# Patient Record
Sex: MALE | Race: BLACK OR AFRICAN AMERICAN | Employment: FULL TIME | URBAN - METROPOLITAN AREA
[De-identification: names, ages, dates, MRNs, and addresses within clinical notes are randomized per-mention and may not be internally consistent; named-entity substitution may affect disease eponyms.]

---

## 2019-07-24 ENCOUNTER — HOSPITAL ENCOUNTER (INPATIENT)
Age: 59
LOS: 1 days | Discharge: HOME | DRG: 261 | End: 2019-07-26
Attending: EMERGENCY MEDICINE | Admitting: INTERNAL MEDICINE

## 2019-07-24 ENCOUNTER — APPOINTMENT (OUTPATIENT)
Dept: CT IMAGING | Age: 59
DRG: 261 | End: 2019-07-24

## 2019-07-24 DIAGNOSIS — N40.0 ENLARGED PROSTATE: ICD-10-CM

## 2019-07-24 DIAGNOSIS — K59.00 CONSTIPATION, UNSPECIFIED CONSTIPATION TYPE: ICD-10-CM

## 2019-07-24 DIAGNOSIS — R33.8 ACUTE RETENTION OF URINE: Primary | ICD-10-CM

## 2019-07-24 DIAGNOSIS — N17.9 ACUTE RENAL FAILURE, UNSPECIFIED ACUTE RENAL FAILURE TYPE (HCC): ICD-10-CM

## 2019-07-24 DIAGNOSIS — R19.7 DIARRHEA OF PRESUMED INFECTIOUS ORIGIN: ICD-10-CM

## 2019-07-24 DIAGNOSIS — K52.1 DIARRHEA DUE TO DRUG: ICD-10-CM

## 2019-07-24 PROBLEM — R33.9 URINARY RETENTION: Status: ACTIVE | Noted: 2019-07-24

## 2019-07-24 LAB
A/G RATIO: 1.2 (ref 1.1–2.2)
ALBUMIN SERPL-MCNC: 4.2 G/DL (ref 3.4–5)
ALP BLD-CCNC: 60 U/L (ref 40–129)
ALT SERPL-CCNC: 55 U/L (ref 10–40)
ANION GAP SERPL CALCULATED.3IONS-SCNC: 14 MMOL/L (ref 3–16)
AST SERPL-CCNC: 54 U/L (ref 15–37)
BASOPHILS ABSOLUTE: 0.1 K/UL (ref 0–0.2)
BASOPHILS RELATIVE PERCENT: 0.4 %
BILIRUB SERPL-MCNC: 1.9 MG/DL (ref 0–1)
BILIRUBIN URINE: NEGATIVE
BLOOD, URINE: ABNORMAL
BUN BLDV-MCNC: 24 MG/DL (ref 7–20)
CALCIUM SERPL-MCNC: 9.3 MG/DL (ref 8.3–10.6)
CHLORIDE BLD-SCNC: 102 MMOL/L (ref 99–110)
CLARITY: ABNORMAL
CO2: 24 MMOL/L (ref 21–32)
COLOR: YELLOW
CREAT SERPL-MCNC: 2.1 MG/DL (ref 0.8–1.3)
EOSINOPHILS ABSOLUTE: 0 K/UL (ref 0–0.6)
EOSINOPHILS RELATIVE PERCENT: 0 %
EPITHELIAL CELLS, UA: 0 /HPF (ref 0–5)
GFR AFRICAN AMERICAN: 39
GFR NON-AFRICAN AMERICAN: 32
GLOBULIN: 3.6 G/DL
GLUCOSE BLD-MCNC: 128 MG/DL (ref 70–99)
GLUCOSE URINE: NEGATIVE MG/DL
HCT VFR BLD CALC: 41.9 % (ref 40.5–52.5)
HEMOGLOBIN: 13.4 G/DL (ref 13.5–17.5)
HYALINE CASTS: 2 /LPF (ref 0–8)
KETONES, URINE: NEGATIVE MG/DL
LEUKOCYTE ESTERASE, URINE: NEGATIVE
LYMPHOCYTES ABSOLUTE: 0.6 K/UL (ref 1–5.1)
LYMPHOCYTES RELATIVE PERCENT: 5.1 %
MCH RBC QN AUTO: 25.9 PG (ref 26–34)
MCHC RBC AUTO-ENTMCNC: 31.9 G/DL (ref 31–36)
MCV RBC AUTO: 81.2 FL (ref 80–100)
MICROSCOPIC EXAMINATION: YES
MONOCYTES ABSOLUTE: 0.9 K/UL (ref 0–1.3)
MONOCYTES RELATIVE PERCENT: 7.2 %
NEUTROPHILS ABSOLUTE: 11 K/UL (ref 1.7–7.7)
NEUTROPHILS RELATIVE PERCENT: 87.3 %
NITRITE, URINE: NEGATIVE
PDW BLD-RTO: 15.7 % (ref 12.4–15.4)
PH UA: 6 (ref 5–8)
PLATELET # BLD: 259 K/UL (ref 135–450)
PMV BLD AUTO: 8.2 FL (ref 5–10.5)
POTASSIUM SERPL-SCNC: 4.3 MMOL/L (ref 3.5–5.1)
PROTEIN UA: 30 MG/DL
RBC # BLD: 5.16 M/UL (ref 4.2–5.9)
RBC UA: 593 /HPF (ref 0–4)
SODIUM BLD-SCNC: 140 MMOL/L (ref 136–145)
SPECIFIC GRAVITY UA: 1.01 (ref 1–1.03)
TOTAL PROTEIN: 7.8 G/DL (ref 6.4–8.2)
URINE REFLEX TO CULTURE: ABNORMAL
URINE TYPE: ABNORMAL
UROBILINOGEN, URINE: 0.2 E.U./DL
WBC # BLD: 12.6 K/UL (ref 4–11)
WBC UA: 3 /HPF (ref 0–5)

## 2019-07-24 PROCEDURE — 51702 INSERT TEMP BLADDER CATH: CPT

## 2019-07-24 PROCEDURE — 96361 HYDRATE IV INFUSION ADD-ON: CPT

## 2019-07-24 PROCEDURE — 80053 COMPREHEN METABOLIC PANEL: CPT

## 2019-07-24 PROCEDURE — 81001 URINALYSIS AUTO W/SCOPE: CPT

## 2019-07-24 PROCEDURE — 36415 COLL VENOUS BLD VENIPUNCTURE: CPT

## 2019-07-24 PROCEDURE — 74176 CT ABD & PELVIS W/O CONTRAST: CPT

## 2019-07-24 PROCEDURE — 2580000003 HC RX 258: Performed by: PHYSICIAN ASSISTANT

## 2019-07-24 PROCEDURE — 2580000003 HC RX 258: Performed by: INTERNAL MEDICINE

## 2019-07-24 PROCEDURE — 85025 COMPLETE CBC W/AUTO DIFF WBC: CPT

## 2019-07-24 PROCEDURE — 96360 HYDRATION IV INFUSION INIT: CPT

## 2019-07-24 PROCEDURE — 99284 EMERGENCY DEPT VISIT MOD MDM: CPT

## 2019-07-24 PROCEDURE — G0378 HOSPITAL OBSERVATION PER HR: HCPCS

## 2019-07-24 PROCEDURE — 6370000000 HC RX 637 (ALT 250 FOR IP): Performed by: INTERNAL MEDICINE

## 2019-07-24 RX ORDER — FINASTERIDE 5 MG/1
5 TABLET, FILM COATED ORAL DAILY
Status: DISCONTINUED | OUTPATIENT
Start: 2019-07-24 | End: 2019-07-26 | Stop reason: HOSPADM

## 2019-07-24 RX ORDER — AMLODIPINE BESYLATE 5 MG/1
5 TABLET ORAL DAILY
Status: DISCONTINUED | OUTPATIENT
Start: 2019-07-24 | End: 2019-07-25

## 2019-07-24 RX ORDER — ONDANSETRON 2 MG/ML
4 INJECTION INTRAMUSCULAR; INTRAVENOUS EVERY 6 HOURS PRN
Status: DISCONTINUED | OUTPATIENT
Start: 2019-07-24 | End: 2019-07-26 | Stop reason: HOSPADM

## 2019-07-24 RX ORDER — 0.9 % SODIUM CHLORIDE 0.9 %
500 INTRAVENOUS SOLUTION INTRAVENOUS ONCE
Status: COMPLETED | OUTPATIENT
Start: 2019-07-24 | End: 2019-07-24

## 2019-07-24 RX ORDER — SODIUM CHLORIDE 0.9 % (FLUSH) 0.9 %
10 SYRINGE (ML) INJECTION PRN
Status: DISCONTINUED | OUTPATIENT
Start: 2019-07-24 | End: 2019-07-26 | Stop reason: HOSPADM

## 2019-07-24 RX ORDER — TAMSULOSIN HYDROCHLORIDE 0.4 MG/1
0.4 CAPSULE ORAL DAILY
Status: DISCONTINUED | OUTPATIENT
Start: 2019-07-24 | End: 2019-07-26 | Stop reason: HOSPADM

## 2019-07-24 RX ORDER — SODIUM CHLORIDE 0.9 % (FLUSH) 0.9 %
10 SYRINGE (ML) INJECTION EVERY 12 HOURS SCHEDULED
Status: DISCONTINUED | OUTPATIENT
Start: 2019-07-24 | End: 2019-07-26 | Stop reason: HOSPADM

## 2019-07-24 RX ADMIN — TAMSULOSIN HYDROCHLORIDE 0.4 MG: 0.4 CAPSULE ORAL at 17:20

## 2019-07-24 RX ADMIN — AMLODIPINE BESYLATE 5 MG: 5 TABLET ORAL at 23:02

## 2019-07-24 RX ADMIN — FINASTERIDE 5 MG: 5 TABLET, FILM COATED ORAL at 17:20

## 2019-07-24 RX ADMIN — Medication 10 ML: at 23:02

## 2019-07-24 RX ADMIN — SODIUM CHLORIDE 500 ML: 9 INJECTION, SOLUTION INTRAVENOUS at 13:05

## 2019-07-24 SDOH — HEALTH STABILITY: MENTAL HEALTH: HOW OFTEN DO YOU HAVE A DRINK CONTAINING ALCOHOL?: NEVER

## 2019-07-24 ASSESSMENT — PAIN SCALES - GENERAL
PAINLEVEL_OUTOF10: 0
PAINLEVEL_OUTOF10: 9

## 2019-07-24 ASSESSMENT — ENCOUNTER SYMPTOMS
BLOOD IN STOOL: 0
COUGH: 0
VOMITING: 0
BACK PAIN: 0
CONSTIPATION: 1
ABDOMINAL PAIN: 1
WHEEZING: 0
DIARRHEA: 0
COLOR CHANGE: 0
SHORTNESS OF BREATH: 0
STRIDOR: 0
RECTAL PAIN: 0
ABDOMINAL DISTENTION: 1
ANAL BLEEDING: 0
NAUSEA: 0

## 2019-07-24 ASSESSMENT — PAIN DESCRIPTION - LOCATION: LOCATION: ABDOMEN

## 2019-07-24 NOTE — ED PROVIDER NOTES
meetings of clubs or organizations: None     Relationship status: None    Intimate partner violence:     Fear of current or ex partner: None     Emotionally abused: None     Physically abused: None     Forced sexual activity: None   Other Topics Concern    None   Social History Narrative    None       SCREENINGS             PHYSICAL EXAM    (up to 7 for level 4, 8 or more for level 5)     ED Triage Vitals [07/24/19 1052]   BP Temp Temp Source Pulse Resp SpO2 Height Weight   (!) 172/129 97.9 °F (36.6 °C) Infrared 79 16 98 % -- 202 lb (91.6 kg)       Physical Exam   Constitutional: He is oriented to person, place, and time. He appears well-developed and well-nourished. No distress. HENT:   Head: Normocephalic and atraumatic. Right Ear: External ear normal.   Left Ear: External ear normal.   Nose: Nose normal.   Mouth/Throat: Oropharynx is clear and moist.   Eyes: Pupils are equal, round, and reactive to light. Conjunctivae and EOM are normal. Right eye exhibits no discharge. Left eye exhibits no discharge. No scleral icterus. Neck: Normal range of motion. No JVD present. Cardiovascular: Normal rate. Pulmonary/Chest: Effort normal and breath sounds normal.   Abdominal: Soft. Bowel sounds are normal. He exhibits distension. He exhibits no abdominal bruit and no pulsatile midline mass. There is tenderness in the suprapubic area. There is no rigidity, no rebound, no guarding, no CVA tenderness, no tenderness at McBurney's point and negative Kebede's sign. Hernia confirmed negative in the right inguinal area and confirmed negative in the left inguinal area. Genitourinary: Testes normal and penis normal. Cremasteric reflex is present. Right testis shows no mass, no swelling and no tenderness. Right testis is descended. Cremasteric reflex is not absent on the right side. Left testis shows no mass, no swelling and no tenderness. Left testis is descended. Cremasteric reflex is not absent on the left side. Uncircumcised. No phimosis, paraphimosis, hypospadias, penile erythema or penile tenderness. No discharge found. Musculoskeletal: Normal range of motion. Lymphadenopathy:     He has no cervical adenopathy. No inguinal adenopathy noted on the right or left side. Neurological: He is alert and oriented to person, place, and time. Skin: Skin is warm and dry. Capillary refill takes less than 2 seconds. No rash noted. He is not diaphoretic. No erythema. No pallor. Psychiatric: He has a normal mood and affect. His behavior is normal.   Nursing note and vitals reviewed.       DIAGNOSTIC RESULTS   LABS:    Labs Reviewed   CBC WITH AUTO DIFFERENTIAL - Abnormal; Notable for the following components:       Result Value    WBC 12.6 (*)     Hemoglobin 13.4 (*)     MCH 25.9 (*)     RDW 15.7 (*)     Neutrophils # 11.0 (*)     Lymphocytes # 0.6 (*)     All other components within normal limits    Narrative:     Performed at:  [facility] [address] [address] [employer]   Phone [phone]   COMPREHENSIVE METABOLIC PANEL - Abnormal; Notable for the following components:    Glucose 128 (*)     BUN 24 (*)     CREATININE 2.1 (*)     GFR Non- 32 (*)     GFR  39 (*)     Total Bilirubin 1.9 (*)     ALT 55 (*)     AST 54 (*)     All other components within normal limits    Narrative:     Performed at:  [facility] [address] [address] [employer]   Phone [phone]   URINE RT REFLEX TO CULTURE - Abnormal; Notable for the following components:    Clarity, UA CLOUDY (*)     Blood, Urine LARGE (*)     Protein, UA 30 (*)     All other components within normal limits    Narrative:     Performed at:  [facility]  [address] [address] [employer]   Phone [phone]   MICROSCOPIC URINALYSIS - Abnormal; Notable for the following components:    RBC,  (*)     All other components

## 2019-07-24 NOTE — CONSULTS
symptoms located lower abdomen described as continuous and rated as severe. Patient also reports long history of LUTS as above. No prior urologic workup. Past Medical History:  He has no past medical history on file. Past Surgical History:  He has no past surgical history on file. Allergies:  No Known Allergies     Social History:  He reports that he has never smoked. He does not have any smokeless tobacco history on file. He reports that he does not drink alcohol or use drugs. Family History:  family history is not on file. Medications:  Scheduled Meds:   sodium chloride  500 mL Intravenous Once     Continuous Infusions:  PRN Meds:     Review of Systems:  Constitutional: Negative for fever   Genitourinary: + lower abdomen pain, diff urinating, see HPI  Eyes: negative for sudden change in vision  EENT: no complaints  Cardiovascular: Negative for chest pain  Respiratory: Negative for shortness of breath  Gastrointestinal: Negative for nausea  Musculoskeletal: Negative for back pain  Neurological: Negative for weakness  Psychiatric: Negative for anxiety  Integumentary: Negative for rashes or adenopathy    PHYSICAL EXAM     Vitals:    07/24/19 1305   BP: (!) 144/110   Pulse: 60   Resp: 16   Temp:    SpO2: 96%     Constitutional: NAD, well-developed, well-nourished. HEENT: MMM. Hearing intact. PERRL  Neck: no thyroid masses appreciated. Trachea is midline. Neck appears unremarkable  Lymph: no palpable adenopathy in supraclavicular, or axillary lymph nodes  Cardiovascular: Regular rate. No peripheral edema  Respiratory: Respirations are even and non-labored. No audible breath sounds. Genitourinary: uncircumcised penis, glans normal, no penile discharge. No rashes/lesions. Testes descended bilaterally, no masses, nontender to palpation. Remainder of scrotal contents normal. No hernia appreciated. MEET >50g no nodules  Abdomen: Soft.  No distension since catheter placement, no tenderness, hydronephrosis bilaterally. Severe bilateral perinephric edema with fluid tracking inferiorly within the perinephric and anterior pararenal spaces. 4 cm right renal cyst.  Remaining solid organs and gallbladder have an unremarkable noncontrast appearance. GI/Bowel: Colonic diverticulosis. No other gastrointestinal abnormality demonstrated Pelvis: Enlarged prostate. Don catheter in the urinary bladder. Moderately distended urinary bladder with mild wall thickening. No pelvic fluid Peritoneum/Retroperitoneum: No ascites or pneumoperitoneum. Aorta normal in caliber Bones/Soft Tissues: Diffuse lumbar degenerative disc disease     Enlarged prostate. Moderately distended urinary bladder with mild wall thickening compatible with chronic bladder outlet obstruction. There is a Don catheter in place. There is severe bilateral perinephric edema. This finding can be seen in the setting of acute renal insufficiency or obstructive uropathy.   There is no significant hydronephrosis currently, however if the Don catheter was recently placed, there may be recently relieved obstructive uropathy            Electronically signed by: Jonah Heath, 7/24/2019 1:13 PM  [facility]  Office contact: [phone]

## 2019-07-24 NOTE — PLAN OF CARE
minimized  Outcome: Ongoing  Goal: Cerebral tissue perfusion will improve  Description  Cerebral tissue perfusion will improve  Outcome: Ongoing     Problem: Coping:  Goal: Ability to identify and develop effective coping behavior will improve  Description  Ability to identify and develop effective coping behavior will improve  Outcome: Ongoing     Problem: Nutritional:  Goal: Ability to identify appropriate dietary choices will improve  Description  Ability to identify appropriate dietary choices will improve  Outcome: Ongoing

## 2019-07-24 NOTE — H&P
Sclera clear, pupils equal  ENT: Moist mucus membranes, no thrush. Trachea midline. Cardiovascular: Regular rhythm, normal S1, S2. No murmur, gallop, rub. No edema in lower extremities  Respiratory: Clear to auscultation bilaterally, no wheeze, good inspiratory effort  Gastrointestinal: Abdomen soft, non-tender, not distended, normal bowel sounds  Musculoskeletal: No cyanosis in digits, neck supple  Neurology: Cranial nerves grossly intact. Alert and oriented in time, place and person. No speech or motor deficits  Psychiatry: Appropriate affect. Not agitated  Skin: Warm, dry, normal turgor, no rash  Brisk capillary refill, peripheral pulses palpable   Labs:  CBC:   Lab Results   Component Value Date    WBC 12.6 07/24/2019    RBC 5.16 07/24/2019    HGB 13.4 07/24/2019    HCT 41.9 07/24/2019    MCV 81.2 07/24/2019    MCH 25.9 07/24/2019    MCHC 31.9 07/24/2019    RDW 15.7 07/24/2019     07/24/2019    MPV 8.2 07/24/2019     BMP:    Lab Results   Component Value Date     07/24/2019    K 4.3 07/24/2019     07/24/2019    CO2 24 07/24/2019    BUN 24 07/24/2019    CREATININE 2.1 07/24/2019    CALCIUM 9.3 07/24/2019    GFRAA 39 07/24/2019    LABGLOM 32 07/24/2019    GLUCOSE 128 07/24/2019     CT ABDOMEN PELVIS WO CONTRAST Additional Contrast? None   Final Result   Enlarged prostate. Moderately distended urinary bladder with mild wall   thickening compatible with chronic bladder outlet obstruction. There is a   Don catheter in place. There is severe bilateral perinephric edema. This   finding can be seen in the setting of acute renal insufficiency or   obstructive uropathy.   There is no significant hydronephrosis currently,   however if the Don catheter was recently placed, there may be recently   relieved obstructive uropathy         CT abdomen shows   Enlarged prostate.  Moderately distended urinary bladder with mild wall   thickening compatible with chronic bladder outlet obstruction

## 2019-07-25 PROBLEM — R33.8 ACUTE RETENTION OF URINE: Status: ACTIVE | Noted: 2019-07-24

## 2019-07-25 LAB
A/G RATIO: 1.2 (ref 1.1–2.2)
ALBUMIN SERPL-MCNC: 3.3 G/DL (ref 3.4–5)
ALP BLD-CCNC: 46 U/L (ref 40–129)
ALT SERPL-CCNC: 40 U/L (ref 10–40)
ANION GAP SERPL CALCULATED.3IONS-SCNC: 9 MMOL/L (ref 3–16)
AST SERPL-CCNC: 37 U/L (ref 15–37)
BILIRUB SERPL-MCNC: 1.8 MG/DL (ref 0–1)
BUN BLDV-MCNC: 23 MG/DL (ref 7–20)
CALCIUM SERPL-MCNC: 8.4 MG/DL (ref 8.3–10.6)
CHLORIDE BLD-SCNC: 107 MMOL/L (ref 99–110)
CO2: 27 MMOL/L (ref 21–32)
CREAT SERPL-MCNC: 1.3 MG/DL (ref 0.8–1.3)
EKG ATRIAL RATE: 144 BPM
EKG DIAGNOSIS: NORMAL
EKG Q-T INTERVAL: 362 MS
EKG QRS DURATION: 96 MS
EKG QTC CALCULATION (BAZETT): 514 MS
EKG R AXIS: 2 DEGREES
EKG T AXIS: 199 DEGREES
EKG VENTRICULAR RATE: 121 BPM
GFR AFRICAN AMERICAN: >60
GFR NON-AFRICAN AMERICAN: 56
GLOBULIN: 2.8 G/DL
GLUCOSE BLD-MCNC: 98 MG/DL (ref 70–99)
HCT VFR BLD CALC: 38.4 % (ref 40.5–52.5)
HEMOGLOBIN: 12.6 G/DL (ref 13.5–17.5)
LV EF: 33 %
LVEF MODALITY: NORMAL
MCH RBC QN AUTO: 26.3 PG (ref 26–34)
MCHC RBC AUTO-ENTMCNC: 32.8 G/DL (ref 31–36)
MCV RBC AUTO: 80.1 FL (ref 80–100)
PDW BLD-RTO: 15.3 % (ref 12.4–15.4)
PLATELET # BLD: 212 K/UL (ref 135–450)
PMV BLD AUTO: 7.6 FL (ref 5–10.5)
POTASSIUM REFLEX MAGNESIUM: 4.4 MMOL/L (ref 3.5–5.1)
RBC # BLD: 4.79 M/UL (ref 4.2–5.9)
SODIUM BLD-SCNC: 143 MMOL/L (ref 136–145)
TOTAL PROTEIN: 6.1 G/DL (ref 6.4–8.2)
TROPONIN: <0.01 NG/ML
TROPONIN: <0.01 NG/ML
TSH REFLEX: 0.49 UIU/ML (ref 0.27–4.2)
WBC # BLD: 7.5 K/UL (ref 4–11)

## 2019-07-25 PROCEDURE — 84484 ASSAY OF TROPONIN QUANT: CPT

## 2019-07-25 PROCEDURE — 85027 COMPLETE CBC AUTOMATED: CPT

## 2019-07-25 PROCEDURE — 99255 IP/OBS CONSLTJ NEW/EST HI 80: CPT | Performed by: INTERNAL MEDICINE

## 2019-07-25 PROCEDURE — 93306 TTE W/DOPPLER COMPLETE: CPT

## 2019-07-25 PROCEDURE — 96372 THER/PROPH/DIAG INJ SC/IM: CPT

## 2019-07-25 PROCEDURE — G0378 HOSPITAL OBSERVATION PER HR: HCPCS

## 2019-07-25 PROCEDURE — 2580000003 HC RX 258: Performed by: INTERNAL MEDICINE

## 2019-07-25 PROCEDURE — 93010 ELECTROCARDIOGRAM REPORT: CPT | Performed by: INTERNAL MEDICINE

## 2019-07-25 PROCEDURE — 96374 THER/PROPH/DIAG INJ IV PUSH: CPT

## 2019-07-25 PROCEDURE — 93005 ELECTROCARDIOGRAM TRACING: CPT | Performed by: INTERNAL MEDICINE

## 2019-07-25 PROCEDURE — 6360000002 HC RX W HCPCS: Performed by: INTERNAL MEDICINE

## 2019-07-25 PROCEDURE — 80053 COMPREHEN METABOLIC PANEL: CPT

## 2019-07-25 PROCEDURE — 6370000000 HC RX 637 (ALT 250 FOR IP): Performed by: INTERNAL MEDICINE

## 2019-07-25 PROCEDURE — 2500000003 HC RX 250 WO HCPCS: Performed by: INTERNAL MEDICINE

## 2019-07-25 PROCEDURE — 36415 COLL VENOUS BLD VENIPUNCTURE: CPT

## 2019-07-25 PROCEDURE — 84443 ASSAY THYROID STIM HORMONE: CPT

## 2019-07-25 RX ORDER — DILTIAZEM HYDROCHLORIDE 5 MG/ML
10 INJECTION INTRAVENOUS ONCE
Status: COMPLETED | OUTPATIENT
Start: 2019-07-25 | End: 2019-07-25

## 2019-07-25 RX ADMIN — TAMSULOSIN HYDROCHLORIDE 0.4 MG: 0.4 CAPSULE ORAL at 08:21

## 2019-07-25 RX ADMIN — AMLODIPINE BESYLATE 5 MG: 5 TABLET ORAL at 08:21

## 2019-07-25 RX ADMIN — FINASTERIDE 5 MG: 5 TABLET, FILM COATED ORAL at 08:21

## 2019-07-25 RX ADMIN — Medication 10 ML: at 08:22

## 2019-07-25 RX ADMIN — DILTIAZEM HYDROCHLORIDE 30 MG: 30 TABLET, FILM COATED ORAL at 13:07

## 2019-07-25 RX ADMIN — ENOXAPARIN SODIUM 40 MG: 40 INJECTION SUBCUTANEOUS at 08:21

## 2019-07-25 RX ADMIN — DILTIAZEM HYDROCHLORIDE 10 MG: 5 INJECTION INTRAVENOUS at 10:45

## 2019-07-25 RX ADMIN — APIXABAN 5 MG: 5 TABLET, FILM COATED ORAL at 21:49

## 2019-07-25 RX ADMIN — APIXABAN 5 MG: 5 TABLET, FILM COATED ORAL at 13:07

## 2019-07-25 RX ADMIN — Medication 10 ML: at 21:49

## 2019-07-25 RX ADMIN — DILTIAZEM HYDROCHLORIDE 30 MG: 30 TABLET, FILM COATED ORAL at 18:00

## 2019-07-25 ASSESSMENT — PAIN SCALES - GENERAL
PAINLEVEL_OUTOF10: 0

## 2019-07-25 NOTE — CONSULTS
[address]   Electrophysiology Consultation   Date: 7/25/2019  Reason for Consultation: Atrial fibrillation   Consult Requesting Physician: Henri Benitez MD     Chief Complaint   Patient presents with    Urinary Retention     Pt states he has not urinated in over 24hrs. Pain to abdomen     HPI: Kyle Worley is a 59 y.o. male with Hx of HTN and BPH. No history of AF or arrhythmias. Pt presented for constipation and urinary retention starting on Tuesday. A ernst was placed in ER and 2,200 mL of urine was returned . he went into atrial fibrillation this morning with non-specific T wave inversion, given 10 mg IV cardizem bolus and started on PO. Patient seen and examined. Clinical notes reviewed. Telemetry reviewed. No new complaints today. No major events overnight. Denies having chest pain, palpitations, shortness of breath, orthopnea/PND, cough, or dizziness at the time of this visit. Past Medical History:   Diagnosis Date    Hypertension         History reviewed. No pertinent surgical history. No Known Allergies    Social History:  Reviewed. reports that he has never smoked. He has never used smokeless tobacco. He reports that he does not drink alcohol or use drugs. Family History:  Reviewed. family history includes Heart Attack in his mother; Heart Disease in his mother; High Blood Pressure in his father and mother; Other in his father; Stroke in his father. Review of System:  All other systems reviewed and are negative except for that noted above.  Pertinent negatives are:     · General: negative for fever, chills   · Ophthalmic ROS: negative for - eye pain or loss of vision  · ENT ROS: negative for - headaches, sore throat   · Respiratory: negative for - cough, sputum  · Cardiovascular: Reviewed in HPI  · Gastrointestinal: negative for - abdominal pain, diarrhea, N/V  · Hematology: negative for - bleeding, blood clots, bruising or jaundice  · Genito-Urinary:  negative for - results found for: BNP  No results found for: PROTIME, INR  No results found for: CHOL, HDL, TRIG    ECG: Atrial fibrillation with rapid ventricular response  with pvcsT wave abnormality, consider lateral ischemia  or LVHProlonged QTBaseline   Echo:   Cath:     Scheduled Meds:   apixaban  5 mg Oral BID    diltiazem  30 mg Oral 4 times per day    sodium chloride flush  10 mL Intravenous 2 times per day    tamsulosin  0.4 mg Oral Daily    finasteride  5 mg Oral Daily     Continuous Infusions:  PRN Meds:.perflutren lipid microspheres, sodium chloride flush, magnesium hydroxide, ondansetron     Patient Active Problem List    Diagnosis Date Noted    Urinary retention 07/24/2019      Active Hospital Problems    Diagnosis Date Noted    Urinary retention [R33.9] 07/24/2019       Assessment:       Plan:    - paroxysmal atrial fibrillation   I am not sure how Atrial fibrillation was detected since no baseline ECG was available but exam from yesterday was sinus   [id number] score 1. anticoagulation for 4 weeks following conversion   I would do cardioversion since it is of new onset if he stays in Atrial fibrillation by tomorrow     Rate is on the higher side, will monitor     Will consider long term monitoring with Implantable Loop recorder   Afib risk factors including age, HTN, obesity, inactivity and JOLENE were discussed with patient. Risk factor modification recommended. All questions were answered. - Treatment options including cardioversion, rate control strategy, anticoagulation were discussed with patient. Risks, benefits and alternative of each treatment options were explained. All questions answered. Mallampati 3  ASA 3    - HTN       BP is well controlled. Continue current meds.     - Urinary retention   Followed by urology      - SACHIN   Obstructive uropathy   Improved    Thank you for allowing me to participate in the care of Lisa Lind     NOTE: This report was transcribed using voice recognition software. Every effort was made to ensure accuracy, however, inadvertent computerized transcription errors may be present.

## 2019-07-25 NOTE — PROGRESS NOTES
Shift assessment complete, heart rhythm irregular, notified MD tele placed on per MD order, pt developed A Fib according to ECG strip, notified MD with ECG reading, Cardizem IV bolus administered per MD order/ MAR, monitor vss, administered morning med per [facility], pt denied pain, call light within reach,pt alert and oriented x4 no needs reported at this time

## 2019-07-25 NOTE — PROGRESS NOTES
Regular rate   Resp: unlabored respirations  Abd: Soft, non-distended, non-tender, no masses  Ext: no peripheral edema noted, moves upper and lower extremities spontaneously  Skin: warmand well perfused, no rashes noted on the face, or arms.      Don cyu    Labs:  Lab Results   Component Value Date    WBC 7.5 07/25/2019    HGB 12.6 (L) 07/25/2019    HCT 38.4 (L) 07/25/2019    MCV 80.1 07/25/2019     07/25/2019     Lab Results   Component Value Date    CREATININE 1.3 07/25/2019    BUN 23 (H) 07/25/2019     07/25/2019    K 4.4 07/25/2019     07/25/2019    CO2 27 07/25/2019       Tali Floyd MD  7/25/2019

## 2019-07-25 NOTE — PROGRESS NOTES
[facility] PROGRESS NOTE    7/25/2019 11:58 AM        Name: Ludy Garcia . Admitted: 7/24/2019  Primary Care Provider: No primary care provider on file. (Tel: None)      Subjective: Developed new onset atrial for ablation this morning received 10 mg Cardizem bolus Don in place no abdominal pain no chest pain        Reviewed interval ancillary notes    Current Medications    perflutren lipid microspheres (DEFINITY) injection 1.65 mg ONCE PRN   apixaban (ELIQUIS) tablet 5 mg BID   diltiazem (CARDIZEM) tablet 30 mg 4 times per day   sodium chloride flush 0.9 % injection 10 mL 2 times per day   sodium chloride flush 0.9 % injection 10 mL PRN   magnesium hydroxide (MILK OF MAGNESIA) 400 MG/5ML suspension 30 mL Daily PRN   ondansetron (ZOFRAN) injection 4 mg Q6H PRN   tamsulosin (FLOMAX) capsule 0.4 mg Daily   finasteride (PROSCAR) tablet 5 mg Daily       Objective:  /65   Pulse 108   Temp 98.3 °F (36.8 °C) (Oral)   Resp 18   Ht 6' 2\" (1.88 m)   Wt 202 lb (91.6 kg)   SpO2 92%   BMI 25.94 kg/m²     Intake/Output Summary (Last 24 hours) at 7/25/2019 1158  Last data filed at 7/25/2019 3666  Gross per 24 hour   Intake --   Output 1900 ml   Net -1900 ml    Wt Readings from Last 3 Encounters:   07/24/19 202 lb (91.6 kg)       General appearance:  Appears comfortable  Eyes: Sclera clear. Pupils equal.  ENT: Moist oral mucosa. Trachea midline, no adenopathy. Cardiovascular: Regular rhythm, normal S1, S2. No murmur. No edema in lower extremities  Respiratory: Not using accessory muscles. Good inspiratory effort. Clear to auscultation bilaterally, no wheeze or crackles. GI: Abdomen soft, no tenderness, not distended, normal bowel sounds  Musculoskeletal: No cyanosis in digits, neck supple  Neurology: CN 2-12 grossly intact. No speech or motor deficits  Psych: Normal affect.  Alert and oriented in time, place and

## 2019-07-25 NOTE — PROGRESS NOTES
4 Eyes Skin Assessment     The patient is being assess for  Admission    I agree that 2 RN's have performed a thorough Head to Toe Skin Assessment on the patient. ALL assessment sites listed below have been assessed. Areas assessed by both nurses:   [x]   Head, Face, and Ears   [x]   Shoulders, Back, and Chest  [x]   Arms, Elbows, and Hands   [x]   Coccyx, Sacrum, and IschIum  [x]   Legs, Feet, and Heels        Does the Patient have Skin Breakdown?   No         J Carlos Prevention initiated:  No   Wound Care Orders initiated:  No      [facility] nurse consulted for Pressure Injury (Stage 3,4, Unstageable, DTI, NWPT, and Complex wounds), New and Established Ostomies:  No      Nurse 1 eSignature: Electronically signed by Gio Wu RN on 7/25/19 at 7:53 PM    **SHARE this note so that the co-signing nurse is able to place an eSignature**    Nurse 2 eSignature: Electronically signed by Marcella Bonds RN on 7/25/19 at 7:54 PM

## 2019-07-26 VITALS
BODY MASS INDEX: 25.93 KG/M2 | WEIGHT: 202 LBS | TEMPERATURE: 98.2 F | RESPIRATION RATE: 18 BRPM | HEART RATE: 82 BPM | HEIGHT: 74 IN | DIASTOLIC BLOOD PRESSURE: 83 MMHG | SYSTOLIC BLOOD PRESSURE: 118 MMHG | OXYGEN SATURATION: 98 %

## 2019-07-26 DIAGNOSIS — Z45.09 ENCOUNTER FOR LOOP RECORDER CHECK: Primary | ICD-10-CM

## 2019-07-26 PROBLEM — R33.8 ACUTE URINARY RETENTION: Status: ACTIVE | Noted: 2019-07-26

## 2019-07-26 LAB
EKG ATRIAL RATE: 64 BPM
EKG DIAGNOSIS: NORMAL
EKG P AXIS: 102 DEGREES
EKG P-R INTERVAL: 132 MS
EKG Q-T INTERVAL: 442 MS
EKG QRS DURATION: 90 MS
EKG QTC CALCULATION (BAZETT): 477 MS
EKG R AXIS: 21 DEGREES
EKG T AXIS: 137 DEGREES
EKG VENTRICULAR RATE: 70 BPM
LV EF: 27 %
LVEF MODALITY: NORMAL
TROPONIN: <0.01 NG/ML

## 2019-07-26 PROCEDURE — 93017 CV STRESS TEST TRACING ONLY: CPT | Performed by: INTERNAL MEDICINE

## 2019-07-26 PROCEDURE — 33285 INSJ SUBQ CAR RHYTHM MNTR: CPT

## 2019-07-26 PROCEDURE — 94760 N-INVAS EAR/PLS OXIMETRY 1: CPT

## 2019-07-26 PROCEDURE — 84484 ASSAY OF TROPONIN QUANT: CPT

## 2019-07-26 PROCEDURE — 6370000000 HC RX 637 (ALT 250 FOR IP): Performed by: INTERNAL MEDICINE

## 2019-07-26 PROCEDURE — 6360000002 HC RX W HCPCS: Performed by: INTERNAL MEDICINE

## 2019-07-26 PROCEDURE — G0378 HOSPITAL OBSERVATION PER HR: HCPCS

## 2019-07-26 PROCEDURE — 78452 HT MUSCLE IMAGE SPECT MULT: CPT | Performed by: INTERNAL MEDICINE

## 2019-07-26 PROCEDURE — 5A2204Z RESTORATION OF CARDIAC RHYTHM, SINGLE: ICD-10-PCS | Performed by: INTERNAL MEDICINE

## 2019-07-26 PROCEDURE — 93005 ELECTROCARDIOGRAM TRACING: CPT | Performed by: INTERNAL MEDICINE

## 2019-07-26 PROCEDURE — 2580000003 HC RX 258: Performed by: INTERNAL MEDICINE

## 2019-07-26 PROCEDURE — 92960 CARDIOVERSION ELECTRIC EXT: CPT

## 2019-07-26 PROCEDURE — 99233 SBSQ HOSP IP/OBS HIGH 50: CPT | Performed by: INTERNAL MEDICINE

## 2019-07-26 PROCEDURE — 36415 COLL VENOUS BLD VENIPUNCTURE: CPT

## 2019-07-26 PROCEDURE — 1200000000 HC SEMI PRIVATE

## 2019-07-26 PROCEDURE — 7100000010 HC PHASE II RECOVERY - FIRST 15 MIN

## 2019-07-26 PROCEDURE — 2500000003 HC RX 250 WO HCPCS

## 2019-07-26 PROCEDURE — 92960 CARDIOVERSION ELECTRIC EXT: CPT | Performed by: INTERNAL MEDICINE

## 2019-07-26 PROCEDURE — A9502 TC99M TETROFOSMIN: HCPCS | Performed by: INTERNAL MEDICINE

## 2019-07-26 PROCEDURE — 33285 INSJ SUBQ CAR RHYTHM MNTR: CPT | Performed by: INTERNAL MEDICINE

## 2019-07-26 PROCEDURE — 93010 ELECTROCARDIOGRAM REPORT: CPT | Performed by: INTERNAL MEDICINE

## 2019-07-26 PROCEDURE — C1764 EVENT RECORDER, CARDIAC: HCPCS

## 2019-07-26 PROCEDURE — 3430000000 HC RX DIAGNOSTIC RADIOPHARMACEUTICAL: Performed by: INTERNAL MEDICINE

## 2019-07-26 PROCEDURE — 0JH632Z INSERTION OF MONITORING DEVICE INTO CHEST SUBCUTANEOUS TISSUE AND FASCIA, PERCUTANEOUS APPROACH: ICD-10-PCS | Performed by: INTERNAL MEDICINE

## 2019-07-26 RX ORDER — ASPIRIN 81 MG/1
81 TABLET, CHEWABLE ORAL DAILY
Qty: 30 TABLET | Refills: 3 | Status: ON HOLD | OUTPATIENT
Start: 2019-07-27 | End: 2022-02-09 | Stop reason: SDUPTHER

## 2019-07-26 RX ORDER — METOPROLOL SUCCINATE 25 MG/1
25 TABLET, EXTENDED RELEASE ORAL DAILY
Status: DISCONTINUED | OUTPATIENT
Start: 2019-07-26 | End: 2019-07-26 | Stop reason: HOSPADM

## 2019-07-26 RX ORDER — ATORVASTATIN CALCIUM 40 MG/1
40 TABLET, FILM COATED ORAL NIGHTLY
Status: DISCONTINUED | OUTPATIENT
Start: 2019-07-26 | End: 2019-07-26 | Stop reason: HOSPADM

## 2019-07-26 RX ORDER — TAMSULOSIN HYDROCHLORIDE 0.4 MG/1
0.4 CAPSULE ORAL DAILY
Qty: 30 CAPSULE | Refills: 3 | Status: ON HOLD | OUTPATIENT
Start: 2019-07-27 | End: 2021-12-01 | Stop reason: SDUPTHER

## 2019-07-26 RX ORDER — FINASTERIDE 5 MG/1
5 TABLET, FILM COATED ORAL DAILY
Qty: 30 TABLET | Refills: 3 | Status: ON HOLD | OUTPATIENT
Start: 2019-07-27 | End: 2021-12-01 | Stop reason: SDUPTHER

## 2019-07-26 RX ORDER — ATORVASTATIN CALCIUM 40 MG/1
40 TABLET, FILM COATED ORAL NIGHTLY
Qty: 30 TABLET | Refills: 3 | Status: ON HOLD | OUTPATIENT
Start: 2019-07-26 | End: 2021-12-01 | Stop reason: SDUPTHER

## 2019-07-26 RX ORDER — ASPIRIN 81 MG/1
81 TABLET, CHEWABLE ORAL DAILY
Status: DISCONTINUED | OUTPATIENT
Start: 2019-07-26 | End: 2019-07-26 | Stop reason: HOSPADM

## 2019-07-26 RX ORDER — LISINOPRIL 5 MG/1
5 TABLET ORAL DAILY
Qty: 90 TABLET | Refills: 0 | Status: ON HOLD | OUTPATIENT
Start: 2019-07-26 | End: 2021-12-01 | Stop reason: SDUPTHER

## 2019-07-26 RX ADMIN — Medication 10 ML: at 12:28

## 2019-07-26 RX ADMIN — ASPIRIN 81 MG 81 MG: 81 TABLET ORAL at 12:23

## 2019-07-26 RX ADMIN — TETROFOSMIN 10 MILLICURIE: 1.38 INJECTION, POWDER, LYOPHILIZED, FOR SOLUTION INTRAVENOUS at 08:59

## 2019-07-26 RX ADMIN — METOPROLOL TARTRATE 25 MG: 25 TABLET, FILM COATED ORAL at 12:21

## 2019-07-26 RX ADMIN — FINASTERIDE 5 MG: 5 TABLET, FILM COATED ORAL at 12:22

## 2019-07-26 RX ADMIN — DILTIAZEM HYDROCHLORIDE 30 MG: 30 TABLET, FILM COATED ORAL at 06:23

## 2019-07-26 RX ADMIN — TETROFOSMIN 30 MILLICURIE: 1.38 INJECTION, POWDER, LYOPHILIZED, FOR SOLUTION INTRAVENOUS at 10:20

## 2019-07-26 RX ADMIN — REGADENOSON 0.4 MG: 0.08 INJECTION, SOLUTION INTRAVENOUS at 10:16

## 2019-07-26 RX ADMIN — DILTIAZEM HYDROCHLORIDE 30 MG: 30 TABLET, FILM COATED ORAL at 00:53

## 2019-07-26 RX ADMIN — APIXABAN 5 MG: 5 TABLET, FILM COATED ORAL at 12:22

## 2019-07-26 RX ADMIN — TAMSULOSIN HYDROCHLORIDE 0.4 MG: 0.4 CAPSULE ORAL at 12:21

## 2019-07-26 ASSESSMENT — PAIN SCALES - GENERAL
PAINLEVEL_OUTOF10: 0
PAINLEVEL_OUTOF10: 0

## 2019-07-26 NOTE — PROGRESS NOTES
joining rehab when able. Patient provided with CHF Zone Management tool and CHF symptoms magnet. Discussed importance of lifestyle changes: agrees to daily weights    PATIENT/CAREGIVER TEACHING:    Level of patient/caregiver understanding able to:   [x ] Verbalize understanding [ ] Demonstrate understanding [ ] Teach back   [ ] Needs reinforcement [ ] Other:       Time spent teachin mins    1. WEIGHT: Admit Weight: 202 lb (91.6 kg)      Today  Weight: 202 lb (91.6 kg)   2. I/O     Intake/Output Summary (Last 24 hours) at 2019 1450  Last data filed at 2019 0625  Gross per 24 hour   Intake --   Output 1850 ml   Net -1850 ml       Recommendations:   1. He needs one week hospital follow up  2. Educate further on fluid restriction 48 oz- 64 oz during inpatient stay so he can understand how to measure intake at home. 3. Continue to educate on S/S.   4. Emphasize daily weights, diet, and knowing when and who to call  5. Provided patient with CHF Resource Line for questions and concerns.        SHANTHI CALL 2019 2:50 PM

## 2019-07-26 NOTE — PROGRESS NOTES
[facility]   Electrophysiology Progress Note     Admit Date: 2019     Reason for follow up: Atrial fibrillation     HPI and Interval History:   Patient seen and examined. Clinical notes reviewed. Telemetry reviewed. No new complaint today. No major events overnight. Denies having chest pain, shortness of breath, dyspnea on exertion, Orthopnea, PND at the time of this visit. Feels fine  Has Florencia  Review of System:  All other systems reviewed and are negative except for that noted above. Pertinent negatives are:     · General: negative for fever, chills   · Ophthalmic ROS: negative for - eye pain or loss of vision  · ENT ROS: negative for - headaches, sore throat   · Respiratory: negative for - cough, sputum  · Cardiovascular: Reviewed in HPI  · Gastrointestinal: negative for - abdominal pain, diarrhea, N/V  · Hematology: negative for - bleeding, blood clots, bruising or jaundice  · Genito-Urinary:  negative for - Dysuria or incontinence  · Musculoskeletal: negative for - Joint swelling, muscle pain  · Neurological: negative for - confusion, dizziness, headaches   · Psychiatric: No anxiety, no depression. · Dermatological: negative for - rash      Physical Examination:  Vitals:    19 0631   BP:    Pulse: 94   Resp:    Temp:    SpO2:       In: -   Out: 1850    Wt Readings from Last 3 Encounters:   19 202 lb (91.6 kg)     Temp  Av.3 °F (36.8 °C)  Min: 97.8 °F (36.6 °C)  Max: 98.8 °F (37.1 °C)  Pulse  Av.4  Min: 65  Max: 111  BP  Min: 100/66  Max: 137/86  SpO2  Av.2 %  Min: 92 %  Max: 100 %    Intake/Output Summary (Last 24 hours) at 2019 0735  Last data filed at 2019 4042  Gross per 24 hour   Intake --   Output 2800 ml   Net -2800 ml       · Telemetry: Atrial fibrillation   · Constitutional: Oriented. No distress. · Head: Normocephalic and atraumatic.    · Mouth/Throat: Oropharynx is clear and moist.   · Eyes: Conjunctivae normal. EOM are normal.   · Neck:

## 2019-07-26 NOTE — PROGRESS NOTES
CLINICAL PHARMACY NOTE: MEDS TO [address] Select Patient?: No  Total # of Prescriptions Filled: 7   The following medications were delivered to the patient:  · Metoprolol tartrate 25mg  · Lisinopril 5mg  · Asa 81mg chew  · Finasteride 5mg  · tamsulosin 0.4mg  · eliquis 5mg  · Atorvastatin 40mg  Total # of Interventions Completed: 0  Time Spent (min): 45    Additional Documentation:  Added free trial eliquis card  Medications delivered- patient signed  Cristina Melton

## 2019-07-31 ENCOUNTER — TELEPHONE (OUTPATIENT)
Dept: OTHER | Age: 59
End: 2019-07-31

## 2019-08-01 ENCOUNTER — HOSPITAL ENCOUNTER (EMERGENCY)
Age: 59
Discharge: HOME | End: 2019-08-01

## 2019-08-01 ENCOUNTER — NURSE ONLY (OUTPATIENT)
Dept: CARDIOLOGY CLINIC | Age: 59
End: 2019-08-01

## 2019-08-01 VITALS
BODY MASS INDEX: 25.54 KG/M2 | SYSTOLIC BLOOD PRESSURE: 123 MMHG | WEIGHT: 199 LBS | DIASTOLIC BLOOD PRESSURE: 77 MMHG | OXYGEN SATURATION: 99 % | HEART RATE: 69 BPM | HEIGHT: 74 IN | RESPIRATION RATE: 18 BRPM | TEMPERATURE: 99 F

## 2019-08-01 DIAGNOSIS — N39.0 URINARY TRACT INFECTION WITHOUT HEMATURIA, SITE UNSPECIFIED: Primary | ICD-10-CM

## 2019-08-01 DIAGNOSIS — I48.0 PAF (PAROXYSMAL ATRIAL FIBRILLATION) (HCC): ICD-10-CM

## 2019-08-01 DIAGNOSIS — Z46.6 ENCOUNTER FOR FOLEY CATHETER REMOVAL: ICD-10-CM

## 2019-08-01 DIAGNOSIS — Z45.09 ENCOUNTER FOR LOOP RECORDER CHECK: ICD-10-CM

## 2019-08-01 LAB
BACTERIA: ABNORMAL /HPF
BILIRUBIN URINE: NEGATIVE
BLOOD, URINE: ABNORMAL
CLARITY: ABNORMAL
COLOR: YELLOW
EPITHELIAL CELLS, UA: 1 /HPF (ref 0–5)
GLUCOSE URINE: NEGATIVE MG/DL
HYALINE CASTS: 3 /LPF (ref 0–8)
KETONES, URINE: NEGATIVE MG/DL
LEUKOCYTE ESTERASE, URINE: ABNORMAL
MICROSCOPIC EXAMINATION: YES
NITRITE, URINE: NEGATIVE
PH UA: 5.5 (ref 5–8)
PROTEIN UA: 100 MG/DL
RBC UA: 448 /HPF (ref 0–4)
SPECIFIC GRAVITY UA: 1.02 (ref 1–1.03)
URINE REFLEX TO CULTURE: YES
URINE TYPE: ABNORMAL
UROBILINOGEN, URINE: 1 E.U./DL
WBC UA: 278 /HPF (ref 0–5)

## 2019-08-01 PROCEDURE — 81001 URINALYSIS AUTO W/SCOPE: CPT

## 2019-08-01 PROCEDURE — 87077 CULTURE AEROBIC IDENTIFY: CPT

## 2019-08-01 PROCEDURE — 87186 SC STD MICRODIL/AGAR DIL: CPT

## 2019-08-01 PROCEDURE — 99283 EMERGENCY DEPT VISIT LOW MDM: CPT

## 2019-08-01 PROCEDURE — 93291 INTERROG DEV EVAL SCRMS IP: CPT | Performed by: INTERNAL MEDICINE

## 2019-08-01 PROCEDURE — 6370000000 HC RX 637 (ALT 250 FOR IP): Performed by: PHYSICIAN ASSISTANT

## 2019-08-01 PROCEDURE — 87086 URINE CULTURE/COLONY COUNT: CPT

## 2019-08-01 RX ORDER — CEPHALEXIN 500 MG/1
500 CAPSULE ORAL 2 TIMES DAILY
Qty: 10 CAPSULE | Refills: 0 | Status: SHIPPED | OUTPATIENT
Start: 2019-08-01 | End: 2019-08-06

## 2019-08-01 RX ORDER — CEPHALEXIN 250 MG/1
500 CAPSULE ORAL ONCE
Status: COMPLETED | OUTPATIENT
Start: 2019-08-01 | End: 2019-08-01

## 2019-08-01 RX ADMIN — CEPHALEXIN 500 MG: 250 CAPSULE ORAL at 18:34

## 2019-08-01 ASSESSMENT — ENCOUNTER SYMPTOMS
DIARRHEA: 0
ABDOMINAL PAIN: 0
SHORTNESS OF BREATH: 0
VOMITING: 0
WHEEZING: 0
COUGH: 0
RHINORRHEA: 0
NAUSEA: 0

## 2019-08-01 NOTE — DISCHARGE INSTR - COC
Continuity of Care Form    Patient Name: Annie Neri   :  1960  MRN:  [id number]    Admit date:  2019  Discharge date:  ***    Code Status Order: Prior   Advance Directives:     Admitting Physician:  No admitting provider for patient encounter. PCP: No primary care provider on file. Discharging Nurse: [facility] Unit/Room#: ED-0012/12  Discharging Unit Phone Number: ***    Emergency Contact:   Extended Emergency Contact Information  Primary Emergency Contact: [address] [place] [address] Phone: [phone]  Relation: Other   needed? No    Past Surgical History:  Past Surgical History:   Procedure Laterality Date    CARDIOVERSION  2019    Dr. Siria Juan  2019       Immunization History: There is no immunization history on file for this patient.     Active Problems:  Patient Active Problem List   Diagnosis Code    Acute retention of urine R33.8    Acute renal failure (HCC) N17.9    PAF (paroxysmal atrial fibrillation) (HCC) I48.0    Benign essential HTN I10    Dilated cardiomyopathy ([facility] [address] I42.0    Encounter for loop recorder check Z45.09    Acute urinary retention R33.8       Isolation/Infection:   Isolation          No Isolation            Nurse Assessment:  Last Vital Signs: BP (!) 152/92   Pulse 70   Temp 99 °F (37.2 °C) (Infrared)   Resp 16   Ht 6' 2\" (1.88 m)   Wt 199 lb (90.3 kg)   SpO2 99%   BMI 25.55 kg/m²     Last documented pain score (0-10 scale):    Last Weight:   Wt Readings from Last 1 Encounters:   19 199 lb (90.3 kg)     Mental Status:  {IP PT MENTAL STATUS:}    IV Access:  { JOSE ROBERTO IV ACCESS:178841685}    Nursing Mobility/ADLs:  Walking   {CHP DME HCDY:402486469}  Transfer  {P DME CDOY:169292270}  Bathing  {P DME GJHT:130605532}  Dressing  {CHP DME ILDL:194709186}  Toileting  {CHP DME MHIT:893162777}  Feeding  {P DME NRMP:216952545}  Med Admin  {[facility] DME SZQN:980715987}  Med Delivery   {

## 2019-08-01 NOTE — ED PROVIDER NOTES
session: None    Stress: None   Relationships    Social connections:     Talks on phone: None     Gets together: None     Attends Pent[employer]ostal service: None     Active member of club or organization: None     Attends meetings of clubs or organizations: None     Relationship status: None    Intimate partner violence:     Fear of current or ex partner: None     Emotionally abused: None     Physically abused: None     Forced sexual activity: None   Other Topics Concern    None   Social History Narrative    None       SCREENINGS             PHYSICAL EXAM    (up to 7 for level 4, 8 or more for level 5)     ED Triage Vitals [08/01/19 1644]   BP Temp Temp Source Pulse Resp SpO2 Height Weight   (!) 152/92 99 °F (37.2 °C) Infrared 70 16 99 % 6' 2\" (1.88 m) 199 lb (90.3 kg)       Physical Exam   Constitutional: He is oriented to person, place, and time. He appears well-developed and well-nourished. HENT:   Head: Normocephalic and atraumatic. Right Ear: External ear normal.   Left Ear: External ear normal.   Nose: Nose normal.   Eyes: Right eye exhibits no discharge. Left eye exhibits no discharge. Neck: Normal range of motion. Neck supple. Cardiovascular: Normal rate, regular rhythm and normal heart sounds. No murmur heard. Pulmonary/Chest: Effort normal and breath sounds normal. No respiratory distress. Abdominal: Soft. He exhibits no distension. There is no tenderness. Musculoskeletal: Normal range of motion. Neurological: He is alert and oriented to person, place, and time. Skin: Skin is warm and dry. He is not diaphoretic. Psychiatric: He has a normal mood and affect. His behavior is normal.   Nursing note and vitals reviewed.       DIAGNOSTIC RESULTS   LABS:    Labs Reviewed   URINE RT REFLEX TO CULTURE - Abnormal; Notable for the following components:       Result Value    Clarity, UA CLOUDY (*)     Blood, Urine LARGE (*)     Protein,  (*)     Leukocyte Esterase, Urine LARGE (*)     All other

## 2019-08-02 ENCOUNTER — HOSPITAL ENCOUNTER (EMERGENCY)
Age: 59
Discharge: HOME | End: 2019-08-02
Attending: EMERGENCY MEDICINE

## 2019-08-02 VITALS
RESPIRATION RATE: 16 BRPM | TEMPERATURE: 98.1 F | DIASTOLIC BLOOD PRESSURE: 72 MMHG | OXYGEN SATURATION: 100 % | WEIGHT: 199 LBS | HEART RATE: 86 BPM | BODY MASS INDEX: 25.55 KG/M2 | SYSTOLIC BLOOD PRESSURE: 118 MMHG

## 2019-08-02 DIAGNOSIS — R33.8 ACUTE URINARY RETENTION: Primary | ICD-10-CM

## 2019-08-02 LAB
BACTERIA: ABNORMAL /HPF
BILIRUBIN URINE: NEGATIVE
BLOOD, URINE: ABNORMAL
CLARITY: ABNORMAL
COLOR: YELLOW
COMMENT UA: ABNORMAL
EPITHELIAL CELLS, UA: 2 /HPF (ref 0–5)
GLUCOSE URINE: NEGATIVE MG/DL
KETONES, URINE: NEGATIVE MG/DL
LEUKOCYTE ESTERASE, URINE: ABNORMAL
MICROSCOPIC EXAMINATION: YES
MUCUS: ABNORMAL /LPF
NITRITE, URINE: NEGATIVE
PH UA: 6 (ref 5–8)
PROTEIN UA: NEGATIVE MG/DL
RBC UA: ABNORMAL /HPF (ref 0–2)
SPECIFIC GRAVITY UA: 1.01 (ref 1–1.03)
URINE REFLEX TO CULTURE: YES
URINE TYPE: ABNORMAL
UROBILINOGEN, URINE: 0.2 E.U./DL
WBC UA: 16 /HPF (ref 0–5)

## 2019-08-02 PROCEDURE — 51798 US URINE CAPACITY MEASURE: CPT

## 2019-08-02 PROCEDURE — 81001 URINALYSIS AUTO W/SCOPE: CPT

## 2019-08-02 PROCEDURE — 99283 EMERGENCY DEPT VISIT LOW MDM: CPT

## 2019-08-02 PROCEDURE — 87086 URINE CULTURE/COLONY COUNT: CPT

## 2019-08-02 ASSESSMENT — PAIN SCALES - GENERAL: PAINLEVEL_OUTOF10: 8

## 2019-08-02 ASSESSMENT — ENCOUNTER SYMPTOMS
NAUSEA: 0
ABDOMINAL PAIN: 0
VOMITING: 0
SHORTNESS OF BREATH: 0

## 2019-08-02 ASSESSMENT — PAIN DESCRIPTION - LOCATION: LOCATION: ABDOMEN

## 2019-08-03 LAB
ORGANISM: ABNORMAL
URINE CULTURE, ROUTINE: ABNORMAL
URINE CULTURE, ROUTINE: ABNORMAL

## 2019-08-04 LAB — URINE CULTURE, ROUTINE: NORMAL

## 2019-08-04 NOTE — ED PROVIDER NOTES
ED Course as of Aug 03 2310   Sat Aug 03, 2019   2308 Microscopic Urinalysis(!):    Mucus, UA 1+(!)   RBC, UA (!)   Bacteria, UA Rare(!)   Urinalysis Comments see below   WBC, UA 16(!)   Epi Cells 2 [WL]   2308 Urinalysis Reflex to Culture(!):    Color, UA YELLOW   Clarity, UA CLOUDY(!)   Glucose, UA Negative   Bilirubin, Urine Negative   Ketones, Urine Negative   Specific Gravity, UA 1.013   Blood, Urine MODERATE(!)   pH, UA 6.0   Protein, UA Negative   Urobilinogen, Urine 0.2   Nitrite, Urine Negative   Leukocyte Esterase, Urine TRACE(!)   Microscopic Examination YES   Urine Reflex to Culture Yes   Urine Type Not Specified [WL]   2308 Attending Supervising Physician's Attestation Statement  I performed a history and physical examination on the patient and discussed the management with the physician assistant. I reviewed and agree with the findings and plan as documented in her note . Electronically signed by Lubna Cole DO on 8/3/19 at 11:08 PM    Patient presents for evaluation of urinary retention. He had Don removed yesterday, has not been able to urinate since. He reports abdominal sensation of fullness. Denies any back pain. Denies any nausea. Denies any fever chills. He is currently being treated for UTI, and had urine culture yesterday and about a week ago. He is unsure what antibiotic he is on. On my evaluation, following Don placement for significant urinary retention, patient is nontoxic in no acute distress. Abdomen is soft nontender with no guarding or rebound. He has no CVA tenderness. Don was placed with significant relief and repeat culture sent. He does not have any symptoms consistent or concerning for more severe obstruction and does not describe a time course concerning for this either. Culture results reviewed, and sensitivities are not returned yet but E. coli seen on culture from yesterday.   Discussed continued antibiotics and patient will need to follow-up
Transportation needs:     Medical: None     Non-medical: None   Tobacco Use    Smoking status: Never Smoker    Smokeless tobacco: Never Used   Substance and Sexual Activity    Alcohol use: Never     Frequency: Never    Drug use: Never    Sexual activity: Not Currently   Lifestyle    Physical activity:     Days per week: None     Minutes per session: None    Stress: None   Relationships    Social connections:     Talks on phone: None     Gets together: None     Attends Taoist service: None     Active member of club or organization: None     Attends meetings of clubs or organizations: None     Relationship status: None    Intimate partner violence:     Fear of current or ex partner: None     Emotionally abused: None     Physically abused: None     Forced sexual activity: None   Other Topics Concern    None   Social History Narrative    None       SCREENINGS             PHYSICAL EXAM    (up to 7 for level 4, 8 or more for level 5)     ED Triage Vitals [08/02/19 1802]   BP Temp Temp Source Pulse Resp SpO2 Height Weight   118/72 98.1 °F (36.7 °C) Infrared 86 16 100 % -- 199 lb (90.3 kg)       Physical Exam   Constitutional: He is oriented to person, place, and time. He appears well-developed and well-nourished. No distress. HENT:   Head: Normocephalic and atraumatic. Nose: Nose normal.   Mouth/Throat: Oropharynx is clear and moist.   Eyes: Conjunctivae and EOM are normal. Right eye exhibits no discharge. Left eye exhibits no discharge. Neck: Normal range of motion. Neck supple. Cardiovascular: Normal rate, regular rhythm and normal heart sounds. Exam reveals no gallop. No murmur heard. Pulmonary/Chest: Effort normal and breath sounds normal. No respiratory distress. He has no wheezes. He has no rales. Abdominal: Soft. There is no tenderness. Musculoskeletal: Normal range of motion. He exhibits no edema, tenderness or deformity.    Neurological: He is alert and oriented to person, place, and

## 2019-09-14 ENCOUNTER — HOSPITAL ENCOUNTER (EMERGENCY)
Age: 59
Discharge: HOME | End: 2019-09-14

## 2019-09-14 VITALS
RESPIRATION RATE: 16 BRPM | TEMPERATURE: 97.1 F | DIASTOLIC BLOOD PRESSURE: 104 MMHG | HEART RATE: 64 BPM | OXYGEN SATURATION: 98 % | SYSTOLIC BLOOD PRESSURE: 179 MMHG

## 2019-09-14 DIAGNOSIS — R33.9 URINARY RETENTION: Primary | ICD-10-CM

## 2019-09-14 LAB
BACTERIA: ABNORMAL /HPF
BILIRUBIN URINE: NEGATIVE
BLOOD, URINE: ABNORMAL
CLARITY: ABNORMAL
COLOR: YELLOW
CRYSTALS, UA: ABNORMAL /HPF
GLUCOSE URINE: NEGATIVE MG/DL
KETONES, URINE: NEGATIVE MG/DL
LEUKOCYTE ESTERASE, URINE: ABNORMAL
MICROSCOPIC EXAMINATION: YES
NITRITE, URINE: POSITIVE
PH UA: >=9 (ref 5–8)
PROTEIN UA: >=300 MG/DL
RBC UA: ABNORMAL /HPF (ref 0–2)
SPECIFIC GRAVITY UA: 1.01 (ref 1–1.03)
URINE REFLEX TO CULTURE: YES
URINE TYPE: ABNORMAL
UROBILINOGEN, URINE: 0.2 E.U./DL
WBC UA: ABNORMAL /HPF (ref 0–5)

## 2019-09-14 PROCEDURE — 87086 URINE CULTURE/COLONY COUNT: CPT

## 2019-09-14 PROCEDURE — 87186 SC STD MICRODIL/AGAR DIL: CPT

## 2019-09-14 PROCEDURE — 81001 URINALYSIS AUTO W/SCOPE: CPT

## 2019-09-14 PROCEDURE — 99283 EMERGENCY DEPT VISIT LOW MDM: CPT

## 2019-09-14 PROCEDURE — 51702 INSERT TEMP BLADDER CATH: CPT

## 2019-09-14 PROCEDURE — 87077 CULTURE AEROBIC IDENTIFY: CPT

## 2019-09-14 RX ORDER — CEPHALEXIN 500 MG/1
500 CAPSULE ORAL 4 TIMES DAILY
Qty: 40 CAPSULE | Refills: 0 | Status: ON HOLD | OUTPATIENT
Start: 2019-09-14 | End: 2021-12-01 | Stop reason: HOSPADM

## 2019-09-14 NOTE — ED PROVIDER NOTES
[facility]        Pt Name: Lor Coppola  MRN: [id number]  [place] 1960  Date of evaluation: 9/14/2019  Provider: Juan Jose Muse PA-C  PCP: No primary care provider on file. This patient was not seen and evaluated by the attending physician No att. providers found. CHIEF COMPLAINT       Chief Complaint   Patient presents with    Dysuria     pt in from home c/o dysuria and urine retention for the past day, pt has hx of the same states he thinks he needs at catheter        HISTORY OF PRESENT ILLNESS   (Location/Symptom, Timing/Onset, Context/Setting, Quality, Duration, Modifying Factors, Severity)  Note limiting factors. Lor Coppola is a 59 y.o. male that presents to the emergency department with a chief complaint of his Don catheter not draining. Patient states last time he had his Don catheter changed was when he was here in the emergency department which was on 8/2. He was seen here in July for acute kidney injury and urinary retention and was seen by urology at that time for neurogenic bladder versus BPH and was placed on Flomax which he states he has been taking. Was seen here for retention again and had Don catheter changed on 8/2 and he has not followed up with anyone since then. He states that his catheter bag was not draining at home since yesterday so presented to the emergency department but before I enter the room his Don catheter began draining again and now he feels much better. He is no longer having his abdominal discomfort. Has no pain now. Denies nausea, vomiting, hematuria or any symptoms. States he has not followed up with urology because he does not have the money at this time. Nursing Notes were all reviewed and agreed with or any disagreements were addressed  in the HPI.     REVIEW OF SYSTEMS    (2-9 systems for level 4, 10 or more for level 5)     Review of Musculoskeletal: Normal range of motion. He exhibits no edema, tenderness or deformity. Neurological: He is alert and oriented to person, place, and time. No cranial nerve deficit. Skin: Skin is warm and dry. No rash noted. He is not diaphoretic. No erythema. Psychiatric: He has a normal mood and affect. His behavior is normal.   Nursing note and vitals reviewed. DIAGNOSTIC RESULTS   LABS:    Labs Reviewed   URINE RT REFLEX TO CULTURE - Abnormal; Notable for the following components:       Result Value    Clarity, UA TURBID (*)     Blood, Urine TRACE (*)     pH, UA >=9.0 (*)     Protein, UA >=300 (*)     Nitrite, Urine POSITIVE (*)     Leukocyte Esterase, Urine LARGE (*)     All other components within normal limits    Narrative:     Performed at:  [facility] [address], [facility], [address] [facility]   Phone [phone]   MICROSCOPIC URINALYSIS - Abnormal; Notable for the following components:    WBC, UA 6-10 (*)     Bacteria, UA 3+ (*)     Crystals 3+ Triple Phos (*)     All other components within normal limits    Narrative:     Performed at:  [facility] [address], [facility], [address]   Phone [phone]   URINE CULTURE       All other labs were within normal range or not returned as of this dictation. EKG: All EKG's are interpreted by the Emergency Department Physician in the absence of a cardiologist.  Please see their note for interpretation of EKG. RADIOLOGY:   Non-plain film images such as CT, Ultrasound and MRI are read by the radiologist. Plain radiographic images are visualized andpreliminarily interpreted by the  ED Provider with the below findings:        Interpretation perthe Radiologist below, if available at the time of this note:    No orders to display     No results found.         PROCEDURES   Unless otherwise noted below, none     Procedures    CRITICAL CARE TIME   N/A    CONSULTS: Dr. Toshia Jean Baptiste CONSULT TO UROLOGY      EMERGENCY DEPARTMENT COURSE and DIFFERENTIAL DIAGNOSIS/MDM:   Vitals:    Vitals:    09/14/19 0600   BP: (!) 179/104   Pulse: 64   Resp: 16   Temp: 97.1 °F (36.2 °C)   SpO2: 98%       Patient was given thefollowing medications:  Medications - No data to display    Patient presented with urinary retention. By the time he got here however his Don catheter began draining he felt much better and was asymptomatic. Did discuss this with urology as the patient has not followed up and was just planning on leaving his Don catheter in place that already had a large amount of sediment and it was dirty. They would like a new Don catheter placed in for the patient to be placed on antibiotics with urine culture. They state that the patient would not follow-up with them [facility] also has a resident clinic that he can follow-up with. I educated the patient on this and the importance of following up. Catheter was changed by nursing staff. Patient was placed on antibiotics. Do not suspect obstructing stone, acute abdomen, pyelonephritis or other emergent etiology. He will return here for any worsening of symptoms or problems at home. FINAL IMPRESSION      1.  Urinary retention          DISPOSITION/PLAN   DISPOSITION Decision To Discharge 09/14/2019 08:35:51 AM      PATIENT REFERREDTO:  Katherine Sutton MD  [facility] [address]  [address] [place] [address] [phone]    Schedule an appointment as soon as possible for a visit   3-5 days    [facility] urology clinic      For re-check    [facility] Emergency Department  [address] [facility]  [phone]    As needed      DISCHARGE MEDICATIONS:  Discharge Medication List as of 9/14/2019  8:35 AM      START taking these medications    Details   cephALEXin (KEFLEX) 500 MG capsule Take 1 capsule by mouth 4 times daily, Disp-40 capsule, R-0Print             DISCONTINUED MEDICATIONS:  Discharge

## 2019-09-17 LAB
ORGANISM: ABNORMAL
ORGANISM: ABNORMAL
URINE CULTURE, ROUTINE: ABNORMAL
URINE CULTURE, ROUTINE: ABNORMAL

## 2019-09-17 NOTE — ED NOTES
+ culture noted, reviewed with Dr Maya Amador, pt needs to stop Keflex & start Cipro 500mg BID x10 days; message left with family member to have pt call ED.      Neptali Hardy RN  09/17/19 [id number]

## 2019-10-31 ENCOUNTER — HOSPITAL ENCOUNTER (EMERGENCY)
Age: 59
Discharge: HOME | End: 2019-10-31
Attending: EMERGENCY MEDICINE

## 2019-10-31 VITALS
RESPIRATION RATE: 16 BRPM | DIASTOLIC BLOOD PRESSURE: 115 MMHG | SYSTOLIC BLOOD PRESSURE: 173 MMHG | OXYGEN SATURATION: 97 % | BODY MASS INDEX: 27.09 KG/M2 | WEIGHT: 200 LBS | HEIGHT: 72 IN | TEMPERATURE: 98.1 F | HEART RATE: 85 BPM

## 2019-10-31 DIAGNOSIS — T83.9XXA URINARY CATHETER COMPLICATION, INITIAL ENCOUNTER (HCC): Primary | ICD-10-CM

## 2019-10-31 PROCEDURE — 51702 INSERT TEMP BLADDER CATH: CPT

## 2019-10-31 PROCEDURE — 99282 EMERGENCY DEPT VISIT SF MDM: CPT

## 2019-10-31 RX ORDER — LIDOCAINE HYDROCHLORIDE 20 MG/ML
JELLY TOPICAL
Status: DISCONTINUED
Start: 2019-10-31 | End: 2019-11-01 | Stop reason: HOSPADM

## 2020-01-19 ENCOUNTER — HOSPITAL ENCOUNTER (EMERGENCY)
Age: 60
Discharge: HOME | End: 2020-01-19
Attending: EMERGENCY MEDICINE

## 2020-01-19 VITALS
HEIGHT: 74 IN | OXYGEN SATURATION: 99 % | TEMPERATURE: 98.6 F | DIASTOLIC BLOOD PRESSURE: 96 MMHG | BODY MASS INDEX: 26.95 KG/M2 | RESPIRATION RATE: 18 BRPM | HEART RATE: 81 BPM | SYSTOLIC BLOOD PRESSURE: 139 MMHG | WEIGHT: 210 LBS

## 2020-01-19 PROCEDURE — 99283 EMERGENCY DEPT VISIT LOW MDM: CPT

## 2020-01-19 NOTE — ED NOTES
Pt has been in and out of the bathroom multiple times. Will not leave monitors on.       Chidi Ames RN  01/19/20 1114

## 2020-01-19 NOTE — ED NOTES
Pt unsure of medications. Reports that he does not have a family doctor. Asked if he could get his medications here before he leaves. Explained that the ER cannot refill home medications and that he needs to follow up with primary care doctor. Verbalized understanding.       Anand Duarte RN  01/19/20 [id number]

## 2020-01-19 NOTE — ED PROVIDER NOTES
[facility]        Pt Name: Jihan Marinelli  MRN: [id number]  [place] 1960  Date of evaluation: 1/19/2020  Provider: Niya Steven PA-C  PCP: No primary care provider on file. This patient was seen and evaluated by the attending physician Gretchen Tavarez MD.      [address] [place] [address]       Chief Complaint   Patient presents with    Abdominal Pain     pt c/o abd pain since this AM. Pt sts \"jj been having a little bit of constipation. \" Denies n/v/d or urinary sx. pt wont explain anything else other than triage. just sts \"its a bad situation. \"       HISTORY OF PRESENT ILLNESS   (Location/Symptom, Timing/Onset, Context/Setting, Quality, Duration, Modifying Factors, Severity)  Note limiting factors. Jihan Marinelli is a 62 y.o. male that presents to the emergency department with a chief complaint of abdominal discomfort and urinary retention. Patient states that his catheter stopped draining sometime yesterday morning about 24 hours ago. Is now having abdominal discomfort and nausea. Denies vomiting, fevers, diarrhea, bloody stool, chest pain, shortness of breath or any other symptoms. Patient was admitted in July 2018 for acute urinary retention and acute renal failure. Since then he has been in and out of the hospital for catheter changes. I saw this patient in September and talk to urology at that time about possibly doing a voiding trial but after discussion given that he had renal failure and noncompliance concern was that patient most likely need to have voiding trial in urology office and he was referred to both the urology group and [facility] urology clinic. Patient was then seen again for similar complaints in the emergency department on October 31 and had catheter change and he states he is still not even called his urologist when I talked to them today.   He states that his catheter was changed 3 weeks ago and where it was changed he states here but he is not even been here for 2-1/2 months. This catheter is been in place for almost 3 months now. His most recent urine culture grew out enterococcus and Pseudomonas. Nursing Notes were all reviewed and agreed with or any disagreements were addressed in the HPI. REVIEW OF SYSTEMS    (2-9 systems for level 4, 10 or more for level 5)     Review of Systems    Positives and Pertinent negatives as per HPI. Except as noted above in the ROS, all other systems were reviewed and negative. PAST MEDICAL HISTORY     Past Medical History:   Diagnosis Date    Atrial fibrillation ([address] 07/25/2019    Hypertension          SURGICAL HISTORY     Past Surgical History:   Procedure Laterality Date    CARDIOVERSION  07/26/2019    Dr. Bipin Bhagat  07/26/2019         [address]       Discharge Medication List as of 1/19/2020  1:09 PM      CONTINUE these medications which have NOT CHANGED    Details   cephALEXin (KEFLEX) 500 MG capsule Take 1 capsule by mouth 4 times daily, Disp-40 capsule, R-0Print      aspirin 81 MG chewable tablet Take 1 tablet by mouth daily, Disp-30 tablet, R-3Normal      apixaban (ELIQUIS) 5 MG TABS tablet Take 1 tablet by mouth 2 times daily, Disp-60 tablet, R-0Normal      atorvastatin (LIPITOR) 40 MG tablet Take 1 tablet by mouth nightly, Disp-30 tablet, R-3Normal      metoprolol tartrate (LOPRESSOR) 25 MG tablet Take 1 tablet by mouth 2 times daily, Disp-60 tablet, R-3Normal      finasteride (PROSCAR) 5 MG tablet Take 1 tablet by mouth daily, Disp-30 tablet, R-3Normal      tamsulosin (FLOMAX) 0.4 MG capsule Take 1 capsule by mouth daily, Disp-30 capsule, R-3Normal      lisinopril (PRINIVIL;ZESTRIL) 5 MG tablet Take 1 tablet by mouth daily, Disp-90 tablet, R-0Normal               ALLERGIES     Patient has no known allergies.     FAMILYHISTORY       Family History   Problem Relation Age of Onset    Heart Disease Mother     High Blood Pressure Mother     Heart Attack Mother     Other Father     Stroke Father     High Blood Pressure Father           SOCIAL HISTORY       Social History     Tobacco Use    Smoking status: Never Smoker    Smokeless tobacco: Never Used   Substance Use Topics    Alcohol use: Never     Frequency: Never    Drug use: Never       SCREENINGS             PHYSICAL EXAM    (up to 7 for level 4, 8 or more for level 5)     ED Triage Vitals [01/19/20 1033]   BP Temp Temp Source Pulse Resp SpO2 Height Weight   (!) 199/109 98.6 °F (37 °C) Oral 81 18 99 % 6' 2\" (1.88 m) 210 lb (95.3 kg)       Physical Exam  Vitals signs and nursing note reviewed. Constitutional:       Appearance: He is well-developed. He is not diaphoretic. HENT:      Head: Atraumatic. Nose: Nose normal.   Eyes:      General:         Right eye: No discharge. Left eye: No discharge. Neck:      Musculoskeletal: Normal range of motion. Cardiovascular:      Rate and Rhythm: Normal rate and regular rhythm. Heart sounds: No murmur. No friction rub. No gallop. Pulmonary:      Effort: Pulmonary effort is normal. No respiratory distress. Breath sounds: No stridor. No wheezing, rhonchi or rales. Abdominal:      General: Bowel sounds are normal. There is no distension. Palpations: Abdomen is soft. There is no mass. Tenderness: There is tenderness. There is no guarding or rebound. Hernia: No hernia is present. Comments: Generalized tenderness to palpate throughout the abdomen. Difficult to obtain exam however as patient will not lay down and is just pacing around the room. Genitourinary:     Comments: Very small amount of dark-colored urine noted in Don catheter with large amount of sediment noted over catheter itself. No blood coming from the penis or penile swelling noted. Musculoskeletal: Normal range of motion. General: No swelling.    Skin:     General: Skin is try to slide and in between his other patients that he already has scheduled. Patient does not have his own cell phone number and apparently the number we have listed for him was 1 of his friends numbers and is disconnected as Dr. Holman Nicely try to call this. Patient understands urgency that he needs to go to this appointment tomorrow. Low suspicion for pyelonephritis, acute abdomen, perforated viscus or other emergent etiology. Patient was stable time of discharge. FINAL IMPRESSION      1. Acute urinary retention    2. Complication of Don catheter, initial encounter [facility])          DISPOSITION/PLAN   DISPOSITION Decision To Discharge 01/19/2020 12:10:06 PM      PATIENT REFERREDTO:  Dr. Holman Nicely  [address]  to his office at noon tomorrow and he will see you as soon as he can when he gets a break between other patients.       DISCHARGE MEDICATIONS:  Discharge Medication List as of 1/19/2020  1:09 PM          DISCONTINUED MEDICATIONS:  Discharge Medication List as of 1/19/2020  1:09 PM                 (Please note that portions of this note were completed with a voice recognition program.  Efforts were made to edit the dictations but occasionally words are mis-transcribed.)    Niya Steven PA-C (electronically signed)           Niya Steven PA-C  01/19/20 [id number]

## 2020-08-11 NOTE — PROGRESS NOTES
Family History:  Reviewed. family history includes Heart Attack in his mother; Heart Disease in his mother; High Blood Pressure in his father and mother; Other in his father; Stroke in his father. Denies family history of sudden cardiac death, arrhythmia, premature CAD    Review of Systems:  · Constitutional: Negative for fever, night sweats, chills, weight changes, or weakness  · Skin: Negative for rash, dry skin, pruritus, bruising, bleeding, blood clots, or changes in skin pigment  · HEENT: Negative for vision changes, ringing in the ears, sore throat, dysphagia, or swollen lymph nodes  · Respiratory: Reviewed in HPI  · Cardiovascular: Reviewed in HPI  · Gastrointestinal: Negative for abdominal pain, N/V/D, constipation, or black/tarry stools  · Genito-Urinary: Negative for dysuria, incontinence, urgency, or hematuria  · Musculoskeletal: Negative for joint swelling, muscle pain, or injuries  · Neurological/Psych: Negative for confusion, seizures, headaches, balance issues or TIA-like symptoms. No anxiety, depression, or insomnia    Physical Examination:  Vitals:    07/25/19 1235   BP: 111/64   Pulse: 65   Resp: 18   Temp: 98.3 °F (36.8 °C)   SpO2: 100%      In: -   Out: 2300    Wt Readings from Last 3 Encounters:   07/24/19 202 lb (91.6 kg)       Intake/Output Summary (Last 24 hours) at 7/25/2019 1328  Last data filed at 7/25/2019 1312  Gross per 24 hour   Intake --   Output 2300 ml   Net -2300 ml       Telemetry: Personally Reviewed  - Atrial Fibrillation  · Constitutional: Cooperative and in no apparent distress, and appears well nourished/developed  · Skin: Warm and pink; no pallor, cyanosis, bruising, or clubbing  · HEENT: Symmetric and normocephalic. PERRL, EOM intact. Conjunctiva pink with clear sclera. Mucus membranes pink and moist. Teeth intact. Thyroid smooth without nodules or goiter. · Cardiovascular: Tachycardic rate and irregular rhythm. S1/S2 present without murmurs, rubs, or gallops. cardiovascular related activities with a goal of 150 min/week of moderate level activity or 10,000 steps per day. Encouraged to perform as much activity as tolerated    All pertinent information and plan of care discussed with the EP physician. All questions and concerns were addressed to the patient/family. Alternatives to my treatment were discussed. I have discussed the above stated plan with patient and the nurse. The patient verbalized understanding and agreed with the plan. Thank you for allowing to us to participate in the care of Lor Coppola.     TRENT Davila-CNP  [facility]   Office: [phone] You can access the FollowMyHealth Patient Portal offered by [facility] by registering at the following website: [email or web address]. By joining [employer]’s FollowMyHealth portal, you will also be able to view your health information using other applications (apps) compatible with our system.

## 2021-11-27 ENCOUNTER — HOSPITAL ENCOUNTER (INPATIENT)
Age: 61
LOS: 4 days | Discharge: HOME | DRG: 720 | End: 2021-12-01
Attending: STUDENT IN AN ORGANIZED HEALTH CARE EDUCATION/TRAINING PROGRAM | Admitting: INTERNAL MEDICINE
Payer: MEDICAID

## 2021-11-27 ENCOUNTER — APPOINTMENT (OUTPATIENT)
Dept: GENERAL RADIOLOGY | Age: 61
DRG: 720 | End: 2021-11-27
Payer: MEDICAID

## 2021-11-27 DIAGNOSIS — I48.91 ATRIAL FIBRILLATION WITH RVR (HCC): ICD-10-CM

## 2021-11-27 DIAGNOSIS — Z79.01 ANTICOAGULATED: ICD-10-CM

## 2021-11-27 DIAGNOSIS — J81.0 ACUTE PULMONARY EDEMA (HCC): Primary | ICD-10-CM

## 2021-11-27 DIAGNOSIS — R06.01 ORTHOPNEA: ICD-10-CM

## 2021-11-27 DIAGNOSIS — Z20.822 PERSON UNDER INVESTIGATION FOR COVID-19: ICD-10-CM

## 2021-11-27 LAB
A/G RATIO: 1.1 (ref 1.1–2.2)
ALBUMIN SERPL-MCNC: 3.3 G/DL (ref 3.4–5)
ALP BLD-CCNC: 46 U/L (ref 40–129)
ALT SERPL-CCNC: 40 U/L (ref 10–40)
ANION GAP SERPL CALCULATED.3IONS-SCNC: 10 MMOL/L (ref 3–16)
AST SERPL-CCNC: 66 U/L (ref 15–37)
BACTERIA: ABNORMAL /HPF
BASOPHILS ABSOLUTE: 0 K/UL (ref 0–0.2)
BASOPHILS RELATIVE PERCENT: 0.3 %
BILIRUB SERPL-MCNC: 1 MG/DL (ref 0–1)
BILIRUBIN URINE: NEGATIVE
BLOOD, URINE: ABNORMAL
BUN BLDV-MCNC: 17 MG/DL (ref 7–20)
CALCIUM SERPL-MCNC: 8.2 MG/DL (ref 8.3–10.6)
CHLORIDE BLD-SCNC: 99 MMOL/L (ref 99–110)
CLARITY: ABNORMAL
CO2: 28 MMOL/L (ref 21–32)
COLOR: YELLOW
CREAT SERPL-MCNC: 1 MG/DL (ref 0.8–1.3)
EOSINOPHILS ABSOLUTE: 0 K/UL (ref 0–0.6)
EOSINOPHILS RELATIVE PERCENT: 0 %
EPITHELIAL CELLS, UA: 1 /HPF (ref 0–5)
GFR AFRICAN AMERICAN: >60
GFR NON-AFRICAN AMERICAN: >60
GLUCOSE BLD-MCNC: 105 MG/DL (ref 70–99)
GLUCOSE URINE: NEGATIVE MG/DL
HCT VFR BLD CALC: 39.1 % (ref 40.5–52.5)
HEMOGLOBIN: 12.9 G/DL (ref 13.5–17.5)
HYALINE CASTS: 5 /LPF (ref 0–8)
KETONES, URINE: NEGATIVE MG/DL
LACTIC ACID: 1.9 MMOL/L (ref 0.4–2)
LEUKOCYTE ESTERASE, URINE: ABNORMAL
LYMPHOCYTES ABSOLUTE: 0.4 K/UL (ref 1–5.1)
LYMPHOCYTES RELATIVE PERCENT: 19.9 %
MCH RBC QN AUTO: 26.4 PG (ref 26–34)
MCHC RBC AUTO-ENTMCNC: 33 G/DL (ref 31–36)
MCV RBC AUTO: 80 FL (ref 80–100)
MICROSCOPIC EXAMINATION: YES
MONOCYTES ABSOLUTE: 0.2 K/UL (ref 0–1.3)
MONOCYTES RELATIVE PERCENT: 8.1 %
NEUTROPHILS ABSOLUTE: 1.5 K/UL (ref 1.7–7.7)
NEUTROPHILS RELATIVE PERCENT: 71.7 %
NITRITE, URINE: POSITIVE
PDW BLD-RTO: 14.7 % (ref 12.4–15.4)
PH UA: 6 (ref 5–8)
PLATELET # BLD: 171 K/UL (ref 135–450)
PMV BLD AUTO: 7.8 FL (ref 5–10.5)
POTASSIUM SERPL-SCNC: 4 MMOL/L (ref 3.5–5.1)
PRO-BNP: ABNORMAL PG/ML (ref 0–124)
PROCALCITONIN: 0.32 NG/ML (ref 0–0.15)
PROTEIN UA: 100 MG/DL
RBC # BLD: 4.89 M/UL (ref 4.2–5.9)
RBC UA: 1 /HPF (ref 0–4)
SODIUM BLD-SCNC: 137 MMOL/L (ref 136–145)
SPECIFIC GRAVITY UA: 1.02 (ref 1–1.03)
TOTAL PROTEIN: 6.4 G/DL (ref 6.4–8.2)
TROPONIN: <0.01 NG/ML
URINE REFLEX TO CULTURE: ABNORMAL
URINE TYPE: ABNORMAL
UROBILINOGEN, URINE: 1 E.U./DL
WBC # BLD: 2.1 K/UL (ref 4–11)
WBC UA: 8 /HPF (ref 0–5)

## 2021-11-27 PROCEDURE — 6370000000 HC RX 637 (ALT 250 FOR IP): Performed by: PHYSICIAN ASSISTANT

## 2021-11-27 PROCEDURE — 36415 COLL VENOUS BLD VENIPUNCTURE: CPT

## 2021-11-27 PROCEDURE — 87150 DNA/RNA AMPLIFIED PROBE: CPT

## 2021-11-27 PROCEDURE — 1200000000 HC SEMI PRIVATE

## 2021-11-27 PROCEDURE — 80053 COMPREHEN METABOLIC PANEL: CPT

## 2021-11-27 PROCEDURE — 84145 PROCALCITONIN (PCT): CPT

## 2021-11-27 PROCEDURE — 81001 URINALYSIS AUTO W/SCOPE: CPT

## 2021-11-27 PROCEDURE — 85025 COMPLETE CBC W/AUTO DIFF WBC: CPT

## 2021-11-27 PROCEDURE — 71046 X-RAY EXAM CHEST 2 VIEWS: CPT

## 2021-11-27 PROCEDURE — 87086 URINE CULTURE/COLONY COUNT: CPT

## 2021-11-27 PROCEDURE — U0003 INFECTIOUS AGENT DETECTION BY NUCLEIC ACID (DNA OR RNA); SEVERE ACUTE RESPIRATORY SYNDROME CORONAVIRUS 2 (SARS-COV-2) (CORONAVIRUS DISEASE [COVID-19]), AMPLIFIED PROBE TECHNIQUE, MAKING USE OF HIGH THROUGHPUT TECHNOLOGIES AS DESCRIBED BY CMS-2020-01-R: HCPCS

## 2021-11-27 PROCEDURE — 96374 THER/PROPH/DIAG INJ IV PUSH: CPT

## 2021-11-27 PROCEDURE — 87040 BLOOD CULTURE FOR BACTERIA: CPT

## 2021-11-27 PROCEDURE — 84484 ASSAY OF TROPONIN QUANT: CPT

## 2021-11-27 PROCEDURE — 87186 SC STD MICRODIL/AGAR DIL: CPT

## 2021-11-27 PROCEDURE — 93005 ELECTROCARDIOGRAM TRACING: CPT | Performed by: STUDENT IN AN ORGANIZED HEALTH CARE EDUCATION/TRAINING PROGRAM

## 2021-11-27 PROCEDURE — 99283 EMERGENCY DEPT VISIT LOW MDM: CPT

## 2021-11-27 PROCEDURE — U0005 INFEC AGEN DETEC AMPLI PROBE: HCPCS

## 2021-11-27 PROCEDURE — 94761 N-INVAS EAR/PLS OXIMETRY MLT: CPT

## 2021-11-27 PROCEDURE — 83605 ASSAY OF LACTIC ACID: CPT

## 2021-11-27 PROCEDURE — 94640 AIRWAY INHALATION TREATMENT: CPT

## 2021-11-27 PROCEDURE — 6360000002 HC RX W HCPCS: Performed by: NURSE PRACTITIONER

## 2021-11-27 PROCEDURE — 83880 ASSAY OF NATRIURETIC PEPTIDE: CPT

## 2021-11-27 PROCEDURE — 87088 URINE BACTERIA CULTURE: CPT

## 2021-11-27 RX ORDER — SODIUM CHLORIDE 0.9 % (FLUSH) 0.9 %
5-40 SYRINGE (ML) INJECTION PRN
Status: DISCONTINUED | OUTPATIENT
Start: 2021-11-27 | End: 2021-12-01 | Stop reason: HOSPADM

## 2021-11-27 RX ORDER — ACETAMINOPHEN 500 MG
500 TABLET ORAL ONCE
Status: COMPLETED | OUTPATIENT
Start: 2021-11-27 | End: 2021-11-27

## 2021-11-27 RX ORDER — LISINOPRIL 5 MG/1
5 TABLET ORAL DAILY
Status: DISCONTINUED | OUTPATIENT
Start: 2021-11-28 | End: 2021-12-01 | Stop reason: HOSPADM

## 2021-11-27 RX ORDER — TAMSULOSIN HYDROCHLORIDE 0.4 MG/1
0.4 CAPSULE ORAL DAILY
Status: DISCONTINUED | OUTPATIENT
Start: 2021-11-28 | End: 2021-12-01 | Stop reason: HOSPADM

## 2021-11-27 RX ORDER — FINASTERIDE 5 MG/1
5 TABLET, FILM COATED ORAL DAILY
Status: DISCONTINUED | OUTPATIENT
Start: 2021-11-28 | End: 2021-12-01 | Stop reason: HOSPADM

## 2021-11-27 RX ORDER — SODIUM CHLORIDE 9 MG/ML
25 INJECTION, SOLUTION INTRAVENOUS PRN
Status: DISCONTINUED | OUTPATIENT
Start: 2021-11-27 | End: 2021-12-01 | Stop reason: HOSPADM

## 2021-11-27 RX ORDER — IPRATROPIUM BROMIDE AND ALBUTEROL SULFATE 2.5; .5 MG/3ML; MG/3ML
1 SOLUTION RESPIRATORY (INHALATION) ONCE
Status: COMPLETED | OUTPATIENT
Start: 2021-11-27 | End: 2021-11-27

## 2021-11-27 RX ORDER — ATORVASTATIN CALCIUM 40 MG/1
40 TABLET, FILM COATED ORAL NIGHTLY
Status: DISCONTINUED | OUTPATIENT
Start: 2021-11-28 | End: 2021-12-01 | Stop reason: HOSPADM

## 2021-11-27 RX ORDER — ACETAMINOPHEN 325 MG/1
650 TABLET ORAL EVERY 6 HOURS PRN
Status: DISCONTINUED | OUTPATIENT
Start: 2021-11-27 | End: 2021-12-01 | Stop reason: HOSPADM

## 2021-11-27 RX ORDER — FUROSEMIDE 10 MG/ML
40 INJECTION INTRAMUSCULAR; INTRAVENOUS ONCE
Status: COMPLETED | OUTPATIENT
Start: 2021-11-27 | End: 2021-11-27

## 2021-11-27 RX ORDER — ONDANSETRON 4 MG/1
4 TABLET, ORALLY DISINTEGRATING ORAL EVERY 8 HOURS PRN
Status: DISCONTINUED | OUTPATIENT
Start: 2021-11-27 | End: 2021-12-01 | Stop reason: HOSPADM

## 2021-11-27 RX ORDER — SODIUM CHLORIDE 0.9 % (FLUSH) 0.9 %
5-40 SYRINGE (ML) INJECTION EVERY 12 HOURS SCHEDULED
Status: DISCONTINUED | OUTPATIENT
Start: 2021-11-28 | End: 2021-12-01 | Stop reason: HOSPADM

## 2021-11-27 RX ORDER — ACETAMINOPHEN 650 MG/1
650 SUPPOSITORY RECTAL EVERY 6 HOURS PRN
Status: DISCONTINUED | OUTPATIENT
Start: 2021-11-27 | End: 2021-12-01 | Stop reason: HOSPADM

## 2021-11-27 RX ORDER — ONDANSETRON 2 MG/ML
4 INJECTION INTRAMUSCULAR; INTRAVENOUS EVERY 6 HOURS PRN
Status: DISCONTINUED | OUTPATIENT
Start: 2021-11-27 | End: 2021-12-01 | Stop reason: HOSPADM

## 2021-11-27 RX ORDER — POLYETHYLENE GLYCOL 3350 17 G/17G
17 POWDER, FOR SOLUTION ORAL DAILY PRN
Status: DISCONTINUED | OUTPATIENT
Start: 2021-11-27 | End: 2021-12-01 | Stop reason: HOSPADM

## 2021-11-27 RX ADMIN — ACETAMINOPHEN 500 MG: 500 TABLET ORAL at 20:01

## 2021-11-27 RX ADMIN — IPRATROPIUM BROMIDE AND ALBUTEROL SULFATE 1 AMPULE: .5; 3 SOLUTION RESPIRATORY (INHALATION) at 20:41

## 2021-11-27 RX ADMIN — FUROSEMIDE 40 MG: 10 INJECTION, SOLUTION INTRAMUSCULAR; INTRAVENOUS at 22:25

## 2021-11-27 ASSESSMENT — ENCOUNTER SYMPTOMS
VOICE CHANGE: 0
EYES NEGATIVE: 1
DIARRHEA: 0
BACK PAIN: 0
SORE THROAT: 0
STRIDOR: 0
GASTROINTESTINAL NEGATIVE: 1
SHORTNESS OF BREATH: 1
TROUBLE SWALLOWING: 0
CONSTIPATION: 0
ABDOMINAL PAIN: 0
NAUSEA: 0
COUGH: 1
WHEEZING: 0
VOMITING: 0
COLOR CHANGE: 0

## 2021-11-27 ASSESSMENT — PAIN SCALES - GENERAL: PAINLEVEL_OUTOF10: 0

## 2021-11-28 ENCOUNTER — APPOINTMENT (OUTPATIENT)
Dept: CT IMAGING | Age: 61
DRG: 720 | End: 2021-11-28
Payer: MEDICAID

## 2021-11-28 PROBLEM — I50.43 ACUTE ON CHRONIC COMBINED SYSTOLIC AND DIASTOLIC CHF (CONGESTIVE HEART FAILURE) (HCC): Status: ACTIVE | Noted: 2021-11-28

## 2021-11-28 LAB
A/G RATIO: 1 (ref 1.1–2.2)
ALBUMIN SERPL-MCNC: 2.9 G/DL (ref 3.4–5)
ALP BLD-CCNC: 43 U/L (ref 40–129)
ALT SERPL-CCNC: 35 U/L (ref 10–40)
ANION GAP SERPL CALCULATED.3IONS-SCNC: 8 MMOL/L (ref 3–16)
APTT: 32.9 SEC (ref 26.2–38.6)
AST SERPL-CCNC: 62 U/L (ref 15–37)
BASOPHILS ABSOLUTE: 0 K/UL (ref 0–0.2)
BASOPHILS RELATIVE PERCENT: 0.4 %
BILIRUB SERPL-MCNC: 0.9 MG/DL (ref 0–1)
BUN BLDV-MCNC: 15 MG/DL (ref 7–20)
C-REACTIVE PROTEIN: 48.9 MG/L (ref 0–5.1)
C-REACTIVE PROTEIN: 49 MG/L (ref 0–5.1)
CALCIUM SERPL-MCNC: 7.9 MG/DL (ref 8.3–10.6)
CHLORIDE BLD-SCNC: 102 MMOL/L (ref 99–110)
CO2: 29 MMOL/L (ref 21–32)
CREAT SERPL-MCNC: 0.9 MG/DL (ref 0.8–1.3)
D DIMER: 421 NG/ML DDU (ref 0–229)
EKG ATRIAL RATE: 68 BPM
EKG DIAGNOSIS: NORMAL
EKG Q-T INTERVAL: 338 MS
EKG QRS DURATION: 98 MS
EKG QTC CALCULATION (BAZETT): 473 MS
EKG R AXIS: -18 DEGREES
EKG T AXIS: 84 DEGREES
EKG VENTRICULAR RATE: 118 BPM
EOSINOPHILS ABSOLUTE: 0 K/UL (ref 0–0.6)
EOSINOPHILS RELATIVE PERCENT: 0.1 %
FIBRINOGEN: 389 MG/DL (ref 200–397)
GFR AFRICAN AMERICAN: >60
GFR NON-AFRICAN AMERICAN: >60
GLUCOSE BLD-MCNC: 98 MG/DL (ref 70–99)
HCT VFR BLD CALC: 39 % (ref 40.5–52.5)
HEMOGLOBIN: 12.8 G/DL (ref 13.5–17.5)
INR BLD: 1.5 (ref 0.88–1.12)
LACTATE DEHYDROGENASE: 647 U/L (ref 100–190)
LYMPHOCYTES ABSOLUTE: 0.5 K/UL (ref 1–5.1)
LYMPHOCYTES RELATIVE PERCENT: 22.8 %
MAGNESIUM: 2.2 MG/DL (ref 1.8–2.4)
MCH RBC QN AUTO: 26.2 PG (ref 26–34)
MCHC RBC AUTO-ENTMCNC: 32.9 G/DL (ref 31–36)
MCV RBC AUTO: 79.6 FL (ref 80–100)
MONOCYTES ABSOLUTE: 0.1 K/UL (ref 0–1.3)
MONOCYTES RELATIVE PERCENT: 5.7 %
NEUTROPHILS ABSOLUTE: 1.5 K/UL (ref 1.7–7.7)
NEUTROPHILS RELATIVE PERCENT: 71 %
PDW BLD-RTO: 14.7 % (ref 12.4–15.4)
PLATELET # BLD: 158 K/UL (ref 135–450)
PMV BLD AUTO: 8.1 FL (ref 5–10.5)
POTASSIUM REFLEX MAGNESIUM: 4.2 MMOL/L (ref 3.5–5.1)
PROCALCITONIN: 0.29 NG/ML (ref 0–0.15)
PROTHROMBIN TIME: 17.2 SEC (ref 9.9–12.7)
RBC # BLD: 4.9 M/UL (ref 4.2–5.9)
SARS-COV-2: DETECTED
SODIUM BLD-SCNC: 139 MMOL/L (ref 136–145)
TOTAL PROTEIN: 5.9 G/DL (ref 6.4–8.2)
TROPONIN: <0.01 NG/ML
TSH REFLEX: 1.2 UIU/ML (ref 0.27–4.2)
VITAMIN D 25-HYDROXY: 11.8 NG/ML
WBC # BLD: 2 K/UL (ref 4–11)

## 2021-11-28 PROCEDURE — 93010 ELECTROCARDIOGRAM REPORT: CPT | Performed by: INTERNAL MEDICINE

## 2021-11-28 PROCEDURE — 2580000003 HC RX 258: Performed by: NURSE PRACTITIONER

## 2021-11-28 PROCEDURE — 71260 CT THORAX DX C+: CPT

## 2021-11-28 PROCEDURE — 85730 THROMBOPLASTIN TIME PARTIAL: CPT

## 2021-11-28 PROCEDURE — 85384 FIBRINOGEN ACTIVITY: CPT

## 2021-11-28 PROCEDURE — 84484 ASSAY OF TROPONIN QUANT: CPT

## 2021-11-28 PROCEDURE — 93005 ELECTROCARDIOGRAM TRACING: CPT | Performed by: INTERNAL MEDICINE

## 2021-11-28 PROCEDURE — 1200000000 HC SEMI PRIVATE

## 2021-11-28 PROCEDURE — 83615 LACTATE (LD) (LDH) ENZYME: CPT

## 2021-11-28 PROCEDURE — 84145 PROCALCITONIN (PCT): CPT

## 2021-11-28 PROCEDURE — 85379 FIBRIN DEGRADATION QUANT: CPT

## 2021-11-28 PROCEDURE — 6360000002 HC RX W HCPCS: Performed by: NURSE PRACTITIONER

## 2021-11-28 PROCEDURE — 85025 COMPLETE CBC W/AUTO DIFF WBC: CPT

## 2021-11-28 PROCEDURE — 83735 ASSAY OF MAGNESIUM: CPT

## 2021-11-28 PROCEDURE — 6360000004 HC RX CONTRAST MEDICATION: Performed by: INTERNAL MEDICINE

## 2021-11-28 PROCEDURE — 84443 ASSAY THYROID STIM HORMONE: CPT

## 2021-11-28 PROCEDURE — 99253 IP/OBS CNSLTJ NEW/EST LOW 45: CPT | Performed by: INTERNAL MEDICINE

## 2021-11-28 PROCEDURE — 6360000002 HC RX W HCPCS: Performed by: INTERNAL MEDICINE

## 2021-11-28 PROCEDURE — 82306 VITAMIN D 25 HYDROXY: CPT

## 2021-11-28 PROCEDURE — 86140 C-REACTIVE PROTEIN: CPT

## 2021-11-28 PROCEDURE — 85610 PROTHROMBIN TIME: CPT

## 2021-11-28 PROCEDURE — 36415 COLL VENOUS BLD VENIPUNCTURE: CPT

## 2021-11-28 PROCEDURE — 6370000000 HC RX 637 (ALT 250 FOR IP): Performed by: NURSE PRACTITIONER

## 2021-11-28 PROCEDURE — 80053 COMPREHEN METABOLIC PANEL: CPT

## 2021-11-28 RX ORDER — METHYLPREDNISOLONE SODIUM SUCCINATE 40 MG/ML
40 INJECTION, POWDER, LYOPHILIZED, FOR SOLUTION INTRAMUSCULAR; INTRAVENOUS EVERY 6 HOURS
Status: DISCONTINUED | OUTPATIENT
Start: 2021-11-28 | End: 2021-11-29

## 2021-11-28 RX ORDER — FUROSEMIDE 10 MG/ML
40 INJECTION INTRAMUSCULAR; INTRAVENOUS 2 TIMES DAILY
Status: DISCONTINUED | OUTPATIENT
Start: 2021-11-28 | End: 2021-12-01

## 2021-11-28 RX ORDER — FUROSEMIDE 10 MG/ML
40 INJECTION INTRAMUSCULAR; INTRAVENOUS 2 TIMES DAILY
Status: DISCONTINUED | OUTPATIENT
Start: 2021-11-28 | End: 2021-11-28

## 2021-11-28 RX ADMIN — METHYLPREDNISOLONE SODIUM SUCCINATE 40 MG: 40 INJECTION, POWDER, FOR SOLUTION INTRAMUSCULAR; INTRAVENOUS at 23:57

## 2021-11-28 RX ADMIN — METOPROLOL TARTRATE 25 MG: 25 TABLET, FILM COATED ORAL at 00:27

## 2021-11-28 RX ADMIN — APIXABAN 5 MG: 5 TABLET, FILM COATED ORAL at 20:17

## 2021-11-28 RX ADMIN — APIXABAN 5 MG: 5 TABLET, FILM COATED ORAL at 00:27

## 2021-11-28 RX ADMIN — SODIUM CHLORIDE, PRESERVATIVE FREE 10 ML: 5 INJECTION INTRAVENOUS at 20:18

## 2021-11-28 RX ADMIN — LISINOPRIL 5 MG: 5 TABLET ORAL at 09:51

## 2021-11-28 RX ADMIN — DESMOPRESSIN ACETATE 40 MG: 0.2 TABLET ORAL at 00:27

## 2021-11-28 RX ADMIN — METHYLPREDNISOLONE SODIUM SUCCINATE 40 MG: 40 INJECTION, POWDER, FOR SOLUTION INTRAMUSCULAR; INTRAVENOUS at 18:41

## 2021-11-28 RX ADMIN — FUROSEMIDE 40 MG: 10 INJECTION, SOLUTION INTRAMUSCULAR; INTRAVENOUS at 18:41

## 2021-11-28 RX ADMIN — FINASTERIDE 5 MG: 5 TABLET, FILM COATED ORAL at 09:51

## 2021-11-28 RX ADMIN — FUROSEMIDE 40 MG: 10 INJECTION, SOLUTION INTRAMUSCULAR; INTRAVENOUS at 09:51

## 2021-11-28 RX ADMIN — METOPROLOL TARTRATE 25 MG: 25 TABLET, FILM COATED ORAL at 20:18

## 2021-11-28 RX ADMIN — APIXABAN 5 MG: 5 TABLET, FILM COATED ORAL at 09:50

## 2021-11-28 RX ADMIN — TAMSULOSIN HYDROCHLORIDE 0.4 MG: 0.4 CAPSULE ORAL at 09:51

## 2021-11-28 RX ADMIN — DESMOPRESSIN ACETATE 40 MG: 0.2 TABLET ORAL at 20:17

## 2021-11-28 RX ADMIN — SODIUM CHLORIDE, PRESERVATIVE FREE 10 ML: 5 INJECTION INTRAVENOUS at 09:51

## 2021-11-28 RX ADMIN — METOPROLOL TARTRATE 25 MG: 25 TABLET, FILM COATED ORAL at 09:50

## 2021-11-28 RX ADMIN — IOPAMIDOL 75 ML: 755 INJECTION, SOLUTION INTRAVENOUS at 10:18

## 2021-11-28 RX ADMIN — METHYLPREDNISOLONE SODIUM SUCCINATE 40 MG: 40 INJECTION, POWDER, FOR SOLUTION INTRAMUSCULAR; INTRAVENOUS at 12:18

## 2021-11-28 ASSESSMENT — PAIN SCALES - GENERAL
PAINLEVEL_OUTOF10: 0

## 2021-11-28 NOTE — PROGRESS NOTES
Hospitalist Progress Note      PCP: No primary care provider on file. Date of Admission: 11/27/2021    Chief Complaint: SOB    Hospital Course: 55 yo M with history of dilated CMP, pAfib (not taking Eliquis as prescribed), BPH, HTN came to ER with SOB. Admitted as inpatient for acute on chronic combined CHF, Afib with RVR and sepsis suspected to be from COVID 19. Started on Solumedrol IV for tracheitis and wheezing on 11/28. Cardiology saw patient on 11/28. CTA Chest requested to r/o PE on 11/28. Subjective:  Patient states BL LE edema improving. Still SOB. Has sore throat. No CP, HA or fevers      Medications:  Reviewed    Infusion Medications    sodium chloride       Scheduled Medications    furosemide  40 mg IntraVENous BID    apixaban  5 mg Oral BID    atorvastatin  40 mg Oral Nightly    finasteride  5 mg Oral Daily    lisinopril  5 mg Oral Daily    metoprolol tartrate  25 mg Oral BID    tamsulosin  0.4 mg Oral Daily    sodium chloride flush  5-40 mL IntraVENous 2 times per day     PRN Meds: iopamidol, sodium chloride flush, sodium chloride, ondansetron **OR** ondansetron, polyethylene glycol, acetaminophen **OR** acetaminophen, perflutren lipid microspheres, acetaminophen **OR** acetaminophen      Intake/Output Summary (Last 24 hours) at 11/28/2021 1031  Last data filed at 11/28/2021 0958  Gross per 24 hour   Intake 240 ml   Output 300 ml   Net -60 ml       Physical Exam Performed:    /83   Pulse 88   Temp 98.9 °F (37.2 °C) (Oral)   Resp 18   Ht 6' 2\" (1.88 m)   Wt 197 lb 5 oz (89.5 kg)   SpO2 90%   BMI 25.33 kg/m²     General appearance: No apparent distress, appears stated age and cooperative. HEENT: Pupils equal, round, and reactive to light. Conjunctivae/corneas clear. Neck: Supple, with full range of motion. No jugular venous distention. Trachea midline. Respiratory:  BL wheezing and basilar rales. Scattered rhonchi.   Cardiovascular: Regular rate and rhythm with normal S1/S2 without murmurs, rubs or gallops. Abdomen: Soft, non-tender, non-distended with normal bowel sounds. Musculoskeletal: No clubbing, cyanosis. 2+ BL LE edema. Full range of motion without deformity. Skin: Skin color, texture, turgor normal.  No rashes or lesions. Neurologic:  Neurovascularly intact without any focal sensory/motor deficits. Cranial nerves: II-XII intact, grossly non-focal.  Psychiatric: Alert and oriented, thought content appropriate, normal insight  Capillary Refill: Brisk,3 seconds, normal   Peripheral Pulses: +2 palpable, equal bilaterally       Labs:   Recent Labs     11/27/21 2020 11/28/21 0618   WBC 2.1* 2.0*   HGB 12.9* 12.8*   HCT 39.1* 39.0*    158     Recent Labs     11/27/21 2020 11/28/21 0618    139   K 4.0 4.2   CL 99 102   CO2 28 29   BUN 17 15   CREATININE 1.0 0.9   CALCIUM 8.2* 7.9*     Recent Labs     11/27/21 2020 11/28/21 0618   AST 66* 62*   ALT 40 35   BILITOT 1.0 0.9   ALKPHOS 46 43     Recent Labs     11/28/21 0618   INR 1.50*     Recent Labs     11/27/21 2020 11/28/21  0009 11/28/21 0618   TROPONINI <0.01 <0.01 <0.01       Urinalysis:      Lab Results   Component Value Date    NITRU POSITIVE 11/27/2021    WBCUA 8 11/27/2021    BACTERIA 4+ 11/27/2021    RBCUA 1 11/27/2021    BLOODU MODERATE 11/27/2021    SPECGRAV 1.019 11/27/2021    GLUCOSEU Negative 11/27/2021       Radiology:  XR CHEST (2 VW)   Final Result   Moderate diffuse interstitial and alveolar opacities throughout the lungs   bilaterally, somewhat asymmetric and more pronounced on the right. The   finding is nonspecific and may be due to asymmetric pulmonary edema. However, multilobar pneumonia could have a similar appearance, including an   active viral pneumonitis. Cardiomegaly. No pneumothorax or significant pleural effusion.          CT CHEST PULMONARY EMBOLISM W CONTRAST    (Results Pending)           Assessment/Plan:    Active Hospital Problems    Diagnosis     Acute on chronic combined systolic and diastolic CHF (congestive heart failure) ([facility]) [I50.43]     Acute pulmonary edema ([facility]) [J81.0]     Dilated cardiomyopathy ([address] [I42.0]     PAF (paroxysmal atrial fibrillation) ([facility]) [I48.0]      1. Cont Lasix 40 mg IV bid  2. Droplet plus until COVID r/o  3. Start Solumedrol 40 mg IV q6h for wheezing and tracheitis  4. Continue Metoprolol, Lisinopril, Lipitor  5. Continue Eliquis  6. Strict I/O, daily wt on scale  7. FR 1.5 L daily  8. Check CTA Chest to r/o PE    DVT Prophylaxis: Eliquis  Diet: ADULT DIET; Regular; Low Sodium (2 gm)  Code Status: Full Code    PT/OT Eval Status: Requested    Dispo - Home    Discussed with patient. COVID seems likely here. Will see how he responds to treatment.     Pillo Núñez MD

## 2021-11-28 NOTE — ED PROVIDER NOTES
MidLevel Attestation   I havepersonally performed and/or participated in the history, exam and medical decision making and agree with all pertinent clinical information. I have also reviewed and agree with the past medical, family and social historyunless otherwise noted. I have personally performed a face to face diagnostic evaluation onthis patient. I have reviewed the mid-levels findings and agree. In brief, Serina Montero is a 56 y.o. male that presented to the emergency department with   Chief Complaint   Patient presents with    Shortness of Breath     Pt reports having difficulty breathing while laying flat, and chest congestion times one week. hoarse voice   . CONSTITUTIONAL: Well appearing, in no acute distress   EYES: PERRL, No injection, discharge or scleral icterus. NECK: Normal ROM, NO LAD and Moist mucous membranes. CARDIOVASCULAR: Regular rate and rhythm. No murmurs or gallop. PULMONARY/CHEST: Airway patent. No retractions. Breath sounds clear with good air entry bilaterally. ABDOMEN: Soft, Non-distended and non-tender, without guarding or rebound. SKIN: Acyanotic, warm, dry   MUSCULOSKELETAL: No swelling, tenderness or deformity   NEUROLOGICAL: Awake. Pulses intact. Grossly nonfocal     78-year-old gentleman who presents with shortness of breath and found to have pulmonary edema secondary to atrial fibrillation. Patient admitted for further evaluation and treatment. I have reviewed and interpreted all of the currently available lab results and diagnostics from this visit:  Results for orders placed or performed during the hospital encounter of 11/27/21   Culture, Blood 1    Specimen: Blood   Result Value Ref Range    Blood Culture, Routine       No Growth to date. Any change in status will be called.    Culture, Blood 2    Specimen: Blood   Result Value Ref Range    Culture, Blood 2 (A)      Gram stain Aerobic bottle:  Gram positive cocci in clusters  resembling Staphylococcus  Information to follow      Organism Staphylococcus epidermidis DNA Detected (A)     Culture, Blood 2 See additional report for complete BCID panel.     Culture, Urine    Specimen: Urine, clean catch   Result Value Ref Range    Organism Escherichia coli (A)     Urine Culture, Routine >100,000 CFU/ml  Sensitivity to follow      Culture, Blood, PCR ID Panel Results Report   Result Value Ref Range    Report SEE IMAGE    CBC Auto Differential   Result Value Ref Range    WBC 2.1 (L) 4.0 - 11.0 K/uL    RBC 4.89 4.20 - 5.90 M/uL    Hemoglobin 12.9 (L) 13.5 - 17.5 g/dL    Hematocrit 39.1 (L) 40.5 - 52.5 %    MCV 80.0 80.0 - 100.0 fL    MCH 26.4 26.0 - 34.0 pg    MCHC 33.0 31.0 - 36.0 g/dL    RDW 14.7 12.4 - 15.4 %    Platelets 282 627 - 194 K/uL    MPV 7.8 5.0 - 10.5 fL    Neutrophils % 71.7 %    Lymphocytes % 19.9 %    Monocytes % 8.1 %    Eosinophils % 0.0 %    Basophils % 0.3 %    Neutrophils Absolute 1.5 (L) 1.7 - 7.7 K/uL    Lymphocytes Absolute 0.4 (L) 1.0 - 5.1 K/uL    Monocytes Absolute 0.2 0.0 - 1.3 K/uL    Eosinophils Absolute 0.0 0.0 - 0.6 K/uL    Basophils Absolute 0.0 0.0 - 0.2 K/uL   Comprehensive Metabolic Panel   Result Value Ref Range    Sodium 137 136 - 145 mmol/L    Potassium 4.0 3.5 - 5.1 mmol/L    Chloride 99 99 - 110 mmol/L    CO2 28 21 - 32 mmol/L    Anion Gap 10 3 - 16    Glucose 105 (H) 70 - 99 mg/dL    BUN 17 7 - 20 mg/dL    CREATININE 1.0 0.8 - 1.3 mg/dL    GFR Non-African American >60 >60    GFR African American >60 >60    Calcium 8.2 (L) 8.3 - 10.6 mg/dL    Total Protein 6.4 6.4 - 8.2 g/dL    Albumin 3.3 (L) 3.4 - 5.0 g/dL    Albumin/Globulin Ratio 1.1 1.1 - 2.2    Total Bilirubin 1.0 0.0 - 1.0 mg/dL    Alkaline Phosphatase 46 40 - 129 U/L    ALT 40 10 - 40 U/L    AST 66 (H) 15 - 37 U/L   Troponin   Result Value Ref Range    Troponin <0.01 <0.01 ng/mL   Brain Natriuretic Peptide   Result Value Ref Range    Pro-BNP 13,643 (H) 0 - 124 pg/mL   Lactic Acid, Plasma   Result Value Ref Range    Lactic Acid 1.9 0.4 - 2.0 mmol/L   COVID-19   Result Value Ref Range    SARS-CoV-2 Detected (A) Not detected   Procalcitonin   Result Value Ref Range    Procalcitonin 0.32 (H) 0.00 - 0.15 ng/mL   Urine, reflex to culture    Specimen: Urine, clean catch   Result Value Ref Range    Color, UA YELLOW Straw/Yellow    Clarity, UA CLOUDY (A) Clear    Glucose, Ur Negative Negative mg/dL    Bilirubin Urine Negative Negative    Ketones, Urine Negative Negative mg/dL    Specific Gravity, UA 1.019 1.005 - 1.030    Blood, Urine MODERATE (A) Negative    pH, UA 6.0 5.0 - 8.0    Protein,  (A) Negative mg/dL    Urobilinogen, Urine 1.0 <2.0 E.U./dL    Nitrite, Urine POSITIVE (A) Negative    Leukocyte Esterase, Urine TRACE (A) Negative    Microscopic Examination YES     Urine Type see below     Urine Reflex to Culture Not Indicated    Microscopic Urinalysis   Result Value Ref Range    Bacteria, UA 4+ (A) None Seen /HPF    Hyaline Casts, UA 5 0 - 8 /LPF    WBC, UA 8 (H) 0 - 5 /HPF    RBC, UA 1 0 - 4 /HPF    Epithelial Cells, UA 1 0 - 5 /HPF   Comprehensive Metabolic Panel w/ Reflex to MG   Result Value Ref Range    Sodium 139 136 - 145 mmol/L    Potassium reflex Magnesium 4.2 3.5 - 5.1 mmol/L    Chloride 102 99 - 110 mmol/L    CO2 29 21 - 32 mmol/L    Anion Gap 8 3 - 16    Glucose 98 70 - 99 mg/dL    BUN 15 7 - 20 mg/dL    CREATININE 0.9 0.8 - 1.3 mg/dL    GFR Non-African American >60 >60    GFR African American >60 >60    Calcium 7.9 (L) 8.3 - 10.6 mg/dL    Total Protein 5.9 (L) 6.4 - 8.2 g/dL    Albumin 2.9 (L) 3.4 - 5.0 g/dL    Albumin/Globulin Ratio 1.0 (L) 1.1 - 2.2    Total Bilirubin 0.9 0.0 - 1.0 mg/dL    Alkaline Phosphatase 43 40 - 129 U/L    ALT 35 10 - 40 U/L    AST 62 (H) 15 - 37 U/L   CBC auto differential   Result Value Ref Range    WBC 2.0 (L) 4.0 - 11.0 K/uL    RBC 4.90 4.20 - 5.90 M/uL    Hemoglobin 12.8 (L) 13.5 - 17.5 g/dL    Hematocrit 39.0 (L) 40.5 - 52.5 %    MCV 79.6 (L) 80.0 - 100.0 fL    MCH 26.2 26.0 - 34.0 pg    MCHC 32.9 31.0 - 36.0 g/dL    RDW 14.7 12.4 - 15.4 %    Platelets 554 945 - 759 K/uL    MPV 8.1 5.0 - 10.5 fL    Neutrophils % 71.0 %    Lymphocytes % 22.8 %    Monocytes % 5.7 %    Eosinophils % 0.1 %    Basophils % 0.4 %    Neutrophils Absolute 1.5 (L) 1.7 - 7.7 K/uL    Lymphocytes Absolute 0.5 (L) 1.0 - 5.1 K/uL    Monocytes Absolute 0.1 0.0 - 1.3 K/uL    Eosinophils Absolute 0.0 0.0 - 0.6 K/uL    Basophils Absolute 0.0 0.0 - 0.2 K/uL   Troponin   Result Value Ref Range    Troponin <0.01 <0.01 ng/mL   Troponin   Result Value Ref Range    Troponin <0.01 <0.01 ng/mL   TSH with Reflex   Result Value Ref Range    TSH 1.20 0.27 - 4.20 uIU/mL   Protime-INR   Result Value Ref Range    Protime 17.2 (H) 9.9 - 12.7 sec    INR 1.50 (H) 0.88 - 1.12   APTT   Result Value Ref Range    aPTT 32.9 26.2 - 38.6 sec   Fibrinogen   Result Value Ref Range    Fibrinogen 389 200 - 397 mg/dL   Procalcitonin   Result Value Ref Range    Procalcitonin 0.29 (H) 0.00 - 0.15 ng/mL   C-Reactive Protein   Result Value Ref Range    CRP 48.9 (H) 0.0 - 5.1 mg/L   C-Reactive Protein   Result Value Ref Range    CRP 49.0 (H) 0.0 - 5.1 mg/L   Lactate Dehydrogenase   Result Value Ref Range     (H) 100 - 190 U/L   D-Dimer, Quantitative   Result Value Ref Range    D-Dimer, Quant 421 (H) 0 - 229 ng/mL DDU   Vitamin D 25 Hydroxy   Result Value Ref Range    Vit D, 25-Hydroxy 11.8 (L) >=30 ng/mL   Troponin   Result Value Ref Range    Troponin <0.01 <0.01 ng/mL   Magnesium   Result Value Ref Range    Magnesium 2.20 1.80 - 2.40 mg/dL   C-Reactive Protein   Result Value Ref Range    CRP 42.7 (H) 0.0 - 5.1 mg/L   Basic metabolic panel   Result Value Ref Range    Sodium 137 136 - 145 mmol/L    Potassium 4.2 3.5 - 5.1 mmol/L    Chloride 98 (L) 99 - 110 mmol/L    CO2 31 21 - 32 mmol/L    Anion Gap 8 3 - 16    Glucose 135 (H) 70 - 99 mg/dL    BUN 17 7 - 20 mg/dL    CREATININE 0.9 0.8 - 1.3 mg/dL    GFR Non-African American >60 >60    GFR admission, onward)    Start Ordered     Status Ordering Provider    11/29/21 1630 11/29/21 0439  vancomycin 1000 mg IVPB in 250 mL D5W addavial  EVERY 12 HOURS        Question:  Antimicrobial Indications  Answer:  Bloodstream Infection    Acknowledged [address]    11/29/21 1200 11/29/21 1115  dexamethasone (PF) (DECADRON) injection 6 mg  DAILY         Last MAR action: Given - by HEATH Collins on 11/29/21 at [address] [facility]    11/29/21 1145 11/29/21 1121  metoprolol succinate (TOPROL XL) extended release tablet 50 mg  DAILY         Last MAR action: Given - by Kasi Mcintosh on 11/29/21 at [facility]    11/29/21 1130 11/29/21 1110  cefTRIAXone (ROCEPHIN) 1000 mg in sterile water 10 mL IV syringe  EVERY 24 HOURS        Question:  Antimicrobial Indications  Answer:  Urinary Tract Infection    Last MAR action: Given - by HEATH Collins on 11/29/21 at [facility]    11/29/21 0230 11/29/21 0214  vancomycin (VANCOCIN) 1,250 mg in dextrose 5 % 250 mL IVPB  ONCE        Question:  Antimicrobial Indications  Answer:  Bloodstream Infection    Last MAR action: Stopped - by Ju Ruelas on 11/29/21 at [address] [facility], [place]    11/28/21 1800 11/28/21 1036  furosemide (LASIX) injection 40 mg  2 TIMES DAILY         Last MAR action: Given - by Kasi Mcintosh on 11/29/21 at [facility] [place]    11/28/21 1018 11/28/21 1018  iopamidol (ISOVUE-370) 76 % injection 75 mL  IMG ONCE PRN         Last MAR action: Given - by Annabel Delcid on 11/28/21 at [facility] [facility], [facility]    11/28/21 0944 11/28/21 0944  iopamidol (ISOVUE-370) 76 % injection 75 mL  IMG ONCE PRN         Acknowledged [place], [place]    11/28/21 0900 11/27/21 2354  finasteride (PROSCAR) tablet 5 mg  DAILY         Last MAR action: Given - by Iman Michael A on 11/29/21 at [address] [place], [place]    11/28/21 0900 11/27/21 2354  lisinopril (PRINIVIL;ZESTRIL) tablet 5 mg  DAILY         Last MAR action: Given - by Kasi Mcintosh on 11/29/21 at [address] [facility]    11/28/21 0900 11/27/21 2354  tamsulosin (FLOMAX) capsule 0.4 mg  DAILY         Last MAR action: Given - by Derek Covarrubias on 11/29/21 at [address] [place] [address]    11/28/21 0900 11/27/21 2354  sodium chloride flush 0.9 % injection 5-40 mL  2 times per day         Last MAR action: Given - by Derek Covarrubias on 11/29/21 at [address] [place] [address]    11/28/21 0015 11/27/21 2354  apixaban (ELIQUIS) tablet 5 mg  2 TIMES DAILY         Last MAR action: Given - by Derek Covarrubias on 11/29/21 at [address] [place] [address]    11/28/21 0015 11/27/21 2354  atorvastatin (LIPITOR) tablet 40 mg  NIGHTLY         Last MAR action: Given - by Mandy Luther on 11/28/21 at [address] [place] [address], [address] [place] [address]    11/27/21 2354 11/27/21 2354  sodium chloride flush 0.9 % injection 5-40 mL  PRN         Acknowledged [facility]    11/27/21 2354 11/27/21 2354  ondansetron (ZOFRAN-ODT) disintegrating tablet 4 mg  EVERY 8 HOURS PRN        \"Or\" Linked Group Details    Acknowledged [facility], [facility]    11/27/21 2354 11/27/21 2354  ondansetron (ZOFRAN) injection 4 mg  EVERY 6 HOURS PRN        \"Or\" Linked Group Details    Acknowledged [facility], [facility]    11/27/21 2354 11/27/21 2354  polyethylene glycol (GLYCOLAX) packet 17 g  DAILY PRN         Acknowledged [facility]    11/27/21 2354 11/27/21 2354  acetaminophen (TYLENOL) tablet 650 mg  EVERY 6 HOURS PRN        \"Or\" Linked Group Details    Acknowledged [facility]    11/27/21 2354 11/27/21 2354  acetaminophen (TYLENOL) suppository 650 mg  EVERY 6 HOURS PRN        \"Or\" Linked Group Details    Acknowledged [facility]    11/27/21 2354 11/27/21 2354  perflutren lipid microspheres (DEFINITY) injection 1.65 mg  IMG ONCE PRN         Acknowledged [facility]    11/27/21 2354 11/27/21 2354  acetaminophen (TYLENOL) tablet 650 mg  EVERY 6 HOURS PRN        \"Or\" Linked Group Details    Acknowledged [facility], [place]    11/27/21 2354 11/27/21 2354  acetaminophen (TYLENOL) suppository 650 mg  EVERY 6 HOURS PRN        \"Or\" Linked Group Details    Acknowledged Juan Wasserman    11/27/21 2354 11/27/21 2354  0.9 % sodium chloride infusion  PRN         Last MAR action: New Bag - by Mo Bevaers [facility] 11/29/21 [facility]    11/27/21 2215 11/27/21 2212  furosemide (LASIX) injection 40 mg  ONCE         Last MAR action: Given - by Liana Feldman on 11/27/21 at [address] [address]    11/27/21 2000 11/27/21 1957  acetaminophen (TYLENOL) tablet 500 mg  ONCE         Last MAR action: Given - by Norma Perez on 11/27/21 at [facility], [facility]    11/27/21 2000 11/27/21 1957  ipratropium-albuterol (DUONEB) nebulizer solution 1 ampule  ONCE        Question:  Initiate RT Bronchodilator Protocol  Answer:  Yes    Last MAR action: Given - by Sandra Salguero on 11/27/21 at [address]          Final Impression      1. Acute pulmonary edema ([address]    2. Person under investigation for COVID-19    3. Atrial fibrillation with RVR ([address]    4. Anticoagulated    5. Orthopnea        DISPOSITION Admitted 11/27/2021 10:28:49 PM       This record is transcribed utilizing voice recognition technology. There are inherent limitations in this technology. In addition, there may be limitations in editing of this report. If there are any discrepancies, please contact me directly.     Enrique Arizmendi MD   11/29/2021         Torsten Weinberg MD  11/29/21 5448

## 2021-11-28 NOTE — PROGRESS NOTES
4 Eyes Skin Assessment     The patient is being assess for  Admission    I agree that 2 RN's have performed a thorough Head to Toe Skin Assessment on the patient. ALL assessment sites listed below have been assessed. Areas assessed by both nurses:   [x]   Head, Face, and Ears   [x]   Shoulders, Back, and Chest  [x]   Arms, Elbows, and Hands   [x]   Coccyx, Sacrum, and IschIum  [x]   Legs, Feet, and Heels        Does the Patient have Skin Breakdown?   No         J Carlos Prevention initiated:  NA   Wound Care Orders initiated:  NA      [facility] nurse consulted for Pressure Injury (Stage 3,4, Unstageable, DTI, NWPT, and Complex wounds), New and Established Ostomies:  NA      Nurse 1 eSignature: Electronically signed by Corinne Coyer, RN on 11/28/21 at 12:21 AM EST    **SHARE this note so that the co-signing nurse is able to place an eSignature**    Nurse 2 eSignature: Electronically signed by Farheen Mclaughlin RN on 11/28/21 at 3:15 AM EST

## 2021-11-28 NOTE — H&P
HOSPITALISTS HISTORY AND PHYSICAL    11/27/2021 10:18 PM    Patient Information:  Gisele Bonilla is a 56 y.o. male [id number]  PCP:  No primary care provider on file. (Tel: None )    Chief complaint:    Chief Complaint   Patient presents with    Shortness of Breath     Pt reports having difficulty breathing while laying flat, and chest congestion times one week. hoarse voice        History of Present Illness:  Cherie Nice is a 56 y.o. male with history of hypertension and atrial fibrillation. Patient presents the emergency room for shortness of breath and cough. He states his symptoms started approximately 1 week ago. He states he is unable to lay flat due to shortness of breath. He has had a productive cough with white phlegm. He denies any fevers at home. However his temperature was 100.5 F in ER. He has been experiencing lower extremity edema which is new. Denies any history of heart failure. Denies any chest pain. He denies any sick contacts. He was concerned about COVID because his friend is positive for COVID as of today. However patient was having symptoms prior to him being in contact with him. In the emergency room patient's EKG showed atrial fibrillation with RVR. Patient does have a history of atrial fibrillation but states he was \"put back in rhythm\". He stopped taking Eliquis about 6 months ago, he was unsure if he was suppose to continue medication. History obtained from patient       REVIEW OF SYSTEMS:   Review of Systems   Constitutional: Positive for fatigue. HENT: Negative for congestion and sore throat. Eyes: Negative. Respiratory: Positive for cough and shortness of breath. Cardiovascular: Positive for leg swelling. Negative for chest pain. Gastrointestinal: Negative. Endocrine: Negative. Genitourinary: Negative.     Musculoskeletal: Negative. Skin: Negative. Neurological: Negative. Hematological: Negative. Psychiatric/Behavioral: Negative. All other systems reviewed and are negative. Past Medical History:   has a past medical history of Atrial fibrillation ([address] and Hypertension. Past Surgical History:   has a past surgical history that includes Insertable Cardiac Monitor (07/26/2019) and Cardioversion (07/26/2019). Medications:  No current facility-administered medications on file prior to encounter. Current Outpatient Medications on File Prior to Encounter   Medication Sig Dispense Refill    aspirin 81 MG chewable tablet Take 1 tablet by mouth daily 30 tablet 3    cephALEXin (KEFLEX) 500 MG capsule Take 1 capsule by mouth 4 times daily 40 capsule 0    apixaban (ELIQUIS) 5 MG TABS tablet Take 1 tablet by mouth 2 times daily 60 tablet 0    atorvastatin (LIPITOR) 40 MG tablet Take 1 tablet by mouth nightly 30 tablet 3    metoprolol tartrate (LOPRESSOR) 25 MG tablet Take 1 tablet by mouth 2 times daily 60 tablet 3    finasteride (PROSCAR) 5 MG tablet Take 1 tablet by mouth daily 30 tablet 3    tamsulosin (FLOMAX) 0.4 MG capsule Take 1 capsule by mouth daily 30 capsule 3    lisinopril (PRINIVIL;ZESTRIL) 5 MG tablet Take 1 tablet by mouth daily 90 tablet 0       Allergies:  No Known Allergies     Social History:  Patient Lives alone   reports that he has never smoked. He has never used smokeless tobacco. He reports that he does not drink alcohol and does not use drugs. Family History:  family history includes Heart Attack in his mother; Heart Disease in his mother; High Blood Pressure in his father and mother; Other in his father; Stroke in his father. ,     Physical Exam:  /83   Pulse 104   Temp 100.5 °F (38.1 °C) (Oral)   Resp (!) 32   Ht 6' 2\" (1.88 m)   Wt 202 lb (91.6 kg)   SpO2 96%   BMI 25.94 kg/m²   Physical Exam  Vitals and nursing note reviewed.    Constitutional:       General: He is not in acute distress. Appearance: Normal appearance. He is ill-appearing. HENT:      Head: Normocephalic. Nose: Nose normal.      Mouth/Throat:      Mouth: Mucous membranes are moist.   Eyes:      Pupils: Pupils are equal, round, and reactive to light. Neck:      Comments: JVD present  Cardiovascular:      Rate and Rhythm: Tachycardia present. Rhythm irregular. Pulses: Normal pulses. Heart sounds: No murmur heard. Pulmonary:      Comments: Diffuse crackles bilaterally with occasional wheezing. Patient begins coughing with deep breath  Abdominal:      General: Abdomen is flat. Bowel sounds are normal. There is no distension. Palpations: Abdomen is soft. Tenderness: There is no abdominal tenderness. Musculoskeletal:         General: Normal range of motion. Right lower leg: Edema present. Left lower leg: Edema present. Comments: 4+ bilateral edema   Skin:     General: Skin is warm and dry. Capillary Refill: Capillary refill takes less than 2 seconds. Coloration: Skin is not jaundiced. Neurological:      General: No focal deficit present. Mental Status: He is alert and oriented to person, place, and time. Cranial Nerves: No cranial nerve deficit. Psychiatric:         Mood and Affect: Mood normal.         Behavior: Behavior normal.         Thought Content:  Thought content normal.         Judgment: Judgment normal.         Labs:  CBC:   Lab Results   Component Value Date    WBC 2.1 11/27/2021    RBC 4.89 11/27/2021    HGB 12.9 11/27/2021    HCT 39.1 11/27/2021    MCV 80.0 11/27/2021    MCH 26.4 11/27/2021    MCHC 33.0 11/27/2021    RDW 14.7 11/27/2021     11/27/2021    MPV 7.8 11/27/2021     BMP:    Lab Results   Component Value Date     11/27/2021    K 4.0 11/27/2021    K 4.4 07/25/2019    CL 99 11/27/2021    CO2 28 11/27/2021    BUN 17 11/27/2021    CREATININE 1.0 11/27/2021    CALCIUM 8.2 11/27/2021    GFRAA >60 11/27/2021 LABGLOM >60 11/27/2021    GLUCOSE 105 11/27/2021     XR CHEST (2 VW)   Final Result   Moderate diffuse interstitial and alveolar opacities throughout the lungs   bilaterally, somewhat asymmetric and more pronounced on the right. The   finding is nonspecific and may be due to asymmetric pulmonary edema. However, multilobar pneumonia could have a similar appearance, including an   active viral pneumonitis. Cardiomegaly. No pneumothorax or significant pleural effusion. Chest Xray:   EKG:    I visualized CXR images and EKG strips    Problem List  Active Problems:    * No active hospital problems. *  Resolved Problems:    * No resolved hospital problems. *        Assessment/Plan:   1. Acute Pulmonary Edema  Will be admitted to the floor with telemetry. His clinical picture is consistent with fluid overload given the new onset lower extremity edema BNP was 13,643 and orthopnea. Will give a dose of Lasix x1 in ER and evaluate response. 2D echo in am.  BB and ACE continue  However patient does also have fever with cough and CXR ? Viral pneumonia. His respiratory symptoms maybe multifactorial   He is unvaccinated for COVID,  Will check COVID pcr, droplet plus isolation. He is not hypoxic in ER but dyspneic. No steroids at this time. 2. Atrial Fibrillation with RVR  Restart eliquis. Rate is controlled now. Restart metoprolol with dose tonight. 3. Fever  COVID pending  UA  Sputum cx  procalcitonin 0.32.   antibiotics not warranted at this time    4. Hypertension  BP stable continue home meds. DVT prophylaxis Eliquis  Code status Full  Diet Cardiac, Na restriction  IV access peripheral  Don Catheter none    Admit as inpatient. I anticipate hospitalization spanning more than two midnights for investigation and treatment of the above medically necessary diagnoses. Please note that some part of this chart was generated using Dragon dictation software.  Although every effort was made to ensure the accuracy of this automated transcription, some errors in transcription may have occurred inadvertently. If you may need any clarification, please do not hesitate to contact me through [facility].        TRENT Villalobos CNP    11/27/2021 10:18 PM

## 2021-11-28 NOTE — ED PROVIDER NOTES
[facility]        Pt Name: Nnamdi Prince  MRN: [id number]  [place] 1960  Date of evaluation: 11/27/2021  Provider: Torin Benton PA-C  PCP: No primary care provider on file. Note Started: 8:48 PM EST        I have seen and evaluated this patient with my supervising physician Kingston Aly MD.    [address] [place] [address]       Chief Complaint   Patient presents with    Shortness of Breath     Pt reports having difficulty breathing while laying flat, and chest congestion times one week. hoarse voice       HISTORY OF PRESENT ILLNESS   (Location, Timing/Onset, Context/Setting, Quality, Duration, Modifying Factors, Severity, Associated Signs and Symptoms)  Note limiting factors. Chief Complaint: Cough and shortness of breath    Nnamdi Prince is a 56 y.o. male who presents to the emergency department with complaints of cough, congestion and shortness of breath. He reports having a cough and congestion for the past week. He does have known COVID-19 exposure at work. He has not Covid vaccinated. He has been feeling increasingly short of breath primarily when he lays flat. He presented to the emergency department with a fever. Denies chills or body aches. Denies chest pain, palpitations, hemoptysis, abdominal pain, nausea, vomiting or diarrhea. He also noticed that for the past month, he has had swelling in his ankles. Nursing Notes were all reviewed and agreed with or any disagreements were addressed in the HPI. REVIEW OF SYSTEMS    (2-9 systems for level 4, 10 or more for level 5)     Review of Systems   Constitutional: Positive for fever. Negative for chills. HENT: Positive for congestion. Negative for sore throat, tinnitus, trouble swallowing and voice change. Eyes: Negative for visual disturbance. Respiratory: Positive for cough and shortness of breath. Negative for wheezing and stridor. Father           SOCIAL HISTORY       Social History     Tobacco Use    Smoking status: Never Smoker    Smokeless tobacco: Never Used   Substance Use Topics    Alcohol use: Never    Drug use: Never       SCREENINGS             PHYSICAL EXAM    (up to 7 for level 4, 8 or more for level 5)     ED Triage Vitals   BP Temp Temp Source Pulse Resp SpO2 Height Weight   11/27/21 1941 11/27/21 1939 11/27/21 1939 11/27/21 1941 11/27/21 1941 11/27/21 1941 11/27/21 1943 11/27/21 1943   116/83 100.5 °F (38.1 °C) Oral 88 22 92 % 6' 2\" (1.88 m) 202 lb (91.6 kg)       Physical Exam  Vitals and nursing note reviewed. Constitutional:       Appearance: He is well-developed. He is not toxic-appearing or diaphoretic. HENT:      Head: Normocephalic and atraumatic. Jaw: There is normal jaw occlusion. No trismus, tenderness, swelling, pain on movement or malocclusion. Right Ear: Hearing, tympanic membrane, ear canal and external ear normal.      Left Ear: Hearing, tympanic membrane, ear canal and external ear normal.      Nose: Congestion present. Right Sinus: No maxillary sinus tenderness or frontal sinus tenderness. Left Sinus: No maxillary sinus tenderness or frontal sinus tenderness. Mouth/Throat:      Lips: Pink. Mouth: Mucous membranes are moist.      Dentition: No dental tenderness. Pharynx: Oropharynx is clear. Uvula midline. No uvula swelling. Tonsils: No tonsillar exudate or tonsillar abscesses. 1+ on the right. 1+ on the left. Eyes:      General: No scleral icterus. Right eye: No discharge. Left eye: No discharge. Extraocular Movements: Extraocular movements intact. Conjunctiva/sclera: Conjunctivae normal.      Pupils: Pupils are equal, round, and reactive to light. Cardiovascular:      Rate and Rhythm: Normal rate. Pulmonary:      Effort: Pulmonary effort is normal.      Breath sounds: Rales present.    Abdominal:      General: Bowel sounds are normal. Palpations: Abdomen is soft. Tenderness: There is no abdominal tenderness. Musculoskeletal:         General: Normal range of motion. Cervical back: Normal range of motion. Comments: Symmetrical trace ankle edema. No posterior calf or thigh tenderness, palpable cord, discoloration. Negative homans. Skin:     General: Skin is warm and dry. Capillary Refill: Capillary refill takes less than 2 seconds. Coloration: Skin is not jaundiced or pale. Findings: No bruising, erythema, lesion or rash. Neurological:      Mental Status: He is alert and oriented to person, place, and time. Mental status is at baseline.    Psychiatric:         Mood and Affect: Mood normal.         Behavior: Behavior normal.         DIAGNOSTIC RESULTS   LABS:    Labs Reviewed   CBC WITH AUTO DIFFERENTIAL - Abnormal; Notable for the following components:       Result Value    WBC 2.1 (*)     Hemoglobin 12.9 (*)     Hematocrit 39.1 (*)     Neutrophils Absolute 1.5 (*)     Lymphocytes Absolute 0.4 (*)     All other components within normal limits    Narrative:     Performed at:  [facility] [address]   Phone [phone]   COMPREHENSIVE METABOLIC PANEL - Abnormal; Notable for the following components:    Glucose 105 (*)     Calcium 8.2 (*)     Albumin 3.3 (*)     AST 66 (*)     All other components within normal limits    Narrative:     Performed at:  [facility] [address] [place] [address] [email or web address]   Phone [phone] - Abnormal; Notable for the following components:    Pro-BNP 13,643 (*)     All other components within normal limits    Narrative:     Performed at:  [facility] [email or web address]   Phone [phone]   CULTURE, BLOOD 1   CULTURE, BLOOD 2   TROPONIN    Narrative:     Performed at:  [facility]  [address], [place]   Phone [phone]   LACTIC ACID, PLASMA    Narrative:     Performed at:  [facility]  [address], [place]   Phone [phone]   LACTIC ACID, PLASMA   COVID-19       When ordered only abnormal lab results are displayed. All other labs were within normal range or not returned as of this dictation. EKG: When ordered, EKG's are interpreted by the Emergency Department Physician in the absence of a cardiologist.  Please see their note for interpretation of EKG. RADIOLOGY:   Non-plain film images such as CT, Ultrasound and MRI are read by the radiologist. Plain radiographic images are visualized and preliminarily interpreted by the ED Provider with the below findings:        Interpretation per the Radiologist below, if available at the time of this note:    XR CHEST (2 VW)   Final Result   Moderate diffuse interstitial and alveolar opacities throughout the lungs   bilaterally, somewhat asymmetric and more pronounced on the right. The   finding is nonspecific and may be due to asymmetric pulmonary edema. However, multilobar pneumonia could have a similar appearance, including an   active viral pneumonitis. Cardiomegaly. No pneumothorax or significant pleural effusion. XR CHEST (2 VW)    Result Date: 11/27/2021  EXAMINATION: TWO XRAY VIEWS OF THE CHEST 11/27/2021 6:59 pm COMPARISON: None. HISTORY: ORDERING SYSTEM PROVIDED HISTORY: cough/fever TECHNOLOGIST PROVIDED HISTORY: Reason for exam:->cough/fever Reason for Exam: cough/fever Acuity: Acute Type of Exam: Initial FINDINGS: Upright frontal and lateral view chest radiographs were obtained. The heart is enlarged. The mediastinal contour and pleural spaces are within normal limits.  Diffuse interstitial and alveolar opacities are present throughout the lungs bilaterally to a moderate degree, somewhat asymmetric and significantly more pronounced on the right. No pneumothorax or significant pleural effusion. No acute thoracic osseous abnormality. Moderate diffuse interstitial and alveolar opacities throughout the lungs bilaterally, somewhat asymmetric and more pronounced on the right. The finding is nonspecific and may be due to asymmetric pulmonary edema. However, multilobar pneumonia could have a similar appearance, including an active viral pneumonitis. Cardiomegaly. No pneumothorax or significant pleural effusion. PROCEDURES   Unless otherwise noted below, none     Procedures    CRITICAL CARE TIME   Critical Care  There was a high probability of life-threatening clinical deterioration in the patient's condition requiring my urgent intervention. Total critical care time with the patient was 32 minutes excluding separately reportable procedures. Critical care required due to patients suspected covid19 pneumonia with superimposed pulmonary edema and atrial fibrillation warranting consideration of treatment, continued monitoring, consultation and admission. CONSULTS:  IP CONSULT TO HOSPITALIST      EMERGENCY DEPARTMENT COURSE and DIFFERENTIAL DIAGNOSIS/MDM:   Vitals:    Vitals:    11/27/21 1941 11/27/21 1943 11/27/21 2043 11/27/21 2122   BP: 116/83      Pulse: 88   104   Resp: 22  25 (!) 32   Temp:       TempSrc:       SpO2: 92%  92% 96%   Weight:  202 lb (91.6 kg)     Height:  6' 2\" (1.88 m)         Patient was given the following medications:  Medications   acetaminophen (TYLENOL) tablet 500 mg (500 mg Oral Given 11/27/21 2001)   ipratropium-albuterol (DUONEB) nebulizer solution 1 ampule (1 ampule Inhalation Given 11/27/21 2041)         This patient presents to the emergency department complaining of cough and shortness of breath. He also reports bilateral ankle edema. In 2019, he did have an echocardiogram with ejection fraction 30 to 35%. Chest x-ray shows findings likely consistent with pulmonary edema. His BNP is over 30,000.

## 2021-11-28 NOTE — ED NOTES
SPO2/pulse taken walking from triage to bed 19    In triage: SPO2 93%, pulse: 91  At room 19: SPO2 88%, pulse: 3330 Nick Matamoros RN  11/27/21 2040

## 2021-11-28 NOTE — CONSULTS
[facility]   Cardiovascular Evaluation    PATIENT: Deshawn Patterson  DATE: 2021  MRN: [id number]  CSN: [id number]  : 1960    Primary Care Doctor/Referring provider: No primary care provider on file., Britney Mann MD     Reason for evaluation/Chief complaint:   Shortness of Breath (Pt reports having difficulty breathing while laying flat, and chest congestion times one week. hoarse voice)      Subjective:    History of present illness on initial date of evaluation:   Deshawn Patterson is a 56 y.o. patient who presented to the ER for SOB. Due to the current efforts to prevent transmission of COVID-19 and also the need to preserve PPE for other caregivers, an observational only full face-to-face encounter with the patient was performed. That being said, all relevant records and diagnostic tests were reviewed, including laboratory results and imaging. Please reference any relevant documentation elsewhere. Care will be coordinated with the primary service. The patient complained of SOB for 1 week with associated febrile symptoms. He was exposed to [employer] at work. Clinically also complained of CHF symptoms. He has been admitted and responded to lasix with diuresis and improvement in his clinical symptoms. This morning he has no SOB and is watching television and eating breakfast.     .        Patient Active Problem List   Diagnosis    Acute retention of urine    Acute renal failure ([address]    PAF (paroxysmal atrial fibrillation) ([address]    Benign essential HTN    Dilated cardiomyopathy ([address]    Encounter for loop recorder check    Acute urinary retention    Acute pulmonary edema ([address]         Cardiac Testing: I have reviewed the findings below. EKG:  ECHO:   STRESS TEST:  CATH:  BYPASS:  VASCULAR:    Past Medical History:   has a past medical history of Atrial fibrillation ([address] and Hypertension.     Surgical History:   has a past surgical history that includes illness. Physical Examination:    [unfilled]  /76   Pulse 88   Temp 98.6 °F (37 °C) (Oral)   Resp 20   Ht 6' 2\" (1.88 m)   Wt 197 lb 5 oz (89.5 kg)   SpO2 93%   BMI 25.33 kg/m²    Weight: 197 lb 5 oz (89.5 kg)     Wt Readings from Last 3 Encounters:   11/27/21 197 lb 5 oz (89.5 kg)   01/19/20 210 lb (95.3 kg)   10/31/19 200 lb (90.7 kg)       Intake/Output Summary (Last 24 hours) at 11/28/2021 0823  Last data filed at 11/28/2021 0322  Gross per 24 hour   Intake --   Output 300 ml   Net -300 ml       . Due to the current efforts to prevent transmission of COVID-19 and also the need to preserve PPE for other caregivers, an observational only full face-to-face encounter with the patient was performed. That being said, all relevant records and diagnostic tests were reviewed, including laboratory results and imaging. Please reference any relevant documentation elsewhere.  Care will be coordinated with the primary service    General Appearance:  Alert, cooperative, no distress, appears stated age   Head:  Normocephalic, without obvious abnormality, atraumatic   Eyes:  PERRL, conjunctiva/corneas clear       Nose: Nares normal, no drainage or sinus tenderness   Throat: Lips, mucosa, and tongue normal   Neck: Supple, symmetrical, trachea midline, no adenopathy, thyroid: not enlarged, symmetric, no JVD       Lungs:   Respirations unlabored and no distress   Chest Wall:  deferred   Heart:  irregular rhythm and normal rate   Abdomen:   Not distended           Extremities: Extremities normal, atraumatic, no cyanosis or edema   Pulses: deferred   Skin: Skin color, texture, turgor normal, no rashes or lesions   Pysch: Normal mood and affect   Neurologic: Observational exam with normal gross motor and sensory exam.         Labs  Recent Labs     11/27/21 2020 11/28/21 0618   WBC 2.1* 2.0*   HGB 12.9* 12.8*   HCT 39.1* 39.0*   MCV 80.0 79.6*    158     Recent Labs     11/27/21 2020 11/28/21 0618 CREATININE 1.0 0.9   BUN 17 15    139   K 4.0 4.2   CL 99 102   CO2 28 29     Recent Labs     11/28/21  0618   INR 1.50*   PROTIME 17.2*     Recent Labs     11/27/21  2020 11/28/21  0009 11/28/21  0618   TROPONINI <0.01 <0.01 <0.01     Invalid input(s): PRO-BNP  No results for input(s): CHOL, HDL in the last 72 hours. Invalid input(s): LDL, TG      Imaging:  I have reviewed the below testing personally and my interpretation is below. EKG:  Atrial fibrillation with rapid ventricular responseMinimal voltage criteria for LVH, may be normal variantNonspecific T wave abnormality , probably digitalis effectAbnormal ECG    CXR:      Assessment:  56 y.o. patient with:  SOB  CMP  AFIB      Plan:  COVID PNA a real concern    ~should be tested for COVID  CHF also possibly   ~known prior history of LV dysfunction   ~CHF education reinforced. ~salt restriction   ~fluid restriction   ~medication compliance   ~daily weights and notify of any significant weight gain/loss   ~establish with CHF nurse   ~outpatient follow-up with our CHF team    AFIB   ~rate control   ~AC    Medical Decision Making: The following items were considered in medical decision making:  Independent review of images  Review / order clinical lab tests  Review / order radiology tests  Decision to obtain old records  Review and summation of old records as accessed through [facility] (a summary of my findings in these old records)        All questions and concerns were addressed to the patient/family. Alternatives to my treatment were discussed. The note was completed using EMR. Every effort was made to ensure accuracy; however, inadvertent computerized transcription errors may be present.     Krystal Berger MD, [facility]  [phone] [facility] office  [phone] [facility]  11/28/2021  8:23 AM

## 2021-11-28 NOTE — ED NOTES
Bed: 19  Expected date:   Expected time:   Means of arrival:   Comments:  Yuli Romo RN  11/27/21 2024

## 2021-11-28 NOTE — ED NOTES
Bedside shift report given to 5T JACLYN hancock. V/U. Patient being transported to unit via wheelchair.       Radha Mustafa RN  11/27/21 9918

## 2021-11-28 NOTE — PROGRESS NOTES
Hospitalist    Per nurse patient feeling much better after Lasix dose. He voided several times however unfortunately was not measured. Will continue Lasix for 2 more doses.   Cardiology consult in am.     Jm Gambino NP

## 2021-11-28 NOTE — ACP (ADVANCE CARE PLANNING)
Advanced Care Planning Note. Purpose of Encounter: Advanced care planning in light of acute on chronic combined CHF  Parties In Attendance: Patient  Decisional Capacity: Yes  Subjective: Patient has sore throat  Objective: Cr 0.9  Goals of Care Determination: Patient wants full support (CPR, vent, surgery, HD, trach, PEG)  Plan:  IV Lasix, IV Solumedrol, Eliquis, CTA Chest  Code Status: Full code   Time spent on Advanced care Plannin minutes  Advanced Care Planning Documents: Completed advanced directives on chart, cousin is the POA.     Sangita Sesay MD  2021 10:37 AM

## 2021-11-29 PROBLEM — I50.23 ACUTE ON CHRONIC SYSTOLIC HEART FAILURE DUE TO VALVULAR DISEASE (HCC): Status: ACTIVE | Noted: 2021-11-28

## 2021-11-29 PROBLEM — I38 ACUTE ON CHRONIC SYSTOLIC HEART FAILURE DUE TO VALVULAR DISEASE (HCC): Status: ACTIVE | Noted: 2021-11-28

## 2021-11-29 LAB
ANION GAP SERPL CALCULATED.3IONS-SCNC: 8 MMOL/L (ref 3–16)
BASOPHILS ABSOLUTE: 0 K/UL (ref 0–0.2)
BASOPHILS RELATIVE PERCENT: 0.1 %
BUN BLDV-MCNC: 17 MG/DL (ref 7–20)
C-REACTIVE PROTEIN: 42.7 MG/L (ref 0–5.1)
CALCIUM SERPL-MCNC: 8.2 MG/DL (ref 8.3–10.6)
CHLORIDE BLD-SCNC: 98 MMOL/L (ref 99–110)
CO2: 31 MMOL/L (ref 21–32)
CREAT SERPL-MCNC: 0.9 MG/DL (ref 0.8–1.3)
EOSINOPHILS ABSOLUTE: 0 K/UL (ref 0–0.6)
EOSINOPHILS RELATIVE PERCENT: 0 %
GFR AFRICAN AMERICAN: >60
GFR NON-AFRICAN AMERICAN: >60
GLUCOSE BLD-MCNC: 135 MG/DL (ref 70–99)
HCT VFR BLD CALC: 42 % (ref 40.5–52.5)
HEMOGLOBIN: 13.7 G/DL (ref 13.5–17.5)
LV EF: 20 %
LVEF MODALITY: NORMAL
LYMPHOCYTES ABSOLUTE: 0.4 K/UL (ref 1–5.1)
LYMPHOCYTES RELATIVE PERCENT: 14.9 %
MCH RBC QN AUTO: 26 PG (ref 26–34)
MCHC RBC AUTO-ENTMCNC: 32.7 G/DL (ref 31–36)
MCV RBC AUTO: 79.8 FL (ref 80–100)
MONOCYTES ABSOLUTE: 0.1 K/UL (ref 0–1.3)
MONOCYTES RELATIVE PERCENT: 4.7 %
NEUTROPHILS ABSOLUTE: 2.1 K/UL (ref 1.7–7.7)
NEUTROPHILS RELATIVE PERCENT: 80.3 %
PDW BLD-RTO: 15.2 % (ref 12.4–15.4)
PLATELET # BLD: 203 K/UL (ref 135–450)
PMV BLD AUTO: 8 FL (ref 5–10.5)
POTASSIUM SERPL-SCNC: 4.2 MMOL/L (ref 3.5–5.1)
PRO-BNP: ABNORMAL PG/ML (ref 0–124)
RBC # BLD: 5.26 M/UL (ref 4.2–5.9)
REPORT: NORMAL
SODIUM BLD-SCNC: 137 MMOL/L (ref 136–145)
WBC # BLD: 2.6 K/UL (ref 4–11)

## 2021-11-29 PROCEDURE — 36415 COLL VENOUS BLD VENIPUNCTURE: CPT

## 2021-11-29 PROCEDURE — 6360000002 HC RX W HCPCS: Performed by: INTERNAL MEDICINE

## 2021-11-29 PROCEDURE — 85025 COMPLETE CBC W/AUTO DIFF WBC: CPT

## 2021-11-29 PROCEDURE — 6370000000 HC RX 637 (ALT 250 FOR IP): Performed by: CLINICAL NURSE SPECIALIST

## 2021-11-29 PROCEDURE — 93306 TTE W/DOPPLER COMPLETE: CPT

## 2021-11-29 PROCEDURE — 6360000002 HC RX W HCPCS: Performed by: NURSE PRACTITIONER

## 2021-11-29 PROCEDURE — 99232 SBSQ HOSP IP/OBS MODERATE 35: CPT | Performed by: CLINICAL NURSE SPECIALIST

## 2021-11-29 PROCEDURE — 2580000003 HC RX 258: Performed by: INTERNAL MEDICINE

## 2021-11-29 PROCEDURE — 86140 C-REACTIVE PROTEIN: CPT

## 2021-11-29 PROCEDURE — 6370000000 HC RX 637 (ALT 250 FOR IP): Performed by: NURSE PRACTITIONER

## 2021-11-29 PROCEDURE — 87040 BLOOD CULTURE FOR BACTERIA: CPT

## 2021-11-29 PROCEDURE — 87205 SMEAR GRAM STAIN: CPT

## 2021-11-29 PROCEDURE — 80048 BASIC METABOLIC PNL TOTAL CA: CPT

## 2021-11-29 PROCEDURE — 87070 CULTURE OTHR SPECIMN AEROBIC: CPT

## 2021-11-29 PROCEDURE — 83880 ASSAY OF NATRIURETIC PEPTIDE: CPT

## 2021-11-29 PROCEDURE — 1200000000 HC SEMI PRIVATE

## 2021-11-29 PROCEDURE — 2580000003 HC RX 258: Performed by: NURSE PRACTITIONER

## 2021-11-29 RX ORDER — DEXAMETHASONE SODIUM PHOSPHATE 10 MG/ML
6 INJECTION, SOLUTION INTRAMUSCULAR; INTRAVENOUS DAILY
Status: DISCONTINUED | OUTPATIENT
Start: 2021-11-29 | End: 2021-11-30

## 2021-11-29 RX ORDER — METOPROLOL SUCCINATE 50 MG/1
50 TABLET, EXTENDED RELEASE ORAL DAILY
Status: DISCONTINUED | OUTPATIENT
Start: 2021-11-29 | End: 2021-12-01 | Stop reason: HOSPADM

## 2021-11-29 RX ADMIN — DEXAMETHASONE SODIUM PHOSPHATE 6 MG: 10 INJECTION, SOLUTION INTRAMUSCULAR; INTRAVENOUS at 13:40

## 2021-11-29 RX ADMIN — METHYLPREDNISOLONE SODIUM SUCCINATE 40 MG: 40 INJECTION, POWDER, FOR SOLUTION INTRAMUSCULAR; INTRAVENOUS at 07:12

## 2021-11-29 RX ADMIN — VANCOMYCIN HYDROCHLORIDE 1250 MG: 10 INJECTION, POWDER, LYOPHILIZED, FOR SOLUTION INTRAVENOUS at 04:21

## 2021-11-29 RX ADMIN — SODIUM CHLORIDE, PRESERVATIVE FREE 10 ML: 5 INJECTION INTRAVENOUS at 08:52

## 2021-11-29 RX ADMIN — LISINOPRIL 5 MG: 5 TABLET ORAL at 08:53

## 2021-11-29 RX ADMIN — SODIUM CHLORIDE 25 ML: 9 INJECTION, SOLUTION INTRAVENOUS at 04:20

## 2021-11-29 RX ADMIN — METOPROLOL SUCCINATE 50 MG: 50 TABLET, EXTENDED RELEASE ORAL at 13:40

## 2021-11-29 RX ADMIN — METOPROLOL TARTRATE 25 MG: 25 TABLET, FILM COATED ORAL at 08:52

## 2021-11-29 RX ADMIN — Medication 1000 MG: at 11:45

## 2021-11-29 RX ADMIN — FUROSEMIDE 40 MG: 10 INJECTION, SOLUTION INTRAMUSCULAR; INTRAVENOUS at 17:43

## 2021-11-29 RX ADMIN — SODIUM CHLORIDE, PRESERVATIVE FREE 10 ML: 5 INJECTION INTRAVENOUS at 21:50

## 2021-11-29 RX ADMIN — FINASTERIDE 5 MG: 5 TABLET, FILM COATED ORAL at 08:52

## 2021-11-29 RX ADMIN — APIXABAN 5 MG: 5 TABLET, FILM COATED ORAL at 21:51

## 2021-11-29 RX ADMIN — TAMSULOSIN HYDROCHLORIDE 0.4 MG: 0.4 CAPSULE ORAL at 08:52

## 2021-11-29 RX ADMIN — APIXABAN 5 MG: 5 TABLET, FILM COATED ORAL at 08:52

## 2021-11-29 RX ADMIN — VANCOMYCIN HYDROCHLORIDE 1000 MG: 1 INJECTION, POWDER, LYOPHILIZED, FOR SOLUTION INTRAVENOUS at 17:45

## 2021-11-29 RX ADMIN — FUROSEMIDE 40 MG: 10 INJECTION, SOLUTION INTRAMUSCULAR; INTRAVENOUS at 08:53

## 2021-11-29 ASSESSMENT — PAIN SCALES - GENERAL
PAINLEVEL_OUTOF10: 0
PAINLEVEL_OUTOF10: 0

## 2021-11-29 NOTE — PROGRESS NOTES
[address]   Daily Progress Note      Admit Date:  11/27/2021    HPI:    Mr. Pascual Navas is a 56year old male with history of AF, hypertension     He is admitted with shortness of breath with febrile symptoms. He was exposed to covid at work. He was treated with lasix and diuresed. He was also started on solumedrol IV for tracheitis and wheezing. covid + probnp 13K  unvaccinated    Subjective:  Patient is being seen for systolic heart failure  There were no acute overnight cardiac events. Weight 202-188   Creat 0.9 potassium 4.2 He is sitting up in the chair. He works as a . He has not been following with cardiology, just gets his medications when he comes into the hospital for admission/ER (therefore non compliant with his medications). He is currently being treated for covid    Objective:   BP (!) 128/94   Pulse 119   Temp 97.9 °F (36.6 °C) (Oral)   Resp 16   Ht 6' 2\" (1.88 m)   Wt 188 lb 7.9 oz (85.5 kg)   SpO2 96%   BMI 24.20 kg/m²     Intake/Output Summary (Last 24 hours) at 11/29/2021 1055  Last data filed at 11/29/2021 0845  Gross per 24 hour   Intake 720 ml   Output --   Net 720 ml          Physical Exam:  General:  Awake, alert, oriented in NAD  Skin:  Warm and dry. No unusual bruising or rash  Neck:  Supple. No JVD or carotid bruit appreciated  Chest:  Normal effort.   Bilateral rales in the bases  Cardiovascular:  tachy, S1/S2, no murmur/gallop/rub  Abdomen:  Soft, nontender, +bowel sounds  Extremities:  No edema  Neurological: No focal deficits  Psychological: Normal mood and affect      Medications:    vancomycin  1,000 mg IntraVENous Q12H    methylPREDNISolone  40 mg IntraVENous Q6H    furosemide  40 mg IntraVENous BID    apixaban  5 mg Oral BID    atorvastatin  40 mg Oral Nightly    finasteride  5 mg Oral Daily    lisinopril  5 mg Oral Daily    metoprolol tartrate  25 mg Oral BID    tamsulosin  0.4 mg Oral Daily    sodium chloride flush  5-40 mL IntraVENous 2 times per day      sodium chloride 25 mL (11/29/21 0420)       Lab Data:  CBC:   Recent Labs     11/27/21 2020 11/28/21  0618 11/29/21  0543   WBC 2.1* 2.0* 2.6*   HGB 12.9* 12.8* 13.7    158 203     BMP:    Recent Labs     11/27/21 2020 11/28/21  0618 11/29/21  0543    139 137   K 4.0 4.2 4.2   CO2 28 29 31   BUN 17 15 17   CREATININE 1.0 0.9 0.9     INR:    Recent Labs     11/28/21 0618   INR 1.50*     BNP:    Recent Labs     11/27/21 2020   PROBNP 13,643*         Diagnostics:  Echo pending    Echo 7/25/2019  Summary   -Overall left ventricular systolic function is moderately depressed .   -Ejection fraction is visually estimated to be 30-35 %. -Global left ventricular function moderately reduced.   -Indeterminate diastolic function due to atrial fibrillation and moderate to   severe mitral regurgitation.   -Moderate-to-severe mitral regurgitation .   -Dilated left atrium with a volume of 97 ml.   -Left atrial volume is increased probably due to atrial fibrillation .   -There is mild right ventricular hypertrophy.   -The right ventricle is mildly enlarged.   -Right ventricular systolic pressure of 53 mm Hg consistent with pulmonary   hypertension.   -Moderate to severe tricuspid regurgitation. TAPSE = 1.43   -IVC size is dilated (>2.1 cm) but collapses > 50% with respiration   consistent with elevated RA pressure (8 mmHg). Assessment:    1.  covid pneumonia  2. Non compliance  3.  AF rate controlled, on eliquis   4. Acute on chronic systolic heart failure    Plan:    1. Continue lisinopril 5 mg daily  2. Will change lopressor to toprol 50 mg aily  3. Continue Lasix 40 mg IV bid   4. Add probnp to labs  5. CHF nurse following for diet education, fluid restriction and daily weights  6. Echo when cleared from covid  7.  covid vaccine recommend in a month    NYHA II    Discussed with patient who is agreeable with plan of care.      Thank you for allowing me to participate in the care of your patient.     Yoko Gil, APRN - CNS, CNS

## 2021-11-29 NOTE — PROGRESS NOTES
kg)   SpO2 90%   BMI 24.20 kg/m²     Intake/Output Summary (Last 24 hours) at 11/29/2021 0729  Last data filed at 11/28/2021 1830  Gross per 24 hour   Intake 720 ml   Output --   Net 720 ml      Wt Readings from Last 3 Encounters:   11/29/21 188 lb 7.9 oz (85.5 kg)   01/19/20 210 lb (95.3 kg)   10/31/19 200 lb (90.7 kg)       General appearance:  Appears comfortable, while up in the chair. He is alert and pleasant   Eyes: Sclera clear. Pupils equal.  ENT: Moist oral mucosa. Trachea midline, no adenopathy. Cardiovascular: Regular rhythm, normal S1, S2. No murmur. + 1 edema in lower extremities  Respiratory: Not using accessory muscles. Good inspiratory effort. No wheezing,  Bilateral crackles noted   GI: Abdomen soft, no tenderness, not distended, normal bowel sounds  Musculoskeletal: No cyanosis in digits, neck supple  Neurology: CN 2-12 grossly intact. No speech or motor deficits  Psych: Normal affect. Alert and oriented in time, place and person  Skin: Warm, dry, normal turgor    Labs and Tests:  CBC:   Recent Labs     11/27/21 2020 11/28/21  0618 11/29/21  0543   WBC 2.1* 2.0* 2.6*   HGB 12.9* 12.8* 13.7    158 203     BMP:    Recent Labs     11/27/21 2020 11/28/21  0618 11/29/21  0543    139 137   K 4.0 4.2 4.2   CL 99 102 98*   CO2 28 29 31   BUN 17 15 17   CREATININE 1.0 0.9 0.9   GLUCOSE 105* 98 135*     Hepatic:   Recent Labs     11/27/21 2020 11/28/21  0618   AST 66* 62*   ALT 40 35   BILITOT 1.0 0.9   ALKPHOS 55 43     CTPA   Negative for pulmonary embolus. 2. Multifocal bilateral atypical pneumonia     BNP 13,643  -  12, 738     D dimer 421     CRP 49 - 42     Urine culture with E coli    1/2 BC with staph epi     ECHO July 2019     Summary   -Overall left ventricular systolic function is moderately depressed .   -Ejection fraction is visually estimated to be 30-35 %.    -Global left ventricular function moderately reduced.   -Indeterminate diastolic function due to atrial fibrillation and moderate to   severe mitral regurgitation.   -Moderate-to-severe mitral regurgitation .   -Dilated left atrium with a volume of 97 ml.   -Left atrial volume is increased probably due to atrial fibrillation .   -There is mild right ventricular hypertrophy.   -The right ventricle is mildly enlarged.   -Right ventricular systolic pressure of 53 mm Hg consistent with pulmonary   hypertension.   -Moderate to severe tricuspid regurgitation. TAPSE = 1.43   -IVC size is dilated (>2.1 cm) but collapses > 50% with respiration    Problem List  Principal Problem:    Acute on chronic combined systolic and diastolic CHF (congestive heart failure) ([facility])  Active Problems:    PAF (paroxysmal atrial fibrillation) ([facility])    Dilated cardiomyopathy ([facility] [address]    Acute pulmonary edema ([facility])  Resolved Problems:    * No resolved hospital problems. *       Assessment & Plan:   1. Acute on chronic combine Heart failure:  Echo is pending , however previous Echo showed EF 35 % . The patient was taking only ASA prior to admission. He has been started on ACE, Toprol 50 mg and BID IV lasix. He need HF education   2. AF:  Rate controlled , now on Eliquis   3. UTI with E coli:  I have added daily rocephin, will follow the cultures  4. 1/2 BC positive for staph epi, likely a contaminant,  Will repeat. He received one dose of Vanc  5. COVID:  Now on daily decadron at 6mg daily. Sats are 985 on RA   6. Will place financial consult . .. will need meds to beds. Diet: ADULT DIET; Regular;  Low Sodium (2 gm); 1500 ml  Code:Full Code  DVT PPX      TRENT Bourgeois CNP   11/29/2021 7:29 AM

## 2021-11-29 NOTE — PROGRESS NOTES
Hospitalist    Positive blood cultures and culture #2. Gram stain aerobic bottle gram-positive cocci in clusters resembling Staphylococcus coccus coagulase negative DNA detected. We will start IV vancomycin with pharmacy dosing. Repeat blood cultures. Further recommendations per rounding hospitalist in the morning.     Ciara Gonzalez NP

## 2021-11-29 NOTE — CARE COORDINATION
CM called and spoke to patient d/t positive covid status. Pt alert and oriented. Currently staying at his work so he can isolate himself. He states he has supportive friends and stays with them normally. Pt has no PCP. CM noted pending medicaid. Cm told pt will provide him with a [facility] pamphlet and they accept medicaid for follow up. He verbalized understanding. CM notified RN pamphlet by charge RN. Pt will need meds to bed on discharge. CM will monitor for d/c needs.     Bluford Libman, RN, BSN  [phone]

## 2021-11-29 NOTE — PROGRESS NOTES
Micro called and said blood culture #1 is + for coagulase neg staph. Message sent to [facility] to notify of result.  .Electronically signed by Heidi Leyva RN on 11/29/2021 at 2:02 AM

## 2021-11-30 ENCOUNTER — APPOINTMENT (OUTPATIENT)
Dept: GENERAL RADIOLOGY | Age: 61
DRG: 720 | End: 2021-11-30
Payer: MEDICAID

## 2021-11-30 LAB
EKG ATRIAL RATE: 90 BPM
EKG DIAGNOSIS: NORMAL
EKG Q-T INTERVAL: 376 MS
EKG QRS DURATION: 104 MS
EKG QTC CALCULATION (BAZETT): 490 MS
EKG R AXIS: 194 DEGREES
EKG T AXIS: 118 DEGREES
EKG VENTRICULAR RATE: 102 BPM
ORGANISM: ABNORMAL
PROCALCITONIN: 0.14 NG/ML (ref 0–0.15)
URINE CULTURE, ROUTINE: ABNORMAL
VANCOMYCIN TROUGH: 13.2 UG/ML (ref 10–20)

## 2021-11-30 PROCEDURE — 6360000002 HC RX W HCPCS: Performed by: NURSE PRACTITIONER

## 2021-11-30 PROCEDURE — 2580000003 HC RX 258: Performed by: NURSE PRACTITIONER

## 2021-11-30 PROCEDURE — 99232 SBSQ HOSP IP/OBS MODERATE 35: CPT | Performed by: CLINICAL NURSE SPECIALIST

## 2021-11-30 PROCEDURE — 84145 PROCALCITONIN (PCT): CPT

## 2021-11-30 PROCEDURE — 1200000000 HC SEMI PRIVATE

## 2021-11-30 PROCEDURE — 6370000000 HC RX 637 (ALT 250 FOR IP): Performed by: CLINICAL NURSE SPECIALIST

## 2021-11-30 PROCEDURE — 93010 ELECTROCARDIOGRAM REPORT: CPT | Performed by: INTERNAL MEDICINE

## 2021-11-30 PROCEDURE — 6370000000 HC RX 637 (ALT 250 FOR IP): Performed by: NURSE PRACTITIONER

## 2021-11-30 PROCEDURE — 71045 X-RAY EXAM CHEST 1 VIEW: CPT

## 2021-11-30 PROCEDURE — 80202 ASSAY OF VANCOMYCIN: CPT

## 2021-11-30 PROCEDURE — 36415 COLL VENOUS BLD VENIPUNCTURE: CPT

## 2021-11-30 PROCEDURE — 6360000002 HC RX W HCPCS: Performed by: INTERNAL MEDICINE

## 2021-11-30 RX ADMIN — SODIUM CHLORIDE, PRESERVATIVE FREE 10 ML: 5 INJECTION INTRAVENOUS at 17:14

## 2021-11-30 RX ADMIN — LISINOPRIL 5 MG: 5 TABLET ORAL at 10:27

## 2021-11-30 RX ADMIN — APIXABAN 5 MG: 5 TABLET, FILM COATED ORAL at 10:27

## 2021-11-30 RX ADMIN — SODIUM CHLORIDE, PRESERVATIVE FREE 10 ML: 5 INJECTION INTRAVENOUS at 10:29

## 2021-11-30 RX ADMIN — SODIUM CHLORIDE 25 ML: 9 INJECTION, SOLUTION INTRAVENOUS at 05:40

## 2021-11-30 RX ADMIN — FINASTERIDE 5 MG: 5 TABLET, FILM COATED ORAL at 10:27

## 2021-11-30 RX ADMIN — METOPROLOL SUCCINATE 50 MG: 50 TABLET, EXTENDED RELEASE ORAL at 10:27

## 2021-11-30 RX ADMIN — DESMOPRESSIN ACETATE 40 MG: 0.2 TABLET ORAL at 21:24

## 2021-11-30 RX ADMIN — APIXABAN 5 MG: 5 TABLET, FILM COATED ORAL at 21:24

## 2021-11-30 RX ADMIN — Medication 1000 MG: at 10:26

## 2021-11-30 RX ADMIN — TAMSULOSIN HYDROCHLORIDE 0.4 MG: 0.4 CAPSULE ORAL at 10:27

## 2021-11-30 RX ADMIN — SODIUM CHLORIDE, PRESERVATIVE FREE 10 ML: 5 INJECTION INTRAVENOUS at 21:24

## 2021-11-30 RX ADMIN — FUROSEMIDE 40 MG: 10 INJECTION, SOLUTION INTRAMUSCULAR; INTRAVENOUS at 10:27

## 2021-11-30 RX ADMIN — DESMOPRESSIN ACETATE 40 MG: 0.2 TABLET ORAL at 01:46

## 2021-11-30 RX ADMIN — DEXAMETHASONE SODIUM PHOSPHATE 6 MG: 10 INJECTION, SOLUTION INTRAMUSCULAR; INTRAVENOUS at 10:27

## 2021-11-30 RX ADMIN — VANCOMYCIN HYDROCHLORIDE 1000 MG: 1 INJECTION, POWDER, LYOPHILIZED, FOR SOLUTION INTRAVENOUS at 05:42

## 2021-11-30 RX ADMIN — FUROSEMIDE 40 MG: 10 INJECTION, SOLUTION INTRAMUSCULAR; INTRAVENOUS at 17:15

## 2021-11-30 ASSESSMENT — PAIN SCALES - GENERAL
PAINLEVEL_OUTOF10: 0

## 2021-11-30 NOTE — PROGRESS NOTES
Pt denies any pain remains RA VSS aware of fluid restriction education provided by nurse. Cardio in this am signed off adjusted metoprolol orders.  Pt aware resting in bed call light in reach

## 2021-11-30 NOTE — PROGRESS NOTES
Net 240 ml      Wt Readings from Last 3 Encounters:   11/30/21 186 lb 15.2 oz (84.8 kg)   01/19/20 210 lb (95.3 kg)   10/31/19 200 lb (90.7 kg)       General appearance:  Appears comfortable, while up in the chair. He is alert and pleasant   Eyes: Sclera clear. Pupils equal.  ENT: Moist oral mucosa. Trachea midline, no adenopathy. Cardiovascular: Regular rhythm, normal S1, S2. No murmur. no edema in lower extremities  Respiratory: Not using accessory muscles. Good inspiratory effort. No wheezing, crackles LLL  GI: Abdomen soft, no tenderness, not distended, normal bowel sounds  Musculoskeletal: No cyanosis in digits, neck supple  Neurology: CN 2-12 grossly intact. No speech or motor deficits  Psych: Normal affect. Alert and oriented in time, place and person  Skin: Warm, dry, normal turgor    Labs and Tests:  CBC:   Recent Labs     11/27/21  2020 11/28/21  0618 11/29/21  0543   WBC 2.1* 2.0* 2.6*   HGB 12.9* 12.8* 13.7    158 203     BMP:    Recent Labs     11/27/21 2020 11/28/21  0618 11/29/21  0543    139 137   K 4.0 4.2 4.2   CL 99 102 98*   CO2 28 29 31   BUN 17 15 17   CREATININE 1.0 0.9 0.9   GLUCOSE 105* 98 135*     Hepatic:   Recent Labs     11/27/21  2020 11/28/21  0618   AST 66* 62*   ALT 40 35   BILITOT 1.0 0.9   ALKPHOS 55 43     CTPA   Negative for pulmonary embolus. 2. Multifocal bilateral atypical pneumonia     BNP 13,643  -  12, 738       Urine culture with E coli    1/2 BC with staph epi      Summary    -Covid+    -Severely reduced global systolic function with an ejection fraction    estimated at 20%.  -Severe global hypokinesis with regional variations noted.    -Right ventricular systolic function appears to be mildly reduced based on    visual inspection.    -Severe biatrial enlargement.    -Thickened mitral valve without evidence of stenosis.  There is    mild-to-moderate mitral regurgitation.    -There is mild-to-moderate tricuspid regurgitation with a RVSP estimation of  37 mmHg.    -Diastolic dysfunction with elevated LV filling pressures. Urine Cultures  Susceptibility     Escherichia coli     BACTERIAL SUSCEPTIBILITY PANEL BY PAT     ampicillin <=2 mcg/mL Sensitive     ceFAZolin <=4 mcg/mL Sensitive 1     cefepime <=0.12 mcg/mL Sensitive     cefTRIAXone <=0.25 mcg/mL Sensitive     ciprofloxacin <=0.25 mcg/mL Sensitive     ertapenem <=0.12 mcg/mL Sensitive     gentamicin <=1 mcg/mL Sensitive     levofloxacin <=0.12 mcg/mL Sensitive     nitrofurantoin <=16 mcg/mL Sensitive     piperacillin-tazobactam <=4 mcg/mL Sensitive     trimethoprim-sulfamethoxazole <=20 mcg/mL Sensitive          Problem List  Principal Problem:    Acute on chronic systolic heart failure due to valvular disease (HCC)  Active Problems:    Atrial fibrillation (HCC)    Dilated cardiomyopathy (HCC)    Acute pulmonary edema (HCC)    COVID    Non-compliance    Homeless  Resolved Problems:    * No resolved hospital problems. *       Assessment & Plan:   1. Acute on chronic combine Heart failure:  Echo revealed EF down to 20% from 35 % two years ago. The patient was taking only ASA prior to admission. He has been started on ACE, Toprol 50 mg and BID IV lasix. He needs HF education. Discussed with cardiology. Plan to switch to po lasix if cxr improved. 2. AF:  Rate controlled , now on Eliquis   3. UTI with E coli: on rocephin day 2.   4. 1/2 BC positive for staph epi, likely a contaminant, repeat negative thus far. Will dc the vanco.  5. COVID:  Now on daily decadron at 6mg daily. On RA. Will dc the steroids. 6. Disposition-possible dc tomorrow. Diet: ADULT DIET; Regular;  Low Sodium (2 gm); 1500 ml  Code:Full Code  DVT PPX      Giovany Lewis PA-C   11/30/2021 3:52 PM

## 2021-11-30 NOTE — CONSULTS
[address]      Manfred Miners 1960    History:  Past Medical History:   Diagnosis Date    Atrial fibrillation ([facility] [address] 07/25/2019    Hypertension        ECHO:   Summary   -Covid+   -Severely reduced global systolic function with an ejection fraction   estimated at 20%. -Severe global hypokinesis with regional variations noted. -Right ventricular systolic function appears to be mildly reduced based on   visual inspection.   -Severe biatrial enlargement.   -Thickened mitral valve without evidence of stenosis. There is   mild-to-moderate mitral regurgitation.   -There is mild-to-moderate tricuspid regurgitation with a RVSP estimation of   37 mmHg. -Diastolic dysfunction with elevated LV filling pressures. ACE/ARB: lisinopril 5 mg daily  BB: toprol xl 50 mg daily  Aldosterone Antagonist: not on- consider if not contraindicated    History of sleep apnea: no  [place] Screen ordered:yes    DM History: No      Last Hospital Admission:  7/24/19 with urinary retention  Code Status: full   Discharge plans:  Patient homeless- lives with friends    Family Present: no    Manfred Leal was admitted to the hospital with increased shortness of breath. Found to be COVID positive and EF now 20% Patient states he was noted to have lower leg edema, dyspnea with exertion and fatigue. Patient states he does not have a scale but has a way to weigh himself at work. Patient was not following a sodium or fluid restriction. Stressed to patient importance of complying with follow up visits and with medications. Asked patient if he had plan to be able to continue to obtain meds. He was not able to answer. He did state he would have friends bring him to appointments. Stressed need for following up. Patient provided with both written and verbal education on CHF signs/ symptoms, causes, discharge medications, daily weights, low sodium diet, activity, and follow-up.   Pt to call if gains 3 pounds in one day or 5 pounds in one week. Mutually agreed upon goals were discussed such as calling the MD as soon as they recognize symptoms and weight gain, maintaining proper diet, taking medications as prescribed, joining cardiac rehab when able. Also reviewed importance of risk factor reduction. Patient provided with CHF Zone Management tool and CHF symptoms magnet. Discussed importance of lifestyle changes: agrees to weights daily    PATIENT/CAREGIVER TEACHING:    Level of patient/caregiver understanding able to:   [ x] Verbalize understanding [ ] Demonstrate understanding [ ] Teach back   [ ] Needs reinforcement [ ] Other:       Time spent teachin mins    1. WEIGHT: Admit Weight: 202 lb (91.6 kg)      Today  Weight: 186 lb 15.2 oz (84.8 kg)   2. I/O     Intake/Output Summary (Last 24 hours) at 2021 1158  Last data filed at 2021 1015  Gross per 24 hour   Intake 240 ml   Output --   Net 240 ml       Recommendations:   1. Patient needs follow up at discharge  2. Educate further on fluid restriction 48 oz- 64 oz during inpatient stay so he can understand how to measure intake at home. 3. Continue to educate on S/S.   4. Emphasize daily weights, diet, and knowing when and who to call  5. Provided patient with CHF Resource Line for questions and concerns.    6. Stressed importance of complying with follow ups and medications    SHANTHI CALL RN 2021 11:58 AM

## 2021-11-30 NOTE — PROGRESS NOTES
[facility], [place], operated by [facility]   Daily Progress Note      Admit Date:  11/27/2021    HPI:    Mr. Alva Bennett is a 56year old male with history of AF, hypertension     He is admitted with shortness of breath with febrile symptoms. He was exposed to covid at work. He was treated with lasix and diuresed. He was also started on solumedrol IV for tracheitis and wheezing. covid + probnp 13K-12K  Unvaccinated. . homeless    Subjective:  Patient is being seen for systolic heart failure  There were no acute overnight cardiac events. Weight [phone]  I&O not recorded  Creat 0.9 potassium 4.2 He is sitting up in the bed on room air and states that his breathing is better today. He does not drive and is very quite about where he lives and who can help him    Objective:   /65   Pulse 75   Temp 97.5 °F (36.4 °C) (Oral)   Resp 16   Ht 6' 2\" (1.88 m)   Wt 186 lb 15.2 oz (84.8 kg)   SpO2 94%   BMI 24.00 kg/m²       Intake/Output Summary (Last 24 hours) at 11/30/2021 1058  Last data filed at 11/30/2021 1015  Gross per 24 hour   Intake 240 ml   Output --   Net 240 ml          Physical Exam:  General:  Awake, alert, oriented in NAD  Skin:  Warm and dry. No unusual bruising or rash  Neck:  Supple. No JVD or carotid bruit appreciated  Chest:  Normal effort.   Bilateral rales in the bases  Cardiovascular:  tachy, S1/S2, no murmur/gallop/rub  Abdomen:  Soft, nontender, +bowel sounds  Extremities:  No edema  Neurological: No focal deficits  Psychological: Normal mood and affect      Medications:    cefTRIAXone (ROCEPHIN) IV  1,000 mg IntraVENous Q24H    dexamethasone  6 mg IntraVENous Daily    metoprolol succinate  50 mg Oral Daily    furosemide  40 mg IntraVENous BID    apixaban  5 mg Oral BID    atorvastatin  40 mg Oral Nightly    finasteride  5 mg Oral Daily    lisinopril  5 mg Oral Daily    tamsulosin  0.4 mg Oral Daily    sodium chloride flush  5-40 mL IntraVENous 2 times per day      sodium covid pneumonia  2. Non compliance  3.  AF rate controlled, on eliquis   4. Acute on chronic systolic heart failure, on ace and bb; no aldosterone antagonist due to non compliance concerns  5.  homeless    Plan:    1. Continue lisinopril 5 mg daily  2. Continue toprol 50 mg aily  3. Continue Lasix 40 mg IV bid   4.  covid vaccine recommend in a month  5. CHF nurse following for diet education, fluid restriction and daily weights    cxr pending if improved will change lasix to po    Will need meds to bed. NYHA II    Discussed with patient who is agreeable with plan of care. Thank you for allowing me to participate in the care of your patient.     Yoko Gil, TRENT - CNS, CNS

## 2021-11-30 NOTE — PROGRESS NOTES
Message to hosp regarding sepsis risk score >= 5 . Electronically signed by Slim Gordon RN on 11/30/2021 at 3:16 AM

## 2021-12-01 VITALS
OXYGEN SATURATION: 94 % | SYSTOLIC BLOOD PRESSURE: 142 MMHG | RESPIRATION RATE: 18 BRPM | HEIGHT: 74 IN | DIASTOLIC BLOOD PRESSURE: 89 MMHG | WEIGHT: 181.2 LBS | TEMPERATURE: 98.3 F | BODY MASS INDEX: 23.25 KG/M2 | HEART RATE: 59 BPM

## 2021-12-01 LAB
ANION GAP SERPL CALCULATED.3IONS-SCNC: 11 MMOL/L (ref 3–16)
BLOOD CULTURE, ROUTINE: NORMAL
BUN BLDV-MCNC: 16 MG/DL (ref 7–20)
CALCIUM SERPL-MCNC: 7.9 MG/DL (ref 8.3–10.6)
CHLORIDE BLD-SCNC: 100 MMOL/L (ref 99–110)
CO2: 29 MMOL/L (ref 21–32)
CREAT SERPL-MCNC: 0.8 MG/DL (ref 0.8–1.3)
CULTURE, BLOOD 2: ABNORMAL
CULTURE, BLOOD 2: ABNORMAL
CULTURE, RESPIRATORY: NORMAL
GFR AFRICAN AMERICAN: >60
GFR NON-AFRICAN AMERICAN: >60
GLUCOSE BLD-MCNC: 117 MG/DL (ref 70–99)
GRAM STAIN RESULT: NORMAL
ORGANISM: ABNORMAL
ORGANISM: ABNORMAL
POTASSIUM REFLEX MAGNESIUM: 3.7 MMOL/L (ref 3.5–5.1)
PRO-BNP: 7283 PG/ML (ref 0–124)
SODIUM BLD-SCNC: 140 MMOL/L (ref 136–145)

## 2021-12-01 PROCEDURE — 83880 ASSAY OF NATRIURETIC PEPTIDE: CPT

## 2021-12-01 PROCEDURE — 80048 BASIC METABOLIC PNL TOTAL CA: CPT

## 2021-12-01 PROCEDURE — 2580000003 HC RX 258: Performed by: NURSE PRACTITIONER

## 2021-12-01 PROCEDURE — 36415 COLL VENOUS BLD VENIPUNCTURE: CPT

## 2021-12-01 PROCEDURE — 6370000000 HC RX 637 (ALT 250 FOR IP): Performed by: NURSE PRACTITIONER

## 2021-12-01 PROCEDURE — 6370000000 HC RX 637 (ALT 250 FOR IP): Performed by: CLINICAL NURSE SPECIALIST

## 2021-12-01 PROCEDURE — 6360000002 HC RX W HCPCS: Performed by: INTERNAL MEDICINE

## 2021-12-01 PROCEDURE — 99232 SBSQ HOSP IP/OBS MODERATE 35: CPT | Performed by: CLINICAL NURSE SPECIALIST

## 2021-12-01 PROCEDURE — 6360000002 HC RX W HCPCS: Performed by: NURSE PRACTITIONER

## 2021-12-01 RX ORDER — ATORVASTATIN CALCIUM 40 MG/1
40 TABLET, FILM COATED ORAL NIGHTLY
Qty: 30 TABLET | Refills: 1 | Status: ON HOLD | OUTPATIENT
Start: 2021-12-01 | End: 2022-02-09 | Stop reason: SDUPTHER

## 2021-12-01 RX ORDER — FUROSEMIDE 40 MG/1
40 TABLET ORAL DAILY
Status: DISCONTINUED | OUTPATIENT
Start: 2021-12-01 | End: 2021-12-01 | Stop reason: HOSPADM

## 2021-12-01 RX ORDER — FUROSEMIDE 40 MG/1
40 TABLET ORAL DAILY
Qty: 30 TABLET | Refills: 0 | Status: ON HOLD | OUTPATIENT
Start: 2021-12-02 | End: 2022-02-09 | Stop reason: SDUPTHER

## 2021-12-01 RX ORDER — METOPROLOL SUCCINATE 50 MG/1
50 TABLET, EXTENDED RELEASE ORAL DAILY
Qty: 30 TABLET | Refills: 0 | Status: ON HOLD | OUTPATIENT
Start: 2021-12-02 | End: 2022-02-09 | Stop reason: SDUPTHER

## 2021-12-01 RX ORDER — FINASTERIDE 5 MG/1
5 TABLET, FILM COATED ORAL DAILY
Qty: 30 TABLET | Refills: 1 | Status: ON HOLD | OUTPATIENT
Start: 2021-12-01 | End: 2022-02-09 | Stop reason: SDUPTHER

## 2021-12-01 RX ORDER — TAMSULOSIN HYDROCHLORIDE 0.4 MG/1
0.4 CAPSULE ORAL DAILY
Qty: 30 CAPSULE | Refills: 1 | Status: ON HOLD | OUTPATIENT
Start: 2021-12-01 | End: 2022-02-09 | Stop reason: SDUPTHER

## 2021-12-01 RX ORDER — LISINOPRIL 5 MG/1
5 TABLET ORAL DAILY
Qty: 30 TABLET | Refills: 0 | Status: ON HOLD | OUTPATIENT
Start: 2021-12-01 | End: 2022-02-09 | Stop reason: SDUPTHER

## 2021-12-01 RX ADMIN — LISINOPRIL 5 MG: 5 TABLET ORAL at 09:01

## 2021-12-01 RX ADMIN — APIXABAN 5 MG: 5 TABLET, FILM COATED ORAL at 09:01

## 2021-12-01 RX ADMIN — METOPROLOL SUCCINATE 50 MG: 50 TABLET, EXTENDED RELEASE ORAL at 09:01

## 2021-12-01 RX ADMIN — Medication 1000 MG: at 11:17

## 2021-12-01 RX ADMIN — SODIUM CHLORIDE, PRESERVATIVE FREE 10 ML: 5 INJECTION INTRAVENOUS at 09:02

## 2021-12-01 RX ADMIN — TAMSULOSIN HYDROCHLORIDE 0.4 MG: 0.4 CAPSULE ORAL at 09:01

## 2021-12-01 RX ADMIN — FINASTERIDE 5 MG: 5 TABLET, FILM COATED ORAL at 09:01

## 2021-12-01 RX ADMIN — FUROSEMIDE 40 MG: 10 INJECTION, SOLUTION INTRAMUSCULAR; INTRAVENOUS at 09:03

## 2021-12-01 NOTE — PROGRESS NOTES
VSS. A&O. Morning medications given. No other needs expressed by pt at this time. Call light within reach. Will continue to monitor.

## 2021-12-01 NOTE — DISCHARGE INSTR - DIET
For nutrition questions after discharge please call the Registered Dietitian at [phone]. Heart Failure Nutrition Therapy  This diet will help you feel better and support your heart by reducing symptoms of fluid retention, shortness of breath and swelling. You should focus on:  Limiting sodium in your diet by reading labels and limiting foods high in sodium. Limit your daily sodium intake to 2,000 mg per day. Select foods with 140 mg of sodium or less per serving. Foods with more than 300 mg of sodium per serving may not fit into a reduced-sodium meal plan. Check serving sizes. If you eat more than 1 serving, you will get more sodium than the amount listed. Limiting fluid in your diet. Ask your doctor how much fluid you can have per day  Remember foods that are liquid at room temperature such as popsicles, soup, ice cream and Jell-O are fluids. Checking your weight to make sure you're not retaining too much fluid. Weigh yourself every morning. If you gain 3 or more pounds in one day or 5 pounds within 1 week, call your doctor. Foods to choose and avoid:  Avoid processed foods. Eat more fresh foods. Fresh and frozen fruits and vegetables are good choices. Choose fresh meats. Avoid processed meats such as troy, sausage and hot dogs. Do not add salt to your food while cooking or at the table. Try dry or fresh herbs, pepper, lemon juice, or a sodium-free seasoning blend such as Mrs. Dash to add flavor to food. Do not use a salt substitute. Use caution at [employer] foods are high in sodium. Ask for your food to be cooked without salt and request sauces and dressing to come on the side. 

## 2021-12-01 NOTE — PROGRESS NOTES
CLINICAL PHARMACY NOTE: MEDS TO BEDS    Total # of Prescriptions Filled: 7   The following medications were delivered to the patient:  · Flomax 0.4 mg  · Finasteride 5 mg  · Lisinopril 5 mg  · Lasix 40 mg  · Eliquis 5 mg  · Metoprolol ER 50 mg  · Atorvastatin 40 mg    Additional Documentation:    Delivered to Nurse  Presume RN=signed  Doretha Hurtado CPhT

## 2021-12-01 NOTE — PROGRESS NOTES
[address]   Daily Progress Note      Admit Date:  11/27/2021    HPI:    Mr. Sai Brown is a 56year old male with history of AF, hypertension     He is admitted with shortness of breath with febrile symptoms. He was exposed to covid at work. He was treated with lasix and diuresed. He was also started on solumedrol IV for tracheitis and wheezing. covid + probnp [phone]  Unvaccinated. . homeless    Subjective:  Patient is being seen for systolic heart failure  There were no acute overnight cardiac events. Weight [phone] by me. He is sitting up in the bed, breathing much improved  Creat 0.8 potassium 3.7 bnp much improved 7283    Objective:   BP (!) 142/89   Pulse 59   Temp 98.3 °F (36.8 °C) (Oral)   Resp 18   Ht 6' 2\" (1.88 m)   Wt 186 lb 15.2 oz (84.8 kg)   SpO2 94%   BMI 24.00 kg/m²     No intake or output data in the 24 hours ending 12/01/21 1034       Physical Exam:  General:  Awake, alert, oriented in NAD  Skin:  Warm and dry. No unusual bruising or rash  Neck:  Supple. No JVD or carotid bruit appreciated  Chest:  Normal effort.   Clear bilaterally, no rales, rhonchi or wheezing  Cardiovascular:  tachy, S1/S2, no murmur/gallop/rub  Abdomen:  Soft, nontender, +bowel sounds  Extremities:  No edema  Neurological: No focal deficits  Psychological: Normal mood and affect      Medications:    cefTRIAXone (ROCEPHIN) IV  1,000 mg IntraVENous Q24H    metoprolol succinate  50 mg Oral Daily    furosemide  40 mg IntraVENous BID    apixaban  5 mg Oral BID    atorvastatin  40 mg Oral Nightly    finasteride  5 mg Oral Daily    lisinopril  5 mg Oral Daily    tamsulosin  0.4 mg Oral Daily    sodium chloride flush  5-40 mL IntraVENous 2 times per day      sodium chloride 25 mL (11/30/21 0540)       Lab Data:  CBC:   Recent Labs     11/29/21  0543   WBC 2.6*   HGB 13.7        BMP:    Recent Labs     11/29/21  0543 12/01/21  0824    140   K 4.2 3.7   CO2 31 29 BUN 17 16   CREATININE 0.9 0.8     INR:    No results for input(s): INR in the last 72 hours. BNP:    Recent Labs     11/29/21  0543 12/01/21  0823   PROBNP 12,738* 7,283*         Diagnostics:  Echo 11/29/2021  Summary   -Covid+   -Severely reduced global systolic function with an ejection fraction   estimated at 20%. -Severe global hypokinesis with regional variations noted. -Right ventricular systolic function appears to be mildly reduced based on   visual inspection.   -Severe biatrial enlargement.   -Thickened mitral valve without evidence of stenosis. There is   mild-to-moderate mitral regurgitation.   -There is mild-to-moderate tricuspid regurgitation with a RVSP estimation of   37 mmHg. -Diastolic dysfunction with elevated LV filling pressures. Echo 7/25/2019  Summary   -Overall left ventricular systolic function is moderately depressed .   -Ejection fraction is visually estimated to be 30-35 %. -Global left ventricular function moderately reduced.   -Indeterminate diastolic function due to atrial fibrillation and moderate to   severe mitral regurgitation.   -Moderate-to-severe mitral regurgitation .   -Dilated left atrium with a volume of 97 ml.   -Left atrial volume is increased probably due to atrial fibrillation .   -There is mild right ventricular hypertrophy.   -The right ventricle is mildly enlarged.   -Right ventricular systolic pressure of 53 mm Hg consistent with pulmonary   hypertension.   -Moderate to severe tricuspid regurgitation. TAPSE = 1.43   -IVC size is dilated (>2.1 cm) but collapses > 50% with respiration   consistent with elevated RA pressure (8 mmHg). Assessment:    1.  covid pneumonia  2. Non compliance  3.  AF rate controlled, on eliquis   4. Acute on chronic systolic heart failure, on ace and bb; no aldosterone antagonist due to non compliance concerns  5.  homeless    Plan:    1. Continue lisinopril 5 mg daily  2. Continue toprol 50 mg aily  3.   Will stop IV lasix and change to lasix 40 mg daily  4.  covid vaccine recommend in a month  5. CHF nurse following for diet education, fluid restriction and daily weights  6. Will need stress test once cleared from covid    Ok from cardiology standpoint for discharge. Will do meds to bed and schedule first follow up visit as a VV. He states he has a friend that can help him with a visit on a smart phone  He does not seem to understand how weak his heart is      NYHA II    Discussed with patient who is agreeable with plan of care. Thank you for allowing me to participate in the care of your patient.     TRENT Luis - CNS, CNS

## 2021-12-01 NOTE — PLAN OF CARE
Problem: Airway Clearance - Ineffective:  Goal: Ability to maintain a clear airway will improve  Description: Ability to maintain a clear airway will improve  12/1/2021 1148 by Usman Perez RN  Outcome: Completed  12/1/2021 1109 by Usman Perez RN  Outcome: Ongoing     Problem: Airway Clearance - Ineffective  Goal: Achieve or maintain patent airway  12/1/2021 1148 by Usman Perez RN  Outcome: Completed  12/1/2021 1109 by Usman Perez RN  Outcome: Ongoing     Problem: Gas Exchange - Impaired  Goal: Absence of hypoxia  12/1/2021 1148 by Usman Perez RN  Outcome: Completed  12/1/2021 1109 by Usman Perez RN  Outcome: Ongoing  Goal: Promote optimal lung function  12/1/2021 1148 by Usman Perez RN  Outcome: Completed  12/1/2021 1109 by Usman Perez RN  Outcome: Ongoing     Problem: Breathing Pattern - Ineffective  Goal: Ability to achieve and maintain a regular respiratory rate  12/1/2021 1148 by Usman Perez RN  Outcome: Completed  12/1/2021 1109 by Usman Perez RN  Outcome: Ongoing     Problem:  Body Temperature -  Risk of, Imbalanced  Goal: Ability to maintain a body temperature within defined limits  12/1/2021 1148 by Usman Perez RN  Outcome: Completed  12/1/2021 1109 by Usman Perez RN  Outcome: Ongoing  Goal: Will regain or maintain usual level of consciousness  12/1/2021 1148 by Usman Perez RN  Outcome: Completed  12/1/2021 1109 by Usman Perez RN  Outcome: Ongoing  Goal: Complications related to the disease process, condition or treatment will be avoided or minimized  12/1/2021 1148 by Usman Perez RN  Outcome: Completed  12/1/2021 1109 by Usman Perez RN  Outcome: Ongoing     Problem: Isolation Precautions - Risk of Spread of Infection  Goal: Prevent transmission of infection  12/1/2021 1148 by Usman Perez RN  Outcome: Completed  12/1/2021 1109 by Usman Perez RN  Outcome: Ongoing     Problem: Nutrition Deficits  Goal: Optimize nutritional status  12/1/2021 1148 by Usman Perez

## 2021-12-02 ENCOUNTER — CARE COORDINATION (OUTPATIENT)
Dept: CASE MANAGEMENT | Age: 61
End: 2021-12-02

## 2021-12-02 ENCOUNTER — TELEPHONE (OUTPATIENT)
Dept: OTHER | Age: 61
End: 2021-12-02

## 2021-12-02 NOTE — CARE COORDINATION
[address] Transitions Initial Follow Up Call    Call within 2 business days of discharge: Yes    Patient: Dominic Perla Patient : 1960   MRN: [id number]  Reason for Admission: COVID+  Discharge Date: 21 RARS: Readmission Risk Score: 9.3 ( )    First attempt at 24 hour discharge call, phone number has been disconnected. There is no HIPAA form on file to reach out to joanna relative or friend. CTN will resolve episode.       Freida Vides RN

## 2021-12-03 ENCOUNTER — TELEPHONE (OUTPATIENT)
Dept: OTHER | Age: 61
End: 2021-12-03

## 2021-12-03 LAB
BLOOD CULTURE, ROUTINE: NORMAL
CULTURE, BLOOD 2: NORMAL

## 2021-12-03 NOTE — TELEPHONE ENCOUNTER
Second attempt to contact patient for follow up call. No answer at this time.      Has appointment 12/16 with [address] [place] [address]

## 2021-12-03 NOTE — TELEPHONE ENCOUNTER
[facility]  TELEPHONE ENCOUNTER FORM    Perez Melissa 1960    Final attempted to call patient for HF follow-up. No answer at this time.       Next MD/ Clinic appointment: 12/16 with Joane Phoenix, RN 12/3/2021 5:26 PM

## 2021-12-08 NOTE — DISCHARGE SUMMARY
[facility]    Patient Demographics    Vince Arora  Date of Birth. 1960  MRN. [id number]     Primary care provider. No primary care provider on file. (Tel: None)    Admit date: 11/27/2021    Discharge date (blank if same as Note Date): 12/1/2021  Note Date: 12/8/2021     Reason for Hospitalization. Chief Complaint   Patient presents with    Shortness of Breath     Pt reports having difficulty breathing while laying flat, and chest congestion times one week. hoarse voice         Significant Findings. Principal Problem:    Acute on chronic systolic heart failure due to valvular disease (HCC)  Active Problems:    Atrial fibrillation (HCC)    Dilated cardiomyopathy (HCC)    Acute pulmonary edema (HCC)    COVID    Non-compliance    Homeless  Resolved Problems:    * No resolved hospital problems. *       Problems and results from this hospitalization that need follow up. 1. CHF  2. A. fib  3. COVID    Significant test results and incidental findings. 1. ECHO  -Covid+    -Severely reduced global systolic function with an ejection fraction    estimated at 20%.  -Severe global hypokinesis with regional variations noted.    -Right ventricular systolic function appears to be mildly reduced based on    visual inspection.    -Severe biatrial enlargement.    -Thickened mitral valve without evidence of stenosis. There is    mild-to-moderate mitral regurgitation.    -There is mild-to-moderate tricuspid regurgitation with a RVSP estimation of    37 mmHg.    -Diastolic dysfunction with elevated LV filling pressures  Invasive procedures and treatments. 1. None     Problem-based Hospital Course. Patient is a 52-year-old male who presented to the hospital with shortness of breath fever, and cough. He has also been having orthopnea and leg swelling.   In the emergency room EKG revealed A. fib with RVR. Chest x-ray revealed pulmonary edema versus multifocal pneumonia. Patient was admitted with decompensated CHF. He was started on IV Lasix. He did test positive for Covid and was initially treated with steroids however these were discontinued as he is not requiring oxygen. 1. Acute on chronic combine Heart failure:  Echo revealed EF down to 20% from 35 % two years ago. The patient was taking only ASA prior to admission. He has been started on ACE, Toprol 50 mg and IV lasix. He was transitioned to oral Lasix and was seen by the CHF nurse for extensive education as patient did not realize he had heart failure previously. 2. AF:  Rate is controlled. He was restarted on Eliquis   3. UTI -cultures were positive for E. coli. He completed 3 days of Rocephin. 4. COVID:   Patient was started on IV Decadron however this was discontinued as he was on room air. .     Consults. IP CONSULT TO HOSPITALIST  IP CONSULT TO CARDIOLOGY  IP CONSULT TO HEART FAILURE NURSE/COORDINATOR  IP CONSULT TO FINANCIAL COUNSELOR    Physical examination on discharge day. BP (!) 142/89   Pulse 59   Temp 98.3 °F (36.8 °C) (Oral)   Resp 18   Ht 6' 2\" (1.88 m)   Wt 181 lb 3.2 oz (82.2 kg)   SpO2 94%   BMI 23.26 kg/m²   General appearance. Alert. Looks comfortable. HEENT. Sclera clear. Moist mucus membranes. Cardiovascular. Regular rate and rhythm, normal S1, S2. No murmur. Respiratory. Not using accessory muscles. Clear to auscultation bilaterally, no wheeze. Gastrointestinal. Abdomen soft, non-tender, not distended, normal bowel sounds  Neurology. Facial symmetry. No speech deficits. Moving all extremities equally. Extremities. No edema in lower extremities. Skin. Warm, dry, normal turgor    Condition at time of discharge stable    Medication instructions provided to patient at discharge.      Medication List      START taking these medications    furosemide 40 MG tablet  Commonly known as: LASIX  Take 1 tablet by mouth daily     metoprolol succinate 50 MG extended release tablet  Commonly known as: TOPROL XL  Take 1 tablet by mouth daily        CONTINUE taking these medications    apixaban 5 MG Tabs tablet  Commonly known as: ELIQUIS  Take 1 tablet by mouth 2 times daily     aspirin 81 MG chewable tablet  Take 1 tablet by mouth daily     atorvastatin 40 MG tablet  Commonly known as: LIPITOR  Take 1 tablet by mouth nightly     finasteride 5 MG tablet  Commonly known as: PROSCAR  Take 1 tablet by mouth daily     lisinopril 5 MG tablet  Commonly known as: PRINIVIL;ZESTRIL  Take 1 tablet by mouth daily     tamsulosin 0.4 MG capsule  Commonly known as: FLOMAX  Take 1 capsule by mouth daily        STOP taking these medications    cephALEXin 500 MG capsule  Commonly known as: Keflex     metoprolol tartrate 25 MG tablet  Commonly known as: LOPRESSOR           Where to Get Your Medications      These medications were sent to [facility], [address] [place] [address], [address] [place] [address] Road    Phone: [phone]   · apixaban 5 MG Tabs tablet  · atorvastatin 40 MG tablet  · finasteride 5 MG tablet  · furosemide 40 MG tablet  · lisinopril 5 MG tablet  · metoprolol succinate 50 MG extended release tablet  · tamsulosin 0.4 MG capsule         Discharge recommendations given to patient. Follow Up. cardiology in 1 week   Disposition. home  Activity. activity as tolerated    Spent 33 minutes in discharge process. Signed:   Kathi Chacon PA-C     12/8/2021 2:38 PM

## 2022-02-05 ENCOUNTER — HOSPITAL ENCOUNTER (INPATIENT)
Age: 62
LOS: 4 days | Discharge: HOME | DRG: 190 | End: 2022-02-10
Attending: EMERGENCY MEDICINE | Admitting: INTERNAL MEDICINE
Payer: COMMERCIAL

## 2022-02-05 ENCOUNTER — APPOINTMENT (OUTPATIENT)
Dept: CT IMAGING | Age: 62
DRG: 190 | End: 2022-02-05
Payer: COMMERCIAL

## 2022-02-05 ENCOUNTER — APPOINTMENT (OUTPATIENT)
Dept: GENERAL RADIOLOGY | Age: 62
DRG: 190 | End: 2022-02-05
Payer: COMMERCIAL

## 2022-02-05 DIAGNOSIS — I50.9 ACUTE ON CHRONIC CONGESTIVE HEART FAILURE, UNSPECIFIED HEART FAILURE TYPE (HCC): ICD-10-CM

## 2022-02-05 DIAGNOSIS — I21.4 NSTEMI (NON-ST ELEVATED MYOCARDIAL INFARCTION) (HCC): Primary | ICD-10-CM

## 2022-02-05 LAB
A/G RATIO: 1.2 (ref 1.1–2.2)
ALBUMIN SERPL-MCNC: 3.3 G/DL (ref 3.4–5)
ALP BLD-CCNC: 79 U/L (ref 40–129)
ALT SERPL-CCNC: 47 U/L (ref 10–40)
ANION GAP SERPL CALCULATED.3IONS-SCNC: 9 MMOL/L (ref 3–16)
APTT: 26.4 SEC (ref 26.2–38.6)
AST SERPL-CCNC: 37 U/L (ref 15–37)
BASOPHILS ABSOLUTE: 0 K/UL (ref 0–0.2)
BASOPHILS RELATIVE PERCENT: 0.8 %
BILIRUB SERPL-MCNC: 0.8 MG/DL (ref 0–1)
BUN BLDV-MCNC: 17 MG/DL (ref 7–20)
CALCIUM SERPL-MCNC: 8.3 MG/DL (ref 8.3–10.6)
CHLORIDE BLD-SCNC: 108 MMOL/L (ref 99–110)
CO2: 25 MMOL/L (ref 21–32)
CREAT SERPL-MCNC: 1 MG/DL (ref 0.8–1.3)
EOSINOPHILS ABSOLUTE: 0.1 K/UL (ref 0–0.6)
EOSINOPHILS RELATIVE PERCENT: 1.6 %
GFR AFRICAN AMERICAN: >60
GFR NON-AFRICAN AMERICAN: >60
GLUCOSE BLD-MCNC: 96 MG/DL (ref 70–99)
HCT VFR BLD CALC: 34.4 % (ref 40.5–52.5)
HEMOGLOBIN: 11.4 G/DL (ref 13.5–17.5)
INR BLD: 1.5 (ref 0.88–1.12)
LYMPHOCYTES ABSOLUTE: 1.4 K/UL (ref 1–5.1)
LYMPHOCYTES RELATIVE PERCENT: 26.2 %
MCH RBC QN AUTO: 27.2 PG (ref 26–34)
MCHC RBC AUTO-ENTMCNC: 33 G/DL (ref 31–36)
MCV RBC AUTO: 82.3 FL (ref 80–100)
MONOCYTES ABSOLUTE: 0.5 K/UL (ref 0–1.3)
MONOCYTES RELATIVE PERCENT: 8.7 %
NEUTROPHILS ABSOLUTE: 3.4 K/UL (ref 1.7–7.7)
NEUTROPHILS RELATIVE PERCENT: 62.7 %
PDW BLD-RTO: 16.7 % (ref 12.4–15.4)
PLATELET # BLD: 338 K/UL (ref 135–450)
PMV BLD AUTO: 7 FL (ref 5–10.5)
POTASSIUM SERPL-SCNC: 3.9 MMOL/L (ref 3.5–5.1)
PRO-BNP: ABNORMAL PG/ML (ref 0–124)
PROTHROMBIN TIME: 17.2 SEC (ref 9.9–12.7)
RBC # BLD: 4.18 M/UL (ref 4.2–5.9)
SODIUM BLD-SCNC: 142 MMOL/L (ref 136–145)
TOTAL PROTEIN: 6 G/DL (ref 6.4–8.2)
TROPONIN: 0.5 NG/ML
TROPONIN: 0.54 NG/ML
WBC # BLD: 5.5 K/UL (ref 4–11)

## 2022-02-05 PROCEDURE — 83880 ASSAY OF NATRIURETIC PEPTIDE: CPT

## 2022-02-05 PROCEDURE — 6360000004 HC RX CONTRAST MEDICATION: Performed by: EMERGENCY MEDICINE

## 2022-02-05 PROCEDURE — 6360000002 HC RX W HCPCS: Performed by: EMERGENCY MEDICINE

## 2022-02-05 PROCEDURE — 36415 COLL VENOUS BLD VENIPUNCTURE: CPT

## 2022-02-05 PROCEDURE — 84484 ASSAY OF TROPONIN QUANT: CPT

## 2022-02-05 PROCEDURE — 85025 COMPLETE CBC W/AUTO DIFF WBC: CPT

## 2022-02-05 PROCEDURE — 85610 PROTHROMBIN TIME: CPT

## 2022-02-05 PROCEDURE — 85730 THROMBOPLASTIN TIME PARTIAL: CPT

## 2022-02-05 PROCEDURE — 99284 EMERGENCY DEPT VISIT MOD MDM: CPT

## 2022-02-05 PROCEDURE — 93005 ELECTROCARDIOGRAM TRACING: CPT | Performed by: PHYSICIAN ASSISTANT

## 2022-02-05 PROCEDURE — 71046 X-RAY EXAM CHEST 2 VIEWS: CPT

## 2022-02-05 PROCEDURE — 6370000000 HC RX 637 (ALT 250 FOR IP): Performed by: EMERGENCY MEDICINE

## 2022-02-05 PROCEDURE — 71260 CT THORAX DX C+: CPT

## 2022-02-05 PROCEDURE — 81001 URINALYSIS AUTO W/SCOPE: CPT

## 2022-02-05 PROCEDURE — 80053 COMPREHEN METABOLIC PANEL: CPT

## 2022-02-05 PROCEDURE — 96374 THER/PROPH/DIAG INJ IV PUSH: CPT

## 2022-02-05 RX ORDER — ASPIRIN 81 MG/1
324 TABLET, CHEWABLE ORAL ONCE
Status: COMPLETED | OUTPATIENT
Start: 2022-02-05 | End: 2022-02-05

## 2022-02-05 RX ORDER — FUROSEMIDE 10 MG/ML
40 INJECTION INTRAMUSCULAR; INTRAVENOUS ONCE
Status: COMPLETED | OUTPATIENT
Start: 2022-02-05 | End: 2022-02-05

## 2022-02-05 RX ADMIN — IOPAMIDOL 68 ML: 755 INJECTION, SOLUTION INTRAVENOUS at 21:32

## 2022-02-05 RX ADMIN — ASPIRIN 81 MG 324 MG: 81 TABLET ORAL at 22:57

## 2022-02-05 RX ADMIN — FUROSEMIDE 40 MG: 10 INJECTION, SOLUTION INTRAMUSCULAR; INTRAVENOUS at 22:57

## 2022-02-05 ASSESSMENT — PAIN SCALES - GENERAL: PAINLEVEL_OUTOF10: 8

## 2022-02-05 ASSESSMENT — ENCOUNTER SYMPTOMS
SHORTNESS OF BREATH: 1
VOMITING: 0
COUGH: 0
RHINORRHEA: 0
NAUSEA: 0
DIARRHEA: 0
ABDOMINAL PAIN: 0

## 2022-02-05 NOTE — LETTER
[facility]  EP Procedure Sheet    2/10/22  Julio Roblero  1960  EP Procedures  [] Pacemaker implant (single/dual) [] EP Study   [] ICD implant (single/dual) [] Atrial flutter ablation (CONSUELO Y/N)   [] Biv implant ICD [] Tilt Table   [] Biv implant PPM [] Atrial fibrillation ablation (CONSUELO Yes)   [] Generator Change (PPM/ICD/BiV) [] SVT ablation   [] Lead revision (RV/LA/RA) (<1 month) [] VT ablation     [] Lead extraction +/- upgrade (BiV/PPM/ICD) [] VT Ischemic/ non-ischemic   [] Loop implant/ removal [] VT RVOT   [x] Cardioversion [] VT Left sided   [x] CONSUELO (rule out thrombus) [] AVN ablation   Equipment  [] Medtronic  [] LADY Mapping System   [] [address] [] [address]   [] [employer] [] CryoAblation   [] Biotronik [] Laser Lead Extraction   EP Procedures Scheduling Request  # hours Requested   Scheduled  Date:   Specific Day  Completed    Anesthesia  F/u Date:   CT surgery backup  COVID     Overnight stay      Location/Doctor MFF [x]RMM []MXA   []MKW [] Other     Pre-Procedure Labs / Imaging  [] PT/INR [] Type & cross   [] CBC [] Units PRBC   [] BMP/Mg [] Units FFP   [] Venogram [] Cardiac CTA for Pulmonary vein mapping     RN INITIALS:     Patient Instructions  Do not eat or drink after midnight the night prior to procedure  Dx:Persistent AF, HENOK thrombus

## 2022-02-06 PROBLEM — I50.43 ACUTE ON CHRONIC COMBINED SYSTOLIC AND DIASTOLIC HEART FAILURE (HCC): Status: ACTIVE | Noted: 2022-02-06

## 2022-02-06 LAB
BILIRUBIN URINE: NEGATIVE
BILIRUBIN URINE: NEGATIVE
BLOOD, URINE: ABNORMAL
BLOOD, URINE: ABNORMAL
CLARITY: CLEAR
CLARITY: CLEAR
COLOR: YELLOW
COLOR: YELLOW
EKG ATRIAL RATE: 110 BPM
EKG DIAGNOSIS: NORMAL
EKG Q-T INTERVAL: 428 MS
EKG QRS DURATION: 100 MS
EKG QTC CALCULATION (BAZETT): 497 MS
EKG R AXIS: -12 DEGREES
EKG T AXIS: 163 DEGREES
EKG VENTRICULAR RATE: 81 BPM
EPITHELIAL CELLS, UA: 0 /HPF (ref 0–5)
EPITHELIAL CELLS, UA: 0 /HPF (ref 0–5)
GLUCOSE URINE: NEGATIVE MG/DL
GLUCOSE URINE: NEGATIVE MG/DL
HYALINE CASTS: 0 /LPF (ref 0–8)
HYALINE CASTS: 0 /LPF (ref 0–8)
KETONES, URINE: NEGATIVE MG/DL
KETONES, URINE: NEGATIVE MG/DL
LEUKOCYTE ESTERASE, URINE: NEGATIVE
LEUKOCYTE ESTERASE, URINE: NEGATIVE
MAGNESIUM: 2.1 MG/DL (ref 1.8–2.4)
MICROSCOPIC EXAMINATION: YES
MICROSCOPIC EXAMINATION: YES
NITRITE, URINE: NEGATIVE
NITRITE, URINE: NEGATIVE
PH UA: 5.5 (ref 5–8)
PH UA: 6.5 (ref 5–8)
PROTEIN UA: NEGATIVE MG/DL
PROTEIN UA: NEGATIVE MG/DL
RBC UA: 11 /HPF (ref 0–4)
RBC UA: 227 /HPF (ref 0–4)
REASON FOR REJECTION: NORMAL
REJECTED TEST: NORMAL
SPECIFIC GRAVITY UA: 1.02 (ref 1–1.03)
SPECIFIC GRAVITY UA: 1.02 (ref 1–1.03)
TROPONIN: 0.52 NG/ML
URINE REFLEX TO CULTURE: ABNORMAL
URINE TYPE: ABNORMAL
URINE TYPE: ABNORMAL
UROBILINOGEN, URINE: 0.2 E.U./DL
UROBILINOGEN, URINE: 0.2 E.U./DL
WBC UA: 0 /HPF (ref 0–5)
WBC UA: 1 /HPF (ref 0–5)

## 2022-02-06 PROCEDURE — 1200000000 HC SEMI PRIVATE

## 2022-02-06 PROCEDURE — 2580000003 HC RX 258: Performed by: INTERNAL MEDICINE

## 2022-02-06 PROCEDURE — 6360000002 HC RX W HCPCS: Performed by: INTERNAL MEDICINE

## 2022-02-06 PROCEDURE — 81001 URINALYSIS AUTO W/SCOPE: CPT

## 2022-02-06 PROCEDURE — 36415 COLL VENOUS BLD VENIPUNCTURE: CPT

## 2022-02-06 PROCEDURE — 83735 ASSAY OF MAGNESIUM: CPT

## 2022-02-06 PROCEDURE — 99255 IP/OBS CONSLTJ NEW/EST HI 80: CPT | Performed by: INTERNAL MEDICINE

## 2022-02-06 PROCEDURE — 93010 ELECTROCARDIOGRAM REPORT: CPT | Performed by: INTERNAL MEDICINE

## 2022-02-06 PROCEDURE — 6370000000 HC RX 637 (ALT 250 FOR IP): Performed by: INTERNAL MEDICINE

## 2022-02-06 PROCEDURE — 84484 ASSAY OF TROPONIN QUANT: CPT

## 2022-02-06 RX ORDER — ACETAMINOPHEN 325 MG/1
650 TABLET ORAL EVERY 6 HOURS PRN
Status: DISCONTINUED | OUTPATIENT
Start: 2022-02-06 | End: 2022-02-08

## 2022-02-06 RX ORDER — METOPROLOL SUCCINATE 50 MG/1
50 TABLET, EXTENDED RELEASE ORAL DAILY
Status: DISCONTINUED | OUTPATIENT
Start: 2022-02-06 | End: 2022-02-10 | Stop reason: HOSPADM

## 2022-02-06 RX ORDER — ASPIRIN 81 MG/1
81 TABLET, CHEWABLE ORAL DAILY
Status: DISCONTINUED | OUTPATIENT
Start: 2022-02-06 | End: 2022-02-10 | Stop reason: HOSPADM

## 2022-02-06 RX ORDER — SODIUM CHLORIDE 0.9 % (FLUSH) 0.9 %
5-40 SYRINGE (ML) INJECTION PRN
Status: DISCONTINUED | OUTPATIENT
Start: 2022-02-06 | End: 2022-02-10 | Stop reason: HOSPADM

## 2022-02-06 RX ORDER — TAMSULOSIN HYDROCHLORIDE 0.4 MG/1
0.4 CAPSULE ORAL DAILY
Status: DISCONTINUED | OUTPATIENT
Start: 2022-02-06 | End: 2022-02-10 | Stop reason: HOSPADM

## 2022-02-06 RX ORDER — MECLIZINE HCL 12.5 MG/1
25 TABLET ORAL 3 TIMES DAILY PRN
Status: DISCONTINUED | OUTPATIENT
Start: 2022-02-06 | End: 2022-02-10 | Stop reason: HOSPADM

## 2022-02-06 RX ORDER — ACETAMINOPHEN 650 MG/1
650 SUPPOSITORY RECTAL EVERY 6 HOURS PRN
Status: DISCONTINUED | OUTPATIENT
Start: 2022-02-06 | End: 2022-02-10 | Stop reason: HOSPADM

## 2022-02-06 RX ORDER — FINASTERIDE 5 MG/1
5 TABLET, FILM COATED ORAL DAILY
Status: DISCONTINUED | OUTPATIENT
Start: 2022-02-06 | End: 2022-02-10 | Stop reason: HOSPADM

## 2022-02-06 RX ORDER — SODIUM CHLORIDE 0.9 % (FLUSH) 0.9 %
5-40 SYRINGE (ML) INJECTION EVERY 12 HOURS SCHEDULED
Status: DISCONTINUED | OUTPATIENT
Start: 2022-02-06 | End: 2022-02-10 | Stop reason: HOSPADM

## 2022-02-06 RX ORDER — NITROGLYCERIN 0.4 MG/1
0.4 TABLET SUBLINGUAL EVERY 5 MIN PRN
Status: DISCONTINUED | OUTPATIENT
Start: 2022-02-06 | End: 2022-02-10 | Stop reason: HOSPADM

## 2022-02-06 RX ORDER — LISINOPRIL 5 MG/1
5 TABLET ORAL DAILY
Status: DISCONTINUED | OUTPATIENT
Start: 2022-02-06 | End: 2022-02-10 | Stop reason: HOSPADM

## 2022-02-06 RX ORDER — POLYETHYLENE GLYCOL 3350 17 G/17G
17 POWDER, FOR SOLUTION ORAL DAILY PRN
Status: DISCONTINUED | OUTPATIENT
Start: 2022-02-06 | End: 2022-02-10 | Stop reason: HOSPADM

## 2022-02-06 RX ORDER — FUROSEMIDE 10 MG/ML
40 INJECTION INTRAMUSCULAR; INTRAVENOUS 3 TIMES DAILY
Status: DISCONTINUED | OUTPATIENT
Start: 2022-02-06 | End: 2022-02-09

## 2022-02-06 RX ORDER — ATORVASTATIN CALCIUM 40 MG/1
40 TABLET, FILM COATED ORAL NIGHTLY
Status: DISCONTINUED | OUTPATIENT
Start: 2022-02-06 | End: 2022-02-10 | Stop reason: HOSPADM

## 2022-02-06 RX ORDER — SODIUM CHLORIDE 9 MG/ML
25 INJECTION, SOLUTION INTRAVENOUS PRN
Status: DISCONTINUED | OUTPATIENT
Start: 2022-02-06 | End: 2022-02-10 | Stop reason: HOSPADM

## 2022-02-06 RX ADMIN — Medication 10 ML: at 10:36

## 2022-02-06 RX ADMIN — ATORVASTATIN CALCIUM 40 MG: 40 TABLET, FILM COATED ORAL at 20:21

## 2022-02-06 RX ADMIN — FINASTERIDE 5 MG: 5 TABLET, FILM COATED ORAL at 10:34

## 2022-02-06 RX ADMIN — FUROSEMIDE 40 MG: 10 INJECTION, SOLUTION INTRAMUSCULAR; INTRAVENOUS at 16:06

## 2022-02-06 RX ADMIN — LISINOPRIL 5 MG: 5 TABLET ORAL at 10:34

## 2022-02-06 RX ADMIN — FUROSEMIDE 40 MG: 10 INJECTION, SOLUTION INTRAMUSCULAR; INTRAVENOUS at 20:21

## 2022-02-06 RX ADMIN — TAMSULOSIN HYDROCHLORIDE 0.4 MG: 0.4 CAPSULE ORAL at 10:34

## 2022-02-06 RX ADMIN — Medication 10 ML: at 20:21

## 2022-02-06 RX ADMIN — ASPIRIN 81 MG 81 MG: 81 TABLET ORAL at 10:35

## 2022-02-06 RX ADMIN — FUROSEMIDE 40 MG: 10 INJECTION, SOLUTION INTRAMUSCULAR; INTRAVENOUS at 10:37

## 2022-02-06 RX ADMIN — METOPROLOL SUCCINATE 50 MG: 50 TABLET, EXTENDED RELEASE ORAL at 10:34

## 2022-02-06 ASSESSMENT — PAIN SCALES - GENERAL
PAINLEVEL_OUTOF10: 0

## 2022-02-06 NOTE — ED PROVIDER NOTES
[facility]        Pt Name: Loree Forde  MRN: [id number]  [place] 1960  Date of evaluation: 2/5/2022  Provider: Archana Crespo PA-C  PCP: No primary care provider on file. Note Started: 8:06 PM EST        I have seen and evaluated this patient with my supervising physician No att. providers found. CHIEF COMPLAINT       Chief Complaint   Patient presents with    Knee Pain     pt states bilateral knee and ankle pain and swelling x1 weeks. HISTORY OF PRESENT ILLNESS   (Location, Timing/Onset, Context/Setting, Quality, Duration, Modifying Factors, Severity, Associated Signs and Symptoms)  Note limiting factors. Chief Complaint: Lower leg edema    Loree Forde is a 59 y.o. male who presents to the emergency department today for evaluation for lower leg edema. The patient states he does have a history of hypertension, as well as atrial fibrillation. The patient states that he is compliant with all his medications, including Eliquis as well as his Lasix. The patient has been admitted for pulmonary edema in the past.  The patient tells me that over the past week, he states that he has noticed increasing swelling to both of his legs, and he states that this is been worsening. The patient states he is having some discomfort to both of his lower legs but is attributing this to the swelling. He is describing this as a \"tightness\". Patient states he has had a 20 pound unintentional weight gain over the past month. The patient states that he does not have any chest pain although he states he is now is noticing exertional shortness of breath. The patient states that he has had a cough and he feels that he occasionally will cough up clear sputum. Patient is otherwise not any fevers. He has no abdominal pain, nausea, vomiting or diarrhea.   He has no urinary symptoms, the patient otherwise has no complaints at this time    Nursing Notes were all reviewed and agreed with or any disagreements were addressed in the HPI. REVIEW OF SYSTEMS    (2-9 systems for level 4, 10 or more for level 5)     Review of Systems   Constitutional: Negative for activity change, appetite change, chills and fever. HENT: Negative for congestion and rhinorrhea. Respiratory: Positive for shortness of breath. Negative for cough. Cardiovascular: Positive for leg swelling. Negative for chest pain. Gastrointestinal: Negative for abdominal pain, diarrhea, nausea and vomiting. Genitourinary: Negative for difficulty urinating, dysuria and hematuria. Positives and Pertinent negatives as per HPI. Except as noted above in the ROS, all other systems were reviewed and negative. PAST MEDICAL HISTORY     Past Medical History:   Diagnosis Date    Atrial fibrillation ([facility] [address] 07/25/2019    Hypertension          SURGICAL HISTORY     Past Surgical History:   Procedure Laterality Date    CARDIOVERSION  07/26/2019    Dr. Damian Gabriel  07/26/2019         CURRENTMEDICATIONS       Previous Medications    APIXABAN (ELIQUIS) 5 MG TABS TABLET    Take 1 tablet by mouth 2 times daily    ASPIRIN 81 MG CHEWABLE TABLET    Take 1 tablet by mouth daily    ATORVASTATIN (LIPITOR) 40 MG TABLET    Take 1 tablet by mouth nightly    FINASTERIDE (PROSCAR) 5 MG TABLET    Take 1 tablet by mouth daily    FUROSEMIDE (LASIX) 40 MG TABLET    Take 1 tablet by mouth daily    LISINOPRIL (PRINIVIL;ZESTRIL) 5 MG TABLET    Take 1 tablet by mouth daily    METOPROLOL SUCCINATE (TOPROL XL) 50 MG EXTENDED RELEASE TABLET    Take 1 tablet by mouth daily    TAMSULOSIN (FLOMAX) 0.4 MG CAPSULE    Take 1 capsule by mouth daily         ALLERGIES     Patient has no known allergies.     FAMILYHISTORY       Family History   Problem Relation Age of Onset    Heart Disease Mother     High Blood Pressure Mother     Heart Attack Mother     Other Father     Stroke Father     High Blood Pressure Father           SOCIAL HISTORY       Social History     Tobacco Use    Smoking status: Never Smoker    Smokeless tobacco: Never Used   Substance Use Topics    Alcohol use: Never    Drug use: Never       SCREENINGS             PHYSICAL EXAM    (up to 7 for level 4, 8 or more for level 5)     ED Triage Vitals   BP Temp Temp Source Pulse Resp SpO2 Height Weight   02/05/22 1925 02/05/22 1922 02/05/22 1922 02/05/22 1922 02/05/22 1922 02/05/22 1922 -- 02/05/22 1922   121/75 98.5 °F (36.9 °C) Oral 97 16 97 %  181 lb (82.1 kg)       Physical Exam  Vitals and nursing note reviewed. Constitutional:       Appearance: He is well-developed. He is not diaphoretic. HENT:      Head: Normocephalic and atraumatic. Right Ear: External ear normal.      Left Ear: External ear normal.      Nose: Nose normal.   Eyes:      General:         Right eye: No discharge. Left eye: No discharge. Neck:      Vascular: JVD present. Trachea: No tracheal deviation. Cardiovascular:      Rate and Rhythm: Normal rate. Rhythm irregular. Pulses: Normal pulses. Comments: 3+ pitting edema to bilateral lower legs  Pulmonary:      Effort: Pulmonary effort is normal. No respiratory distress. Breath sounds: Rales present. No wheezing. Comments: Rales to bilateral bases  Abdominal:      General: Bowel sounds are normal. There is no distension. Palpations: Abdomen is soft. Tenderness: There is no abdominal tenderness. There is no guarding. Musculoskeletal:         General: Normal range of motion. Cervical back: Normal range of motion and neck supple. Skin:     General: Skin is warm and dry. Neurological:      Mental Status: He is alert and oriented to person, place, and time.    Psychiatric:         Behavior: Behavior normal.         DIAGNOSTIC RESULTS   LABS:    Labs Reviewed   CBC WITH AUTO DIFFERENTIAL   COMPREHENSIVE METABOLIC PANEL   URINE RT REFLEX TO CULTURE   TROPONIN   BRAIN NATRIURETIC PEPTIDE       When ordered only abnormal lab results are displayed. All other labs were within normal range or not returned as of this dictation. EKG: When ordered, EKG's are interpreted by the Emergency Department Physician in the absence of a cardiologist.  Please see their note for interpretation of EKG. RADIOLOGY:   Non-plain film images such as CT, Ultrasound and MRI are read by the radiologist. Plain radiographic images are visualized and preliminarily interpreted by the ED Provider with the below findings:        Interpretation per the Radiologist below, if available at the time of this note:    XR CHEST (2 VW)    (Results Pending)     No results found. PROCEDURES   Unless otherwise noted below, none     Procedures    CRITICAL CARE TIME   Critical Care  There was a high probability of life-threatening clinical deterioration in the patient's condition requiring my urgent intervention. Total critical care time with the patient was 35 minutes excluding separately reportable procedures. Critical care required due to patients NSTEMI, requiring extensive work-up, cardiology consultation, and admission      CONSULTS:  None      EMERGENCY DEPARTMENT COURSE and DIFFERENTIAL DIAGNOSIS/MDM:   Vitals:    Vitals:    02/05/22 1922 02/05/22 1925   BP:  121/75   Pulse: 97    Resp: 16    Temp: 98.5 °F (36.9 °C)    TempSrc: Oral    SpO2: 97%    Weight: 181 lb (82.1 kg)        Patient was given the following medications:  Medications - No data to display        Briefly, this is a 75-year-old male who presents to the emergency department today for evaluation for leg swelling. The patient states he has been experiencing leg swelling for the past week, he states he has had a 20 pound unintentional weight gain.   Patient states that he is now starting to notice shortness of breath, particularly with exertion    On examination he does have 3+ pitting edema to bilateral lower legs.  He does have JVD, with rales noted to bilateral bases. EKG does show T wave inversions which appear to be more pronounced when compared to previous EKGs. Please see attending note for details. CBC shows no evidence of leukocytosis, he is does have a mild anemia. His CMP is unremarkable. Troponin is elevated 0.54 and 6 BNP is over 16,000. His chest x-ray does show a small portal effusion. Patient states that he is compliant with his medications, and he states he did take his Eliquis today. CT of chest will be obtained there is no evidence of PE    Discussed the case with cardiology, clinical presentation today concerning for NSTEMI, although aspirin and Lasix were added on. As the patient is already taking Eliquis today no heparin recommended. Patient will need to be managed for further care and evaluation, hospitalist has been paged for admission, the patient is updated and agreeable with plan. He is otherwise stable for admission    FINAL IMPRESSION      1. NSTEMI (non-ST elevated myocardial infarction) ([facility] [address]          DISPOSITION/PLAN   DISPOSITION        PATIENT REFERRED TO:  No follow-up provider specified.     DISCHARGE MEDICATIONS:  New Prescriptions    No medications on file       DISCONTINUED MEDICATIONS:  Discontinued Medications    No medications on file              (Please note that portions of this note were completed with a voice recognition program.  Efforts were made to edit the dictations but occasionally words are mis-transcribed.)    Ilda Whittington PA-C (electronically signed)            Ilda Whittington PA-C  02/06/22 9513

## 2022-02-06 NOTE — ED NOTES
Report called to 3A. RN verbalized understanding. Jonathon being transported to unit via ED tech.      [facility]  02/06/22 0503

## 2022-02-06 NOTE — H&P
Hospital Medicine History and Physical    2/6/2022    Date of Admission: 2/6/2022    Date of Service: Pt seen/examined on 2/6/2022 and admitted to inpatient. Assessment/plan:  1. Acute on chronic combined systolic and diastolic heart failure. Will continue with IV Lasix 40 mg twice daily with hold parameters. Continue beta-blocker. Maintain on fluid restrictions. Monitor ins/out. Patient may require AICD placement at some point in the near future, unless of course if ejection fraction has improved. 2. NSTEMI. Could be secondary to demand ischemia from CHF exacerbation. Patient already on Eliquis, took last dose prior to arrival in the emergency room. Hold further anticoagulation tonight, especially as patient may require cardiac catheterization this hospital stay. Following evaluation by cardiology service, may be started on heparin infusion later this morning. Continue antiplatelet therapy, beta-blocker, statin. Trend troponin. Telemetry monitoring. 3. Other comorbidities:  history of fibrillation on chronic anticoagulation with Eliquis, essential hypertension, chronic combined systolic and diastolic heart failure (most recent echocardiogram with ejection fraction of 20%), history of BPH. Activities: Bedrest  Prophylaxis: Last took Eliquis last night  Code status: Full code    ==========================================================  Chief complaint:  Chief Complaint   Patient presents with    Knee Pain     pt states bilateral knee and ankle pain and swelling x1 weeks. History of Presenting Illness:   This is a pleasant 59 y.o. male with history of fibrillation on chronic anticoagulation with Eliquis, essential hypertension, chronic combined systolic and diastolic heart failure (most recent echocardiogram with ejection fraction of 20%), history of BPH, who presents to the emergency room with complaints of increasing bilateral lower extremity swelling, bilateral knee pain (due to swelling), orthopnea. He does not have shortness of breath at rest.  He denies cough or fever or chills. He reports having right shoulder pain earlier yesterday. proBNP is significantly elevated, has elevated troponin of 0.5; reports taking Eliquis prior to presentation to the emergency room. CT-PE protocol reveals trace right pleural effusion. Patient is being admitted for management of acute on chronic combined systolic heart failure, NSTEMI. Past Medical History:      Diagnosis Date    Atrial fibrillation ([address] 07/25/2019    Hypertension        Past Surgical History:      Procedure Laterality Date    CARDIOVERSION  07/26/2019    Dr. Alexander Fears  07/26/2019       Medications (prior to admission):  Prior to Admission medications    Medication Sig Start Date End Date Taking? Authorizing Provider   lisinopril (PRINIVIL;ZESTRIL) 5 MG tablet Take 1 tablet by mouth daily 12/1/21  Yes TRENT Pascual - CNS   metoprolol succinate (TOPROL XL) 50 MG extended release tablet Take 1 tablet by mouth daily 12/2/21  Yes TRENT Alamo - CNS   furosemide (LASIX) 40 MG tablet Take 1 tablet by mouth daily 12/2/21  Yes Collette Belch, APRN - CNS   atorvastatin (LIPITOR) 40 MG tablet Take 1 tablet by mouth nightly 12/1/21  Yes Ashleigh Gross PA-C   apixaban (ELIQUIS) 5 MG TABS tablet Take 1 tablet by mouth 2 times daily 12/1/21  Yes Ashleigh Gross PA-C   finasteride (PROSCAR) 5 MG tablet Take 1 tablet by mouth daily 12/1/21  Yes Ashleigh Gross PA-C   tamsulosin (FLOMAX) 0.4 MG capsule Take 1 capsule by mouth daily 12/1/21  Yes Ashleigh Gross PA-C   aspirin 81 MG chewable tablet Take 1 tablet by mouth daily 7/27/19  Yes Ayah Erickson MD       Allergy(ies):  Patient has no known allergies. Social History:  TOBACCO:  reports that he has never smoked. He has never used smokeless tobacco.  ETOH:  reports no history of alcohol use.     Family History:      Problem Relation Age of Onset    Heart Disease Mother     High Blood Pressure Mother     Heart Attack Mother     Other Father     Stroke Father     High Blood Pressure Father        Review of Systems:  Pertinent positives are listed in HPI. At least 10-point ROS reviewed and were negative. Vitals and physical examination:  /75   Pulse 97   Temp 98.5 °F (36.9 °C) (Oral)   Resp 16   Wt 181 lb (82.1 kg)   SpO2 97%   BMI 23.24 kg/m²   Gen/overall appearance: Not in acute distress. Alert. Oriented x3. Head: Normocephalic, atraumatic  Eyes: EOMI, good acuity  ENT: Oral mucosa moist  Neck: No JVD, thyromegaly  CVS: Nml S1S2, no MRG, RRR  Pulm: Clear bilaterally. No crackles/wheezes  Gastrointestinal: Soft, NT/ND, +BS  Musculoskeletal: 4+ bilateral lower extremity edema. Warm  Neuro: No focal deficit. Moves extremity spontaneously. Psychiatry: Appropriate affect. Not agitated. Skin: Warm, dry with normal turgor. No rash  Capillary refill: Brisk,< 3 seconds   Peripheral Pulses: +2 palpable, equal bilaterally       Labs/imaging/EKG:  CBC:   Recent Labs     02/05/22 2022   WBC 5.5   HGB 11.4*        BMP:    Recent Labs     02/05/22 2022      K 3.9      CO2 25   BUN 17   CREATININE 1.0   GLUCOSE 96     Hepatic:   Recent Labs     02/05/22 2022   AST 37   ALT 47*   BILITOT 0.8   ALKPHOS 79       XR CHEST (2 VW)    Result Date: 2/5/2022  EXAMINATION: TWO XRAY VIEWS OF THE CHEST 2/5/2022 8:01 pm COMPARISON: 11/30/2021 HISTORY: ORDERING SYSTEM PROVIDED HISTORY: Chest Discomfort TECHNOLOGIST PROVIDED HISTORY: Reason for exam:->Chest Discomfort FINDINGS: The heart is moderately enlarged and unchanged. There is an implanted cardiac event monitor. There is blunting of the right costophrenic angle. Lungs are otherwise clear. There is no pneumothorax. No acute bone finding. Stable cardiomegaly. Mild blunting of the right costophrenic angle, likely trace pleural fluid and airspace disease.      CT CHEST PULMONARY EMBOLISM W CONTRAST    Result Date: 2/5/2022  EXAMINATION: CTA OF THE CHEST 2/5/2022 9:25 pm TECHNIQUE: CTA of the chest was performed after the administration of intravenous contrast.  Multiplanar reformatted images are provided for review. MIP images are provided for review. Dose modulation, iterative reconstruction, and/or weight based adjustment of the mA/kV was utilized to reduce the radiation dose to as low as reasonably achievable. COMPARISON: Chest radiograph today. Chest CT 11/28/2021 HISTORY: ORDERING SYSTEM PROVIDED HISTORY: r/o pe TECHNOLOGIST PROVIDED HISTORY: Reason for exam:->r/o pe Decision Support Exception - unselect if not a suspected or confirmed emergency medical condition->Emergency Medical Condition (MA) Reason for Exam: Knee Pain (pt states bilateral knee and ankle pain and swelling x1 weeks. ) FINDINGS: Pulmonary Arteries: Pulmonary arteries are adequately opacified for evaluation. No evidence of intraluminal filling defect to suggest pulmonary embolism. Main pulmonary artery is normal in caliber. Mediastinum: No evidence of mediastinal lymphadenopathy. The heart and pericardium demonstrate no acute abnormality. Lungs/pleura: Trace right pleural effusion. Minimal scarring in the anterior medial right upper lobe and medial lower lobes, which may represent residual from previously seen multifocal consolidation. No evidence for edema. The central airway is patent. Upper Abdomen: No acute findings. Right renal cyst. Soft Tissues/Bones: Mild body wall edema. No evidence of pulmonary embolism. Trace right effusion. Minimal residual scarring from previously seen multifocal consolidation. EKG: Atrial fibrillation, rate 86 beats per minutes. Inverted T waves in lateral leads appears to be more pronounced than on previous EKGs. No ST changes. PVCs present. QTc 497. I reviewed EKG. Discussed with ER GENIE. Thank you No primary care provider on file.  for the opportunity to be involved in this patient's care.    -----------------------------  Denis Coburn MD  [facility]

## 2022-02-06 NOTE — PROGRESS NOTES
Cardiomyopathy presumed to be nonischemic but no cardiac cath    Paroxysmal a.fib on eliquis  Difficulty with medical compliance  Acute systolic heart failure  Elevated troponin concerning for an ischemic etiology of cardiomyopathy    Rec- continue diuresis  Agree that cardiac cath should be done during this hospital stay

## 2022-02-06 NOTE — ED PROVIDER NOTES
I independently performed a history and physical on Dusty Herzog. All diagnostic, treatment, and disposition decisions were made by myself in conjunction with the advanced practice provider. Briefly, this is a 59 y.o. male here for bilateral leg swelling and shortness of breath. Reports symptoms are worsened by minimal exertion, as well as with lying flat. Known history of CHF and atrial fibrillation, reports adherence to all his medications, however has difficulty describing his medications to me, states \"I just take with a give me. \"  He does report taking his Eliquis earlier today. .    On exam, patient afebrile and nontoxic. No distress. Heart RRR. Lungs diminished at bases, no wheezes. Abdomen soft, nondistended, nontender to palpation in all quadrants. 3+ symmetric pitting edema lower extremities. Calf compartments nontender to compression. EKG  EKG was reviewed by emergency department physician in the absence of a cardiologist    Narrow complex irregular rhythm, rate 81, normal axis, normal QRS intervals, normal Qtc, no ST elevations or depressions, TWI I, II, aVL and lateral leads, impression atrial fibrillation with nonspecific T wave morphology and solitary PVC, no STEMI      Screenings   [place] Coma Scale  Eye Opening: Spontaneous  Best Verbal Response: Oriented  Best Motor Response: Obeys commands  [place] Coma Scale Score: 15        MDM    Patient afebrile and nontoxic. No distress. EKG with nonspecific T wave morphology and chronic atrial fibrillation, no STEMI. Troponin is significantly elevated at 0.54, patient reports shortness of breath, however no chest pain. CTA shows no evidence of pulmonary embolism, pneumothorax or mediastinal abnormality. BNP is also significantly elevated over baseline. No findings of infection, patient is not septic. I suspect patient is likely having type II NSTEMI secondary to CHF exacerbation. Patient received aspirin and furosemide. Electrolytes acceptable. Case was discussed with Dr. Kelly Perez for cardiology, agrees with current management plan. Per cardiology, recommends no heparin at this time as patient has recently taken his Eliquis. I did perform a repeat troponin which is without significant change. Case discussed with internal medicine team who will admit for further reevaluation and care. Patient alert, hemodynamically stable and in no distress at time of admission. Critical Care:    I have discussed the case with the advanced practice provider. I have personally performed a history, physical exam, and my own medical decision making. I have reviewed the note and agree with the findings and plan. Upon my evaluation, this patient had a high probability of imminent or life-threatening deterioration due to non-ST elevation myocardial infarction which required my direct attention, intervention, and personal management. The total critical care time personally spent while evaluating and treating this patient was 32 minutes exclusive of any time spent doing separately billable procedures. This includes time at the bedside, data interpretation, medication management, monitoring for potential decompensation and physician consultation. Specifics of interventions taken and potentially life-threatening diagnostic considerations are listed above in the medical decision making. Patient Referrals:  No follow-up provider specified. Discharge Medications:  Current Discharge Medication List          FINAL IMPRESSION  1. NSTEMI (non-ST elevated myocardial infarction) ([facility] [address]    2. Acute on chronic congestive heart failure, unspecified heart failure type (HCC)        Blood pressure (!) 134/90, pulse 99, temperature 98.1 °F (36.7 °C), temperature source Oral, resp. rate 16, height 6' 2\" (1.88 m), weight 223 lb 11.2 oz (101.5 kg), SpO2 95 %.      For further details of Rachel Carrington emergency department encounter, please see documentation by advanced practice provider, BISI Singleton.     Chela Mistry DO (electronically signed)  Attending Emergency Physician       Chela Mistry DO  02/06/22 428 Landy Foster DO  02/06/22 1036

## 2022-02-07 LAB
A/G RATIO: 1.4 (ref 1.1–2.2)
ALBUMIN SERPL-MCNC: 3.3 G/DL (ref 3.4–5)
ALP BLD-CCNC: 63 U/L (ref 40–129)
ALT SERPL-CCNC: 40 U/L (ref 10–40)
ANION GAP SERPL CALCULATED.3IONS-SCNC: 10 MMOL/L (ref 3–16)
AST SERPL-CCNC: 30 U/L (ref 15–37)
BASOPHILS ABSOLUTE: 0 K/UL (ref 0–0.2)
BASOPHILS RELATIVE PERCENT: 0.8 %
BILIRUB SERPL-MCNC: 1.4 MG/DL (ref 0–1)
BUN BLDV-MCNC: 15 MG/DL (ref 7–20)
CALCIUM SERPL-MCNC: 8.6 MG/DL (ref 8.3–10.6)
CHLORIDE BLD-SCNC: 102 MMOL/L (ref 99–110)
CO2: 28 MMOL/L (ref 21–32)
CREAT SERPL-MCNC: 1.1 MG/DL (ref 0.8–1.3)
EKG ATRIAL RATE: 97 BPM
EKG DIAGNOSIS: NORMAL
EKG Q-T INTERVAL: 472 MS
EKG QRS DURATION: 106 MS
EKG QTC CALCULATION (BAZETT): 541 MS
EKG R AXIS: -9 DEGREES
EKG T AXIS: 249 DEGREES
EKG VENTRICULAR RATE: 79 BPM
EOSINOPHILS ABSOLUTE: 0.1 K/UL (ref 0–0.6)
EOSINOPHILS RELATIVE PERCENT: 2.7 %
GFR AFRICAN AMERICAN: >60
GFR NON-AFRICAN AMERICAN: >60
GLUCOSE BLD-MCNC: 81 MG/DL (ref 70–99)
HCT VFR BLD CALC: 38.4 % (ref 40.5–52.5)
HEMOGLOBIN: 12.5 G/DL (ref 13.5–17.5)
LV EF: 20 %
LVEF MODALITY: NORMAL
LYMPHOCYTES ABSOLUTE: 1.1 K/UL (ref 1–5.1)
LYMPHOCYTES RELATIVE PERCENT: 24.1 %
MAGNESIUM: 2 MG/DL (ref 1.8–2.4)
MCH RBC QN AUTO: 27 PG (ref 26–34)
MCHC RBC AUTO-ENTMCNC: 32.5 G/DL (ref 31–36)
MCV RBC AUTO: 83 FL (ref 80–100)
MONOCYTES ABSOLUTE: 0.4 K/UL (ref 0–1.3)
MONOCYTES RELATIVE PERCENT: 9.5 %
NEUTROPHILS ABSOLUTE: 2.9 K/UL (ref 1.7–7.7)
NEUTROPHILS RELATIVE PERCENT: 62.9 %
PDW BLD-RTO: 16.2 % (ref 12.4–15.4)
PHOSPHORUS: 4.5 MG/DL (ref 2.5–4.9)
PLATELET # BLD: 337 K/UL (ref 135–450)
PMV BLD AUTO: 7.5 FL (ref 5–10.5)
POTASSIUM SERPL-SCNC: 4.3 MMOL/L (ref 3.5–5.1)
RBC # BLD: 4.63 M/UL (ref 4.2–5.9)
SODIUM BLD-SCNC: 140 MMOL/L (ref 136–145)
TOTAL PROTEIN: 5.7 G/DL (ref 6.4–8.2)
WBC # BLD: 4.6 K/UL (ref 4–11)

## 2022-02-07 PROCEDURE — 84100 ASSAY OF PHOSPHORUS: CPT

## 2022-02-07 PROCEDURE — 6360000004 HC RX CONTRAST MEDICATION: Performed by: INTERNAL MEDICINE

## 2022-02-07 PROCEDURE — 1200000000 HC SEMI PRIVATE

## 2022-02-07 PROCEDURE — 94760 N-INVAS EAR/PLS OXIMETRY 1: CPT

## 2022-02-07 PROCEDURE — 85025 COMPLETE CBC W/AUTO DIFF WBC: CPT

## 2022-02-07 PROCEDURE — 93010 ELECTROCARDIOGRAM REPORT: CPT | Performed by: INTERNAL MEDICINE

## 2022-02-07 PROCEDURE — 93005 ELECTROCARDIOGRAM TRACING: CPT | Performed by: INTERNAL MEDICINE

## 2022-02-07 PROCEDURE — 6360000002 HC RX W HCPCS: Performed by: INTERNAL MEDICINE

## 2022-02-07 PROCEDURE — 2580000003 HC RX 258: Performed by: INTERNAL MEDICINE

## 2022-02-07 PROCEDURE — 99232 SBSQ HOSP IP/OBS MODERATE 35: CPT | Performed by: CLINICAL NURSE SPECIALIST

## 2022-02-07 PROCEDURE — 80053 COMPREHEN METABOLIC PANEL: CPT

## 2022-02-07 PROCEDURE — 99233 SBSQ HOSP IP/OBS HIGH 50: CPT | Performed by: INTERNAL MEDICINE

## 2022-02-07 PROCEDURE — 83735 ASSAY OF MAGNESIUM: CPT

## 2022-02-07 PROCEDURE — 36415 COLL VENOUS BLD VENIPUNCTURE: CPT

## 2022-02-07 PROCEDURE — 6370000000 HC RX 637 (ALT 250 FOR IP): Performed by: INTERNAL MEDICINE

## 2022-02-07 PROCEDURE — C8929 TTE W OR WO FOL WCON,DOPPLER: HCPCS

## 2022-02-07 RX ADMIN — ENOXAPARIN SODIUM 80 MG: 80 INJECTION SUBCUTANEOUS at 07:56

## 2022-02-07 RX ADMIN — ASPIRIN 81 MG 81 MG: 81 TABLET ORAL at 07:55

## 2022-02-07 RX ADMIN — Medication 10 ML: at 20:16

## 2022-02-07 RX ADMIN — PERFLUTREN 1.43 MG: 6.52 INJECTION, SUSPENSION INTRAVENOUS at 14:49

## 2022-02-07 RX ADMIN — TAMSULOSIN HYDROCHLORIDE 0.4 MG: 0.4 CAPSULE ORAL at 07:56

## 2022-02-07 RX ADMIN — FUROSEMIDE 40 MG: 10 INJECTION, SOLUTION INTRAMUSCULAR; INTRAVENOUS at 07:55

## 2022-02-07 RX ADMIN — LISINOPRIL 5 MG: 5 TABLET ORAL at 07:55

## 2022-02-07 RX ADMIN — METOPROLOL SUCCINATE 50 MG: 50 TABLET, EXTENDED RELEASE ORAL at 07:55

## 2022-02-07 RX ADMIN — FINASTERIDE 5 MG: 5 TABLET, FILM COATED ORAL at 07:55

## 2022-02-07 RX ADMIN — FUROSEMIDE 40 MG: 10 INJECTION, SOLUTION INTRAMUSCULAR; INTRAVENOUS at 14:08

## 2022-02-07 RX ADMIN — FUROSEMIDE 40 MG: 10 INJECTION, SOLUTION INTRAMUSCULAR; INTRAVENOUS at 20:16

## 2022-02-07 RX ADMIN — Medication 10 ML: at 07:56

## 2022-02-07 RX ADMIN — ATORVASTATIN CALCIUM 40 MG: 40 TABLET, FILM COATED ORAL at 20:16

## 2022-02-07 ASSESSMENT — PAIN SCALES - GENERAL
PAINLEVEL_OUTOF10: 0

## 2022-02-07 NOTE — PROGRESS NOTES
[facility] PROGRESS NOTE    2/7/2022 2:15 PM        Name: Beckie Lang . Admitted: 2/5/2022  Primary Care Provider: No primary care provider on file. (Tel: None)                        Subjective:  . No acute events overnight. Resting well. Pain control. Diet ok. Labs reviewed  Denies any chest pain sob. Reviewed interval ancillary notes    Current Medications  finasteride (PROSCAR) tablet 5 mg, Daily  lisinopril (PRINIVIL;ZESTRIL) tablet 5 mg, Daily  metoprolol succinate (TOPROL XL) extended release tablet 50 mg, Daily  tamsulosin (FLOMAX) capsule 0.4 mg, Daily  furosemide (LASIX) injection 40 mg, TID  sodium chloride flush 0.9 % injection 5-40 mL, 2 times per day  sodium chloride flush 0.9 % injection 5-40 mL, PRN  0.9 % sodium chloride infusion, PRN  acetaminophen (TYLENOL) tablet 650 mg, Q6H PRN   Or  acetaminophen (TYLENOL) suppository 650 mg, Q6H PRN  polyethylene glycol (GLYCOLAX) packet 17 g, Daily PRN  aspirin chewable tablet 81 mg, Daily  atorvastatin (LIPITOR) tablet 40 mg, Nightly  meclizine (ANTIVERT) tablet 25 mg, TID PRN  nitroGLYCERIN (NITROSTAT) SL tablet 0.4 mg, Q5 Min PRN  COVID-19 Ad26 Vaccine (J&J, COLLEEN) injection 0.5 mL, Prior to discharge        Objective:  /73   Pulse 59   Temp 98 °F (36.7 °C) (Oral)   Resp 16   Ht 6' 2\" (1.88 m)   Wt 206 lb 1.6 oz (93.5 kg)   SpO2 98%   BMI 26.46 kg/m²     Intake/Output Summary (Last 24 hours) at 2/7/2022 1415  Last data filed at 2/7/2022 1141  Gross per 24 hour   Intake 240 ml   Output 5175 ml   Net -4935 ml      Wt Readings from Last 3 Encounters:   02/07/22 206 lb 1.6 oz (93.5 kg)   12/01/21 181 lb 3.2 oz (82.2 kg)   01/19/20 210 lb (95.3 kg)       General appearance:  Appears comfortable  Eyes: Sclera clear. Pupils equal.  ENT: Moist oral mucosa.  Trachea midline, no adenopathy. Cardiovascular: Regular rhythm, normal S1, S2. No murmur. No edema in lower extremities  Respiratory: Not using accessory muscles. Good inspiratory effort. Clear to auscultation bilaterally, no wheeze or crackles. GI: Abdomen soft, no tenderness, not distended, normal bowel sounds  Musculoskeletal: No cyanosis in digits, neck supple  Neurology: CN 2-12 grossly intact. No speech or motor deficits  Psych: Normal affect. Alert and oriented in time, place and person  Skin: Warm, dry, normal turgor    Labs and Tests:  CBC:   Recent Labs     02/05/22 2022 02/07/22  0611   WBC 5.5 4.6   HGB 11.4* 12.5*    337     BMP:    Recent Labs     02/05/22 2022 02/07/22  0611    140   K 3.9 4.3    102   CO2 25 28   BUN 17 15   CREATININE 1.0 1.1   GLUCOSE 96 81     Hepatic:   Recent Labs     02/05/22 2022 02/07/22  0611   AST 37 30   ALT 47* 40   BILITOT 0.8 1.4*   ALKPHOS 79 63       Discussed care with patient             Problem List  Active Problems:    PAF (paroxysmal atrial fibrillation) (HCC)    Acute on chronic systolic heart failure (HCC)    Acute on chronic combined systolic and diastolic heart failure (HCC)    NSTEMI (non-ST elevated myocardial infarction) ([facility] [address]  Resolved Problems:    * No resolved hospital problems. *       Assessment & Plan:   1. Acute on chronic CHF exacerbation  -Continue IV diuresis continue monitor Don catheter monitor input    Non-STEMI  -Plan for cath today    Chronic atrial fibrillation on anticoagulation with Eliquis which has been held continue Lovenox for now          Diet: Diet NPO Exceptions are: Sips of Water with Meds  ADULT DIET;  Regular  Code:Full Code  DVT PPX lovenox       Taylor Alonso MD   2/7/2022 2:15 PM

## 2022-02-07 NOTE — PROGRESS NOTES
[facility] Daily Progress Note      Admit Date:  2/5/2022    Chief Complaint   Patient presents with    Knee Pain     pt states bilateral knee and ankle pain and swelling x1 weeks. Subjective:  Mr. Hernandez Moody is being seen for acute CHF. He has difficulty with getting his medications at times. He is homeless and stays with friends and family. He works as a . He is admitted with weight gain and lower extremity edema. LVEF 20% by echo. EKG changes and elevated troponin 0.52 and AF-on Eliquis (DCCV; ILR 2019). He has never had a [facility].     Objective:   /81   Pulse 74   Temp 98 °F (36.7 °C) (Oral)   Resp 16   Ht 6' 2\" (1.88 m)   Wt 199 lb 1.6 oz (90.3 kg)   SpO2 95%   BMI 25.56 kg/m²       Intake/Output Summary (Last 24 hours) at 2/8/2022 0801  Last data filed at 2/8/2022 0518  Gross per 24 hour   Intake --   Output 3350 ml   Net -3350 ml       TELEMETRY: Afib    Physical Exam:  General:  Awake, alert, oriented x 3, NAD  Skin:  Warm and dry  Neck:  JVD flat  Chest:  normal air entry  Cardiovascular:  Irregular RR S1S2, no S3, no mrmr  Abdomen:  Soft, ND, NT, No HSM  Extremities:  2+ edema    Medications:    finasteride  5 mg Oral Daily    lisinopril  5 mg Oral Daily    metoprolol succinate  50 mg Oral Daily    tamsulosin  0.4 mg Oral Daily    furosemide  40 mg IntraVENous TID    sodium chloride flush  5-40 mL IntraVENous 2 times per day    aspirin  81 mg Oral Daily    atorvastatin  40 mg Oral Nightly    covid-19 vaccine  1 Dose IntraMUSCular Prior to discharge      sodium chloride       sodium chloride flush, sodium chloride, acetaminophen **OR** acetaminophen, polyethylene glycol, meclizine, nitroGLYCERIN    Lab Data:  CBC:   Recent Labs     02/05/22 2022 02/07/22 0611 02/08/22  0648   WBC 5.5 4.6 3.7*   HGB 11.4* 12.5* 12.0*   HCT 34.4* 38.4* 37.0*   MCV 82.3 83.0 81.9    337 353     BMP:   Recent Labs     02/05/22 2022 02/07/22  0611 02/08/22  0648   NA office            Eduardo Blanco MD, MD 2/8/2022 8:01 AM

## 2022-02-07 NOTE — PROGRESS NOTES
 sodium chloride         Lab Data:  CBC:   Recent Labs     02/05/22 2022 02/07/22  0611   WBC 5.5 4.6   HGB 11.4* 12.5*    337     BMP:    Recent Labs     02/05/22 2022 02/07/22  0611    140   K 3.9 4.3   CO2 25 28   BUN 17 15   CREATININE 1.0 1.1     INR:    Recent Labs     02/05/22  2301   INR 1.50*     BNP:    Recent Labs     02/05/22 2022   PROBNP 16,265*         Diagnostics:  Echo 11/29/2021  Summary   -Covid+   -Severely reduced global systolic function with an ejection fraction   estimated at 20%.  -Severe global hypokinesis with regional variations noted.   -Right ventricular systolic function appears to be mildly reduced based on   visual inspection.   -Severe biatrial enlargement.   -Thickened mitral valve without evidence of stenosis. There is   mild-to-moderate mitral regurgitation.   -There is mild-to-moderate tricuspid regurgitation with a RVSP estimation of   49 mmHg.   -Diastolic dysfunction with elevated LV filling pressures.        Echo 7/25/2019  Summary   -Overall left ventricular systolic function is moderately depressed .   -Ejection fraction is visually estimated to be 30-35 %.  -Global left ventricular function moderately reduced.   -Indeterminate diastolic function due to atrial fibrillation and moderate to   severe mitral regurgitation.   -Moderate-to-severe mitral regurgitation .   -Dilated left atrium with a volume of 97 ml.   -Left atrial volume is increased probably due to atrial fibrillation .   -There is mild right ventricular hypertrophy.   -The right ventricle is mildly enlarged.   -Right ventricular systolic pressure of 53 mm Hg consistent with pulmonary   hypertension.   -Moderate to severe tricuspid regurgitation. TAPSE = 1.43   -IVC size is dilated (>2.1 cm) but collapses > 50% with respiration   consistent with elevated RA pressure (8 mmHg). Assessment:    1.   Acute on chronic systolic heart failure, on ace and BB; no aldosterone antagonist due to non compliance  2. NSTEMI  3. Non compliance  4. Homeless  5. PAF, on lovenox    Plan:    1. Continue toprol 50 mg once a day  2. Continue lisinopril 5 mg daily  3. Continue lasix 40 mg IV three times a day  4. CHF nurse following for diet education, fluid restriction and daily weights  5. Will need to have LHC once he is diuresed, hopefully tomorrow  6. Will need meds to bed at discharge  7. Stressed importance of follow up    NYHA III    Discussed with patient who is agreeable with plan of care. Thank you for allowing me to participate in the care of your patient.     Alice Rebollar, APRN - CNS, CNS

## 2022-02-07 NOTE — CONSULTS
[address]                     [facility], [address]                                  CONSULTATION    PATIENT NAME: Yenifer Valdez                  :        1960  MED REC NO:   [id number]                          ROOM:       3327  ACCOUNT NO:   [de-identified]                           ADMIT DATE: 2022  PROVIDER:     Manjit Golden MD    CONSULT DATE:  2022    REASON FOR CONSULTATION:  Evaluate the patient known to you with a  congestive heart failure exacerbation. HISTORY OF PRESENT ILLNESS:  The patient is a gentleman who currently  has a very difficult social situation. When he was last seen in  2021, he was actually homeless. As a result, he has sometimes of  difficult time being compliant with his medications. He has noted a  very significant weight gain with lower extremity edema over the last  week or so. Due to this, he came into the hospital.  He has had stress  testing in the past and has felt to have a nonischemic cardiomyopathy,  but has never had a cardiac catheterization. He has known to have  ejection fraction of 20%. Medical compliance makes it difficult to push  medications and also to discuss device therapy for protection. He has  been admitted to the hospital with acute congestive heart failure. He  feels significantly better with resolution of leg edema and improvement  in shortness of breath with diuresis. He denies having any chest  discomfort. PAST MEDICAL HISTORY:  Notable for paroxysmal atrial fibrillation. He  remains in sinus rhythm; history of hypertension; cardiomyopathy, which  has been presumed to be nonischemic. He has had cardioversion in the  past.  He does have a loop recorder. FAMILY HISTORY:  Notable for heart attack in his mother later age,  stroke in his father. SOCIAL HISTORY:  He does not drink alcohol. No history of cigarette  use.     MEDICATIONS:  Lisinopril 5 mg daily, Toprol XL 50 mg daily, Lasix 40 mg  daily, Lipitor 40 mg daily, Eliquis 5 mg b.i.d., Proscar, Flomax, and  aspirin. ALLERGIES:  None. REVIEW OF SYSTEMS:  A 14-system review of system is otherwise, negative. PHYSICAL EXAMINATION:  GENERAL:  Currently on physical examination, he appears comfortable at  rest.  VITAL SIGNS:  Blood pressure is 120/74, pulse 96. He states he feels  great. Weight on admission was 181. The weight today is increased  _____. HEENT:  Normal.  NECK:  He currently does have neck vein distention. LUNGS:  Diminished at both bases. CARDIOVASCULAR:  He has normal S1, S2. He does have S3 gallop. ABDOMEN:  Soft and nontender. No masses are felt. SKIN:  Warm and dry. EXTREMITIES:  Show 2+ edema below the knees, which by _____ has  tremendously improved. NEUROLOGIC:  Intact. Moves all extremities well. LABORATORY DATA:  Lab work show creatinine to be 1. Liver test slightly  elevated, ALT at 47, troponin has been elevated at 0.52. Also of note,  this patient had a recent COVID pneumonia. EKG is difficult to tell, but likely he has atrial fibrillation. He  also has lateral T wave inversion. It is concerning for lateral wall  ischemia. IMPRESSION:  Cardiomyopathy with acute exacerbation of congestive heart  failure, which has been presumed that it was a nonischemic  cardiomyopathy, but now is concerning with EKG changes and elevated  troponin that he could have an ischemic etiology. RECOMMENDATIONS:  May be difficult to use all medications due to his  medical compliance. Entresto needs to be considered, cardiac  catheterization needs to be considered and also implantable  defibrillator needs to be considered. For now, we will proceed with  aggressive diuresis as has been done. I agree with temporarily holding  Eliquis in anticipation of a cardiac catheterization. For DVT prophylaxis, I will  give full dose Lovenox in the interim.         Lito Goff MD    D: 02/06/2022 17:21:14       T: 02/06/2022 22:30:09     [email or web address]  Job#: [id number]     Doc#: [id number]    CC:

## 2022-02-07 NOTE — CARE COORDINATION
Discharge planning note;  Patient  Reviewed for discharge planning needs; admitted from home, A&O, independent  and it appears that patient has minimal needs for discharge at this time with readmission score of 15%. Discussed with patients nurse and requested that case management be notified if discharge needs are identified. Patient has active insurance with [employer]  Patient lives with friends. A friend will pick him up on discharge , it not may require assistance possibly- [facility]. No PCP listed on the chart , patient was provided with list of the area PCPs. , advised to call and schedule appointment. Case management will continue to follow progress and update discharge plan as needed.

## 2022-02-07 NOTE — CONSULTS
[address]      Garima Taylor 1960    History:  Past Medical History:   Diagnosis Date    Atrial fibrillation ([facility] [address] 07/25/2019    Hypertension        ECHO: echo results today pending    From 11/29/21   Summary   -Covid+   -Severely reduced global systolic function with an ejection fraction   estimated at 20%. -Severe global hypokinesis with regional variations noted. -Right ventricular systolic function appears to be mildly reduced based on   visual inspection.   -Severe biatrial enlargement.   -Thickened mitral valve without evidence of stenosis. There is   mild-to-moderate mitral regurgitation.   -There is mild-to-moderate tricuspid regurgitation with a RVSP estimation of   37 mmHg. -Diastolic dysfunction with elevated LV filling pressures. ACE/ARB: lisinopril 5 mg daily  BB: toprol xl 50 mg daily  Aldosterone Antagonist:  Not on-- no aldosterone antagonist due to non compliance    History of sleep apnea: no  [place] Screen ordered: yes    DM History: No    Last Hospital Admission:11/27/21 with CHF  Code Status: full code  Discharge plans: from home    Family Present: shahab Taylor was admitted to the hospital with increased shortness of breath. Patient is known to HF team from previous admission. He did not come to follow up appointments. He has not been weighing himself daily. He was not following a sodium restriction. Patient states he was skipping days of medications when he was feeling well. He was not able to come to his appointment. Patient provided with both written and verbal education on CHF signs/ symptoms, causes, discharge medications, daily weights, low sodium diet, activity, and follow-up. Pt to call if gains 3 pounds in one day or 5 pounds in one week.  Mutually agreed upon goals were discussed such as calling the MD as soon as they recognize symptoms and weight gain, maintaining proper diet, taking medications as prescribed, joining cardiac rehab when able. Also reviewed importance of risk factor reduction. Patient provided with CHF Zone Management tool and CHF symptoms magnet. Discussed importance of lifestyle changes: encourage compliance with medications and follow up appointments    PATIENT/CAREGIVER TEACHING:    Level of patient/caregiver understanding able to:   [x ] Verbalize understanding [ ] Demonstrate understanding [ ] Teach back   [ ] Needs reinforcement [ ] Other:       Time spent teachin mins    1. WEIGHT: Admit Weight: 181 lb (82.1 kg)      Today  Weight: 206 lb 1.6 oz (93.5 kg)   2. I/O     Intake/Output Summary (Last 24 hours) at 2022 1523  Last data filed at 2022 1141  Gross per 24 hour   Intake 240 ml   Output 5175 ml   Net -4935 ml       Recommendations:   1. Patient needs follow up -- stressed importance of going to clinic in follow up. 2. Educate further on fluid restriction 48 oz- 64 oz during inpatient stay so he can understand how to measure intake at home. 3. Continue to educate on S/S.   4. Emphasize daily weights, diet, and knowing when and who to call  5. Provided patient with CHF Resource Line for questions and concerns. 6. Encourage medication compliance.     Romnia Mckeon, JACLYN 2022 3:23 PM

## 2022-02-08 LAB
A/G RATIO: 1.5 (ref 1.1–2.2)
ALBUMIN SERPL-MCNC: 3.2 G/DL (ref 3.4–5)
ALP BLD-CCNC: 57 U/L (ref 40–129)
ALT SERPL-CCNC: 33 U/L (ref 10–40)
ANION GAP SERPL CALCULATED.3IONS-SCNC: 11 MMOL/L (ref 3–16)
AST SERPL-CCNC: 21 U/L (ref 15–37)
BASOPHILS ABSOLUTE: 0 K/UL (ref 0–0.2)
BASOPHILS RELATIVE PERCENT: 1 %
BILIRUB SERPL-MCNC: 1.2 MG/DL (ref 0–1)
BUN BLDV-MCNC: 15 MG/DL (ref 7–20)
CALCIUM SERPL-MCNC: 8.3 MG/DL (ref 8.3–10.6)
CHLORIDE BLD-SCNC: 101 MMOL/L (ref 99–110)
CO2: 28 MMOL/L (ref 21–32)
CREAT SERPL-MCNC: 1.1 MG/DL (ref 0.8–1.3)
EOSINOPHILS ABSOLUTE: 0.1 K/UL (ref 0–0.6)
EOSINOPHILS RELATIVE PERCENT: 3.3 %
GFR AFRICAN AMERICAN: >60
GFR NON-AFRICAN AMERICAN: >60
GLUCOSE BLD-MCNC: 76 MG/DL (ref 70–99)
HCT VFR BLD CALC: 37 % (ref 40.5–52.5)
HEMOGLOBIN: 12 G/DL (ref 13.5–17.5)
LEFT VENTRICULAR EJECTION FRACTION MODE: NORMAL
LV EF: 5 %
LYMPHOCYTES ABSOLUTE: 1.1 K/UL (ref 1–5.1)
LYMPHOCYTES RELATIVE PERCENT: 29 %
MAGNESIUM: 2 MG/DL (ref 1.8–2.4)
MCH RBC QN AUTO: 26.6 PG (ref 26–34)
MCHC RBC AUTO-ENTMCNC: 32.5 G/DL (ref 31–36)
MCV RBC AUTO: 81.9 FL (ref 80–100)
MONOCYTES ABSOLUTE: 0.3 K/UL (ref 0–1.3)
MONOCYTES RELATIVE PERCENT: 8.7 %
NEUTROPHILS ABSOLUTE: 2.1 K/UL (ref 1.7–7.7)
NEUTROPHILS RELATIVE PERCENT: 58 %
PDW BLD-RTO: 16.1 % (ref 12.4–15.4)
PHOSPHORUS: 4.1 MG/DL (ref 2.5–4.9)
PLATELET # BLD: 353 K/UL (ref 135–450)
PMV BLD AUTO: 7.5 FL (ref 5–10.5)
POTASSIUM SERPL-SCNC: 3.9 MMOL/L (ref 3.5–5.1)
RBC # BLD: 4.52 M/UL (ref 4.2–5.9)
SODIUM BLD-SCNC: 140 MMOL/L (ref 136–145)
TOTAL PROTEIN: 5.3 G/DL (ref 6.4–8.2)
WBC # BLD: 3.7 K/UL (ref 4–11)

## 2022-02-08 PROCEDURE — 6360000004 HC RX CONTRAST MEDICATION: Performed by: INTERNAL MEDICINE

## 2022-02-08 PROCEDURE — 85025 COMPLETE CBC W/AUTO DIFF WBC: CPT

## 2022-02-08 PROCEDURE — 2709999900 HC NON-CHARGEABLE SUPPLY

## 2022-02-08 PROCEDURE — 99232 SBSQ HOSP IP/OBS MODERATE 35: CPT | Performed by: CLINICAL NURSE SPECIALIST

## 2022-02-08 PROCEDURE — 80053 COMPREHEN METABOLIC PANEL: CPT

## 2022-02-08 PROCEDURE — 99152 MOD SED SAME PHYS/QHP 5/>YRS: CPT | Performed by: INTERNAL MEDICINE

## 2022-02-08 PROCEDURE — 1200000000 HC SEMI PRIVATE

## 2022-02-08 PROCEDURE — 6360000002 HC RX W HCPCS

## 2022-02-08 PROCEDURE — 93460 R&L HRT ART/VENTRICLE ANGIO: CPT | Performed by: INTERNAL MEDICINE

## 2022-02-08 PROCEDURE — 36415 COLL VENOUS BLD VENIPUNCTURE: CPT

## 2022-02-08 PROCEDURE — 93458 L HRT ARTERY/VENTRICLE ANGIO: CPT

## 2022-02-08 PROCEDURE — 84100 ASSAY OF PHOSPHORUS: CPT

## 2022-02-08 PROCEDURE — B2111ZZ FLUOROSCOPY OF MULTIPLE CORONARY ARTERIES USING LOW OSMOLAR CONTRAST: ICD-10-PCS | Performed by: INTERNAL MEDICINE

## 2022-02-08 PROCEDURE — C1894 INTRO/SHEATH, NON-LASER: HCPCS

## 2022-02-08 PROCEDURE — 2500000003 HC RX 250 WO HCPCS

## 2022-02-08 PROCEDURE — 6360000002 HC RX W HCPCS: Performed by: INTERNAL MEDICINE

## 2022-02-08 PROCEDURE — C1769 GUIDE WIRE: HCPCS

## 2022-02-08 PROCEDURE — 83735 ASSAY OF MAGNESIUM: CPT

## 2022-02-08 PROCEDURE — 2580000003 HC RX 258: Performed by: INTERNAL MEDICINE

## 2022-02-08 PROCEDURE — 99152 MOD SED SAME PHYS/QHP 5/>YRS: CPT

## 2022-02-08 PROCEDURE — 99153 MOD SED SAME PHYS/QHP EA: CPT

## 2022-02-08 PROCEDURE — C1751 CATH, INF, PER/CENT/MIDLINE: HCPCS

## 2022-02-08 PROCEDURE — 6370000000 HC RX 637 (ALT 250 FOR IP): Performed by: INTERNAL MEDICINE

## 2022-02-08 PROCEDURE — 4A023N7 MEASUREMENT OF CARDIAC SAMPLING AND PRESSURE, LEFT HEART, PERCUTANEOUS APPROACH: ICD-10-PCS | Performed by: INTERNAL MEDICINE

## 2022-02-08 RX ORDER — OXYCODONE HYDROCHLORIDE AND ACETAMINOPHEN 5; 325 MG/1; MG/1
2 TABLET ORAL EVERY 4 HOURS PRN
Status: DISCONTINUED | OUTPATIENT
Start: 2022-02-08 | End: 2022-02-10 | Stop reason: HOSPADM

## 2022-02-08 RX ORDER — ONDANSETRON 2 MG/ML
4 INJECTION INTRAMUSCULAR; INTRAVENOUS EVERY 6 HOURS PRN
Status: DISCONTINUED | OUTPATIENT
Start: 2022-02-08 | End: 2022-02-10 | Stop reason: HOSPADM

## 2022-02-08 RX ORDER — ACETAMINOPHEN 325 MG/1
650 TABLET ORAL EVERY 4 HOURS PRN
Status: DISCONTINUED | OUTPATIENT
Start: 2022-02-08 | End: 2022-02-10 | Stop reason: HOSPADM

## 2022-02-08 RX ORDER — SODIUM CHLORIDE 9 MG/ML
25 INJECTION, SOLUTION INTRAVENOUS PRN
Status: DISCONTINUED | OUTPATIENT
Start: 2022-02-08 | End: 2022-02-10 | Stop reason: HOSPADM

## 2022-02-08 RX ORDER — SODIUM CHLORIDE 0.9 % (FLUSH) 0.9 %
5-40 SYRINGE (ML) INJECTION PRN
Status: DISCONTINUED | OUTPATIENT
Start: 2022-02-08 | End: 2022-02-10 | Stop reason: HOSPADM

## 2022-02-08 RX ORDER — SODIUM CHLORIDE 0.9 % (FLUSH) 0.9 %
5-40 SYRINGE (ML) INJECTION EVERY 12 HOURS SCHEDULED
Status: DISCONTINUED | OUTPATIENT
Start: 2022-02-08 | End: 2022-02-10 | Stop reason: HOSPADM

## 2022-02-08 RX ORDER — SODIUM CHLORIDE 9 MG/ML
INJECTION, SOLUTION INTRAVENOUS CONTINUOUS
Status: DISCONTINUED | OUTPATIENT
Start: 2022-02-08 | End: 2022-02-10 | Stop reason: HOSPADM

## 2022-02-08 RX ORDER — OXYCODONE HYDROCHLORIDE AND ACETAMINOPHEN 5; 325 MG/1; MG/1
1 TABLET ORAL EVERY 4 HOURS PRN
Status: DISCONTINUED | OUTPATIENT
Start: 2022-02-08 | End: 2022-02-10 | Stop reason: HOSPADM

## 2022-02-08 RX ADMIN — Medication 10 ML: at 20:30

## 2022-02-08 RX ADMIN — ATORVASTATIN CALCIUM 40 MG: 40 TABLET, FILM COATED ORAL at 20:30

## 2022-02-08 RX ADMIN — FUROSEMIDE 40 MG: 10 INJECTION, SOLUTION INTRAMUSCULAR; INTRAVENOUS at 20:29

## 2022-02-08 RX ADMIN — TAMSULOSIN HYDROCHLORIDE 0.4 MG: 0.4 CAPSULE ORAL at 08:00

## 2022-02-08 RX ADMIN — LISINOPRIL 5 MG: 5 TABLET ORAL at 08:01

## 2022-02-08 RX ADMIN — FINASTERIDE 5 MG: 5 TABLET, FILM COATED ORAL at 08:00

## 2022-02-08 RX ADMIN — IOPAMIDOL 74 ML: 755 INJECTION, SOLUTION INTRAVENOUS at 18:13

## 2022-02-08 RX ADMIN — FUROSEMIDE 40 MG: 10 INJECTION, SOLUTION INTRAMUSCULAR; INTRAVENOUS at 16:13

## 2022-02-08 RX ADMIN — FUROSEMIDE 40 MG: 10 INJECTION, SOLUTION INTRAMUSCULAR; INTRAVENOUS at 08:01

## 2022-02-08 RX ADMIN — SODIUM CHLORIDE: 9 INJECTION, SOLUTION INTRAVENOUS at 16:13

## 2022-02-08 RX ADMIN — ASPIRIN 81 MG 81 MG: 81 TABLET ORAL at 08:01

## 2022-02-08 RX ADMIN — METOPROLOL SUCCINATE 50 MG: 50 TABLET, EXTENDED RELEASE ORAL at 08:01

## 2022-02-08 RX ADMIN — Medication 10 ML: at 08:01

## 2022-02-08 ASSESSMENT — PAIN SCALES - GENERAL
PAINLEVEL_OUTOF10: 0

## 2022-02-08 NOTE — PROGRESS NOTES
The [place] Sleepiness Scale       The [place] Sleepiness Scale is widely used in the field of sleep medicine as a subjective measure of a patient's sleepiness. The test is a list of eight situations in which you rate your tendency to become sleepy on a scale of 0, no chance to 3, high chance of dozing. Your score is based on a scale of 0 to 24. The scale estimates whether you are experiencing excessive sleepiness that possibly requires medical attention. How Sleepy Are You? How sleepy are you to doze off or fall asleep in the following situations? You should rate your chances of dozing off, not just feeling tired. Even if you have not done some of these things recently try to determine how they would have affected you.  For each situation, decide whether or not you would have:     0 = No chance of dozing 1 = Slight chance of dozing   2 = Moderate chance of  dozing 3 = High change of dozing       Situation                                                                                     Chance of Dozing    Sitting and reading  0 =  []  1 =    [] 2 =    [] 3 =    [x]    Watching TV  0 =  []  1 =    [] 2 =    [] 3 =    [x]      Sitting inactive in public place (e.g., a theater or a meeting)  0 =  [x]  1 =    [] 2 =    [] 3 =    []    As a passenger in a car for an hour without a break          0  =  []  1 =    [] 2 =    [] 3 =    [x]    Lying down to rest in the afternoon when circumstances permit    0 =  [x]  1 =    [] 2 =    [] 3 =    []    Sitting and talking to someone  0 =  [x]  1 =    [] 2 =    [] 3 =    []      Sitting quietly after a lunch without alcohol  0 =  []  1 =    [] 2 =    [] 3 =    [x]    In a car, while stopped for a few minutes in traffic                                                                      0 =  []  1 =    [x] 2 =    [] 3 =    []    Total Score = 13  If your total score is 10 or greater, you are experiencing excessive sleepiness and should consider seeking a medical follow-up. Take a copy of this screening test to your primary care physician on your next office visit. Interpretation:      0 -   7: It is unlikely that you are abnormally sleepy. 8 -   9: You have an average amount of daytime sleepiness. 10 - 15: You may be excessively sleepy depending on the situation. You may want to consider seeking medical attention. 16 - 24: You are excessively sleepy and should consider seeking medical attention.       Electronically signed by Terry Griffith RCP on 2/8/2022 at 3:42 PM

## 2022-02-08 NOTE — OP NOTE
Patient:  Beckie Lang   :   1960    Procedural Summary  ~Consent:   Obtained written and verbal consent      Risks/benefits explained in detail  ~Procedure:    Left Heart Catheterization  ~Medications:    Procedural sedation with minimal conscious sedation  ~Complications:   None  ~Blood Loss:    <10cc  ~Specimens:    None obtained  ~Pre-sedation re-evaluation: Performed immediately prior to procedure. Medication and Procedural Reconciliation:  An independent trained observer pushed medications at my direction. We monitored the patient's level of consciousness and vital signs/physiologic status throughout the procedure duration (see start and stop times below). Sedation: 1 mg Versed, 50 mcg Fentanyl  Sedation start: 140  Sedation stop: 145    Cardiac Cath PCI:  Anatomy:   LM-nml   LAD-nml  Cx-nml  OM- nml  RCA-nml  RPDA- nml  LVEF- 10%  LVG- global hypokinesis  LVEDP- 10    Hemodynamics:  RA- mean 3  RV- 37/0  PAWP- 10  PA- 34/12 (20)    C. O.- 5.7 (4.9)  C. I.- 2.6 (2.25)  PA sat 60%  AO sat 91%    Contrast: 70  Flouro Time: 5.3  Access: R radial a, R CFV    Impression  ~Coronary Angiography w/ normal cors  ~LVG with LVEF of 10% and global regional wall motion abnormalities  ~Severe MR  ~Normal CO/CI  ~normal L and R filling pressures    Recommendation  ~Aggressive medical treatment and risk factor modification  ~1. Medications reviewed, no changes at this time. 2. Post cath IVF. Bedrest.  3.Consider CONSUELO for evaluation of MR.   4. Patient has been advised on the importance of regular exercise of at least 20-30 minutes daily alternating with aerobic and isometric activities. 5. Patient counseled about and offered assistance for smoking cessation   6. No indication for cardiac rehab  7. CHF service to evaluate tomorrow.             Kaylee Olea MD, MD 2022 2:54 PM

## 2022-02-08 NOTE — PROGRESS NOTES
Pt returned from cath lab via bed. VSS. R radial site swollen, no bruising, no hematoma, no oozing. Pulses strong +3. R groin site clean, dry and intact. No signs of hematoma. Pedal pulse weak +1. Will monitor.

## 2022-02-08 NOTE — PRE SEDATION
Brief Pre-Op Note/Sedation Assessment      Hammad Falcon  1960  [id number]  2:53 PM    Planned Procedure: Cardiac Catheterization Procedure  Post Procedure Plan: Return to same level of care  Consent: I have discussed with the patient and/or the patient representative the indication, alternatives, and the possible risks and/or complications of the planned procedure and the anesthesia methods. The patient and/or patient representative appear to understand and agree to proceed. Chief Complaint:   NSTEMI      Indications for Cath Procedure:  1. Presentation:  Suspected CAD, Cardiac Arrythmia and LV Dysfunction  2. Anginal Classification within 2 weeks:  CCS III - Symptoms with everyday living activities, i.e., moderate limitation  3. Angina Symptoms Assessment:  Typical Chest Pain  4. Heart Failure Class within last 2 weeks:  Yes:  Heart Failure Type: Systolic Severity:  Class III - Symptoms of HF on less-than-ordinary exertion  5. Cardiovascular Instability:  Yes:  Persistent ischemic symptoms (CP, ST Elevation) and Acute CHF symptoms    Prior Ischemic Workup/Eval:  1. Pre-Procedural Medications: Yes: ACE/ARB/ARNI, Beta Blockers and STATIN  2. Stress Test Completed? Yes:  Stress or Imaging Studies Performed (within ANY time period):   Type:  Stress Nuclear  Results:  Positive:  Myocardial Perfusion Defects (Nuclear) Extent of Ischemia:  High Risk (>3% annual death or MI)    Does Patient need surgery?   Cath Valve Surgery:  No    Pre-Procedure Medical History:  Vital Signs:  /75   Pulse 56   Temp 98.2 °F (36.8 °C) (Oral)   Resp 15   Ht 6' 2\" (1.88 m)   Wt 199 lb 1.6 oz (90.3 kg)   SpO2 98%   BMI 25.56 kg/m²     Allergies:  No Known Allergies  Medications:    Current Facility-Administered Medications   Medication Dose Route Frequency Provider Last Rate Last Admin    finasteride (PROSCAR) tablet 5 mg  5 mg Oral Daily Van Meyers MD   5 mg at 02/08/22 0800    lisinopril (PRINIVIL;ZESTRIL) tablet 5 mg  5 mg Oral Daily Ingrid Horn MD   5 mg at 02/08/22 0801    metoprolol succinate (TOPROL XL) extended release tablet 50 mg  50 mg Oral Daily Ingrid oHrn MD   50 mg at 02/08/22 0801    tamsulosin (FLOMAX) capsule 0.4 mg  0.4 mg Oral Daily Ingrid Horn MD   0.4 mg at 02/08/22 0800    furosemide (LASIX) injection 40 mg  40 mg IntraVENous TID Ingrid Horn MD   40 mg at 02/08/22 0801    sodium chloride flush 0.9 % injection 5-40 mL  5-40 mL IntraVENous 2 times per day Ingrid Horn MD   10 mL at 02/08/22 0801    sodium chloride flush 0.9 % injection 5-40 mL  5-40 mL IntraVENous PRN Ingrid Horn MD        0.9 % sodium chloride infusion  25 mL IntraVENous PRN Ingrid Horn MD        acetaminophen (TYLENOL) tablet 650 mg  650 mg Oral Q6H PRN Ingrid Horn MD        Or    acetaminophen (TYLENOL) suppository 650 mg  650 mg Rectal Q6H PRN Ingrid Horn MD        polyethylene glycol (GLYCOLAX) packet 17 g  17 g Oral Daily PRN Ingrid Horn MD        aspirin chewable tablet 81 mg  81 mg Oral Daily Ingrid Horn MD   81 mg at 02/08/22 0801    atorvastatin (LIPITOR) tablet 40 mg  40 mg Oral Nightly Ingrid Horn MD   40 mg at 02/07/22 2016    meclizine (ANTIVERT) tablet 25 mg  25 mg Oral TID PRN Ingrid Horn MD        nitroGLYCERIN (NITROSTAT) SL tablet 0.4 mg  0.4 mg SubLINGual Q5 Min PRN Ingrid Horn MD        COVID-19 Ad26 Vaccine (J&J, COLLEEN) injection 0.5 mL  1 Dose IntraMUSCular Prior to discharge Kishan Flores, JACLYN           Past Medical History:    Past Medical History:   Diagnosis Date    Atrial fibrillation ([address] 07/25/2019    Hypertension        Surgical History:    Past Surgical History:   Procedure Laterality Date    CARDIOVERSION  07/26/2019    Dr. Ritika Houston  07/26/2019             Pre-Sedation:  Pre-Sedation Documentation and Exam:  I have assessed the patient and reviewed the H&P on the chart. Prior History of Anesthesia Complications:   none    Modified Mallampati:  II (soft palate, uvula, fauces visible)    ASA Classification:  Class 2 - A normal healthy patient with mild systemic disease    Isrrael Scale: Activity:  2 - Able to move 4 extremities voluntarily on command  Respiration:  2 - Able to breathe deeply and cough freely  Circulation:  2 - BP+/- 20mmHg of normal  Consciousness:  2 - Fully awake  Oxygen Saturation (color):  2 - Able to maintain oxygen saturation >92% on room air    Sedation/Anesthesia Plan:  Guard the patient's safety and welfare. Minimize physical discomfort and pain. Minimize negative psychological responses to treatment by providing sedation and analgesia and maximize the potential amnesia. Patient to meet pre-procedure discharge plan.     Medication Planned:  midazolam intravenously and fentanyl intravenously    Patient is an appropriate candidate for plan of sedation:   yes      Electronically signed by Britney Coley MD on 2/8/2022 at 2:53 PM

## 2022-02-08 NOTE — PROGRESS NOTES
[facility]   Daily Progress Note      Admit Date:  2/5/2022    HPI:    Mr. Kenrick Gray is a 59year old male with history of PAF, hypertension. Unvaccinated, , homeless    He is admitted with weight gain and lower extremity edema. He has had medical non compliance. Last admission was covid + and he never followed up. He never had LHC and LVEF 20%. EKG changes and elevated troponin 0.52 and AF    Subjective:  Patient is being seen for acute on chronic systolic heart failure. There were no acute overnight cardiac events. He is feeling  Better today and edema in lower legs much improved. He is making good urine  LHC planned for today    Creat 1.1 potassium 3.9  Weight [phone] (186 best weight) and -10 L out     Objective:   /81   Pulse 74   Temp 98 °F (36.7 °C) (Oral)   Resp 16   Ht 6' 2\" (1.88 m)   Wt 199 lb 1.6 oz (90.3 kg)   SpO2 95%   BMI 25.56 kg/m²       Intake/Output Summary (Last 24 hours) at 2/8/2022 0915  Last data filed at 2/8/2022 0518  Gross per 24 hour   Intake --   Output 3350 ml   Net -3350 ml          Physical Exam:  General:  Awake, alert, oriented in NAD  Skin:  Warm and dry. No unusual bruising or rash  Neck:  Supple. No JVD or carotid bruit appreciated  Chest:  Normal effort.   Rales in the bases  Cardiovascular:  RRR, S1/S2, no murmur/gallop/rub  Abdomen:  Soft, nontender, +bowel sounds  Extremities:  Bilateral lower ankle to mid calf edema L>R  Neurological: No focal deficits  Psychological: Normal mood and affect      Medications:    finasteride  5 mg Oral Daily    lisinopril  5 mg Oral Daily    metoprolol succinate  50 mg Oral Daily    tamsulosin  0.4 mg Oral Daily    furosemide  40 mg IntraVENous TID    sodium chloride flush  5-40 mL IntraVENous 2 times per day    aspirin  81 mg Oral Daily    atorvastatin  40 mg Oral Nightly    covid-19 vaccine  1 Dose IntraMUSCular Prior to discharge      sodium chloride         Lab Data:  CBC:   Recent Labs     02/05/22 2022 02/07/22  0611 02/08/22  0648   WBC 5.5 4.6 3.7*   HGB 11.4* 12.5* 12.0*    337 353     BMP:    Recent Labs     02/05/22 2022 02/07/22  0611 02/08/22  0648    140 140   K 3.9 4.3 3.9   CO2 25 28 28   BUN 17 15 15   CREATININE 1.0 1.1 1.1     INR:    Recent Labs     02/05/22  2301   INR 1.50*     BNP:    Recent Labs     02/05/22 2022   PROBNP 16,265*         Diagnostics:  Echo 11/29/2021  Summary   -Covid+   -Severely reduced global systolic function with an ejection fraction   estimated at 20%.  -Severe global hypokinesis with regional variations noted.   -Right ventricular systolic function appears to be mildly reduced based on   visual inspection.   -Severe biatrial enlargement.   -Thickened mitral valve without evidence of stenosis. There is   mild-to-moderate mitral regurgitation.   -There is mild-to-moderate tricuspid regurgitation with a RVSP estimation of   68 mmHg.   -Diastolic dysfunction with elevated LV filling pressures.        Echo 7/25/2019  Summary   -Overall left ventricular systolic function is moderately depressed .   -Ejection fraction is visually estimated to be 30-35 %.  -Global left ventricular function moderately reduced.   -Indeterminate diastolic function due to atrial fibrillation and moderate to   severe mitral regurgitation.   -Moderate-to-severe mitral regurgitation .   -Dilated left atrium with a volume of 97 ml.   -Left atrial volume is increased probably due to atrial fibrillation .   -There is mild right ventricular hypertrophy.   -The right ventricle is mildly enlarged.   -Right ventricular systolic pressure of 53 mm Hg consistent with pulmonary   hypertension.   -Moderate to severe tricuspid regurgitation. TAPSE = 1.43   -IVC size is dilated (>2.1 cm) but collapses > 50% with respiration   consistent with elevated RA pressure (8 mmHg). Assessment:    1.   Acute on chronic systolic heart failure, on ace and BB; no aldosterone antagonist due to non compliance  2. NSTEMI  3. Non compliance  4. Homeless  5. PAF, on lovenox    Plan:    1. Continue toprol 50 mg once a day  2. Continue lisinopril 5 mg daily. Would not start entresto or aldactone due to non compliance history. Could consider if he follows up closely  3. Continue lasix 40 mg IV three times a day  4. CHF nurse following for diet education, fluid restriction and daily weights  5. C planned for today  6. Will need meds to bed at discharge  7. Stressed importance of follow up    NYHA III    Discussed with patient who is agreeable with plan of care. Thank you for allowing me to participate in the care of your patient.     TRENT [place] - CNS, CNS

## 2022-02-08 NOTE — PROGRESS NOTES
[facility] PROGRESS NOTE    2/8/2022 1:34 PM        Name: Garima Taylor . Admitted: 2/5/2022  Primary Care Provider: No primary care provider on file. (Tel: None)                        Subjective:  . No acute events overnight. Resting well. Pain control. Diet ok. Labs reviewed  Denies any chest pain sob. Reviewed interval ancillary notes    Current Medications  finasteride (PROSCAR) tablet 5 mg, Daily  lisinopril (PRINIVIL;ZESTRIL) tablet 5 mg, Daily  metoprolol succinate (TOPROL XL) extended release tablet 50 mg, Daily  tamsulosin (FLOMAX) capsule 0.4 mg, Daily  furosemide (LASIX) injection 40 mg, TID  sodium chloride flush 0.9 % injection 5-40 mL, 2 times per day  sodium chloride flush 0.9 % injection 5-40 mL, PRN  0.9 % sodium chloride infusion, PRN  acetaminophen (TYLENOL) tablet 650 mg, Q6H PRN   Or  acetaminophen (TYLENOL) suppository 650 mg, Q6H PRN  polyethylene glycol (GLYCOLAX) packet 17 g, Daily PRN  aspirin chewable tablet 81 mg, Daily  atorvastatin (LIPITOR) tablet 40 mg, Nightly  meclizine (ANTIVERT) tablet 25 mg, TID PRN  nitroGLYCERIN (NITROSTAT) SL tablet 0.4 mg, Q5 Min PRN  COVID-19 Ad26 Vaccine (J&J, [employer]LE[employer]) injection 0.5 mL, Prior to discharge        Objective:  /75   Pulse 56   Temp 98.2 °F (36.8 °C) (Oral)   Resp 15   Ht 6' 2\" (1.88 m)   Wt 199 lb 1.6 oz (90.3 kg)   SpO2 98%   BMI 25.56 kg/m²     Intake/Output Summary (Last 24 hours) at 2/8/2022 1334  Last data filed at 2/8/2022 1229  Gross per 24 hour   Intake --   Output 3550 ml   Net -3550 ml      Wt Readings from Last 3 Encounters:   02/08/22 199 lb 1.6 oz (90.3 kg)   12/01/21 181 lb 3.2 oz (82.2 kg)   01/19/20 210 lb (95.3 kg)       General appearance:  Appears comfortable  Eyes: Sclera clear. Pupils equal.  ENT: Moist oral mucosa.  Trachea midline, no adenopathy. Cardiovascular: Regular rhythm, normal S1, S2. No murmur. No edema in lower extremities  Respiratory: Not using accessory muscles. Good inspiratory effort. Clear to auscultation bilaterally, no wheeze or crackles. GI: Abdomen soft, no tenderness, not distended, normal bowel sounds  Musculoskeletal: No cyanosis in digits, neck supple  Neurology: CN 2-12 grossly intact. No speech or motor deficits  Psych: Normal affect. Alert and oriented in time, place and person  Skin: Warm, dry, normal turgor    Labs and Tests:  CBC:   Recent Labs     02/05/22 2022 02/07/22  0611 02/08/22  0648   WBC 5.5 4.6 3.7*   HGB 11.4* 12.5* 12.0*    337 353     BMP:    Recent Labs     02/05/22 2022 02/07/22  0611 02/08/22  0648    140 140   K 3.9 4.3 3.9    102 101   CO2 25 28 28   BUN 17 15 15   CREATININE 1.0 1.1 1.1   GLUCOSE 96 81 76     Hepatic:   Recent Labs     02/05/22 2022 02/07/22  0611 02/08/22  0648   AST 37 30 21   ALT 47* 40 33   BILITOT 0.8 1.4* 1.2*   ALKPHOS 79 63 57       Discussed care with patient             Problem List  Active Problems:    PAF (paroxysmal atrial fibrillation) (HCC)    Acute on chronic systolic heart failure (HCC)    Acute on chronic combined systolic and diastolic heart failure (HCC)    NSTEMI (non-ST elevated myocardial infarction) ([facility] [address]  Resolved Problems:    * No resolved hospital problems. *       Assessment & Plan:   1.  Acute on chronic CHF exacerbation  -Continue IV diuresis continue monitor Don catheter monitor input  -Continue diuresis    Non-STEMI  -0 plan for left heart cath today    Chronic atrial fibrillation on anticoagulation with Eliquis which has been held continue Lovenox for now          Diet: Diet NPO Exceptions are: Sips of Water with Meds  Code:Full Code  DVT PPX lovenox   Disposition hopefully home tomorrow if all improving      Cesar Marcum MD   2/8/2022 1:34 PM

## 2022-02-09 DIAGNOSIS — I34.0 MITRAL VALVE INSUFFICIENCY, UNSPECIFIED ETIOLOGY: Primary | ICD-10-CM

## 2022-02-09 LAB
A/G RATIO: 1.3 (ref 1.1–2.2)
ALBUMIN SERPL-MCNC: 2.9 G/DL (ref 3.4–5)
ALP BLD-CCNC: 56 U/L (ref 40–129)
ALT SERPL-CCNC: 28 U/L (ref 10–40)
ANION GAP SERPL CALCULATED.3IONS-SCNC: 11 MMOL/L (ref 3–16)
AST SERPL-CCNC: 21 U/L (ref 15–37)
BASOPHILS ABSOLUTE: 0 K/UL (ref 0–0.2)
BASOPHILS RELATIVE PERCENT: 0.7 %
BILIRUB SERPL-MCNC: 0.9 MG/DL (ref 0–1)
BUN BLDV-MCNC: 14 MG/DL (ref 7–20)
CALCIUM SERPL-MCNC: 8.1 MG/DL (ref 8.3–10.6)
CHLORIDE BLD-SCNC: 100 MMOL/L (ref 99–110)
CO2: 26 MMOL/L (ref 21–32)
CREAT SERPL-MCNC: 1 MG/DL (ref 0.8–1.3)
EOSINOPHILS ABSOLUTE: 0.1 K/UL (ref 0–0.6)
EOSINOPHILS RELATIVE PERCENT: 3 %
GFR AFRICAN AMERICAN: >60
GFR NON-AFRICAN AMERICAN: >60
GLUCOSE BLD-MCNC: 79 MG/DL (ref 70–99)
HCT VFR BLD CALC: 38.7 % (ref 40.5–52.5)
HEMOGLOBIN: 12.5 G/DL (ref 13.5–17.5)
LYMPHOCYTES ABSOLUTE: 0.9 K/UL (ref 1–5.1)
LYMPHOCYTES RELATIVE PERCENT: 22.3 %
MAGNESIUM: 2 MG/DL (ref 1.8–2.4)
MCH RBC QN AUTO: 26.5 PG (ref 26–34)
MCHC RBC AUTO-ENTMCNC: 32.2 G/DL (ref 31–36)
MCV RBC AUTO: 82.4 FL (ref 80–100)
MONOCYTES ABSOLUTE: 0.3 K/UL (ref 0–1.3)
MONOCYTES RELATIVE PERCENT: 8.5 %
NEUTROPHILS ABSOLUTE: 2.6 K/UL (ref 1.7–7.7)
NEUTROPHILS RELATIVE PERCENT: 65.5 %
PDW BLD-RTO: 16.8 % (ref 12.4–15.4)
PHOSPHORUS: 4.3 MG/DL (ref 2.5–4.9)
PLATELET # BLD: 329 K/UL (ref 135–450)
PMV BLD AUTO: 7.4 FL (ref 5–10.5)
POTASSIUM SERPL-SCNC: 3.7 MMOL/L (ref 3.5–5.1)
RBC # BLD: 4.69 M/UL (ref 4.2–5.9)
SODIUM BLD-SCNC: 137 MMOL/L (ref 136–145)
TOTAL PROTEIN: 5.1 G/DL (ref 6.4–8.2)
WBC # BLD: 4 K/UL (ref 4–11)

## 2022-02-09 PROCEDURE — 83735 ASSAY OF MAGNESIUM: CPT

## 2022-02-09 PROCEDURE — 99232 SBSQ HOSP IP/OBS MODERATE 35: CPT | Performed by: CLINICAL NURSE SPECIALIST

## 2022-02-09 PROCEDURE — 2580000003 HC RX 258: Performed by: INTERNAL MEDICINE

## 2022-02-09 PROCEDURE — 1200000000 HC SEMI PRIVATE

## 2022-02-09 PROCEDURE — 6370000000 HC RX 637 (ALT 250 FOR IP): Performed by: INTERNAL MEDICINE

## 2022-02-09 PROCEDURE — 85025 COMPLETE CBC W/AUTO DIFF WBC: CPT

## 2022-02-09 PROCEDURE — 84100 ASSAY OF PHOSPHORUS: CPT

## 2022-02-09 PROCEDURE — 80053 COMPREHEN METABOLIC PANEL: CPT

## 2022-02-09 PROCEDURE — 36415 COLL VENOUS BLD VENIPUNCTURE: CPT

## 2022-02-09 PROCEDURE — 6370000000 HC RX 637 (ALT 250 FOR IP): Performed by: CLINICAL NURSE SPECIALIST

## 2022-02-09 PROCEDURE — 6360000002 HC RX W HCPCS: Performed by: INTERNAL MEDICINE

## 2022-02-09 PROCEDURE — 6370000000 HC RX 637 (ALT 250 FOR IP): Performed by: HOSPITALIST

## 2022-02-09 RX ORDER — TAMSULOSIN HYDROCHLORIDE 0.4 MG/1
0.4 CAPSULE ORAL DAILY
Qty: 30 CAPSULE | Refills: 2 | Status: ON HOLD | OUTPATIENT
Start: 2022-02-09 | End: 2022-04-26 | Stop reason: SDUPTHER

## 2022-02-09 RX ORDER — METOPROLOL SUCCINATE 50 MG/1
50 TABLET, EXTENDED RELEASE ORAL DAILY
Qty: 30 TABLET | Refills: 1 | Status: SHIPPED | OUTPATIENT
Start: 2022-02-09 | End: 2022-04-12 | Stop reason: SDUPTHER

## 2022-02-09 RX ORDER — ATORVASTATIN CALCIUM 40 MG/1
40 TABLET, FILM COATED ORAL NIGHTLY
Qty: 30 TABLET | Refills: 2 | Status: ON HOLD | OUTPATIENT
Start: 2022-02-09 | End: 2022-04-26 | Stop reason: SDUPTHER

## 2022-02-09 RX ORDER — ASPIRIN 81 MG/1
81 TABLET, CHEWABLE ORAL DAILY
Qty: 30 TABLET | Refills: 1 | Status: ON HOLD | OUTPATIENT
Start: 2022-02-09 | End: 2022-04-26 | Stop reason: SDUPTHER

## 2022-02-09 RX ORDER — LISINOPRIL 5 MG/1
5 TABLET ORAL DAILY
Qty: 30 TABLET | Refills: 1 | Status: SHIPPED | OUTPATIENT
Start: 2022-02-09 | End: 2022-04-12 | Stop reason: SDUPTHER

## 2022-02-09 RX ORDER — FINASTERIDE 5 MG/1
5 TABLET, FILM COATED ORAL DAILY
Qty: 30 TABLET | Refills: 0 | Status: ON HOLD | OUTPATIENT
Start: 2022-02-09 | End: 2022-04-26 | Stop reason: SDUPTHER

## 2022-02-09 RX ORDER — FUROSEMIDE 40 MG/1
40 TABLET ORAL 2 TIMES DAILY
Status: DISCONTINUED | OUTPATIENT
Start: 2022-02-09 | End: 2022-02-10 | Stop reason: HOSPADM

## 2022-02-09 RX ORDER — FUROSEMIDE 40 MG/1
40 TABLET ORAL DAILY
Qty: 90 TABLET | Refills: 1 | Status: SHIPPED | OUTPATIENT
Start: 2022-02-09 | End: 2022-02-16

## 2022-02-09 RX ADMIN — Medication 10 ML: at 20:09

## 2022-02-09 RX ADMIN — FINASTERIDE 5 MG: 5 TABLET, FILM COATED ORAL at 08:34

## 2022-02-09 RX ADMIN — ATORVASTATIN CALCIUM 40 MG: 40 TABLET, FILM COATED ORAL at 20:08

## 2022-02-09 RX ADMIN — SODIUM CHLORIDE, PRESERVATIVE FREE 10 ML: 5 INJECTION INTRAVENOUS at 08:35

## 2022-02-09 RX ADMIN — METOPROLOL SUCCINATE 50 MG: 50 TABLET, EXTENDED RELEASE ORAL at 08:34

## 2022-02-09 RX ADMIN — APIXABAN 5 MG: 5 TABLET, FILM COATED ORAL at 15:23

## 2022-02-09 RX ADMIN — TAMSULOSIN HYDROCHLORIDE 0.4 MG: 0.4 CAPSULE ORAL at 08:35

## 2022-02-09 RX ADMIN — SODIUM CHLORIDE, PRESERVATIVE FREE 10 ML: 5 INJECTION INTRAVENOUS at 20:08

## 2022-02-09 RX ADMIN — FUROSEMIDE 40 MG: 40 TABLET ORAL at 17:33

## 2022-02-09 RX ADMIN — Medication 10 ML: at 10:08

## 2022-02-09 RX ADMIN — ASPIRIN 81 MG 81 MG: 81 TABLET ORAL at 08:34

## 2022-02-09 RX ADMIN — FUROSEMIDE 40 MG: 10 INJECTION, SOLUTION INTRAMUSCULAR; INTRAVENOUS at 08:35

## 2022-02-09 RX ADMIN — LISINOPRIL 5 MG: 5 TABLET ORAL at 08:35

## 2022-02-09 ASSESSMENT — PAIN SCALES - GENERAL
PAINLEVEL_OUTOF10: 0

## 2022-02-09 NOTE — PROGRESS NOTES
[facility] PROGRESS NOTE    2/9/2022 2:14 PM        Name: Billy Dos Santos Admitted: 2/5/2022  Primary Care Provider: No primary care provider on file. (Tel: None)                        Subjective:  . No acute events overnight. Resting well. Pain control. Diet ok. Labs reviewed  Denies any chest pain sob.      Reviewed interval ancillary notes    Current Medications  furosemide (LASIX) tablet 40 mg, BID  0.9 % sodium chloride infusion, Continuous  sodium chloride flush 0.9 % injection 5-40 mL, 2 times per day  sodium chloride flush 0.9 % injection 5-40 mL, PRN  0.9 % sodium chloride infusion, PRN  acetaminophen (TYLENOL) tablet 650 mg, Q4H PRN  oxyCODONE-acetaminophen (PERCOCET) 5-325 MG per tablet 1 tablet, Q4H PRN   Or  oxyCODONE-acetaminophen (PERCOCET) 5-325 MG per tablet 2 tablet, Q4H PRN  ondansetron (ZOFRAN) injection 4 mg, Q6H PRN  finasteride (PROSCAR) tablet 5 mg, Daily  lisinopril (PRINIVIL;ZESTRIL) tablet 5 mg, Daily  metoprolol succinate (TOPROL XL) extended release tablet 50 mg, Daily  tamsulosin (FLOMAX) capsule 0.4 mg, Daily  sodium chloride flush 0.9 % injection 5-40 mL, 2 times per day  sodium chloride flush 0.9 % injection 5-40 mL, PRN  0.9 % sodium chloride infusion, PRN  acetaminophen (TYLENOL) suppository 650 mg, Q6H PRN  polyethylene glycol (GLYCOLAX) packet 17 g, Daily PRN  aspirin chewable tablet 81 mg, Daily  atorvastatin (LIPITOR) tablet 40 mg, Nightly  meclizine (ANTIVERT) tablet 25 mg, TID PRN  nitroGLYCERIN (NITROSTAT) SL tablet 0.4 mg, Q5 Min PRN  COVID-19 Ad26 Vaccine (J&J, [employer]) injection 0.5 mL, Prior to discharge        Objective:  BP 94/67   Pulse 61   Temp 98.1 °F (36.7 °C) (Oral)   Resp 16   Ht 6' 2\" (1.88 m)   Wt 193 lb 12.6 oz (87.9 kg)   SpO2 95%   BMI 24.88 kg/m²     Intake/Output Summary (Last 24 hours) at 2/9/2022 Sadie Moore 42 filed at 2/9/2022 1119  Gross per 24 hour   Intake 240 ml   Output 3450 ml   Net -3210 ml      Wt Readings from Last 3 Encounters:   02/09/22 193 lb 12.6 oz (87.9 kg)   12/01/21 181 lb 3.2 oz (82.2 kg)   01/19/20 210 lb (95.3 kg)       General appearance:  Appears comfortable  Eyes: Sclera clear. Pupils equal.  ENT: Moist oral mucosa. Trachea midline, no adenopathy. Cardiovascular: Regular rhythm, normal S1, S2. No murmur. No edema in lower extremities  Respiratory: Not using accessory muscles. Good inspiratory effort. Clear to auscultation bilaterally, no wheeze or crackles. GI: Abdomen soft, no tenderness, not distended, normal bowel sounds  Musculoskeletal: No cyanosis in digits, neck supple  Neurology: CN 2-12 grossly intact. No speech or motor deficits  Psych: Normal affect. Alert and oriented in time, place and person  Skin: Warm, dry, normal turgor    Labs and Tests:  CBC:   Recent Labs     02/07/22  0611 02/08/22  0648 02/09/22  0546   WBC 4.6 3.7* 4.0   HGB 12.5* 12.0* 12.5*    353 329     BMP:    Recent Labs     02/07/22  0611 02/08/22  0648 02/09/22  0546    140 137   K 4.3 3.9 3.7    101 100   CO2 28 28 26   BUN 15 15 14   CREATININE 1.1 1.1 1.0   GLUCOSE 81 76 79     Hepatic:   Recent Labs     02/07/22  0611 02/08/22  0648 02/09/22  0546   AST 30 21 21   ALT 40 33 28   BILITOT 1.4* 1.2* 0.9   ALKPHOS 63 57 56       Discussed care with patient             Problem List  Active Problems:    PAF (paroxysmal atrial fibrillation) (HCC)    Acute on chronic systolic heart failure (HCC)    Acute on chronic combined systolic and diastolic heart failure (HCC)    NSTEMI (non-ST elevated myocardial infarction) ([facility] [address]  Resolved Problems:    * No resolved hospital problems. *       Assessment & Plan:   1.  Acute on chronic CHF exacerbation  -switched to po lasix   -Continue diuresis    Non-STEMI  =s/p LHC  - CONSUELO and life vest         Diet: Diet NPO Exceptions are: Sips of Water with Meds  Code:Full Code  DVT PPX lovenox   Disposition hopefully home tomorrow if all improving      Trixie Jeffery MD   2/9/2022 2:14 PM

## 2022-02-09 NOTE — PROGRESS NOTES
[address]   Daily Progress Note      Admit Date:  2/5/2022    HPI:    Mr. Miko Iraheta is a 59year old male with history of PAF, hypertension. Unvaccinated, , homeless    He is admitted with weight gain and lower extremity edema. He has had medical non compliance. Last admission was covid + and he never followed up. He never had LHC and LVEF 20%. EKG changes and elevated troponin 0.52 and AF    Subjective:  Patient is being seen for acute on chronic systolic heart failure. There were no acute overnight cardiac events. He is feeling  Better today and edema in lower legs much improved. He is making good urine  LHC planned for today    Creat 1.1 potassium 3.9  Weight [phone] (186 best weight) and -14 L out     Objective:   /62   Pulse 86   Temp 97.4 °F (36.3 °C) (Oral)   Resp 15   Ht 6' 2\" (1.88 m)   Wt 193 lb 12.6 oz (87.9 kg)   SpO2 95%   BMI 24.88 kg/m²       Intake/Output Summary (Last 24 hours) at 2/9/2022 0924  Last data filed at 2/9/2022 0029  Gross per 24 hour   Intake 240 ml   Output 4100 ml   Net -3860 ml          Physical Exam:  General:  Awake, alert, oriented in NAD  Skin:  Warm and dry. No unusual bruising or rash  Neck:  Supple. No JVD or carotid bruit appreciated  Chest:  Normal effort.   Rales in the bases  Cardiovascular:  RRR, S1/S2, no murmur/gallop/rub  Abdomen:  Soft, nontender, +bowel sounds  Extremities:  Bilateral lower ankle to mid calf edema L>R  Neurological: No focal deficits  Psychological: Normal mood and affect      Medications:    sodium chloride flush  5-40 mL IntraVENous 2 times per day    finasteride  5 mg Oral Daily    lisinopril  5 mg Oral Daily    metoprolol succinate  50 mg Oral Daily    tamsulosin  0.4 mg Oral Daily    furosemide  40 mg IntraVENous TID    sodium chloride flush  5-40 mL IntraVENous 2 times per day    aspirin  81 mg Oral Daily    atorvastatin  40 mg Oral Nightly    covid-19 vaccine  1 Dose IntraMUSCular Prior to discharge      sodium chloride 75 mL/hr at 02/08/22 1613    sodium chloride      sodium chloride         Lab Data:  CBC:   Recent Labs     02/07/22  0611 02/08/22  0648 02/09/22  0546   WBC 4.6 3.7* 4.0   HGB 12.5* 12.0* 12.5*    353 329     BMP:    Recent Labs     02/07/22  0611 02/08/22  0648 02/09/22  0546    140 137   K 4.3 3.9 3.7   CO2 28 28 26   BUN 15 15 14   CREATININE 1.1 1.1 1.0     INR:    No results for input(s): INR in the last 72 hours. BNP:    No results for input(s): PROBNP in the last 72 hours. Diagnostics:  Cardiac Cath PCI: Dr Tom Olea 2/8/2022  Anatomy:   LM-nml   LAD-nml  Cx-nml  OM- nml  RCA-nml  RPDA- nml  LVEF- 10%  LVG- global hypokinesis  LVEDP- 10     Hemodynamics:  RA- mean 3  RV- 37/0  PAWP- 10  PA- 34/12 (20)     C.O.- 5.7 (4.9)  C. I.- 2.6 (2.25)  PA sat 60%  AO sat 91%     Contrast: 70  Flouro Time: 5.3  Access: R radial a, R CFV     Impression  ~Coronary Angiography w/ normal cors  ~LVG with LVEF of 10% and global regional wall motion abnormalities  ~Severe MR  ~Normal CO/CI  ~normal L and R filling pressures     Recommendation  ~Aggressive medical treatment and risk factor modification  ~1. Medications reviewed, no changes at this time. 2. Post cath IVF. Bedrest.  3.Consider CONSUELO for evaluation of MR.   4. Patient has been advised on the importance of regular exercise of at least 20-30 minutes daily alternating with aerobic and isometric activities. 5. Patient counseled about and offered assistance for smoking cessation   6. No indication for cardiac rehab  7. CHF service to evaluate tomorrow. Echo 11/29/2021  Summary   -Covid+   -Severely reduced global systolic function with an ejection fraction   estimated at 20%.    -Severe global hypokinesis with regional variations noted.   -Right ventricular systolic function appears to be mildly reduced based on   visual inspection.   -Severe biatrial enlargement.   -Thickened mitral valve without evidence of stenosis. There is   mild-to-moderate mitral regurgitation.   -There is mild-to-moderate tricuspid regurgitation with a RVSP estimation of   05 mmHg.   -Diastolic dysfunction with elevated LV filling pressures.        Echo 7/25/2019  Summary   -Overall left ventricular systolic function is moderately depressed .   -Ejection fraction is visually estimated to be 30-35 %.  -Global left ventricular function moderately reduced.   -Indeterminate diastolic function due to atrial fibrillation and moderate to   severe mitral regurgitation.   -Moderate-to-severe mitral regurgitation .   -Dilated left atrium with a volume of 97 ml.   -Left atrial volume is increased probably due to atrial fibrillation .   -There is mild right ventricular hypertrophy.   -The right ventricle is mildly enlarged.   -Right ventricular systolic pressure of 53 mm Hg consistent with pulmonary   hypertension.   -Moderate to severe tricuspid regurgitation. TAPSE = 1.43   -IVC size is dilated (>2.1 cm) but collapses > 50% with respiration   consistent with elevated RA pressure (8 mmHg). Assessment:    1. Acute on chronic systolic heart failure, on ace and BB; no aldosterone antagonist due to non compliance  2. NSTEMI  3. Non compliance  4. Homeless  5. PAF, on lovenox    Plan:    1. Continue toprol 50 mg once a day  2. Continue lisinopril 5 mg daily. Would not start entresto or aldactone due to non compliance history. Could consider if he follows up closely  3. Will stop IV lasix and change to oral 40 mg po bid  4. CHF nurse following for diet education, fluid restriction and daily weights  5. Will see if CONSUELO can be done today. Make npo for now  6. Will need meds to bed at discharge  7. Stressed importance of follow up    Discussed with Dr Berny Pearce. Will ask EP to see regarding life vest at discharge    NYHA III    Discussed with patient who is agreeable with plan of care.      Thank you for allowing me to participate in the care of your patient.     Isidra An, APRN - CNS, CNS

## 2022-02-09 NOTE — PROGRESS NOTES
CLINICAL PHARMACY NOTE: MEDS TO BEDS    Total # of Prescriptions Filled: 8   The following medications were delivered to the patient:  · Lasix 40 mg  · Finasteride 5 mg  · Lisinopril 5 mg  · Aspirin 81 mg  · Eliquis 5 mg  · Atorvastatin 40 mg  · Metoprolol ER 50 mg  · Flomax 0.4 mg    Additional Documentation:  Delivered to Patient=signed  Chela Valenzuela CPhT

## 2022-02-10 ENCOUNTER — TELEPHONE (OUTPATIENT)
Dept: CARDIOLOGY CLINIC | Age: 62
End: 2022-02-10

## 2022-02-10 VITALS
BODY MASS INDEX: 24.78 KG/M2 | OXYGEN SATURATION: 99 % | DIASTOLIC BLOOD PRESSURE: 78 MMHG | TEMPERATURE: 97.6 F | WEIGHT: 193.12 LBS | HEIGHT: 74 IN | SYSTOLIC BLOOD PRESSURE: 115 MMHG | RESPIRATION RATE: 16 BRPM | HEART RATE: 68 BPM

## 2022-02-10 LAB
ANION GAP SERPL CALCULATED.3IONS-SCNC: 10 MMOL/L (ref 3–16)
BASOPHILS ABSOLUTE: 0.1 K/UL (ref 0–0.2)
BASOPHILS RELATIVE PERCENT: 1.2 %
BUN BLDV-MCNC: 13 MG/DL (ref 7–20)
CALCIUM SERPL-MCNC: 8.8 MG/DL (ref 8.3–10.6)
CHLORIDE BLD-SCNC: 101 MMOL/L (ref 99–110)
CO2: 28 MMOL/L (ref 21–32)
CREAT SERPL-MCNC: 1 MG/DL (ref 0.8–1.3)
EOSINOPHILS ABSOLUTE: 0.1 K/UL (ref 0–0.6)
EOSINOPHILS RELATIVE PERCENT: 2.9 %
GFR AFRICAN AMERICAN: >60
GFR NON-AFRICAN AMERICAN: >60
GLUCOSE BLD-MCNC: 82 MG/DL (ref 70–99)
HCT VFR BLD CALC: 39.5 % (ref 40.5–52.5)
HEMOGLOBIN: 12.7 G/DL (ref 13.5–17.5)
LV EF: 23 %
LVEF MODALITY: NORMAL
LYMPHOCYTES ABSOLUTE: 1.2 K/UL (ref 1–5.1)
LYMPHOCYTES RELATIVE PERCENT: 27.8 %
MCH RBC QN AUTO: 26.4 PG (ref 26–34)
MCHC RBC AUTO-ENTMCNC: 32.1 G/DL (ref 31–36)
MCV RBC AUTO: 82.2 FL (ref 80–100)
MONOCYTES ABSOLUTE: 0.4 K/UL (ref 0–1.3)
MONOCYTES RELATIVE PERCENT: 8.4 %
NEUTROPHILS ABSOLUTE: 2.6 K/UL (ref 1.7–7.7)
NEUTROPHILS RELATIVE PERCENT: 59.7 %
PDW BLD-RTO: 16.5 % (ref 12.4–15.4)
PLATELET # BLD: 358 K/UL (ref 135–450)
PMV BLD AUTO: 7.5 FL (ref 5–10.5)
POTASSIUM SERPL-SCNC: 4.9 MMOL/L (ref 3.5–5.1)
RBC # BLD: 4.81 M/UL (ref 4.2–5.9)
SARS-COV-2, NAAT: NOT DETECTED
SODIUM BLD-SCNC: 139 MMOL/L (ref 136–145)
WBC # BLD: 4.3 K/UL (ref 4–11)

## 2022-02-10 PROCEDURE — 93325 DOPPLER ECHO COLOR FLOW MAPG: CPT

## 2022-02-10 PROCEDURE — 36415 COLL VENOUS BLD VENIPUNCTURE: CPT

## 2022-02-10 PROCEDURE — B24BZZ4 ULTRASONOGRAPHY OF HEART WITH AORTA, TRANSESOPHAGEAL: ICD-10-PCS | Performed by: INTERNAL MEDICINE

## 2022-02-10 PROCEDURE — 6370000000 HC RX 637 (ALT 250 FOR IP): Performed by: CLINICAL NURSE SPECIALIST

## 2022-02-10 PROCEDURE — 99152 MOD SED SAME PHYS/QHP 5/>YRS: CPT

## 2022-02-10 PROCEDURE — 85025 COMPLETE CBC W/AUTO DIFF WBC: CPT

## 2022-02-10 PROCEDURE — 93320 DOPPLER ECHO COMPLETE: CPT

## 2022-02-10 PROCEDURE — 99232 SBSQ HOSP IP/OBS MODERATE 35: CPT | Performed by: CLINICAL NURSE SPECIALIST

## 2022-02-10 PROCEDURE — 99152 MOD SED SAME PHYS/QHP 5/>YRS: CPT | Performed by: INTERNAL MEDICINE

## 2022-02-10 PROCEDURE — 2580000003 HC RX 258: Performed by: INTERNAL MEDICINE

## 2022-02-10 PROCEDURE — 93312 ECHO TRANSESOPHAGEAL: CPT

## 2022-02-10 PROCEDURE — 99153 MOD SED SAME PHYS/QHP EA: CPT

## 2022-02-10 PROCEDURE — 99233 SBSQ HOSP IP/OBS HIGH 50: CPT | Performed by: INTERNAL MEDICINE

## 2022-02-10 PROCEDURE — 6370000000 HC RX 637 (ALT 250 FOR IP): Performed by: HOSPITALIST

## 2022-02-10 PROCEDURE — 7100000010 HC PHASE II RECOVERY - FIRST 15 MIN

## 2022-02-10 PROCEDURE — 80048 BASIC METABOLIC PNL TOTAL CA: CPT

## 2022-02-10 PROCEDURE — 87635 SARS-COV-2 COVID-19 AMP PRB: CPT

## 2022-02-10 PROCEDURE — 6370000000 HC RX 637 (ALT 250 FOR IP): Performed by: INTERNAL MEDICINE

## 2022-02-10 RX ADMIN — ASPIRIN 81 MG 81 MG: 81 TABLET ORAL at 08:13

## 2022-02-10 RX ADMIN — FINASTERIDE 5 MG: 5 TABLET, FILM COATED ORAL at 08:14

## 2022-02-10 RX ADMIN — TAMSULOSIN HYDROCHLORIDE 0.4 MG: 0.4 CAPSULE ORAL at 08:14

## 2022-02-10 RX ADMIN — SODIUM CHLORIDE, PRESERVATIVE FREE 10 ML: 5 INJECTION INTRAVENOUS at 08:14

## 2022-02-10 RX ADMIN — APIXABAN 5 MG: 5 TABLET, FILM COATED ORAL at 08:14

## 2022-02-10 RX ADMIN — METOPROLOL SUCCINATE 50 MG: 50 TABLET, EXTENDED RELEASE ORAL at 08:13

## 2022-02-10 RX ADMIN — LISINOPRIL 5 MG: 5 TABLET ORAL at 08:14

## 2022-02-10 RX ADMIN — FUROSEMIDE 40 MG: 40 TABLET ORAL at 08:13

## 2022-02-10 ASSESSMENT — PAIN SCALES - GENERAL
PAINLEVEL_OUTOF10: 0
PAINLEVEL_OUTOF10: 0

## 2022-02-10 NOTE — CONSULTS
[address]   Electrophysiology Consultation   Date: 2/10/2022  Reason for Consultation: Atrial fibrillation   Consult Requesting Physician: Gregory Mcwilliams MD   Chief Complaint   Patient presents with    Knee Pain     pt states bilateral knee and ankle pain and swelling x1 weeks. CC: LE swelling and shortness of breath    HPI: Calin Huggins is a 59 y.o. male history of NICMP, paroxysmal atrial fibrillation, HFrEF, who has presented to the hospital with increasing LE swelling and weight gain and orthopnea. Patient has history of HFrEF and was seen before but did not follow up. He is homeless. He started having increasing SOB and orthopnea and LE swelling and came to the hospital. He has been treated with GDMT and heart failure is following him. EP has been consulted with regards to Afib and LifeVest evaluation. Patient denies having palpitation. No syncope. Assessment and plan:     - HFrEF:    Severe LV dysfunction. He has been started on GDMT. We discussed future implantation of AICD if his EF remains low after 3-6 months of compliant medical therapy. We also discussed Wearable defibrillator, LifeVest, Risks, benefits and alternative were explained. All questions answered. Patient verbalized understanding but is not interested in having wearable defibrillator. He states that he would like to try medication to see if his heart improves. Continue with BB and ACE-I. Follow up with heart failure team.     - Atrial fibrillation   Duration of atrial fibrillation is unknown. He has an ILR but never followed up. Rate is controlled. High [id number]-Aurc score and high risk for stroke and thromboembolism. Anticoagulation is recommended. On ELiquis. Discussed cardioversion after CONSUELO today. Risks, benefits and alternative of procedure were explained. All questions answered. Patient understood and would like to proceed.      Antiarrhythmic therapy limited to amiodarone due to structural heart disease.     - Mitral regurgitation:    Echo with ? Severe MR. Plan for CONSUELO today to assess the severity of MR. Discussed with nursing staff. Active Hospital Problems    Diagnosis Date Noted    NSTEMI (non-ST elevated myocardial infarction) ([facility] [address] [I21.4]     Acute on chronic combined systolic and diastolic heart failure ([place] [address] [I50.43] 2022    Acute on chronic systolic heart failure (HCC) [I50.23] 2021    PAF (paroxysmal atrial fibrillation) ([facility] [address] [I48.0]        Diagnostic studies:   Echo: 2022:    -Definity administered for LV thrombus. -Left ventricular cavity size is severely dilated with normal left   ventricular wall thickness.   -Overall left ventricular systolic function appears severely reduced. Ejection fraction is visually estimated to be 20%. -There is severe diffuse hypokinesis. -Cannot grade diastology due to atrial fibrillation, although there are   elevated LA pressures.   -Prominent trabeculations in the LV apex. Cannot exclude Non-compaction   cardiomyopathy.   -No evidence of left ventricular mass or thrombus noted. -The right ventricle is severely enlarged. Right ventricular systolic   function is moderately reduced.   -There is severe bi-atrial enlargement. -Mitral valve leaflets appear mildly thickened. Mitral regurgitation   directed centrally and posteriorly that may be severe. -The ascending aorta is mildly dilated. -Moderate to severe tricuspid regurgitation with a PASP of 77 mmHg.   -Trivial pulmonic regurgitation. Echo: 2019: EF: 35%     EC2022: Atrial fibrillation     CXR: 2022  Stable cardiomegaly.       Mild blunting of the right costophrenic angle, likely trace pleural fluid and   airspace disease. I independently reviewed the cardiac diagnostic studies, ECG and relevant imaging studies.      Lab Results   Component Value Date    LVEF 5 2022    LVEFMODE Cardiac Cath 2022     No results found for: TSHFT4, TSH    Physical Examination:  Vitals:    02/10/22 0800   BP: 122/80   Pulse: 73   Resp: 16   Temp: 97.6 °F (36.4 °C)   SpO2: 97%      In: -   Out: 1800    Wt Readings from Last 3 Encounters:   02/10/22 193 lb 2 oz (87.6 kg)   21 181 lb 3.2 oz (82.2 kg)   20 210 lb (95.3 kg)     Temp  Av.9 °F (36.6 °C)  Min: 97.5 °F (36.4 °C)  Max: 98.2 °F (36.8 °C)  Pulse  Av.2  Min: 61  Max: 77  BP  Min: 94/67  Max: 122/80  SpO2  Av %  Min: 95 %  Max: 100 %    Intake/Output Summary (Last 24 hours) at 2/10/2022 0848  Last data filed at 2/10/2022 9789  Gross per 24 hour   Intake --   Output 1800 ml   Net -1800 ml       I independently reviewed all cardiac tracing from cardiac telemetry. · Constitutional: Oriented. No distress. · Head: Normocephalic and atraumatic. · Mouth/Throat: Oropharynx is clear and moist.   · Eyes: Conjunctivae normal. EOM are normal.   · Neck: Neck supple. No JVD present. · Cardiovascular: Normal rate, Irregular rhythm, S1&S2. Systolic murmur    · Pulmonary/Chest: Bilateral respiratory sounds. No rhonchi. · Abdominal: Soft. No tenderness. · Musculoskeletal: No tenderness. No edema    · Lymphadenopathy: Has no cervical adenopathy. · Neurological: Alert and oriented. Follows command, No Gross deficit   · Skin: Skin is warm, No rash noted.    · Psychiatric: Has a normal behavior       Scheduled Meds:   furosemide  40 mg Oral BID    apixaban  5 mg Oral BID    sodium chloride flush  5-40 mL IntraVENous 2 times per day    finasteride  5 mg Oral Daily    lisinopril  5 mg Oral Daily    metoprolol succinate  50 mg Oral Daily    tamsulosin  0.4 mg Oral Daily    sodium chloride flush  5-40 mL IntraVENous 2 times per day    aspirin  81 mg Oral Daily    atorvastatin  40 mg Oral Nightly    covid-19 vaccine  1 Dose IntraMUSCular Prior to discharge     Continuous Infusions:   sodium chloride 75 mL/hr at 22 1613    sodium chloride      sodium chloride       PRN Meds:.sodium chloride flush, sodium chloride, acetaminophen, oxyCODONE-acetaminophen **OR** oxyCODONE-acetaminophen, ondansetron, sodium chloride flush, sodium chloride, [DISCONTINUED] acetaminophen **OR** acetaminophen, polyethylene glycol, meclizine, nitroGLYCERIN     Review of System:  [x] Full ROS obtained and negative except as mentioned in HPI    Prior to Admission medications    Medication Sig Start Date End Date Taking? Authorizing Provider   aspirin 81 MG chewable tablet Take 1 tablet by mouth daily 2/9/22  Yes Tommy Cook MD   atorvastatin (LIPITOR) 40 MG tablet Take 1 tablet by mouth nightly 2/9/22  Yes Tommy Cook MD   finasteride (PROSCAR) 5 MG tablet Take 1 tablet by mouth daily 2/9/22  Yes Tommy Cook MD   furosemide (LASIX) 40 MG tablet Take 1 tablet by mouth daily 2/9/22  Yes Tommy Cook MD   lisinopril (PRINIVIL;ZESTRIL) 5 MG tablet Take 1 tablet by mouth daily 2/9/22  Yes Tommy Cook MD   metoprolol succinate (TOPROL XL) 50 MG extended release tablet Take 1 tablet by mouth daily 2/9/22  Yes Tommy Cook MD   tamsulosin (FLOMAX) 0.4 MG capsule Take 1 capsule by mouth daily 2/9/22  Yes Tommy Cook MD   apixaban (ELIQUIS) 5 MG TABS tablet Take 1 tablet by mouth 2 times daily 2/9/22  Yes Tommy Cook MD       Past Medical History:   Diagnosis Date    Atrial fibrillation ([facility] [address] 07/25/2019    Hypertension         Past Surgical History:   Procedure Laterality Date    CARDIOVERSION  07/26/2019    Dr. Lidia Herrmann  07/26/2019       No Known Allergies    Social History:  Reviewed. reports that he has never smoked. He has never used smokeless tobacco. He reports that he does not drink alcohol and does not use drugs. Family History:  Reviewed. Reviewed. No family history of SCD. Relevant and available labs, and cardiovascular diagnostics reviewed. Reviewed.    Recent Labs     02/08/22  0648 02/09/22  0546    137   K 3.9 3.7  100   CO2 28 26   PHOS 4.1 4.3   BUN 15 14   CREATININE 1.1 1.0     Recent Labs     02/08/22  0648 02/09/22  0546   WBC 3.7* 4.0   HGB 12.0* 12.5*   HCT 37.0* 38.7*   MCV 81.9 82.4    329     Estimated Creatinine Clearance: 88 mL/min (based on SCr of 1 mg/dL). No results found for: BNP    I independently reviewed all cardiac tracing from cardiac telemetry. I independently reviewed relevant and available cardiac diagnostic tests ECG, CXR, Echo, Stress test, Device interrogation, Holter, CT scan. Complex medical condition with multiple medical problems affecting prognosis and outcome of EP interventions    All questions and concerns were addressed to the patient/family. Alternatives to my treatment were discussed. I have discussed the above stated plan and the patient verbalized understanding and agreed with the plan. NOTE: This report was transcribed using voice recognition software. Every effort was made to ensure accuracy, however, inadvertent computerized transcription errors may be present.      Travis Bedolla MD, MPH  [place] [address]   Office: [phone]  Fax: [phone]

## 2022-02-10 NOTE — PLAN OF CARE
Problem: Falls - Risk of:  Goal: Will remain free from falls  Description: Will remain free from falls  Outcome: Ongoing  Note: Remain free from falls.

## 2022-02-10 NOTE — TELEPHONE ENCOUNTER
LVM to call to schedule CONSUELO w/[facility] the week of 2-28. Please transfer call to [address] [place] [address]. Thank you.

## 2022-02-10 NOTE — PROGRESS NOTES
Pt back from cath lab. Bedside report received from cath lab RN. Pt drowsy from sedation, oriented x4.

## 2022-02-10 NOTE — PROCEDURES
[place] [address]     Electrophysiology Procedure Note       Date of Procedure: 2/10/2022  Patient's Name: Robi Sarkar  YOB: 1960   Medical Record Number: [id number]  Procedure Performed by: Kian Hameed MD    Procedures performed:    Trans-esophageal echocardiography    IV sedation. Start time: 12:09  Stop time: 12:34   Fentanyl: 50  Mcg  Versed: 2    Mg    An independent trained observer pushed medications at my direction    Indication of the procedure: MR     Details of procedure: The patient was brought to the cath lab area in a fasting and non-sedated state. The risks, benefits and alternatives of the procedure were discussed with the patient. The patient opted to proceed with the procedure. Written informed consent was signed and placed in the chart. A timeout protocol was completed to identify the patient and the procedure being performed. IV sedation was provided with IV Versed, Fentanyl initially and CONSUELO was performed     Preliminary CONSUELO results are:     - Severe LV dysfunction,  EF: 20-25%  - LA dilated. There was HENOK thrombus. - Moderate MR     Patient tolerated the procedure and no immediate complications noted.      Kian Hameed MD, MPH  [place] [address]   Office: [phone]  Fax: [phone]

## 2022-02-10 NOTE — PROGRESS NOTES
H&P Update    I have reviewed the history and physical and examined the patient and updated with relevant changes. Consent: I have discussed with the patient and/or the patient representative the indication, alternatives, and the possible risks and/or complications of the planned procedure and the anesthesia methods. The patient and/or patient representative appear to understand and agree to proceed. Vitals:    02/10/22 1145   BP: 101/72   Pulse: 65   Resp: 16   Temp:    SpO2: 99%     Prior to Admission medications    Medication Sig Start Date End Date Taking? Authorizing Provider   aspirin 81 MG chewable tablet Take 1 tablet by mouth daily 2/9/22  Yes Gracy Sánchez MD   atorvastatin (LIPITOR) 40 MG tablet Take 1 tablet by mouth nightly 2/9/22  Yes Gracy Sánchez MD   finasteride (PROSCAR) 5 MG tablet Take 1 tablet by mouth daily 2/9/22  Yes Gracy Sánchez MD   furosemide (LASIX) 40 MG tablet Take 1 tablet by mouth daily 2/9/22  Yes Gracy Sánchez MD   lisinopril (PRINIVIL;ZESTRIL) 5 MG tablet Take 1 tablet by mouth daily 2/9/22  Yes Gracy Sánchez MD   metoprolol succinate (TOPROL XL) 50 MG extended release tablet Take 1 tablet by mouth daily 2/9/22  Yes Gracy Sánchez MD   tamsulosin (FLOMAX) 0.4 MG capsule Take 1 capsule by mouth daily 2/9/22  Yes Gracy Sánchez MD   apixaban (ELIQUIS) 5 MG TABS tablet Take 1 tablet by mouth 2 times daily 2/9/22  Yes Gracy Sánchez MD     Past Medical History:   Diagnosis Date    Atrial fibrillation ([address] 07/25/2019    Hypertension      Past Surgical History:   Procedure Laterality Date    CARDIOVERSION  07/26/2019    Dr. Kee Pina  07/26/2019     No Known Allergies    Pre-Sedation Documentation and Exam:   I have personally completed a history, physical exam & review of systems for this patient (see notes).     Mallampati Airway Assessment:  Class II     Prior History of Anesthesia Complications:   None    ASA Classification:  Class 3 - A patient with severe systemic disease that limits activity but is not incapacitating    Sedation/ Anesthesia Plan:   Intravenous sedation    Medications Planned:   Midazolam (Versed) and Fentanyl intravenously    Patient is an appropriate candidate for plan of sedation:   Yes    Electronically signed by Damian Schneider MD on 2/10/2022 at 12:36 PM

## 2022-02-10 NOTE — PROGRESS NOTES
[address]   Electrophysiology Nurse Practitioner  Pre-Consult Rounding    Date: 2/10/2022  Date of admission: 2022  7:16 PM  Reason for Admission: Acute on chronic combined systolic and diastolic heart failure ([address] [I50.43]  NSTEMI (non-ST elevated myocardial infarction) [facility]) [I21.4]  Consult Requesting Physician: Juan Lambert MD     Dusty Herzog is a 59 y.o. male presented to hospital with weight gain and leg swelling. He was found to have severe cardiomyopathy and MR. A cardiac cath showed normal coronaries. Past medical history: PAF, CHF, NICM, and HTN. Hx of DCCV with ILR implant (19)    EC22  Atrial fibrillation    ECHO: 22   -Definity administered for LV thrombus. -Left ventricular cavity size is severely dilated with normal left ventricular wall thickness.   -Overall left ventricular systolic function appears severely reduced. Ejection fraction is visually estimated to be 20%. -There is severe diffuse hypokinesis. -Cannot grade diastology due to atrial fibrillation, although there are elevated LA pressures.   -Prominent trabeculations in the LV apex. Cannot exclude Non-compaction cardiomyopathy.   -No evidence of left ventricular mass or thrombus noted. -The right ventricle is severely enlarged. Right ventricular systolic function is moderately reduced.   -There is severe bi-atrial enlargement. -Mitral valve leaflets appear mildly thickened. Mitral regurgitation directed centrally and posteriorly that may be severe. -The ascending aorta is mildly dilated. -Moderate to severe tricuspid regurgitation with a PASP of 77 mmHg.   -Trivial pulmonic regurgitation. Cath: 22  Anatomy:   LM-nml   LAD-nml  Cx-nml  OM- nml  RCA-nml  RPDA- nml  LVEF- 10%  LVG- global hypokinesis  LVEDP- 10     Hemodynamics:  RA- mean 3  RV- 37/0  PAWP- 10  PA- 34/12 (20)     C.O.- 5.7 (4.9)  C. I.- 2.6 (2.25)  PA sat 60%  AO sat 91%      Impression  ~Coronary Angiography w/ normal cors  ~LVG with LVEF of 10% and global regional wall motion abnormalities  ~Severe MR  ~Normal CO/CI  ~normal L and R filling pressures    Lab Results   Component Value Date    LVEF 5 02/08/2022    LVEFMODE Cardiac Cath 02/08/2022     No results found for: TSHFT4, TSH    Plan:     1. Acute on Chronic systolic heart failure (NYHA Class **)  - Appears compensated   ~ EF 20% per echo  - Continue with  Toprol XL 50 mg QD, lisinopril 5 mg QD, lasix 40 mg BID  - Aggressive medical therapy with risk factor modification  - Discussed with patient importance of monitoring weight, low sodium diet and fluid restriction  - CHF team following    2. NICM   - EF 10% on cath   - On OMT    3. Paroxysmal Atrial Fibrillation  - Currently in AF, rate controlled  - Continue Toprol XL 50 mg QD  - [id number] score:high; [id number] Vasc score and anticoagulation discussed. High risk for stroke and thromboembolism. Anticoagulation is recommended. Risk of bleeding was discussed.  ~ On eliquis 5 mg BID      - Treatment options including cardioversion, rate control strategy, antiarrhythmics, anticoagulation and possible ablation were discussed with patient. Risks, benefits and alternative of each treatment options were explained. All questions answered    ~ Will plan for CONSUELO/DCCV later today if no clot found    4. Mitral Regurgitation   - Severe per echo   - Consider CONSUELO to assess further    Will discuss the plan for EP attending. Full consult note to follow.

## 2022-02-10 NOTE — PROGRESS NOTES
Data- discharge order received, pt verbalized agreement to discharge, disposition to previous residence, no needs for HHC/DME. Action- discharge instructions prepared and given to pt, pt verbalized understanding. Medication information packet given r/t NEW and/or CHANGED prescriptions emphasizing name/purpose/side effects, pt verbalized understanding. Discharge instruction summary: Diet- cardiac, Activity- as tolerated, Primary Care Physician as follows: No primary care provider on file. None f/u appointment in one week, immunizations reviewed and updated, prescription medications filled meds to beds. Inpatient surgical procedure precautions reviewed: left heart cath, CONSUELO cardioversion CHF Education reviewed. Pt/ Family has had a total of 60 minutes CHF education this admission encounter. 1. WEIGHT: Admit Weight: 181 lb (82.1 kg) (02/05/22 1922)        Today  Weight: 193 lb 2 oz (87.6 kg) (02/10/22 0530)       2. O2 SAT.: SpO2: 99 % (02/10/22 1145)    Response- Pt belongings gathered, IV removed. Disposition is home (no HHC/DME needs), transported with friend, taken to lobby via w/c w/ RN, no complications.

## 2022-02-10 NOTE — PROGRESS NOTES
[facility]   Daily Progress Note      Admit Date:  2/5/2022    HPI:    Mr. Ciara Kim is a 59year old male with history of PAF, hypertension. Unvaccinated, , homeless    He is admitted with weight gain and lower extremity edema. He has had medical non compliance. Last admission was covid + and he never followed up. He never had LHC and LVEF 20%. EKG changes and elevated troponin 0.52 and AF    Subjective:  Patient is being seen for acute on chronic systolic heart failure. There were no acute overnight cardiac events. He tells me he went to high school. His vision is poor but he did read something for me. He is feeling good, no edema  Creat 1.0 potassium 3.7  Weight [phone] (186 best weight) and -14 L out     Objective:   /80   Pulse 73   Temp 97.6 °F (36.4 °C) (Oral)   Resp 16   Ht 6' 2\" (1.88 m)   Wt 193 lb 2 oz (87.6 kg)   SpO2 97%   BMI 24.80 kg/m²       Intake/Output Summary (Last 24 hours) at 2/10/2022 2968  Last data filed at 2/10/2022 2906  Gross per 24 hour   Intake --   Output 1800 ml   Net -1800 ml          Physical Exam:  General:  Awake, alert, oriented in NAD  Skin:  Warm and dry. No unusual bruising or rash  Neck:  Supple. No JVD or carotid bruit appreciated  Chest:  Normal effort.   Clear bilaterally, no rales rhonchi or wheezing  Cardiovascular:  RRR, S1/S2, no murmur/gallop/rub  Abdomen:  Soft, nontender, +bowel sounds  Extremities:  No leg edema  Neurological: No focal deficits  Psychological: Normal mood and affect      Medications:    furosemide  40 mg Oral BID    apixaban  5 mg Oral BID    sodium chloride flush  5-40 mL IntraVENous 2 times per day    finasteride  5 mg Oral Daily    lisinopril  5 mg Oral Daily    metoprolol succinate  50 mg Oral Daily    tamsulosin  0.4 mg Oral Daily    sodium chloride flush  5-40 mL IntraVENous 2 times per day    aspirin  81 mg Oral Daily    atorvastatin  40 mg Oral Nightly    covid-19 vaccine  1 Dose IntraMUSCular Prior to discharge      sodium chloride 75 mL/hr at 02/08/22 1613    sodium chloride      sodium chloride         Lab Data:  CBC:   Recent Labs     02/08/22  0648 02/09/22  0546   WBC 3.7* 4.0   HGB 12.0* 12.5*    329     BMP:    Recent Labs     02/08/22  0648 02/09/22  0546    137   K 3.9 3.7   CO2 28 26   BUN 15 14   CREATININE 1.1 1.0     INR:    No results for input(s): INR in the last 72 hours. BNP:    No results for input(s): PROBNP in the last 72 hours. Diagnostics:  Cardiac Cath PCI: Dr Jany Ji 2/8/2022  Anatomy:   LM-nml   LAD-nml  Cx-nml  OM- nml  RCA-nml  RPDA- nml  LVEF- 10%  LVG- global hypokinesis  LVEDP- 10     Hemodynamics:  RA- mean 3  RV- 37/0  PAWP- 10  PA- 34/12 (20)     C.O.- 5.7 (4.9)  C. I.- 2.6 (2.25)  PA sat 60%  AO sat 91%     Contrast: 70  Flouro Time: 5.3  Access: R radial a, R CFV     Impression  ~Coronary Angiography w/ normal cors  ~LVG with LVEF of 10% and global regional wall motion abnormalities  ~Severe MR  ~Normal CO/CI  ~normal L and R filling pressures     Recommendation  ~Aggressive medical treatment and risk factor modification  ~1. Medications reviewed, no changes at this time. 2. Post cath IVF. Bedrest.  3.Consider CONSUELO for evaluation of MR.   4. Patient has been advised on the importance of regular exercise of at least 20-30 minutes daily alternating with aerobic and isometric activities. 5. Patient counseled about and offered assistance for smoking cessation   6. No indication for cardiac rehab  7. CHF service to evaluate tomorrow. Echo 11/29/2021  Summary   -Covid+   -Severely reduced global systolic function with an ejection fraction   estimated at 20%.  -Severe global hypokinesis with regional variations noted.   -Right ventricular systolic function appears to be mildly reduced based on   visual inspection.   -Severe biatrial enlargement.   -Thickened mitral valve without evidence of stenosis.  There is   mild-to-moderate mitral regurgitation.   -There is mild-to-moderate tricuspid regurgitation with a RVSP estimation of   13 mmHg.   -Diastolic dysfunction with elevated LV filling pressures.        Echo 7/25/2019  Summary   -Overall left ventricular systolic function is moderately depressed .   -Ejection fraction is visually estimated to be 30-35 %.  -Global left ventricular function moderately reduced.   -Indeterminate diastolic function due to atrial fibrillation and moderate to   severe mitral regurgitation.   -Moderate-to-severe mitral regurgitation .   -Dilated left atrium with a volume of 97 ml.   -Left atrial volume is increased probably due to atrial fibrillation .   -There is mild right ventricular hypertrophy.   -The right ventricle is mildly enlarged.   -Right ventricular systolic pressure of 53 mm Hg consistent with pulmonary   hypertension.   -Moderate to severe tricuspid regurgitation. TAPSE = 1.43   -IVC size is dilated (>2.1 cm) but collapses > 50% with respiration   consistent with elevated RA pressure (8 mmHg). Assessment:    1. Acute on chronic systolic heart failure, on ace and BB; no aldosterone antagonist due to non compliance  2. NSTEMI  3. Non compliance  4. Homeless  5. PAF, on lovenox    Plan:    1. Continue toprol 50 mg once a day  2. Continue lisinopril 5 mg daily. Would not start entresto or aldactone due to non compliance history. Could consider if he follows up closely  3. Continue lasix oral 40 mg bid  4. CHF nurse following for diet education, fluid restriction and daily weights  5.  meds to bed at discharge done  6. Stressed importance of follow up    Declines life vest  Plans for CONSUELO to look at MV and possible CV    NYHA III    Discussed with patient who is agreeable with plan of care. Thank you for allowing me to participate in the care of your patient.     TRENT De Leon - CNS, CNS

## 2022-02-11 ENCOUNTER — TELEPHONE (OUTPATIENT)
Dept: OTHER | Age: 62
End: 2022-02-11

## 2022-02-11 ENCOUNTER — TELEPHONE (OUTPATIENT)
Dept: CARDIOLOGY CLINIC | Age: 62
End: 2022-02-11

## 2022-02-11 NOTE — TELEPHONE ENCOUNTER
[facility]  TELEPHONE ENCOUNTER FORM    Calista Lehman 1960    Attempted to call patient for HF follow-up. No answer at this time.       Next MD/ Clinic appointment: 2/16/22 with Nena Germain RN 2/11/2022 3:06 PM

## 2022-02-11 NOTE — TELEPHONE ENCOUNTER
[place] [address]  TELEPHONE ENCOUNTER FORM    Rovertodanay Jose J 1960    Attempted to call patient for HF follow-up. No answer at this time.       Next MD/ Clinic appointment: 2/16 with Melinda RODAS RN 2/11/2022 11:27 AM

## 2022-02-12 NOTE — PROGRESS NOTES
Physician Progress Note      PATIENT:               Frankie Lin  CSN #:                  [id number]  :                       1960  ADMIT DATE:       2022 7:16 PM  [address] DATE:        2/10/2022 5:58 PM  RESPONDING  PROVIDER #:        Cherie Myers MD          QUERY TEXT:    Patient admitted with Acute on Chronic Combined CHF. .  If possible, please   document in the progress notes and discharge summary if you are evaluating   and/or treating any of the following: The medical record reflects the following:  Risk Factors: A/C Combined CHF, HTN, NSTEMI, Cardiomyopathy, MVR, PAF  Clinical Indicators: Per H&P 22 \"NSTEMI. Could be secondary to demand   ischemia from CHF exacerbation. \" Per Cardiology LHC OP note 22 \"Coronary   Angiography w/ normal cors   LVG with LVEF of 10% and global regional wall motion abnormalities   Severe MR. \"  Labs on admission 22 Troponin 2022pm 0.54  2301pm  0.50     22  0231am  0.52  Treatment: Cardiology/EP Cardiology consult, LHC, CONSUELO, Echo, Cxr, CT Chest/PE,   iv Lasix, po Eliquis, monitor cardiac status and labs  Options provided:  -- NSTEMI  -- Type 2 MI  -- Demand Ischemia with MI  -- Demand Ischemia only, no MI  -- Other - I will add my own diagnosis  -- Disagree - Not applicable / Not valid  -- Disagree - Clinically unable to determine / Unknown  -- Refer to Clinical Documentation Reviewer    PROVIDER RESPONSE TEXT:    This patient has an NSTEMI.     Query created by: Leena Magana on 2022 4:15 PM      Electronically signed by:  Cherie Meyrs MD 2022 10:21 AM

## 2022-02-12 NOTE — DISCHARGE SUMMARY
[facility] DISCHARGE SUMMARY    Patient Demographics    Vince Del Rosario  Date of Birth. 1960  MRN. [id number]     Primary care provider. No primary care provider on file. (Tel: None)    Admit date: 2/5/2022    Discharge date (blank if same as Note Date): 2/10/2022  Note Date: 2/12/2022     Reason for Hospitalization. Chief Complaint   Patient presents with    Knee Pain     pt states bilateral knee and ankle pain and swelling x1 weeks. [facility] Course. Acute on chronic CHF exacerbation  -New onset AF. Less than 25%  -Poor compliance  -See eval by cardiology underwent CONSUELO due to abnormal troponin left heart cath as well.  -Patient treated with IV diuresis and transition to p.o. diuresis  -Cardiology will follow outpatient. Meds to bed provided. Medically stable on room air at the time of discharge    Consults. IP CONSULT TO CARDIOLOGY  IP CONSULT TO CARDIOLOGY  IP CONSULT TO HEART FAILURE NURSE/COORDINATOR  IP CONSULT TO SOCIAL WORK  IP CONSULT TO FINANCIAL COUNSELOR    Physical examination on discharge day. /78   Pulse 68   Temp 97.6 °F (36.4 °C) (Oral)   Resp 16   Ht 6' 2\" (1.88 m)   Wt 193 lb 2 oz (87.6 kg)   SpO2 99%   BMI 24.80 kg/m²   General appearance. Alert. Looks comfortable. HEENT. Sclera clear. Moist mucus membranes. Cardiovascular. Regular rate and rhythm, normal S1, S2. No murmur. Respiratory. Not using accessory muscles. Clear to auscultation bilaterally, no wheeze. Gastrointestinal. Abdomen soft, non-tender, not distended, normal bowel sounds  Neurology. Facial symmetry. No speech deficits. Moving all extremities equally. Extremities. No edema in lower extremities. Skin. Warm, dry, normal turgor    Condition at time of discharge stable     Medication instructions provided to patient at discharge.      Medication List CONTINUE taking these medications    apixaban 5 MG Tabs tablet  Commonly known as: ELIQUIS  Take 1 tablet by mouth 2 times daily  Notes to patient: Eliquis (apixaban) keeps the platelets in your blood from coagulating (clotting)   Side effect: bruising      aspirin 81 MG chewable tablet  Take 1 tablet by mouth daily  Notes to patient: Used to prevent platelet aggregation   May cause bleeding     atorvastatin 40 MG tablet  Commonly known as: LIPITOR  Take 1 tablet by mouth nightly  Notes to patient: Use: lower bad cholesterol  Side effects: headache, muscle pain, constipation, diarrhea     finasteride 5 MG tablet  Commonly known as: PROSCAR  Take 1 tablet by mouth daily  Notes to patient: Treats symptoms of benign prostatic hyperplasia (BPH) in men with an enlarged prostate  Side effects: chills, dizziness      furosemide 40 MG tablet  Commonly known as: LASIX  Take 1 tablet by mouth daily  Notes to patient: Use: gets rid of extra volume the heart would otherwise have to process through the body. Decreases swelling and decreases workload on the heart  Side effects: low potassium, muscle cramps, increased urination      lisinopril 5 MG tablet  Commonly known as: PRINIVIL;ZESTRIL  Take 1 tablet by mouth daily  Notes to patient: Angiotensin Converting Enzyme Inhibitors  Use: lower blood pressure, preventing heart failure  Side effects: cough, change in taste, and dizziness . Call MD right away if lips or throat begin swell or you have difficulty breathing.      metoprolol succinate 50 MG extended release tablet  Commonly known as: TOPROL XL  Take 1 tablet by mouth daily  Notes to patient: Use: to treat weak heart or high blood pressure  Side effects: feeling dizzy, tired, or weak     tamsulosin 0.4 MG capsule  Commonly known as: FLOMAX  Take 1 capsule by mouth daily  Notes to patient: Tamsulosin /Flomax  Use: treat an enlarged prostate or urinary retention  Side effects: Feeling dizzy, headache or low blood pressure Where to Get Your Medications      These medications were sent to [facility], [address] [place] [address] Mayra Germain, [place] [address]    Phone: [phone]   · apixaban 5 MG Tabs tablet  · aspirin 81 MG chewable tablet  · atorvastatin 40 MG tablet  · finasteride 5 MG tablet  · furosemide 40 MG tablet  · lisinopril 5 MG tablet  · metoprolol succinate 50 MG extended release tablet  · tamsulosin 0.4 MG capsule         Discharge recommendations given to patient. Follow Up. pcp in 1 week   Disposition. home  Activity. activity as tolerated  Diet: No diet orders on file      Spent 45  minutes in discharge process.     Signed:  Calulm Vo MD     2/12/2022 6:15 PM

## 2022-02-15 ENCOUNTER — PRE-PROCEDURE TELEPHONE (OUTPATIENT)
Dept: CARDIAC CATH/INVASIVE PROCEDURES | Age: 62
End: 2022-02-15

## 2022-02-15 NOTE — PROGRESS NOTES
The phone number listed on the PT's account is not his, the number belongs to a friend. Friend states PT does not have a number and he does not know where to find him. He states that he will have him call us if he sees him. PT does have an appointment in the office tomorrow. If possible could we schedule his CV/CONSUELO will here? PT insurance will need 7 business days to pre-cert the CONSUELO. So as of now we could schedule the procedure for 2/28/22 @ 9am (8am arrival) or 3/2 @ 9am (8:00am arrival). Routing this message to EP Nurse.

## 2022-02-15 NOTE — PROGRESS NOTES
Called patient to schedule his CV & CONSUELO. No answer so I left a message asking for a return call.

## 2022-02-16 ENCOUNTER — HOSPITAL ENCOUNTER (OUTPATIENT)
Age: 62
Discharge: HOME | End: 2022-02-16
Payer: COMMERCIAL

## 2022-02-16 ENCOUNTER — OFFICE VISIT (OUTPATIENT)
Dept: CARDIOLOGY CLINIC | Age: 62
End: 2022-02-16
Payer: COMMERCIAL

## 2022-02-16 VITALS
DIASTOLIC BLOOD PRESSURE: 86 MMHG | HEIGHT: 74 IN | HEART RATE: 68 BPM | SYSTOLIC BLOOD PRESSURE: 136 MMHG | BODY MASS INDEX: 26.69 KG/M2 | WEIGHT: 208 LBS | OXYGEN SATURATION: 99 %

## 2022-02-16 DIAGNOSIS — I50.22 CHRONIC CLINICAL SYSTOLIC HEART FAILURE (HCC): ICD-10-CM

## 2022-02-16 DIAGNOSIS — I10 BENIGN ESSENTIAL HTN: ICD-10-CM

## 2022-02-16 DIAGNOSIS — Z91.199 NON-COMPLIANCE: ICD-10-CM

## 2022-02-16 DIAGNOSIS — I50.23 ACUTE ON CHRONIC SYSTOLIC HEART FAILURE (HCC): Primary | ICD-10-CM

## 2022-02-16 DIAGNOSIS — Z59.00 HOMELESS: ICD-10-CM

## 2022-02-16 DIAGNOSIS — I42.0 DILATED CARDIOMYOPATHY (HCC): ICD-10-CM

## 2022-02-16 DIAGNOSIS — I48.0 PAF (PAROXYSMAL ATRIAL FIBRILLATION) (HCC): ICD-10-CM

## 2022-02-16 LAB
ANION GAP SERPL CALCULATED.3IONS-SCNC: 8 MMOL/L (ref 3–16)
BUN BLDV-MCNC: 17 MG/DL (ref 7–20)
CALCIUM SERPL-MCNC: 8.5 MG/DL (ref 8.3–10.6)
CHLORIDE BLD-SCNC: 106 MMOL/L (ref 99–110)
CO2: 27 MMOL/L (ref 21–32)
CREAT SERPL-MCNC: 1 MG/DL (ref 0.8–1.3)
GFR AFRICAN AMERICAN: >60
GFR NON-AFRICAN AMERICAN: >60
GLUCOSE BLD-MCNC: 72 MG/DL (ref 70–99)
POTASSIUM SERPL-SCNC: 4.5 MMOL/L (ref 3.5–5.1)
PRO-BNP: ABNORMAL PG/ML (ref 0–124)
SODIUM BLD-SCNC: 141 MMOL/L (ref 136–145)

## 2022-02-16 PROCEDURE — 36415 COLL VENOUS BLD VENIPUNCTURE: CPT

## 2022-02-16 PROCEDURE — 80048 BASIC METABOLIC PNL TOTAL CA: CPT

## 2022-02-16 PROCEDURE — 99215 OFFICE O/P EST HI 40 MIN: CPT | Performed by: CLINICAL NURSE SPECIALIST

## 2022-02-16 PROCEDURE — 83880 ASSAY OF NATRIURETIC PEPTIDE: CPT

## 2022-02-16 RX ORDER — FUROSEMIDE 40 MG/1
80 TABLET ORAL DAILY
Qty: 60 TABLET | Refills: 0 | Status: SHIPPED | OUTPATIENT
Start: 2022-02-16 | End: 2022-03-02 | Stop reason: DRUGHIGH

## 2022-02-16 RX ORDER — CHOLECALCIFEROL (VITAMIN D3) 50 MCG
2000 TABLET ORAL DAILY
Qty: 90 TABLET | Refills: 1 | Status: SHIPPED | OUTPATIENT
Start: 2022-02-16 | End: 2022-09-21 | Stop reason: SDUPTHER

## 2022-02-16 SDOH — ECONOMIC STABILITY - HOUSING INSECURITY: HOMELESSNESS UNSPECIFIED: Z59.00

## 2022-02-16 NOTE — PROGRESS NOTES
[facility]  Progress Note    Primary Care Doctor:  No primary care provider on file. Chief Complaint   Patient presents with    Congestive Heart Failure        History of Present Illness:  59 y.o. male with history of with history of PAF, hypertension. Unvaccinated, , homeless  Declines life vest    I had the pleasure of seeing Ellyn Scott in follow up for hospitalization 2/5-10/22 for weight gain and lower extremity edema. He recently had covid and did not follow up in office. LVEF 20%, LHC done. Weight 223->193. He was started on HF therapy, declined life vest.  CONSUELO done with HENOK thrombus (on eliquis per EP). Not able to do CV  He is ambulatory and by his self today. His weight is up from [address], [address]. He is not weighing self at home. He denies any chest pain or shortness of breath but his lower ankles to lower calf are edematous. He still tells me that the phone number listed is correct. No answered it when office called  He is taking all his medications. Given a pill box today    Past Medical History:   has a past medical history of Atrial fibrillation ([address] and Hypertension. Surgical History:   has a past surgical history that includes Insertable Cardiac Monitor (07/26/2019) and Cardioversion (07/26/2019). Social History:   reports that he has never smoked. He has never used smokeless tobacco. He reports that he does not drink alcohol and does not use drugs. Family History:   Family History   Problem Relation Age of Onset    Heart Disease Mother     High Blood Pressure Mother     Heart Attack Mother     Other Father     Stroke Father     High Blood Pressure Father        Home Medications:  Prior to Admission medications    Medication Sig Start Date End Date Taking?  Authorizing Provider   furosemide (LASIX) 40 MG tablet Take 2 tablets by mouth daily 2/16/22  Yes TRENT Jalloh - CNS   vitamin D (CHOLECALCIFEROL) 50 MCG (2000 UT) TABS tablet Take 1 tablet by mouth daily 2/16/22  Yes Deirdre Jose Daniel Law, APRN - CNS   aspirin 81 MG chewable tablet Take 1 tablet by mouth daily 2/9/22   Eber Cuba MD   atorvastatin (LIPITOR) 40 MG tablet Take 1 tablet by mouth nightly 2/9/22   Eber Cuba MD   finasteride (PROSCAR) 5 MG tablet Take 1 tablet by mouth daily 2/9/22   Eber Cuba MD   lisinopril (PRINIVIL;ZESTRIL) 5 MG tablet Take 1 tablet by mouth daily 2/9/22   Eber Cuba MD   metoprolol succinate (TOPROL XL) 50 MG extended release tablet Take 1 tablet by mouth daily 2/9/22   Eber Cuba MD   tamsulosin (FLOMAX) 0.4 MG capsule Take 1 capsule by mouth daily 2/9/22   Eber Cuba MD   apixaban (ELIQUIS) 5 MG TABS tablet Take 1 tablet by mouth 2 times daily 2/9/22   Eber Cuba MD        Allergies:  Patient has no known allergies. Review of Systems:   · Constitutional: there has been no unanticipated weight loss. There's been no change in energy level, sleep pattern, or activity level. · Eyes: No visual changes or diplopia. No scleral icterus. · ENT: No Headaches, hearing loss or vertigo. No mouth sores or sore throat. · Cardiovascular: Reviewed in HPI  · Respiratory: No cough or wheezing, no sputum production. No hematemesis. · Gastrointestinal: No abdominal pain, appetite loss, blood in stools. No change in bowel or bladder habits. · Genitourinary: No dysuria, trouble voiding, or hematuria. · Musculoskeletal:  No gait disturbance, weakness or joint complaints. · Integumentary: No rash or pruritis. · Neurological: No headache, diplopia, change in muscle strength, numbness or tingling. No change in gait, balance, coordination, mood, affect, memory, mentation, behavior. · Psychiatric: No anxiety, no depression. · Endocrine: No malaise, fatigue or temperature intolerance. No excessive thirst, fluid intake, or urination. No tremor.   · Hematologic/Lymphatic: No abnormal bruising or bleeding, blood clots or swollen lymph nodes.  · Allergic/Immunologic: No nasal congestion or hives. Physical Examination:    Vitals:    02/16/22 1317   BP: 136/86   Pulse: 68   SpO2: 99%   Weight: 208 lb (94.3 kg)   Height: 6' 2\" (1.88 m)        Constitutional and General Appearance: Warm and dry, no apparent distress, normal coloration  HEENT:  Normocephalic, atraumatic  Respiratory:  · Normal excursion and expansion without use of accessory muscles  · Resp Auscultation: Normal breath sounds without dullness  Cardiovascular:  · The apical impulses not displaced  · Heart tones are crisp and normal  · JVP normal cm H2O  · irregular rate and rhythm  · Peripheral pulses are symmetrical and full  · There is no clubbing, cyanosis of the extremities.   · Bilateral ankle to lower calf edema  · Pedal Pulses: 2+ and equal   Abdomen:  · No masses or tenderness  · Liver/Spleen: No Abnormalities Noted  Neurological/Psychiatric:  · Alert and oriented in all spheres  · Moves all extremities well  · Exhibits normal gait balance and coordination  · No abnormalities of mood, affect, memory, mentation, or behavior are noted    Lab Data:    CBC:   Lab Results   Component Value Date    WBC 4.3 02/10/2022    WBC 4.0 02/09/2022    WBC 3.7 02/08/2022    RBC 4.81 02/10/2022    RBC 4.69 02/09/2022    RBC 4.52 02/08/2022    HGB 12.7 02/10/2022    HGB 12.5 02/09/2022    HGB 12.0 02/08/2022    HCT 39.5 02/10/2022    HCT 38.7 02/09/2022    HCT 37.0 02/08/2022    MCV 82.2 02/10/2022    MCV 82.4 02/09/2022    MCV 81.9 02/08/2022    RDW 16.5 02/10/2022    RDW 16.8 02/09/2022    RDW 16.1 02/08/2022     02/10/2022     02/09/2022     02/08/2022     BMP:  Lab Results   Component Value Date     02/16/2022     02/10/2022     02/09/2022    K 4.5 02/16/2022    K 4.9 02/10/2022    K 3.7 02/09/2022    K 3.7 12/01/2021    K 4.2 11/28/2021    K 4.4 07/25/2019     02/16/2022     02/10/2022     02/09/2022    CO2 27 02/16/2022    CO2 28 02/10/2022    CO2 26 02/09/2022    PHOS 4.3 02/09/2022    PHOS 4.1 02/08/2022    PHOS 4.5 02/07/2022    BUN 17 02/16/2022    BUN 13 02/10/2022    BUN 14 02/09/2022    CREATININE 1.0 02/16/2022    CREATININE 1.0 02/10/2022    CREATININE 1.0 02/09/2022     BNP:   Lab Results   Component Value Date    PROBNP 10,576 02/16/2022    PROBNP 16,265 02/05/2022    PROBNP 7,283 12/01/2021     Cardiac Imaging:  CONSUELO Dr Walker Hatfield 2/10/22  Preliminary CONSUELO results are:      - Severe LV dysfunction,  EF: 20-25%  - LA dilated. There was HENOK thrombus. - Moderate MR    Cardiac Cath PCI: Dr Dyllan Sapp 2/8/2022  Anatomy:   LM-nml   LAD-nml  Cx-nml  OM- nml  RCA-nml  RPDA- nml  LVEF- 10%  LVG- global hypokinesis  LVEDP- 10     Hemodynamics:  RA- mean 3  RV- 37/0  PAWP- 10  PA- 34/12 (20)     C.O.- 5.7 (4.9)  C. I.- 2.6 (2.25)  PA sat 60%  AO sat 91%     Contrast: 70  Flouro Time: 5.3  Access: R radial a, R CFV     Impression  ~Coronary Angiography w/ normal cors  ~LVG with LVEF of 10% and global regional wall motion abnormalities  ~Severe MR  ~Normal CO/CI  ~normal L and R filling pressures     Recommendation  ~Aggressive medical treatment and risk factor modification  ~1. Medications reviewed, no changes at this time. 2. Post cath IVF. Bedrest.  3.Consider CONSUELO for evaluation of MR.   4. Patient has been advised on the importance of regular exercise of at least 20-30 minutes daily alternating with aerobic and isometric activities. 5. Patient counseled about and offered assistance for smoking cessation   6. No indication for cardiac rehab  7. CHF service to evaluate tomorrow.     Echo 11/29/2021  Summary   -Covid+   -Severely reduced global systolic function with an ejection fraction   estimated at 20%.  -Severe global hypokinesis with regional variations noted.   -Right ventricular systolic function appears to be mildly reduced based on   visual inspection.   -Severe biatrial enlargement.   -Thickened mitral valve without evidence of stenosis.

## 2022-02-16 NOTE — PATIENT INSTRUCTIONS
1. Check blood work today  2. Increase lasix to 80 mg daily  3. Start vitamin d 2000 once a day  4. Continue all other medications  5. Cut fluids down to 64 ounces a day and <2000 mg sodium  6.   RTO in 2 weeks

## 2022-02-17 ENCOUNTER — TELEPHONE (OUTPATIENT)
Dept: CARDIOLOGY CLINIC | Age: 62
End: 2022-02-17

## 2022-02-17 NOTE — TELEPHONE ENCOUNTER
----- Message from TRENT Roach - CNS sent at 2/16/2022  6:51 PM EST -----  Call and make sure he increased his lasix as I discussed in [address] today  His fluid level is still elevated on labs  thanks

## 2022-02-17 NOTE — TELEPHONE ENCOUNTER
Tried to reach patient , [facility] about lab results and hie increased lasix dose.

## 2022-03-02 ENCOUNTER — TELEPHONE (OUTPATIENT)
Dept: CARDIOLOGY CLINIC | Age: 62
End: 2022-03-02

## 2022-03-02 ENCOUNTER — OFFICE VISIT (OUTPATIENT)
Dept: CARDIOLOGY CLINIC | Age: 62
End: 2022-03-02
Payer: COMMERCIAL

## 2022-03-02 ENCOUNTER — HOSPITAL ENCOUNTER (EMERGENCY)
Age: 62
Discharge: HOME | End: 2022-03-02
Payer: COMMERCIAL

## 2022-03-02 ENCOUNTER — HOSPITAL ENCOUNTER (OUTPATIENT)
Dept: ONCOLOGY | Age: 62
Setting detail: INFUSION SERIES
Discharge: HOME | End: 2022-03-02
Payer: COMMERCIAL

## 2022-03-02 VITALS
RESPIRATION RATE: 18 BRPM | DIASTOLIC BLOOD PRESSURE: 98 MMHG | HEART RATE: 68 BPM | TEMPERATURE: 98.7 F | OXYGEN SATURATION: 95 % | SYSTOLIC BLOOD PRESSURE: 138 MMHG

## 2022-03-02 VITALS
HEART RATE: 64 BPM | DIASTOLIC BLOOD PRESSURE: 66 MMHG | OXYGEN SATURATION: 97 % | SYSTOLIC BLOOD PRESSURE: 106 MMHG | HEIGHT: 74 IN | WEIGHT: 224.3 LBS | BODY MASS INDEX: 28.79 KG/M2

## 2022-03-02 VITALS
HEART RATE: 89 BPM | DIASTOLIC BLOOD PRESSURE: 65 MMHG | SYSTOLIC BLOOD PRESSURE: 124 MMHG | TEMPERATURE: 97.8 F | RESPIRATION RATE: 18 BRPM

## 2022-03-02 DIAGNOSIS — I50.23 ACUTE ON CHRONIC SYSTOLIC HEART FAILURE (HCC): Primary | ICD-10-CM

## 2022-03-02 DIAGNOSIS — I42.0 DILATED CARDIOMYOPATHY (HCC): ICD-10-CM

## 2022-03-02 DIAGNOSIS — I50.23 ACUTE ON CHRONIC SYSTOLIC HEART FAILURE (HCC): ICD-10-CM

## 2022-03-02 DIAGNOSIS — I10 BENIGN ESSENTIAL HTN: ICD-10-CM

## 2022-03-02 DIAGNOSIS — R33.9 URINARY RETENTION: Primary | ICD-10-CM

## 2022-03-02 DIAGNOSIS — Z91.199 NON-COMPLIANCE: ICD-10-CM

## 2022-03-02 DIAGNOSIS — I48.0 PAF (PAROXYSMAL ATRIAL FIBRILLATION) (HCC): ICD-10-CM

## 2022-03-02 DIAGNOSIS — Z59.00 HOMELESS: ICD-10-CM

## 2022-03-02 LAB
ANION GAP SERPL CALCULATED.3IONS-SCNC: 10 MMOL/L (ref 3–16)
ANION GAP SERPL CALCULATED.3IONS-SCNC: 11 MMOL/L (ref 3–16)
BASOPHILS ABSOLUTE: 0 K/UL (ref 0–0.2)
BASOPHILS RELATIVE PERCENT: 0.7 %
BILIRUBIN URINE: NEGATIVE
BLOOD, URINE: ABNORMAL
BUN BLDV-MCNC: 23 MG/DL (ref 7–20)
BUN BLDV-MCNC: 23 MG/DL (ref 7–20)
CALCIUM SERPL-MCNC: 8.4 MG/DL (ref 8.3–10.6)
CALCIUM SERPL-MCNC: 8.6 MG/DL (ref 8.3–10.6)
CHLORIDE BLD-SCNC: 105 MMOL/L (ref 99–110)
CHLORIDE BLD-SCNC: 105 MMOL/L (ref 99–110)
CLARITY: CLEAR
CO2: 25 MMOL/L (ref 21–32)
CO2: 27 MMOL/L (ref 21–32)
COLOR: YELLOW
CREAT SERPL-MCNC: 1.1 MG/DL (ref 0.8–1.3)
CREAT SERPL-MCNC: 1.1 MG/DL (ref 0.8–1.3)
EOSINOPHILS ABSOLUTE: 0.1 K/UL (ref 0–0.6)
EOSINOPHILS RELATIVE PERCENT: 1.1 %
EPITHELIAL CELLS, UA: 0 /HPF (ref 0–5)
GFR AFRICAN AMERICAN: >60
GFR AFRICAN AMERICAN: >60
GFR NON-AFRICAN AMERICAN: >60
GFR NON-AFRICAN AMERICAN: >60
GLUCOSE BLD-MCNC: 89 MG/DL (ref 70–99)
GLUCOSE BLD-MCNC: 97 MG/DL (ref 70–99)
GLUCOSE URINE: NEGATIVE MG/DL
HCT VFR BLD CALC: 34.8 % (ref 40.5–52.5)
HEMOGLOBIN: 11.2 G/DL (ref 13.5–17.5)
HYALINE CASTS: 0 /LPF (ref 0–8)
KETONES, URINE: NEGATIVE MG/DL
LEUKOCYTE ESTERASE, URINE: NEGATIVE
LYMPHOCYTES ABSOLUTE: 0.9 K/UL (ref 1–5.1)
LYMPHOCYTES RELATIVE PERCENT: 19.8 %
MCH RBC QN AUTO: 26.6 PG (ref 26–34)
MCHC RBC AUTO-ENTMCNC: 32.1 G/DL (ref 31–36)
MCV RBC AUTO: 82.7 FL (ref 80–100)
MICROSCOPIC EXAMINATION: YES
MONOCYTES ABSOLUTE: 0.3 K/UL (ref 0–1.3)
MONOCYTES RELATIVE PERCENT: 7 %
NEUTROPHILS ABSOLUTE: 3.1 K/UL (ref 1.7–7.7)
NEUTROPHILS RELATIVE PERCENT: 71.4 %
NITRITE, URINE: NEGATIVE
PDW BLD-RTO: 15.9 % (ref 12.4–15.4)
PH UA: 6 (ref 5–8)
PLATELET # BLD: 199 K/UL (ref 135–450)
PMV BLD AUTO: 7.8 FL (ref 5–10.5)
POTASSIUM SERPL-SCNC: 3.7 MMOL/L (ref 3.5–5.1)
POTASSIUM SERPL-SCNC: 3.8 MMOL/L (ref 3.5–5.1)
PRO-BNP: ABNORMAL PG/ML (ref 0–124)
PROTEIN UA: NEGATIVE MG/DL
RBC # BLD: 4.2 M/UL (ref 4.2–5.9)
RBC UA: 215 /HPF (ref 0–4)
SODIUM BLD-SCNC: 141 MMOL/L (ref 136–145)
SODIUM BLD-SCNC: 142 MMOL/L (ref 136–145)
SPECIFIC GRAVITY UA: 1.01 (ref 1–1.03)
URINE REFLEX TO CULTURE: ABNORMAL
URINE TYPE: ABNORMAL
UROBILINOGEN, URINE: 0.2 E.U./DL
WBC # BLD: 4.4 K/UL (ref 4–11)
WBC UA: 0 /HPF (ref 0–5)

## 2022-03-02 PROCEDURE — 6360000002 HC RX W HCPCS: Performed by: CLINICAL NURSE SPECIALIST

## 2022-03-02 PROCEDURE — 96374 THER/PROPH/DIAG INJ IV PUSH: CPT

## 2022-03-02 PROCEDURE — 2580000003 HC RX 258: Performed by: CLINICAL NURSE SPECIALIST

## 2022-03-02 PROCEDURE — 85025 COMPLETE CBC W/AUTO DIFF WBC: CPT

## 2022-03-02 PROCEDURE — 99211 OFF/OP EST MAY X REQ PHY/QHP: CPT

## 2022-03-02 PROCEDURE — G8419 CALC BMI OUT NRM PARAM NOF/U: HCPCS | Performed by: CLINICAL NURSE SPECIALIST

## 2022-03-02 PROCEDURE — 83880 ASSAY OF NATRIURETIC PEPTIDE: CPT

## 2022-03-02 PROCEDURE — G8427 DOCREV CUR MEDS BY ELIG CLIN: HCPCS | Performed by: CLINICAL NURSE SPECIALIST

## 2022-03-02 PROCEDURE — 1036F TOBACCO NON-USER: CPT | Performed by: CLINICAL NURSE SPECIALIST

## 2022-03-02 PROCEDURE — 36415 COLL VENOUS BLD VENIPUNCTURE: CPT

## 2022-03-02 PROCEDURE — 99215 OFFICE O/P EST HI 40 MIN: CPT | Performed by: CLINICAL NURSE SPECIALIST

## 2022-03-02 PROCEDURE — G8484 FLU IMMUNIZE NO ADMIN: HCPCS | Performed by: CLINICAL NURSE SPECIALIST

## 2022-03-02 PROCEDURE — 80048 BASIC METABOLIC PNL TOTAL CA: CPT

## 2022-03-02 PROCEDURE — 3017F COLORECTAL CA SCREEN DOC REV: CPT | Performed by: CLINICAL NURSE SPECIALIST

## 2022-03-02 PROCEDURE — 1111F DSCHRG MED/CURRENT MED MERGE: CPT | Performed by: CLINICAL NURSE SPECIALIST

## 2022-03-02 PROCEDURE — 81001 URINALYSIS AUTO W/SCOPE: CPT

## 2022-03-02 RX ORDER — FUROSEMIDE 10 MG/ML
120 INJECTION INTRAMUSCULAR; INTRAVENOUS ONCE
Status: COMPLETED | OUTPATIENT
Start: 2022-03-02 | End: 2022-03-02

## 2022-03-02 RX ORDER — SODIUM CHLORIDE 0.9 % (FLUSH) 0.9 %
5-40 SYRINGE (ML) INJECTION ONCE
Status: COMPLETED | OUTPATIENT
Start: 2022-03-02 | End: 2022-03-02

## 2022-03-02 RX ORDER — FUROSEMIDE 40 MG/1
80 TABLET ORAL 2 TIMES DAILY
Qty: 120 TABLET | Refills: 0 | Status: SHIPPED | OUTPATIENT
Start: 2022-03-02 | End: 2022-04-12 | Stop reason: ALTCHOICE

## 2022-03-02 RX ADMIN — FUROSEMIDE 120 MG: 10 INJECTION, SOLUTION INTRAMUSCULAR; INTRAVENOUS at 14:54

## 2022-03-02 RX ADMIN — Medication 10 ML: at 14:53

## 2022-03-02 SDOH — ECONOMIC STABILITY - HOUSING INSECURITY: HOMELESSNESS UNSPECIFIED: Z59.00

## 2022-03-02 ASSESSMENT — PAIN SCALES - GENERAL: PAINLEVEL_OUTOF10: 10

## 2022-03-02 ASSESSMENT — PAIN - FUNCTIONAL ASSESSMENT: PAIN_FUNCTIONAL_ASSESSMENT: 0-10

## 2022-03-02 ASSESSMENT — ENCOUNTER SYMPTOMS
SHORTNESS OF BREATH: 0
DIARRHEA: 0
ABDOMINAL PAIN: 1
RHINORRHEA: 0
NAUSEA: 0
VOMITING: 0
COUGH: 0

## 2022-03-02 NOTE — PROGRESS NOTES
Patient to department for outpatient labs and lasix. Here from cardiology office. Labs drawn followed by lasix 120 mg at 20 mg per minute. Tolerated well. Patient aware that he will have urinary urgency and frequency. Also aware that this tx may lower his b/p and he may feel dizzy upon standing. All questions answered. To follow up with cardiology office.

## 2022-03-02 NOTE — PROGRESS NOTES
[facility]  Progress Note    Primary Care Doctor:  No primary care provider on file. Chief Complaint   Patient presents with    Congestive Heart Failure    Foot Swelling    Shortness of Breath        History of Present Illness:  59 y.o. male with history of with history of PAF, hypertension. Unvaccinated, , homeless  Declines life vest  hospitalization 2/5-10/22 for weight gain and lower extremity edema. He recently had covid and did not follow up in office. LVEF 20%, C done. Weight 223->193. He was started on HF therapy, declined life vest.  CONSUELO done with HENOK thrombus (on eliquis per EP). Not able to do CV    I had the pleasure of seeing Herman Goyal in follow up for systolic heart failure. He is ambulatory and by his self. His weight has gone from 091 673 13 99. He is complaining of not having any energy. He is eating out at fast food. His lower legs are very edematous. He states he is taking all his medications. He complains of fatigue, shortness of breath and swelling in feet/legs. He denies any chest pain, palpitations. His phone number is a friend's and that person does not answer. He is working on getting his own    Past Medical History:   has a past medical history of Atrial fibrillation ([address] and Hypertension. Surgical History:   has a past surgical history that includes Insertable Cardiac Monitor (07/26/2019) and Cardioversion (07/26/2019). Social History:   reports that he has never smoked. He has never used smokeless tobacco. He reports that he does not drink alcohol and does not use drugs. Family History:   Family History   Problem Relation Age of Onset    Heart Disease Mother     High Blood Pressure Mother     Heart Attack Mother     Other Father     Stroke Father     High Blood Pressure Father        Home Medications:  Prior to Admission medications    Medication Sig Start Date End Date Taking?  Authorizing Provider   furosemide (LASIX) 40 MG tablet Take 2 tablets by mouth 2 times daily 3/2/22  Yes TRENT Perez - CNS   vitamin D (CHOLECALCIFEROL) 50 MCG (2000 UT) TABS tablet Take 1 tablet by mouth daily 2/16/22  Yes TRENT Alamo CNS   aspirin 81 MG chewable tablet Take 1 tablet by mouth daily 2/9/22  Yes Vijaya Roy MD   atorvastatin (LIPITOR) 40 MG tablet Take 1 tablet by mouth nightly 2/9/22  Yes Vijaya Roy MD   finasteride (PROSCAR) 5 MG tablet Take 1 tablet by mouth daily 2/9/22  Yes Vijaya Roy MD   lisinopril (PRINIVIL;ZESTRIL) 5 MG tablet Take 1 tablet by mouth daily 2/9/22  Yes Vijaya Roy MD   metoprolol succinate (TOPROL XL) 50 MG extended release tablet Take 1 tablet by mouth daily 2/9/22  Yes Vijaya Roy MD   tamsulosin (FLOMAX) 0.4 MG capsule Take 1 capsule by mouth daily 2/9/22  Yes Vijaya Roy MD   apixaban (ELIQUIS) 5 MG TABS tablet Take 1 tablet by mouth 2 times daily 2/9/22  Yes Vijaya Roy MD        Allergies:  Patient has no known allergies. Review of Systems:   · Constitutional: there has been no unanticipated weight loss. There's been no change in energy level, sleep pattern, or activity level. · Eyes: No visual changes or diplopia. No scleral icterus. · ENT: No Headaches, hearing loss or vertigo. No mouth sores or sore throat. · Cardiovascular: Reviewed in HPI  · Respiratory: No cough or wheezing, no sputum production. No hematemesis. · Gastrointestinal: No abdominal pain, appetite loss, blood in stools. No change in bowel or bladder habits. · Genitourinary: No dysuria, trouble voiding, or hematuria. · Musculoskeletal:  No gait disturbance, weakness or joint complaints. · Integumentary: No rash or pruritis. · Neurological: No headache, diplopia, change in muscle strength, numbness or tingling. No change in gait, balance, coordination, mood, affect, memory, mentation, behavior. · Psychiatric: No anxiety, no depression.   · Endocrine: No malaise, fatigue or temperature intolerance. No excessive thirst, fluid intake, or urination. No tremor. · Hematologic/Lymphatic: No abnormal bruising or bleeding, blood clots or swollen lymph nodes. · Allergic/Immunologic: No nasal congestion or hives. Physical Examination:    Vitals:    03/02/22 1401   BP: 106/66   Pulse: 64   SpO2: 97%   Weight: 224 lb 4.8 oz (101.7 kg)   Height: 6' 2\" (1.88 m)        Constitutional and General Appearance: Warm and dry, no apparent distress, normal coloration  HEENT:  Normocephalic, atraumatic  Respiratory:  · Normal excursion and expansion without use of accessory muscles  · Resp Auscultation: Normal breath sounds without dullness  Cardiovascular:  · The apical impulses not displaced  · Heart tones are crisp and normal  · JVP + cm H2O  · irregular rate and rhythm  · Peripheral pulses are symmetrical and full  · There is no clubbing, cyanosis of the extremities.   · Bilateral ankle to thigh edema (increased)  · Pedal Pulses: 2+ and equal   Abdomen:  · No masses or tenderness  · Liver/Spleen: No Abnormalities Noted  Neurological/Psychiatric:  · Alert and oriented in all spheres  · Moves all extremities well  · Exhibits normal gait balance and coordination  · No abnormalities of mood, affect, memory, mentation, or behavior are noted    Lab Data:    CBC:   Lab Results   Component Value Date    WBC 4.3 02/10/2022    WBC 4.0 02/09/2022    WBC 3.7 02/08/2022    RBC 4.81 02/10/2022    RBC 4.69 02/09/2022    RBC 4.52 02/08/2022    HGB 12.7 02/10/2022    HGB 12.5 02/09/2022    HGB 12.0 02/08/2022    HCT 39.5 02/10/2022    HCT 38.7 02/09/2022    HCT 37.0 02/08/2022    MCV 82.2 02/10/2022    MCV 82.4 02/09/2022    MCV 81.9 02/08/2022    RDW 16.5 02/10/2022    RDW 16.8 02/09/2022    RDW 16.1 02/08/2022     02/10/2022     02/09/2022     02/08/2022     BMP:  Lab Results   Component Value Date     02/16/2022     02/10/2022     02/09/2022    K 4.5 02/16/2022    K 4.9 02/10/2022    K 3.7 02/09/2022    K 3.7 12/01/2021    K 4.2 11/28/2021    K 4.4 07/25/2019     02/16/2022     02/10/2022     02/09/2022    CO2 27 02/16/2022    CO2 28 02/10/2022    CO2 26 02/09/2022    PHOS 4.3 02/09/2022    PHOS 4.1 02/08/2022    PHOS 4.5 02/07/2022    BUN 17 02/16/2022    BUN 13 02/10/2022    BUN 14 02/09/2022    CREATININE 1.0 02/16/2022    CREATININE 1.0 02/10/2022    CREATININE 1.0 02/09/2022     BNP:   Lab Results   Component Value Date    PROBNP 10,576 02/16/2022    PROBNP 16,265 02/05/2022    PROBNP 7,283 12/01/2021     Cardiac Imaging:  CONSUELO Dr [facility] 2/10/22  Preliminary CONSUELO results are:      - Severe LV dysfunction,  EF: 20-25%  - LA dilated. There was HENOK thrombus. - Moderate MR    Cardiac Cath PCI: Dr Ortega Soares 2/8/2022  Anatomy:   LM-nml   LAD-nml  Cx-nml  OM- nml  RCA-nml  RPDA- nml  LVEF- 10%  LVG- global hypokinesis  LVEDP- 10     Hemodynamics:  RA- mean 3  RV- 37/0  PAWP- 10  PA- 34/12 (20)     C.O.- 5.7 (4.9)  C. I.- 2.6 (2.25)  PA sat 60%  AO sat 91%     Contrast: 70  Flouro Time: 5.3  Access: R radial a, R CFV     Impression  ~Coronary Angiography w/ normal cors  ~LVG with LVEF of 10% and global regional wall motion abnormalities  ~Severe MR  ~Normal CO/CI  ~normal L and R filling pressures     Recommendation  ~Aggressive medical treatment and risk factor modification  ~1. Medications reviewed, no changes at this time. 2. Post cath IVF. Bedrest.  3.Consider CONSUELO for evaluation of MR.   4. Patient has been advised on the importance of regular exercise of at least 20-30 minutes daily alternating with aerobic and isometric activities. 5. Patient counseled about and offered assistance for smoking cessation   6. No indication for cardiac rehab  7. CHF service to evaluate tomorrow.     Echo 11/29/2021  Summary   -Covid+   -Severely reduced global systolic function with an ejection fraction   estimated at 20%.    -Severe global hypokinesis with regional variations noted.   -Right ventricular systolic function appears to be mildly reduced based on   visual inspection.   -Severe biatrial enlargement.   -Thickened mitral valve without evidence of stenosis. There is   mild-to-moderate mitral regurgitation.   -There is mild-to-moderate tricuspid regurgitation with a RVSP estimation of   78 mmHg.   -Diastolic dysfunction with elevated LV filling pressures.     Echo 7/25/2019  Summary   -Overall left ventricular systolic function is moderately depressed .   -Ejection fraction is visually estimated to be 30-35 %.  -Global left ventricular function moderately reduced.   -Indeterminate diastolic function due to atrial fibrillation and moderate to   severe mitral regurgitation.   -Moderate-to-severe mitral regurgitation .   -Dilated left atrium with a volume of 97 ml.   -Left atrial volume is increased probably due to atrial fibrillation .   -There is mild right ventricular hypertrophy.   -The right ventricle is mildly enlarged.   -Right ventricular systolic pressure of 53 mm Hg consistent with pulmonary   hypertension.   -Moderate to severe tricuspid regurgitation. TAPSE = 1.43   -IVC size is dilated (>2.1 cm) but collapses > 50% with respiration   consistent with elevated RA pressure (8 mmHg).    Assessment:    1. Acute on chronic systolic heart failure (HCC) on ace and bb   2. Non-compliance    3. Homeless    4. PAF (paroxysmal atrial fibrillation) ([address]    5. Benign essential HTN    6. Dilated cardiomyopathy ([address]        Plan:   Patient Instructions   1. Cut out the fast food  2. IV lasix and blood work in infusion center today  3. Increase lasix to 80 mg twice a day  4. RTO in 1 week  5. Low sodium food <2000 mg a day and <64 ounces fluids a day    >40 minutes spent in reviewing, examining and coordinating with Infusion center plan of care and treatment.     He is not interested in doing cardiac rehab    NYHA 3    I appreciate the opportunity of cooperating in the care of this individual.    Damir Guillen, APRN - CNS, CNS, 3/2/2022, 3:06 PM  }

## 2022-03-02 NOTE — TELEPHONE ENCOUNTER
LMOM for pt to [facility] for message per [facility] EVALUATION [facility] TREATMENT [place]

## 2022-03-02 NOTE — TELEPHONE ENCOUNTER
----- Message from Dozier Snellen, APRN - CNS sent at 3/2/2022  4:10 PM EST -----  Call this patricia tomorrow  His fluid level was up on labs today and was given IV lasix  Make sure he picked up the script for lasix in pharmacy and is taking 80 mg twice a day of lasix  thanks

## 2022-03-02 NOTE — PATIENT INSTRUCTIONS
1.  Cut out the fast food  2. IV lasix and blood work in infusion center today  3. Increase lasix to 80 mg twice a day  4. RTO in 1 week  5.   Low sodium food <2000 mg a day and <64 ounces fluids a day

## 2022-03-03 ENCOUNTER — TELEPHONE (OUTPATIENT)
Dept: OTHER | Facility: CLINIC | Age: 62
End: 2022-03-03

## 2022-03-03 NOTE — TELEPHONE ENCOUNTER
N[facility] attempted to contact pt regarding ED follow up appt. Attempt unsuccessful. Unable to leave voicemail. Name on voicemail did not match pt's name.

## 2022-03-03 NOTE — ED PROVIDER NOTES
[facility]        Pt Name: Rad Angelo  MRN: [id number]  [place] 1960  Date of evaluation: 3/2/2022  Provider: Pradeep Aly PA-C  PCP: No primary care provider on file. Note Started: 10:57 PM EST       SHONA. I have evaluated this patient. My supervising physician was available for consultation. CHIEF COMPLAINT       Chief Complaint   Patient presents with    Abdominal Pain     pt states that he was here today to be seen for the same urinary problems. pt states that gave him some medication to urinate but he states he feels like he can go but when he tries he cant. HISTORY OF PRESENT ILLNESS   (Location, Timing/Onset, Context/Setting, Quality, Duration, Modifying Factors, Severity, Associated Signs and Symptoms)  Note limiting factors. Chief Complaint: Urinary retention    Rad Angelo is a 59 y.o. male who presents to the emergency department today for evaluation for urinary retention. The patient has a history of hypertension, atrial fibrillation, anticoagulated on Eliquis, history of CHF. The patient states that he has had issues with urinary retention in the past, and he states he has needed a Don catheter in the past.  Patient states that he is followed by Dr. Marilou Taylor for this. The patient states that he was seen as an outpatient of cardiology today, and he did receive 120 mg of Lasix IV, he states that this was done at 2 PM, he states that he went home however he states he is not been able to urinate patient states that he was having lower abdominal discomfort, and a fullness. He states that his pain is sharp, constant is rated as a 10/10. Patient states that this felt similar to when he needed a catheter in the past.  The patient states that he did not notice any hematuria. He has no chest pain. No shortness of breath. No fever or chills. No cough or congestion.   He denies any nausea, vomiting or diarrhea. No other complaints    Nursing Notes were all reviewed and agreed with or any disagreements were addressed in the HPI. REVIEW OF SYSTEMS    (2-9 systems for level 4, 10 or more for level 5)     Review of Systems   Constitutional: Negative for activity change, appetite change, chills and fever. HENT: Negative for congestion and rhinorrhea. Respiratory: Negative for cough and shortness of breath. Cardiovascular: Negative for chest pain. Gastrointestinal: Positive for abdominal pain. Negative for diarrhea, nausea and vomiting. Genitourinary: Positive for difficulty urinating. Negative for dysuria and hematuria. Positives and Pertinent negatives as per HPI. Except as noted above in the ROS, all other systems were reviewed and negative.        PAST MEDICAL HISTORY     Past Medical History:   Diagnosis Date    Atrial fibrillation ([facility] [address] 07/25/2019    Hypertension          SURGICAL HISTORY     Past Surgical History:   Procedure Laterality Date    CARDIOVERSION  07/26/2019    Dr. Devendra Rahman  07/26/2019         [address]       Discharge Medication List as of 3/2/2022  9:58 PM      CONTINUE these medications which have NOT CHANGED    Details   furosemide (LASIX) 40 MG tablet Take 2 tablets by mouth 2 times daily, Disp-120 tablet, R-0Normal      vitamin D (CHOLECALCIFEROL) 50 MCG (2000 UT) TABS tablet Take 1 tablet by mouth daily, Disp-90 tablet, R-1Normal      aspirin 81 MG chewable tablet Take 1 tablet by mouth daily, Disp-30 tablet, R-1Normal      atorvastatin (LIPITOR) 40 MG tablet Take 1 tablet by mouth nightly, Disp-30 tablet, R-2Normal      finasteride (PROSCAR) 5 MG tablet Take 1 tablet by mouth daily, Disp-30 tablet, R-0Normal      lisinopril (PRINIVIL;ZESTRIL) 5 MG tablet Take 1 tablet by mouth daily, Disp-30 tablet, R-1Normal      metoprolol succinate (TOPROL XL) 50 MG extended release tablet Take 1 tablet by mouth daily, Disp-30 tablet, R-1Normal      tamsulosin (FLOMAX) 0.4 MG capsule Take 1 capsule by mouth daily, Disp-30 capsule, R-2Normal      apixaban (ELIQUIS) 5 MG TABS tablet Take 1 tablet by mouth 2 times daily, Disp-60 tablet, R-0Normal               ALLERGIES     Patient has no known allergies. FAMILYHISTORY       Family History   Problem Relation Age of Onset    Heart Disease Mother     High Blood Pressure Mother     Heart Attack Mother     Other Father     Stroke Father     High Blood Pressure Father           SOCIAL HISTORY       Social History     Tobacco Use    Smoking status: Never Smoker    Smokeless tobacco: Never Used   Substance Use Topics    Alcohol use: Never    Drug use: Never       SCREENINGS    Deniz Coma Scale  Eye Opening: Spontaneous  Best Verbal Response: Oriented  Best Motor Response: Obeys commands  Deniz Coma Scale Score: 15        PHYSICAL EXAM    (up to 7 for level 4, 8 or more for level 5)     ED Triage Vitals   BP Temp Temp Source Pulse Resp SpO2 Height Weight   03/02/22 2022 03/02/22 2018 03/02/22 2018 03/02/22 2018 03/02/22 2020 03/02/22 2020 -- --   (!) 137/100 98.7 °F (37.1 °C) Oral 68 18 98 %         Physical Exam  Vitals and nursing note reviewed. Constitutional:       Appearance: He is well-developed. He is not diaphoretic. HENT:      Head: Normocephalic and atraumatic. Right Ear: External ear normal.      Left Ear: External ear normal.      Nose: Nose normal.   Eyes:      General:         Right eye: No discharge. Left eye: No discharge. Neck:      Trachea: No tracheal deviation. Cardiovascular:      Rate and Rhythm: Normal rate and regular rhythm. Pulmonary:      Effort: Pulmonary effort is normal. No respiratory distress. Breath sounds: Normal breath sounds. No wheezing or rales. Abdominal:      General: Bowel sounds are normal. There is distension. Palpations: Abdomen is soft. Tenderness:  There is abdominal tenderness in the suprapubic area. There is no guarding or rebound. Musculoskeletal:         General: Normal range of motion. Cervical back: Normal range of motion and neck supple. Skin:     General: Skin is warm and dry. Neurological:      Mental Status: He is alert and oriented to person, place, and time. Psychiatric:         Behavior: Behavior normal.         DIAGNOSTIC RESULTS   LABS:    Labs Reviewed   CBC WITH AUTO DIFFERENTIAL - Abnormal; Notable for the following components:       Result Value    Hemoglobin 11.2 (*)     Hematocrit 34.8 (*)     RDW 15.9 (*)     Lymphocytes Absolute 0.9 (*)     All other components within normal limits   BASIC METABOLIC PANEL - Abnormal; Notable for the following components:    BUN 23 (*)     All other components within normal limits   URINALYSIS WITH REFLEX TO CULTURE - Abnormal; Notable for the following components:    Blood, Urine LARGE (*)     All other components within normal limits   MICROSCOPIC URINALYSIS - Abnormal; Notable for the following components:    RBC,  (*)     All other components within normal limits       When ordered only abnormal lab results are displayed. All other labs were within normal range or not returned as of this dictation. EKG: When ordered, EKG's are interpreted by the Emergency Department Physician in the absence of a cardiologist.  Please see their note for interpretation of EKG. RADIOLOGY:   Non-plain film images such as CT, Ultrasound and MRI are read by the radiologist. Plain radiographic images are visualized and preliminarily interpreted by the ED Provider with the below findings:        Interpretation per the Radiologist below, if available at the time of this note:    No orders to display     No results found.         PROCEDURES   Unless otherwise noted below, none     Procedures    CRITICAL CARE TIME       CONSULTS:  None      EMERGENCY DEPARTMENT COURSE and DIFFERENTIAL DIAGNOSIS/MDM:   Vitals:    Vitals:    03/02/22 2022 03/02/22 2045 03/02/22 2115 03/02/22 2145   BP: (!) 137/100 (!) 123/94 (!) 130/92 (!) 138/98   Pulse:       Resp:       Temp:       TempSrc:       SpO2:   95%        Patient was given the following medications:  Medications - No data to display        Briefly, this is a 69-year-old male who presents to the emergency department today for evaluation for urinary retention. Patient states that he does have a history of CHF, he states that he went to cardiology today, and he states that he received 120 mg of Lasix IV at 3 PM, since then has been unable to urinate, and has had abdominal discomfort and distention. He has had urinary retention in the past and states that he is needed a catheter    On physical examination, patient does have tenderness noted over suprapubic abdomen some mild distention, bladder scan is greater than 1000, Don catheter will be placed    Patient had complete resolution of symptoms after Don catheter placed and over 1600 mL have been drained from the patient's bladder    CBC shows no evidence of leukocytosis, mild anemia, similar to previous. His BMP is unremarkable urine does show blood but no evidence of infection, \"due to the recent catheterization    Patient has remained asymptomatic in the emergency room since Don catheter will be placed. Will DC home with the catheter in place and will follow up with urology. As he does not have any symptoms and his abdomen is now benign they do not feel that he needs any imaging or further work-up at this time. Recommend close follow-up within the next 2 to 3 days. He is to return to the ED for any new or worsening symptoms, the patient was understanding is agreeable with plan. Stable for discharge. Results for orders placed or performed during the hospital encounter of 03/02/22   CBC with Auto Differential   Result Value Ref Range    WBC 4.4 4.0 - 11.0 K/uL    RBC 4.20 4. 20 - 5.90 M/uL    Hemoglobin 11.2 (L) 13.5 - 17.5 g/dL    Hematocrit 34.8 (L) 40.5 - 52.5 %    MCV 82.7 80.0 - 100.0 fL    MCH 26.6 26.0 - 34.0 pg    MCHC 32.1 31.0 - 36.0 g/dL    RDW 15.9 (H) 12.4 - 15.4 %    Platelets 858 624 - 143 K/uL    MPV 7.8 5.0 - 10.5 fL    Neutrophils % 71.4 %    Lymphocytes % 19.8 %    Monocytes % 7.0 %    Eosinophils % 1.1 %    Basophils % 0.7 %    Neutrophils Absolute 3.1 1.7 - 7.7 K/uL    Lymphocytes Absolute 0.9 (L) 1.0 - 5.1 K/uL    Monocytes Absolute 0.3 0.0 - 1.3 K/uL    Eosinophils Absolute 0.1 0.0 - 0.6 K/uL    Basophils Absolute 0.0 0.0 - 0.2 K/uL   Basic Metabolic Panel   Result Value Ref Range    Sodium 142 136 - 145 mmol/L    Potassium 3.8 3.5 - 5.1 mmol/L    Chloride 105 99 - 110 mmol/L    CO2 27 21 - 32 mmol/L    Anion Gap 10 3 - 16    Glucose 89 70 - 99 mg/dL    BUN 23 (H) 7 - 20 mg/dL    CREATININE 1.1 0.8 - 1.3 mg/dL    GFR Non-African American >60 >60    GFR African American >60 >60    Calcium 8.4 8.3 - 10.6 mg/dL   Urinalysis with Reflex to Culture    Specimen: Urine   Result Value Ref Range    Color, UA YELLOW Straw/Yellow    Clarity, UA Clear Clear    Glucose, Ur Negative Negative mg/dL    Bilirubin Urine Negative Negative    Ketones, Urine Negative Negative mg/dL    Specific Gravity, UA 1.007 1.005 - 1.030    Blood, Urine LARGE (A) Negative    pH, UA 6.0 5.0 - 8.0    Protein, UA Negative Negative mg/dL    Urobilinogen, Urine 0.2 <2.0 E.U./dL    Nitrite, Urine Negative Negative    Leukocyte Esterase, Urine Negative Negative    Microscopic Examination YES     Urine Type NotGiven     Urine Reflex to Culture Not Indicated    Microscopic Urinalysis   Result Value Ref Range    Hyaline Casts, UA 0 0 - 8 /LPF    WBC, UA 0 0 - 5 /HPF    RBC,  (H) 0 - 4 /HPF    Epithelial Cells, UA 0 0 - 5 /HPF         I estimate there is LOW risk acute appendicitis, acute cholecystitis, cholangitis, pancreatitis, diverticulitis, perforated viscus, perforated ulcer, colitis, C. diff colitis, ischemic colitis, bowel obstruction, acute dissection, thus I consider the discharge disposition reasonable. Also, there is no evidence or peritonitis, sepsis, or toxicity. Beckie Lang and I have discussed the diagnosis and risks, and we agree with discharging home to follow-up with their primary doctor. We also discussed returning to the Emergency Department immediately if new or worsening symptoms occur. We have discussed the symptoms which are most concerning (e.g., bloody stool, fever, changing or worsening pain, vomiting) that necessitate immediate return. FINAL IMPRESSION      1.  Urinary retention          DISPOSITION/PLAN   DISPOSITION Decision To Discharge 03/02/2022 09:44:57 PM      PATIENT REFERRED TO:  Jarod Esqueda MD  [facility]  [phone]    Schedule an appointment as soon as possible for a visit in 2 days      [facility] Emergency Department  [address] [place] [address]  [phone]    As needed, If symptoms worsen      DISCHARGE MEDICATIONS:  Discharge Medication List as of 3/2/2022  9:58 PM          DISCONTINUED MEDICATIONS:  Discharge Medication List as of 3/2/2022  9:58 PM                 (Please note that portions of this note were completed with a voice recognition program.  Efforts were made to edit the dictations but occasionally words are mis-transcribed.)    Karson Monroy PA-C (electronically signed)            Karson Monroy PA-C  03/02/22 2072

## 2022-03-03 NOTE — ED NOTES
Bladder scanner done at this time and showed >1000 ml. PA notified and ernst catheter placed at this time.       Pily [facility]ias[facility]  03/02/22 2029

## 2022-03-09 ENCOUNTER — OFFICE VISIT (OUTPATIENT)
Dept: CARDIOLOGY CLINIC | Age: 62
End: 2022-03-09
Payer: COMMERCIAL

## 2022-03-09 ENCOUNTER — TELEPHONE (OUTPATIENT)
Dept: CARDIOLOGY CLINIC | Age: 62
End: 2022-03-09

## 2022-03-09 VITALS
WEIGHT: 218 LBS | HEART RATE: 54 BPM | BODY MASS INDEX: 27.98 KG/M2 | HEIGHT: 74 IN | OXYGEN SATURATION: 98 % | SYSTOLIC BLOOD PRESSURE: 120 MMHG | DIASTOLIC BLOOD PRESSURE: 80 MMHG

## 2022-03-09 DIAGNOSIS — I48.0 PAF (PAROXYSMAL ATRIAL FIBRILLATION) (HCC): ICD-10-CM

## 2022-03-09 DIAGNOSIS — I42.0 DILATED CARDIOMYOPATHY (HCC): ICD-10-CM

## 2022-03-09 DIAGNOSIS — Z59.00 HOMELESS: ICD-10-CM

## 2022-03-09 DIAGNOSIS — I10 BENIGN ESSENTIAL HTN: ICD-10-CM

## 2022-03-09 DIAGNOSIS — Z91.199 NON-COMPLIANCE: ICD-10-CM

## 2022-03-09 DIAGNOSIS — I50.23 ACUTE ON CHRONIC SYSTOLIC HEART FAILURE (HCC): Primary | ICD-10-CM

## 2022-03-09 PROCEDURE — 99214 OFFICE O/P EST MOD 30 MIN: CPT | Performed by: CLINICAL NURSE SPECIALIST

## 2022-03-09 PROCEDURE — G8427 DOCREV CUR MEDS BY ELIG CLIN: HCPCS | Performed by: CLINICAL NURSE SPECIALIST

## 2022-03-09 PROCEDURE — G8419 CALC BMI OUT NRM PARAM NOF/U: HCPCS | Performed by: CLINICAL NURSE SPECIALIST

## 2022-03-09 PROCEDURE — G8484 FLU IMMUNIZE NO ADMIN: HCPCS | Performed by: CLINICAL NURSE SPECIALIST

## 2022-03-09 PROCEDURE — 1111F DSCHRG MED/CURRENT MED MERGE: CPT | Performed by: CLINICAL NURSE SPECIALIST

## 2022-03-09 PROCEDURE — 3017F COLORECTAL CA SCREEN DOC REV: CPT | Performed by: CLINICAL NURSE SPECIALIST

## 2022-03-09 PROCEDURE — 1036F TOBACCO NON-USER: CPT | Performed by: CLINICAL NURSE SPECIALIST

## 2022-03-09 SDOH — ECONOMIC STABILITY - HOUSING INSECURITY: HOMELESSNESS UNSPECIFIED: Z59.00

## 2022-03-09 NOTE — PROGRESS NOTES
[address]  Progress Note    Primary Care Doctor:  No primary care provider on file. Chief Complaint   Patient presents with    Follow-up    Congestive Heart Failure        History of Present Illness:  59 y.o. male with history of with history of PAF, hypertension. Unvaccinated, , homeless  Declines life vest  hospitalization 2/5-10/22 for weight gain and lower extremity edema. He recently had covid and did not follow up in office. LVEF 20%, LHC done. Weight 223->193. He was started on HF therapy, declined life vest.  CONSUELO done with HENOK thrombus (on eliquis per EP). Not able to do CV    I had the pleasure of seeing Enma Corbett in follow up for systolic heart failure. He is ambulatory and by his self. After last visit, he was suppose to increase lasix to 80 mg twice a day but he is only taking 80 mg once a day. He received IV lasix. He did have to go to the ER for urinary retention and now has an indwelling catheter. He is suppose to schedule an appt with Dr Petrona Pompa but has not. He continues with edema ankles to knees. Weight [phone]. No chest pain, palpitation or shortness of breath. Past Medical History:   has a past medical history of Atrial fibrillation ([address] and Hypertension. Surgical History:   has a past surgical history that includes Insertable Cardiac Monitor (07/26/2019) and Cardioversion (07/26/2019). Social History:   reports that he has never smoked. He has never used smokeless tobacco. He reports that he does not drink alcohol and does not use drugs. Family History:   Family History   Problem Relation Age of Onset    Heart Disease Mother     High Blood Pressure Mother     Heart Attack Mother     Other Father     Stroke Father     High Blood Pressure Father        Home Medications:  Prior to Admission medications    Medication Sig Start Date End Date Taking?  Authorizing Provider   furosemide (LASIX) 40 MG tablet Take 2 tablets by mouth 2 times daily 3/2/22  Yes TRENT Diallo   vitamin D (CHOLECALCIFEROL) 50 MCG (2000 UT) TABS tablet Take 1 tablet by mouth daily 2/16/22  Yes TRENT [place]   aspirin 81 MG chewable tablet Take 1 tablet by mouth daily 2/9/22  Yes Kristine Rodriguez MD   atorvastatin (LIPITOR) 40 MG tablet Take 1 tablet by mouth nightly 2/9/22  Yes Kristine Rodriguez MD   finasteride (PROSCAR) 5 MG tablet Take 1 tablet by mouth daily 2/9/22  Yes Kristine Rodriguez MD   lisinopril (PRINIVIL;ZESTRIL) 5 MG tablet Take 1 tablet by mouth daily 2/9/22  Yes Kristine Rodriguez MD   metoprolol succinate (TOPROL XL) 50 MG extended release tablet Take 1 tablet by mouth daily 2/9/22  Yes Kristine Rodriguez MD   tamsulosin (FLOMAX) 0.4 MG capsule Take 1 capsule by mouth daily 2/9/22  Yes Kristine Rodriguez MD   apixaban (ELIQUIS) 5 MG TABS tablet Take 1 tablet by mouth 2 times daily 2/9/22  Yes Kristine Rodriguez MD        Allergies:  Patient has no known allergies. Review of Systems:   · Constitutional: there has been no unanticipated weight loss. There's been no change in energy level, sleep pattern, or activity level. · Eyes: No visual changes or diplopia. No scleral icterus. · ENT: No Headaches, hearing loss or vertigo. No mouth sores or sore throat. · Cardiovascular: Reviewed in HPI  · Respiratory: No cough or wheezing, no sputum production. No hematemesis. · Gastrointestinal: No abdominal pain, appetite loss, blood in stools. No change in bowel or bladder habits. · Genitourinary: No dysuria, trouble voiding, or hematuria. · Musculoskeletal:  No gait disturbance, weakness or joint complaints. · Integumentary: No rash or pruritis. · Neurological: No headache, diplopia, change in muscle strength, numbness or tingling. No change in gait, balance, coordination, mood, affect, memory, mentation, behavior. · Psychiatric: No anxiety, no depression. · Endocrine: No malaise, fatigue or temperature intolerance.  No excessive thirst, fluid intake, or urination. No tremor. · Hematologic/Lymphatic: No abnormal bruising or bleeding, blood clots or swollen lymph nodes. · Allergic/Immunologic: No nasal congestion or hives. Physical Examination:    Vitals:    03/09/22 1303   BP: 120/80   Site: Left Upper Arm   Position: Sitting   Cuff Size: Large Adult   Pulse: 54   SpO2: 98%   Weight: 218 lb (98.9 kg)   Height: 6' 2\" (1.88 m)        Constitutional and General Appearance: Warm and dry, no apparent distress, normal coloration  HEENT:  Normocephalic, atraumatic  Respiratory:  · Normal excursion and expansion without use of accessory muscles  · Resp Auscultation: Normal breath sounds without dullness  Cardiovascular:  · The apical impulses not displaced  · Heart tones are crisp and normal  · JVP + cm H2O  · irregular rate and rhythm  · Peripheral pulses are symmetrical and full  · There is no clubbing, cyanosis of the extremities.   · Bilateral ankle to knees  · Pedal Pulses: 2+ and equal   Abdomen:  · No masses or tenderness  · Liver/Spleen: No Abnormalities Noted  Neurological/Psychiatric:  · Alert and oriented in all spheres  · Moves all extremities well  · Exhibits normal gait balance and coordination  · No abnormalities of mood, affect, memory, mentation, or behavior are noted    Lab Data:    CBC:   Lab Results   Component Value Date    WBC 4.4 03/02/2022    WBC 4.3 02/10/2022    WBC 4.0 02/09/2022    RBC 4.20 03/02/2022    RBC 4.81 02/10/2022    RBC 4.69 02/09/2022    HGB 11.2 03/02/2022    HGB 12.7 02/10/2022    HGB 12.5 02/09/2022    HCT 34.8 03/02/2022    HCT 39.5 02/10/2022    HCT 38.7 02/09/2022    MCV 82.7 03/02/2022    MCV 82.2 02/10/2022    MCV 82.4 02/09/2022    RDW 15.9 03/02/2022    RDW 16.5 02/10/2022    RDW 16.8 02/09/2022     03/02/2022     02/10/2022     02/09/2022     BMP:  Lab Results   Component Value Date     03/02/2022     03/02/2022     02/16/2022    K 3.8 03/02/2022    K 3.7 03/02/2022    K 4.5 02/16/2022    K 3.7 12/01/2021    K 4.2 11/28/2021    K 4.4 07/25/2019     03/02/2022     03/02/2022     02/16/2022    CO2 27 03/02/2022    CO2 25 03/02/2022    CO2 27 02/16/2022    PHOS 4.3 02/09/2022    PHOS 4.1 02/08/2022    PHOS 4.5 02/07/2022    BUN 23 03/02/2022    BUN 23 03/02/2022    BUN 17 02/16/2022    CREATININE 1.1 03/02/2022    CREATININE 1.1 03/02/2022    CREATININE 1.0 02/16/2022     BNP:   Lab Results   Component Value Date    PROBNP 22,846 03/02/2022    PROBNP 10,576 02/16/2022    PROBNP 16,265 02/05/2022     Cardiac Imaging:  CONSUELO Dr Farrah Mack 2/10/22  Preliminary CONSUELO results are:      - Severe LV dysfunction,  EF: 20-25%  - LA dilated. There was HENOK thrombus. - Moderate MR    Cardiac Cath PCI: Dr Dwain Che 2/8/2022  Anatomy:   LM-nml   LAD-nml  Cx-nml  OM- nml  RCA-nml  RPDA- nml  LVEF- 10%  LVG- global hypokinesis  LVEDP- 10     Hemodynamics:  RA- mean 3  RV- 37/0  PAWP- 10  PA- 34/12 (20)     C.O.- 5.7 (4.9)  C. I.- 2.6 (2.25)  PA sat 60%  AO sat 91%     Contrast: 70  Flouro Time: 5.3  Access: R radial a, R CFV     Impression  ~Coronary Angiography w/ normal cors  ~LVG with LVEF of 10% and global regional wall motion abnormalities  ~Severe MR  ~Normal CO/CI  ~normal L and R filling pressures     Recommendation  ~Aggressive medical treatment and risk factor modification  ~1. Medications reviewed, no changes at this time. 2. Post cath IVF. Bedrest.  3.Consider CONSUELO for evaluation of MR.   4. Patient has been advised on the importance of regular exercise of at least 20-30 minutes daily alternating with aerobic and isometric activities. 5. Patient counseled about and offered assistance for smoking cessation   6. No indication for cardiac rehab  7. CHF service to evaluate tomorrow.     Echo 11/29/2021  Summary   -Covid+   -Severely reduced global systolic function with an ejection fraction   estimated at 20%.    -Severe global hypokinesis with regional variations noted.   -Right ventricular systolic function appears to be mildly reduced based on   visual inspection.   -Severe biatrial enlargement.   -Thickened mitral valve without evidence of stenosis. There is   mild-to-moderate mitral regurgitation.   -There is mild-to-moderate tricuspid regurgitation with a RVSP estimation of   85 mmHg.   -Diastolic dysfunction with elevated LV filling pressures.     Echo 7/25/2019  Summary   -Overall left ventricular systolic function is moderately depressed .   -Ejection fraction is visually estimated to be 30-35 %.  -Global left ventricular function moderately reduced.   -Indeterminate diastolic function due to atrial fibrillation and moderate to   severe mitral regurgitation.   -Moderate-to-severe mitral regurgitation .   -Dilated left atrium with a volume of 97 ml.   -Left atrial volume is increased probably due to atrial fibrillation .   -There is mild right ventricular hypertrophy.   -The right ventricle is mildly enlarged.   -Right ventricular systolic pressure of 53 mm Hg consistent with pulmonary   hypertension.   -Moderate to severe tricuspid regurgitation. TAPSE = 1.43   -IVC size is dilated (>2.1 cm) but collapses > 50% with respiration   consistent with elevated RA pressure (8 mmHg).    Assessment:    1. Acute on chronic systolic heart failure (HCC) on ace and bb   2. Non-compliance    3. Homeless    4. PAF (paroxysmal atrial fibrillation) ([address]    5. Benign essential HTN    6. Dilated cardiomyopathy ([address]        Plan:   Patient Instructions   1. Increase lasix to 2 pills twice a day  2. Check blood work in 1 week  3. RTO March 22nd   4. Patient to call Dr Nelda Koyanagi office for appt    Patient apparently has a bill that needs to be paid at Dr Delgado Hill office. Patient given phone number to take care of finances and schedule appt for his indwelling ernst.     He is not interested in doing cardiac rehab    NYHA 3    I appreciate the opportunity of cooperating in the care of this individual.    Elisha White, APRN - CNS, CNS, 3/9/2022, 2:31 PM  }

## 2022-03-09 NOTE — PATIENT INSTRUCTIONS
1.  Increase lasix to 2 pills twice a day  2. Check blood work in 1 week  3. RTO March 22nd   4.   Patient to call Dr Petrona Pompa office for appt

## 2022-03-09 NOTE — TELEPHONE ENCOUNTER
Urology called Sang Perez  back to state pt needs to call billing dept at  [phone] before they can schedule an appt. Please advise pt.

## 2022-03-09 NOTE — TELEPHONE ENCOUNTER
Spoke to patient before he left today he stated he will call the billing department and set up arrangements

## 2022-03-22 ENCOUNTER — HOSPITAL ENCOUNTER (OUTPATIENT)
Age: 62
Discharge: HOME | End: 2022-03-22
Payer: COMMERCIAL

## 2022-03-22 ENCOUNTER — OFFICE VISIT (OUTPATIENT)
Dept: CARDIOLOGY CLINIC | Age: 62
End: 2022-03-22
Payer: COMMERCIAL

## 2022-03-22 VITALS
HEART RATE: 52 BPM | HEIGHT: 74 IN | WEIGHT: 220.1 LBS | DIASTOLIC BLOOD PRESSURE: 80 MMHG | SYSTOLIC BLOOD PRESSURE: 112 MMHG | BODY MASS INDEX: 28.25 KG/M2 | OXYGEN SATURATION: 96 %

## 2022-03-22 DIAGNOSIS — I10 BENIGN ESSENTIAL HTN: ICD-10-CM

## 2022-03-22 DIAGNOSIS — Z91.199 NON-COMPLIANCE: ICD-10-CM

## 2022-03-22 DIAGNOSIS — I42.0 DILATED CARDIOMYOPATHY (HCC): ICD-10-CM

## 2022-03-22 DIAGNOSIS — Z59.00 HOMELESS: ICD-10-CM

## 2022-03-22 DIAGNOSIS — I50.23 ACUTE ON CHRONIC SYSTOLIC HEART FAILURE (HCC): ICD-10-CM

## 2022-03-22 DIAGNOSIS — I50.23 ACUTE ON CHRONIC SYSTOLIC HEART FAILURE (HCC): Primary | ICD-10-CM

## 2022-03-22 DIAGNOSIS — I48.0 PAF (PAROXYSMAL ATRIAL FIBRILLATION) (HCC): ICD-10-CM

## 2022-03-22 LAB
ANION GAP SERPL CALCULATED.3IONS-SCNC: 11 MMOL/L (ref 3–16)
BUN BLDV-MCNC: 21 MG/DL (ref 7–20)
CALCIUM SERPL-MCNC: 8.4 MG/DL (ref 8.3–10.6)
CHLORIDE BLD-SCNC: 99 MMOL/L (ref 99–110)
CO2: 29 MMOL/L (ref 21–32)
CREAT SERPL-MCNC: 1.2 MG/DL (ref 0.8–1.3)
GFR AFRICAN AMERICAN: >60
GFR NON-AFRICAN AMERICAN: >60
GLUCOSE BLD-MCNC: 92 MG/DL (ref 70–99)
POTASSIUM SERPL-SCNC: 3.6 MMOL/L (ref 3.5–5.1)
PRO-BNP: ABNORMAL PG/ML (ref 0–124)
SODIUM BLD-SCNC: 139 MMOL/L (ref 136–145)

## 2022-03-22 PROCEDURE — 83880 ASSAY OF NATRIURETIC PEPTIDE: CPT

## 2022-03-22 PROCEDURE — 36415 COLL VENOUS BLD VENIPUNCTURE: CPT

## 2022-03-22 PROCEDURE — 1036F TOBACCO NON-USER: CPT | Performed by: CLINICAL NURSE SPECIALIST

## 2022-03-22 PROCEDURE — G8427 DOCREV CUR MEDS BY ELIG CLIN: HCPCS | Performed by: CLINICAL NURSE SPECIALIST

## 2022-03-22 PROCEDURE — G8484 FLU IMMUNIZE NO ADMIN: HCPCS | Performed by: CLINICAL NURSE SPECIALIST

## 2022-03-22 PROCEDURE — 99214 OFFICE O/P EST MOD 30 MIN: CPT | Performed by: CLINICAL NURSE SPECIALIST

## 2022-03-22 PROCEDURE — 80048 BASIC METABOLIC PNL TOTAL CA: CPT

## 2022-03-22 PROCEDURE — G8419 CALC BMI OUT NRM PARAM NOF/U: HCPCS | Performed by: CLINICAL NURSE SPECIALIST

## 2022-03-22 PROCEDURE — 3017F COLORECTAL CA SCREEN DOC REV: CPT | Performed by: CLINICAL NURSE SPECIALIST

## 2022-03-22 RX ORDER — METOLAZONE 5 MG/1
5 TABLET ORAL ONCE
Qty: 1 TABLET | Refills: 0 | Status: ON HOLD | OUTPATIENT
Start: 2022-03-22 | End: 2022-04-26 | Stop reason: HOSPADM

## 2022-03-22 SDOH — ECONOMIC STABILITY - HOUSING INSECURITY: HOMELESSNESS UNSPECIFIED: Z59.00

## 2022-03-22 NOTE — PATIENT INSTRUCTIONS
1.  You must make an appt with Dr Pillo Alba [phone]  2. Take metolazone 5 mg once 30 minutes prior to your lasix tomorrow morning  3. Continue all other medications  4.   RTO in 3 weeks

## 2022-03-22 NOTE — PROGRESS NOTES
[address]  Progress Note    Primary Care Doctor:  No primary care provider on file. Chief Complaint   Patient presents with    Hypertension    Shortness of Breath    Edema     legs and ankles        History of Present Illness:  59 y.o. male with history of with history of PAF, hypertension. Unvaccinated, , homeless  Declines life vest  hospitalization 2/5-10/22 for weight gain and lower extremity edema. He recently had covid and did not follow up in office. LVEF 20%, LHC done. Weight 223->193. He was started on HF therapy, declined life vest.  CONSUELO done with HENOK thrombus (on eliquis per EP). Not able to do CV    I had the pleasure of seeing Robi Sarkar in follow up for systolic heart failure. He is ambulatory and by his self. He continues with his renst bag and has not schedule an appt with Dr Madeleine Lema as instructed after last visit. He is asking me to take his ernst out today. His weight is up 2 pounds and tells me he is taking all his medications. When asked about diet and fluids, he is still over doing them both. His legs are edematous ankles to lower thighs. He denies any increased shortness of breath, palpitations or chest pain. He has been compliant with coming to his appointments    Past Medical History:   has a past medical history of Atrial fibrillation ([address] and Hypertension. Surgical History:   has a past surgical history that includes Insertable Cardiac Monitor (07/26/2019) and Cardioversion (07/26/2019). Social History:   reports that he has never smoked. He has never used smokeless tobacco. He reports that he does not drink alcohol and does not use drugs.    Family History:   Family History   Problem Relation Age of Onset    Heart Disease Mother     High Blood Pressure Mother     Heart Attack Mother     Other Father     Stroke Father     High Blood Pressure Father        Home Medications:  Prior to Admission medications    Medication Sig Start Date End Date Taking? Authorizing Provider   metOLazone (ZAROXOLYN) 5 MG tablet Take 1 tablet by mouth once for 1 dose 3/22/22 3/22/22 Yes TRENT Peguero   furosemide (LASIX) 40 MG tablet Take 2 tablets by mouth 2 times daily 3/2/22  Yes TRENT Stein - CNS   vitamin D (CHOLECALCIFEROL) 50 MCG (2000 UT) TABS tablet Take 1 tablet by mouth daily 2/16/22  Yes TRENT Alamo - CNS   aspirin 81 MG chewable tablet Take 1 tablet by mouth daily 2/9/22  Yes Rigoberto Norman MD   atorvastatin (LIPITOR) 40 MG tablet Take 1 tablet by mouth nightly 2/9/22  Yes Rigoberto Norman MD   finasteride (PROSCAR) 5 MG tablet Take 1 tablet by mouth daily 2/9/22  Yes Rigoberto Norman MD   lisinopril (PRINIVIL;ZESTRIL) 5 MG tablet Take 1 tablet by mouth daily 2/9/22  Yes Rigoberto Norman MD   metoprolol succinate (TOPROL XL) 50 MG extended release tablet Take 1 tablet by mouth daily 2/9/22  Yes Rigoberto Norman MD   tamsulosin (FLOMAX) 0.4 MG capsule Take 1 capsule by mouth daily 2/9/22  Yes Rigoberto Norman MD   apixaban (ELIQUIS) 5 MG TABS tablet Take 1 tablet by mouth 2 times daily 2/9/22  Yes Rigoberto Norman MD        Allergies:  Patient has no known allergies. Review of Systems:   · Constitutional: there has been no unanticipated weight loss. There's been no change in energy level, sleep pattern, or activity level. · Eyes: No visual changes or diplopia. No scleral icterus. · ENT: No Headaches, hearing loss or vertigo. No mouth sores or sore throat. · Cardiovascular: Reviewed in HPI  · Respiratory: No cough or wheezing, no sputum production. No hematemesis. · Gastrointestinal: No abdominal pain, appetite loss, blood in stools. No change in bowel or bladder habits. · Genitourinary: No dysuria, trouble voiding, or hematuria. · Musculoskeletal:  No gait disturbance, weakness or joint complaints. · Integumentary: No rash or pruritis.   · Neurological: No headache, diplopia, change in muscle strength, numbness or 329 02/09/2022     BMP:  Lab Results   Component Value Date     03/02/2022     03/02/2022     02/16/2022    K 3.8 03/02/2022    K 3.7 03/02/2022    K 4.5 02/16/2022    K 3.7 12/01/2021    K 4.2 11/28/2021    K 4.4 07/25/2019     03/02/2022     03/02/2022     02/16/2022    CO2 27 03/02/2022    CO2 25 03/02/2022    CO2 27 02/16/2022    PHOS 4.3 02/09/2022    PHOS 4.1 02/08/2022    PHOS 4.5 02/07/2022    BUN 23 03/02/2022    BUN 23 03/02/2022    BUN 17 02/16/2022    CREATININE 1.1 03/02/2022    CREATININE 1.1 03/02/2022    CREATININE 1.0 02/16/2022     BNP:   Lab Results   Component Value Date    PROBNP 22,846 03/02/2022    PROBNP 10,576 02/16/2022    PROBNP 16,265 02/05/2022     Cardiac Imaging:  CONSUELO Dr Albert Felix 2/10/22  Preliminary CONSUELO results are:      - Severe LV dysfunction,  EF: 20-25%  - LA dilated. There was HENOK thrombus. - Moderate MR    Cardiac Cath PCI: Dr Milka Whitley 2/8/2022  Anatomy:   LM-nml   LAD-nml  Cx-nml  OM- nml  RCA-nml  RPDA- nml  LVEF- 10%  LVG- global hypokinesis  LVEDP- 10     Hemodynamics:  RA- mean 3  RV- 37/0  PAWP- 10  PA- 34/12 (20)     C.O.- 5.7 (4.9)  C. I.- 2.6 (2.25)  PA sat 60%  AO sat 91%     Contrast: 70  Flouro Time: 5.3  Access: R radial a, R CFV     Impression  ~Coronary Angiography w/ normal cors  ~LVG with LVEF of 10% and global regional wall motion abnormalities  ~Severe MR  ~Normal CO/CI  ~normal L and R filling pressures     Recommendation  ~Aggressive medical treatment and risk factor modification  ~1. Medications reviewed, no changes at this time. 2. Post cath IVF. Bedrest.  3.Consider CONSUELO for evaluation of MR.   4. Patient has been advised on the importance of regular exercise of at least 20-30 minutes daily alternating with aerobic and isometric activities. 5. Patient counseled about and offered assistance for smoking cessation   6.  No indication for cardiac rehab  7. CHF service to evaluate tomorrow.     Echo 11/29/2021  Summary   -Covid+   -Severely reduced global systolic function with an ejection fraction   estimated at 20%.  -Severe global hypokinesis with regional variations noted.   -Right ventricular systolic function appears to be mildly reduced based on   visual inspection.   -Severe biatrial enlargement.   -Thickened mitral valve without evidence of stenosis. There is   mild-to-moderate mitral regurgitation.   -There is mild-to-moderate tricuspid regurgitation with a RVSP estimation of   80 mmHg.   -Diastolic dysfunction with elevated LV filling pressures.     Echo 7/25/2019  Summary   -Overall left ventricular systolic function is moderately depressed .   -Ejection fraction is visually estimated to be 30-35 %.  -Global left ventricular function moderately reduced.   -Indeterminate diastolic function due to atrial fibrillation and moderate to   severe mitral regurgitation.   -Moderate-to-severe mitral regurgitation .   -Dilated left atrium with a volume of 97 ml.   -Left atrial volume is increased probably due to atrial fibrillation .   -There is mild right ventricular hypertrophy.   -The right ventricle is mildly enlarged.   -Right ventricular systolic pressure of 53 mm Hg consistent with pulmonary   hypertension.   -Moderate to severe tricuspid regurgitation. TAPSE = 1.43   -IVC size is dilated (>2.1 cm) but collapses > 50% with respiration   consistent with elevated RA pressure (8 mmHg).    Assessment:    1. Acute on chronic systolic heart failure (HCC) on ace and bb   2. Non-compliance    3. Homeless    4. PAF (paroxysmal atrial fibrillation) ([facility] [address]    5. Benign essential HTN    6. Dilated cardiomyopathy ([facility] [address]        Plan:   Patient Instructions   1. You must make an appt with Dr Tabitha Mclean [phone]  2. Take metolazone 5 mg once 30 minutes prior to your lasix tomorrow morning  3. Continue all other medications  4.   RTO in 3 weeks    Patient apparently has a bill that needs to be paid at Dr Brayden Washington office.     I stressed the importance of having the above appt as I am concerned for infection    He is not interested in doing cardiac rehab    NYHA 3    I appreciate the opportunity of cooperating in the care of this individual.    TRENT Hunter Mai - CNS, CNS, 3/22/2022, 2:12 PM  }

## 2022-03-23 ENCOUNTER — TELEPHONE (OUTPATIENT)
Dept: CARDIOLOGY CLINIC | Age: 62
End: 2022-03-23

## 2022-03-23 DIAGNOSIS — I50.23 ACUTE ON CHRONIC SYSTOLIC HEART FAILURE (HCC): Primary | ICD-10-CM

## 2022-03-23 RX ORDER — POTASSIUM CHLORIDE 20 MEQ/1
40 TABLET, EXTENDED RELEASE ORAL DAILY
Qty: 1 TABLET | Refills: 0 | Status: ON HOLD | OUTPATIENT
Start: 2022-03-23 | End: 2022-04-26

## 2022-03-23 NOTE — TELEPHONE ENCOUNTER
Friend calling back-could not understand what his name was-stated he was calling back for the pt. MA unavailable to speak to him.

## 2022-03-23 NOTE — TELEPHONE ENCOUNTER
pharmacy called they need update RX for the need clarification dispense one or two tablet?    potassium chloride (KLOR-CON M) 20 MEQ   Dis 1 tablet   Direction is Take 2 tablets by mouth daily for 1 day

## 2022-03-23 NOTE — TELEPHONE ENCOUNTER
----- Message from TRENT Hodgson CNP sent at 3/23/2022 10:13 AM EDT -----  Labs better than 3 weeks ago, his potassium is on the low side so I am going to call in a dose for him to take today with the metolazone. Let's recheck labs next week please.  Marck Bear

## 2022-03-24 ENCOUNTER — TELEPHONE (OUTPATIENT)
Dept: CARDIOLOGY CLINIC | Age: 62
End: 2022-03-24

## 2022-03-24 NOTE — TELEPHONE ENCOUNTER
Called and spoke to patient's friend he will pass the information on. Tried to reach outpatient pharmacy for clarification of potassium, LMOM about potassium dose.

## 2022-03-24 NOTE — TELEPHONE ENCOUNTER
----- Message from TRENT Castillo - CNP sent at 3/23/2022 10:13 AM EDT -----  Labs better than 3 weeks ago, his potassium is on the low side so I am going to call in a dose for him to take today with the metolazone. Let's recheck labs next week please.  Phoebe Jones

## 2022-04-12 ENCOUNTER — OFFICE VISIT (OUTPATIENT)
Dept: CARDIOLOGY CLINIC | Age: 62
End: 2022-04-12
Payer: COMMERCIAL

## 2022-04-12 VITALS
HEIGHT: 74 IN | DIASTOLIC BLOOD PRESSURE: 90 MMHG | OXYGEN SATURATION: 97 % | SYSTOLIC BLOOD PRESSURE: 130 MMHG | BODY MASS INDEX: 29.21 KG/M2 | WEIGHT: 227.6 LBS | HEART RATE: 70 BPM

## 2022-04-12 DIAGNOSIS — I42.0 DILATED CARDIOMYOPATHY (HCC): ICD-10-CM

## 2022-04-12 DIAGNOSIS — I48.0 PAF (PAROXYSMAL ATRIAL FIBRILLATION) (HCC): ICD-10-CM

## 2022-04-12 DIAGNOSIS — I10 BENIGN ESSENTIAL HTN: ICD-10-CM

## 2022-04-12 DIAGNOSIS — I50.23 ACUTE ON CHRONIC SYSTOLIC HEART FAILURE (HCC): Primary | ICD-10-CM

## 2022-04-12 DIAGNOSIS — Z59.00 HOMELESS: ICD-10-CM

## 2022-04-12 DIAGNOSIS — Z91.199 NON-COMPLIANCE: ICD-10-CM

## 2022-04-12 PROCEDURE — 1036F TOBACCO NON-USER: CPT | Performed by: CLINICAL NURSE SPECIALIST

## 2022-04-12 PROCEDURE — G8427 DOCREV CUR MEDS BY ELIG CLIN: HCPCS | Performed by: CLINICAL NURSE SPECIALIST

## 2022-04-12 PROCEDURE — 3017F COLORECTAL CA SCREEN DOC REV: CPT | Performed by: CLINICAL NURSE SPECIALIST

## 2022-04-12 PROCEDURE — 99215 OFFICE O/P EST HI 40 MIN: CPT | Performed by: CLINICAL NURSE SPECIALIST

## 2022-04-12 PROCEDURE — G8419 CALC BMI OUT NRM PARAM NOF/U: HCPCS | Performed by: CLINICAL NURSE SPECIALIST

## 2022-04-12 RX ORDER — TORSEMIDE 20 MG/1
20 TABLET ORAL DAILY
Qty: 120 TABLET | Refills: 0 | Status: ON HOLD | OUTPATIENT
Start: 2022-04-12 | End: 2022-04-26 | Stop reason: HOSPADM

## 2022-04-12 RX ORDER — LISINOPRIL 5 MG/1
5 TABLET ORAL DAILY
Qty: 30 TABLET | Refills: 1 | Status: ON HOLD | OUTPATIENT
Start: 2022-04-12 | End: 2022-04-26 | Stop reason: SDUPTHER

## 2022-04-12 RX ORDER — METOPROLOL SUCCINATE 50 MG/1
50 TABLET, EXTENDED RELEASE ORAL DAILY
Qty: 30 TABLET | Refills: 1 | Status: ON HOLD | OUTPATIENT
Start: 2022-04-12 | End: 2022-04-26 | Stop reason: HOSPADM

## 2022-04-12 SDOH — ECONOMIC STABILITY - HOUSING INSECURITY: HOMELESSNESS UNSPECIFIED: Z59.00

## 2022-04-12 NOTE — PROGRESS NOTES
[facility]  Progress Note    Primary Care Doctor:  No primary care provider on file. Chief Complaint   Patient presents with    Congestive Heart Failure     [place]    Hypertension    Follow-up     3 weeks         History of Present Illness:  59 y.o. male with history of with history of PAF, hypertension. Unvaccinated, , homeless  Declines life vest  hospitalization 2/5-10/22 for weight gain and lower extremity edema. He recently had covid and did not follow up in office. LVEF 20%, LHC done. Weight 223->193. He was started on HF therapy, declined life vest.  CONSUELO done with HENOK thrombus (on eliquis per EP). Not able to do CV    I had the pleasure of seeing Mejia Valladares in follow up for systolic heart failure. He is ambulatory and by his self. He continues to hold a lot of fluid in his legs. \"I just want more energy and no shortness of breath. \"  He had an indwelling ernst catheter which he never followed up with Dr Andi Shaver. He states that the catheter popped out 3 days ago, no abdominal pain and feels he is urinating fine for now. He has a bottle of baby aspirin and has his lasix pills and eliquis in the bottle. He cut the eliquis back once a day due to nose bleeds. He pops baby aspirins when he does not feel good. Not sure he is taking his metoprolol or lisinopril. He has not followed up with EP. Past Medical History:   has a past medical history of Atrial fibrillation ([address] and Hypertension. Surgical History:   has a past surgical history that includes Insertable Cardiac Monitor (07/26/2019) and Cardioversion (07/26/2019). Social History:   reports that he has never smoked. He has never used smokeless tobacco. He reports that he does not drink alcohol and does not use drugs.    Family History:   Family History   Problem Relation Age of Onset    Heart Disease Mother     High Blood Pressure Mother     Heart Attack Mother     Other Father     Stroke Father    Geoff High Blood Pressure Father        Home Medications:  Prior to Admission medications    Medication Sig Start Date End Date Taking? Authorizing Provider   torsemide (DEMADEX) 20 MG tablet Take 1 tablet by mouth daily 4/12/22  Yes TRENT Manzo   lisinopril (PRINIVIL;ZESTRIL) 5 MG tablet Take 1 tablet by mouth daily 4/12/22  Yes TRENT Manzo   metoprolol succinate (TOPROL XL) 50 MG extended release tablet Take 1 tablet by mouth daily 4/12/22  Yes TRENT Jain   potassium chloride (KLOR-CON M) 20 MEQ extended release tablet Take 2 tablets by mouth daily for 1 day 3/23/22 4/12/22 Yes TRENT Salinas CNP   metOLazone (ZAROXOLYN) 5 MG tablet Take 1 tablet by mouth once for 1 dose 3/22/22 4/12/22 Yes TRENT Jain   vitamin D (CHOLECALCIFEROL) 50 MCG (2000 UT) TABS tablet Take 1 tablet by mouth daily 2/16/22  Yes TRENT Alamo   aspirin 81 MG chewable tablet Take 1 tablet by mouth daily 2/9/22  Yes Queen Anita MD   atorvastatin (LIPITOR) 40 MG tablet Take 1 tablet by mouth nightly 2/9/22  Yes Queen Anita MD   finasteride (PROSCAR) 5 MG tablet Take 1 tablet by mouth daily 2/9/22  Yes Queen Anita MD   tamsulosin (FLOMAX) 0.4 MG capsule Take 1 capsule by mouth daily 2/9/22  Yes Queen Anita MD   apixaban (ELIQUIS) 5 MG TABS tablet Take 1 tablet by mouth 2 times daily  Patient taking differently: Take 5 mg by mouth Patient stated that he has started to take 1 time a day instead of 2. Patient stated that taking 2 a day was causing nosebleeds. 2/9/22  Yes Queen Anita MD        Allergies:  Patient has no known allergies. Review of Systems:   · Constitutional: there has been no unanticipated weight loss. There's been no change in energy level, sleep pattern, or activity level. · Eyes: No visual changes or diplopia. No scleral icterus. · ENT: No Headaches, hearing loss or vertigo. No mouth sores or sore throat.   · Cardiovascular: Reviewed in HPI  · Respiratory: No cough or wheezing, no sputum production. No hematemesis. · Gastrointestinal: No abdominal pain, appetite loss, blood in stools. No change in bowel or bladder habits. · Genitourinary: No dysuria, trouble voiding, or hematuria. · Musculoskeletal:  No gait disturbance, weakness or joint complaints. · Integumentary: No rash or pruritis. · Neurological: No headache, diplopia, change in muscle strength, numbness or tingling. No change in gait, balance, coordination, mood, affect, memory, mentation, behavior. · Psychiatric: No anxiety, no depression. · Endocrine: No malaise, fatigue or temperature intolerance. No excessive thirst, fluid intake, or urination. No tremor. · Hematologic/Lymphatic: No abnormal bruising or bleeding, blood clots or swollen lymph nodes. · Allergic/Immunologic: No nasal congestion or hives. Physical Examination:    Vitals:    04/12/22 1449   BP: (!) 130/90   Site: Left Upper Arm   Position: Sitting   Cuff Size: Large Adult   Pulse: 70   SpO2: 97%   Weight: 227 lb 9.6 oz (103.2 kg)   Height: 6' 2\" (1.88 m)        Constitutional and General Appearance: Warm and dry, no apparent distress, normal coloration  HEENT:  Normocephalic, atraumatic  Respiratory:  · Normal excursion and expansion without use of accessory muscles  · Resp Auscultation: Normal breath sounds without dullness  Cardiovascular:  · The apical impulses not displaced  · Heart tones are crisp and normal  · JVP + cm H2O  · irregular rate and rhythm  · Peripheral pulses are symmetrical and full  · There is no clubbing, cyanosis of the extremities.   · Bilateral ankle to thighs  · Pedal Pulses: 2+ and equal   Abdomen:  · No masses or tenderness  · Liver/Spleen: No Abnormalities Noted  Neurological/Psychiatric:  · Alert and oriented in all spheres  · Moves all extremities well  · Exhibits normal gait balance and coordination  · No abnormalities of mood, affect, memory, mentation, or behavior are noted    Lab Data:    CBC:   Lab Results   Component Value Date    WBC 4.4 03/02/2022    WBC 4.3 02/10/2022    WBC 4.0 02/09/2022    RBC 4.20 03/02/2022    RBC 4.81 02/10/2022    RBC 4.69 02/09/2022    HGB 11.2 03/02/2022    HGB 12.7 02/10/2022    HGB 12.5 02/09/2022    HCT 34.8 03/02/2022    HCT 39.5 02/10/2022    HCT 38.7 02/09/2022    MCV 82.7 03/02/2022    MCV 82.2 02/10/2022    MCV 82.4 02/09/2022    RDW 15.9 03/02/2022    RDW 16.5 02/10/2022    RDW 16.8 02/09/2022     03/02/2022     02/10/2022     02/09/2022     BMP:  Lab Results   Component Value Date     03/22/2022     03/02/2022     03/02/2022    K 3.6 03/22/2022    K 3.8 03/02/2022    K 3.7 03/02/2022    K 3.7 12/01/2021    K 4.2 11/28/2021    K 4.4 07/25/2019    CL 99 03/22/2022     03/02/2022     03/02/2022    CO2 29 03/22/2022    CO2 27 03/02/2022    CO2 25 03/02/2022    PHOS 4.3 02/09/2022    PHOS 4.1 02/08/2022    PHOS 4.5 02/07/2022    BUN 21 03/22/2022    BUN 23 03/02/2022    BUN 23 03/02/2022    CREATININE 1.2 03/22/2022    CREATININE 1.1 03/02/2022    CREATININE 1.1 03/02/2022     BNP:   Lab Results   Component Value Date    PROBNP 11,579 03/22/2022    PROBNP 22,846 03/02/2022    PROBNP 10,576 02/16/2022     Cardiac Imaging:  CONSUELO Dr Asya Palafox 2/10/22  Preliminary CONSUELO results are:      - Severe LV dysfunction,  EF: 20-25%  - LA dilated. There was HENOK thrombus. - Moderate MR    Cardiac Cath PCI: Dr Krissy Caballero 2/8/2022  Anatomy:   LM-nml   LAD-nml  Cx-nml  OM- nml  RCA-nml  RPDA- nml  LVEF- 10%  LVG- global hypokinesis  LVEDP- 10     Hemodynamics:  RA- mean 3  RV- 37/0  PAWP- 10  PA- 34/12 (20)     C.O.- 5.7 (4.9)  C. I.- 2.6 (2.25)  PA sat 60%  AO sat 91%     Contrast: 70  Flouro Time: 5.3  Access: R radial a, R CFV     Impression  ~Coronary Angiography w/ normal cors  ~LVG with LVEF of 10% and global regional wall motion abnormalities  ~Severe MR  ~Normal CO/CI  ~normal L and R filling pressures     Recommendation  ~Aggressive medical treatment and risk factor modification  ~1. Medications reviewed, no changes at this time. 2. Post cath IVF. Bedrest.  3.Consider CONSUELO for evaluation of MR.   4. Patient has been advised on the importance of regular exercise of at least 20-30 minutes daily alternating with aerobic and isometric activities. 5. Patient counseled about and offered assistance for smoking cessation   6. No indication for cardiac rehab  7. CHF service to evaluate tomorrow.     Echo 11/29/2021  Summary   -Covid+   -Severely reduced global systolic function with an ejection fraction   estimated at 20%.  -Severe global hypokinesis with regional variations noted.   -Right ventricular systolic function appears to be mildly reduced based on   visual inspection.   -Severe biatrial enlargement.   -Thickened mitral valve without evidence of stenosis. There is   mild-to-moderate mitral regurgitation.   -There is mild-to-moderate tricuspid regurgitation with a RVSP estimation of   17 mmHg.   -Diastolic dysfunction with elevated LV filling pressures.     Echo 7/25/2019  Summary   -Overall left ventricular systolic function is moderately depressed .   -Ejection fraction is visually estimated to be 30-35 %.  -Global left ventricular function moderately reduced.   -Indeterminate diastolic function due to atrial fibrillation and moderate to   severe mitral regurgitation.   -Moderate-to-severe mitral regurgitation .   -Dilated left atrium with a volume of 97 ml.   -Left atrial volume is increased probably due to atrial fibrillation .   -There is mild right ventricular hypertrophy.   -The right ventricle is mildly enlarged.   -Right ventricular systolic pressure of 53 mm Hg consistent with pulmonary   hypertension.   -Moderate to severe tricuspid regurgitation.  TAPSE = 1.43   -IVC size is dilated (>2.1 cm) but collapses > 50% with respiration   consistent with elevated RA pressure (8 mmHg).     Assessment:    1. Acute on chronic systolic heart failure (HCC) on ace and bb   2. Non-compliance    3. Homeless    4. PAF (paroxysmal atrial fibrillation) ([facility] [address]    5. Benign essential HTN    6. Dilated cardiomyopathy ([facility] [address]        Plan:   Patient Instructions   1. Infusion center tomorrow for blood work and IV lasix  2. Stop po lasix and start torsemide 40 mg twice a day  3. Recommend ER if no improvement in symptoms  4. You need to be on eliquis twice a day  5. Needs appt with EP (rhythm MD)  6. RTO in 2 weeks    He is fluid overloaded 20 pounds and recommend admission to the hospital.  I am afraid he is not compliant with taking his lisinopril or metoprolol along with fluid restrictions. If he is not able to urinate, he has been instructed to go to ER (had indwelling ernst catheter which fell out). New scripts for lisinopril and metoprolol sent to pharmacy    >40 minutes spent in reviewing, examining and coordinating with Infusion center plan of care and treatment.     NYHA 3    I appreciate the opportunity of cooperating in the care of this individual.    TRENT Lema - CNS, CNS, 4/12/2022, 4:02 PM  }

## 2022-04-12 NOTE — PATIENT INSTRUCTIONS
1.  Infusion center tomorrow for blood work and IV lasix  2. Stop po lasix and start torsemide 40 mg twice a day  3. Recommend ER if no improvement in symptoms  4. You need to be on eliquis twice a day  5. Needs appt with EP (rhythm MD)  6.   RTO in 2 weeks Statement Selected

## 2022-04-13 ENCOUNTER — HOSPITAL ENCOUNTER (INPATIENT)
Age: 62
LOS: 13 days | Discharge: HOME | DRG: 179 | End: 2022-04-26
Attending: EMERGENCY MEDICINE | Admitting: INTERNAL MEDICINE
Payer: COMMERCIAL

## 2022-04-13 ENCOUNTER — APPOINTMENT (OUTPATIENT)
Dept: GENERAL RADIOLOGY | Age: 62
DRG: 179 | End: 2022-04-13
Payer: COMMERCIAL

## 2022-04-13 ENCOUNTER — CLINICAL DOCUMENTATION (OUTPATIENT)
Dept: ONCOLOGY | Age: 62
End: 2022-04-13

## 2022-04-13 DIAGNOSIS — I50.9 ACUTE ON CHRONIC CONGESTIVE HEART FAILURE, UNSPECIFIED HEART FAILURE TYPE (HCC): Primary | ICD-10-CM

## 2022-04-13 DIAGNOSIS — N39.0 COMPLICATED UTI (URINARY TRACT INFECTION): ICD-10-CM

## 2022-04-13 PROBLEM — I50.21 ACUTE SYSTOLIC HEART FAILURE (HCC): Status: ACTIVE | Noted: 2022-04-13

## 2022-04-13 LAB
A/G RATIO: 1.4 (ref 1.1–2.2)
ALBUMIN SERPL-MCNC: 4 G/DL (ref 3.4–5)
ALP BLD-CCNC: 100 U/L (ref 40–129)
ALT SERPL-CCNC: 57 U/L (ref 10–40)
ANION GAP SERPL CALCULATED.3IONS-SCNC: 13 MMOL/L (ref 3–16)
AST SERPL-CCNC: 50 U/L (ref 15–37)
BACTERIA: ABNORMAL /HPF
BASOPHILS ABSOLUTE: 0.1 K/UL (ref 0–0.2)
BASOPHILS RELATIVE PERCENT: 0.8 %
BILIRUB SERPL-MCNC: 2.1 MG/DL (ref 0–1)
BILIRUBIN URINE: NEGATIVE
BLOOD, URINE: ABNORMAL
BUN BLDV-MCNC: 32 MG/DL (ref 7–20)
CALCIUM SERPL-MCNC: 9.1 MG/DL (ref 8.3–10.6)
CHLORIDE BLD-SCNC: 101 MMOL/L (ref 99–110)
CLARITY: ABNORMAL
CO2: 27 MMOL/L (ref 21–32)
COLOR: YELLOW
CREAT SERPL-MCNC: 1.3 MG/DL (ref 0.8–1.3)
EOSINOPHILS ABSOLUTE: 0 K/UL (ref 0–0.6)
EOSINOPHILS RELATIVE PERCENT: 0.5 %
EPITHELIAL CELLS, UA: 0 /HPF (ref 0–5)
GFR AFRICAN AMERICAN: >60
GFR NON-AFRICAN AMERICAN: 56
GLUCOSE BLD-MCNC: 91 MG/DL (ref 70–99)
GLUCOSE URINE: NEGATIVE MG/DL
HCT VFR BLD CALC: 37.8 % (ref 40.5–52.5)
HEMOGLOBIN: 12.2 G/DL (ref 13.5–17.5)
HYALINE CASTS: 14 /LPF (ref 0–8)
KETONES, URINE: NEGATIVE MG/DL
LEUKOCYTE ESTERASE, URINE: ABNORMAL
LYMPHOCYTES ABSOLUTE: 1.5 K/UL (ref 1–5.1)
LYMPHOCYTES RELATIVE PERCENT: 20.4 %
MCH RBC QN AUTO: 26.3 PG (ref 26–34)
MCHC RBC AUTO-ENTMCNC: 32.3 G/DL (ref 31–36)
MCV RBC AUTO: 81.3 FL (ref 80–100)
MICROSCOPIC EXAMINATION: YES
MONOCYTES ABSOLUTE: 0.7 K/UL (ref 0–1.3)
MONOCYTES RELATIVE PERCENT: 9.5 %
NEUTROPHILS ABSOLUTE: 4.9 K/UL (ref 1.7–7.7)
NEUTROPHILS RELATIVE PERCENT: 68.8 %
NITRITE, URINE: NEGATIVE
PDW BLD-RTO: 15.7 % (ref 12.4–15.4)
PH UA: 5 (ref 5–8)
PLATELET # BLD: 277 K/UL (ref 135–450)
PMV BLD AUTO: 7.6 FL (ref 5–10.5)
POTASSIUM REFLEX MAGNESIUM: 4.1 MMOL/L (ref 3.5–5.1)
PRO-BNP: ABNORMAL PG/ML (ref 0–124)
PROTEIN UA: 100 MG/DL
RBC # BLD: 4.64 M/UL (ref 4.2–5.9)
RBC UA: 8 /HPF (ref 0–4)
SODIUM BLD-SCNC: 141 MMOL/L (ref 136–145)
SPECIFIC GRAVITY UA: >=1.03 (ref 1–1.03)
TOTAL PROTEIN: 6.9 G/DL (ref 6.4–8.2)
TROPONIN: <0.01 NG/ML
URINE REFLEX TO CULTURE: YES
URINE TYPE: ABNORMAL
UROBILINOGEN, URINE: 0.2 E.U./DL
WBC # BLD: 7.1 K/UL (ref 4–11)
WBC UA: 140 /HPF (ref 0–5)

## 2022-04-13 PROCEDURE — 99283 EMERGENCY DEPT VISIT LOW MDM: CPT

## 2022-04-13 PROCEDURE — 6370000000 HC RX 637 (ALT 250 FOR IP): Performed by: INTERNAL MEDICINE

## 2022-04-13 PROCEDURE — 6360000002 HC RX W HCPCS: Performed by: EMERGENCY MEDICINE

## 2022-04-13 PROCEDURE — 85025 COMPLETE CBC W/AUTO DIFF WBC: CPT

## 2022-04-13 PROCEDURE — 84484 ASSAY OF TROPONIN QUANT: CPT

## 2022-04-13 PROCEDURE — 87086 URINE CULTURE/COLONY COUNT: CPT

## 2022-04-13 PROCEDURE — 1200000000 HC SEMI PRIVATE

## 2022-04-13 PROCEDURE — 80053 COMPREHEN METABOLIC PANEL: CPT

## 2022-04-13 PROCEDURE — 87040 BLOOD CULTURE FOR BACTERIA: CPT

## 2022-04-13 PROCEDURE — 71045 X-RAY EXAM CHEST 1 VIEW: CPT

## 2022-04-13 PROCEDURE — 83880 ASSAY OF NATRIURETIC PEPTIDE: CPT

## 2022-04-13 PROCEDURE — 36415 COLL VENOUS BLD VENIPUNCTURE: CPT

## 2022-04-13 PROCEDURE — 81001 URINALYSIS AUTO W/SCOPE: CPT

## 2022-04-13 PROCEDURE — 93005 ELECTROCARDIOGRAM TRACING: CPT | Performed by: EMERGENCY MEDICINE

## 2022-04-13 PROCEDURE — 6360000002 HC RX W HCPCS: Performed by: INTERNAL MEDICINE

## 2022-04-13 PROCEDURE — 87186 SC STD MICRODIL/AGAR DIL: CPT

## 2022-04-13 RX ORDER — ONDANSETRON 4 MG/1
4 TABLET, ORALLY DISINTEGRATING ORAL EVERY 8 HOURS PRN
Status: DISCONTINUED | OUTPATIENT
Start: 2022-04-13 | End: 2022-04-13 | Stop reason: ALTCHOICE

## 2022-04-13 RX ORDER — FUROSEMIDE 10 MG/ML
60 INJECTION INTRAMUSCULAR; INTRAVENOUS ONCE
Status: COMPLETED | OUTPATIENT
Start: 2022-04-13 | End: 2022-04-13

## 2022-04-13 RX ORDER — PROCHLORPERAZINE MALEATE 5 MG/1
10 TABLET ORAL EVERY 6 HOURS PRN
Status: DISCONTINUED | OUTPATIENT
Start: 2022-04-13 | End: 2022-04-26 | Stop reason: HOSPADM

## 2022-04-13 RX ORDER — SODIUM CHLORIDE 9 MG/ML
INJECTION, SOLUTION INTRAVENOUS PRN
Status: DISCONTINUED | OUTPATIENT
Start: 2022-04-13 | End: 2022-04-26 | Stop reason: HOSPADM

## 2022-04-13 RX ORDER — POLYETHYLENE GLYCOL 3350 17 G/17G
17 POWDER, FOR SOLUTION ORAL DAILY PRN
Status: DISCONTINUED | OUTPATIENT
Start: 2022-04-13 | End: 2022-04-26 | Stop reason: HOSPADM

## 2022-04-13 RX ORDER — ASPIRIN 81 MG/1
81 TABLET, CHEWABLE ORAL DAILY
Status: DISCONTINUED | OUTPATIENT
Start: 2022-04-14 | End: 2022-04-14

## 2022-04-13 RX ORDER — FUROSEMIDE 10 MG/ML
INJECTION INTRAMUSCULAR; INTRAVENOUS
Status: DISPENSED
Start: 2022-04-13 | End: 2022-04-14

## 2022-04-13 RX ORDER — LISINOPRIL 5 MG/1
5 TABLET ORAL DAILY
Status: DISCONTINUED | OUTPATIENT
Start: 2022-04-13 | End: 2022-04-15

## 2022-04-13 RX ORDER — METOPROLOL SUCCINATE 50 MG/1
50 TABLET, EXTENDED RELEASE ORAL DAILY
Status: DISCONTINUED | OUTPATIENT
Start: 2022-04-13 | End: 2022-04-17

## 2022-04-13 RX ORDER — ONDANSETRON 2 MG/ML
4 INJECTION INTRAMUSCULAR; INTRAVENOUS EVERY 6 HOURS PRN
Status: DISCONTINUED | OUTPATIENT
Start: 2022-04-13 | End: 2022-04-13 | Stop reason: ALTCHOICE

## 2022-04-13 RX ORDER — SODIUM CHLORIDE 0.9 % (FLUSH) 0.9 %
5-40 SYRINGE (ML) INJECTION PRN
Status: DISCONTINUED | OUTPATIENT
Start: 2022-04-13 | End: 2022-04-26 | Stop reason: HOSPADM

## 2022-04-13 RX ORDER — TAMSULOSIN HYDROCHLORIDE 0.4 MG/1
0.4 CAPSULE ORAL DAILY
Status: DISCONTINUED | OUTPATIENT
Start: 2022-04-14 | End: 2022-04-26 | Stop reason: HOSPADM

## 2022-04-13 RX ORDER — FINASTERIDE 5 MG/1
5 TABLET, FILM COATED ORAL DAILY
Status: DISCONTINUED | OUTPATIENT
Start: 2022-04-14 | End: 2022-04-26 | Stop reason: HOSPADM

## 2022-04-13 RX ORDER — SODIUM CHLORIDE 0.9 % (FLUSH) 0.9 %
5-40 SYRINGE (ML) INJECTION EVERY 12 HOURS SCHEDULED
Status: DISCONTINUED | OUTPATIENT
Start: 2022-04-13 | End: 2022-04-26 | Stop reason: HOSPADM

## 2022-04-13 RX ORDER — ACETAMINOPHEN 325 MG/1
650 TABLET ORAL EVERY 6 HOURS PRN
Status: DISCONTINUED | OUTPATIENT
Start: 2022-04-13 | End: 2022-04-26 | Stop reason: HOSPADM

## 2022-04-13 RX ORDER — ACETAMINOPHEN 650 MG/1
650 SUPPOSITORY RECTAL EVERY 6 HOURS PRN
Status: DISCONTINUED | OUTPATIENT
Start: 2022-04-13 | End: 2022-04-26 | Stop reason: HOSPADM

## 2022-04-13 RX ORDER — ATORVASTATIN CALCIUM 40 MG/1
40 TABLET, FILM COATED ORAL NIGHTLY
Status: DISCONTINUED | OUTPATIENT
Start: 2022-04-13 | End: 2022-04-26 | Stop reason: HOSPADM

## 2022-04-13 RX ADMIN — Medication 1000 MG: at 17:16

## 2022-04-13 RX ADMIN — FUROSEMIDE 60 MG: 10 INJECTION, SOLUTION INTRAMUSCULAR; INTRAVENOUS at 15:14

## 2022-04-13 RX ADMIN — DESMOPRESSIN ACETATE 40 MG: 0.2 TABLET ORAL at 21:57

## 2022-04-13 RX ADMIN — METOPROLOL SUCCINATE 50 MG: 50 TABLET, EXTENDED RELEASE ORAL at 21:57

## 2022-04-13 ASSESSMENT — PAIN SCALES - GENERAL
PAINLEVEL_OUTOF10: 0

## 2022-04-13 NOTE — ACP (ADVANCE CARE PLANNING)
Advanced Care Planning Note.     Purpose of Encounter: Advanced care planning in light of hospitalization  Parties In Attendance: Patient,    Decisional Capacity: Yes  Subjective: Patient  understand that this conversation is to address long term care goal  Objective: Admitted to hospital with acute on chronic systolic heart failure exacerbation and UTI  Goals of Care Determination: Patient pursue CPR intubation initially if required unsure about long-term ventilation or tracheostomy would want his family and doctors to come to a joint decision regarding this  Code Status: full code  Time spent on Advanced care Plannin minutes  Advanced Care Planning Documents: documented patient's wishes, and unsure who he would want to make medical decision for him if he is unable to make decisions would like to think about it and get back to the service regarding this    Richa Ramírez MD  2022 3:29 PM

## 2022-04-13 NOTE — ED PROVIDER NOTES
EMERGENCY DEPARTMENT PROVIDER NOTE    Patient Identification  Pt Name: Hudson Dowell  MRN: [id number]  Armstrongfizabella 1960  Date of evaluation: 4/13/2022  Provider: Chidi Haider DO  PCP: No primary care provider on file. Chief Complaint  Shortness of Breath (pt c/o shortness of breath x couple of days.)      HPI  (History provided by patient)  This is a 59 y.o. male with pertinent past medical history of CHF, hypertension who was brought in by self for shortness of breath ongoing for the past 3 to 5 days. Worsened with exertion and lying flat, nothing seems to make the symptoms better. Severity moderate. Patient states it feels like his past exacerbations of CHF. Associated with worsening edema in his bilateral lower extremities and weight gain. Patient states it is difficult to bend his knees due to the swelling. He reports adherence to his medications. Was seen by cardiology yesterday, was instructed to switch from Lasix to torsemide however he has not yet filled this new prescription. He denies any chest pain or fever. Patient also states he had a Ernst catheter placed until it was inadvertently removed about 3 days ago. He is been urinating easily without the ernst since then, however has burning with urination.     ROS    Const:  No fevers, no chills, no generalized weakness  Skin:  No rash, no lesions  Eyes:  No visual changes, no blurry or double vision, no pain  ENT:  No sore throat, no difficulty swallowing, no ear pain, no sinus pain or congestion  Card:  No chest pain, no palpitations, +edema  Resp:  +shortness of breath, +cough, no wheezing  Abd:  No abdominal pain, no nausea, no vomiting, no diarrhea  Genitourinary:  +dysuria, no hematuria  MSK:  No joint pain, no myalgia  Neuro:  No focal weakness, no headache, no paresthesia    All other systems reviewed and negative unless otherwise noted in HPI        I have reviewed the following nursing documentation:  Allergies: Patient has no known allergies. Past medical history:   Past Medical History:   Diagnosis Date    Atrial fibrillation ([facility] [address] 07/25/2019    CHF (congestive heart failure) (HCC)     Hx of blood clots     Hypertension      Past surgical history:   Past Surgical History:   Procedure Laterality Date    CARDIOVERSION  07/26/2019    Dr. Maria A Fernandez  07/26/2019       Home medications:   Current Discharge Medication List      CONTINUE these medications which have NOT CHANGED    Details   torsemide (DEMADEX) 20 MG tablet Take 1 tablet by mouth daily  Qty: 120 tablet, Refills: 0      lisinopril (PRINIVIL;ZESTRIL) 5 MG tablet Take 1 tablet by mouth daily  Qty: 30 tablet, Refills: 1      metoprolol succinate (TOPROL XL) 50 MG extended release tablet Take 1 tablet by mouth daily  Qty: 30 tablet, Refills: 1      potassium chloride (KLOR-CON M) 20 MEQ extended release tablet Take 2 tablets by mouth daily for 1 day  Qty: 1 tablet, Refills: 0      metOLazone (ZAROXOLYN) 5 MG tablet Take 1 tablet by mouth once for 1 dose  Qty: 1 tablet, Refills: 0      vitamin D (CHOLECALCIFEROL) 50 MCG (2000 UT) TABS tablet Take 1 tablet by mouth daily  Qty: 90 tablet, Refills: 1    Associated Diagnoses: Acute on chronic systolic heart failure (HCC)      aspirin 81 MG chewable tablet Take 1 tablet by mouth daily  Qty: 30 tablet, Refills: 1      atorvastatin (LIPITOR) 40 MG tablet Take 1 tablet by mouth nightly  Qty: 30 tablet, Refills: 2      finasteride (PROSCAR) 5 MG tablet Take 1 tablet by mouth daily  Qty: 30 tablet, Refills: 0      tamsulosin (FLOMAX) 0.4 MG capsule Take 1 capsule by mouth daily  Qty: 30 capsule, Refills: 2      apixaban (ELIQUIS) 5 MG TABS tablet Take 1 tablet by mouth 2 times daily  Qty: 60 tablet, Refills: 0             Social history:  reports that he has never smoked. He has never used smokeless tobacco. He reports that he does not drink alcohol and does not use drugs.     Family history:    Family History   Problem Relation Age of Onset    Heart Disease Mother     High Blood Pressure Mother     Heart Attack Mother     Other Father     Stroke Father     High Blood Pressure Father          Exam  ED Triage Vitals [04/13/22 1210]   BP Temp Temp Source Pulse Resp SpO2 Height Weight   112/88 98.2 °F (36.8 °C) Oral 70 22 99 % 6' 2\" (1.88 m) 227 lb (103 kg)     Nursing note and vitals reviewed. Constitutional: Well developed, well nourished. Non-toxic in appearance. Chronically ill-appearing. HENT:      Head: Normocephalic and atraumatic. Ears: External ears normal.      Nose: Nose normal.     Mouth: Membrane mucosa moist and pink. Eyes: Anicteric sclera. No discharge. Neck: Supple. Trachea midline. Cardiovascular: Irregularly irregular rhythm, normal rate; no murmurs, rubs, or gallops. 4+ pitting edema symmetric lower extremities  Pulmonary/Chest: No sheba respiratory distress. Mild tachypnea, appears conversationally dyspneic. Breath sounds diminished at bases. No stridor. No wheezes. No rales. Abdominal: Soft. No distension. Pitting edema to lower anterior abdominal wall. Nontender to palpation all quadrants. Musculoskeletal: Moves all extremities. No gross deformity. Neurological: Alert and oriented. Face symmetric. Speech is clear. Skin: Warm and dry. No rash. Psychiatric: Normal mood and affect. Behavior is normal.    Procedures      EKG    EKG was reviewed by emergency department physician in the absence of a cardiologist    Narrow complex irregular rhythm, rate 102, normal axis, normal QRS intervals, normal Qtc, no ST elevations or depressions, TWI V5 and V6, impression atrial fibrillation with nonspecific T wave morphology and frequent PVCs, no STEMI      Radiology  XR CHEST PORTABLE   Final Result   Cardiomegaly with no acute pulmonary finding.              Labs  Results for orders placed or performed during the hospital encounter of 04/13/22   CBC with Auto Differential Negative Negative    Leukocyte Esterase, Urine MODERATE (A) Negative    Microscopic Examination YES     Urine Type NotGiven     Urine Reflex to Culture Yes    Microscopic Urinalysis   Result Value Ref Range    Bacteria, UA 4+ (A) None Seen /HPF    Hyaline Casts, UA 14 (H) 0 - 8 /LPF    WBC,  (H) 0 - 5 /HPF    RBC, UA 8 (H) 0 - 4 /HPF    Epithelial Cells, UA 0 0 - 5 /HPF   EKG 12 Lead   Result Value Ref Range    Ventricular Rate 102 BPM    Atrial Rate 102 BPM    QRS Duration 108 ms    Q-T Interval 396 ms    QTc Calculation (Bazett) 516 ms    R Axis 2 degrees    T Axis -42 degrees    Diagnosis       Undetermined rhythmT wave abnormality, consider lateral ischemiaAbnormal ECG       Screenings           MDM and ED Course    Patient afebrile, chronically ill-appearing however nontoxic. He is mildly tachypneic, however in no obvious respiratory distress. No hypoxia. EKG no STEMI, troponin normal, no chest pain, low suspicion for ACS. Patient with chronic atrial fibrillation, currently rate controlled, no indication for emergent intervention in this regard. Pulmonary embolism is considered however is not the most likely diagnosis. BNP is significantly elevated over baseline, patient also with weight gain and pronounced lower extremity edema. Consistent with acute exacerbation of CHF. Urinalysis also notable for UTI, nothing to suggest pyelonephritis. Patient is not septic. Patient received Lasix. Given recent presence of Don catheter, will treat UTI with cefepime. Case discussed with internal medicine team who will admit for further evaluation and care. Patient alert, no distress and hemodynamically stable at time of admission. Final Impression  1. Acute on chronic congestive heart failure, unspecified heart failure type ([facility] [address]    2. Complicated UTI (urinary tract infection)        Blood pressure (!) 117/91, pulse 108, temperature 97.9 °F (36.6 °C), temperature source Oral, resp.  rate 18, height 6' 2\" (1.88 m), weight 224 lb 12.8 oz (102 kg), SpO2 95 %. Disposition:  DISPOSITION Admitted 04/13/2022 03:12:41 PM      Patient Referrals:  No follow-up provider specified. Discharge Medications:  Current Discharge Medication List          Discontinued Medications:  Current Discharge Medication List          This chart was generated using the [address] [place] [address] dictation system. I created this record but it may contain dictation errors given the limitations of this technology.     Yessy Vaughn DO (electronically signed)  Attending Emergency Physician       Yessy Vaughn DO  04/13/22 1942

## 2022-04-13 NOTE — H&P
HOSPITALISTS HISTORY AND PHYSICAL    4/13/2022 3:26 PM    Patient Information:  Gena Martinez is a 59 y.o. male [id number]  PCP:  No primary care provider on file. (Tel: None )    Chief complaint:    Chief Complaint   Patient presents with    Shortness of Breath     pt c/o shortness of breath x couple of days. History of Present Illness:  Marilyn Carter is a 59 y.o. male admitted to hospital 2 months ago with A. fib with RVR no left atrial appendage clot is on Eliquis and oral medication patient has had increased lower extremity swelling with exertional dyspnea and nocturnal dyspnea not able to lay flat anymore. No chest pain no nausea vomiting no fevers or chills but does have a cough productive of frothy sputum. REVIEW OF SYSTEMS:   Constitutional: Negative for fever,chills or night sweats  ENT: Negative for rhinorrhea, epistaxis, hoarseness, sore throat. Respiratory: see above  Cardiovascular: Negative for chest pain, palpitations   Gastrointestinal: Negative for nausea, vomiting, diarrhea  Genitourinary: Negative for polyuria, dysuria   Hematologic/Lymphatic: Negative for bleeding tendency, easy bruising  Musculoskeletal: Negative for myalgias and arthralgias  Neurologic: Negative for confusion,dysarthria. Skin: Negative for itching,rash  Psychiatric: Negative for depression,anxiety, agitation. Endocrine: Negative for polydipsia,polyuria,heat /cold intolerance. Past Medical History:   has a past medical history of Atrial fibrillation ([facility] [address] and Hypertension. Past Surgical History:   has a past surgical history that includes Insertable Cardiac Monitor (07/26/2019) and Cardioversion (07/26/2019).      Medications:  Current Facility-Administered Medications on File Prior to Encounter   Medication Dose Route Frequency Provider Last Rate Last Admin    furosemide (LASIX) injection 120 mg  120 mg IntraVENous Once TRENT [place]        sodium chloride flush 0.9 % injection 5-40 mL  5-40 mL IntraVENous Once TRENT [place]         Current Outpatient Medications on File Prior to Encounter   Medication Sig Dispense Refill    torsemide (DEMADEX) 20 MG tablet Take 1 tablet by mouth daily 120 tablet 0    lisinopril (PRINIVIL;ZESTRIL) 5 MG tablet Take 1 tablet by mouth daily 30 tablet 1    metoprolol succinate (TOPROL XL) 50 MG extended release tablet Take 1 tablet by mouth daily 30 tablet 1    potassium chloride (KLOR-CON M) 20 MEQ extended release tablet Take 2 tablets by mouth daily for 1 day 1 tablet 0    metOLazone (ZAROXOLYN) 5 MG tablet Take 1 tablet by mouth once for 1 dose 1 tablet 0    vitamin D (CHOLECALCIFEROL) 50 MCG (2000 UT) TABS tablet Take 1 tablet by mouth daily 90 tablet 1    aspirin 81 MG chewable tablet Take 1 tablet by mouth daily 30 tablet 1    atorvastatin (LIPITOR) 40 MG tablet Take 1 tablet by mouth nightly 30 tablet 2    finasteride (PROSCAR) 5 MG tablet Take 1 tablet by mouth daily 30 tablet 0    tamsulosin (FLOMAX) 0.4 MG capsule Take 1 capsule by mouth daily 30 capsule 2    apixaban (ELIQUIS) 5 MG TABS tablet Take 1 tablet by mouth 2 times daily (Patient taking differently: Take 5 mg by mouth Patient stated that he has started to take 1 time a day instead of 2. Patient stated that taking 2 a day was causing nosebleeds.) 60 tablet 0       Allergies:  No Known Allergies     Social History:  Patient Lives at home   reports that he has never smoked. He has never used smokeless tobacco. He reports that he does not drink alcohol and does not use drugs. Family History:  family history includes Heart Attack in his mother; Heart Disease in his mother; High Blood Pressure in his father and mother;  Other in his father; Stroke in his father. ,     Physical Exam:  /88   Pulse 70   Temp 98.2 °F (36.8 °C) (Oral)   Resp 22   Ht 6' 2\" (1.88 m)   Wt 227 lb (103 kg)   SpO2 99%   BMI 29.15 kg/m²     General appearance:  Appears comfortable. AAOx3  HEENT: atraumatic, Pupils equal, muscous membranes moist, no masses appreciated  Cardiovascular: Regular rate and rhythm no murmurs appreciated  Respiratory: CTAB no wheezing  Gastrointestinal: Abdomen soft, non-tender, BS+  EXT: 2+ BLE  Neurology: no gross focal deficts  Psychiatry: Appropriate affect. Not agitated  Skin: Warm, dry, no rashes appreciated    Labs:  CBC:   Lab Results   Component Value Date    WBC 7.1 04/13/2022    RBC 4.64 04/13/2022    HGB 12.2 04/13/2022    HCT 37.8 04/13/2022    MCV 81.3 04/13/2022    MCH 26.3 04/13/2022    MCHC 32.3 04/13/2022    RDW 15.7 04/13/2022     04/13/2022    MPV 7.6 04/13/2022     BMP:    Lab Results   Component Value Date     04/13/2022    K 4.1 04/13/2022     04/13/2022    CO2 27 04/13/2022    BUN 32 04/13/2022    CREATININE 1.3 04/13/2022    CALCIUM 9.1 04/13/2022    GFRAA >60 04/13/2022    LABGLOM 56 04/13/2022    GLUCOSE 91 04/13/2022     XR CHEST PORTABLE   Final Result   Cardiomegaly with no acute pulmonary finding. Recent imaging reviewed    Problem List  Principal Problem:    Acute systolic heart failure ([address]  Resolved Problems:    * No resolved hospital problems. *        Assessment/Plan:   Acute On chronic systolic heart failure with EF of 25% and moderate MR  - iv lasix 40 mg bid  - home meds  - cards consult    Elevated lft likely secondary to above  - diurese and trend    PAF with left atrial appendage clost 2/2022  - eliquis  - bblocker    Bph: flomax    UTI: rocephin     DVT prophylaxis eliquis  Code status full code        Admit as inpatient I anticipate hospitalization spanning more than two midnights for investigation and treatment of the above medically necessary diagnoses. Please note that some part of this chart was generated using Dragon dictation software.  Although every effort was made to ensure the accuracy of this automated transcription, some errors in transcription may have occurred inadvertently. If you may need any clarification, please do not hesitate to contact me through [facility].        Joan Byrd MD    4/13/2022 3:26 PM

## 2022-04-13 NOTE — PROGRESS NOTES
Pt admitted to 3 A from ed. Assessment complete. A&o x4. resp even/unlabored on ra. sob with exertion. o2 sats 94% on RA. No cp/pressure, tele reading afib 100s. 2+ edema ble. Pt weak, fall precautions in place. Bed alarm in place.  Bed low, call bell in reach, instructed to call for assist.

## 2022-04-13 NOTE — ED NOTES
Patient took monitor off and didn't want it back on. ..needed wipes for hands.   Upset he couldn't get them sooner     Annalee Lopez RN  04/13/22 [phone]

## 2022-04-14 ENCOUNTER — APPOINTMENT (OUTPATIENT)
Dept: GENERAL RADIOLOGY | Age: 62
DRG: 179 | End: 2022-04-14
Payer: COMMERCIAL

## 2022-04-14 LAB
A/G RATIO: 1.2 (ref 1.1–2.2)
ALBUMIN SERPL-MCNC: 3.6 G/DL (ref 3.4–5)
ALP BLD-CCNC: 83 U/L (ref 40–129)
ALT SERPL-CCNC: 53 U/L (ref 10–40)
ANION GAP SERPL CALCULATED.3IONS-SCNC: 12 MMOL/L (ref 3–16)
AST SERPL-CCNC: 43 U/L (ref 15–37)
BASOPHILS ABSOLUTE: 0 K/UL (ref 0–0.2)
BASOPHILS RELATIVE PERCENT: 0.4 %
BILIRUB SERPL-MCNC: 1.8 MG/DL (ref 0–1)
BUN BLDV-MCNC: 34 MG/DL (ref 7–20)
CALCIUM SERPL-MCNC: 9 MG/DL (ref 8.3–10.6)
CHLORIDE BLD-SCNC: 103 MMOL/L (ref 99–110)
CO2: 28 MMOL/L (ref 21–32)
CREAT SERPL-MCNC: 1.4 MG/DL (ref 0.8–1.3)
EKG ATRIAL RATE: 102 BPM
EKG DIAGNOSIS: NORMAL
EKG Q-T INTERVAL: 396 MS
EKG QRS DURATION: 108 MS
EKG QTC CALCULATION (BAZETT): 516 MS
EKG R AXIS: 2 DEGREES
EKG T AXIS: -42 DEGREES
EKG VENTRICULAR RATE: 102 BPM
EOSINOPHILS ABSOLUTE: 0 K/UL (ref 0–0.6)
EOSINOPHILS RELATIVE PERCENT: 0.6 %
GFR AFRICAN AMERICAN: >60
GFR NON-AFRICAN AMERICAN: 51
GLUCOSE BLD-MCNC: 96 MG/DL (ref 70–99)
HCT VFR BLD CALC: 36.1 % (ref 40.5–52.5)
HEMOGLOBIN: 11.6 G/DL (ref 13.5–17.5)
LYMPHOCYTES ABSOLUTE: 1.4 K/UL (ref 1–5.1)
LYMPHOCYTES RELATIVE PERCENT: 19.9 %
MCH RBC QN AUTO: 26.1 PG (ref 26–34)
MCHC RBC AUTO-ENTMCNC: 32.1 G/DL (ref 31–36)
MCV RBC AUTO: 81.4 FL (ref 80–100)
MONOCYTES ABSOLUTE: 0.5 K/UL (ref 0–1.3)
MONOCYTES RELATIVE PERCENT: 7.4 %
NEUTROPHILS ABSOLUTE: 4.8 K/UL (ref 1.7–7.7)
NEUTROPHILS RELATIVE PERCENT: 71.7 %
PDW BLD-RTO: 14.9 % (ref 12.4–15.4)
PLATELET # BLD: 274 K/UL (ref 135–450)
PMV BLD AUTO: 7.8 FL (ref 5–10.5)
POTASSIUM REFLEX MAGNESIUM: 4 MMOL/L (ref 3.5–5.1)
RBC # BLD: 4.44 M/UL (ref 4.2–5.9)
SODIUM BLD-SCNC: 143 MMOL/L (ref 136–145)
TOTAL PROTEIN: 6.5 G/DL (ref 6.4–8.2)
WBC # BLD: 6.8 K/UL (ref 4–11)

## 2022-04-14 PROCEDURE — 97162 PT EVAL MOD COMPLEX 30 MIN: CPT

## 2022-04-14 PROCEDURE — 93010 ELECTROCARDIOGRAM REPORT: CPT | Performed by: INTERNAL MEDICINE

## 2022-04-14 PROCEDURE — 97166 OT EVAL MOD COMPLEX 45 MIN: CPT

## 2022-04-14 PROCEDURE — 36415 COLL VENOUS BLD VENIPUNCTURE: CPT

## 2022-04-14 PROCEDURE — 1200000000 HC SEMI PRIVATE

## 2022-04-14 PROCEDURE — 51798 US URINE CAPACITY MEASURE: CPT

## 2022-04-14 PROCEDURE — 72170 X-RAY EXAM OF PELVIS: CPT

## 2022-04-14 PROCEDURE — 6370000000 HC RX 637 (ALT 250 FOR IP): Performed by: INTERNAL MEDICINE

## 2022-04-14 PROCEDURE — 2580000003 HC RX 258: Performed by: INTERNAL MEDICINE

## 2022-04-14 PROCEDURE — 80053 COMPREHEN METABOLIC PANEL: CPT

## 2022-04-14 PROCEDURE — 6360000002 HC RX W HCPCS: Performed by: INTERNAL MEDICINE

## 2022-04-14 PROCEDURE — 99255 IP/OBS CONSLTJ NEW/EST HI 80: CPT | Performed by: INTERNAL MEDICINE

## 2022-04-14 PROCEDURE — 85025 COMPLETE CBC W/AUTO DIFF WBC: CPT

## 2022-04-14 PROCEDURE — 97116 GAIT TRAINING THERAPY: CPT

## 2022-04-14 PROCEDURE — 97530 THERAPEUTIC ACTIVITIES: CPT

## 2022-04-14 RX ADMIN — TAMSULOSIN HYDROCHLORIDE 0.4 MG: 0.4 CAPSULE ORAL at 09:51

## 2022-04-14 RX ADMIN — APIXABAN 5 MG: 5 TABLET, FILM COATED ORAL at 09:51

## 2022-04-14 RX ADMIN — ASPIRIN 81 MG 81 MG: 81 TABLET ORAL at 09:50

## 2022-04-14 RX ADMIN — LISINOPRIL 5 MG: 5 TABLET ORAL at 09:50

## 2022-04-14 RX ADMIN — FUROSEMIDE 5 MG/HR: 10 INJECTION, SOLUTION INTRAMUSCULAR; INTRAVENOUS at 10:00

## 2022-04-14 RX ADMIN — METOPROLOL SUCCINATE 50 MG: 50 TABLET, EXTENDED RELEASE ORAL at 09:51

## 2022-04-14 RX ADMIN — Medication 1000 MG: at 16:20

## 2022-04-14 RX ADMIN — DESMOPRESSIN ACETATE 40 MG: 0.2 TABLET ORAL at 20:58

## 2022-04-14 RX ADMIN — Medication 10 ML: at 09:51

## 2022-04-14 RX ADMIN — LISINOPRIL 5 MG: 5 TABLET ORAL at 01:10

## 2022-04-14 RX ADMIN — FINASTERIDE 5 MG: 5 TABLET, FILM COATED ORAL at 09:50

## 2022-04-14 ASSESSMENT — PAIN SCALES - GENERAL
PAINLEVEL_OUTOF10: 0

## 2022-04-14 NOTE — CONSULTS
Urology Consult Note  [facility]     Patient: Shyla Diehl MRN: [id number]  Room/Bed: [id number]   YOB: 1960  Age/Sex: 59 y.o.male  Admission Date: 4/13/2022     Date of Service:  4/14/2022    Consulting Provider: TRENT Newman - KATLYN  Admitting/Requesting Physician: Lizandro Coleman MD  Primary Care Physician: No primary care provider on file. Reason for Consult: Urinary Retention    ASSESSMENT/PLAN     58 yo male with hx of BPH on f/f  Hx of CHF with EF 20%, admitted with exacerbation on lasix drip  Don removed yesterday and PVR this morning is 15CC's  His urine is clear yellow, he is convinced he has a fragment of the catheter retained in his bladder, and this was the purpose for our consultation today. UA is suspicious for infection  No previous psa noted  Rev CT 2019- enlarged prostate, signs of LYON    Recommendations:  X-ray 1v pelvis  Continue empiric abx and f/u culture  Continue f/f  Continue to monitor for urinary retention  Needs a PSA outside the setting of urinary retention- follow up requested  Urology will follow      All patient questions were answered. He understands the plan as listed above. HISTORY     Chief Complaint:   Chief Complaint   Patient presents with    Shortness of Breath     pt c/o shortness of breath x couple of days. History of Present Illness: Shyla Diehl is a 59 y.o. male admitted to hospital 2 months ago with A. fib with RVR no left atrial appendage clot is on Eliquis and oral medication patient has had increased lower extremity swelling with exertional dyspnea and nocturnal dyspnea not able to lay flat anymore. No chest pain no nausea vomiting no fevers or chills but does have a cough productive of frothy sputum. Urology has been consulted as patient believes he has a retained catheter in the bladder.      Past Medical History:  He has a past medical history of Atrial fibrillation ([address] (07/25/2019), CHF (congestive heart failure) ([facility] [address] blood clots, and Hypertension. Hospital Problem List:  Principal Problem:    Acute systolic heart failure ([facility] [address]  Resolved Problems:    * No resolved hospital problems. *      Past Surgical History:  He has a past surgical history that includes Insertable Cardiac Monitor (07/26/2019) and Cardioversion (07/26/2019). Social History:  He reports that he has never smoked. He has never used smokeless tobacco. He reports that he does not drink alcohol and does not use drugs. Family History:  family history includes Heart Attack in his mother; Heart Disease in his mother; High Blood Pressure in his father and mother; Other in his father; Stroke in his father. Allergies:  No Known Allergies    Medications:  Scheduled Meds:   apixaban  5 mg Oral Daily    aspirin  81 mg Oral Daily    atorvastatin  40 mg Oral Nightly    finasteride  5 mg Oral Daily    lisinopril  5 mg Oral Daily    metoprolol succinate  50 mg Oral Daily    tamsulosin  0.4 mg Oral Daily    sodium chloride flush  5-40 mL IntraVENous 2 times per day    cefTRIAXone (ROCEPHIN) IV  1,000 mg IntraVENous Q24H     Continuous Infusions:   furosemide (LASIX) 1mg/ml infusion 5 mg/hr (04/14/22 1000)    sodium chloride       PRN Meds:sodium chloride flush, sodium chloride, polyethylene glycol, acetaminophen **OR** acetaminophen, prochlorperazine **OR** prochlorperazine (COMPAZINE) with normal saline injection    Review of Systems:  Pertinent positives/negatives reviewed in HPI. All other systems reviewed and negative, unless noted below.     Constitutional: Negative  Genitourinary: see HPI  HEENT: Negative   Cardiovascular: Negative   Respiratory: Negative   Gastrointestinal: Negative   Musculoskeletal: Negative   Neurological: Negative   Psychiatric: Negative   Integumentary: Negative     PHYSICAL EXAM     Vitals:    04/14/22 0947   BP: (!) 112/52   Pulse: 103   Resp: 20   Temp: 97.9 °F (36.6 °C)   SpO2: 96% CONSTITUTIONAL: The patient is well nourished/developed, with no distress noted. NEUROLOGICAL/PSYCHIATRIC: Oriented to place and time, normal affected noted. NECK: The neck is symmetrical and supple, with no masses noted. CARDIOVASCULAR: Regular rate and rhythm, no evidence of swelling noted. RESPIRATORY: Normal respiratory effort with no wheezing noted. ABDOMEN: Abdomen soft, non-tender, non-distended. No enlarged liver or spleen. No hernias noted. Stool occult blood not indicated. SKIN: Skin appears normal.  LYMPHATICS: No adenopathy noted. CVA: No CVA tenderness bilaterally. GENITOURINARY: The penis is without rash or lesions and meatus with expected size and location. The scrotum appears normal. Bilateral testicles appears to be of normal size and location. No masses or tenderness noted of testicles or epididymis. Ins/Outs:    Intake/Output Summary (Last 24 hours) at 4/14/2022 1041  Last data filed at 4/14/2022 0947  Gross per 24 hour   Intake 600 ml   Output 789 ml   Net -189 ml       LABS     CBC   Lab Results   Component Value Date    WBC 6.8 04/14/2022    RBC 4.44 04/14/2022    HGB 11.6 04/14/2022    HCT 36.1 04/14/2022    MCV 81.4 04/14/2022    MCH 26.1 04/14/2022    MCHC 32.1 04/14/2022    RDW 14.9 04/14/2022     04/14/2022    MPV 7.8 04/14/2022     BMP   Lab Results   Component Value Date     04/14/2022    K 4.0 04/14/2022     04/14/2022    CO2 28 04/14/2022    BUN 34 04/14/2022    CREATININE 1.4 04/14/2022    GLUCOSE 96 04/14/2022    CALCIUM 9.0 04/14/2022     Urinalysis:   Lab Results   Component Value Date    COLORU Yellow 04/13/2022    GLUCOSEU Negative 04/13/2022    BLOODU MODERATE 04/13/2022    NITRU Negative 04/13/2022    LEUKOCYTESUR MODERATE 04/13/2022     Urine culture: No results for input(s): Cecily Brooms in the last 72 hours.   PSA: No results found for: PSA      IMAGING     XR CHEST PORTABLE    Result Date: 4/13/2022  EXAMINATION: ONE XRAY VIEW OF THE CHEST 4/13/2022 12:40 pm COMPARISON: 02/05/2022 radiograph HISTORY: ORDERING SYSTEM PROVIDED HISTORY: shortness of breath TECHNOLOGIST PROVIDED HISTORY: Reason for exam:->shortness of breath Reason for Exam:  shortness of breath, cough FINDINGS: Severe cardiomegaly. No significant vascular congestion. The lungs appear clear with the left lower lung zone obscured on this portable study by the cardiac silhouette. No skeletal finding. Cardiomegaly with no acute pulmonary finding.             Electronically signed by: TRENT Galindo CNP  4/14/2022   [facility]  Office Contact: [phone]

## 2022-04-14 NOTE — PROGRESS NOTES
Physical Therapy    Facility/Department: [address] NURSING  Initial Assessment    NAME: Ger Ennis  : 1960  MRN: [id number]    Date of Service: 2022  Ger Ennis scored a 20/24 on the AM-PAC short mobility form. Current research shows that an AM-PAC score of 18 or greater is typically associated with a discharge to the patient's home setting. Based on the patient's AM-PAC score and their current functional mobility deficits, it is recommended that the patient have 2-3 sessions per week of Physical Therapy at d/c to increase the patient's independence. At this time, this patient demonstrates the endurance and safety to discharge home with Home PT and a follow up treatment frequency of 2-3x/wk. Please see assessment section for further patient specific details. If patient discharges prior to next session this note will serve as a discharge summary. Please see below for the latest assessment towards goals. Discharge Recommendations:      PT Equipment Recommendations  Equipment Needed: No  Other: Pt. ambulating without an AD at this time    Assessment   Body structures, Functions, Activity limitations: Decreased functional mobility ; Decreased strength;Decreased endurance;Decreased balance  Assessment: Pt. presents with CHF and complicated UTI. Pt. is slightly below baseline functional mobility and is somewhat limited by significant B LE edema. Pt. will benefit from skilled PT to improve functional mobility. Anticipate Pt. improving functional mobility and being safe for d/c to home. Will continue to monitor based on progress.   Treatment Diagnosis: Impaired functional mobility  Prognosis: Good  Decision Making: Medium Complexity  History: as above  Exam: as above  Clinical Presentation: Evolving  PT Education: PT Role;Plan of Care;Transfer Training;General Safety;Gait Training;Functional Mobility Training;Home Exercise Program  Patient Education: Verbalized understanding  Barriers to Learning: None  REQUIRES PT FOLLOW UP: Yes  Activity Tolerance  Activity Tolerance: Patient Tolerated treatment well;Patient limited by endurance  Activity Tolerance: Pt. limited partially by B LE edema       Patient Diagnosis(es): The primary encounter diagnosis was Acute on chronic congestive heart failure, unspecified heart failure type ([address] A diagnosis of Complicated UTI (urinary tract infection) was also pertinent to this visit. has a past medical history of Atrial fibrillation ([address] CHF (congestive heart failure) ([address] Hx of blood clots, and Hypertension. has a past surgical history that includes Insertable Cardiac Monitor (07/26/2019) and Cardioversion (07/26/2019). 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  He is homeless and has noncompliance with meds and f/u. He has recently had an indwelling ernst for urinary retention and had balloon rupture and thinks some of the fragments may still be inside. He has chronic AF and is on eliquis. He was seen in heart failure clinic on 4/12 and admitted. Recent admit 2/5-2/10/22 for the same. EF 20%. Normal cors by cath. Severe MRRaya WHITING thrombus also. Dx with acute on chronic CHF and complicated UTI  Vision/Hearing  Vision: Impaired  Vision Exceptions: Wears glasses for reading  Hearing: Within functional limits     Subjective  General  Chart Reviewed: Yes  Patient assessed for rehabilitation services?: Yes  Response To Previous Treatment: Patient with no complaints from previous session.   Family / Caregiver Present: No  Referring Practitioner: Dr. Earl Johnston  Referral Date : 04/13/22  Diagnosis: Acute Systolic heart failure  Follows Commands: Within Functional Limits  General Comment  Comments: Supine in bed, Deviations: Increased VITO; Decreased step length;Decreased step height;Slow Rona  Distance: ~ 100'  Stairs/Curb  Stairs?: No     Balance  Posture: Good  Sitting - Static: Good  Sitting - Dynamic: Good  Standing - Static: Good;-  Standing - Dynamic: Good;-  Comments: Pt. with Supervision in sitting and CGA with standign        Plan   Plan  Times per week: 3-5x/wk  Current Treatment Recommendations: Strengthening,Balance Training,Functional Mobility Training,Transfer Training,Gait Training,Endurance Training,Home Exercise Program,Patient/Caregiver Education & Training,Equipment Evaluation, Education, & procurement,Safety Education & Training,Stair training  Safety Devices  Type of devices:  All fall risk precautions in place,Gait belt (Left in w/c with transport for a test)  Restraints  Initially in place: No                                                             AM-PAC Score  AM-PAC Inpatient Mobility Raw Score : 20 (04/14/22 1434)  AM-PAC Inpatient T-Scale Score : 47.67 (04/14/22 1434)  Mobility Inpatient CMS 0-100% Score: 35.83 (04/14/22 1434)  Mobility Inpatient CMS G-Code Modifier : CJ (04/14/22 1434)          Goals  Short term goals  Time Frame for Short term goals: By d/c  Short term goal 1: Supine to/from sit with Mod I  Short term goal 2: Amb. x 200' with/without and AD wiht Mod I  Short term goal 3: up and down 12 steps with rail and Mod I  Patient Goals   Patient goals : Improve walking       Therapy Time   Individual Concurrent Group Co-treatment   Time In 1341         Time Out 1404         Minutes 23         Timed Code Treatment Minutes: [facility] [address] [place] [address], [id number]

## 2022-04-14 NOTE — CONSULTS
[address]   Cardiac Evaluation      Patient: Liat Munguia  YOB: 1960         Chief Complaint   Patient presents with    Shortness of Breath     pt c/o shortness of breath x couple of days. Referring provider: No primary care provider on file. History of Present Illness  60 yo BM with hx of nonischemic severe cmp with severe MR who is admitted with edema and chf.  He is homeless and has noncompliance with meds and f/u. He has recently had an indwelling ernst for urinary retention and had balloon rupture and thinks some of the fragments may still be inside. He has chronic AF and is on eliquis. He was seen in heart failure clinic on 4/12 and admitted. Recent admit 2/5-2/10/22 for the same. EF 20%. Normal cors by cath. Severe MR. HENOK thrombus also. Past Medical History:   has a past medical history of Atrial fibrillation ([address] CHF (congestive heart failure) ([facility] Hx of blood clots, and Hypertension. Surgical History:   has a past surgical history that includes Insertable Cardiac Monitor (07/26/2019) and Cardioversion (07/26/2019).      Current Facility-Administered Medications   Medication Dose Route Frequency Provider Last Rate Last Admin    furosemide (LASIX) 100 mg in dextrose 5 % 100 mL infusion  5 mg/hr IntraVENous Continuous Jong Ruiz MD        apixaban (ELIQUIS) tablet 5 mg  5 mg Oral Daily Richa Ramírez MD        aspirin chewable tablet 81 mg  81 mg Oral Daily Richa Ramírez MD        atorvastatin (LIPITOR) tablet 40 mg  40 mg Oral Nightly Richa Ramírez MD   40 mg at 04/13/22 2157    finasteride (PROSCAR) tablet 5 mg  5 mg Oral Daily Richa Ramírez MD        lisinopril (PRINIVIL;ZESTRIL) tablet 5 mg  5 mg Oral Daily Richa Ramírez MD   5 mg at 04/14/22 0110    metoprolol succinate (TOPROL XL) extended release tablet 50 mg  50 mg Oral Daily Richa Ramírez MD   50 mg at 04/13/22 2157    tamsulosin (FLOMAX) capsule 0.4 mg 0.4 mg Oral Daily Jeannie Tran MD        sodium chloride flush 0.9 % injection 5-40 mL  5-40 mL IntraVENous 2 times per day Jeannie Tran MD        sodium chloride flush 0.9 % injection 5-40 mL  5-40 mL IntraVENous PRN Jeannie Tran MD        0.9 % sodium chloride infusion   IntraVENous PRN Jeannie Tran MD        polyethylene glycol (GLYCOLAX) packet 17 g  17 g Oral Daily PRN Jeannie Tran MD        acetaminophen (TYLENOL) tablet 650 mg  650 mg Oral Q6H PRN Jeannie Tran MD        Or   Tellez acetaminophen (TYLENOL) suppository 650 mg  650 mg Rectal Q6H PRN Jeannie Tran MD        cefTRIAXone (ROCEPHIN) 1000 mg in sterile water 10 mL IV syringe  1,000 mg IntraVENous Q24H Jeannie Tran MD   1,000 mg at 04/13/22 1716    prochlorperazine (COMPAZINE) tablet 10 mg  10 mg Oral Q6H PRN Jeannie Tran MD        Or   Tellez prochlorperazine (COMPAZINE) 10 mg in sodium chloride (PF) 10 mL injection  10 mg IntraVENous Q6H PRN Jeannie Tran MD         Facility-Administered Medications Ordered in Other Encounters   Medication Dose Route Frequency Provider Last Rate Last Admin    furosemide (LASIX) injection 120 mg  120 mg IntraVENous Once TRENT [place]        sodium chloride flush 0.9 % injection 5-40 mL  5-40 mL IntraVENous Once TRENT Davis           Allergies:  Patient has no known allergies.      Social History:  Social History     Socioeconomic History    Marital status: Single     Spouse name: Not on file    Number of children: Not on file    Years of education: Not on file    Highest education level: Not on file   Occupational History    Not on file   Tobacco Use    Smoking status: Never Smoker    Smokeless tobacco: Never Used   Vaping Use    Vaping Use: Never used   Substance and Sexual Activity    Alcohol use: Never    Drug use: Never    Sexual activity: Not Currently   Other Topics Concern    Not on file   Social History Narrative    Not on file     Social Determinants of Health     Financial Resource Strain:     Difficulty of Paying Living Expenses: Not on file   Food Insecurity:     Worried About Running Out of Food in the Last Year: Not on file    Pam of Food in the Last Year: Not on file   Transportation Needs:     Lack of Transportation (Medical): Not on file    Lack of Transportation (Non-Medical): Not on file   Physical Activity:     Days of Exercise per Week: Not on file    Minutes of Exercise per Session: Not on file   Stress:     Feeling of Stress : Not on file   Social Connections:     Frequency of Communication with Friends and Family: Not on file    Frequency of Social Gatherings with Friends and Family: Not on file    Attends [employer] Services: Not on file    Active Member of [address] [place] [address] [employer] or Organizations: Not on file    Attends Club or Organization Meetings: Not on file    Marital Status: Not on file   Intimate Partner Violence:     Fear of Current or Ex-Partner: Not on file    Emotionally Abused: Not on file    Physically Abused: Not on file    Sexually Abused: Not on file   Housing Stability:     Unable to Pay for Housing in the Last Year: Not on file    Number of [place] in the Last Year: Not on file    Unstable Housing in the Last Year: Not on file       Family History:   Family History   Problem Relation Age of Onset    Heart Disease Mother     High Blood Pressure Mother     Heart Attack Mother     Other Father     Stroke Father     High Blood Pressure Father      Family history has been reviewed and not pertinent except as noted above. Review of Systems:   · Constitutional: there has been no unanticipated weight loss. No change in energy or activity level   · Eyes: No visual changes   · ENT: No Headaches, hearing loss or vertigo. No mouth sores or sore throat. · Cardiovascular: Reviewed in HPI  · Respiratory: No cough or wheezing, no sputum production.    · Gastrointestinal: No abdominal pain, appetite loss, blood in stools. No change in bowel or bladder habits. · Genitourinary: No nocturia, dysuria, trouble voiding  · Musculoskeletal:  No gait disturbance, weakness or joint complaints. · Integumentary: No rash or pruritis. · Neurological: No headache, change in muscle strength, numbness or tingling. No change in gait, balance, coordination, mood, affect, memory, mentation, behavior. · Psychiatric: No anxiety or depression  · Endocrine: No malaise or fever  · Hematologic/Lymphatic: No abnormal bruising or bleeding, blood clots or swollen lymph nodes. · Allergic/Immunologic: No nasal congestion or hives. Physical Examination:    Vitals:    04/13/22 2115 04/13/22 2347 04/14/22 0306 04/14/22 0449   BP: 90/78 (!) 129/94 (!) 133/99    Pulse:  102 108    Resp:  20 20    Temp:  97.6 °F (36.4 °C) 97.8 °F (36.6 °C)    TempSrc:  Oral Oral    SpO2:  95% 97%    Weight:    219 lb 1.6 oz (99.4 kg)   Height:         Body mass index is 28.13 kg/m². Wt Readings from Last 3 Encounters:   04/14/22 219 lb 1.6 oz (99.4 kg)   04/12/22 227 lb 9.6 oz (103.2 kg)   03/22/22 220 lb 1.6 oz (99.8 kg)      BP Readings from Last 3 Encounters:   04/14/22 (!) 133/99   04/12/22 (!) 130/90   03/22/22 112/80        Physical Examination:    · CONSTITUTIONAL: Well developed, well nourished, pleasant   · EYES: PERRLA. No xanthelasma, sclera non icteric  · EARS,NOSE,MOUTH,THROAT:  Mucous membranes moist, normal hearing  · NECK: Supple, JVP normal, thyroid not enlarged. Carotids 2+ without bruits  · RESPIRATORY: Normal effort, no rales or rhonchi  · CARDIOVASCULAR: Normal PMI, irregular rate and rhythm, 3/6 holosystolic  murmurs, rub or gallop. 3-4+ edema into the thighs. . Radial pulses present and equal  · CHEST: No scar or masses  · ABDOMEN: Normal bowel sounds. No masses or tenderness. No bruit  · MUSCULOSKELETAL: No clubbing or cyanosis. Moves all extremities well. Normal gait  · SKIN:  Warm and dry.  No rashes  · NEUROLOGIC: Cranial nerves intact. Alert and oriented  · PSYCHIATRIC: Calm affect. Appears to have normal judgement and insight        Assessment/Plan  1. Acute on chronic congestive heart failure, unspecified heart failure type ([address] - related to noncompliance with meds probably because of his homelessness and lack of resources. No further cardiac testing is needed. I will start a lasix drip to help with his diuresis and he just needs to be placed on his usual meds. 2. Complicated UTI (urinary tract infection) - urology consult. He thinks he may have some frqgments of balloon inside. 3. Severe cmp and severe MR>   4. Chronic AF and HENOK thrombus - continue eliquis. Thank you for allowing to me to participate in the care of Mariam Webster.

## 2022-04-14 NOTE — PROGRESS NOTES
[facility] PROGRESS NOTE    4/14/2022 12:26 PM        Name: Hudson Dowell . Admitted: 4/13/2022  Primary Care Provider: No primary care provider on file. (Tel: None)        Subjective: In bed shortness of breath is improving swelling lower extremities.   No nausea vomiting chest pain    Reviewed interval ancillary notes    Current Medications  furosemide (LASIX) 100 mg in dextrose 5 % 100 mL infusion, Continuous  apixaban (ELIQUIS) tablet 5 mg, Daily  aspirin chewable tablet 81 mg, Daily  atorvastatin (LIPITOR) tablet 40 mg, Nightly  finasteride (PROSCAR) tablet 5 mg, Daily  lisinopril (PRINIVIL;ZESTRIL) tablet 5 mg, Daily  metoprolol succinate (TOPROL XL) extended release tablet 50 mg, Daily  tamsulosin (FLOMAX) capsule 0.4 mg, Daily  sodium chloride flush 0.9 % injection 5-40 mL, 2 times per day  sodium chloride flush 0.9 % injection 5-40 mL, PRN  0.9 % sodium chloride infusion, PRN  polyethylene glycol (GLYCOLAX) packet 17 g, Daily PRN  acetaminophen (TYLENOL) tablet 650 mg, Q6H PRN   Or  acetaminophen (TYLENOL) suppository 650 mg, Q6H PRN  cefTRIAXone (ROCEPHIN) 1000 mg in sterile water 10 mL IV syringe, Q24H  prochlorperazine (COMPAZINE) tablet 10 mg, Q6H PRN   Or  prochlorperazine (COMPAZINE) 10 mg in sodium chloride (PF) 10 mL injection, Q6H PRN    sodium chloride flush 0.9 % injection 5-40 mL, Once        Objective:  BP (!) 112/52   Pulse 103   Temp 97.9 °F (36.6 °C) (Oral)   Resp 20   Ht 6' 2\" (1.88 m)   Wt 219 lb 1.6 oz (99.4 kg)   SpO2 96%   BMI 28.13 kg/m²     Intake/Output Summary (Last 24 hours) at 4/14/2022 1226  Last data filed at 4/14/2022 1150  Gross per 24 hour   Intake 600 ml   Output 1039 ml   Net -439 ml      Wt Readings from Last 3 Encounters:   04/14/22 219 lb 1.6 oz (99.4 kg)   04/12/22 227 lb 9.6 oz (103.2 kg)   03/22/22 220 lb 1.6 oz (99.8 kg)       General appearance:  Appears

## 2022-04-14 NOTE — PROGRESS NOTES
Occupational Therapy   Occupational Therapy Initial Assessment  Date: 2022   Patient Name: Dominique Shirley  MRN: [id number]     : 1960    Date of Service: 2022    Discharge Recommendations: Dominique Shirley scored a 21/24 on the AM-PAC ADL Inpatient form. Current research shows that an AM-PAC score of 18 or greater is typically associated with a discharge to the patient's home setting. Based on the patient's AM-PAC score, and their current ADL deficits, it is recommended that the patient have 2-3 sessions per week of Occupational Therapy at d/c to increase the patient's independence. At this time, this patient demonstrates the endurance and safety to discharge home with home health care (home vs OP services) and a follow up treatment frequency of 2-3x/wk. Please see assessment section for further patient specific details. HOME HEALTH CARE: LEVEL 1 STANDARD    - Initial home health evaluation to occur within 24-48 hours, in patient home   - Therapy to evaluate with goal of regaining prior level of functioning   - Therapy to evaluate if patient has [id number] [facility] needs for personal care      If patient discharges prior to next session this note will serve as a discharge summary. Please see below for the latest assessment towards goals. OT Equipment Recommendations  Equipment Needed: No  Other: Defer to next setting    Assessment   Performance deficits / Impairments: Decreased functional mobility ; Decreased safe awareness;Decreased coordination;Decreased ADL status; Decreased endurance;Decreased sensation;Decreased high-level IADLs  Assessment: Patient presents with the above deficits impacting occupational performance and functioning below baseline, skilled OT services needed to address deficits to facilitate safe return to PLOF. Treatment Diagnosis: Above associated with decreased ADL/IADL status due to acute systolic heart failure and general weakness.   Prognosis: Fair  Decision on eliquis. He was seen in heart failure clinic on 4/12 and admitted. Recent admit 2/5-2/10/22 for the same. EF 20%. Normal cors by cath. Severe MR. HENOK thrombus also. Dx with acute on chronic CHF and complicated UTI    Subjective   General  Chart Reviewed: Yes  Patient assessed for rehabilitation services?: Yes  Family / Caregiver Present: No  Subjective  Subjective: Patient supine in bed upon arrival, agreeable to therapy evaluation. Patient Currently in Pain: No  Pain Assessment  Pain Assessment: 0-10  Pain Level: 0  Patient's Stated Pain Goal: No pain  Vital Signs  Temp: 97.3 °F (36.3 °C)  Temp Source: Axillary  Pulse: 100  Heart Rate Source: Monitor  Resp: 20  BP: 101/63  BP Location: Left upper arm  Patient Position: Semi fowlers  Level of Consciousness: Alert (0)  MEWS Score: 1  Patient Currently in Pain: No  Oxygen Therapy  SpO2: 96 %  Pulse Oximeter Device Mode: Intermittent  Pulse Oximeter Device Location: Finger  O2 Device: None (Room air)    Social/Functional History  Social/Functional History  Lives With:  (Has been staying with friends but chart reports homeless)  Type of Home: Apartment  Home Layout: One level  Home Access: Stairs to enter with rails  Entrance Stairs - Number of Steps: ~2-3 BRET  Bathroom Shower/Tub: Tub/Shower unit  Bathroom Toilet: Standard  Bathroom Accessibility: Accessible  ADL Assistance: Independent  Homemaking Assistance: Independent  Homemaking Responsibilities: Yes  Ambulation Assistance: Independent  Transfer Assistance: Independent  Active : No  Patient's  Info: Friends drive as needed  Occupation: Part time employment  Type of occupation:   Leisure & Hobbies: Watching  Additional Comments: Patient reports no falls in past 6 months.      Objective   Vision: Impaired  Vision Exceptions: Wears glasses for reading  Hearing: Within functional limits    Orientation  Overall Orientation Status: Within Functional Limits  Observation/Palpation  Posture: Fair  Edema: B LE's significantly edematous mid thigh and distal  Balance  Sitting Balance: Stand by assistance  Standing Balance: Contact guard assistance  Standing Balance  Time: ~4-5 minutes  Activity: Functional transfers, functional mobility  Comment: Patient with slight unsteadiness and reported increased swelling and decreased sensation in BLE upon standing. Functional Mobility  Functional - Mobility Device: No device  Activity: Other  Assist Level: Contact guard assistance  Functional Mobility Comments: Patient with CGA with no assistive device, patient with slight unsteadiness and minimal verbal cueing needed for spatial awareness and initiation for increased safety awareness. ADL  UE Bathing: Stand by assistance (SBA for washing face and arms seated EOB with warm wipes)  Additional Comments: Patient declined any further ADLs. Tone RUE  RUE Tone: Normotonic  Tone LUE  LUE Tone: Normotonic  Coordination  Movements Are Fluid And Coordinated: Yes     Bed mobility  Supine to Sit: Stand by assistance  Scooting: Stand by assistance  Comment: Patient with HOB elevated and with use of side rail, patient with increased time due to slowed movements. Transfers  Sit to stand: Contact guard assistance  Stand to sit: Contact guard assistance  Transfer Comments: Patient with minimal verbal cueing needed for hand placement needed for increased safety awareness with functional mobility tasks. Vision - Basic Assessment  Prior Vision: Wears glasses only for reading  Visual History: No significant visual history  Patient Visual Report: No visual complaint reported. Cognition  Overall Cognitive Status: Exceptions  Arousal/Alertness: Appropriate responses to stimuli  Following Commands:  Follows multistep commands consistently  Attention Span: Appears intact  Memory: Appears intact  Safety Judgement: Decreased awareness of need for assistance;Decreased awareness of need for safety  Problem Solving: Assistance required to generate solutions;Assistance required to implement solutions  Insights: Decreased awareness of deficits  Initiation: Requires cues for some  Sequencing: Requires cues for some  Perception  Overall Perceptual Status: WFL     Sensation  Overall Sensation Status: WFL      LUE AROM (degrees)  LUE AROM : WFL  Left Hand AROM (degrees)  Left Hand AROM: WFL  RUE AROM (degrees)  RUE AROM : WFL  Right Hand AROM (degrees)  Right Hand AROM: WFL         Plan   Plan  Times per week: 3-5x/wk  Times per day: Daily  Current Treatment Recommendations: Balance Training,Functional Mobility Training,Endurance Training,Patient/Caregiver Education & Training,Self-Care / ADL,Safety Education & Training      AM-PAC Score   AM-PAC Inpatient Daily Activity Raw Score: 21 (04/14/22 1427)  AM-PAC Inpatient ADL T-Scale Score : 44.27 (04/14/22 1427)  ADL Inpatient CMS 0-100% Score: 32.79 (04/14/22 1427)  ADL Inpatient CMS G-Code Modifier : Lauren Coreas (04/14/22 1427)    Goals  Short term goals  Time Frame for Short term goals: Discharge  Short term goal 1: Patient will complete functional transfers with mod I  Short term goal 2: Patient will complete functional mobility tasks with mod I  Short term goal 3: Patient will complete UB/LB ADLs with mod I  Long term goals  Time Frame for Long term goals : STG=LTG  Patient Goals   Patient goals : Patient wants to get back to feeling better.        Therapy Time   Individual Concurrent Group Co-treatment   Time In 1341         Time Out 1404         Minutes 23              Timed Code Treatment Minutes:   8 minutes     Total Treatment Minutes:  23 minutes       Arvin Smith OTR/L [id number]

## 2022-04-14 NOTE — CONSULTS
[address]   Electrophysiology Consultation   Date: 4/14/2022  Reason for Consultation: NSVT   Consult Requesting Physician: Dr. Leonard Blackwell MD   Chief Complaint   Patient presents with    Shortness of Breath     pt c/o shortness of breath x couple of days. CC: Shortness of breath    HPI: Marilyn Carter is a 59 y.o. male who has presented to the hospital with increasing SOB for the past few days. History of NICMP, persistent atrial fibrillation, HFrEF. Last seen by EP in 2/10/2022 and CONSUELO was performed which showed moderate MR and severe LV dysfunction. LifeVest discussed but he was not interested. He was started on GDMT by heart failure team. He was seen in heart failure clinic and was not sure whether he takes his medications. He was found to be fluid overloaded and was sent to ER. He complains of increasing shortness of breath and lower extremity swelling. No chest pain. He also states that he had a urinary catheter because he was not able to urinate and that has come out. He states that he was taking his Eliquis once a day instead of twice a day because he had some nosebleeding. Patient has had PVCs and few beats of nonsustained VT on telemetry and EP has been consulted. Assessment:   - NSVT   - Acute on chronic HFrEF  -Nonischemic cardiomyopathy  -Moderate to severe mitral regurgitation  -Persistent atrial fibrillation  -Left atrial appendage thrombus  -UTI    Plan:   -Patient has had PVC and a few beats of nonsustained VT, asymptomatic on telemetry.  -He does have HFrEF and LV dysfunction and eventually is a candidate for AICD implantation.  -I discussed AICD implantation with him in details. Risk-benefit alternative discussed.  -Also provided shared decision making tools for him to read.   He states that his friends have ICD and would like to learn more about it before making a decision.  -He declined LifeVest in the past and is not interested.  -Continue aggressive medical therapy. Needs to confirm his compliance with his medical therapy and follow-up with heart failure prior to final decision for implantation of ICD. -He has persistent atrial fibrillation and HENOK thrombus. He was not taking his Eliquis correctly. -Continue with Eliquis 5 mg twice daily. Discontinue aspirin due to nosebleeding.  -Heart failure team to follow. - IV diuretics  - Strict I/Os  - Weight dialy.   - Salt and fluid restriction  - Antibiotic therapy. -  evaluation for living situation, etc.     Discussed with nursing staff. Active Hospital Problems    Diagnosis Date Noted    Acute systolic heart failure ([address] [I50.21] 2022       Diagnostic studies:   ECG: Atrial fibrillation, PVC  Last ECG in sinus rhythm, 2019     Echo: 2/10/2022:    Left ventricular size is moderately increased. Overall left ventricular   systolic function appears severely reduced with ejection fraction of 20-25%   and severe diffuse hypokinesis. Moderate mitral regurgitation. Calculated ERO of 0.33 cm2. Left atrial size appears dilated. There is an echodensity inside the left   atrial appendage consistent with left atrial appendage thrombus. The right ventricle is enlarged. Right ventricular systolic function is   mildly reduced . Mild to moderate tricuspid regurgitation. I independently reviewed the cardiac diagnostic studies, ECG and relevant imaging studies.      Lab Results   Component Value Date    LVEF 23 02/10/2022    LVEFMODE Cardiac Cath 2022     No results found for: TSHFT4, TSH    Physical Examination:  Vitals:    22 0947   BP: (!) 112/52   Pulse: 103   Resp: 20   Temp: 97.9 °F (36.6 °C)   SpO2: 96%      In: 600 [P.O.:600]  Out: 789    Wt Readings from Last 3 Encounters:   22 219 lb 1.6 oz (99.4 kg)   22 227 lb 9.6 oz (103.2 kg)   22 220 lb 1.6 oz (99.8 kg)     Temp  Av.9 °F (36.6 °C)  Min: 97.6 °F (36.4 °C)  Max: 98.2 °F (36.8 °C)  Pulse  Av.1  Min: 70  Max: 112  BP  Min: 77/59  Max: 133/99  SpO2  Av.7 %  Min: 95 %  Max: 99 %    Intake/Output Summary (Last 24 hours) at 2022 1026  Last data filed at 2022 0947  Gross per 24 hour   Intake 600 ml   Output 789 ml   Net -189 ml         I independently reviewed all cardiac tracing from cardiac telemetry. · Constitutional: Oriented. No distress. · Head: Normocephalic and atraumatic. · Mouth/Throat: Oropharynx is clear and moist.   · Eyes: Conjunctivae normal. EOM are normal.   · Neck: Neck supple. No JVD present. · Cardiovascular: Normal rate, regular rhythm, R9&I0.  ++ systolic murmur   · Pulmonary/Chest: Bilateral respiratory sounds. No rhonchi. · Abdominal: Soft. No tenderness. · Musculoskeletal: No tenderness. ++ edema    · Lymphadenopathy: Has no cervical adenopathy. · Neurological: Alert and oriented. Follows command, No Gross deficit   · Skin: Skin is warm, No rash noted. · Psychiatric: Has a normal behavior       Scheduled Meds:   apixaban  5 mg Oral Daily    aspirin  81 mg Oral Daily    atorvastatin  40 mg Oral Nightly    finasteride  5 mg Oral Daily    lisinopril  5 mg Oral Daily    metoprolol succinate  50 mg Oral Daily    tamsulosin  0.4 mg Oral Daily    sodium chloride flush  5-40 mL IntraVENous 2 times per day    cefTRIAXone (ROCEPHIN) IV  1,000 mg IntraVENous Q24H     Continuous Infusions:   furosemide (LASIX) 1mg/ml infusion 5 mg/hr (22 1000)    sodium chloride       PRN Meds:.sodium chloride flush, sodium chloride, polyethylene glycol, acetaminophen **OR** acetaminophen, prochlorperazine **OR** prochlorperazine (COMPAZINE) with normal saline injection     Review of System:  [x] Full ROS obtained and negative except as mentioned in HPI    Prior to Admission medications    Medication Sig Start Date End Date Taking?  Authorizing Provider   torsemide (DEMADEX) 20 MG tablet Take 1 tablet by mouth daily 22   Deirdre Cormier APRN - CNS   lisinopril (PRINIVIL;ZESTRIL) 5 MG tablet Take 1 tablet by mouth daily 4/12/22   [facility]   metoprolol succinate (TOPROL XL) 50 MG extended release tablet Take 1 tablet by mouth daily 4/12/22   [facility]   potassium chloride (KLOR-CON M) 20 MEQ extended release tablet Take 2 tablets by mouth daily for 1 day 3/23/22 4/12/22  Nia Black, APRN - CNP   metOLazone (ZAROXOLYN) 5 MG tablet Take 1 tablet by mouth once for 1 dose 3/22/22 4/12/22  Deirdre Alex Emmie, APRN - CNS   vitamin D (CHOLECALCIFEROL) 50 MCG (2000 UT) TABS tablet Take 1 tablet by mouth daily 2/16/22   [facility]   aspirin 81 MG chewable tablet Take 1 tablet by mouth daily 2/9/22   Kayli Cadena MD   atorvastatin (LIPITOR) 40 MG tablet Take 1 tablet by mouth nightly 2/9/22   Kayli Cadena MD   finasteride (PROSCAR) 5 MG tablet Take 1 tablet by mouth daily 2/9/22   Kayli Cadena MD   tamsulosin (FLOMAX) 0.4 MG capsule Take 1 capsule by mouth daily 2/9/22   Kayli Cadena MD   apixaban (ELIQUIS) 5 MG TABS tablet Take 1 tablet by mouth 2 times daily  Patient taking differently: Take 5 mg by mouth Patient stated that he has started to take 1 time a day instead of 2. Patient stated that taking 2 a day was causing nosebleeds. 2/9/22   Kayli Cadena MD       Past Medical History:   Diagnosis Date    Atrial fibrillation [facility]) 07/25/2019    CHF (congestive heart failure) (HCC)     Hx of blood clots     Hypertension         Past Surgical History:   Procedure Laterality Date    CARDIOVERSION  07/26/2019    Dr. Giselle Costa  07/26/2019       No Known Allergies    Social History:  Reviewed. reports that he has never smoked. He has never used smokeless tobacco. He reports that he does not drink alcohol and does not use drugs. Family History:  Reviewed. Reviewed. No family history of SCD. Relevant and available labs, and cardiovascular diagnostics reviewed. Reviewed. Recent Labs     04/13/22  1305 04/14/22  0513    143   K 4.1 4.0    103   CO2 27 28   BUN 32* 34*   CREATININE 1.3 1.4*     Recent Labs     04/13/22  1305 04/14/22  0514   WBC 7.1 6.8   HGB 12.2* 11.6*   HCT 37.8* 36.1*   MCV 81.3 81.4    274     Estimated Creatinine Clearance: 68 mL/min (A) (based on SCr of 1.4 mg/dL (H)). No results found for: BNP    I independently reviewed all cardiac tracing from cardiac telemetry. I independently reviewed relevant and available cardiac diagnostic tests ECG, CXR, Echo, Stress test, Device interrogation, Holter, CT scan. Complex medical condition with multiple medical problems affecting prognosis and outcome of EP interventions    All questions and concerns were addressed to the patient/family. Alternatives to my treatment were discussed. I have discussed the above stated plan and the patient verbalized understanding and agreed with the plan. NOTE: This report was transcribed using voice recognition software. Every effort was made to ensure accuracy, however, inadvertent computerized transcription errors may be present.      Oumou Turpin MD, MPH  [facility]   Office: [phone]  Fax: [phone]

## 2022-04-14 NOTE — PROGRESS NOTES
Nutrition Education    · Verbally reviewed information with Patient  · Educated on CHF  · Written educational materials provided. · Refer to Patient Education activity for more details. Provided heart failure diet education. Education included low sodium diet guidelines (3-4 gm Na+/day) and fluid restriction (64 oz/day). Reviewed foods to choose and foods to avoid, along with label reading and ways to add flavor to food. Pt reports currently tries to follow a low sodium diet at home and does not add salt to food. Pt states understanding of education. Time spent with patient: 5 minutes.        Electronically signed by Adeline Howe RD, LD on 4/14/22 at 10:55 AM EDT    Contact: [phone]

## 2022-04-14 NOTE — PROGRESS NOTES
[facility]      Toby Cushing 1960    History:  Past Medical History:   Diagnosis Date    Atrial fibrillation ([address] 07/25/2019    [facility]congest[facility] heart failure) ([facility])     Hx of blood clots     Hypertension        ECHO:  Echocardiogram     Summary   Left ventricular size is moderately increased. Overall left ventricular   systolic function appears severely reduced with ejection fraction of 20-25%   and severe diffuse hypokinesis. Moderate mitral regurgitation. Calculated ERO of 0.33 cm2. Left atrial size appears dilated. There is an echodensity inside the left   atrial appendage consistent with left atrial appendage thrombus. The right ventricle is enlarged. Right ventricular systolic function is   mildly reduced . Mild to moderate tricuspid regurgitation. Signature      ------------------------------------------------------------------   Electronically signed by Daya Jeong MD (Interpreting   physician) on 02/10/2022 at 02:25 PM   ------------------------------------------------------------------    ACE/ARB/ARNi: Lisinopril 5 mg daily  BB: Metoprolol Succinate 50 mg daily  Aldosterone Antagonist: No aldosterone antagonist  SGLT2: No SGLT2 inhibitor    History of sleep apnea: No  [place] Screen ordered: Yes    DM History:  No    Last [place] for CHF/Afib  Code Status: Full   Discharge plans: Patient to be discharged home    Family Present: No    Mr. Brandon Li was seen for heart failure with severe MRRaya Has a history of PAF, ( Eliquis),  hypertension, and systolic heart failure. Works as a  and is homeless. Noncompliance with medications and follow-up. He was admitted for edema and 20 lb weight gain. Had an indwelling catheter that fell out. He has a scale, but does not weigh daily. He lives with friends from house to house. He claims he no longer uses salt and tries to eat more vegetables.  Does go over 64 ounces of fluid a day. Stressed importance of follow-up and medication compliance. Will need medications in hand at discharge. Patient provided with both written and verbal education on CHF signs/ symptoms, causes, discharge medications, daily weights, low sodium diet, activity, and follow-up. Pt to call if gains 3 pounds in one day or 5 pounds in one week. Mutually agreed upon goals were discussed such as calling the MD as soon as they recognize symptoms and weight gain, maintaining proper diet, taking medications as prescribed, joining cardiac rehab when able. Also reviewed importance of risk factor reduction. Patient provided with CHF Zone Management tool and CHF symptoms magnet. Discussed importance of lifestyle changes: call early with symptoms    PATIENT/CAREGIVER TEACHING:    Level of patient/caregiver understanding able to:   [x ] Verbalize understanding [ ] Demonstrate understanding [ ] Teach back   [ ] Needs reinforcement [ ] Other:       Time spent teachin minutes    1. WEIGHT: Admit Weight: 227 lb (103 kg)      Today  Weight: 219 lb 1.6 oz (99.4 kg)   2. I/O     Intake/Output Summary (Last 24 hours) at 2022 1641  Last data filed at 2022 1150  Gross per 24 hour   Intake 600 ml   Output 1039 ml   Net -439 ml       Recommendations:   1. He will need a follow up appointment  2. Educate further on fluid restriction 48 oz- 64 oz during inpatient stay so he can understand how to measure intake at home. 3. Continue to educate on S/S.   4. Emphasize daily weights, diet, and knowing when and who to call  5. Provided patient with CHF Resource Line for questions and concerns.       Twila Alba RN 2022 4:41 PM

## 2022-04-14 NOTE — PROGRESS NOTES
pt having intermittent runs of vtach. the longest was 11 beats. pt is asymptomatic. Cardiology notified.

## 2022-04-15 ENCOUNTER — TELEPHONE (OUTPATIENT)
Dept: CARDIOLOGY CLINIC | Age: 62
End: 2022-04-15

## 2022-04-15 LAB
A/G RATIO: 1.3 (ref 1.1–2.2)
ALBUMIN SERPL-MCNC: 3.4 G/DL (ref 3.4–5)
ALP BLD-CCNC: 77 U/L (ref 40–129)
ALT SERPL-CCNC: 48 U/L (ref 10–40)
ANION GAP SERPL CALCULATED.3IONS-SCNC: 8 MMOL/L (ref 3–16)
AST SERPL-CCNC: 36 U/L (ref 15–37)
BASOPHILS ABSOLUTE: 0 K/UL (ref 0–0.2)
BASOPHILS RELATIVE PERCENT: 0.5 %
BILIRUB SERPL-MCNC: 1.3 MG/DL (ref 0–1)
BUN BLDV-MCNC: 32 MG/DL (ref 7–20)
CALCIUM SERPL-MCNC: 8.4 MG/DL (ref 8.3–10.6)
CHLORIDE BLD-SCNC: 102 MMOL/L (ref 99–110)
CO2: 30 MMOL/L (ref 21–32)
CREAT SERPL-MCNC: 1.3 MG/DL (ref 0.8–1.3)
EOSINOPHILS ABSOLUTE: 0.1 K/UL (ref 0–0.6)
EOSINOPHILS RELATIVE PERCENT: 1.3 %
GFR AFRICAN AMERICAN: >60
GFR NON-AFRICAN AMERICAN: 56
GLUCOSE BLD-MCNC: 89 MG/DL (ref 70–99)
HCT VFR BLD CALC: 36.3 % (ref 40.5–52.5)
HEMOGLOBIN: 11.6 G/DL (ref 13.5–17.5)
IRON SATURATION: 10 % (ref 20–50)
IRON: 38 UG/DL (ref 59–158)
LYMPHOCYTES ABSOLUTE: 1.4 K/UL (ref 1–5.1)
LYMPHOCYTES RELATIVE PERCENT: 20.8 %
MCH RBC QN AUTO: 26.1 PG (ref 26–34)
MCHC RBC AUTO-ENTMCNC: 31.9 G/DL (ref 31–36)
MCV RBC AUTO: 81.9 FL (ref 80–100)
MONOCYTES ABSOLUTE: 0.5 K/UL (ref 0–1.3)
MONOCYTES RELATIVE PERCENT: 7.1 %
NEUTROPHILS ABSOLUTE: 4.6 K/UL (ref 1.7–7.7)
NEUTROPHILS RELATIVE PERCENT: 70.3 %
ORGANISM: ABNORMAL
PDW BLD-RTO: 15.3 % (ref 12.4–15.4)
PLATELET # BLD: 278 K/UL (ref 135–450)
PMV BLD AUTO: 7.7 FL (ref 5–10.5)
POTASSIUM REFLEX MAGNESIUM: 3.8 MMOL/L (ref 3.5–5.1)
RBC # BLD: 4.44 M/UL (ref 4.2–5.9)
SODIUM BLD-SCNC: 140 MMOL/L (ref 136–145)
TOTAL IRON BINDING CAPACITY: 372 UG/DL (ref 260–445)
TOTAL PROTEIN: 6 G/DL (ref 6.4–8.2)
URINE CULTURE, ROUTINE: ABNORMAL
WBC # BLD: 6.5 K/UL (ref 4–11)

## 2022-04-15 PROCEDURE — 2580000003 HC RX 258: Performed by: INTERNAL MEDICINE

## 2022-04-15 PROCEDURE — 80053 COMPREHEN METABOLIC PANEL: CPT

## 2022-04-15 PROCEDURE — 6360000002 HC RX W HCPCS: Performed by: INTERNAL MEDICINE

## 2022-04-15 PROCEDURE — 36415 COLL VENOUS BLD VENIPUNCTURE: CPT

## 2022-04-15 PROCEDURE — 99233 SBSQ HOSP IP/OBS HIGH 50: CPT | Performed by: INTERNAL MEDICINE

## 2022-04-15 PROCEDURE — 99232 SBSQ HOSP IP/OBS MODERATE 35: CPT | Performed by: CLINICAL NURSE SPECIALIST

## 2022-04-15 PROCEDURE — 97530 THERAPEUTIC ACTIVITIES: CPT

## 2022-04-15 PROCEDURE — 6370000000 HC RX 637 (ALT 250 FOR IP): Performed by: INTERNAL MEDICINE

## 2022-04-15 PROCEDURE — 85025 COMPLETE CBC W/AUTO DIFF WBC: CPT

## 2022-04-15 PROCEDURE — 1200000000 HC SEMI PRIVATE

## 2022-04-15 PROCEDURE — 97535 SELF CARE MNGMENT TRAINING: CPT

## 2022-04-15 PROCEDURE — 83540 ASSAY OF IRON: CPT

## 2022-04-15 PROCEDURE — 83550 IRON BINDING TEST: CPT

## 2022-04-15 RX ORDER — LISINOPRIL 5 MG/1
2.5 TABLET ORAL DAILY
Status: DISCONTINUED | OUTPATIENT
Start: 2022-04-16 | End: 2022-04-21

## 2022-04-15 RX ADMIN — FUROSEMIDE 5 MG/HR: 10 INJECTION, SOLUTION INTRAMUSCULAR; INTRAVENOUS at 06:28

## 2022-04-15 RX ADMIN — FINASTERIDE 5 MG: 5 TABLET, FILM COATED ORAL at 08:56

## 2022-04-15 RX ADMIN — TAMSULOSIN HYDROCHLORIDE 0.4 MG: 0.4 CAPSULE ORAL at 08:56

## 2022-04-15 RX ADMIN — FUROSEMIDE 5 MG/HR: 10 INJECTION, SOLUTION INTRAMUSCULAR; INTRAVENOUS at 21:52

## 2022-04-15 RX ADMIN — APIXABAN 5 MG: 5 TABLET, FILM COATED ORAL at 08:56

## 2022-04-15 RX ADMIN — Medication 10 ML: at 20:17

## 2022-04-15 RX ADMIN — Medication 1000 MG: at 16:33

## 2022-04-15 RX ADMIN — DESMOPRESSIN ACETATE 40 MG: 0.2 TABLET ORAL at 20:17

## 2022-04-15 ASSESSMENT — PAIN SCALES - GENERAL
PAINLEVEL_OUTOF10: 0

## 2022-04-15 NOTE — PROGRESS NOTES
Urology Progress Note  [facility]    Provider: TRENT Chavarria CNP  Patient ID:  Admission Date: 2022 Name: Angel Silvestre  OR Date: 2022 MRN: [id number]   Patient Location: [id number] : 1960  Attending: Mariaa Prado MD Date of Service: 4/15/2022  PCP: No primary care provider on file. Diagnoses:  1. Acute on chronic congestive heart failure, unspecified heart failure type ([address]    2. Complicated UTI (urinary tract infection)      BPH    Assessment/Plan:  60 yo male with hx of BPH on f/f  Hx of CHF with EF 20%, admitted with exacerbation on lasix drip  Don removed yesterday and PVR this morning is 15CC's  His urine is clear yellow, he is convinced he has a fragment of the catheter retained in his bladder, and this was the purpose for our consultation today. UA is suspicious for infection  No previous psa noted  Rev CT - enlarged prostate, signs of LYON       Recommendations:  X-ray 1v pelvis- negative for foreign object in bladder  -Feeling better today, denies LUTS, pvr low yesterday  Continue short course abx for klebsiella uti  Continue f/f  Can discharge per  when medically clear. We discussed Follow up for PSA testing and BPH with LUTS. - requested    The patient had a chance to ask questions which were answered. he understands the above plan. Subjective:   Angel Silvestre is a 59 y.o. male. He was seen and examined this morning. Today we discussed urinary symptoms have improved. We talked about his x-ray showing no foreign object in the bladder. We discussed f/u for bph and psa testing.      Objective:   Vitals:  Vitals:    04/15/22 0855   BP: (!) 97/56   Pulse: 54   Resp:    Temp:    SpO2:        Intake/Output Summary (Last 24 hours) at 4/15/2022 1016  Last data filed at 4/15/2022 0907  Gross per 24 hour   Intake 840 ml   Output 1400 ml   Net -560 ml     Physical Exam:  Gen: Alert and oriented x3, no acute distress  CV: Regular rate   Resp: unlabored respirations  Abd: Soft, non-distended, non-tender, no masses  Ext: no peripheral edema noted, moves upper and lower extremities spontaneously  Skin: warmand well perfused, no rashes noted on the face, or arms.      Labs:  Lab Results   Component Value Date    WBC 6.5 04/15/2022    HGB 11.6 (L) 04/15/2022    HCT 36.3 (L) 04/15/2022    MCV 81.9 04/15/2022     04/15/2022     Lab Results   Component Value Date    CREATININE 1.3 04/15/2022    BUN 32 (H) 04/15/2022     04/15/2022    K 3.8 04/15/2022     04/15/2022    CO2 30 04/15/2022       Latoya Bennett, TRENT - CNP   4/15/2022

## 2022-04-15 NOTE — PROGRESS NOTES
Occupational Therapy  Facility/Department: [facility] 3A NURSING  Daily Treatment Note  NAME: Willian Moss  : 1960  MRN: [id number]    Date of Service: 4/15/2022    Discharge Recommendations: Willian Moss scored a 22/24 on the AM-PAC ADL Inpatient form. Current research shows that an AM-PAC score of 18 or greater is typically associated with a discharge to the patient's home setting. Based on the patient's AM-PAC score, and their current ADL deficits, it is recommended that the patient have 2-3 sessions per week of Occupational Therapy at d/c to increase the patient's independence. At this time, this patient demonstrates the endurance and safety to discharge home with home OT and a follow up treatment frequency of 2-3x/wk. Please see assessment section for further patient specific details. Patient stated goes between several friends' houses in the [facility]. Works part time as a . HOME HEALTH CARE: LEVEL 1 STANDARD    - Initial home health evaluation to occur within 24-48 hours, in patient home   - Therapy to evaluate with goal of regaining prior level of functioning   - Therapy to evaluate if patient has [address] needs for personal care    If patient discharges prior to next session this note will serve as a discharge summary. Please see below for the latest assessment towards goals. OT Equipment Recommendations  Equipment Needed: No    Assessment   Performance deficits / Impairments: Decreased functional mobility ; Decreased safe awareness;Decreased coordination;Decreased ADL status; Decreased endurance;Decreased sensation;Decreased high-level IADLs  Assessment: Patient presents with the above deficits impacting occupational performance and functioning below baseline, skilled OT services needed to address deficits to facilitate safe return to [facility].   Treatment Diagnosis: Above associated with decreased ADL/IADL status due to acute systolic heart failure and general weakness. Prognosis: [address]  OT Education: OT Role;Energy Conservation;Plan of Care;Transfer Training  Patient Education: Patient verbalized understanding of transfers and ADLs this date  REQUIRES OT FOLLOW UP: Yes  Activity Tolerance  Activity Tolerance: Patient Tolerated treatment well  Safety Devices  Safety Devices in place: Yes  Type of devices: All fall risk precautions in place;Nurse notified; Chair alarm in place; Left in chair;Call light within reach; Patient at risk for falls  Restraints  Initially in place: No         Patient Diagnosis(es): The primary encounter diagnosis was Acute on chronic congestive heart failure, unspecified heart failure type ([address] A diagnosis of Complicated UTI (urinary tract infection) was also pertinent to this visit. has a past medical history of Atrial fibrillation ([facility] CHF (congestive heart failure) ([facility] Hx of blood clots, and Hypertension. has a past surgical history that includes Insertable Cardiac Monitor (07/26/2019) and Cardioversion (07/26/2019). Restrictions  Restrictions/Precautions  Restrictions/Precautions: Fall Risk (high fall risk)  Required Braces or Orthoses?: No  Position Activity Restriction  Other position/activity restrictions: 58 yo BM with hx of nonischemic severe cmp with severe MR who is admitted with edema and chf.  He is homeless and has noncompliance with meds and f/u. He has recently had an indwelling ernst for urinary retention and had balloon rupture and thinks some of the fragments may still be inside. He has chronic AF and is on eliquis. He was seen in heart failure clinic on 4/12 and admitted. Recent admit 2/5-2/10/22 for the same. EF 20%. Normal cors by cath. Severe MR. HENOK thrombus also.   Dx with acute on chronic CHF and complicated UTI  Subjective   General  Chart Reviewed: Yes  Patient assessed for rehabilitation services?: Yes  Response to previous treatment: Patient with no complaints from previous session  Family / Caregiver Present: No  Subjective  Subjective: Patient sitting in a chair at sink with nurse aide in room. Patient performed shaving and sponge bath with nurse aide. General Comment  Comments: Patient c/o left lateral thigh feels sore. Patient described it as spasm like, or like a eric horse. Stated felt better after walk. Warm blanket applied across thigh at end of session  Vital Signs  Patient Currently in Pain: Other (comment)   Orientation  Orientation  Overall Orientation Status: Within Functional Limits  Objective    ADL  Feeding: Independent  Grooming: Independent  UE Dressing: Setup  LE Dressing: Setup (don hospital pants, don/doff socks sitting in chair)  Additional Comments: Patient just completed full sponge bathing an shaving sitting in chair at sink with nurse aide. Balance  Sitting Balance: Independent  Standing Balance: Contact guard assistance  Standing Balance  Time: ~4-5 minutes  Activity: Functional transfers, functional mobility  Comment: Ambulation @ 100 feet without use of deivce. Slight unsteadiness, patient c/o left thigh having spasms-- CGA     Transfers  Sit to stand: Contact guard assistance  Stand to sit: Contact guard assistance                       Cognition  Arousal/Alertness: Appropriate responses to stimuli  Following Commands: Follows multistep commands consistently  Attention Span: Appears intact  Safety Judgement: Decreased awareness of need for assistance;Decreased awareness of need for safety  Problem Solving: Assistance required to generate solutions;Assistance required to implement solutions  Insights: Decreased awareness of deficits  Cognition Comment: Chart reports patient noncompliant with medications-- may be due to homelessness and limited resources.      Perception  Overall Perceptual Status: [facility]                                   Plan   Plan  Times per week: 3-5x/wk  Times per day: Daily  Current Treatment Recommendations: Balance Training,Functional Mobility Training,Endurance Training,Patient/Caregiver Education & Training,Self-Care / ADL,Safety Education & Training  G-Code     OutComes Score                                                  AM-PAC Score        AM-PAC Inpatient Daily Activity Raw Score: 22 (04/15/22 1220)  AM-PAC Inpatient ADL T-Scale Score : 47.1 (04/15/22 1220)  ADL Inpatient CMS 0-100% Score: 25.8 (04/15/22 1220)  ADL Inpatient CMS G-Code Modifier : Neoma Ruffing (04/15/22 1220)    Goals  Short term goals  Time Frame for Short term goals: Discharge  Short term goal 1: Patient will complete functional transfers with mod I-- CGA 4/15/22  Short term goal 2: Patient will complete functional mobility tasks with mod I--- SBA/CGA without device 4/15/22  Short term goal 3: Patient will complete UB/LB ADLs with mod I--setup LB dressing 4/15/22  Long term goals  Time Frame for Long term goals : STG=LTG  Patient Goals   Patient goals : Patient wants to get back to feeling better.        Therapy Time   Individual Concurrent Group Co-treatment   Time In 4099         Time Out 1216         Minutes 25              Timed Code Treatment Minutes:  25 Minutes    Total Treatment Minutes:  [address], OTR/L [address]

## 2022-04-15 NOTE — PROGRESS NOTES
CLINICAL PHARMACY NOTE: MEDS TO BEDS    Total # of Prescriptions Filled: 7   The following medications were delivered to the patient:  · Eliquis 5mg  · Atorvastatin 40mg  · Tamsulosin 0.4mg  · Finasteride 5mg  · Lisinopril 2.5mg   · Torsemide 20mg  Metoprolol ER 50mg     Additional Documentation:    Medications delivered to [facility] for weekend discharge

## 2022-04-15 NOTE — PROGRESS NOTES
[facility], operated by [facility]   Electrophysiology Progress Note     Admit Date: 4/13/2022     Reason for follow up: PVCs, NSVT     HPI and Interval History: 59 y.o. male presented with history of NICMP, persistent atrial fibrillation, HFrEF, HENOK Thrombus, Moderate to severe MR, severe LV dysfunction  Declined lifeVest.    Homeless and appears to be non-compliant with medical therapy. Presented with SOB and LE swelling and urinary complaint. Patient seen and examined. Clinical notes reviewed. Telemetry reviewed. No new complaint today. No major events overnight. Denies having chest pain, shortness of breath, dyspnea on exertion, Orthopnea, PND at the time of this visit. Assessment:     - NSVT   - Acute on chronic HFrEF  -Nonischemic cardiomyopathy  -Moderate to severe mitral regurgitation  -Persistent atrial fibrillation  -Left atrial appendage thrombus  -UTI      Plan:   - I have discussed importance of compliance with patient. - HFrEF and LV dysfunction and eventually is a candidate for AICD implantation if remains compliant with GDMT and follow up with heart failure. - Risk-benefit alternative of AICD has been discussed. Also provided shared decision making tools for him to read. -He declined LifeVest in the past and is not interested. - He states that he will think about AICD implantation and will let me know. He does welding occasionally and understand that is not recommended in patient with AICD and he is concerned about it.      -Continue aggressive medical therapy.    -He has persistent atrial fibrillation and HENOK thrombus. He was not taking his Eliquis correctly. -Resume Eliquis 5 mg twice daily. Discontinue aspirin due to nosebleeding.  -Heart failure team to follow. - IV diuretics  - Strict I/Os  - Weight dialy.   - Salt and fluid restriction  - Antibiotic therapy.      -  evaluation for living situation, etc.     - No further EP recommendation.  Follow up in EP office as outpatient. Will sign off. Discussed with nursing staff. Active Hospital Problems    Diagnosis Date Noted    Complicated UTI (urinary tract infection) [N39.0]     Chronic atrial fibrillation (HCC) [I48.20]     Severe mitral regurgitation [I34.0]     Acute systolic heart failure ([address] [I50.21] 2022       Diagnostic studies:     ECG: Atrial fibrillation, PVC  Last ECG in sinus rhythm, 2019      Echo: 2/10/2022:    Left ventricular size is moderately increased. Overall left ventricular   systolic function appears severely reduced with ejection fraction of 20-25%   and severe diffuse hypokinesis.      Moderate mitral regurgitation. Calculated ERO of 0.33 cm2.      Left atrial size appears dilated. There is an echodensity inside the left   atrial appendage consistent with left atrial appendage thrombus.      The right ventricle is enlarged. Right ventricular systolic function is   mildly reduced .      Mild to moderate tricuspid regurgitation. I independently reviewed the cardiac diagnostic studies, ECG and relevant imaging studies. Physical Examination:  Vitals:    04/15/22 0855   BP: (!) 97/56   Pulse: 54   Resp:    Temp:    SpO2:       In: 1080 [P.O.:1080]  Out: 1400    Wt Readings from Last 3 Encounters:   04/15/22 216 lb 4.8 oz (98.1 kg)   22 227 lb 9.6 oz (103.2 kg)   22 220 lb 1.6 oz (99.8 kg)     Temp  Av °F (36.1 °C)  Min: 94.6 °F (34.8 °C)  Max: 97.8 °F (36.6 °C)  Pulse  Av.3  Min: 52  Max: 114  BP  Min: 97/56  Max: 124/87  SpO2  Av.5 %  Min: 94 %  Max: 97 %    Intake/Output Summary (Last 24 hours) at 4/15/2022 1002  Last data filed at 4/15/2022 5354  Gross per 24 hour   Intake 840 ml   Output 1400 ml   Net -560 ml       I independently reviewed all cardiac tracing from cardiac telemetry. · · Constitutional: Oriented. No distress. · · Head: Normocephalic and atraumatic.    · · Mouth/Throat: Oropharynx is clear and moist.   · · Eyes: Conjunctivae normal. EOM are normal.   · · Neck: Neck supple. No JVD present. · · Cardiovascular: Normal rate, regular rhythm, R8&U9.  ++ systolic murmur   · · Pulmonary/Chest: Bilateral respiratory sounds. No rhonchi. · · Abdominal: Soft. No tenderness. · · Musculoskeletal: No tenderness. ++ edema    · · Lymphadenopathy: Has no cervical adenopathy. · · Neurological: Alert and oriented. Follows command, No Gross deficit   · · Skin: Skin is warm, No rash noted. · · Psychiatric: Has a normal behavior     Scheduled Meds:   apixaban  5 mg Oral Daily    atorvastatin  40 mg Oral Nightly    finasteride  5 mg Oral Daily    lisinopril  5 mg Oral Daily    metoprolol succinate  50 mg Oral Daily    tamsulosin  0.4 mg Oral Daily    sodium chloride flush  5-40 mL IntraVENous 2 times per day    cefTRIAXone (ROCEPHIN) IV  1,000 mg IntraVENous Q24H     Continuous Infusions:   furosemide (LASIX) 1mg/ml infusion 5 mg/hr (04/15/22 0628)    sodium chloride       PRN Meds:sodium chloride flush, sodium chloride, polyethylene glycol, acetaminophen **OR** acetaminophen, prochlorperazine **OR** prochlorperazine (COMPAZINE) with normal saline injection     Prior to Admission medications    Medication Sig Start Date End Date Taking?  Authorizing Provider   torsemide (DEMADEX) 20 MG tablet Take 1 tablet by mouth daily 4/12/22   [facility], TRENT - CNS   lisinopril (PRINIVIL;ZESTRIL) 5 MG tablet Take 1 tablet by mouth daily 4/12/22   [facility], APRN - CNS   metoprolol succinate (TOPROL XL) 50 MG extended release tablet Take 1 tablet by mouth daily 4/12/22   [facility], APRN - CNS   potassium chloride (KLOR-CON M) 20 MEQ extended release tablet Take 2 tablets by mouth daily for 1 day 3/23/22 4/12/22  TRENT Hunt - CNP   metOLazone (ZAROXOLYN) 5 MG tablet Take 1 tablet by mouth once for 1 dose 3/22/22 4/12/22  TRENT Betancur - CNS   vitamin D (CHOLECALCIFEROL) 50 MCG (2000 UT) TABS tablet Take 1 tablet by mouth daily 2/16/22   Viviana Bloodgood, APRN - CNS   aspirin 81 MG chewable tablet Take 1 tablet by mouth daily 2/9/22   Criss Baker MD   atorvastatin (LIPITOR) 40 MG tablet Take 1 tablet by mouth nightly 2/9/22   Criss Baker MD   finasteride (PROSCAR) 5 MG tablet Take 1 tablet by mouth daily 2/9/22   Criss Baker MD   tamsulosin (FLOMAX) 0.4 MG capsule Take 1 capsule by mouth daily 2/9/22   Criss Baker MD   apixaban (ELIQUIS) 5 MG TABS tablet Take 1 tablet by mouth 2 times daily  Patient taking differently: Take 5 mg by mouth Patient stated that he has started to take 1 time a day instead of 2. Patient stated that taking 2 a day was causing nosebleeds. 2/9/22   Criss Baker MD       Review of System:  [x] Full ROS obtained and negative except as mentioned in HPI    Relevant and available labs, and cardiovascular diagnostics reviewed. Reviewed. Recent Labs     04/13/22  1305 04/14/22  0513 04/15/22  0611    143 140   K 4.1 4.0 3.8    103 102   CO2 27 28 30   BUN 32* 34* 32*   CREATININE 1.3 1.4* 1.3     Recent Labs     04/13/22  1305 04/14/22  0514 04/15/22  0611   WBC 7.1 6.8 6.5   HGB 12.2* 11.6* 11.6*   HCT 37.8* 36.1* 36.3*   MCV 81.3 81.4 81.9    274 278     Estimated Creatinine Clearance: 68 mL/min (based on SCr of 1.3 mg/dL). No results found for: BNP    I independently reviewed all cardiac tracing from cardiac telemetry. I independently reviewed relevant and available cardiac diagnostic tests ECG, CXR, Echo, Stress test, Device interrogation, Holter, CT scan. Complex medical condition with multiple medical problems affecting prognosis and outcome of EP interventions  Severe exacerbation of underlying medical condition requiring hospitalization and at risk of decompensation.          I have spent 35 minutes of face to face time with the patient with more than 50% spent counseling and coordinating care for this patient    Thank you for allowing me to participate in the care of Tatyana Boyle     All questions and concerns were addressed to the patient/family. Alternatives to my treatment were discussed. I have discussed the above stated plan and the patient verbalized understanding and agreed with the plan. NOTE: This report was transcribed using voice recognition software. Every effort was made to ensure accuracy, however, inadvertent computerized transcription errors may be present.      Indy Neri MD, MPH  [facility]   Office: [phone]  Fax: [phone]

## 2022-04-15 NOTE — PROGRESS NOTES
[facility] PROGRESS NOTE    4/15/2022 10:38 AM        Name: Samuel Tanner . Admitted: 4/13/2022  Primary Care Provider: No primary care provider on file. (Tel: None)        Subjective: In bed shortness of breath has improved swelling is improving no nausea vomiting chest  Reviewed interval ancillary notes    Current Medications  furosemide (LASIX) 100 mg in dextrose 5 % 100 mL infusion, Continuous  apixaban (ELIQUIS) tablet 5 mg, Daily  atorvastatin (LIPITOR) tablet 40 mg, Nightly  finasteride (PROSCAR) tablet 5 mg, Daily  lisinopril (PRINIVIL;ZESTRIL) tablet 5 mg, Daily  metoprolol succinate (TOPROL XL) extended release tablet 50 mg, Daily  tamsulosin (FLOMAX) capsule 0.4 mg, Daily  sodium chloride flush 0.9 % injection 5-40 mL, 2 times per day  sodium chloride flush 0.9 % injection 5-40 mL, PRN  0.9 % sodium chloride infusion, PRN  polyethylene glycol (GLYCOLAX) packet 17 g, Daily PRN  acetaminophen (TYLENOL) tablet 650 mg, Q6H PRN   Or  acetaminophen (TYLENOL) suppository 650 mg, Q6H PRN  cefTRIAXone (ROCEPHIN) 1000 mg in sterile water 10 mL IV syringe, Q24H  prochlorperazine (COMPAZINE) tablet 10 mg, Q6H PRN   Or  prochlorperazine (COMPAZINE) 10 mg in sodium chloride (PF) 10 mL injection, Q6H PRN    sodium chloride flush 0.9 % injection 5-40 mL, Once        Objective:  BP (!) 97/56   Pulse 99   Temp 97.5 °F (36.4 °C) (Oral)   Resp 16   Ht 6' 2\" (1.88 m)   Wt 216 lb 4.8 oz (98.1 kg)   SpO2 96%   BMI 27.77 kg/m²     Intake/Output Summary (Last 24 hours) at 4/15/2022 1038  Last data filed at 4/15/2022 0907  Gross per 24 hour   Intake 840 ml   Output 1400 ml   Net -560 ml      Wt Readings from Last 3 Encounters:   04/15/22 216 lb 4.8 oz (98.1 kg)   04/12/22 227 lb 9.6 oz (103.2 kg)   03/22/22 220 lb 1.6 oz (99.8 kg)       General appearance:  Appears comfortable.  AAOx3  HEENT: atraumatic, Pupils equal, muscous membranes moist, no masses appreciated  Cardiovascular: Regular rate and rhythm no murmurs appreciated  Respiratory: CTAB no wheezing  Gastrointestinal: Abdomen soft, non-tender, BS+  EXT: 1+ BLE  Neurology: no gross focal deficts  Psychiatry: Appropriate affect. Not agitated  Skin: Warm, dry, no rashes appreciated    Labs and Tests:  CBC:   Recent Labs     04/13/22  1305 04/14/22  0514 04/15/22  0611   WBC 7.1 6.8 6.5   HGB 12.2* 11.6* 11.6*    274 278     BMP:    Recent Labs     04/13/22  1305 04/14/22  0513 04/15/22  0611    143 140   K 4.1 4.0 3.8    103 102   CO2 27 28 30   BUN 32* 34* 32*   CREATININE 1.3 1.4* 1.3   GLUCOSE 91 96 89     Hepatic:   Recent Labs     04/13/22  1305 04/14/22  0513 04/15/22  0611   AST 50* 43* 36   ALT 57* 53* 48*   BILITOT 2.1* 1.8* 1.3*   ALKPHOS 100 83 77     XR PELVIS (1-2 VIEWS)   Final Result   No acute abnormality and no foreign body evident. XR CHEST PORTABLE   Final Result   Cardiomegaly with no acute pulmonary finding. Recent imaging reviewed    Problem List  Principal Problem:    Acute systolic heart failure (HCC)  Active Problems:    Complicated UTI (urinary tract infection)    Chronic atrial fibrillation (HCC)    Severe mitral regurgitation  Resolved Problems:    * No resolved hospital problems.  *     Assessment/Plan:   Acute On chronic systolic heart failure with EF of 25% and moderate MR  - lasix gtt, cr stable repeat labs in am  - bp lower will decreas elisinorpril dose  - cards onboard     Elevated lft likely secondary to above  - trend improving with diuresis     PAF with left atrial appendage clost 2/2022  - eliquis  - bblocker     Bph: flomax     UTI: rocephin      DVT prophylaxis eliquis  Code status full code       Audrey Terrell MD   4/15/2022 10:38 AM

## 2022-04-15 NOTE — PROGRESS NOTES
[facility]   Daily Progress Note      Admit Date:  4/13/2022    HPI:    Mr. Pema Abad is a 59 y.o. male with history of with history of PAF, hypertension.  Unvaccinated, , homeless, HENOK thrombus and AF    He is admitted with fluid overload, non compliance with medications (not taking toprol or lisinopril at home and taking eliquis once a day along with several baby aspirins). He had recent indwelling ernst for urinary retention and had balloon rupture causing ernst to fall out. Recent admission 2/5-10 for same issues. LVEF 20% normal cors, severe MR, HENOK thrombus    Subjective:  Patient is being seen for acute on chronic systolic heart failure. There were no acute overnight cardiac events. He is sitting up in the bed on room air. Weight 224-216 creat 1.3 potassium 3.8   He is happy that he is able to urinate without difficulty and that the edema is going down. Objective:   BP (!) 97/56   Pulse 54   Temp 97.5 °F (36.4 °C) (Oral)   Resp 16   Ht 6' 2\" (1.88 m)   Wt 216 lb 4.8 oz (98.1 kg)   SpO2 96%   BMI 27.77 kg/m²     Intake/Output Summary (Last 24 hours) at 4/15/2022 0901  Last data filed at 4/15/2022 6635  Gross per 24 hour   Intake 720 ml   Output 1400 ml   Net -680 ml          Physical Exam:  General:  Awake, alert, oriented in NAD  Skin:  Warm and dry. No unusual bruising or rash  Neck:  Supple. No JVD or carotid bruit appreciated  Chest:  Normal effort.   Decreased bilaterally in bases  Cardiovascular:  RRR, S1/S2, no murmur/gallop/rub  Abdomen:  Soft, nontender, +bowel sounds  Extremities:  Bilateral lower ankles to thighs edema  Neurological: No focal deficits  Psychological: Normal mood and affect      Medications:    apixaban  5 mg Oral Daily    atorvastatin  40 mg Oral Nightly    finasteride  5 mg Oral Daily    lisinopril  5 mg Oral Daily    metoprolol succinate  50 mg Oral Daily    tamsulosin  0.4 mg Oral Daily    sodium chloride flush  5-40 mL IntraVENous 2 times per day    cefTRIAXone (ROCEPHIN) IV  1,000 mg IntraVENous Q24H      furosemide (LASIX) 1mg/ml infusion 5 mg/hr (04/15/22 0628)    sodium chloride         Lab Data:  CBC:   Recent Labs     04/13/22  1305 04/14/22  0514 04/15/22  0611   WBC 7.1 6.8 6.5   HGB 12.2* 11.6* 11.6*    274 278     BMP:    Recent Labs     04/13/22  1305 04/14/22  0513 04/15/22  0611    143 140   K 4.1 4.0 3.8   CO2 27 28 30   BUN 32* 34* 32*   CREATININE 1.3 1.4* 1.3     INR:  No results for input(s): INR in the last 72 hours. BNP:    Recent Labs     04/13/22  1305   PROBNP 27,525*         Diagnostics:  CONSUELO Dr Vanessa Ledezma 2/10/22  Preliminary CONSUELO results are:      - Severe LV dysfunction,  EF: 20-25%  - LA dilated. There was HENOK thrombus. - Moderate MR     Cardiac Cath PCI: Dr Justice Postin 2/8/2022  Anatomy:   LM-nml   LAD-nml  Cx-nml  OM- nml  RCA-nml  RPDA- nml  LVEF- 10%  LVG- global hypokinesis  LVEDP- 10     Hemodynamics:  RA- mean 3  RV- 37/0  PAWP- 10  PA- 34/12 (20)     C.O.- 5.7 (4.9)  C. I.- 2.6 (2.25)  PA sat 60%  AO sat 91%     Contrast: 70  Flouro Time: 5.3  Access: R radial a, R CFV     Impression  ~Coronary Angiography w/ normal cors  ~LVG with LVEF of 10% and global regional wall motion abnormalities  ~Severe MR  ~Normal CO/CI  ~normal L and R filling pressures     Recommendation  ~Aggressive medical treatment and risk factor modification  ~1. Medications reviewed, no changes at this time. 2. Post cath IVF. Bedrest.  3.Consider CONSUELO for evaluation of MR.   4. Patient has been advised on the importance of regular exercise of at least 20-30 minutes daily alternating with aerobic and isometric activities. 5. Patient counseled about and offered assistance for smoking cessation   6. No indication for cardiac rehab  7. CHF service to evaluate tomorrow.     Echo 11/29/2021  Summary   -Covid+   -Severely reduced global systolic function with an ejection fraction   estimated at 20%.    -Severe global hypokinesis with regional variations noted.   -Right ventricular systolic function appears to be mildly reduced based on   visual inspection.   -Severe biatrial enlargement.   -Thickened mitral valve without evidence of stenosis. There is   mild-to-moderate mitral regurgitation.   -There is mild-to-moderate tricuspid regurgitation with a RVSP estimation of   67 mmHg.   -Diastolic dysfunction with elevated LV filling pressures.     Echo 7/25/2019  Summary   -Overall left ventricular systolic function is moderately depressed .   -Ejection fraction is visually estimated to be 30-35 %.  -Global left ventricular function moderately reduced.   -Indeterminate diastolic function due to atrial fibrillation and moderate to   severe mitral regurgitation.   -Moderate-to-severe mitral regurgitation .   -Dilated left atrium with a volume of 97 ml.   -Left atrial volume is increased probably due to atrial fibrillation .   -There is mild right ventricular hypertrophy.   -The right ventricle is mildly enlarged.   -Right ventricular systolic pressure of 53 mm Hg consistent with pulmonary   hypertension.   -Moderate to severe tricuspid regurgitation. TAPSE = 1.43   -IVC size is dilated (>2.1 cm) but collapses > 50% with respiration   consistent with elevated RA pressure (8 mmHg). Assessment:    1. Acute on chronic systolic heart failure, on ace, bb; no aldosterone antagonist due to non compliance  2. Complicated UTI  3. Chronic atrial fib and HENOK thrombus, on eliquis  4. Non compliance  5. Severe MR  6. Homeless       Plan:    1. Add iron studies to labs  2. Continue lisinopril 2.5 mg daily (will decrease from 5 to 2.5)  3. Continue toprol 50 mg daily  4. Compliance stressed with diet and medications  5.  EP following  6. CHF nurse following for diet education, fluid restriction and daily weights  7. Consider adding verquvo and/or jardiance at discharge  8.   Continue lasix drip at 5 mg/hr    Will be here through the weekend and will do meds to bed. I have called the pharmacy    NYHA IV    Discussed with patient who is agreeable with plan of care. Thank you for allowing me to participate in the care of your patient.     TRENT Mireles - CNS, CNS

## 2022-04-15 NOTE — TELEPHONE ENCOUNTER
Please call and schedule hospital f/u in 4 momths  cardiomyopathy, possible ICD , Persistent atrial fibrillation with HENOK thrombus

## 2022-04-15 NOTE — PLAN OF CARE
Problem: Falls - Risk of:  Goal: Will remain free from falls  Description: Will remain free from falls  4/15/2022 1026 by Светлана Stanley RN  Outcome: Ongoing  Note: High fall risk per Jones scale. Fall precautions in place, bed alarm engaged. Non-skid footwear on patient, belongings within reach, bed in lowest position.

## 2022-04-15 NOTE — TELEPHONE ENCOUNTER
----- Message from Milady Navarro MD sent at 4/15/2022 10:55 AM EDT -----  Schedule office visit with me after 4 months.        Thanks

## 2022-04-15 NOTE — CARE COORDINATION
Discharge Planning Assessment  Readmission score   RN/SW discharge planner met with patient and family member to discuss reason for admission, current living situation, and potential needs at the time of discharge    Demographics/Insurance verified Yes- [employer]    Current type of dwelling: patient states he lives with friends and co-woekers , has no place of his own    Patient from ECF/SW confirmed with: N/A    Living arrangements: lives with friends in different places. 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 Friends assists as needed . PCP: has no PCP, he uses his cardiolgist . He has been provided with PCP list and advised to call . DME:  None    Active with any community resources/agencies/skilled home care: No    Medication compliance issues: denies    Financial issues that could impact healthcare: No      Tentative discharge plan: home with one of his friends    Discussed and provided facilities of choice if transition to a skilled nursing facility is required at the time of discharge      Discussed with patient and/or family that on the day of discharge home tentative time of discharge will be between 10 AM and noon.     Transportation at the time of discharge: one of the friends will assist .

## 2022-04-15 NOTE — PROGRESS NOTES
Meds delivered to inpatient pharmacy to store until pt's discharge. Made a note in pt's handoff report and will relay to night shift RN tonight.

## 2022-04-16 LAB
A/G RATIO: 1.3 (ref 1.1–2.2)
ALBUMIN SERPL-MCNC: 3.3 G/DL (ref 3.4–5)
ALP BLD-CCNC: 72 U/L (ref 40–129)
ALT SERPL-CCNC: 43 U/L (ref 10–40)
ANION GAP SERPL CALCULATED.3IONS-SCNC: 10 MMOL/L (ref 3–16)
AST SERPL-CCNC: 33 U/L (ref 15–37)
BASOPHILS ABSOLUTE: 0 K/UL (ref 0–0.2)
BASOPHILS RELATIVE PERCENT: 0.7 %
BILIRUB SERPL-MCNC: 0.9 MG/DL (ref 0–1)
BUN BLDV-MCNC: 25 MG/DL (ref 7–20)
CALCIUM SERPL-MCNC: 8.4 MG/DL (ref 8.3–10.6)
CHLORIDE BLD-SCNC: 101 MMOL/L (ref 99–110)
CO2: 29 MMOL/L (ref 21–32)
CREAT SERPL-MCNC: 1.1 MG/DL (ref 0.8–1.3)
EOSINOPHILS ABSOLUTE: 0.1 K/UL (ref 0–0.6)
EOSINOPHILS RELATIVE PERCENT: 2.2 %
GFR AFRICAN AMERICAN: >60
GFR NON-AFRICAN AMERICAN: >60
GLUCOSE BLD-MCNC: 86 MG/DL (ref 70–99)
HCT VFR BLD CALC: 35.2 % (ref 40.5–52.5)
HEMOGLOBIN: 11.5 G/DL (ref 13.5–17.5)
LYMPHOCYTES ABSOLUTE: 1.2 K/UL (ref 1–5.1)
LYMPHOCYTES RELATIVE PERCENT: 24.4 %
MCH RBC QN AUTO: 26.7 PG (ref 26–34)
MCHC RBC AUTO-ENTMCNC: 32.7 G/DL (ref 31–36)
MCV RBC AUTO: 81.9 FL (ref 80–100)
MONOCYTES ABSOLUTE: 0.3 K/UL (ref 0–1.3)
MONOCYTES RELATIVE PERCENT: 7 %
NEUTROPHILS ABSOLUTE: 3.1 K/UL (ref 1.7–7.7)
NEUTROPHILS RELATIVE PERCENT: 65.7 %
PDW BLD-RTO: 15.3 % (ref 12.4–15.4)
PLATELET # BLD: 255 K/UL (ref 135–450)
PMV BLD AUTO: 7.8 FL (ref 5–10.5)
POTASSIUM REFLEX MAGNESIUM: 3.8 MMOL/L (ref 3.5–5.1)
PRO-BNP: ABNORMAL PG/ML (ref 0–124)
RBC # BLD: 4.3 M/UL (ref 4.2–5.9)
SODIUM BLD-SCNC: 140 MMOL/L (ref 136–145)
TOTAL PROTEIN: 5.9 G/DL (ref 6.4–8.2)
WBC # BLD: 4.7 K/UL (ref 4–11)

## 2022-04-16 PROCEDURE — 6360000002 HC RX W HCPCS: Performed by: INTERNAL MEDICINE

## 2022-04-16 PROCEDURE — 1200000000 HC SEMI PRIVATE

## 2022-04-16 PROCEDURE — 99233 SBSQ HOSP IP/OBS HIGH 50: CPT | Performed by: NURSE PRACTITIONER

## 2022-04-16 PROCEDURE — 83880 ASSAY OF NATRIURETIC PEPTIDE: CPT

## 2022-04-16 PROCEDURE — 36415 COLL VENOUS BLD VENIPUNCTURE: CPT

## 2022-04-16 PROCEDURE — 80053 COMPREHEN METABOLIC PANEL: CPT

## 2022-04-16 PROCEDURE — 2580000003 HC RX 258: Performed by: INTERNAL MEDICINE

## 2022-04-16 PROCEDURE — 85025 COMPLETE CBC W/AUTO DIFF WBC: CPT

## 2022-04-16 PROCEDURE — 6370000000 HC RX 637 (ALT 250 FOR IP): Performed by: INTERNAL MEDICINE

## 2022-04-16 RX ADMIN — TAMSULOSIN HYDROCHLORIDE 0.4 MG: 0.4 CAPSULE ORAL at 09:00

## 2022-04-16 RX ADMIN — Medication 1000 MG: at 14:48

## 2022-04-16 RX ADMIN — Medication 10 ML: at 09:00

## 2022-04-16 RX ADMIN — FUROSEMIDE 2.5 MG/HR: 10 INJECTION, SOLUTION INTRAMUSCULAR; INTRAVENOUS at 18:52

## 2022-04-16 RX ADMIN — FINASTERIDE 5 MG: 5 TABLET, FILM COATED ORAL at 09:00

## 2022-04-16 RX ADMIN — DESMOPRESSIN ACETATE 40 MG: 0.2 TABLET ORAL at 20:14

## 2022-04-16 RX ADMIN — APIXABAN 5 MG: 5 TABLET, FILM COATED ORAL at 09:00

## 2022-04-16 ASSESSMENT — PAIN SCALES - GENERAL
PAINLEVEL_OUTOF10: 0

## 2022-04-16 NOTE — PROGRESS NOTES
[facility] PROGRESS NOTE    4/16/2022 11:49 AM        Name: Denise Dos Santos Admitted: 4/13/2022  Primary Care Provider: No primary care provider on file. (Tel: None)        Subjective: In bed shortness of breath is resolved swelling is improved able to bend knees now no nausea vomiting or chest  Reviewed interval ancillary notes    Current Medications  lisinopril (PRINIVIL;ZESTRIL) tablet 2.5 mg, Daily  furosemide (LASIX) 100 mg in dextrose 5 % 100 mL infusion, Continuous  apixaban (ELIQUIS) tablet 5 mg, Daily  atorvastatin (LIPITOR) tablet 40 mg, Nightly  finasteride (PROSCAR) tablet 5 mg, Daily  metoprolol succinate (TOPROL XL) extended release tablet 50 mg, Daily  tamsulosin (FLOMAX) capsule 0.4 mg, Daily  sodium chloride flush 0.9 % injection 5-40 mL, 2 times per day  sodium chloride flush 0.9 % injection 5-40 mL, PRN  0.9 % sodium chloride infusion, PRN  polyethylene glycol (GLYCOLAX) packet 17 g, Daily PRN  acetaminophen (TYLENOL) tablet 650 mg, Q6H PRN   Or  acetaminophen (TYLENOL) suppository 650 mg, Q6H PRN  cefTRIAXone (ROCEPHIN) 1000 mg in sterile water 10 mL IV syringe, Q24H  prochlorperazine (COMPAZINE) tablet 10 mg, Q6H PRN   Or  prochlorperazine (COMPAZINE) 10 mg in sodium chloride (PF) 10 mL injection, Q6H PRN        Objective:  BP (!) 104/56   Pulse 91   Temp 97.7 °F (36.5 °C) (Oral)   Resp 18   Ht 6' 2\" (1.88 m)   Wt 221 lb (100.2 kg)   SpO2 99%   BMI 28.37 kg/m²     Intake/Output Summary (Last 24 hours) at 4/16/2022 1149  Last data filed at 4/16/2022 1114  Gross per 24 hour   Intake 1480 ml   Output 1890 ml   Net -410 ml      Wt Readings from Last 3 Encounters:   04/16/22 221 lb (100.2 kg)   04/12/22 227 lb 9.6 oz (103.2 kg)   03/22/22 220 lb 1.6 oz (99.8 kg)       General appearance:  Appears comfortable.  AAOx3  HEENT: atraumatic, Pupils equal, muscous membranes moist, no masses appreciated  Cardiovascular: Regular rate and rhythm no murmurs appreciated  Respiratory: CTAB no wheezing  Gastrointestinal: Abdomen soft, non-tender, BS+  EXT: 1+ BLE improving  Neurology: no gross focal deficts  Psychiatry: Appropriate affect. Not agitated  Skin: Warm, dry, no rashes appreciated    Labs and Tests:  CBC:   Recent Labs     04/14/22  0514 04/15/22  0611 04/16/22  0552   WBC 6.8 6.5 4.7   HGB 11.6* 11.6* 11.5*    278 255     BMP:    Recent Labs     04/14/22  0513 04/15/22  0611 04/16/22  0552    140 140   K 4.0 3.8 3.8    102 101   CO2 28 30 29   BUN 34* 32* 25*   CREATININE 1.4* 1.3 1.1   GLUCOSE 96 89 86     Hepatic:   Recent Labs     04/14/22  0513 04/15/22  0611 04/16/22  0552   AST 43* 36 33   ALT 53* 48* 43*   BILITOT 1.8* 1.3* 0.9   ALKPHOS 83 77 72     XR PELVIS (1-2 VIEWS)   Final Result   No acute abnormality and no foreign body evident. XR CHEST PORTABLE   Final Result   Cardiomegaly with no acute pulmonary finding. Recent imaging reviewed    Problem List  Principal Problem:    Acute systolic heart failure (HCC)  Active Problems:    Complicated UTI (urinary tract infection)    Chronic atrial fibrillation (HCC)    Severe mitral regurgitation    Acute on chronic congestive heart failure ([address]  Resolved Problems:    * No resolved hospital problems.  *     Assessment/Plan:   Acute On chronic systolic heart failure with EF of 25% and moderate MR  - lasix gtt, cr stable repeat bmp in am       Elevated lft likely secondary to above  -repeat labs in am improving with diuresisi     PAF with left atrial appendage clost 2/2022  - eliquis  - bblocker     Bph: flomax     UTI: rocephin      DVT prophylaxis eliquis  Code status full code       Erica Rivas MD   4/16/2022 11:49 AM

## 2022-04-16 NOTE — PROGRESS NOTES
Pt's B/P now trending down, SBP in low 90s. Lasix gtt at 5. I held morning metoprolol & lisinopril d/t SBP < 110. Provider, Dr. Koleen Boxer, notified. Lasix gtt turned to 2.5mg/hr per his response. Pt informed & educated. Denies any symptomatic response to hypotension.

## 2022-04-16 NOTE — PROGRESS NOTES
[address]   Cardiology Progress Note     Date: 4/16/2022  Admit Date: 4/13/2022     Reason for consultation: shortness of breath     Chief Complaint:   Chief Complaint   Patient presents with    Shortness of Breath     pt c/o shortness of breath x couple of days. History of Present Illness: History obtained from patient and medical record. Brad Gunn is a 59 y.o. male with a past medical history of PAF, hypertension.  Unvaccinated, , homeless, HENOK thrombus and AF     He is admitted with fluid overload, non compliance with medications (not taking toprol or lisinopril at home and taking eliquis once a day along with several baby aspirins). He had recent indwelling ernst for urinary retention and had balloon rupture causing ernst to fall out. Recent admission 2/5-10 for same issues. LVEF 20% normal cors, severe MR, HENOK thrombus      Interval Hx: Today, he says the shortness of breath is much better. Swelling is slowly improving. Patient seen and examined. Clinical notes reviewed. Telemetry reviewed. No new complaints today. No major events overnight. Denies having chest pain, palpitations, shortness of breath, orthopnea/PND, cough, or dizziness at the time of this visit. Allergies:  No Known Allergies    Home Meds:  Prior to Visit Medications    Medication Sig Taking?  Authorizing Provider   torsemide (DEMADEX) 20 MG tablet Take 1 tablet by mouth daily  TRENT [place] - CNS   lisinopril (PRINIVIL;ZESTRIL) 5 MG tablet Take 1 tablet by mouth daily  TRENT [place] - CNS   metoprolol succinate (TOPROL XL) 50 MG extended release tablet Take 1 tablet by mouth daily  TRENT [place] - CNS   potassium chloride (KLOR-CON M) 20 MEQ extended release tablet Take 2 tablets by mouth daily for 1 day  TRENT Benedict CNP   metOLazone (ZAROXOLYN) 5 MG tablet Take 1 tablet by mouth once for 1 dose  TRENT [facility]ana   vitamin D (CHOLECALCIFEROL) 50 seconds. No elevation of JVP. +2 RLE, +3 LLE  · Respiratory: Respirations symmetric and unlabored. Lungs clear to auscultation bilaterally, no wheezing, crackles, or rhonchi  · Gastrointestinal: Abdomen soft and round. Bowel sounds normoactive in all quadrants without tenderness or masses. · Musculoskeletal: Bilateral upper and lower extremity strength 5/5 with full ROM  · Neurologic/Psych: Awake and orientated to person, place and time. Calm affect, appropriate mood    Pertinent labs, diagnostic, device, and imaging results reviewed as a part of this visit    Labs:    BMP:   Recent Labs     22  0513 04/15/22  0611 22  0552    140 140   K 4.0 3.8 3.8    102 101   CO2 28 30 29   BUN 34* 32* 25*   CREATININE 1.4* 1.3 1.1     Estimated Creatinine Clearance: 87 mL/min (based on SCr of 1.1 mg/dL). CBC:   Recent Labs     22  0514 04/15/22  0622  0552   WBC 6.8 6.5 4.7   HGB 11.6* 11.6* 11.5*   HCT 36.1* 36.3* 35.2*   MCV 81.4 81.9 81.9    278 255     Thyroid: No results found for: TSH, V8RQXJW, N9QPGNK, THYROIDAB  Lipids: No results found for: CHOL, HDL, TRIG  LFTS:   Lab Results   Component Value Date    ALT 43 2022    AST 33 2022    ALKPHOS 72 2022    PROT 5.9 2022    AGRATIO 1.3 2022    BILITOT 0.9 2022     Cardiac Enzymes:   Lab Results   Component Value Date    TROPONINI <0.01 2022    TROPONINI 0.52 2022    TROPONINI 0.50 2022     Coags:   Lab Results   Component Value Date    PROTIME 17.2 2022    INR 1.50 2022       EC/3/22  Atrial fibrillation  T wave abnormality, consider lateral ischemia  Abnormal ECG    ECHO:  22  Summary   -Definity administered for LV thrombus. -Left ventricular cavity size is severely dilated with normal left   ventricular wall thickness.   -Overall left ventricular systolic function appears severely reduced. Ejection fraction is visually estimated to be 20%.    -There is severe diffuse hypokinesis. -Cannot grade diastology due to atrial fibrillation, although there are   elevated LA pressures.   -Prominent trabeculations in the LV apex. Cannot exclude Non-compaction   cardiomyopathy.   -No evidence of left ventricular mass or thrombus noted. -The right ventricle is severely enlarged. Right ventricular systolic   function is moderately reduced.   -There is severe bi-atrial enlargement. -Mitral valve leaflets appear mildly thickened. Mitral regurgitation   directed centrally and posteriorly that may be severe. -The ascending aorta is mildly dilated. -Moderate to severe tricuspid regurgitation with a PASP of 77 mmHg.   -Trivial pulmonic regurgitation. CONSUELO 2/10/22  - Severe LV dysfunction,  EF: 20-25%  - LA dilated. There was HENOK thrombus. - Moderate MR     Cath: 2/8/22  Anatomy:   LM-nml   LAD-nml  Cx-nml  OM- nml  RCA-nml  RPDA- nml  LVEF- 10%  LVG- global hypokinesis  LVEDP- 10     Hemodynamics:  RA- mean 3  RV- 37/0  PAWP- 10  PA- 34/12 (20)     C.O.- 5.7 (4.9)  C. I.- 2.6 (2.25)  PA sat 60%  AO sat 91%     Contrast: 70  Flouro Time: 5.3  Access: R radial a, R CFV     Impression  ~Coronary Angiography w/ normal cors  ~LVG with LVEF of 10% and global regional wall motion abnormalities  ~Severe MR  ~Normal CO/CI  ~normal L and R filling pressures     Recommendation  ~Aggressive medical treatment and risk factor modification  ~1. Medications reviewed, no changes at this time. 2. Post cath IVF. Bedrest.  3.Consider CONSUELO for evaluation of MR.   4. Patient has been advised on the importance of regular exercise of at least 20-30 minutes daily alternating with aerobic and isometric activities. 5. Patient counseled about and offered assistance for smoking cessation   6.  No indication for cardiac rehab  7. CHF service to evaluate tomorrow.       Problem List:   Patient Active Problem List    Diagnosis Date Noted    Complicated UTI (urinary tract infection)     Chronic atrial fibrillation (HCC)     Severe mitral regurgitation     Acute systolic heart failure ([address] 04/13/2022    NSTEMI (non-ST elevated myocardial infarction) ([address]     Acute on chronic combined systolic and diastolic heart failure ([address] 02/06/2022    Homeless     COVID     Non-compliance     Acute on chronic systolic heart failure ([address] 11/28/2021    Acute pulmonary edema ([address] 11/27/2021    Encounter for loop recorder check 07/26/2019    Acute urinary retention 07/26/2019    Dilated cardiomyopathy (HCC)     Acute renal failure (HCC)     PAF (paroxysmal atrial fibrillation) (HCC)     Benign essential HTN     Acute retention of urine 07/24/2019        Assessment and Plan:     MR  ~ severe MR per echo 2/8/22   ~ moderate MR per CONSUELO 2/10/22     Cardiomyopathy   ~ SOB improving, significant swelling persistent BLE   ~ Echo 2/7/22 EF 20%  ~ Normal cors per [address] 2/8/22   ~ not intersted in [address],   ~ conversation started regarding AICD with EP. Needs to confirm compliance with medical therapy and follow up with heart failure prior to final deciion for implantation of ICD  ~ lisinopril / toprol / lasix gtt     PVCs/NSVT  ~ EP following  ~ known HFrEF  ~ refuses LifeVest  ~ discussing AICD with EP     Afib / HENOK  ~ eliquis   ~ EP following     UTI  ~ abx per urology     Trend BNP. Continue with lasix gtt for another day. Will reevaluate symptoms/labs in AM    Multiple medical conditions with risk of decompensation. All pertinent information and plan of care discussed with the physician. All questions and concerns were addressed to the patient. Alternatives to my treatment were discussed. I have discussed the above stated plan with patient and the nurse. The patient verbalized understanding and agreed with the plan. Thank you for allowing to us to participate in the care of Denise NEWMAN-CNP  [facility]   Office: [phone]

## 2022-04-17 LAB
A/G RATIO: 1.4 (ref 1.1–2.2)
ALBUMIN SERPL-MCNC: 3.5 G/DL (ref 3.4–5)
ALP BLD-CCNC: 74 U/L (ref 40–129)
ALT SERPL-CCNC: 41 U/L (ref 10–40)
ANION GAP SERPL CALCULATED.3IONS-SCNC: 8 MMOL/L (ref 3–16)
AST SERPL-CCNC: 35 U/L (ref 15–37)
BASOPHILS ABSOLUTE: 0 K/UL (ref 0–0.2)
BASOPHILS RELATIVE PERCENT: 1 %
BILIRUB SERPL-MCNC: 0.9 MG/DL (ref 0–1)
BLOOD CULTURE, ROUTINE: NORMAL
BUN BLDV-MCNC: 23 MG/DL (ref 7–20)
CALCIUM SERPL-MCNC: 8.6 MG/DL (ref 8.3–10.6)
CHLORIDE BLD-SCNC: 100 MMOL/L (ref 99–110)
CO2: 29 MMOL/L (ref 21–32)
CREAT SERPL-MCNC: 1 MG/DL (ref 0.8–1.3)
CULTURE, BLOOD 2: NORMAL
EOSINOPHILS ABSOLUTE: 0.1 K/UL (ref 0–0.6)
EOSINOPHILS RELATIVE PERCENT: 2.5 %
GFR AFRICAN AMERICAN: >60
GFR NON-AFRICAN AMERICAN: >60
GLUCOSE BLD-MCNC: 89 MG/DL (ref 70–99)
HCT VFR BLD CALC: 34.4 % (ref 40.5–52.5)
HEMOGLOBIN: 11.2 G/DL (ref 13.5–17.5)
LYMPHOCYTES ABSOLUTE: 1.1 K/UL (ref 1–5.1)
LYMPHOCYTES RELATIVE PERCENT: 23.7 %
MAGNESIUM: 2.2 MG/DL (ref 1.8–2.4)
MCH RBC QN AUTO: 26.8 PG (ref 26–34)
MCHC RBC AUTO-ENTMCNC: 32.7 G/DL (ref 31–36)
MCV RBC AUTO: 81.9 FL (ref 80–100)
MONOCYTES ABSOLUTE: 0.3 K/UL (ref 0–1.3)
MONOCYTES RELATIVE PERCENT: 7.6 %
NEUTROPHILS ABSOLUTE: 2.9 K/UL (ref 1.7–7.7)
NEUTROPHILS RELATIVE PERCENT: 65.2 %
PDW BLD-RTO: 15.6 % (ref 12.4–15.4)
PLATELET # BLD: 262 K/UL (ref 135–450)
PMV BLD AUTO: 8.1 FL (ref 5–10.5)
POTASSIUM REFLEX MAGNESIUM: 3.9 MMOL/L (ref 3.5–5.1)
RBC # BLD: 4.2 M/UL (ref 4.2–5.9)
SODIUM BLD-SCNC: 137 MMOL/L (ref 136–145)
TOTAL PROTEIN: 6 G/DL (ref 6.4–8.2)
WBC # BLD: 4.5 K/UL (ref 4–11)

## 2022-04-17 PROCEDURE — 6360000002 HC RX W HCPCS: Performed by: INTERNAL MEDICINE

## 2022-04-17 PROCEDURE — 6370000000 HC RX 637 (ALT 250 FOR IP): Performed by: INTERNAL MEDICINE

## 2022-04-17 PROCEDURE — 85025 COMPLETE CBC W/AUTO DIFF WBC: CPT

## 2022-04-17 PROCEDURE — 1200000000 HC SEMI PRIVATE

## 2022-04-17 PROCEDURE — 6370000000 HC RX 637 (ALT 250 FOR IP): Performed by: NURSE PRACTITIONER

## 2022-04-17 PROCEDURE — 99232 SBSQ HOSP IP/OBS MODERATE 35: CPT | Performed by: NURSE PRACTITIONER

## 2022-04-17 PROCEDURE — 83735 ASSAY OF MAGNESIUM: CPT

## 2022-04-17 PROCEDURE — 36415 COLL VENOUS BLD VENIPUNCTURE: CPT

## 2022-04-17 PROCEDURE — 2580000003 HC RX 258: Performed by: INTERNAL MEDICINE

## 2022-04-17 PROCEDURE — 80053 COMPREHEN METABOLIC PANEL: CPT

## 2022-04-17 RX ORDER — METOPROLOL SUCCINATE 25 MG/1
25 TABLET, EXTENDED RELEASE ORAL DAILY
Status: DISCONTINUED | OUTPATIENT
Start: 2022-04-17 | End: 2022-04-26 | Stop reason: HOSPADM

## 2022-04-17 RX ADMIN — DESMOPRESSIN ACETATE 40 MG: 0.2 TABLET ORAL at 20:23

## 2022-04-17 RX ADMIN — LISINOPRIL 2.5 MG: 5 TABLET ORAL at 08:26

## 2022-04-17 RX ADMIN — APIXABAN 5 MG: 5 TABLET, FILM COATED ORAL at 08:26

## 2022-04-17 RX ADMIN — Medication 10 ML: at 20:23

## 2022-04-17 RX ADMIN — TAMSULOSIN HYDROCHLORIDE 0.4 MG: 0.4 CAPSULE ORAL at 08:26

## 2022-04-17 RX ADMIN — METOPROLOL SUCCINATE 25 MG: 25 TABLET, EXTENDED RELEASE ORAL at 08:26

## 2022-04-17 RX ADMIN — FINASTERIDE 5 MG: 5 TABLET, FILM COATED ORAL at 08:26

## 2022-04-17 RX ADMIN — Medication 10 ML: at 08:26

## 2022-04-17 RX ADMIN — Medication 1000 MG: at 16:36

## 2022-04-17 RX ADMIN — APIXABAN 5 MG: 5 TABLET, FILM COATED ORAL at 20:23

## 2022-04-17 ASSESSMENT — PAIN SCALES - GENERAL
PAINLEVEL_OUTOF10: 0

## 2022-04-17 NOTE — PROGRESS NOTES
[facility] PROGRESS NOTE    4/17/2022 11:37 AM        Name: Werner Leal . Admitted: 4/13/2022  Primary Care Provider: No primary care provider on file. (Tel: None)        Subjective: In bed shortness of breath is improving swelling of the knees is greatly improved no nausea vomiting or chest pain  Reviewed interval ancillary notes    Current Medications  apixaban (ELIQUIS) tablet 5 mg, BID  metoprolol succinate (TOPROL XL) extended release tablet 25 mg, Daily  lisinopril (PRINIVIL;ZESTRIL) tablet 2.5 mg, Daily  furosemide (LASIX) 100 mg in dextrose 5 % 100 mL infusion, Continuous  atorvastatin (LIPITOR) tablet 40 mg, Nightly  finasteride (PROSCAR) tablet 5 mg, Daily  tamsulosin (FLOMAX) capsule 0.4 mg, Daily  sodium chloride flush 0.9 % injection 5-40 mL, 2 times per day  sodium chloride flush 0.9 % injection 5-40 mL, PRN  0.9 % sodium chloride infusion, PRN  polyethylene glycol (GLYCOLAX) packet 17 g, Daily PRN  acetaminophen (TYLENOL) tablet 650 mg, Q6H PRN   Or  acetaminophen (TYLENOL) suppository 650 mg, Q6H PRN  cefTRIAXone (ROCEPHIN) 1000 mg in sterile water 10 mL IV syringe, Q24H  prochlorperazine (COMPAZINE) tablet 10 mg, Q6H PRN   Or  prochlorperazine (COMPAZINE) 10 mg in sodium chloride (PF) 10 mL injection, Q6H PRN        Objective:  BP (!) 121/92   Pulse 75   Temp 98 °F (36.7 °C) (Oral)   Resp 18   Ht 6' 2\" (1.88 m)   Wt 221 lb 1.6 oz (100.3 kg)   SpO2 98%   BMI 28.39 kg/m²     Intake/Output Summary (Last 24 hours) at 4/17/2022 1137  Last data filed at 4/17/2022 0735  Gross per 24 hour   Intake 1262.17 ml   Output 575 ml   Net 687.17 ml      Wt Readings from Last 3 Encounters:   04/17/22 221 lb 1.6 oz (100.3 kg)   04/12/22 227 lb 9.6 oz (103.2 kg)   03/22/22 220 lb 1.6 oz (99.8 kg)       General appearance:  Appears comfortable.  AAOx3  HEENT: atraumatic, Pupils equal, muscous membranes moist, no masses appreciated  Cardiovascular: Regular rate and rhythm no murmurs appreciated  Respiratory: CTAB no wheezing  Gastrointestinal: Abdomen soft, non-tender, BS+  EXT: 1+ BLE improving  Neurology: no gross focal deficts  Psychiatry: Appropriate affect. Not agitated  Skin: Warm, dry, no rashes appreciated    Labs and Tests:  CBC:   Recent Labs     04/15/22  0611 04/16/22  0552 04/17/22  0531   WBC 6.5 4.7 4.5   HGB 11.6* 11.5* 11.2*    255 262     BMP:    Recent Labs     04/15/22  0611 04/16/22  0552 04/17/22  0531    140 137   K 3.8 3.8 3.9    101 100   CO2 30 29 29   BUN 32* 25* 23*   CREATININE 1.3 1.1 1.0   GLUCOSE 89 86 89     Hepatic:   Recent Labs     04/15/22  0611 04/16/22  0552 04/17/22  0531   AST 36 33 35   ALT 48* 43* 41*   BILITOT 1.3* 0.9 0.9   ALKPHOS 77 72 74     XR PELVIS (1-2 VIEWS)   Final Result   No acute abnormality and no foreign body evident. XR CHEST PORTABLE   Final Result   Cardiomegaly with no acute pulmonary finding. Recent imaging reviewed    Problem List  Principal Problem:    Acute systolic heart failure (HCC)  Active Problems:    Complicated UTI (urinary tract infection)    Chronic atrial fibrillation (HCC)    Severe mitral regurgitation    Acute on chronic congestive heart failure ([address]  Resolved Problems:    * No resolved hospital problems.  *     Assessment/Plan:   Acute On chronic systolic heart failure with EF of 25% and moderate MR  - lasix gtt, cr is stil stable repeat labs sandy       Elevated lft likely secondary to above  Keeps improving with diureesis     PAF with left atrial appendage clost 2/2022  - eliquis  - bblocker     Bph: flomax     UTI: rocephin      DVT prophylaxis eliquis  Code status full code       Yudith Contreras MD   4/17/2022 11:37 AM
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
## 2022-04-17 NOTE — PROGRESS NOTES
I was told in report that pt had 11 beats of V-tach overnight. Provider notified by night shift RN, magnesium lab ordered. Lab just called and verified that they can add mag onto the labs drawn this morning, awaiting result.

## 2022-04-17 NOTE — PLAN OF CARE
Problem: Falls - Risk of:  Goal: Will remain free from falls  Description: Will remain free from falls  4/16/2022 2344 by John You RN  Outcome: Ongoing     Problem: Falls - Risk of:  Goal: Absence of physical injury  Description: Absence of physical injury  4/16/2022 2344 by John You RN  Outcome: Ongoing     Problem: OXYGENATION/RESPIRATORY FUNCTION  Goal: Patient will maintain patent airway  4/16/2022 1228 by Cherry Milner RN  Outcome: Ongoing

## 2022-04-17 NOTE — PROGRESS NOTES
[facility]   Cardiology Progress Note     Date: 4/17/2022  Admit Date: 4/13/2022     Reason for consultation: shortness of breath     Chief Complaint:   Chief Complaint   Patient presents with    Shortness of Breath     pt c/o shortness of breath x couple of days. History of Present Illness: History obtained from patient and medical record. Jesus Chavez is a 59 y.o. male with a past medical history of PAF, hypertension.  Unvaccinated, , homeless, HENOK thrombus and AF     He is admitted with fluid overload, non compliance with medications (not taking toprol or lisinopril at home and taking eliquis once a day along with several baby aspirins). He had recent indwelling ernst for urinary retention and had balloon rupture causing ernst to fall out. Recent admission 2/5-10 for same issues. LVEF 20% normal cors, severe MR, HENOK thrombus      Interval Hx: Today, he feels great. Swelling in legs has still not completely resolved. Per RN, Lasix gtt decreased last evening to 2.5 due to SBP in the 90s. 11 beat run of VT overnight, asymptomatic. Patient seen and examined. Clinical notes reviewed. Telemetry reviewed. No new complaints today. No major events overnight. Denies having chest pain, palpitations, shortness of breath, orthopnea/PND, cough, or dizziness at the time of this visit. Allergies:  No Known Allergies    Home Meds:  Prior to Visit Medications    Medication Sig Taking?  Authorizing Provider   torsemide (DEMADEX) 20 MG tablet Take 1 tablet by mouth daily  TRENT [place] - CNS   lisinopril (PRINIVIL;ZESTRIL) 5 MG tablet Take 1 tablet by mouth daily  TRENT [place] - CNS   metoprolol succinate (TOPROL XL) 50 MG extended release tablet Take 1 tablet by mouth daily  TRENT [place] - CNS   potassium chloride (KLOR-CON M) 20 MEQ extended release tablet Take 2 tablets by mouth daily for 1 day  TRENT Gilbert CNP   metOLazone (ZAROXOLYN) 5 MG tablet Take 1 tablet by mouth once for 1 dose  TRENT [place]   vitamin D (CHOLECALCIFEROL) 50 MCG (2000 UT) TABS tablet Take 1 tablet by mouth daily  TRENT [place]   aspirin 81 MG chewable tablet Take 1 tablet by mouth daily  Kee Ruano MD   atorvastatin (LIPITOR) 40 MG tablet Take 1 tablet by mouth nightly  Kee Ruano MD   finasteride (PROSCAR) 5 MG tablet Take 1 tablet by mouth daily  Kee Ruano MD   tamsulosin (FLOMAX) 0.4 MG capsule Take 1 capsule by mouth daily  Raysa Nunez MD   apixaban (ELIQUIS) 5 MG TABS tablet Take 1 tablet by mouth 2 times daily  Patient taking differently: Take 5 mg by mouth Patient stated that he has started to take 1 time a day instead of 2. Patient stated that taking 2 a day was causing nosebleeds. Kee Ruano MD      Scheduled Meds:   lisinopril  2.5 mg Oral Daily    apixaban  5 mg Oral Daily    atorvastatin  40 mg Oral Nightly    finasteride  5 mg Oral Daily    metoprolol succinate  50 mg Oral Daily    tamsulosin  0.4 mg Oral Daily    sodium chloride flush  5-40 mL IntraVENous 2 times per day    cefTRIAXone (ROCEPHIN) IV  1,000 mg IntraVENous Q24H     Continuous Infusions:   furosemide (LASIX) 1mg/ml infusion 2.5 mg/hr (04/17/22 0735)    sodium chloride       PRN Meds:sodium chloride flush, sodium chloride, polyethylene glycol, acetaminophen **OR** acetaminophen, prochlorperazine **OR** prochlorperazine (COMPAZINE) with normal saline injection     Past Medical History:  Past Medical History:   Diagnosis Date    Atrial fibrillation ([facility] [address] 07/25/2019    CHF (congestive heart failure) (HCC)     Hx of blood clots     Hypertension         Past Surgical History:    has a past surgical history that includes Insertable Cardiac Monitor (07/26/2019) and Cardioversion (07/26/2019). Social History:  Reviewed. reports that he has never smoked.  He has never used smokeless tobacco. He reports that he does not drink alcohol and does not use drugs. Family History:  Reviewed. family history includes Heart Attack in his mother; Heart Disease in his mother; High Blood Pressure in his father and mother; Other in his father; Stroke in his father. Denies family history of sudden cardiac death, arrhythmia, premature CAD    Review of Systems:  · Constitutional: Negative for fever, night sweats, chills, weight changes, or weakness  · Skin: Negative for rash, dry skin, pruritus, bruising, bleeding, blood clots, or changes in skin pigment  · HEENT: Negative for vision changes, ringing in the ears, sore throat, dysphagia, or swollen lymph nodes  · Respiratory: Reviewed in HPI  · Cardiovascular: Reviewed in HPI  · Gastrointestinal: Negative for abdominal pain, N/V/D, constipation, or black/tarry stools  · Genito-Urinary: Negative for dysuria, incontinence, urgency, or hematuria  · Musculoskeletal: Negative for joint swelling, muscle pain, or injuries  · Neurological/Psych: Negative for confusion, seizures, headaches, balance issues or TIA-like symptoms. No anxiety, depression, or insomnia    Physical Examination:  Vitals:    04/17/22 0407   BP: 102/78   Pulse: 93   Resp: 22   Temp: 97 °F (36.1 °C)   SpO2: 100%      In: 1622.2 [P.O.:1320; I.V.:302.2]  Out: 1100    Wt Readings from Last 3 Encounters:   04/17/22 221 lb 1.6 oz (100.3 kg)   04/12/22 227 lb 9.6 oz (103.2 kg)   03/22/22 220 lb 1.6 oz (99.8 kg)       Intake/Output Summary (Last 24 hours) at 4/17/2022 8172  Last data filed at 4/17/2022 0735  Gross per 24 hour   Intake 1622.17 ml   Output 1100 ml   Net 522.17 ml       Telemetry: Personally Reviewed  - atrial fibrillation   · Constitutional: Cooperative and in no apparent distress, and appears well nourished  · Skin: Warm and pink; no pallor, cyanosis, bruising, or clubbing  · HEENT: Symmetric and normocephalic. PERRL, EOM intact. Conjunctiva pink with clear sclera. Mucus membranes pink and moist. Teeth intact.  Thyroid smooth without nodules or goiter. · Cardiovascular: Irregular rate and rhythm. S1/S2 present without murmurs, rubs, or gallops. Peripheral pulses 2+, capillary refill < 3 seconds. No elevation of JVP. +1 RLE +2 LLE  · Respiratory: Respirations symmetric and unlabored. Lungs clear to auscultation bilaterally, no wheezing, crackles, or rhonchi  · Gastrointestinal: Abdomen soft and round. Bowel sounds normoactive in all quadrants without tenderness or masses. · Musculoskeletal: Bilateral upper and lower extremity strength 5/5 with full ROM  · Neurologic/Psych: Awake and orientated to person, place and time. Calm affect, appropriate mood    Pertinent labs, diagnostic, device, and imaging results reviewed as a part of this visit    Labs:    BMP:   Recent Labs     04/15/22  0622  0552 22  0531    140 137   K 3.8 3.8 3.9    101 100   CO2 30 29 29   BUN 32* 25* 23*   CREATININE 1.3 1.1 1.0   MG  --   --  2.20     Estimated Creatinine Clearance: 96 mL/min (based on SCr of 1 mg/dL). CBC:   Recent Labs     04/15/22  062252 22  0531   WBC 6.5 4.7 4.5   HGB 11.6* 11.5* 11.2*   HCT 36.3* 35.2* 34.4*   MCV 81.9 81.9 81.9    255 262     Thyroid: No results found for: TSH, F6PWHSK, D7AQCKO, THYROIDAB  Lipids: No results found for: CHOL, HDL, TRIG  LFTS:   Lab Results   Component Value Date    ALT 41 2022    AST 35 2022    ALKPHOS 74 2022    PROT 6.0 2022    AGRATIO 1.4 2022    BILITOT 0.9 2022     Cardiac Enzymes:   Lab Results   Component Value Date    TROPONINI <0.01 2022    TROPONINI 0.52 2022    TROPONINI 0.50 2022     Coags:   Lab Results   Component Value Date    PROTIME 17.2 2022    INR 1.50 2022       EC/3/22  Atrial fibrillation  T wave abnormality, consider lateral ischemia  Abnormal ECG    ECHO:  22  Summary   -Definity administered for LV thrombus.    -Left ventricular cavity size is severely dilated with normal left ventricular wall thickness.   -Overall left ventricular systolic function appears severely reduced. Ejection fraction is visually estimated to be 20%. -There is severe diffuse hypokinesis. -Cannot grade diastology due to atrial fibrillation, although there are   elevated LA pressures.   -Prominent trabeculations in the LV apex. Cannot exclude Non-compaction   cardiomyopathy.   -No evidence of left ventricular mass or thrombus noted. -The right ventricle is severely enlarged. Right ventricular systolic   function is moderately reduced.   -There is severe bi-atrial enlargement. -Mitral valve leaflets appear mildly thickened. Mitral regurgitation   directed centrally and posteriorly that may be severe. -The ascending aorta is mildly dilated. -Moderate to severe tricuspid regurgitation with a PASP of 77 mmHg.   -Trivial pulmonic regurgitation. CONSUELO 2/10/22  - Severe LV dysfunction,  EF: 20-25%  - LA dilated. There was HENOK thrombus. - Moderate MR     Cath: 2/8/22  Anatomy:   LM-nml   LAD-nml  Cx-nml  OM- nml  RCA-nml  RPDA- nml  LVEF- 10%  LVG- global hypokinesis  LVEDP- 10     Hemodynamics:  RA- mean 3  RV- 37/0  PAWP- 10  PA- 34/12 (20)     C.O.- 5.7 (4.9)  C. I.- 2.6 (2.25)  PA sat 60%  AO sat 91%     Contrast: 70  Flouro Time: 5.3  Access: R radial a, R CFV     Impression  ~Coronary Angiography w/ normal cors  ~LVG with LVEF of 10% and global regional wall motion abnormalities  ~Severe MR  ~Normal CO/CI  ~normal L and R filling pressures     Recommendation  ~Aggressive medical treatment and risk factor modification  ~1. Medications reviewed, no changes at this time. 2. Post cath IVF. Bedrest.  3.Consider CONSUELO for evaluation of MR.   4. Patient has been advised on the importance of regular exercise of at least 20-30 minutes daily alternating with aerobic and isometric activities. 5. Patient counseled about and offered assistance for smoking cessation   6.  No indication for cardiac rehab  7. CHF service to evaluate tomorrow.       Problem List:   Patient Active Problem List    Diagnosis Date Noted    Acute on chronic congestive heart failure (HCC)     Complicated UTI (urinary tract infection)     Chronic atrial fibrillation (HCC)     Severe mitral regurgitation     Acute systolic heart failure ([address] 04/13/2022    NSTEMI (non-ST elevated myocardial infarction) ([address]     Acute on chronic combined systolic and diastolic heart failure ([address] 02/06/2022    Homeless     COVID     Non-compliance     Acute on chronic systolic heart failure ([address] 11/28/2021    Acute pulmonary edema ([address] 11/27/2021    Encounter for loop recorder check 07/26/2019    Acute urinary retention 07/26/2019    Dilated cardiomyopathy ([facility] [address]     Acute renal failure ([facility] [address]     PAF (paroxysmal atrial fibrillation) ([facility] [address]     Benign essential HTN     Acute retention of urine 07/24/2019        Assessment and Plan:     MR  ~ moderate MR per CONSUELO 2/10/22   ~ severe MR per echo 2/8/22     Cardiomyopathy   ~ SOB improving, significant swelling persistent BLE   ~ Echo 2/7/22 EF 20%  ~ Normal cors per [facility] 2/8/22   ~ not intersted in [address],   ~ conversation started regarding AICD with EP. Needs to confirm compliance with medical therapy and follow up with heart failure prior to final deciion for implantation of ICD  ~ lisinopril / toprol / lasix gtt     PVCs/NSVT  ~ 11 beat run of VT overnight, pt asymptomatic. BB has not been given x2 days d/t low BP  ~ EP following  ~ known HFrEF  ~ refuses LifeVest  ~ discussing AICD with EP     Afib / HENOK  ~ eliquis   ~ EP following     UTI  ~ abx per urology     Increase eliquis to BID dosing as ASA has been discontinued. Decrease toprol to 25 mg daily as he his low BP has prevented him from receiving the medication the last 2 days. Continue with lasix gtt , will reevaluate symptoms/labs in AM.     Multiple medical conditions with risk of decompensation.      All pertinent information and plan of care discussed with the physician. All questions and concerns were addressed to the patient. Alternatives to my treatment were discussed. I have discussed the above stated plan with patient and the nurse. The patient verbalized understanding and agreed with the plan. Thank you for allowing to us to participate in the care of Tatyana Boyle.     Bonifacio Arboleda APRN-[facility]   Office: [phone]

## 2022-04-18 ENCOUNTER — APPOINTMENT (OUTPATIENT)
Dept: GENERAL RADIOLOGY | Age: 62
DRG: 179 | End: 2022-04-18
Payer: COMMERCIAL

## 2022-04-18 LAB
A/G RATIO: 1.3 (ref 1.1–2.2)
ALBUMIN SERPL-MCNC: 3.7 G/DL (ref 3.4–5)
ALP BLD-CCNC: 81 U/L (ref 40–129)
ALT SERPL-CCNC: 41 U/L (ref 10–40)
ANION GAP SERPL CALCULATED.3IONS-SCNC: 12 MMOL/L (ref 3–16)
AST SERPL-CCNC: 33 U/L (ref 15–37)
BASOPHILS ABSOLUTE: 0 K/UL (ref 0–0.2)
BASOPHILS RELATIVE PERCENT: 0.7 %
BILIRUB SERPL-MCNC: 0.8 MG/DL (ref 0–1)
BUN BLDV-MCNC: 22 MG/DL (ref 7–20)
CALCIUM SERPL-MCNC: 8.9 MG/DL (ref 8.3–10.6)
CHLORIDE BLD-SCNC: 100 MMOL/L (ref 99–110)
CO2: 27 MMOL/L (ref 21–32)
CREAT SERPL-MCNC: 1.1 MG/DL (ref 0.8–1.3)
EOSINOPHILS ABSOLUTE: 0.1 K/UL (ref 0–0.6)
EOSINOPHILS RELATIVE PERCENT: 2.2 %
GFR AFRICAN AMERICAN: >60
GFR NON-AFRICAN AMERICAN: >60
GLUCOSE BLD-MCNC: 99 MG/DL (ref 70–99)
HCT VFR BLD CALC: 37.6 % (ref 40.5–52.5)
HEMOGLOBIN: 11.9 G/DL (ref 13.5–17.5)
LYMPHOCYTES ABSOLUTE: 1.2 K/UL (ref 1–5.1)
LYMPHOCYTES RELATIVE PERCENT: 25.2 %
MAGNESIUM: 2.4 MG/DL (ref 1.8–2.4)
MCH RBC QN AUTO: 25.8 PG (ref 26–34)
MCHC RBC AUTO-ENTMCNC: 31.6 G/DL (ref 31–36)
MCV RBC AUTO: 81.6 FL (ref 80–100)
MONOCYTES ABSOLUTE: 0.3 K/UL (ref 0–1.3)
MONOCYTES RELATIVE PERCENT: 7 %
NEUTROPHILS ABSOLUTE: 3.1 K/UL (ref 1.7–7.7)
NEUTROPHILS RELATIVE PERCENT: 64.9 %
PDW BLD-RTO: 15.5 % (ref 12.4–15.4)
PHOSPHORUS: 3.7 MG/DL (ref 2.5–4.9)
PLATELET # BLD: 279 K/UL (ref 135–450)
PMV BLD AUTO: 7.6 FL (ref 5–10.5)
POTASSIUM SERPL-SCNC: 4.1 MMOL/L (ref 3.5–5.1)
RBC # BLD: 4.61 M/UL (ref 4.2–5.9)
SODIUM BLD-SCNC: 139 MMOL/L (ref 136–145)
TOTAL PROTEIN: 6.5 G/DL (ref 6.4–8.2)
TROPONIN: <0.01 NG/ML
WBC # BLD: 4.8 K/UL (ref 4–11)

## 2022-04-18 PROCEDURE — 2580000003 HC RX 258: Performed by: INTERNAL MEDICINE

## 2022-04-18 PROCEDURE — 36569 INSJ PICC 5 YR+ W/O IMAGING: CPT

## 2022-04-18 PROCEDURE — 71045 X-RAY EXAM CHEST 1 VIEW: CPT

## 2022-04-18 PROCEDURE — 6370000000 HC RX 637 (ALT 250 FOR IP): Performed by: INTERNAL MEDICINE

## 2022-04-18 PROCEDURE — 84100 ASSAY OF PHOSPHORUS: CPT

## 2022-04-18 PROCEDURE — 83735 ASSAY OF MAGNESIUM: CPT

## 2022-04-18 PROCEDURE — 97116 GAIT TRAINING THERAPY: CPT

## 2022-04-18 PROCEDURE — 97110 THERAPEUTIC EXERCISES: CPT

## 2022-04-18 PROCEDURE — 85025 COMPLETE CBC W/AUTO DIFF WBC: CPT

## 2022-04-18 PROCEDURE — C1751 CATH, INF, PER/CENT/MIDLINE: HCPCS

## 2022-04-18 PROCEDURE — 80053 COMPREHEN METABOLIC PANEL: CPT

## 2022-04-18 PROCEDURE — 6360000002 HC RX W HCPCS: Performed by: INTERNAL MEDICINE

## 2022-04-18 PROCEDURE — 02HV33Z INSERTION OF INFUSION DEVICE INTO SUPERIOR VENA CAVA, PERCUTANEOUS APPROACH: ICD-10-PCS | Performed by: INTERNAL MEDICINE

## 2022-04-18 PROCEDURE — 6370000000 HC RX 637 (ALT 250 FOR IP): Performed by: NURSE PRACTITIONER

## 2022-04-18 PROCEDURE — 1200000000 HC SEMI PRIVATE

## 2022-04-18 PROCEDURE — 6360000002 HC RX W HCPCS: Performed by: CLINICAL NURSE SPECIALIST

## 2022-04-18 PROCEDURE — 36415 COLL VENOUS BLD VENIPUNCTURE: CPT

## 2022-04-18 PROCEDURE — 84484 ASSAY OF TROPONIN QUANT: CPT

## 2022-04-18 PROCEDURE — 99233 SBSQ HOSP IP/OBS HIGH 50: CPT | Performed by: CLINICAL NURSE SPECIALIST

## 2022-04-18 PROCEDURE — 2580000003 HC RX 258: Performed by: CLINICAL NURSE SPECIALIST

## 2022-04-18 PROCEDURE — 2500000003 HC RX 250 WO HCPCS: Performed by: INTERNAL MEDICINE

## 2022-04-18 RX ORDER — LIDOCAINE HYDROCHLORIDE 10 MG/ML
5 INJECTION, SOLUTION EPIDURAL; INFILTRATION; INTRACAUDAL; PERINEURAL ONCE
Status: DISCONTINUED | OUTPATIENT
Start: 2022-04-18 | End: 2022-04-18 | Stop reason: SDUPTHER

## 2022-04-18 RX ORDER — SODIUM CHLORIDE 0.9 % (FLUSH) 0.9 %
5-40 SYRINGE (ML) INJECTION PRN
Status: DISCONTINUED | OUTPATIENT
Start: 2022-04-18 | End: 2022-04-18

## 2022-04-18 RX ORDER — LIDOCAINE HYDROCHLORIDE 10 MG/ML
5 INJECTION, SOLUTION EPIDURAL; INFILTRATION; INTRACAUDAL; PERINEURAL ONCE
Status: COMPLETED | OUTPATIENT
Start: 2022-04-18 | End: 2022-04-18

## 2022-04-18 RX ORDER — MILRINONE LACTATE 0.2 MG/ML
0.2 INJECTION, SOLUTION INTRAVENOUS CONTINUOUS
Status: DISCONTINUED | OUTPATIENT
Start: 2022-04-18 | End: 2022-04-22

## 2022-04-18 RX ORDER — SODIUM CHLORIDE 9 MG/ML
25 INJECTION, SOLUTION INTRAVENOUS PRN
Status: DISCONTINUED | OUTPATIENT
Start: 2022-04-18 | End: 2022-04-26 | Stop reason: HOSPADM

## 2022-04-18 RX ORDER — SODIUM CHLORIDE 0.9 % (FLUSH) 0.9 %
5-40 SYRINGE (ML) INJECTION PRN
Status: DISCONTINUED | OUTPATIENT
Start: 2022-04-18 | End: 2022-04-26 | Stop reason: HOSPADM

## 2022-04-18 RX ORDER — SODIUM CHLORIDE 9 MG/ML
25 INJECTION, SOLUTION INTRAVENOUS PRN
Status: DISCONTINUED | OUTPATIENT
Start: 2022-04-18 | End: 2022-04-18

## 2022-04-18 RX ORDER — SODIUM CHLORIDE 0.9 % (FLUSH) 0.9 %
5-40 SYRINGE (ML) INJECTION EVERY 12 HOURS SCHEDULED
Status: DISCONTINUED | OUTPATIENT
Start: 2022-04-18 | End: 2022-04-26 | Stop reason: HOSPADM

## 2022-04-18 RX ORDER — SODIUM CHLORIDE 0.9 % (FLUSH) 0.9 %
5-40 SYRINGE (ML) INJECTION EVERY 12 HOURS SCHEDULED
Status: DISCONTINUED | OUTPATIENT
Start: 2022-04-18 | End: 2022-04-18 | Stop reason: SDUPTHER

## 2022-04-18 RX ADMIN — APIXABAN 5 MG: 5 TABLET, FILM COATED ORAL at 20:08

## 2022-04-18 RX ADMIN — IRON SUCROSE 200 MG: 20 INJECTION, SOLUTION INTRAVENOUS at 16:43

## 2022-04-18 RX ADMIN — FINASTERIDE 5 MG: 5 TABLET, FILM COATED ORAL at 08:40

## 2022-04-18 RX ADMIN — DESMOPRESSIN ACETATE 40 MG: 0.2 TABLET ORAL at 20:08

## 2022-04-18 RX ADMIN — SODIUM CHLORIDE, PRESERVATIVE FREE 10 ML: 5 INJECTION INTRAVENOUS at 20:09

## 2022-04-18 RX ADMIN — LIDOCAINE HYDROCHLORIDE 5 ML: 10 INJECTION, SOLUTION EPIDURAL; INFILTRATION; INTRACAUDAL; PERINEURAL at 16:29

## 2022-04-18 RX ADMIN — TAMSULOSIN HYDROCHLORIDE 0.4 MG: 0.4 CAPSULE ORAL at 08:41

## 2022-04-18 RX ADMIN — METOPROLOL SUCCINATE 25 MG: 25 TABLET, EXTENDED RELEASE ORAL at 08:40

## 2022-04-18 RX ADMIN — MILRINONE LACTATE 0.2 MCG/KG/MIN: 0.2 INJECTION, SOLUTION INTRAVENOUS at 18:41

## 2022-04-18 RX ADMIN — LISINOPRIL 2.5 MG: 5 TABLET ORAL at 08:41

## 2022-04-18 RX ADMIN — APIXABAN 5 MG: 5 TABLET, FILM COATED ORAL at 08:41

## 2022-04-18 RX ADMIN — FUROSEMIDE 2.5 MG/HR: 10 INJECTION, SOLUTION INTRAMUSCULAR; INTRAVENOUS at 14:39

## 2022-04-18 ASSESSMENT — PAIN SCALES - GENERAL
PAINLEVEL_OUTOF10: 0

## 2022-04-18 NOTE — PROGRESS NOTES
Pt awake in bed. VSS, assessment complete and medications given. Pt denies any needs. Fresh water provided and call light in reach.

## 2022-04-18 NOTE — PROGRESS NOTES
Physical Therapy  Facility/Department: [address] NURSING  Daily Treatment Note  NAME: Christy Agarwal  : 1960  MRN: [id number]    Date of Service: 2022  Christy Agarwal scored a 20/24 on the AM-PAC short mobility form. Current research shows that an AM-PAC score of 18 or greater is typically associated with a discharge to the patient's home setting. Based on the patient's AM-PAC score and their current functional mobility, do not anticipate Pt. Needing further therapy. PT gave HEP and Pt. Reports \"I'll continue to do what you showed Me\"  Please see assessment section for further patient specific details. If patient discharges prior to next session this note will serve as a discharge summary. Please see below for the latest assessment towards goals. Discharge Recommendations:      PT Equipment Recommendations  Equipment Needed: No  Other: Pt. ambulating without an AD at this time    Assessment   Assessment: Pt. presents with CHF and complicated UTI. Pt. is slightly below baseline functional mobility and is somewhat limited by significant B LE edema. Pt. improving in the distance of ambulation tolerating. Pt. will benefit from skilled PT to improve functional mobility. Anticipate Pt. improving functional mobility and being safe for d/c to home. Will continue to monitor based on progress. Treatment Diagnosis: Impaired functional mobility  Prognosis: Good  PT Education: PT Role;Plan of Care;Transfer Training;General Safety;Gait Training;Functional Mobility Training;Home Exercise Program  Patient Education: Verbalized understanding  REQUIRES PT FOLLOW UP: Yes  Activity Tolerance  Activity Tolerance: Patient Tolerated treatment well  Activity Tolerance: Pt. limited partially by B LE edema     Patient Diagnosis(es): The primary encounter diagnosis was Acute on chronic congestive heart failure, unspecified heart failure type ([facility] [address]  A diagnosis of Complicated UTI (urinary tract infection) was also pertinent to this visit. has a past medical history of Atrial fibrillation ([facility] [address] CHF (congestive heart failure) ([facility] [address] Hx of blood clots, and Hypertension. has a past surgical history that includes Insertable Cardiac Monitor (07/26/2019) and Cardioversion (07/26/2019). Restrictions  Restrictions/Precautions  Restrictions/Precautions: Fall Risk (moderat)  Required Braces or Orthoses?: No  Position Activity Restriction  Other position/activity restrictions: 58 yo BM with hx of nonischemic severe cmp with severe MR who is admitted with edema and chf.  He is homeless and has noncompliance with meds and f/u. He has recently had an indwelling ernst for urinary retention and had balloon rupture and thinks some of the fragments may still be inside. He has chronic AF and is on eliquis. He was seen in heart failure clinic on 4/12 and admitted. Recent admit 2/5-2/10/22 for the same. EF 20%. Normal cors by cath. Severe MR. HENOK thrombus also. Dx with acute on chronic CHF and complicated UTI  Subjective   General  Chart Reviewed: Yes  Response To Previous Treatment: Patient with no complaints from previous session. Family / Caregiver Present: No  Subjective  Subjective: Denies pain c/o stiffness in the L LE  General Comment  Comments: Sitting up in chair, agreeable to therapy          Orientation  Orientation  Overall Orientation Status: Within Functional Limits  Cognition      Objective   Bed mobility  Supine to Sit: Unable to assess  Sit to Supine: Unable to assess  Comment: Up in chair pre/post session  Transfers  Sit to Stand: Supervision  Stand to sit: Supervision  Ambulation  Ambulation?: Yes  Ambulation 1  Surface: level tile  Device: No Device  Assistance: Contact guard assistance;Supervision  Quality of Gait: Pt. with decreased knee and ankle motion d/t edema, No LOB, increased med/lateral sway to accomodate swing through with limited knee flexion  Gait Deviations: Increased VITO; Decreased step length;Decreased step height;Slow Rona  Distance: ~ 350' x 2  Stairs/Curb  Stairs?: No     Balance  Posture: Good  Sitting - Static: Good  Sitting - Dynamic: Good  Standing - Static: Good;-  Standing - Dynamic: Good;-  Comments: Pt. with Supervision in sitting and CGA/S with standing  Exercises  Hip Flexion: x 12  BLE  Knee Active Range of Motion: x 12 B LE to 90 degrees with pillow case under feet  Ankle Pumps: x 12 B LE         Comment: Set up for comfort in BS chair              G-Code     OutComes Score                                                     AM-PAC Score  AM-PAC Inpatient Mobility Raw Score : 20 (04/18/22 1336)  AM-PAC Inpatient T-Scale Score : 47.67 (04/18/22 1336)  Mobility Inpatient CMS 0-100% Score: 35.83 (04/18/22 1336)  Mobility Inpatient CMS G-Code Modifier : CJ (04/18/22 1336)          Goals  Short term goals  Time Frame for Short term goals: By d/c  Short term goal 1: Supine to/from sit with Mod I  Short term goal 2: Amb. x 200' with/without and AD wiht Mod I  Short term goal 3: up and down 12 steps with rail and Mod I  Patient Goals   Patient goals : Improve walking    Plan    Plan  Times per week: 3-5x/wk  Current Treatment Recommendations: Strengthening,Balance Training,Functional Mobility Training,Transfer Training,Gait Training,Endurance Training,Home Exercise Program,Patient/Caregiver Education & Training,Equipment Evaluation, Education, & procurement,Safety Education & Training,Stair training  Safety Devices  Type of devices:  All fall risk precautions in place,Gait belt,Call light within reach  Restraints  Initially in place: No     Therapy Time   Individual Concurrent Group Co-treatment   Time In 1507         Time Out 1535         Minutes 28         Timed Code Treatment Minutes: [address], [place], [id number]

## 2022-04-18 NOTE — PROGRESS NOTES
[facility]   Daily Progress Note      Admit Date:  4/13/2022    HPI:    Mr. Ivana Johnson is a 59 y.o. male with history of with history of PAF, hypertension.  Unvaccinated, , homeless, HENOK thrombus and AF    He is admitted with fluid overload, non compliance with medications (not taking toprol or lisinopril at home and taking eliquis once a day along with several baby aspirins). He had recent indwelling ernst for urinary retention and had balloon rupture causing ernst to fall out. Recent admission 2/5-10 for same issues. LVEF 20% normal cors, severe MR, HENOK thrombus    Subjective:  Patient is being seen for acute on chronic systolic heart failure. There were no acute overnight cardiac events. Creat 1.1 potassium 4.1 not making much urine and weight not coming down. Iron sat 10  probnp 27K-11K    Objective:   /78   Pulse 91   Temp 97.4 °F (36.3 °C) (Oral)   Resp 16   Ht 6' 2\" (1.88 m)   Wt 222 lb (100.7 kg)   SpO2 97%   BMI 28.50 kg/m²       Intake/Output Summary (Last 24 hours) at 4/18/2022 0842  Last data filed at 4/18/2022 0833  Gross per 24 hour   Intake 480 ml   Output 210 ml   Net 270 ml          Physical Exam:  General:  Awake, alert, oriented in NAD  Skin:  Warm and dry. No unusual bruising or rash  Neck:  Supple. No JVD or carotid bruit appreciated  Chest:  Normal effort.   Decreased bilaterally in bases  Cardiovascular:  RRR, S1/S2, no murmur/gallop/rub  Abdomen:  Soft, nontender, +bowel sounds  Extremities:  Bilateral lower ankles to thighs edema  Neurological: No focal deficits  Psychological: Normal mood and affect      Medications:    apixaban  5 mg Oral BID    metoprolol succinate  25 mg Oral Daily    lisinopril  2.5 mg Oral Daily    atorvastatin  40 mg Oral Nightly    finasteride  5 mg Oral Daily    tamsulosin  0.4 mg Oral Daily    sodium chloride flush  5-40 mL IntraVENous 2 times per day    cefTRIAXone (ROCEPHIN) IV  1,000 mg IntraVENous Q24H      furosemide (LASIX) 1mg/ml infusion 2.5 mg/hr (04/17/22 0735)    sodium chloride         Lab Data:  CBC:   Recent Labs     04/16/22  0552 04/17/22  0531 04/18/22  0215   WBC 4.7 4.5 4.8   HGB 11.5* 11.2* 11.9*    262 279     BMP:    Recent Labs     04/16/22  0552 04/17/22  0531 04/18/22  0216    137 139   K 3.8 3.9 4.1   CO2 29 29 27   BUN 25* 23* 22*   CREATININE 1.1 1.0 1.1     INR:  No results for input(s): INR in the last 72 hours. BNP:    Recent Labs     04/16/22  0552   PROBNP 11,887*         Diagnostics:  CONSUELO Dr Julieth Thomason 2/10/22  Preliminary CONSUELO results are:      - Severe LV dysfunction,  EF: 20-25%  - LA dilated. There was HENOK thrombus. - Moderate MR     Cardiac Cath PCI: Dr Ofe Reilly 2/8/2022  Anatomy:   LM-nml   LAD-nml  Cx-nml  OM- nml  RCA-nml  RPDA- nml  LVEF- 10%  LVG- global hypokinesis  LVEDP- 10     Hemodynamics:  RA- mean 3  RV- 37/0  PAWP- 10  PA- 34/12 (20)     C.O.- 5.7 (4.9)  C. I.- 2.6 (2.25)  PA sat 60%  AO sat 91%     Contrast: 70  Flouro Time: 5.3  Access: R radial a, R CFV     Impression  ~Coronary Angiography w/ normal cors  ~LVG with LVEF of 10% and global regional wall motion abnormalities  ~Severe MR  ~Normal CO/CI  ~normal L and R filling pressures     Recommendation  ~Aggressive medical treatment and risk factor modification  ~1. Medications reviewed, no changes at this time. 2. Post cath IVF. Bedrest.  3.Consider CONSUELO for evaluation of MR.   4. Patient has been advised on the importance of regular exercise of at least 20-30 minutes daily alternating with aerobic and isometric activities. 5. Patient counseled about and offered assistance for smoking cessation   6. No indication for cardiac rehab  7. CHF service to evaluate tomorrow.     Echo 11/29/2021  Summary   -Covid+   -Severely reduced global systolic function with an ejection fraction   estimated at 20%.    -Severe global hypokinesis with regional variations noted.   -Right ventricular systolic function appears to be mildly reduced based on   visual inspection.   -Severe biatrial enlargement.   -Thickened mitral valve without evidence of stenosis. There is   mild-to-moderate mitral regurgitation.   -There is mild-to-moderate tricuspid regurgitation with a RVSP estimation of   41 mmHg.   -Diastolic dysfunction with elevated LV filling pressures.     Echo 7/25/2019  Summary   -Overall left ventricular systolic function is moderately depressed .   -Ejection fraction is visually estimated to be 30-35 %.  -Global left ventricular function moderately reduced.   -Indeterminate diastolic function due to atrial fibrillation and moderate to   severe mitral regurgitation.   -Moderate-to-severe mitral regurgitation .   -Dilated left atrium with a volume of 97 ml.   -Left atrial volume is increased probably due to atrial fibrillation .   -There is mild right ventricular hypertrophy.   -The right ventricle is mildly enlarged.   -Right ventricular systolic pressure of 53 mm Hg consistent with pulmonary   hypertension.   -Moderate to severe tricuspid regurgitation. TAPSE = 1.43   -IVC size is dilated (>2.1 cm) but collapses > 50% with respiration   consistent with elevated RA pressure (8 mmHg). Assessment:    1. Acute on chronic systolic heart failure, on ace, bb; no aldosterone antagonist due to non compliance  2. Complicated UTI  3. Chronic atrial fib and HENOK thrombus, on eliquis  4. Non compliance  5. Severe MR  6. Homeless   7. NSVT  8. Iron deficiency anemia    Plan:    1. Will start milrinone 0.2 mcg/kg/min  2. Continue lisinopril 2.5 mg daily  3. Continue toprol 25 mg daily  4. Compliance stressed with diet and medications  5.  EP following  6. CHF nurse following for diet education, fluid restriction and daily weights  7. Consider adding verquvo and/or jardiance at discharge  8. Continue lasix drip at 2.5 mg/hr. May be able to increase to 5 mg/hr if BP improves  9.   Give IV venofer 200 mg x 4 doses    Per notes BP in 90s but not documented on flow sheet  He is still very fluid overloaded as I have seen him in hospital before and weight was down below 200  Will require several more days to diuresis  Discussed with Dr Sunshine Chowdary    NYHA IV    Discussed with patient who is agreeable with plan of care. Thank you for allowing me to participate in the care of your patient.     Erik Bullock, APRN - CNS, CNS

## 2022-04-18 NOTE — PROGRESS NOTES
[facility] PROGRESS NOTE    4/18/2022 10:33 AM        Name: Mariam Webster . Admitted: 4/13/2022  Primary Care Provider: No primary care provider on file.  (Tel: None)        Subjective:      Sitting in chair shortness of breath about the same swelling of the legs is slightly improved from yesterday but greatly from admission no nausea  Reviewed interval ancillary notes    Current Medications  iron sucrose (VENOFER) 200 mg in sodium chloride 0.9 % 100 mL IVPB, Q24H  apixaban (ELIQUIS) tablet 5 mg, BID  metoprolol succinate (TOPROL XL) extended release tablet 25 mg, Daily  lisinopril (PRINIVIL;ZESTRIL) tablet 2.5 mg, Daily  furosemide (LASIX) 100 mg in dextrose 5 % 100 mL infusion, Continuous  atorvastatin (LIPITOR) tablet 40 mg, Nightly  finasteride (PROSCAR) tablet 5 mg, Daily  tamsulosin (FLOMAX) capsule 0.4 mg, Daily  sodium chloride flush 0.9 % injection 5-40 mL, 2 times per day  sodium chloride flush 0.9 % injection 5-40 mL, PRN  0.9 % sodium chloride infusion, PRN  polyethylene glycol (GLYCOLAX) packet 17 g, Daily PRN  acetaminophen (TYLENOL) tablet 650 mg, Q6H PRN   Or  acetaminophen (TYLENOL) suppository 650 mg, Q6H PRN  cefTRIAXone (ROCEPHIN) 1000 mg in sterile water 10 mL IV syringe, Q24H  prochlorperazine (COMPAZINE) tablet 10 mg, Q6H PRN   Or  prochlorperazine (COMPAZINE) 10 mg in sodium chloride (PF) 10 mL injection, Q6H PRN        Objective:  /78   Pulse 91   Temp 97.4 °F (36.3 °C) (Oral)   Resp 16   Ht 6' 2\" (1.88 m)   Wt 222 lb (100.7 kg)   SpO2 97%   BMI 28.50 kg/m²     Intake/Output Summary (Last 24 hours) at 4/18/2022 1033  Last data filed at 4/18/2022 0847  Gross per 24 hour   Intake 480 ml   Output 285 ml   Net 195 ml      Wt Readings from Last 3 Encounters:   04/18/22 222 lb (100.7 kg)   04/12/22 227 lb 9.6 oz (103.2 kg)   03/22/22 220 lb 1.6 oz (99.8 kg)       General appearance: Appears comfortable. AAOx3  HEENT: atraumatic, Pupils equal, muscous membranes moist, no masses appreciated  Cardiovascular: Regular rate and rhythm no murmurs appreciated  Respiratory: CTAB no wheezing  Gastrointestinal: Abdomen soft, non-tender, BS+  EXT: 1+ BLE improving  Neurology: no gross focal deficts  Psychiatry: Appropriate affect. Not agitated  Skin: Warm, dry, no rashes appreciated    Labs and Tests:  CBC:   Recent Labs     04/16/22  0552 04/17/22  0531 04/18/22 0215   WBC 4.7 4.5 4.8   HGB 11.5* 11.2* 11.9*    262 279     BMP:    Recent Labs     04/16/22  0552 04/17/22  0531 04/18/22  0216    137 139   K 3.8 3.9 4.1    100 100   CO2 29 29 27   BUN 25* 23* 22*   CREATININE 1.1 1.0 1.1   GLUCOSE 86 89 99     Hepatic:   Recent Labs     04/16/22 0552 04/17/22 0531 04/18/22 0216   AST 33 35 33   ALT 43* 41* 41*   BILITOT 0.9 0.9 0.8   ALKPHOS 72 74 81     XR PELVIS (1-2 VIEWS)   Final Result   No acute abnormality and no foreign body evident. XR CHEST PORTABLE   Final Result   Cardiomegaly with no acute pulmonary finding. Recent imaging reviewed    Problem List  Principal Problem:    Acute systolic heart failure (HCC)  Active Problems:    Complicated UTI (urinary tract infection)    Chronic atrial fibrillation (HCC)    Severe mitral regurgitation    Acute on chronic congestive heart failure ([address]  Resolved Problems:    * No resolved hospital problems.  *     Assessment/Plan:   Acute On chronic systolic heart failure with EF of 25% and moderate MR  -Lasix drip creatinine stable discussed with cardiology will start milrinone drip       Elevated lft likely secondary to above  improved     PAF with left atrial appendage clost 2/2022  - eliquis  - bblocker     Bph: flomax     UTI: rocephin      DVT prophylaxis eliquis  Code status full code       Audrey Terrell MD   4/18/2022 10:33 AM

## 2022-04-18 NOTE — PLAN OF CARE
Problem: Falls - Risk of:  Goal: Will remain free from falls  Description: Will remain free from falls  4/18/2022 0250 by Dariel Manriquez RN  Outcome: Ongoing     Problem: Falls - Risk of:  Goal: Absence of physical injury  Description: Absence of physical injury  4/18/2022 0250 by Dariel Manriquez RN  Outcome: Ongoing     Problem: HEMODYNAMIC STATUS  Goal: Patient has stable vital signs and fluid balance  4/18/2022 0250 by Dariel Manriquez RN  Outcome: Ongoing

## 2022-04-18 NOTE — PROCEDURES
PCXR obtained cleared to use. Per radiologist reading noted as follows: There is a PICC line in place on the left with the tip in the distal superior vena cava. Pt JACLYN Guaman aware.

## 2022-04-18 NOTE — PROCEDURES
PICC PROCEDURE NOTE WITH PCXR NEEDED  Chart reviewed for allergies, diagnosis, labs, known contraindications, reason for line placement and planned length of treatment. Informed consent noted to be signed and on chart. Insertion procedure discussed with patient/family member. Three patient identifiers - Patient name,   and MRN -  completed &  confirmed verbally. Time out performed Hat, mask and eye shield donned. PICC site scrubbed with Chloraprep  One-Step applicator for 30 seconds x 1. Hand Hygiene  performed with 3% Chlorhexidine surgical scrub x1 min prior to  Sterile gloves, sterile gown being donned. Patient draped using maximal sterile barrier technique ( head to toe ). PICC site scrubbed a 2nd time with Chloraprep One-Step applicator x 30 sec. Modified Seldinger  technique/ultrasound assisted and tip locating system utilized for insertion. 1% Lidocaine 5 ml injected interdermally pre-insertion. PCXR obtained d/t unable to confirm PICC tip with 3CG, will place note and order with clarification of radiologist reading for clearance to use line when available and notify RN. Positive brisk blood return obtained from all lumens  and each lumen flushed with 10 mls  0.9% Sterile Sodium Chloride. All lumens flush easily with no resistance. Valve placed on all lumens followed by Alcohol Swab Caps on end of each. Bio-patch in place. Catheter secured with non-sutured locking device per hospital protocol. Sterile Tegaderm applied over PICC site. Sterile field maintained during procedure. Guide wire and positioning wire accounted for post procedure and disposed of in sharps. Appearance of site is Clean dry and intact without bleeding or edema. All edges of Tegaderm occlusive. Site marked with date and initials of RN placing line. Teaching performed to pt/family and noted in education section. Bed placed in low position post-procedure. Top 2 side rails in up position call button in reach. Pt. Response to procedure tolerated well. RN notified of all of the above. Attempted SHELBY PICC, basilic with much difficulty, unable to get confirmation, PICC appeared to be coiling back on itself or straight across chest without dropping into SVC, after multiple attempts with placement, PCXR now ordered, USG PIV placed in RFA if PICC needs to be removed. A Double  Power Injectable PICC was trimmed at  50 CM and placed   SHELBY   vein.

## 2022-04-18 NOTE — PLAN OF CARE
Problem: Falls - Risk of:  Goal: Will remain free from falls  Description: Will remain free from falls  Outcome: Ongoing   Pt will remain free from falls. Fall precautions in place and alarm engaged. Bedside table and call light within reach. Pt aware to use call light for assistance ambulating.     Problem: HEMODYNAMIC STATUS  Goal: Patient has stable vital signs and fluid balance  Outcome: Ongoing  VSS and monitored Q4 hrs

## 2022-04-19 LAB
A/G RATIO: 1.3 (ref 1.1–2.2)
ALBUMIN SERPL-MCNC: 3.4 G/DL (ref 3.4–5)
ALP BLD-CCNC: 81 U/L (ref 40–129)
ALT SERPL-CCNC: 36 U/L (ref 10–40)
ANION GAP SERPL CALCULATED.3IONS-SCNC: 9 MMOL/L (ref 3–16)
AST SERPL-CCNC: 28 U/L (ref 15–37)
BASOPHILS ABSOLUTE: 0 K/UL (ref 0–0.2)
BASOPHILS RELATIVE PERCENT: 1.1 %
BILIRUB SERPL-MCNC: 0.9 MG/DL (ref 0–1)
BUN BLDV-MCNC: 23 MG/DL (ref 7–20)
CALCIUM SERPL-MCNC: 8.5 MG/DL (ref 8.3–10.6)
CHLORIDE BLD-SCNC: 102 MMOL/L (ref 99–110)
CO2: 30 MMOL/L (ref 21–32)
CREAT SERPL-MCNC: 1 MG/DL (ref 0.8–1.3)
EOSINOPHILS ABSOLUTE: 0.1 K/UL (ref 0–0.6)
EOSINOPHILS RELATIVE PERCENT: 2.3 %
GFR AFRICAN AMERICAN: >60
GFR NON-AFRICAN AMERICAN: >60
GLUCOSE BLD-MCNC: 105 MG/DL (ref 70–99)
HCT VFR BLD CALC: 32.6 % (ref 40.5–52.5)
HEMOGLOBIN: 10.8 G/DL (ref 13.5–17.5)
LYMPHOCYTES ABSOLUTE: 0.9 K/UL (ref 1–5.1)
LYMPHOCYTES RELATIVE PERCENT: 23.3 %
MCH RBC QN AUTO: 26.9 PG (ref 26–34)
MCHC RBC AUTO-ENTMCNC: 33.1 G/DL (ref 31–36)
MCV RBC AUTO: 81.2 FL (ref 80–100)
MONOCYTES ABSOLUTE: 0.3 K/UL (ref 0–1.3)
MONOCYTES RELATIVE PERCENT: 8.1 %
NEUTROPHILS ABSOLUTE: 2.4 K/UL (ref 1.7–7.7)
NEUTROPHILS RELATIVE PERCENT: 65.2 %
PDW BLD-RTO: 15.1 % (ref 12.4–15.4)
PLATELET # BLD: 246 K/UL (ref 135–450)
PMV BLD AUTO: 7.6 FL (ref 5–10.5)
POTASSIUM REFLEX MAGNESIUM: 3.7 MMOL/L (ref 3.5–5.1)
RBC # BLD: 4.01 M/UL (ref 4.2–5.9)
SODIUM BLD-SCNC: 141 MMOL/L (ref 136–145)
TOTAL PROTEIN: 6 G/DL (ref 6.4–8.2)
WBC # BLD: 3.7 K/UL (ref 4–11)

## 2022-04-19 PROCEDURE — 2580000003 HC RX 258: Performed by: CLINICAL NURSE SPECIALIST

## 2022-04-19 PROCEDURE — 97116 GAIT TRAINING THERAPY: CPT

## 2022-04-19 PROCEDURE — 6360000002 HC RX W HCPCS: Performed by: INTERNAL MEDICINE

## 2022-04-19 PROCEDURE — 85025 COMPLETE CBC W/AUTO DIFF WBC: CPT

## 2022-04-19 PROCEDURE — 6360000002 HC RX W HCPCS: Performed by: CLINICAL NURSE SPECIALIST

## 2022-04-19 PROCEDURE — 2580000003 HC RX 258: Performed by: INTERNAL MEDICINE

## 2022-04-19 PROCEDURE — 1200000000 HC SEMI PRIVATE

## 2022-04-19 PROCEDURE — 99233 SBSQ HOSP IP/OBS HIGH 50: CPT | Performed by: CLINICAL NURSE SPECIALIST

## 2022-04-19 PROCEDURE — 6370000000 HC RX 637 (ALT 250 FOR IP): Performed by: NURSE PRACTITIONER

## 2022-04-19 PROCEDURE — 80053 COMPREHEN METABOLIC PANEL: CPT

## 2022-04-19 PROCEDURE — 6370000000 HC RX 637 (ALT 250 FOR IP): Performed by: INTERNAL MEDICINE

## 2022-04-19 RX ADMIN — IRON SUCROSE 200 MG: 20 INJECTION, SOLUTION INTRAVENOUS at 08:53

## 2022-04-19 RX ADMIN — MILRINONE LACTATE 0.2 MCG/KG/MIN: 0.2 INJECTION, SOLUTION INTRAVENOUS at 09:49

## 2022-04-19 RX ADMIN — METOPROLOL SUCCINATE 25 MG: 25 TABLET, EXTENDED RELEASE ORAL at 08:02

## 2022-04-19 RX ADMIN — LISINOPRIL 2.5 MG: 5 TABLET ORAL at 08:02

## 2022-04-19 RX ADMIN — APIXABAN 5 MG: 5 TABLET, FILM COATED ORAL at 21:05

## 2022-04-19 RX ADMIN — SODIUM CHLORIDE, PRESERVATIVE FREE 10 ML: 5 INJECTION INTRAVENOUS at 21:06

## 2022-04-19 RX ADMIN — FUROSEMIDE 10 MG/HR: 10 INJECTION, SOLUTION INTRAMUSCULAR; INTRAVENOUS at 14:15

## 2022-04-19 RX ADMIN — APIXABAN 5 MG: 5 TABLET, FILM COATED ORAL at 08:02

## 2022-04-19 RX ADMIN — TAMSULOSIN HYDROCHLORIDE 0.4 MG: 0.4 CAPSULE ORAL at 08:02

## 2022-04-19 RX ADMIN — Medication 10 ML: at 10:01

## 2022-04-19 RX ADMIN — DESMOPRESSIN ACETATE 40 MG: 0.2 TABLET ORAL at 21:05

## 2022-04-19 RX ADMIN — FINASTERIDE 5 MG: 5 TABLET, FILM COATED ORAL at 08:02

## 2022-04-19 ASSESSMENT — PAIN SCALES - GENERAL
PAINLEVEL_OUTOF10: 0

## 2022-04-19 NOTE — PROGRESS NOTES
[facility] PROGRESS NOTE    4/19/2022 11:33 AM        Name: Marquez Bernardo . Admitted: 4/13/2022  Primary Care Provider: No primary care provider on file.  (Tel: None)        Subjective:      Lying in bed started on milrinone drip Lasix drip and IV Venofer shortness of breath is improving leg swelling improved but not much urine output yesterday  Reviewed interval ancillary notes    Current Medications  iron sucrose (VENOFER) 200 mg in sodium chloride 0.9 % 100 mL IVPB, Q24H  milrinone (PRIMACOR) 20 mg in dextrose 5 % 100 mL infusion, Continuous  sodium chloride flush 0.9 % injection 5-40 mL, 2 times per day  sodium chloride flush 0.9 % injection 5-40 mL, PRN  0.9 % sodium chloride infusion, PRN  apixaban (ELIQUIS) tablet 5 mg, BID  metoprolol succinate (TOPROL XL) extended release tablet 25 mg, Daily  lisinopril (PRINIVIL;ZESTRIL) tablet 2.5 mg, Daily  furosemide (LASIX) 100 mg in dextrose 5 % 100 mL infusion, Continuous  atorvastatin (LIPITOR) tablet 40 mg, Nightly  finasteride (PROSCAR) tablet 5 mg, Daily  tamsulosin (FLOMAX) capsule 0.4 mg, Daily  sodium chloride flush 0.9 % injection 5-40 mL, 2 times per day  sodium chloride flush 0.9 % injection 5-40 mL, PRN  0.9 % sodium chloride infusion, PRN  polyethylene glycol (GLYCOLAX) packet 17 g, Daily PRN  acetaminophen (TYLENOL) tablet 650 mg, Q6H PRN   Or  acetaminophen (TYLENOL) suppository 650 mg, Q6H PRN  prochlorperazine (COMPAZINE) tablet 10 mg, Q6H PRN   Or  prochlorperazine (COMPAZINE) 10 mg in sodium chloride (PF) 10 mL injection, Q6H PRN        Objective:  /83   Pulse 69   Temp 97.3 °F (36.3 °C) (Oral)   Resp 16   Ht 6' 2\" (1.88 m)   Wt 222 lb 10.6 oz (101 kg)   SpO2 99%   BMI 28.59 kg/m²     Intake/Output Summary (Last 24 hours) at 4/19/2022 1133  Last data filed at 4/19/2022 1016  Gross per 24 hour   Intake 600 ml   Output 1075 ml   Net -475 ml      Wt Readings from Last 3 Encounters:   04/19/22 222 lb 10.6 oz (101 kg)   04/12/22 227 lb 9.6 oz (103.2 kg)   03/22/22 220 lb 1.6 oz (99.8 kg)       General appearance:  Appears comfortable. AAOx3  HEENT: atraumatic, Pupils equal, muscous membranes moist, no masses appreciated  Cardiovascular: Regular rate and rhythm no murmurs appreciated  Respiratory: CTAB no wheezing  Gastrointestinal: Abdomen soft, non-tender, BS+  EXT: 1+ BLE not much improved  Neurology: no gross focal deficts  Psychiatry: Appropriate affect. Not agitated  Skin: Warm, dry, no rashes appreciated    Labs and Tests:  CBC:   Recent Labs     04/17/22  0531 04/18/22 0215 04/19/22  0515   WBC 4.5 4.8 3.7*   HGB 11.2* 11.9* 10.8*    279 246     BMP:    Recent Labs     04/17/22  0531 04/18/22 0216 04/19/22  0515    139 141   K 3.9 4.1 3.7    100 102   CO2 29 27 30   BUN 23* 22* 23*   CREATININE 1.0 1.1 1.0   GLUCOSE 89 99 105*     Hepatic:   Recent Labs     04/17/22  0531 04/18/22 0216 04/19/22  0515   AST 35 33 28   ALT 41* 41* 36   BILITOT 0.9 0.8 0.9   ALKPHOS 74 81 81     XR CHEST PORTABLE   Final Result   Status post left PICC line placement in good position. Stable cardiomegaly with minimal central pulmonary congestion or pulmonary   artery hypertension which is unchanged. Minimal discoid atelectasis or scarring along the lung bases which is more   prominent         XR PELVIS (1-2 VIEWS)   Final Result   No acute abnormality and no foreign body evident. XR CHEST PORTABLE   Final Result   Cardiomegaly with no acute pulmonary finding. Recent imaging reviewed    Problem List  Principal Problem:    Acute systolic heart failure (HCC)  Active Problems:    Complicated UTI (urinary tract infection)    Chronic atrial fibrillation (HCC)    Severe mitral regurgitation    Acute on chronic congestive heart failure ([facility]  Resolved Problems:    * No resolved hospital problems.  *     Assessment/Plan: Acute On chronic systolic heart failure with EF of 25% and moderate MR  -milirinone gtt  - if bp stable increase lasix to 10mg /hr       Elevated lft likely secondary to above  improved     PAF with left atrial appendage clost 2/2022  - eliquis  - nika     Bph: flomax     UTI: rocephin      DVT prophylaxis eliquis  Code status full code       Dar Graham MD   4/19/2022 11:33 AM

## 2022-04-19 NOTE — PROGRESS NOTES
Physical Therapy  Facility/Department: [address] NURSING  Daily Treatment Note  NAME: Mariam Webster  : 1960  MRN: [id number]    Date of Service: 2022    Discharge Recommendations:Manoj Dsouza scored a 22/24 on the AM-PAC short mobility form. At this time, no further PT is recommended upon discharge due to functioning near his baseline status, safe with mobility. Recommend patient returns to prior setting with prior services. PT Equipment Recommendations  Equipment Needed: No  Other: Pt. ambulating without an AD at this time    Assessment   Body structures, Functions, Activity limitations: Decreased functional mobility ; Decreased endurance  Assessment: Pt supervision for transfers and gait with no AD needed. SOB after activity. Pt. presents with CHF and complicated UTI. Pt. is slightly below baseline functional mobility and is somewhat limited by significant B LE edema. Pt. improving in the distance of ambulation tolerating. Pt. will benefit from skilled PT to improve functional mobility. Anticipate Pt. improving functional mobility and being safe for d/c to home. Will continue to monitor based on progress. Treatment Diagnosis: Impaired functional mobility  Prognosis: Good  PT Education: PT Role;Plan of Care;Transfer Training;General Safety;Gait Training;Functional Mobility Training;Home Exercise Program;Goals  Patient Education: Verbalized understanding  REQUIRES PT FOLLOW UP: Yes  Activity Tolerance  Activity Tolerance: Patient Tolerated treatment well  Activity Tolerance: Pt. limited partially by B LE edema, but swelling decreasing. Patient Diagnosis(es): The primary encounter diagnosis was Acute on chronic congestive heart failure, unspecified heart failure type ([facility] [address] A diagnosis of Complicated UTI (urinary tract infection) was also pertinent to this visit.      has a past medical history of Atrial fibrillation ([address] CHF (congestive heart failure) ([address] Hx of blood clots, and Hypertension. has a past surgical history that includes Insertable Cardiac Monitor (07/26/2019) and Cardioversion (07/26/2019). Restrictions  Restrictions/Precautions  Restrictions/Precautions: Fall Risk (moderat)  Required Braces or Orthoses?: No  Position Activity Restriction  Other position/activity restrictions: 60 yo BM with hx of nonischemic severe cmp with severe MR who is admitted with edema and chf.  He is homeless and has noncompliance with meds and f/u. He has recently had an indwelling ernst for urinary retention and had balloon rupture and thinks some of the fragments may still be inside. He has chronic AF and is on eliquis. He was seen in heart failure clinic on 4/12 and admitted. Recent admit 2/5-2/10/22 for the same. EF 20%. Normal cors by cath. Severe MR. HENOK thrombus also. Dx with acute on chronic CHF and complicated UTI  Subjective   General  Chart Reviewed: Yes  Response To Previous Treatment: Patient with no complaints from previous session. Family / Caregiver Present: No  Subjective  Subjective: Pt denies pain at rest. Feeling better overall. General Comment  Comments: Pt seated EOB upon arrival. Agreeable to working with PT. Pain Screening  Patient Currently in Pain: Denies  Vital Signs  Patient Currently in Pain: Denies       Orientation  Orientation  Overall Orientation Status: Within Functional Limits  Cognition      Objective   Bed mobility  Comment: Pt seated EOB upon arrival and remained seated EOB at end of treatment. Pt with good sitting balance and able to ayad pants independently. Transfers  Sit to Stand: Supervision  Stand to sit: Supervision  Ambulation  Ambulation?: Yes  Ambulation 1  Surface: level tile  Device: No Device  Other Apparatus: (IV)  Assistance: Supervision  Quality of Gait: steady gait  Distance: Pt amb with no AD and supervision for 450 ft. Comments: Pt amb in hallway with good balance. SOB noted during amb but tolerated well.  O2 sats 97% on RA after activity. Pt recovered breathing quickly once seated. Balance  Posture: Good  Sitting - Static: Good  Sitting - Dynamic: Good  Standing - Static: Good;-  Standing - Dynamic: Good;-  Comments: Pt amb with no AD. Comment: dressing, monitoring vitals with activity. G-Code     OutComes Score                                                     AM-PAC Score  AM-PAC Inpatient Mobility Raw Score : 22 (04/19/22 1427)  AM-PAC Inpatient T-Scale Score : 53.28 (04/19/22 1427)  Mobility Inpatient CMS 0-100% Score: 20.91 (04/19/22 1427)  Mobility Inpatient CMS G-Code Modifier : CJ (04/19/22 1427)          Goals  Short term goals  Time Frame for Short term goals: By d/c (no goals met in full this date)  Short term goal 1: Supine to/from sit with Mod I  Short term goal 2: Amb. x 200' with/without and AD wiht Mod I  Short term goal 3: up and down 12 steps with rail and Mod I  Patient Goals   Patient goals : Improve walking    Plan    Plan  Times per week: 3-5x/wk  Times per day: Daily  Current Treatment Recommendations: Strengthening,Balance Training,Functional Mobility Training,Transfer Training,Gait Training,Endurance Training,Home Exercise Program,Patient/Caregiver Education & Training,Equipment Evaluation, Education, & procurement,Safety Education & Training,Stair training  Safety Devices  Type of devices:  All fall risk precautions in place,Gait belt,Call light within reach,Left in bed,Nurse notified  Restraints  Initially in place: No     Therapy Time   Individual Concurrent Group Co-treatment   Time In 1359         Time Out 1417         Minutes 18         Timed Code Treatment Minutes: [address] Luc [facility], [id number]

## 2022-04-19 NOTE — PROGRESS NOTES
Clinical Pharmacy Note    HF Core Measures Assessment    Latest EF: 20-25% on ECHo    Entresto/ACE/ARB (EF<40%): lisinopril 2.5 mg daily   Evidence based BB (bisoprolol, metoprolol XL, carvedilol) (EF<40%): metoprolol succinate 25 mg daily  Aldosterone Antagonist (EF<35%): no     (May consider the use of hydralazine + nitrate in patients intolerant to ACEI/ARB)    Odalis Tobias, PharmD, [facility]  Clinical Pharmacist  [id number]

## 2022-04-19 NOTE — PLAN OF CARE
Problem: Falls - Risk of:  Goal: Will remain free from falls  Description: Will remain free from falls  4/19/2022 0225 by Gin Casarez RN  Outcome: Ongoing     Problem: Falls - Risk of:  Goal: Absence of physical injury  Description: Absence of physical injury  Outcome: Ongoing     Problem: OXYGENATION/RESPIRATORY FUNCTION  Goal: Patient will maintain patent airway  Outcome: Ongoing     Problem: OXYGENATION/RESPIRATORY FUNCTION  Goal: Patient will achieve/maintain normal respiratory rate/effort  Description: Respiratory rate and effort will be within normal limits for the patient  Outcome: Ongoing     Problem: HEMODYNAMIC STATUS  Goal: Patient has stable vital signs and fluid balance  4/19/2022 0225 by Gin Casarez RN  Outcome: Ongoing

## 2022-04-19 NOTE — PROGRESS NOTES
Nutrition Note    RECOMMENDATIONS  1. PO Diet: Cardiac, 2 gm Na+  2. ONS: NA     NUTRITION ASSESSMENT   Pt reports is eating well, with % meals consumed. Pt continues to diurese d/t CHF. Education has been provided, however pt has been noncompliant in past d/t social/environmental circumstances (staff reports pt is homeless). Pt is at low risk for nutrition compromise. Will con't to provide on-going education & monitor for wt fluctuations.  Nutrition Related Findings: +1 pitting edema BLE.  -1L per I/O's; LBM 4/17   Wounds: None   Nutrition Education:  Education completed    Nutrition Goals: po intake at least 75% of meals     MALNUTRITION ASSESSMENT       Malnutrition Status: No malnutrition    NUTRITION DIAGNOSIS   · Limited adherence to nutrition-related recommendations related to other (comment) (social & environmental circumstances) as evidenced by other (comment) (limited food choices/sources of meals d/t homelessness)        CURRENT NUTRITION THERAPIES  ADULT DIET; Regular; Low Fat/Low Chol/High Fiber/2 gm Na     PO Intake: %   PO Supplement Intake:None Ordered    ANTHROPOMETRICS   Current Height: 6' 2\" (188 cm)   Current Weight: 222 lb 10.6 oz (101 kg)     Ideal Body Weight (IBW): 190 lbs  (86 kg)      BMI: 28.5    The patient will be monitored per nutrition standards of care. Consult dietitian if additional nutrition interventions are needed prior to RD reassessment.      Emiliana Hinojosa, JUSTICE, LD    Contact: [phone]

## 2022-04-19 NOTE — PROGRESS NOTES
[address]   Daily Progress Note      Admit Date:  4/13/2022    HPI:    Mr. Mary Kay Sy is a 59 y.o. male with history of with history of PAF, hypertension.  Unvaccinated, , homeless, HENOK thrombus and AF    He is admitted with fluid overload, non compliance with medications (not taking toprol or lisinopril at home and taking eliquis once a day along with several baby aspirins). He had recent indwelling ernst for urinary retention and had balloon rupture causing ernst to fall out. Recent admission 2/5-10 for same issues. LVEF 20% normal cors, severe MR, HENOK thrombus  Anemic and started on IV venofer  Milrinone started 4/18/22    Subjective:  Patient is being seen for acute on chronic systolic heart failure. There were no acute overnight cardiac events. Weight steady at 222 milrinone started late yesterday due to needing a PICC line and BP improved. He is now on IV lasix 5 mg/hr, milrinone 0.2 mcg/kg/min and IV venofer. Legs still very edematous. He denies any chest pain or palpitations. Some runs of 3 PVC on monitor and a run of 5  probnp 27K-11K    Objective:   /83   Pulse 69   Temp 97.3 °F (36.3 °C) (Oral)   Resp 16   Ht 6' 2\" (1.88 m)   Wt 222 lb 10.6 oz (101 kg)   SpO2 99%   BMI 28.59 kg/m²       Intake/Output Summary (Last 24 hours) at 4/19/2022 0854  Last data filed at 4/19/2022 0427  Gross per 24 hour   Intake 360 ml   Output 775 ml   Net -415 ml          Physical Exam:  General:  Awake, alert, oriented in NAD  Skin:  Warm and dry. No unusual bruising or rash  Neck:  Supple. No JVD or carotid bruit appreciated  Chest:  Normal effort.   Decreased bilaterally in bases  Cardiovascular:  RRR, S1/S2, no murmur/gallop/rub  Abdomen:  Soft, nontender, +bowel sounds  Extremities:  Bilateral lower ankles to thighs edema  Neurological: No focal deficits  Psychological: Normal mood and affect      Medications:    iron sucrose (VENOFER) iv piggyback 100 mL (Admin over 60 minutes)  200 mg IntraVENous Q24H    sodium chloride flush  5-40 mL IntraVENous 2 times per day    apixaban  5 mg Oral BID    metoprolol succinate  25 mg Oral Daily    lisinopril  2.5 mg Oral Daily    atorvastatin  40 mg Oral Nightly    finasteride  5 mg Oral Daily    tamsulosin  0.4 mg Oral Daily    sodium chloride flush  5-40 mL IntraVENous 2 times per day      milrinone 0.1986 mcg/kg/min (04/18/22 1841)    sodium chloride      furosemide (LASIX) 1mg/ml infusion 5 mg/hr (04/19/22 0039)    sodium chloride         Lab Data:  CBC:   Recent Labs     04/17/22  0531 04/18/22  0215 04/19/22  0515   WBC 4.5 4.8 3.7*   HGB 11.2* 11.9* 10.8*    279 246     BMP:    Recent Labs     04/17/22  0531 04/18/22  0216 04/19/22  0515    139 141   K 3.9 4.1 3.7   CO2 29 27 30   BUN 23* 22* 23*   CREATININE 1.0 1.1 1.0     INR:  No results for input(s): INR in the last 72 hours. BNP:    No results for input(s): PROBNP in the last 72 hours. Diagnostics:  [facility] Dr Denise Plata 2/10/22  Preliminary CONSUELO results are:      - Severe LV dysfunction,  EF: 20-25%  - LA dilated. There was HENOK thrombus. - Moderate MR     Cardiac Cath PCI: Dr Vicky Crawford 2/8/2022  Anatomy:   LM-nml   LAD-nml  Cx-nml  OM- nml  RCA-nml  RPDA- nml  LVEF- 10%  LVG- global hypokinesis  LVEDP- 10     Hemodynamics:  RA- mean 3  RV- 37/0  PAWP- 10  PA- 34/12 (20)     C.O.- 5.7 (4.9)  C. I.- 2.6 (2.25)  PA sat 60%  AO sat 91%     Contrast: 70  Flouro Time: 5.3  Access: R radial a, R CFV     Impression  ~Coronary Angiography w/ normal cors  ~LVG with LVEF of 10% and global regional wall motion abnormalities  ~Severe MR  ~Normal CO/CI  ~normal L and R filling pressures     Recommendation  ~Aggressive medical treatment and risk factor modification  ~1. Medications reviewed, no changes at this time. 2. Post cath IVF.  Bedrest.  3.Consider CONSUELO for evaluation of MR.   4. Patient has been advised on the importance of regular exercise of at least 20-30 minutes daily alternating with aerobic and isometric activities. 5. Patient counseled about and offered assistance for smoking cessation   6. No indication for cardiac rehab  7. CHF service to evaluate tomorrow.     Echo 11/29/2021  Summary   -Covid+   -Severely reduced global systolic function with an ejection fraction   estimated at 20%.  -Severe global hypokinesis with regional variations noted.   -Right ventricular systolic function appears to be mildly reduced based on   visual inspection.   -Severe biatrial enlargement.   -Thickened mitral valve without evidence of stenosis. There is   mild-to-moderate mitral regurgitation.   -There is mild-to-moderate tricuspid regurgitation with a RVSP estimation of   36 mmHg.   -Diastolic dysfunction with elevated LV filling pressures.     Echo 7/25/2019  Summary   -Overall left ventricular systolic function is moderately depressed .   -Ejection fraction is visually estimated to be 30-35 %.  -Global left ventricular function moderately reduced.   -Indeterminate diastolic function due to atrial fibrillation and moderate to   severe mitral regurgitation.   -Moderate-to-severe mitral regurgitation .   -Dilated left atrium with a volume of 97 ml.   -Left atrial volume is increased probably due to atrial fibrillation .   -There is mild right ventricular hypertrophy.   -The right ventricle is mildly enlarged.   -Right ventricular systolic pressure of 53 mm Hg consistent with pulmonary   hypertension.   -Moderate to severe tricuspid regurgitation. TAPSE = 1.43   -IVC size is dilated (>2.1 cm) but collapses > 50% with respiration   consistent with elevated RA pressure (8 mmHg). Assessment:    1. Acute on chronic systolic heart failure, on ace, bb; no aldosterone antagonist due to non compliance  2. Complicated UTI  3. Chronic atrial fib and HENOK thrombus, on eliquis  4. Non compliance  5. Severe MR  6. Homeless   7. NSVT  8. Iron deficiency anemia    Plan:    1.   Continue milrinone 0.2 mcg/kg/min  2. Continue lisinopril 2.5 mg daily  3. Continue toprol 25 mg daily  4. Compliance stressed with diet and medications  5.  EP following  6. CHF nurse following for diet education, fluid restriction and daily weights  7. Consider adding verquvo and/or jardiance at discharge  8. Continue lasix drip at 5 mg/hr. Will increase to 10 mg/hr at noon if BP remains >100  9. Continue IV venofer 200 mg x 4 doses    He has several more days to diuresis. Goal weight is 200    NYHA IV    Discussed with patient who is agreeable with plan of care. Thank you for allowing me to participate in the care of your patient.     Rebeca Terrazas, APRN - CNS, CNS

## 2022-04-19 NOTE — PLAN OF CARE
Problem: Falls - Risk of:  Goal: Will remain free from falls  Description: Will remain free from falls  4/19/2022 0926 by Evelyne Kaufman RN  Outcome: Ongoing  Note: Pt A&O, remains free from falls during this stay. Call light within reach. Will continue to monitor. Problem: FLUID AND ELECTROLYTE IMBALANCE  Goal: Fluid and electrolyte balance are achieved/maintained  Outcome: Ongoing  Note: Cardiology following. Pt on lasix and milrinone gtt. Strict I&Os being done. Pt on RA, no SOB.

## 2022-04-20 LAB
A/G RATIO: 1.3 (ref 1.1–2.2)
ALBUMIN SERPL-MCNC: 3.3 G/DL (ref 3.4–5)
ALP BLD-CCNC: 79 U/L (ref 40–129)
ALT SERPL-CCNC: 32 U/L (ref 10–40)
ANION GAP SERPL CALCULATED.3IONS-SCNC: 8 MMOL/L (ref 3–16)
AST SERPL-CCNC: 26 U/L (ref 15–37)
BASOPHILS ABSOLUTE: 0 K/UL (ref 0–0.2)
BASOPHILS RELATIVE PERCENT: 1 %
BILIRUB SERPL-MCNC: 0.9 MG/DL (ref 0–1)
BUN BLDV-MCNC: 18 MG/DL (ref 7–20)
CALCIUM SERPL-MCNC: 8.6 MG/DL (ref 8.3–10.6)
CHLORIDE BLD-SCNC: 99 MMOL/L (ref 99–110)
CO2: 31 MMOL/L (ref 21–32)
CREAT SERPL-MCNC: 1 MG/DL (ref 0.8–1.3)
EOSINOPHILS ABSOLUTE: 0.1 K/UL (ref 0–0.6)
EOSINOPHILS RELATIVE PERCENT: 3.3 %
GFR AFRICAN AMERICAN: >60
GFR NON-AFRICAN AMERICAN: >60
GLUCOSE BLD-MCNC: 84 MG/DL (ref 70–99)
HCT VFR BLD CALC: 32.6 % (ref 40.5–52.5)
HEMOGLOBIN: 10.7 G/DL (ref 13.5–17.5)
LYMPHOCYTES ABSOLUTE: 0.9 K/UL (ref 1–5.1)
LYMPHOCYTES RELATIVE PERCENT: 25.5 %
MCH RBC QN AUTO: 26.5 PG (ref 26–34)
MCHC RBC AUTO-ENTMCNC: 32.8 G/DL (ref 31–36)
MCV RBC AUTO: 80.7 FL (ref 80–100)
MONOCYTES ABSOLUTE: 0.4 K/UL (ref 0–1.3)
MONOCYTES RELATIVE PERCENT: 10.9 %
NEUTROPHILS ABSOLUTE: 2.2 K/UL (ref 1.7–7.7)
NEUTROPHILS RELATIVE PERCENT: 59.3 %
PDW BLD-RTO: 15.4 % (ref 12.4–15.4)
PLATELET # BLD: 241 K/UL (ref 135–450)
PMV BLD AUTO: 7.4 FL (ref 5–10.5)
POTASSIUM REFLEX MAGNESIUM: 3.6 MMOL/L (ref 3.5–5.1)
RBC # BLD: 4.04 M/UL (ref 4.2–5.9)
SODIUM BLD-SCNC: 138 MMOL/L (ref 136–145)
TOTAL PROTEIN: 5.8 G/DL (ref 6.4–8.2)
WBC # BLD: 3.6 K/UL (ref 4–11)

## 2022-04-20 PROCEDURE — 85025 COMPLETE CBC W/AUTO DIFF WBC: CPT

## 2022-04-20 PROCEDURE — 2580000003 HC RX 258: Performed by: INTERNAL MEDICINE

## 2022-04-20 PROCEDURE — 6360000002 HC RX W HCPCS: Performed by: CLINICAL NURSE SPECIALIST

## 2022-04-20 PROCEDURE — 1200000000 HC SEMI PRIVATE

## 2022-04-20 PROCEDURE — 99233 SBSQ HOSP IP/OBS HIGH 50: CPT | Performed by: CLINICAL NURSE SPECIALIST

## 2022-04-20 PROCEDURE — 6370000000 HC RX 637 (ALT 250 FOR IP): Performed by: NURSE PRACTITIONER

## 2022-04-20 PROCEDURE — 6370000000 HC RX 637 (ALT 250 FOR IP): Performed by: INTERNAL MEDICINE

## 2022-04-20 PROCEDURE — 2580000003 HC RX 258: Performed by: CLINICAL NURSE SPECIALIST

## 2022-04-20 PROCEDURE — 80053 COMPREHEN METABOLIC PANEL: CPT

## 2022-04-20 RX ADMIN — FUROSEMIDE 10 MG/HR: 10 INJECTION, SOLUTION INTRAMUSCULAR; INTRAVENOUS at 23:31

## 2022-04-20 RX ADMIN — MILRINONE LACTATE 0.2 MCG/KG/MIN: 0.2 INJECTION, SOLUTION INTRAVENOUS at 06:42

## 2022-04-20 RX ADMIN — SODIUM CHLORIDE, PRESERVATIVE FREE 10 ML: 5 INJECTION INTRAVENOUS at 20:29

## 2022-04-20 RX ADMIN — FINASTERIDE 5 MG: 5 TABLET, FILM COATED ORAL at 09:44

## 2022-04-20 RX ADMIN — FUROSEMIDE 10 MG/HR: 10 INJECTION, SOLUTION INTRAMUSCULAR; INTRAVENOUS at 13:32

## 2022-04-20 RX ADMIN — APIXABAN 5 MG: 5 TABLET, FILM COATED ORAL at 20:26

## 2022-04-20 RX ADMIN — LISINOPRIL 2.5 MG: 5 TABLET ORAL at 09:44

## 2022-04-20 RX ADMIN — METOPROLOL SUCCINATE 25 MG: 25 TABLET, EXTENDED RELEASE ORAL at 09:44

## 2022-04-20 RX ADMIN — FUROSEMIDE 10 MG/HR: 10 INJECTION, SOLUTION INTRAMUSCULAR; INTRAVENOUS at 02:07

## 2022-04-20 RX ADMIN — DESMOPRESSIN ACETATE 40 MG: 0.2 TABLET ORAL at 20:26

## 2022-04-20 RX ADMIN — TAMSULOSIN HYDROCHLORIDE 0.4 MG: 0.4 CAPSULE ORAL at 09:44

## 2022-04-20 RX ADMIN — IRON SUCROSE 200 MG: 20 INJECTION, SOLUTION INTRAVENOUS at 10:20

## 2022-04-20 RX ADMIN — APIXABAN 5 MG: 5 TABLET, FILM COATED ORAL at 09:44

## 2022-04-20 ASSESSMENT — PAIN SCALES - GENERAL
PAINLEVEL_OUTOF10: 0

## 2022-04-20 NOTE — CARE COORDINATION
SW reviewed pt's chart. PT/OT now has no recommendations for follow up therapy. Patient has been provided a PCP list.  Has [employer]. Pt reported he is homeless but staying with friends. May need meds to beds at discharge d/t difficulty getting to a pharmacy. No other needs identified.     Electronically signed by DAVINA Diaz, CHRISTINEW on 4/20/2022 at 3:21 PM

## 2022-04-20 NOTE — PLAN OF CARE
Problem: ACTIVITY INTOLERANCE/IMPAIRED MOBILITY  Goal: Mobility/activity is maintained at optimum level for patient  4/20/2022 1911 by Maria R Garcia RN  Outcome: Progressing  4/20/2022 0538 by Elisa Gudino  Outcome: Ongoing  Intervention: ENCOURAGE INDEPENDENT ACTIVITY PER ABILITY  Note: Pt able to walk in [place] with assist, pt walked in [place] 600 feet with pca. No sob. Lasix gtt and primacor gtt infusing. Pt needing reminders to use urinal because he has voided into toilet several times. Vss. BLE wrapped with ace wraps.

## 2022-04-20 NOTE — PROGRESS NOTES
[facility]   Daily Progress Note      Admit Date:  4/13/2022    HPI:    Mr. Abril Smith is a 59 y.o. male with history of with history of PAF, hypertension.  Unvaccinated, , homeless, HENOK thrombus and AF    He is admitted with fluid overload, non compliance with medications (not taking toprol or lisinopril at home and taking eliquis once a day along with several baby aspirins). He had recent indwelling ernst for urinary retention and had balloon rupture causing ernst to fall out. Recent admission 2/5-10 for same issues. LVEF 20% normal cors, severe MR, HENKO thrombus  Anemic and started on IV venofer  Milrinone started 4/18/22    Subjective:  Patient is being seen for acute on chronic systolic heart failure. There were no acute overnight cardiac events. Weight 222-219 probnp 27K-11K and -3.3 L out  Creat 1.0 potassium 3.6   He is up moving around in his room with bilateral lower legs wrapped and feel much softer. Breathing is improved. Tolerating lasix and milrinone infusions    Objective:   /85   Pulse 91   Temp 97.6 °F (36.4 °C) (Oral)   Resp 18   Ht 6' 2\" (1.88 m)   Wt 219 lb 12.8 oz (99.7 kg)   SpO2 98%   BMI 28.22 kg/m²       Intake/Output Summary (Last 24 hours) at 4/20/2022 9833  Last data filed at 4/20/2022 0900  Gross per 24 hour   Intake 480 ml   Output 2950 ml   Net -2470 ml          Physical Exam:  General:  Awake, alert, oriented in NAD  Skin:  Warm and dry. No unusual bruising or rash  Neck:  Supple. No JVD or carotid bruit appreciated  Chest:  Normal effort.   Decreased bilaterally in bases  Cardiovascular:  RRR, S1/S2, no murmur/gallop/rub  Abdomen:  Soft, nontender, +bowel sounds  Extremities:  Bilateral lower ankles to thighs edema, wrapped and softer  Neurological: No focal deficits  Psychological: Normal mood and affect      Medications:    iron sucrose (VENOFER) iv piggyback 100 mL (Admin over 60 minutes)  200 mg IntraVENous Q24H    sodium chloride flush 5-40 mL IntraVENous 2 times per day    apixaban  5 mg Oral BID    metoprolol succinate  25 mg Oral Daily    lisinopril  2.5 mg Oral Daily    atorvastatin  40 mg Oral Nightly    finasteride  5 mg Oral Daily    tamsulosin  0.4 mg Oral Daily    sodium chloride flush  5-40 mL IntraVENous 2 times per day      milrinone 0.2 mcg/kg/min (04/20/22 0251)    sodium chloride      furosemide (LASIX) 1mg/ml infusion 10 mg/hr (04/20/22 0207)    sodium chloride         Lab Data:  CBC:   Recent Labs     04/18/22  0215 04/19/22  0515 04/20/22  0421   WBC 4.8 3.7* 3.6*   HGB 11.9* 10.8* 10.7*    246 241     BMP:    Recent Labs     04/18/22  0216 04/19/22  0515 04/20/22 0421    141 138   K 4.1 3.7 3.6   CO2 27 30 31   BUN 22* 23* 18   CREATININE 1.1 1.0 1.0     INR:  No results for input(s): INR in the last 72 hours. BNP:    No results for input(s): PROBNP in the last 72 hours. Diagnostics:  [facility] Dr Joan Bassett 2/10/22  Preliminary CONSUELO results are:      - Severe LV dysfunction,  EF: 20-25%  - LA dilated. There was HENOK thrombus. - Moderate MR     Cardiac Cath PCI: Dr Bouchra Higgins 2/8/2022  Anatomy:   LM-nml   LAD-nml  Cx-nml  OM- nml  RCA-nml  RPDA- nml  LVEF- 10%  LVG- global hypokinesis  LVEDP- 10     Hemodynamics:  RA- mean 3  RV- 37/0  PAWP- 10  PA- 34/12 (20)     C.O.- 5.7 (4.9)  C. I.- 2.6 (2.25)  PA sat 60%  AO sat 91%     Contrast: 70  Flouro Time: 5.3  Access: R radial a, R CFV     Impression  ~Coronary Angiography w/ normal cors  ~LVG with LVEF of 10% and global regional wall motion abnormalities  ~Severe MR  ~Normal CO/CI  ~normal L and R filling pressures     Recommendation  ~Aggressive medical treatment and risk factor modification  ~1. Medications reviewed, no changes at this time. 2. Post cath IVF. Bedrest.  3.Consider CONSUELO for evaluation of MR.   4. Patient has been advised on the importance of regular exercise of at least 20-30 minutes daily alternating with aerobic and isometric activities.    5. Patient counseled about and offered assistance for smoking cessation   6. No indication for cardiac rehab  7. CHF service to evaluate tomorrow.     Echo 11/29/2021  Summary   -Covid+   -Severely reduced global systolic function with an ejection fraction   estimated at 20%.  -Severe global hypokinesis with regional variations noted.   -Right ventricular systolic function appears to be mildly reduced based on   visual inspection.   -Severe biatrial enlargement.   -Thickened mitral valve without evidence of stenosis. There is   mild-to-moderate mitral regurgitation.   -There is mild-to-moderate tricuspid regurgitation with a RVSP estimation of   41 mmHg.   -Diastolic dysfunction with elevated LV filling pressures.     Echo 7/25/2019  Summary   -Overall left ventricular systolic function is moderately depressed .   -Ejection fraction is visually estimated to be 30-35 %.  -Global left ventricular function moderately reduced.   -Indeterminate diastolic function due to atrial fibrillation and moderate to   severe mitral regurgitation.   -Moderate-to-severe mitral regurgitation .   -Dilated left atrium with a volume of 97 ml.   -Left atrial volume is increased probably due to atrial fibrillation .   -There is mild right ventricular hypertrophy.   -The right ventricle is mildly enlarged.   -Right ventricular systolic pressure of 53 mm Hg consistent with pulmonary   hypertension.   -Moderate to severe tricuspid regurgitation. TAPSE = 1.43   -IVC size is dilated (>2.1 cm) but collapses > 50% with respiration   consistent with elevated RA pressure (8 mmHg). Assessment:    1. Acute on chronic systolic heart failure, on ace, bb; no aldosterone antagonist due to non compliance  2. Complicated UTI  3. Chronic atrial fib and HENOK thrombus, on eliquis  4. Non compliance  5. Severe MR  6. Homeless   7. NSVT  8. Iron deficiency anemia    Plan:    1. Continue milrinone 0.2 mcg/kg/min  2. Continue lisinopril 2.5 mg daily. May consider changing to entresto at discharge  3. Continue toprol 25 mg daily  4. Compliance stressed with diet and medications  5.  EP following, plan for ICD after medical therapy, declines life vest  6. CHF nurse following for diet education, fluid restriction and daily weights  7. Consider adding verquvo and/or jardiance at discharge  8. Continue lasix drip at 10 mg/hr  9. Continue IV venofer 200 mg x 4 doses    He has several more days to diuresis. Goal weight is 200    NYHA IV    Discussed with patient who is agreeable with plan of care. Thank you for allowing me to participate in the care of your patient.     Kenny Carmen, APRN - CNS, CNS

## 2022-04-21 LAB
A/G RATIO: 1.3 (ref 1.1–2.2)
ALBUMIN SERPL-MCNC: 3.3 G/DL (ref 3.4–5)
ALP BLD-CCNC: 76 U/L (ref 40–129)
ALT SERPL-CCNC: 31 U/L (ref 10–40)
ANION GAP SERPL CALCULATED.3IONS-SCNC: 9 MMOL/L (ref 3–16)
AST SERPL-CCNC: 26 U/L (ref 15–37)
BASOPHILS ABSOLUTE: 0.1 K/UL (ref 0–0.2)
BASOPHILS RELATIVE PERCENT: 1.2 %
BILIRUB SERPL-MCNC: 1 MG/DL (ref 0–1)
BUN BLDV-MCNC: 16 MG/DL (ref 7–20)
CALCIUM SERPL-MCNC: 8.7 MG/DL (ref 8.3–10.6)
CHLORIDE BLD-SCNC: 97 MMOL/L (ref 99–110)
CO2: 32 MMOL/L (ref 21–32)
CREAT SERPL-MCNC: 1 MG/DL (ref 0.8–1.3)
EOSINOPHILS ABSOLUTE: 0.1 K/UL (ref 0–0.6)
EOSINOPHILS RELATIVE PERCENT: 3.3 %
GFR AFRICAN AMERICAN: >60
GFR NON-AFRICAN AMERICAN: >60
GLUCOSE BLD-MCNC: 82 MG/DL (ref 70–99)
HCT VFR BLD CALC: 32.7 % (ref 40.5–52.5)
HEMOGLOBIN: 10.8 G/DL (ref 13.5–17.5)
LYMPHOCYTES ABSOLUTE: 1 K/UL (ref 1–5.1)
LYMPHOCYTES RELATIVE PERCENT: 23.3 %
MCH RBC QN AUTO: 26.7 PG (ref 26–34)
MCHC RBC AUTO-ENTMCNC: 33.2 G/DL (ref 31–36)
MCV RBC AUTO: 80.5 FL (ref 80–100)
MONOCYTES ABSOLUTE: 0.4 K/UL (ref 0–1.3)
MONOCYTES RELATIVE PERCENT: 10 %
NEUTROPHILS ABSOLUTE: 2.6 K/UL (ref 1.7–7.7)
NEUTROPHILS RELATIVE PERCENT: 62.2 %
PDW BLD-RTO: 15 % (ref 12.4–15.4)
PLATELET # BLD: 238 K/UL (ref 135–450)
PMV BLD AUTO: 7.3 FL (ref 5–10.5)
POTASSIUM REFLEX MAGNESIUM: 3.6 MMOL/L (ref 3.5–5.1)
PRO-BNP: 5842 PG/ML (ref 0–124)
RBC # BLD: 4.05 M/UL (ref 4.2–5.9)
SODIUM BLD-SCNC: 138 MMOL/L (ref 136–145)
TOTAL PROTEIN: 5.8 G/DL (ref 6.4–8.2)
WBC # BLD: 4.2 K/UL (ref 4–11)

## 2022-04-21 PROCEDURE — 6370000000 HC RX 637 (ALT 250 FOR IP): Performed by: NURSE PRACTITIONER

## 2022-04-21 PROCEDURE — 36415 COLL VENOUS BLD VENIPUNCTURE: CPT

## 2022-04-21 PROCEDURE — 2580000003 HC RX 258: Performed by: CLINICAL NURSE SPECIALIST

## 2022-04-21 PROCEDURE — 6370000000 HC RX 637 (ALT 250 FOR IP): Performed by: INTERNAL MEDICINE

## 2022-04-21 PROCEDURE — 97116 GAIT TRAINING THERAPY: CPT

## 2022-04-21 PROCEDURE — 99233 SBSQ HOSP IP/OBS HIGH 50: CPT | Performed by: CLINICAL NURSE SPECIALIST

## 2022-04-21 PROCEDURE — 1200000000 HC SEMI PRIVATE

## 2022-04-21 PROCEDURE — 2580000003 HC RX 258: Performed by: INTERNAL MEDICINE

## 2022-04-21 PROCEDURE — 6360000002 HC RX W HCPCS: Performed by: CLINICAL NURSE SPECIALIST

## 2022-04-21 PROCEDURE — 94760 N-INVAS EAR/PLS OXIMETRY 1: CPT

## 2022-04-21 PROCEDURE — 85025 COMPLETE CBC W/AUTO DIFF WBC: CPT

## 2022-04-21 PROCEDURE — 83880 ASSAY OF NATRIURETIC PEPTIDE: CPT

## 2022-04-21 PROCEDURE — 80053 COMPREHEN METABOLIC PANEL: CPT

## 2022-04-21 PROCEDURE — 97110 THERAPEUTIC EXERCISES: CPT

## 2022-04-21 RX ADMIN — DESMOPRESSIN ACETATE 40 MG: 0.2 TABLET ORAL at 20:32

## 2022-04-21 RX ADMIN — Medication 10 ML: at 20:33

## 2022-04-21 RX ADMIN — MILRINONE LACTATE 0.2 MCG/KG/MIN: 0.2 INJECTION, SOLUTION INTRAVENOUS at 02:15

## 2022-04-21 RX ADMIN — TAMSULOSIN HYDROCHLORIDE 0.4 MG: 0.4 CAPSULE ORAL at 08:01

## 2022-04-21 RX ADMIN — MILRINONE LACTATE 0.2 MCG/KG/MIN: 0.2 INJECTION, SOLUTION INTRAVENOUS at 20:34

## 2022-04-21 RX ADMIN — FUROSEMIDE 10 MG/HR: 10 INJECTION, SOLUTION INTRAMUSCULAR; INTRAVENOUS at 10:23

## 2022-04-21 RX ADMIN — APIXABAN 5 MG: 5 TABLET, FILM COATED ORAL at 08:01

## 2022-04-21 RX ADMIN — IRON SUCROSE 200 MG: 20 INJECTION, SOLUTION INTRAVENOUS at 08:54

## 2022-04-21 RX ADMIN — FINASTERIDE 5 MG: 5 TABLET, FILM COATED ORAL at 08:01

## 2022-04-21 RX ADMIN — APIXABAN 5 MG: 5 TABLET, FILM COATED ORAL at 20:32

## 2022-04-21 RX ADMIN — FUROSEMIDE 10 MG/HR: 10 INJECTION, SOLUTION INTRAMUSCULAR; INTRAVENOUS at 20:35

## 2022-04-21 ASSESSMENT — PAIN DESCRIPTION - PROGRESSION: CLINICAL_PROGRESSION: GRADUALLY IMPROVING

## 2022-04-21 ASSESSMENT — PAIN SCALES - GENERAL
PAINLEVEL_OUTOF10: 0
PAINLEVEL_OUTOF10: 0

## 2022-04-21 NOTE — PLAN OF CARE
Problem: Falls - Risk of:  Goal: Will remain free from falls  Description: Will remain free from falls  4/21/2022 0901 by Severo Roller, RN  Outcome: Progressing     Problem: OXYGENATION/RESPIRATORY FUNCTION  Goal: Patient will maintain patent airway  4/21/2022 0901 by Severo Roller, RN  Outcome: Progressing     Problem: FLUID AND ELECTROLYTE IMBALANCE  Goal: Fluid and electrolyte balance are achieved/maintained  Outcome: Progressing     Problem: ACTIVITY INTOLERANCE/IMPAIRED MOBILITY  Goal: Mobility/activity is maintained at optimum level for patient  4/21/2022 0901 by Severo Roller, RN  Outcome: Progressing     Problem: Pain  Goal: Verbalizes/displays adequate comfort level or baseline comfort level  4/21/2022 0901 by Severo Roller, RN  Outcome: Progressing

## 2022-04-21 NOTE — PROGRESS NOTES
[facility]   Daily Progress Note      Admit Date:  4/13/2022    HPI:    Mr. Steve Venegas is a 59 y.o. male with history of with history of PAF, hypertension.  Unvaccinated, , homeless, HENOK thrombus and AF    He is admitted with fluid overload, non compliance with medications (not taking toprol or lisinopril at home and taking eliquis once a day along with several baby aspirins). He had recent indwelling ernst for urinary retention and had balloon rupture causing ernst to fall out. Recent admission 2/5-10 for same issues. LVEF 20% normal cors, severe MR, HENOK thrombus  Anemic and started on IV venofer  Milrinone started 4/18/22    Subjective:  Patient is being seen for acute on chronic systolic heart failure. There were no acute overnight cardiac events. Weight [phone] probnp 27K-11K and -4.1 L out  Creat 1.0 potassium 3.6   He is sitting up in the chair on room air. His edema is improving in his legs, out walking in the halls and breathing much improved. Objective:   /66   Pulse 82   Temp 97.4 °F (36.3 °C) (Oral)   Resp 16   Ht 6' 2\" (1.88 m)   Wt 212 lb 11.2 oz (96.5 kg)   SpO2 96%   BMI 27.31 kg/m²       Intake/Output Summary (Last 24 hours) at 4/21/2022 0909  Last data filed at 4/21/2022 0855  Gross per 24 hour   Intake 240 ml   Output 1750 ml   Net -1510 ml          Physical Exam:  General:  Awake, alert, oriented in NAD  Skin:  Warm and dry. No unusual bruising or rash  Neck:  Supple. No JVD or carotid bruit appreciated  Chest:  Normal effort.   Rales in bilateral bases  Cardiovascular:  RRR, S1/S2, no murmur/gallop/rub  Abdomen:  Soft, nontender, +bowel sounds  Extremities:  Bilateral lower ankles to thighs edema, wrapped and softer  Neurological: No focal deficits  Psychological: Normal mood and affect      Medications:    iron sucrose (VENOFER) iv piggyback 100 mL (Admin over 60 minutes)  200 mg IntraVENous Q24H    sodium chloride flush  5-40 mL IntraVENous 2 times per day    apixaban  5 mg Oral BID    metoprolol succinate  25 mg Oral Daily    lisinopril  2.5 mg Oral Daily    atorvastatin  40 mg Oral Nightly    finasteride  5 mg Oral Daily    tamsulosin  0.4 mg Oral Daily    sodium chloride flush  5-40 mL IntraVENous 2 times per day      milrinone 0.2 mcg/kg/min (04/21/22 0215)    sodium chloride      furosemide (LASIX) 1mg/ml infusion 10 mg/hr (04/20/22 2331)    sodium chloride         Lab Data:  CBC:   Recent Labs     04/19/22  0515 04/20/22  0421 04/21/22  0405   WBC 3.7* 3.6* 4.2   HGB 10.8* 10.7* 10.8*    241 238     BMP:    Recent Labs     04/19/22  0515 04/20/22 0421 04/21/22  0405    138 138   K 3.7 3.6 3.6   CO2 30 31 32   BUN 23* 18 16   CREATININE 1.0 1.0 1.0     INR:  No results for input(s): INR in the last 72 hours. BNP:    No results for input(s): PROBNP in the last 72 hours. Diagnostics:  CONSUELO Dr Denise Plata 2/10/22  Preliminary CONSUELO results are:      - Severe LV dysfunction,  EF: 20-25%  - LA dilated. There was HENOK thrombus. - Moderate MR     Cardiac Cath PCI: Dr Vicky Crawford 2/8/2022  Anatomy:   LM-nml   LAD-nml  Cx-nml  OM- nml  RCA-nml  RPDA- nml  LVEF- 10%  LVG- global hypokinesis  LVEDP- 10     Hemodynamics:  RA- mean 3  RV- 37/0  PAWP- 10  PA- 34/12 (20)     C.O.- 5.7 (4.9)  C. I.- 2.6 (2.25)  PA sat 60%  AO sat 91%     Contrast: 70  Flouro Time: 5.3  Access: R radial a, R CFV     Impression  ~Coronary Angiography w/ normal cors  ~LVG with LVEF of 10% and global regional wall motion abnormalities  ~Severe MR  ~Normal CO/CI  ~normal L and R filling pressures     Recommendation  ~Aggressive medical treatment and risk factor modification  ~1. Medications reviewed, no changes at this time. 2. Post cath IVF. Bedrest.  3.Consider CONSUELO for evaluation of MR.   4. Patient has been advised on the importance of regular exercise of at least 20-30 minutes daily alternating with aerobic and isometric activities.    5. Patient counseled about and offered assistance for smoking cessation   6. No indication for cardiac rehab  7. CHF service to evaluate tomorrow.     Echo 11/29/2021  Summary   -Covid+   -Severely reduced global systolic function with an ejection fraction   estimated at 20%.  -Severe global hypokinesis with regional variations noted.   -Right ventricular systolic function appears to be mildly reduced based on   visual inspection.   -Severe biatrial enlargement.   -Thickened mitral valve without evidence of stenosis. There is   mild-to-moderate mitral regurgitation.   -There is mild-to-moderate tricuspid regurgitation with a RVSP estimation of   13 mmHg.   -Diastolic dysfunction with elevated LV filling pressures.     Echo 7/25/2019  Summary   -Overall left ventricular systolic function is moderately depressed .   -Ejection fraction is visually estimated to be 30-35 %.  -Global left ventricular function moderately reduced.   -Indeterminate diastolic function due to atrial fibrillation and moderate to   severe mitral regurgitation.   -Moderate-to-severe mitral regurgitation .   -Dilated left atrium with a volume of 97 ml.   -Left atrial volume is increased probably due to atrial fibrillation .   -There is mild right ventricular hypertrophy.   -The right ventricle is mildly enlarged.   -Right ventricular systolic pressure of 53 mm Hg consistent with pulmonary   hypertension.   -Moderate to severe tricuspid regurgitation. TAPSE = 1.43   -IVC size is dilated (>2.1 cm) but collapses > 50% with respiration   consistent with elevated RA pressure (8 mmHg). Assessment:    1. Acute on chronic systolic heart failure, on ace, bb; no aldosterone antagonist due to non compliance  2. Complicated UTI  3. Chronic atrial fib and HENOK thrombus, on eliquis  4. Non compliance  5. Severe MR  6. Homeless   7. NSVT  8. Iron deficiency anemia    Plan:    1. Continue milrinone 0.2 mcg/kg/min  2. Will hold lisinopril to allow BP to not be so soft  3. Continue toprol 25 mg daily  4. Compliance stressed with diet and medications  5.  EP following, plan for ICD after medical therapy, declines life vest  6. CHF nurse following for diet education, fluid restriction and daily weights  7. Consider adding verquvo and/or jardiance at discharge  8. Continue lasix drip at 10 mg/hr  9. Continue IV venofer 200 mg x 4 doses  10. Will check probnp    He has several more days to diuresis. Goal weight is 200    NYHA IV    Discussed with patient who is agreeable with plan of care. Thank you for allowing me to participate in the care of your patient.     Charleen Domingo, APRN - CNS, CNS

## 2022-04-21 NOTE — PLAN OF CARE
Problem: Falls - Risk of:  Goal: Will remain free from falls  Description: Will remain free from falls  Outcome: Progressing     Problem: Falls - Risk of:  Goal: Absence of physical injury  Description: Absence of physical injury  Outcome: Progressing     Problem: OXYGENATION/RESPIRATORY FUNCTION  Goal: Patient will maintain patent airway  Outcome: Progressing     Problem: OXYGENATION/RESPIRATORY FUNCTION  Goal: Patient will achieve/maintain normal respiratory rate/effort  Description: Respiratory rate and effort will be within normal limits for the patient  Outcome: Progressing     Problem: Pain  Goal: Verbalizes/displays adequate comfort level or baseline comfort level  Outcome: Progressing

## 2022-04-21 NOTE — PROGRESS NOTES
[facility] PROGRESS NOTE    4/21/2022 10:23 AM        Name: Hudson Dowell . Admitted: 4/13/2022  Primary Care Provider: No primary care provider on file.  (Tel: None)        Subjective:      Sitting in chair feeling much better swelling greatly improved no nausea vomiting chest  Reviewed interval ancillary notes    Current Medications  milrinone (PRIMACOR) 20 mg in dextrose 5 % 100 mL infusion, Continuous  sodium chloride flush 0.9 % injection 5-40 mL, 2 times per day  sodium chloride flush 0.9 % injection 5-40 mL, PRN  0.9 % sodium chloride infusion, PRN  apixaban (ELIQUIS) tablet 5 mg, BID  metoprolol succinate (TOPROL XL) extended release tablet 25 mg, Daily  furosemide (LASIX) 100 mg in dextrose 5 % 100 mL infusion, Continuous  atorvastatin (LIPITOR) tablet 40 mg, Nightly  finasteride (PROSCAR) tablet 5 mg, Daily  tamsulosin (FLOMAX) capsule 0.4 mg, Daily  sodium chloride flush 0.9 % injection 5-40 mL, 2 times per day  sodium chloride flush 0.9 % injection 5-40 mL, PRN  0.9 % sodium chloride infusion, PRN  polyethylene glycol (GLYCOLAX) packet 17 g, Daily PRN  acetaminophen (TYLENOL) tablet 650 mg, Q6H PRN   Or  acetaminophen (TYLENOL) suppository 650 mg, Q6H PRN  prochlorperazine (COMPAZINE) tablet 10 mg, Q6H PRN   Or  prochlorperazine (COMPAZINE) 10 mg in sodium chloride (PF) 10 mL injection, Q6H PRN        Objective:  /66   Pulse 82   Temp 97.4 °F (36.3 °C) (Oral)   Resp 16   Ht 6' 2\" (1.88 m)   Wt 212 lb 11.2 oz (96.5 kg)   SpO2 96%   BMI 27.31 kg/m²     Intake/Output Summary (Last 24 hours) at 4/21/2022 1023  Last data filed at 4/21/2022 0855  Gross per 24 hour   Intake 240 ml   Output 1750 ml   Net -1510 ml      Wt Readings from Last 3 Encounters:   04/21/22 212 lb 11.2 oz (96.5 kg)   04/12/22 227 lb 9.6 oz (103.2 kg)   03/22/22 220 lb 1.6 oz (99.8 kg)       General appearance:  Appears comfortable. AAOx3  HEENT: atraumatic, Pupils equal, muscous membranes moist, no masses appreciated  Cardiovascular: Regular rate and rhythm no murmurs appreciated  Respiratory: CTAB no wheezing  Gastrointestinal: Abdomen soft, non-tender, BS+  EXT: 1+ BLE not much improved  Neurology: no gross focal deficts  Psychiatry: Appropriate affect. Not agitated  Skin: Warm, dry, no rashes appreciated    Labs and Tests:  CBC:   Recent Labs     04/19/22  0515 04/20/22  0421 04/21/22  0405   WBC 3.7* 3.6* 4.2   HGB 10.8* 10.7* 10.8*    241 238     BMP:    Recent Labs     04/19/22  0515 04/20/22  0421 04/21/22  0405    138 138   K 3.7 3.6 3.6    99 97*   CO2 30 31 32   BUN 23* 18 16   CREATININE 1.0 1.0 1.0   GLUCOSE 105* 84 82     Hepatic:   Recent Labs     04/19/22  0515 04/20/22  0421 04/21/22  0405   AST 28 26 26   ALT 36 32 31   BILITOT 0.9 0.9 1.0   ALKPHOS 81 79 76     XR CHEST PORTABLE   Final Result   Status post left PICC line placement in good position. Stable cardiomegaly with minimal central pulmonary congestion or pulmonary   artery hypertension which is unchanged. Minimal discoid atelectasis or scarring along the lung bases which is more   prominent         XR PELVIS (1-2 VIEWS)   Final Result   No acute abnormality and no foreign body evident. XR CHEST PORTABLE   Final Result   Cardiomegaly with no acute pulmonary finding. Recent imaging reviewed    Problem List  Principal Problem:    Acute systolic heart failure (HCC)  Active Problems:    Complicated UTI (urinary tract infection)    Chronic atrial fibrillation (HCC)    Severe mitral regurgitation    Acute on chronic congestive heart failure ([address]  Resolved Problems:    * No resolved hospital problems.  *     Assessment/Plan:   Acute On chronic systolic heart failure with EF of 25% and moderate MR  -milirinone gtt  -lasix   -Continue lisinopril and Lopressor blood pressure on lower side will monitor today and blood pressure improved we will give recheck BMP in a.m. monitor mag and potassium       Elevated lft likely secondary to above  improved     PAF with left atrial appendage clost 2/2022  - eliquis  - nika     Bph: flomax     UTI: s/p atbx  DVT prophylaxis eliquis  Code status full code       Gasper Daniels MD   4/21/2022 10:23 AM

## 2022-04-21 NOTE — PROGRESS NOTES
Physical Therapy  Facility/Department: [address] NURSING  Physical Therapy treatment session    Name: Tarun Cervantes  : 1960  MRN: [id number]  Date of Service: 2022    Discharge Recommendations:    Tarun Cervantes scored a 20/24 on the AM-PAC short mobility form. Current research shows that an AM-PAC score of 18 or greater is typically associated with a discharge to the patient's home setting. Based on the patient's AM-PAC score and their current functional mobility, do not anticipate further need for PT at d/c. Please see assessment section for further patient specific details. If patient discharges prior to next session this note will serve as a discharge summary. Please see below for the latest assessment towards goals. PT Equipment Recommendations  Equipment Needed: No  Other: Pt. ambulating without an AD at this time      Patient Diagnosis(es): The primary encounter diagnosis was Acute on chronic congestive heart failure, unspecified heart failure type ([address] A diagnosis of Complicated UTI (urinary tract infection) was also pertinent to this visit. Past Medical History:  has a past medical history of Atrial fibrillation ([address] CHF (congestive heart failure) ([address] Hx of blood clots, and Hypertension. Past Surgical History:  has a past surgical history that includes Insertable Cardiac Monitor (2019) and Cardioversion (2019). Assessment   Assessment: Pt presented with CHF and UTI. Pt. is progressing with functional mobility able to increase ambulation to 600' this date with S/CGA. Occasionally has a balance faulter to the R partially corrected by PT. Pt is very agreeable to therapy. Anticipated being able to d/c to home safely.   Therapy Prognosis: Good  Requires PT Follow-Up: Yes  Activity Tolerance  Activity Tolerance: Patient tolerated treatment well     Plan   Plan  Current Treatment Recommendations: Rosa Tadeo Mobility Training,Transfer Training,Gait  Exercise Program,Patient/Caregiver Education & Training,Equipment Evaluation, Education, & procurement,Safety Education & Training,Stair training  Safety Devices  Type of Devices: Call light within reach (Pt. did not have bed alarm on upon entry)  Restraints  Restraints Initially in Place: No     Restrictions  Restrictions/Precautions  Restrictions/Precautions: Fall Risk (moderate fall risk)  Required Braces or Orthoses?: No  Position Activity Restriction  Other position/activity restrictions: 60 yo BM with hx of nonischemic severe cmp with severe MR who is admitted with edema and chf.  He is homeless and has noncompliance with meds and f/u. He has recently had an indwelling ernst for urinary retention and had balloon rupture and thinks some of the fragments may still be inside. He has chronic AF and is on eliquis. He was seen in heart failure clinic on 4/12 and admitted. Recent admit 2/5-2/10/22 for the same. EF 20%. Normal cors by cath. Severe MR. HENOK thrombus also. Dx with acute on chronic CHF and complicated UTI     Subjective   General  Chart Reviewed: Yes  Response To Previous Treatment: Patient with no complaints from previous session. Family / Caregiver Present: No  Diagnosis: Acute Systolic heart failure  Follows Commands: Within Functional Limits  Subjective  Subjective: Pt denies pain at rest. Feeling better overall.  Denies lightheadedness or dizziness         Social/Functional History  Social/Functional History  Lives With:  (Has been staying with friends but chart reports homeless)  Type of Home: Apartment  Home Layout: One level  Home Access: Stairs to enter with rails  Entrance Stairs - Number of Steps: ~2-3 BRET  Bathroom Shower/Tub: Tub/Shower unit  Bathroom Toilet: Standard  Bathroom Accessibility: Accessible  ADL Assistance: [address] [place] [address] Avenue: Independent  Homemaking Responsibilities: Yes  Ambulation Assistance: Independent  Transfer Assistance: Independent  Active : No  Patient's  Info: Friends drive as needed  Occupation: Part time employment  Type of Occupation:   Leisure & Hobbies: Watching  Additional Comments: Patient reports no falls in past 6 months. Objective      Observation/Palpation  Posture: Good                    Bed Mobility Training  Bed Mobility Training: Yes  Supine to Sit: Supervision;Modified independent  Sit to Supine: Supervision;Modified independent  Scooting: Modified independent;Supervision  Balance  Sitting: Intact  Standing: Without support (Needed intermittent CGA)  Transfer Training  Transfer Training: Yes  Overall Level of Assistance: Supervision;Modified independent  Interventions: Verbal cues (to bend knees to 90 degrees)  Sit to Stand: Supervision;Modified independent  Stand to Sit: Modified independent;Supervision  Gait Training  Gait Training: Yes  Gait  Overall Level of Assistance: Contact-guard assistance  Base of Support: Widened  Speed/Rona: Slow  Step Length: Right shortened;Left shortened  Stance: Right increased; Left increased  Gait Abnormalities:  (occasional list to the R that PT corrects with CGA; Pt. slow to respond to listing)  Distance (ft): 600 Feet  Assistive Device: Gait belt (No device)  Wheelchair Management  Wheelchair Management: No                 Exercise Treatment: Standing exercises:  1 UE support on the countertop:  marching x 10, hip abd/add x 10 and heel rises x 10  Dynamic Standing Balance Exercises: Standing with eyes closed x 30 sec. with Supervisoin; Pertubations with eyes open in all directions, Pt. a bit slow initially to react and needed cuing but gradually improved, turn to look over each shoulder x 5 reps, able to shift weight appropriately with cuing, Cervical ROM while standng with Supervision        OutComes Score                                                  AM-PAC Score  AM-PAC Inpatient Mobility Raw Score : 20 (04/21/22 1401)  AM-PAC Inpatient T-Scale Score : 47.67 (04/21/22 1401)  Mobility Inpatient CMS 0-100% Score: 35.83 (04/21/22 1401)  Mobility Inpatient CMS G-Code Modifier : CJ (04/21/22 1401)          Goals  Short Term Goals  Time Frame for Short term goals: By d/c  Short term goal 1: Supine to/from sit with Mod I  Short term goal 2: Amb. x 200' with/without and AD wiht Mod I  Short term goal 3: up and down 12 steps with rail and Mod I  Patient Goals   Patient goals : Improve walking       Therapy Time   Individual Concurrent Group Co-treatment   Time In 1325         Time Out 1355         Minutes 30         Timed Code Treatment Minutes: [address] [facility] [address] [place], [place], [id number]

## 2022-04-21 NOTE — PROGRESS NOTES
Assessment complete. VSS, respirations are even and unlabored. Afebrile. Pt is up to chair. No needs expressed. Call light within reach. Non-skid socks on, bed in lowest position and locked. No other needs expressed. will continue to monitor.

## 2022-04-21 NOTE — PROGRESS NOTES
[facility] PROGRESS NOTE    4/21/2022 10:22 AM        Name: Hudson Dowell . Admitted: 4/13/2022  Primary Care Provider: No primary care provider on file. (Tel: None)        Subjective: In bed on milrinone and Lasix drip and swelling is improving   Reviewed interval ancillary notes    Current Medications  milrinone (PRIMACOR) 20 mg in dextrose 5 % 100 mL infusion, Continuous  sodium chloride flush 0.9 % injection 5-40 mL, 2 times per day  sodium chloride flush 0.9 % injection 5-40 mL, PRN  0.9 % sodium chloride infusion, PRN  apixaban (ELIQUIS) tablet 5 mg, BID  metoprolol succinate (TOPROL XL) extended release tablet 25 mg, Daily  furosemide (LASIX) 100 mg in dextrose 5 % 100 mL infusion, Continuous  atorvastatin (LIPITOR) tablet 40 mg, Nightly  finasteride (PROSCAR) tablet 5 mg, Daily  tamsulosin (FLOMAX) capsule 0.4 mg, Daily  sodium chloride flush 0.9 % injection 5-40 mL, 2 times per day  sodium chloride flush 0.9 % injection 5-40 mL, PRN  0.9 % sodium chloride infusion, PRN  polyethylene glycol (GLYCOLAX) packet 17 g, Daily PRN  acetaminophen (TYLENOL) tablet 650 mg, Q6H PRN   Or  acetaminophen (TYLENOL) suppository 650 mg, Q6H PRN  prochlorperazine (COMPAZINE) tablet 10 mg, Q6H PRN   Or  prochlorperazine (COMPAZINE) 10 mg in sodium chloride (PF) 10 mL injection, Q6H PRN        Objective:  /66   Pulse 82   Temp 97.4 °F (36.3 °C) (Oral)   Resp 16   Ht 6' 2\" (1.88 m)   Wt 212 lb 11.2 oz (96.5 kg)   SpO2 96%   BMI 27.31 kg/m²     Intake/Output Summary (Last 24 hours) at 4/21/2022 1022  Last data filed at 4/21/2022 0855  Gross per 24 hour   Intake 240 ml   Output 1750 ml   Net -1510 ml      Wt Readings from Last 3 Encounters:   04/21/22 212 lb 11.2 oz (96.5 kg)   04/12/22 227 lb 9.6 oz (103.2 kg)   03/22/22 220 lb 1.6 oz (99.8 kg)       General appearance:  Appears comfortable.  AAOx3  HEENT: atraumatic, Pupils equal, muscous membranes moist, no masses appreciated  Cardiovascular: Regular rate and rhythm no murmurs appreciated  Respiratory: CTAB no wheezing  Gastrointestinal: Abdomen soft, non-tender, BS+  EXT: 1+ BLE not much improved  Neurology: no gross focal deficts  Psychiatry: Appropriate affect. Not agitated  Skin: Warm, dry, no rashes appreciated    Labs and Tests:  CBC:   Recent Labs     04/19/22  0515 04/20/22  0421 04/21/22  0405   WBC 3.7* 3.6* 4.2   HGB 10.8* 10.7* 10.8*    241 238     BMP:    Recent Labs     04/19/22  0515 04/20/22  0421 04/21/22  0405    138 138   K 3.7 3.6 3.6    99 97*   CO2 30 31 32   BUN 23* 18 16   CREATININE 1.0 1.0 1.0   GLUCOSE 105* 84 82     Hepatic:   Recent Labs     04/19/22  0515 04/20/22  0421 04/21/22  0405   AST 28 26 26   ALT 36 32 31   BILITOT 0.9 0.9 1.0   ALKPHOS 81 79 76     XR CHEST PORTABLE   Final Result   Status post left PICC line placement in good position. Stable cardiomegaly with minimal central pulmonary congestion or pulmonary   artery hypertension which is unchanged. Minimal discoid atelectasis or scarring along the lung bases which is more   prominent         XR PELVIS (1-2 VIEWS)   Final Result   No acute abnormality and no foreign body evident. XR CHEST PORTABLE   Final Result   Cardiomegaly with no acute pulmonary finding. Recent imaging reviewed    Problem List  Principal Problem:    Acute systolic heart failure (HCC)  Active Problems:    Complicated UTI (urinary tract infection)    Chronic atrial fibrillation (HCC)    Severe mitral regurgitation    Acute on chronic congestive heart failure ([facility]  Resolved Problems:    * No resolved hospital problems.  *     Assessment/Plan:   Acute On chronic systolic heart failure with EF of 25% and moderate MR  -milirinone gtt  -lasix repea tbmp in am       Elevated lft likely secondary to above  improved     PAF with left atrial appendage clost 2/2022  - eliquis  - bblocker     Bph: flomax     UTI: rocephin      DVT prophylaxis eliquis  Code status full code       Tamiko Newton MD   4/21/2022 10:22 AM

## 2022-04-22 LAB
A/G RATIO: 1.3 (ref 1.1–2.2)
ALBUMIN SERPL-MCNC: 3.8 G/DL (ref 3.4–5)
ALP BLD-CCNC: 92 U/L (ref 40–129)
ALT SERPL-CCNC: 37 U/L (ref 10–40)
ANION GAP SERPL CALCULATED.3IONS-SCNC: 8 MMOL/L (ref 3–16)
AST SERPL-CCNC: 32 U/L (ref 15–37)
BASOPHILS ABSOLUTE: 0.1 K/UL (ref 0–0.2)
BASOPHILS RELATIVE PERCENT: 1.2 %
BILIRUB SERPL-MCNC: 1.3 MG/DL (ref 0–1)
BUN BLDV-MCNC: 16 MG/DL (ref 7–20)
CALCIUM SERPL-MCNC: 9.1 MG/DL (ref 8.3–10.6)
CHLORIDE BLD-SCNC: 95 MMOL/L (ref 99–110)
CO2: 32 MMOL/L (ref 21–32)
CREAT SERPL-MCNC: 1 MG/DL (ref 0.8–1.3)
EOSINOPHILS ABSOLUTE: 0.1 K/UL (ref 0–0.6)
EOSINOPHILS RELATIVE PERCENT: 2.6 %
GFR AFRICAN AMERICAN: >60
GFR NON-AFRICAN AMERICAN: >60
GLUCOSE BLD-MCNC: 85 MG/DL (ref 70–99)
HCT VFR BLD CALC: 36.1 % (ref 40.5–52.5)
HEMOGLOBIN: 11.5 G/DL (ref 13.5–17.5)
LYMPHOCYTES ABSOLUTE: 1.2 K/UL (ref 1–5.1)
LYMPHOCYTES RELATIVE PERCENT: 23 %
MCH RBC QN AUTO: 25.7 PG (ref 26–34)
MCHC RBC AUTO-ENTMCNC: 31.9 G/DL (ref 31–36)
MCV RBC AUTO: 80.6 FL (ref 80–100)
MONOCYTES ABSOLUTE: 0.4 K/UL (ref 0–1.3)
MONOCYTES RELATIVE PERCENT: 8.3 %
NEUTROPHILS ABSOLUTE: 3.5 K/UL (ref 1.7–7.7)
NEUTROPHILS RELATIVE PERCENT: 64.9 %
PDW BLD-RTO: 15.3 % (ref 12.4–15.4)
PLATELET # BLD: 306 K/UL (ref 135–450)
PMV BLD AUTO: 7.5 FL (ref 5–10.5)
POTASSIUM REFLEX MAGNESIUM: 3.8 MMOL/L (ref 3.5–5.1)
RBC # BLD: 4.48 M/UL (ref 4.2–5.9)
SODIUM BLD-SCNC: 135 MMOL/L (ref 136–145)
TOTAL PROTEIN: 6.8 G/DL (ref 6.4–8.2)
WBC # BLD: 5.3 K/UL (ref 4–11)

## 2022-04-22 PROCEDURE — 2580000003 HC RX 258: Performed by: CLINICAL NURSE SPECIALIST

## 2022-04-22 PROCEDURE — 6360000002 HC RX W HCPCS: Performed by: CLINICAL NURSE SPECIALIST

## 2022-04-22 PROCEDURE — 6370000000 HC RX 637 (ALT 250 FOR IP): Performed by: NURSE PRACTITIONER

## 2022-04-22 PROCEDURE — 85025 COMPLETE CBC W/AUTO DIFF WBC: CPT

## 2022-04-22 PROCEDURE — 97535 SELF CARE MNGMENT TRAINING: CPT

## 2022-04-22 PROCEDURE — 1200000000 HC SEMI PRIVATE

## 2022-04-22 PROCEDURE — 6370000000 HC RX 637 (ALT 250 FOR IP): Performed by: INTERNAL MEDICINE

## 2022-04-22 PROCEDURE — 97110 THERAPEUTIC EXERCISES: CPT

## 2022-04-22 PROCEDURE — 97116 GAIT TRAINING THERAPY: CPT

## 2022-04-22 PROCEDURE — 2580000003 HC RX 258: Performed by: INTERNAL MEDICINE

## 2022-04-22 PROCEDURE — 99233 SBSQ HOSP IP/OBS HIGH 50: CPT | Performed by: CLINICAL NURSE SPECIALIST

## 2022-04-22 PROCEDURE — 80053 COMPREHEN METABOLIC PANEL: CPT

## 2022-04-22 PROCEDURE — 97530 THERAPEUTIC ACTIVITIES: CPT

## 2022-04-22 PROCEDURE — 36592 COLLECT BLOOD FROM PICC: CPT

## 2022-04-22 RX ORDER — MILRINONE LACTATE 0.2 MG/ML
0.1 INJECTION, SOLUTION INTRAVENOUS CONTINUOUS
Status: DISCONTINUED | OUTPATIENT
Start: 2022-04-22 | End: 2022-04-23

## 2022-04-22 RX ORDER — MILRINONE LACTATE 0.2 MG/ML
0.1 INJECTION, SOLUTION INTRAVENOUS CONTINUOUS
Status: CANCELLED | OUTPATIENT
Start: 2022-04-22

## 2022-04-22 RX ADMIN — Medication 10 ML: at 09:39

## 2022-04-22 RX ADMIN — FUROSEMIDE 10 MG/HR: 10 INJECTION, SOLUTION INTRAMUSCULAR; INTRAVENOUS at 10:03

## 2022-04-22 RX ADMIN — FINASTERIDE 5 MG: 5 TABLET, FILM COATED ORAL at 09:36

## 2022-04-22 RX ADMIN — FUROSEMIDE 10 MG/HR: 10 INJECTION, SOLUTION INTRAMUSCULAR; INTRAVENOUS at 19:28

## 2022-04-22 RX ADMIN — APIXABAN 5 MG: 5 TABLET, FILM COATED ORAL at 21:16

## 2022-04-22 RX ADMIN — APIXABAN 5 MG: 5 TABLET, FILM COATED ORAL at 09:36

## 2022-04-22 RX ADMIN — DESMOPRESSIN ACETATE 40 MG: 0.2 TABLET ORAL at 21:16

## 2022-04-22 RX ADMIN — MILRINONE LACTATE 0.1 MCG/KG/MIN: 0.2 INJECTION, SOLUTION INTRAVENOUS at 19:30

## 2022-04-22 RX ADMIN — TAMSULOSIN HYDROCHLORIDE 0.4 MG: 0.4 CAPSULE ORAL at 09:36

## 2022-04-22 RX ADMIN — METOPROLOL SUCCINATE 25 MG: 25 TABLET, EXTENDED RELEASE ORAL at 09:36

## 2022-04-22 ASSESSMENT — PAIN SCALES - GENERAL: PAINLEVEL_OUTOF10: 0

## 2022-04-22 ASSESSMENT — PAIN DESCRIPTION - PROGRESSION
CLINICAL_PROGRESSION: GRADUALLY IMPROVING

## 2022-04-22 NOTE — PROGRESS NOTES
Restrictions  Restrictions/Precautions  Restrictions/Precautions: Fall Risk (medium fall risk)  Required Braces or Orthoses?: No  Position Activity Restriction  Sternal Precautions: . Other position/activity restrictions: 60 yo BM with hx of nonischemic severe cmp with severe MR who is admitted with edema and chf.  He is homeless and has noncompliance with meds and f/u. He has recently had an indwelling ernst for urinary retention and had balloon rupture and thinks some of the fragments may still be inside. He has chronic AF and is on eliquis. He was seen in heart failure clinic on 4/12 and admitted. Recent admit 2/5-2/10/22 for the same. EF 20%. Normal cors by cath. Severe MR. HENOK thrombus also. Dx with acute on chronic CHF and complicated UTI     Subjective   General  Patient assessed for rehabilitation services?: Yes  Family / Caregiver Present: No  Diagnosis: Acute Systolic heart failure  Follows Commands: Within Functional Limits  General Comment  Comments: supine in bed upon arrival.  Subjective  Subjective: Denied pain. Agreeable to therapy. States he feels much better    Objective         Gross Assessment  Strength: Within functional limits  Coordination: Within functional limits                    Bed mobility  Supine to Sit: Independent  Scooting: Independent  Transfers  Sit to Stand: Independent (from bed x 3)  Stand to sit:  Independent  Ambulation  Device: No Device  Assistance: Independent  Quality of Gait: steady gait, decreased savanah, increased VITO  Distance: 15'+ 15' + 500'  Comments: able to perform 5' of retrograde ambulation independently  Stairs/Curb  Stairs?: Yes  Stairs  # Steps : 12 (4 steps x 3 due to IV pole)  Rails: Left ascending  Assistance: Modified independent   Comment: steady reciprocal pattern     Balance  Sitting - Static: Good  Sitting - Dynamic: Good  Standing - Static: Good  Standing - Dynamic: Good           AM-PAC Score  AM-PAC Inpatient Mobility Raw Score : 24 (04/22/22 1342)  AM-PAC Inpatient T-Scale Score : 61.14 (04/22/22 1342)  Mobility Inpatient CMS 0-100% Score: 0 (04/22/22 1342)  Mobility Inpatient CMS G-Code Modifier : [facility] (04/22/22 1342)          Goals  Short Term Goals  Time Frame for Short term goals: By d/c  Short term goal 1: Supine to/from sit with Mod I - MET 4/22  Short term goal 2: Amb. x 200' with/without and AD wiht Mod I - MET 4/22  Short term goal 3: up and down 12 steps with rail and Mod I - MET 4/22  Patient Goals   Patient goals : Improve walking       Therapy Time   Individual Concurrent Group Co-treatment   Time In 1257         Time Out 1325         Minutes 28         Timed Code Treatment Minutes: 28 Minutes       Olivier Rowe, PT    Thanks, Olivier Rowe, PT, DPT [id number]

## 2022-04-22 NOTE — PROGRESS NOTES
Occupational Therapy  Facility/Department: [facility] 3A NURSING  Daily Treatment Note    Name: Desire Naylor  : 1960  MRN: [id number]  Date of Service: 2022    Discharge Recommendations:  Desire Naylor scored a 23/24 on the AM-PAC ADL inpatient form. Current research shows that an AM-PAC score of 18 or greater is typically associated with a discharge to the patient's home setting. Based on the patient's AM-PAC score and their current functional progression, do not anticipate further need for OT at d/c. Please see assessment section for further patient specific details. OT Equipment Recommendations  Equipment Needed: No       Patient Diagnosis(es): The primary encounter diagnosis was Acute on chronic congestive heart failure, unspecified heart failure type ([address] A diagnosis of Complicated UTI (urinary tract infection) was also pertinent to this visit. Past Medical History:  has a past medical history of Atrial fibrillation ([facility] [address] CHF (congestive heart failure) ([facility] [address] Hx of blood clots, and Hypertension. Past Surgical History:  has a past surgical history that includes Insertable Cardiac Monitor (2019) and Cardioversion (2019). Treatment Diagnosis: Above associated with decreased ADL/IADL status due to acute systolic heart failure and general weakness. Assessment   Performance deficits / Impairments: Decreased functional mobility ; Decreased safe awareness;Decreased coordination;Decreased ADL status; Decreased endurance;Decreased sensation;Decreased high-level IADLs  Assessment: Pt presents close to baseline with the above deficits d/t CHF/UTI. Pt continues to progress with independence with functional mobilty, functional transfers, and ADL completion. Pt is very agreeable and motivated to therapy. Anticipating safe d/c home.   Prognosis: Good  REQUIRES OT FOLLOW-UP: Yes  Activity Tolerance  Activity Tolerance: Patient Tolerated treatment well  Activity Tolerance: Pt motivated to participate in therapeutic exercises this date, no c/o fatigue or pain during grading of TE        Plan   Plan  Times per Week: 3-5x/wk  Times per Day: Daily  Current Treatment Recommendations: Balance training,Strengthening,Functional mobility training,Endurance training,Safety education & training,Return to work related activity,Self-Care / ADL,Cognitive/Perceptual training,Other (comment) (Medication management)    Safety Precautions: Call light within reach; Chair alarm in place; Left in chair; Patient at risk for falls; Gait belt; All fall risk precautions in place; Nurse notified    Restrictions  Restrictions/Precautions  Restrictions/Precautions: Fall Risk (High Fall risk, Pacemaker precations)  Required Braces or Orthoses?: No  Position Activity Restriction  Other position/activity restrictions: 58 yo BM with hx of nonischemic severe cmp with severe MR who is admitted with edema and chf.  He is homeless and has noncompliance with meds and f/u. He has recently had an indwelling ernst for urinary retention and had balloon rupture and thinks some of the fragments may still be inside. He has chronic AF and is on eliquis. He was seen in heart failure clinic on 4/12 and admitted. Recent admit 2/5-2/10/22 for the same. EF 20%. Normal cors by cath. Severe MR. HENOK thrombus also. Dx with acute on chronic CHF and complicated UTI    Subjective   General  Chart Reviewed: Yes  Patient assessed for rehabilitation services?: Yes  Response to previous treatment: Patient with no complaints from previous session  Family / Caregiver Present: No  Subjective  Subjective: Pt supine in bed with RN in room upon arrival. Pleasant and agreeable to OT session. Pt denying pain at rest but c/o difficulty with urination this AM.  General Comment  Comments: Patient c/o left lateral thigh feels sore. Patient described it as spasm like, or like a eric horse. Stated felt better after walk.  Warm blanket applied across thigh at end of session    Objective   Balance  Sitting Balance: Independent (EOB ~5min)  Standing Balance: Contact guard assistance (Supervision for static standing, CGA during dynamic activities)  Standing Balance  Time: ~20 min total  Activity: Functional transfers, functional mobility, TE in stance to work on balance/dynamc standing  Comment: No instances of LOB  Functional Mobility  Functional - Mobility Device: No device  Activity:  (around room, to/from sink ~30ft total)  Assist Level: Stand by assistance  Functional Mobility Comments: Pt SBA for functional mobility with no DME. Min VCs for safety awareness and spatial awareness in small spaces  ADL  Feeding: Independent (Pt independent to eat breakfast at EOS)  Grooming: Supervision (Supervision for hand hygeine in stance at sink)  LE Dressing: Supervision;Setup (Supervision to don hospital pants seated EOB)  Additional Comments: Pt politely declined additional ADLs and need to toilet this date        Bed mobility  Supine to Sit: Modified independent  Sit to Supine: Unable to assess (Pt seated in recliner at EOS)  Scooting: Modified independent  Comment: HOB raised and use of bedrails with all bed mobility  Transfers  Sit to stand: Supervision (x1 from EOB)  Stand to sit: Supervision (x1 to recliner)  Transfer Comments: Patient with minimal verbal cueing needed for hand placement needed for increased safety awareness with functional transfers. Cognition  Overall Cognitive Status: Exceptions  Arousal/Alertness: Appropriate responses to stimuli  Following Commands: Follows multistep commands with increased time; Follows multistep commands with repitition; Follows one step commands consistently  Safety Judgement: Decreased awareness of need for assistance;Decreased awareness of need for safety  Problem Solving: Assistance required to generate solutions;Assistance required to implement solutions  Insights: Decreased awareness of deficits  Initiation: Requires cues for some  Sequencing: Requires cues for some  Cognition Comment: Pt slow to process commands, difficulty adhering to multi-step instructions without repetition/VCs  Perception  Overall Perceptual Status: WFL            Exercise Treatment: Pt completed dynamic standing AROM TE to address balance necessary during dynamic ADL completion and functional mobility as well as for fall prevention. Pt completed bilateral single leg balancing while raising opposite arms/legs x10 each side (shoulder flexion, hip flexion/knee flex). Pt completed x10 each side single leg balances with arm crossing midline (Hip extension/knee flexion, shoulder horizontal adduction/abduction). Pt completed single leg raises x10 each LE, and standing bicep curls with a small VITO (feet together) x10 B UE. Pt completed x10 B LE lunges with one leg forward and bent forward with opposite arms completing shoulder row. AM-PAC Score  AM-PAC Inpatient Daily Activity Raw Score: 23 (04/22/22 1038)  AM-PAC Inpatient ADL T-Scale Score : 51.12 (04/22/22 1038)  ADL Inpatient CMS 0-100% Score: 15.86 (04/22/22 1038)  ADL Inpatient CMS G-Code Modifier : CI (04/22/22 1038)    Goals  Short Term Goals  Time Frame for Short term goals: Discharge  Short Term Goal 1: Patient will complete functional transfers with mod I-- Supervision 4/22  Short Term Goal 2: Patient will complete functional mobility tasks with mod I--- SBA/CGA with no DME 4/22  Short Term Goal 3: Patient will complete UB/LB ADLs with mod I-Setup/supervision 4/22  Long Term Goals  Time Frame for Long term goals : STG=LTG  Patient Goals   Patient goals : Patient wants to get back to feeling better.        Therapy Time   Individual Concurrent Group Co-treatment   Time In 0815         Time Out 0855         Minutes 40         Timed Code Treatment Minutes:40 minutes  Total Treatment Minutes:  40 minutes    Marty Chough, S/RUCHI  [place], SIFUENTES/L-[id number]    RUCHI provided direct supervision to student, facilitated in making skilled judgements throughout duration of session.

## 2022-04-22 NOTE — PLAN OF CARE
Problem: HEMODYNAMIC STATUS  Goal: Patient has stable vital signs and fluid balance  Intervention: Administer medications as ordered  Note: Pt remains on lasix and milrinone gtts. Cards following. Pt reminded of strict I&O, daily weights, low salt diet.

## 2022-04-22 NOTE — PLAN OF CARE
Problem: HEMODYNAMIC STATUS  Goal: Patient has stable vital signs and fluid balance  Outcome: Progressing     Problem: FLUID AND ELECTROLYTE IMBALANCE  Goal: Fluid and electrolyte balance are achieved/maintained  Outcome: Progressing     Problem: ACTIVITY INTOLERANCE/IMPAIRED MOBILITY  Goal: Mobility/activity is maintained at optimum level for patient  Outcome: Adequate for Discharge     Problem: Falls - Risk of:  Goal: Will remain free from falls  Description: Will remain free from falls  Outcome: Completed  Goal: Absence of physical injury  Description: Absence of physical injury  Outcome: Completed     Problem: OXYGENATION/RESPIRATORY FUNCTION  Goal: Patient will maintain patent airway  Outcome: Completed  Goal: Patient will achieve/maintain normal respiratory rate/effort  Description: Respiratory rate and effort will be within normal limits for the patient  Outcome: Completed

## 2022-04-22 NOTE — FLOWSHEET NOTE
04/22/22 0921 04/22/22 0928 04/22/22 0933   Output (mL)   Urine  --  375 mL  --    Urine Assessment   Bladder Scan Volume (mL) 621 mL  --  305 mL   bladder scanned pt before and after void.

## 2022-04-22 NOTE — PROGRESS NOTES
[address]   Daily Progress Note      Admit Date:  4/13/2022    HPI:    Mr. Nat Mera is a 59 y.o. male with history of with history of PAF, hypertension.  Unvaccinated, , homeless, HENOK thrombus and AF    He is admitted with fluid overload, non compliance with medications (not taking toprol or lisinopril at home and taking eliquis once a day along with several baby aspirins). He had recent indwelling ernst for urinary retention and had balloon rupture causing ernst to fall out. Recent admission 2/5-10 for same issues. LVEF 20% normal cors, severe MR, HENOK thrombus  Anemic and started on IV venofer  Milrinone started 4/18/22    Subjective:  Patient is being seen for acute on chronic systolic heart failure. There were no acute overnight cardiac events. Weight [phone] probnp 59R- and -4.7 L out  Creat 1.0 potassium 3.8  He is sitting up in the chair on room air, breathing better, leg edema improving and weight down. He is complaining of not emptying his bladder completely      Objective:   BP 98/68   Pulse 78   Temp 97.5 °F (36.4 °C) (Oral)   Resp 18   Ht 6' 2\" (1.88 m)   Wt 204 lb 8 oz (92.8 kg)   SpO2 98%   BMI 26.26 kg/m²       Intake/Output Summary (Last 24 hours) at 4/22/2022 0843  Last data filed at 4/22/2022 5717  Gross per 24 hour   Intake 2525.6 ml   Output 3125 ml   Net -599.4 ml          Physical Exam:  General:  Awake, alert, oriented in NAD  Skin:  Warm and dry. No unusual bruising or rash  Neck:  Supple. No JVD or carotid bruit appreciated  Chest:  Normal effort.   Rales in bilateral bases improved  Cardiovascular:  RRR, S1/S2, no murmur/gallop/rub  Abdomen:  Soft, nontender, +bowel sounds  Extremities:  Bilateral lower ankles to thighs edema, wrapped and softer  Neurological: No focal deficits  Psychological: Normal mood and affect      Medications:    sodium chloride flush  5-40 mL IntraVENous 2 times per day    apixaban  5 mg Oral BID    metoprolol evaluate tomorrow.     Echo 11/29/2021  Summary   -Covid+   -Severely reduced global systolic function with an ejection fraction   estimated at 20%.  -Severe global hypokinesis with regional variations noted.   -Right ventricular systolic function appears to be mildly reduced based on   visual inspection.   -Severe biatrial enlargement.   -Thickened mitral valve without evidence of stenosis. There is   mild-to-moderate mitral regurgitation.   -There is mild-to-moderate tricuspid regurgitation with a RVSP estimation of   98 mmHg.   -Diastolic dysfunction with elevated LV filling pressures.     Echo 7/25/2019  Summary   -Overall left ventricular systolic function is moderately depressed .   -Ejection fraction is visually estimated to be 30-35 %.  -Global left ventricular function moderately reduced.   -Indeterminate diastolic function due to atrial fibrillation and moderate to   severe mitral regurgitation.   -Moderate-to-severe mitral regurgitation .   -Dilated left atrium with a volume of 97 ml.   -Left atrial volume is increased probably due to atrial fibrillation .   -There is mild right ventricular hypertrophy.   -The right ventricle is mildly enlarged.   -Right ventricular systolic pressure of 53 mm Hg consistent with pulmonary   hypertension.   -Moderate to severe tricuspid regurgitation. TAPSE = 1.43   -IVC size is dilated (>2.1 cm) but collapses > 50% with respiration   consistent with elevated RA pressure (8 mmHg). Assessment:    1. Acute on chronic systolic heart failure, on ace, bb; no aldosterone antagonist due to non compliance  2. Complicated UTI  3. Chronic atrial fib and HENOK thrombus, on eliquis  4. Non compliance  5. Severe MR  6. Homeless   7. NSVT  8. Iron deficiency anemia    Plan:    1. Will decrease milrinone 0.1 mcg/kg/min. Hopefully stop tomorrow  2. Will hold lisinopril to allow BP to not be so soft  3. Continue toprol 25 mg daily  4.   Compliance stressed with diet and medications  5.  EP following, plan for ICD after medical therapy, declines life vest  6. CHF nurse following for diet education, fluid restriction and daily weights  7. Consider adding verquvo and/or jardiance at discharge  8. Continue lasix drip at 10 mg/hr. Hopefully stop tomorrow and change to oral torsemide  9. Continue IV venofer 200 mg x 4 doses    meds to bed completed for possible discharge over the weekend. Hopefully home Sunday with follow up next week    Discussed with bedside nurse. She will do bladder scan    Goal weight is 200    NYHA IV    Discussed with patient who is agreeable with plan of care. Thank you for allowing me to participate in the care of your patient.     TRENT Waite - CNS, CNS

## 2022-04-23 LAB
A/G RATIO: 1.3 (ref 1.1–2.2)
ALBUMIN SERPL-MCNC: 3.6 G/DL (ref 3.4–5)
ALP BLD-CCNC: 79 U/L (ref 40–129)
ALT SERPL-CCNC: 31 U/L (ref 10–40)
ANION GAP SERPL CALCULATED.3IONS-SCNC: 9 MMOL/L (ref 3–16)
AST SERPL-CCNC: 28 U/L (ref 15–37)
BASOPHILS ABSOLUTE: 0 K/UL (ref 0–0.2)
BASOPHILS RELATIVE PERCENT: 1 %
BILIRUB SERPL-MCNC: 1.3 MG/DL (ref 0–1)
BUN BLDV-MCNC: 12 MG/DL (ref 7–20)
CALCIUM SERPL-MCNC: 8.9 MG/DL (ref 8.3–10.6)
CHLORIDE BLD-SCNC: 98 MMOL/L (ref 99–110)
CO2: 32 MMOL/L (ref 21–32)
CREAT SERPL-MCNC: 0.8 MG/DL (ref 0.8–1.3)
EOSINOPHILS ABSOLUTE: 0.1 K/UL (ref 0–0.6)
EOSINOPHILS RELATIVE PERCENT: 3.1 %
GFR AFRICAN AMERICAN: >60
GFR NON-AFRICAN AMERICAN: >60
GLUCOSE BLD-MCNC: 99 MG/DL (ref 70–99)
HCT VFR BLD CALC: 33.6 % (ref 40.5–52.5)
HEMOGLOBIN: 11.1 G/DL (ref 13.5–17.5)
LYMPHOCYTES ABSOLUTE: 1 K/UL (ref 1–5.1)
LYMPHOCYTES RELATIVE PERCENT: 24.4 %
MAGNESIUM: 2.2 MG/DL (ref 1.8–2.4)
MCH RBC QN AUTO: 26.6 PG (ref 26–34)
MCHC RBC AUTO-ENTMCNC: 33.1 G/DL (ref 31–36)
MCV RBC AUTO: 80.3 FL (ref 80–100)
MONOCYTES ABSOLUTE: 0.4 K/UL (ref 0–1.3)
MONOCYTES RELATIVE PERCENT: 11.1 %
NEUTROPHILS ABSOLUTE: 2.4 K/UL (ref 1.7–7.7)
NEUTROPHILS RELATIVE PERCENT: 60.4 %
PDW BLD-RTO: 15.1 % (ref 12.4–15.4)
PLATELET # BLD: 248 K/UL (ref 135–450)
PMV BLD AUTO: 7.6 FL (ref 5–10.5)
POTASSIUM REFLEX MAGNESIUM: 3.8 MMOL/L (ref 3.5–5.1)
RBC # BLD: 4.18 M/UL (ref 4.2–5.9)
SODIUM BLD-SCNC: 139 MMOL/L (ref 136–145)
TOTAL PROTEIN: 6.3 G/DL (ref 6.4–8.2)
WBC # BLD: 3.9 K/UL (ref 4–11)

## 2022-04-23 PROCEDURE — 85025 COMPLETE CBC W/AUTO DIFF WBC: CPT

## 2022-04-23 PROCEDURE — 2580000003 HC RX 258: Performed by: INTERNAL MEDICINE

## 2022-04-23 PROCEDURE — 2580000003 HC RX 258: Performed by: CLINICAL NURSE SPECIALIST

## 2022-04-23 PROCEDURE — 6360000002 HC RX W HCPCS: Performed by: CLINICAL NURSE SPECIALIST

## 2022-04-23 PROCEDURE — 99233 SBSQ HOSP IP/OBS HIGH 50: CPT | Performed by: CLINICAL NURSE SPECIALIST

## 2022-04-23 PROCEDURE — 1200000000 HC SEMI PRIVATE

## 2022-04-23 PROCEDURE — 6360000002 HC RX W HCPCS: Performed by: INTERNAL MEDICINE

## 2022-04-23 PROCEDURE — 6370000000 HC RX 637 (ALT 250 FOR IP): Performed by: INTERNAL MEDICINE

## 2022-04-23 PROCEDURE — 6370000000 HC RX 637 (ALT 250 FOR IP): Performed by: NURSE PRACTITIONER

## 2022-04-23 PROCEDURE — 6370000000 HC RX 637 (ALT 250 FOR IP): Performed by: CLINICAL NURSE SPECIALIST

## 2022-04-23 PROCEDURE — 80053 COMPREHEN METABOLIC PANEL: CPT

## 2022-04-23 PROCEDURE — 83735 ASSAY OF MAGNESIUM: CPT

## 2022-04-23 RX ORDER — TORSEMIDE 20 MG/1
40 TABLET ORAL 2 TIMES DAILY
Status: DISCONTINUED | OUTPATIENT
Start: 2022-04-23 | End: 2022-04-26 | Stop reason: HOSPADM

## 2022-04-23 RX ORDER — POTASSIUM CHLORIDE 20 MEQ/1
20 TABLET, EXTENDED RELEASE ORAL ONCE
Status: COMPLETED | OUTPATIENT
Start: 2022-04-23 | End: 2022-04-23

## 2022-04-23 RX ADMIN — AMIODARONE HYDROCHLORIDE 1 MG/MIN: 50 INJECTION, SOLUTION INTRAVENOUS at 20:12

## 2022-04-23 RX ADMIN — SODIUM CHLORIDE, PRESERVATIVE FREE 10 ML: 5 INJECTION INTRAVENOUS at 20:02

## 2022-04-23 RX ADMIN — DEXTROSE MONOHYDRATE 150 MG: 50 INJECTION, SOLUTION INTRAVENOUS at 20:00

## 2022-04-23 RX ADMIN — FUROSEMIDE 10 MG/HR: 10 INJECTION, SOLUTION INTRAMUSCULAR; INTRAVENOUS at 05:53

## 2022-04-23 RX ADMIN — POTASSIUM CHLORIDE 20 MEQ: 20 TABLET, EXTENDED RELEASE ORAL at 19:54

## 2022-04-23 RX ADMIN — METOPROLOL SUCCINATE 25 MG: 25 TABLET, EXTENDED RELEASE ORAL at 08:29

## 2022-04-23 RX ADMIN — SODIUM CHLORIDE, PRESERVATIVE FREE 10 ML: 5 INJECTION INTRAVENOUS at 08:30

## 2022-04-23 RX ADMIN — APIXABAN 5 MG: 5 TABLET, FILM COATED ORAL at 08:29

## 2022-04-23 RX ADMIN — APIXABAN 5 MG: 5 TABLET, FILM COATED ORAL at 19:54

## 2022-04-23 RX ADMIN — FINASTERIDE 5 MG: 5 TABLET, FILM COATED ORAL at 08:29

## 2022-04-23 RX ADMIN — DESMOPRESSIN ACETATE 40 MG: 0.2 TABLET ORAL at 19:54

## 2022-04-23 RX ADMIN — Medication 10 ML: at 08:29

## 2022-04-23 RX ADMIN — SODIUM CHLORIDE, PRESERVATIVE FREE 10 ML: 5 INJECTION INTRAVENOUS at 20:01

## 2022-04-23 RX ADMIN — TAMSULOSIN HYDROCHLORIDE 0.4 MG: 0.4 CAPSULE ORAL at 08:29

## 2022-04-23 RX ADMIN — TORSEMIDE 40 MG: 20 TABLET ORAL at 11:06

## 2022-04-23 RX ADMIN — TORSEMIDE 40 MG: 20 TABLET ORAL at 19:54

## 2022-04-23 ASSESSMENT — PAIN SCALES - GENERAL
PAINLEVEL_OUTOF10: 0

## 2022-04-23 NOTE — FLOWSHEET NOTE
Pt urinated 250 ml. bladderscan 242ml. Pt instructed to urinate again while sitting on toilet and pressing bladder. Pt urinated more.  bladderscan 35 ml.       04/23/22 1817   Urine Assessment   Post Void Cath Residual (mL) 35

## 2022-04-23 NOTE — PLAN OF CARE
Problem: HEMODYNAMIC STATUS  Goal: Patient has stable vital signs and fluid balance  Outcome: Progressing     Problem: ACTIVITY INTOLERANCE/IMPAIRED MOBILITY  Goal: Mobility/activity is maintained at optimum level for patient  Outcome: Progressing     Problem: FLUID AND ELECTROLYTE IMBALANCE  Goal: Fluid and electrolyte balance are achieved/maintained  Outcome: Adequate for Discharge     Problem: Pain  Goal: Verbalizes/displays adequate comfort level or baseline comfort level  Outcome: Adequate for Discharge     Problem: Anxiety  Goal: Will report anxiety at manageable levels  Description: INTERVENTIONS:  1. Administer medication as ordered  2. Teach and rehearse alternative coping skills  3.  Provide emotional support with 1:1 interaction with staff  Outcome: Adequate for Discharge

## 2022-04-23 NOTE — FLOWSHEET NOTE
Pt stated he was having trouble urinating this morning. He urinated 250 ml. He requested that he be bladderscanned.  He had significant post void residual       04/23/22 0802   Urine Assessment   Post Void Cath Residual (mL) 748

## 2022-04-23 NOTE — PROGRESS NOTES
Pt had 52 beat run of Vtach at 1830. VSS, asymptomatic. MT captured on paper; placed in hard chart. Cardiologist on call was perfectserved.  Dr. Mimi Alvarez returned call and asked this nurse to order amiodarone standard protocol (bolus and gtt), 20 meq potassium PO, and check Magnesium (replace if less than 2, with 2g (2000mg) IV magnesium sulfate)

## 2022-04-23 NOTE — PROGRESS NOTES
[facility] PROGRESS NOTE    4/23/2022 12:02 PM        Name: Liat Munguia . Admitted: 4/13/2022  Primary Care Provider: No primary care provider on file. (Tel: None)        Subjective:    I bed doing better swelling is greatly improved no chest pain fevers or chills  Reviewed interval ancillary notes    Current Medications  torsemide (DEMADEX) tablet 40 mg, BID  sodium chloride flush 0.9 % injection 5-40 mL, 2 times per day  sodium chloride flush 0.9 % injection 5-40 mL, PRN  0.9 % sodium chloride infusion, PRN  apixaban (ELIQUIS) tablet 5 mg, BID  metoprolol succinate (TOPROL XL) extended release tablet 25 mg, Daily  atorvastatin (LIPITOR) tablet 40 mg, Nightly  finasteride (PROSCAR) tablet 5 mg, Daily  tamsulosin (FLOMAX) capsule 0.4 mg, Daily  sodium chloride flush 0.9 % injection 5-40 mL, 2 times per day  sodium chloride flush 0.9 % injection 5-40 mL, PRN  0.9 % sodium chloride infusion, PRN  polyethylene glycol (GLYCOLAX) packet 17 g, Daily PRN  acetaminophen (TYLENOL) tablet 650 mg, Q6H PRN   Or  acetaminophen (TYLENOL) suppository 650 mg, Q6H PRN  prochlorperazine (COMPAZINE) tablet 10 mg, Q6H PRN   Or  prochlorperazine (COMPAZINE) 10 mg in sodium chloride (PF) 10 mL injection, Q6H PRN        Objective:  BP (!) 113/58   Pulse 70   Temp 97.6 °F (36.4 °C) (Oral)   Resp 18   Ht 6' 2\" (1.88 m)   Wt 198 lb 8 oz (90 kg)   SpO2 97%   BMI 25.49 kg/m²     Intake/Output Summary (Last 24 hours) at 4/23/2022 1202  Last data filed at 4/23/2022 1039  Gross per 24 hour   Intake 923 ml   Output 4223 ml   Net -3300 ml      Wt Readings from Last 3 Encounters:   04/23/22 198 lb 8 oz (90 kg)   04/12/22 227 lb 9.6 oz (103.2 kg)   03/22/22 220 lb 1.6 oz (99.8 kg)       General appearance:  Appears comfortable.  AAOx3  HEENT: atraumatic, Pupils equal, muscous membranes moist, no masses appreciated  Cardiovascular: Regular rate and rhythm no murmurs appreciated  Respiratory: CTAB no wheezing  Gastrointestinal: Abdomen soft, non-tender, BS+  EXT: trace edema  Neurology: no gross focal deficts  Psychiatry: Appropriate affect. Not agitated  Skin: Warm, dry, no rashes appreciated    Labs and Tests:  CBC:   Recent Labs     04/21/22  0405 04/22/22  0400 04/23/22  0550   WBC 4.2 5.3 3.9*   HGB 10.8* 11.5* 11.1*    306 248     BMP:    Recent Labs     04/21/22  0405 04/22/22  0400 04/23/22  0550    135* 139   K 3.6 3.8 3.8   CL 97* 95* 98*   CO2 32 32 32   BUN 16 16 12   CREATININE 1.0 1.0 0.8   GLUCOSE 82 85 99     Hepatic:   Recent Labs     04/21/22  0405 04/22/22  0400 04/23/22  0550   AST 26 32 28   ALT 31 37 31   BILITOT 1.0 1.3* 1.3*   ALKPHOS 76 92 79     XR CHEST PORTABLE   Final Result   Status post left PICC line placement in good position. Stable cardiomegaly with minimal central pulmonary congestion or pulmonary   artery hypertension which is unchanged. Minimal discoid atelectasis or scarring along the lung bases which is more   prominent         XR PELVIS (1-2 VIEWS)   Final Result   No acute abnormality and no foreign body evident. XR CHEST PORTABLE   Final Result   Cardiomegaly with no acute pulmonary finding. Recent imaging reviewed    Problem List  Principal Problem:    Acute systolic heart failure (HCC)  Active Problems:    Complicated UTI (urinary tract infection)    Chronic atrial fibrillation (HCC)    Severe mitral regurgitation    Acute on chronic congestive heart failure ([address]  Resolved Problems:    * No resolved hospital problems.  *     Assessment/Plan:   Acute On chronic systolic heart failure with EF of 25% and moderate MR  -stop milrinone and lasix gtt, start torsemide 40mg bid  -continuue toprol lsinipril on hold for bp       Elevated lft likely secondary to above  improved     PAF with left atrial appendage clost 2/2022  - eliquis  - nika     Bph: flomax, having retention will straight cath today and monitor     UTI: s/p atbx  DVT prophylaxis eliquis  Code status full code       Mattie Collins MD   4/23/2022 12:02 PM

## 2022-04-23 NOTE — PROGRESS NOTES
[address]   Daily Progress Note      Admit Date:  4/13/2022    HPI:    Mr. Shayla Hurtado is a 59 y.o. male with history of with history of PAF, hypertension.  Unvaccinated, , homeless, HENOK thrombus and AF    He is admitted with fluid overload, non compliance with medications (not taking toprol or lisinopril at home and taking eliquis once a day along with several baby aspirins). He had recent indwelling ernst for urinary retention and had balloon rupture causing ernst to fall out. Recent admission 2/5-10 for same issues. LVEF 20% normal cors, severe MR, HENOK thrombus  Anemic and started on IV venofer  Milrinone started 4/18/22    Subjective:  Patient is being seen for acute on chronic systolic heart failure. There were no acute overnight cardiac events. Weight [phone] probnp 29P- and -8.7 L out  Creat 0.8 potassium 3.8  He is sleeping soundly. Diuresed nicely, breathing much improved along with  leg edema        Objective:   BP (!) 113/58   Pulse 70   Temp 97.6 °F (36.4 °C) (Oral)   Resp 18   Ht 6' 2\" (1.88 m)   Wt 198 lb 8 oz (90 kg)   SpO2 97%   BMI 25.49 kg/m²       Intake/Output Summary (Last 24 hours) at 4/23/2022 1039  Last data filed at 4/23/2022 0802  Gross per 24 hour   Intake 683 ml   Output 4498 ml   Net -3815 ml          Physical Exam:  General:  Awake, alert, oriented in NAD  Skin:  Warm and dry. No unusual bruising or rash  Neck:  Supple. No JVD or carotid bruit appreciated  Chest:  Normal effort.   Clear bilaterally, no rhonchi, rales or wheezing  Cardiovascular:  RRR, S1/S2, no murmur/gallop/rub  Abdomen:  Soft, nontender, +bowel sounds  Extremities:  Bilateral lower ankles edema  Neurological: No focal deficits  Psychological: Normal mood and affect      Medications:    sodium chloride flush  5-40 mL IntraVENous 2 times per day    apixaban  5 mg Oral BID    metoprolol succinate  25 mg Oral Daily    atorvastatin  40 mg Oral Nightly    finasteride  5 mg Oral Daily    tamsulosin  0.4 mg Oral Daily    sodium chloride flush  5-40 mL IntraVENous 2 times per day      milrinone 0.1006 mcg/kg/min (04/22/22 1930)    sodium chloride      furosemide (LASIX) 1mg/ml infusion 10 mg/hr (04/23/22 0553)    sodium chloride         Lab Data:  CBC:   Recent Labs     04/21/22  0405 04/22/22  0400 04/23/22  0550   WBC 4.2 5.3 3.9*   HGB 10.8* 11.5* 11.1*    306 248     BMP:    Recent Labs     04/21/22  0405 04/22/22  0400 04/23/22  0550    135* 139   K 3.6 3.8 3.8   CO2 32 32 32   BUN 16 16 12   CREATININE 1.0 1.0 0.8     INR:  No results for input(s): INR in the last 72 hours. BNP:    Recent Labs     04/21/22  0405   PROBNP 5,842*         Diagnostics:  CONSUELO Dr Nallely Nieto 2/10/22  Preliminary CONSUELO results are:      - Severe LV dysfunction,  EF: 20-25%  - LA dilated. There was HENOK thrombus. - Moderate MR     Cardiac Cath PCI: Dr Jesus Manuel Edmond 2/8/2022  Anatomy:   LM-nml   LAD-nml  Cx-nml  OM- nml  RCA-nml  RPDA- nml  LVEF- 10%  LVG- global hypokinesis  LVEDP- 10     Hemodynamics:  RA- mean 3  RV- 37/0  PAWP- 10  PA- 34/12 (20)     C.O.- 5.7 (4.9)  C. I.- 2.6 (2.25)  PA sat 60%  AO sat 91%     Contrast: 70  Flouro Time: 5.3  Access: R radial a, R CFV     Impression  ~Coronary Angiography w/ normal cors  ~LVG with LVEF of 10% and global regional wall motion abnormalities  ~Severe MR  ~Normal CO/CI  ~normal L and R filling pressures     Recommendation  ~Aggressive medical treatment and risk factor modification  ~1. Medications reviewed, no changes at this time. 2. Post cath IVF. Bedrest.  3.Consider CONSUELO for evaluation of MR.   4. Patient has been advised on the importance of regular exercise of at least 20-30 minutes daily alternating with aerobic and isometric activities. 5. Patient counseled about and offered assistance for smoking cessation   6.  No indication for cardiac rehab  7. CHF service to evaluate tomorrow.     Echo 11/29/2021  Summary   -Covid+   -Severely reduced global systolic function with an ejection fraction   estimated at 20%.  -Severe global hypokinesis with regional variations noted.   -Right ventricular systolic function appears to be mildly reduced based on   visual inspection.   -Severe biatrial enlargement.   -Thickened mitral valve without evidence of stenosis. There is   mild-to-moderate mitral regurgitation.   -There is mild-to-moderate tricuspid regurgitation with a RVSP estimation of   99 mmHg.   -Diastolic dysfunction with elevated LV filling pressures.     Echo 7/25/2019  Summary   -Overall left ventricular systolic function is moderately depressed .   -Ejection fraction is visually estimated to be 30-35 %.  -Global left ventricular function moderately reduced.   -Indeterminate diastolic function due to atrial fibrillation and moderate to   severe mitral regurgitation.   -Moderate-to-severe mitral regurgitation .   -Dilated left atrium with a volume of 97 ml.   -Left atrial volume is increased probably due to atrial fibrillation .   -There is mild right ventricular hypertrophy.   -The right ventricle is mildly enlarged.   -Right ventricular systolic pressure of 53 mm Hg consistent with pulmonary   hypertension.   -Moderate to severe tricuspid regurgitation. TAPSE = 1.43   -IVC size is dilated (>2.1 cm) but collapses > 50% with respiration   consistent with elevated RA pressure (8 mmHg). Assessment:    1. Acute on chronic systolic heart failure, on ace, bb; no aldosterone antagonist due to non compliance  2. Complicated UTI  3. Chronic atrial fib and HENOK thrombus, on eliquis  4. Non compliance  5. Severe MR  6. Homeless   7. NSVT  8. Iron deficiency anemia    Plan:    1. Will stop IV milrinone and lasix drip  2. Will hold lisinopril as BP soft. May consider starting 2.5 mg tomorrow  3. Continue toprol 25 mg daily  4.   Compliance stressed with diet and medications  5.  EP following, plan for ICD after medical therapy, declines life vest  6. CHF nurse following for diet education, fluid restriction and daily weights  7. Consider adding verquvo and/or jardiance at discharge  8. Start torsemide 40 mg twice a day   9. Continue IV venofer 200 mg x 4 doses    meds to bed completed for possible discharge over the weekend. Hopefully home Sunday with follow up next week. Beside nurse to pack his medisets prior to discharge    Discussed with bedside nurse. She will do bladder scan    Goal weight is 200    NYHA IV    Discussed with patient who is agreeable with plan of care. Thank you for allowing me to participate in the care of your patient.     Katelin Alston, APRN - CNS, CNS

## 2022-04-24 LAB
A/G RATIO: 1.4 (ref 1.1–2.2)
ALBUMIN SERPL-MCNC: 3.5 G/DL (ref 3.4–5)
ALP BLD-CCNC: 84 U/L (ref 40–129)
ALT SERPL-CCNC: 35 U/L (ref 10–40)
ANION GAP SERPL CALCULATED.3IONS-SCNC: 8 MMOL/L (ref 3–16)
AST SERPL-CCNC: 33 U/L (ref 15–37)
BASOPHILS ABSOLUTE: 0 K/UL (ref 0–0.2)
BASOPHILS RELATIVE PERCENT: 0.9 %
BILIRUB SERPL-MCNC: 1 MG/DL (ref 0–1)
BUN BLDV-MCNC: 18 MG/DL (ref 7–20)
CALCIUM SERPL-MCNC: 8.5 MG/DL (ref 8.3–10.6)
CHLORIDE BLD-SCNC: 96 MMOL/L (ref 99–110)
CO2: 31 MMOL/L (ref 21–32)
CREAT SERPL-MCNC: 1.1 MG/DL (ref 0.8–1.3)
EOSINOPHILS ABSOLUTE: 0 K/UL (ref 0–0.6)
EOSINOPHILS RELATIVE PERCENT: 0.8 %
GFR AFRICAN AMERICAN: >60
GFR NON-AFRICAN AMERICAN: >60
GLUCOSE BLD-MCNC: 94 MG/DL (ref 70–99)
HCT VFR BLD CALC: 33 % (ref 40.5–52.5)
HEMOGLOBIN: 11 G/DL (ref 13.5–17.5)
LYMPHOCYTES ABSOLUTE: 0.9 K/UL (ref 1–5.1)
LYMPHOCYTES RELATIVE PERCENT: 18.3 %
MCH RBC QN AUTO: 26.7 PG (ref 26–34)
MCHC RBC AUTO-ENTMCNC: 33.2 G/DL (ref 31–36)
MCV RBC AUTO: 80.4 FL (ref 80–100)
MONOCYTES ABSOLUTE: 0.4 K/UL (ref 0–1.3)
MONOCYTES RELATIVE PERCENT: 7.7 %
NEUTROPHILS ABSOLUTE: 3.4 K/UL (ref 1.7–7.7)
NEUTROPHILS RELATIVE PERCENT: 72.3 %
PDW BLD-RTO: 14.9 % (ref 12.4–15.4)
PLATELET # BLD: 251 K/UL (ref 135–450)
PMV BLD AUTO: 7.5 FL (ref 5–10.5)
POTASSIUM REFLEX MAGNESIUM: 4.2 MMOL/L (ref 3.5–5.1)
RBC # BLD: 4.11 M/UL (ref 4.2–5.9)
SODIUM BLD-SCNC: 135 MMOL/L (ref 136–145)
TOTAL PROTEIN: 6 G/DL (ref 6.4–8.2)
WBC # BLD: 4.7 K/UL (ref 4–11)

## 2022-04-24 PROCEDURE — 6370000000 HC RX 637 (ALT 250 FOR IP): Performed by: NURSE PRACTITIONER

## 2022-04-24 PROCEDURE — 85025 COMPLETE CBC W/AUTO DIFF WBC: CPT

## 2022-04-24 PROCEDURE — 6370000000 HC RX 637 (ALT 250 FOR IP): Performed by: CLINICAL NURSE SPECIALIST

## 2022-04-24 PROCEDURE — 80053 COMPREHEN METABOLIC PANEL: CPT

## 2022-04-24 PROCEDURE — 6370000000 HC RX 637 (ALT 250 FOR IP): Performed by: INTERNAL MEDICINE

## 2022-04-24 PROCEDURE — 2580000003 HC RX 258: Performed by: INTERNAL MEDICINE

## 2022-04-24 PROCEDURE — 99233 SBSQ HOSP IP/OBS HIGH 50: CPT | Performed by: INTERNAL MEDICINE

## 2022-04-24 PROCEDURE — 1200000000 HC SEMI PRIVATE

## 2022-04-24 RX ADMIN — APIXABAN 5 MG: 5 TABLET, FILM COATED ORAL at 10:29

## 2022-04-24 RX ADMIN — APIXABAN 5 MG: 5 TABLET, FILM COATED ORAL at 20:31

## 2022-04-24 RX ADMIN — SODIUM CHLORIDE, PRESERVATIVE FREE 10 ML: 5 INJECTION INTRAVENOUS at 08:15

## 2022-04-24 RX ADMIN — FINASTERIDE 5 MG: 5 TABLET, FILM COATED ORAL at 08:15

## 2022-04-24 RX ADMIN — METOPROLOL SUCCINATE 25 MG: 25 TABLET, EXTENDED RELEASE ORAL at 08:15

## 2022-04-24 RX ADMIN — TAMSULOSIN HYDROCHLORIDE 0.4 MG: 0.4 CAPSULE ORAL at 08:14

## 2022-04-24 RX ADMIN — TORSEMIDE 40 MG: 20 TABLET ORAL at 08:15

## 2022-04-24 RX ADMIN — DESMOPRESSIN ACETATE 40 MG: 0.2 TABLET ORAL at 20:31

## 2022-04-24 RX ADMIN — Medication 10 ML: at 08:15

## 2022-04-24 RX ADMIN — TORSEMIDE 40 MG: 20 TABLET ORAL at 20:31

## 2022-04-24 ASSESSMENT — PAIN SCALES - GENERAL
PAINLEVEL_OUTOF10: 0

## 2022-04-24 NOTE — PROGRESS NOTES
[facility]   Electrophysiology Progress Note     Admit Date: 4/13/2022     Reason for follow up: PVCs, NSVT     HPI and Interval History: 59 y.o. male presented with history of NICMP, persistent atrial fibrillation, HFrEF, HENOK Thrombus, Moderate to severe MR, severe LV dysfunction  Declined lifeVest.    Homeless and appears to be non-compliant with medical therapy. Presented with SOB and LE swelling and urinary complaint. Patient seen and examined. Clinical notes reviewed. Telemetry reviewed. No new complaint today. No major events overnight. Denies having chest pain, shortness of breath, dyspnea on exertion, Orthopnea, PND at the time of this visit. Patient had a long episode of VT last night. This was almost 30 seconds. Active Hospital Problems    Diagnosis Date Noted    Acute on chronic congestive heart failure (HCC) [I97.3]     Complicated UTI (urinary tract infection) [N39.0]     Chronic atrial fibrillation (HCC) [I48.20]     Severe mitral regurgitation [I34.0]     Acute systolic heart failure ([address] [I50.21] 04/13/2022       Diagnostic studies:     ECG: Atrial fibrillation, PVC  Last ECG in sinus rhythm, 7/26/2019      Echo: 2/10/2022:    Left ventricular size is moderately increased. Overall left ventricular   systolic function appears severely reduced with ejection fraction of 20-25%   and severe diffuse hypokinesis.      Moderate mitral regurgitation. Calculated ERO of 0.33 cm2.      Left atrial size appears dilated. There is an echodensity inside the left   atrial appendage consistent with left atrial appendage thrombus.      The right ventricle is enlarged. Right ventricular systolic function is   mildly reduced .      Mild to moderate tricuspid regurgitation. I independently reviewed the cardiac diagnostic studies, ECG and relevant imaging studies.      Physical Examination:  Vitals:    04/24/22 0745   BP: 108/69   Pulse: 70   Resp: 18   Temp: 97.7 °F (36.5 °C) SpO2: 99%      In: 1320 [P.O.:1320]  Out: 3078    Wt Readings from Last 3 Encounters:   22 197 lb 4.8 oz (89.5 kg)   22 227 lb 9.6 oz (103.2 kg)   22 220 lb 1.6 oz (99.8 kg)     Temp  Av.8 °F (36.6 °C)  Min: 97.4 °F (36.3 °C)  Max: 98.2 °F (36.8 °C)  Pulse  Av.1  Min: 48  Max: 76  BP  Min: 84/62  Max: 128/100  SpO2  Av.6 %  Min: 92 %  Max: 100 %    Intake/Output Summary (Last 24 hours) at 2022 1048  Last data filed at 2022 0841  Gross per 24 hour   Intake 1080 ml   Output 2080 ml   Net -1000 ml       I independently reviewed all cardiac tracing from cardiac telemetry. Ventricular tachycardia, 26 seconds  · · Constitutional: Oriented. No distress. · · Head: Normocephalic and atraumatic. · · Mouth/Throat: Oropharynx is clear and moist.   · · Eyes: Conjunctivae normal. EOM are normal.   · · Neck: Neck supple. No JVD present. · · Cardiovascular: Normal rate, regular rhythm, B8&K4.  ++ systolic murmur   · · Pulmonary/Chest: Bilateral respiratory sounds. No rhonchi. · · Abdominal: Soft. No tenderness. · · Musculoskeletal: No tenderness. ++ edema    · · Lymphadenopathy: Has no cervical adenopathy. · · Neurological: Alert and oriented. Follows command, No Gross deficit   · · Skin: Skin is warm, No rash noted.    · · Psychiatric: Has a normal behavior     Scheduled Meds:   torsemide  40 mg Oral BID    sodium chloride flush  5-40 mL IntraVENous 2 times per day    apixaban  5 mg Oral BID    metoprolol succinate  25 mg Oral Daily    atorvastatin  40 mg Oral Nightly    finasteride  5 mg Oral Daily    tamsulosin  0.4 mg Oral Daily    sodium chloride flush  5-40 mL IntraVENous 2 times per day     Continuous Infusions:   amiodarone      sodium chloride      sodium chloride       PRN Meds:sodium chloride flush, sodium chloride, sodium chloride flush, sodium chloride, polyethylene glycol, acetaminophen **OR** acetaminophen, prochlorperazine **OR** prochlorperazine (COMPAZINE) with normal saline injection     Prior to Admission medications    Medication Sig Start Date End Date Taking? Authorizing Provider   torsemide (DEMADEX) 20 MG tablet Take 1 tablet by mouth daily 4/12/22   TRENT Leach - CNS   lisinopril (PRINIVIL;ZESTRIL) 5 MG tablet Take 1 tablet by mouth daily 4/12/22   Darren Warner APRN - CNS   metoprolol succinate (TOPROL XL) 50 MG extended release tablet Take 1 tablet by mouth daily 4/12/22   TRENT Leach - CNS   potassium chloride (KLOR-CON M) 20 MEQ extended release tablet Take 2 tablets by mouth daily for 1 day 3/23/22 4/12/22  TRENT Ocampo - CNP   metOLazone (ZAROXOLYN) 5 MG tablet Take 1 tablet by mouth once for 1 dose 3/22/22 4/12/22  TRENT Alaniz - CNS   vitamin D (CHOLECALCIFEROL) 50 MCG (2000 UT) TABS tablet Take 1 tablet by mouth daily 2/16/22   TRENT Leach - CNS   aspirin 81 MG chewable tablet Take 1 tablet by mouth daily 2/9/22   Sheridan Ramey MD   atorvastatin (LIPITOR) 40 MG tablet Take 1 tablet by mouth nightly 2/9/22   Sheridan Ramey MD   finasteride (PROSCAR) 5 MG tablet Take 1 tablet by mouth daily 2/9/22   Sheridan Ramey MD   tamsulosin (FLOMAX) 0.4 MG capsule Take 1 capsule by mouth daily 2/9/22   Sheridan Ramey MD   apixaban (ELIQUIS) 5 MG TABS tablet Take 1 tablet by mouth 2 times daily  Patient taking differently: Take 5 mg by mouth Patient stated that he has started to take 1 time a day instead of 2. Patient stated that taking 2 a day was causing nosebleeds. 2/9/22   Sheridan Ramey MD       Review of System:  [x] Full ROS obtained and negative except as mentioned in HPI    Relevant and available labs, and cardiovascular diagnostics reviewed. Reviewed.    Recent Labs     04/22/22  0400 04/23/22  0550 04/24/22  0545   * 139 135*   K 3.8 3.8 4.2   CL 95* 98* 96*   CO2 32 32 31   BUN 16 12 18   CREATININE 1.0 0.8 1.1     Recent Labs     04/22/22  0400 04/23/22  0550 04/24/22  0545   WBC 5.3 3.9* 4.7   HGB 11.5* 11.1* 11.0*   HCT 36.1* 33.6* 33.0*   MCV 80.6 80.3 80.4    248 251     Estimated Creatinine Clearance: 80 mL/min (based on SCr of 1.1 mg/dL). No results found for: BNP   Assessment:     Plan:   -Ventricular tachycardia, NSVT    Patient has been having episodes of nonsustained VT and had a very long episode that was almost 30 seconds. I think given that the patient has had longstanding cardiomyopathy for several years on medical therapy, he requires ICD as primary and I would even consider as secondary prevention given the long VT episode. Given that the patient has very low heart rate, he will need biventricular device to allow for pacing   more physiologically since it is expected that he will have significant amount of pacing.      - Acute on chronic HFrEF    Patient is more compensated. Continue medical therapy.        -Nonischemic cardiomyopathy    Continue medical therapy. He has been informed about ICD in the past.  Given the long episode of VT that he had, I think we should proceed with the ICD on this admission. We will consider biventricular device due to expected high percentage of pacing.      -Moderate to severe mitral regurgitation  Continue medical therapy      -Persistent atrial fibrillation    Continue anticoagulation.   Rate is controlled.      -Left atrial appendage thrombus  Continue anticoagulation

## 2022-04-24 NOTE — PLAN OF CARE
Problem: HEMODYNAMIC STATUS  Goal: Patient has stable vital signs and fluid balance  Outcome: Progressing     Problem: FLUID AND ELECTROLYTE IMBALANCE  Goal: Fluid and electrolyte balance are achieved/maintained  Outcome: Progressing     Problem: Pain  Goal: Verbalizes/displays adequate comfort level or baseline comfort level  Outcome: Adequate for Discharge     Problem: Anxiety  Goal: Will report anxiety at manageable levels  Description: INTERVENTIONS:  1. Administer medication as ordered  2. Teach and rehearse alternative coping skills  3.  Provide emotional support with 1:1 interaction with staff  Outcome: Adequate for Discharge

## 2022-04-24 NOTE — PROGRESS NOTES
[facility] PROGRESS NOTE    4/24/2022 11:38 AM        Name: Willian Moss . Admitted: 4/13/2022  Primary Care Provider: No primary care provider on file. (Tel: None)        Subjective:     With episode nonsustained V. tach overnight up to 52 beats felt lightheaded and dizzy with these episodes no chest pain or shortness of breath  Reviewed interval ancillary notes    Current Medications  torsemide (DEMADEX) tablet 40 mg, BID  amiodarone (CORDARONE) 450 mg in dextrose 5 % 250 mL infusion, Continuous  sodium chloride flush 0.9 % injection 5-40 mL, 2 times per day  sodium chloride flush 0.9 % injection 5-40 mL, PRN  0.9 % sodium chloride infusion, PRN  apixaban (ELIQUIS) tablet 5 mg, BID  metoprolol succinate (TOPROL XL) extended release tablet 25 mg, Daily  atorvastatin (LIPITOR) tablet 40 mg, Nightly  finasteride (PROSCAR) tablet 5 mg, Daily  tamsulosin (FLOMAX) capsule 0.4 mg, Daily  sodium chloride flush 0.9 % injection 5-40 mL, 2 times per day  sodium chloride flush 0.9 % injection 5-40 mL, PRN  0.9 % sodium chloride infusion, PRN  polyethylene glycol (GLYCOLAX) packet 17 g, Daily PRN  acetaminophen (TYLENOL) tablet 650 mg, Q6H PRN   Or  acetaminophen (TYLENOL) suppository 650 mg, Q6H PRN  prochlorperazine (COMPAZINE) tablet 10 mg, Q6H PRN   Or  prochlorperazine (COMPAZINE) 10 mg in sodium chloride (PF) 10 mL injection, Q6H PRN        Objective:  /69   Pulse 70   Temp 97.7 °F (36.5 °C) (Oral)   Resp 18   Ht 6' 2\" (1.88 m)   Wt 197 lb 4.8 oz (89.5 kg)   SpO2 99%   BMI 25.33 kg/m²     Intake/Output Summary (Last 24 hours) at 4/24/2022 1138  Last data filed at 4/24/2022 0841  Gross per 24 hour   Intake 1080 ml   Output 1780 ml   Net -700 ml      Wt Readings from Last 3 Encounters:   04/24/22 197 lb 4.8 oz (89.5 kg)   04/12/22 227 lb 9.6 oz (103.2 kg)   03/22/22 220 lb 1.6 oz (99.8 kg)       General appearance:  Appears comfortable. AAOx3  HEENT: atraumatic, Pupils equal, muscous membranes moist, no masses appreciated  Cardiovascular: Regular rate and rhythm no murmurs appreciated  Respiratory: CTAB no wheezing  Gastrointestinal: Abdomen soft, non-tender, BS+  EXT: trace edema  Neurology: no gross focal deficts  Psychiatry: Appropriate affect. Not agitated  Skin: Warm, dry, no rashes appreciated    Labs and Tests:  CBC:   Recent Labs     04/22/22  0400 04/23/22  0550 04/24/22  0545   WBC 5.3 3.9* 4.7   HGB 11.5* 11.1* 11.0*    248 251     BMP:    Recent Labs     04/22/22  0400 04/23/22  0550 04/24/22  0545   * 139 135*   K 3.8 3.8 4.2   CL 95* 98* 96*   CO2 32 32 31   BUN 16 12 18   CREATININE 1.0 0.8 1.1   GLUCOSE 85 99 94     Hepatic:   Recent Labs     04/22/22  0400 04/23/22  0550 04/24/22  0545   AST 32 28 33   ALT 37 31 35   BILITOT 1.3* 1.3* 1.0   ALKPHOS 92 79 84     XR CHEST PORTABLE   Final Result   Status post left PICC line placement in good position. Stable cardiomegaly with minimal central pulmonary congestion or pulmonary   artery hypertension which is unchanged. Minimal discoid atelectasis or scarring along the lung bases which is more   prominent         XR PELVIS (1-2 VIEWS)   Final Result   No acute abnormality and no foreign body evident. XR CHEST PORTABLE   Final Result   Cardiomegaly with no acute pulmonary finding. Recent imaging reviewed    Problem List  Principal Problem:    Acute systolic heart failure (HCC)  Active Problems:    VT (ventricular tachycardia) (HCC)    Complicated UTI (urinary tract infection)    Chronic atrial fibrillation (HCC)    Severe mitral regurgitation    Acute on chronic congestive heart failure ([address]  Resolved Problems:    * No resolved hospital problems.  *     Assessment/Plan:   Acute On chronic systolic heart failure with EF of 25% and moderate MR   torsemide 40mg bid  -continuue toprol lsinipril on hold for bp    Nonsustained vt: discussed with cards icd in am     Elevated lft likely secondary to above  improved     PAF with left atrial appendage clost 2/2022  - eliquis  - nika     Bph: flomax, having retention will straight cath today and monitor     UTI: s/p atbx  DVT prophylaxis eliquis  Code status full code       Tamiko Newton MD   4/24/2022 11:38 AM

## 2022-04-24 NOTE — PROGRESS NOTES
Message was sent to hospitalist group in regards to pt being dizzy and soft b/p and Giacomo Fleischer responded with just to monitor the pt for now

## 2022-04-24 NOTE — PROGRESS NOTES
Pt called out stating he was feeling dizzy.  This RN came to the room and stopped amiodarone gtt that was infusing

## 2022-04-24 NOTE — FLOWSHEET NOTE
Pt voided 350. Bladder scan 295. Pt instructed to sit on toilet and press on bladder to empty it more .  Bladder scan post 2nd void 145.       04/24/22 1624   Urine Assessment   Post Void Cath Residual (mL) 145

## 2022-04-25 ENCOUNTER — APPOINTMENT (OUTPATIENT)
Dept: GENERAL RADIOLOGY | Age: 62
DRG: 179 | End: 2022-04-25
Payer: COMMERCIAL

## 2022-04-25 PROCEDURE — C1894 INTRO/SHEATH, NON-LASER: HCPCS

## 2022-04-25 PROCEDURE — 6370000000 HC RX 637 (ALT 250 FOR IP): Performed by: INTERNAL MEDICINE

## 2022-04-25 PROCEDURE — 33249 INSJ/RPLCMT DEFIB W/LEAD(S): CPT | Performed by: INTERNAL MEDICINE

## 2022-04-25 PROCEDURE — 99233 SBSQ HOSP IP/OBS HIGH 50: CPT | Performed by: NURSE PRACTITIONER

## 2022-04-25 PROCEDURE — C1882 AICD, OTHER THAN SING/DUAL: HCPCS

## 2022-04-25 PROCEDURE — 71045 X-RAY EXAM CHEST 1 VIEW: CPT

## 2022-04-25 PROCEDURE — C1898 LEAD, PMKR, OTHER THAN TRANS: HCPCS

## 2022-04-25 PROCEDURE — 2580000003 HC RX 258

## 2022-04-25 PROCEDURE — C1900 LEAD, CORONARY VENOUS: HCPCS

## 2022-04-25 PROCEDURE — 99152 MOD SED SAME PHYS/QHP 5/>YRS: CPT

## 2022-04-25 PROCEDURE — 6370000000 HC RX 637 (ALT 250 FOR IP): Performed by: NURSE PRACTITIONER

## 2022-04-25 PROCEDURE — 02H63KZ INSERTION OF DEFIBRILLATOR LEAD INTO RIGHT ATRIUM, PERCUTANEOUS APPROACH: ICD-10-PCS | Performed by: INTERNAL MEDICINE

## 2022-04-25 PROCEDURE — 33286 RMVL SUBQ CAR RHYTHM MNTR: CPT

## 2022-04-25 PROCEDURE — 6370000000 HC RX 637 (ALT 250 FOR IP): Performed by: CLINICAL NURSE SPECIALIST

## 2022-04-25 PROCEDURE — 6360000002 HC RX W HCPCS

## 2022-04-25 PROCEDURE — 33225 L VENTRIC PACING LEAD ADD-ON: CPT

## 2022-04-25 PROCEDURE — C1777 LEAD, AICD, ENDO SINGLE COIL: HCPCS

## 2022-04-25 PROCEDURE — C1887 CATHETER, GUIDING: HCPCS

## 2022-04-25 PROCEDURE — 1200000000 HC SEMI PRIVATE

## 2022-04-25 PROCEDURE — C1892 INTRO/SHEATH,FIXED,PEEL-AWAY: HCPCS

## 2022-04-25 PROCEDURE — 02HK3KZ INSERTION OF DEFIBRILLATOR LEAD INTO RIGHT VENTRICLE, PERCUTANEOUS APPROACH: ICD-10-PCS | Performed by: INTERNAL MEDICINE

## 2022-04-25 PROCEDURE — C1889 IMPLANT/INSERT DEVICE, NOC: HCPCS

## 2022-04-25 PROCEDURE — 33249 INSJ/RPLCMT DEFIB W/LEAD(S): CPT

## 2022-04-25 PROCEDURE — 0JH609Z INSERTION OF CARDIAC RESYNCHRONIZATION DEFIBRILLATOR PULSE GENERATOR INTO CHEST SUBCUTANEOUS TISSUE AND FASCIA, OPEN APPROACH: ICD-10-PCS | Performed by: INTERNAL MEDICINE

## 2022-04-25 PROCEDURE — 99152 MOD SED SAME PHYS/QHP 5/>YRS: CPT | Performed by: INTERNAL MEDICINE

## 2022-04-25 PROCEDURE — C1769 GUIDE WIRE: HCPCS

## 2022-04-25 PROCEDURE — 2580000003 HC RX 258: Performed by: INTERNAL MEDICINE

## 2022-04-25 PROCEDURE — 99153 MOD SED SAME PHYS/QHP EA: CPT

## 2022-04-25 PROCEDURE — 02HL3KZ INSERTION OF DEFIBRILLATOR LEAD INTO LEFT VENTRICLE, PERCUTANEOUS APPROACH: ICD-10-PCS | Performed by: INTERNAL MEDICINE

## 2022-04-25 PROCEDURE — 33225 L VENTRIC PACING LEAD ADD-ON: CPT | Performed by: INTERNAL MEDICINE

## 2022-04-25 RX ORDER — SODIUM CHLORIDE 0.9 % (FLUSH) 0.9 %
5-40 SYRINGE (ML) INJECTION PRN
Status: DISCONTINUED | OUTPATIENT
Start: 2022-04-25 | End: 2022-04-26 | Stop reason: HOSPADM

## 2022-04-25 RX ORDER — SODIUM CHLORIDE 9 MG/ML
INJECTION, SOLUTION INTRAVENOUS PRN
Status: DISCONTINUED | OUTPATIENT
Start: 2022-04-25 | End: 2022-04-26 | Stop reason: HOSPADM

## 2022-04-25 RX ORDER — SODIUM CHLORIDE 0.9 % (FLUSH) 0.9 %
5-40 SYRINGE (ML) INJECTION EVERY 12 HOURS SCHEDULED
Status: DISCONTINUED | OUTPATIENT
Start: 2022-04-25 | End: 2022-04-26 | Stop reason: HOSPADM

## 2022-04-25 RX ORDER — OXYCODONE HYDROCHLORIDE 5 MG/1
5 TABLET ORAL EVERY 4 HOURS PRN
Status: DISCONTINUED | OUTPATIENT
Start: 2022-04-25 | End: 2022-04-26 | Stop reason: HOSPADM

## 2022-04-25 RX ORDER — OXYCODONE HYDROCHLORIDE 5 MG/1
10 TABLET ORAL EVERY 4 HOURS PRN
Status: DISCONTINUED | OUTPATIENT
Start: 2022-04-25 | End: 2022-04-26 | Stop reason: HOSPADM

## 2022-04-25 RX ADMIN — TORSEMIDE 40 MG: 20 TABLET ORAL at 21:11

## 2022-04-25 RX ADMIN — TAMSULOSIN HYDROCHLORIDE 0.4 MG: 0.4 CAPSULE ORAL at 08:17

## 2022-04-25 RX ADMIN — SODIUM CHLORIDE, PRESERVATIVE FREE 10 ML: 5 INJECTION INTRAVENOUS at 08:19

## 2022-04-25 RX ADMIN — Medication 10 ML: at 08:19

## 2022-04-25 RX ADMIN — FINASTERIDE 5 MG: 5 TABLET, FILM COATED ORAL at 08:17

## 2022-04-25 RX ADMIN — APIXABAN 5 MG: 5 TABLET, FILM COATED ORAL at 21:11

## 2022-04-25 RX ADMIN — DESMOPRESSIN ACETATE 40 MG: 0.2 TABLET ORAL at 21:11

## 2022-04-25 ASSESSMENT — PAIN SCALES - GENERAL
PAINLEVEL_OUTOF10: 0

## 2022-04-25 NOTE — PROGRESS NOTES
Pt return from cath to room 3320 at 1800. TWO incisions CDI where BVICD implanted and loop recorder extracted. Bedrest complete 1730 per order  \"Bedrest x 1 hours then ambulate only if there is no evidence of failure to sense, capture or pace\"  patient aware, denies questions. Report from nurse Kait Narayan. Telemetry verified. Will continue to monitor.

## 2022-04-25 NOTE — PROGRESS NOTES
[facility]   Electrophysiology Progress Note   Date: 4/25/2022  Admit Date: 4/13/2022     Reason for follow up: NSVT, CMP    Chief Complaint:   Chief Complaint   Patient presents with    Shortness of Breath     pt c/o shortness of breath x couple of days. History of Present Illness: History obtained from patient and medical record. Angie Ga is a 59 y.o. male with a past medical history of hypertension, sCHF, NICM with EF 20-25%, persistent atrial fibrillation. Seen by EP 2/2022 and CONSUELO was performed which showed moderate M, HENOK thrombus, and LV dysfunction 20-25%. Neymar Bautista was discussed and he declined at that time. Started on GDMT at that time. He has ILR after DCCV 7/26/2019 Dr. Ayad Casarez. Interval Hx: Today, he is being seen for follow up. AF and NSVT on telemery. No new complaints today. No major events overnight. Denies having chest pain, palpitations, shortness of breath, orthopnea or dizziness. Patient seen and examined. Clinical notes reviewed. Telemetry reviewed. Assessment and Plan:  Ventricular tachycardia/NSVT   - NSVT nearly 30 seconds in duration this admisssion   - Continue Toprol, may need to consider AAD therapy if frequent or recurrent   - Asymptomatic with it   - Likely secondary to NICM with EF 20-25%   - Plans for AICD later today     Acute on Chronic HFrEF   - Appears compensated   - Continue GDMT   . Heart failure education:   - Salt restriction < 2 grams/day  - Fluid restriction < 2 lit/day  - Exercise 30 min, 5 times/week  - Alcohol abstinence   - Avoiding over the counter NSAIDs ( e.g. Advil)  - Medication compliance   - Weight loss and heart health diet  - Daily weight   - Follow up with heart failure clinic. NICM   - EF 20-25%   - I have discussed the procedure, risks, benefits and alternatives in detail with patient and family.  The risks including, but not limited to, the risks of bleeding, infection, pain, device malfunction, lead dislodgement, radiation exposure, injury to cardiac and surrounding structures (including pneumothorax), stroke, cardiac perforation, tamponade, need for emergent heart surgery, myocardial infarction and death were discussed in detail. The patient opted to proceed with the device implantation. Keep NPO. Will plan for Biv-AICD placement and ILR removal today with Dr. Mildred Barbosa    Persistent AF   - Remaisn in AF/CVR today    - At this point will continue with [facility] and rate control, can consider AAD or invasive therapy as OP   - Continue Eliquis 5 mg bid  HENOK thrombus   - Per CONSUELO 2/10/2022   - Continue with AC  MR   - Moderate per echo   - Stable    - Plans for Biv AICD and ILR removal later today  - Last Eliquis 4/24/2022, held this am    All pertinent information and plan of care discussed with the EP physician. Multiple medical conditions with risk of decompensation. Problem List:   Patient Active Problem List    Diagnosis Date Noted    VT (ventricular tachycardia) ([address]     Acute on chronic congestive heart failure (HCC)     Complicated UTI (urinary tract infection)     Chronic atrial fibrillation (HCC)     Severe mitral regurgitation     Acute systolic heart failure ([address] 04/13/2022    NSTEMI (non-ST elevated myocardial infarction) ([address]     Acute on chronic combined systolic and diastolic heart failure ([address] 02/06/2022    Homeless     COVID     Non-compliance     Acute on chronic systolic heart failure ([address] 11/28/2021    Acute pulmonary edema ([address] 11/27/2021    Encounter for loop recorder check 07/26/2019    Acute urinary retention 07/26/2019    Dilated cardiomyopathy ([address]     Acute renal failure ([address]     PAF (paroxysmal atrial fibrillation) ([address]     Benign essential HTN     Acute retention of urine 07/24/2019      Allergies:  No Known Allergies  Home Meds:  Prior to Visit Medications    Medication Sig Taking?  Authorizing Provider   torsemide (DEMADEX) 20 MG tablet Take 1 tablet by mouth daily  Deirdre Das, APRN - CNS   lisinopril (PRINIVIL;ZESTRIL) 5 MG tablet Take 1 tablet by mouth daily  Deirdre Das, APRN - CNS   metoprolol succinate (TOPROL XL) 50 MG extended release tablet Take 1 tablet by mouth daily  Deirdre Das, APRN - CNS   potassium chloride (KLOR-CON M) 20 MEQ extended release tablet Take 2 tablets by mouth daily for 1 day  TRENT Conteh - CNP   metOLazone (ZAROXOLYN) 5 MG tablet Take 1 tablet by mouth once for 1 dose  Deirdre Das, APRN - CNS   vitamin D (CHOLECALCIFEROL) 50 MCG (2000 UT) TABS tablet Take 1 tablet by mouth daily  TRENT [place] - CNS   aspirin 81 MG chewable tablet Take 1 tablet by mouth daily  Venice Kelsey MD   atorvastatin (LIPITOR) 40 MG tablet Take 1 tablet by mouth nightly  Venice Kelsey MD   finasteride (PROSCAR) 5 MG tablet Take 1 tablet by mouth daily  Venice Kelsey MD   tamsulosin (FLOMAX) 0.4 MG capsule Take 1 capsule by mouth daily  Raysa Nunez MD   apixaban (ELIQUIS) 5 MG TABS tablet Take 1 tablet by mouth 2 times daily  Patient taking differently: Take 5 mg by mouth Patient stated that he has started to take 1 time a day instead of 2. Patient stated that taking 2 a day was causing nosebleeds.   Venice Kelsey MD      Scheduled Meds:   torsemide  40 mg Oral BID    sodium chloride flush  5-40 mL IntraVENous 2 times per day    apixaban  5 mg Oral BID    metoprolol succinate  25 mg Oral Daily    atorvastatin  40 mg Oral Nightly    finasteride  5 mg Oral Daily    tamsulosin  0.4 mg Oral Daily    sodium chloride flush  5-40 mL IntraVENous 2 times per day     Continuous Infusions:   amiodarone      sodium chloride      sodium chloride       PRN Meds:sodium chloride flush, sodium chloride, sodium chloride flush, sodium chloride, polyethylene glycol, acetaminophen **OR** acetaminophen, prochlorperazine **OR** prochlorperazine (COMPAZINE) with normal saline injection   Past Medical History:  Past Medical History: Diagnosis Date    Atrial fibrillation ([facility] 07/25/2019    CHF (congestive heart failure) (HCC)     Hx of blood clots     Hypertension       Past Surgical History:    has a past surgical history that includes Insertable Cardiac Monitor (07/26/2019) and Cardioversion (07/26/2019). Social History:  Reviewed. reports that he has never smoked. He has never used smokeless tobacco. He reports that he does not drink alcohol and does not use drugs. Family History:  Reviewed. family history includes Heart Attack in his mother; Heart Disease in his mother; High Blood Pressure in his father and mother; Other in his father; Stroke in his father. Denies family history of sudden cardiac death, arrhythmia, premature CAD    Review of Systems:  · Constitutional: Negative for fever, weight changes, or weakness  · Skin: Negative for bruising, bleeding, or changes in skin pigment  · HEENT: Negative for vision changes or dysphagia  · Respiratory: Reviewed in HPI  · Cardiovascular: Reviewed in HPI  · Gastrointestinal: Negative for abdominal pain or black/tarry stools  · Genito-Urinary: Negative for hematuria  · Musculoskeletal: No focal weakness  · Neurological/Psych: Negative for confusion or TIA-like symptoms.  No anxiety, depression, or insomnia    Physical Examination:  Vitals:    04/25/22 0800   BP: 92/84   Pulse: 55   Resp:    Temp:    SpO2:       In: 1440 [P.O.:1440]  Out: 1545    Wt Readings from Last 3 Encounters:   04/25/22 194 lb 8 oz (88.2 kg)   04/12/22 227 lb 9.6 oz (103.2 kg)   03/22/22 220 lb 1.6 oz (99.8 kg)       Intake/Output Summary (Last 24 hours) at 4/25/2022 0830  Last data filed at 4/25/2022 0416  Gross per 24 hour   Intake 1440 ml   Output 1545 ml   Net -105 ml     Telemetry: Personally Reviewed Atrial fibrillation , NSVT  · Constitutional: Cooperative and in no apparent distress, and appears well nourished  · Skin: Warm and pink; no cyanosis, bruising, or clubbing  · HEENT: Symmetric and normocephalic. Conjunctiva pink with clear sclera. Mucus membranes pink and moist.   · Cardiovascular: Irregular rate and rhythm. S1 & S2, negative for murmurs. Peripheral pulses 2+, capillary refill < 3 seconds. negative elevation of JVP. · Respiratory: Respirations symmetric and unlabored. Lungs clear to auscultation bilaterally, no wheezing, crackles, or rhonchi  · Gastrointestinal: Abdomen soft and round. Bowel sounds normoactive in all quadrants. · Musculoskeletal: No focal weakness. · Neurologic/Psych: Awake and orientated to person, place and time. Calm affect, appropriate mood    Pertinent labs, diagnostic, device, and imaging results reviewed as a part of this visit    Labs:    BMP:   Recent Labs     22  0550 22  0545     --  135*   K 3.8  --  4.2   CL 98*  --  96*   CO2 32  --  31   BUN 12  --  18   CREATININE 0.8  --  1.1   MG  --  2.20  --      Estimated Creatinine Clearance: 80 mL/min (based on SCr of 1.1 mg/dL). CBC:   Recent Labs     22  0550 22  0545   WBC 3.9* 4.7   HGB 11.1* 11.0*   HCT 33.6* 33.0*   MCV 80.3 80.4    251     Thyroid: No results found for: TSH, O6USZEA, Y4WVNPZ, THYROIDAB  Lipids: No results found for: CHOL, HDL, TRIG  LFTS:   Lab Results   Component Value Date    ALT 35 2022    AST 33 2022    ALKPHOS 84 2022    PROT 6.0 2022    AGRATIO 1.4 2022    BILITOT 1.0 2022     Cardiac Enzymes:   Lab Results   Component Value Date    TROPONINI <0.01 2022    TROPONINI <0.01 2022    TROPONINI 0.52 2022     Coags:   Lab Results   Component Value Date    PROTIME 17.2 2022    INR 1.50 2022     EC2022: Atrial Fibrillation    ECHO:  2/10/2022      Summary   Left ventricular size is moderately increased. Overall left ventricular   systolic function appears severely reduced with ejection fraction of 20-25%   and severe diffuse hypokinesis.       Moderate mitral regurgitation. Calculated ERO of 0.33 cm2. Left atrial size appears dilated. There is an echodensity inside the left   atrial appendage consistent with left atrial appendage thrombus. The right ventricle is enlarged. Right ventricular systolic function is   mildly reduced . Mild to moderate tricuspid regurgitation. All questions and concerns were addressed to the patient. Alternatives to my treatment were discussed. I have discussed the above stated plan with patient and the nurse. The patient verbalized understanding and agreed with the plan. Thank you for allowing to us to participate in the care of Estefania Gautma.     Lula Salazar, TRENT-CNP  [facility]   Office: [phone]

## 2022-04-25 NOTE — PROCEDURES
[address]     Electrophysiology Procedure Note       Date of Procedure: 4/25/2022  Patient's Name: Angel Silvestre  YOB: 1960   Medical Record Number: [id number]  Procedure Performed by: Sergio Hernandez MD    Procedures performed:    ·    ·    · Insertion of MRI compatible right ventricular defibrillator lead under fluoroscopy. · Insertion of MRI compatible right atrial lead under fluoroscopy  · Insertion of MRI compatible left ventricular lead under fluoroscopy  · Implantation of a MRI compatible Biv-AICD   ·    · Electronic analysis of lead and device. · IV sedation       Blood loss: 10 cc  Complications: none     Indication of the procedure:    Angel Silvestre is a 59 y.o. male with  non-ischemic cardiomyopathy, systolic heart failure, NYHA class III, EF of 20% % and ventricular tachycardia. On GDMT. Patient is bradycardic and requires high percentage of pacing and therefore biventricular device is indicated. Details of procedure: The patient was brought to the electrophysiology laboratory in stable condition. The patient was in a fasting and non-sedated state. The risks, benefits and alternatives of the procedure were discussed with the patient. The risks including, but not limited to, the risks of vascular injury, bleeding, infection, device malfunction, lead dislodgement, radiation exposure, injury to cardiac and surrounding structures (including pneumothorax), stroke, myocardial infarction and death were discussed in detail. The patient opted to proceed with the device implantation. Written informed consent was signed and placed in the chart. Prophylactic antibiotic was given. Access was done using ultrasound. An independent trained observer pushed medications at my direction. We monitored the patient's level of consciousness and vital signs/physiologic status throughout the procedure duration (see start and stop times below).   Sedation:  4 mg Versed, 200 mcg Fentanyl  Sedation start: 1627  Sedation stop: 1745      The patient was prepped and draped in a sterile fashion. A timeout protocol was completed to identify the patient and the procedure being performed. Pre-sedation evaluation and airway assessment (Mallampatti classification, class lI) was completed. IV sedation was provided with IV Versed, Fentanyl. An incision was made in the left pectoral area after administration of lidocaine. Using electrocautery and blunt dissection, a pocket was created. Central venous access into the left axillary vein was obtained using the modified Seldinger technique. After central venous access was obtained, a sheath was placed in the axillary vein. A right ventricular lead was advanced into position in the apical septum/   under fluoroscopic guidance and using a series of curved stylets. The lead was actively fixated. After confirming appropriate function, the sheath was split and removed. The lead was secured to the underlying tissue using suture material.     Then using a long sheath and guidewire, we gained access to the coronary sinus. We performed a CS venography and found the  lateral branch of the coronary sinus. Then using a wire, we accessed to this branch and a sheath was advanced into this branch. Then under fluoroscopy a LV lead was advanced into this branch. After confirming appropriate function, the sheath was split and removed. The lead was secured to the underlying tissue using suture material.   A new sheath was advanced over a  previously placed wire into the vein. The atrial lead was advanced to the right atrial appendage and actively fixated under fluoroscopic guidance. After confirming appropriate function, the sheath was split and removed. The lead was secured to the underlying tissue using suture. The pocket was irrigated with an antibiotic solution.  The leads were then connected to the new pulse generator (Cardiac resynchronization therapy(CRT)  ) which was then placed into the cleaned pocket. The pulse generator was sutured inside the pocket. The pocket was then closed in three separate subcutaneous layers using 3-0  Vicryl and subcuticular layer using 4-0 Vicryl. . Steristrips was used on the skin. The skin was covered with pressure dressing. Antibiotic envelope was used. All sponge and needle counts were reported as correct at the end of the procedure. The patient tolerated the procedure well and there were no complications. Post-sedation evaluation was completed. Patient was transported to the holding area in stable condition. DEVICE and LEADS information:          Plan:   The patient will be admitted and have usual post-implant care, including chest x-ray, and antibiotic therapy and interrogation of the device.

## 2022-04-25 NOTE — PROGRESS NOTES
[facility] PROGRESS NOTE    4/25/2022 9:18 AM        Name: Denise Dos Santos Admitted: 4/13/2022  Primary Care Provider: No primary care provider on file. (Tel: None)        Subjective:    Lying bed no nausea vomiting no chest pain  Reviewed interval ancillary notes    Current Medications  torsemide (DEMADEX) tablet 40 mg, BID  amiodarone (CORDARONE) 450 mg in dextrose 5 % 250 mL infusion, Continuous  sodium chloride flush 0.9 % injection 5-40 mL, 2 times per day  sodium chloride flush 0.9 % injection 5-40 mL, PRN  0.9 % sodium chloride infusion, PRN  apixaban (ELIQUIS) tablet 5 mg, BID  metoprolol succinate (TOPROL XL) extended release tablet 25 mg, Daily  atorvastatin (LIPITOR) tablet 40 mg, Nightly  finasteride (PROSCAR) tablet 5 mg, Daily  tamsulosin (FLOMAX) capsule 0.4 mg, Daily  sodium chloride flush 0.9 % injection 5-40 mL, 2 times per day  sodium chloride flush 0.9 % injection 5-40 mL, PRN  0.9 % sodium chloride infusion, PRN  polyethylene glycol (GLYCOLAX) packet 17 g, Daily PRN  acetaminophen (TYLENOL) tablet 650 mg, Q6H PRN   Or  acetaminophen (TYLENOL) suppository 650 mg, Q6H PRN  prochlorperazine (COMPAZINE) tablet 10 mg, Q6H PRN   Or  prochlorperazine (COMPAZINE) 10 mg in sodium chloride (PF) 10 mL injection, Q6H PRN        Objective:  BP 92/84   Pulse 55   Temp 97.6 °F (36.4 °C) (Oral)   Resp 18   Ht 6' 2\" (1.88 m)   Wt 194 lb 8 oz (88.2 kg)   SpO2 100%   BMI 24.97 kg/m²     Intake/Output Summary (Last 24 hours) at 4/25/2022 0918  Last data filed at 4/25/2022 0416  Gross per 24 hour   Intake 1200 ml   Output 1545 ml   Net -345 ml      Wt Readings from Last 3 Encounters:   04/25/22 194 lb 8 oz (88.2 kg)   04/12/22 227 lb 9.6 oz (103.2 kg)   03/22/22 220 lb 1.6 oz (99.8 kg)       General appearance:  Appears comfortable.  AAOx3  HEENT: atraumatic, Pupils equal, muscous membranes moist, no masses appreciated  Cardiovascular: Regular rate and rhythm no murmurs appreciated  Respiratory: CTAB no wheezing  Gastrointestinal: Abdomen soft, non-tender, BS+  EXT: trace edema  Neurology: no gross focal deficts  Psychiatry: Appropriate affect. Not agitated  Skin: Warm, dry, no rashes appreciated    Labs and Tests:  CBC:   Recent Labs     04/23/22  0550 04/24/22  0545   WBC 3.9* 4.7   HGB 11.1* 11.0*    251     BMP:    Recent Labs     04/23/22  0550 04/24/22  0545    135*   K 3.8 4.2   CL 98* 96*   CO2 32 31   BUN 12 18   CREATININE 0.8 1.1   GLUCOSE 99 94     Hepatic:   Recent Labs     04/23/22  0550 04/24/22  0545   AST 28 33   ALT 31 35   BILITOT 1.3* 1.0   ALKPHOS 79 84     XR CHEST PORTABLE   Final Result   Status post left PICC line placement in good position. Stable cardiomegaly with minimal central pulmonary congestion or pulmonary   artery hypertension which is unchanged. Minimal discoid atelectasis or scarring along the lung bases which is more   prominent         XR PELVIS (1-2 VIEWS)   Final Result   No acute abnormality and no foreign body evident. XR CHEST PORTABLE   Final Result   Cardiomegaly with no acute pulmonary finding. Recent imaging reviewed    Problem List  Principal Problem:    Acute systolic heart failure (HCC)  Active Problems:    VT (ventricular tachycardia) (HCC)    Complicated UTI (urinary tract infection)    Chronic atrial fibrillation (HCC)    Severe mitral regurgitation    Acute on chronic congestive heart failure ([address]  Resolved Problems:    * No resolved hospital problems.  *     Assessment/Plan:   Acute On chronic systolic heart failure with EF of 25% and moderate MR   torsemide 40mg bid  -continuue toprol lsinipril on hold for bp    Nonsustained vt: for aicd today    Elevated lft likely secondary to above  improved     PAF with left atrial appendage clost 2/2022  - eliquis  - bblocker     Bph: flomax,     UTI: s/p atbx  DVT prophylaxis eliquis  Code status full code       Monserrat Stratton MD   4/25/2022 9:18 AM

## 2022-04-25 NOTE — PLAN OF CARE
Problem: HEMODYNAMIC STATUS  Goal: Patient has stable vital signs and fluid balance  Outcome: Progressing     Problem: FLUID AND ELECTROLYTE IMBALANCE  Goal: Fluid and electrolyte balance are achieved/maintained  Outcome: Progressing     Problem: ACTIVITY INTOLERANCE/IMPAIRED MOBILITY  Goal: Mobility/activity is maintained at optimum level for patient  Outcome: Progressing

## 2022-04-26 ENCOUNTER — NURSE ONLY (OUTPATIENT)
Dept: CARDIOLOGY CLINIC | Age: 62
End: 2022-04-26
Payer: COMMERCIAL

## 2022-04-26 ENCOUNTER — APPOINTMENT (OUTPATIENT)
Dept: GENERAL RADIOLOGY | Age: 62
DRG: 179 | End: 2022-04-26
Payer: COMMERCIAL

## 2022-04-26 ENCOUNTER — NURSE ONLY (OUTPATIENT)
Dept: CARDIOLOGY CLINIC | Age: 62
End: 2022-04-26

## 2022-04-26 VITALS
OXYGEN SATURATION: 97 % | HEIGHT: 74 IN | TEMPERATURE: 98.4 F | RESPIRATION RATE: 18 BRPM | BODY MASS INDEX: 24.85 KG/M2 | DIASTOLIC BLOOD PRESSURE: 88 MMHG | HEART RATE: 81 BPM | SYSTOLIC BLOOD PRESSURE: 123 MMHG | WEIGHT: 193.6 LBS

## 2022-04-26 DIAGNOSIS — I42.0 DILATED CARDIOMYOPATHY (HCC): ICD-10-CM

## 2022-04-26 DIAGNOSIS — I50.22 CHRONIC SYSTOLIC CONGESTIVE HEART FAILURE (HCC): ICD-10-CM

## 2022-04-26 DIAGNOSIS — I47.20 VT (VENTRICULAR TACHYCARDIA): ICD-10-CM

## 2022-04-26 DIAGNOSIS — I48.0 PAF (PAROXYSMAL ATRIAL FIBRILLATION) (HCC): ICD-10-CM

## 2022-04-26 DIAGNOSIS — Z95.810 ICD (IMPLANTABLE CARDIOVERTER-DEFIBRILLATOR), BIVENTRICULAR, IN SITU: Primary | ICD-10-CM

## 2022-04-26 DIAGNOSIS — Z95.810 ICD (IMPLANTABLE CARDIOVERTER-DEFIBRILLATOR), BIVENTRICULAR, IN SITU: ICD-10-CM

## 2022-04-26 DIAGNOSIS — I48.20 CHRONIC ATRIAL FIBRILLATION (HCC): ICD-10-CM

## 2022-04-26 PROCEDURE — 2580000003 HC RX 258: Performed by: INTERNAL MEDICINE

## 2022-04-26 PROCEDURE — 93005 ELECTROCARDIOGRAM TRACING: CPT | Performed by: INTERNAL MEDICINE

## 2022-04-26 PROCEDURE — 6370000000 HC RX 637 (ALT 250 FOR IP): Performed by: INTERNAL MEDICINE

## 2022-04-26 PROCEDURE — 94760 N-INVAS EAR/PLS OXIMETRY 1: CPT

## 2022-04-26 PROCEDURE — 6370000000 HC RX 637 (ALT 250 FOR IP): Performed by: CLINICAL NURSE SPECIALIST

## 2022-04-26 PROCEDURE — 71046 X-RAY EXAM CHEST 2 VIEWS: CPT

## 2022-04-26 PROCEDURE — 99232 SBSQ HOSP IP/OBS MODERATE 35: CPT | Performed by: NURSE PRACTITIONER

## 2022-04-26 PROCEDURE — 99233 SBSQ HOSP IP/OBS HIGH 50: CPT | Performed by: NURSE PRACTITIONER

## 2022-04-26 PROCEDURE — 6370000000 HC RX 637 (ALT 250 FOR IP): Performed by: NURSE PRACTITIONER

## 2022-04-26 RX ORDER — ATORVASTATIN CALCIUM 40 MG/1
40 TABLET, FILM COATED ORAL NIGHTLY
Qty: 30 TABLET | Refills: 2 | Status: ON HOLD | OUTPATIENT
Start: 2022-04-26 | End: 2022-06-17 | Stop reason: SDUPTHER

## 2022-04-26 RX ORDER — FINASTERIDE 5 MG/1
5 TABLET, FILM COATED ORAL DAILY
Qty: 30 TABLET | Refills: 0 | Status: SHIPPED | OUTPATIENT
Start: 2022-04-26 | End: 2022-05-31 | Stop reason: SDUPTHER

## 2022-04-26 RX ORDER — METOPROLOL SUCCINATE 25 MG/1
25 TABLET, EXTENDED RELEASE ORAL DAILY
Qty: 30 TABLET | Refills: 3 | Status: SHIPPED | OUTPATIENT
Start: 2022-04-27 | End: 2022-05-31 | Stop reason: SDUPTHER

## 2022-04-26 RX ORDER — ASPIRIN 81 MG/1
81 TABLET, CHEWABLE ORAL DAILY
Qty: 30 TABLET | Refills: 1 | Status: ON HOLD | OUTPATIENT
Start: 2022-04-26 | End: 2022-05-01 | Stop reason: HOSPADM

## 2022-04-26 RX ORDER — POTASSIUM CHLORIDE 20 MEQ/1
40 TABLET, EXTENDED RELEASE ORAL DAILY
Qty: 1 TABLET | Refills: 0 | Status: SHIPPED | OUTPATIENT
Start: 2022-04-26 | End: 2022-05-13 | Stop reason: ALTCHOICE

## 2022-04-26 RX ORDER — LISINOPRIL 5 MG/1
5 TABLET ORAL DAILY
Qty: 30 TABLET | Refills: 1 | Status: SHIPPED | OUTPATIENT
Start: 2022-04-26 | End: 2022-04-29 | Stop reason: DRUGHIGH

## 2022-04-26 RX ORDER — TAMSULOSIN HYDROCHLORIDE 0.4 MG/1
0.4 CAPSULE ORAL DAILY
Qty: 30 CAPSULE | Refills: 2 | Status: SHIPPED | OUTPATIENT
Start: 2022-04-26 | End: 2022-05-31 | Stop reason: SDUPTHER

## 2022-04-26 RX ADMIN — METOPROLOL SUCCINATE 25 MG: 25 TABLET, EXTENDED RELEASE ORAL at 08:16

## 2022-04-26 RX ADMIN — ACETAMINOPHEN 650 MG: 325 TABLET ORAL at 02:24

## 2022-04-26 RX ADMIN — APIXABAN 5 MG: 5 TABLET, FILM COATED ORAL at 08:16

## 2022-04-26 RX ADMIN — TORSEMIDE 40 MG: 20 TABLET ORAL at 08:15

## 2022-04-26 RX ADMIN — ACETAMINOPHEN 650 MG: 325 TABLET ORAL at 08:19

## 2022-04-26 RX ADMIN — TAMSULOSIN HYDROCHLORIDE 0.4 MG: 0.4 CAPSULE ORAL at 08:15

## 2022-04-26 RX ADMIN — FINASTERIDE 5 MG: 5 TABLET, FILM COATED ORAL at 08:16

## 2022-04-26 RX ADMIN — Medication 5 ML: at 08:31

## 2022-04-26 ASSESSMENT — PAIN SCALES - GENERAL
PAINLEVEL_OUTOF10: 0
PAINLEVEL_OUTOF10: 1
PAINLEVEL_OUTOF10: 4

## 2022-04-26 ASSESSMENT — PAIN DESCRIPTION - LOCATION: LOCATION: CHEST

## 2022-04-26 ASSESSMENT — PAIN DESCRIPTION - ORIENTATION: ORIENTATION: LEFT

## 2022-04-26 ASSESSMENT — PAIN DESCRIPTION - DESCRIPTORS: DESCRIPTORS: ACHING

## 2022-04-26 ASSESSMENT — PAIN - FUNCTIONAL ASSESSMENT: PAIN_FUNCTIONAL_ASSESSMENT: ACTIVITIES ARE NOT PREVENTED

## 2022-04-26 NOTE — PROGRESS NOTES
Patient was seen and evaluated today. Needs 2-view CXR today to evaluate leads.       Lalitha Mccartney, TRENT-CNP

## 2022-04-26 NOTE — PROGRESS NOTES
Electrophysiology Note    Reviewed CXR with Dr. Leidy Krueger and the lead has migrated back. Device has been adjusted LV1t o RV coil with adequate thresholds. OK for discharge from EP perspective. Post-Device Implant Instructions were reviewed with patient, please place in discharge instructions on AVS    1. Wound Care  -Bathe daily but keep incision dry and clean until your 7-10 day follow up  -Do not shower until you are told to do so by your physician.  -Do not remove the outer dressing unless it is soiled  -Allow the thin pieces of tape (Steri-Strips) to fall off naturally. Do not pick or pull at these unless instructed to do so by your physician. 2. Contact the cardiologists office for these concerns:   -Increased swelling and/or tenderness in incision and/or extending down the arm on the same side as implant site   -Feeling increased palpitations or irregular heart beat   -Redness of incision or drainage from incision.   -Bleeding that does not stop or increases.  -Skin pimples along incision.  -If a suture works its way through the incision.  -Fever greater than 100.5    3. Do not rub or twist the device site    4. Avoid lifting objects heavier than 10 pounds for one month. Do not raise the arm on the side where the device was implanted over your head for one month. These rules help to heal the implant site and stabilize the heart lead wires. 5. Avoid tight clothing over the incision. 6. No driving for one week or until initial visit after your procedure. 7. Carry your temporary Device  I.D. Card with you until your permanent card arrives in four to six weeks. An I. D. Card should be with you always. 8. If you have a defibrillator (AICD) and receive a shock or hear a tone from the device call the doctor's office    9. An implanted device does not replace any medications, diet or activity restrictions that you had before the implant.  Check with your cardiologist regarding questions    10. Reasons to seek medical attention immediately: Call 911   -Sudden onset of chest pain, shortness of breath, dizziness, or loss of balance or coordination   -Sudden Change in vision   -Sudden confusion or trouble speaking and/or understanding    -Sudden onset of numbness or weakness of face/arm/leg, especially on one side of the body   -Sudden or severe headache without known cause   -Nausea with uncontrolled vomiting   -Severe bleeding        Carry Grim, APRN-CNP

## 2022-04-26 NOTE — PROGRESS NOTES
RN referral \"per protocol\" to see patient. Visited w/pt who spoke of new diagnosis and lifestyle changes. Expressed gratitude for the \"wonderful care and support\" he has received from all staff throughout hospital.  Dixie and prayer important to pt. Prayer and blessing with patient.

## 2022-04-26 NOTE — PLAN OF CARE
Problem: HEMODYNAMIC STATUS  Goal: Patient has stable vital signs and fluid balance  Outcome: Completed     Problem: FLUID AND ELECTROLYTE IMBALANCE  Goal: Fluid and electrolyte balance are achieved/maintained  Outcome: Completed     Problem: ACTIVITY INTOLERANCE/IMPAIRED MOBILITY  Goal: Mobility/activity is maintained at optimum level for patient  Outcome: Completed     Problem: Discharge Planning  Goal: Discharge to home or other facility with appropriate resources  Outcome: Completed     Problem: Pain  Goal: Verbalizes/displays adequate comfort level or baseline comfort level  Outcome: Completed     Problem: Chronic Conditions and Co-morbidities  Goal: Patient's chronic conditions and co-morbidity symptoms are monitored and maintained or improved  Outcome: Completed     Problem: Death & Dying  Goal: Pt/Family communicate acceptance of impending death and feel psychological comfort and peace  Description: INTERVENTIONS:  1. Assess patient/family anxiety and grief process related to end of life issues  2. Provide emotional and spiritual support  3. Provide information about the patient's health status with consideration of family and cultural values  4. Communicate willingness to discuss death and facilitate grief process  with patient/family as appropriate  5. Emphasize sustaining relationships within family system and community, or amy/spiritual traditions  6. Initiate Spiritual Care, Psychosocial Clinical Specialist, consult as needed  Outcome: Completed     Problem: Anxiety  Goal: Will report anxiety at manageable levels  Description: INTERVENTIONS:  1. Administer medication as ordered  2. Teach and rehearse alternative coping skills  3.  Provide emotional support with 1:1 interaction with staff  Outcome: Completed

## 2022-04-26 NOTE — CARE COORDINATION
Patient reported for his PCP follow up he will be seeing KATLYN Seo  with [address] in [facility] [phone] on 4/27/22.

## 2022-04-26 NOTE — PROGRESS NOTES
CLINICAL PHARMACY NOTE: MEDS TO BEDS    Total # of Prescriptions Filled: 2   The following medications were delivered to the patient:  · Aspirin 81 mg  · Metoprolol ER 25 mg    Additional Documentation:    Shahram Connors RN picked up from Outpatient Pharmacy=signed  Elan Barber CPhT

## 2022-04-26 NOTE — CARE COORDINATION
Discharge Plan:   Patient discharged on 4/26/22 to home with a friend. PCP follow up he will be seeing KATLYN Ramos  with [address] in [facility]- [phone] on 4/27/22.     One of his friends will pick him up or he may need assistance possibly  LYFT    All discharge needs met per case management

## 2022-04-26 NOTE — DISCHARGE SUMMARY
Hospital Medicine Discharge Summary    Patient: Scott Mederos     Gender: male  : 1960   Age: 59 y.o. MRN: [id number]    Admitting Physician: Jacob Gabriel MD  Discharge Physician: Jacob Gabriel MD     Code Status: Full Code     Admit Date: 2022   Discharge Date:   2022    Disposition:  Home  Time spent arranging discharge: 35 minutes    Discharge Diagnoses: Active Hospital Problems    Diagnosis Date Noted    [facility] [I47.2]      Priority: Medium    Acute on chronic congestive heart failure (HCC) [Y57.7]     Complicated UTI (urinary tract infection) [N39.0]     Chronic atrial fibrillation (HCC) [I48.20]     Severe mitral regurgitation [I34.0]     Acute systolic heart failure ([facility] [I50.21] 2022       Condition at Discharge:  Stable    Hospital Course:   Patient was admitted to hospital with acute on chronic systolic heart failure with EF of 25% and moderate mitral regurgitation. Patient was started on milrinone drip and IV Lasix drip patient was diuresed for multiple days shortness of breath and swelling resolved patient discharged on torsemide 40 mg twice daily. Patient did have episode of nonsustained VT underwent AICD placement. Patient have UTI treated with antibiotic    Discharge Exam:    /88   Pulse 81   Temp 98.4 °F (36.9 °C) (Oral)   Resp 16   Ht 6' 2\" (1.88 m)   Wt 193 lb 9.6 oz (87.8 kg)   SpO2 97%   BMI 24.86 kg/m²   General appearance:  Appears comfortable. AAOx3  HEENT: atraumatic, Pupils equal, muscous membranes moist, no masses appreciated  Cardiovascular: Regular rate and rhythm no murmurs appreciated  Respiratory: CTAB no wheezing  Gastrointestinal: Abdomen soft, non-tender, BS+  EXT: no edema  Neurology: no gross focal deficts  Psychiatry: Appropriate affect.  Not agitated  Skin: Warm, dry, no rashes appreciated    Discharge Medications:   Current Discharge Medication List        Current Discharge Medication List      CONTINUE these medications which have CHANGED    Details   metoprolol succinate (TOPROL XL) 25 MG extended release tablet Take 1 tablet by mouth daily  Qty: 30 tablet, Refills: 3      torsemide 40 MG TABS Take 40 mg by mouth in the morning and at bedtime  Qty: 30 tablet, Refills: 3      aspirin 81 MG chewable tablet Take 1 tablet by mouth daily  Qty: 30 tablet, Refills: 1      apixaban (ELIQUIS) 5 MG TABS tablet Take 1 tablet by mouth 2 times daily  Qty: 60 tablet, Refills: 0      atorvastatin (LIPITOR) 40 MG tablet Take 1 tablet by mouth nightly  Qty: 30 tablet, Refills: 2      lisinopril (PRINIVIL;ZESTRIL) 5 MG tablet Take 1 tablet by mouth daily  Qty: 30 tablet, Refills: 1      potassium chloride (KLOR-CON M) 20 MEQ extended release tablet Take 2 tablets by mouth daily for 1 day  Qty: 1 tablet, Refills: 0      finasteride (PROSCAR) 5 MG tablet Take 1 tablet by mouth daily  Qty: 30 tablet, Refills: 0      tamsulosin (FLOMAX) 0.4 MG capsule Take 1 capsule by mouth daily  Qty: 30 capsule, Refills: 2           Current Discharge Medication List      CONTINUE these medications which have NOT CHANGED    Details   vitamin D (CHOLECALCIFEROL) 50 MCG (2000 UT) TABS tablet Take 1 tablet by mouth daily  Qty: 90 tablet, Refills: 1    Associated Diagnoses: Acute on chronic systolic heart failure (HCC)           Current Discharge Medication List      STOP taking these medications       metOLazone (ZAROXOLYN) 5 MG tablet Comments:   Reason for Stopping:               Labs:  For convenience and continuity at follow-up the following most recent labs are provided:    Lab Results   Component Value Date    WBC 4.7 04/24/2022    HGB 11.0 04/24/2022    HCT 33.0 04/24/2022    MCV 80.4 04/24/2022     04/24/2022     04/24/2022    K 4.2 04/24/2022    CL 96 04/24/2022    CO2 31 04/24/2022    BUN 18 04/24/2022    CREATININE 1.1 04/24/2022    CALCIUM 8.5 04/24/2022    PHOS 3.7 04/18/2022    ALKPHOS 84 04/24/2022    ALT 35 04/24/2022    AST 33 04/24/2022    BILITOT 1.0 04/24/2022    LABALBU 3.5 04/24/2022     Lab Results   Component Value Date    INR 1.50 (H) 02/05/2022    INR 1.50 (H) 11/28/2021       Radiology:  XR PELVIS (1-2 VIEWS)    Result Date: 4/14/2022  EXAMINATION: ONE XRAY VIEW OF THE PELVIS 4/14/2022 2:03 pm COMPARISON: None. HISTORY: ORDERING SYSTEM PROVIDED HISTORY: Patient believes he has a fragment from the ernst catheter in his bladder. This can be a 1v at bedside if desired TECHNOLOGIST PROVIDED HISTORY: Reason for exam:->Patient believes he has a fragment from the ernst catheter in his bladder. This can be a 1v at bedside if desired Reason for Exam: Patient believes he has a fragment from the ernst catheter in his bladder. FINDINGS: Soft tissues within the lower pelvis are unremarkable. There is some stool within the rectal vault. No foreign bodies are demonstrated. There is no acute fracture or dislocation. Hip joints are unremarkable. Surrounding soft tissues are unremarkable. The remainder of the included pelvis is unremarkable for age. No acute abnormality and no foreign body evident. XR CHEST PORTABLE    Result Date: 4/25/2022  EXAMINATION: ONE XRAY VIEW OF THE CHEST 4/25/2022 6:18 pm COMPARISON: Chest radiograph 04/18/2022. HISTORY: ORDERING SYSTEM PROVIDED HISTORY: Pacemaker placement and rule out pneumothorax TECHNOLOGIST PROVIDED HISTORY: Reason for exam:->Pacemaker placement and rule out pneumothorax Reason for Exam: pacemaker placement and rule out pneumothorax FINDINGS: A multiple lead left chest AICD is in place. The cardiac silhouette is enlarged but stable. No pneumothorax, vascular congestion, consolidation, or pleural effusion is identified. No acute osseous abnormality. No acute process.      XR CHEST PORTABLE    Result Date: 4/18/2022  EXAMINATION: ONE XRAY VIEW OF THE CHEST 4/18/2022 4:36 pm COMPARISON: 04/13/2022 HISTORY: ORDERING SYSTEM PROVIDED HISTORY: SHELBY PICC, difficult placement, pt afib unable to confirm TECHNOLOGIST PROVIDED HISTORY: Reason for exam:->SHELBY PICC, difficult placement, pt afib unable to confirm Reason for Exam: SHELBY PICC FINDINGS: The heart is mildly enlarged but unchanged. The pulmonary vessels are top-normal.  The lungs are hyperinflated with mild linear densities along the lung bases which is more prominent. No effusion or consolidation is seen. There is a PICC line in place on the left with the tip in the distal superior vena cava. No pneumothorax is seen. Status post left PICC line placement in good position. Stable cardiomegaly with minimal central pulmonary congestion or pulmonary artery hypertension which is unchanged. Minimal discoid atelectasis or scarring along the lung bases which is more prominent     XR CHEST PORTABLE    Result Date: 4/13/2022  EXAMINATION: ONE XRAY VIEW OF THE CHEST 4/13/2022 12:40 pm COMPARISON: 02/05/2022 radiograph HISTORY: ORDERING SYSTEM PROVIDED HISTORY: shortness of breath TECHNOLOGIST PROVIDED HISTORY: Reason for exam:->shortness of breath Reason for Exam:  shortness of breath, cough FINDINGS: Severe cardiomegaly. No significant vascular congestion. The lungs appear clear with the left lower lung zone obscured on this portable study by the cardiac silhouette. No skeletal finding. Cardiomegaly with no acute pulmonary finding.          Signed:    Jeannie Tran MD   4/26/2022

## 2022-04-26 NOTE — PROGRESS NOTES
[address]  HEART FAILURE  Progress Note      Admit Date 4/13/2022     Reason for Consult:      Reason for Consultation/Chief Complaint: SOB    HPI:    Rob Newton is a 59 y.o. male with PMH NICM, HFrEF, homelessness, AF s/p DCCV, and HTN admitted with edema and SOB. COVID +, has had some VT, anemia - got IV venofer and was on Milrinone and Lasix gtt. He has diuresed about 10L, long episode VT Sat night and had ICD placed yesterday. Subjective:  Patient is being seen for CHF, CMP. There were no acute overnight cardiac events. Today Mr. Siobhan Miguel denies chest pain, shortness of breath, palpitations and feels well, BP improved today. Baseline Weight:    Wt Readings from Last 3 Encounters:   04/26/22 193 lb 9.6 oz (87.8 kg)   04/12/22 227 lb 9.6 oz (103.2 kg)   03/22/22 220 lb 1.6 oz (99.8 kg)         Cardiac Testing:   CONSUELO Dr Gabriel Smith 2/10/22  Preliminary CONSUELO results are:      - Severe LV dysfunction,  EF: 20-25%  - LA dilated. There was HENOK thrombus. - Moderate MR     Cardiac Cath PCI: Dr Paulo Buckley 2/8/2022  Anatomy:   LM-nml   LAD-nml  Cx-nml  OM- nml  RCA-nml  RPDA- nml  LVEF- 10%  LVG- global hypokinesis  LVEDP- 10     Hemodynamics:  RA- mean 3  RV- 37/0  PAWP- 10  PA- 34/12 (20)     C.O.- 5.7 (4.9)  C. I.- 2.6 (2.25)  PA sat 60%  AO sat 91%     Contrast: 70  Flouro Time: 5.3  Access: R radial a, R CFV     Impression  ~Coronary Angiography w/ normal cors  ~LVG with LVEF of 10% and global regional wall motion abnormalities  ~Severe MR  ~Normal CO/CI  ~normal L and R filling pressures     Echo 11/29/2021  Summary   -Covid+   -Severely reduced global systolic function with an ejection fraction   estimated at 20%.  -Severe global hypokinesis with regional variations noted.   -Right ventricular systolic function appears to be mildly reduced based on   visual inspection.   -Severe biatrial enlargement.   -Thickened mitral valve without evidence of stenosis.  There is   mild-to-moderate mitral regurgitation.   -There is mild-to-moderate tricuspid regurgitation with a RVSP estimation of   06 mmHg.   -Diastolic dysfunction with elevated LV filling pressures.       Core measures for HF:  EF: 20-25%   ACEi/ARB/ARNI:lisinopril  BB: Metoprolol succinate  Damian: none for noncompliance  Hydralazine/nitrates:  SGLT2i: unaffordable for pt    NYHA Class III    Objective:   /88   Pulse 81   Temp 98.4 °F (36.9 °C) (Oral)   Resp 16   Ht 6' 2\" (1.88 m)   Wt 193 lb 9.6 oz (87.8 kg)   SpO2 97%   BMI 24.86 kg/m²       Intake/Output Summary (Last 24 hours) at 4/26/2022 0849  Last data filed at 4/26/2022 0162  Gross per 24 hour   Intake 960 ml   Output 1725 ml   Net -765 ml      In: 960 [P.O.:960]  Out: 1725       Physical Exam:  General Appearance:  Non-obese/Well Nourished  Respiratory:  · Resp Auscultation: Normal breath sounds without dullness  Cardiovascular:  · Auscultation: Regular rate and rhythm, normal S1S2, no m/g/r/c  · Palpation: Normal    · JVD: none  · Pedal Pulses: 2+ and equal   Abdomen:  · Soft, NT, ND, + bs  Extremities:  · No Cyanosis or Clubbing  · Extremities:trace edema  Neurological/Psychiatric:  · Oriented to time, place, and person  · Non-anxious    MEDICATIONS:   Scheduled Meds:   Scheduled Meds:   sodium chloride flush  5-40 mL IntraVENous 2 times per day    torsemide  40 mg Oral BID    sodium chloride flush  5-40 mL IntraVENous 2 times per day    apixaban  5 mg Oral BID    metoprolol succinate  25 mg Oral Daily    atorvastatin  40 mg Oral Nightly    finasteride  5 mg Oral Daily    tamsulosin  0.4 mg Oral Daily    sodium chloride flush  5-40 mL IntraVENous 2 times per day     Continuous Infusions:   sodium chloride      amiodarone      sodium chloride      sodium chloride       PRN Meds:.sodium chloride flush, sodium chloride, oxyCODONE **OR** oxyCODONE, sodium chloride flush, sodium chloride, sodium chloride flush, sodium chloride, polyethylene glycol, acetaminophen **OR** acetaminophen, prochlorperazine **OR** prochlorperazine (COMPAZINE) with normal saline injection  Continuous Infusions:   sodium chloride      amiodarone      sodium chloride      sodium chloride         Intake/Output Summary (Last 24 hours) at 4/26/2022 0849  Last data filed at 4/26/2022 9407  Gross per 24 hour   Intake 960 ml   Output 1725 ml   Net -765 ml       Lab Data:  CBC:   Lab Results   Component Value Date    WBC 4.7 04/24/2022    HGB 11.0 04/24/2022     04/24/2022     BMP:  Lab Results   Component Value Date     04/24/2022    K 4.2 04/24/2022    CL 96 04/24/2022    CO2 31 04/24/2022    BUN 18 04/24/2022    CREATININE 1.1 04/24/2022    GLUCOSE 94 04/24/2022     INR:   Lab Results   Component Value Date    INR 1.50 02/05/2022    INR 1.50 11/28/2021        CARDIAC LABS  ENZYMES:No results for input(s): CKMB, CKMBINDEX, TROPONINI in the last 72 hours. Invalid input(s): CKTOTAL;3  FASTING LIPID PANEL:No results found for: HDL, LDLDIRECT, LDLCALC, TRIG, TSH  LIVER PROFILE:  Lab Results   Component Value Date    AST 33 04/24/2022    AST 28 04/23/2022    ALT 35 04/24/2022    ALT 31 04/23/2022     BNP:   Lab Results   Component Value Date    PROBNP 5,842 04/21/2022    PROBNP 11,887 04/16/2022    PROBNP 27,525 04/13/2022     Iron Studies:  No results found for: FERRITIN  Lab Results   Component Value Date    IRON 38 (L) 04/15/2022    TIBC 372 04/15/2022      Iron Deficiency Anemia:  Yes IV Iron Therapy:  Yes  2017 ACC/AHA HF Guidelines:   intravenous iron replacement in patients with New York Heart Association (NYHA) class II and III HF and iron deficiency(ferritin <100 ng/ml or 100-300 ng/ml if transferrin saturation <20%), to improve functional status and QoL. 1. WEIGHT: Admit Weight: 227 lb (103 kg)      Today  Weight: 193 lb 9.6 oz (87.8 kg)   2.  I/O     Intake/Output Summary (Last 24 hours) at 4/26/2022 0849  Last data filed at 4/26/2022 7628  Gross per 24 hour   Intake 960 ml Output 1725 ml   Net -765 ml           Assessment/Plan:     1. AHF- compensated, continue torsemide  2. NICM - on low dose BB, restart lisinopril 2.5mg/day, no cathy for noncompliance, SGLT2i unaffordable  3. AF- rate controlled on BB, continue AC  4.  VT- ICD placed    Pt ok for d/c today, already has meds at bedside, will f/u in CHF clinic in one week        I appreciate the opportunity of cooperating in the care of this individual.    Margo Henderson, APRN - CNP, ACNP, [address] 4/26/2022, 8:49 AM  Heart Failure  The [address], [address]  Ph: [phone]      Core Measures:   · Discharge instructions:   · LVEF documented:   · ACEI for LV dysfunction:   · Smoking Cessation:

## 2022-04-26 NOTE — PROGRESS NOTES
[facility] Post Op Implant/PDE Device Check  Rep Checked Device   Pre Rep-Changed LV vector from LV 4-coil to LV3-coil due to non capture

## 2022-04-26 NOTE — PROGRESS NOTES
[address]   Electrophysiology Progress Note   Date: 4/26/2022  Admit Date: 4/13/2022     Reason for follow up: NSVT, CMP    Chief Complaint:   Chief Complaint   Patient presents with    Shortness of Breath     pt c/o shortness of breath x couple of days. History of Present Illness: History obtained from patient and medical record. Gabe Valiente is a 59 y.o. male with a past medical history of hypertension, sCHF, NICM with EF 20-25%, persistent atrial fibrillation. Seen by EP 2/2022 and CONSUELO was performed which showed moderate M, HENOK thrombus, and LV dysfunction 20-25%. Marquis Keita was discussed and he declined at that time. Started on GDMT at that time. He has ILR after DCCV 7/26/2019 Dr. Nic Espitia. Interval Hx: Today, he is being seen for follow up. AF and NSVT on telemery. No new complaints today. No major events overnight. Denies having chest pain, palpitations, shortness of breath, orthopnea or dizziness. Patient seen and examined. Clinical notes reviewed. Telemetry reviewed. Assessment and Plan:  Ventricular tachycardia/NSVT   - NSVT nearly 30 seconds in duration this admisssion   - No recurrence on telemetry   - S/p AICD   - Likely secondary to NICM with EF 20-25%  Acute on Chronic HFrEF   - Appears compensated   - Continue GDMT   . Heart failure education:   - Salt restriction < 2 grams/day  - Fluid restriction < 2 lit/day  - Exercise 30 min, 5 times/week  - Alcohol abstinence   - Avoiding over the counter NSAIDs ( e.g. Advil)  - Medication compliance   - Weight loss and heart health diet  - Daily weight   - Follow up with heart failure clinic. NICM   - EF 20-25%. -.S/p Biv-AICD    ~ Left upper chest CDI not hematoma or oozing    ~ Device interrogation showed elevated thresholds and LV vector was changed from LV3 to coil to LV4 to coil. Reivewed with Dr. Nic Espitia and we will repeat CXR today to assess lead position. Otherwise normal device fx.   Persistent AF   - AF/ today on telemetry    - At this point will continue with [facility] and rate control, can consider AAD or invasive therapy as OP   - Continue Eliquis 5 mg bid  HENOK thrombus   - Per CONSUELO 2/10/2022   - Continue with AC  MR   - Moderate per echo   - Stable    - 2-view CXR now  - May need device adjustments pending CXR results    All pertinent information and plan of care discussed with the EP physician. Multiple medical conditions with risk of decompensation. Problem List:   Patient Active Problem List    Diagnosis Date Noted    VT (ventricular tachycardia) ([address]     Acute on chronic congestive heart failure (HCC)     Complicated UTI (urinary tract infection)     Chronic atrial fibrillation (HCC)     Severe mitral regurgitation     Acute systolic heart failure ([address] 04/13/2022    NSTEMI (non-ST elevated myocardial infarction) ([address]     Acute on chronic combined systolic and diastolic heart failure ([address] 02/06/2022    Homeless     COVID     Non-compliance     Acute on chronic systolic heart failure ([address] 11/28/2021    Acute pulmonary edema ([address] 11/27/2021    Encounter for loop recorder check 07/26/2019    Acute urinary retention 07/26/2019    Dilated cardiomyopathy ([address]     Acute renal failure ([address]     PAF (paroxysmal atrial fibrillation) ([address]     Benign essential HTN     Acute retention of urine 07/24/2019      Allergies:  No Known Allergies  Home Meds:  Prior to Visit Medications    Medication Sig Taking?  Authorizing Provider   torsemide (DEMADEX) 20 MG tablet Take 1 tablet by mouth daily  TRENT Alamo - CNS   lisinopril (PRINIVIL;ZESTRIL) 5 MG tablet Take 1 tablet by mouth daily  TRENT [place]amo - CNS   metoprolol succinate (TOPROL XL) 50 MG extended release tablet Take 1 tablet by mouth daily  TRENT [place] - CNS   potassium chloride (KLOR-CON M) 20 MEQ extended release tablet Take 2 tablets by mouth daily for 1 day  TRENT Lorenz CNP   metOLazone (Mike Sarwat) 5 MG tablet Take 1 tablet by mouth once for 1 dose  TRENT [place]amo   vitamin D (CHOLECALCIFEROL) 50 MCG (2000 UT) TABS tablet Take 1 tablet by mouth daily  TRENT [place]amo   aspirin 81 MG chewable tablet Take 1 tablet by mouth daily  Sheridan Ramey MD   atorvastatin (LIPITOR) 40 MG tablet Take 1 tablet by mouth nightly  Sheridan Ramey MD   finasteride (PROSCAR) 5 MG tablet Take 1 tablet by mouth daily  Sheridan Ramey MD   tamsulosin (FLOMAX) 0.4 MG capsule Take 1 capsule by mouth daily  Raysa Nunez MD   apixaban (ELIQUIS) 5 MG TABS tablet Take 1 tablet by mouth 2 times daily  Patient taking differently: Take 5 mg by mouth Patient stated that he has started to take 1 time a day instead of 2. Patient stated that taking 2 a day was causing nosebleeds.   Sheridan Ramey MD      Scheduled Meds:   sodium chloride flush  5-40 mL IntraVENous 2 times per day    torsemide  40 mg Oral BID    sodium chloride flush  5-40 mL IntraVENous 2 times per day    apixaban  5 mg Oral BID    metoprolol succinate  25 mg Oral Daily    atorvastatin  40 mg Oral Nightly    finasteride  5 mg Oral Daily    tamsulosin  0.4 mg Oral Daily    sodium chloride flush  5-40 mL IntraVENous 2 times per day     Continuous Infusions:   sodium chloride      amiodarone      sodium chloride      sodium chloride       PRN Meds:sodium chloride flush, sodium chloride, oxyCODONE **OR** oxyCODONE, sodium chloride flush, sodium chloride, sodium chloride flush, sodium chloride, polyethylene glycol, acetaminophen **OR** acetaminophen, prochlorperazine **OR** prochlorperazine (COMPAZINE) with normal saline injection   Past Medical History:  Past Medical History:   Diagnosis Date    [facility] 07/25/2019    CHF (congestive heart failure) (HCC)     Hx of blood clots     Hypertension       Past Surgical History:    has a past surgical history that includes Insertable Cardiac Monitor (07/26/2019) and Cardioversion (07/26/2019). Social History:  Reviewed. reports that he has never smoked. He has never used smokeless tobacco. He reports that he does not drink alcohol and does not use drugs. Family History:  Reviewed. family history includes Heart Attack in his mother; Heart Disease in his mother; High Blood Pressure in his father and mother; Other in his father; Stroke in his father. Denies family history of sudden cardiac death, arrhythmia, premature CAD    Review of Systems:  · Constitutional: Negative for fever, weight changes, or weakness  · Skin: Negative for bruising, bleeding, or changes in skin pigment  · HEENT: Negative for vision changes or dysphagia  · Respiratory: Reviewed in HPI  · Cardiovascular: Reviewed in HPI  · Gastrointestinal: Negative for abdominal pain or black/tarry stools  · Genito-Urinary: Negative for hematuria  · Musculoskeletal: No focal weakness  · Neurological/Psych: Negative for confusion or TIA-like symptoms. No anxiety, depression, or insomnia    Physical Examination:  Vitals:    04/26/22 0815   BP: 123/88   Pulse: 81   Resp: 16   Temp: 98.4 °F (36.9 °C)   SpO2: 97%      In: 960 [P.O.:960]  Out: 1725    Wt Readings from Last 3 Encounters:   04/26/22 193 lb 9.6 oz (87.8 kg)   04/12/22 227 lb 9.6 oz (103.2 kg)   03/22/22 220 lb 1.6 oz (99.8 kg)       Intake/Output Summary (Last 24 hours) at 4/26/2022 6525  Last data filed at 4/26/2022 9782  Gross per 24 hour   Intake 960 ml   Output 1725 ml   Net -765 ml     Telemetry: Personally Reviewed Atrial fibrillation , NSVT  · Constitutional: Cooperative and in no apparent distress, and appears well nourished  · Skin: Warm and pink; no cyanosis, bruising, or clubbing  · HEENT: Symmetric and normocephalic. Conjunctiva pink with clear sclera. Mucus membranes pink and moist.   · Cardiovascular: Irregular rate and rhythm. S1 & S2, negative for murmurs. Peripheral pulses 2+, capillary refill < 3 seconds. negative elevation of JVP.    · Respiratory: Respirations symmetric and unlabored. Lungs clear to auscultation bilaterally, no wheezing, crackles, or rhonchi  · Gastrointestinal: Abdomen soft and round. Bowel sounds normoactive in all quadrants. · Musculoskeletal: No focal weakness. · Neurologic/Psych: Awake and orientated to person, place and time. Calm affect, appropriate mood    Pertinent labs, diagnostic, device, and imaging results reviewed as a part of this visit    Labs:    BMP:   Recent Labs     22  0545   NA  --  135*   K  --  4.2   CL  --  96*   CO2  --  31   BUN  --  18   CREATININE  --  1.1   MG 2.20  --      Estimated Creatinine Clearance: 80 mL/min (based on SCr of 1.1 mg/dL). CBC:   Recent Labs     22  0545   WBC 4.7   HGB 11.0*   HCT 33.0*   MCV 80.4        Thyroid: No results found for: TSH, V6LBBIP, M4NOTZG, THYROIDAB  Lipids: No results found for: CHOL, HDL, TRIG  LFTS:   Lab Results   Component Value Date    ALT 35 2022    AST 33 2022    ALKPHOS 84 2022    PROT 6.0 2022    AGRATIO 1.4 2022    BILITOT 1.0 2022     Cardiac Enzymes:   Lab Results   Component Value Date    TROPONINI <0.01 2022    TROPONINI <0.01 2022    TROPONINI 0.52 2022     Coags:   Lab Results   Component Value Date    PROTIME 17.2 2022    INR 1.50 2022     EC2022: Atrial Fibrillation    ECHO:  2/10/2022      Summary   Left ventricular size is moderately increased. Overall left ventricular   systolic function appears severely reduced with ejection fraction of 20-25%   and severe diffuse hypokinesis. Moderate mitral regurgitation. Calculated ERO of 0.33 cm2. Left atrial size appears dilated. There is an echodensity inside the left   atrial appendage consistent with left atrial appendage thrombus. The right ventricle is enlarged. Right ventricular systolic function is   mildly reduced . Mild to moderate tricuspid regurgitation.     All questions and concerns were addressed to the patient. Alternatives to my treatment were discussed. I have discussed the above stated plan with patient and the nurse. The patient verbalized understanding and agreed with the plan. Thank you for allowing to us to participate in the care of Olegario Lopez.     TRENT Stephenson-KATLYN  [address]   Office: [phone]

## 2022-04-26 NOTE — PROGRESS NOTES
Data- discharge order received, pt verbalized agreement to discharge, disposition to previous residence, no needs for HHC/DME. Action- discharge instructions prepared and given to pt, pt verbalized understanding. Medication information packet given r/t NEW and/or CHANGED prescriptions emphasizing name/purpose/side effects, pt verbalized understanding. Discharge instruction summary: Diet- cardiac, Activity- as tolerated, Primary Care Physician as follows: No primary care provider on file. None f/u appointment scheduled with provided PCP list, immunizations reviewed and N/A, prescription medications filled MEDS TO BEDS. Inpatient surgical procedure precautions reviewed: all post-ICD insertion education provided. CHF Education reviewed. Pt/ Family has had a total of 60 minutes CHF education this admission encounter. 1. WEIGHT: Admit Weight: 227 lb (103 kg) (04/13/22 1210)        Today  Weight: 193 lb 9.6 oz (87.8 kg) (04/26/22 0224)       2. O2 SAT.: SpO2: 97 % (04/26/22 1457)    Response- Pt belongings gathered, IV removed. Disposition is home (no HHC/DME needs), transported with belongings, taken to lobby via w/c w/ staff, no complications.

## 2022-04-26 NOTE — DISCHARGE INSTR - COC
Continuity of Care Form    Patient Name: Seth Cooper   :  1960  MRN:  [id number]    Admit date:  2022  Discharge date:  ***    Code Status Order: Full Code   Advance Directives:      Admitting Physician:  Shona Osgood, MD  PCP: No primary care provider on file. Discharging Nurse: [facility] Unit/Room#: [id number]-[id number]/3320-01  Discharging Unit Phone Number: ***    Emergency Contact:   Extended Emergency Contact Information  Primary Emergency Contact: Edward [address] Phone: [phone]  Mobile Phone: [phone]  Relation: Other   needed? No    Past Surgical History:  Past Surgical History:   Procedure Laterality Date    CARDIOVERSION  2019    Dr. Annamaria Nielsen  2019       Immunization History: There is no immunization history for the selected administration types on file for this patient.     Active Problems:  Patient Active Problem List   Diagnosis Code    Acute retention of urine R33.8    Acute renal failure ([facility]) N17.9    PAF (paroxysmal atrial fibrillation) ([facility]) I48.0    Benign essential HTN I10    Dilated cardiomyopathy ([facility] I42.0    Encounter for loop recorder check Z45.09    Acute urinary retention R33.8    Acute pulmonary edema ([facility]) J81.0    Acute on chronic systolic heart failure ([facility]) I50.23    COVID U07.1    Non-compliance Z91.19    Homeless Z59.00    Acute on chronic combined systolic and diastolic heart failure ([facility]) I50.43    NSTEMI (non-ST elevated myocardial infarction) ([facility]) A22.9    Acute systolic heart failure ([facility]) T91.67    Complicated UTI (urinary tract infection) N39.0    Chronic atrial fibrillation ([facility]) I48.20    Severe mitral regurgitation I34.0    Acute on chronic congestive heart failure ([facility]) I50.9    VT (ventricular tachycardia) ([facility]) I47.2    ICD (implantable cardioverter-defibrillator), biventricular, in situ Z95.810       Isolation/Infection:   Isolation            No Isolation          Patient Infection Status       Infection Onset Added Last Indicated Last Indicated By Review Planned Expiration Resolved Resolved By    None active    Resolved    COVID-19 21 COVID-19   21     COVID-19 (Rule Out) 21 COVID-19 (Ordered)   21 Rule-Out Test Resulted            Nurse Assessment:  Last Vital Signs: /88   Pulse 81   Temp 98.4 °F (36.9 °C) (Oral)   Resp 16   Ht 6' 2\" (1.88 m)   Wt 193 lb 9.6 oz (87.8 kg)   SpO2 97%   BMI 24.86 kg/m²     Last documented pain score (0-10 scale): Pain Level: 1  Last Weight:   Wt Readings from Last 1 Encounters:   22 193 lb 9.6 oz (87.8 kg)     Mental Status:  {IP PT MENTAL STATUS:68877}    IV Access:  { JOSE ROBERTO IV ACCESS:853076687}    Nursing Mobility/ADLs:  Walking   {CHP DME YWLH:125840399}  Transfer  {CHP DME KPWP:602308464}  Bathing  {CHP DME NCNA:765201176}  Dressing  {CHP DME FZXI:491689878}  Toileting  {CHP DME QPKQ:345259498}  Feeding  {CHP DME FLIT:012470969}  Med Admin  {CHP DME QDOQ:050845749}  Med Delivery   { JOSE ROBERTO MED Delivery:897304638}    Wound Care Documentation and Therapy:  Incision 22 Sternum Left;Upper (Active)   Dressing Status Clean;Dry; Intact 22 0440   Incision Assessment Other (Comment) 22 0440   Number of days: 0       Incision 22 Sternum Upper (Active)   Dressing Status Clean;Dry; Intact 22 0440   Incision Assessment Other (Comment) 22 0440   Number of days: 0        Elimination:  Continence: Bowel: {YES / WI:30521}  Bladder: {YES / PE:99011}  Urinary Catheter: {Urinary Catheter:101000118}   Colostomy/Ileostomy/Ileal Conduit: {YES / AU:82663}       Date of Last BM: ***    Intake/Output Summary (Last 24 hours) at 2022 1251  Last data filed at 2022 1237  Gross per 24 hour   Intake 1686.37 ml   Output 1225 ml   Net 461.37 ml     I/O last 3 completed shifts:   In: 720 [P.O.:720]  Out: 2200 [Urine:2200]    Safety Concerns:     [address] [place] [address] Street Safety Concerns:353328464}    Impairments/Disabilities:      { JOSE ROBERTO Impairments/Disabilities:138307881}    Nutrition Therapy:  Current Nutrition Therapy:   [address] JOSE ROBERTO Diet List:553888777}    Routes of Feeding: {CHP DME Other Feedings:551543342}  Liquids: {Slp liquid thickness:93177}  Daily Fluid Restriction: {CHP DME Yes amt example:096104013}  Last Modified Barium Swallow with Video (Video Swallowing Test): {Done Not Done KWCL:840054959}    Treatments at the Time of Hospital Discharge:   Respiratory Treatments: ***  Oxygen Therapy:  {Therapy; copd oxygen:51979}  Ventilator:    {[facility] Vent VYPK:036095107}    Rehab Therapies: {THERAPEUTIC INTERVENTION:5273761612}  Weight Bearing Status/Restrictions: [address] CC Weight Bearin}  Other Medical Equipment (for information only, NOT a DME order):  {EQUIPMENT:340181179}  Other Treatments: ***    Patient's personal belongings (please select all that are sent with patient):  {[facility] DME Belongings:127315091}    RN SIGNATURE:  {Esignature:168946407}    CASE MANAGEMENT/SOCIAL WORK SECTION    Inpatient Status Date: ***    Readmission Risk Assessment Score:  Readmission Risk              Risk of Unplanned Readmission:  23           PCP follow up he will be seeing   KATLYN Rodriguez   with [address] in [facility]- [phone] on 22. / signature: Electronically signed by Corby Newsome RN on 22 at 12:51 PM EDT    PHYSICIAN SECTION    Prognosis: {Prognosis:0145563107}    Condition at Discharge: [address] Patient Condition:506010029}    Rehab Potential (if transferring to Rehab): {Prognosis:5740060292}    Recommended Labs or Other Treatments After Discharge: ***    Physician Certification: I certify the above information and transfer of Samuel Tanner  is necessary for the continuing treatment of the diagnosis listed and that he requires {Admit to Appropriate Level of Care:97290} for {GREATER/LESS:692392580} 30 days. Update Admission H&P: {CHP DME Changes in SRQUB:332611117}    PHYSICIAN SIGNATURE:  {Esignature:144708308}

## 2022-04-26 NOTE — PROGRESS NOTES
Pt discharge paperwork and education complete, 1 week's worth of medications sorted into day and night pill sorters with pt so he can guarantee compliance and get new routine experience. PIV and portable telemetry removed. PCP list provided, pt has already selected PCP and scheduled appt. Pt is currently without a permanent residence, but will be staying with a friend. Friend can not p/u until 1700, I offered a Lyft ride, pt declined d/t the residence being locked until his friend can access with the keys. Will await pickup at 1700.

## 2022-04-27 ENCOUNTER — TELEPHONE (OUTPATIENT)
Dept: OTHER | Age: 62
End: 2022-04-27

## 2022-04-27 LAB
EKG ATRIAL RATE: 76 BPM
EKG DIAGNOSIS: NORMAL
EKG Q-T INTERVAL: 494 MS
EKG QRS DURATION: 164 MS
EKG QTC CALCULATION (BAZETT): 551 MS
EKG R AXIS: -82 DEGREES
EKG T AXIS: 100 DEGREES
EKG VENTRICULAR RATE: 75 BPM

## 2022-04-27 PROCEDURE — 93284 PRGRMG EVAL IMPLANTABLE DFB: CPT | Performed by: INTERNAL MEDICINE

## 2022-04-27 PROCEDURE — 93010 ELECTROCARDIOGRAM REPORT: CPT | Performed by: INTERNAL MEDICINE

## 2022-04-27 NOTE — TELEPHONE ENCOUNTER
[facility]  TELEPHONE ENCOUNTER FORM    Angel Silvestre 1960    Attempted to call patient for HF follow-up. Patient not available to talk per friend.       Next MD/ Clinic appointment: 4/29 with Melinda RODAS RN 4/27/2022 1:42 PM

## 2022-04-28 ENCOUNTER — TELEPHONE (OUTPATIENT)
Dept: OTHER | Age: 62
End: 2022-04-28

## 2022-04-29 ENCOUNTER — TELEPHONE (OUTPATIENT)
Dept: OTHER | Age: 62
End: 2022-04-29

## 2022-04-29 ENCOUNTER — HOSPITAL ENCOUNTER (INPATIENT)
Age: 62
LOS: 1 days | Discharge: HOME | DRG: 422 | End: 2022-05-01
Attending: INTERNAL MEDICINE | Admitting: INTERNAL MEDICINE
Payer: COMMERCIAL

## 2022-04-29 ENCOUNTER — OFFICE VISIT (OUTPATIENT)
Dept: CARDIOLOGY CLINIC | Age: 62
End: 2022-04-29
Payer: COMMERCIAL

## 2022-04-29 ENCOUNTER — APPOINTMENT (OUTPATIENT)
Dept: CT IMAGING | Age: 62
DRG: 422 | End: 2022-04-29
Payer: COMMERCIAL

## 2022-04-29 ENCOUNTER — TELEPHONE (OUTPATIENT)
Dept: OTHER | Facility: CLINIC | Age: 62
End: 2022-04-29

## 2022-04-29 ENCOUNTER — APPOINTMENT (OUTPATIENT)
Dept: GENERAL RADIOLOGY | Age: 62
DRG: 422 | End: 2022-04-29
Payer: COMMERCIAL

## 2022-04-29 VITALS
WEIGHT: 199.5 LBS | SYSTOLIC BLOOD PRESSURE: 114 MMHG | HEART RATE: 77 BPM | OXYGEN SATURATION: 98 % | HEIGHT: 74 IN | DIASTOLIC BLOOD PRESSURE: 78 MMHG | BODY MASS INDEX: 25.6 KG/M2

## 2022-04-29 DIAGNOSIS — S01.81XA FACIAL LACERATION, INITIAL ENCOUNTER: ICD-10-CM

## 2022-04-29 DIAGNOSIS — Z59.00 HOMELESS: ICD-10-CM

## 2022-04-29 DIAGNOSIS — I10 BENIGN ESSENTIAL HTN: ICD-10-CM

## 2022-04-29 DIAGNOSIS — I48.0 PAF (PAROXYSMAL ATRIAL FIBRILLATION) (HCC): ICD-10-CM

## 2022-04-29 DIAGNOSIS — R55 SYNCOPE AND COLLAPSE: Primary | ICD-10-CM

## 2022-04-29 DIAGNOSIS — S09.90XA INJURY OF HEAD, INITIAL ENCOUNTER: ICD-10-CM

## 2022-04-29 DIAGNOSIS — I50.22 CHRONIC SYSTOLIC CONGESTIVE HEART FAILURE (HCC): Primary | ICD-10-CM

## 2022-04-29 DIAGNOSIS — Z91.199 NON-COMPLIANCE: ICD-10-CM

## 2022-04-29 DIAGNOSIS — I42.0 DILATED CARDIOMYOPATHY (HCC): ICD-10-CM

## 2022-04-29 LAB
A/G RATIO: 1.2 (ref 1.1–2.2)
ALBUMIN SERPL-MCNC: 3.7 G/DL (ref 3.4–5)
ALP BLD-CCNC: 89 U/L (ref 40–129)
ALT SERPL-CCNC: 30 U/L (ref 10–40)
ANION GAP SERPL CALCULATED.3IONS-SCNC: 9 MMOL/L (ref 3–16)
AST SERPL-CCNC: 33 U/L (ref 15–37)
BASOPHILS ABSOLUTE: 0 K/UL (ref 0–0.2)
BASOPHILS RELATIVE PERCENT: 1 %
BILIRUB SERPL-MCNC: 1.2 MG/DL (ref 0–1)
BUN BLDV-MCNC: 16 MG/DL (ref 7–20)
CALCIUM SERPL-MCNC: 8.7 MG/DL (ref 8.3–10.6)
CHLORIDE BLD-SCNC: 101 MMOL/L (ref 99–110)
CO2: 33 MMOL/L (ref 21–32)
CREAT SERPL-MCNC: 1.1 MG/DL (ref 0.8–1.3)
EOSINOPHILS ABSOLUTE: 0.1 K/UL (ref 0–0.6)
EOSINOPHILS RELATIVE PERCENT: 1.3 %
GFR AFRICAN AMERICAN: >60
GFR NON-AFRICAN AMERICAN: >60
GLUCOSE BLD-MCNC: 82 MG/DL (ref 70–99)
HCT VFR BLD CALC: 31.9 % (ref 40.5–52.5)
HEMOGLOBIN: 10.5 G/DL (ref 13.5–17.5)
LYMPHOCYTES ABSOLUTE: 1.1 K/UL (ref 1–5.1)
LYMPHOCYTES RELATIVE PERCENT: 20.5 %
MAGNESIUM: 2.1 MG/DL (ref 1.8–2.4)
MCH RBC QN AUTO: 26.4 PG (ref 26–34)
MCHC RBC AUTO-ENTMCNC: 33 G/DL (ref 31–36)
MCV RBC AUTO: 80.1 FL (ref 80–100)
MONOCYTES ABSOLUTE: 0.4 K/UL (ref 0–1.3)
MONOCYTES RELATIVE PERCENT: 8.4 %
NEUTROPHILS ABSOLUTE: 3.5 K/UL (ref 1.7–7.7)
NEUTROPHILS RELATIVE PERCENT: 68.8 %
PDW BLD-RTO: 15.8 % (ref 12.4–15.4)
PLATELET # BLD: 198 K/UL (ref 135–450)
PMV BLD AUTO: 7.9 FL (ref 5–10.5)
POTASSIUM REFLEX MAGNESIUM: 3.3 MMOL/L (ref 3.5–5.1)
RBC # BLD: 3.98 M/UL (ref 4.2–5.9)
SODIUM BLD-SCNC: 143 MMOL/L (ref 136–145)
TOTAL PROTEIN: 6.7 G/DL (ref 6.4–8.2)
TROPONIN: <0.01 NG/ML
WBC # BLD: 5.1 K/UL (ref 4–11)

## 2022-04-29 PROCEDURE — G0378 HOSPITAL OBSERVATION PER HR: HCPCS

## 2022-04-29 PROCEDURE — 36415 COLL VENOUS BLD VENIPUNCTURE: CPT

## 2022-04-29 PROCEDURE — 70450 CT HEAD/BRAIN W/O DYE: CPT

## 2022-04-29 PROCEDURE — 0HQ1XZZ REPAIR FACE SKIN, EXTERNAL APPROACH: ICD-10-PCS | Performed by: INTERNAL MEDICINE

## 2022-04-29 PROCEDURE — 93005 ELECTROCARDIOGRAM TRACING: CPT | Performed by: PHYSICIAN ASSISTANT

## 2022-04-29 PROCEDURE — 6370000000 HC RX 637 (ALT 250 FOR IP): Performed by: NURSE PRACTITIONER

## 2022-04-29 PROCEDURE — 90715 TDAP VACCINE 7 YRS/> IM: CPT | Performed by: PHYSICIAN ASSISTANT

## 2022-04-29 PROCEDURE — 83735 ASSAY OF MAGNESIUM: CPT

## 2022-04-29 PROCEDURE — 6360000002 HC RX W HCPCS: Performed by: PHYSICIAN ASSISTANT

## 2022-04-29 PROCEDURE — 84484 ASSAY OF TROPONIN QUANT: CPT

## 2022-04-29 PROCEDURE — 12013 RPR F/E/E/N/L/M 2.6-5.0 CM: CPT

## 2022-04-29 PROCEDURE — 99285 EMERGENCY DEPT VISIT HI MDM: CPT

## 2022-04-29 PROCEDURE — 99215 OFFICE O/P EST HI 40 MIN: CPT | Performed by: CLINICAL NURSE SPECIALIST

## 2022-04-29 PROCEDURE — 85025 COMPLETE CBC W/AUTO DIFF WBC: CPT

## 2022-04-29 PROCEDURE — 80053 COMPREHEN METABOLIC PANEL: CPT

## 2022-04-29 PROCEDURE — 90471 IMMUNIZATION ADMIN: CPT | Performed by: PHYSICIAN ASSISTANT

## 2022-04-29 PROCEDURE — 71045 X-RAY EXAM CHEST 1 VIEW: CPT

## 2022-04-29 PROCEDURE — 72125 CT NECK SPINE W/O DYE: CPT

## 2022-04-29 RX ORDER — TORSEMIDE 20 MG/1
40 TABLET ORAL 2 TIMES DAILY
Status: DISCONTINUED | OUTPATIENT
Start: 2022-04-30 | End: 2022-04-30

## 2022-04-29 RX ORDER — ONDANSETRON 2 MG/ML
4 INJECTION INTRAMUSCULAR; INTRAVENOUS EVERY 6 HOURS PRN
Status: CANCELLED | OUTPATIENT
Start: 2022-04-29

## 2022-04-29 RX ORDER — ONDANSETRON 4 MG/1
4 TABLET, ORALLY DISINTEGRATING ORAL EVERY 8 HOURS PRN
Status: CANCELLED | OUTPATIENT
Start: 2022-04-29

## 2022-04-29 RX ORDER — LISINOPRIL 2.5 MG/1
2.5 TABLET ORAL DAILY
Qty: 30 TABLET | Refills: 0 | Status: SHIPPED
Start: 2022-04-29 | End: 2022-05-31 | Stop reason: SDUPTHER

## 2022-04-29 RX ORDER — POTASSIUM CHLORIDE 750 MG/1
20 TABLET, FILM COATED, EXTENDED RELEASE ORAL ONCE
Status: COMPLETED | OUTPATIENT
Start: 2022-04-29 | End: 2022-04-29

## 2022-04-29 RX ADMIN — POTASSIUM CHLORIDE 20 MEQ: 750 TABLET, FILM COATED, EXTENDED RELEASE ORAL at 23:26

## 2022-04-29 RX ADMIN — TETANUS TOXOID, REDUCED DIPHTHERIA TOXOID AND ACELLULAR PERTUSSIS VACCINE, ADSORBED 0.5 ML: 5; 2.5; 8; 8; 2.5 SUSPENSION INTRAMUSCULAR at 17:31

## 2022-04-29 SDOH — ECONOMIC STABILITY - HOUSING INSECURITY: HOMELESSNESS UNSPECIFIED: Z59.00

## 2022-04-29 ASSESSMENT — ENCOUNTER SYMPTOMS
RESPIRATORY NEGATIVE: 1
GASTROINTESTINAL NEGATIVE: 1
EYES NEGATIVE: 1

## 2022-04-29 ASSESSMENT — PAIN SCALES - GENERAL: PAINLEVEL_OUTOF10: 4

## 2022-04-29 ASSESSMENT — PAIN DESCRIPTION - DESCRIPTORS: DESCRIPTORS: ACHING

## 2022-04-29 ASSESSMENT — PAIN DESCRIPTION - ORIENTATION: ORIENTATION: LEFT

## 2022-04-29 ASSESSMENT — PAIN DESCRIPTION - LOCATION: LOCATION: HEAD

## 2022-04-29 ASSESSMENT — PAIN - FUNCTIONAL ASSESSMENT: PAIN_FUNCTIONAL_ASSESSMENT: 0-10

## 2022-04-29 ASSESSMENT — PAIN DESCRIPTION - FREQUENCY: FREQUENCY: CONTINUOUS

## 2022-04-29 NOTE — PROGRESS NOTES
Rapid Response Quick Summary    Room: front lobby by registration     Assessment of concern / patient: patient reports felt dizzy. Witnesses state he fell and hit head on a table, laceration noted     Physician involved:  Dr. Emre Nye     Interventions: assisted to wheelchair dressing and pressure applied to forehead      Disposition:   To ER per WC

## 2022-04-29 NOTE — PATIENT INSTRUCTIONS
1.  Continue all current medications  2. Continue to fill pill box every week and make sure if pill bottle is empty get filled at the pharmacy  3. Check blood work next week  4. Device check next Tuesday at 1030 am (here in cardiology)  5.   RTO in 2 weeks

## 2022-04-29 NOTE — TELEPHONE ENCOUNTER
Admission orders were placed before writer contacted ED provider to inform of 30 day readmission risk

## 2022-04-29 NOTE — PROGRESS NOTES
[address]  Progress Note    Primary Care Doctor:  No primary care provider on file. Chief Complaint   Patient presents with    Congestive Heart Failure     No complaints     Hypertension    Follow-up     2 Weeks         History of Present Illness:  59 y.o. male with history of with history of PAF, hypertension. Unvaccinated, , homeless  hospitalization 2/5-10/22 for weight gain and lower extremity edema. He recently had covid and did not follow up in office. LVEF 20%, LHC done. Weight 223->193. He was started on HF therapy, declined life vest.  CONSUELO done with HENOK thrombus (on eliquis per EP). Not able to do CV    I had the pleasure of seeing Meghan Kaur in follow up for hospitalization 4/13-26/22 for fluid overload requiring diureses with milrinone and lasix infusion, UTI and VT requiring ICD placement. His weight at discharge was 193 and today 199. He has all his medications in pill boxes (brought them with him) today and is taking all as prescribed. He complains of some soreness at his ICD site (edema) no bleeding or redness. He is pleased with his weight and that he is not shortness of breath and edema still down in his legs. No issues with urinating. Several attempted phone calls 4/27, 28 and 29    Past Medical History:   has a past medical history of Atrial fibrillation ([address] CHF (congestive heart failure) ([address] Hx of blood clots, and Hypertension. Surgical History:   has a past surgical history that includes Insertable Cardiac Monitor (07/26/2019) and Cardioversion (07/26/2019). Social History:   reports that he has never smoked. He has never used smokeless tobacco. He reports that he does not drink alcohol and does not use drugs.    Family History:   Family History   Problem Relation Age of Onset    Heart Disease Mother     High Blood Pressure Mother     Heart Attack Mother     Other Father     Stroke Father     High Blood Pressure Father        Home Medications:  Prior to Admission medications    Medication Sig Start Date End Date Taking? Authorizing Provider   lisinopril (PRINIVIL;ZESTRIL) 2.5 MG tablet Take 1 tablet by mouth daily 4/29/22  Yes TRENT Bonilla   metoprolol succinate (TOPROL XL) 25 MG extended release tablet Take 1 tablet by mouth daily 4/27/22  Yes Gasper Daniels MD   torsemide 40 MG TABS Take 40 mg by mouth in the morning and at bedtime 4/26/22  Yes Gasper Daniels MD   aspirin 81 MG chewable tablet Take 1 tablet by mouth daily 4/26/22  Yes Gasper Daniels MD   apixaban (ELIQUIS) 5 MG TABS tablet Take 1 tablet by mouth 2 times daily 4/26/22  Yes Gasper Daniels MD   atorvastatin (LIPITOR) 40 MG tablet Take 1 tablet by mouth nightly 4/26/22  Yes Gasper Daniels MD   potassium chloride (KLOR-CON M) 20 MEQ extended release tablet Take 2 tablets by mouth daily for 1 day 4/26/22 4/29/22 Yes Gasper Daniels MD   finasteride (PROSCAR) 5 MG tablet Take 1 tablet by mouth daily 4/26/22  Yes Gasper Daniels MD   tamsulosin (FLOMAX) 0.4 MG capsule Take 1 capsule by mouth daily 4/26/22  Yes Gasper Daniels MD   vitamin D (CHOLECALCIFEROL) 50 MCG (2000 UT) TABS tablet Take 1 tablet by mouth daily 2/16/22  Yes TRENT Bonilla        Allergies:  Patient has no known allergies. Review of Systems:   · Constitutional: there has been no unanticipated weight loss. There's been no change in energy level, sleep pattern, or activity level. · Eyes: No visual changes or diplopia. No scleral icterus. · ENT: No Headaches, hearing loss or vertigo. No mouth sores or sore throat. · Cardiovascular: Reviewed in HPI  · Respiratory: No cough or wheezing, no sputum production. No hematemesis. · Gastrointestinal: No abdominal pain, appetite loss, blood in stools. No change in bowel or bladder habits. · Genitourinary: No dysuria, trouble voiding, or hematuria.   · Musculoskeletal:  No gait disturbance, weakness or joint complaints. · Integumentary: No rash or pruritis. · Neurological: No headache, diplopia, change in muscle strength, numbness or tingling. No change in gait, balance, coordination, mood, affect, memory, mentation, behavior. · Psychiatric: No anxiety, no depression. · Endocrine: No malaise, fatigue or temperature intolerance. No excessive thirst, fluid intake, or urination. No tremor. · Hematologic/Lymphatic: No abnormal bruising or bleeding, blood clots or swollen lymph nodes. · Allergic/Immunologic: No nasal congestion or hives. Physical Examination:    Vitals:    04/29/22 1450   BP: 114/78   Site: Left Upper Arm   Position: Sitting   Cuff Size: Medium Adult   Pulse: 77   SpO2: 98%   Weight: 199 lb 8 oz (90.5 kg)   Height: 6' 2\" (1.88 m)        Constitutional and General Appearance: Warm and dry, no apparent distress, normal coloration  HEENT:  Normocephalic, atraumatic  Respiratory:  · Normal excursion and expansion without use of accessory muscles  · Resp Auscultation: Normal breath sounds without dullness  Cardiovascular:  · The apical impulses not displaced  · Heart tones are crisp and normal  · JVP normal cm H2O  · irregular rate and rhythm  · Peripheral pulses are symmetrical and full  · There is no clubbing, cyanosis of the extremities.   · Bilateral lower ankle edema  · Pedal Pulses: 2+ and equal   Abdomen:  · No masses or tenderness  · Liver/Spleen: No Abnormalities Noted  Neurological/Psychiatric:  · Alert and oriented in all spheres  · Moves all extremities well  · Exhibits normal gait balance and coordination  · No abnormalities of mood, affect, memory, mentation, or behavior are noted    Lab Data:    CBC:   Lab Results   Component Value Date    WBC 4.7 04/24/2022    WBC 3.9 04/23/2022    WBC 5.3 04/22/2022    RBC 4.11 04/24/2022    RBC 4.18 04/23/2022    RBC 4.48 04/22/2022    HGB 11.0 04/24/2022    HGB 11.1 04/23/2022    HGB 11.5 04/22/2022    HCT 33.0 04/24/2022    HCT 33.6 04/23/2022    HCT 36.1 04/22/2022    MCV 80.4 04/24/2022    MCV 80.3 04/23/2022    MCV 80.6 04/22/2022    RDW 14.9 04/24/2022    RDW 15.1 04/23/2022    RDW 15.3 04/22/2022     04/24/2022     04/23/2022     04/22/2022     BMP:  Lab Results   Component Value Date     04/24/2022     04/23/2022     04/22/2022    K 4.2 04/24/2022    K 3.8 04/23/2022    K 3.8 04/22/2022    CL 96 04/24/2022    CL 98 04/23/2022    CL 95 04/22/2022    CO2 31 04/24/2022    CO2 32 04/23/2022    CO2 32 04/22/2022    PHOS 3.7 04/18/2022    PHOS 4.3 02/09/2022    PHOS 4.1 02/08/2022    BUN 18 04/24/2022    BUN 12 04/23/2022    BUN 16 04/22/2022    CREATININE 1.1 04/24/2022    CREATININE 0.8 04/23/2022    CREATININE 1.0 04/22/2022     BNP:   Lab Results   Component Value Date    PROBNP 5,842 04/21/2022    PROBNP 11,887 04/16/2022    PROBNP 27,525 04/13/2022     Cardiac Imaging:  CONSUELO Dr Nallely Nieto 2/10/22  Preliminary CONSUELO results are:      - Severe LV dysfunction,  EF: 20-25%  - LA dilated. There was HENOK thrombus. - Moderate MR    Cardiac Cath PCI: Dr Jesus Manuel Edmond 2/8/2022  Anatomy:   LM-nml   LAD-nml  Cx-nml  OM- nml  RCA-nml  RPDA- nml  LVEF- 10%  LVG- global hypokinesis  LVEDP- 10     Hemodynamics:  RA- mean 3  RV- 37/0  PAWP- 10  PA- 34/12 (20)     C.O.- 5.7 (4.9)  C. I.- 2.6 (2.25)  PA sat 60%  AO sat 91%     Contrast: 70  Flouro Time: 5.3  Access: R radial a, R CFV     Impression  ~Coronary Angiography w/ normal cors  ~LVG with LVEF of 10% and global regional wall motion abnormalities  ~Severe MR  ~Normal CO/CI  ~normal L and R filling pressures     Recommendation  ~Aggressive medical treatment and risk factor modification  ~1. Medications reviewed, no changes at this time. 2. Post cath IVF. Bedrest.  3.Consider CONSUELO for evaluation of MR.   4. Patient has been advised on the importance of regular exercise of at least 20-30 minutes daily alternating with aerobic and isometric activities.    5. Patient counseled about and offered assistance for smoking cessation   6. No indication for cardiac rehab  7. CHF service to evaluate tomorrow.     Echo 11/29/2021  Summary   -Covid+   -Severely reduced global systolic function with an ejection fraction   estimated at 20%.  -Severe global hypokinesis with regional variations noted.   -Right ventricular systolic function appears to be mildly reduced based on   visual inspection.   -Severe biatrial enlargement.   -Thickened mitral valve without evidence of stenosis. There is   mild-to-moderate mitral regurgitation.   -There is mild-to-moderate tricuspid regurgitation with a RVSP estimation of   00 mmHg.   -Diastolic dysfunction with elevated LV filling pressures.     Echo 7/25/2019  Summary   -Overall left ventricular systolic function is moderately depressed .   -Ejection fraction is visually estimated to be 30-35 %.  -Global left ventricular function moderately reduced.   -Indeterminate diastolic function due to atrial fibrillation and moderate to   severe mitral regurgitation.   -Moderate-to-severe mitral regurgitation .   -Dilated left atrium with a volume of 97 ml.   -Left atrial volume is increased probably due to atrial fibrillation .   -There is mild right ventricular hypertrophy.   -The right ventricle is mildly enlarged.   -Right ventricular systolic pressure of 53 mm Hg consistent with pulmonary   hypertension.   -Moderate to severe tricuspid regurgitation. TAPSE = 1.43   -IVC size is dilated (>2.1 cm) but collapses > 50% with respiration   consistent with elevated RA pressure (8 mmHg).    Assessment:    1. Chronic systolic heart failure (HCC) on ace and bb; no aldosterone antagonist due to compliance   2. Non-compliance    3. Homeless    4. PAF (paroxysmal atrial fibrillation) (HCC) Dr Chayo rothman   5. Benign essential HTN    6. Dilated cardiomyopathy ([facility]        Plan:   Patient Instructions   1. Continue all current medications  2.   Continue to fill pill box every week and make sure if pill bottle is empty get filled at the pharmacy  3. Check blood work next week  4. Device check next Tuesday at 1030 am (here in cardiology)  5.   RTO in 2 weeks      NYHA 3    I appreciate the opportunity of cooperating in the care of this individual.    TRENT Vanegas - CNS, CNS, 4/29/2022, 3:27 PM  }

## 2022-04-30 LAB
A/G RATIO: 1.2 (ref 1.1–2.2)
ALBUMIN SERPL-MCNC: 3.3 G/DL (ref 3.4–5)
ALP BLD-CCNC: 71 U/L (ref 40–129)
ALT SERPL-CCNC: 25 U/L (ref 10–40)
ANION GAP SERPL CALCULATED.3IONS-SCNC: 10 MMOL/L (ref 3–16)
AST SERPL-CCNC: 28 U/L (ref 15–37)
BASOPHILS ABSOLUTE: 0 K/UL (ref 0–0.2)
BASOPHILS RELATIVE PERCENT: 0.9 %
BILIRUB SERPL-MCNC: 1.3 MG/DL (ref 0–1)
BUN BLDV-MCNC: 18 MG/DL (ref 7–20)
CALCIUM SERPL-MCNC: 8.8 MG/DL (ref 8.3–10.6)
CHLORIDE BLD-SCNC: 103 MMOL/L (ref 99–110)
CO2: 31 MMOL/L (ref 21–32)
CREAT SERPL-MCNC: 1 MG/DL (ref 0.8–1.3)
EKG ATRIAL RATE: 300 BPM
EKG DIAGNOSIS: NORMAL
EKG Q-T INTERVAL: 404 MS
EKG QRS DURATION: 86 MS
EKG QTC CALCULATION (BAZETT): 463 MS
EKG R AXIS: -16 DEGREES
EKG T AXIS: -42 DEGREES
EKG VENTRICULAR RATE: 79 BPM
EOSINOPHILS ABSOLUTE: 0.1 K/UL (ref 0–0.6)
EOSINOPHILS RELATIVE PERCENT: 2 %
GFR AFRICAN AMERICAN: >60
GFR NON-AFRICAN AMERICAN: >60
GLUCOSE BLD-MCNC: 84 MG/DL (ref 70–99)
HCT VFR BLD CALC: 29.9 % (ref 40.5–52.5)
HEMOGLOBIN: 9.8 G/DL (ref 13.5–17.5)
LYMPHOCYTES ABSOLUTE: 1.2 K/UL (ref 1–5.1)
LYMPHOCYTES RELATIVE PERCENT: 25.2 %
MCH RBC QN AUTO: 26.6 PG (ref 26–34)
MCHC RBC AUTO-ENTMCNC: 32.8 G/DL (ref 31–36)
MCV RBC AUTO: 81 FL (ref 80–100)
MONOCYTES ABSOLUTE: 0.4 K/UL (ref 0–1.3)
MONOCYTES RELATIVE PERCENT: 9.1 %
NEUTROPHILS ABSOLUTE: 3 K/UL (ref 1.7–7.7)
NEUTROPHILS RELATIVE PERCENT: 62.8 %
PDW BLD-RTO: 15.6 % (ref 12.4–15.4)
PLATELET # BLD: 187 K/UL (ref 135–450)
PMV BLD AUTO: 7.8 FL (ref 5–10.5)
POTASSIUM REFLEX MAGNESIUM: 3.8 MMOL/L (ref 3.5–5.1)
RBC # BLD: 3.69 M/UL (ref 4.2–5.9)
SODIUM BLD-SCNC: 144 MMOL/L (ref 136–145)
TOTAL PROTEIN: 6 G/DL (ref 6.4–8.2)
TROPONIN: <0.01 NG/ML
WBC # BLD: 4.7 K/UL (ref 4–11)

## 2022-04-30 PROCEDURE — 84484 ASSAY OF TROPONIN QUANT: CPT

## 2022-04-30 PROCEDURE — 1200000000 HC SEMI PRIVATE

## 2022-04-30 PROCEDURE — 36415 COLL VENOUS BLD VENIPUNCTURE: CPT

## 2022-04-30 PROCEDURE — 2580000003 HC RX 258: Performed by: NURSE PRACTITIONER

## 2022-04-30 PROCEDURE — 99223 1ST HOSP IP/OBS HIGH 75: CPT | Performed by: INTERNAL MEDICINE

## 2022-04-30 PROCEDURE — 6370000000 HC RX 637 (ALT 250 FOR IP): Performed by: NURSE PRACTITIONER

## 2022-04-30 PROCEDURE — G0378 HOSPITAL OBSERVATION PER HR: HCPCS

## 2022-04-30 PROCEDURE — 85025 COMPLETE CBC W/AUTO DIFF WBC: CPT

## 2022-04-30 PROCEDURE — 93010 ELECTROCARDIOGRAM REPORT: CPT | Performed by: INTERNAL MEDICINE

## 2022-04-30 PROCEDURE — 80053 COMPREHEN METABOLIC PANEL: CPT

## 2022-04-30 RX ORDER — ASPIRIN 81 MG/1
81 TABLET, CHEWABLE ORAL DAILY
Status: DISCONTINUED | OUTPATIENT
Start: 2022-04-30 | End: 2022-05-01 | Stop reason: HOSPADM

## 2022-04-30 RX ORDER — METOPROLOL SUCCINATE 25 MG/1
25 TABLET, EXTENDED RELEASE ORAL DAILY
Status: DISCONTINUED | OUTPATIENT
Start: 2022-04-30 | End: 2022-05-01 | Stop reason: HOSPADM

## 2022-04-30 RX ORDER — ACETAMINOPHEN 325 MG/1
650 TABLET ORAL EVERY 6 HOURS PRN
Status: DISCONTINUED | OUTPATIENT
Start: 2022-04-30 | End: 2022-05-01 | Stop reason: HOSPADM

## 2022-04-30 RX ORDER — ACETAMINOPHEN 650 MG/1
650 SUPPOSITORY RECTAL EVERY 6 HOURS PRN
Status: DISCONTINUED | OUTPATIENT
Start: 2022-04-30 | End: 2022-05-01 | Stop reason: HOSPADM

## 2022-04-30 RX ORDER — SODIUM CHLORIDE 0.9 % (FLUSH) 0.9 %
5-40 SYRINGE (ML) INJECTION PRN
Status: DISCONTINUED | OUTPATIENT
Start: 2022-04-30 | End: 2022-05-01 | Stop reason: HOSPADM

## 2022-04-30 RX ORDER — TAMSULOSIN HYDROCHLORIDE 0.4 MG/1
0.4 CAPSULE ORAL DAILY
Status: DISCONTINUED | OUTPATIENT
Start: 2022-04-30 | End: 2022-05-01 | Stop reason: HOSPADM

## 2022-04-30 RX ORDER — SODIUM CHLORIDE 9 MG/ML
INJECTION, SOLUTION INTRAVENOUS PRN
Status: DISCONTINUED | OUTPATIENT
Start: 2022-04-30 | End: 2022-05-01 | Stop reason: HOSPADM

## 2022-04-30 RX ORDER — ATORVASTATIN CALCIUM 40 MG/1
40 TABLET, FILM COATED ORAL NIGHTLY
Status: DISCONTINUED | OUTPATIENT
Start: 2022-04-30 | End: 2022-05-01 | Stop reason: HOSPADM

## 2022-04-30 RX ORDER — TORSEMIDE 20 MG/1
20 TABLET ORAL DAILY
Status: DISCONTINUED | OUTPATIENT
Start: 2022-05-01 | End: 2022-05-01 | Stop reason: HOSPADM

## 2022-04-30 RX ORDER — FINASTERIDE 5 MG/1
5 TABLET, FILM COATED ORAL DAILY
Status: DISCONTINUED | OUTPATIENT
Start: 2022-04-30 | End: 2022-05-01 | Stop reason: HOSPADM

## 2022-04-30 RX ORDER — POTASSIUM CHLORIDE 20 MEQ/1
40 TABLET, EXTENDED RELEASE ORAL DAILY
Status: DISCONTINUED | OUTPATIENT
Start: 2022-04-30 | End: 2022-05-01 | Stop reason: HOSPADM

## 2022-04-30 RX ORDER — SODIUM CHLORIDE 0.9 % (FLUSH) 0.9 %
5-40 SYRINGE (ML) INJECTION EVERY 12 HOURS SCHEDULED
Status: DISCONTINUED | OUTPATIENT
Start: 2022-04-30 | End: 2022-05-01 | Stop reason: HOSPADM

## 2022-04-30 RX ORDER — LISINOPRIL 5 MG/1
2.5 TABLET ORAL DAILY
Status: DISCONTINUED | OUTPATIENT
Start: 2022-04-30 | End: 2022-05-01 | Stop reason: HOSPADM

## 2022-04-30 RX ORDER — POLYETHYLENE GLYCOL 3350 17 G/17G
17 POWDER, FOR SOLUTION ORAL DAILY PRN
Status: DISCONTINUED | OUTPATIENT
Start: 2022-04-30 | End: 2022-05-01 | Stop reason: HOSPADM

## 2022-04-30 RX ADMIN — LISINOPRIL 2.5 MG: 5 TABLET ORAL at 10:30

## 2022-04-30 RX ADMIN — APIXABAN 5 MG: 5 TABLET, FILM COATED ORAL at 22:09

## 2022-04-30 RX ADMIN — TORSEMIDE 40 MG: 20 TABLET ORAL at 10:30

## 2022-04-30 RX ADMIN — FINASTERIDE 5 MG: 5 TABLET, FILM COATED ORAL at 10:30

## 2022-04-30 RX ADMIN — APIXABAN 5 MG: 5 TABLET, FILM COATED ORAL at 10:30

## 2022-04-30 RX ADMIN — ATORVASTATIN CALCIUM 40 MG: 40 TABLET, FILM COATED ORAL at 01:08

## 2022-04-30 RX ADMIN — TAMSULOSIN HYDROCHLORIDE 0.4 MG: 0.4 CAPSULE ORAL at 10:30

## 2022-04-30 RX ADMIN — ACETAMINOPHEN 650 MG: 325 TABLET ORAL at 01:08

## 2022-04-30 RX ADMIN — METOPROLOL SUCCINATE 25 MG: 25 TABLET, EXTENDED RELEASE ORAL at 10:30

## 2022-04-30 RX ADMIN — SODIUM CHLORIDE, PRESERVATIVE FREE 10 ML: 5 INJECTION INTRAVENOUS at 22:09

## 2022-04-30 RX ADMIN — APIXABAN 5 MG: 5 TABLET, FILM COATED ORAL at 01:08

## 2022-04-30 RX ADMIN — SODIUM CHLORIDE, PRESERVATIVE FREE 10 ML: 5 INJECTION INTRAVENOUS at 10:31

## 2022-04-30 RX ADMIN — POTASSIUM CHLORIDE 40 MEQ: 20 TABLET, EXTENDED RELEASE ORAL at 10:30

## 2022-04-30 RX ADMIN — ASPIRIN 81 MG 81 MG: 81 TABLET ORAL at 10:31

## 2022-04-30 RX ADMIN — ATORVASTATIN CALCIUM 40 MG: 40 TABLET, FILM COATED ORAL at 22:09

## 2022-04-30 ASSESSMENT — PAIN DESCRIPTION - PAIN TYPE
TYPE: ACUTE PAIN
TYPE: ACUTE PAIN

## 2022-04-30 ASSESSMENT — PAIN DESCRIPTION - ONSET
ONSET: ON-GOING
ONSET: ON-GOING

## 2022-04-30 ASSESSMENT — PAIN DESCRIPTION - PROGRESSION
CLINICAL_PROGRESSION: GRADUALLY IMPROVING

## 2022-04-30 ASSESSMENT — PAIN - FUNCTIONAL ASSESSMENT
PAIN_FUNCTIONAL_ASSESSMENT: ACTIVITIES ARE NOT PREVENTED
PAIN_FUNCTIONAL_ASSESSMENT: ACTIVITIES ARE NOT PREVENTED

## 2022-04-30 ASSESSMENT — PAIN DESCRIPTION - FREQUENCY
FREQUENCY: CONTINUOUS
FREQUENCY: CONTINUOUS

## 2022-04-30 ASSESSMENT — PAIN DESCRIPTION - DESCRIPTORS
DESCRIPTORS: ACHING
DESCRIPTORS: ACHING

## 2022-04-30 ASSESSMENT — PAIN DESCRIPTION - LOCATION
LOCATION: EYE
LOCATION: EYE

## 2022-04-30 ASSESSMENT — PAIN SCALES - GENERAL
PAINLEVEL_OUTOF10: 0
PAINLEVEL_OUTOF10: 0
PAINLEVEL_OUTOF10: 2
PAINLEVEL_OUTOF10: 0
PAINLEVEL_OUTOF10: 2
PAINLEVEL_OUTOF10: 0

## 2022-04-30 ASSESSMENT — PAIN DESCRIPTION - ORIENTATION
ORIENTATION: LEFT
ORIENTATION: LEFT

## 2022-04-30 NOTE — PROGRESS NOTES
Hospitalist Progress Note      PCP: No primary care provider on file. Date of Admission: 4/29/2022    Chief Complaint: Syncope    Hospital Course:   Patient's admitting history reviewed and confirmed  No acute events overnight  No chest pain shortness of breath  No lightheadedness  No abdominal pain nausea vomiting no fever      Medications:  Reviewed      Exam:    /63   Pulse 68   Temp 97.7 °F (36.5 °C) (Oral)   Resp 16   Ht 6' 2\" (1.88 m)   Wt 190 lb 0.6 oz (86.2 kg)   SpO2 93%   BMI 24.40 kg/m²     General appearance: No apparent distress, appears stated age and cooperative. HEENT: Pupils equal, round, and reactive to light. Conjunctivae/corneas clear. Laceration left upper eyebrow has been sutured some edema around the  Neck: Supple, with full range of motion. No jugular venous distention. Trachea midline. Respiratory:  Normal respiratory effort. Clear to auscultation, bilaterally without RALES/WHEEZES/Rhonchi. Cardiovascular: Regular rate and rhythm with normal S1/S2 without MURMURS, rubs or gallops. Abdomen: Soft, non-tender, non-distended with normal bowel sounds. Musculoskeletal: No clubbing, cyanosis or EDEMA bilaterally. Full range of motion without deformity. Skin: Skin color, texture, turgor normal.  No rashes or lesions. Neurologic:  Neurovascularly intact without any focal sensory/motor deficits. Cranial nerves: II-XII intact, grossly non-focal.        Labs:   Recent Labs     04/29/22 1735 04/30/22 0521   WBC 5.1 4.7   HGB 10.5* 9.8*   HCT 31.9* 29.9*    187     Recent Labs     04/29/22 1735 04/30/22  0521    144   K 3.3* 3.8    103   CO2 33* 31   BUN 16 18   CREATININE 1.1 1.0   CALCIUM 8.7 8.8     Recent Labs     04/29/22 1735 04/30/22  0521   AST 33 28   ALT 30 25   BILITOT 1.2* 1.3*   ALKPHOS 89 71     No results for input(s): INR in the last 72 hours.   Recent Labs     04/29/22 1735 04/30/22  0101 04/30/22  0521   TROPONINI <0.01 <0.01 <0.01 Urinalysis:      Lab Results   Component Value Date    NITRU Negative 04/13/2022    WBCUA 140 04/13/2022    BACTERIA 4+ 04/13/2022    RBCUA 8 04/13/2022    BLOODU MODERATE 04/13/2022    SPECGRAV >=1.030 04/13/2022    GLUCOSEU Negative 04/13/2022       Radiology:  CT Cervical Spine WO Contrast   Final Result   No acute intracranial abnormality. No acute cervical spine abnormality. Moderate to severe multilevel   degenerative disc disease. Chest x-ray demonstrates cardiomegaly and pacemaker. No evidence of   pulmonary edema or active infiltrates or active pleural disease. CT Head WO Contrast   Final Result   No acute intracranial abnormality. No acute cervical spine abnormality. Moderate to severe multilevel   degenerative disc disease. Chest x-ray demonstrates cardiomegaly and pacemaker. No evidence of   pulmonary edema or active infiltrates or active pleural disease. XR CHEST PORTABLE   Final Result   No acute intracranial abnormality. No acute cervical spine abnormality. Moderate to severe multilevel   degenerative disc disease. Chest x-ray demonstrates cardiomegaly and pacemaker. No evidence of   pulmonary edema or active infiltrates or active pleural disease. Assessment/Plan:    Active Hospital Problems    Diagnosis Date Noted    Syncope and collapse [R55] 04/29/2022     Priority: Medium       Acute Medical Issues Being Addressed:    64-year-old admitted to the hospital with syncope    Syncope unclear etiology  Has AICD in place  AICD being interrogated  Orthostatics negative  We will get PT OT  Keep on a cardiac monitor  Cardiology has seen the patient    Left forehead laceration  S/p suturing  Traumatic    Atrial fibrillation  Continue Eliquis    Chronic severe systolic heart failure  EF 20 to 25%  On home medication  S/p AICD        DVT Prophylaxis:on eliquis   Diet: ADULT DIET;  Regular; Low Fat/Low Chol/High Fiber/MILIND  Code Status: Full Code      Dispo - once acute medical processes have resolved    Vick Rojas MD

## 2022-04-30 NOTE — PROGRESS NOTES
Pt had recent ICD placement. Had syncope episode when walking out of cardiology office for follow up. Sustained laceration to above left eye, sutures inserted in ED. CT negative. Neuro checks initiated. Plan to interrogate ICD in am, pt NPO. Cardiology consulted.      Alert and oriented X 4  Meds WWW  Independent- [facility]

## 2022-04-30 NOTE — PLAN OF CARE
Problem: Discharge Planning  Goal: Discharge to home or other facility with appropriate resources  4/30/2022 1103 by Alva Jesus RN  Outcome: Progressing  4/30/2022 0128 by Amara Brasher RN  Outcome: Progressing     Problem: Pain  Goal: Verbalizes/displays adequate comfort level or baseline comfort level  4/30/2022 1103 by Alva Jesus RN  Outcome: Progressing  Flowsheets (Taken 4/30/2022 1103)  Verbalizes/displays adequate comfort level or baseline comfort level: Assess pain using appropriate pain scale  Note: No pain reported, just the burning/ pins and needles feeling @ pacemaker site, MD aware  4/30/2022 0128 by Amara Brasher RN  Outcome: Progressing     Problem: Safety - Adult  Goal: Free from fall injury  Outcome: Progressing     Problem: ABCDS Injury Assessment  Goal: Absence of physical injury  Outcome: Progressing

## 2022-04-30 NOTE — H&P
HOSPITALISTS HISTORY AND PHYSICAL    4/29/2022 8:52 PM    Patient Information:  Brayan Huggins is a 59 y.o. male [id number]  PCP:  No primary care provider on file. (Tel: None )    Chief complaint:    Chief Complaint   Patient presents with    Fall     Pt stated fall d/t dizziness. Laceration about 10 cm on L side of forehead        History of Present Illness:  Mariam Webster is a 59 y.o. male with hx of HTN, HFrEF 20 to 25%, recent AICD placement,, Atrial Fibrillation on Eliquis, and homeless. Presents the emergency room after syncopal episode earlier today. He had a follow-up visit with cardiology and completed his visit. He states he was in the waiting room waiting about 40 minutes for a ride home. He got up and walked out to the lobby and woke up on the ground with a doctor standing over him. He states he had no warning that he was going to lose consciousness. He did obtain a laceration to left eyebrow. Patient does have a AICD that was recently placed this week. He does not recall receiving a shock during this episode. He does state since the insertion of the AICD he has been having pins and needle sensation in the are of the device. He denies any CP or SOB. His device has not yet been interrogated. He is currently in a paced rhythm. History obtained from patient     REVIEW OF SYSTEMS:   Review of Systems   Constitutional: Negative. HENT: Negative. Eyes: Negative. Respiratory: Negative. Cardiovascular: Negative. Pain around pacemaker site   Gastrointestinal: Negative. Endocrine: Negative. Genitourinary: Negative. Musculoskeletal: Negative. Skin: Negative. Neurological: Positive for syncope and headaches. Hematological: Negative. Psychiatric/Behavioral: Negative. All other systems reviewed and are negative.       Past Medical History:   has a past medical history of Atrial fibrillation ([facility] CHF (congestive heart failure) ([facility] Hx of blood clots, and Hypertension. Past Surgical History:   has a past surgical history that includes Insertable Cardiac Monitor (07/26/2019) and Cardioversion (07/26/2019). Medications:  No current facility-administered medications on file prior to encounter. Current Outpatient Medications on File Prior to Encounter   Medication Sig Dispense Refill    lisinopril (PRINIVIL;ZESTRIL) 2.5 MG tablet Take 1 tablet by mouth daily 30 tablet 0    metoprolol succinate (TOPROL XL) 25 MG extended release tablet Take 1 tablet by mouth daily 30 tablet 3    torsemide 40 MG TABS Take 40 mg by mouth in the morning and at bedtime 30 tablet 3    aspirin 81 MG chewable tablet Take 1 tablet by mouth daily 30 tablet 1    apixaban (ELIQUIS) 5 MG TABS tablet Take 1 tablet by mouth 2 times daily 60 tablet 0    atorvastatin (LIPITOR) 40 MG tablet Take 1 tablet by mouth nightly 30 tablet 2    potassium chloride (KLOR-CON M) 20 MEQ extended release tablet Take 2 tablets by mouth daily for 1 day 1 tablet 0    finasteride (PROSCAR) 5 MG tablet Take 1 tablet by mouth daily 30 tablet 0    tamsulosin (FLOMAX) 0.4 MG capsule Take 1 capsule by mouth daily 30 capsule 2    vitamin D (CHOLECALCIFEROL) 50 MCG (2000 UT) TABS tablet Take 1 tablet by mouth daily 90 tablet 1       Allergies:  No Known Allergies     Social History:  Patient Lives with friends    reports that he has never smoked. He has never used smokeless tobacco. He reports that he does not drink alcohol and does not use drugs. Family History:  family history includes Heart Attack in his mother; Heart Disease in his mother; High Blood Pressure in his father and mother;  Other in his father; Stroke in his father. ,     Physical Exam:  /70   Pulse 80   Temp 98.2 °F (36.8 °C) (Oral)   Resp 16   Ht 6' 2\" (1.88 m)   Wt 195 lb (88.5 kg)   SpO2 99%   BMI 25.04 kg/m² Physical Exam  Vitals and nursing note reviewed. Constitutional:       Appearance: Normal appearance. He is not ill-appearing. HENT:      Head: Normocephalic. Comments: Left eyebrow laceration with sutures intact     Mouth/Throat:      Mouth: Mucous membranes are moist.   Eyes:      Extraocular Movements: Extraocular movements intact. Pupils: Pupils are equal, round, and reactive to light. Cardiovascular:      Rate and Rhythm: Normal rate. Rhythm irregular. Pulses: Normal pulses. Heart sounds: Murmur heard. Comments: Left chest wall, dressing intact of pacemaker site  Pulmonary:      Effort: Pulmonary effort is normal. No respiratory distress. Breath sounds: Normal breath sounds. Abdominal:      General: Abdomen is flat. Bowel sounds are normal. There is no distension. Palpations: Abdomen is soft. Tenderness: There is no abdominal tenderness. Musculoskeletal:         General: Normal range of motion. Cervical back: Normal range of motion. Right lower leg: Edema present. Left lower leg: Edema present. Comments: 2+ bilateral Edema    Skin:     General: Skin is warm and dry. Capillary Refill: Capillary refill takes less than 2 seconds. Neurological:      General: No focal deficit present. Mental Status: He is alert and oriented to person, place, and time. Cranial Nerves: No cranial nerve deficit.    Psychiatric:         Mood and Affect: Mood normal.         Behavior: Behavior normal.         Labs:  CBC:   Lab Results   Component Value Date    WBC 5.1 04/29/2022    RBC 3.98 04/29/2022    HGB 10.5 04/29/2022    HCT 31.9 04/29/2022    MCV 80.1 04/29/2022    MCH 26.4 04/29/2022    MCHC 33.0 04/29/2022    RDW 15.8 04/29/2022     04/29/2022    MPV 7.9 04/29/2022     BMP:    Lab Results   Component Value Date     04/29/2022    K 3.3 04/29/2022     04/29/2022    CO2 33 04/29/2022    BUN 16 04/29/2022    CREATININE 1.1 04/29/2022    CALCIUM 8.7 04/29/2022    GFRAA >60 04/29/2022    LABGLOM >60 04/29/2022    GLUCOSE 82 04/29/2022     CT Cervical Spine WO Contrast   Final Result   No acute intracranial abnormality. No acute cervical spine abnormality. Moderate to severe multilevel   degenerative disc disease. Chest x-ray demonstrates cardiomegaly and pacemaker. No evidence of   pulmonary edema or active infiltrates or active pleural disease. CT Head WO Contrast   Final Result   No acute intracranial abnormality. No acute cervical spine abnormality. Moderate to severe multilevel   degenerative disc disease. Chest x-ray demonstrates cardiomegaly and pacemaker. No evidence of   pulmonary edema or active infiltrates or active pleural disease. XR CHEST PORTABLE   Final Result   No acute intracranial abnormality. No acute cervical spine abnormality. Moderate to severe multilevel   degenerative disc disease. Chest x-ray demonstrates cardiomegaly and pacemaker. No evidence of   pulmonary edema or active infiltrates or active pleural disease. Chest Xray:   EKG:    I visualized CXR images and EKG strips  Problem List  Active Problems:    * No active hospital problems. *  Resolved Problems:    * No resolved hospital problems. *        Assessment/Plan:   1. Syncope   Admit observation with telemetry   Cardiology consult  Device will need interrogated. Concerning for cardiac arrhythmia given his heart hx and no symptoms prior to episode. Serial troponin  Continue ASA   Orthostatic BP, HR    2. Head Laceration  Sutures placed in ER. Mild headache but neuro exam normal  It seems he lost consciousness prior to hitting his head  Neuro checks overnight  Prn pain meds    3. Atrial Fibrillation  Continue Eliquis    4. Chronic HFrEF  EF 20 to 25% on last echo  Continue home meds including diuretics.   He appears euvolemic other then some bilateral edema but he states this is greatly improved from earlier this week. 5. Hypertension  BP stable continue home meds    6. Prolonged QT  Telemetry   Avoid QT prolonging medications. DVT prophylaxis Elquis   Code status Full   Diet low Na, NPO after midnight   IV access peripheral   Don Catheter none    Admit as Observation I anticipate hospitalization spanning less than two midnights for investigation and treatment of the above medically necessary diagnoses. Please note that some part of this chart was generated using Dragon dictation software. Although every effort was made to ensure the accuracy of this automated transcription, some errors in transcription may have occurred inadvertently. If you may need any clarification, please do not hesitate to contact me through [facility].        Debborah Krabbe, APRN - CNP    4/29/2022 8:52 PM

## 2022-05-01 VITALS
OXYGEN SATURATION: 98 % | SYSTOLIC BLOOD PRESSURE: 112 MMHG | HEART RATE: 78 BPM | BODY MASS INDEX: 24.39 KG/M2 | DIASTOLIC BLOOD PRESSURE: 81 MMHG | HEIGHT: 74 IN | TEMPERATURE: 97.7 F | WEIGHT: 190.04 LBS | RESPIRATION RATE: 18 BRPM

## 2022-05-01 PROCEDURE — 6370000000 HC RX 637 (ALT 250 FOR IP): Performed by: INTERNAL MEDICINE

## 2022-05-01 PROCEDURE — G0378 HOSPITAL OBSERVATION PER HR: HCPCS

## 2022-05-01 PROCEDURE — 99233 SBSQ HOSP IP/OBS HIGH 50: CPT | Performed by: NURSE PRACTITIONER

## 2022-05-01 PROCEDURE — 2580000003 HC RX 258: Performed by: NURSE PRACTITIONER

## 2022-05-01 PROCEDURE — 6370000000 HC RX 637 (ALT 250 FOR IP): Performed by: NURSE PRACTITIONER

## 2022-05-01 RX ORDER — TORSEMIDE 20 MG/1
20 TABLET ORAL DAILY
Qty: 30 TABLET | Refills: 3 | Status: SHIPPED | OUTPATIENT
Start: 2022-05-02 | End: 2022-05-13 | Stop reason: DRUGHIGH

## 2022-05-01 RX ADMIN — SODIUM CHLORIDE, PRESERVATIVE FREE 10 ML: 5 INJECTION INTRAVENOUS at 10:24

## 2022-05-01 RX ADMIN — METOPROLOL SUCCINATE 25 MG: 25 TABLET, EXTENDED RELEASE ORAL at 10:24

## 2022-05-01 RX ADMIN — TAMSULOSIN HYDROCHLORIDE 0.4 MG: 0.4 CAPSULE ORAL at 10:23

## 2022-05-01 RX ADMIN — APIXABAN 5 MG: 5 TABLET, FILM COATED ORAL at 10:24

## 2022-05-01 RX ADMIN — ASPIRIN 81 MG 81 MG: 81 TABLET ORAL at 10:24

## 2022-05-01 RX ADMIN — POTASSIUM CHLORIDE 40 MEQ: 20 TABLET, EXTENDED RELEASE ORAL at 10:24

## 2022-05-01 RX ADMIN — TORSEMIDE 20 MG: 20 TABLET ORAL at 10:24

## 2022-05-01 RX ADMIN — FINASTERIDE 5 MG: 5 TABLET, FILM COATED ORAL at 10:23

## 2022-05-01 ASSESSMENT — PAIN SCALES - GENERAL: PAINLEVEL_OUTOF10: 1

## 2022-05-01 NOTE — PLAN OF CARE
Problem: Discharge Planning  Goal: Discharge to home or other facility with appropriate resources  Outcome: Progressing  Note: Pt set to leave today per MD     Problem: Pain  Goal: Verbalizes/displays adequate comfort level or baseline comfort level  5/1/2022 1315 by Natalia Carlson RN  Outcome: Progressing  Note: No pain reported  5/1/2022 0135 by Dede Marshall RN  Outcome: Progressing  Flowsheets (Taken 5/1/2022 0135)  Verbalizes/displays adequate comfort level or baseline comfort level:   Assess pain using appropriate pain scale   Implement non-pharmacological measures as appropriate and evaluate response  Note: Pt given Ice pack for laceration over left eye. Problem: Safety - Adult  Goal: Free from fall injury  5/1/2022 1315 by Natalia Carlson RN  Outcome: Progressing  5/1/2022 0135 by Dede Marshall RN  Outcome: Progressing  Note: Fall risk assessment completed. Pt gait steady. Clear pathway provided to and from bathroom as pt is ambulatory. Bed kept in lowest position with wheels locked. Call light within reach. Pt encouraged to call for any needs. Pt free from accidental injury this shift. Problem: ABCDS Injury Assessment  Goal: Absence of physical injury  5/1/2022 1315 by Natalia Carlson RN  Outcome: Progressing  5/1/2022 0135 by Dede Marshall RN  Outcome: Progressing  Note: Pt instructed to call if he feels lightheaded or in need of assistance.

## 2022-05-01 NOTE — PROGRESS NOTES
[address]   Cardiology Progress Note     Date: 5/1/2022  Admit Date: 4/29/2022     Reason for consultation: Syncope, CHF    Chief Complaint:   Chief Complaint   Patient presents with    Fall     Pt stated fall d/t dizziness. Laceration about 10 cm on L side of forehead       History of Present Illness: History obtained from patient and medical record. Shyla Diehl is a 59 y.o. male with a past medical history of persistent AF, CHF, NICM, MR, and HTN. Hx of DCCV with ILR implant (7/26/19). He was found to have EF of 20-25%. S/p Med[employer] Biv AICD (4/25/22, Jasper Sheets). Pt admitted after becoming dizzy and having a syncopal episode in the office while waiting for his  to take him home. Interval Hx: Today, he is being seen for follow up. He is feeling much better. He was able to walk in the hallways yesterday without any issue or syncope. He is hemodynamically stable in V-paced rhythm. Pt reports he has been having nose bleeds at home, but is taking his eliquis BID now. Pt has small hematoma/swelling at his AICD site. Patient seen and examined. Clinical notes reviewed. Telemetry reviewed. No new complaints today. No major events overnight. Denies having chest pain, palpitations, shortness of breath, orthopnea/PND, cough, or dizziness at the time of this visit. Allergies:  No Known Allergies    Home Meds:  Prior to Visit Medications    Medication Sig Taking?  Authorizing Provider   lisinopril (PRINIVIL;ZESTRIL) 2.5 MG tablet Take 1 tablet by mouth daily  TRENT Stout - CNS   metoprolol succinate (TOPROL XL) 25 MG extended release tablet Take 1 tablet by mouth daily  Lizandro Coleman MD   torsemide 40 MG TABS Take 40 mg by mouth in the morning and at bedtime  Lizandro Coleman MD   aspirin 81 MG chewable tablet Take 1 tablet by mouth daily  Lizandro Coleman MD   apixaban (ELIQUIS) 5 MG TABS tablet Take 1 tablet by mouth 2 times daily  Lizandro Coleman MD atorvastatin (LIPITOR) 40 MG tablet Take 1 tablet by mouth nightly  Jacqui Sosa MD   potassium chloride (KLOR-CON M) 20 MEQ extended release tablet Take 2 tablets by mouth daily for 1 day  Jacqui Sosa MD   finasteride (PROSCAR) 5 MG tablet Take 1 tablet by mouth daily  Jacqui Sosa MD   tamsulosin (FLOMAX) 0.4 MG capsule Take 1 capsule by mouth daily  Jacqui Sosa MD   vitamin D (CHOLECALCIFEROL) 50 MCG (2000 UT) TABS tablet Take 1 tablet by mouth daily  Deirdre Das, APRN - CNS      Scheduled Meds:   apixaban  5 mg Oral BID    aspirin  81 mg Oral Daily    atorvastatin  40 mg Oral Nightly    finasteride  5 mg Oral Daily    lisinopril  2.5 mg Oral Daily    metoprolol succinate  25 mg Oral Daily    tamsulosin  0.4 mg Oral Daily    potassium chloride  40 mEq Oral Daily    sodium chloride flush  5-40 mL IntraVENous 2 times per day    torsemide  20 mg Oral Daily     Continuous Infusions:   sodium chloride       PRN Meds:sodium chloride flush, sodium chloride, polyethylene glycol, acetaminophen **OR** acetaminophen     Past Medical History:  Past Medical History:   Diagnosis Date    Atrial fibrillation ([facility] 07/25/2019    CHF (congestive heart failure) ([facility]     Hx of blood clots     Hypertension         Past Surgical History:    has a past surgical history that includes Insertable Cardiac Monitor (07/26/2019) and Cardioversion (07/26/2019). Social History:  Reviewed. reports that he has never smoked. He has never used smokeless tobacco. He reports that he does not drink alcohol and does not use drugs. Family History:  Reviewed. family history includes Heart Attack in his mother; Heart Disease in his mother; High Blood Pressure in his father and mother; Other in his father; Stroke in his father.      Review of Systems:  · Constitutional: Negative for fever, night sweats, chills, weight changes, or weakness  · Skin: Negative for rash, dry skin, pruritus, bruising, bleeding, blood clots, or changes in skin pigment  · HEENT: Negative for vision changes, ringing in the ears, sore throat, dysphagia, or swollen lymph nodes  · Respiratory: Reviewed in HPI  · Cardiovascular: Reviewed in HPI  · Gastrointestinal: Negative for abdominal pain, N/V/D, constipation, or black/tarry stools  · Genito-Urinary: Negative for dysuria, incontinence, urgency, or hematuria  · Musculoskeletal: Negative for joint swelling, muscle pain, or injuries  · Neurological/Psych: Negative for confusion, seizures, headaches, balance issues or TIA-like symptoms. No anxiety, depression, or insomnia    Physical Examination:  Vitals:    05/01/22 0800   BP: 115/81   Pulse: 78   Resp: 18   Temp: 98.4 °F (36.9 °C)   SpO2: 99%      No intake/output data recorded. Wt Readings from Last 3 Encounters:   04/30/22 190 lb 0.6 oz (86.2 kg)   04/29/22 199 lb 8 oz (90.5 kg)   04/26/22 193 lb 9.6 oz (87.8 kg)     No intake or output data in the 24 hours ending 05/01/22 1009    Telemetry: Personally Reviewed  - V-paced with underlying atrial fibrillation   · Constitutional: Cooperative and in no apparent distress, and appears well nourished  · Skin: Warm and pink; no pallor, cyanosis, bruising, or clubbing. Left chest AICD site with mild/mod swelling and small hematoma. No drainage, oozing. Dressing in place  · HEENT: Symmetric and normocephalic. PERRL, EOM intact. Conjunctiva pink with clear sclera. Mucus membranes pink and moist. Teeth intact. Thyroid smooth without nodules or goiter. · Cardiovascular: Regular rate and irregular rhythm. S1/S2 present without murmurs, rubs, or gallops. Peripheral pulses 2+, capillary refill < 3 seconds. No elevation of JVP. No peripheral edema  · Respiratory: Respirations symmetric and unlabored. Lungs clear to auscultation bilaterally, no wheezing, crackles, or rhonchi  · Gastrointestinal: Abdomen soft and round.  Bowel sounds normoactive in all quadrants without tenderness or masses. · Musculoskeletal: Bilateral upper and lower extremity strength 5/5 with full ROM  · Neurologic/Psych: Awake and orientated to person, place and time. Calm affect, appropriate mood    Pertinent labs, diagnostic, device, and imaging results reviewed as a part of this visit    Labs:    BMP:   Recent Labs     22    144   K 3.3* 3.8    103   CO2 33* 31   BUN 16 18   CREATININE 1.1 1.0   MG 2.10  --      Estimated Creatinine Clearance: 88 mL/min (based on SCr of 1 mg/dL). CBC:   Recent Labs     22   WBC 5.1 4.7   HGB 10.5* 9.8*   HCT 31.9* 29.9*   MCV 80.1 81.0    187     Thyroid: No results found for: TSH, Q6KFMPT, D9NJEKD, THYROIDAB  Lipids: No results found for: CHOL, HDL, TRIG  LFTS:   Lab Results   Component Value Date    ALT 25 2022    AST 28 2022    ALKPHOS 71 2022    PROT 6.0 2022    AGRATIO 1.2 2022    BILITOT 1.3 2022     Cardiac Enzymes:   Lab Results   Component Value Date    TROPONINI <0.01 2022    TROPONINI <0.01 2022    TROPONINI <0.01 2022     Coags:   Lab Results   Component Value Date    PROTIME 17.2 2022    INR 1.50 2022       EC22 (Personally Interpreted)   - V-paced with underlying atrial fibrillation    ECHO: 22   -Definity administered for LV thrombus.   -Left ventricular cavity size is severely dilated with normal left ventricular wall thickness.   -Overall left ventricular systolic function appears severely reduced. Ejection fraction is visually estimated to be 20%.  -There is severe diffuse hypokinesis.   -Cannot grade diastology due to atrial fibrillation, although there are elevated LA pressures.   -Prominent trabeculations in the LV apex. Cannot exclude Non-compaction cardiomyopathy.   -No evidence of left ventricular mass or thrombus noted.   -The right ventricle is severely enlarged.  Right ventricular systolic function is moderately reduced.   -There is severe bi-atrial enlargement.   -Mitral valve leaflets appear mildly thickened. Mitral regurgitation directed centrally and posteriorly that may be severe.   -The ascending aorta is mildly dilated.   -Moderate to severe tricuspid regurgitation with a PASP of 77 mmHg.   -Trivial pulmonic regurgitation.     Cath: 2/8/22  Anatomy:   LM-nml   LAD-nml  Cx-nml  OM- nml  RCA-nml  RPDA- nml  LVEF- 10%  LVG- global hypokinesis  LVEDP- 10     Hemodynamics:  RA- mean 3  RV- 37/0  PAWP- 10  PA- 34/12 (20)     C.O.- 5.7 (4.9)  C. I.- 2.6 (2.25)  PA sat 60%  AO sat 91%      Impression  ~Coronary Angiography w/ normal cors  ~LVG with LVEF of 10% and global regional wall motion abnormalities  ~Severe MR  ~Normal CO/CI  ~normal L and R filling pressures    Problem List:   Patient Active Problem List    Diagnosis Date Noted    Syncope and collapse 04/29/2022    Thrombus of left atrial appendage     [facility]ricular tachycardia[facility]     ICD (implantable cardioverter-defibrillator), biventricular, in situ 04/26/2022    Acute on chronic congestive heart failure (HCC)     Complicated UTI (urinary tract infection)     Chronic atrial fibrillation ([facility])     Moderate mitral regurgitation     Acute systolic heart failure ([address] 04/13/2022    NSTEMI (non-ST elevated myocardial infarction) ([address]     Acute on chronic combined systolic and diastolic heart failure ([address] 02/06/2022    Homeless     COVID     Non-compliance     Acute on chronic systolic heart failure ([address] 11/28/2021    Acute pulmonary edema ([address] 11/27/2021    Encounter for loop recorder check 07/26/2019    Acute urinary retention 07/26/2019    Dilated cardiomyopathy (HCC)     Acute renal failure (HCC)     PAF (paroxysmal atrial fibrillation) ([facility])     Benign essential HTN     Acute retention of urine 07/24/2019        Assessment and Plan:     1.  Syncope   - Likely secondary to dehydration/meds   - No arrhythmias on device   - No recurrence; able to ambulate unit without issue    - Recommend lying down and elevating legs for future prodromal symptoms  - 20-30mmHg compression stockings recommended    2. Chronic systolic heart failure (NYHA Class III)  - Appears compensated   ~ EF 20-25% per echo  - Continue with Toprol XL 25 mg QD, lisinopril 2.5 mg QD, and torsemide 20 mg QD (previously on 40 mg BID)  - Aggressive medical therapy with risk factor modification  - Discussed with patient importance of monitoring weight, low sodium diet and fluid restriction    3. NICM  - S/p Medtr[employer] Biv AICD (4/25/22, )  - I reviewed device interrogation. Device functioning normally. - Follow up with device clinic as scheduled     - Pt has small hematoma with mild to mod swelling at his AICD site. He states he has not been placing ice to site. Instructed him to avoid overuse of left arm and to apply ice pack to site 3-4x daily for 20 minutes increments to help swelling. Will have him follow up in clinic as scheduled on Tuesday. If no improvement, may need to hold eliquis for a couple days. (Pictures in chart under media)    4. Persistent Atrial Fibrillation  - Currently V-paced with underlying AF  - Continue Toprol XL 25 mg QD  - [id number] score:high ; [id number] Vasc score and anticoagulation discussed. High risk for stroke and thromboembolism. Anticoagulation is recommended. Risk of bleeding was discussed.  ~ On Eliquis 5 mg BID     - Consider DCCV in future if he is compliant with his eliquis    5. HTN  - Controlled: Goal <130/80  - Continue current medications    6. HENOK Thrombus   - Noted on CONSUELO (2/22)   - On Eliquis (Ok to stop ASA given nose bleeds)    No further cardiology recommendation. Naren Hernández for discharge. Follow up in office as scheduled (Device check on 5/3 and CHF NP OV on 5/13)    All pertinent information and plan of care discussed with the rounding physician. All questions and concerns were addressed to the patient.  Alternatives to my treatment were discussed. I have discussed the above stated plan with patient and the nurse. The patient verbalized understanding and agreed with the plan. Thank you for allowing to us to participate in the care of Corinne Blackmon    The patient was seen for >35 minutes. I reviewed interval history, physical exam, review of data including labs, imaging, development and implementation of treatment plan and coordination of complex care.     [facility]  [address]   Office: [phone]

## 2022-05-01 NOTE — PLAN OF CARE
Problem: Discharge Planning  Goal: Discharge to home or other facility with appropriate resources  5/1/2022 1519 by Murtaza Toth RN  Outcome: Completed  5/1/2022 1315 by Murtaza Toth RN  Outcome: Progressing  Note: Pt set to leave today per MD     Problem: Pain  Goal: Verbalizes/displays adequate comfort level or baseline comfort level  5/1/2022 1519 by Murtaza Toth RN  Outcome: Completed  5/1/2022 1315 by Murtaza Toth RN  Outcome: Progressing  Note: No pain reported  5/1/2022 0135 by Octavio Ellington RN  Outcome: Progressing  Flowsheets (Taken 5/1/2022 0135)  Verbalizes/displays adequate comfort level or baseline comfort level:   Assess pain using appropriate pain scale   Implement non-pharmacological measures as appropriate and evaluate response  Note: Pt given Ice pack for laceration over left eye. Problem: Safety - Adult  Goal: Free from fall injury  5/1/2022 1519 by Murtaza Toth RN  Outcome: Completed  5/1/2022 1315 by Murtaza Toth RN  Outcome: Progressing  5/1/2022 0135 by Octavio Ellington RN  Outcome: Progressing  Note: Fall risk assessment completed. Pt gait steady. Clear pathway provided to and from bathroom as pt is ambulatory. Bed kept in lowest position with wheels locked. Call light within reach. Pt encouraged to call for any needs. Pt free from accidental injury this shift. Problem: ABCDS Injury Assessment  Goal: Absence of physical injury  5/1/2022 1519 by Murtaza Toth RN  Outcome: Completed  5/1/2022 1315 by Murtaza Toth RN  Outcome: Progressing  5/1/2022 0135 by Octavio Ellington RN  Outcome: Progressing  Note: Pt instructed to call if he feels lightheaded or in need of assistance.

## 2022-05-01 NOTE — PLAN OF CARE
Problem: Pain  Goal: Verbalizes/displays adequate comfort level or baseline comfort level  Outcome: Progressing  Flowsheets (Taken 5/1/2022 0135)  Verbalizes/displays adequate comfort level or baseline comfort level:   Assess pain using appropriate pain scale   Implement non-pharmacological measures as appropriate and evaluate response  Note: Pt given Ice pack for laceration over left eye. Problem: Safety - Adult  Goal: Free from fall injury  Outcome: Progressing  Note: Fall risk assessment completed. Pt gait steady. Clear pathway provided to and from bathroom as pt is ambulatory. Bed kept in lowest position with wheels locked. Call light within reach. Pt encouraged to call for any needs. Pt free from accidental injury this shift. Problem: ABCDS Injury Assessment  Goal: Absence of physical injury  Outcome: Progressing  Note: Pt instructed to call if he feels lightheaded or in need of assistance.

## 2022-05-01 NOTE — DISCHARGE SUMMARY
Patient: Vinod Blanco     Gender: male  : 1960   Age: 59 y.o.   MRN: [id number]    Admitting Physician: Mckenzie Henderson MD  Discharge Physician: Terrence Guardado MD     Code Status: Full Code     Admit Date: 2022   Discharge Date:   2022    Disposition:  Home    Discharge Diagnoses:  Syncope is likely secondary to dehydration and meds  Chronic severe systolic heart failure s/p AICD  Left forehead laceration s/p suture    Active Hospital Problems    Diagnosis Date Noted    Syncope and collapse [R55] 2022     Priority: Medium       Follow-up appointments:  one week    Outpatient to do list: Follow-up on Tuesday with EP clinic    Condition at Discharge:  [address]:   78-year-old admitted with syncope  His AICD was checked did not show any evidence of arrhythmias this was felt secondary to meds and dehydration at this point his torsemide dose has been decreased however beyond that there are not many options his orthostatics were negative  Had a laceration on his left upper forehead/eyebrow which was sutured had some swelling around it no visual complaints CT head was negative he will need removal of the sutures in 7 days time  Is continued on his Eliquis      Discharge Medications:   Current Discharge Medication List        Current Discharge Medication List      CONTINUE these medications which have CHANGED    Details   torsemide (DEMADEX) 20 MG tablet Take 1 tablet by mouth daily  Qty: 30 tablet, Refills: 3           Current Discharge Medication List      CONTINUE these medications which have NOT CHANGED    Details   lisinopril (PRINIVIL;ZESTRIL) 2.5 MG tablet Take 1 tablet by mouth daily  Qty: 30 tablet, Refills: 0      metoprolol succinate (TOPROL XL) 25 MG extended release tablet Take 1 tablet by mouth daily  Qty: 30 tablet, Refills: 3      apixaban (ELIQUIS) 5 MG TABS tablet Take 1 tablet by mouth 2 times daily  Qty: 60 tablet, Refills: 0      atorvastatin (LIPITOR) 40 MG tablet Take 1 tablet by mouth nightly  Qty: 30 tablet, Refills: 2      potassium chloride (KLOR-CON M) 20 MEQ extended release tablet Take 2 tablets by mouth daily for 1 day  Qty: 1 tablet, Refills: 0      finasteride (PROSCAR) 5 MG tablet Take 1 tablet by mouth daily  Qty: 30 tablet, Refills: 0      tamsulosin (FLOMAX) 0.4 MG capsule Take 1 capsule by mouth daily  Qty: 30 capsule, Refills: 2      vitamin D (CHOLECALCIFEROL) 50 MCG (2000 UT) TABS tablet Take 1 tablet by mouth daily  Qty: 90 tablet, Refills: 1    Associated Diagnoses: Acute on chronic systolic heart failure (HCC)           Current Discharge Medication List      STOP taking these medications       aspirin 81 MG chewable tablet Comments:   Reason for Stopping:               Discharge ROS:  A complete review of systems was asked and negative     Discharge Exam:    /81   Pulse 78   Temp 98.4 °F (36.9 °C) (Oral)   Resp 18   Ht 6' 2\" (1.88 m)   Wt 190 lb 0.6 oz (86.2 kg)   SpO2 99%   BMI 24.40 kg/m²   General appearance:  NAD  HEENT:   Normal cephalic, atraumatic, moist mucous membranes, no oropharyngeal erythema or exudate left upper eyebrow forehead laceration with some edema and tenderness suture  Heart[de-identified] Normal s1/s2, RRR, no murmurs, gallops, or rubs. no leg edema  Lungs:  Normal respiratory effort. Clear to auscultation, bilaterally without Rales/Wheezes/Rhonchi. Abdomen: Soft, non-tender, non-distended, bowel sounds present, no masses  Musculoskeletal:  No clubbing, no cyanosis, *  Neurologic:  Neurovascularly intact without any focal sensory/motor deficits. Cranial nerves: II-XII intact, grossly non-focal.    Labs:  For convenience and continuity at follow-up the following most recent labs are provided:    Lab Results   Component Value Date    WBC 4.7 04/30/2022    HGB 9.8 04/30/2022    HCT 29.9 04/30/2022    MCV 81.0 04/30/2022     04/30/2022     04/30/2022    K 3.8 04/30/2022     04/30/2022    CO2 31 04/30/2022 BUN 18 04/30/2022    CREATININE 1.0 04/30/2022    CALCIUM 8.8 04/30/2022    PHOS 3.7 04/18/2022    ALKPHOS 71 04/30/2022    ALT 25 04/30/2022    AST 28 04/30/2022    BILITOT 1.3 04/30/2022    LABALBU 3.3 04/30/2022     Lab Results   Component Value Date    INR 1.50 (H) 02/05/2022    INR 1.50 (H) 11/28/2021           The patient was seen and examined on day of discharge and this discharge summary is in conjunction with any daily progress note from day of discharge. Time Spent on discharge is 45 minutes  in the examination, evaluation, counseling and review of medications and discharge plan. Note that greater than 30 minutes was spent in preparing discharge papers, discussing discharge with patient, medication review, etc.       Signed:    Cornelio Jain MD   5/1/2022      Thank you No primary care provider on file. for the opportunity to be involved in this patient's care.  If you have any questions or concerns please feel free to contact me

## 2022-05-01 NOTE — CONSULTS
[facility]                     [address] [facility], [address]                                  CONSULTATION    PATIENT NAME: Gretchen Navarrete                  :        1960  MED REC NO:   [id number]                          ROOM:       9965  ACCOUNT NO:   [de-identified]                           ADMIT DATE: 2022  PROVIDER:     Bruna Murphy MD    CONSULT DATE:  2022    REASON FOR CONSULTATION:  Evaluate patient who had a fall while awaiting  his ride after office visit yesterday. HISTORY OF PRESENT ILLNESS:  The patient is a 69-year-old gentleman who  has a chronic nonischemic cardiomyopathy with ejection fraction of 10%. He is now status post implantable defibrillator. He is compliant with  medications, but has a very difficult social situation with chronic  homelessness, although I believe some people do take him in at times. He was seen in the office yesterday and seemingly quite stable. He then  was waiting his ride when he very suddenly became lightheaded. He then  had a fall and actually had a facial laceration. He was seen in the  emergency room with a facial bruise. His ICD device has subsequently  been interrogated with no evidence of ventricular tachycardia. There  are no shots from his defibrillator. He was evaluated in the emergency  room, had a CT scan without any significant abnormalities and he was  admitted for further treatment. He also has a facial laceration sutured  in the emergency room. Today, he is quite concerned about ongoing  follow up and ability to be on his own. PAST MEDICAL HISTORY:  Notable for chronic atrial fibrillation, chronic  nonischemic cardiomyopathy with a recent cardiac catheterization showing  normal coronary arteries. He has recent ICD implant. FAMILY HISTORY:  Notable for coronary artery disease in his mother,  hypertension in mother. Father has stroke and high blood pressure.     SOCIAL HISTORY:  He has never smoked. He does not drink alcohol or use  drugs. MEDICATIONS:  Prior to admission are lisinopril 2.5 mg daily, Toprol XL  25 mg daily, torsemide 40 mg b.i.d., aspirin 81 mg daily, Eliquis 5 mg  b.i.d., Eliquis is used for intracavitary thrombus with chronic atrial  fibrillation. He is on Lipitor 40 mg daily, potassium, Proscar, Flomax,  vitamin B, and in the hospital he is still receiving torsemide at 40 mg  daily. ALLERGIES:  None. REVIEW OF SYSTEMS:  14-system review of systems is only notable for  anxiety. PHYSICAL EXAMINATION:  VITAL SIGNS:  His blood pressure is 198/60 with no significant drop upon  standing. HEENT:  Normal.  NECK:  Neck veins are flat. LUNGS:  Clear. HEART:  Cardiac sounds are notable for S4 gallop. ABDOMEN:  Nontender. No masses are felt. EXTREMITIES:  Currently without edema. NEUROLOGIC:  Moves all extremities well. Cranial nerves II through XII  are intact. EKG shows demand pacemaker, which appears to be a single-chamber  pacemaker. The underlying rhythm is atrial fibrillation. LABORATORY DATA:  Lab work shows troponin less than 0.01, a slightly  prerenal with BUN 18, creatinine 1, albumin only 3.3, hemoglobin 9.8,  white blood cell count 4700, platelet count 647,259. IMPRESSION:  Nonischemic cardiomyopathy with class 3 congestive heart  failure, currently may be over diuresed and perhaps that played a role  in vasovagal episode. We will observe him in the hospital and reduce  the dose of torsemide at only 20 mg daily. Recent ICD implant with no  evidence that the patient have ventricular tachycardia as the cause of  using the same and near loss of consciousness. PLAN:  The patient has very reluctant to be discharged from the  hospital.  I still wonder what his disposition will be. We will reduce  the torsemide to 20 mg daily and I think that will help ______ dizziness  and hypotension. Overall prognosis is poor.   The patient is not a  candidate for advanced nebulized therapy such as left ventricular assist  device nor evaluation with cardiac transplantation due to social  circumstances. Additionally, there is no possible way the patient could  be treated with Entresto. Please note 70 minutes of time is spent with majority of the time with  direct patient contact and performing this consultation.         Otilia Orozco MD    D: 04/30/2022 20:20:55       T: 04/30/2022 23:03:04     [email or web address]  Job#: [id number]     Doc#: [id number]    CC:

## 2022-05-01 NOTE — PROGRESS NOTES
Data- discharge order received, pt verbalized agreement to discharge, disposition to previous residence, no needs for HHC/DME. Action- discharge instructions prepared and given to pt, pt verbalized understanding. Medication information packet given r/t NEW and/or CHANGED prescriptions emphasizing name/purpose/side effects, pt verbalized understanding. Discharge instruction summary: Diet- regular, cardiac diet, Activity- as toelrated, Primary Care Physician as follows: No primary care provider on file. None f/u appointment to be scheduled by pt, list of PCPs provided, immunizations reviewed and up to date, prescription medications filled and pt sent with paper script. Inpatient surgical procedure precautions reviewed. CHF Education reviewed. Pt/ Family has had a total of 60 minutes CHF education this admission encounter. 1. WEIGHT: Admit Weight: 195 lb (88.5 kg) (04/29/22 1704)        Today  Weight: 190 lb 0.6 oz (86.2 kg) (04/30/22 0518)       2. O2 SAT.: SpO2: 98 % (05/01/22 1700)    Response- Pt belongings gathered, IV removed. Disposition is home (no HHC/DME needs), transported with belongings, taken to lobby via w/c w/ belongings, no complications.

## 2022-05-03 ENCOUNTER — NURSE ONLY (OUTPATIENT)
Dept: CARDIOLOGY CLINIC | Age: 62
End: 2022-05-03
Payer: COMMERCIAL

## 2022-05-03 DIAGNOSIS — I48.20 CHRONIC ATRIAL FIBRILLATION (HCC): ICD-10-CM

## 2022-05-03 DIAGNOSIS — I47.20 VT (VENTRICULAR TACHYCARDIA): ICD-10-CM

## 2022-05-03 DIAGNOSIS — Z95.810 ICD (IMPLANTABLE CARDIOVERTER-DEFIBRILLATOR), BIVENTRICULAR, IN SITU: ICD-10-CM

## 2022-05-03 DIAGNOSIS — I42.0 DILATED CARDIOMYOPATHY (HCC): ICD-10-CM

## 2022-05-03 DIAGNOSIS — I48.0 PAF (PAROXYSMAL ATRIAL FIBRILLATION) (HCC): ICD-10-CM

## 2022-05-03 DIAGNOSIS — I50.22 CHRONIC SYSTOLIC CONGESTIVE HEART FAILURE (HCC): ICD-10-CM

## 2022-05-03 PROCEDURE — 93284 PRGRMG EVAL IMPLANTABLE DFB: CPT | Performed by: INTERNAL MEDICINE

## 2022-05-03 PROCEDURE — 93290 INTERROG DEV EVAL ICPMS IP: CPT | Performed by: INTERNAL MEDICINE

## 2022-05-04 NOTE — PROGRESS NOTES
Patient comes in for programming evaluation for his defibrillator. All sensing and pacing parameters are within normal range. S/p mdtbivicd on 4/25/2022 by Dr. Bessy Dowell life 10.0 years  Mode VVIR    63.0%. Effective 57.5%  Patient has known AF  2 NSVT episodes noted. Last on 5/4/2022 lasting 3 seconds. appear to be AF with short RVR. Patient remains on Eliquis and metoprolol. Patients incision is healing nicely. Patient to call the office immediately with any signs on infection. Please see interrogation for more detail. Optivol is initializing. Patient will follow up in 3 months in office. Patient is homeless and does not have a cell phone. Will check in the office every 3 months.

## 2022-05-10 VITALS — DIASTOLIC BLOOD PRESSURE: 62 MMHG | SYSTOLIC BLOOD PRESSURE: 92 MMHG

## 2022-05-13 ENCOUNTER — OFFICE VISIT (OUTPATIENT)
Dept: CARDIOLOGY CLINIC | Age: 62
End: 2022-05-13
Payer: COMMERCIAL

## 2022-05-13 ENCOUNTER — HOSPITAL ENCOUNTER (OUTPATIENT)
Age: 62
Discharge: HOME | End: 2022-05-13
Payer: COMMERCIAL

## 2022-05-13 VITALS
SYSTOLIC BLOOD PRESSURE: 90 MMHG | HEART RATE: 72 BPM | HEIGHT: 74 IN | OXYGEN SATURATION: 98 % | BODY MASS INDEX: 25.8 KG/M2 | DIASTOLIC BLOOD PRESSURE: 56 MMHG | WEIGHT: 201 LBS

## 2022-05-13 DIAGNOSIS — I42.0 DILATED CARDIOMYOPATHY (HCC): ICD-10-CM

## 2022-05-13 DIAGNOSIS — I48.0 PAF (PAROXYSMAL ATRIAL FIBRILLATION) (HCC): ICD-10-CM

## 2022-05-13 DIAGNOSIS — I50.22 CHRONIC SYSTOLIC CONGESTIVE HEART FAILURE (HCC): ICD-10-CM

## 2022-05-13 DIAGNOSIS — I50.22 CHRONIC SYSTOLIC CONGESTIVE HEART FAILURE (HCC): Primary | ICD-10-CM

## 2022-05-13 DIAGNOSIS — Z91.199 NON-COMPLIANCE: ICD-10-CM

## 2022-05-13 DIAGNOSIS — Z59.00 HOMELESS: ICD-10-CM

## 2022-05-13 DIAGNOSIS — I10 BENIGN ESSENTIAL HTN: ICD-10-CM

## 2022-05-13 DIAGNOSIS — Z95.810 ICD (IMPLANTABLE CARDIOVERTER-DEFIBRILLATOR), BIVENTRICULAR, IN SITU: ICD-10-CM

## 2022-05-13 LAB
HCT VFR BLD CALC: 30.2 % (ref 40.5–52.5)
HEMOGLOBIN: 10 G/DL (ref 13.5–17.5)
MCH RBC QN AUTO: 26.8 PG (ref 26–34)
MCHC RBC AUTO-ENTMCNC: 33.2 G/DL (ref 31–36)
MCV RBC AUTO: 81 FL (ref 80–100)
PDW BLD-RTO: 17.4 % (ref 12.4–15.4)
PLATELET # BLD: 262 K/UL (ref 135–450)
PMV BLD AUTO: 9.1 FL (ref 5–10.5)
RBC # BLD: 3.72 M/UL (ref 4.2–5.9)
WBC # BLD: 4.2 K/UL (ref 4–11)

## 2022-05-13 PROCEDURE — 1036F TOBACCO NON-USER: CPT | Performed by: CLINICAL NURSE SPECIALIST

## 2022-05-13 PROCEDURE — 3017F COLORECTAL CA SCREEN DOC REV: CPT | Performed by: CLINICAL NURSE SPECIALIST

## 2022-05-13 PROCEDURE — 99214 OFFICE O/P EST MOD 30 MIN: CPT | Performed by: CLINICAL NURSE SPECIALIST

## 2022-05-13 PROCEDURE — 83880 ASSAY OF NATRIURETIC PEPTIDE: CPT

## 2022-05-13 PROCEDURE — 36415 COLL VENOUS BLD VENIPUNCTURE: CPT

## 2022-05-13 PROCEDURE — 85027 COMPLETE CBC AUTOMATED: CPT

## 2022-05-13 PROCEDURE — 1111F DSCHRG MED/CURRENT MED MERGE: CPT | Performed by: CLINICAL NURSE SPECIALIST

## 2022-05-13 PROCEDURE — 80048 BASIC METABOLIC PNL TOTAL CA: CPT

## 2022-05-13 PROCEDURE — G8427 DOCREV CUR MEDS BY ELIG CLIN: HCPCS | Performed by: CLINICAL NURSE SPECIALIST

## 2022-05-13 PROCEDURE — G8419 CALC BMI OUT NRM PARAM NOF/U: HCPCS | Performed by: CLINICAL NURSE SPECIALIST

## 2022-05-13 RX ORDER — TORSEMIDE 20 MG/1
20 TABLET ORAL 2 TIMES DAILY
Qty: 30 TABLET | Refills: 3 | Status: SHIPPED
Start: 2022-05-13 | End: 2022-05-31 | Stop reason: SDUPTHER

## 2022-05-13 SDOH — ECONOMIC STABILITY - HOUSING INSECURITY: HOMELESSNESS UNSPECIFIED: Z59.00

## 2022-05-13 NOTE — PATIENT INSTRUCTIONS
1. Check blood work today  2. Cut torsemide to 20 mg twice a day. Will not take any torsemide tonight  3. Sutures removed in incision above left eye  4.   RTO in 2 weeks

## 2022-05-13 NOTE — PROGRESS NOTES
[address]  Progress Note    Primary Care Doctor:  No primary care provider on file. Chief Complaint   Patient presents with    Congestive Heart Failure    Hypertension    Follow-up        History of Present Illness:  59 y.o. male with history of with history of PAF, hypertension. Unvaccinated, , homeless  hospitalization 2/5-10/22 for weight gain and lower extremity edema. He recently had covid and did not follow up in office. LVEF 20%, LHC done. Weight 223->193. He was started on HF therapy, declined life vest.  CONSUELO done with HENOK thrombus (on eliquis per EP). Not able to do CV  4/13-26/22 for fluid overload requiring diureses with milrinone and lasix infusion, UTI and VT requiring ICD placement    I had the pleasure of seeing Josh Resendiz in follow up for systolic heart failure. He is ambulatory and by his self. After last visit, he became lightheaded and was admitted due to laceration on top of left eyebrow. His optival did not show VT. He was suppose to cut torsemide to 20 mg daily but he is still taking 40 mg twice a day. He felt lightheaded getting out of car today, sat and then was fine. He has 2 long heavy shirts on today (temp outside is 84. His weight is staying stable. He denies any chest pain, palpitations or increased edema. He feels much better. Past Medical History:   has a past medical history of Atrial fibrillation ([address] CHF (congestive heart failure) ([address] Hx of blood clots, and Hypertension. Surgical History:   has a past surgical history that includes Insertable Cardiac Monitor (07/26/2019) and Cardioversion (07/26/2019). Social History:   reports that he has never smoked. He has never used smokeless tobacco. He reports that he does not drink alcohol and does not use drugs.    Family History:   Family History   Problem Relation Age of Onset    Heart Disease Mother     High Blood Pressure Mother     Heart Attack Mother     Other Father    Tellez Stroke Father     High Blood Pressure Father        Home Medications:  Prior to Admission medications    Medication Sig Start Date End Date Taking? Authorizing Provider   torsemide (DEMADEX) 20 MG tablet Take 1 tablet by mouth in the morning and at bedtime 5/13/22  Yes TRENT Perrin   apixaban (ELIQUIS) 5 MG TABS tablet Take 1 tablet by mouth 2 times daily 4/26/22  Yes Yudith Contreras MD   atorvastatin (LIPITOR) 40 MG tablet Take 1 tablet by mouth nightly 4/26/22  Yes Yudith Contreras MD   lisinopril (PRINIVIL;ZESTRIL) 2.5 MG tablet Take 1 tablet by mouth daily 4/29/22   TRENT Perrin   metoprolol succinate (TOPROL XL) 25 MG extended release tablet Take 1 tablet by mouth daily 4/27/22   Yudith Contreras MD   finasteride (PROSCAR) 5 MG tablet Take 1 tablet by mouth daily 4/26/22   Yudith Contreras MD   tamsulosin [facility]) 0.4 MG capsule Take 1 capsule by mouth daily 4/26/22   Yudith Contreras MD   vitamin D (CHOLECALCIFEROL) 50 MCG (2000 UT) TABS tablet Take 1 tablet by mouth daily 2/16/22   TRENT Perrin        Allergies:  Patient has no known allergies. Review of Systems:   · Constitutional: there has been no unanticipated weight loss. There's been no change in energy level, sleep pattern, or activity level. · Eyes: No visual changes or diplopia. No scleral icterus. · ENT: No Headaches, hearing loss or vertigo. No mouth sores or sore throat. · Cardiovascular: Reviewed in HPI  · Respiratory: No cough or wheezing, no sputum production. No hematemesis. · Gastrointestinal: No abdominal pain, appetite loss, blood in stools. No change in bowel or bladder habits. · Genitourinary: No dysuria, trouble voiding, or hematuria. · Musculoskeletal:  No gait disturbance, weakness or joint complaints. · Integumentary: No rash or pruritis. · Neurological: No headache, diplopia, change in muscle strength, numbness or tingling.  No change in gait, balance, coordination, mood, affect, memory, mentation, behavior. · Psychiatric: No anxiety, no depression. · Endocrine: No malaise, fatigue or temperature intolerance. No excessive thirst, fluid intake, or urination. No tremor. · Hematologic/Lymphatic: No abnormal bruising or bleeding, blood clots or swollen lymph nodes. · Allergic/Immunologic: No nasal congestion or hives. Physical Examination:    Vitals:    05/13/22 1512   BP: (!) 90/56   Pulse: 72   SpO2: 98%   Weight: 201 lb (91.2 kg)   Height: 6' 2\" (1.88 m)        Constitutional and General Appearance: Warm and dry, no apparent distress, normal coloration sutures above left eye brow  HEENT:  Normocephalic, atraumatic  Respiratory:  · Normal excursion and expansion without use of accessory muscles  · Resp Auscultation: Normal breath sounds without dullness  Cardiovascular:  · The apical impulses not displaced  · Heart tones are crisp and normal  · JVP normal cm H2O  · irregular rate and rhythm  · Peripheral pulses are symmetrical and full  · There is no clubbing, cyanosis of the extremities.   · Bilateral lower ankle edema  · Pedal Pulses: 2+ and equal   Abdomen:  · No masses or tenderness  · Liver/Spleen: No Abnormalities Noted  Neurological/Psychiatric:  · Alert and oriented in all spheres  · Moves all extremities well  · Exhibits normal gait balance and coordination  · No abnormalities of mood, affect, memory, mentation, or behavior are noted    Lab Data:    CBC:   Lab Results   Component Value Date    WBC 4.7 04/30/2022    WBC 5.1 04/29/2022    WBC 4.7 04/24/2022    RBC 3.69 04/30/2022    RBC 3.98 04/29/2022    RBC 4.11 04/24/2022    HGB 9.8 04/30/2022    HGB 10.5 04/29/2022    HGB 11.0 04/24/2022    HCT 29.9 04/30/2022    HCT 31.9 04/29/2022    HCT 33.0 04/24/2022    MCV 81.0 04/30/2022    MCV 80.1 04/29/2022    MCV 80.4 04/24/2022    RDW 15.6 04/30/2022    RDW 15.8 04/29/2022    RDW 14.9 04/24/2022     04/30/2022     04/29/2022     04/24/2022     BMP:  Lab Results   Component Value Date     04/30/2022     04/29/2022     04/24/2022    K 3.8 04/30/2022    K 3.3 04/29/2022    K 4.2 04/24/2022     04/30/2022     04/29/2022    CL 96 04/24/2022    CO2 31 04/30/2022    CO2 33 04/29/2022    CO2 31 04/24/2022    PHOS 3.7 04/18/2022    PHOS 4.3 02/09/2022    PHOS 4.1 02/08/2022    BUN 18 04/30/2022    BUN 16 04/29/2022    BUN 18 04/24/2022    CREATININE 1.0 04/30/2022    CREATININE 1.1 04/29/2022    CREATININE 1.1 04/24/2022     BNP:   Lab Results   Component Value Date    PROBNP 5,842 04/21/2022    PROBNP 11,887 04/16/2022    PROBNP 27,525 04/13/2022     Cardiac Imaging:  CONSUELO Dr Asya Palafox 2/10/22  Preliminary CONSUELO results are:      - Severe LV dysfunction,  EF: 20-25%  - LA dilated. There was HENOK thrombus. - Moderate MR    Cardiac Cath PCI: Dr Krissy Caballero 2/8/2022  Anatomy:   LM-nml   LAD-nml  Cx-nml  OM- nml  RCA-nml  RPDA- nml  LVEF- 10%  LVG- global hypokinesis  LVEDP- 10     Hemodynamics:  RA- mean 3  RV- 37/0  PAWP- 10  PA- 34/12 (20)     C.O.- 5.7 (4.9)  C. I.- 2.6 (2.25)  PA sat 60%  AO sat 91%     Contrast: 70  Flouro Time: 5.3  Access: R radial a, R CFV     Impression  ~Coronary Angiography w/ normal cors  ~LVG with LVEF of 10% and global regional wall motion abnormalities  ~Severe MR  ~Normal CO/CI  ~normal L and R filling pressures     Recommendation  ~Aggressive medical treatment and risk factor modification  ~1. Medications reviewed, no changes at this time. 2. Post cath IVF. Bedrest.  3.Consider CONSUELO for evaluation of MR.   4. Patient has been advised on the importance of regular exercise of at least 20-30 minutes daily alternating with aerobic and isometric activities. 5. Patient counseled about and offered assistance for smoking cessation   6.  No indication for cardiac rehab  7. CHF service to evaluate tomorrow.     Echo 11/29/2021  Summary   -Covid+   -Severely reduced global systolic function with an ejection fraction   estimated at 20%.  -Severe global hypokinesis with regional variations noted.   -Right ventricular systolic function appears to be mildly reduced based on   visual inspection.   -Severe biatrial enlargement.   -Thickened mitral valve without evidence of stenosis. There is   mild-to-moderate mitral regurgitation.   -There is mild-to-moderate tricuspid regurgitation with a RVSP estimation of   40 mmHg.   -Diastolic dysfunction with elevated LV filling pressures.     Echo 7/25/2019  Summary   -Overall left ventricular systolic function is moderately depressed .   -Ejection fraction is visually estimated to be 30-35 %.  -Global left ventricular function moderately reduced.   -Indeterminate diastolic function due to atrial fibrillation and moderate to   severe mitral regurgitation.   -Moderate-to-severe mitral regurgitation .   -Dilated left atrium with a volume of 97 ml.   -Left atrial volume is increased probably due to atrial fibrillation .   -There is mild right ventricular hypertrophy.   -The right ventricle is mildly enlarged.   -Right ventricular systolic pressure of 53 mm Hg consistent with pulmonary   hypertension.   -Moderate to severe tricuspid regurgitation. TAPSE = 1.43   -IVC size is dilated (>2.1 cm) but collapses > 50% with respiration   consistent with elevated RA pressure (8 mmHg).    Assessment:    1. Chronic systolic heart failure (HCC) on ace and bb; no aldosterone antagonist due to compliance   2. Non-compliance    3. Homeless    4. PAF (paroxysmal atrial fibrillation) ([facility]) Dr Julieth rothman   5. Benign essential HTN    6. Dilated cardiomyopathy ([address]    7. ICD in place    Plan:   Patient Instructions   1. Check blood work today  2. Cut torsemide to 20 mg twice a day. Will not take any torsemide tonight  3. Sutures removed in incision above left eye  4.   RTO in 2 weeks    Sutures above left eye brow removed by HF nurse  [facility]    I appreciate the opportunity of cooperating in the care of this individual.    Nj Chen, APRN - CNS, CNS, 5/13/2022, 3:44 PM

## 2022-05-14 LAB
ANION GAP SERPL CALCULATED.3IONS-SCNC: 14 MMOL/L (ref 3–16)
BUN BLDV-MCNC: 16 MG/DL (ref 7–20)
CALCIUM SERPL-MCNC: 8.9 MG/DL (ref 8.3–10.6)
CHLORIDE BLD-SCNC: 101 MMOL/L (ref 99–110)
CO2: 28 MMOL/L (ref 21–32)
CREAT SERPL-MCNC: 1.2 MG/DL (ref 0.8–1.3)
GFR AFRICAN AMERICAN: >60
GFR NON-AFRICAN AMERICAN: >60
GLUCOSE BLD-MCNC: 93 MG/DL (ref 70–99)
POTASSIUM SERPL-SCNC: 3.3 MMOL/L (ref 3.5–5.1)
PRO-BNP: ABNORMAL PG/ML (ref 0–124)
SODIUM BLD-SCNC: 143 MMOL/L (ref 136–145)

## 2022-05-17 ENCOUNTER — TELEPHONE (OUTPATIENT)
Dept: CARDIOLOGY CLINIC | Age: 62
End: 2022-05-17

## 2022-05-17 NOTE — TELEPHONE ENCOUNTER
----- Message from TRENT Joseph - CNS sent at 5/16/2022  4:00 PM EDT -----  Labs are stable  Continue current medications  thanks

## 2022-05-31 ENCOUNTER — HOSPITAL ENCOUNTER (OUTPATIENT)
Age: 62
Discharge: HOME | End: 2022-05-31
Payer: COMMERCIAL

## 2022-05-31 ENCOUNTER — OFFICE VISIT (OUTPATIENT)
Dept: CARDIOLOGY CLINIC | Age: 62
End: 2022-05-31
Payer: COMMERCIAL

## 2022-05-31 VITALS
WEIGHT: 212 LBS | HEART RATE: 84 BPM | BODY MASS INDEX: 27.21 KG/M2 | HEIGHT: 74 IN | OXYGEN SATURATION: 98 % | DIASTOLIC BLOOD PRESSURE: 60 MMHG | SYSTOLIC BLOOD PRESSURE: 100 MMHG

## 2022-05-31 DIAGNOSIS — I50.22 CHRONIC SYSTOLIC CONGESTIVE HEART FAILURE (HCC): Primary | ICD-10-CM

## 2022-05-31 DIAGNOSIS — I50.22 CHRONIC SYSTOLIC CONGESTIVE HEART FAILURE (HCC): ICD-10-CM

## 2022-05-31 DIAGNOSIS — I42.0 DILATED CARDIOMYOPATHY (HCC): ICD-10-CM

## 2022-05-31 DIAGNOSIS — Z91.199 NON-COMPLIANCE: ICD-10-CM

## 2022-05-31 DIAGNOSIS — Z59.00 HOMELESS: ICD-10-CM

## 2022-05-31 DIAGNOSIS — Z95.810 ICD (IMPLANTABLE CARDIOVERTER-DEFIBRILLATOR), BIVENTRICULAR, IN SITU: ICD-10-CM

## 2022-05-31 DIAGNOSIS — I48.0 PAF (PAROXYSMAL ATRIAL FIBRILLATION) (HCC): ICD-10-CM

## 2022-05-31 DIAGNOSIS — I10 BENIGN ESSENTIAL HTN: ICD-10-CM

## 2022-05-31 LAB
ANION GAP SERPL CALCULATED.3IONS-SCNC: 13 MMOL/L (ref 3–16)
BUN BLDV-MCNC: 23 MG/DL (ref 7–20)
CALCIUM SERPL-MCNC: 8.8 MG/DL (ref 8.3–10.6)
CHLORIDE BLD-SCNC: 102 MMOL/L (ref 99–110)
CO2: 28 MMOL/L (ref 21–32)
CREAT SERPL-MCNC: 1.2 MG/DL (ref 0.8–1.3)
GFR AFRICAN AMERICAN: >60
GFR NON-AFRICAN AMERICAN: >60
GLUCOSE BLD-MCNC: 80 MG/DL (ref 70–99)
POTASSIUM SERPL-SCNC: 4.1 MMOL/L (ref 3.5–5.1)
PRO-BNP: ABNORMAL PG/ML (ref 0–124)
SODIUM BLD-SCNC: 143 MMOL/L (ref 136–145)

## 2022-05-31 PROCEDURE — 1111F DSCHRG MED/CURRENT MED MERGE: CPT | Performed by: CLINICAL NURSE SPECIALIST

## 2022-05-31 PROCEDURE — 1036F TOBACCO NON-USER: CPT | Performed by: CLINICAL NURSE SPECIALIST

## 2022-05-31 PROCEDURE — 36415 COLL VENOUS BLD VENIPUNCTURE: CPT

## 2022-05-31 PROCEDURE — 99214 OFFICE O/P EST MOD 30 MIN: CPT | Performed by: CLINICAL NURSE SPECIALIST

## 2022-05-31 PROCEDURE — 83880 ASSAY OF NATRIURETIC PEPTIDE: CPT

## 2022-05-31 PROCEDURE — G8419 CALC BMI OUT NRM PARAM NOF/U: HCPCS | Performed by: CLINICAL NURSE SPECIALIST

## 2022-05-31 PROCEDURE — 80048 BASIC METABOLIC PNL TOTAL CA: CPT

## 2022-05-31 PROCEDURE — 3017F COLORECTAL CA SCREEN DOC REV: CPT | Performed by: CLINICAL NURSE SPECIALIST

## 2022-05-31 PROCEDURE — G8427 DOCREV CUR MEDS BY ELIG CLIN: HCPCS | Performed by: CLINICAL NURSE SPECIALIST

## 2022-05-31 RX ORDER — TORSEMIDE 20 MG/1
20 TABLET ORAL 2 TIMES DAILY
Qty: 60 TABLET | Refills: 1 | Status: ON HOLD | OUTPATIENT
Start: 2022-05-31 | End: 2022-06-17 | Stop reason: SDUPTHER

## 2022-05-31 RX ORDER — TAMSULOSIN HYDROCHLORIDE 0.4 MG/1
0.4 CAPSULE ORAL DAILY
Qty: 30 CAPSULE | Refills: 0 | Status: ON HOLD | OUTPATIENT
Start: 2022-05-31 | End: 2022-06-17 | Stop reason: HOSPADM

## 2022-05-31 RX ORDER — METOPROLOL SUCCINATE 25 MG/1
25 TABLET, EXTENDED RELEASE ORAL DAILY
Qty: 90 TABLET | Refills: 3 | Status: ON HOLD | OUTPATIENT
Start: 2022-05-31 | End: 2022-06-17 | Stop reason: HOSPADM

## 2022-05-31 RX ORDER — LISINOPRIL 2.5 MG/1
2.5 TABLET ORAL DAILY
Qty: 90 TABLET | Refills: 0 | Status: ON HOLD | OUTPATIENT
Start: 2022-05-31 | End: 2022-06-17 | Stop reason: SDUPTHER

## 2022-05-31 RX ORDER — FINASTERIDE 5 MG/1
5 TABLET, FILM COATED ORAL DAILY
Qty: 30 TABLET | Refills: 0 | Status: ON HOLD | OUTPATIENT
Start: 2022-05-31 | End: 2022-10-31 | Stop reason: HOSPADM

## 2022-05-31 SDOH — ECONOMIC STABILITY - HOUSING INSECURITY: HOMELESSNESS UNSPECIFIED: Z59.00

## 2022-05-31 NOTE — PROGRESS NOTES
[address]  Progress Note    Primary Care Doctor:  No primary care provider on file. Chief Complaint   Patient presents with    Congestive Heart Failure    Fatigue    Extremity Weakness        History of Present Illness:  59 y.o. male with history of with history of PAF, hypertension. Unvaccinated, , homeless  hospitalization 2/5-10/22 for weight gain and lower extremity edema. He recently had covid and did not follow up in office. LVEF 20%, LHC done. Weight 223->193. He was started on HF therapy, declined life vest.  CONSUELO done with HENOK thrombus (on eliquis per EP). Not able to do CV  4/13-26/22 for fluid overload requiring diureses with milrinone and lasix infusion, UTI and VT requiring ICD placement    I had the pleasure of seeing Hudson Dowell in follow up for systolic heart failure. He is ambulatory and by his self. He is taking all his cardiac medications but has been out of his flomax and proscar for at least 2 weeks. He still has not made an appt with a PCP. He denies any dizziness but is having no energy. His weight is up to 212 (best weight is 200). His optival is starting to  information. He denies any increased shortness of breath or chest pain. Past Medical History:   has a past medical history of Atrial fibrillation ([address] CHF (congestive heart failure) ([address] Hx of blood clots, and Hypertension. Surgical History:   has a past surgical history that includes Insertable Cardiac Monitor (07/26/2019) and Cardioversion (07/26/2019). Social History:   reports that he has never smoked. He has never used smokeless tobacco. He reports that he does not drink alcohol and does not use drugs.    Family History:   Family History   Problem Relation Age of Onset    Heart Disease Mother     High Blood Pressure Mother     Heart Attack Mother     Other Father     Stroke Father     High Blood Pressure Father        Home Medications:  Prior to Admission medications Medication Sig Start Date End Date Taking? Authorizing Provider   empagliflozin (JARDIANCE) 10 MG tablet Take 1 tablet by mouth daily 5/31/22  Yes TRENT Alamo   torsemide (DEMADEX) 20 MG tablet Take 1 tablet by mouth in the morning and at bedtime 5/31/22  Yes TRENT Oliver   lisinopril (PRINIVIL;ZESTRIL) 2.5 MG tablet Take 1 tablet by mouth daily 5/31/22  Yes TRENT Berry   metoprolol succinate (TOPROL XL) 25 MG extended release tablet Take 1 tablet by mouth daily 5/31/22  Yes TRENT Alamo   finasteride (PROSCAR) 5 MG tablet Take 1 tablet by mouth daily 5/31/22  Yes TRENT Oliver   tamsulosin (FLOMAX) 0.4 mg capsule Take 1 capsule by mouth daily 5/31/22  Yes TRENT Berry   apixaban (ELIQUIS) 5 MG TABS tablet Take 1 tablet by mouth 2 times daily 4/26/22  Yes Gwynda Baumgarten, MD   atorvastatin (LIPITOR) 40 MG tablet Take 1 tablet by mouth nightly 4/26/22  Yes Gwynda Baumgarten, MD   vitamin D (CHOLECALCIFEROL) 50 MCG (2000 UT) TABS tablet Take 1 tablet by mouth daily 2/16/22  Yes TRENT Berry        Allergies:  Patient has no known allergies. Review of Systems:   · Constitutional: there has been no unanticipated weight loss. There's been no change in energy level, sleep pattern, or activity level. · Eyes: No visual changes or diplopia. No scleral icterus. · ENT: No Headaches, hearing loss or vertigo. No mouth sores or sore throat. · Cardiovascular: Reviewed in HPI  · Respiratory: No cough or wheezing, no sputum production. No hematemesis. · Gastrointestinal: No abdominal pain, appetite loss, blood in stools. No change in bowel or bladder habits. · Genitourinary: No dysuria, trouble voiding, or hematuria. · Musculoskeletal:  No gait disturbance, weakness or joint complaints. · Integumentary: No rash or pruritis.   · Neurological: No headache, diplopia, change in muscle strength, numbness or tingling. No change in gait, balance, coordination, mood, affect, memory, mentation, behavior. · Psychiatric: No anxiety, no depression. · Endocrine: No malaise, fatigue or temperature intolerance. No excessive thirst, fluid intake, or urination. No tremor. · Hematologic/Lymphatic: No abnormal bruising or bleeding, blood clots or swollen lymph nodes. · Allergic/Immunologic: No nasal congestion or hives. Physical Examination:    Vitals:    05/31/22 1256   BP: 100/60   Pulse: 84   SpO2: 98%   Weight: 212 lb (96.2 kg)   Height: 6' 2\" (1.88 m)        Constitutional and General Appearance: Warm and dry, no apparent distress, normal coloration   HEENT:  Normocephalic, atraumatic  Respiratory:  · Normal excursion and expansion without use of accessory muscles  · Resp Auscultation: Normal breath sounds without dullness  Cardiovascular:  · The apical impulses not displaced  · Heart tones are crisp and normal  · JVP normal cm H2O  · irregular rate and rhythm  · Peripheral pulses are symmetrical and full  · There is no clubbing, cyanosis of the extremities.   · Bilateral lower ankle edema to upper calf edema  · Pedal Pulses: 2+ and equal   Abdomen:  · No masses or tenderness  · Liver/Spleen: No Abnormalities Noted  Neurological/Psychiatric:  · Alert and oriented in all spheres  · Moves all extremities well  · Exhibits normal gait balance and coordination  · No abnormalities of mood, affect, memory, mentation, or behavior are noted    Lab Data:    CBC:   Lab Results   Component Value Date    WBC 4.2 05/13/2022    WBC 4.7 04/30/2022    WBC 5.1 04/29/2022    RBC 3.72 05/13/2022    RBC 3.69 04/30/2022    RBC 3.98 04/29/2022    HGB 10.0 05/13/2022    HGB 9.8 04/30/2022    HGB 10.5 04/29/2022    HCT 30.2 05/13/2022    HCT 29.9 04/30/2022    HCT 31.9 04/29/2022    MCV 81.0 05/13/2022    MCV 81.0 04/30/2022    MCV 80.1 04/29/2022    RDW 17.4 05/13/2022    RDW 15.6 04/30/2022    RDW 15.8 04/29/2022     05/13/2022     04/30/2022     04/29/2022     BMP:  Lab Results   Component Value Date     05/13/2022     04/30/2022     04/29/2022    K 3.3 05/13/2022    K 3.8 04/30/2022    K 3.3 04/29/2022    K 4.2 04/24/2022     05/13/2022     04/30/2022     04/29/2022    CO2 28 05/13/2022    CO2 31 04/30/2022    CO2 33 04/29/2022    PHOS 3.7 04/18/2022    PHOS 4.3 02/09/2022    PHOS 4.1 02/08/2022    BUN 16 05/13/2022    BUN 18 04/30/2022    BUN 16 04/29/2022    CREATININE 1.2 05/13/2022    CREATININE 1.0 04/30/2022    CREATININE 1.1 04/29/2022     BNP:   Lab Results   Component Value Date    PROBNP 10,133 05/13/2022    PROBNP 5,842 04/21/2022    PROBNP 11,887 04/16/2022     Cardiac Imaging:  CONSUELO Dr Julieth Thomason 2/10/22  Preliminary CONSUELO results are:      - Severe LV dysfunction,  EF: 20-25%  - LA dilated. There was HENOK thrombus. - Moderate MR    Cardiac Cath PCI: Dr Adam Hancock 2/8/2022  Anatomy:   LM-nml   LAD-nml  Cx-nml  OM- nml  RCA-nml  RPDA- nml  LVEF- 10%  LVG- global hypokinesis  LVEDP- 10     Hemodynamics:  RA- mean 3  RV- 37/0  PAWP- 10  PA- 34/12 (20)     C.O.- 5.7 (4.9)  C. I.- 2.6 (2.25)  PA sat 60%  AO sat 91%     Contrast: 70  Flouro Time: 5.3  Access: R radial a, R CFV     Impression  ~Coronary Angiography w/ normal cors  ~LVG with LVEF of 10% and global regional wall motion abnormalities  ~Severe MR  ~Normal CO/CI  ~normal L and R filling pressures     Recommendation  ~Aggressive medical treatment and risk factor modification  ~1. Medications reviewed, no changes at this time. 2. Post cath IVF. Bedrest.  3.Consider CONSUELO for evaluation of MR.   4. Patient has been advised on the importance of regular exercise of at least 20-30 minutes daily alternating with aerobic and isometric activities. 5. Patient counseled about and offered assistance for smoking cessation   6.  No indication for cardiac rehab  7. CHF service to evaluate tomorrow.     Echo 11/29/2021  Summary   -Covid+   -Severely reduced global systolic function with an ejection fraction   estimated at 20%.  -Severe global hypokinesis with regional variations noted.   -Right ventricular systolic function appears to be mildly reduced based on   visual inspection.   -Severe biatrial enlargement.   -Thickened mitral valve without evidence of stenosis. There is   mild-to-moderate mitral regurgitation.   -There is mild-to-moderate tricuspid regurgitation with a RVSP estimation of   47 mmHg.   -Diastolic dysfunction with elevated LV filling pressures.     Echo 7/25/2019  Summary   -Overall left ventricular systolic function is moderately depressed .   -Ejection fraction is visually estimated to be 30-35 %.  -Global left ventricular function moderately reduced.   -Indeterminate diastolic function due to atrial fibrillation and moderate to   severe mitral regurgitation.   -Moderate-to-severe mitral regurgitation .   -Dilated left atrium with a volume of 97 ml.   -Left atrial volume is increased probably due to atrial fibrillation .   -There is mild right ventricular hypertrophy.   -The right ventricle is mildly enlarged.   -Right ventricular systolic pressure of 53 mm Hg consistent with pulmonary   hypertension.   -Moderate to severe tricuspid regurgitation. TAPSE = 1.43   -IVC size is dilated (>2.1 cm) but collapses > 50% with respiration   consistent with elevated RA pressure (8 mmHg).    Assessment:    1. Chronic systolic heart failure (HCC) on ace and bb; no aldosterone antagonist due to compliance   2. Non-compliance    3. Homeless    4. PAF (paroxysmal atrial fibrillation) (HCC) Dr Julieth rothman   5. Benign essential HTN    6. Dilated cardiomyopathy ([address]    7. ICD in place    Plan:   Patient Instructions   1. Continue all your medications  2.  scripts at the pharmacy today  3. Start jardiance 10 mg once a day (keep privates clean)  4. Check blood work  5. RTO in 3 week  6.   MAKE primary care appt    He continues to be high risk for readmission due to non compliance with his medications.     NYHA 4    I appreciate the opportunity of cooperating in the care of this individual.    Rebeca Sun, TRENT - CNS, CNS, 5/31/2022, 2:58 PM

## 2022-05-31 NOTE — PATIENT INSTRUCTIONS
1.  Continue all your medications  2.  scripts at the pharmacy today  3. Start jardiance 10 mg once a day (keep privates clean)  4. Check blood work  5. RTO in 3 week  6.   MAKE primary care appt

## 2022-06-01 ENCOUNTER — TELEPHONE (OUTPATIENT)
Dept: CARDIOLOGY CLINIC | Age: 62
End: 2022-06-01

## 2022-06-01 NOTE — TELEPHONE ENCOUNTER
I spoke with Lauri Zuleta- on HIPAA. I relayed message per NPRG. He states that he will talk to pt and get back with us.

## 2022-06-01 NOTE — TELEPHONE ENCOUNTER
----- Message from TRENT Cordero - CNS sent at 6/1/2022  2:16 PM EDT -----  Fluid level is up on his labs   Make sure he started the jardiance 10 mg once a day

## 2022-06-15 ENCOUNTER — HOSPITAL ENCOUNTER (INPATIENT)
Age: 62
LOS: 5 days | Discharge: HOME | DRG: 194 | End: 2022-06-20
Attending: EMERGENCY MEDICINE | Admitting: INTERNAL MEDICINE
Payer: COMMERCIAL

## 2022-06-15 ENCOUNTER — APPOINTMENT (OUTPATIENT)
Dept: CT IMAGING | Age: 62
DRG: 194 | End: 2022-06-15
Payer: COMMERCIAL

## 2022-06-15 ENCOUNTER — APPOINTMENT (OUTPATIENT)
Dept: GENERAL RADIOLOGY | Age: 62
DRG: 194 | End: 2022-06-15
Payer: COMMERCIAL

## 2022-06-15 DIAGNOSIS — I50.9 ACUTE ON CHRONIC CONGESTIVE HEART FAILURE, UNSPECIFIED HEART FAILURE TYPE (HCC): Primary | ICD-10-CM

## 2022-06-15 LAB
A/G RATIO: 1.2 (ref 1.1–2.2)
ALBUMIN SERPL-MCNC: 3.9 G/DL (ref 3.4–5)
ALP BLD-CCNC: 102 U/L (ref 40–129)
ALT SERPL-CCNC: 55 U/L (ref 10–40)
ANION GAP SERPL CALCULATED.3IONS-SCNC: 13 MMOL/L (ref 3–16)
APTT: 29.8 SEC (ref 23–34.3)
AST SERPL-CCNC: 57 U/L (ref 15–37)
BACTERIA: ABNORMAL /HPF
BASE EXCESS VENOUS: 0.2 MMOL/L (ref -3–3)
BASOPHILS ABSOLUTE: 0 K/UL (ref 0–0.2)
BASOPHILS RELATIVE PERCENT: 1.2 %
BILIRUB SERPL-MCNC: 3.5 MG/DL (ref 0–1)
BILIRUBIN URINE: NEGATIVE
BLOOD, URINE: NEGATIVE
BUN BLDV-MCNC: 24 MG/DL (ref 7–20)
CALCIUM SERPL-MCNC: 9.1 MG/DL (ref 8.3–10.6)
CARBOXYHEMOGLOBIN: 4.1 % (ref 0–1.5)
CHLORIDE BLD-SCNC: 101 MMOL/L (ref 99–110)
CLARITY: ABNORMAL
CO2: 24 MMOL/L (ref 21–32)
COLOR: YELLOW
CREAT SERPL-MCNC: 1.2 MG/DL (ref 0.8–1.3)
EOSINOPHILS ABSOLUTE: 0 K/UL (ref 0–0.6)
EOSINOPHILS RELATIVE PERCENT: 0.5 %
EPITHELIAL CELLS, UA: ABNORMAL /HPF (ref 0–5)
GFR AFRICAN AMERICAN: >60
GFR NON-AFRICAN AMERICAN: >60
GLUCOSE BLD-MCNC: 103 MG/DL (ref 70–99)
GLUCOSE URINE: >=1000 MG/DL
HCO3 VENOUS: 25 MMOL/L (ref 23–29)
HCT VFR BLD CALC: 35.1 % (ref 40.5–52.5)
HEMOGLOBIN: 11.3 G/DL (ref 13.5–17.5)
INFLUENZA A: NOT DETECTED
INFLUENZA B: NOT DETECTED
INR BLD: 1.5 (ref 0.87–1.14)
KETONES, URINE: NEGATIVE MG/DL
LACTIC ACID, SEPSIS: 3 MMOL/L (ref 0.4–1.9)
LACTIC ACID, SEPSIS: 3.5 MMOL/L (ref 0.4–1.9)
LEUKOCYTE ESTERASE, URINE: NEGATIVE
LYMPHOCYTES ABSOLUTE: 0.9 K/UL (ref 1–5.1)
LYMPHOCYTES RELATIVE PERCENT: 23 %
MCH RBC QN AUTO: 27.7 PG (ref 26–34)
MCHC RBC AUTO-ENTMCNC: 32.3 G/DL (ref 31–36)
MCV RBC AUTO: 85.7 FL (ref 80–100)
METHEMOGLOBIN VENOUS: 0.2 %
MICROSCOPIC EXAMINATION: YES
MONOCYTES ABSOLUTE: 0.3 K/UL (ref 0–1.3)
MONOCYTES RELATIVE PERCENT: 8.4 %
NEUTROPHILS ABSOLUTE: 2.7 K/UL (ref 1.7–7.7)
NEUTROPHILS RELATIVE PERCENT: 66.9 %
NITRITE, URINE: NEGATIVE
O2 CONTENT, VEN: 16 VOL %
O2 SAT, VEN: 98 %
O2 THERAPY: ABNORMAL
PCO2, VEN: 40.4 MMHG (ref 40–50)
PDW BLD-RTO: 20.7 % (ref 12.4–15.4)
PH UA: 5 (ref 5–8)
PH VENOUS: 7.4 (ref 7.35–7.45)
PLATELET # BLD: 241 K/UL (ref 135–450)
PMV BLD AUTO: 8.3 FL (ref 5–10.5)
PO2, VEN: 105 MMHG (ref 25–40)
POTASSIUM SERPL-SCNC: 4.4 MMOL/L (ref 3.5–5.1)
PRO-BNP: ABNORMAL PG/ML (ref 0–124)
PROCALCITONIN: 0.26 NG/ML (ref 0–0.15)
PROTEIN UA: 100 MG/DL
PROTHROMBIN TIME: 18.1 SEC (ref 11.7–14.5)
RBC # BLD: 4.09 M/UL (ref 4.2–5.9)
RBC UA: ABNORMAL /HPF (ref 0–4)
SARS-COV-2 RNA, RT PCR: NOT DETECTED
SODIUM BLD-SCNC: 138 MMOL/L (ref 136–145)
SPECIFIC GRAVITY UA: 1.02 (ref 1–1.03)
TCO2 CALC VENOUS: 59 MMOL/L
TOTAL PROTEIN: 7.2 G/DL (ref 6.4–8.2)
TROPONIN: <0.01 NG/ML
TROPONIN: <0.01 NG/ML
URINE REFLEX TO CULTURE: ABNORMAL
URINE TYPE: ABNORMAL
UROBILINOGEN, URINE: 1 E.U./DL
WBC # BLD: 4 K/UL (ref 4–11)
WBC UA: ABNORMAL /HPF (ref 0–5)

## 2022-06-15 PROCEDURE — 99285 EMERGENCY DEPT VISIT HI MDM: CPT

## 2022-06-15 PROCEDURE — 71250 CT THORAX DX C-: CPT

## 2022-06-15 PROCEDURE — 80053 COMPREHEN METABOLIC PANEL: CPT

## 2022-06-15 PROCEDURE — 6360000002 HC RX W HCPCS: Performed by: PHYSICIAN ASSISTANT

## 2022-06-15 PROCEDURE — 85730 THROMBOPLASTIN TIME PARTIAL: CPT

## 2022-06-15 PROCEDURE — 81001 URINALYSIS AUTO W/SCOPE: CPT

## 2022-06-15 PROCEDURE — 36415 COLL VENOUS BLD VENIPUNCTURE: CPT

## 2022-06-15 PROCEDURE — 96374 THER/PROPH/DIAG INJ IV PUSH: CPT

## 2022-06-15 PROCEDURE — 85025 COMPLETE CBC W/AUTO DIFF WBC: CPT

## 2022-06-15 PROCEDURE — 84484 ASSAY OF TROPONIN QUANT: CPT

## 2022-06-15 PROCEDURE — 85610 PROTHROMBIN TIME: CPT

## 2022-06-15 PROCEDURE — 84443 ASSAY THYROID STIM HORMONE: CPT

## 2022-06-15 PROCEDURE — 83605 ASSAY OF LACTIC ACID: CPT

## 2022-06-15 PROCEDURE — 93005 ELECTROCARDIOGRAM TRACING: CPT | Performed by: PHYSICIAN ASSISTANT

## 2022-06-15 PROCEDURE — 82803 BLOOD GASES ANY COMBINATION: CPT

## 2022-06-15 PROCEDURE — 1200000000 HC SEMI PRIVATE

## 2022-06-15 PROCEDURE — 84145 PROCALCITONIN (PCT): CPT

## 2022-06-15 PROCEDURE — 87636 SARSCOV2 & INF A&B AMP PRB: CPT

## 2022-06-15 PROCEDURE — 83880 ASSAY OF NATRIURETIC PEPTIDE: CPT

## 2022-06-15 PROCEDURE — 71045 X-RAY EXAM CHEST 1 VIEW: CPT

## 2022-06-15 RX ORDER — METOLAZONE 2.5 MG/1
5 TABLET ORAL ONCE
Status: COMPLETED | OUTPATIENT
Start: 2022-06-15 | End: 2022-06-16

## 2022-06-15 RX ORDER — TAMSULOSIN HYDROCHLORIDE 0.4 MG/1
0.4 CAPSULE ORAL DAILY
Status: DISCONTINUED | OUTPATIENT
Start: 2022-06-16 | End: 2022-06-20 | Stop reason: HOSPADM

## 2022-06-15 RX ORDER — ACETAMINOPHEN 650 MG/1
650 SUPPOSITORY RECTAL EVERY 6 HOURS PRN
Status: DISCONTINUED | OUTPATIENT
Start: 2022-06-15 | End: 2022-06-20 | Stop reason: HOSPADM

## 2022-06-15 RX ORDER — FINASTERIDE 5 MG/1
5 TABLET, FILM COATED ORAL DAILY
Status: DISCONTINUED | OUTPATIENT
Start: 2022-06-16 | End: 2022-06-20 | Stop reason: HOSPADM

## 2022-06-15 RX ORDER — LISINOPRIL 5 MG/1
2.5 TABLET ORAL DAILY
Status: DISCONTINUED | OUTPATIENT
Start: 2022-06-16 | End: 2022-06-20 | Stop reason: HOSPADM

## 2022-06-15 RX ORDER — ONDANSETRON 2 MG/ML
4 INJECTION INTRAMUSCULAR; INTRAVENOUS EVERY 6 HOURS PRN
Status: DISCONTINUED | OUTPATIENT
Start: 2022-06-15 | End: 2022-06-20 | Stop reason: HOSPADM

## 2022-06-15 RX ORDER — METOPROLOL SUCCINATE 25 MG/1
25 TABLET, EXTENDED RELEASE ORAL DAILY
Status: DISCONTINUED | OUTPATIENT
Start: 2022-06-16 | End: 2022-06-20 | Stop reason: HOSPADM

## 2022-06-15 RX ORDER — SODIUM CHLORIDE 0.9 % (FLUSH) 0.9 %
5-40 SYRINGE (ML) INJECTION EVERY 12 HOURS SCHEDULED
Status: DISCONTINUED | OUTPATIENT
Start: 2022-06-15 | End: 2022-06-20 | Stop reason: HOSPADM

## 2022-06-15 RX ORDER — FUROSEMIDE 10 MG/ML
40 INJECTION INTRAMUSCULAR; INTRAVENOUS ONCE
Status: COMPLETED | OUTPATIENT
Start: 2022-06-15 | End: 2022-06-15

## 2022-06-15 RX ORDER — FUROSEMIDE 10 MG/ML
40 INJECTION INTRAMUSCULAR; INTRAVENOUS 2 TIMES DAILY
Status: DISCONTINUED | OUTPATIENT
Start: 2022-06-16 | End: 2022-06-16

## 2022-06-15 RX ORDER — ENOXAPARIN SODIUM 100 MG/ML
40 INJECTION SUBCUTANEOUS DAILY
Status: DISCONTINUED | OUTPATIENT
Start: 2022-06-16 | End: 2022-06-15 | Stop reason: ALTCHOICE

## 2022-06-15 RX ORDER — ONDANSETRON 4 MG/1
4 TABLET, ORALLY DISINTEGRATING ORAL EVERY 8 HOURS PRN
Status: DISCONTINUED | OUTPATIENT
Start: 2022-06-15 | End: 2022-06-20 | Stop reason: HOSPADM

## 2022-06-15 RX ORDER — ACETAMINOPHEN 325 MG/1
650 TABLET ORAL EVERY 6 HOURS PRN
Status: DISCONTINUED | OUTPATIENT
Start: 2022-06-15 | End: 2022-06-20 | Stop reason: HOSPADM

## 2022-06-15 RX ORDER — SODIUM CHLORIDE 9 MG/ML
INJECTION, SOLUTION INTRAVENOUS PRN
Status: DISCONTINUED | OUTPATIENT
Start: 2022-06-15 | End: 2022-06-20 | Stop reason: HOSPADM

## 2022-06-15 RX ORDER — POLYETHYLENE GLYCOL 3350 17 G/17G
17 POWDER, FOR SOLUTION ORAL DAILY PRN
Status: DISCONTINUED | OUTPATIENT
Start: 2022-06-15 | End: 2022-06-20 | Stop reason: HOSPADM

## 2022-06-15 RX ORDER — ATORVASTATIN CALCIUM 40 MG/1
40 TABLET, FILM COATED ORAL NIGHTLY
Status: DISCONTINUED | OUTPATIENT
Start: 2022-06-15 | End: 2022-06-20 | Stop reason: HOSPADM

## 2022-06-15 RX ORDER — SODIUM CHLORIDE 0.9 % (FLUSH) 0.9 %
5-40 SYRINGE (ML) INJECTION PRN
Status: DISCONTINUED | OUTPATIENT
Start: 2022-06-15 | End: 2022-06-20 | Stop reason: HOSPADM

## 2022-06-15 RX ADMIN — FUROSEMIDE 40 MG: 10 INJECTION, SOLUTION INTRAMUSCULAR; INTRAVENOUS at 20:56

## 2022-06-15 ASSESSMENT — ENCOUNTER SYMPTOMS
DIARRHEA: 0
VOMITING: 0
COUGH: 0
RHINORRHEA: 0
SHORTNESS OF BREATH: 1
ABDOMINAL PAIN: 0
NAUSEA: 0

## 2022-06-15 NOTE — ED PROVIDER NOTES
[address] [facility]        Pt Name: Kelin Mckeon  MRN: [id number]  [place] 1960  Date of evaluation: 6/15/2022  Provider: Gabino Mahoney PA-C  PCP: No primary care provider on file. Note Started: 6:54 PM EDT        I have seen and evaluated this patient with my supervising physician Morena Gomez MD.    CHIEF COMPLAINT       Chief Complaint   Patient presents with    Fatigue     i feel sickly since yesterday. c/o upset stomach and feeling overly tired. HISTORY OF PRESENT ILLNESS   (Location, Timing/Onset, Context/Setting, Quality, Duration, Modifying Factors, Severity, Associated Signs and Symptoms)  Note limiting factors. Chief Complaint: Shortness of breath, fatigue    Kelin Mckeon is a 59 y.o. male who presents emergency department today for evaluation for shortness of breath, as well as fatigue. Patient states that he has a history of CHF, atrial fibrillation, history of blood clots and hypertension. He is anticoagulated on Eliquis. Patient states that starting yesterday he noticed a general weakness, and he states that he did develop a cough, and felt increasingly short of breath. Patient states that he is coughing up clear fluid, although denies any hemoptysis, or sputum patient feels that he has gained weight, and does feel that he is fluid overloaded. He states that shortness of breath is worse today, is worse with lying flat, and exertion. Improves when he is sitting straight up. Patient has no chest pain, or chest tightness. Has not had any fever or chills. He denies any abdominal pain, nausea, vomiting or diarrhea. Patient denies any urinary symptoms, no other complaints. Nursing Notes were all reviewed and agreed with or any disagreements were addressed in the HPI.     REVIEW OF SYSTEMS    (2-9 systems for level 4, 10 or more for level 5)     Review of Systems   Constitutional: Positive for fatigue. Negative for activity change, appetite change, chills and fever. HENT: Negative for congestion and rhinorrhea. Respiratory: Positive for shortness of breath. Negative for cough. Cardiovascular: Positive for leg swelling. Negative for chest pain. Gastrointestinal: Negative for abdominal pain, diarrhea, nausea and vomiting. Genitourinary: Negative for difficulty urinating, dysuria and hematuria. Positives and Pertinent negatives as per HPI. Except as noted above in the ROS, all other systems were reviewed and negative. PAST MEDICAL HISTORY     Past Medical History:   Diagnosis Date    Atrial fibrillation ([facility] 07/25/2019    CHF (congestive heart failure) (HCC)     Hx of blood clots     Hypertension          SURGICAL HISTORY     Past Surgical History:   Procedure Laterality Date    CARDIOVERSION  07/26/2019    Dr. Jae Whitney  07/26/2019         CURRENTMEDICATIONS       Previous Medications    APIXABAN (ELIQUIS) 5 MG TABS TABLET    Take 1 tablet by mouth 2 times daily    ATORVASTATIN (LIPITOR) 40 MG TABLET    Take 1 tablet by mouth nightly    EMPAGLIFLOZIN (JARDIANCE) 10 MG TABLET    Take 1 tablet by mouth daily    FINASTERIDE (PROSCAR) 5 MG TABLET    Take 1 tablet by mouth daily    LISINOPRIL (PRINIVIL;ZESTRIL) 2.5 MG TABLET    Take 1 tablet by mouth daily    METOPROLOL SUCCINATE (TOPROL XL) 25 MG EXTENDED RELEASE TABLET    Take 1 tablet by mouth daily    TAMSULOSIN (FLOMAX) 0.4 MG CAPSULE    Take 1 capsule by mouth daily    TORSEMIDE (DEMADEX) 20 MG TABLET    Take 1 tablet by mouth in the morning and at bedtime    VITAMIN D (CHOLECALCIFEROL) 50 MCG (2000 UT) TABS TABLET    Take 1 tablet by mouth daily         ALLERGIES     Patient has no known allergies.     FAMILYHISTORY       Family History   Problem Relation Age of Onset    Heart Disease Mother     High Blood Pressure Mother     Heart Attack Mother     Other Father     Stroke Father     High Blood Pressure Father           SOCIAL HISTORY       Social History     Tobacco Use    Smoking status: Never Smoker    Smokeless tobacco: Never Used   Vaping Use    Vaping Use: Never used   Substance Use Topics    Alcohol use: Never    Drug use: Never       SCREENINGS             PHYSICAL EXAM    (up to 7 for level 4, 8 or more for level 5)     ED Triage Vitals [06/15/22 1751]   BP Temp Temp Source Heart Rate Resp SpO2 Height Weight   (!) 101/57 97.8 °F (36.6 °C) Oral 89 16 97 % 6' 2\" (1.88 m) 200 lb (90.7 kg)       Physical Exam  Vitals and nursing note reviewed. Constitutional:       Appearance: He is well-developed. He is not diaphoretic. HENT:      Head: Normocephalic and atraumatic. Right Ear: External ear normal.      Left Ear: External ear normal.      Nose: Nose normal.   Eyes:      General:         Right eye: No discharge. Left eye: No discharge. Neck:      Vascular: JVD present. Trachea: No tracheal deviation. Cardiovascular:      Rate and Rhythm: Normal rate and regular rhythm. Comments: 2+ pitting edema to bilateral lower leg  Pulmonary:      Effort: Pulmonary effort is normal. No respiratory distress. Breath sounds: Rales present. Comments: Diminished aeration throughout lung fields, rales to bilateral bases  Abdominal:      General: Bowel sounds are normal. There is no distension. Palpations: Abdomen is soft. Tenderness: There is no abdominal tenderness. There is no guarding. Musculoskeletal:         General: Normal range of motion. Cervical back: Normal range of motion and neck supple. Skin:     General: Skin is warm and dry. Neurological:      Mental Status: He is alert and oriented to person, place, and time.    Psychiatric:         Behavior: Behavior normal.         DIAGNOSTIC RESULTS   LABS:    Labs Reviewed   CBC WITH AUTO DIFFERENTIAL - Abnormal; Notable for the following components:       Result Value    RBC 4.09 (*) Hemoglobin 11.3 (*)     Hematocrit 35.1 (*)     RDW 20.7 (*)     Lymphocytes Absolute 0.9 (*)     All other components within normal limits   COMPREHENSIVE METABOLIC PANEL - Abnormal; Notable for the following components:    Glucose 103 (*)     BUN 24 (*)     Total Bilirubin 3.5 (*)     ALT 55 (*)     AST 57 (*)     All other components within normal limits   BRAIN NATRIURETIC PEPTIDE - Abnormal; Notable for the following components:    Pro-BNP 32,647 (*)     All other components within normal limits   PROTIME-INR - Abnormal; Notable for the following components:    Protime 18.1 (*)     INR 1.50 (*)     All other components within normal limits   URINALYSIS WITH REFLEX TO CULTURE - Abnormal; Notable for the following components:    Clarity, UA CLOUDY (*)     Glucose, Ur >=1000 (*)     Protein,  (*)     All other components within normal limits   PROCALCITONIN - Abnormal; Notable for the following components:    Procalcitonin 0.26 (*)     All other components within normal limits   LACTATE, SEPSIS - Abnormal; Notable for the following components:    Lactic Acid, Sepsis 3.0 (*)     All other components within normal limits   LACTATE, SEPSIS - Abnormal; Notable for the following components:    Lactic Acid, Sepsis 3.5 (*)     All other components within normal limits   BLOOD GAS, VENOUS - Abnormal; Notable for the following components:    pO2, [place] 105.0 (*)     Carboxyhemoglobin 4.1 (*)     All other components within normal limits   MICROSCOPIC URINALYSIS - Abnormal; Notable for the following components:    Bacteria, UA Rare (*)     All other components within normal limits   COVID-19 & INFLUENZA COMBO   TROPONIN   APTT       When ordered only abnormal lab results are displayed. All other labs were within normal range or not returned as of this dictation. EKG:  When ordered, EKG's are interpreted by the Emergency Department Physician in the absence of a cardiologist.  Please see their note for interpretation of EKG.    RADIOLOGY:   Non-plain film images such as CT, Ultrasound and MRI are read by the radiologist. Plain radiographic images are visualized and preliminarily interpreted by the ED Provider with the below findings:        Interpretation per the Radiologist below, if available at the time of this note:    XR CHEST PORTABLE   Final Result   1. No acute cardiopulmonary process identified. 2. Stable severe cardiomegaly. CT CHEST WO CONTRAST    (Results Pending)     No results found. PROCEDURES   Unless otherwise noted below, none     Procedures    CRITICAL CARE TIME       CONSULTS:  IP CONSULT TO HEART FAILURE NURSE/COORDINATOR  IP CONSULT TO DIETITIAN  IP CONSULT TO CARDIOLOGY      EMERGENCY DEPARTMENT COURSE and DIFFERENTIAL DIAGNOSIS/MDM:   Vitals:    Vitals:    06/15/22 1751 06/15/22 2012 06/15/22 2015 06/15/22 2045   BP: (!) 101/57  (!) 113/99 (!) 127/107   Pulse: 89 96  95   Resp: 16 27  29   Temp: 97.8 °F (36.6 °C)      TempSrc: Oral      SpO2: 97%      Weight: 200 lb (90.7 kg)      Height: 6' 2\" (1.88 m)          Patient was given the following medications:  Medications   furosemide (LASIX) injection 40 mg (40 mg IntraVENous Given 6/15/22 2056)         Is this patient to be included in the SEP-1 Core Measure due to severe sepsis or septic shock? No   Exclusion criteria - the patient is NOT to be included for SEP-1 Core Measure due to: Infection is not suspected    , This is a 26-year-old male who presents to the emergency department today for shortness of breath, general fatigue. He has a history of CHF, hypertension, history of atrial fibrillation anticoagulated on Eliquis. Started with shortness of breath, fatigue yesterday, worse today. On examination he is diminished aeration throughout all lung fields with rales to bilateral bases. Positive JVD. 2+ pitting edema to bilateral lower legs    EKG is documented by my attending, please see his note for further details.   Urine shows no evidence of infection. COVID and flu are negative. CBC shows no evidence of leukocytosis, mild anemia. Procalcitonin is normal.  Lactic acid is elevated, but this could certainly be respiratory in nature. Patient is tachypneic. Troponin is negative. BNP is elevated. X-ray does showed no acute process with severe cardiomegaly, however in concern for fluid overload, will obtain dry chest CT. Will give Lasix. Patient will need to be admitted for further care and evaluation as I do suspect a CHF exacerbation. Hospitalist has been paged for admission, patient is updated, agreeable with plan, stable for admission. FINAL IMPRESSION      1. Acute on chronic congestive heart failure, unspecified heart failure type [facility])          DISPOSITION/PLAN   DISPOSITION Admitted 06/15/2022 09:46:35 PM      PATIENT REFERRED TO:  No follow-up provider specified.     DISCHARGE MEDICATIONS:  New Prescriptions    No medications on file       DISCONTINUED MEDICATIONS:  Discontinued Medications    No medications on file              (Please note that portions of this note were completed with a voice recognition program.  Efforts were made to edit the dictations but occasionally words are mis-transcribed.)    Abdiel Ramirez PA-C (electronically signed)            Abdiel Ramirez PA-C  06/15/22 [id number]

## 2022-06-15 NOTE — ED PROVIDER NOTES
In addition to the advanced practice provider, I personally saw Spencer Barker and performed a substantive portion of the visit including all aspects of the medical decision making. Medical Decision Making  Patient presented to be evaluated for shortness of breath and fatigue. Patient has extensive history of blood clots and CHF. His weakness and fatigue are generalized and nonfocal.  He is also had a cough and shortness of breath. Presentation and symptoms were consistent with CHF and significantly elevated BNP seemed to confirm this. Chest x-ray did not show edema, but CT showed evidence of venous congestion and early infiltrates in the lung bases. Patient also had an elevated lactic acid, although this is nonspecific and not indicative of sepsis. He does not have a leukocytosis nor was he febrile. Patient's clinical presentation, I believe this is secondary to congestive heart failure and we are treating as such, initiating treatment with Lasix IV. I personally saw the patient and independently provided 20 minutes of non-concurrent critical care out of the total shared critical care time provided. EKG  The Ekg interpreted by me in the absence of a cardiologist shows. Demand pacer rhythm with a rate of 91  Axis is   Left axis deviation   No specific ST-T wave changes appreciated. No evidence of acute ischemia. No significant change from prior EKG dated 4/29/2022      Screenings  Is this patient to be included in the SEP-1 Core Measure due to severe sepsis or septic shock? No   Exclusion criteria - the patient is NOT to be included for SEP-1 Core Measure due to: Alternative explanation for abnormal labs/vitals that do not relate to sepsis, see MDM for further explanation    FINAL IMPRESSION  1. Acute on chronic congestive heart failure, unspecified heart failure type (HCC)        Blood pressure (!) 101/57, pulse 89, temperature 97.8 °F (36.6 °C), temperature source Oral, resp.  rate 16, height 6' 2\" (1.88 m), weight 200 lb (90.7 kg), SpO2 97 %.      For further details of [facility] emergency department encounter, please see documentation by advanced practice provider, BISI Trivedi.       Caitlyn Ortiz MD  06/25/22 [id number]

## 2022-06-16 ENCOUNTER — NURSE ONLY (OUTPATIENT)
Dept: CARDIOLOGY CLINIC | Age: 62
End: 2022-06-16
Payer: COMMERCIAL

## 2022-06-16 DIAGNOSIS — I47.20 VT (VENTRICULAR TACHYCARDIA): ICD-10-CM

## 2022-06-16 DIAGNOSIS — I50.23 ACUTE ON CHRONIC SYSTOLIC HEART FAILURE (HCC): ICD-10-CM

## 2022-06-16 DIAGNOSIS — I42.0 DILATED CARDIOMYOPATHY (HCC): ICD-10-CM

## 2022-06-16 DIAGNOSIS — I48.20 CHRONIC ATRIAL FIBRILLATION (HCC): ICD-10-CM

## 2022-06-16 DIAGNOSIS — I50.22 CHRONIC SYSTOLIC CONGESTIVE HEART FAILURE (HCC): ICD-10-CM

## 2022-06-16 DIAGNOSIS — I48.0 PAF (PAROXYSMAL ATRIAL FIBRILLATION) (HCC): ICD-10-CM

## 2022-06-16 DIAGNOSIS — Z95.810 ICD (IMPLANTABLE CARDIOVERTER-DEFIBRILLATOR), BIVENTRICULAR, IN SITU: ICD-10-CM

## 2022-06-16 LAB
ANION GAP SERPL CALCULATED.3IONS-SCNC: 13 MMOL/L (ref 3–16)
BUN BLDV-MCNC: 26 MG/DL (ref 7–20)
CALCIUM SERPL-MCNC: 9 MG/DL (ref 8.3–10.6)
CHLORIDE BLD-SCNC: 102 MMOL/L (ref 99–110)
CHOLESTEROL, TOTAL: 102 MG/DL (ref 0–199)
CO2: 23 MMOL/L (ref 21–32)
CREAT SERPL-MCNC: 1.3 MG/DL (ref 0.8–1.3)
EKG ATRIAL RATE: 91 BPM
EKG DIAGNOSIS: NORMAL
EKG Q-T INTERVAL: 412 MS
EKG QRS DURATION: 104 MS
EKG QTC CALCULATION (BAZETT): 506 MS
EKG R AXIS: -8 DEGREES
EKG T AXIS: 107 DEGREES
EKG VENTRICULAR RATE: 91 BPM
GFR AFRICAN AMERICAN: >60
GFR NON-AFRICAN AMERICAN: 56
GLUCOSE BLD-MCNC: 97 MG/DL (ref 70–99)
HDLC SERPL-MCNC: 48 MG/DL (ref 40–60)
LDL CHOLESTEROL CALCULATED: 41 MG/DL
MAGNESIUM: 2.5 MG/DL (ref 1.8–2.4)
POTASSIUM SERPL-SCNC: 4.3 MMOL/L (ref 3.5–5.1)
SODIUM BLD-SCNC: 138 MMOL/L (ref 136–145)
TRIGL SERPL-MCNC: 65 MG/DL (ref 0–150)
TROPONIN: <0.01 NG/ML
TSH SERPL DL<=0.05 MIU/L-ACNC: 2.66 UIU/ML (ref 0.27–4.2)
VLDLC SERPL CALC-MCNC: 13 MG/DL

## 2022-06-16 PROCEDURE — 2580000003 HC RX 258: Performed by: INTERNAL MEDICINE

## 2022-06-16 PROCEDURE — 93010 ELECTROCARDIOGRAM REPORT: CPT | Performed by: INTERNAL MEDICINE

## 2022-06-16 PROCEDURE — 80061 LIPID PANEL: CPT

## 2022-06-16 PROCEDURE — 6370000000 HC RX 637 (ALT 250 FOR IP): Performed by: INTERNAL MEDICINE

## 2022-06-16 PROCEDURE — 36415 COLL VENOUS BLD VENIPUNCTURE: CPT

## 2022-06-16 PROCEDURE — 6360000002 HC RX W HCPCS: Performed by: INTERNAL MEDICINE

## 2022-06-16 PROCEDURE — 93290 INTERROG DEV EVAL ICPMS IP: CPT | Performed by: INTERNAL MEDICINE

## 2022-06-16 PROCEDURE — 99223 1ST HOSP IP/OBS HIGH 75: CPT | Performed by: INTERNAL MEDICINE

## 2022-06-16 PROCEDURE — 84484 ASSAY OF TROPONIN QUANT: CPT

## 2022-06-16 PROCEDURE — 80048 BASIC METABOLIC PNL TOTAL CA: CPT

## 2022-06-16 PROCEDURE — 1200000000 HC SEMI PRIVATE

## 2022-06-16 PROCEDURE — 93289 INTERROG DEVICE EVAL HEART: CPT | Performed by: INTERNAL MEDICINE

## 2022-06-16 PROCEDURE — 83735 ASSAY OF MAGNESIUM: CPT

## 2022-06-16 RX ORDER — FUROSEMIDE 10 MG/ML
80 INJECTION INTRAMUSCULAR; INTRAVENOUS 3 TIMES DAILY
Status: DISCONTINUED | OUTPATIENT
Start: 2022-06-16 | End: 2022-06-18

## 2022-06-16 RX ADMIN — FUROSEMIDE 40 MG: 10 INJECTION, SOLUTION INTRAMUSCULAR; INTRAVENOUS at 11:09

## 2022-06-16 RX ADMIN — EMPAGLIFLOZIN 10 MG: 10 TABLET, FILM COATED ORAL at 13:11

## 2022-06-16 RX ADMIN — METOLAZONE 5 MG: 2.5 TABLET ORAL at 01:00

## 2022-06-16 RX ADMIN — FINASTERIDE 5 MG: 5 TABLET, FILM COATED ORAL at 11:09

## 2022-06-16 RX ADMIN — ATORVASTATIN CALCIUM 40 MG: 40 TABLET, FILM COATED ORAL at 01:01

## 2022-06-16 RX ADMIN — TAMSULOSIN HYDROCHLORIDE 0.4 MG: 0.4 CAPSULE ORAL at 11:09

## 2022-06-16 RX ADMIN — LISINOPRIL 2.5 MG: 5 TABLET ORAL at 11:09

## 2022-06-16 RX ADMIN — APIXABAN 5 MG: 5 TABLET, FILM COATED ORAL at 22:43

## 2022-06-16 RX ADMIN — APIXABAN 5 MG: 5 TABLET, FILM COATED ORAL at 01:01

## 2022-06-16 RX ADMIN — SODIUM CHLORIDE, PRESERVATIVE FREE 10 ML: 5 INJECTION INTRAVENOUS at 01:04

## 2022-06-16 RX ADMIN — APIXABAN 5 MG: 5 TABLET, FILM COATED ORAL at 11:09

## 2022-06-16 RX ADMIN — FUROSEMIDE 80 MG: 10 INJECTION, SOLUTION INTRAMUSCULAR; INTRAVENOUS at 22:43

## 2022-06-16 RX ADMIN — ATORVASTATIN CALCIUM 40 MG: 40 TABLET, FILM COATED ORAL at 22:43

## 2022-06-16 RX ADMIN — METOPROLOL SUCCINATE 25 MG: 25 TABLET, EXTENDED RELEASE ORAL at 11:09

## 2022-06-16 RX ADMIN — SODIUM CHLORIDE, PRESERVATIVE FREE 10 ML: 5 INJECTION INTRAVENOUS at 11:10

## 2022-06-16 RX ADMIN — SODIUM CHLORIDE, PRESERVATIVE FREE 10 ML: 5 INJECTION INTRAVENOUS at 22:43

## 2022-06-16 NOTE — PROGRESS NOTES
We received remote transmission from [facility] at [facility]. Transmission shows normal sensing and pacing function. Noted NSVT. Pt has known AF and remains on Eliquis. EP physician will review. See interrogation under cardiology tab in the [address] [place] [address] [address] field for more details. Optivol is within normal range.

## 2022-06-16 NOTE — H&P
Hospital Medicine History & Physical      PCP: No primary care provider on file. Date of Admission: 6/15/2022    Date of Service: Pt seen/examined on 6/15/2022  and Admitted to Inpatient with expected LOS greater than two midnights due to medical therapy. Chief Complaint:    Chief Complaint   Patient presents with    Fatigue     i feel sickly since yesterday. c/o upset stomach and feeling overly tired. History Of Present Illness: The patient is a 59 y.o. male with history of ventricular tachycardia status post AICD, cardiomyopathy with EF of 20%, atrial fibrillation, hypertension, chronic systolic heart failure, CAD who presented with shortness of breath over the last several days with associated orthopnea and ankle swelling, malaise and fatigue. Presents orthopnea with PND. Scanty cough with no production. No fevers or chills. Of note he was seen in cardiology clinic 5/31/2022 and noted to have gained 12 pounds weight. He expresses fluid none discretion. There is concern for medication noncompliance as well. No chest pains. BNP elevated at 32,647  Chest x-ray with clear lung fields and cardiomegaly  EKG paced    Past Medical History:        Diagnosis Date    Atrial fibrillation ([address] 07/25/2019    [facility]est[facility]     Hx of blood clots     Hypertension        Past Surgical History:        Procedure Laterality Date    CARDIOVERSION  07/26/2019    Dr. Aurelia Mathew  07/26/2019       Medications Prior to Admission:    Prior to Admission medications    Medication Sig Start Date End Date Taking?  Authorizing Provider   empagliflozin (JARDIANCE) 10 MG tablet Take 1 tablet by mouth daily 5/31/22   TRENT Wakefield   torsemide (DEMADEX) 20 MG tablet Take 1 tablet by mouth in the morning and at bedtime 5/31/22   TRENT [place]   lisinopril (PRINIVIL;ZESTRIL) 2.5 MG tablet Take 1 tablet by mouth daily 5/31/22   Deirdre TRENT Lewis   metoprolol succinate (TOPROL XL) 25 MG extended release tablet Take 1 tablet by mouth daily 5/31/22   TRENT Matthew   finasteride (PROSCAR) 5 MG tablet Take 1 tablet by mouth daily 5/31/22   TRENT Matthew   tamsulosin [facility]) 0.4 mg capsule Take 1 capsule by mouth daily 5/31/22   TRENT Resendiz   apixaban (ELIQUIS) 5 MG TABS tablet Take 1 tablet by mouth 2 times daily 4/26/22   Jocy Javed MD   atorvastatin (LIPITOR) 40 MG tablet Take 1 tablet by mouth nightly 4/26/22   Jocy Javed MD   vitamin D (CHOLECALCIFEROL) 50 MCG (2000 UT) TABS tablet Take 1 tablet by mouth daily 2/16/22   TRENT Resendiz       Allergies:  Patient has no known allergies. Social History:  The patient currently lives with family    TOBACCO:   reports that he has never smoked. He has never used smokeless tobacco.  ETOH:   reports no history of alcohol use. Family History:  Reviewed in detail and negative for DM, Early CAD, Cancer, CVA. Positive as follows:        Problem Relation Age of Onset    Heart Disease Mother     High Blood Pressure Mother     Heart Attack Mother     Other Father     Stroke Father     High Blood Pressure Father        REVIEW OF SYSTEMS:   Positive for shortness of breath, orthopnea, dyspnea on minimal exertion, leg swelling and as noted in the HPI. All other systems reviewed and negative. PHYSICAL EXAM:    BP (!) 127/107   Pulse 95   Temp 97.8 °F (36.6 °C) (Oral)   Resp 29   Ht 6' 2\" (1.88 m)   Wt 200 lb (90.7 kg)   SpO2 97%   BMI 25.68 kg/m²     General appearance: No apparent distress appears stated age and cooperative. HEENT Normal cephalic, atraumatic without obvious deformity. Pupils equal, round, and reactive to light. Extra ocular muscles intact. Conjunctivae/corneas clear. Neck: Supple, No jugular venous distention/bruits.    Lungs: Clear to auscultation, bilaterally without Rales/Wheezes/Rhonchi with good respiratory effort. Heart: Regular rate and rhythm with Normal S1/S2 without murmurs, rubs or gallops  Abdomen: Soft, non-tender or non-distended without rigidity or guarding and positive bowel sounds all four quadrants. Extremities: No clubbing, cyanosis. Gross pitting bilateral lower extremity  Skin: Skin color, texture, turgor normal.  No rashes or lesions. Neurologic: Alert and oriented X 3, neurovascularly intact with sensory/motor intact upper extremities/lower extremities, bilaterally. Cranial nerves: II-XII intact, grossly non-focal.  Mental status: Alert, oriented, thought content appropriate.         CBC   Recent Labs     06/15/22  1848   WBC 4.0   HGB 11.3*   HCT 35.1*         RENAL  Recent Labs     06/15/22  1848      K 4.4      CO2 24   BUN 24*   CREATININE 1.2     LFT'S  Recent Labs     06/15/22  1848   AST 57*   ALT 55*   BILITOT 3.5*   ALKPHOS 102     COAG  Recent Labs     06/15/22  1848   INR 1.50*     CARDIAC ENZYMES  Recent Labs     06/15/22  1848   TROPONINI <0.01       U/A:    Lab Results   Component Value Date    COLORU Yellow 06/15/2022    WBCUA 0-2 06/15/2022    RBCUA 0-2 06/15/2022    MUCUS 1+ 08/02/2019    BACTERIA Rare 06/15/2022    CLARITYU CLOUDY 06/15/2022    SPECGRAV 1.020 06/15/2022    LEUKOCYTESUR Negative 06/15/2022    BLOODU Negative 06/15/2022    GLUCOSEU >=1000 06/15/2022         Active Hospital Problems    Diagnosis Date Noted    VT (ventricular tachycardia) ([facility] [I47.2]      Priority: Medium    ICD (implantable cardioverter-defibrillator), biventricular, in situ [Z95.810] 04/26/2022    Chronic atrial fibrillation ([address] [I48.20]     Acute on chronic systolic heart failure ([address] [I50.23] 11/28/2021    Dilated cardiomyopathy ([address] [I42.0]     Benign essential HTN [I10]          ASSESSMENT/PLAN:  59 y.o. male with history of ventricular tachycardia status post AICD, cardiomyopathy with EF of 20%, atrial fibrillation, hypertension, chronic systolic heart failure, CAD who presented with shortness of breath over the last several days with associated orthopnea and ankle swelling, malaise and fatigue found to have acute decompensated systolic heart failure    Plan:  -IV diuretic with Lasix 40 mg twice a day  -Monitor intake and output  -Monitor daily weight  -Low-salt diet  -Cardiology consult  -CHF coordinator consult  -Monitor renal function and electrolytes and replace as appropriate  -Trend troponins  -Continue other chronic home medications      DVT Prophylaxis: On Eliquis  Diet: Cardiac diet  Code Status: Full code         Melodie Salazar MD    Thank you No primary care provider on file. for the opportunity to be involved in this patient's care. If you have any questions or concerns please feel free to contact me at [phone].

## 2022-06-16 NOTE — ED NOTES
Report given to [facility]. No further questions at this time. Pt transported to floor by [facility] ER tech. No sign of distress at time of transfer.       Sophie Muse RN  06/15/22 7571

## 2022-06-16 NOTE — CONSULTS
[facility]  Advanced CHF/Pulmonary Hypertension   Cardiac Evaluation      Michael Moran  YOB: 1960    Requesting PHysician:  Dr. Yasmin Romero      Chief Complaint   Patient presents with    Fatigue     i feel sickly since yesterday. c/o upset stomach and feeling overly tired. History of Present Illness:  Michael Moran is a 58 yo male who presented to the ED for evaluation of SOB, as well as fatigue. He has a history of chronic systolic HF, atrial fibrillation, history of blood clots and hypertension. He is anticoagulated on Eliquis. He started having generalized weakness, cough, and felt increasingly short of breath. He is coughing up clear fluid. No hemoptysis. He feels that he has gained weight. He doesn't have scales. He is homeless and lives with various friends. His shortness of breath is better sitting up straight. No chest pain or chest tightness. Denies fever or chills. No abdominal pain, nausea, vomiting, or diarrhea. No urinary symptoms. He was admitted 2/5-10 for weight gain and lower extremity edema. He was diuresed around 30 pounds. He was readmited 4/13-26. That admission, he required milrinone and lasix infusion. He had ICD placed for VT.     I am consulted for HF management. It is doubtful that he has been compliant with meds. He is complaining of the heat, stating that none of his friends that he stays with have air conditioning. He is diuresing today. Labs:  Sodium 138  K 4.3  BUN/Cre 26/1.3  Troponin < 0.01 x 2  BNP 32K (was 25K on 5/31)  H/H 11.3/35.1        Echo:  2/10/22:   Summary   Left ventricular size is moderately increased. Overall left ventricular   systolic function appears severely reduced with ejection fraction of 20-25%   and severe diffuse hypokinesis. Moderate mitral regurgitation. Calculated ERO of 0.33 cm2. Left atrial size appears dilated.  There is an echodensity inside the left   atrial appendage Or    acetaminophen (TYLENOL) suppository 650 mg  650 mg Rectal Q6H PRN Cristiana Peace MD        furosemide (LASIX) injection 40 mg  40 mg IntraVENous BID Cristiana Peace MD   40 mg at 06/16/22 1109       Past Medical History:   Diagnosis Date    Atrial fibrillation ([address] 07/25/2019    CHF (congestive heart failure) (HCC)     Hx of blood clots     Hypertension      Past Surgical History:   Procedure Laterality Date    CARDIOVERSION  07/26/2019    Dr. Misael Ramirez  07/26/2019     Family History   Problem Relation Age of Onset    Heart Disease Mother     High Blood Pressure Mother     Heart Attack Mother     Other Father     Stroke Father     High Blood Pressure Father      Social History     Socioeconomic History    Marital status: Single     Spouse name: Not on file    Number of children: Not on file    Years of education: Not on file    Highest education level: Not on file   Occupational History    Not on file   Tobacco Use    Smoking status: Never Smoker    Smokeless tobacco: Never Used   Vaping Use    Vaping Use: Never used   Substance and Sexual Activity    Alcohol use: Never    Drug use: Never    Sexual activity: Not Currently   Other Topics Concern    Not on file   Social History Narrative    Not on file     Social Determinants of Health     Financial Resource Strain:     Difficulty of Paying Living Expenses: Not on file   Food Insecurity:     Worried About [address] in the Last Year: Not on file    [address] in the Last Year: Not on file   Transportation Needs:     Lack of Transportation (Medical): Not on file    Lack of Transportation (Non-Medical):  Not on file   Physical Activity:     Days of Exercise per Week: Not on file    Minutes of Exercise per Session: Not on file   Stress:     Feeling of Stress : Not on file   Social Connections:     Frequency of Communication with Friends and Family: Not on file    Frequency of Social Gatherings with Friends and Family: Not on file    Attends Restorationist Services: Not on file    Active Member of Clubs or Organizations: Not on file    Attends Club or Organization Meetings: Not on file    Marital Status: Not on file   Intimate Partner Violence:     Fear of Current or Ex-Partner: Not on file    Emotionally Abused: Not on file    Physically Abused: Not on file    Sexually Abused: Not on file   Housing Stability:     Unable to Pay for Housing in the Last Year: Not on file    Number of [place] in the Last Year: Not on file    Unstable Housing in the Last Year: Not on file       Review of Systems:   · Constitutional: there has been no unanticipated weight loss. There's been no change in energy level, sleep pattern, or activity level. · Eyes: No visual changes or diplopia. No scleral icterus. · ENT: No Headaches, hearing loss or vertigo. No mouth sores or sore throat. · Cardiovascular: Reviewed in HPI  · Respiratory: No cough or wheezing, no sputum production. No hematemesis. · Gastrointestinal: No abdominal pain, appetite loss, blood in stools. No change in bowel or bladder habits. · Genitourinary: No dysuria, trouble voiding, or hematuria. · Musculoskeletal:  No gait disturbance, weakness or joint complaints. · Integumentary: No rash or pruritis. · Neurological: No headache, diplopia, change in muscle strength, numbness or tingling. No change in gait, balance, coordination, mood, affect, memory, mentation, behavior. · Psychiatric: No anxiety, no depression. · Endocrine: No malaise, fatigue or temperature intolerance. No excessive thirst, fluid intake, or urination. No tremor. · Hematologic/Lymphatic: No abnormal bruising or bleeding, blood clots or swollen lymph nodes. · Allergic/Immunologic: No nasal congestion or hives.     Physical Examination:    Vitals:    06/16/22 0410 06/16/22 0540 06/16/22 0915 06/16/22 1252   BP: (!) 132/91  116/80 107/74   Pulse: 74  69 90 Resp: 16 18 18   Temp: 98.1 °F (36.7 °C)  98 °F (36.7 °C) 97.7 °F (36.5 °C)   TempSrc: Axillary  Oral Axillary   SpO2: 100%  93% 99%   Weight:  215 lb 9.6 oz (97.8 kg)     Height:         Body mass index is 27.68 kg/m². Wt Readings from Last 3 Encounters:   06/16/22 215 lb 9.6 oz (97.8 kg)   05/31/22 212 lb (96.2 kg)   05/13/22 201 lb (91.2 kg)     BP Readings from Last 3 Encounters:   06/16/22 107/74   05/31/22 100/60   05/13/22 (!) 90/56     Constitutional and General Appearance:   WD/WN in NAD  HEENT:  NC/AT  NIKKI  No problems with hearing  Skin:  Warm, dry  Respiratory:  · Normal excursion and expansion without use of accessory muscles  · Resp Auscultation: Normal breath sounds without dullness  Cardiovascular:  · The apical impulses not displaced  · Heart tones are crisp and normal  · Cervical veins are not engorged  · The carotid upstroke is normal in amplitude and contour without delay or bruit  · JVP less than 8 cm H2O  RRR with nl S1 and S2 without m,r,g  · Peripheral pulses are symmetrical and full  · There is no clubbing, cyanosis of the extremities. · No edema  · Femoral Arteries: 2+ and equal  · Pedal Pulses: 2+ and equal   Neck:  · No thyromegaly  Abdomen:  · No masses or tenderness  · Liver/Spleen: No Abnormalities Noted  Neurological/Psychiatric:  · Alert and oriented in all spheres  · Moves all extremities well  · Exhibits normal gait balance and coordination  · No abnormalities of mood, affect, memory, mentation, or behavior are noted    Labs were reviewed including labs from other hospital systems through [facility]. Cardiac testing was reviewed including echos, nuclear scans, cardiac catheterization, including from other hospital systems through [facility]. Assessment:    1. Acute on chronic systolic congestive heart failure, unspecified heart failure type ([address]     2. Noncompliance with meds  3. ICD with Optivol  4. Homelessness  5. Paroxysmal atrial fib      Plan:  1. Increase lasix 80 mg IV tid. He needs to diurese about 8 pounds. 2.  Continue low dose lisinopril  3  Continue toprol XL  4. He is not reliable with follow up and labs. Thus, I will not prescribe spironolactone  5;  Continue Jardiance  6. Continue Eliquis for [facility] for afib  7. CHF education reinforced. ~salt restriction  ~fluid restriction  ~medication compliance  ~daily weights and notify of any significant weight gain/loss  ~establish with CHF nurse  ~outpatient follow-up with our CHF team        I appreciate the opportunity of cooperating in the care of this patient.     Martha Lees M.D., [facility]

## 2022-06-16 NOTE — PLAN OF CARE
Problem: Discharge Planning  Goal: Discharge to home or other facility with appropriate resources  Outcome: Progressing  Flowsheets (Taken 6/15/2022 2251 by Darrion Thorpe RN)  Discharge to home or other facility with appropriate resources: Identify barriers to discharge with patient and caregiver     Problem: Safety - Adult  Goal: Free from fall injury  Outcome: Progressing

## 2022-06-16 NOTE — CONSULTS
[facility] 1960    History:  Past Medical History:   Diagnosis Date    Atrial fibrillation ([address] 07/25/2019    [facility]cong[facility] ([facility])     Hx of blood clots     Hypertension        ECHO:     Summary   Left ventricular size is moderately increased. Overall left ventricular   systolic function appears severely reduced with ejection fraction of 20-25%   and severe diffuse hypokinesis. Moderate mitral regurgitation. Calculated ERO of 0.33 cm2. Left atrial size appears dilated. There is an echodensity inside the left   atrial appendage consistent with left atrial appendage thrombus. The right ventricle is enlarged. Right ventricular systolic function is   mildly reduced . Mild to moderate tricuspid regurgitation. Signature      ------------------------------------------------------------------   Electronically signed by Robert Garcia MD (Interpreting   physician) on 02/10/2022 at 02:25 PM      ACE/ARB/ARNi: Lisinopril 2.5 mg daily  BB: Metoprolol Succinate 25 mg daily  Aldosterone Antagonist: No aldosterone antagonist due to noncompliance history  SGLT2: Jardiance 10 mg daily    History of sleep apnea: No    [place] Screen ordered: Yes. Previous sleep apnea screen was 13 on 2/8/22    DM History: No      Last Hospital Admission: 5/1/22 for syncope and collapse   Code Status: Full   Discharge plans: Patient to be discharged home    Family Present: No    Patient was admitted for shortness of breath, difficulty walking, decreased energy and increased fatigue. Continued complaints of decreased strength. Question if he is taking his medications correctly. Only taking medications once a day, torsemide and Eliquis  is prescribed  Twice a day. History of PAF  (Eliquis), hypertension, and systolic heart failure. He is staying with different friends.  Previous hospital weight was 190 lbs and admission weight is 215 lbs this admission. He has a scale, but does not weigh everyday. He does not know what to do if his weight goes up. He claims he does not eat a lot of salt, but drinks a lot of fluid due to the hot weather. Did have a piece of pizza the other night. Patient provided with both written and verbal education on CHF signs/ symptoms, causes, discharge medications, daily weights, low sodium diet, activity, and follow-up. Pt to call if gains 3 pounds in one day or 5 pounds in one week. Mutually agreed upon goals were discussed such as calling the MD as soon as they recognize symptoms and weight gain, maintaining proper diet, taking medications as prescribed, joining cardiac rehab when able. Also reviewed importance of risk factor reduction. Patient provided with CHF Zone Management tool and CHF symptoms magnet. Discussed importance of lifestyle changes: Call early for symptoms     PATIENT/CAREGIVER TEACHING:    Level of patient/caregiver understanding able to:   [ x] Verbalize understanding [ ] Demonstrate understanding [ ] Teach back   [ ] Needs reinforcement [ ] Other:       Time spent teachin minutes    1. WEIGHT: Admit Weight: 200 lb (90.7 kg)      Today  Weight: 215 lb 9.6 oz (97.8 kg)   2. I/O     Intake/Output Summary (Last 24 hours) at 2022 1452  Last data filed at 2022 1212  Gross per 24 hour   Intake 490 ml   Output 800 ml   Net -310 ml       Recommendations:   1. Patient will need a follow up appointment  2. Educate further on fluid restriction 48 oz- 64 oz during inpatient stay so he can understand how to measure intake at home. 3. Continue to educate on S/S.   4. Emphasize daily weights, diet, and knowing when and who to call  5. Provided patient with CHF Resource Line for questions and concerns. 6. Patient would benefit from cardiac rehab as an outpatient. Flyer provided. 7. He would benefit from medications in hand.      Caden Christensen RN 2022 2:52 PM

## 2022-06-16 NOTE — PROGRESS NOTES
[facility] PROGRESS NOTE    6/16/2022 8:43 AM        Name: Rashad Melissa . Admitted: 6/15/2022  Primary Care Provider: No primary care provider on file. (Tel: None)      Chief Complaint   Patient presents with    Fatigue     i feel sickly since yesterday. c/o upset stomach and feeling overly tired. Brief History: Patient is a 58 yo male with hx atrial fibrillation, chronic sCHF (EF 20%), HTN, VT s/p AICD, HENOK thrombus. He presented to ER with progressive shortness of breath over the past several days associated with orthopnea, increased edema, fatigue, weight gain. He admits to noncompliance with fluid restriction and medications. ProBNP [id number]. Admitted with CHF exacerbation     Subjective:  Presently sitting up in bed. Reports breathing improved compared to admission but not at baseline. Has swelling BLE.  Denies chest pain, palpitations, abdominal pain, nausea    Reviewed interval ancillary notes    Current Medications  apixaban (ELIQUIS) tablet 5 mg, BID  atorvastatin (LIPITOR) tablet 40 mg, Nightly  empagliflozin (JARDIANCE) tablet 10 mg, Daily  finasteride (PROSCAR) tablet 5 mg, Daily  lisinopril (PRINIVIL;ZESTRIL) tablet 2.5 mg, Daily  metoprolol succinate (TOPROL XL) extended release tablet 25 mg, Daily  tamsulosin (FLOMAX) capsule 0.4 mg, Daily  sodium chloride flush 0.9 % injection 5-40 mL, 2 times per day  sodium chloride flush 0.9 % injection 5-40 mL, PRN  0.9 % sodium chloride infusion, PRN  ondansetron (ZOFRAN-ODT) disintegrating tablet 4 mg, Q8H PRN   Or  ondansetron (ZOFRAN) injection 4 mg, Q6H PRN  polyethylene glycol (GLYCOLAX) packet 17 g, Daily PRN  acetaminophen (TYLENOL) tablet 650 mg, Q6H PRN   Or  acetaminophen (TYLENOL) suppository 650 mg, Q6H PRN  furosemide (LASIX) injection 40 mg, BID      Objective:  BP (!) 132/91   Pulse 74   Temp 98.1 °F (36.7 °C) (Axillary)   Resp 16   Ht 6' 2\" (1.88 m)   Wt 215 lb 9.6 oz (97.8 kg)   SpO2 100%   BMI 27.68 kg/m²     Intake/Output Summary (Last 24 hours) at 6/16/2022 0843  Last data filed at 6/16/2022 0419  Gross per 24 hour   Intake 10 ml   Output 500 ml   Net -490 ml      Wt Readings from Last 3 Encounters:   06/16/22 215 lb 9.6 oz (97.8 kg)   05/31/22 212 lb (96.2 kg)   05/13/22 201 lb (91.2 kg)       General appearance:  Appears comfortable  Eyes: Sclera clear. Pupils equal.  ENT: Moist oral mucosa. Trachea midline, no adenopathy. Cardiovascular: Regular rhythm, normal S1, S2. No murmur. No edema in lower extremities  Respiratory: Not using accessory muscles. Good inspiratory effort. Clear to auscultation bilaterally, no wheeze or crackles. GI: Abdomen soft, no tenderness, not distended, normal bowel sounds  Musculoskeletal: No cyanosis in digits, neck supple  Neurology: CN 2-12 grossly intact. No speech or motor deficits  Psych: Normal affect. Alert and oriented in time, place and person  Skin: Warm, dry, normal turgor    Labs and Tests:  CBC:   Recent Labs     06/15/22  1848   WBC 4.0   HGB 11.3*        BMP:    Recent Labs     06/15/22  1848 06/16/22  0439    138   K 4.4 4.3    102   CO2 24 23   BUN 24* 26*   CREATININE 1.2 1.3   GLUCOSE 103* 97     Hepatic:   Recent Labs     06/15/22  1848   AST 57*   ALT 55*   BILITOT 3.5*   ALKPHOS 102       CXR 6/15/2022:  1. No acute cardiopulmonary process identified. 2. Stable severe cardiomegaly.      CT Chest 6/15/2022:  Limited exam due to the lack of IV contrast.       Mild subsegmental linear atelectasis vs scarring or early infiltrates along   the lung bases.  Recommend follow-up with serial chest x-rays.       Mild cardiomegaly with mild prominence of the central pulmonary vessels   suggestive of pulmonary artery hypertension or venous congestion.       Mildly dilated and atherosclerotic thoracic aorta with no aneurysm.       Left pacemaker in place in good position.     Mild ascites along the upper abdomen.       4.7 cm right renal cyst       Mild chronic liver changes       Questionable 3rd spacing of fluid or cellulitis in the subcutaneous soft   tissues along the anterior chest wall extending inferiorly and laterally. Echo 2/7/2022:  Summary   -Definity administered for LV thrombus. -Left ventricular cavity size is severely dilated with normal left   ventricular wall thickness.   -Overall left ventricular systolic function appears severely reduced. Ejection fraction is visually estimated to be 20%. -There is severe diffuse hypokinesis. -Cannot grade diastology due to atrial fibrillation, although there are   elevated LA pressures.   -Prominent trabeculations in the LV apex. Cannot exclude Non-compaction   cardiomyopathy.   -No evidence of left ventricular mass or thrombus noted. -The right ventricle is severely enlarged. Right ventricular systolic   function is moderately reduced.   -There is severe bi-atrial enlargement. -Mitral valve leaflets appear mildly thickened. Mitral regurgitation   directed centrally and posteriorly that may be severe. -The ascending aorta is mildly dilated. -Moderate to severe tricuspid regurgitation with a PASP of 77 mmHg.   -Trivial pulmonic regurgitation. Problem List  Principal Problem:    Acute on chronic systolic heart failure (HCC)  Active Problems:    VT (ventricular tachycardia) (HCC)    Benign essential HTN    Dilated cardiomyopathy (HCC)    Chronic atrial fibrillation (HCC)    ICD (implantable cardioverter-defibrillator), biventricular, in situ  Resolved Problems:    * No resolved hospital problems. *       Assessment & Plan:   1. Acute on chronic systolic CHF. Likely secondary to noncompliance with fluid restrictions and medications. CT scan suggestive of venous congestion, proBNP 32,647. Started on IV Lasix BID. Cardiology consult pending. 2. NICM. Normal cors on [facility] 2/2022. EF 20%. S/p AICD.  Continue

## 2022-06-16 NOTE — PROGRESS NOTES
Jonny Zhou 56 duties from [employer]. Meds given per eMAR. The care plan and education has been reviewed and mutually agreed upon with the patient. In bed, fall precautions in place, hourly rounding, call light and belongings in reach, bed in lowest position, wheels locked in place, side rails up x 2, walkways free of clutter. No further needs expressed at this time.

## 2022-06-17 LAB
ANION GAP SERPL CALCULATED.3IONS-SCNC: 10 MMOL/L (ref 3–16)
BACTERIA: ABNORMAL /HPF
BILIRUBIN URINE: NEGATIVE
BLOOD, URINE: ABNORMAL
BUN BLDV-MCNC: 26 MG/DL (ref 7–20)
CALCIUM SERPL-MCNC: 8.8 MG/DL (ref 8.3–10.6)
CHLORIDE BLD-SCNC: 101 MMOL/L (ref 99–110)
CLARITY: CLEAR
CO2: 29 MMOL/L (ref 21–32)
COLOR: YELLOW
CREAT SERPL-MCNC: 1.3 MG/DL (ref 0.8–1.3)
EPITHELIAL CELLS, UA: 0 /HPF (ref 0–5)
FERRITIN: 163.1 NG/ML (ref 30–400)
GFR AFRICAN AMERICAN: >60
GFR NON-AFRICAN AMERICAN: 56
GLUCOSE BLD-MCNC: 86 MG/DL (ref 70–99)
GLUCOSE URINE: 250 MG/DL
HYALINE CASTS: 0 /LPF (ref 0–8)
IRON SATURATION: 15 % (ref 20–50)
IRON: 48 UG/DL (ref 59–158)
KETONES, URINE: NEGATIVE MG/DL
LEUKOCYTE ESTERASE, URINE: NEGATIVE
MAGNESIUM: 2.2 MG/DL (ref 1.8–2.4)
MICROSCOPIC EXAMINATION: YES
NITRITE, URINE: NEGATIVE
PH UA: 6 (ref 5–8)
POTASSIUM SERPL-SCNC: 3.5 MMOL/L (ref 3.5–5.1)
PROTEIN UA: NEGATIVE MG/DL
RBC UA: 7 /HPF (ref 0–4)
SODIUM BLD-SCNC: 140 MMOL/L (ref 136–145)
SPECIFIC GRAVITY UA: 1.01 (ref 1–1.03)
TOTAL IRON BINDING CAPACITY: 310 UG/DL (ref 260–445)
URINE TYPE: ABNORMAL
UROBILINOGEN, URINE: 0.2 E.U./DL
WBC UA: 1 /HPF (ref 0–5)

## 2022-06-17 PROCEDURE — 36415 COLL VENOUS BLD VENIPUNCTURE: CPT

## 2022-06-17 PROCEDURE — 80048 BASIC METABOLIC PNL TOTAL CA: CPT

## 2022-06-17 PROCEDURE — 83540 ASSAY OF IRON: CPT

## 2022-06-17 PROCEDURE — 83550 IRON BINDING TEST: CPT

## 2022-06-17 PROCEDURE — 81001 URINALYSIS AUTO W/SCOPE: CPT

## 2022-06-17 PROCEDURE — 6370000000 HC RX 637 (ALT 250 FOR IP): Performed by: INTERNAL MEDICINE

## 2022-06-17 PROCEDURE — 1200000000 HC SEMI PRIVATE

## 2022-06-17 PROCEDURE — 82728 ASSAY OF FERRITIN: CPT

## 2022-06-17 PROCEDURE — 6360000002 HC RX W HCPCS: Performed by: INTERNAL MEDICINE

## 2022-06-17 PROCEDURE — 99233 SBSQ HOSP IP/OBS HIGH 50: CPT | Performed by: NURSE PRACTITIONER

## 2022-06-17 PROCEDURE — 2580000003 HC RX 258: Performed by: INTERNAL MEDICINE

## 2022-06-17 PROCEDURE — 51798 US URINE CAPACITY MEASURE: CPT

## 2022-06-17 PROCEDURE — 83735 ASSAY OF MAGNESIUM: CPT

## 2022-06-17 RX ORDER — METOPROLOL SUCCINATE 25 MG/1
25 TABLET, EXTENDED RELEASE ORAL DAILY
Qty: 30 TABLET | Refills: 3 | Status: ON HOLD | OUTPATIENT
Start: 2022-06-18 | End: 2022-08-01 | Stop reason: HOSPADM

## 2022-06-17 RX ORDER — TAMSULOSIN HYDROCHLORIDE 0.4 MG/1
0.4 CAPSULE ORAL DAILY
Qty: 30 CAPSULE | Refills: 3 | Status: SHIPPED | OUTPATIENT
Start: 2022-06-18

## 2022-06-17 RX ORDER — ATORVASTATIN CALCIUM 40 MG/1
40 TABLET, FILM COATED ORAL NIGHTLY
Qty: 30 TABLET | Refills: 2 | Status: SHIPPED | OUTPATIENT
Start: 2022-06-17 | End: 2022-10-05 | Stop reason: SDUPTHER

## 2022-06-17 RX ORDER — LISINOPRIL 2.5 MG/1
2.5 TABLET ORAL DAILY
Qty: 90 TABLET | Refills: 2 | Status: SHIPPED | OUTPATIENT
Start: 2022-06-17 | End: 2022-08-01

## 2022-06-17 RX ORDER — TORSEMIDE 20 MG/1
60 TABLET ORAL DAILY
Qty: 90 TABLET | Refills: 1 | Status: ON HOLD | OUTPATIENT
Start: 2022-06-17 | End: 2022-08-01 | Stop reason: HOSPADM

## 2022-06-17 RX ADMIN — APIXABAN 5 MG: 5 TABLET, FILM COATED ORAL at 08:38

## 2022-06-17 RX ADMIN — ATORVASTATIN CALCIUM 40 MG: 40 TABLET, FILM COATED ORAL at 21:26

## 2022-06-17 RX ADMIN — TAMSULOSIN HYDROCHLORIDE 0.4 MG: 0.4 CAPSULE ORAL at 08:39

## 2022-06-17 RX ADMIN — SODIUM CHLORIDE, PRESERVATIVE FREE 10 ML: 5 INJECTION INTRAVENOUS at 21:26

## 2022-06-17 RX ADMIN — FUROSEMIDE 80 MG: 10 INJECTION, SOLUTION INTRAMUSCULAR; INTRAVENOUS at 21:26

## 2022-06-17 RX ADMIN — FINASTERIDE 5 MG: 5 TABLET, FILM COATED ORAL at 08:38

## 2022-06-17 RX ADMIN — FUROSEMIDE 80 MG: 10 INJECTION, SOLUTION INTRAMUSCULAR; INTRAVENOUS at 08:39

## 2022-06-17 RX ADMIN — SODIUM CHLORIDE, PRESERVATIVE FREE 10 ML: 5 INJECTION INTRAVENOUS at 08:39

## 2022-06-17 RX ADMIN — LISINOPRIL 2.5 MG: 5 TABLET ORAL at 08:39

## 2022-06-17 RX ADMIN — FUROSEMIDE 80 MG: 10 INJECTION, SOLUTION INTRAMUSCULAR; INTRAVENOUS at 16:59

## 2022-06-17 RX ADMIN — EMPAGLIFLOZIN 10 MG: 10 TABLET, FILM COATED ORAL at 08:38

## 2022-06-17 RX ADMIN — METOPROLOL SUCCINATE 25 MG: 25 TABLET, EXTENDED RELEASE ORAL at 08:38

## 2022-06-17 RX ADMIN — APIXABAN 5 MG: 5 TABLET, FILM COATED ORAL at 21:26

## 2022-06-17 ASSESSMENT — PAIN DESCRIPTION - LOCATION: LOCATION: ABDOMEN

## 2022-06-17 ASSESSMENT — PAIN SCALES - GENERAL
PAINLEVEL_OUTOF10: 4
PAINLEVEL_OUTOF10: 0

## 2022-06-17 NOTE — PROGRESS NOTES
[address]  HEART FAILURE  Progress Note      Admit Date 6/15/2022     Reason for Consult:      Reason for Consultation/Chief Complaint: fatigue    HPI:    Marylin Steven is a 59 y.o. male with PMH AF, blood clots, HTN, HFrEF, VT, ICD admitted with fatigue and SOB. He is homeless, stays with friends and was admitted in both Feb and April for CHF. He has diuresed 3L on IV Lasix      Subjective:  Patient is being seen for CHF. There were no acute overnight cardiac events. Today Mr. Corinne Grace denies chest pain or palpitations, tells me SOB is improving and c/o urinary pain and hematuria. Review of Systems - General ROS: negative  Hematological and Lymphatic ROS: positive for - hematuria  Respiratory ROS: no cough, shortness of breath, or wheezing  Cardiovascular ROS: no chest pain or dyspnea on exertion  Gastrointestinal ROS: no abdominal pain, change in bowel habits, or black or bloody stools  Musculoskeletal ROS: positive for - swelling in legs  Neurological ROS: negative     Baseline Weight: uk, no home wts   Wt Readings from Last 3 Encounters:   06/17/22 203 lb 12.8 oz (92.4 kg)   05/31/22 212 lb (96.2 kg)   05/13/22 201 lb (91.2 kg)         Cardiac Testing:   Echo:  2/10/22:   Summary   Left ventricular size is moderately increased. Overall left ventricular   systolic function appears severely reduced with ejection fraction of 20-25%   and severe diffuse hypokinesis.      Moderate mitral regurgitation. Calculated ERO of 0.33 cm2.      Left atrial size appears dilated. There is an echodensity inside the left   atrial appendage consistent with left atrial appendage thrombus.      The right ventricle is enlarged. Right ventricular systolic function is   mildly reduced .      Mild to moderate tricuspid regurgitation.      Device: Medtronic ICD with optivol    Core measures for HF:  EF: 20-25%%   ACEi/ARB/ARNI: lisinopril  BB: Metoprolol succinate  Damian: none for homelessness and noncompliance with labs  Hydralazine/nitrates:  SGLT2i: Jardiance    NYHA Class III      Objective:   /85   Pulse 81   Temp 97.7 °F (36.5 °C) (Oral)   Resp 18   Ht 6' 2\" (1.88 m)   Wt 203 lb 12.8 oz (92.4 kg)   SpO2 95%   BMI 26.17 kg/m²       Intake/Output Summary (Last 24 hours) at 6/17/2022 0858  Last data filed at 6/17/2022 0452  Gross per 24 hour   Intake 720 ml   Output 3845 ml   Net -3125 ml      In: 720 [P.O.:720]  Out: 3845       Physical Exam:  General Appearance:  Non-obese/Well Nourished  Respiratory:  · Resp Auscultation: Normal breath sounds without dullness  Cardiovascular:  · Auscultation: Regular rate and rhythm, normal S1S2, no m/g/r/c  · Palpation: Normal    · JVD: postive  · Pedal Pulses: 2+ and equal   Abdomen:  · Soft, NT, ND, + bs  Extremities:  · No Cyanosis or Clubbing  · Extremities: 2+ edema past knees  Neurological/Psychiatric:  · Oriented to time, place, and person  · Non-anxious    MEDICATIONS:   Scheduled Meds:   Scheduled Meds:   furosemide  80 mg IntraVENous TID    apixaban  5 mg Oral BID    atorvastatin  40 mg Oral Nightly    empagliflozin  10 mg Oral Daily    finasteride  5 mg Oral Daily    lisinopril  2.5 mg Oral Daily    metoprolol succinate  25 mg Oral Daily    tamsulosin  0.4 mg Oral Daily    sodium chloride flush  5-40 mL IntraVENous 2 times per day     Continuous Infusions:   sodium chloride       PRN Meds:.sodium chloride flush, sodium chloride, ondansetron **OR** ondansetron, polyethylene glycol, acetaminophen **OR** acetaminophen  Continuous Infusions:   sodium chloride         Intake/Output Summary (Last 24 hours) at 6/17/2022 0858  Last data filed at 6/17/2022 0452  Gross per 24 hour   Intake 720 ml   Output 3845 ml   Net -3125 ml       Lab Data:  CBC:   Lab Results   Component Value Date    WBC 4.0 06/15/2022    HGB 11.3 06/15/2022     06/15/2022     BMP:  Lab Results   Component Value Date     06/17/2022    K 3.5 06/17/2022    K 3.8 04/30/2022    CL 101 06/17/2022    CO2 29 06/17/2022    BUN 26 06/17/2022    CREATININE 1.3 06/17/2022    GLUCOSE 86 06/17/2022     INR:   Lab Results   Component Value Date    INR 1.50 06/15/2022    INR 1.50 02/05/2022    INR 1.50 11/28/2021        CARDIAC LABS  ENZYMES:  Recent Labs     06/15/22  1848 06/15/22  2233 06/16/22  0145   TROPONINI <0.01 <0.01 <0.01     FASTING LIPID PANEL:  Lab Results   Component Value Date    HDL 48 06/16/2022    LDLCALC 41 06/16/2022    TRIG 65 06/16/2022    TSH 2.66 06/15/2022     LIVER PROFILE:  Lab Results   Component Value Date    AST 57 06/15/2022    AST 28 04/30/2022    ALT 55 06/15/2022    ALT 25 04/30/2022     BNP:   Lab Results   Component Value Date    PROBNP 32,647 06/15/2022    PROBNP 25,980 05/31/2022    PROBNP 10,133 05/13/2022     Iron Studies:  No results found for: FERRITIN  Lab Results   Component Value Date    IRON 38 (L) 04/15/2022    TIBC 372 04/15/2022      Iron Deficiency Anemia:  Studies pending IV Iron Therapy:    2017 ACC/AHA HF Guidelines:   intravenous iron replacement in patients with New York Heart Association (NYHA) class II and III HF and iron deficiency(ferritin <100 ng/ml or 100-300 ng/ml if transferrin saturation <20%), to improve functional status and QoL. 1. WEIGHT: Admit Weight: 200 lb (90.7 kg)      Today  Weight: 203 lb 12.8 oz (92.4 kg)   2. I/O     Intake/Output Summary (Last 24 hours) at 6/17/2022 0858  Last data filed at 6/17/2022 0452  Gross per 24 hour   Intake 720 ml   Output 3845 ml   Net -3125 ml           Assessment/Plan:     1. AHF- still overloaded, continue diuresis with IV lasix today, check iron studies and give venofer while here if indicated, will send meds to beds today in case pt d/c over the weekend  2. HTN- controlled on ACE and toprol  3. PAF- rate controlled on BB, continue AC  4. CMP- continue ACE, BB, jardiance, no cathy for noncompliance with labs      The patient was seen for >25 minutes.  I reviewed interval history, physical exam, review of data including labs, imaging, development and implementation of treatment plan and coordination of complex care.  More than 50% of the time was devoted to counseling the patient on their diagnoses/treatments, as well as coordination of care with the other care teams    I appreciate the opportunity of cooperating in the care of this individual.    Virginia Andres, APRN - CNP, ACNP, [address] 6/17/2022, 8:58 AM  Heart Failure  The [address], [address] [place] [address]  Ph: [phone]      Core Measures:   · Discharge instructions:   · LVEF documented:   · ACEI for LV dysfunction:   · Smoking Cessation:

## 2022-06-17 NOTE — PROGRESS NOTES
[facility] PROGRESS NOTE    6/17/2022 1:16 PM        Name: Han Porter . Admitted: 6/15/2022  Primary Care Provider: No primary care provider on file. (Tel: None)      Chief Complaint   Patient presents with    Fatigue     i feel sickly since yesterday. c/o upset stomach and feeling overly tired. Brief History: Patient is a 58 yo male with hx atrial fibrillation, chronic sCHF (EF 20%), HTN, VT s/p AICD, HENOK thrombus. He presented to ER with progressive shortness of breath over the past several days associated with orthopnea, increased edema, fatigue, weight gain. He admits to noncompliance with fluid restriction and medications. ProBNP [id number]. Admitted with CHF exacerbation     Subjective: Up and about in room. States breathing is much improved. Weight is down 12 lbs compared to admission. Denies chest pain, palpitations. Has been having issues emptying bladder. Was straight cathed for ~ 900 ml residual earlier today. He is taking Flomax and Proscar.      Reviewed interval ancillary notes    Current Medications  furosemide (LASIX) injection 80 mg, TID  apixaban (ELIQUIS) tablet 5 mg, BID  atorvastatin (LIPITOR) tablet 40 mg, Nightly  empagliflozin (JARDIANCE) tablet 10 mg, Daily  finasteride (PROSCAR) tablet 5 mg, Daily  lisinopril (PRINIVIL;ZESTRIL) tablet 2.5 mg, Daily  metoprolol succinate (TOPROL XL) extended release tablet 25 mg, Daily  tamsulosin (FLOMAX) capsule 0.4 mg, Daily  sodium chloride flush 0.9 % injection 5-40 mL, 2 times per day  sodium chloride flush 0.9 % injection 5-40 mL, PRN  0.9 % sodium chloride infusion, PRN  ondansetron (ZOFRAN-ODT) disintegrating tablet 4 mg, Q8H PRN   Or  ondansetron (ZOFRAN) injection 4 mg, Q6H PRN  polyethylene glycol (GLYCOLAX) packet 17 g, Daily PRN  acetaminophen (TYLENOL) tablet 650 mg, Q6H PRN   Or  acetaminophen (TYLENOL) suppository 650 mg, Q6H spironolactone give noncompliance. 3. Chronic atrial fibrillation. Presents in V-paced rhythm. Continue beta blocker and apixaban. 4. HTN. Controlled. Continue to follow. 5. Anemia. Hx HEATHER and received IV Venofer 4/2022. Hgb 11.3 on presentation which appears baseline. 6. Urinary retention. Reports difficulty emptying bladder. Straight cathed after bladder scan showed post void residual ~ 900 ml, 830 ml obtained. Continue Flomax and Proscar. Consult urology. Diet: ADULT DIET; Regular; 5 carb choices (75 gm/meal);  Low Sodium (2 gm)  Code:Full Code  DVT PPX: apixaban      TRENT Acosta CNP   6/17/2022 1:16 PM

## 2022-06-17 NOTE — PROGRESS NOTES
CLINICAL PHARMACY NOTE: MEDS TO BEDS    Total # of Prescriptions Filled: 3   The following medications were delivered to the patient:  · Torsemide 20 mg  · Eliquis 5 mg  · Atorvastatin 40 mg    Additional Documentation:    Delivered to [facility] Kade Foster RN=signed  Liliana Salinas CP

## 2022-06-17 NOTE — PROGRESS NOTES
Pt complained of bladder fullness and discomfort. Bladder scan showed ~900mL. Straight cathed 830mL. Provider notified.

## 2022-06-18 LAB
ANION GAP SERPL CALCULATED.3IONS-SCNC: 13 MMOL/L (ref 3–16)
BUN BLDV-MCNC: 24 MG/DL (ref 7–20)
CALCIUM SERPL-MCNC: 8.6 MG/DL (ref 8.3–10.6)
CHLORIDE BLD-SCNC: 96 MMOL/L (ref 99–110)
CO2: 32 MMOL/L (ref 21–32)
CREAT SERPL-MCNC: 1.3 MG/DL (ref 0.8–1.3)
GFR AFRICAN AMERICAN: >60
GFR NON-AFRICAN AMERICAN: 56
GLUCOSE BLD-MCNC: 107 MG/DL (ref 70–99)
MAGNESIUM: 1.9 MG/DL (ref 1.8–2.4)
POTASSIUM SERPL-SCNC: 3.2 MMOL/L (ref 3.5–5.1)
PRO-BNP: ABNORMAL PG/ML (ref 0–124)
SODIUM BLD-SCNC: 141 MMOL/L (ref 136–145)

## 2022-06-18 PROCEDURE — 80048 BASIC METABOLIC PNL TOTAL CA: CPT

## 2022-06-18 PROCEDURE — 6370000000 HC RX 637 (ALT 250 FOR IP): Performed by: INTERNAL MEDICINE

## 2022-06-18 PROCEDURE — 6360000002 HC RX W HCPCS: Performed by: NURSE PRACTITIONER

## 2022-06-18 PROCEDURE — 2580000003 HC RX 258: Performed by: INTERNAL MEDICINE

## 2022-06-18 PROCEDURE — 51798 US URINE CAPACITY MEASURE: CPT

## 2022-06-18 PROCEDURE — 6370000000 HC RX 637 (ALT 250 FOR IP): Performed by: NURSE PRACTITIONER

## 2022-06-18 PROCEDURE — 83735 ASSAY OF MAGNESIUM: CPT

## 2022-06-18 PROCEDURE — 99232 SBSQ HOSP IP/OBS MODERATE 35: CPT | Performed by: NURSE PRACTITIONER

## 2022-06-18 PROCEDURE — 83880 ASSAY OF NATRIURETIC PEPTIDE: CPT

## 2022-06-18 PROCEDURE — 1200000000 HC SEMI PRIVATE

## 2022-06-18 PROCEDURE — 2580000003 HC RX 258: Performed by: NURSE PRACTITIONER

## 2022-06-18 RX ORDER — FUROSEMIDE 10 MG/ML
80 INJECTION INTRAMUSCULAR; INTRAVENOUS 2 TIMES DAILY
Status: DISCONTINUED | OUTPATIENT
Start: 2022-06-18 | End: 2022-06-19

## 2022-06-18 RX ORDER — POTASSIUM CHLORIDE 20 MEQ/1
20 TABLET, EXTENDED RELEASE ORAL
Status: DISCONTINUED | OUTPATIENT
Start: 2022-06-18 | End: 2022-06-19

## 2022-06-18 RX ADMIN — APIXABAN 5 MG: 5 TABLET, FILM COATED ORAL at 20:51

## 2022-06-18 RX ADMIN — SODIUM CHLORIDE, PRESERVATIVE FREE 10 ML: 5 INJECTION INTRAVENOUS at 08:56

## 2022-06-18 RX ADMIN — FINASTERIDE 5 MG: 5 TABLET, FILM COATED ORAL at 08:56

## 2022-06-18 RX ADMIN — SODIUM CHLORIDE, PRESERVATIVE FREE 10 ML: 5 INJECTION INTRAVENOUS at 20:51

## 2022-06-18 RX ADMIN — TAMSULOSIN HYDROCHLORIDE 0.4 MG: 0.4 CAPSULE ORAL at 08:55

## 2022-06-18 RX ADMIN — APIXABAN 5 MG: 5 TABLET, FILM COATED ORAL at 08:55

## 2022-06-18 RX ADMIN — ATORVASTATIN CALCIUM 40 MG: 40 TABLET, FILM COATED ORAL at 20:51

## 2022-06-18 RX ADMIN — IRON SUCROSE 100 MG: 20 INJECTION, SOLUTION INTRAVENOUS at 08:59

## 2022-06-18 RX ADMIN — METOPROLOL SUCCINATE 25 MG: 25 TABLET, EXTENDED RELEASE ORAL at 08:56

## 2022-06-18 RX ADMIN — EMPAGLIFLOZIN 10 MG: 10 TABLET, FILM COATED ORAL at 09:32

## 2022-06-18 RX ADMIN — POTASSIUM CHLORIDE 20 MEQ: 20 TABLET, EXTENDED RELEASE ORAL at 08:55

## 2022-06-18 NOTE — CONSULTS
[address]                     [facility], [address]                                  CONSULTATION    PATIENT NAME: Osiel Herron                  :        1960  MED REC NO:   [id number]                          ROOM:       6790  ACCOUNT NO:   [de-identified]                           ADMIT DATE: 06/15/2022  PROVIDER:     Ewelina Johnson MD    CONSULT DATE:  2022    REASON FOR CONSULTATION:  Urinary retention. CONSULTING PHYSICIAN:  Veronica Watson NP    HISTORY OF PRESENT ILLNESS:  The patient is a 43-year-old man who  presented to the hospital on 06/15/2022 with fatigue and shortness of  breath. He has a history of prostatic hypertrophy and on his home  medication list has been on Flomax and Proscar for some time, but is  unsure exactly how long he has been on these medicines. He was admitted  to the hospital and seen by Cardiology with the diagnosis of acute on  chronic congestive heart failure. His creatinine on admission was 1.2  and his urinalysis was clear with no blood or signs of infection. He  had no imaging of his kidneys. He was admitted to the hospital and  started on diuretics and had significant output and later developed  urinary retention. On , a Don catheter was inserted with over  800 mL of output and because of this, Urology consultation was made. The patient denies seeing a urologist previously. Again on his medicine  list, he does take Flomax and Proscar, but there is some note that he  might be not very compliant with his medicines and is not exactly sure  if he has been on these medicines prior to coming to the hospital.  He  denies any previous visits to see urologist and denies any previous  prostate surgery. PAST MEDICAL HISTORY:  Remarkable for atrial fibrillation, congestive  heart failure, hypertension, history of blood clots.     PAST SURGICAL HISTORY:  He has had cardioversion in 2019.    MEDICATIONS:  Please see history of present illness for medication list.  He was taking Flomax and Proscar prior to admission. He does take  Eliquis. ALLERGIES:  He has no known drug allergies. SOCIAL HISTORY:  The patient states he lives with different family  members and friends. FAMILY HISTORY:  Negative for prostate cancer. REVIEW OF SYSTEMS:  Positive for shortness of breath, dyspnea, leg  swelling, stomach upset. No gross hematuria. No dysuria. Denies  incontinence. Had no significant troubles voiding prior to coming to  the hospital.    PHYSICAL EXAMINATION:  The patient was seen and examined in the morning  of 06/18/2022. GENERAL:  He was alert and oriented, totally cooperative. No obvious  distress. VITAL SIGNS:  Temperature 98.3, blood pressure was 87/58, pulse 86,  respirations 18. HEENT:  Head was normocephalic, atraumatic. NECK:  Soft. ABDOMEN:  Soft, nontender, nondistended. GENITOURINARY:  He has a normal phallus. Indwelling Don catheter was  present. Testes was normal with no mass or tenderness. Prostate was  enlarged, but smooth and benign. No significant induration or  nodularity. EXTREMITIES:  He has bilateral lower extremity pitting edema still. IMPRESSION:  Urinary retention probably from prostatic hypertrophy. The  patient is on Flomax and Proscar. We will go ahead and remove the  catheter and then straight cath the patient as needed. Because of his  hypotension, we will hold off on doubling the Flomax at this point. The  patient seems comfortable with intermittent catheterization if necessary  at discharge and then we will arrange for followup in the office next  week.         Elias Saenz MD    D: 06/18/2022 10:27:06       T: 06/18/2022 13:58:36     [email or web address]  Job#: [id number]     Doc#: [id number]    CC:

## 2022-06-18 NOTE — PROGRESS NOTES
Note dictated  Imp   Urinary retention  Normal renal function     ua neg  No uppper tract imaging  Mod bph on exam    Plan  Pt was already on flomax and proscar on adm     Blood pressure low this am and will hold on increasing flomax  Will remove ernst and teach cic if possible     If ready for discharge Sunday , ok for discharge with cic q 8 hours and will get pt in office next week

## 2022-06-18 NOTE — PLAN OF CARE
Problem: Discharge Planning  Goal: Discharge to home or other facility with appropriate resources  Outcome: Progressing     Problem: Safety - Adult  Goal: Free from fall injury  Outcome: Progressing     Problem: Chronic Conditions and Co-morbidities  Goal: Patient's chronic conditions and co-morbidity symptoms are monitored and maintained or improved  Outcome: Progressing     Problem: Pain  Goal: Verbalizes/displays adequate comfort level or baseline comfort level  Outcome: Progressing     Problem: Cardiovascular - Adult  Goal: Maintains optimal cardiac output and hemodynamic stability  Outcome: Progressing  Goal: Absence of cardiac dysrhythmias or at baseline  Outcome: Progressing     Problem: Genitourinary - Adult  Goal: Absence of urinary retention  Outcome: Progressing  Goal: Urinary catheter remains patent  Outcome: Progressing     Problem: Metabolic/Fluid and Electrolytes - Adult  Goal: Electrolytes maintained within normal limits  Outcome: Progressing  Goal: Hemodynamic stability and optimal renal function maintained  Outcome: Progressing

## 2022-06-18 NOTE — PROGRESS NOTES
[facility]              Progress Note      Admit Date 6/15/2022  HPI: Rashad Melissa is a 60 yo male who presented to the ED for evaluation of SOB, as well as fatigue. He has a history of chronic systolic HF, atrial fibrillation, history of blood clots and hypertension. [place] He started having generalized weakness, cough, and felt increasingly short of breath. He is coughing up clear fluid. No hemoptysis. He feels that he has gained weight. His shortness of breath is better sitting up straight.     He was admitted 2/5-10 for weight gain and lower extremity edema. He was diuresed around 30 pounds. He was readmited . That admission, he required milrinone and lasix infusion. He had ICD placed for VT.  ~consulted for HF management. It is doubtful that he has been compliant with meds. Interval history:  K+ 3.2     BNP 32,647 > 13,421     Wt down 14# / 24 hrs ; net loss 5.9 L       Iron sat 15 ; ferritin 163.1    Mr. Roya Barrera denies chest pain, shortness of breath, palpitations  Subjective: slept good last night; able to lay flat. C/O finger cramping and Lt lateral thigh cramp this am       Scheduled Meds:   furosemide  80 mg IntraVENous TID    apixaban  5 mg Oral BID    atorvastatin  40 mg Oral Nightly    empagliflozin  10 mg Oral Daily    finasteride  5 mg Oral Daily    lisinopril  2.5 mg Oral Daily    metoprolol succinate  25 mg Oral Daily    tamsulosin  0.4 mg Oral Daily    sodium chloride flush  5-40 mL IntraVENous 2 times per day     Continuous Infusions:   sodium chloride       PRN Meds:sodium chloride flush, sodium chloride, ondansetron **OR** ondansetron, polyethylene glycol, acetaminophen **OR** acetaminophen       Objective:      Wt Readings from Last 3 Encounters:   22 189 lb 3.2 oz (85.8 kg)   22 212 lb (96.2 kg)   22 201 lb (91.2 kg)   Admit weight: Weight: 200 lb (90.7 kg)      Temperature range over 24hrs:   Temp  Av.1 °F (36.7 °C) Min: 97.7 °F (36.5 °C)  Max: 98.4 °F (36.9 °C)  Current Respiratory Rate:  Resp: 18  Current Pulse:  Heart Rate: 71  Current Blood Pressure:  BP: 93/67  24hr Blood Pressure Range:  Systolic (40KEK), YNC:313 , Min:83 , EFK:438   ; Diastolic (96JGF), KAU:88, Min:37, Max:75    Current Pulse Oximetry:  SpO2: 99 % RA      Intake/Output Summary (Last 24 hours) at 6/18/2022 0714  Last data filed at 6/18/2022 0437  Gross per 24 hour   Intake 1276 ml   Output 7230 ml   Net -5954 ml       Telemetry monitor:  occ V-paced, underlying AF    Physical Exam:  General:  Awake, alert, NAD  Psych : calm   Skin:  Warm and dry  Chest:  Fine crackles LLL to auscultation, respiration easy  Cardiovascular:  IRRR 76 S1S2 no murmur to auscultation; no JVD  Abdomen: Bowel sounds normal, abd soft, non-tender  Extremities:  patrick LE edema  : unremarkable      Imaging    Echo: Feb '22:  Summary   Left ventricular size is moderately increased. Overall left ventricular   systolic function appears severely reduced with ejection fraction of 20-25%   and severe diffuse hypokinesis. Moderate mitral regurgitation. Calculated ERO of 0.33 cm2. Left atrial size appears dilated. There is an echodensity inside the left   atrial appendage consistent with left atrial appendage thrombus. The right ventricle is enlarged. Right ventricular systolic function is   mildly reduced . Mild to moderate tricuspid regurgitation.     CT chest: 6/15/22:     Impression   Limited exam due to the lack of IV contrast.       Mild subsegmental linear atelectasis vs scarring or early infiltrates along   the lung bases.  Recommend follow-up with serial chest x-rays.       Mild cardiomegaly with mild prominence of the central pulmonary vessels   suggestive of pulmonary artery hypertension or venous congestion.       Mildly dilated and atherosclerotic thoracic aorta with no aneurysm.       Left pacemaker in place in good position.       Mild ascites along the upper abdomen.       4.7 cm right renal cyst       Mild chronic liver changes       Questionable 3rd spacing of fluid or cellulitis in the subcutaneous soft   tissues along the anterior chest wall extending inferiorly and laterally.         CXR: 6/15/22:  FINDINGS:   There is a left pacemaker present.  Leads project in the region of the right   atrium, right ventricle and left ventricle.  Severe cardiomegaly is   unchanged.  There is no focal consolidation, pleural effusion or evidence of   edema.           Impression   1. No acute cardiopulmonary process identified. 2. Stable severe cardiomegaly.             Lab Review     Renal Profile:   Lab Results   Component Value Date    CREATININE 1.3 06/18/2022    BUN 24 06/18/2022     06/18/2022    K 3.2 06/18/2022    K 3.8 04/30/2022    CL 96 06/18/2022    CO2 32 06/18/2022     CBC:    Lab Results   Component Value Date    WBC 4.0 06/15/2022    RBC 4.09 06/15/2022    HGB 11.3 06/15/2022    HCT 35.1 06/15/2022    MCV 85.7 06/15/2022    RDW 20.7 06/15/2022     06/15/2022     BNP:  No results found for: BNP  Fasting Lipid Panel:    Lab Results   Component Value Date    CHOL 102 06/16/2022    HDL 48 06/16/2022    TRIG 65 06/16/2022     Cardiac Enzymes:    Lab Results   Component Value Date    TROPONINI <0.01 06/16/2022     PT/ INR   Lab Results   Component Value Date    INR 1.50 06/15/2022    INR 1.50 02/05/2022    INR 1.50 11/28/2021    PROTIME 18.1 06/15/2022    PROTIME 17.2 02/05/2022    PROTIME 17.2 11/28/2021     PTT No results found for: PTT   Lab Results   Component Value Date    MG 1.90 06/18/2022      Lab Results   Component Value Date    TSH 2.66 06/15/2022      Ref. Range 6/15/2022 18:48 6/18/2022 04:26   Pro-BNP Latest Ref Range: 0 - 124 pg/mL 32,647 (H) 13,421 (H)      Ref.  Range 6/17/2022 05:35   Ferritin Latest Ref Range: 30.0 - 400.0 ng/mL 163.1   Iron Latest Ref Range: 59 - 158 ug/dL 48 (L)   Iron Saturation Latest Ref Range: 20 - 50 % 15 (L) TIBC Latest Ref Range: 260 - 445 ug/dL 310       Assessment/Plan:     Patient Active Problem List   Diagnosis    Acute retention of urine    Acute renal failure (HCC)    [facility]    Benign essential HTN    Dilated cardiomyopathy ([facility]    Encounter for loop recorder check    Acute urinary retention    Acute pulmonary edema (HCC)    Acute on chronic systolic heart failure (HCC)    COVID    Non-compliance    Homeless    Acute on chronic combined systolic and diastolic heart failure (HCC)    NSTEMI (non-ST elevated myocardial infarction) ([facility])    Acute systolic heart failure (HCC)    Complicated UTI (urinary tract infection)    Chronic atrial fibrillation ([facility])    Moderate mitral regurgitation    Acute on chronic congestive heart failure (HCC)    VT (ventricular tachycardia) ([facility]    ICD (implantable cardioverter-defibrillator), biventricular, in situ    Thrombus of left atrial appendage    Syncope and collapse        1. AHF-    ~improving : denies orthopnea/PND   ~net loss 5.9 L ; wt down 13# / 24 hrs   ~proBNP trending down   ~Jardiance / lasix 80 mg IV tid / lisinopril    ~ferritin 163.1 with iron sat 15  2. Primary HTN  ~controlled  3. PAF  ~controlled AP (s/p ICD pacemaker)  ~DOAC / toprol  4. Non-ischemic cardiomyopathy  1. LVEF 20-25%, severe diffuse HK; sp ICD  2. echodensity consistent with  Lt atrial appendage thrombus : on Eliquis      Plan:  venofer 100 mg IV x3 doses   Hypokalemia : replacement of 20 mEq po daily ; recheck labs in am   Will decrease lasix to 80 mg bid IV with plans to transition to oral torsemide in am    The patient was seen for >35 minutes.  I reviewed interval history, physical exam, review of data including labs, imaging, development and implementation of treatment plan and coordination of complex care

## 2022-06-18 NOTE — PROGRESS NOTES
[facility] PROGRESS NOTE    6/18/2022 8:49 AM        Name: Fam Leal . Admitted: 6/15/2022  Primary Care Provider: No primary care provider on file. (Tel: None)      Chief Complaint   Patient presents with    Fatigue     i feel sickly since yesterday. c/o upset stomach and feeling overly tired. Brief History: Patient is a 58 yo male with hx atrial fibrillation, chronic sCHF (EF 20%), HTN, VT s/p AICD, HENOK thrombus. He presented to ER with progressive shortness of breath over the past several days associated with orthopnea, increased edema, fatigue, weight gain. He admits to noncompliance with fluid restriction and medications. ProBNP [id number]. Admitted with CHF exacerbation     Subjective: Resting in bed. States he feels much better. Denies shortness of breath, orthopnea. Weight is down 14 lbs overnight, diuresed 7 liters yesterday, he has noted some cramps in hands and legs. BP is low this am 87 systolic but asymptomatic. Cardiology has continued Lasix but dose decreased. Don placed yesterday for high residuals, 900 ml and 800 ml.       Reviewed interval ancillary notes    Current Medications  iron sucrose (VENOFER) 100 mg in sodium chloride 0.9 % 100 mL IVPB, Q24H  potassium chloride (KLOR-CON M) extended release tablet 20 mEq, Daily with breakfast  furosemide (LASIX) injection 80 mg, BID  apixaban (ELIQUIS) tablet 5 mg, BID  atorvastatin (LIPITOR) tablet 40 mg, Nightly  empagliflozin (JARDIANCE) tablet 10 mg, Daily  finasteride (PROSCAR) tablet 5 mg, Daily  lisinopril (PRINIVIL;ZESTRIL) tablet 2.5 mg, Daily  metoprolol succinate (TOPROL XL) extended release tablet 25 mg, Daily  tamsulosin (FLOMAX) capsule 0.4 mg, Daily  sodium chloride flush 0.9 % injection 5-40 mL, 2 times per day  sodium chloride flush 0.9 % injection 5-40 mL, PRN  0.9 % sodium chloride infusion, PRN  ondansetron (ZOFRAN-ODT) disintegrating tablet 4 mg, Q8H PRN   Or  ondansetron (ZOFRAN) injection 4 mg, Q6H PRN  polyethylene glycol (GLYCOLAX) packet 17 g, Daily PRN  acetaminophen (TYLENOL) tablet 650 mg, Q6H PRN   Or  acetaminophen (TYLENOL) suppository 650 mg, Q6H PRN      Objective:  BP 93/67   Pulse 71   Temp 98.3 °F (36.8 °C) (Oral)   Resp 18   Ht 6' 2\" (1.88 m)   Wt 189 lb 3.2 oz (85.8 kg)   SpO2 99%   BMI 24.29 kg/m²     Intake/Output Summary (Last 24 hours) at 6/18/2022 0849  Last data filed at 6/18/2022 0437  Gross per 24 hour   Intake 1276 ml   Output 7230 ml   Net -5954 ml      Wt Readings from Last 3 Encounters:   06/18/22 189 lb 3.2 oz (85.8 kg)   05/31/22 212 lb (96.2 kg)   05/13/22 201 lb (91.2 kg)     General:  Awake, alert, oriented in NAD  Skin:  Warm and dry. No unusual bruising or rash  Neck:  Supple. No JVD appreciated  Chest:  Normal effort. Clear to auscultation, no wheezes/rhonchi/rales  Cardiovascular:  RRR, normal S1/S2, no murmur/gallop/rub  Abdomen:  Soft, nontender, +bowel sounds  Extremities:  No edema  Neurological: No focal deficits  Psychological: Normal mood and affect    Labs and Tests:  CBC:   Recent Labs     06/15/22  1848   WBC 4.0   HGB 11.3*        BMP:    Recent Labs     06/16/22  0439 06/17/22  0535 06/18/22  0426    140 141   K 4.3 3.5 3.2*    101 96*   CO2 23 29 32   BUN 26* 26* 24*   CREATININE 1.3 1.3 1.3   GLUCOSE 97 86 107*     Hepatic:   Recent Labs     06/15/22  1848   AST 57*   ALT 55*   BILITOT 3.5*   ALKPHOS 102       CXR 6/15/2022:  1. No acute cardiopulmonary process identified. 2. Stable severe cardiomegaly.      CT Chest 6/15/2022:  Limited exam due to the lack of IV contrast.       Mild subsegmental linear atelectasis vs scarring or early infiltrates along   the lung bases.  Recommend follow-up with serial chest x-rays.       Mild cardiomegaly with mild prominence of the central pulmonary vessels   suggestive of pulmonary artery hypertension or venous congestion.       Mildly dilated and atherosclerotic thoracic aorta with no aneurysm.       Left pacemaker in place in good position.       Mild ascites along the upper abdomen.       4.7 cm right renal cyst       Mild chronic liver changes       Questionable 3rd spacing of fluid or cellulitis in the subcutaneous soft   tissues along the anterior chest wall extending inferiorly and laterally. Echo 2/7/2022:  Summary   -Definity administered for LV thrombus. -Left ventricular cavity size is severely dilated with normal left   ventricular wall thickness.   -Overall left ventricular systolic function appears severely reduced. Ejection fraction is visually estimated to be 20%. -There is severe diffuse hypokinesis. -Cannot grade diastology due to atrial fibrillation, although there are   elevated LA pressures.   -Prominent trabeculations in the LV apex. Cannot exclude Non-compaction   cardiomyopathy.   -No evidence of left ventricular mass or thrombus noted. -The right ventricle is severely enlarged. Right ventricular systolic   function is moderately reduced.   -There is severe bi-atrial enlargement. -Mitral valve leaflets appear mildly thickened. Mitral regurgitation   directed centrally and posteriorly that may be severe. -The ascending aorta is mildly dilated. -Moderate to severe tricuspid regurgitation with a PASP of 77 mmHg.   -Trivial pulmonic regurgitation. Problem List  Principal Problem:    Acute on chronic systolic heart failure (HCC)  Active Problems:    VT (ventricular tachycardia) (HCC)    Benign essential HTN    Dilated cardiomyopathy (HCC)    Chronic atrial fibrillation (HCC)    ICD (implantable cardioverter-defibrillator), biventricular, in situ  Resolved Problems:    * No resolved hospital problems. *       Assessment & Plan:   1. Acute on chronic systolic CHF. Likely secondary to noncompliance with fluid restrictions and medications.  CT scan suggestive of venous congestion, proBNP [id number]. Started on IV Lasix with excellent response, weight is down 26 lbs compared to admission. Cardiology recommends continuing IV Lasix for 1 more day, will hold dose this am secondary hypotension and muscle cramps. .    2. NICM. Normal cors on [facility] 2/2022. EF 20%. S/p AICD. Continue Jardiance, ACEi, eidence based beta blocker. Not on spironolactone given hx of noncompliance. 3. Chronic atrial fibrillation. Presents in V-paced rhythm. Continue beta blocker and apixaban. 4. HTN. BP low this am, 87/58. Asymptomatic. Will hold IV Lasix and lisinopril this am. Continue to follow. 5. Anemia. Hx HEATHER and received IV Venofer 4/2022. Hgb 11.3 on presentation which appears baseline. Repeat iron levels low(48) to receive Venofer. 6. Urinary retention. Reports difficulty emptying bladder. Don placed for high residuals, 900 ml and 800 ml. Continue Flomax and Proscar. Urology consult pending. Disposition: Anticipate home, likely tomorrow, no needs. .       Diet: ADULT DIET; Regular; 5 carb choices (75 gm/meal);  Low Sodium (2 gm)  Code:Full Code  DVT PPX: apixaban      TRENT Gomez CNP   6/18/2022 8:49 AM

## 2022-06-19 LAB
ANION GAP SERPL CALCULATED.3IONS-SCNC: 8 MMOL/L (ref 3–16)
BUN BLDV-MCNC: 21 MG/DL (ref 7–20)
CALCIUM SERPL-MCNC: 8.5 MG/DL (ref 8.3–10.6)
CHLORIDE BLD-SCNC: 97 MMOL/L (ref 99–110)
CO2: 32 MMOL/L (ref 21–32)
CREAT SERPL-MCNC: 1.3 MG/DL (ref 0.8–1.3)
GFR AFRICAN AMERICAN: >60
GFR NON-AFRICAN AMERICAN: 56
GLUCOSE BLD-MCNC: 85 MG/DL (ref 70–99)
HCT VFR BLD CALC: 37.4 % (ref 40.5–52.5)
HEMOGLOBIN: 11.9 G/DL (ref 13.5–17.5)
MAGNESIUM: 2.1 MG/DL (ref 1.8–2.4)
MCH RBC QN AUTO: 27.2 PG (ref 26–34)
MCHC RBC AUTO-ENTMCNC: 31.9 G/DL (ref 31–36)
MCV RBC AUTO: 85 FL (ref 80–100)
PDW BLD-RTO: 20.7 % (ref 12.4–15.4)
PLATELET # BLD: 258 K/UL (ref 135–450)
PMV BLD AUTO: 7.6 FL (ref 5–10.5)
POTASSIUM SERPL-SCNC: 3.6 MMOL/L (ref 3.5–5.1)
RBC # BLD: 4.39 M/UL (ref 4.2–5.9)
SODIUM BLD-SCNC: 137 MMOL/L (ref 136–145)
WBC # BLD: 4.5 K/UL (ref 4–11)

## 2022-06-19 PROCEDURE — 2580000003 HC RX 258: Performed by: INTERNAL MEDICINE

## 2022-06-19 PROCEDURE — 83735 ASSAY OF MAGNESIUM: CPT

## 2022-06-19 PROCEDURE — 2580000003 HC RX 258: Performed by: NURSE PRACTITIONER

## 2022-06-19 PROCEDURE — 85027 COMPLETE CBC AUTOMATED: CPT

## 2022-06-19 PROCEDURE — 1200000000 HC SEMI PRIVATE

## 2022-06-19 PROCEDURE — 6360000002 HC RX W HCPCS: Performed by: NURSE PRACTITIONER

## 2022-06-19 PROCEDURE — 6370000000 HC RX 637 (ALT 250 FOR IP): Performed by: INTERNAL MEDICINE

## 2022-06-19 PROCEDURE — 99232 SBSQ HOSP IP/OBS MODERATE 35: CPT | Performed by: NURSE PRACTITIONER

## 2022-06-19 PROCEDURE — 36415 COLL VENOUS BLD VENIPUNCTURE: CPT

## 2022-06-19 PROCEDURE — 80048 BASIC METABOLIC PNL TOTAL CA: CPT

## 2022-06-19 RX ORDER — FUROSEMIDE 10 MG/ML
40 INJECTION INTRAMUSCULAR; INTRAVENOUS
Status: DISCONTINUED | OUTPATIENT
Start: 2022-06-19 | End: 2022-06-20 | Stop reason: HOSPADM

## 2022-06-19 RX ORDER — POTASSIUM CHLORIDE 20 MEQ/1
30 TABLET, EXTENDED RELEASE ORAL
Status: DISCONTINUED | OUTPATIENT
Start: 2022-06-19 | End: 2022-06-20 | Stop reason: HOSPADM

## 2022-06-19 RX ADMIN — LISINOPRIL 2.5 MG: 5 TABLET ORAL at 07:49

## 2022-06-19 RX ADMIN — FUROSEMIDE 40 MG: 10 INJECTION, SOLUTION INTRAMUSCULAR; INTRAVENOUS at 10:54

## 2022-06-19 RX ADMIN — APIXABAN 5 MG: 5 TABLET, FILM COATED ORAL at 07:49

## 2022-06-19 RX ADMIN — FINASTERIDE 5 MG: 5 TABLET, FILM COATED ORAL at 07:49

## 2022-06-19 RX ADMIN — ATORVASTATIN CALCIUM 40 MG: 40 TABLET, FILM COATED ORAL at 20:48

## 2022-06-19 RX ADMIN — SODIUM CHLORIDE, PRESERVATIVE FREE 10 ML: 5 INJECTION INTRAVENOUS at 07:49

## 2022-06-19 RX ADMIN — IRON SUCROSE 100 MG: 20 INJECTION, SOLUTION INTRAVENOUS at 08:43

## 2022-06-19 RX ADMIN — TAMSULOSIN HYDROCHLORIDE 0.4 MG: 0.4 CAPSULE ORAL at 07:49

## 2022-06-19 RX ADMIN — METOPROLOL SUCCINATE 25 MG: 25 TABLET, EXTENDED RELEASE ORAL at 07:48

## 2022-06-19 RX ADMIN — EMPAGLIFLOZIN 10 MG: 10 TABLET, FILM COATED ORAL at 07:49

## 2022-06-19 RX ADMIN — APIXABAN 5 MG: 5 TABLET, FILM COATED ORAL at 20:48

## 2022-06-19 RX ADMIN — SODIUM CHLORIDE, PRESERVATIVE FREE 10 ML: 5 INJECTION INTRAVENOUS at 20:49

## 2022-06-19 NOTE — PLAN OF CARE
Problem: Discharge Planning  Goal: Discharge to home or other facility with appropriate resources  Outcome: Progressing     Problem: Safety - Adult  Goal: Free from fall injury  6/18/2022 2317 by Eleni Chacko RN  Outcome: Progressing  6/18/2022 1804 by Yuliet Vidal RN  Outcome: Progressing     Problem: Chronic Conditions and Co-morbidities  Goal: Patient's chronic conditions and co-morbidity symptoms are monitored and maintained or improved  Outcome: Progressing     Problem: Pain  Goal: Verbalizes/displays adequate comfort level or baseline comfort level  Outcome: Progressing     Problem: Cardiovascular - Adult  Goal: Maintains optimal cardiac output and hemodynamic stability  Outcome: Progressing  Goal: Absence of cardiac dysrhythmias or at baseline  Outcome: Progressing     Problem: Genitourinary - Adult  Goal: Absence of urinary retention  Outcome: Progressing  Goal: Urinary catheter remains patent  Outcome: Progressing     Problem: Metabolic/Fluid and Electrolytes - Adult  Goal: Electrolytes maintained within normal limits  Outcome: Progressing  Goal: Hemodynamic stability and optimal renal function maintained  Outcome: Progressing

## 2022-06-19 NOTE — PROGRESS NOTES
[facility] PROGRESS NOTE    6/19/2022 1:14 PM        Name: Marylin Steven . Admitted: 6/15/2022  Primary Care Provider: No primary care provider on file. (Tel: None)      Chief Complaint   Patient presents with    Fatigue     i feel sickly since yesterday. c/o upset stomach and feeling overly tired. Brief History: Patient is a 60 yo male with hx atrial fibrillation, chronic sCHF (EF 20%), HTN, VT s/p AICD, HENOK thrombus. He presented to ER with progressive shortness of breath over the past several days associated with orthopnea, increased edema, fatigue, weight gain. He admits to noncompliance with fluid restriction and medications. ProBNP [id number]. Admitted with CHF exacerbation     Subjective: Resting in bed. States he feels so much better. Has been up in room, denies shortness of breath, denies orthopnea. Says fatigue much improved. Lasix held yesterday for hypotension with BP systolic in 90K. BP improved today. .      Reviewed interval ancillary notes    Current Medications  furosemide (LASIX) injection 40 mg, Daily before lunch  potassium chloride (KLOR-CON M) extended release tablet 30 mEq, Daily with breakfast  iron sucrose (VENOFER) 100 mg in sodium chloride 0.9 % 100 mL IVPB, Q24H  apixaban (ELIQUIS) tablet 5 mg, BID  atorvastatin (LIPITOR) tablet 40 mg, Nightly  empagliflozin (JARDIANCE) tablet 10 mg, Daily  finasteride (PROSCAR) tablet 5 mg, Daily  lisinopril (PRINIVIL;ZESTRIL) tablet 2.5 mg, Daily  metoprolol succinate (TOPROL XL) extended release tablet 25 mg, Daily  tamsulosin (FLOMAX) capsule 0.4 mg, Daily  sodium chloride flush 0.9 % injection 5-40 mL, 2 times per day  sodium chloride flush 0.9 % injection 5-40 mL, PRN  0.9 % sodium chloride infusion, PRN  ondansetron (ZOFRAN-ODT) disintegrating tablet 4 mg, Q8H PRN   Or  ondansetron (ZOFRAN) injection 4 mg, Q6H PRN  polyethylene glycol (GLYCOLAX) packet 17 g, Daily PRN  acetaminophen (TYLENOL) tablet 650 mg, Q6H PRN   Or  acetaminophen (TYLENOL) suppository 650 mg, Q6H PRN      Objective:  /65   Pulse 70   Temp 98 °F (36.7 °C) (Oral)   Resp 18   Ht 6' 2\" (1.88 m)   Wt 190 lb 3.2 oz (86.3 kg)   SpO2 98%   BMI 24.42 kg/m²     Intake/Output Summary (Last 24 hours) at 6/19/2022 1314  Last data filed at 6/19/2022 1052  Gross per 24 hour   Intake 558 ml   Output 2345 ml   Net -1787 ml      Wt Readings from Last 3 Encounters:   06/19/22 190 lb 3.2 oz (86.3 kg)   05/31/22 212 lb (96.2 kg)   05/13/22 201 lb (91.2 kg)     General:  Awake, alert, oriented in NAD  Skin:  Warm and dry. No unusual bruising or rash  Neck:  Supple. No JVD appreciated  Chest:  Normal effort. Clear to auscultation, no wheezes/rhonchi/rales  Cardiovascular:  RRR, normal S1/S2, no murmur/gallop/rub  Abdomen:  Soft, nontender, +bowel sounds  Extremities:  Trace BLE edema  Neurological: No focal deficits  Psychological: Normal mood and affect    Labs and Tests:  CBC:   Recent Labs     06/19/22  0436   WBC 4.5   HGB 11.9*        BMP:    Recent Labs     06/17/22  0535 06/18/22  0426 06/19/22  0436    141 137   K 3.5 3.2* 3.6    96* 97*   CO2 29 32 32   BUN 26* 24* 21*   CREATININE 1.3 1.3 1.3   GLUCOSE 86 107* 85     Hepatic:   No results for input(s): AST, ALT, ALB, BILITOT, ALKPHOS in the last 72 hours. CXR 6/15/2022:  1. No acute cardiopulmonary process identified. 2. Stable severe cardiomegaly.      CT Chest 6/15/2022:  Limited exam due to the lack of IV contrast.       Mild subsegmental linear atelectasis vs scarring or early infiltrates along   the lung bases.  Recommend follow-up with serial chest x-rays.       Mild cardiomegaly with mild prominence of the central pulmonary vessels   suggestive of pulmonary artery hypertension or venous congestion.       Mildly dilated and atherosclerotic thoracic aorta with no aneurysm.       Left pacemaker in place in good position.       Mild ascites along the upper abdomen.       4.7 cm right renal cyst       Mild chronic liver changes       Questionable 3rd spacing of fluid or cellulitis in the subcutaneous soft   tissues along the anterior chest wall extending inferiorly and laterally. Echo 2/7/2022:  Summary   -Definity administered for LV thrombus. -Left ventricular cavity size is severely dilated with normal left   ventricular wall thickness.   -Overall left ventricular systolic function appears severely reduced. Ejection fraction is visually estimated to be 20%. -There is severe diffuse hypokinesis. -Cannot grade diastology due to atrial fibrillation, although there are   elevated LA pressures.   -Prominent trabeculations in the LV apex. Cannot exclude Non-compaction   cardiomyopathy.   -No evidence of left ventricular mass or thrombus noted. -The right ventricle is severely enlarged. Right ventricular systolic   function is moderately reduced.   -There is severe bi-atrial enlargement. -Mitral valve leaflets appear mildly thickened. Mitral regurgitation   directed centrally and posteriorly that may be severe. -The ascending aorta is mildly dilated. -Moderate to severe tricuspid regurgitation with a PASP of 77 mmHg.   -Trivial pulmonic regurgitation. Problem List  Principal Problem:    Acute on chronic systolic heart failure (HCC)  Active Problems:    VT (ventricular tachycardia) (HCC)    Benign essential HTN    Dilated cardiomyopathy (HCC)    Chronic atrial fibrillation (HCC)    ICD (implantable cardioverter-defibrillator), biventricular, in situ  Resolved Problems:    * No resolved hospital problems. *       Assessment & Plan:   1. Acute on chronic systolic CHF. Likely secondary to noncompliance with fluid restrictions and medications. CT scan suggestive of venous congestion, proBNP 32,647. Started on IV Lasix with excellent response, weight is down 25 lbs compared to admission.  Cardiology continuing IV Lasix today. .    2. NICM. Normal cors on [facility] 2/2022. EF 20%. S/p AICD. Continue Jardiance, ACEi, eidence based beta blocker. Not on spironolactone given hx of noncompliance. 3. Chronic atrial fibrillation. Presents in V-paced rhythm. Continue beta blocker and apixaban. 4. HTN. BP low yesterday, 54Q systolic. Improved today, [facility] Continue to follow. 5. Anemia. Hx HEATHER and received IV Venofer 4/2022. Hgb 11.3 on presentation which appears baseline. Repeat iron levels low(48) to receive Venofer. 6. Urinary retention. Reports difficulty emptying bladder. Don placed for high residuals, 900 ml and 800 ml. Continue Flomax and Proscar. Urology consult pending. Disposition: Discussed with cardiology, wanted to continue IV Lasix for another day. Anticipate home tomorrow after 3rd dose Venofer. Diet: ADULT DIET; Regular; 5 carb choices (75 gm/meal);  Low Sodium (2 gm)  Code:Full Code  DVT PPX: apixaban      TRENT Jenkins CNP   6/19/2022 1:14 PM

## 2022-06-20 VITALS
DIASTOLIC BLOOD PRESSURE: 77 MMHG | TEMPERATURE: 98.6 F | HEIGHT: 74 IN | WEIGHT: 192.4 LBS | SYSTOLIC BLOOD PRESSURE: 100 MMHG | HEART RATE: 89 BPM | BODY MASS INDEX: 24.69 KG/M2 | RESPIRATION RATE: 16 BRPM | OXYGEN SATURATION: 98 %

## 2022-06-20 LAB
ANION GAP SERPL CALCULATED.3IONS-SCNC: 8 MMOL/L (ref 3–16)
BUN BLDV-MCNC: 23 MG/DL (ref 7–20)
CALCIUM SERPL-MCNC: 8.4 MG/DL (ref 8.3–10.6)
CHLORIDE BLD-SCNC: 99 MMOL/L (ref 99–110)
CO2: 31 MMOL/L (ref 21–32)
CREAT SERPL-MCNC: 1.2 MG/DL (ref 0.8–1.3)
GFR AFRICAN AMERICAN: >60
GFR NON-AFRICAN AMERICAN: >60
GLUCOSE BLD-MCNC: 84 MG/DL (ref 70–99)
MAGNESIUM: 2.4 MG/DL (ref 1.8–2.4)
POTASSIUM SERPL-SCNC: 4 MMOL/L (ref 3.5–5.1)
SODIUM BLD-SCNC: 138 MMOL/L (ref 136–145)

## 2022-06-20 PROCEDURE — 6360000002 HC RX W HCPCS: Performed by: NURSE PRACTITIONER

## 2022-06-20 PROCEDURE — 80048 BASIC METABOLIC PNL TOTAL CA: CPT

## 2022-06-20 PROCEDURE — 6370000000 HC RX 637 (ALT 250 FOR IP): Performed by: INTERNAL MEDICINE

## 2022-06-20 PROCEDURE — 36415 COLL VENOUS BLD VENIPUNCTURE: CPT

## 2022-06-20 PROCEDURE — 99232 SBSQ HOSP IP/OBS MODERATE 35: CPT | Performed by: NURSE PRACTITIONER

## 2022-06-20 PROCEDURE — 2580000003 HC RX 258: Performed by: NURSE PRACTITIONER

## 2022-06-20 PROCEDURE — 83735 ASSAY OF MAGNESIUM: CPT

## 2022-06-20 PROCEDURE — 2580000003 HC RX 258: Performed by: INTERNAL MEDICINE

## 2022-06-20 PROCEDURE — 6370000000 HC RX 637 (ALT 250 FOR IP): Performed by: NURSE PRACTITIONER

## 2022-06-20 RX ADMIN — APIXABAN 5 MG: 5 TABLET, FILM COATED ORAL at 09:08

## 2022-06-20 RX ADMIN — SODIUM CHLORIDE, PRESERVATIVE FREE 10 ML: 5 INJECTION INTRAVENOUS at 09:09

## 2022-06-20 RX ADMIN — POTASSIUM CHLORIDE 30 MEQ: 20 TABLET, EXTENDED RELEASE ORAL at 09:07

## 2022-06-20 RX ADMIN — EMPAGLIFLOZIN 10 MG: 10 TABLET, FILM COATED ORAL at 09:08

## 2022-06-20 RX ADMIN — FUROSEMIDE 40 MG: 10 INJECTION, SOLUTION INTRAMUSCULAR; INTRAVENOUS at 12:51

## 2022-06-20 RX ADMIN — FINASTERIDE 5 MG: 5 TABLET, FILM COATED ORAL at 09:08

## 2022-06-20 RX ADMIN — IRON SUCROSE 100 MG: 20 INJECTION, SOLUTION INTRAVENOUS at 09:17

## 2022-06-20 RX ADMIN — TAMSULOSIN HYDROCHLORIDE 0.4 MG: 0.4 CAPSULE ORAL at 09:07

## 2022-06-20 ASSESSMENT — PAIN SCALES - GENERAL
PAINLEVEL_OUTOF10: 0
PAINLEVEL_OUTOF10: 0

## 2022-06-20 NOTE — CARE COORDINATION
Discharge Planning Assessment  Readmission score 23%  RN/SW discharge planner met with patient/ (and family member) to discuss reason for admission, current living situation, and potential needs at the time of discharge    Demographics/Insurance verified Yes- [employer]    Current type of dwelling: patient states he still  lives with his different friends and co-worker , he has no place of his own. Patient from ECF/SW confirmed with: N/A    Living arrangements: lives with friends in different places . Level of function/Support: he I independent with ADLs, goes to work when feeling well. - auto -. Friends assist as needed. PCP: KATLYN Muller   with [address] in [place]- [phone] [facility]      DME: None    Active with any community resources/agencies/skilled home care: None    Medication compliance issues: denies issues     Financial issues that could impact healthcare: No      Tentative discharge plan: home with one of his friends. Transportation at the time of discharge: [employer] will pick him up.

## 2022-06-20 NOTE — PROGRESS NOTES
Urology Progress Note  [facility]    Provider: TRENT Marx CNP  Patient ID:  Admission Date: 6/15/2022 Name: Manfred Leal  OR Date: 6/15/2022 MRN: [id number]   Patient Location: [id number] : 1960  Attending: Garth Bowie MD Date of Service: 2022  PCP: No primary care provider on file. Diagnoses:  1. Acute on chronic congestive heart failure, unspecified heart failure type [facility])      Urinary Retention    Assessment/Plan:  58 yo AA male with CHF exac    Don removed for VT yesterday  -Voiding with good UOP  -Obtain PVR  Continue flomax and finasteride  Follow up requested to trend bladder residuals and workup for PCA with MEET/PSA outside setting of retention  OK to discharge per  if PVR is low and medically clear      The patient had a chance to ask questions which were answered. he understands the above plan. Subjective:   Manfred Leal is a 59 y.o. male. He was seen and examined this morning. Today we discussed follow uip in office for routine check. Checkk PVRs. Will need PSA/MEET. Objective:   Vitals:  Vitals:    22 0900   BP: (!) 101/57   Pulse: 87   Resp:    Temp: 98.4 °F (36.9 °C)   SpO2: 100%       Intake/Output Summary (Last 24 hours) at 2022 0910  Last data filed at 2022 1429  Gross per 24 hour   Intake 480 ml   Output 2375 ml   Net -1895 ml     Physical Exam:  Gen: Alert and oriented x3, no acute distress  CV: Regular rate   Resp: unlabored respirations  Abd: Soft, non-distended, non-tender, no masses  Ext: no peripheral edema noted, moves upper and lower extremities spontaneously  Skin: warmand well perfused, no rashes noted on the face, or arms.      Labs:  Lab Results   Component Value Date    WBC 4.5 2022    HGB 11.9 (L) 2022    HCT 37.4 (L) 2022    MCV 85.0 2022     2022     Lab Results   Component Value Date    CREATININE 1.2 2022    BUN 23 (H) 2022

## 2022-06-20 NOTE — PROGRESS NOTES
[address]  HEART FAILURE  Progress Note      Admit Date 6/15/2022     Reason for Consult:      Reason for Consultation/Chief Complaint: fatigue    HPI:    Sebastian Bills is a 59 y.o. male with PMH AF, blood clots, HTN, HFrEF, VT, ICD admitted with fatigue and SOB. He is homeless, stays with friends and was admitted in both Feb and April for CHF. He has diuresed 13L on IV Lasix. Subjective:  Patient is being seen for CHF. There were no acute overnight cardiac events. Today Mr. Santy Benjamin denies chest pain or palpitations, or SOB today. Review of Systems - General ROS: negative  Hematological and Lymphatic ROS: negative  Respiratory ROS: no cough, shortness of breath, or wheezing  Cardiovascular ROS: no chest pain or dyspnea on exertion  Gastrointestinal ROS: no abdominal pain, change in bowel habits, or black or bloody stools  Musculoskeletal ROS: positive for - swelling in legs  Neurological ROS: negative     Baseline Weight: uk, no home wts   Wt Readings from Last 3 Encounters:   06/20/22 192 lb 6.4 oz (87.3 kg)   05/31/22 212 lb (96.2 kg)   05/13/22 201 lb (91.2 kg)         Cardiac Testing:   Echo:  2/10/22:   Summary   Left ventricular size is moderately increased. Overall left ventricular   systolic function appears severely reduced with ejection fraction of 20-25%   and severe diffuse hypokinesis.      Moderate mitral regurgitation. Calculated ERO of 0.33 cm2.      Left atrial size appears dilated. There is an echodensity inside the left   atrial appendage consistent with left atrial appendage thrombus.      The right ventricle is enlarged. Right ventricular systolic function is   mildly reduced .      Mild to moderate tricuspid regurgitation.      Device: Medtronic ICD with optivol    Core measures for HF:  EF: 20-25%%   ACEi/ARB/ARNI: lisinopril  BB: Metoprolol succinate  Damian: none for homelessness and noncompliance with labs  Hydralazine/nitrates:  SGLT2i: Jardiance    NYHA Class II      Objective:   /70   Pulse 69   Temp 98 °F (36.7 °C) (Oral)   Resp 16   Ht 6' 2\" (1.88 m)   Wt 192 lb 6.4 oz (87.3 kg)   SpO2 96%   BMI 24.70 kg/m²       Intake/Output Summary (Last 24 hours) at 6/20/2022 0900  Last data filed at 6/20/2022 4553  Gross per 24 hour   Intake 480 ml   Output 2375 ml   Net -1895 ml      In: 480 [P.O.:480]  Out: 2625       Physical Exam:  General Appearance:  Non-obese/Well Nourished  Respiratory:  · Resp Auscultation: Normal breath sounds without dullness  Cardiovascular:  · Auscultation: Regular rate and rhythm, normal S1S2, no m/g/r/c  · Palpation: Normal    · JVD: negative  · Pedal Pulses: 2+ and equal   Abdomen:  · Soft, NT, ND, + bs  Extremities:  · No Cyanosis or Clubbing  · Extremities: trace pitting edema   Neurological/Psychiatric:  · Oriented to time, place, and person  · Non-anxious    MEDICATIONS:   Scheduled Meds:   Scheduled Meds:   furosemide  40 mg IntraVENous Daily before lunch    potassium chloride  30 mEq Oral Daily with breakfast    iron sucrose (VENOFER) iv piggyback 100 mL (Admin over 60 minutes)  100 mg IntraVENous Q24H    apixaban  5 mg Oral BID    atorvastatin  40 mg Oral Nightly    empagliflozin  10 mg Oral Daily    finasteride  5 mg Oral Daily    lisinopril  2.5 mg Oral Daily    metoprolol succinate  25 mg Oral Daily    tamsulosin  0.4 mg Oral Daily    sodium chloride flush  5-40 mL IntraVENous 2 times per day     Continuous Infusions:   sodium chloride       PRN Meds:.sodium chloride flush, sodium chloride, ondansetron **OR** ondansetron, polyethylene glycol, acetaminophen **OR** acetaminophen  Continuous Infusions:   sodium chloride         Intake/Output Summary (Last 24 hours) at 6/20/2022 0900  Last data filed at 6/20/2022 0615  Gross per 24 hour   Intake 480 ml   Output 2375 ml   Net -1895 ml       Lab Data:  CBC:   Lab Results   Component Value Date    WBC 4.5 06/19/2022    HGB 11.9 06/19/2022     06/19/2022 BMP:  Lab Results   Component Value Date     06/20/2022    K 4.0 06/20/2022    K 3.8 04/30/2022    CL 99 06/20/2022    CO2 31 06/20/2022    BUN 23 06/20/2022    CREATININE 1.2 06/20/2022    GLUCOSE 84 06/20/2022     INR:   Lab Results   Component Value Date    INR 1.50 06/15/2022    INR 1.50 02/05/2022    INR 1.50 11/28/2021        CARDIAC LABS  ENZYMES:  No results for input(s): CKMB, CKMBINDEX, TROPONINI in the last 72 hours. Invalid input(s): CKTOTAL;3  FASTING LIPID PANEL:  Lab Results   Component Value Date    HDL 48 06/16/2022    LDLCALC 41 06/16/2022    TRIG 65 06/16/2022    TSH 2.66 06/15/2022     LIVER PROFILE:  Lab Results   Component Value Date    AST 57 06/15/2022    AST 28 04/30/2022    ALT 55 06/15/2022    ALT 25 04/30/2022     BNP:   Lab Results   Component Value Date    PROBNP 13,421 06/18/2022    PROBNP 32,647 06/15/2022    PROBNP 25,980 05/31/2022     Iron Studies:    Lab Results   Component Value Date    FERRITIN 163.1 06/17/2022     Lab Results   Component Value Date    IRON 48 (L) 06/17/2022    TIBC 310 06/17/2022    FERRITIN 163.1 06/17/2022      Iron Deficiency Anemia:  yes IV Iron Therapy:  Yes, inpt June 2022 2017 ACC/AHA HF Guidelines:   intravenous iron replacement in patients with New York Heart Association (NYHA) class II and III HF and iron deficiency(ferritin <100 ng/ml or 100-300 ng/ml if transferrin saturation <20%), to improve functional status and QoL. 1. WEIGHT: Admit Weight: 200 lb (90.7 kg)      Today  Weight: 192 lb 6.4 oz (87.3 kg)   2. I/O     Intake/Output Summary (Last 24 hours) at 6/20/2022 0900  Last data filed at 6/20/2022 0615  Gross per 24 hour   Intake 480 ml   Output 2375 ml   Net -1895 ml           Assessment/Plan:     1. AHF- compensated, pt ok for discharge today, will get meds to beds- scripts sent on friday  2. HTN- controlled on ACE and toprol  3. PAF- rate controlled on BB, continue AC  4.  CMP- continue ACE, BB, jardiance, no cathy for noncompliance with labs  5.  HEATHER - venofer given        I appreciate the opportunity of cooperating in the care of this individual.    Alex Chao, TRENT - CNP, ACNP, [address] 6/20/2022, 9:00 AM  Heart Failure  The [address], [address]  Ph: [phone]      Core Measures:   · Discharge instructions:   · LVEF documented:   · ACEI for LV dysfunction:   · Smoking Cessation:

## 2022-06-20 NOTE — PLAN OF CARE
Problem: Discharge Planning  Goal: Discharge to home or other facility with appropriate resources  Outcome: Progressing     Problem: Safety - Adult  Goal: Free from fall injury  Outcome: Progressing  Flowsheets (Taken 6/20/2022 1600)  Free From Fall Injury: Instruct family/caregiver on patient safety  Note: Pt is independent in the room, pt educated to call if he feels unsafe without the nurse. Problem: Chronic Conditions and Co-morbidities  Goal: Patient's chronic conditions and co-morbidity symptoms are monitored and maintained or improved  Outcome: Progressing     Problem: Pain  Goal: Verbalizes/displays adequate comfort level or baseline comfort level  Outcome: Progressing  Flowsheets (Taken 6/20/2022 1600)  Verbalizes/displays adequate comfort level or baseline comfort level:   Encourage patient to monitor pain and request assistance   Assess pain using appropriate pain scale   Administer analgesics based on type and severity of pain and evaluate response  Note: Pt stated on a scale from 0-10 his pain was 0/10. No medication or adjustment needed.       Problem: Cardiovascular - Adult  Goal: Maintains optimal cardiac output and hemodynamic stability  Outcome: Progressing  Goal: Absence of cardiac dysrhythmias or at baseline  Outcome: Progressing     Problem: Genitourinary - Adult  Goal: Absence of urinary retention  Outcome: Progressing  Goal: Urinary catheter remains patent  Outcome: Progressing     Problem: Metabolic/Fluid and Electrolytes - Adult  Goal: Electrolytes maintained within normal limits  Outcome: Progressing  Goal: Hemodynamic stability and optimal renal function maintained  Outcome: Progressing     Problem: ABCDS Injury Assessment  Goal: Absence of physical injury  Outcome: Progressing

## 2022-06-20 NOTE — DISCHARGE SUMMARY
[address] DISCHARGE SUMMARY    Patient Demographics    Patient. Jena Rubalcava  Date of Birth. 1960  MRN. [id number]     Primary care provider. No primary care provider on file. (Tel: None)    Admit date: 6/15/2022    Discharge date (blank if same as Note Date): Note Date: 6/20/2022     Reason for Hospitalization. Chief Complaint   Patient presents with    Fatigue     i feel sickly since yesterday. c/o upset stomach and feeling overly tired. Significant Findings. Principal Problem:    Acute on chronic systolic heart failure (HCC)  Active Problems:    VT (ventricular tachycardia) (HCC)    Benign essential HTN    Dilated cardiomyopathy (HCC)    Chronic atrial fibrillation (HCC)    ICD (implantable cardioverter-defibrillator), biventricular, in situ  Resolved Problems:    * No resolved hospital problems. *       Problems and results from this hospitalization that need follow up. 1. Chronic sCHF. Follow up in CHF clinic. 2. Urinary retention. Urology office to arrange follow up. Significant test results and incidental findings. CXR 6/15/2022:  1. No acute cardiopulmonary process identified.    2. Stable severe cardiomegaly.      CT Chest 6/15/2022:  Limited exam due to the lack of IV contrast.       Mild subsegmental linear atelectasis vs scarring or early infiltrates along   the lung bases.  Recommend follow-up with serial chest x-rays.       Mild cardiomegaly with mild prominence of the central pulmonary vessels   suggestive of pulmonary artery hypertension or venous congestion.       Mildly dilated and atherosclerotic thoracic aorta with no aneurysm.       Left pacemaker in place in good position.       Mild ascites along the upper abdomen.       4.7 cm right renal cyst       Mild chronic liver changes       Questionable 3rd spacing of fluid or cellulitis in the subcutaneous soft beta blocker. ACEi transitioned to ARNI after washout period. Not on spironolactone given hx of noncompliance. 3. Chronic atrial fibrillation. Presents in V-paced rhythm. Continued beta blocker and apixaban. 4. HTN. BP stable on Entresto. 5. Anemia. Hx HEATHER. Hgb 11.3 on presentation which appears baseline. Repeat iron levels low(48), received IV Venofer x 3.   6. Urinary retention. Reports difficulty emptying bladder. Don placed for high residuals, 900 ml and 800 ml. Seen by urology, continued Flomax and Proscar. Don removed for voiding trial and voiding without issue. To follow up in urology office in 1 week. Patient feels well, eager for DC home. Currently staying with friends. Reviewed DC plans, follow up and medications with patient. Expresses understanding. Questions answered. Consults. IP CONSULT TO HEART FAILURE NURSE/COORDINATOR  IP CONSULT TO DIETITIAN  IP CONSULT TO CARDIOLOGY  IP CONSULT TO UROLOGY    Physical examination on discharge day. BP (!) 101/57   Pulse 87   Temp 98.4 °F (36.9 °C)   Resp 16   Ht 6' 2\" (1.88 m)   Wt 192 lb 6.4 oz (87.3 kg)   SpO2 100%   BMI 24.70 kg/m²   General appearance. Alert. Looks comfortable. HEENT. Sclera clear. Moist mucus membranes. Cardiovascular. Regular rate and rhythm, normal S1, S2. No murmur. Respiratory. Not using accessory muscles. Clear to auscultation bilaterally, no wheeze. Gastrointestinal. Abdomen soft, non-tender, not distended, normal bowel sounds  Neurology. Facial symmetry. No speech deficits. Moving all extremities equally. Extremities. Trace edema in lower extremities. Skin. Warm, dry, normal turgor    Condition at time of discharge stable    Medication instructions provided to patient at discharge.      Medication List      CHANGE how you take these medications    torsemide 20 MG tablet  Commonly known as: DEMADEX  Take 3 tablets by mouth daily 2 with breakfast and one with lunch  What changed:   · how much to take  · when to take this  · additional instructions  Notes to patient: Use: treat heart failure, fluid retention, lower blood pressure. Side effects: frequent urination, weakness, muscle cramps, increased sensitivity to light, nausea and dizziness. CONTINUE taking these medications    apixaban 5 MG Tabs tablet  Commonly known as: ELIQUIS  Take 1 tablet by mouth 2 times daily  Notes to patient: Use: to prevent blood clots and stroke   Side Effects: bleeding, bruising     atorvastatin 40 MG tablet  Commonly known as: LIPITOR  Take 1 tablet by mouth nightly  Notes to patient:    Atorvastatin (Lipitor®)  Use: lower bad cholesterol   Side effects: headache, muscle pains, constipation, diarrhea.     empagliflozin 10 MG tablet  Commonly known as: Jardiance  Take 1 tablet by mouth daily  Notes to patient: Use: manage diabetes (lower BS)  Side effects: urinary tract infections, low BS, dizziness, nausea     finasteride 5 MG tablet  Commonly known as: PROSCAR  Take 1 tablet by mouth daily  Notes to patient: Use: to treat symptoms of prostate enlargement  Side effects: chills, confusion, dizziness upon standing     lisinopril 2.5 MG tablet  Commonly known as: PRINIVIL;ZESTRIL  Take 1 tablet by mouth daily  Notes to patient: Lisinopril is used to treat high blood pressure, help heart function after a heart attack, help a weak heart. Side effects: dizziness, headache, cough. metoprolol succinate 25 MG extended release tablet  Commonly known as: TOPROL XL  Take 1 tablet by mouth daily  Notes to patient: Lopressor/Metoprolol is used to treat high blood pressure, chest pain or pressure.    Side effects: dizziness, feeling tired, loose stools, throwing up     tamsulosin 0.4 MG capsule  Commonly known as: FLOMAX  Take 1 capsule by mouth daily  Notes to patient: Tamsulosin /Flomax  Use: treat an enlarged prostate or urinary retention  Side effects: Feeling dizzy, headache or low blood pressure     vitamin D 50 MCG (2000 UT) Tabs tablet  Commonly known as: CHOLECALCIFEROL  Take 1 tablet by mouth daily  Notes to patient: Supplement  Use: Prophylaxis and treatment of vitamin D depletion  Side Effects: Muscle weakness/twitching, fatigue, constipation, upset stomach           Where to Get Your Medications      These medications were sent to [facility], [address] [place] [address] 37, [address]    Phone: [phone]   · apixaban 5 MG Tabs tablet  · atorvastatin 40 MG tablet  · empagliflozin 10 MG tablet  · lisinopril 2.5 MG tablet  · metoprolol succinate 25 MG extended release tablet  · tamsulosin 0.4 MG capsule  · torsemide 20 MG tablet     Received meds to beds. Discharge recommendations given to patient. Follow Up. CHF clinic in 1 week, urology office to arrange follow up   Disposition. home  Activity. activity as tolerated  Diet: ADULT DIET; Regular; 5 carb choices (75 gm/meal); Low Sodium (2 gm)      Spent > 30 minutes in discharge process.     Signed:  TRENT Jenkins CNP     6/20/2022 10:04 AM

## 2022-06-20 NOTE — PROGRESS NOTES
Data- discharge order received, pt verbalized agreement to discharge, disposition to previous residence, no needs for HHC/DME. Action- discharge instructions prepared and given to pt, pt verbalized understanding. Medication information packet given r/t NEW and/or CHANGED prescriptions emphasizing name/purpose/side effects, pt verbalized understanding. Discharge instruction summary: Diet- regular, Activity- up as tolerated, Primary Care Physician as follows: No primary care provider on file. None f/u appointment in 1 week, immunizations reviewed and updated, prescription medications filled at outpatient pharmacy. 1. WEIGHT: Admit Weight: 200 lb (90.7 kg) (06/15/22 1751)        Today  Weight: 192 lb 6.4 oz (87.3 kg) (06/20/22 0346)       2. O2 SAT.: SpO2: 98 % (06/20/22 1245)    Response- Pt belongings gathered, IV removed. Disposition is home (no HHC/DME needs), transported with RN, taken to lobby via w/c w/ friend, no complications.

## 2022-06-20 NOTE — PROGRESS NOTES
CLINICAL PHARMACY NOTE: MEDS TO BEDS    Total # of Prescriptions Filled: 3   The following medications were delivered to the patient:  · Lisinopril 2.5 mg  · Flomax 0.4 mg  · Metoprolol ER 25 mg    Additional Documentation:  Delivered to Kasey Beck RN=signed  Francis Salinas CP

## 2022-06-21 ENCOUNTER — TELEPHONE (OUTPATIENT)
Dept: OTHER | Age: 62
End: 2022-06-21

## 2022-06-21 ENCOUNTER — TELEPHONE (OUTPATIENT)
Dept: ADMINISTRATIVE | Age: 62
End: 2022-06-21

## 2022-06-21 NOTE — TELEPHONE ENCOUNTER
[facility]  TELEPHONE ENCOUNTER FORM    Werner Leal 1960    Attempted to call patient for HF follow-up. No answer at this time.       Next MD/ Clinic appointment: 6/23 with Joanne Nguyen RN 6/21/2022 11:51 AM

## 2022-06-23 ENCOUNTER — OFFICE VISIT (OUTPATIENT)
Dept: CARDIOLOGY CLINIC | Age: 62
End: 2022-06-23
Payer: COMMERCIAL

## 2022-06-23 ENCOUNTER — HOSPITAL ENCOUNTER (OUTPATIENT)
Age: 62
Discharge: HOME | End: 2022-06-23
Payer: COMMERCIAL

## 2022-06-23 VITALS
HEIGHT: 74 IN | OXYGEN SATURATION: 98 % | BODY MASS INDEX: 25.44 KG/M2 | HEART RATE: 64 BPM | SYSTOLIC BLOOD PRESSURE: 102 MMHG | DIASTOLIC BLOOD PRESSURE: 60 MMHG | WEIGHT: 198.2 LBS

## 2022-06-23 DIAGNOSIS — I50.22 CHRONIC SYSTOLIC CONGESTIVE HEART FAILURE (HCC): ICD-10-CM

## 2022-06-23 DIAGNOSIS — I48.0 PAF (PAROXYSMAL ATRIAL FIBRILLATION) (HCC): ICD-10-CM

## 2022-06-23 DIAGNOSIS — Z95.810 ICD (IMPLANTABLE CARDIOVERTER-DEFIBRILLATOR), BIVENTRICULAR, IN SITU: ICD-10-CM

## 2022-06-23 DIAGNOSIS — I42.0 DILATED CARDIOMYOPATHY (HCC): ICD-10-CM

## 2022-06-23 DIAGNOSIS — I50.22 CHRONIC SYSTOLIC CONGESTIVE HEART FAILURE (HCC): Primary | ICD-10-CM

## 2022-06-23 DIAGNOSIS — Z91.199 NON-COMPLIANCE: ICD-10-CM

## 2022-06-23 DIAGNOSIS — Z59.00 HOMELESS: ICD-10-CM

## 2022-06-23 DIAGNOSIS — I10 BENIGN ESSENTIAL HTN: ICD-10-CM

## 2022-06-23 LAB
ANION GAP SERPL CALCULATED.3IONS-SCNC: 10 MMOL/L (ref 3–16)
BUN BLDV-MCNC: 21 MG/DL (ref 7–20)
CALCIUM SERPL-MCNC: 8.8 MG/DL (ref 8.3–10.6)
CHLORIDE BLD-SCNC: 99 MMOL/L (ref 99–110)
CO2: 31 MMOL/L (ref 21–32)
CREAT SERPL-MCNC: 1.2 MG/DL (ref 0.8–1.3)
GFR AFRICAN AMERICAN: >60
GFR NON-AFRICAN AMERICAN: >60
GLUCOSE BLD-MCNC: 79 MG/DL (ref 70–99)
POTASSIUM SERPL-SCNC: 3.5 MMOL/L (ref 3.5–5.1)
PRO-BNP: ABNORMAL PG/ML (ref 0–124)
SODIUM BLD-SCNC: 140 MMOL/L (ref 136–145)

## 2022-06-23 PROCEDURE — 80048 BASIC METABOLIC PNL TOTAL CA: CPT

## 2022-06-23 PROCEDURE — 99214 OFFICE O/P EST MOD 30 MIN: CPT | Performed by: CLINICAL NURSE SPECIALIST

## 2022-06-23 PROCEDURE — 36415 COLL VENOUS BLD VENIPUNCTURE: CPT

## 2022-06-23 PROCEDURE — 83880 ASSAY OF NATRIURETIC PEPTIDE: CPT

## 2022-06-23 SDOH — ECONOMIC STABILITY - HOUSING INSECURITY: HOMELESSNESS UNSPECIFIED: Z59.00

## 2022-06-23 NOTE — PATIENT INSTRUCTIONS
1.  Continue current medications  2. Start vitamin d once 2000 day  3. Check blood work   4.   RTO in 1 month

## 2022-06-23 NOTE — PROGRESS NOTES
Mother     High Blood Pressure Mother     Heart Attack Mother     Other Father     Stroke Father     High Blood Pressure Father        Home Medications:  Prior to Admission medications    Medication Sig Start Date End Date Taking? Authorizing Provider   apixaban (ELIQUIS) 5 MG TABS tablet Take 1 tablet by mouth 2 times daily 6/17/22  Yes TRENT Sarkar CNP   empagliflozin (JARDIANCE) 10 MG tablet Take 1 tablet by mouth daily 6/18/22  Yes TRENT Sarkar CNP   metoprolol succinate (TOPROL XL) 25 MG extended release tablet Take 1 tablet by mouth daily 6/18/22  Yes TRENT Sarkar CNP   torsemide (DEMADEX) 20 MG tablet Take 3 tablets by mouth daily 2 with breakfast and one with lunch 6/17/22  Yes TRENT Sarkar CNP   tamsulosin [facility]) 0.4 MG capsule Take 1 capsule by mouth daily 6/18/22  Yes TRENT Sarkar CNP   atorvastatin (LIPITOR) 40 MG tablet Take 1 tablet by mouth nightly 6/17/22  Yes TRENT Sarkar CNP   lisinopril (PRINIVIL;ZESTRIL) 2.5 MG tablet Take 1 tablet by mouth daily 6/17/22  Yes TRENT Sarkar CNP   finasteride (PROSCAR) 5 MG tablet Take 1 tablet by mouth daily 5/31/22  Yes TRENT Ramirez   vitamin D (CHOLECALCIFEROL) 50 MCG (2000 UT) TABS tablet Take 1 tablet by mouth daily 2/16/22  Yes TRENT Ramirez        Allergies:  Patient has no known allergies. Review of Systems:   · Constitutional: there has been no unanticipated weight loss. There's been no change in energy level, sleep pattern, or activity level. · Eyes: No visual changes or diplopia. No scleral icterus. · ENT: No Headaches, hearing loss or vertigo. No mouth sores or sore throat. · Cardiovascular: Reviewed in HPI  · Respiratory: No cough or wheezing, no sputum production. No hematemesis. · Gastrointestinal: No abdominal pain, appetite loss, blood in stools. No change in bowel or bladder habits.   · Genitourinary: No dysuria, trouble voiding, or hematuria. · Musculoskeletal:  No gait disturbance, weakness or joint complaints. · Integumentary: No rash or pruritis. · Neurological: No headache, diplopia, change in muscle strength, numbness or tingling. No change in gait, balance, coordination, mood, affect, memory, mentation, behavior. · Psychiatric: No anxiety, no depression. · Endocrine: No malaise, fatigue or temperature intolerance. No excessive thirst, fluid intake, or urination. No tremor. · Hematologic/Lymphatic: No abnormal bruising or bleeding, blood clots or swollen lymph nodes. · Allergic/Immunologic: No nasal congestion or hives. Physical Examination:    Vitals:    06/23/22 1505 06/23/22 1528   BP: 90/70 102/60   Site: Left Upper Arm    Position: Sitting    Cuff Size: Large Adult    Pulse: 64    SpO2: 98%    Weight: 198 lb 3.2 oz (89.9 kg)    Height: 6' 2\" (1.88 m)         Constitutional and General Appearance: Warm and dry, no apparent distress, normal coloration   HEENT:  Normocephalic, atraumatic  Respiratory:  · Normal excursion and expansion without use of accessory muscles  · Resp Auscultation: Normal breath sounds without dullness  Cardiovascular:  · The apical impulses not displaced  · Heart tones are crisp and normal  · JVP normal cm H2O  · irregular rate and rhythm  · Peripheral pulses are symmetrical and full  · There is no clubbing, cyanosis of the extremities.   · Bilateral lower ankle edema to upper calf edema  · Pedal Pulses: 2+ and equal   Abdomen:  · No masses or tenderness  · Liver/Spleen: No Abnormalities Noted  Neurological/Psychiatric:  · Alert and oriented in all spheres  · Moves all extremities well  · Exhibits normal gait balance and coordination  · No abnormalities of mood, affect, memory, mentation, or behavior are noted    Lab Data:    CBC:   Lab Results   Component Value Date    WBC 4.5 06/19/2022    WBC 4.0 06/15/2022    WBC 4.2 05/13/2022    RBC 4.39 06/19/2022    RBC 4.09 06/15/2022    RBC 3.72 05/13/2022 HGB 11.9 06/19/2022    HGB 11.3 06/15/2022    HGB 10.0 05/13/2022    HCT 37.4 06/19/2022    HCT 35.1 06/15/2022    HCT 30.2 05/13/2022    MCV 85.0 06/19/2022    MCV 85.7 06/15/2022    MCV 81.0 05/13/2022    RDW 20.7 06/19/2022    RDW 20.7 06/15/2022    RDW 17.4 05/13/2022     06/19/2022     06/15/2022     05/13/2022     BMP:  Lab Results   Component Value Date     06/20/2022     06/19/2022     06/18/2022    K 4.0 06/20/2022    K 3.6 06/19/2022    K 3.2 06/18/2022    K 3.8 04/30/2022    K 3.3 04/29/2022    K 4.2 04/24/2022    CL 99 06/20/2022    CL 97 06/19/2022    CL 96 06/18/2022    CO2 31 06/20/2022    CO2 32 06/19/2022    CO2 32 06/18/2022    PHOS 3.7 04/18/2022    PHOS 4.3 02/09/2022    PHOS 4.1 02/08/2022    BUN 23 06/20/2022    BUN 21 06/19/2022    BUN 24 06/18/2022    CREATININE 1.2 06/20/2022    CREATININE 1.3 06/19/2022    CREATININE 1.3 06/18/2022     BNP:   Lab Results   Component Value Date    PROBNP 13,421 06/18/2022    PROBNP 32,647 06/15/2022    PROBNP 25,980 05/31/2022     Cardiac Imaging:  CONSUELO Dr Ladonna Yanez 2/10/22  Preliminary CONSUELO results are:      - Severe LV dysfunction,  EF: 20-25%  - LA dilated. There was HENOK thrombus. - Moderate MR    Cardiac Cath PCI: Dr Kris Anglin 2/8/2022  Anatomy:   LM-nml   LAD-nml  Cx-nml  OM- nml  RCA-nml  RPDA- nml  LVEF- 10%  LVG- global hypokinesis  LVEDP- 10     Hemodynamics:  RA- mean 3  RV- 37/0  PAWP- 10  PA- 34/12 (20)     C.O.- 5.7 (4.9)  C. I.- 2.6 (2.25)  PA sat 60%  AO sat 91%     Contrast: 70  Flouro Time: 5.3  Access: R radial a, R CFV     Impression  ~Coronary Angiography w/ normal cors  ~LVG with LVEF of 10% and global regional wall motion abnormalities  ~Severe MR  ~Normal CO/CI  ~normal L and R filling pressures     Recommendation  ~Aggressive medical treatment and risk factor modification  ~1. Medications reviewed, no changes at this time. 2. Post cath IVF.  Bedrest.  3.Consider CONSUELO for evaluation of MR.   4. Patient has been advised on the importance of regular exercise of at least 20-30 minutes daily alternating with aerobic and isometric activities. 5. Patient counseled about and offered assistance for smoking cessation   6. No indication for cardiac rehab  7. CHF service to evaluate tomorrow.     Echo 11/29/2021  Summary   -Covid+   -Severely reduced global systolic function with an ejection fraction   estimated at 20%.  -Severe global hypokinesis with regional variations noted.   -Right ventricular systolic function appears to be mildly reduced based on   visual inspection.   -Severe biatrial enlargement.   -Thickened mitral valve without evidence of stenosis. There is   mild-to-moderate mitral regurgitation.   -There is mild-to-moderate tricuspid regurgitation with a RVSP estimation of   00 mmHg.   -Diastolic dysfunction with elevated LV filling pressures.     Echo 7/25/2019  Summary   -Overall left ventricular systolic function is moderately depressed .   -Ejection fraction is visually estimated to be 30-35 %.  -Global left ventricular function moderately reduced.   -Indeterminate diastolic function due to atrial fibrillation and moderate to   severe mitral regurgitation.   -Moderate-to-severe mitral regurgitation .   -Dilated left atrium with a volume of 97 ml.   -Left atrial volume is increased probably due to atrial fibrillation .   -There is mild right ventricular hypertrophy.   -The right ventricle is mildly enlarged.   -Right ventricular systolic pressure of 53 mm Hg consistent with pulmonary   hypertension.   -Moderate to severe tricuspid regurgitation. TAPSE = 1.43   -IVC size is dilated (>2.1 cm) but collapses > 50% with respiration   consistent with elevated RA pressure (8 mmHg).    Assessment:    1. Chronic systolic heart failure (HCC) on ace and bb; no aldosterone antagonist due to compliance   2. Non-compliance    3. Homeless    4. PAF (paroxysmal atrial fibrillation) ([facility]) Dr Cayden rothman   5.  Benign essential HTN    6. Dilated cardiomyopathy ([facility] [address]    7. ICD in place    Plan:   Patient Instructions   1. Continue current medications  2. Start vitamin d once 2000 day  3. Check blood work   4.   RTO in 1 month    NYHA 4    I appreciate the opportunity of cooperating in the care of this individual.    Remona Opitz, APRN - CNS, CNS, 6/23/2022, 6:28 PM

## 2022-06-24 ENCOUNTER — TELEPHONE (OUTPATIENT)
Dept: CARDIOLOGY CLINIC | Age: 62
End: 2022-06-24

## 2022-06-24 NOTE — Clinical Note
[address] [place] [address]  [address] [facility] [address]  [address]  Phone: [phone]  Fax: [phone]    TRENT Milan        June 28, 2022     [id number] [facility] [address]      Dear Gladys Barboza:    Below are the results from your recent visit:    Resulted Orders   Basic Metabolic Panel   Result Value Ref Range    Sodium 140 136 - 145 mmol/L    Potassium 3.5 3.5 - 5.1 mmol/L    Chloride 99 99 - 110 mmol/L    CO2 31 21 - 32 mmol/L    Anion Gap 10 3 - 16    Glucose 79 70 - 99 mg/dL    BUN 21 (H) 7 - 20 mg/dL    CREATININE 1.2 0.8 - 1.3 mg/dL    GFR Non-African American >60 >60      Comment:      >60 mL/min/1.73m2 EGFR, calc. for ages 25 and older using the  MDRD formula (not corrected for weight), is valid for stable  renal function. GFR  >60 >60      Comment:      Chronic Kidney Disease: less than 60 ml/min/1.73 sq.m. Kidney Failure: less than 15 ml/min/1.73 sq.m. Results valid for patients 18 years and older. Calcium 8.8 8.3 - 10.6 mg/dL   Brain Natriuretic Peptide   Result Value Ref Range    Pro-BNP 13,453 (H) 0 - 124 pg/mL      Comment:      Methodology by NT-proBNP    An age-independent cutoff point of 300 pg/ml has a 98%  negative predictive value excluding acute heart failure. Values exceeding the age-related cutoff values (450 pg/mL if  age<50, 900 if 50-75 and 1800 if >75) has 90% sensitivity and  84% specificity for diagnosing acute HF. In patients with  renal compromise (eGFR<60) values greater than 1200pg/ml have  a diagnostic sensitivity and specificity of 89% and 72% for  acute HF. The test results show that your current treatment is working. Please {:74226}. We recommend that you repeat the above test(s) in {:11005} {:11}. If you have any questions or concerns, please don't hesitate to call.     Sincerely,        TRENT Milan - CNS

## 2022-06-24 NOTE — LETTER
[address]  [address] [address]  Phone: [phone]  Fax: [address] [facility] [address], APRN - CNS        June 28, 2022     Patient: Angel Silvestre   YOB: 1960   Date of Visit: 6/24/2022       Angel Silvestre    Multiple unsuccessful attempts have been made to contact you. Your labs are stable. Continue all current medications. If you have any questions or concerns, please don't hesitate to call.     Sincerely,        Toby Carrel, APRN - CNS

## 2022-06-24 NOTE — TELEPHONE ENCOUNTER
----- Message from TRENT Mosquera - CNS sent at 6/24/2022  8:27 AM EDT -----  Labs are stable  Continue all current medications  thanks

## 2022-06-24 NOTE — Clinical Note
[address] Cardiology - Cynda Ache  [address] [address]  Phone: [phone]  Fax: [phone]    TRENT Boudreaux        June 28, 2022    [id number] [facility] [address]      Dear Mariella Marie:    ***    If you have any questions or concerns, please don't hesitate to call.     Sincerely,        TRNET Boudreaux

## 2022-07-11 ENCOUNTER — TELEPHONE (OUTPATIENT)
Dept: CARDIOLOGY CLINIC | Age: 62
End: 2022-07-11

## 2022-07-11 NOTE — TELEPHONE ENCOUNTER
Last OV with Deirdre was 6/23/22; next with her is 7/25/22. I spoke to Mr. Js Mitchell. He was mainly concerned about his medications. He implied he ran out but I could not get a confirmation of this. He asked for \"enough pills for today\"; I told him we do not keep medications here, comes from the pharmacy. All of his Rx's were sent into [facility] outpatient pharmacy 6/17/22 & 6/18/22, all for 30 or 90 days with refills. I told him this, and that he should not be out of them yet; he should have enough to get through to his appt with Deirdre (with the refills). I asked him if he had the meds at home, and he could not give me a definitive answer. I advised him to go right up to the pharmacy and discuss this with them to ensure he has enough meds. He did show me his torsemide bottle, and I reiterated to follow the directions which is to take 2 in the am & 1 in the pm (90 tablets); he has 1 refill on it. He said he would go up to the pharmacy to straighten things out. I also advised him that if he felt worse again, to go to the ER. He said he actually feels better today.

## 2022-07-11 NOTE — TELEPHONE ENCOUNTER
Patient stopped by the office today and is having symptoms of nausea and if he tries to walk feels dizzy and weak and SOB. Patient is improved from yesterday. Patient also has questions about his medication that Deirdre started him on and did not know which medication. Please call patient and advise. Told patient that Deirdre is out of the office all week.   shoshana

## 2022-07-25 ENCOUNTER — OFFICE VISIT (OUTPATIENT)
Dept: CARDIOLOGY CLINIC | Age: 62
End: 2022-07-25
Payer: COMMERCIAL

## 2022-07-25 ENCOUNTER — HOSPITAL ENCOUNTER (OUTPATIENT)
Age: 62
Discharge: HOME | End: 2022-07-25
Payer: COMMERCIAL

## 2022-07-25 VITALS
OXYGEN SATURATION: 98 % | SYSTOLIC BLOOD PRESSURE: 111 MMHG | BODY MASS INDEX: 26.69 KG/M2 | DIASTOLIC BLOOD PRESSURE: 76 MMHG | HEIGHT: 74 IN | WEIGHT: 208 LBS | HEART RATE: 93 BPM

## 2022-07-25 DIAGNOSIS — I10 BENIGN ESSENTIAL HTN: ICD-10-CM

## 2022-07-25 DIAGNOSIS — Z59.00 HOMELESS: ICD-10-CM

## 2022-07-25 DIAGNOSIS — Z95.810 ICD (IMPLANTABLE CARDIOVERTER-DEFIBRILLATOR), BIVENTRICULAR, IN SITU: ICD-10-CM

## 2022-07-25 DIAGNOSIS — I50.22 CHRONIC SYSTOLIC CONGESTIVE HEART FAILURE (HCC): ICD-10-CM

## 2022-07-25 DIAGNOSIS — I48.0 PAF (PAROXYSMAL ATRIAL FIBRILLATION) (HCC): ICD-10-CM

## 2022-07-25 DIAGNOSIS — I42.0 DILATED CARDIOMYOPATHY (HCC): ICD-10-CM

## 2022-07-25 DIAGNOSIS — Z91.199 NON-COMPLIANCE: ICD-10-CM

## 2022-07-25 DIAGNOSIS — I50.22 CHRONIC SYSTOLIC CONGESTIVE HEART FAILURE (HCC): Primary | ICD-10-CM

## 2022-07-25 LAB
ANION GAP SERPL CALCULATED.3IONS-SCNC: 11 MMOL/L (ref 3–16)
BUN BLDV-MCNC: 29 MG/DL (ref 7–20)
CALCIUM SERPL-MCNC: 8.5 MG/DL (ref 8.3–10.6)
CHLORIDE BLD-SCNC: 99 MMOL/L (ref 99–110)
CO2: 31 MMOL/L (ref 21–32)
CREAT SERPL-MCNC: 1.3 MG/DL (ref 0.8–1.3)
GFR AFRICAN AMERICAN: >60
GFR NON-AFRICAN AMERICAN: 56
GLUCOSE BLD-MCNC: 108 MG/DL (ref 70–99)
POTASSIUM SERPL-SCNC: 3.7 MMOL/L (ref 3.5–5.1)
PRO-BNP: ABNORMAL PG/ML (ref 0–124)
SODIUM BLD-SCNC: 141 MMOL/L (ref 136–145)

## 2022-07-25 PROCEDURE — 80048 BASIC METABOLIC PNL TOTAL CA: CPT

## 2022-07-25 PROCEDURE — 3017F COLORECTAL CA SCREEN DOC REV: CPT | Performed by: CLINICAL NURSE SPECIALIST

## 2022-07-25 PROCEDURE — G8419 CALC BMI OUT NRM PARAM NOF/U: HCPCS | Performed by: CLINICAL NURSE SPECIALIST

## 2022-07-25 PROCEDURE — 83880 ASSAY OF NATRIURETIC PEPTIDE: CPT

## 2022-07-25 PROCEDURE — G8428 CUR MEDS NOT DOCUMENT: HCPCS | Performed by: CLINICAL NURSE SPECIALIST

## 2022-07-25 PROCEDURE — 99214 OFFICE O/P EST MOD 30 MIN: CPT | Performed by: CLINICAL NURSE SPECIALIST

## 2022-07-25 PROCEDURE — 36415 COLL VENOUS BLD VENIPUNCTURE: CPT

## 2022-07-25 PROCEDURE — 1036F TOBACCO NON-USER: CPT | Performed by: CLINICAL NURSE SPECIALIST

## 2022-07-25 SDOH — ECONOMIC STABILITY - HOUSING INSECURITY: HOMELESSNESS UNSPECIFIED: Z59.00

## 2022-07-25 NOTE — PROGRESS NOTES
[address] [facility]  Progress Note    Primary Care Doctor:  No primary care provider on file. Chief Complaint   Patient presents with    Coronary Artery Disease    Shortness of Breath        History of Present Illness:  59 y.o. male with history of with history of PAF, hypertension. Unvaccinated, , homeless  hospitalization 2/5-10/22 for weight gain and lower extremity edema. He recently had covid and did not follow up in office. LVEF 20%, LHC done. Weight 223->193. He was started on HF therapy, declined life vest.  CONSUELO done with HENOK thrombus (on eliquis per EP). Not able to do CV  4/13-26/22 for fluid overload requiring diureses with milrinone and lasix infusion, UTI and VT requiring ICD placement  6/15-20/22 for acute on chronic systolic heart failure. He was over drinking fluids, diuresed with IV lasix and good weight loss of 23 pounds. I had the pleasure of seeing Deshawn Patterson in follow up for systolic heart failure. He is ambulatory and by his self. His optival shows normal thoracic impedence. He continues with some shortness of breath on occasion, complains of fatigue and had issues with nausea which he does not have today. Weight is up but at home he is below 200. He is taking all his medications. Past Medical History:   has a past medical history of Atrial fibrillation ([address] CHF (congestive heart failure) ([address] Hx of blood clots, and Hypertension. Surgical History:   has a past surgical history that includes Insertable Cardiac Monitor (07/26/2019) and Cardioversion (07/26/2019). Social History:   reports that he has never smoked. He has never used smokeless tobacco. He reports that he does not drink alcohol and does not use drugs.    Family History:   Family History   Problem Relation Age of Onset    Heart Disease Mother     High Blood Pressure Mother     Heart Attack Mother     Other Father     Stroke Father     High Blood Pressure Father        Home Medications:  Prior to Admission medications    Medication Sig Start Date End Date Taking? Authorizing Provider   apixaban (ELIQUIS) 5 MG TABS tablet Take 1 tablet by mouth 2 times daily 6/17/22  Yes TRENT Castellano CNP   metoprolol succinate (TOPROL XL) 25 MG extended release tablet Take 1 tablet by mouth daily 6/18/22  Yes TRENT Castellano CNP   torsemide (DEMADEX) 20 MG tablet Take 3 tablets by mouth daily 2 with breakfast and one with lunch 6/17/22  Yes TRENT Castellano CNP   tamsulosin [facility]) 0.4 MG capsule Take 1 capsule by mouth daily 6/18/22  Yes TRENT Castellano CNP   atorvastatin (LIPITOR) 40 MG tablet Take 1 tablet by mouth nightly 6/17/22  Yes TRENT Castellano CNP   lisinopril (PRINIVIL;ZESTRIL) 2.5 MG tablet Take 1 tablet by mouth daily 6/17/22  Yes TRENT Castellano CNP   finasteride (PROSCAR) 5 MG tablet Take 1 tablet by mouth daily 5/31/22  Yes TRENT Rea   vitamin D (CHOLECALCIFEROL) 50 MCG (2000 UT) TABS tablet Take 1 tablet by mouth daily 2/16/22  Yes TRENT Rea   empagliflozin (JARDIANCE) 10 MG tablet Take 1 tablet by mouth daily 6/18/22   TRENT Castellano CNP        Allergies:  Patient has no known allergies. Review of Systems:   Constitutional: there has been no unanticipated weight loss. There's been no change in energy level, sleep pattern, or activity level. Eyes: No visual changes or diplopia. No scleral icterus. ENT: No Headaches, hearing loss or vertigo. No mouth sores or sore throat. Cardiovascular: Reviewed in HPI  Respiratory: No cough or wheezing, no sputum production. No hematemesis. Gastrointestinal: No abdominal pain, appetite loss, blood in stools. No change in bowel or bladder habits. Genitourinary: No dysuria, trouble voiding, or hematuria. Musculoskeletal:  No gait disturbance, weakness or joint complaints. Integumentary: No rash or pruritis.   Neurological: No headache, diplopia, change in muscle strength, numbness or tingling. No change in gait, balance, coordination, mood, affect, memory, mentation, behavior. Psychiatric: No anxiety, no depression. Endocrine: No malaise, fatigue or temperature intolerance. No excessive thirst, fluid intake, or urination. No tremor. Hematologic/Lymphatic: No abnormal bruising or bleeding, blood clots or swollen lymph nodes. Allergic/Immunologic: No nasal congestion or hives. Physical Examination:    Vitals:    07/25/22 1303   BP: 111/76   Pulse: 93   SpO2: 98%   Weight: 208 lb (94.3 kg)   Height: 6' 2\" (1.88 m)        Constitutional and General Appearance: Warm and dry, no apparent distress, normal coloration   HEENT:  Normocephalic, atraumatic  Respiratory:  Normal excursion and expansion without use of accessory muscles  Resp Auscultation: Normal breath sounds without dullness  Cardiovascular: The apical impulses not displaced  Heart tones are crisp and normal  JVP normal cm H2O  irregular rate and rhythm  Peripheral pulses are symmetrical and full  There is no clubbing, cyanosis of the extremities.   Bilateral lower ankle edema to upper calf edema  Pedal Pulses: 2+ and equal   Abdomen:  No masses or tenderness  Liver/Spleen: No Abnormalities Noted  Neurological/Psychiatric:  Alert and oriented in all spheres  Moves all extremities well  Exhibits normal gait balance and coordination  No abnormalities of mood, affect, memory, mentation, or behavior are noted    Lab Data:    CBC:   Lab Results   Component Value Date/Time    WBC 4.5 06/19/2022 04:36 AM    WBC 4.0 06/15/2022 06:48 PM    WBC 4.2 05/13/2022 04:19 PM    RBC 4.39 06/19/2022 04:36 AM    RBC 4.09 06/15/2022 06:48 PM    RBC 3.72 05/13/2022 04:19 PM    HGB 11.9 06/19/2022 04:36 AM    HGB 11.3 06/15/2022 06:48 PM    HGB 10.0 05/13/2022 04:19 PM    HCT 37.4 06/19/2022 04:36 AM    HCT 35.1 06/15/2022 06:48 PM    HCT 30.2 05/13/2022 04:19 PM    MCV 85.0 06/19/2022 04:36 AM    MCV 85.7 06/15/2022 06:48 PM    MCV 81.0 05/13/2022 04:19 PM    RDW 20.7 06/19/2022 04:36 AM    RDW 20.7 06/15/2022 06:48 PM    RDW 17.4 05/13/2022 04:19 PM     06/19/2022 04:36 AM     06/15/2022 06:48 PM     05/13/2022 04:19 PM     BMP:  Lab Results   Component Value Date/Time     06/23/2022 04:00 PM     06/20/2022 04:37 AM     06/19/2022 04:36 AM    K 3.5 06/23/2022 04:00 PM    K 4.0 06/20/2022 04:37 AM    K 3.6 06/19/2022 04:36 AM    K 3.8 04/30/2022 05:21 AM    K 3.3 04/29/2022 05:35 PM    K 4.2 04/24/2022 05:45 AM    CL 99 06/23/2022 04:00 PM    CL 99 06/20/2022 04:37 AM    CL 97 06/19/2022 04:36 AM    CO2 31 06/23/2022 04:00 PM    CO2 31 06/20/2022 04:37 AM    CO2 32 06/19/2022 04:36 AM    PHOS 3.7 04/18/2022 02:16 AM    PHOS 4.3 02/09/2022 05:46 AM    PHOS 4.1 02/08/2022 06:48 AM    BUN 21 06/23/2022 04:00 PM    BUN 23 06/20/2022 04:37 AM    BUN 21 06/19/2022 04:36 AM    CREATININE 1.2 06/23/2022 04:00 PM    CREATININE 1.2 06/20/2022 04:37 AM    CREATININE 1.3 06/19/2022 04:36 AM     BNP:   Lab Results   Component Value Date/Time    PROBNP 13,453 06/23/2022 04:00 PM    PROBNP 13,421 06/18/2022 04:26 AM    PROBNP 32,647 06/15/2022 06:48 PM     Cardiac Imaging:  CONSUELO Dr Sally Loaiza 2/10/22  Preliminary CONSUELO results are:      - Severe LV dysfunction,  EF: 20-25%  - LA dilated. There was HENOK thrombus. - Moderate MR    Cardiac Cath PCI: Dr Praful Naqvi 2/8/2022  Anatomy:   LM-nml   LAD-nml  Cx-nml  OM- nml  RCA-nml  RPDA- nml  LVEF- 10%  LVG- global hypokinesis  LVEDP- 10     Hemodynamics:  RA- mean 3  RV- 37/0  PAWP- 10  PA- 34/12 (20)     C. O.- 5.7 (4.9)  C. I.- 2.6 (2.25)  PA sat 60%  AO sat 91%     Contrast: 70  Flouro Time: 5.3  Access: R radial a, R CFV     Impression  ~Coronary Angiography w/ normal cors  ~LVG with LVEF of 10% and global regional wall motion abnormalities  ~Severe MR  ~Normal CO/CI  ~normal L and R filling pressures     Recommendation  ~Aggressive medical treatment and risk factor modification  ~1. Medications reviewed, no changes at this time. 2. Post cath IVF. Bedrest.  3.Consider CONSUELO for evaluation of MR.   4. Patient has been advised on the importance of regular exercise of at least 20-30 minutes daily alternating with aerobic and isometric activities. 5. Patient counseled about and offered assistance for smoking cessation   6. No indication for cardiac rehab  7. CHF service to evaluate tomorrow. Echo 11/29/2021  Summary   -Covid+   -Severely reduced global systolic function with an ejection fraction   estimated at 20%. -Severe global hypokinesis with regional variations noted. -Right ventricular systolic function appears to be mildly reduced based on   visual inspection.   -Severe biatrial enlargement.   -Thickened mitral valve without evidence of stenosis. There is   mild-to-moderate mitral regurgitation.   -There is mild-to-moderate tricuspid regurgitation with a RVSP estimation of   37 mmHg. -Diastolic dysfunction with elevated LV filling pressures. Echo 7/25/2019  Summary   -Overall left ventricular systolic function is moderately depressed .   -Ejection fraction is visually estimated to be 30-35 %. -Global left ventricular function moderately reduced.   -Indeterminate diastolic function due to atrial fibrillation and moderate to   severe mitral regurgitation.   -Moderate-to-severe mitral regurgitation .   -Dilated left atrium with a volume of 97 ml.   -Left atrial volume is increased probably due to atrial fibrillation .   -There is mild right ventricular hypertrophy.   -The right ventricle is mildly enlarged.   -Right ventricular systolic pressure of 53 mm Hg consistent with pulmonary   hypertension.   -Moderate to severe tricuspid regurgitation. TAPSE = 1.43   -IVC size is dilated (>2.1 cm) but collapses > 50% with respiration   consistent with elevated RA pressure (8 mmHg). Assessment:    1.  Chronic systolic heart failure (HCC) on ace and bb; no aldosterone antagonist due to compliance   2. Non-compliance    3. Homeless    4. PAF (paroxysmal atrial fibrillation) ([facility]) Dr Rossana rothman   5. Benign essential HTN    6. Dilated cardiomyopathy ([address]    7.       ICD in place    Plan:   Patient Instructions   Cardiac rehab referral  Continue all current medications  Blood work today  Keep up the great work  RTO in 6 weeks    NYHA 4    I appreciate the opportunity of cooperating in the care of this individual.    Young Chiang, TRENT - CNS, CNS, 7/25/2022, 2:09 PM

## 2022-07-25 NOTE — PATIENT INSTRUCTIONS
Cardiac rehab referral  Continue all current medications  Blood work today  Keep up the great work  RTO in 6 weeks

## 2022-07-26 ENCOUNTER — TELEPHONE (OUTPATIENT)
Dept: CARDIOLOGY CLINIC | Age: 62
End: 2022-07-26

## 2022-07-28 ENCOUNTER — APPOINTMENT (OUTPATIENT)
Dept: GENERAL RADIOLOGY | Age: 62
DRG: 137 | End: 2022-07-28
Payer: COMMERCIAL

## 2022-07-28 ENCOUNTER — HOSPITAL ENCOUNTER (INPATIENT)
Age: 62
LOS: 3 days | Discharge: HOME | DRG: 137 | End: 2022-08-01
Attending: HOSPITALIST | Admitting: HOSPITALIST
Payer: COMMERCIAL

## 2022-07-28 ENCOUNTER — APPOINTMENT (OUTPATIENT)
Dept: CT IMAGING | Age: 62
DRG: 137 | End: 2022-07-28
Payer: COMMERCIAL

## 2022-07-28 DIAGNOSIS — I48.20 CHRONIC ATRIAL FIBRILLATION (HCC): ICD-10-CM

## 2022-07-28 DIAGNOSIS — J18.9 PNEUMONIA OF RIGHT LOWER LOBE DUE TO INFECTIOUS ORGANISM: Primary | ICD-10-CM
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
## 2022-07-28 DIAGNOSIS — J18.9 HAP (HOSPITAL-ACQUIRED PNEUMONIA): ICD-10-CM

## 2022-07-28 LAB
A/G RATIO: 1.3 (ref 1.1–2.2)
ALBUMIN SERPL-MCNC: 3.9 G/DL (ref 3.4–5)
ALP BLD-CCNC: 105 U/L (ref 40–129)
ALT SERPL-CCNC: 42 U/L (ref 10–40)
ANION GAP SERPL CALCULATED.3IONS-SCNC: 12 MMOL/L (ref 3–16)
APTT: 28.8 SEC (ref 23–34.3)
AST SERPL-CCNC: 48 U/L (ref 15–37)
BASOPHILS ABSOLUTE: 0.1 K/UL (ref 0–0.2)
BASOPHILS RELATIVE PERCENT: 0.9 %
BILIRUB SERPL-MCNC: 3 MG/DL (ref 0–1)
BUN BLDV-MCNC: 33 MG/DL (ref 7–20)
CALCIUM SERPL-MCNC: 8.7 MG/DL (ref 8.3–10.6)
CHLORIDE BLD-SCNC: 100 MMOL/L (ref 99–110)
CO2: 27 MMOL/L (ref 21–32)
CREAT SERPL-MCNC: 1.4 MG/DL (ref 0.8–1.3)
EOSINOPHILS ABSOLUTE: 0 K/UL (ref 0–0.6)
EOSINOPHILS RELATIVE PERCENT: 0.4 %
GFR AFRICAN AMERICAN: >60
GFR NON-AFRICAN AMERICAN: 51
GLUCOSE BLD-MCNC: 140 MG/DL (ref 70–99)
HCT VFR BLD CALC: 35.1 % (ref 40.5–52.5)
HEMOGLOBIN: 11.5 G/DL (ref 13.5–17.5)
INR BLD: 1.59 (ref 0.87–1.14)
LYMPHOCYTES ABSOLUTE: 0.9 K/UL (ref 1–5.1)
LYMPHOCYTES RELATIVE PERCENT: 15.7 %
MCH RBC QN AUTO: 28.2 PG (ref 26–34)
MCHC RBC AUTO-ENTMCNC: 32.8 G/DL (ref 31–36)
MCV RBC AUTO: 85.9 FL (ref 80–100)
MONOCYTES ABSOLUTE: 0.7 K/UL (ref 0–1.3)
MONOCYTES RELATIVE PERCENT: 11.2 %
NEUTROPHILS ABSOLUTE: 4.3 K/UL (ref 1.7–7.7)
NEUTROPHILS RELATIVE PERCENT: 71.8 %
PDW BLD-RTO: 17 % (ref 12.4–15.4)
PLATELET # BLD: 206 K/UL (ref 135–450)
PMV BLD AUTO: 8.3 FL (ref 5–10.5)
POTASSIUM SERPL-SCNC: 3.5 MMOL/L (ref 3.5–5.1)
PRO-BNP: ABNORMAL PG/ML (ref 0–124)
PROCALCITONIN: 0.16 NG/ML (ref 0–0.15)
PROTHROMBIN TIME: 18.9 SEC (ref 11.7–14.5)
RBC # BLD: 4.08 M/UL (ref 4.2–5.9)
SARS-COV-2, NAAT: NOT DETECTED
SODIUM BLD-SCNC: 139 MMOL/L (ref 136–145)
TOTAL PROTEIN: 6.9 G/DL (ref 6.4–8.2)
TROPONIN: <0.01 NG/ML
WBC # BLD: 5.9 K/UL (ref 4–11)

## 2022-07-28 PROCEDURE — 87635 SARS-COV-2 COVID-19 AMP PRB: CPT

## 2022-07-28 PROCEDURE — 84145 PROCALCITONIN (PCT): CPT

## 2022-07-28 PROCEDURE — 85610 PROTHROMBIN TIME: CPT

## 2022-07-28 PROCEDURE — 99285 EMERGENCY DEPT VISIT HI MDM: CPT

## 2022-07-28 PROCEDURE — 85730 THROMBOPLASTIN TIME PARTIAL: CPT

## 2022-07-28 PROCEDURE — 71046 X-RAY EXAM CHEST 2 VIEWS: CPT

## 2022-07-28 PROCEDURE — 36415 COLL VENOUS BLD VENIPUNCTURE: CPT

## 2022-07-28 PROCEDURE — 85025 COMPLETE CBC W/AUTO DIFF WBC: CPT

## 2022-07-28 PROCEDURE — 84484 ASSAY OF TROPONIN QUANT: CPT

## 2022-07-28 PROCEDURE — 80053 COMPREHEN METABOLIC PANEL: CPT

## 2022-07-28 PROCEDURE — 6360000004 HC RX CONTRAST MEDICATION: Performed by: PHYSICIAN ASSISTANT

## 2022-07-28 PROCEDURE — 83880 ASSAY OF NATRIURETIC PEPTIDE: CPT

## 2022-07-28 PROCEDURE — 71260 CT THORAX DX C+: CPT | Performed by: PHYSICIAN ASSISTANT

## 2022-07-28 RX ADMIN — IOPAMIDOL 75 ML: 755 INJECTION, SOLUTION INTRAVENOUS at 23:53

## 2022-07-28 ASSESSMENT — ENCOUNTER SYMPTOMS
SHORTNESS OF BREATH: 0
VOMITING: 0
RHINORRHEA: 0
ABDOMINAL PAIN: 0
DIARRHEA: 0
NAUSEA: 0
COUGH: 1

## 2022-07-29 ENCOUNTER — APPOINTMENT (OUTPATIENT)
Dept: ULTRASOUND IMAGING | Age: 62
DRG: 137 | End: 2022-07-29
Payer: COMMERCIAL

## 2022-07-29 PROBLEM — J18.9 PNA (PNEUMONIA): Status: ACTIVE | Noted: 2022-07-29

## 2022-07-29 LAB
ANION GAP SERPL CALCULATED.3IONS-SCNC: 16 MMOL/L (ref 3–16)
BUN BLDV-MCNC: 33 MG/DL (ref 7–20)
CALCIUM SERPL-MCNC: 8.5 MG/DL (ref 8.3–10.6)
CHLORIDE BLD-SCNC: 98 MMOL/L (ref 99–110)
CO2: 24 MMOL/L (ref 21–32)
CREAT SERPL-MCNC: 1.4 MG/DL (ref 0.8–1.3)
GFR AFRICAN AMERICAN: >60
GFR NON-AFRICAN AMERICAN: 51
GLUCOSE BLD-MCNC: 146 MG/DL (ref 70–99)
MAGNESIUM: 2.3 MG/DL (ref 1.8–2.4)
PHOSPHORUS: 4.1 MG/DL (ref 2.5–4.9)
POTASSIUM SERPL-SCNC: 3.4 MMOL/L (ref 3.5–5.1)
SODIUM BLD-SCNC: 138 MMOL/L (ref 136–145)
TROPONIN: <0.01 NG/ML
TROPONIN: <0.01 NG/ML
VANCOMYCIN RANDOM: 7 UG/ML

## 2022-07-29 PROCEDURE — 84484 ASSAY OF TROPONIN QUANT: CPT

## 2022-07-29 PROCEDURE — 99254 IP/OBS CNSLTJ NEW/EST MOD 60: CPT | Performed by: INTERNAL MEDICINE

## 2022-07-29 PROCEDURE — 84100 ASSAY OF PHOSPHORUS: CPT

## 2022-07-29 PROCEDURE — 80048 BASIC METABOLIC PNL TOTAL CA: CPT

## 2022-07-29 PROCEDURE — 2580000003 HC RX 258: Performed by: PHYSICIAN ASSISTANT

## 2022-07-29 PROCEDURE — 6370000000 HC RX 637 (ALT 250 FOR IP): Performed by: INTERNAL MEDICINE

## 2022-07-29 PROCEDURE — 36415 COLL VENOUS BLD VENIPUNCTURE: CPT

## 2022-07-29 PROCEDURE — 99223 1ST HOSP IP/OBS HIGH 75: CPT | Performed by: INTERNAL MEDICINE

## 2022-07-29 PROCEDURE — 2580000003 HC RX 258: Performed by: HOSPITALIST

## 2022-07-29 PROCEDURE — 83735 ASSAY OF MAGNESIUM: CPT

## 2022-07-29 PROCEDURE — 94669 MECHANICAL CHEST WALL OSCILL: CPT

## 2022-07-29 PROCEDURE — 6370000000 HC RX 637 (ALT 250 FOR IP): Performed by: HOSPITALIST

## 2022-07-29 PROCEDURE — 6360000002 HC RX W HCPCS: Performed by: HOSPITALIST

## 2022-07-29 PROCEDURE — 6360000002 HC RX W HCPCS: Performed by: PHYSICIAN ASSISTANT

## 2022-07-29 PROCEDURE — 6370000000 HC RX 637 (ALT 250 FOR IP): Performed by: PHYSICIAN ASSISTANT

## 2022-07-29 PROCEDURE — 94761 N-INVAS EAR/PLS OXIMETRY MLT: CPT

## 2022-07-29 PROCEDURE — 76705 ECHO EXAM OF ABDOMEN: CPT

## 2022-07-29 PROCEDURE — 80202 ASSAY OF VANCOMYCIN: CPT

## 2022-07-29 PROCEDURE — 1200000000 HC SEMI PRIVATE

## 2022-07-29 PROCEDURE — 94640 AIRWAY INHALATION TREATMENT: CPT

## 2022-07-29 RX ORDER — ACETAMINOPHEN 325 MG/1
650 TABLET ORAL EVERY 6 HOURS PRN
Status: DISCONTINUED | OUTPATIENT
Start: 2022-07-29 | End: 2022-08-01 | Stop reason: HOSPADM

## 2022-07-29 RX ORDER — ATORVASTATIN CALCIUM 40 MG/1
40 TABLET, FILM COATED ORAL NIGHTLY
Status: DISCONTINUED | OUTPATIENT
Start: 2022-07-29 | End: 2022-08-01 | Stop reason: HOSPADM

## 2022-07-29 RX ORDER — TORSEMIDE 20 MG/1
60 TABLET ORAL DAILY
Status: DISCONTINUED | OUTPATIENT
Start: 2022-07-29 | End: 2022-08-01 | Stop reason: HOSPADM

## 2022-07-29 RX ORDER — TAMSULOSIN HYDROCHLORIDE 0.4 MG/1
0.4 CAPSULE ORAL DAILY
Status: DISCONTINUED | OUTPATIENT
Start: 2022-07-29 | End: 2022-08-01 | Stop reason: HOSPADM

## 2022-07-29 RX ORDER — SODIUM CHLORIDE 0.9 % (FLUSH) 0.9 %
5-40 SYRINGE (ML) INJECTION EVERY 12 HOURS SCHEDULED
Status: DISCONTINUED | OUTPATIENT
Start: 2022-07-29 | End: 2022-08-01 | Stop reason: HOSPADM

## 2022-07-29 RX ORDER — ALBUTEROL SULFATE 2.5 MG/3ML
2.5 SOLUTION RESPIRATORY (INHALATION)
Status: DISCONTINUED | OUTPATIENT
Start: 2022-07-29 | End: 2022-07-29

## 2022-07-29 RX ORDER — FINASTERIDE 5 MG/1
5 TABLET, FILM COATED ORAL DAILY
Status: DISCONTINUED | OUTPATIENT
Start: 2022-07-29 | End: 2022-08-01 | Stop reason: HOSPADM

## 2022-07-29 RX ORDER — METOLAZONE 2.5 MG/1
5 TABLET ORAL DAILY
Status: COMPLETED | OUTPATIENT
Start: 2022-07-29 | End: 2022-07-30

## 2022-07-29 RX ORDER — POLYETHYLENE GLYCOL 3350 17 G/17G
17 POWDER, FOR SOLUTION ORAL DAILY PRN
Status: DISCONTINUED | OUTPATIENT
Start: 2022-07-29 | End: 2022-08-01 | Stop reason: HOSPADM

## 2022-07-29 RX ORDER — CODEINE PHOSPHATE AND GUAIFENESIN 10; 100 MG/5ML; MG/5ML
5 SOLUTION ORAL EVERY 4 HOURS PRN
Status: DISCONTINUED | OUTPATIENT
Start: 2022-07-29 | End: 2022-07-29 | Stop reason: HOSPADM

## 2022-07-29 RX ORDER — ONDANSETRON 4 MG/1
4 TABLET, ORALLY DISINTEGRATING ORAL EVERY 8 HOURS PRN
Status: DISCONTINUED | OUTPATIENT
Start: 2022-07-29 | End: 2022-08-01 | Stop reason: HOSPADM

## 2022-07-29 RX ORDER — SODIUM CHLORIDE 9 MG/ML
INJECTION, SOLUTION INTRAVENOUS PRN
Status: DISCONTINUED | OUTPATIENT
Start: 2022-07-29 | End: 2022-08-01 | Stop reason: HOSPADM

## 2022-07-29 RX ORDER — VITAMIN B COMPLEX
2000 TABLET ORAL DAILY
Status: DISCONTINUED | OUTPATIENT
Start: 2022-07-29 | End: 2022-08-01 | Stop reason: HOSPADM

## 2022-07-29 RX ORDER — ALBUTEROL SULFATE 2.5 MG/3ML
2.5 SOLUTION RESPIRATORY (INHALATION) 2 TIMES DAILY
Status: DISCONTINUED | OUTPATIENT
Start: 2022-07-29 | End: 2022-07-29

## 2022-07-29 RX ORDER — POTASSIUM CHLORIDE 7.45 MG/ML
10 INJECTION INTRAVENOUS PRN
Status: DISCONTINUED | OUTPATIENT
Start: 2022-07-29 | End: 2022-08-01 | Stop reason: HOSPADM

## 2022-07-29 RX ORDER — ALBUTEROL SULFATE 2.5 MG/3ML
2.5 SOLUTION RESPIRATORY (INHALATION)
Status: DISCONTINUED | OUTPATIENT
Start: 2022-07-29 | End: 2022-08-01 | Stop reason: HOSPADM

## 2022-07-29 RX ORDER — MAGNESIUM SULFATE 1 G/100ML
1000 INJECTION INTRAVENOUS PRN
Status: DISCONTINUED | OUTPATIENT
Start: 2022-07-29 | End: 2022-08-01 | Stop reason: HOSPADM

## 2022-07-29 RX ORDER — POTASSIUM CHLORIDE 20 MEQ/1
40 TABLET, EXTENDED RELEASE ORAL PRN
Status: DISCONTINUED | OUTPATIENT
Start: 2022-07-29 | End: 2022-08-01 | Stop reason: HOSPADM

## 2022-07-29 RX ORDER — ONDANSETRON 2 MG/ML
4 INJECTION INTRAMUSCULAR; INTRAVENOUS EVERY 6 HOURS PRN
Status: DISCONTINUED | OUTPATIENT
Start: 2022-07-29 | End: 2022-08-01 | Stop reason: HOSPADM

## 2022-07-29 RX ORDER — METOPROLOL SUCCINATE 25 MG/1
25 TABLET, EXTENDED RELEASE ORAL DAILY
Status: DISCONTINUED | OUTPATIENT
Start: 2022-07-29 | End: 2022-08-01

## 2022-07-29 RX ORDER — LISINOPRIL 5 MG/1
2.5 TABLET ORAL DAILY
Status: DISCONTINUED | OUTPATIENT
Start: 2022-07-29 | End: 2022-08-01 | Stop reason: HOSPADM

## 2022-07-29 RX ORDER — IPRATROPIUM BROMIDE AND ALBUTEROL SULFATE 2.5; .5 MG/3ML; MG/3ML
1 SOLUTION RESPIRATORY (INHALATION) 4 TIMES DAILY
Status: DISCONTINUED | OUTPATIENT
Start: 2022-07-29 | End: 2022-08-01 | Stop reason: HOSPADM

## 2022-07-29 RX ORDER — SODIUM CHLORIDE 0.9 % (FLUSH) 0.9 %
5-40 SYRINGE (ML) INJECTION PRN
Status: DISCONTINUED | OUTPATIENT
Start: 2022-07-29 | End: 2022-08-01 | Stop reason: HOSPADM

## 2022-07-29 RX ORDER — ACETAMINOPHEN 650 MG/1
650 SUPPOSITORY RECTAL EVERY 6 HOURS PRN
Status: DISCONTINUED | OUTPATIENT
Start: 2022-07-29 | End: 2022-08-01 | Stop reason: HOSPADM

## 2022-07-29 RX ADMIN — LISINOPRIL 2.5 MG: 5 TABLET ORAL at 10:29

## 2022-07-29 RX ADMIN — ALBUTEROL SULFATE 2.5 MG: 2.5 SOLUTION RESPIRATORY (INHALATION) at 09:15

## 2022-07-29 RX ADMIN — ATORVASTATIN CALCIUM 40 MG: 40 TABLET, FILM COATED ORAL at 21:36

## 2022-07-29 RX ADMIN — METOPROLOL SUCCINATE 25 MG: 25 TABLET, EXTENDED RELEASE ORAL at 10:30

## 2022-07-29 RX ADMIN — VANCOMYCIN HYDROCHLORIDE 750 MG: 750 INJECTION, POWDER, LYOPHILIZED, FOR SOLUTION INTRAVENOUS at 15:15

## 2022-07-29 RX ADMIN — TORSEMIDE 60 MG: 20 TABLET ORAL at 10:30

## 2022-07-29 RX ADMIN — VANCOMYCIN HYDROCHLORIDE 1000 MG: 1 INJECTION, POWDER, LYOPHILIZED, FOR SOLUTION INTRAVENOUS at 01:22

## 2022-07-29 RX ADMIN — IPRATROPIUM BROMIDE AND ALBUTEROL SULFATE 1 AMPULE: 2.5; .5 SOLUTION RESPIRATORY (INHALATION) at 12:30

## 2022-07-29 RX ADMIN — IPRATROPIUM BROMIDE AND ALBUTEROL SULFATE 1 AMPULE: 2.5; .5 SOLUTION RESPIRATORY (INHALATION) at 15:44

## 2022-07-29 RX ADMIN — IPRATROPIUM BROMIDE AND ALBUTEROL SULFATE 1 AMPULE: 2.5; .5 SOLUTION RESPIRATORY (INHALATION) at 19:33

## 2022-07-29 RX ADMIN — CEFEPIME 2000 MG: 2 INJECTION, POWDER, FOR SOLUTION INTRAVENOUS at 10:43

## 2022-07-29 RX ADMIN — Medication 2000 UNITS: at 10:30

## 2022-07-29 RX ADMIN — Medication 10 ML: at 10:31

## 2022-07-29 RX ADMIN — SODIUM CHLORIDE: 9 INJECTION, SOLUTION INTRAVENOUS at 15:09

## 2022-07-29 RX ADMIN — FINASTERIDE 5 MG: 5 TABLET, FILM COATED ORAL at 10:29

## 2022-07-29 RX ADMIN — CEFEPIME 2000 MG: 2 INJECTION, POWDER, FOR SOLUTION INTRAVENOUS at 17:55

## 2022-07-29 RX ADMIN — METOLAZONE 5 MG: 2.5 TABLET ORAL at 11:19

## 2022-07-29 RX ADMIN — SODIUM CHLORIDE: 9 INJECTION, SOLUTION INTRAVENOUS at 10:39

## 2022-07-29 RX ADMIN — SODIUM CHLORIDE: 9 INJECTION, SOLUTION INTRAVENOUS at 17:52

## 2022-07-29 RX ADMIN — TAMSULOSIN HYDROCHLORIDE 0.4 MG: 0.4 CAPSULE ORAL at 10:29

## 2022-07-29 RX ADMIN — GUAIFENESIN AND CODEINE PHOSPHATE 5 ML: 100; 10 SOLUTION ORAL at 00:44

## 2022-07-29 RX ADMIN — APIXABAN 5 MG: 5 TABLET, FILM COATED ORAL at 10:29

## 2022-07-29 RX ADMIN — CEFEPIME 1000 MG: 1 INJECTION, POWDER, FOR SOLUTION INTRAMUSCULAR; INTRAVENOUS at 00:49

## 2022-07-29 RX ADMIN — APIXABAN 5 MG: 5 TABLET, FILM COATED ORAL at 21:36

## 2022-07-29 ASSESSMENT — PAIN - FUNCTIONAL ASSESSMENT: PAIN_FUNCTIONAL_ASSESSMENT: 0-10

## 2022-07-29 ASSESSMENT — PAIN SCALES - GENERAL
PAINLEVEL_OUTOF10: 0
PAINLEVEL_OUTOF10: 7
PAINLEVEL_OUTOF10: 3

## 2022-07-29 ASSESSMENT — PAIN DESCRIPTION - ORIENTATION
ORIENTATION: RIGHT;LEFT
ORIENTATION: RIGHT;LEFT

## 2022-07-29 ASSESSMENT — PAIN DESCRIPTION - LOCATION
LOCATION: ABDOMEN
LOCATION: KNEE

## 2022-07-29 NOTE — PROGRESS NOTES
4 Eyes Skin Assessment     The patient is being assess for  Admission    I agree that 2 RN's have performed a thorough Head to Toe Skin Assessment on the patient. ALL assessment sites listed below have been assessed. Areas assessed by both nurses:   [x]   Head, Face, and Ears   [x]   Shoulders, Back, and Chest  [x]   Arms, Elbows, and Hands   [x]   Coccyx, Sacrum, and IschIum  [x]   Legs, Feet, and Heels        Does the Patient have Skin Breakdown?   No         J Carlos Prevention initiated:  No   Wound Care Orders initiated:  No      [facility] nurse consulted for Pressure Injury (Stage 3,4, Unstageable, DTI, NWPT, and Complex wounds), New and Established Ostomies:  No      Nurse 1 eSignature: Electronically signed by Olesya Loredo RN on 7/29/22 at 4:38 AM EDT    **SHARE this note so that the co-signing nurse is able to place an eSignature**    Nurse 2 eSignature: Electronically signed by Markell Aquino RN on 7/29/22 at 4:39 AM EDT

## 2022-07-29 NOTE — CONSULTS
[facility] Pulmonary and Critical Care   Consult Note      Reason for Consult: Shortness of breath, pneumonia  Requesting Physician: Dr Cayden Martinez    Subjective:   Laura Mantle / HPI:                The patient is a 59 y.o. male with significant past medical history of:      Diagnosis Date    Atrial fibrillation ([facility] [address] 07/25/2019    CHF (congestive heart failure) ([facility] [address]     Hx of blood clots     Hypertension          Patient presents to the hospital with severe shortness of breath worsening over the preceding 2 or 3 days. He had associated cough. He started to notice some blood in the sputum and came to the ER after that. He described sputum as blood-tinged. He does have a history of atrial fibrillation and left atrial thrombus and is on Eliquis. He also has a history of HFrEF. The patient is homeless.     Past Surgical History:        Procedure Laterality Date    CARDIOVERSION  07/26/2019    Dr. Lynn Can  07/26/2019     Current Medications:    Current Facility-Administered Medications: apixaban (ELIQUIS) tablet 5 mg, 5 mg, Oral, BID  atorvastatin (LIPITOR) tablet 40 mg, 40 mg, Oral, Nightly  finasteride (PROSCAR) tablet 5 mg, 5 mg, Oral, Daily  lisinopril (PRINIVIL;ZESTRIL) tablet 2.5 mg, 2.5 mg, Oral, Daily  metoprolol succinate (TOPROL XL) extended release tablet 25 mg, 25 mg, Oral, Daily  tamsulosin (FLOMAX) capsule 0.4 mg, 0.4 mg, Oral, Daily  torsemide (DEMADEX) tablet 60 mg, 60 mg, Oral, Daily  vitamin D (CHOLECALCIFEROL) tablet 2,000 Units, 2,000 Units, Oral, Daily  sodium chloride flush 0.9 % injection 5-40 mL, 5-40 mL, IntraVENous, 2 times per day  sodium chloride flush 0.9 % injection 5-40 mL, 5-40 mL, IntraVENous, PRN  0.9 % sodium chloride infusion, , IntraVENous, PRN  ondansetron (ZOFRAN-ODT) disintegrating tablet 4 mg, 4 mg, Oral, Q8H PRN **OR** ondansetron (ZOFRAN) injection 4 mg, 4 mg, IntraVENous, Q6H PRN  polyethylene glycol (GLYCOLAX) packet 17 g, 17 g, Oral, Daily palpitations, exertional chest pressure/discomfort, edema, syncope  GASTROINTESTINAL:  negative for nausea, vomiting, diarrhea, constipation, blood in stool and abdominal pain  GENITOURINARY:  negative for frequency, dysuria, urinary incontinence, decreased urine volume, and hematuria  HEMATOLOGIC/LYMPHATIC:  negative for easy bruising, bleeding and lymphadenopathy  ALLERGIC/IMMUNOLOGIC:  negative for recurrent infections, angioedema, anaphylaxis and drug reactions  ENDOCRINE:  negative for weight changes and diabetic symptoms including polyuria, polydipsia and polyphagia  MUSCULOSKELETAL:  negative for  pain, joint swelling, decreased range of motion and muscle weakness  NEUROLOGICAL:  negative for headaches, slurred speech, unilateral weakness  PSYCHIATRIC/BEHAVIORAL: negative for hallucinations, behavioral problems, confusion and agitation. Objective:   PHYSICAL EXAM:      VITALS:  BP (!) 119/92   Pulse 93   Temp 97.3 °F (36.3 °C) (Oral)   Resp 18   Ht 6' 2\" (1.88 m)   Wt 210 lb 12.2 oz (95.6 kg)   SpO2 98%   BMI 27.06 kg/m²      24HR INTAKE/OUTPUT:  No intake or output data in the 24 hours ending 07/29/22 1023  CONSTITUTIONAL:  awake, alert, cooperative, no apparent distress, and appears stated age  NECK:  Supple, symmetrical, trachea midline, no adenopathy, thyroid symmetric, not enlarged and no tenderness, skin normal  LUNGS:  no increased work of breathing and crackles mostly in the right midlung field to auscultation. No accessory muscle use  CARDIOVASCULAR: S1 and S2, no edema and no JVD  ABDOMEN:  normal bowel sounds, non-distended and no masses palpated, and no tenderness to palpation. No hepatospleenomegaly  LYMPHADENOPATHY:  no axillary or supraclavicular adenopathy. No cervical adnenopathy  PSYCHIATRIC: Oriented to person place and time. No obvious depression or anxiety. MUSCULOSKELETAL: No obvious misalignment or effusion of the joints. No clubbing, cyanosis of the digits.   SKIN:  normal

## 2022-07-29 NOTE — CONSULTS
[facility]      Priyanka Flowers 1960    History:  Past Medical History:   Diagnosis Date    Atrial fibrillation ([facility] [address] 07/25/2019    CHF (congestive heart failure) ([facility] [address]     Hx of blood clots     Hypertension        ECHO:  2/10/22   Summary   Left ventricular size is moderately increased. Overall left ventricular   systolic function appears severely reduced with ejection fraction of 20-25%   and severe diffuse hypokinesis. Moderate mitral regurgitation. Calculated ERO of 0.33 cm2. Left atrial size appears dilated. There is an echodensity inside the left   atrial appendage consistent with left atrial appendage thrombus. The right ventricle is enlarged. Right ventricular systolic function is   mildly reduced . Mild to moderate tricuspid regurgitation. ACE/ARB/ARNi: lisinopril 2.5 mg daily  BB: toprol xl 25 mg daily  Aldosterone Antagonist: not on d/t noncompliance  SGLT2: not on d/t noncompliance    History of sleep apnea: No  [place] Screen ordered: Yes    DM History: No      Last Hospital Admission: 6/15/22 with CHF  Code Status: full code  Discharge plans: homeless    Family Present: shahab Flowers was admitted to the hospital with increased shortness of breath. Patient is well known to HF clinic and follows with HF NP as an outpatient. Patient does have a scale. Baseline weight is around 200 lb and he did increase to above 210 lb on home scale. Patient states he is out of medications. He does have refills available through outpatient pharmacy here but he was not aware. Medication compliance has been a concern in the past. Patient states he tries to restrict sodium and fluids. He has been seen in clinic recently. Patient provided with both written and verbal education on CHF signs/ symptoms, causes, discharge medications, daily weights, low sodium diet, activity, and follow-up.   Pt to call if gains 3 pounds in one day or 5 pounds in one week. Mutually agreed upon goals were discussed such as calling the MD as soon as they recognize symptoms and weight gain, maintaining proper diet, taking medications as prescribed, joining cardiac rehab when able. Also reviewed importance of risk factor reduction. Patient provided with CHF Zone Management tool and CHF symptoms magnet. Discussed importance of lifestyle changes: encourage patient to call early with symptoms--also strongly encourage medications compliance    PATIENT/CAREGIVER TEACHING:    Level of patient/caregiver understanding able to:   [ x] Verbalize understanding [ ] Demonstrate understanding [ ] Teach back   [ ] Needs reinforcement [ ] Other:       Time spent teachin mins    1. WEIGHT: Admit Weight: 210 lb 12.2 oz (95.6 kg)      Today  Weight: 210 lb 12.2 oz (95.6 kg)   2. I/O   Intake/Output Summary (Last 24 hours) at 2022 1506  Last data filed at 2022 1503  Gross per 24 hour   Intake 379.47 ml   Output --   Net 379.47 ml       Recommendations:   1. Patient needs one week hospital follow up after discharge. 2. Educate further on fluid restriction 48 oz- 64 oz during inpatient stay so he can understand how to measure intake at home. 3. Continue to educate on S/S.   4. Emphasize daily weights, diet, and knowing when and who to call  5. Provided patient with CHF Resource Line for questions and concerns.   6. Phoned outpatient pharmacy-- per Dr. Aleks Pascual note, will keep HF meds same--patient needs rx refilled- they will send up to patient so he can leave with his medications in hand-- spoke to patient's nurse         Cherie Pena RN 2022 3:06 PM

## 2022-07-29 NOTE — PROGRESS NOTES
The [place] Sleepiness Scale       The [place] Sleepiness Scale is widely used in the field of sleep medicine as a subjective measure of a patient's sleepiness. The test is a list of eight situations in which you rate your tendency to become sleepy on a scale of 0, no chance to 3, high chance of dozing. Your score is based on a scale of 0 to 24. The scale estimates whether you are experiencing excessive sleepiness that possibly requires medical attention. How Sleepy Are You? How sleepy are you to doze off or fall asleep in the following situations? You should rate your chances of dozing off, not just feeling tired. Even if you have not done some of these things recently try to determine how they would have affected you.  For each situation, decide whether or not you would have:     0 = No chance of dozing 1 = Slight chance of dozing   2 = Moderate chance of  dozing 3 = High change of dozing       Situation                                                                                     Chance of Dozing    Sitting and reading  0 =  []  1 =    [] 2 =    [x] 3 =    []    Watching TV  0 =  []  1 =    [] 2 =    [x] 3 =    []      Sitting inactive in public place (e.g., a theater or a meeting)  0 =  [x]  1 =    [] 2 =    [] 3 =    []    As a passenger in a car for an hour without a break          0  =  []  1 =    [] 2 =    [] 3 =    [x]    Lying down to rest in the afternoon when circumstances permit    0 =  []  1 =    [] 2 =    [x] 3 =    []    Sitting and talking to someone  0 =  []  1 =    [] 2 =    [x] 3 =    []      Sitting quietly after a lunch without alcohol  0 =  []  1 =    [] 2 =    [x] 3 =    []    In a car, while stopped for a few minutes in traffic                                                                      0 =  []  1 =    [x] 2 =    [] 3 =    []    Total Score = 14    If your total score is 10 or greater, you are experiencing excessive sleepiness and should consider seeking a medical follow-up. Take a copy of this screening test to your primary care physician on your next office visit. Interpretation:      0 -   7: It is unlikely that you are abnormally sleepy. 8 -   9: You have an average amount of daytime sleepiness. 10 - 15: You may be excessively sleepy depending on the situation. You may want to consider seeking medical attention. 16 - 24: You are excessively sleepy and should consider seeking medical attention.       Electronically signed by Gerson Dalton RCP on 7/29/2022 at 4:08 PM

## 2022-07-29 NOTE — ED TRIAGE NOTES
Pt states that he is not homeless but then ha states he lives in [facility] then [place] then in [facility]" then [facility]" who brought him to the hospital.

## 2022-07-29 NOTE — CONSULTS
[address]   Cardiac Evaluation      Patient: Mer Perales  YOB: 1960         Chief Complaint   Patient presents with    Cough     Cough for about 1 week; States he is \"coughing up blood\" since yesterday; denies living in any group settings; (+) chills; denies any N/V/D        Referring provider: No primary care provider on file. History of Present Illness:   60 yo AAM admitted for hemoptysis and found to have a RLL pneumonia. He has severe NICM and is followed closely in the heart failure clinic, last visit 7/25 and was stable with a normal optivol reading. He states he has some increased LE edema. There is no increased pulmonary edema on the chest ct. He is homeless but has been compliant with his meds. Markie Webb He is on Eliquis for  AF and HENOK thrombus. He has normal cors but severe MR. He has an AICD. Past Medical History:   has a past medical history of Atrial fibrillation ([address] CHF (congestive heart failure) ([facility] [address] Hx of blood clots, and Hypertension. Surgical History:   has a past surgical history that includes Insertable Cardiac Monitor (07/26/2019) and Cardioversion (07/26/2019).      Current Facility-Administered Medications   Medication Dose Route Frequency Provider Last Rate Last Admin    apixaban (ELIQUIS) tablet 5 mg  5 mg Oral BID Dani Wong MD        atorvastatin (LIPITOR) tablet 40 mg  40 mg Oral Nightly Dani Wong MD        finasteride (PROSCAR) tablet 5 mg  5 mg Oral Daily Dani Wong MD        lisinopril (PRINIVIL;ZESTRIL) tablet 2.5 mg  2.5 mg Oral Daily Dani Wong MD        metoprolol succinate (TOPROL XL) extended release tablet 25 mg  25 mg Oral Daily Dani Wong MD        tamsulosin (FLOMAX) capsule 0.4 mg  0.4 mg Oral Daily Dani Wong MD        torsemide (DEMADEX) tablet 60 mg  60 mg Oral Daily Dani Wong MD        vitamin D (CHOLECALCIFEROL) tablet 2,000 Units  2,000 Units Oral Daily Dani MD Marvin        sodium chloride flush 0.9 % injection 5-40 mL  5-40 mL IntraVENous 2 times per day Sven Hernandez MD        sodium chloride flush 0.9 % injection 5-40 mL  5-40 mL IntraVENous PRN Dani Wong MD        0.9 % sodium chloride infusion   IntraVENous PRN Dani Wong MD        ondansetron (ZOFRAN-ODT) disintegrating tablet 4 mg  4 mg Oral Q8H PRN Dani Wong MD        Or    ondansetron (ZOFRAN) injection 4 mg  4 mg IntraVENous Q6H PRN Dani Wong MD        polyethylene glycol (GLYCOLAX) packet 17 g  17 g Oral Daily PRN Dani Wong MD        acetaminophen (TYLENOL) tablet 650 mg  650 mg Oral Q6H PRN Dani Wong MD        Or    acetaminophen (TYLENOL) suppository 650 mg  650 mg Rectal Q6H PRN Dani Wong MD        albuterol (PROVENTIL) nebulizer solution 2.5 mg  2.5 mg Nebulization Q2H PRN Dani Wong MD        cefepime (MAXIPIME) 2000 mg IVPB minibag  2,000 mg IntraVENous Vepatti Nesbitt MD        vancomycin (VANCOCIN) 750 mg in dextrose 5 % 250 mL IVPB  750 mg IntraVENous Q12H Dani Wong MD        albuterol (PROVENTIL) nebulizer solution 2.5 mg  2.5 mg Nebulization BID Dani Wong MD   2.5 mg at 07/29/22 0915    magnesium sulfate 1000 mg in dextrose 5% 100 mL IVPB  1,000 mg IntraVENous PRN Dani Wong MD        sodium phosphate 15.3 mmol in sodium chloride 0.9 % 250 mL IVPB  0.16 mmol/kg IntraVENous PRN Sven Hernandez MD        Or    sodium phosphate 30.6 mmol in sodium chloride 0.9 % 250 mL IVPB  0.32 mmol/kg IntraVENous PRN Dani Wong MD        potassium chloride (KLOR-CON M) extended release tablet 40 mEq  40 mEq Oral PRN Dani Wong MD        Or    potassium bicarb-citric acid (EFFER-K) effervescent tablet 40 mEq  40 mEq Oral PRN Dani Wong MD        Or    potassium chloride 10 mEq/100 mL IVPB (Peripheral Line)  10 mEq IntraVENous PRN Sven Hernandez MD           Allergies:  Patient has no known allergies. Social History:  Social History     Socioeconomic History    Marital status: Single     Spouse name: Not on file    Number of children: Not on file    Years of education: Not on file    Highest education level: Not on file   Occupational History    Not on file   Tobacco Use    Smoking status: Never    Smokeless tobacco: Never   Vaping Use    Vaping Use: Never used   Substance and Sexual Activity    Alcohol use: Never    Drug use: Never    Sexual activity: Not Currently   Other Topics Concern    Not on file   Social History Narrative    Not on file     Social Determinants of Health     Financial Resource Strain: Not on file   Food Insecurity: Not on file   Transportation Needs: Not on file   Physical Activity: Not on file   Stress: Not on file   Social Connections: Not on file   Intimate Partner Violence: Not on file   Housing Stability: Not on file       Family History:   Family History   Problem Relation Age of Onset    Heart Disease Mother     High Blood Pressure Mother     Heart Attack Mother     Other Father     Stroke Father     High Blood Pressure Father      Family history has been reviewed and not pertinent except as noted above. Review of Systems:   Constitutional: there has been no unanticipated weight loss. No change in energy or activity level   Eyes: No visual changes   ENT: No Headaches, hearing loss or vertigo. No mouth sores or sore throat. Cardiovascular: Reviewed in HPI  Respiratory: No cough or wheezing, no sputum production. Gastrointestinal: No abdominal pain, appetite loss, blood in stools. No change in bowel or bladder habits. Genitourinary: No nocturia, dysuria, trouble voiding  Musculoskeletal:  No gait disturbance, weakness or joint complaints. Integumentary: No rash or pruritis. Neurological: No headache, change in muscle strength, numbness or tingling. No change in gait, balance, coordination, mood, affect, memory, mentation, behavior.   Psychiatric: No anxiety or depression  Endocrine: No malaise or fever  Hematologic/Lymphatic: No abnormal bruising or bleeding, blood clots or swollen lymph nodes. Allergic/Immunologic: No nasal congestion or hives. Physical Examination:    Vitals:    07/29/22 0300 07/29/22 0330 07/29/22 0540 07/29/22 0915   BP: 103/60  (!) 119/92    Pulse: 90  55 93   Resp: 20  18 18   Temp: 97.8 °F (36.6 °C)  97.8 °F (36.6 °C)    TempSrc: Oral  Oral    SpO2: 97%  98% 98%   Weight:  210 lb 12.2 oz (95.6 kg)     Height:  6' 2\" (1.88 m)       Body mass index is 27.06 kg/m². Wt Readings from Last 3 Encounters:   07/29/22 210 lb 12.2 oz (95.6 kg)   07/25/22 208 lb (94.3 kg)   06/23/22 198 lb 3.2 oz (89.9 kg)      BP Readings from Last 3 Encounters:   07/29/22 (!) 119/92   07/25/22 111/76   06/23/22 102/60        Physical Examination:    CONSTITUTIONAL: Well developed, well nourished  EYES: PERRLA. No xanthelasma, sclera non icteric  EARS,NOSE,MOUTH,THROAT:  Mucous membranes moist, normal hearing  NECK: Supple, JVP normal, thyroid not enlarged. Carotids 2+ without bruits  RESPIRATORY: Normal effort, no rales or rhonchi  CARDIOVASCULAR: Normal PMI, irregular  rhythm, no murmurs, rub or gallop. 3+  edema. Radial pulses present and equal  CHEST: No scar or masses  ABDOMEN: Normal bowel sounds. No masses or tenderness. No bruit  MUSCULOSKELETAL: No clubbing or cyanosis. Moves all extremities well. Normal gait  SKIN:  Warm and dry. No rashes  NEUROLOGIC: Cranial nerves intact. Alert and oriented  PSYCHIATRIC: Calm affect. Appears to have normal judgement and insight        Assessment/Plan  1. Pneumonia of right lower lobe due to infectious organism - with some hemoptysis. He is feeling better with some abx and breathing treatments and is being seen by DR Rey. 2. HAP (hospital-acquired pneumonia)    3. Chronic atrial fibrillation (HCC) - on rate control with toprol and eliquis       4. Known severe NICM with mildly increased LE edema. Optivol was normal on 6/16/22 . I will give him one dose of metolazone for his edema for 2 days. His stable weight is about 200 and he was weighed at 210 today. All of his other meds are ordered and do not need adjustment. Thank you for allowing to me to participate in the care of [place].

## 2022-07-29 NOTE — H&P
_    [facility]    7/29/2022 1:21 AM    Patient Information:  Siobhan Arredondo is a 59 y.o. male [id number]    PCP:  No primary care provider on file. (Tel: None )    Date of Service:   Pt seen/examined on 7/29/2022   Admitted to Inpatient with expected LOS greater than two midnights due to medical therapy. Chief complaint:    Chief Complaint   Patient presents with    Cough     Cough for about 1 week; States he is \"coughing up blood\" since yesterday; denies living in any group settings; (+) chills; denies any N/V/D       History of Present Illness:  Kiko Torres is a 59 y.o. male who presented with   He comes from Home . He has h/o CHFrEF . He was recently admitted for CHF as well . Since last few days , he has been having productive cough and also having mild hemoptysis . He is on Blood thinners at home . He reports chills @ Home . Hence he came to ED for evaluation      History and pertinent information obtained from   ED provider   Prior Chart    Patient         Vitals:    07/28/22 2149 07/29/22 0034   BP: (!) 108/94 (!) 117/92   Pulse: 82 (!) 112   Resp: 20 20   Temp: 97.6 °F (36.4 °C)    TempSrc: Oral    SpO2: 97% 97%          Imaging/Labs/Work up/Medications given/Consults called by ED => Reviewed and Noted   IV Abx        Current Facility-Administered Medications: vancomycin 1000 mg IVPB in 250 mL D5W addavial, 1,000 mg, IntraVENous, Once  cefepime (MAXIPIME) 1000 mg IVPB minibag, 1,000 mg, IntraVENous, Q12H  guaiFENesin-codeine (GUAIFENESIN AC) 100-10 MG/5ML liquid 5 mL, 5 mL, Oral, Q4H PRN      On my evaluation, patient's condition as below :   Since arrival to ED, post above Rx, patient is AO X 3   Is on RA   He is non toxic appearance   He is not in distress   No chest pain   No SOB @ rest     No obvious signs of s/o acute Life Threatening Respiratory distress or acute Cardiac or hemodynamic instability, noted @ time of my evaluation of patient. REVIEW OF SYSTEMS:     Pertinent positives and negatives are noted in the HPI . All other systems were reviewed and are negative. Past Medical History:         has a past medical history of Atrial fibrillation ([facility] [address] CHF (congestive heart failure) ([facility] [address] Hx of blood clots, and Hypertension. Past Surgical History:         has a past surgical history that includes Insertable Cardiac Monitor (07/26/2019) and Cardioversion (07/26/2019). Medications:    Current Outpatient Medications on File Prior to Encounter   Medication Sig Dispense Refill    apixaban (ELIQUIS) 5 MG TABS tablet Take 1 tablet by mouth 2 times daily 60 tablet 3    empagliflozin (JARDIANCE) 10 MG tablet Take 1 tablet by mouth daily 30 tablet 3    metoprolol succinate (TOPROL XL) 25 MG extended release tablet Take 1 tablet by mouth daily 30 tablet 3    torsemide (DEMADEX) 20 MG tablet Take 3 tablets by mouth daily 2 with breakfast and one with lunch 90 tablet 1    tamsulosin (FLOMAX) 0.4 MG capsule Take 1 capsule by mouth daily 30 capsule 3    atorvastatin (LIPITOR) 40 MG tablet Take 1 tablet by mouth nightly 30 tablet 2    lisinopril (PRINIVIL;ZESTRIL) 2.5 MG tablet Take 1 tablet by mouth daily 90 tablet 2    finasteride (PROSCAR) 5 MG tablet Take 1 tablet by mouth daily 30 tablet 0    vitamin D (CHOLECALCIFEROL) 50 MCG (2000 UT) TABS tablet Take 1 tablet by mouth daily 90 tablet 1         Allergies:  No Known Allergies       Social History:   reports that he has never smoked. He has never used smokeless tobacco. He reports that he does not drink alcohol and does not use drugs. Family History:            Problem Relation Age of Onset    Heart Disease Mother     High Blood Pressure Mother     Heart Attack Mother     Other Father     Stroke Father     High Blood Pressure Father            Physical Exam:  BP (!) 117/92   Pulse (!) 112   Temp 97.6 °F (36.4 °C) (Oral)   Resp 20   SpO2 97%     General appearance:   Ill appearing . Eyes:  Sclera clear, pupils equal  ENT:  Dry   mucus membranes, Trachea midline. Cardiovascular: Regular rhythm, normal S1, S2. No edema in lower extremities   Respiratory:  Noted Dec AE B/ L . Gastrointestinal:  Abdomen soft,  non-tender,  not distended,  normal bowel sounds   Musculoskeletal:  No cyanosis in digits, neck supple  Neurology:  Cranial nerves grossly intact. Alert and oriented in time, place and person. No speech or motor deficits   Psychiatry:  Appropriate affect. Not agitated  Skin:  Warm, dry, normal turgor, no rash       Labs:     Recent Labs     07/28/22  2250   WBC 5.9   HGB 11.5*   HCT 35.1*        Recent Labs     07/28/22  2250      K 3.5      CO2 27   BUN 33*   CREATININE 1.4*   CALCIUM 8.7     Recent Labs     07/28/22  2250   AST 48*   ALT 42*   BILITOT 3.0*   ALKPHOS 105     Recent Labs     07/28/22  2250   INR 1.59*     Recent Labs     07/28/22  2250   TROPONINI <0.01         Urinalysis:      Lab Results   Component Value Date/Time    NITRU Negative 06/17/2022 05:00 PM    WBCUA 1 06/17/2022 05:00 PM    BACTERIA None Seen 06/17/2022 05:00 PM    RBCUA 7 06/17/2022 05:00 PM    BLOODU SMALL 06/17/2022 05:00 PM    SPECGRAV 1.010 06/17/2022 05:00 PM    GLUCOSEU 250 06/17/2022 05:00 PM         Radiology:     CXR:   I have reviewed the CXR  Mild congestion       IMAGING :     CT CHEST PULMONARY EMBOLISM W CONTRAST   Final Result   1. No pulmonary emboli. 2. Right lower lobe infiltrate with mild consolidation seen centrally and   mild peripheral ground-glass change felt to represent an acute early   consolidative pneumonic infiltrate. No cavitation. 3. No spiculated lung mass. 4. Significant cardiomegaly is identified with some contrast reflux into the   hepatic veins suggesting right-sided failure.    5. Trace upper abdominal ascites, similar to the prior exam.         XR CHEST (2 VW)   Final Result   Enlarged cardiac silhouette with mild pulmonary Prophylaxis . C/w HOme Blood thinners    ; + SCDs   Nutrition/Diet. No diet orders on file  Code Status/Advanced Care Plan . Prior  PT/OT and ambulatory Eval Status. Ambulate with Assist   Probable LOS & future Disposition planned post discharge . Home in 2-3 days       CONSULTS ORDERED @ [facility]  IP CONSULT TO PULMONOLOGY  IP CONSULT TO PHARMACY      Medical Decision Making : HIGH     Total patient Care [ Direct and Indirect ] time spent in evaluating the patient an discussing plan with appropriate staff/patient/family members is approximately ~ 46 min       Dionicio Richter MD    [facility]    7/29/2022 1:21 AM

## 2022-07-29 NOTE — PLAN OF CARE
Problem: Pain  Goal: Verbalizes/displays adequate comfort level or baseline comfort level  7/29/2022 1843 by Ranjana Truong RN  Outcome: Progressing  7/29/2022 0654 by Linda Alexis RN  Outcome: Progressing  7/29/2022 0452 by Linda Alexis RN  Outcome: Progressing     Problem: Safety - Adult  Goal: Free from fall injury  7/29/2022 1843 by Ranjana Truong RN  Outcome: Progressing  7/29/2022 0654 by Linda Alexis RN  Outcome: Progressing  7/29/2022 0452 by Linda Alexis RN  Outcome: Progressing     Problem: Chronic Conditions and Co-morbidities  Goal: Patient's chronic conditions and co-morbidity symptoms are monitored and maintained or improved  Outcome: Progressing

## 2022-07-29 NOTE — ED PROVIDER NOTES
[facility]        Pt Name: Dominic Perla  MRN: [id number]  [place] 1960  Date of evaluation: 7/28/2022  Provider: Kieran Alonzo PA-C  PCP: No primary care provider on file. Note Started: 10:18 PM EDT       SHONA. I have evaluated this patient. My supervising physician was available for consultation. CHIEF COMPLAINT       Chief Complaint   Patient presents with    Cough     Cough for about 1 week; States he is \"coughing up blood\" since yesterday; denies living in any group settings; (+) chills; denies any N/V/D       HISTORY OF PRESENT ILLNESS   (Location, Timing/Onset, Context/Setting, Quality, Duration, Modifying Factors, Severity, Associated Signs and Symptoms)  Note limiting factors. Chief Complaint: Wilmer Perla is a 59 y.o. male who presents to the emergency department today for evaluation for a cough. The patient states that he has been experiencing a cough, and he states that this has been ongoing for the past week, has been worse over the past 2 to 3 days. Patient states that he has been coughing up bright red blood for the past 2 days. Patient states that this seems to be improving. Patient does have a history of atrial fibrillation, CHF, history of blood clots and is anticoagulated on Eliquis. Patient states that he is compliant with his medications. Patient has not had any fever or chills. He denies any chest pain. He has no shortness of breath. Patient denies any lower leg swelling. He denies any abdominal pain, nausea, vomiting or diarrhea. He has no urinary symptoms. Patient otherwise has no other complaints at this time    Nursing Notes were all reviewed and agreed with or any disagreements were addressed in the HPI.     REVIEW OF SYSTEMS    (2-9 systems for level 4, 10 or more for level 5)     Review of Systems   Constitutional:  Negative for activity change, appetite change, chills and fever. HENT:  Negative for congestion and rhinorrhea. Respiratory:  Positive for cough. Negative for shortness of breath. Cardiovascular:  Negative for chest pain. Gastrointestinal:  Negative for abdominal pain, diarrhea, nausea and vomiting. Genitourinary:  Negative for difficulty urinating, dysuria and hematuria. Positives and Pertinent negatives as per HPI. Except as noted above in the ROS, all other systems were reviewed and negative. PAST MEDICAL HISTORY     Past Medical History:   Diagnosis Date    Atrial fibrillation ([facility] [address] 07/25/2019    CHF (congestive heart failure) ([facility])     Hx of blood clots     Hypertension          SURGICAL HISTORY     Past Surgical History:   Procedure Laterality Date    CARDIOVERSION  07/26/2019    Dr. Anuja Graves  07/26/2019         CURRENTMEDICATIONS       Previous Medications    APIXABAN (ELIQUIS) 5 MG TABS TABLET    Take 1 tablet by mouth 2 times daily    ATORVASTATIN (LIPITOR) 40 MG TABLET    Take 1 tablet by mouth nightly    EMPAGLIFLOZIN (JARDIANCE) 10 MG TABLET    Take 1 tablet by mouth daily    FINASTERIDE (PROSCAR) 5 MG TABLET    Take 1 tablet by mouth daily    LISINOPRIL (PRINIVIL;ZESTRIL) 2.5 MG TABLET    Take 1 tablet by mouth daily    METOPROLOL SUCCINATE (TOPROL XL) 25 MG EXTENDED RELEASE TABLET    Take 1 tablet by mouth daily    TAMSULOSIN (FLOMAX) 0.4 MG CAPSULE    Take 1 capsule by mouth daily    TORSEMIDE (DEMADEX) 20 MG TABLET    Take 3 tablets by mouth daily 2 with breakfast and one with lunch    VITAMIN D (CHOLECALCIFEROL) 50 MCG (2000 UT) TABS TABLET    Take 1 tablet by mouth daily         ALLERGIES     Patient has no known allergies.     FAMILYHISTORY       Family History   Problem Relation Age of Onset    Heart Disease Mother     High Blood Pressure Mother     Heart Attack Mother     Other Father     Stroke Father     High Blood Pressure Father           SOCIAL HISTORY       Social History     Tobacco Use Smoking status: Never    Smokeless tobacco: Never   Vaping Use    Vaping Use: Never used   Substance Use Topics    Alcohol use: Never    Drug use: Never       SCREENINGS             PHYSICAL EXAM    (up to 7 for level 4, 8 or more for level 5)     ED Triage Vitals [07/28/22 2149]   BP Temp Temp Source Heart Rate Resp SpO2 Height Weight   (!) 108/94 97.6 °F (36.4 °C) Oral 82 20 97 % -- --       Physical Exam  Vitals and nursing note reviewed. Constitutional:       Appearance: He is well-developed. He is not diaphoretic. HENT:      Head: Normocephalic and atraumatic. Right Ear: External ear normal.      Left Ear: External ear normal.      Nose: Nose normal.   Eyes:      General:         Right eye: No discharge. Left eye: No discharge. Neck:      Trachea: No tracheal deviation. Cardiovascular:      Rate and Rhythm: Normal rate and regular rhythm. Heart sounds: No murmur heard. Comments: No lower leg edema  Pulmonary:      Effort: Pulmonary effort is normal. No respiratory distress. Breath sounds: Normal breath sounds. No wheezing or rales. Comments: Diminished aeration throughout, with rales noted to bilateral bases, right greater than left  Abdominal:      General: Bowel sounds are normal. There is no distension. Palpations: Abdomen is soft. Tenderness: There is no abdominal tenderness. Musculoskeletal:         General: Normal range of motion. Cervical back: Normal range of motion and neck supple. Skin:     General: Skin is warm and dry. Neurological:      Mental Status: He is alert and oriented to person, place, and time.    Psychiatric:         Behavior: Behavior normal.       DIAGNOSTIC RESULTS   LABS:    Labs Reviewed   CBC WITH AUTO DIFFERENTIAL - Abnormal; Notable for the following components:       Result Value    RBC 4.08 (*)     Hemoglobin 11.5 (*)     Hematocrit 35.1 (*)     RDW 17.0 (*)     Lymphocytes Absolute 0.9 (*)     All other components within normal limits   COMPREHENSIVE METABOLIC PANEL - Abnormal; Notable for the following components:    Glucose 140 (*)     BUN 33 (*)     Creatinine 1.4 (*)     GFR Non- 51 (*)     Total Bilirubin 3.0 (*)     ALT 42 (*)     AST 48 (*)     All other components within normal limits   BRAIN NATRIURETIC PEPTIDE - Abnormal; Notable for the following components:    Pro-BNP 32,131 (*)     All other components within normal limits   PROTIME-INR - Abnormal; Notable for the following components:    Protime 18.9 (*)     INR 1.59 (*)     All other components within normal limits   PROCALCITONIN - Abnormal; Notable for the following components:    Procalcitonin 0.16 (*)     All other components within normal limits   COVID-19, RAPID   TROPONIN   APTT       When ordered only abnormal lab results are displayed. All other labs were within normal range or not returned as of this dictation. EKG: When ordered, EKG's are interpreted by the Emergency Department Physician in the absence of a cardiologist.  Please see their note for interpretation of EKG. RADIOLOGY:   Non-plain film images such as CT, Ultrasound and MRI are read by the radiologist. Plain radiographic images are visualized and preliminarily interpreted by the ED Provider with the below findings:        Interpretation per the Radiologist below, if available at the time of this note:    CT CHEST PULMONARY EMBOLISM W CONTRAST   Final Result   1. No pulmonary emboli. 2. Right lower lobe infiltrate with mild consolidation seen centrally and   mild peripheral ground-glass change felt to represent an acute early   consolidative pneumonic infiltrate. No cavitation. 3. No spiculated lung mass. 4. Significant cardiomegaly is identified with some contrast reflux into the   hepatic veins suggesting right-sided failure.    5. Trace upper abdominal ascites, similar to the prior exam.         XR CHEST (2 VW)   Final Result   Enlarged cardiac silhouette with mild pulmonary vascular congestion. No results found. PROCEDURES   Unless otherwise noted below, none     Procedures    CRITICAL CARE TIME       CONSULTS:  IP CONSULT TO CARDIOLOGY  IP CONSULT TO PULMONOLOGY  IP CONSULT TO PHARMACY      EMERGENCY DEPARTMENT COURSE and DIFFERENTIAL DIAGNOSIS/MDM:   Vitals:    Vitals:    07/28/22 2149 07/29/22 0034   BP: (!) 108/94 (!) 117/92   Pulse: 82 (!) 112   Resp: 20 20   Temp: 97.6 °F (36.4 °C)    TempSrc: Oral    SpO2: 97% 97%       Patient was given the following medications:  Medications   vancomycin 1000 mg IVPB in 250 mL D5W addavial (has no administration in time range)   cefepime (MAXIPIME) 1000 mg IVPB minibag (1,000 mg IntraVENous New Bag 7/29/22 0049)   guaiFENesin-codeine (GUAIFENESIN AC) 100-10 MG/5ML liquid 5 mL (5 mLs Oral Given 7/29/22 0044)   iopamidol (ISOVUE-370) 76 % injection 75 mL (75 mLs IntraVENous Given 7/28/22 2103)         Is this patient to be included in the SEP-1 Core Measure due to severe sepsis or septic shock? No   Exclusion criteria - the patient is NOT to be included for SEP-1 Core Measure due to:  May have criteria for sepsis, but does not meet criteria for severe sepsis or septic shock    Briefly, this is a 58-year-old male who presents to the emergency department today for evaluation for a cough x1 week. Patient does have a history of blood clots, and is otherwise compliant with medications. Patient states he started coughing up blood 2 to 3 days ago. No pain or shortness of breath    Physical exam has diminished aeration throughout all lung fields, with rales to bilateral bases, right greater than left. EKG seconded by my attending, please see his note for further details. CBC shows no evidence of leukocytosis, stable anemia. CMP is relatively unremarkable, does show elevated liver enzymes, elevated bilirubin, however BNP is elevated and this is likely secondary to his right-sided heart failure.   Troponin is negative  Procalcitonin  Is normal, COVID is negative. CT of chest shows no evidence of PE. He does have a right lower lobe infiltrate with some mild consolidation seen centrally and groundglass change felt to represent an early acute pneumonic infiltrate. No lung mass. He has cardiomegaly with some contrast reflux into the hepatic veins suggested right-sided failure with trace upper abdominal ascites which is similar. Patient was admitted over 1 month ago therefore we will treat with vancomycin and cefepime to cover for hospital-acquired pneumonia. Patient is updated, agreeable with plan, hospitalist has been paged for admission, stable for admission    FINAL IMPRESSION      1. Pneumonia of right lower lobe due to infectious organism    2. HAP (hospital-acquired pneumonia)    3. Chronic atrial fibrillation ([facility] [address]          DISPOSITION/PLAN   DISPOSITION Admitted 07/29/2022 12:55:37 AM      PATIENT REFERRED TO:  No follow-up provider specified.     DISCHARGE MEDICATIONS:  New Prescriptions    No medications on file       DISCONTINUED MEDICATIONS:  Discontinued Medications    No medications on file              (Please note that portions of this note were completed with a voice recognition program.  Efforts were made to edit the dictations but occasionally words are mis-transcribed.)    Jazmín Needs, PA-C (electronically signed)            Jazmín Needs, PA-C  07/29/22 0106

## 2022-07-29 NOTE — PROGRESS NOTES
07/29/22 0356   RT Protocol   History Pulmonary Disease 0   Respiratory pattern 2   Breath sounds 2   Cough 1   Indications for Bronchodilator Therapy Decreased or absent breath sounds   Bronchodilator Assessment Score 5

## 2022-07-29 NOTE — PLAN OF CARE
Admitted earlier for hemoptysis and shortness of breath. On antibiotics for pneumonia. Cardiology and pulmonary recommendations reviewed. Patient feeling better already. On room air.

## 2022-07-29 NOTE — PROGRESS NOTES
CLINICAL PHARMACY NOTE: MEDS TO BEDS    Total # of Prescriptions Filled: 9   The following medications were delivered to the patient:  Vitamin d3  Atorvastatin    Eliquis    Metoprolol  Finasteride    Tamsulosin   Jardiance   Torsemide     Additional Documentation:  Lucien ANAYA signed

## 2022-07-29 NOTE — PROGRESS NOTES
Shift assessment completed. Routine vitals completed. Scheduled medications given. Patient is awake, alert and oriented. Respirations are easy and unlabored. Patient does not appear to be in distress, resting comfortably at this time. Call light within reach.

## 2022-07-29 NOTE — CONSULTS
Clinical Pharmacy Note: Pharmacy to Dose Vancomycin    Vancomycin Day: 2  Current Dosing Regimen: 750 mg q 12 hrs  Dosing Method: Bayesian Modeling    Random: 7.0    Recent Labs     07/28/22  2250 07/29/22  0618   BUN 33* 33*       Recent Labs     07/28/22  2250 07/29/22  0618   CREATININE 1.4* 1.4*       Recent Labs     07/28/22  2250   WBC 5.9       No intake or output data in the 24 hours ending 07/29/22 1140      Ht Readings from Last 1 Encounters:   07/29/22 6' 2\" (1.88 m)        Wt Readings from Last 1 Encounters:   07/29/22 210 lb 12.2 oz (95.6 kg)         Body mass index is 27.06 kg/m². Estimated Creatinine Clearance: 63 mL/min (A) (based on SCr of 1.4 mg/dL (H)). Assessment/Plan:  Vancomycin level is therapeutic. Level was drawn appropriately in respect to last dose given. Continue vancomycin regimen  750 mg every 12 hours. Bayesian Modeling predicts an AUC of 481 mg/L*hr and trough of 16.8 mg/L. Changes in regimen will be determined based on culture results, renal function, and clinical response. Pharmacy will continue to monitor and adjust regimen as necessary.     Thank you for the consult,  Yasmin CastroD, BCPS

## 2022-07-29 NOTE — RT PROTOCOL NOTE
RT Inhaler-Nebulizer Bronchodilator Protocol Note    There is a bronchodilator order in the chart from a provider indicating to follow the RT Bronchodilator Protocol and there is an Initiate RT Inhaler-Nebulizer Bronchodilator Protocol order as well (see protocol at bottom of note). CXR Findings:  No results found. The findings from the last RT Protocol Assessment were as follows:   History Pulmonary Disease: None or smoker <15 pack years  Respiratory Pattern: Dyspnea on exertion or RR 21-25 bpm  Breath Sounds: Slightly diminished and/or crackles  Cough: Strong, productive  Indication for Bronchodilator Therapy: Decreased or absent breath sounds  Bronchodilator Assessment Score: 5    Aerosolized bronchodilator medication orders have been revised according to the RT Inhaler-Nebulizer Bronchodilator Protocol below. Respiratory Therapist to perform RT Therapy Protocol Assessment initially then follow the protocol. Repeat RT Therapy Protocol Assessment PRN for score 0-3 or on second treatment, BID, and PRN for scores above 3. No Indications - adjust the frequency to every 6 hours PRN wheezing or bronchospasm, if no treatments needed after 48 hours then discontinue using Per Protocol order mode. If indication present, adjust the RT bronchodilator orders based on the Bronchodilator Assessment Score as indicated below. Use Inhaler orders unless patient has one or more of the following: on home nebulizer, not able to hold breath for 10 seconds, is not alert and oriented, cannot activate and use MDI correctly, or respiratory rate 25 breaths per minute or more, then use the equivalent nebulizer order(s) with same Frequency and PRN reasons based on the score. If a patient is on this medication at home then do not decrease Frequency below that used at home.     0-3 - enter or revise RT bronchodilator order(s) to equivalent RT Bronchodilator order with Frequency of every 4 hours PRN for wheezing or increased work of breathing using Per Protocol order mode. 4-6 - enter or revise RT Bronchodilator order(s) to two equivalent RT bronchodilator orders with one order with BID Frequency and one order with Frequency of every 4 hours PRN wheezing or increased work of breathing using Per Protocol order mode. 7-10 - enter or revise RT Bronchodilator order(s) to two equivalent RT bronchodilator orders with one order with TID Frequency and one order with Frequency of every 4 hours PRN wheezing or increased work of breathing using Per Protocol order mode. 11-13 - enter or revise RT Bronchodilator order(s) to one equivalent RT bronchodilator order with QID Frequency and an Albuterol order with Frequency of every 4 hours PRN wheezing or increased work of breathing using Per Protocol order mode. Greater than 13 - enter or revise RT Bronchodilator order(s) to one equivalent RT bronchodilator order with every 4 hours Frequency and an Albuterol order with Frequency of every 2 hours PRN wheezing or increased work of breathing using Per Protocol order mode. RT to enter RT Home Evaluation for COPD & MDI Assessment order using Per Protocol order mode.     Electronically signed by Tracie Alejandro RCP on 7/29/2022 at 3:57 AM

## 2022-07-30 PROBLEM — R04.2 HEMOPTYSIS: Status: ACTIVE | Noted: 2022-07-30

## 2022-07-30 LAB
A/G RATIO: 1.1 (ref 1.1–2.2)
ALBUMIN SERPL-MCNC: 3.3 G/DL (ref 3.4–5)
ALP BLD-CCNC: 79 U/L (ref 40–129)
ALT SERPL-CCNC: 32 U/L (ref 10–40)
ANION GAP SERPL CALCULATED.3IONS-SCNC: 9 MMOL/L (ref 3–16)
AST SERPL-CCNC: 34 U/L (ref 15–37)
BASOPHILS ABSOLUTE: 0.1 K/UL (ref 0–0.2)
BASOPHILS RELATIVE PERCENT: 0.6 %
BILIRUB SERPL-MCNC: 2.5 MG/DL (ref 0–1)
BUN BLDV-MCNC: 26 MG/DL (ref 7–20)
CALCIUM SERPL-MCNC: 8.4 MG/DL (ref 8.3–10.6)
CHLORIDE BLD-SCNC: 94 MMOL/L (ref 99–110)
CO2: 34 MMOL/L (ref 21–32)
CREAT SERPL-MCNC: 1.2 MG/DL (ref 0.8–1.3)
EOSINOPHILS ABSOLUTE: 0 K/UL (ref 0–0.6)
EOSINOPHILS RELATIVE PERCENT: 0.3 %
GFR AFRICAN AMERICAN: >60
GFR NON-AFRICAN AMERICAN: >60
GLUCOSE BLD-MCNC: 104 MG/DL (ref 70–99)
HCT VFR BLD CALC: 34.7 % (ref 40.5–52.5)
HEMOGLOBIN: 11.3 G/DL (ref 13.5–17.5)
LYMPHOCYTES ABSOLUTE: 0.7 K/UL (ref 1–5.1)
LYMPHOCYTES RELATIVE PERCENT: 8.7 %
MAGNESIUM: 2.1 MG/DL (ref 1.8–2.4)
MCH RBC QN AUTO: 28.1 PG (ref 26–34)
MCHC RBC AUTO-ENTMCNC: 32.7 G/DL (ref 31–36)
MCV RBC AUTO: 85.9 FL (ref 80–100)
MONOCYTES ABSOLUTE: 0.7 K/UL (ref 0–1.3)
MONOCYTES RELATIVE PERCENT: 9.5 %
MRSA SCREEN RT-PCR: NORMAL
NEUTROPHILS ABSOLUTE: 6.4 K/UL (ref 1.7–7.7)
NEUTROPHILS RELATIVE PERCENT: 80.9 %
PDW BLD-RTO: 16.8 % (ref 12.4–15.4)
PLATELET # BLD: 188 K/UL (ref 135–450)
PMV BLD AUTO: 8.5 FL (ref 5–10.5)
POTASSIUM REFLEX MAGNESIUM: 2.6 MMOL/L (ref 3.5–5.1)
POTASSIUM SERPL-SCNC: 3.2 MMOL/L (ref 3.5–5.1)
PROCALCITONIN: 0.37 NG/ML (ref 0–0.15)
RBC # BLD: 4.04 M/UL (ref 4.2–5.9)
REASON FOR REJECTION: NORMAL
REJECTED TEST: NORMAL
REPORT: NORMAL
RESPIRATORY PANEL PCR: NORMAL
SODIUM BLD-SCNC: 137 MMOL/L (ref 136–145)
TOTAL PROTEIN: 6.2 G/DL (ref 6.4–8.2)
WBC # BLD: 7.9 K/UL (ref 4–11)

## 2022-07-30 PROCEDURE — 1200000000 HC SEMI PRIVATE

## 2022-07-30 PROCEDURE — 6360000002 HC RX W HCPCS: Performed by: HOSPITALIST

## 2022-07-30 PROCEDURE — 85025 COMPLETE CBC W/AUTO DIFF WBC: CPT

## 2022-07-30 PROCEDURE — 83735 ASSAY OF MAGNESIUM: CPT

## 2022-07-30 PROCEDURE — 6370000000 HC RX 637 (ALT 250 FOR IP): Performed by: HOSPITALIST

## 2022-07-30 PROCEDURE — 99233 SBSQ HOSP IP/OBS HIGH 50: CPT | Performed by: NURSE PRACTITIONER

## 2022-07-30 PROCEDURE — 0202U NFCT DS 22 TRGT SARS-COV-2: CPT

## 2022-07-30 PROCEDURE — 6370000000 HC RX 637 (ALT 250 FOR IP): Performed by: INTERNAL MEDICINE

## 2022-07-30 PROCEDURE — 94761 N-INVAS EAR/PLS OXIMETRY MLT: CPT

## 2022-07-30 PROCEDURE — 2580000003 HC RX 258: Performed by: HOSPITALIST

## 2022-07-30 PROCEDURE — 80053 COMPREHEN METABOLIC PANEL: CPT

## 2022-07-30 PROCEDURE — 94640 AIRWAY INHALATION TREATMENT: CPT

## 2022-07-30 PROCEDURE — 87641 MR-STAPH DNA AMP PROBE: CPT

## 2022-07-30 PROCEDURE — 6370000000 HC RX 637 (ALT 250 FOR IP): Performed by: PHYSICIAN ASSISTANT

## 2022-07-30 PROCEDURE — 87449 NOS EACH ORGANISM AG IA: CPT

## 2022-07-30 PROCEDURE — 84132 ASSAY OF SERUM POTASSIUM: CPT

## 2022-07-30 PROCEDURE — 94669 MECHANICAL CHEST WALL OSCILL: CPT

## 2022-07-30 PROCEDURE — 51798 US URINE CAPACITY MEASURE: CPT

## 2022-07-30 PROCEDURE — 36415 COLL VENOUS BLD VENIPUNCTURE: CPT

## 2022-07-30 PROCEDURE — 84145 PROCALCITONIN (PCT): CPT

## 2022-07-30 PROCEDURE — 99232 SBSQ HOSP IP/OBS MODERATE 35: CPT | Performed by: INTERNAL MEDICINE

## 2022-07-30 RX ORDER — FUROSEMIDE 10 MG/ML
40 INJECTION INTRAMUSCULAR; INTRAVENOUS ONCE
Status: DISCONTINUED | OUTPATIENT
Start: 2022-07-30 | End: 2022-07-30

## 2022-07-30 RX ORDER — MIDODRINE HYDROCHLORIDE 5 MG/1
5 TABLET ORAL ONCE
Status: COMPLETED | OUTPATIENT
Start: 2022-07-30 | End: 2022-07-30

## 2022-07-30 RX ADMIN — IPRATROPIUM BROMIDE AND ALBUTEROL SULFATE 1 AMPULE: 2.5; .5 SOLUTION RESPIRATORY (INHALATION) at 07:59

## 2022-07-30 RX ADMIN — Medication 10 ML: at 08:37

## 2022-07-30 RX ADMIN — TAMSULOSIN HYDROCHLORIDE 0.4 MG: 0.4 CAPSULE ORAL at 08:27

## 2022-07-30 RX ADMIN — FINASTERIDE 5 MG: 5 TABLET, FILM COATED ORAL at 08:30

## 2022-07-30 RX ADMIN — IPRATROPIUM BROMIDE AND ALBUTEROL SULFATE 1 AMPULE: 2.5; .5 SOLUTION RESPIRATORY (INHALATION) at 12:25

## 2022-07-30 RX ADMIN — Medication 2000 UNITS: at 08:29

## 2022-07-30 RX ADMIN — METOLAZONE 5 MG: 2.5 TABLET ORAL at 09:53

## 2022-07-30 RX ADMIN — CEFEPIME 2000 MG: 2 INJECTION, POWDER, FOR SOLUTION INTRAVENOUS at 03:32

## 2022-07-30 RX ADMIN — APIXABAN 5 MG: 5 TABLET, FILM COATED ORAL at 08:30

## 2022-07-30 RX ADMIN — POTASSIUM CHLORIDE 10 MEQ: 7.46 INJECTION, SOLUTION INTRAVENOUS at 08:35

## 2022-07-30 RX ADMIN — IPRATROPIUM BROMIDE AND ALBUTEROL SULFATE 1 AMPULE: 2.5; .5 SOLUTION RESPIRATORY (INHALATION) at 18:14

## 2022-07-30 RX ADMIN — CEFEPIME 2000 MG: 2 INJECTION, POWDER, FOR SOLUTION INTRAVENOUS at 18:27

## 2022-07-30 RX ADMIN — VANCOMYCIN HYDROCHLORIDE 750 MG: 750 INJECTION, POWDER, LYOPHILIZED, FOR SOLUTION INTRAVENOUS at 15:07

## 2022-07-30 RX ADMIN — POTASSIUM CHLORIDE 10 MEQ: 7.46 INJECTION, SOLUTION INTRAVENOUS at 12:47

## 2022-07-30 RX ADMIN — LISINOPRIL 2.5 MG: 5 TABLET ORAL at 08:30

## 2022-07-30 RX ADMIN — SODIUM CHLORIDE: 9 INJECTION, SOLUTION INTRAVENOUS at 02:06

## 2022-07-30 RX ADMIN — CEFEPIME 2000 MG: 2 INJECTION, POWDER, FOR SOLUTION INTRAVENOUS at 10:25

## 2022-07-30 RX ADMIN — Medication 10 ML: at 22:13

## 2022-07-30 RX ADMIN — APIXABAN 5 MG: 5 TABLET, FILM COATED ORAL at 22:12

## 2022-07-30 RX ADMIN — METOPROLOL SUCCINATE 25 MG: 25 TABLET, EXTENDED RELEASE ORAL at 08:27

## 2022-07-30 RX ADMIN — POTASSIUM CHLORIDE 10 MEQ: 7.46 INJECTION, SOLUTION INTRAVENOUS at 11:16

## 2022-07-30 RX ADMIN — TORSEMIDE 60 MG: 20 TABLET ORAL at 08:27

## 2022-07-30 RX ADMIN — VANCOMYCIN HYDROCHLORIDE 750 MG: 750 INJECTION, POWDER, LYOPHILIZED, FOR SOLUTION INTRAVENOUS at 02:08

## 2022-07-30 RX ADMIN — POTASSIUM CHLORIDE 10 MEQ: 7.46 INJECTION, SOLUTION INTRAVENOUS at 09:58

## 2022-07-30 RX ADMIN — POTASSIUM CHLORIDE 40 MEQ: 1500 TABLET, EXTENDED RELEASE ORAL at 08:29

## 2022-07-30 RX ADMIN — MIDODRINE HYDROCHLORIDE 5 MG: 5 TABLET ORAL at 23:13

## 2022-07-30 RX ADMIN — ATORVASTATIN CALCIUM 40 MG: 40 TABLET, FILM COATED ORAL at 22:12

## 2022-07-30 NOTE — PROGRESS NOTES
[address]   Cardiology Progress Note   Date: 7/30/2022  Admit Date: 7/28/2022     Reason for follow up: HF, AF    Chief Complaint:   Chief Complaint   Patient presents with    Cough     Cough for about 1 week; States he is \"coughing up blood\" since yesterday; denies living in any group settings; (+) chills; denies any N/V/D     History of Present Illness: History obtained from patient and medical record. Rakel Moreau is a 59 y.o. male with a past medical history of hypertension, sCHF, NICM with EF 20-25%, persistent atrial fibrillation. Seen by EP 2/2022 and CONSUELO was performed which showed moderate MR, HENOK thrombus, and LV dysfunction 20-25%. Jocelyn Angry was discussed and he declined at that time. Started on GDMT at that time. He has ILR after DCCV 7/26/2019 Dr. Quintin Miranda. S/p Biv AICD 4/25/2022    Presented with LE edema and cough. He is homeless, however has been compliant with medications. Interval Hx: Today, he is being seen for follow up. No new complaints today. No major events overnight. Denies having chest pain, palpitations, shortness of breath, orthopnea or dizziness. Patient seen and examined. Clinical notes reviewed. Telemetry reviewed.     Assessment and Plan:  Pneumonia, community acquired   - On RA and abx   - Pulmonology following   Chronic Atrial Fibrillation   -  in atrial fibrillation CVR   - On Eliquis and toprol    - Hx of DCCV 7/2019  Hemoptysis   - Denies recurrence today    - Continue Eliquis and monitor  Severe Ischemic CMP/Chronic Systolic HF   - S/p Biv-AICD    ~ Device interrogation reviewed and Optivol was normal   - Fine crackles and  trace BLE edema, monitor    - Weight is slightly elevated   - Continue torsemide 60 mg daily, lisinopril and Toprol   NSVT   - Likely due to CMP and hypokalemia   - Mag OK   - AICD in place  Hypokalemia   - Severe K+ 2.9   - IV replacement, Keep K>4     - Continue current medications  - Replace electrolytes  - Repeat K this afternoon    All pertinent information and plan of care discussed with the rounding/attending cardiologist.    Multiple medical conditions with risk of decompensation. All questions and concerns were addressed to the patient. Alternatives to my treatment were discussed. I have discussed the above stated plan with patient and the nurse. The patient verbalized understanding and agreed with the plan. Thank you for allowing to us to participate in the care of Spencer Barker. Problem List:   Patient Active Problem List    Diagnosis Date Noted    PNA (pneumonia) 07/29/2022    Syncope and collapse 04/29/2022    Thrombus of left atrial appendage     VT (ventricular tachycardia) (HCC)     ICD (implantable cardioverter-defibrillator), biventricular, in situ 04/26/2022    Acute on chronic congestive heart failure ([address]     Complicated UTI (urinary tract infection)     Chronic atrial fibrillation ([facility])     Moderate mitral regurgitation     Acute systolic heart failure ([address] 04/13/2022    NSTEMI (non-ST elevated myocardial infarction) ([address]     Acute on chronic combined systolic and diastolic heart failure ([address] 02/06/2022    Homeless     COVID     Non-compliance     Acute on chronic systolic heart failure ([address] 11/28/2021    Acute pulmonary edema ([address] 11/27/2021    Encounter for loop recorder check 07/26/2019    Acute urinary retention 07/26/2019    Dilated cardiomyopathy (HCC)     Acute renal failure (HCC)     PAF (paroxysmal atrial fibrillation) (HCC)     Benign essential HTN     Acute retention of urine 07/24/2019      Allergies:  No Known Allergies    Home Meds:  Prior to Visit Medications    Medication Sig Taking?  Authorizing Provider   apixaban (ELIQUIS) 5 MG TABS tablet Take 1 tablet by mouth 2 times daily  TRENT Shanks CNP   empagliflozin (JARDIANCE) 10 MG tablet Take 1 tablet by mouth daily  TRENT Shanks CNP   metoprolol succinate (TOPROL XL) 25 MG extended release tablet Take 1 tablet used smokeless tobacco. He reports that he does not drink alcohol and does not use drugs. Family History:  Reviewed. family history includes Heart Attack in his mother; Heart Disease in his mother; High Blood Pressure in his father and mother; Other in his father; Stroke in his father. Denies family history of sudden cardiac death, arrhythmia, premature CAD    Review of Systems:  Constitutional: Negative for fever, weight changes, or weakness  Skin: Negative for bruising, bleeding, blood clots, or changes in skin pigment  HEENT: Negative for vision changes or dysphagia  Respiratory: Reviewed in HPI  Cardiovascular: Reviewed in HPI  Gastrointestinal: Negative for abdominal pain or black/tarry stools  Genito-Urinary: Negative for hematuria  Musculoskeletal: No focal weakness  Neurological/Psych: Negative for confusion or TIA-like symptoms. Physical Examination:  Vitals:    07/30/22 0827   BP: 111/72   Pulse: 87   Resp: 16   Temp: 98.6 °F (37 °C)   SpO2: 95%      In: 2112 [P.O.:1080; I.V.:92.5]  Out: 1900    Wt Readings from Last 3 Encounters:   07/30/22 195 lb 4.8 oz (88.6 kg)   07/25/22 208 lb (94.3 kg)   06/23/22 198 lb 3.2 oz (89.9 kg)       Intake/Output Summary (Last 24 hours) at 7/30/2022 1009  Last data filed at 7/30/2022 0609  Gross per 24 hour   Intake 2111.99 ml   Output 1900 ml   Net 211.99 ml     Telemetry: Personally Reviewed    Constitutional: Cooperative and in no apparent distress, and appears well nourished  Skin: Warm and pink; no cyanosis, bruising, or clubbing  HEENT: Symmetric and normocephalic. Conjunctiva pink with clear sclera. Mucus membranes pink and moist.   Cardiovascular: regular rate and irregular underlying rhythm. S1 & S2, negative for murmurs. Peripheral pulses 2+, capillary refill < 3 seconds. negative elevation of JVP. 1+ LE edema  Respiratory: Respirations symmetric and unlabored.  Lungs to auscultation bilaterally, no wheezing  or rhonchi + fine crackles bilateral LL  Gastrointestinal: Abdomen soft and round. Bowel sounds normoactive in all quadrants. Musculoskeletal: No focal weakness. Neurologic/Psych: Awake and orientated to person, place and time. Calm affect, appropriate mood    Pertinent labs, diagnostic, device, and imaging results reviewed as a part of this visit    Labs:    BMP:   Recent Labs     2218 22  0643    138 137   K 3.5 3.4* 2.6*    98* 94*   CO2 27 24 34*   PHOS  --  4.1  --    BUN 33* 33* 26*   CREATININE 1.4* 1.4* 1.2   MG  --  2.30 2.10     Estimated Creatinine Clearance: 73 mL/min (based on SCr of 1.2 mg/dL). CBC:   Recent Labs     22  0643   WBC 5.9 7.9   HGB 11.5* 11.3*   HCT 35.1* 34.7*   MCV 85.9 85.9    188     Thyroid:   Lab Results   Component Value Date/Time    TSH 2.66 06/15/2022 10:33 PM     Lipids:   Lab Results   Component Value Date/Time    CHOL 102 2022 04:39 AM    HDL 48 2022 04:39 AM    TRIG 65 2022 04:39 AM     LFTS:   Lab Results   Component Value Date/Time    ALT 32 2022 06:43 AM    AST 34 2022 06:43 AM    ALKPHOS 79 2022 06:43 AM    PROT 6.2 2022 06:43 AM    AGRATIO 1.1 2022 06:43 AM    BILITOT 2.5 2022 06:43 AM     Cardiac Enzymes:   Lab Results   Component Value Date/Time    TROPONINI <0.01 2022 08:46 AM    TROPONINI <0.01 2022 05:14 AM    TROPONINI <0.01 2022 10:50 PM     Coags:   Lab Results   Component Value Date/Time    PROTIME 18.9 2022 10:50 PM    INR 1.59 2022 10:50 PM     EC2022: Atrial Fibrillation    ECHO:  2/10/2022      Summary   Left ventricular size is moderately increased. Overall left ventricular   systolic function appears severely reduced with ejection fraction of 20-25%   and severe diffuse hypokinesis. Moderate mitral regurgitation. Calculated ERO of 0.33 cm2. Left atrial size appears dilated.  There is an echodensity inside the left atrial appendage consistent with left atrial appendage thrombus. The right ventricle is enlarged. Right ventricular systolic function is   mildly reduced . Mild to moderate tricuspid regurgitation. Stress Test: 7/26/2019   Resting ECG    Normal sinus rhythm. Nonspecific ST-T wave changes.     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     Olga Felty, APRN-CNP  [facility]   Office: [phone]

## 2022-07-30 NOTE — PROGRESS NOTES
[facility] Pulmonary/CCM Progress note      Admit Date: 7/28/2022    Chief Complaint: Shortness of breath, cough and hemoptysis    Subjective: Interval History: Patient states that he feels well, with minimal shortness of breath and denies any hemoptysis today.   No chest pain    Scheduled Meds:   apixaban  5 mg Oral BID    atorvastatin  40 mg Oral Nightly    finasteride  5 mg Oral Daily    lisinopril  2.5 mg Oral Daily    metoprolol succinate  25 mg Oral Daily    tamsulosin  0.4 mg Oral Daily    torsemide  60 mg Oral Daily    Vitamin D  2,000 Units Oral Daily    sodium chloride flush  5-40 mL IntraVENous 2 times per day    cefepime  2,000 mg IntraVENous Q8H    vancomycin  750 mg IntraVENous Q12H    ipratropium-albuterol  1 ampule Inhalation 4x daily     Continuous Infusions:   sodium chloride 25 mL/hr at 07/30/22 0206     PRN Meds:sodium chloride flush, sodium chloride, ondansetron **OR** ondansetron, polyethylene glycol, acetaminophen **OR** acetaminophen, albuterol, magnesium sulfate, sodium phosphate IVPB **OR** sodium phosphate IVPB, potassium chloride **OR** potassium alternative oral replacement **OR** potassium chloride    Review of Systems  Constitutional: negative for fatigue, fevers, malaise and weight loss  Ears, nose, mouth, throat: negative for ear drainage, epistaxis, hoarseness, nasal congestion, sore throat and voice change  Respiratory: negative except for shortness of breath  Cardiovascular: negative for chest pain, chest pressure/discomfort, irregular heart beat, lower extremity edema and palpitations  Gastrointestinal: negative for abdominal pain, constipation, diarrhea, jaundice, melena, odynophagia, reflux symptoms and vomiting  Hematologic/lymphatic: negative for bleeding, easy bruising, lymphadenopathy and petechiae  Musculoskeletal:negative for arthralgias, bone pain, muscle weakness, neck pain and stiff joints  Neurological: negative for dizziness, gait problems, headaches, seizures, speech problems, tremors and weakness  Behavioral/Psych: negative for anxiety, behavior problems, depression, fatigue and sleep disturbance  Endocrine: negative for diabetic symptoms including none, neuropathy, polyphagia, polyuria, polydipsia, vomiting and diarrhea and temperature intolerance  Allergic/Immunologic: negative for anaphylaxis, angioedema, hay fever and urticaria    Objective:     Patient Vitals for the past 8 hrs:   BP Temp Temp src Pulse Resp SpO2   07/30/22 1136 102/71 98.5 °F (36.9 °C) Oral 98 18 97 %   07/30/22 0827 111/72 98.6 °F (37 °C) Oral 87 16 95 %   07/30/22 0800 -- -- -- 88 18 95 %     I/O last 3 completed shifts:   In: 2112 [P.O.:1080; I.V.:92.5; IV Piggyback:939.5]  Out: 1900 [Urine:1900]  I/O this shift:  In: -   Out: 1375 [Urine:1375]    General Appearance: alert and oriented to person, place and time, well developed and well- nourished, in no acute distress  Skin: warm and dry, no rash or erythema  Head: normocephalic and atraumatic  Eyes: pupils equal, round, and reactive to light, extraocular eye movements intact, conjunctivae normal  ENT: external ear and ear canal normal bilaterally, nose without deformity, nasal mucosa and turbinates normal  Neck: supple and non-tender without mass, no cervical lymphadenopathy  Pulmonary/Chest: clear to auscultation bilaterally- no wheezes, rales or rhonchi, normal air movement, no respiratory distress  Cardiovascular: normal rate, regular rhythm,  no murmurs, rubs, distal pulses intact, no carotid bruits  Abdomen: soft, non-tender, non-distended, normal bowel sounds, no masses or organomegaly  Lymph Nodes: Cervical, supraclavicular normal  Extremities: no cyanosis, clubbing or edema  Musculoskeletal: normal range of motion, no joint swelling, deformity or tenderness  Neurologic: alert, no focal neurologic deficits    Data Review:  CBC:   Lab Results   Component Value Date/Time    WBC 7.9 07/30/2022 06:43 AM    RBC 4.04 07/30/2022 06:43 AM     BMP:   Lab Results   Component Value Date/Time    GLUCOSE 104 07/30/2022 06:43 AM    CO2 34 07/30/2022 06:43 AM    BUN 26 07/30/2022 06:43 AM    CREATININE 1.2 07/30/2022 06:43 AM    CALCIUM 8.4 07/30/2022 06:43 AM     ABG: No results found for: JRI5PJU, BEART, X0WYDQPJ, PHART, THGBART, FAQ6XRF, PO2ART, PBD9TZD    Radiology: All pertinent images / reports were reviewed as a part of this visit. Narrative   EXAMINATION:   CTA OF THE CHEST 7/28/2022 11:50 pm       TECHNIQUE:   CTA of the chest was performed after the administration of intravenous   contrast.  Multiplanar reformatted images are provided for review. MIP   images are provided for review. Automated exposure control, iterative   reconstruction, and/or weight based adjustment of the mA/kV was utilized to   reduce the radiation dose to as low as reasonably achievable. COMPARISON:   06/15/2022       HISTORY:   ORDERING SYSTEM PROVIDED HISTORY: r.o pe   TECHNOLOGIST PROVIDED HISTORY:   Reason for exam:->r.o pe   Decision Support Exception - unselect if not a suspected or confirmed   emergency medical condition->Emergency Medical Condition (MA)   Reason for Exam: R/o PE. Cough (Cough for about 1 week; States he is   \"coughing up blood\" since yesterday; denies living in any group settings; (+)   chills; denies any N/V/D). FINDINGS:   Pulmonary Arteries: Pulmonary arteries are well opacified with contrast, with   no intraluminal emboli identified. Similar size compared to the previous   examination with prominence of the main pulmonary arteries bilaterally. This   may be related to mild underlying pulmonary hypertension. Mediastinum: No thoracic aortic aneurysm is identified. Cardiomegaly is   noted, with a 3 lead implanted cardiac device noted. No mediastinal   lymphadenopathy is seen. No focal esophageal thickening. Lungs/pleura: No pleural effusion.   Interval development of a right lower   lobe infiltrate with mild central consolidative change and mild peripheral   ground-glass change. No corresponding pulmonary arterial emboli seen to   suggest an infarct. No pneumothorax is identified. Upper Abdomen: Trace ascites in the upper abdomen. Contrast reflux seen into   the patent venous system suggesting right-sided failure. Soft Tissues/Bones: No acute subcutaneous soft tissue abnormality is   identified. No acute osseous abnormality seen in the chest.  Multilevel   degenerative changes are identified. No osseous destructive process is   identified. Impression   1. No pulmonary emboli. 2. Right lower lobe infiltrate with mild consolidation seen centrally and   mild peripheral ground-glass change felt to represent an acute early   consolidative pneumonic infiltrate. No cavitation. 3. No spiculated lung mass. 4. Significant cardiomegaly is identified with some contrast reflux into the   hepatic veins suggesting right-sided failure. 5. Trace upper abdominal ascites, similar to the prior exam       Problem List:     Right lower lobe pneumonia  Hemoptysis  Atrial fibrillation/HENOK thrombus    Assessment/Plan:     Faint right lower lobe infiltrate-peripheral configuration on CT scan. Rapid COVID-19 test negative. Send molecular panel. Patient's symptoms have clinically improved-less shortness of breath and no hemoptysis. On empiric cefepime and vancomycin. Send strep pneumo and Legionella antigen. WBC 7.9, procalcitonin 0.37. Volume overload issues, continues on torsemide. Patient has severe ischemic cardiomyopathy status post BiV AICD. Continue Eliquis for history of HENOK thrombus.     Urvashi Garcia MD

## 2022-07-30 NOTE — PLAN OF CARE
PT independent with set up. Able to ambulate to restroom. Reports no pain at this time. Will continue to monitor for changes.

## 2022-07-30 NOTE — PROGRESS NOTES
Patient complaining of difficulty urinating. Bladder scan shows 549. Hosp notified. Order to straight cath and recheck after 4 hours. Output 550.  Will continue to monitor

## 2022-07-30 NOTE — PROGRESS NOTES
Hospitalist Progress Note      PCP: No primary care provider on file. Date of Admission: 7/28/2022    Chief Complaint: Shortness of breath    Hospital Course: Presented with shortness of breath. Initially required oxygen but currently back on room air. Diagnosed with pneumonia. Has underlying atrial fibrillation and ischemic cardiomyopathy. Subjective: No chest pain, no abdominal pain, no nausea or vomiting. Has mild shortness of breath with exertion but much better than before. Medications:  Reviewed    Infusion Medications    sodium chloride 25 mL/hr at 07/30/22 0206     Scheduled Medications    apixaban  5 mg Oral BID    atorvastatin  40 mg Oral Nightly    finasteride  5 mg Oral Daily    lisinopril  2.5 mg Oral Daily    metoprolol succinate  25 mg Oral Daily    tamsulosin  0.4 mg Oral Daily    torsemide  60 mg Oral Daily    Vitamin D  2,000 Units Oral Daily    sodium chloride flush  5-40 mL IntraVENous 2 times per day    cefepime  2,000 mg IntraVENous Q8H    vancomycin  750 mg IntraVENous Q12H    ipratropium-albuterol  1 ampule Inhalation 4x daily     PRN Meds: sodium chloride flush, sodium chloride, ondansetron **OR** ondansetron, polyethylene glycol, acetaminophen **OR** acetaminophen, albuterol, magnesium sulfate, sodium phosphate IVPB **OR** sodium phosphate IVPB, potassium chloride **OR** potassium alternative oral replacement **OR** potassium chloride      Intake/Output Summary (Last 24 hours) at 7/30/2022 1203  Last data filed at 7/30/2022 0609  Gross per 24 hour   Intake 2111.99 ml   Output 1900 ml   Net 211.99 ml       Physical Exam Performed:    /71   Pulse 98   Temp 98.5 °F (36.9 °C) (Oral)   Resp 18   Ht 6' 2\" (1.88 m)   Wt 195 lb 4.8 oz (88.6 kg)   SpO2 97%   BMI 25.08 kg/m²     General appearance: No apparent distress, appears stated age and cooperative. HEENT: Pupils equal, round, and reactive to light. Conjunctivae/corneas clear.   Neck: Supple, with full range of motion. No jugular venous distention. Trachea midline. Respiratory:  Normal respiratory effort. Right middle and lower posterior inspiratory crackles. No wheezes. Cardiovascular: Irregularly irregular rhythm with normal S1/S2 without murmurs, rubs or gallops. Abdomen: Soft, non-tender, non-distended with normal bowel sounds. Musculoskeletal: No clubbing, cyanosis or edema bilaterally. Full range of motion without deformity. Skin: Skin color, texture, turgor normal.  No rashes or lesions. Neurologic:  Neurovascularly intact without any focal sensory/motor deficits. Cranial nerves: II-XII intact, grossly non-focal.  Psychiatric: Alert and oriented, thought content appropriate, normal insight  Capillary Refill: Brisk,3 seconds, normal   Peripheral Pulses: +2 palpable, equal bilaterally       Labs:   Recent Labs     07/28/22 2250 07/30/22  0643   WBC 5.9 7.9   HGB 11.5* 11.3*   HCT 35.1* 34.7*    188     Recent Labs     07/28/22 2250 07/29/22  0618 07/30/22  0643    138 137   K 3.5 3.4* 2.6*    98* 94*   CO2 27 24 34*   BUN 33* 33* 26*   CREATININE 1.4* 1.4* 1.2   CALCIUM 8.7 8.5 8.4   PHOS  --  4.1  --      Recent Labs     07/28/22 2250 07/30/22  0643   AST 48* 34   ALT 42* 32   BILITOT 3.0* 2.5*   ALKPHOS 105 79     Recent Labs     07/28/22 2250   INR 1.59*     Recent Labs     07/28/22 2250 07/29/22  0514 07/29/22  0846   TROPONINI <0.01 <0.01 <0.01       Urinalysis:      Lab Results   Component Value Date/Time    NITRU Negative 06/17/2022 05:00 PM    WBCUA 1 06/17/2022 05:00 PM    BACTERIA None Seen 06/17/2022 05:00 PM    RBCUA 7 06/17/2022 05:00 PM    BLOODU SMALL 06/17/2022 05:00 PM    SPECGRAV 1.010 06/17/2022 05:00 PM    GLUCOSEU 250 06/17/2022 05:00 PM       Radiology:  [id number] organ? LIVER, GALLBLADDER   Final Result   Fatty infiltration of the liver with mild right upper quadrant ascites. CT CHEST PULMONARY EMBOLISM W CONTRAST   Final Result   1.  No pulmonary emboli. 2. Right lower lobe infiltrate with mild consolidation seen centrally and   mild peripheral ground-glass change felt to represent an acute early   consolidative pneumonic infiltrate. No cavitation. 3. No spiculated lung mass. 4. Significant cardiomegaly is identified with some contrast reflux into the   hepatic veins suggesting right-sided failure. 5. Trace upper abdominal ascites, similar to the prior exam.         XR CHEST (2 VW)   Final Result   Enlarged cardiac silhouette with mild pulmonary vascular congestion. Assessment/Plan:    Active Hospital Problems    Diagnosis     PNA (pneumonia) [J18.9]      Priority: Medium    Thrombus of left atrial appendage [I51.3]      Priority: Medium    VT (ventricular tachycardia) (HCC) [I47.2]      Priority: Medium    ICD (implantable cardioverter-defibrillator), biventricular, in situ [Z95.810]     Chronic atrial fibrillation (HCC) [I48.20]     Moderate mitral regurgitation [V13.8]     Acute systolic heart failure (HCC) [I50.21]     Acute on chronic combined systolic and diastolic heart failure (HCC) [I50.43]     Non-compliance [Z91.19]     Acute on chronic systolic heart failure ([address] [I50.23]     Dilated cardiomyopathy ([address] [I42.0]     PAF (paroxysmal atrial fibrillation) ([address] [I48.0]     Benign essential HTN [I10]      PLAN:    Pneumonia  Started on combination IV antibiotics. Awaiting MRSA nasal swab to de-escalate  Clinically better. Being followed by pulmonology    Hypokalemia  Severe. Replacing with a combination of IV and p.o. potassium    Urinary retention  Required straight cath x1. Able to urinate well now. Chronic atrial fibrillation  Stable on Eliquis for CVA prophylaxis  Controlled heart rate    Ischemic cardiomyopathy  Stable with no changes. Being followed by cardiology. Hypoxia  No evidence of acute hypoxic respiratory failure. Hypoxemia was transient and currently patient is back on room air.   Most likely secondary to pneumonia    Discussed with the patient. Questions answered    Discussed with nursing    DVT Prophylaxis: Eliquis  Diet: ADULT DIET; Regular; Low Sodium (2 gm)  Code Status: Full Code    PT/OT Eval Status: No need    Dispo -inpatient stay probably 1 or 2 more days.     Rajiv Crawford MD

## 2022-07-30 NOTE — PLAN OF CARE
Problem: Pain  Goal: Verbalizes/displays adequate comfort level or baseline comfort level  7/29/2022 2139 by Keiko Peoples RN  Outcome: Progressing     Problem: Safety - Adult  Goal: Free from fall injury  7/29/2022 2139 by Keiko Peoples RN  Outcome: Progressing     Problem: Chronic Conditions and Co-morbidities  Goal: Patient's chronic conditions and co-morbidity symptoms are monitored and maintained or improved  7/29/2022 2139 by Keiko Peoples RN  Outcome: Progressing

## 2022-07-31 LAB
A/G RATIO: 1 (ref 1.1–2.2)
ALBUMIN SERPL-MCNC: 3.3 G/DL (ref 3.4–5)
ALP BLD-CCNC: 94 U/L (ref 40–129)
ALT SERPL-CCNC: 34 U/L (ref 10–40)
ANION GAP SERPL CALCULATED.3IONS-SCNC: 7 MMOL/L (ref 3–16)
AST SERPL-CCNC: 35 U/L (ref 15–37)
BASOPHILS ABSOLUTE: 0 K/UL (ref 0–0.2)
BASOPHILS RELATIVE PERCENT: 0.6 %
BILIRUB SERPL-MCNC: 2 MG/DL (ref 0–1)
BUN BLDV-MCNC: 28 MG/DL (ref 7–20)
CALCIUM SERPL-MCNC: 8.9 MG/DL (ref 8.3–10.6)
CHLORIDE BLD-SCNC: 88 MMOL/L (ref 99–110)
CO2: 40 MMOL/L (ref 21–32)
CREAT SERPL-MCNC: 1.3 MG/DL (ref 0.8–1.3)
EOSINOPHILS ABSOLUTE: 0.1 K/UL (ref 0–0.6)
EOSINOPHILS RELATIVE PERCENT: 1.8 %
GFR AFRICAN AMERICAN: >60
GFR NON-AFRICAN AMERICAN: 56
GLUCOSE BLD-MCNC: 66 MG/DL (ref 70–99)
HCT VFR BLD CALC: 35.1 % (ref 40.5–52.5)
HEMOGLOBIN: 11.6 G/DL (ref 13.5–17.5)
L. PNEUMOPHILA SEROGP 1 UR AG: NORMAL
LYMPHOCYTES ABSOLUTE: 0.9 K/UL (ref 1–5.1)
LYMPHOCYTES RELATIVE PERCENT: 14.1 %
MAGNESIUM: 2.1 MG/DL (ref 1.8–2.4)
MCH RBC QN AUTO: 28.3 PG (ref 26–34)
MCHC RBC AUTO-ENTMCNC: 33.1 G/DL (ref 31–36)
MCV RBC AUTO: 85.6 FL (ref 80–100)
MONOCYTES ABSOLUTE: 0.8 K/UL (ref 0–1.3)
MONOCYTES RELATIVE PERCENT: 12.1 %
NEUTROPHILS ABSOLUTE: 4.7 K/UL (ref 1.7–7.7)
NEUTROPHILS RELATIVE PERCENT: 71.4 %
PDW BLD-RTO: 17.2 % (ref 12.4–15.4)
PLATELET # BLD: 209 K/UL (ref 135–450)
PMV BLD AUTO: 8.5 FL (ref 5–10.5)
POTASSIUM SERPL-SCNC: 3.2 MMOL/L (ref 3.5–5.1)
POTASSIUM SERPL-SCNC: 3.3 MMOL/L (ref 3.5–5.1)
RBC # BLD: 4.1 M/UL (ref 4.2–5.9)
SODIUM BLD-SCNC: 135 MMOL/L (ref 136–145)
STREP PNEUMONIAE ANTIGEN, URINE: NORMAL
TOTAL PROTEIN: 6.7 G/DL (ref 6.4–8.2)
WBC # BLD: 6.6 K/UL (ref 4–11)

## 2022-07-31 PROCEDURE — 6360000002 HC RX W HCPCS: Performed by: HOSPITALIST

## 2022-07-31 PROCEDURE — 85025 COMPLETE CBC W/AUTO DIFF WBC: CPT

## 2022-07-31 PROCEDURE — 2580000003 HC RX 258: Performed by: HOSPITALIST

## 2022-07-31 PROCEDURE — 1200000000 HC SEMI PRIVATE

## 2022-07-31 PROCEDURE — 99232 SBSQ HOSP IP/OBS MODERATE 35: CPT | Performed by: INTERNAL MEDICINE

## 2022-07-31 PROCEDURE — 94761 N-INVAS EAR/PLS OXIMETRY MLT: CPT

## 2022-07-31 PROCEDURE — 6370000000 HC RX 637 (ALT 250 FOR IP): Performed by: HOSPITALIST

## 2022-07-31 PROCEDURE — 94640 AIRWAY INHALATION TREATMENT: CPT

## 2022-07-31 PROCEDURE — 83735 ASSAY OF MAGNESIUM: CPT

## 2022-07-31 PROCEDURE — 84132 ASSAY OF SERUM POTASSIUM: CPT

## 2022-07-31 PROCEDURE — 99233 SBSQ HOSP IP/OBS HIGH 50: CPT | Performed by: NURSE PRACTITIONER

## 2022-07-31 PROCEDURE — 80053 COMPREHEN METABOLIC PANEL: CPT

## 2022-07-31 PROCEDURE — 6370000000 HC RX 637 (ALT 250 FOR IP): Performed by: INTERNAL MEDICINE

## 2022-07-31 PROCEDURE — 36415 COLL VENOUS BLD VENIPUNCTURE: CPT

## 2022-07-31 PROCEDURE — 6370000000 HC RX 637 (ALT 250 FOR IP): Performed by: NURSE PRACTITIONER

## 2022-07-31 RX ORDER — POTASSIUM CHLORIDE 20 MEQ/1
20 TABLET, EXTENDED RELEASE ORAL 2 TIMES DAILY WITH MEALS
Status: DISCONTINUED | OUTPATIENT
Start: 2022-07-31 | End: 2022-08-01 | Stop reason: HOSPADM

## 2022-07-31 RX ORDER — AMOXICILLIN AND CLAVULANATE POTASSIUM 875; 125 MG/1; MG/1
1 TABLET, FILM COATED ORAL EVERY 12 HOURS SCHEDULED
Status: DISCONTINUED | OUTPATIENT
Start: 2022-07-31 | End: 2022-08-01 | Stop reason: HOSPADM

## 2022-07-31 RX ADMIN — APIXABAN 5 MG: 5 TABLET, FILM COATED ORAL at 08:02

## 2022-07-31 RX ADMIN — IPRATROPIUM BROMIDE AND ALBUTEROL SULFATE 1 AMPULE: 2.5; .5 SOLUTION RESPIRATORY (INHALATION) at 21:26

## 2022-07-31 RX ADMIN — Medication 2000 UNITS: at 08:02

## 2022-07-31 RX ADMIN — IPRATROPIUM BROMIDE AND ALBUTEROL SULFATE 1 AMPULE: 2.5; .5 SOLUTION RESPIRATORY (INHALATION) at 15:45

## 2022-07-31 RX ADMIN — CEFEPIME 2000 MG: 2 INJECTION, POWDER, FOR SOLUTION INTRAVENOUS at 10:26

## 2022-07-31 RX ADMIN — IPRATROPIUM BROMIDE AND ALBUTEROL SULFATE 1 AMPULE: 2.5; .5 SOLUTION RESPIRATORY (INHALATION) at 08:23

## 2022-07-31 RX ADMIN — Medication 10 ML: at 21:00

## 2022-07-31 RX ADMIN — TORSEMIDE 60 MG: 20 TABLET ORAL at 08:03

## 2022-07-31 RX ADMIN — IPRATROPIUM BROMIDE AND ALBUTEROL SULFATE 1 AMPULE: 2.5; .5 SOLUTION RESPIRATORY (INHALATION) at 12:40

## 2022-07-31 RX ADMIN — ATORVASTATIN CALCIUM 40 MG: 40 TABLET, FILM COATED ORAL at 20:38

## 2022-07-31 RX ADMIN — TAMSULOSIN HYDROCHLORIDE 0.4 MG: 0.4 CAPSULE ORAL at 08:03

## 2022-07-31 RX ADMIN — APIXABAN 5 MG: 5 TABLET, FILM COATED ORAL at 20:38

## 2022-07-31 RX ADMIN — POTASSIUM CHLORIDE 20 MEQ: 1500 TABLET, EXTENDED RELEASE ORAL at 17:49

## 2022-07-31 RX ADMIN — POTASSIUM CHLORIDE 40 MEQ: 1500 TABLET, EXTENDED RELEASE ORAL at 08:02

## 2022-07-31 RX ADMIN — CEFEPIME 2000 MG: 2 INJECTION, POWDER, FOR SOLUTION INTRAVENOUS at 04:36

## 2022-07-31 RX ADMIN — FINASTERIDE 5 MG: 5 TABLET, FILM COATED ORAL at 08:05

## 2022-07-31 RX ADMIN — VANCOMYCIN HYDROCHLORIDE 750 MG: 750 INJECTION, POWDER, LYOPHILIZED, FOR SOLUTION INTRAVENOUS at 03:16

## 2022-07-31 RX ADMIN — Medication 10 ML: at 08:06

## 2022-07-31 RX ADMIN — AMOXICILLIN AND CLAVULANATE POTASSIUM 1 TABLET: 875; 125 TABLET, FILM COATED ORAL at 20:38

## 2022-07-31 NOTE — PROGRESS NOTES
07/31/22 0824   Treatment   Breath Sounds Post-Tx VIKAS Clear   Breath Sounds Post-Tx LLL Diminished   Breath Sounds Post-Tx RUL Clear   Breath Sounds Post-Tx RML Clear   Breath Sounds Post-Tx RLL Diminished   Post-Tx Pulse 105   Post-Tx Resps 16   Oxygen Therapy/Pulse Ox   O2 Therapy Room air   O2 Device None (Room air)   Resp 16   SpO2 97 %   Cough/Sputum   Sputum How Obtained Cough on request   Cough Dry;Non-productive;Strong   Sputum Amount None

## 2022-07-31 NOTE — PROGRESS NOTES
Hospitalist Progress Note      PCP: No primary care provider on file. Date of Admission: 7/28/2022    Chief Complaint: Shortness of breath    Hospital Course: Presented with shortness of breath. Initially required oxygen but currently back on room air. Diagnosed with pneumonia. Has underlying atrial fibrillation and ischemic cardiomyopathy. Oxygen requirement has resolved. MRSA negative. Urine pneumonia antigens are negative. Vancomycin stopped. Noted persistent hypotension  Potassium is still low but much better. Subjective: No chest pain, no abdominal pain, no nausea or vomiting. Shortness of breath has improved. Medications:  Reviewed    Infusion Medications    sodium chloride 25 mL/hr at 07/30/22 0206     Scheduled Medications    apixaban  5 mg Oral BID    atorvastatin  40 mg Oral Nightly    finasteride  5 mg Oral Daily    lisinopril  2.5 mg Oral Daily    metoprolol succinate  25 mg Oral Daily    tamsulosin  0.4 mg Oral Daily    torsemide  60 mg Oral Daily    Vitamin D  2,000 Units Oral Daily    sodium chloride flush  5-40 mL IntraVENous 2 times per day    cefepime  2,000 mg IntraVENous Q8H    ipratropium-albuterol  1 ampule Inhalation 4x daily     PRN Meds: sodium chloride flush, sodium chloride, ondansetron **OR** ondansetron, polyethylene glycol, acetaminophen **OR** acetaminophen, albuterol, magnesium sulfate, sodium phosphate IVPB **OR** sodium phosphate IVPB, potassium chloride **OR** potassium alternative oral replacement **OR** potassium chloride      Intake/Output Summary (Last 24 hours) at 7/31/2022 0950  Last data filed at 7/31/2022 0444  Gross per 24 hour   Intake 777 ml   Output 1955 ml   Net -1178 ml         Physical Exam Performed:    BP 92/60   Pulse 79   Temp 98.3 °F (36.8 °C)   Resp 16   Ht 6' 2\" (1.88 m)   Wt 195 lb 8 oz (88.7 kg)   SpO2 97%   BMI 25.10 kg/m²     General appearance: No apparent distress, appears stated age and cooperative.   HEENT: Pupils equal, round, and reactive to light. Conjunctivae/corneas clear. Neck: Supple, with full range of motion. No jugular venous distention. Trachea midline. Respiratory:  Normal respiratory effort. Right middle and lower posterior inspiratory crackles. No wheezes. Cardiovascular: Irregularly irregular rhythm with normal S1/S2 without murmurs, rubs or gallops. Abdomen: Soft, non-tender, non-distended with normal bowel sounds. Musculoskeletal: No clubbing, cyanosis or edema bilaterally. Full range of motion without deformity. Skin: Skin color, texture, turgor normal.  No rashes or lesions. Neurologic:  Neurovascularly intact without any focal sensory/motor deficits. Cranial nerves: II-XII intact, grossly non-focal.  Psychiatric: Alert and oriented, thought content appropriate, normal insight  Capillary Refill: Brisk,3 seconds, normal   Peripheral Pulses: +2 palpable, equal bilaterally     I examined the patient today (07/31/22).   Physical exam is similar to yesterday (7/30)    Labs:   Recent Labs     07/28/22 2250 07/30/22  0643 07/31/22  0507   WBC 5.9 7.9 6.6   HGB 11.5* 11.3* 11.6*   HCT 35.1* 34.7* 35.1*    188 209       Recent Labs     07/29/22  0618 07/30/22  0643 07/30/22  1206 07/31/22  0507    137  --  135*   K 3.4* 2.6* 3.2* 3.3*   CL 98* 94*  --  88*   CO2 24 34*  --  40*   BUN 33* 26*  --  28*   CREATININE 1.4* 1.2  --  1.3   CALCIUM 8.5 8.4  --  8.9   PHOS 4.1  --   --   --        Recent Labs     07/28/22 2250 07/30/22  0643 07/31/22  0507   AST 48* 34 35   ALT 42* 32 34   BILITOT 3.0* 2.5* 2.0*   ALKPHOS 105 79 94       Recent Labs     07/28/22  2250   INR 1.59*       Recent Labs     07/28/22  2250 07/29/22  0514 07/29/22  0846   TROPONINI <0.01 <0.01 <0.01         Urinalysis:      Lab Results   Component Value Date/Time    NITRU Negative 06/17/2022 05:00 PM    WBCUA 1 06/17/2022 05:00 PM    BACTERIA None Seen 06/17/2022 05:00 PM    RBCUA 7 06/17/2022 05:00 PM    BLOODU SMALL 06/17/2022 05:00 PM    SPECGRAV 1.010 06/17/2022 05:00 PM    GLUCOSEU 250 06/17/2022 05:00 PM       Radiology:  [id number] [place],[address] organ? LIVER, GALLBLADDER   Final Result   Fatty infiltration of the liver with mild right upper quadrant ascites. CT CHEST PULMONARY EMBOLISM W CONTRAST   Final Result   1. No pulmonary emboli. 2. Right lower lobe infiltrate with mild consolidation seen centrally and   mild peripheral ground-glass change felt to represent an acute early   consolidative pneumonic infiltrate. No cavitation. 3. No spiculated lung mass. 4. Significant cardiomegaly is identified with some contrast reflux into the   hepatic veins suggesting right-sided failure. 5. Trace upper abdominal ascites, similar to the prior exam.         XR CHEST (2 VW)   Final Result   Enlarged cardiac silhouette with mild pulmonary vascular congestion. Assessment/Plan:    Active Hospital Problems    Diagnosis     Hemoptysis [R04.2]      Priority: Medium    PNA (pneumonia) [J18.9]      Priority: Medium    Thrombus of left atrial appendage [I51.3]      Priority: Medium    VT (ventricular tachycardia) (HCC) [I47.2]      Priority: Medium    ICD (implantable cardioverter-defibrillator), biventricular, in situ [Z95.810]     Chronic atrial fibrillation (HCC) [I48.20]     Moderate mitral regurgitation [D23.4]     Acute systolic heart failure (HCC) [I50.21]     Acute on chronic combined systolic and diastolic heart failure (HCC) [I50.43]     Non-compliance [Z91.19]     Acute on chronic systolic heart failure (HCC) [I50.23]     Dilated cardiomyopathy ([address] [I42.0]     PAF (paroxysmal atrial fibrillation) ([facility] [address] [I48.0]     Benign essential HTN [I10]      PLAN:    Pneumonia  Started on combination IV antibiotics  Vancomycin discontinued with negative MRSA nasal swab. Pulmonology input appreciated. Hypokalemia  Severe yesterday, better today.   Replacing per protocol    Urinary retention  Required straight cath x1.  Able to urinate well now. No changes. Chronic atrial fibrillation  Stable on Eliquis for CVA prophylaxis  Controlled heart rate    Ischemic cardiomyopathy  Stable with no changes. Being followed by cardiology. Hypotension  I am concerned for hypotension at this time. Received 1 dose of midodrine overnight. Still hypotensive. Adding hold parameters to beta-blocker and ACE inhibitor which are also needed for cardiomyopathy, not only for hypertension. Hypoxia  No evidence of acute hypoxic respiratory failure. Hypoxemia was transient and currently patient is back on room air. Most likely secondary to pneumonia. Not a cause of concern at this time        DVT Prophylaxis: Eliquis  Diet: ADULT DIET; Regular; Low Sodium (2 gm)  Code Status: Full Code    PT/OT Eval Status: Ordered today    Dispo -probably can go home on oral antibiotics if blood pressure stabilized.     Umer Yoon MD

## 2022-07-31 NOTE — PLAN OF CARE
Problem: Pain  Goal: Verbalizes/displays adequate comfort level or baseline comfort level  7/31/2022 0512 by Gricelda Marcelino RN  Outcome: Progressing     Problem: Safety - Adult  Goal: Free from fall injury  7/31/2022 0512 by Gricelda Marcelino RN  Outcome: Progressing     Problem: Chronic Conditions and Co-morbidities  Goal: Patient's chronic conditions and co-morbidity symptoms are monitored and maintained or improved  7/31/2022 0512 by Gricelda Marcelino RN  Outcome: Progressing

## 2022-07-31 NOTE — PROGRESS NOTES
[address]   Cardiology Progress Note   Date: 7/31/2022  Admit Date: 7/28/2022     Reason for follow up: HF, AF    Chief Complaint:   Chief Complaint   Patient presents with    Cough     Cough for about 1 week; States he is \"coughing up blood\" since yesterday; denies living in any group settings; (+) chills; denies any N/V/D     History of Present Illness: History obtained from patient and medical record. Michael Moran is a 59 y.o. male with a past medical history of hypertension, sCHF, NICM with EF 20-25%, persistent atrial fibrillation. Seen by EP 2/2022 and CONSUELO was performed which showed moderate MR, HENOK thrombus, and LV dysfunction 20-25%. [place] was discussed and he declined at that time. Started on GDMT at that time. He has ILR after DCCV 7/26/2019 Dr. Girish Umana. S/p Biv AICD 4/25/2022    Presented with LE edema and cough. He is homeless, however has been compliant with medications. Interval Hx: Today, he is being seen for follow up. Potassium remains low. Continues with NSVT episodes. No new complaints today. No major events overnight. Denies having chest pain, palpitations, shortness of breath, orthopnea or dizziness. Patient seen and examined. Clinical notes reviewed. Telemetry reviewed.      Assessment and Plan:  Pneumonia, community acquired   - On RA and abx   - Pulmonology following   Chronic Atrial Fibrillation   - Atrial fibrillation rates 's   - On Eliquis and toprol    - Hx of DCCV 7/2019  Hemoptysis   - Denies recurrence today    - Continue Eliquis and monitor  Severe Ischemic CMP  - S/p Biv-AICD   - Subtherapeutic  due to atrial fibrillation and frequent VS episodes; this is not new   - Cannot tolerate more BB due to hypotension  Chronic Systolic HF   ~ Device interrogation repeated and Optivol is normal    - Appears compensated   - Continue torsemide 60 mg daily, lisinopril and Toprol   NSVT   - Likely due to CMP and hypokalemia   - Mag OK   - AICD in place   - Cannot tolerate more BB therapy due to hypotension  Hypokalemia   - Severe K+ 2.9, improved, however remainslow   - IV replacement, Keep K>4     - Replace electrolytes  - Interrogate device to evaluate Optivol and also NSVT burden  - Consider additional medical therapy with digoxin vs amiodarone therapy if NSVT does not improve with electrolyte replacement  - Keep at least one more day to monitor electrolytes    All pertinent information and plan of care discussed with the rounding/attending cardiologist.    Multiple medical conditions with risk of decompensation. All questions and concerns were addressed to the patient. Alternatives to my treatment were discussed. I have discussed the above stated plan with patient and the nurse. The patient verbalized understanding and agreed with the plan. Thank you for allowing to us to participate in the care of Kyle Menchaca.     Problem List:   Patient Active Problem List    Diagnosis Date Noted    Hemoptysis 07/30/2022    PNA (pneumonia) 07/29/2022    Syncope and collapse 04/29/2022    Thrombus of left atrial appendage     VT (ventricular tachycardia) (HCC)     ICD (implantable cardioverter-defibrillator), biventricular, in situ 04/26/2022    Acute on chronic congestive heart failure ([address]     Complicated UTI (urinary tract infection)     Chronic atrial fibrillation (HCC)     Moderate mitral regurgitation     Acute systolic heart failure ([address] 04/13/2022    NSTEMI (non-ST elevated myocardial infarction) ([address]     Acute on chronic combined systolic and diastolic heart failure ([address] 02/06/2022    Homeless     COVID     Non-compliance     Acute on chronic systolic heart failure ([address] 11/28/2021    Acute pulmonary edema ([address] 11/27/2021    Encounter for loop recorder check 07/26/2019    Acute urinary retention 07/26/2019    Dilated cardiomyopathy (HCC)     Acute renal failure (HCC)     PAF (paroxysmal atrial fibrillation) (HCC)     Benign essential HTN Acute retention of urine 07/24/2019      Allergies:  No Known Allergies    Home Meds:  Prior to Visit Medications    Medication Sig Taking?  Authorizing Provider   apixaban (ELIQUIS) 5 MG TABS tablet Take 1 tablet by mouth 2 times daily  TRENT Vargas CNP   empagliflozin (JARDIANCE) 10 MG tablet Take 1 tablet by mouth daily  TRENT Vargas CNP   metoprolol succinate (TOPROL XL) 25 MG extended release tablet Take 1 tablet by mouth daily  TRENT Vargas CNP   torsemide (DEMADEX) 20 MG tablet Take 3 tablets by mouth daily 2 with breakfast and one with lunch  TRENT Vargas CNP   tamsulosin (FLOMAX) 0.4 MG capsule Take 1 capsule by mouth daily  TRENT Vargas CNP   atorvastatin (LIPITOR) 40 MG tablet Take 1 tablet by mouth nightly  TRENT Vargas CNP   lisinopril (PRINIVIL;ZESTRIL) 2.5 MG tablet Take 1 tablet by mouth daily  TRENT Vargas CNP   finasteride (PROSCAR) 5 MG tablet Take 1 tablet by mouth daily  TRENT [place]   vitamin D (CHOLECALCIFEROL) 50 MCG (2000 UT) TABS tablet Take 1 tablet by mouth daily  TRENT [place]      Scheduled Meds:   apixaban  5 mg Oral BID    atorvastatin  40 mg Oral Nightly    finasteride  5 mg Oral Daily    lisinopril  2.5 mg Oral Daily    metoprolol succinate  25 mg Oral Daily    tamsulosin  0.4 mg Oral Daily    torsemide  60 mg Oral Daily    Vitamin D  2,000 Units Oral Daily    sodium chloride flush  5-40 mL IntraVENous 2 times per day    cefepime  2,000 mg IntraVENous Q8H    ipratropium-albuterol  1 ampule Inhalation 4x daily     Continuous Infusions:   sodium chloride 25 mL/hr at 07/30/22 0206     PRN Meds:sodium chloride flush, sodium chloride, ondansetron **OR** ondansetron, polyethylene glycol, acetaminophen **OR** acetaminophen, albuterol, magnesium sulfate, sodium phosphate IVPB **OR** sodium phosphate IVPB, potassium chloride **OR** potassium alternative oral replacement **OR** potassium chloride     Past Medical History:  Past Medical History:   Diagnosis Date    Atrial fibrillation ([facility] [address] 07/25/2019    CHF (congestive heart failure) (HCC)     Hx of blood clots     Hypertension         Past Surgical History:    has a past surgical history that includes Insertable Cardiac Monitor (07/26/2019) and Cardioversion (07/26/2019). Social History:  Reviewed. reports that he has never smoked. He has never used smokeless tobacco. He reports that he does not drink alcohol and does not use drugs. Family History:  Reviewed. family history includes Heart Attack in his mother; Heart Disease in his mother; High Blood Pressure in his father and mother; Other in his father; Stroke in his father. Denies family history of sudden cardiac death, arrhythmia, premature CAD    Review of Systems:  Constitutional: Negative for fever, weight changes, or weakness  Skin: Negative for bruising, bleeding, blood clots, or changes in skin pigment  HEENT: Negative for vision changes or dysphagia  Respiratory: Reviewed in HPI  Cardiovascular: Reviewed in HPI  Gastrointestinal: Negative for abdominal pain or black/tarry stools  Genito-Urinary: Negative for hematuria  Musculoskeletal: No focal weakness  Neurological/Psych: Negative for confusion or TIA-like symptoms. Physical Examination:  Vitals:    07/31/22 0824   BP:    Pulse:    Resp: 16   Temp:    SpO2: 97%      In: 777 [P.O.:777]  Out: 1955    Wt Readings from Last 3 Encounters:   07/31/22 195 lb 8 oz (88.7 kg)   07/25/22 208 lb (94.3 kg)   06/23/22 198 lb 3.2 oz (89.9 kg)       Intake/Output Summary (Last 24 hours) at 7/31/2022 1039  Last data filed at 7/31/2022 0444  Gross per 24 hour   Intake 777 ml   Output 1955 ml   Net -1178 ml     Telemetry: Personally Reviewed    Constitutional: Cooperative and in no apparent distress, and appears well nourished  Skin: Warm and pink; no cyanosis, bruising, or clubbing  HEENT: Symmetric and normocephalic. Conjunctiva pink with clear sclera.  Mucus membranes pink and moist.   Cardiovascular: regular rate and irregular underlying rhythm. S1 & S2, negative for murmurs. Peripheral pulses 2+, capillary refill < 3 seconds. negative elevation of JVP. 1+ LE edema  Respiratory: Respirations symmetric and unlabored. Lungs to auscultation bilaterally, no wheezing  or rhonchi + fine crackles bilateral LL  Gastrointestinal: Abdomen soft and round. Bowel sounds normoactive in all quadrants. Musculoskeletal: No focal weakness. Neurologic/Psych: Awake and orientated to person, place and time. Calm affect, appropriate mood    Pertinent labs, diagnostic, device, and imaging results reviewed as a part of this visit    Labs:    BMP:   Recent Labs     07/29/22  0618 07/30/22  0643 07/30/22  1206 07/31/22  0507    137  --  135*   K 3.4* 2.6* 3.2* 3.3*   CL 98* 94*  --  88*   CO2 24 34*  --  40*   PHOS 4.1  --   --   --    BUN 33* 26*  --  28*   CREATININE 1.4* 1.2  --  1.3   MG 2.30 2.10  --  2.10     Estimated Creatinine Clearance: 68 mL/min (based on SCr of 1.3 mg/dL).    CBC:   Recent Labs     07/28/22  2250 07/30/22  0643 07/31/22  0507   WBC 5.9 7.9 6.6   HGB 11.5* 11.3* 11.6*   HCT 35.1* 34.7* 35.1*   MCV 85.9 85.9 85.6    188 209     Thyroid:   Lab Results   Component Value Date/Time    TSH 2.66 06/15/2022 10:33 PM     Lipids:   Lab Results   Component Value Date/Time    CHOL 102 06/16/2022 04:39 AM    HDL 48 06/16/2022 04:39 AM    TRIG 65 06/16/2022 04:39 AM     LFTS:   Lab Results   Component Value Date/Time    ALT 34 07/31/2022 05:07 AM    AST 35 07/31/2022 05:07 AM    ALKPHOS 94 07/31/2022 05:07 AM    PROT 6.7 07/31/2022 05:07 AM    AGRATIO 1.0 07/31/2022 05:07 AM    BILITOT 2.0 07/31/2022 05:07 AM     Cardiac Enzymes:   Lab Results   Component Value Date/Time    TROPONINI <0.01 07/29/2022 08:46 AM    TROPONINI <0.01 07/29/2022 05:14 AM    TROPONINI <0.01 07/28/2022 10:50 PM     Coags:   Lab Results   Component Value Date/Time    PROTIME 18.9 07/28/2022 10:50 PM    INR 1.59 2022 10:50 PM     EC2022: Atrial Fibrillation    ECHO:  2/10/2022      Summary   Left ventricular size is moderately increased. Overall left ventricular   systolic function appears severely reduced with ejection fraction of 20-25%   and severe diffuse hypokinesis. Moderate mitral regurgitation. Calculated ERO of 0.33 cm2. Left atrial size appears dilated. There is an echodensity inside the left   atrial appendage consistent with left atrial appendage thrombus. The right ventricle is enlarged. Right ventricular systolic function is   mildly reduced . Mild to moderate tricuspid regurgitation. Stress Test: 2019   Resting ECG    Normal sinus rhythm. Nonspecific ST-T wave changes.     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     Minor Habermann, APRN-CNP  [address]   Office: [phone]

## 2022-07-31 NOTE — PLAN OF CARE
PT free from falls. Able to ambulate with standby assist in room. Will continue to monitor for changes.

## 2022-07-31 NOTE — PROGRESS NOTES
[facility] Pulmonary/CCM Progress note      Admit Date: 7/28/2022    Chief Complaint: Shortness of breath, cough and hemoptysis    Subjective: Interval History: Minimal hemoptysis reported by patient today, denies any significant shortness of breath or chest pain. On room air.     Scheduled Meds:   potassium chloride  20 mEq Oral BID WC    apixaban  5 mg Oral BID    atorvastatin  40 mg Oral Nightly    finasteride  5 mg Oral Daily    lisinopril  2.5 mg Oral Daily    metoprolol succinate  25 mg Oral Daily    tamsulosin  0.4 mg Oral Daily    torsemide  60 mg Oral Daily    Vitamin D  2,000 Units Oral Daily    sodium chloride flush  5-40 mL IntraVENous 2 times per day    cefepime  2,000 mg IntraVENous Q8H    ipratropium-albuterol  1 ampule Inhalation 4x daily     Continuous Infusions:   sodium chloride 25 mL/hr at 07/30/22 0206     PRN Meds:sodium chloride flush, sodium chloride, ondansetron **OR** ondansetron, polyethylene glycol, acetaminophen **OR** acetaminophen, albuterol, magnesium sulfate, sodium phosphate IVPB **OR** sodium phosphate IVPB, potassium chloride **OR** potassium alternative oral replacement **OR** potassium chloride    Review of Systems  Constitutional: negative for fatigue, fevers, malaise and weight loss  Ears, nose, mouth, throat: negative for ear drainage, epistaxis, hoarseness, nasal congestion, sore throat and voice change  Respiratory: negative except for shortness of breath  Cardiovascular: negative for chest pain, chest pressure/discomfort, irregular heart beat, lower extremity edema and palpitations  Gastrointestinal: negative for abdominal pain, constipation, diarrhea, jaundice, melena, odynophagia, reflux symptoms and vomiting  Hematologic/lymphatic: negative for bleeding, easy bruising, lymphadenopathy and petechiae  Musculoskeletal:negative for arthralgias, bone pain, muscle weakness, neck pain and stiff joints  Neurological: negative for dizziness, gait problems, headaches, seizures, speech problems, tremors and weakness  Behavioral/Psych: negative for anxiety, behavior problems, depression, fatigue and sleep disturbance  Endocrine: negative for diabetic symptoms including none, neuropathy, polyphagia, polyuria, polydipsia, vomiting and diarrhea and temperature intolerance  Allergic/Immunologic: negative for anaphylaxis, angioedema, hay fever and urticaria    Objective:     Patient Vitals for the past 8 hrs:   BP Temp Temp src Pulse Resp SpO2   07/31/22 1726 (!) 104/52 98 °F (36.7 °C) Oral 53 17 96 %   07/31/22 1245 109/61 -- -- 88 -- --   07/31/22 1239 (!) 82/57 97.5 °F (36.4 °C) Oral 89 16 95 %       I/O last 3 completed shifts: In: 1720.6 [P.O.:1377;  I.V.:6.9; IV Piggyback:336.7]  Out: 3855 [Urine:3855]  I/O this shift:  In: -   Out: 800 [Urine:800]    General Appearance: alert and oriented to person, place and time, well developed and well- nourished, in no acute distress  Skin: warm and dry, no rash or erythema  Head: normocephalic and atraumatic  Eyes: pupils equal, round, and reactive to light, extraocular eye movements intact, conjunctivae normal  ENT: external ear and ear canal normal bilaterally, nose without deformity, nasal mucosa and turbinates normal  Neck: supple and non-tender without mass, no cervical lymphadenopathy  Pulmonary/Chest: clear to auscultation bilaterally- no wheezes, rales or rhonchi, normal air movement, no respiratory distress  Cardiovascular: normal rate, regular rhythm,  no murmurs, rubs, distal pulses intact, no carotid bruits  Abdomen: soft, non-tender, non-distended, normal bowel sounds, no masses or organomegaly  Lymph Nodes: Cervical, supraclavicular normal  Extremities: no cyanosis, clubbing or edema  Musculoskeletal: normal range of motion, no joint swelling, deformity or tenderness  Neurologic: alert, no focal neurologic deficits    Data Review:  CBC:   Lab Results   Component Value Date/Time    WBC 6.6 07/31/2022 05:07 AM    RBC 4.10 07/31/2022 05:07 AM     BMP:   Lab Results   Component Value Date/Time    GLUCOSE 66 07/31/2022 05:07 AM    CO2 40 07/31/2022 05:07 AM    BUN 28 07/31/2022 05:07 AM    CREATININE 1.3 07/31/2022 05:07 AM    CALCIUM 8.9 07/31/2022 05:07 AM     ABG: No results found for: TRX7UNW, BEART, K4YLBGPZ, PHART, THGBART, FBT9QUF, PO2ART, UWH5IXN    Radiology: All pertinent images / reports were reviewed as a part of this visit. Narrative   EXAMINATION:   CTA OF THE CHEST 7/28/2022 11:50 pm       TECHNIQUE:   CTA of the chest was performed after the administration of intravenous   contrast.  Multiplanar reformatted images are provided for review. MIP   images are provided for review. Automated exposure control, iterative   reconstruction, and/or weight based adjustment of the mA/kV was utilized to   reduce the radiation dose to as low as reasonably achievable. COMPARISON:   06/15/2022       HISTORY:   ORDERING SYSTEM PROVIDED HISTORY: r.o pe   TECHNOLOGIST PROVIDED HISTORY:   Reason for exam:->r.o pe   Decision Support Exception - unselect if not a suspected or confirmed   emergency medical condition->Emergency Medical Condition (MA)   Reason for Exam: R/o PE. Cough (Cough for about 1 week; States he is   \"coughing up blood\" since yesterday; denies living in any group settings; (+)   chills; denies any N/V/D). FINDINGS:   Pulmonary Arteries: Pulmonary arteries are well opacified with contrast, with   no intraluminal emboli identified. Similar size compared to the previous   examination with prominence of the main pulmonary arteries bilaterally. This   may be related to mild underlying pulmonary hypertension. Mediastinum: No thoracic aortic aneurysm is identified. Cardiomegaly is   noted, with a 3 lead implanted cardiac device noted. No mediastinal   lymphadenopathy is seen. No focal esophageal thickening. Lungs/pleura: No pleural effusion.   Interval development of a right lower   lobe infiltrate with mild central

## 2022-08-01 VITALS
HEART RATE: 81 BPM | HEIGHT: 74 IN | WEIGHT: 184.3 LBS | TEMPERATURE: 98.2 F | BODY MASS INDEX: 23.65 KG/M2 | RESPIRATION RATE: 18 BRPM | DIASTOLIC BLOOD PRESSURE: 74 MMHG | OXYGEN SATURATION: 97 % | SYSTOLIC BLOOD PRESSURE: 113 MMHG

## 2022-08-01 LAB
ANION GAP SERPL CALCULATED.3IONS-SCNC: 10 MMOL/L (ref 3–16)
BUN BLDV-MCNC: 29 MG/DL (ref 7–20)
CALCIUM SERPL-MCNC: 9.4 MG/DL (ref 8.3–10.6)
CHLORIDE BLD-SCNC: 85 MMOL/L (ref 99–110)
CO2: 39 MMOL/L (ref 21–32)
CREAT SERPL-MCNC: 1.2 MG/DL (ref 0.8–1.3)
GFR AFRICAN AMERICAN: >60
GFR NON-AFRICAN AMERICAN: >60
GLUCOSE BLD-MCNC: 108 MG/DL (ref 70–99)
GLUCOSE BLD-MCNC: 90 MG/DL (ref 70–99)
PERFORMED ON: ABNORMAL
POTASSIUM SERPL-SCNC: 3.1 MMOL/L (ref 3.5–5.1)
PRO-BNP: ABNORMAL PG/ML (ref 0–124)
SODIUM BLD-SCNC: 134 MMOL/L (ref 136–145)

## 2022-08-01 PROCEDURE — 2580000003 HC RX 258: Performed by: HOSPITALIST

## 2022-08-01 PROCEDURE — 6370000000 HC RX 637 (ALT 250 FOR IP): Performed by: NURSE PRACTITIONER

## 2022-08-01 PROCEDURE — 94640 AIRWAY INHALATION TREATMENT: CPT

## 2022-08-01 PROCEDURE — 97161 PT EVAL LOW COMPLEX 20 MIN: CPT

## 2022-08-01 PROCEDURE — 94761 N-INVAS EAR/PLS OXIMETRY MLT: CPT

## 2022-08-01 PROCEDURE — 6370000000 HC RX 637 (ALT 250 FOR IP): Performed by: HOSPITALIST

## 2022-08-01 PROCEDURE — 6370000000 HC RX 637 (ALT 250 FOR IP): Performed by: INTERNAL MEDICINE

## 2022-08-01 PROCEDURE — 99233 SBSQ HOSP IP/OBS HIGH 50: CPT | Performed by: NURSE PRACTITIONER

## 2022-08-01 PROCEDURE — 97530 THERAPEUTIC ACTIVITIES: CPT

## 2022-08-01 PROCEDURE — 36415 COLL VENOUS BLD VENIPUNCTURE: CPT

## 2022-08-01 PROCEDURE — 80048 BASIC METABOLIC PNL TOTAL CA: CPT

## 2022-08-01 PROCEDURE — 97165 OT EVAL LOW COMPLEX 30 MIN: CPT

## 2022-08-01 PROCEDURE — 97116 GAIT TRAINING THERAPY: CPT

## 2022-08-01 PROCEDURE — 83880 ASSAY OF NATRIURETIC PEPTIDE: CPT

## 2022-08-01 PROCEDURE — 99232 SBSQ HOSP IP/OBS MODERATE 35: CPT | Performed by: INTERNAL MEDICINE

## 2022-08-01 RX ORDER — METOPROLOL SUCCINATE 25 MG/1
12.5 TABLET, EXTENDED RELEASE ORAL 2 TIMES DAILY
Qty: 30 TABLET | Refills: 3 | Status: SHIPPED | OUTPATIENT
Start: 2022-08-01

## 2022-08-01 RX ORDER — POTASSIUM CHLORIDE 20 MEQ/1
20 TABLET, EXTENDED RELEASE ORAL 2 TIMES DAILY WITH MEALS
Qty: 60 TABLET | Refills: 3 | Status: SHIPPED | OUTPATIENT
Start: 2022-08-01 | End: 2022-09-08

## 2022-08-01 RX ORDER — METOPROLOL SUCCINATE 25 MG/1
12.5 TABLET, EXTENDED RELEASE ORAL 2 TIMES DAILY
Status: DISCONTINUED | OUTPATIENT
Start: 2022-08-01 | End: 2022-08-01 | Stop reason: HOSPADM

## 2022-08-01 RX ORDER — AMOXICILLIN AND CLAVULANATE POTASSIUM 875; 125 MG/1; MG/1
1 TABLET, FILM COATED ORAL EVERY 12 HOURS SCHEDULED
Qty: 12 TABLET | Refills: 0 | Status: SHIPPED | OUTPATIENT
Start: 2022-08-01 | End: 2022-08-07

## 2022-08-01 RX ADMIN — TAMSULOSIN HYDROCHLORIDE 0.4 MG: 0.4 CAPSULE ORAL at 09:24

## 2022-08-01 RX ADMIN — POTASSIUM CHLORIDE 20 MEQ: 1500 TABLET, EXTENDED RELEASE ORAL at 09:24

## 2022-08-01 RX ADMIN — Medication 2000 UNITS: at 09:23

## 2022-08-01 RX ADMIN — TORSEMIDE 60 MG: 20 TABLET ORAL at 09:24

## 2022-08-01 RX ADMIN — IPRATROPIUM BROMIDE AND ALBUTEROL SULFATE 1 AMPULE: 2.5; .5 SOLUTION RESPIRATORY (INHALATION) at 08:17

## 2022-08-01 RX ADMIN — Medication 10 ML: at 09:24

## 2022-08-01 RX ADMIN — IPRATROPIUM BROMIDE AND ALBUTEROL SULFATE 1 AMPULE: 2.5; .5 SOLUTION RESPIRATORY (INHALATION) at 16:07

## 2022-08-01 RX ADMIN — APIXABAN 5 MG: 5 TABLET, FILM COATED ORAL at 09:24

## 2022-08-01 RX ADMIN — AMOXICILLIN AND CLAVULANATE POTASSIUM 1 TABLET: 875; 125 TABLET, FILM COATED ORAL at 09:23

## 2022-08-01 RX ADMIN — FINASTERIDE 5 MG: 5 TABLET, FILM COATED ORAL at 09:23

## 2022-08-01 RX ADMIN — POTASSIUM CHLORIDE 40 MEQ: 1500 TABLET, EXTENDED RELEASE ORAL at 16:17

## 2022-08-01 NOTE — PROGRESS NOTES
[facility] [address]   Phone: [phone]    Occupational Therapy    [x] Initial Evaluation            [] Daily Treatment Note         [x] Discharge Summary      Patient: Kyle Menchaca   : 1960   MRN: [id number]   Date of Service:  2022    Admitting Diagnosis:  PNA (pneumonia)  Current Admission Summary: Johnie Madrid, APRN-CNP on  \"Manoj Prado is a 59 y.o. male with a past medical history of hypertension, sCHF, NICM with EF 20-25%, persistent atrial fibrillation. Seen by EP 2022 and CONSUELO was performed which showed moderate MR, HENOK thrombus, and LV dysfunction 20-25%. Aydin Crump was discussed and he declined at that time. Started on GDMT at that time. He has ILR after DCCV 2019 Dr. Valery Garcia. S/p Biv AICD 2022. Presented with LE edema and cough. He is homeless, however has been compliant with medications. \"  Past Medical History:  has a past medical history of Atrial fibrillation ([address] CHF (congestive heart failure) ([facility] [address] Hx of blood clots, and Hypertension. Past Surgical History:  has a past surgical history that includes Insertable Cardiac Monitor (2019) and Cardioversion (2019). Discharge Recommendations: Kyle Menchaca scored a 24/24 on the AM-PAC ADL Inpatient form. At this time, no further OT is recommended upon discharge due to patient at independent level. Recommend patient returns to prior setting with prior services. DME Required For Discharge: No DME required    Precautions/Restrictions: low fall risk, up as tolerated, .   Comment: Low sodium diet    Pre-Admission Information  Lives With: friends                   Type of Home: house  Transfer Assistance: Independent without use of device  Ambulation Assistance:Independent without use of device  ADL Assistance: independent with all ADL's  IADL Assistance: independent with homemaking tasks  Active :        [x] Yes                 [] No tries to drive as little as possible   Hand Dominance: [] Left                 [x] Right  Recent Falls: denies  Comment: Per Minor Habermann, APRN-CNP on 7/31, pt is homeless. Pt with unclear living situation. Examination   Vision:   Vision Gross Assessment: Impaired doesn't wear glasses but pt states he needs  Hearing:   WFL states his ears feel \"stuffy\"  Observation:   General Observation:  Pt supine in bed. Peripheral IV in left forearm. Posture:   WFL  Sensation:   reports numbness and tingling in (B) LE  ROM:   (B) UE ROM WFL  Strength:   (B) UE gross strength WFL    Decision Making: low complexity  Clinical Presentation: stable      Subjective  General: Pt is pleasant and agreeable to OT/PT co-treat and evaluation. Pain: 0/10  Pain Interventions: not applicable        Activities of Daily Living  Basic Activities of Daily Living  Dressing Comments: Pt denies need to change clothes, pt requests socks so he can change them later, pt able to reach feet while seated upright in bed  General Comments: Pt is pleasant and agreeable to therapeutic activity but denies ADLs  Instrumental Activities of Daily Living  No IADL completed on this date. Functional Mobility  Bed Mobility  Supine to Sit: modified independent  Comments: HOB elevated, pt verbalized being concerned regarding BP, BP seated on /67  Transfers  Sit to stand transfer:stand by assistance  Stand to sit transfer: stand by assistance  Comments: Good safety awareness. Functional Mobility:  Sitting Balance: modified independent. Standing Balance: supervision.     Functional Mobility: .  stand by assistance  Functional Mobility Activity: ~300 ft on hospital unit  Functional Mobility Comment: no AE  Comments: /86 after functional ambulation    Functional Outcomes  AM-PAC Inpatient Daily Activity Raw Score: 24    Cognition  WFL  Orientation:    A&O x 4  Command Following:   [facility]     Education  Barriers To Learning: none  Patient Education: Patient educated on plan of care, discharge recommendations  Learning Assessment:  Patient verbalized and demonstrates understanding    Assessment  Assessment: Pt BP WFL. Pt tolerated OT/PT well with no rest breaks. Impairments Requiring Therapeutic Intervention: none - eval with same day discharge  Prognosis: good without need for therapy intervention  Clinical Assessment: Patient presenting at independent level for completion of required self care tasks for return to home. Eval with d/c at this time. No therapy services indicated. Safety Interventions: patient left in bed and call light within reach    Plan  Frequency: Eval with same day discharge. No follow up required. Current Treatment Recommendations: Not applicable, evaluation completed with same day discharge. Goals  Patient eval with same day discharge. No goals set as patient is at baseline self care status.       Therapy Session Time     Individual Group Co-treatment   Time In    6495   Time Out    8272   Minutes    23        Timed Code Treatment Minutes:  Timed Code Treatment Minutes: 8 Minutes    Total Treatment Minutes:  23     Electronically Signed By: Pattricia Cabot, [address] [place] [address],[address] OLLIE Quinn/L, [id number]

## 2022-08-01 NOTE — PROGRESS NOTES
[facility] [address]   Phone: [phone]    Physical Therapy    [x] Initial Evaluation            [] Daily Treatment Note         [x] Discharge Summary      Patient: Cece Grimaldo   : 1960   MRN: [id number]   Date of Service:  2022  Admitting Diagnosis: PNA (pneumonia)  Current Admission Summary: Cece Grimaldo is a 59 y.o. male who presents to the emergency department today for evaluation for a cough. The patient states that he has been experiencing a cough, and he states that this has been ongoing for the past week, has been worse over the past 2 to 3 days. Patient states that he has been coughing up bright red blood for the past 2 days. Patient states that this seems to be improving. Patient does have a history of atrial fibrillation, CHF, history of blood clots and is anticoagulated on Eliquis. Patient states that he is compliant with his medications  Past Medical History:  has a past medical history of Atrial fibrillation ([address] CHF (congestive heart failure) ([address] Hx of blood clots, and Hypertension. Past Surgical History:  has a past surgical history that includes Insertable Cardiac Monitor (2019) and Cardioversion (2019). Discharge Recommendations: Cece Grimaldo scored a 24/24 on the AM-PAC short mobility form. At this time, no further PT is recommended upon discharge due to functional mobility presenting at baseline. Recommend patient returns to prior setting with prior services.      DME Required For Discharge: no DME required at discharge  Precautions/Restrictions: no restrictions  Weight Bearing Restrictions: no restrictions  [] Right Upper Extremity  [] Left Upper Extremity [] Right Lower Extremity  [] Left Lower Extremity     Required Braces/Orthotics: no braces required   [] Right  [] Left  Positional Restrictions:no positional restrictions    Pre-Admission Information   Lives With: friends    Type of Home: house    Home Equipment: no prior equipment  Transfer Assistance: Independent without use of device  Ambulation Assistance:Independent without use of device  ADL Assistance: independent with all ADL's    Active :        [x] Yes  [] No    tries to drive as little as possible   Hand Dominance: [] Left  [x] Right    Comments: pt appears to be questionable historian about living environment. per chart review pt may be homeless. Pt states that he is living with friends at this time but unable to give details of home    Examination   Vision:   Vision Gross Assessment: Impaired doesn't wear glasses but pt states he needs some  Hearing:   WFL states his ears feel \"stuffy\"  Observation:   General Observation:  pt  sitting up in bed   Posture:   Good- pt has upright posture during sitting and standing. Sensation:   reports numbness and tingling in (B) LE in the toes, pt states it feels like \"frostbite\"    Tone:   Normotonic    ROM:   (B) LE AROM WFL observed with functional mobility   Strength:   (B) LE strength grossly WFL observed with functional mobility   Decision Making: low complexity  Clinical Presentation: stable      Subjective  General: pt states his cough is better and he can breathe better. Denies any SOB. Pt reports \"things are improving\"  Pain: 0/10  Pain Interventions: not applicable       Functional Mobility  Bed Mobility  Supine to Sit: Independent  Comments:  Transfers  Sit to stand transfer: Independent  Stand to sit transfer: Independent  Comments:  Ambulation  Surface:level surface  Assistance: supervision  Distance: 300ft  Gait Mechanics: increased stance time on R, decreased stance time on L, pronation of both feet, medial/lateral sway, decreased savanah  Comments:    Stair Mobility  Stair mobility not completed on this date. Comments:  Wheelchair Mobility:  No w/c mobility completed on this date.   Comments:  Balance  Static Sitting Balance: good(+): independent with high level dynamic balance in unsupported position  Dynamic Sitting Balance: good(+): independent with high level dynamic balance in unsupported position  Static Standing Balance: good(+): independent with high level dynamic balance in unsupported position  Dynamic Standing Balance: good(+): independent with high level dynamic balance in unsupported position  Comments:    Other Therapeutic Interventions    Functional Outcomes  AM-PAC Inpatient Mobility Raw Score : 24              Cognition  WFL  Orientation:    alert and oriented x 4  Command Following:   [facility]    Education  Barriers To Learning: none  Patient Education: patient educated on PT role and benefits, discharge recommendations  Learning Assessment:  patient verbalizes and demonstrates understanding    Assessment  Activity Tolerance: pt tolerated evaluation well. Pt stated he was ready and excited to get up. When asked if he needed a break during ambulation he denied and stated he felt good and that he could keep going. Prior to ambulation pt had /67 and HR 56; after ambulation /86 and HR 94. Impairments Requiring Therapeutic Intervention: none - eval with same day discharge  Prognosis: good  Clinical Assessment: pt presents with minor gait deviations including medial/lateral sway, decrease stance time on L, and decreased savanah. Prior to admission pt was experiencing SOB and was independent with mobility and ADLs. Currently pt is not experiencing SOB and is performing at baseline. Pt is to be discharged from PT this date with no recommendations for AD or OP/home therapy services. Safety Interventions: patient left in bed; physician in room, call light in reach, RN notified    Plan  Frequency: Eval with same day discharge. No follow up required. Current Treatment Recommendations: not applicable, evaluation completed with same day discharge.     Goals  No acute PT goals    Therapy Session Time      Individual Group Co-treatment   Time In     1109   Time Out     1132 Minutes     23     Timed Code Treatment Minutes:   8  Total Treatment Minutes:  23     Randa Negron, SPT  Electronically Signed By:   PT observed and directed the above evaluation/treatment.   [address], DPT [id number]

## 2022-08-01 NOTE — DISCHARGE SUMMARY
Hospital Medicine Discharge Summary    Patient ID: Dominic Perla      Patient's PCP: No primary care provider on file. Admit Date: 7/28/2022     Discharge Date:   08/01/22     Admitting Provider: Delma Maldonado MD     Discharge Provider: China Medina MD     Discharge Diagnoses: Active Hospital Problems    Diagnosis     Hemoptysis [R04.2]      Priority: Medium    PNA (pneumonia) [J18.9]      Priority: Medium    Thrombus of left atrial appendage [I51.3]      Priority: Medium    VT (ventricular tachycardia) (HCC) [I47.2]      Priority: Medium    ICD (implantable cardioverter-defibrillator), biventricular, in situ [Z95.810]     Chronic atrial fibrillation (HCC) [I48.20]     Moderate mitral regurgitation [B39.2]     Acute systolic heart failure (HCC) [I50.21]     Acute on chronic combined systolic and diastolic heart failure (HCC) [I50.43]     Non-compliance [Z91.19]     Acute on chronic systolic heart failure ([address] [I50.23]     Dilated cardiomyopathy ([facility] [address] [I42.0]     PAF (paroxysmal atrial fibrillation) ([facility] [address] [I48.0]     Benign essential HTN [I10]        The patient was seen and examined on day of discharge and this discharge summary is in conjunction with any daily progress note from day of discharge. Hospital Course:     Patient was admitted with acute shortness of breath and hypoxia. His hypoxia quickly resolved and currently is stable on room air. Diagnosed with pneumonia. Started on IV antibiotics, switched to oral Augmentin by pulmonology. Also evaluated by cardiology. Severe hypokalemia was noted. Diuretics were adjusted and potassium supplementation was added. Jardiance and lisinopril are being kept on hold. Can be resumed if stable over the next week. At this point, patient will be discharged home. Follow-up with PCP and cardiology.     Also evaluated by therapy and was found stable with no further needs    Of note, spironolactone would ordinarily be indicated, but was not prescribed by cardiology due to patient's past history of noncompliance with medications and follow-up. Physical Exam Performed:     /74   Pulse 81   Temp 98.2 °F (36.8 °C) (Oral)   Resp 18   Ht 6' 2\" (1.88 m)   Wt 184 lb 4.8 oz (83.6 kg)   SpO2 93%   BMI 23.66 kg/m²       General appearance:  No apparent distress, appears stated age and cooperative. HEENT:  Normal cephalic, atraumatic without obvious deformity. Pupils equal, round, and reactive to light. Extra ocular muscles intact. Conjunctivae/corneas clear. Neck: Supple, with full range of motion. No jugular venous distention. Trachea midline. Respiratory:  Normal respiratory effort. Clear to auscultation, bilaterally without Rales/Wheezes/Rhonchi. Cardiovascular:  Regular rate and rhythm with normal S1/S2 without murmurs, rubs or gallops. Abdomen: Soft, non-tender, non-distended with normal bowel sounds. Musculoskeletal:  No clubbing, cyanosis or edema bilaterally. Full range of motion without deformity. Skin: Skin color, texture, turgor normal.  No rashes or lesions. Neurologic:  Neurovascularly intact without any focal sensory/motor deficits. Cranial nerves: II-XII intact, grossly non-focal.  Psychiatric:  Alert and oriented, thought content appropriate, normal insight  Capillary Refill: Brisk,< 3 seconds   Peripheral Pulses: +2 palpable, equal bilaterally       Labs:  For convenience and continuity at follow-up the following most recent labs are provided:      CBC:    Lab Results   Component Value Date/Time    WBC 6.6 07/31/2022 05:07 AM    HGB 11.6 07/31/2022 05:07 AM    HCT 35.1 07/31/2022 05:07 AM     07/31/2022 05:07 AM       Renal:    Lab Results   Component Value Date/Time     07/31/2022 05:07 AM    K 3.2 07/31/2022 11:50 AM    K 2.6 07/30/2022 06:43 AM    CL 88 07/31/2022 05:07 AM    CO2 40 07/31/2022 05:07 AM    BUN 28 07/31/2022 05:07 AM    CREATININE 1.3 07/31/2022 05:07 AM    CALCIUM 8.9 07/31/2022 05:07 AM    PHOS 4.1 07/29/2022 06:18 AM         Significant Diagnostic Studies    Radiology:   US ABDOMEN LIMITED Specify organ? LIVER, GALLBLADDER   Final Result   Fatty infiltration of the liver with mild right upper quadrant ascites. CT CHEST PULMONARY EMBOLISM W CONTRAST   Final Result   1. No pulmonary emboli. 2. Right lower lobe infiltrate with mild consolidation seen centrally and   mild peripheral ground-glass change felt to represent an acute early   consolidative pneumonic infiltrate. No cavitation. 3. No spiculated lung mass. 4. Significant cardiomegaly is identified with some contrast reflux into the   hepatic veins suggesting right-sided failure. 5. Trace upper abdominal ascites, similar to the prior exam.         XR CHEST (2 VW)   Final Result   Enlarged cardiac silhouette with mild pulmonary vascular congestion. Consults:     IP CONSULT TO CARDIOLOGY  IP CONSULT TO PULMONOLOGY  IP CONSULT TO HEART FAILURE NURSE/COORDINATOR    Disposition: Home    Condition at Discharge: Stable    Discharge Instructions/Follow-up: PCP, cardiology    Code Status:  Full Code     Activity: activity as tolerated    Diet: diabetic diet      Discharge Medications:     Current Discharge Medication List             Details   amoxicillin-clavulanate (AUGMENTIN) 875-125 MG per tablet Take 1 tablet by mouth in the morning and 1 tablet before bedtime. Do all this for 6 days. Qty: 12 tablet, Refills: 0      potassium chloride (KLOR-CON M) 20 MEQ extended release tablet Take 1 tablet by mouth in the morning and 1 tablet in the evening. Take with meals.   Qty: 60 tablet, Refills: 3                Details   metoprolol succinate (TOPROL XL) 25 MG extended release tablet Take 0.5 tablets by mouth in the morning and at bedtime  Qty: 30 tablet, Refills: 3      torsemide 60 MG TABS Take 60 mg by mouth daily  Qty: 30 tablet, Refills: 3                Details   apixaban (ELIQUIS) 5 MG TABS tablet Take 1 tablet by mouth 2 times daily  Qty: 60 tablet, Refills: 3      tamsulosin (FLOMAX) 0.4 MG capsule Take 1 capsule by mouth daily  Qty: 30 capsule, Refills: 3      atorvastatin (LIPITOR) 40 MG tablet Take 1 tablet by mouth nightly  Qty: 30 tablet, Refills: 2      finasteride (PROSCAR) 5 MG tablet Take 1 tablet by mouth daily  Qty: 30 tablet, Refills: 0      vitamin D (CHOLECALCIFEROL) 50 MCG (2000 UT) TABS tablet Take 1 tablet by mouth daily  Qty: 90 tablet, Refills: 1    Associated Diagnoses: Acute on chronic systolic heart failure (HCC)             Time Spent on discharge is more than 45 minutes in the examination, evaluation, counseling and review of medications and discharge plan. Signed:    Shaq Valdes MD   8/1/2022      Thank you for the opportunity to be involved in this patient's care. If you have any questions or concerns, please feel free to contact me at [phone].

## 2022-08-01 NOTE — PROGRESS NOTES
[address]  HEART FAILURE  Progress Note      Admit Date 7/28/2022     Reason for Consult:      Reason for Consultation/Chief Complaint: cough    HPI:    Deshawn Patterson is a 59 y.o. male with PMH HTn, NICM, HFrEF, AF, MR, HENOK thrombus, ICD April 2022, and homelessness admitted with cough and edema. He has diuresed about 1500, remains hypotensive today, cardiac meds held this am.       Subjective:  Patient is being seen for CHF/CMP. There were no acute overnight cardiac events. Today Mr. Ramakrishna Nolasco denies chest pain, shortness of breath, palpitations    Review of Systems - General ROS: negative  Hematological and Lymphatic ROS: negative  Respiratory ROS: no cough, shortness of breath, or wheezing  Cardiovascular ROS: no chest pain or dyspnea on exertion  Gastrointestinal ROS: no abdominal pain, change in bowel habits, or black or bloody stools  Musculoskeletal ROS: negative  Neurological ROS: no TIA or stroke symptoms     Baseline Weight: 184 hosp scale   Wt Readings from Last 3 Encounters:   08/01/22 184 lb 4.8 oz (83.6 kg)   07/25/22 208 lb (94.3 kg)   06/23/22 198 lb 3.2 oz (89.9 kg)         Cardiac Testing:   ECHO:  2/10/2022      Summary   Left ventricular size is moderately increased. Overall left ventricular   systolic function appears severely reduced with ejection fraction of 20-25%   and severe diffuse hypokinesis. Moderate mitral regurgitation. Calculated ERO of 0.33 cm2. Left atrial size appears dilated. There is an echodensity inside the left   atrial appendage consistent with left atrial appendage thrombus. The right ventricle is enlarged. Right ventricular systolic function is   mildly reduced . Mild to moderate tricuspid regurgitation. Stress Test: 7/26/2019   Resting ECG    Normal sinus rhythm. Nonspecific ST-T wave changes.     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     Device: Medtronic ICD with optivol     Core measures for HF:  EF: 20-25% ACEi/ARB/ARNI: lisinopril  BB: Metoprolol succinate  Damian:  none for noncompliance  with labs  Hydralazine/nitrates:  SGLT2i: Jardiance    NYHA Class III      Objective:   BP 95/63   Pulse 88   Temp 98.2 °F (36.8 °C) (Oral)   Resp 18   Ht 6' 2\" (1.88 m)   Wt 184 lb 4.8 oz (83.6 kg)   SpO2 98%   BMI 23.66 kg/m²     Intake/Output Summary (Last 24 hours) at 8/1/2022 0947  Last data filed at 8/1/2022 0930  Gross per 24 hour   Intake 240 ml   Output 800 ml   Net -560 ml      In: 240 [P.O.:240]  Out: 800       Physical Exam:  General Appearance:  Non-obese/Well Nourished  Respiratory:  Resp Auscultation: Normal breath sounds without dullness  Cardiovascular:   Auscultation: Regular rate and rhythm, normal S1S2, no m/g/r/c  Palpation: Normal    JVD: none  Pedal Pulses: 2+ and equal   Abdomen:  Soft, NT, ND, + bs  Extremities:  No Cyanosis or Clubbing  Extremities: Trace and pitting edema  Neurological/Psychiatric:  Oriented to time, place, and person  Non-anxious    MEDICATIONS:   Scheduled Meds:   Scheduled Meds:   potassium chloride  20 mEq Oral BID WC    amoxicillin-clavulanate  1 tablet Oral 2 times per day    apixaban  5 mg Oral BID    atorvastatin  40 mg Oral Nightly    finasteride  5 mg Oral Daily    lisinopril  2.5 mg Oral Daily    metoprolol succinate  25 mg Oral Daily    tamsulosin  0.4 mg Oral Daily    torsemide  60 mg Oral Daily    Vitamin D  2,000 Units Oral Daily    sodium chloride flush  5-40 mL IntraVENous 2 times per day    ipratropium-albuterol  1 ampule Inhalation 4x daily     Continuous Infusions:   sodium chloride 25 mL/hr at 07/30/22 0206     PRN Meds:.sodium chloride flush, sodium chloride, ondansetron **OR** ondansetron, polyethylene glycol, acetaminophen **OR** acetaminophen, albuterol, magnesium sulfate, sodium phosphate IVPB **OR** sodium phosphate IVPB, potassium chloride **OR** potassium alternative oral replacement **OR** potassium chloride  Continuous Infusions:   sodium chloride 25 mL/hr at 07/30/22 0206       Intake/Output Summary (Last 24 hours) at 8/1/2022 0947  Last data filed at 8/1/2022 0930  Gross per 24 hour   Intake 240 ml   Output 800 ml   Net -560 ml       Lab Data:  CBC:   Lab Results   Component Value Date/Time    WBC 6.6 07/31/2022 05:07 AM    HGB 11.6 07/31/2022 05:07 AM     07/31/2022 05:07 AM     BMP:  Lab Results   Component Value Date/Time     07/31/2022 05:07 AM    K 3.2 07/31/2022 11:50 AM    K 2.6 07/30/2022 06:43 AM    CL 88 07/31/2022 05:07 AM    CO2 40 07/31/2022 05:07 AM    BUN 28 07/31/2022 05:07 AM    CREATININE 1.3 07/31/2022 05:07 AM    GLUCOSE 66 07/31/2022 05:07 AM     INR:   Lab Results   Component Value Date/Time    INR 1.59 07/28/2022 10:50 PM    INR 1.50 06/15/2022 06:48 PM    INR 1.50 02/05/2022 11:01 PM        CARDIAC LABS  ENZYMES:No results for input(s): CKMB, CKMBINDEX, TROPONINI in the last 72 hours.     Invalid input(s): CKTOTAL;3  FASTING LIPID PANEL:  Lab Results   Component Value Date/Time    HDL 48 06/16/2022 04:39 AM    LDLCALC 41 06/16/2022 04:39 AM    TRIG 65 06/16/2022 04:39 AM    TSH 2.66 06/15/2022 10:33 PM     LIVER PROFILE:  Lab Results   Component Value Date/Time    AST 35 07/31/2022 05:07 AM    AST 34 07/30/2022 06:43 AM    ALT 34 07/31/2022 05:07 AM    ALT 32 07/30/2022 06:43 AM     BNP:   Lab Results   Component Value Date/Time    PROBNP 32,131 07/28/2022 10:50 PM    PROBNP 19,307 07/25/2022 02:46 PM    PROBNP 13,453 06/23/2022 04:00 PM     Iron Studies:    Lab Results   Component Value Date/Time    FERRITIN 163.1 06/17/2022 05:35 AM     Lab Results   Component Value Date    IRON 48 (L) 06/17/2022    TIBC 310 06/17/2022    FERRITIN 163.1 06/17/2022      Iron Deficiency Anemia:  Yes IV Iron Therapy:  Yes, June 2022 inpt  2017 ACC/AHA HF Guidelines:   intravenous iron replacement in patients with New York Heart Association (NYHA) class II and III HF and iron deficiency(ferritin <100 ng/ml or 100-300 ng/ml if transferrin saturation <20%), to improve functional status and QoL. 1. WEIGHT: Admit Weight: 210 lb 12.2 oz (95.6 kg)      Today  Weight: 184 lb 4.8 oz (83.6 kg)   2. I/O   Intake/Output Summary (Last 24 hours) at 8/1/2022 0947  Last data filed at 8/1/2022 0930  Gross per 24 hour   Intake 240 ml   Output 800 ml   Net -560 ml         Assessment/Plan:     CHF- optivol under baseline, continue torsemide 60mg/day, pt ok for d/c today if BP and stable  ICM- change toprol to 12.5mg bid and continue holding ACE and jardiance,  can restart at OV this week if BP ok, no cathy for noncompliance with meds  AF- restart BB, continue AC  NSVT- continue BB  Hypokalemia- recheck today, goal >4      The patient was seen for >25 minutes. I reviewed interval history, physical exam, review of data including labs, imaging, development and implementation of treatment plan and coordination of complex care.  More than 50% of the time was devoted to counseling the patient on their diagnoses/treatments, as well as coordination of care with the other care teams    I appreciate the opportunity of cooperating in the care of this individual.    Royer Michael, TRENT - CNP, ACNP, AGPCNP 8/1/2022, 9:47 AM  Heart Failure  The [address], [address] [facility] [place]  Ph: [phone]      Core Measures:   Discharge instructions:   LVEF documented:   ACEI for LV dysfunction:   Smoking Cessation:

## 2022-08-01 NOTE — PROGRESS NOTES
Physician Progress Note      PATIENT:               Chanelle Macdonald  CSN #:                  [id number]  :                       1960  ADMIT DATE:       2022 9:47 PM  [address] [place] DATE:        2022 4:44 PM  RESPONDING  PROVIDER #:        Uziel Sibley MD          QUERY TEXT:    Pt admitted with Pneumonia. Pt noted to have been on iv antibiotics. If   possible, please document in the progress notes and discharge summary if you   are evaluating and/or treating any of the following:      Note: CAP and HCAP indicate where the pneumonia was acquired, not a specific   type. The medical record reflects the following:  Risk Factors: Age, PNA, A/C sCHF, HTN, CMP  Clinical Indicators: Per DC Summary \"Diagnosed with pneumonia. Started on IV   antibiotics, switched to oral Augmentin by pulmonology. \" Treated w/ iv   antibiotics. Treatment: Cardiology/Pulmonology consults, iv Maxipime, iv Vancomycin, po   Augmentin, iv Lasix, CxR, CT Chest/PE, monitor resp. status & labs  Options provided:  -- Possible Gram negative pneumonia  -- Other - I will add my own diagnosis  -- Disagree - Not applicable / Not valid  -- Disagree - Clinically unable to determine / Unknown  -- Refer to Clinical Documentation Reviewer    PROVIDER RESPONSE TEXT:    This patient has possible gram negative pneumonia.     Query created by: Aftab Vasquez on 2022 3:54 PM      Electronically signed by:  Uziel Sibley MD 2022 5:11 PM

## 2022-08-01 NOTE — PROGRESS NOTES
CLINICAL PHARMACY NOTE: MEDS TO BEDS    Total # of Prescriptions Filled:  11   The following medications were delivered to the patient:    Vitamin d  Lisinopril  Amoxicillin-pot clav  Potassium  Metoprolol  Eliquis  Finasteride  Tamsulosin  Torsemide   jardiance  Additional Documentation:  Pt signed

## 2022-08-01 NOTE — PROGRESS NOTES
[facility] Pulmonary/CCM Progress note      Admit Date: 7/28/2022    Chief Complaint: Shortness of breath, cough and hemoptysis    Subjective: Interval History: Small amounts of hemoptysis persist, but overall patient reports improved shortness of breath and cough. Denies any chest pain. On room air.     Scheduled Meds:   metoprolol succinate  12.5 mg Oral BID    potassium chloride  20 mEq Oral BID WC    amoxicillin-clavulanate  1 tablet Oral 2 times per day    apixaban  5 mg Oral BID    atorvastatin  40 mg Oral Nightly    finasteride  5 mg Oral Daily    lisinopril  2.5 mg Oral Daily    tamsulosin  0.4 mg Oral Daily    torsemide  60 mg Oral Daily    Vitamin D  2,000 Units Oral Daily    sodium chloride flush  5-40 mL IntraVENous 2 times per day    ipratropium-albuterol  1 ampule Inhalation 4x daily     Continuous Infusions:   sodium chloride 25 mL/hr at 07/30/22 0206     PRN Meds:sodium chloride flush, sodium chloride, ondansetron **OR** ondansetron, polyethylene glycol, acetaminophen **OR** acetaminophen, albuterol, magnesium sulfate, sodium phosphate IVPB **OR** sodium phosphate IVPB, potassium chloride **OR** potassium alternative oral replacement **OR** potassium chloride    Review of Systems  Constitutional: negative for fatigue, fevers, malaise and weight loss  Ears, nose, mouth, throat: negative for ear drainage, epistaxis, hoarseness, nasal congestion, sore throat and voice change  Respiratory: negative except for shortness of breath  Cardiovascular: negative for chest pain, chest pressure/discomfort, irregular heart beat, lower extremity edema and palpitations  Gastrointestinal: negative for abdominal pain, constipation, diarrhea, jaundice, melena, odynophagia, reflux symptoms and vomiting  Hematologic/lymphatic: negative for bleeding, easy bruising, lymphadenopathy and petechiae  Musculoskeletal:negative for arthralgias, bone pain, muscle weakness, neck pain and stiff joints  Neurological: negative for dizziness, gait problems, headaches, seizures, speech problems, tremors and weakness  Behavioral/Psych: negative for anxiety, behavior problems, depression, fatigue and sleep disturbance  Endocrine: negative for diabetic symptoms including none, neuropathy, polyphagia, polyuria, polydipsia, vomiting and diarrhea and temperature intolerance  Allergic/Immunologic: negative for anaphylaxis, angioedema, hay fever and urticaria    Objective:     Patient Vitals for the past 8 hrs:   BP Temp Temp src Pulse Resp SpO2   08/01/22 0900 95/63 98.2 °F (36.8 °C) Oral 88 18 98 %   08/01/22 0818 -- -- -- 78 18 100 %       I/O last 3 completed shifts:   In: 800 [P.O.:777]  Out: 1380 [Urine:1380]  I/O this shift:  In: 240 [P.O.:240]  Out: -     General Appearance: alert and oriented to person, place and time, well developed and well- nourished, in no acute distress  Skin: warm and dry, no rash or erythema  Head: normocephalic and atraumatic  Eyes: pupils equal, round, and reactive to light, extraocular eye movements intact, conjunctivae normal  ENT: external ear and ear canal normal bilaterally, nose without deformity, nasal mucosa and turbinates normal  Neck: supple and non-tender without mass, no cervical lymphadenopathy  Pulmonary/Chest: clear to auscultation bilaterally- no wheezes, rales or rhonchi, normal air movement, no respiratory distress  Cardiovascular: normal rate, regular rhythm,  no murmurs, rubs, distal pulses intact, no carotid bruits  Abdomen: soft, non-tender, non-distended, normal bowel sounds, no masses or organomegaly  Lymph Nodes: Cervical, supraclavicular normal  Extremities: no cyanosis, clubbing or edema  Musculoskeletal: normal range of motion, no joint swelling, deformity or tenderness  Neurologic: alert, no focal neurologic deficits    Data Review:  CBC:   Lab Results   Component Value Date/Time    WBC 6.6 07/31/2022 05:07 AM    RBC 4.10 07/31/2022 05:07 AM     BMP:   Lab Results   Component Value Date/Time    GLUCOSE 66 07/31/2022 05:07 AM    CO2 40 07/31/2022 05:07 AM    BUN 28 07/31/2022 05:07 AM    CREATININE 1.3 07/31/2022 05:07 AM    CALCIUM 8.9 07/31/2022 05:07 AM     ABG: No results found for: ECL9OES, BEART, L5QNZRCD, PHART, THGBART, GRK8XCY, PO2ART, GCZ3WJR    Radiology: All pertinent images / reports were reviewed as a part of this visit. Narrative   EXAMINATION:   CTA OF THE CHEST 7/28/2022 11:50 pm       TECHNIQUE:   CTA of the chest was performed after the administration of intravenous   contrast.  Multiplanar reformatted images are provided for review. MIP   images are provided for review. Automated exposure control, iterative   reconstruction, and/or weight based adjustment of the mA/kV was utilized to   reduce the radiation dose to as low as reasonably achievable. COMPARISON:   06/15/2022       HISTORY:   ORDERING SYSTEM PROVIDED HISTORY: r.o pe   TECHNOLOGIST PROVIDED HISTORY:   Reason for exam:->r.o pe   Decision Support Exception - unselect if not a suspected or confirmed   emergency medical condition->Emergency Medical Condition (MA)   Reason for Exam: R/o PE. Cough (Cough for about 1 week; States he is   \"coughing up blood\" since yesterday; denies living in any group settings; (+)   chills; denies any N/V/D). FINDINGS:   Pulmonary Arteries: Pulmonary arteries are well opacified with contrast, with   no intraluminal emboli identified. Similar size compared to the previous   examination with prominence of the main pulmonary arteries bilaterally. This   may be related to mild underlying pulmonary hypertension. Mediastinum: No thoracic aortic aneurysm is identified. Cardiomegaly is   noted, with a 3 lead implanted cardiac device noted. No mediastinal   lymphadenopathy is seen. No focal esophageal thickening. Lungs/pleura: No pleural effusion.   Interval development of a right lower   lobe infiltrate with mild central consolidative change and mild peripheral ground-glass change. No corresponding pulmonary arterial emboli seen to   suggest an infarct. No pneumothorax is identified. Upper Abdomen: Trace ascites in the upper abdomen. Contrast reflux seen into   the patent venous system suggesting right-sided failure. Soft Tissues/Bones: No acute subcutaneous soft tissue abnormality is   identified. No acute osseous abnormality seen in the chest.  Multilevel   degenerative changes are identified. No osseous destructive process is   identified. Impression   1. No pulmonary emboli. 2. Right lower lobe infiltrate with mild consolidation seen centrally and   mild peripheral ground-glass change felt to represent an acute early   consolidative pneumonic infiltrate. No cavitation. 3. No spiculated lung mass. 4. Significant cardiomegaly is identified with some contrast reflux into the   hepatic veins suggesting right-sided failure. 5. Trace upper abdominal ascites, similar to the prior exam       Problem List:     Right lower lobe pneumonia  Hemoptysis  Atrial fibrillation/HENOK thrombus    Assessment/Plan:     Faint right lower lobe infiltrate-peripheral configuration on CT scan. Viral molecular panel was noted to be negative. Empiric treatment with oral Augmentin-complete 10-day course. Volume overload issues, continues on torsemide. Patient has severe ischemic cardiomyopathy status post BiV AICD. Continue Eliquis for history of HENOK thrombus. Pulmonary will sign off. Patient should be followed up in our pulmonary office [Dr. Delfina Casas in 1 month. Explained to patient about importance of follow-up.     Perla Piña MD

## 2022-08-02 ENCOUNTER — TELEPHONE (OUTPATIENT)
Dept: OTHER | Age: 62
End: 2022-08-02

## 2022-08-02 NOTE — TELEPHONE ENCOUNTER
[place] [id number]  TELEPHONE ENCOUNTER FORM    Jannie Altamiranon 1960    Attempted to call patient for HF follow-up. No answer at this time.       Next MD/ Clinic appointment: 8/4 with Bibi Ramos RN 8/2/2022 1:52 PM

## 2022-08-03 ENCOUNTER — TELEPHONE (OUTPATIENT)
Dept: OTHER | Age: 62
End: 2022-08-03

## 2022-08-03 NOTE — TELEPHONE ENCOUNTER
Attempted to reach patient. Friend answered phone. States patient not available. Asked he return call.

## 2022-08-04 ENCOUNTER — NURSE ONLY (OUTPATIENT)
Dept: CARDIOLOGY CLINIC | Age: 62
End: 2022-08-04

## 2022-08-04 ENCOUNTER — OFFICE VISIT (OUTPATIENT)
Dept: CARDIOLOGY CLINIC | Age: 62
End: 2022-08-04
Payer: COMMERCIAL

## 2022-08-04 ENCOUNTER — HOSPITAL ENCOUNTER (OUTPATIENT)
Age: 62
Discharge: HOME | End: 2022-08-04
Payer: COMMERCIAL

## 2022-08-04 VITALS
WEIGHT: 192 LBS | OXYGEN SATURATION: 98 % | DIASTOLIC BLOOD PRESSURE: 64 MMHG | HEIGHT: 74 IN | HEART RATE: 64 BPM | SYSTOLIC BLOOD PRESSURE: 98 MMHG | BODY MASS INDEX: 24.64 KG/M2

## 2022-08-04 DIAGNOSIS — I50.22 CHRONIC SYSTOLIC CONGESTIVE HEART FAILURE (HCC): ICD-10-CM

## 2022-08-04 DIAGNOSIS — I50.22 CHRONIC SYSTOLIC CONGESTIVE HEART FAILURE (HCC): Primary | ICD-10-CM

## 2022-08-04 DIAGNOSIS — I42.0 DILATED CARDIOMYOPATHY (HCC): ICD-10-CM

## 2022-08-04 DIAGNOSIS — Z95.810 ICD (IMPLANTABLE CARDIOVERTER-DEFIBRILLATOR), BIVENTRICULAR, IN SITU: ICD-10-CM

## 2022-08-04 DIAGNOSIS — Z59.00 HOMELESS: ICD-10-CM

## 2022-08-04 DIAGNOSIS — I48.0 PAF (PAROXYSMAL ATRIAL FIBRILLATION) (HCC): ICD-10-CM

## 2022-08-04 DIAGNOSIS — I10 BENIGN ESSENTIAL HTN: ICD-10-CM

## 2022-08-04 DIAGNOSIS — Z91.199 NON-COMPLIANCE: ICD-10-CM

## 2022-08-04 LAB
ANION GAP SERPL CALCULATED.3IONS-SCNC: 9 MMOL/L (ref 3–16)
BUN BLDV-MCNC: 25 MG/DL (ref 7–20)
CALCIUM SERPL-MCNC: 8.9 MG/DL (ref 8.3–10.6)
CHLORIDE BLD-SCNC: 93 MMOL/L (ref 99–110)
CO2: 36 MMOL/L (ref 21–32)
CREAT SERPL-MCNC: 1.2 MG/DL (ref 0.8–1.3)
GFR AFRICAN AMERICAN: >60
GFR NON-AFRICAN AMERICAN: >60
GLUCOSE BLD-MCNC: 104 MG/DL (ref 70–99)
POTASSIUM SERPL-SCNC: 3.2 MMOL/L (ref 3.5–5.1)
PRO-BNP: ABNORMAL PG/ML (ref 0–124)
SODIUM BLD-SCNC: 138 MMOL/L (ref 136–145)

## 2022-08-04 PROCEDURE — 36415 COLL VENOUS BLD VENIPUNCTURE: CPT

## 2022-08-04 PROCEDURE — 80048 BASIC METABOLIC PNL TOTAL CA: CPT

## 2022-08-04 PROCEDURE — 99214 OFFICE O/P EST MOD 30 MIN: CPT | Performed by: CLINICAL NURSE SPECIALIST

## 2022-08-04 PROCEDURE — 83880 ASSAY OF NATRIURETIC PEPTIDE: CPT

## 2022-08-04 SDOH — ECONOMIC STABILITY - HOUSING INSECURITY: HOMELESSNESS UNSPECIFIED: Z59.00

## 2022-08-04 NOTE — PATIENT INSTRUCTIONS
Make sure you are not taking the lisinopril and jardiance.   Stay off of these for now  Check blood work today  Dont take the torsemide today and tomorrow start on 40 mg once a day  Continue all other medications  RTO next week

## 2022-08-04 NOTE — PROGRESS NOTES
Brenda Saldivar 89 transmission received 8/4/22 from [facility] for patient's CRT-D.    TECHNICAL FINDINGS  Potential device or lead performance issue(s) observed    DEVICE ASSESSMENT: Available daily battery measurements within expected range. Right ventricular capture threshold not available. Possible loss of ventricular capture noted on Current EGM. ARRHYTHMIA SUMMARY: 2 VT NS episodes detected since last interrogation on 31-Jul-2022, both were on 02-Aug-2022. OBSERVATIONS: Loss of ventriuclar capture on Current EGM VVIR  bpm. Night heart rate over 85 bpm for 7 days. Cardiac Compass trends show fluid impedance measurements above reference line suggesting possible dry status.

## 2022-08-04 NOTE — PROGRESS NOTES
[address]  Progress Note    Primary Care Doctor:  No primary care provider on file. Chief Complaint   Patient presents with    Follow-Up from Hospital    Congestive Heart Failure    Dizziness    Fatigue        History of Present Illness:  59 y.o. male with history of with history of PAF, hypertension. Unvaccinated, , homeless  hospitalization 2/5-10/22 for weight gain and lower extremity edema. He recently had covid and did not follow up in office. LVEF 20%, LHC done. Weight 223->193. He was started on HF therapy, declined life vest.  CONSUELO done with HENOK thrombus (on eliquis per EP). Not able to do CV  4/13-26/22 for fluid overload requiring diureses with milrinone and lasix infusion, UTI and VT requiring ICD placement  6/15-20/22 for acute on chronic systolic heart failure. He was over drinking fluids, diuresed with IV lasix and good weight loss of 23 pounds. I had the pleasure of seeing Michael Moran in follow up for systolic heart failure and hospitalization 7/28-8/1/2022 for shortness of breath, hypoxia, pneumonia, lisinopril and jardiance held due to hypotension and given oral metolazone times 2. He is ambulatory and by his self. He has all of his medications mixed in a baby aspirin bottle (he has pill box at home as well). His optival shows that he is on the drier side. He is complaining of being tired and dizzy. His weight today is 192 (best weight is 199). I gave him 2 bottles of water to drink in the office. He has not taken his medications yet this morning. Phone calls 8/2-3/2022    Past Medical History:   has a past medical history of Atrial fibrillation ([address] CHF (congestive heart failure) ([address] Hx of blood clots, and Hypertension. Surgical History:   has a past surgical history that includes Insertable Cardiac Monitor (07/26/2019) and Cardioversion (07/26/2019). Social History:   reports that he has never smoked.  He has never used smokeless tobacco. He reports that he does not drink alcohol and does not use drugs. Family History:   Family History   Problem Relation Age of Onset    Heart Disease Mother     High Blood Pressure Mother     Heart Attack Mother     Other Father     Stroke Father     High Blood Pressure Father        Home Medications:  Prior to Admission medications    Medication Sig Start Date End Date Taking? Authorizing Provider   Torsemide 60 MG TABS Take 40 mg by mouth daily 8/4/22  Yes TRENT Abdi   metoprolol succinate (TOPROL XL) 25 MG extended release tablet Take 0.5 tablets by mouth in the morning and at bedtime 8/1/22  Yes Raegan Burns MD   amoxicillin-clavulanate (AUGMENTIN) 875-125 MG per tablet Take 1 tablet by mouth in the morning and 1 tablet before bedtime. Do all this for 6 days. 8/1/22 8/7/22 Yes Nadeem Marks MD   potassium chloride (KLOR-CON M) 20 MEQ extended release tablet Take 1 tablet by mouth in the morning and 1 tablet in the evening. Take with meals. 8/1/22  Yes Raegan Burns MD   apixaban (ELIQUIS) 5 MG TABS tablet Take 1 tablet by mouth 2 times daily 6/17/22  Yes TRENT Huang CNP   tamsulosin [facility]) 0.4 MG capsule Take 1 capsule by mouth daily 6/18/22  Yes TRENT Huang CNP   atorvastatin (LIPITOR) 40 MG tablet Take 1 tablet by mouth nightly 6/17/22  Yes TRENT Huang CNP   finasteride (PROSCAR) 5 MG tablet Take 1 tablet by mouth daily 5/31/22  Yes TRENT Esposito   vitamin D (CHOLECALCIFEROL) 50 MCG (2000 UT) TABS tablet Take 1 tablet by mouth daily 2/16/22  Yes TRENT Abdi   empagliflozin (JARDIANCE) 10 MG tablet Take 1 tablet by mouth daily 6/18/22 8/1/22  TRENT Huang CNP   lisinopril (PRINIVIL;ZESTRIL) 2.5 MG tablet Take 1 tablet by mouth daily 6/17/22 8/1/22  TRENT Huang CNP        Allergies:  Patient has no known allergies. Review of Systems:   Constitutional: there has been no unanticipated weight loss.  There's been no change in energy level, sleep pattern, or activity level. Eyes: No visual changes or diplopia. No scleral icterus. ENT: No Headaches, hearing loss or vertigo. No mouth sores or sore throat. Cardiovascular: Reviewed in HPI  Respiratory: No cough or wheezing, no sputum production. No hematemesis. Gastrointestinal: No abdominal pain, appetite loss, blood in stools. No change in bowel or bladder habits. Genitourinary: No dysuria, trouble voiding, or hematuria. Musculoskeletal:  No gait disturbance, weakness or joint complaints. Integumentary: No rash or pruritis. Neurological: No headache, diplopia, change in muscle strength, numbness or tingling. No change in gait, balance, coordination, mood, affect, memory, mentation, behavior. Psychiatric: No anxiety, no depression. Endocrine: No malaise, fatigue or temperature intolerance. No excessive thirst, fluid intake, or urination. No tremor. Hematologic/Lymphatic: No abnormal bruising or bleeding, blood clots or swollen lymph nodes. Allergic/Immunologic: No nasal congestion or hives. Physical Examination:    Vitals:    08/04/22 0922   BP: 98/64   Site: Left Upper Arm   Position: Sitting   Cuff Size: Medium Adult   Pulse: 64   SpO2: 98%   Weight: 192 lb (87.1 kg)   Height: 6' 2\" (1.88 m)        Constitutional and General Appearance: Warm and dry, no apparent distress, normal coloration   HEENT:  Normocephalic, atraumatic  Respiratory:  Normal excursion and expansion without use of accessory muscles  Resp Auscultation: Normal breath sounds without dullness  Cardiovascular: The apical impulses not displaced  Heart tones are crisp and normal  JVP normal cm H2O  irregular rate and rhythm  Peripheral pulses are symmetrical and full  There is no clubbing, cyanosis of the extremities.   Bilateral lower ankle edema to mid calf edema, stable  Pedal Pulses: 2+ and equal   Abdomen:  No masses or tenderness  Liver/Spleen: No Abnormalities Noted  Neurological/Psychiatric:  Alert and oriented in all spheres  Moves all extremities well  Exhibits normal gait balance and coordination  No abnormalities of mood, affect, memory, mentation, or behavior are noted    Lab Data:    CBC:   Lab Results   Component Value Date/Time    WBC 6.6 07/31/2022 05:07 AM    WBC 7.9 07/30/2022 06:43 AM    WBC 5.9 07/28/2022 10:50 PM    RBC 4.10 07/31/2022 05:07 AM    RBC 4.04 07/30/2022 06:43 AM    RBC 4.08 07/28/2022 10:50 PM    HGB 11.6 07/31/2022 05:07 AM    HGB 11.3 07/30/2022 06:43 AM    HGB 11.5 07/28/2022 10:50 PM    HCT 35.1 07/31/2022 05:07 AM    HCT 34.7 07/30/2022 06:43 AM    HCT 35.1 07/28/2022 10:50 PM    MCV 85.6 07/31/2022 05:07 AM    MCV 85.9 07/30/2022 06:43 AM    MCV 85.9 07/28/2022 10:50 PM    RDW 17.2 07/31/2022 05:07 AM    RDW 16.8 07/30/2022 06:43 AM    RDW 17.0 07/28/2022 10:50 PM     07/31/2022 05:07 AM     07/30/2022 06:43 AM     07/28/2022 10:50 PM     BMP:  Lab Results   Component Value Date/Time     08/04/2022 10:50 AM     08/01/2022 11:55 AM     07/31/2022 05:07 AM    K 3.2 08/04/2022 10:50 AM    K 3.1 08/01/2022 11:55 AM    K 3.2 07/31/2022 11:50 AM    K 2.6 07/30/2022 06:43 AM    K 3.8 04/30/2022 05:21 AM    K 3.3 04/29/2022 05:35 PM    CL 93 08/04/2022 10:50 AM    CL 85 08/01/2022 11:55 AM    CL 88 07/31/2022 05:07 AM    CO2 36 08/04/2022 10:50 AM    CO2 39 08/01/2022 11:55 AM    CO2 40 07/31/2022 05:07 AM    PHOS 4.1 07/29/2022 06:18 AM    PHOS 3.7 04/18/2022 02:16 AM    PHOS 4.3 02/09/2022 05:46 AM    BUN 25 08/04/2022 10:50 AM    BUN 29 08/01/2022 11:55 AM    BUN 28 07/31/2022 05:07 AM    CREATININE 1.2 08/04/2022 10:50 AM    CREATININE 1.2 08/01/2022 11:55 AM    CREATININE 1.3 07/31/2022 05:07 AM     BNP:   Lab Results   Component Value Date/Time    PROBNP 16,480 08/04/2022 10:50 AM    PROBNP 15,018 08/01/2022 11:55 AM    PROBNP 32,131 07/28/2022 10:50 PM     Cardiac Imaging:  CONSUELO Dr Shanta Serra reduced.   -Indeterminate diastolic function due to atrial fibrillation and moderate to   severe mitral regurgitation.   -Moderate-to-severe mitral regurgitation .   -Dilated left atrium with a volume of 97 ml.   -Left atrial volume is increased probably due to atrial fibrillation .   -There is mild right ventricular hypertrophy.   -The right ventricle is mildly enlarged.   -Right ventricular systolic pressure of 53 mm Hg consistent with pulmonary   hypertension.   -Moderate to severe tricuspid regurgitation. TAPSE = 1.43   -IVC size is dilated (>2.1 cm) but collapses > 50% with respiration   consistent with elevated RA pressure (8 mmHg). Assessment:    1. Chronic systolic heart failure (HCC)  bb; no aldosterone antagonist due to compliance   2. Non-compliance    3. Homeless    4. PAF (paroxysmal atrial fibrillation) ([facility] Dr Jaswinder rothman   5. Benign essential HTN    6. Dilated cardiomyopathy ([facility] [address]    7. ICD in place    Plan:   Patient Instructions   Make sure you are not taking the lisinopril and jardiance.   Stay off of these for now  Check blood work today  Dont take the torsemide today and tomorrow start on 40 mg once a day  Continue all other medications  RTO next week    Compliance with medications continuing to be a big issue  Will recommend pill pack at follow up visit  Lisinopril and jardiance still on hold  No PCP    NYHA 4    I appreciate the opportunity of cooperating in the care of this individual.    TRENT Craig - MARILEE, CNS, 8/5/2022, 9:05 AM

## 2022-08-05 ENCOUNTER — TELEPHONE (OUTPATIENT)
Dept: CARDIOLOGY CLINIC | Age: 62
End: 2022-08-05

## 2022-08-08 NOTE — PROGRESS NOTES
Physician Progress Note      PATIENT:               Jaquelin Aguilar  CSN #:                  [id number]  :                       1960  ADMIT DATE:       2022 9:47 PM  [address] DATE:        2022 4:44 PM  RESPONDING  PROVIDER #:        Ludy Aj MD          QUERY TEXT:    Patient admitted with Pneumonia. Noted documentation of Acute on Chronic   Systolic CHF in DC Summary and also Acute on Chronic Combined CHF in DC   Summary. If possible, please document in progress notes and discharge summary   if you are evaluating and /or treating any of the following: The medical record reflects the following:  Risk Factors: PNA, CHF, HTN, Dilated Cardiomyopathy, AFib, Non-compliance   w/Medications  Clinical Indicators: Labs on admit 22 Pro-BNP 32,131  22 Pro-BNP   15,018  22 Pro-BNP 01359. Per DC Summary 22 \"Discharge Diagnosis:   Acute on chronic systolic heart failure & Acute on chronic combined systolic   and diastolic heart  failure. Of note, spironolactone would ordinarily be   indicated, but was not prescribed by cardiology due to patient's past history   of noncompliance with medications and follow-up. \"  Per CT Chest 22   \"Impression: Significant cardiomegaly is identified with some contrast reflux   into the hepatic veins suggesting right-sided failure  Treatment: Cardiology consult, po Demadex, po Zaroxolyn, CT Chest/PE, CxR,   monitor labs  Options provided:  -- Acute on Chronic Systolic CHF confirmed and Acute on Chronic Combined CHF   ruled out  -- Acute on Chronic Combined CHF confirmed and Acute on Chronic Systolic CHF   ruled out  -- Acute on Chronic Systolic CHF and Acute on Chronic Combined CHF ruled out   pt. has Chronic Systolic CHF confirmed  -- Acute on Chronic Systolic CHF and Acute on Chronic Combined CHF ruled out   pt. has Chronic Combined CHF confirmed  -- Other - I will add my own diagnosis  -- Disagree - Not applicable / Not valid  -- Disagree - Clinically unable to determine / Unknown  -- Refer to Clinical Documentation Reviewer    PROVIDER RESPONSE TEXT:    After study, Acute on Chronic Systolic CHF confirmed and Acute on Chronic   Combined CHF ruled out.     Query created by: Negar Shine on 8/4/2022 7:15 PM      Electronically signed by:  Ludy Aj MD 8/8/2022 8:50 AM

## 2022-08-10 ENCOUNTER — TELEPHONE (OUTPATIENT)
Dept: PULMONOLOGY | Age: 62
End: 2022-08-10

## 2022-08-11 ENCOUNTER — OFFICE VISIT (OUTPATIENT)
Dept: CARDIOLOGY CLINIC | Age: 62
End: 2022-08-11
Payer: COMMERCIAL

## 2022-08-11 ENCOUNTER — HOSPITAL ENCOUNTER (OUTPATIENT)
Age: 62
Discharge: HOME | End: 2022-08-11
Payer: COMMERCIAL

## 2022-08-11 VITALS
BODY MASS INDEX: 26.17 KG/M2 | DIASTOLIC BLOOD PRESSURE: 64 MMHG | OXYGEN SATURATION: 93 % | SYSTOLIC BLOOD PRESSURE: 94 MMHG | HEIGHT: 74 IN | WEIGHT: 203.9 LBS

## 2022-08-11 DIAGNOSIS — I50.22 CHRONIC SYSTOLIC CONGESTIVE HEART FAILURE (HCC): ICD-10-CM

## 2022-08-11 DIAGNOSIS — I50.22 CHRONIC SYSTOLIC CONGESTIVE HEART FAILURE (HCC): Primary | ICD-10-CM

## 2022-08-11 LAB
ANION GAP SERPL CALCULATED.3IONS-SCNC: 12 MMOL/L (ref 3–16)
BUN BLDV-MCNC: 37 MG/DL (ref 7–20)
CALCIUM SERPL-MCNC: 8.9 MG/DL (ref 8.3–10.6)
CHLORIDE BLD-SCNC: 101 MMOL/L (ref 99–110)
CO2: 30 MMOL/L (ref 21–32)
CREAT SERPL-MCNC: 1.6 MG/DL (ref 0.8–1.3)
GFR AFRICAN AMERICAN: 53
GFR NON-AFRICAN AMERICAN: 44
GLUCOSE BLD-MCNC: 61 MG/DL (ref 70–99)
POTASSIUM SERPL-SCNC: 4.6 MMOL/L (ref 3.5–5.1)
PRO-BNP: ABNORMAL PG/ML (ref 0–124)
SODIUM BLD-SCNC: 143 MMOL/L (ref 136–145)

## 2022-08-11 PROCEDURE — 1036F TOBACCO NON-USER: CPT | Performed by: CLINICAL NURSE SPECIALIST

## 2022-08-11 PROCEDURE — 1111F DSCHRG MED/CURRENT MED MERGE: CPT | Performed by: CLINICAL NURSE SPECIALIST

## 2022-08-11 PROCEDURE — G8427 DOCREV CUR MEDS BY ELIG CLIN: HCPCS | Performed by: CLINICAL NURSE SPECIALIST

## 2022-08-11 PROCEDURE — 3017F COLORECTAL CA SCREEN DOC REV: CPT | Performed by: CLINICAL NURSE SPECIALIST

## 2022-08-11 PROCEDURE — 83880 ASSAY OF NATRIURETIC PEPTIDE: CPT

## 2022-08-11 PROCEDURE — G8419 CALC BMI OUT NRM PARAM NOF/U: HCPCS | Performed by: CLINICAL NURSE SPECIALIST

## 2022-08-11 PROCEDURE — 36415 COLL VENOUS BLD VENIPUNCTURE: CPT

## 2022-08-11 PROCEDURE — 99214 OFFICE O/P EST MOD 30 MIN: CPT | Performed by: CLINICAL NURSE SPECIALIST

## 2022-08-11 PROCEDURE — 80048 BASIC METABOLIC PNL TOTAL CA: CPT

## 2022-08-11 RX ORDER — TORSEMIDE 20 MG/1
40 TABLET ORAL DAILY
Qty: 30 TABLET | Refills: 0 | Status: ON HOLD
Start: 2022-08-11 | End: 2022-09-08 | Stop reason: HOSPADM

## 2022-08-11 NOTE — PROGRESS NOTES
smokeless tobacco. He reports that he does not drink alcohol and does not use drugs. Family History:   Family History   Problem Relation Age of Onset    Heart Disease Mother     High Blood Pressure Mother     Heart Attack Mother     Other Father     Stroke Father     High Blood Pressure Father        Home Medications:  Prior to Admission medications    Medication Sig Start Date End Date Taking? Authorizing Provider   torsemide (DEMADEX) 20 MG tablet Take 2 tablets by mouth in the morning. 8/11/22  Yes TRENT Alamo   metoprolol succinate (TOPROL XL) 25 MG extended release tablet Take 0.5 tablets by mouth in the morning and at bedtime 8/1/22   Milka Mckeon MD   potassium chloride (KLOR-CON M) 20 MEQ extended release tablet Take 1 tablet by mouth in the morning and 1 tablet in the evening. Take with meals. 8/1/22   Milka Mckeon MD   apixaban (ELIQUIS) 5 MG TABS tablet Take 1 tablet by mouth 2 times daily 6/17/22   TRENT Yanes CNP   tamsulosin [facility]) 0.4 MG capsule Take 1 capsule by mouth daily 6/18/22   TRENT Yanes CNP   atorvastatin (LIPITOR) 40 MG tablet Take 1 tablet by mouth nightly 6/17/22   TRENT Yanes CNP   empagliflozin (JARDIANCE) 10 MG tablet Take 1 tablet by mouth daily 6/18/22 8/1/22  TRENT Yanes CNP   lisinopril (PRINIVIL;ZESTRIL) 2.5 MG tablet Take 1 tablet by mouth daily 6/17/22 8/1/22  TRENT Yanes CNP   finasteride (PROSCAR) 5 MG tablet Take 1 tablet by mouth daily 5/31/22   TRENT Wei   vitamin D (CHOLECALCIFEROL) 50 MCG (2000 UT) TABS tablet Take 1 tablet by mouth daily 2/16/22   TRENT Alamo        Allergies:  Patient has no known allergies. Review of Systems:   Constitutional: there has been no unanticipated weight loss. There's been no change in energy level, sleep pattern, or activity level. Eyes: No visual changes or diplopia. No scleral icterus.   ENT: No Headaches, hearing loss or vertigo. No mouth sores or sore throat. Cardiovascular: Reviewed in HPI  Respiratory: No cough or wheezing, no sputum production. No hematemesis. Gastrointestinal: No abdominal pain, appetite loss, blood in stools. No change in bowel or bladder habits. Genitourinary: No dysuria, trouble voiding, or hematuria. Musculoskeletal:  No gait disturbance, weakness or joint complaints. Integumentary: No rash or pruritis. Neurological: No headache, diplopia, change in muscle strength, numbness or tingling. No change in gait, balance, coordination, mood, affect, memory, mentation, behavior. Psychiatric: No anxiety, no depression. Endocrine: No malaise, fatigue or temperature intolerance. No excessive thirst, fluid intake, or urination. No tremor. Hematologic/Lymphatic: No abnormal bruising or bleeding, blood clots or swollen lymph nodes. Allergic/Immunologic: No nasal congestion or hives. Physical Examination:    Vitals:    08/11/22 1232   BP: 94/64   SpO2: 93%   Weight: 203 lb 14.4 oz (92.5 kg)   Height: 6' 2\" (1.88 m)        Constitutional and General Appearance: Warm and dry, no apparent distress, normal coloration   HEENT:  Normocephalic, atraumatic  Respiratory:  Normal excursion and expansion without use of accessory muscles  Resp Auscultation: Normal breath sounds without dullness  Cardiovascular: The apical impulses not displaced  Heart tones are crisp and normal  JVP normal cm H2O  irregular rate and rhythm  Peripheral pulses are symmetrical and full  There is no clubbing, cyanosis of the extremities.   Bilateral lower ankle edema to mid calf edema, unchanged  Pedal Pulses: 2+ and equal   Abdomen:  No masses or tenderness  Liver/Spleen: No Abnormalities Noted  Neurological/Psychiatric:  Alert and oriented in all spheres  Moves all extremities well  Exhibits normal gait balance and coordination  No abnormalities of mood, affect, memory, mentation, or behavior are noted    Lab Data:    CBC:   Lab Results hypokinesis  LVEDP- 10     Hemodynamics:  RA- mean 3  RV- 37/0  PAWP- 10  PA- 34/12 (20)     C. O.- 5.7 (4.9)  C. I.- 2.6 (2.25)  PA sat 60%  AO sat 91%     Contrast: 70  Flouro Time: 5.3  Access: R radial a, R CFV     Impression  ~Coronary Angiography w/ normal cors  ~LVG with LVEF of 10% and global regional wall motion abnormalities  ~Severe MR  ~Normal CO/CI  ~normal L and R filling pressures     Recommendation  ~Aggressive medical treatment and risk factor modification  ~1. Medications reviewed, no changes at this time. 2. Post cath IVF. Bedrest.  3.Consider CONSUELO for evaluation of MR.   4. Patient has been advised on the importance of regular exercise of at least 20-30 minutes daily alternating with aerobic and isometric activities. 5. Patient counseled about and offered assistance for smoking cessation   6. No indication for cardiac rehab  7. CHF service to evaluate tomorrow. Echo 11/29/2021  Summary   -Covid+   -Severely reduced global systolic function with an ejection fraction   estimated at 20%. -Severe global hypokinesis with regional variations noted. -Right ventricular systolic function appears to be mildly reduced based on   visual inspection.   -Severe biatrial enlargement.   -Thickened mitral valve without evidence of stenosis. There is   mild-to-moderate mitral regurgitation.   -There is mild-to-moderate tricuspid regurgitation with a RVSP estimation of   37 mmHg. -Diastolic dysfunction with elevated LV filling pressures. Echo 7/25/2019  Summary   -Overall left ventricular systolic function is moderately depressed .   -Ejection fraction is visually estimated to be 30-35 %.    -Global left ventricular function moderately reduced.   -Indeterminate diastolic function due to atrial fibrillation and moderate to   severe mitral regurgitation.   -Moderate-to-severe mitral regurgitation .   -Dilated left atrium with a volume of 97 ml.   -Left atrial volume is increased probably due to atrial fibrillation .   -There is mild right ventricular hypertrophy.   -The right ventricle is mildly enlarged.   -Right ventricular systolic pressure of 53 mm Hg consistent with pulmonary   hypertension.   -Moderate to severe tricuspid regurgitation. TAPSE = 1.43   -IVC size is dilated (>2.1 cm) but collapses > 50% with respiration   consistent with elevated RA pressure (8 mmHg). Assessment:    1. Chronic systolic heart failure (HCC)  bb; no aldosterone antagonist due to compliance   2. Non-compliance    3. Homeless    4. PAF (paroxysmal atrial fibrillation) ([facility]) Dr Marisol rothman   5. Benign essential HTN    6. Dilated cardiomyopathy ([facility] [address]    7.    ICD in place    Plan:   Patient Instructions   Blood work today  Sleep evaluation   Continue current medications  Bring all your pills in the bottle to your next appt  RTO in August     Compliance with medications continuing to be a big issue  Lisinopril and jardiance still on hold due to soft BP  No PCP    NYHA 4    I appreciate the opportunity of cooperating in the care of this individual.    TRENT Millan - CNS, CNS, 8/11/2022, 3:02 PM

## 2022-08-11 NOTE — PATIENT INSTRUCTIONS
Blood work today  Sleep evaluation   Continue current medications  Bring all your pills in the bottle to your next appt  RTO in August

## 2022-08-12 ENCOUNTER — TELEPHONE (OUTPATIENT)
Dept: CARDIOLOGY CLINIC | Age: 62
End: 2022-08-12

## 2022-08-12 NOTE — TELEPHONE ENCOUNTER
----- Message from TRENT Esposito - CNS sent at 8/12/2022  2:36 PM EDT -----  Please call and see how he is feeling today  Ask him to look and see how much torsemide he is taking and that he is not on lisinopril and jardiance  thanks

## 2022-08-18 ENCOUNTER — HOSPITAL ENCOUNTER (EMERGENCY)
Age: 62
Discharge: HOME | End: 2022-08-19
Attending: EMERGENCY MEDICINE
Payer: COMMERCIAL

## 2022-08-18 ENCOUNTER — APPOINTMENT (OUTPATIENT)
Dept: GENERAL RADIOLOGY | Age: 62
End: 2022-08-18
Payer: COMMERCIAL

## 2022-08-18 VITALS
OXYGEN SATURATION: 100 % | HEIGHT: 74 IN | TEMPERATURE: 97.7 F | SYSTOLIC BLOOD PRESSURE: 111 MMHG | RESPIRATION RATE: 20 BRPM | DIASTOLIC BLOOD PRESSURE: 78 MMHG | BODY MASS INDEX: 26.18 KG/M2 | HEART RATE: 75 BPM

## 2022-08-18 DIAGNOSIS — J20.9 ACUTE BRONCHITIS, UNSPECIFIED ORGANISM: ICD-10-CM

## 2022-08-18 DIAGNOSIS — I50.22 CHRONIC SYSTOLIC CONGESTIVE HEART FAILURE (HCC): Primary | ICD-10-CM

## 2022-08-18 LAB
A/G RATIO: 1.1 (ref 1.1–2.2)
ALBUMIN SERPL-MCNC: 3.5 G/DL (ref 3.4–5)
ALP BLD-CCNC: 131 U/L (ref 40–129)
ALT SERPL-CCNC: 22 U/L (ref 10–40)
ANION GAP SERPL CALCULATED.3IONS-SCNC: 15 MMOL/L (ref 3–16)
AST SERPL-CCNC: 42 U/L (ref 15–37)
BASE EXCESS VENOUS: -0.6 MMOL/L (ref -3–3)
BASOPHILS ABSOLUTE: 0.1 K/UL (ref 0–0.2)
BASOPHILS RELATIVE PERCENT: 1.6 %
BILIRUB SERPL-MCNC: 1.8 MG/DL (ref 0–1)
BUN BLDV-MCNC: 40 MG/DL (ref 7–20)
CALCIUM SERPL-MCNC: 8.6 MG/DL (ref 8.3–10.6)
CARBOXYHEMOGLOBIN: 4.3 % (ref 0–1.5)
CHLORIDE BLD-SCNC: 102 MMOL/L (ref 99–110)
CO2: 22 MMOL/L (ref 21–32)
CREAT SERPL-MCNC: 1.7 MG/DL (ref 0.8–1.3)
EOSINOPHILS ABSOLUTE: 0 K/UL (ref 0–0.6)
EOSINOPHILS RELATIVE PERCENT: 0.8 %
GFR AFRICAN AMERICAN: 49
GFR NON-AFRICAN AMERICAN: 41
GLUCOSE BLD-MCNC: 101 MG/DL (ref 70–99)
HCO3 VENOUS: 22.8 MMOL/L (ref 23–29)
HCT VFR BLD CALC: 35.8 % (ref 40.5–52.5)
HEMOGLOBIN: 11.7 G/DL (ref 13.5–17.5)
LYMPHOCYTES ABSOLUTE: 1.4 K/UL (ref 1–5.1)
LYMPHOCYTES RELATIVE PERCENT: 28.2 %
MCH RBC QN AUTO: 28.1 PG (ref 26–34)
MCHC RBC AUTO-ENTMCNC: 32.7 G/DL (ref 31–36)
MCV RBC AUTO: 86.1 FL (ref 80–100)
METHEMOGLOBIN VENOUS: <0 %
MONOCYTES ABSOLUTE: 0.4 K/UL (ref 0–1.3)
MONOCYTES RELATIVE PERCENT: 7.7 %
NEUTROPHILS ABSOLUTE: 3 K/UL (ref 1.7–7.7)
NEUTROPHILS RELATIVE PERCENT: 61.7 %
O2 CONTENT, VEN: 16 VOL %
O2 SAT, VEN: >100 %
O2 THERAPY: ABNORMAL
PCO2, VEN: 32.7 MMHG (ref 40–50)
PDW BLD-RTO: 16.4 % (ref 12.4–15.4)
PH VENOUS: 7.45 (ref 7.35–7.45)
PLATELET # BLD: 264 K/UL (ref 135–450)
PMV BLD AUTO: 8.4 FL (ref 5–10.5)
PO2, VEN: 186 MMHG (ref 25–40)
POTASSIUM SERPL-SCNC: 4.2 MMOL/L (ref 3.5–5.1)
PRO-BNP: ABNORMAL PG/ML (ref 0–124)
RBC # BLD: 4.16 M/UL (ref 4.2–5.9)
SARS-COV-2, NAAT: NOT DETECTED
SODIUM BLD-SCNC: 139 MMOL/L (ref 136–145)
TCO2 CALC VENOUS: 53 MMOL/L
TOTAL PROTEIN: 6.7 G/DL (ref 6.4–8.2)
WBC # BLD: 4.8 K/UL (ref 4–11)

## 2022-08-18 PROCEDURE — 71045 X-RAY EXAM CHEST 1 VIEW: CPT

## 2022-08-18 PROCEDURE — 87635 SARS-COV-2 COVID-19 AMP PRB: CPT

## 2022-08-18 PROCEDURE — 94640 AIRWAY INHALATION TREATMENT: CPT

## 2022-08-18 PROCEDURE — 80053 COMPREHEN METABOLIC PANEL: CPT

## 2022-08-18 PROCEDURE — 99285 EMERGENCY DEPT VISIT HI MDM: CPT

## 2022-08-18 PROCEDURE — 36415 COLL VENOUS BLD VENIPUNCTURE: CPT

## 2022-08-18 PROCEDURE — 83880 ASSAY OF NATRIURETIC PEPTIDE: CPT

## 2022-08-18 PROCEDURE — 93005 ELECTROCARDIOGRAM TRACING: CPT | Performed by: EMERGENCY MEDICINE

## 2022-08-18 PROCEDURE — 85025 COMPLETE CBC W/AUTO DIFF WBC: CPT

## 2022-08-18 PROCEDURE — 6360000002 HC RX W HCPCS: Performed by: EMERGENCY MEDICINE

## 2022-08-18 PROCEDURE — 82803 BLOOD GASES ANY COMBINATION: CPT

## 2022-08-18 RX ORDER — ALBUTEROL SULFATE 2.5 MG/3ML
5 SOLUTION RESPIRATORY (INHALATION) ONCE
Status: COMPLETED | OUTPATIENT
Start: 2022-08-18 | End: 2022-08-18

## 2022-08-18 RX ADMIN — ALBUTEROL SULFATE 5 MG: 2.5 SOLUTION RESPIRATORY (INHALATION) at 23:32

## 2022-08-18 ASSESSMENT — PAIN DESCRIPTION - LOCATION: LOCATION: THROAT

## 2022-08-18 ASSESSMENT — PAIN - FUNCTIONAL ASSESSMENT
PAIN_FUNCTIONAL_ASSESSMENT: 0-10
PAIN_FUNCTIONAL_ASSESSMENT: ACTIVITIES ARE NOT PREVENTED

## 2022-08-18 ASSESSMENT — PAIN DESCRIPTION - PAIN TYPE: TYPE: ACUTE PAIN

## 2022-08-18 ASSESSMENT — PAIN DESCRIPTION - ORIENTATION: ORIENTATION: ANTERIOR

## 2022-08-18 ASSESSMENT — PAIN DESCRIPTION - DESCRIPTORS: DESCRIPTORS: BURNING

## 2022-08-18 ASSESSMENT — PAIN DESCRIPTION - FREQUENCY: FREQUENCY: INTERMITTENT

## 2022-08-18 ASSESSMENT — PAIN SCALES - GENERAL: PAINLEVEL_OUTOF10: 4

## 2022-08-19 ENCOUNTER — OFFICE VISIT (OUTPATIENT)
Dept: CARDIOLOGY CLINIC | Age: 62
End: 2022-08-19
Payer: COMMERCIAL

## 2022-08-19 ENCOUNTER — TELEPHONE (OUTPATIENT)
Dept: CARDIOLOGY CLINIC | Age: 62
End: 2022-08-19

## 2022-08-19 ENCOUNTER — NURSE ONLY (OUTPATIENT)
Dept: CARDIOLOGY CLINIC | Age: 62
End: 2022-08-19

## 2022-08-19 VITALS
HEART RATE: 79 BPM | BODY MASS INDEX: 27.72 KG/M2 | DIASTOLIC BLOOD PRESSURE: 60 MMHG | SYSTOLIC BLOOD PRESSURE: 102 MMHG | OXYGEN SATURATION: 99 % | HEIGHT: 74 IN | WEIGHT: 216 LBS

## 2022-08-19 DIAGNOSIS — I48.20 CHRONIC ATRIAL FIBRILLATION (HCC): ICD-10-CM

## 2022-08-19 DIAGNOSIS — Z59.00 HOMELESS: ICD-10-CM

## 2022-08-19 DIAGNOSIS — I10 BENIGN ESSENTIAL HTN: ICD-10-CM

## 2022-08-19 DIAGNOSIS — I42.0 DILATED CARDIOMYOPATHY (HCC): ICD-10-CM

## 2022-08-19 DIAGNOSIS — Z95.810 ICD (IMPLANTABLE CARDIOVERTER-DEFIBRILLATOR), BIVENTRICULAR, IN SITU: ICD-10-CM

## 2022-08-19 DIAGNOSIS — I50.43 ACUTE ON CHRONIC COMBINED SYSTOLIC AND DIASTOLIC HEART FAILURE (HCC): Primary | ICD-10-CM

## 2022-08-19 DIAGNOSIS — Z91.199 NON-COMPLIANCE: ICD-10-CM

## 2022-08-19 DIAGNOSIS — I48.0 PAF (PAROXYSMAL ATRIAL FIBRILLATION) (HCC): ICD-10-CM

## 2022-08-19 DIAGNOSIS — I50.22 CHRONIC SYSTOLIC CONGESTIVE HEART FAILURE (HCC): Primary | ICD-10-CM

## 2022-08-19 LAB
EKG ATRIAL RATE: 112 BPM
EKG DIAGNOSIS: NORMAL
EKG Q-T INTERVAL: 380 MS
EKG QRS DURATION: 106 MS
EKG QTC CALCULATION (BAZETT): 518 MS
EKG R AXIS: 15 DEGREES
EKG T AXIS: -11 DEGREES
EKG VENTRICULAR RATE: 112 BPM

## 2022-08-19 PROCEDURE — 99214 OFFICE O/P EST MOD 30 MIN: CPT | Performed by: CLINICAL NURSE SPECIALIST

## 2022-08-19 PROCEDURE — 93290 INTERROG DEV EVAL ICPMS IP: CPT | Performed by: CLINICAL NURSE SPECIALIST

## 2022-08-19 PROCEDURE — 6370000000 HC RX 637 (ALT 250 FOR IP): Performed by: EMERGENCY MEDICINE

## 2022-08-19 PROCEDURE — 93010 ELECTROCARDIOGRAM REPORT: CPT | Performed by: INTERNAL MEDICINE

## 2022-08-19 RX ORDER — DOXYCYCLINE HYCLATE 100 MG
100 TABLET ORAL EVERY 12 HOURS SCHEDULED
Status: DISCONTINUED | OUTPATIENT
Start: 2022-08-19 | End: 2022-08-19

## 2022-08-19 RX ORDER — DOXYCYCLINE HYCLATE 100 MG
100 TABLET ORAL EVERY 12 HOURS SCHEDULED
Status: DISCONTINUED | OUTPATIENT
Start: 2022-08-19 | End: 2022-08-19 | Stop reason: HOSPADM

## 2022-08-19 RX ORDER — DOXYCYCLINE HYCLATE 100 MG
100 TABLET ORAL 2 TIMES DAILY
Qty: 14 TABLET | Refills: 0 | Status: SHIPPED | OUTPATIENT
Start: 2022-08-19 | End: 2022-08-26

## 2022-08-19 RX ADMIN — DOXYCYCLINE HYCLATE 100 MG: 100 TABLET, COATED ORAL at 00:34

## 2022-08-19 SDOH — ECONOMIC STABILITY - HOUSING INSECURITY: HOMELESSNESS UNSPECIFIED: Z59.00

## 2022-08-19 NOTE — PROGRESS NOTES
[address] transmission received 08.19.22 @ 12:38pm from [facility] for patient's CRT-D. Programmed VVIR 50bpm LV only (Eliquis for known AF). Possible intermittent loss of LV capture noted on Current EGM. Last LV threshold measured 2.0V/0.4ms on 07.06.22; Adaptive output on w max at 3.0V. Noted NSVT (Toprol XL). Pt seeing [id number] [facility] today. LVp 58.4%, Effective 52.1%    Optivol is within normal range. TriageF Heart Failure Risk Status on 19-Aug-2022 is Medium*. Pt has f/u appt w device/RMM 08.22.22. NP will review. See interrogation under cardiology tab in the [address] [facility] [address] field for more details.

## 2022-08-19 NOTE — DISCHARGE INSTRUCTIONS
You can increase and take an extra dose of Lasix tonight 1130 tomorrow you have an appointment with the CHF clinic at the [facility] here in [place] show up ahead of time

## 2022-08-19 NOTE — PATIENT INSTRUCTIONS
Restart jardiance 10 mg once a day  Continue all other medications  Go home and take medications  Bring all your bottles with you to your next appointment  RTO 8/31

## 2022-08-19 NOTE — TELEPHONE ENCOUNTER
Tried to reach patient, [facility] about his ER follow up appointment. Patient is scheduled on 08/31/2022 at 1:30 pm. Asked patient to bring in his medication bottles. Also asked if he was coming in today at 11:30 am for a follow up since he was in the ER.

## 2022-08-19 NOTE — ED PROVIDER NOTES
[address] [facility]  eMERGENCY dEPARTMENT eNCOUnter        Pt Name: Mer Perales  MRN: [id number]  Oscargfizabella 1960  Date of evaluation: 8/18/2022  Provider: Andrew Echevarria MD  PCP: No primary care provider on file. CHIEF COMPLAINT       Chief Complaint   Patient presents with    Shortness of Breath     States that for the last week he has weakness and fatigue with a cough. Denies any expose to COVID that he knows of, does endorse N/V, with a productive cough. Fatigue       HISTORY OFPRESENT ILLNESS   (Location/Symptom, Timing/Onset, Context/Setting, Quality, Duration, Modifying Factors,Severity)  Note limiting factors. Mer Perales is a 59 y.o. male patient had a productive cough for a week no chest pain analysis he is having some mild dyspnea on exertion patient has an AICD is on Eliquis for atrial fibrillation    Nursing Notes were all reviewed and agreed with or any disagreements were addressed  in the HPI. REVIEW OF SYSTEMS    (2-9 systems for level 4, 10 or more for level 5)       REVIEW OF SYSTEMS    Constitutional:  Denies fever, chills, or weakness   Eyes:  Denies vision changes  HENT:  Denies sore throat or neck pain   Respiratory:  Denies cough some  shortness of breath   Cardiovascular:  Denies chest pain  GI:  Denies abdominal pain, nausea, vomiting, or diarrhea   Musculoskeletal:  Denies back pain   Skin: no rash or vesicles   Neurologic:  no headache weakness focal    Lymphatic:  no swollen  nodes   Psychiatric: no si or hs thoughts     All systems negative except as marked. Positives and Pertinent negatives as per HPI. Except as noted above in the ROS, all other systems were reviewed andnegative.        PASTMEDICAL HISTORY     Past Medical History:   Diagnosis Date    Atrial fibrillation ([address] 07/25/2019    CHF (congestive heart failure) (HCC)     Hx of blood clots     Hypertension          SURGICAL HISTORY       Past Surgical 08/18/22 2244 08/18/22 2231 08/18/22 2231 08/18/22 2231 08/18/22 2244 --   111/78 97.7 °F (36.5 °C) Oral 85 20 99 % 6' 2\" (1.88 m)            General Appearance:  Alert, cooperative, no distress, appears stated age. Head:  Normocephalic, without obvious abnormality, atraumatic. Eyes:  conjunctiva/corneas clear, EOM's intact. Sclera anicteric. ENT: Mucous membranes moist.   Neck: Supple, symmetrical, trachea midline, no adenopathy. No jugular venous distention. Lungs:   No Respiratory Distress. no rales  rhonchi rub   Chest Wall:  Nontender  no deformity   Heart:  Rsr no murmer gallop    Abdomen:   Soft nontender no organomegally    Extremities:  Full range of motion. no deformity   Pulses: Equal  upper and lower    Skin:  No rashes or lesions to exposed skin. Neurologic: Alert and oriented X 3. Motor grossly normal.  Speech clear.  Cr n 2-12 intact   EKG Interpretation    Interpreted by emergency department physician    Rhythm: paced  Rate: normal  Axis: normal  Ectopy: none  Conduction: normal  ST Segments: no acute change  T Waves: no acute change  Q Waves: none    Clinical Impression: Demand pacemaker rhythm    Josefina Jorgensen MD      DIAGNOSTIC RESULTS   LABS:    Labs Reviewed   CBC WITH AUTO DIFFERENTIAL - Abnormal; Notable for the following components:       Result Value    RBC 4.16 (*)     Hemoglobin 11.7 (*)     Hematocrit 35.8 (*)     RDW 16.4 (*)     All other components within normal limits   COMPREHENSIVE METABOLIC PANEL - Abnormal; Notable for the following components:    Glucose 101 (*)     BUN 40 (*)     Creatinine 1.7 (*)     GFR Non- 41 (*)     GFR  49 (*)     Total Bilirubin 1.8 (*)     Alkaline Phosphatase 131 (*)     AST 42 (*)     All other components within normal limits   BLOOD GAS, VENOUS - Abnormal; Notable for the following components:    pH, Norman 7.452 (*)     pCO2, Norman 32.7 (*)     pO2, Norman 186.0 (*)     HCO3, Venous 22.8 (*) Carboxyhemoglobin 4.3 (*)     All other components within normal limits   BRAIN NATRIURETIC PEPTIDE - Abnormal; Notable for the following components:    Pro-BNP 30,265 (*)     All other components within normal limits   COVID-19, RAPID       All other labs were within normal range or not returned as of thisdictation. EKG: All EKG's are interpreted by the Emergency Department Physician who either signs or Co-signs this chart in the absence of a cardiologist.        RADIOLOGY:   Non-plain film images such as CT, Ultrasound and MRI are read by the radiologist. Plainradiographic images are visualized and preliminarily interpreted by the  ED Provider with the belowfindings:        Interpretation per the Radiologist below, if available at the time of this note:    XR CHEST PORTABLE   Final Result   1. Similar-appearing marked prominence of the cardiac silhouette. No overt   pulmonary edema. 2. Limited evaluation of the retrocardiac space. Otherwise, no focal   consolidation in the remaining visualized lung. PROCEDURES   Unless otherwise noted below, none     Procedures    CRITICAL CARE TIME   N/A      CONSULTS:  None    EMERGENCY DEPARTMENT COURSE and DIFFERENTIAL DIAGNOSIS/MDM:   Vitals:    Vitals:    08/18/22 2231 08/18/22 2244 08/18/22 2332 08/18/22 2333   BP:  111/78     Pulse: 85 75     Resp: 20 20     Temp:  97.7 °F (36.5 °C)     TempSrc:  Oral     SpO2: 99% 100% 98% 100%   Height:  6' 2\" (1.88 m)         Patient was given the following medications:  Medications   albuterol (PROVENTIL) nebulizer solution 5 mg (5 mg Nebulization Given 8/18/22 2332)           Is this patient to be included in the SEP-1 Core Measure due to severe sepsis or septic shock? No   Exclusion criteria - the patient is NOT to be included for SEP-1 Core Measure due to:   Infection is not suspected  Chest x-ray shows no increasing heart failure or pneumonia clinically he does have a bronchitis he was given doxycycline resting heart rate 75 saturation 100% patient is having some fatigue and shortness of breath he was advised to increase his Lasix he was given doxycycline and he has an appointment for 1130 at the CHF clinic in the morning I called and left the information for the clinic and the patient understands he is to go there in the morning    The patient tolerated their visit well. Thepatient and / or the family were informed of the results of any tests, a time was given to answer questions. FINAL IMPRESSION      1. Chronic systolic congestive heart failure ([address]    2. Acute bronchitis, unspecified organism        DISPOSITION/PLAN   DISPOSITION Decision To Admit 08/19/2022 12:08:48 AM      PATIENT REFERRED TO:  No follow-up provider specified.     DISCHARGE MEDICATIONS:  New Prescriptions    No medications on file       DISCONTINUED MEDICATIONS:  Discontinued Medications    No medications on file              (Please note that portions of this note were completed with a voice recognition program.  Efforts were made to edit the dictations but occasionally words aremis-transcribed.)    Barrett Ruiz MD (electronically signed)          Barrett Ruiz MD  08/19/22 3227

## 2022-08-19 NOTE — PROGRESS NOTES
[address]  Progress Note    Primary Care Doctor:  No primary care provider on file. Chief Complaint   Patient presents with    Follow-Up from Hospital    Shortness of Breath    Congestive Heart Failure    Fatigue        History of Present Illness:  59 y.o. male with history of with history of PAF, hypertension. Unvaccinated, , homeless  hospitalization 2/5-10/22 for weight gain and lower extremity edema. He recently had covid and did not follow up in office. LVEF 20%, LHC done. Weight 223->193. He was started on HF therapy, declined life vest.  CONSUELO done with HENOK thrombus (on eliquis per EP). Not able to do CV  4/13-26/22 for fluid overload requiring diureses with milrinone and lasix infusion, UTI and VT requiring ICD placement  6/15-20/22 for acute on chronic systolic heart failure. He was over drinking fluids, diuresed with IV lasix and good weight loss of 23 pounds. 7/28-8/1/2022 for shortness of breath, hypoxia, pneumonia, lisinopril and jardiance held due to hypotension and given oral metolazone times 2    I had the pleasure of seeing Kimberly Yeh in follow up for systolic heart failure and ER visit last night. He is in a wheel chair and by his self. He was started on doxycycline which he just picked up the script today. His optival shows normal thoracic impedence and cxr done in ER does not show increased fluid. His labs show increased bnp to 30K and creat up to 1.7 with bun of 40. His weight has gone from 203 to 216. He remains off his lisinopril and jardiance due to hypotension. BP checked by me is low like he normally is 102/60. No chest pain or palpitations, just fatigue      Past Medical History:   has a past medical history of Atrial fibrillation ([address] CHF (congestive heart failure) ([address] Hx of blood clots, and Hypertension. Surgical History:   has a past surgical history that includes Insertable Cardiac Monitor (07/26/2019) and Cardioversion (07/26/2019). Social History:   reports that he has never smoked. He has never used smokeless tobacco. He reports that he does not drink alcohol and does not use drugs. Family History:   Family History   Problem Relation Age of Onset    Heart Disease Mother     High Blood Pressure Mother     Heart Attack Mother     Other Father     Stroke Father     High Blood Pressure Father        Home Medications:  Prior to Admission medications    Medication Sig Start Date End Date Taking? Authorizing Provider   doxycycline hyclate (VIBRA-TABS) 100 MG tablet Take 1 tablet by mouth 2 times daily for 7 days 8/19/22 8/26/22 Yes Jessika Matthews MD   empagliflozin (JARDIANCE) 10 MG tablet Take 1 tablet by mouth daily 8/19/22  Yes TRENT Manuel   torsemide (DEMADEX) 20 MG tablet Take 2 tablets by mouth in the morning. 8/11/22  Yes TRENT Manuel   metoprolol succinate (TOPROL XL) 25 MG extended release tablet Take 0.5 tablets by mouth in the morning and at bedtime 8/1/22  Yes Tsering Bedoya MD   potassium chloride (KLOR-CON M) 20 MEQ extended release tablet Take 1 tablet by mouth in the morning and 1 tablet in the evening. Take with meals. 8/1/22  Yes Tsering Bedoya MD   apixaban (ELIQUIS) 5 MG TABS tablet Take 1 tablet by mouth 2 times daily 6/17/22  Yes TRENT Swain CNP   tamsulosin [facility]) 0.4 MG capsule Take 1 capsule by mouth daily 6/18/22  Yes TRENT Swain CNP   atorvastatin (LIPITOR) 40 MG tablet Take 1 tablet by mouth nightly 6/17/22  Yes TRENT Swain CNP   finasteride (PROSCAR) 5 MG tablet Take 1 tablet by mouth daily 5/31/22  Yes TRENT Manuel   vitamin D (CHOLECALCIFEROL) 50 MCG (2000 UT) TABS tablet Take 1 tablet by mouth daily 2/16/22  Yes TRENT Manuel   lisinopril (PRINIVIL;ZESTRIL) 2.5 MG tablet Take 1 tablet by mouth daily 6/17/22 8/1/22  TRENT Swain CNP        Allergies:  Patient has no known allergies.      Review of Systems: Constitutional: there has been no unanticipated weight loss. There's been no change in energy level, sleep pattern, or activity level. Eyes: No visual changes or diplopia. No scleral icterus. ENT: No Headaches, hearing loss or vertigo. No mouth sores or sore throat. Cardiovascular: Reviewed in HPI  Respiratory: No cough or wheezing, no sputum production. No hematemesis. Gastrointestinal: No abdominal pain, appetite loss, blood in stools. No change in bowel or bladder habits. Genitourinary: No dysuria, trouble voiding, or hematuria. Musculoskeletal:  No gait disturbance, weakness or joint complaints. Integumentary: No rash or pruritis. Neurological: No headache, diplopia, change in muscle strength, numbness or tingling. No change in gait, balance, coordination, mood, affect, memory, mentation, behavior. Psychiatric: No anxiety, no depression. Endocrine: No malaise, fatigue or temperature intolerance. No excessive thirst, fluid intake, or urination. No tremor. Hematologic/Lymphatic: No abnormal bruising or bleeding, blood clots or swollen lymph nodes. Allergic/Immunologic: No nasal congestion or hives. Physical Examination:    Vitals:    08/19/22 1235 08/19/22 1242 08/19/22 1255   BP: (!) 142/70 (!) 146/80 102/60   Site: Left Upper Arm     Position: Sitting     Cuff Size: Medium Adult     Pulse: 79     SpO2: 99%     Weight: 216 lb (98 kg)     Height: 6' 2\" (1.88 m)            Constitutional and General Appearance: Warm and dry, no apparent distress, normal coloration   HEENT:  Normocephalic, atraumatic  Respiratory:  Normal excursion and expansion without use of accessory muscles  Resp Auscultation: Normal breath sounds without dullness  Cardiovascular: The apical impulses not displaced  Heart tones are crisp and normal  JVP normal cm H2O  irregular rate and rhythm  Peripheral pulses are symmetrical and full  There is no clubbing, cyanosis of the extremities.   Bilateral lower ankle edema to mid calf edema, unchanged  Pedal Pulses: 2+ and equal   Abdomen:  No masses or tenderness  Liver/Spleen: No Abnormalities Noted  Neurological/Psychiatric:  Alert and oriented in all spheres  Moves all extremities well  Exhibits normal gait balance and coordination  No abnormalities of mood, affect, memory, mentation, or behavior are noted    Lab Data:    CBC:   Lab Results   Component Value Date/Time    WBC 4.8 08/18/2022 11:06 PM    WBC 6.6 07/31/2022 05:07 AM    WBC 7.9 07/30/2022 06:43 AM    RBC 4.16 08/18/2022 11:06 PM    RBC 4.10 07/31/2022 05:07 AM    RBC 4.04 07/30/2022 06:43 AM    HGB 11.7 08/18/2022 11:06 PM    HGB 11.6 07/31/2022 05:07 AM    HGB 11.3 07/30/2022 06:43 AM    HCT 35.8 08/18/2022 11:06 PM    HCT 35.1 07/31/2022 05:07 AM    HCT 34.7 07/30/2022 06:43 AM    MCV 86.1 08/18/2022 11:06 PM    MCV 85.6 07/31/2022 05:07 AM    MCV 85.9 07/30/2022 06:43 AM    RDW 16.4 08/18/2022 11:06 PM    RDW 17.2 07/31/2022 05:07 AM    RDW 16.8 07/30/2022 06:43 AM     08/18/2022 11:06 PM     07/31/2022 05:07 AM     07/30/2022 06:43 AM     BMP:  Lab Results   Component Value Date/Time     08/18/2022 11:06 PM     08/11/2022 01:23 PM     08/04/2022 10:50 AM    K 4.2 08/18/2022 11:06 PM    K 4.6 08/11/2022 01:23 PM    K 3.2 08/04/2022 10:50 AM    K 2.6 07/30/2022 06:43 AM    K 3.8 04/30/2022 05:21 AM    K 3.3 04/29/2022 05:35 PM     08/18/2022 11:06 PM     08/11/2022 01:23 PM    CL 93 08/04/2022 10:50 AM    CO2 22 08/18/2022 11:06 PM    CO2 30 08/11/2022 01:23 PM    CO2 36 08/04/2022 10:50 AM    PHOS 4.1 07/29/2022 06:18 AM    PHOS 3.7 04/18/2022 02:16 AM    PHOS 4.3 02/09/2022 05:46 AM    BUN 40 08/18/2022 11:06 PM    BUN 37 08/11/2022 01:23 PM    BUN 25 08/04/2022 10:50 AM    CREATININE 1.7 08/18/2022 11:06 PM    CREATININE 1.6 08/11/2022 01:23 PM    CREATININE 1.2 08/04/2022 10:50 AM     BNP:   Lab Results   Component Value Date/Time    PROBNP 30,265 08/18/2022 11:06 PM -Ejection fraction is visually estimated to be 30-35 %. -Global left ventricular function moderately reduced.   -Indeterminate diastolic function due to atrial fibrillation and moderate to   severe mitral regurgitation.   -Moderate-to-severe mitral regurgitation .   -Dilated left atrium with a volume of 97 ml.   -Left atrial volume is increased probably due to atrial fibrillation .   -There is mild right ventricular hypertrophy.   -The right ventricle is mildly enlarged.   -Right ventricular systolic pressure of 53 mm Hg consistent with pulmonary   hypertension.   -Moderate to severe tricuspid regurgitation. TAPSE = 1.43   -IVC size is dilated (>2.1 cm) but collapses > 50% with respiration   consistent with elevated RA pressure (8 mmHg). Assessment:    1. Chronic systolic heart failure (HCC)  bb; no aldosterone antagonist due to compliance   2. Non-compliance    3. Homeless    4. PAF (paroxysmal atrial fibrillation) ([facility]) Dr Yuly rothman   5. Benign essential HTN    6. Dilated cardiomyopathy ([facility]    7.    ICD in place    Plan:   Patient Instructions   Restart jardiance 10 mg once a day  Continue all other medications  Go home and take medications  Bring all your bottles with you to your next appointment  RTO 8/31    Compliance with medications continuing to be a big issue  Consider restarting lisinopril at next visit  No PCP    NYHA 4    I appreciate the opportunity of cooperating in the care of this individual.    TRENT Booker - CNS, CNS, 8/19/2022, 4:48 PM

## 2022-08-24 ENCOUNTER — OFFICE VISIT (OUTPATIENT)
Dept: CARDIOLOGY CLINIC | Age: 62
End: 2022-08-24
Payer: COMMERCIAL

## 2022-08-24 ENCOUNTER — HOSPITAL ENCOUNTER (OUTPATIENT)
Dept: ONCOLOGY | Age: 62
Setting detail: INFUSION SERIES
Discharge: HOME | End: 2022-08-24
Payer: COMMERCIAL

## 2022-08-24 VITALS
DIASTOLIC BLOOD PRESSURE: 70 MMHG | HEIGHT: 74 IN | WEIGHT: 216 LBS | SYSTOLIC BLOOD PRESSURE: 100 MMHG | HEART RATE: 98 BPM | BODY MASS INDEX: 27.72 KG/M2

## 2022-08-24 VITALS
RESPIRATION RATE: 18 BRPM | HEART RATE: 113 BPM | TEMPERATURE: 97 F | SYSTOLIC BLOOD PRESSURE: 109 MMHG | DIASTOLIC BLOOD PRESSURE: 70 MMHG

## 2022-08-24 DIAGNOSIS — I48.0 PAF (PAROXYSMAL ATRIAL FIBRILLATION) (HCC): ICD-10-CM

## 2022-08-24 DIAGNOSIS — I50.43 ACUTE ON CHRONIC COMBINED SYSTOLIC AND DIASTOLIC HEART FAILURE (HCC): Primary | ICD-10-CM

## 2022-08-24 DIAGNOSIS — Z95.810 ICD (IMPLANTABLE CARDIOVERTER-DEFIBRILLATOR), BIVENTRICULAR, IN SITU: ICD-10-CM

## 2022-08-24 DIAGNOSIS — Z59.00 HOMELESS: ICD-10-CM

## 2022-08-24 DIAGNOSIS — Z91.199 NON-COMPLIANCE: ICD-10-CM

## 2022-08-24 DIAGNOSIS — I10 BENIGN ESSENTIAL HTN: ICD-10-CM

## 2022-08-24 DIAGNOSIS — E87.79 OTHER HYPERVOLEMIA: ICD-10-CM

## 2022-08-24 DIAGNOSIS — I42.0 DILATED CARDIOMYOPATHY (HCC): ICD-10-CM

## 2022-08-24 PROBLEM — E87.70 FLUID OVERLOAD: Status: ACTIVE | Noted: 2022-08-24

## 2022-08-24 LAB
A/G RATIO: 1.1 (ref 1.1–2.2)
ALBUMIN SERPL-MCNC: 3.8 G/DL (ref 3.4–5)
ALP BLD-CCNC: 136 U/L (ref 40–129)
ALT SERPL-CCNC: 75 U/L (ref 10–40)
ANION GAP SERPL CALCULATED.3IONS-SCNC: 11 MMOL/L (ref 3–16)
AST SERPL-CCNC: 82 U/L (ref 15–37)
BILIRUB SERPL-MCNC: 2 MG/DL (ref 0–1)
BUN BLDV-MCNC: 36 MG/DL (ref 7–20)
CALCIUM SERPL-MCNC: 8.9 MG/DL (ref 8.3–10.6)
CHLORIDE BLD-SCNC: 103 MMOL/L (ref 99–110)
CO2: 25 MMOL/L (ref 21–32)
CREAT SERPL-MCNC: 1.4 MG/DL (ref 0.8–1.3)
GFR AFRICAN AMERICAN: >60
GFR NON-AFRICAN AMERICAN: 51
GLUCOSE BLD-MCNC: 89 MG/DL (ref 70–99)
HCT VFR BLD CALC: 38.4 % (ref 40.5–52.5)
HEMOGLOBIN: 12.3 G/DL (ref 13.5–17.5)
IRON SATURATION: 17 % (ref 20–50)
IRON: 82 UG/DL (ref 59–158)
MCH RBC QN AUTO: 28.1 PG (ref 26–34)
MCHC RBC AUTO-ENTMCNC: 32.1 G/DL (ref 31–36)
MCV RBC AUTO: 87.4 FL (ref 80–100)
PDW BLD-RTO: 17.3 % (ref 12.4–15.4)
PLATELET # BLD: 176 K/UL (ref 135–450)
PLATELET SLIDE REVIEW: ABNORMAL
PMV BLD AUTO: 9.5 FL (ref 5–10.5)
POTASSIUM SERPL-SCNC: 5.1 MMOL/L (ref 3.5–5.1)
PRO-BNP: ABNORMAL PG/ML (ref 0–124)
RBC # BLD: 4.39 M/UL (ref 4.2–5.9)
SLIDE REVIEW: ABNORMAL
SODIUM BLD-SCNC: 139 MMOL/L (ref 136–145)
TOTAL IRON BINDING CAPACITY: 473 UG/DL (ref 260–445)
TOTAL PROTEIN: 7.2 G/DL (ref 6.4–8.2)
WBC # BLD: 5.6 K/UL (ref 4–11)

## 2022-08-24 PROCEDURE — 96374 THER/PROPH/DIAG INJ IV PUSH: CPT

## 2022-08-24 PROCEDURE — G8419 CALC BMI OUT NRM PARAM NOF/U: HCPCS | Performed by: CLINICAL NURSE SPECIALIST

## 2022-08-24 PROCEDURE — 85027 COMPLETE CBC AUTOMATED: CPT

## 2022-08-24 PROCEDURE — 6360000002 HC RX W HCPCS: Performed by: CLINICAL NURSE SPECIALIST

## 2022-08-24 PROCEDURE — 2580000003 HC RX 258: Performed by: CLINICAL NURSE SPECIALIST

## 2022-08-24 PROCEDURE — 1111F DSCHRG MED/CURRENT MED MERGE: CPT | Performed by: CLINICAL NURSE SPECIALIST

## 2022-08-24 PROCEDURE — 99215 OFFICE O/P EST HI 40 MIN: CPT | Performed by: CLINICAL NURSE SPECIALIST

## 2022-08-24 PROCEDURE — 1036F TOBACCO NON-USER: CPT | Performed by: CLINICAL NURSE SPECIALIST

## 2022-08-24 PROCEDURE — 3017F COLORECTAL CA SCREEN DOC REV: CPT | Performed by: CLINICAL NURSE SPECIALIST

## 2022-08-24 PROCEDURE — 83540 ASSAY OF IRON: CPT

## 2022-08-24 PROCEDURE — 83550 IRON BINDING TEST: CPT

## 2022-08-24 PROCEDURE — G8427 DOCREV CUR MEDS BY ELIG CLIN: HCPCS | Performed by: CLINICAL NURSE SPECIALIST

## 2022-08-24 PROCEDURE — 80053 COMPREHEN METABOLIC PANEL: CPT

## 2022-08-24 PROCEDURE — 99211 OFF/OP EST MAY X REQ PHY/QHP: CPT

## 2022-08-24 PROCEDURE — 83880 ASSAY OF NATRIURETIC PEPTIDE: CPT

## 2022-08-24 RX ORDER — FUROSEMIDE 10 MG/ML
120 INJECTION INTRAMUSCULAR; INTRAVENOUS ONCE
Status: CANCELLED
Start: 2022-08-24 | End: 2022-08-24

## 2022-08-24 RX ORDER — SODIUM CHLORIDE 0.9 % (FLUSH) 0.9 %
5-40 SYRINGE (ML) INJECTION ONCE
Status: CANCELLED
Start: 2022-08-24 | End: 2022-08-24

## 2022-08-24 RX ORDER — SODIUM CHLORIDE 0.9 % (FLUSH) 0.9 %
5-40 SYRINGE (ML) INJECTION ONCE
Status: COMPLETED | OUTPATIENT
Start: 2022-08-24 | End: 2022-08-24

## 2022-08-24 RX ORDER — LISINOPRIL 2.5 MG/1
2.5 TABLET ORAL DAILY
COMMUNITY
End: 2022-10-26 | Stop reason: SDUPTHER

## 2022-08-24 RX ORDER — FUROSEMIDE 10 MG/ML
120 INJECTION INTRAMUSCULAR; INTRAVENOUS ONCE
Status: COMPLETED | OUTPATIENT
Start: 2022-08-24 | End: 2022-08-24

## 2022-08-24 RX ADMIN — Medication 10 ML: at 12:53

## 2022-08-24 RX ADMIN — FUROSEMIDE 120 MG: 10 INJECTION, SOLUTION INTRAMUSCULAR; INTRAVENOUS at 12:54

## 2022-08-24 SDOH — ECONOMIC STABILITY - HOUSING INSECURITY: HOMELESSNESS UNSPECIFIED: Z59.00

## 2022-08-24 NOTE — PROGRESS NOTES
[address]  Progress Note    Primary Care Doctor:  No primary care provider on file. Chief Complaint   Patient presents with    Follow-up    Congestive Heart Failure    Edema    Shortness of Breath        History of Present Illness:  59 y.o. male with history of with history of PAF, hypertension. Unvaccinated, , homeless  hospitalization 2/5-10/22 for weight gain and lower extremity edema. He recently had covid and did not follow up in office. LVEF 20%, LHC done. Weight 223->193. He was started on HF therapy, declined life vest.  CONSUELO done with HENOK thrombus (on eliquis per EP). Not able to do CV  4/13-26/22 for fluid overload requiring diureses with milrinone and lasix infusion, UTI and VT requiring ICD placement  6/15-20/22 for acute on chronic systolic heart failure. He was over drinking fluids, diuresed with IV lasix and good weight loss of 23 pounds. 7/28-8/1/2022 for shortness of breath, hypoxia, pneumonia, lisinopril and jardiance held due to hypotension and given oral metolazone times 2    I had the pleasure of seeing [place] Neighbors in follow up for systolic heart failure and urgent visit (just walked into office). He is ambulatory and complaining of hurting all over, especially his legs with swelling. He brought all his medications. He stopped taking his antibiotic which has about 3 days worth left. His weight has gone from 192->216. He complains of not being able to eat due to abdominal discomfort and edema into his thighs. He is taking torsemide 20 mg three times a day. No chest pain or dizziness. Mainly overwhelming fatigue. Optival shows normal thoracic impedence      Past Medical History:   has a past medical history of Atrial fibrillation ([address] CHF (congestive heart failure) ([address] Hx of blood clots, and Hypertension. Surgical History:   has a past surgical history that includes Insertable Cardiac Monitor (07/26/2019) and Cardioversion (07/26/2019).    Social History:   reports that he has never smoked. He has never used smokeless tobacco. He reports that he does not drink alcohol and does not use drugs. Family History:   Family History   Problem Relation Age of Onset    Heart Disease Mother     High Blood Pressure Mother     Heart Attack Mother     Other Father     Stroke Father     High Blood Pressure Father        Home Medications:  Prior to Admission medications    Medication Sig Start Date End Date Taking? Authorizing Provider   lisinopril (PRINIVIL;ZESTRIL) 2.5 MG tablet Take 2.5 mg by mouth daily   Yes Historical Provider, MD   doxycycline hyclate (VIBRA-TABS) 100 MG tablet Take 1 tablet by mouth 2 times daily for 7 days 8/19/22 8/26/22 Yes Waleska Esteves MD   empagliflozin (JARDIANCE) 10 MG tablet Take 1 tablet by mouth daily 8/19/22  Yes TRENT Ge   torsemide (DEMADEX) 20 MG tablet Take 2 tablets by mouth in the morning. Patient taking differently: Take 60 mg by mouth daily 8/11/22  Yes TRENT Ge   metoprolol succinate (TOPROL XL) 25 MG extended release tablet Take 0.5 tablets by mouth in the morning and at bedtime 8/1/22  Yes Neda Marquis MD   potassium chloride (KLOR-CON M) 20 MEQ extended release tablet Take 1 tablet by mouth in the morning and 1 tablet in the evening. Take with meals.  8/1/22  Yes Neda Marquis MD   apixaban (ELIQUIS) 5 MG TABS tablet Take 1 tablet by mouth 2 times daily 6/17/22  Yes TRENT Villalobos CNP   tamsulosin [facility]) 0.4 MG capsule Take 1 capsule by mouth daily 6/18/22  Yes TRENT Villalobos CNP   atorvastatin (LIPITOR) 40 MG tablet Take 1 tablet by mouth nightly 6/17/22  Yes TRENT Villalobos CNP   finasteride (PROSCAR) 5 MG tablet Take 1 tablet by mouth daily 5/31/22  Yes TRENT Ge   vitamin D (CHOLECALCIFEROL) 50 MCG (2000 UT) TABS tablet Take 1 tablet by mouth daily 2/16/22  Yes TRENT Ge        Allergies:  Patient has no known 30-35 %. -Global left ventricular function moderately reduced.   -Indeterminate diastolic function due to atrial fibrillation and moderate to   severe mitral regurgitation.   -Moderate-to-severe mitral regurgitation .   -Dilated left atrium with a volume of 97 ml.   -Left atrial volume is increased probably due to atrial fibrillation .   -There is mild right ventricular hypertrophy.   -The right ventricle is mildly enlarged.   -Right ventricular systolic pressure of 53 mm Hg consistent with pulmonary   hypertension.   -Moderate to severe tricuspid regurgitation. TAPSE = 1.43   -IVC size is dilated (>2.1 cm) but collapses > 50% with respiration   consistent with elevated RA pressure (8 mmHg). Assessment:    1. Acute on chronic systolic heart failure (HCC)  bb; no aldosterone antagonist due to compliance   2. Non-compliance    3. Homeless    4. PAF (paroxysmal atrial fibrillation) ([facility]) Dr Blanca rothman   5. Benign essential HTN    6. Dilated cardiomyopathy ([facility] [address]    7. ICD in place    Plan:   Patient Instructions   Discussed with Dr Ortiz Service  Will do labs in infusion center today along with IV lasix 120 mg   Then, weekly he will come to infusion center for IV lasix 120 mg x 1 with infusion of lasix 20 mg/hr for 4 hours with blood work  Will consider adding aldactone in follow up visit next week  Patient taken to infusion center    >40 minutes spent in reviewing, examining and coordinating with Infusion center plan of care and treatment.       NYHA 4    I appreciate the opportunity of cooperating in the care of this individual.    TRENT Hugo - CNS, CNS, 8/24/2022, 2:54 PM

## 2022-08-24 NOTE — PROGRESS NOTES
Patient to department for outpatient labs and lasix. Here from cardiology office. Labs drawn followed by lasix  120mg at 20 mg per minute. Tolerated well. AVS printed and reviewed.  Pt scheduled to return next week for labs/lasix IVP/Lasix gtt

## 2022-08-25 ENCOUNTER — TELEPHONE (OUTPATIENT)
Dept: CARDIOLOGY CLINIC | Age: 62
End: 2022-08-25

## 2022-08-25 NOTE — TELEPHONE ENCOUNTER
----- Message from TRENT Jaquez - CNS sent at 8/25/2022  8:51 AM EDT -----  Call and see how he feels today as I gave him IV lasix in infusion center yesterday  thanks

## 2022-08-27 VITALS
DIASTOLIC BLOOD PRESSURE: 78 MMHG | RESPIRATION RATE: 16 BRPM | HEART RATE: 63 BPM | WEIGHT: 217 LBS | TEMPERATURE: 97.6 F | OXYGEN SATURATION: 99 % | SYSTOLIC BLOOD PRESSURE: 111 MMHG | HEIGHT: 74 IN | BODY MASS INDEX: 27.85 KG/M2

## 2022-08-27 PROCEDURE — 96374 THER/PROPH/DIAG INJ IV PUSH: CPT

## 2022-08-27 PROCEDURE — 99285 EMERGENCY DEPT VISIT HI MDM: CPT

## 2022-08-27 ASSESSMENT — PAIN DESCRIPTION - PAIN TYPE: TYPE: ACUTE PAIN

## 2022-08-27 ASSESSMENT — PAIN DESCRIPTION - FREQUENCY: FREQUENCY: CONTINUOUS

## 2022-08-27 ASSESSMENT — PAIN DESCRIPTION - ONSET: ONSET: ON-GOING

## 2022-08-27 ASSESSMENT — PAIN - FUNCTIONAL ASSESSMENT: PAIN_FUNCTIONAL_ASSESSMENT: 0-10

## 2022-08-27 ASSESSMENT — LIFESTYLE VARIABLES
HOW OFTEN DO YOU HAVE A DRINK CONTAINING ALCOHOL: NEVER
HOW MANY STANDARD DRINKS CONTAINING ALCOHOL DO YOU HAVE ON A TYPICAL DAY: PATIENT DOES NOT DRINK

## 2022-08-27 ASSESSMENT — PAIN DESCRIPTION - ORIENTATION: ORIENTATION: MID;UPPER

## 2022-08-27 ASSESSMENT — PAIN DESCRIPTION - LOCATION: LOCATION: ABDOMEN

## 2022-08-27 ASSESSMENT — PAIN SCALES - GENERAL: PAINLEVEL_OUTOF10: 6

## 2022-08-28 ENCOUNTER — APPOINTMENT (OUTPATIENT)
Dept: GENERAL RADIOLOGY | Age: 62
End: 2022-08-28
Payer: COMMERCIAL

## 2022-08-28 ENCOUNTER — HOSPITAL ENCOUNTER (EMERGENCY)
Age: 62
Discharge: HOME | End: 2022-08-28
Attending: EMERGENCY MEDICINE
Payer: COMMERCIAL

## 2022-08-28 DIAGNOSIS — R10.13 ABDOMINAL PAIN, EPIGASTRIC: Primary | ICD-10-CM

## 2022-08-28 DIAGNOSIS — I50.22 CHRONIC SYSTOLIC CONGESTIVE HEART FAILURE (HCC): ICD-10-CM

## 2022-08-28 LAB
A/G RATIO: 1.1 (ref 1.1–2.2)
ALBUMIN SERPL-MCNC: 3.8 G/DL (ref 3.4–5)
ALP BLD-CCNC: 128 U/L (ref 40–129)
ALT SERPL-CCNC: 62 U/L (ref 10–40)
ANION GAP SERPL CALCULATED.3IONS-SCNC: 13 MMOL/L (ref 3–16)
AST SERPL-CCNC: 51 U/L (ref 15–37)
BASOPHILS ABSOLUTE: 0 K/UL (ref 0–0.2)
BASOPHILS RELATIVE PERCENT: 0.7 %
BILIRUB SERPL-MCNC: 2.6 MG/DL (ref 0–1)
BUN BLDV-MCNC: 39 MG/DL (ref 7–20)
CALCIUM SERPL-MCNC: 9.1 MG/DL (ref 8.3–10.6)
CHLORIDE BLD-SCNC: 99 MMOL/L (ref 99–110)
CO2: 24 MMOL/L (ref 21–32)
CREAT SERPL-MCNC: 1.5 MG/DL (ref 0.8–1.3)
EKG ATRIAL RATE: 76 BPM
EKG DIAGNOSIS: NORMAL
EKG Q-T INTERVAL: 410 MS
EKG QRS DURATION: 108 MS
EKG QTC CALCULATION (BAZETT): 501 MS
EKG R AXIS: 7 DEGREES
EKG T AXIS: -10 DEGREES
EKG VENTRICULAR RATE: 90 BPM
EOSINOPHILS ABSOLUTE: 0 K/UL (ref 0–0.6)
EOSINOPHILS RELATIVE PERCENT: 1 %
GFR AFRICAN AMERICAN: 57
GFR NON-AFRICAN AMERICAN: 47
GLUCOSE BLD-MCNC: 98 MG/DL (ref 70–99)
HCT VFR BLD CALC: 39.9 % (ref 40.5–52.5)
HEMOGLOBIN: 12.7 G/DL (ref 13.5–17.5)
LIPASE: 36 U/L (ref 13–60)
LYMPHOCYTES ABSOLUTE: 1.3 K/UL (ref 1–5.1)
LYMPHOCYTES RELATIVE PERCENT: 28.3 %
MCH RBC QN AUTO: 27.9 PG (ref 26–34)
MCHC RBC AUTO-ENTMCNC: 31.9 G/DL (ref 31–36)
MCV RBC AUTO: 87.4 FL (ref 80–100)
MONOCYTES ABSOLUTE: 0.4 K/UL (ref 0–1.3)
MONOCYTES RELATIVE PERCENT: 9.2 %
NEUTROPHILS ABSOLUTE: 2.8 K/UL (ref 1.7–7.7)
NEUTROPHILS RELATIVE PERCENT: 60.8 %
PDW BLD-RTO: 17.7 % (ref 12.4–15.4)
PLATELET # BLD: 188 K/UL (ref 135–450)
PMV BLD AUTO: 8.3 FL (ref 5–10.5)
POTASSIUM REFLEX MAGNESIUM: 4 MMOL/L (ref 3.5–5.1)
PRO-BNP: ABNORMAL PG/ML (ref 0–124)
RBC # BLD: 4.56 M/UL (ref 4.2–5.9)
SODIUM BLD-SCNC: 136 MMOL/L (ref 136–145)
TOTAL PROTEIN: 7.2 G/DL (ref 6.4–8.2)
TROPONIN: <0.01 NG/ML
WBC # BLD: 4.6 K/UL (ref 4–11)

## 2022-08-28 PROCEDURE — 6370000000 HC RX 637 (ALT 250 FOR IP): Performed by: EMERGENCY MEDICINE

## 2022-08-28 PROCEDURE — 84484 ASSAY OF TROPONIN QUANT: CPT

## 2022-08-28 PROCEDURE — 71045 X-RAY EXAM CHEST 1 VIEW: CPT

## 2022-08-28 PROCEDURE — 93005 ELECTROCARDIOGRAM TRACING: CPT | Performed by: EMERGENCY MEDICINE

## 2022-08-28 PROCEDURE — 6360000002 HC RX W HCPCS: Performed by: EMERGENCY MEDICINE

## 2022-08-28 PROCEDURE — 85025 COMPLETE CBC W/AUTO DIFF WBC: CPT

## 2022-08-28 PROCEDURE — 36415 COLL VENOUS BLD VENIPUNCTURE: CPT

## 2022-08-28 PROCEDURE — 83690 ASSAY OF LIPASE: CPT

## 2022-08-28 PROCEDURE — 80053 COMPREHEN METABOLIC PANEL: CPT

## 2022-08-28 PROCEDURE — 93010 ELECTROCARDIOGRAM REPORT: CPT | Performed by: INTERNAL MEDICINE

## 2022-08-28 PROCEDURE — 83880 ASSAY OF NATRIURETIC PEPTIDE: CPT

## 2022-08-28 RX ORDER — ONDANSETRON 4 MG/1
4 TABLET, ORALLY DISINTEGRATING ORAL ONCE
Status: COMPLETED | OUTPATIENT
Start: 2022-08-28 | End: 2022-08-28

## 2022-08-28 RX ORDER — FUROSEMIDE 10 MG/ML
20 INJECTION INTRAMUSCULAR; INTRAVENOUS ONCE
Status: COMPLETED | OUTPATIENT
Start: 2022-08-28 | End: 2022-08-28

## 2022-08-28 RX ORDER — ONDANSETRON 4 MG/1
4-8 TABLET, ORALLY DISINTEGRATING ORAL EVERY 12 HOURS PRN
Qty: 12 TABLET | Refills: 0 | Status: ON HOLD | OUTPATIENT
Start: 2022-08-28 | End: 2022-10-31 | Stop reason: HOSPADM

## 2022-08-28 RX ORDER — OMEPRAZOLE 20 MG/1
20 CAPSULE, DELAYED RELEASE ORAL
Qty: 30 CAPSULE | Refills: 0 | Status: SHIPPED | OUTPATIENT
Start: 2022-08-28 | End: 2022-10-26 | Stop reason: SDUPTHER

## 2022-08-28 RX ADMIN — ONDANSETRON 4 MG: 4 TABLET, ORALLY DISINTEGRATING ORAL at 04:30

## 2022-08-28 RX ADMIN — FUROSEMIDE 20 MG: 10 INJECTION, SOLUTION INTRAMUSCULAR; INTRAVENOUS at 05:52

## 2022-08-28 NOTE — DISCHARGE INSTRUCTIONS
Take 2 water pills in the morning and 1 in the evening for the next 5 days to reduce some of the swelling and then you can go back to 2 in the morning

## 2022-08-28 NOTE — ED PROVIDER NOTES
[address] [facility]  eMERGENCY dEPARTMENT eNCOUnter        Pt Name: Kyle Menchaca  MRN: [id number]  [place] 1960  Date of evaluation: 8/27/2022  Provider: Julee Garcia MD  PCP: No primary care provider on file. CHIEF COMPLAINT       Chief Complaint   Patient presents with    Abdominal Pain     From home. Upper mid abdominal pain with N/V since yesterday. Bilateral leg pain and swelling, progressive over the last several days. Leg Pain       HISTORY OFPRESENT ILLNESS   (Location/Symptom, Timing/Onset, Context/Setting, Quality, Duration, Modifying Factors,Severity)  Note limiting factors. Kyle Menchaca is a 59 y.o. male complains of epigastric discomfort off and on for about 4 days does not radiate nausea with occasional vomiting patient states he has chronic leg swelling and it is a little worse    Nursing Notes were all reviewed and agreed with or any disagreements were addressed  in the HPI. REVIEW OF SYSTEMS    (2-9 systems for level 4, 10 or more for level 5)       REVIEW OF SYSTEMS    Constitutional:  Denies fever, chills, or weakness   Eyes:  Denies vision changes  HENT:  Denies sore throat or neck pain   Respiratory:  Denies cough or shortness of breath   Cardiovascular:  Denies chest pain  GI:  abdominal pain, nausea, vomiting, no  diarrhea   Musculoskeletal:  Denies back pain   Skin: no rash or vesicles   Neurologic:  no headache weakness focal    Lymphatic:  no swollen  nodes   Psychiatric: no si or hs thoughts     All systems negative except as marked. Positives and Pertinent negatives as per HPI. Except as noted above in the ROS, all other systems were reviewed andnegative.        PASTMEDICAL HISTORY     Past Medical History:   Diagnosis Date    Atrial fibrillation ([address] 07/25/2019    CHF (congestive heart failure) (HCC)     Hx of blood clots     Hypertension          SURGICAL HISTORY       Past Surgical History:   Procedure Laterality Date    CARDIOVERSION  07/26/2019    Dr. Erika Trujillo  07/26/2019         CURRENT MEDICATIONS       Previous Medications    APIXABAN (ELIQUIS) 5 MG TABS TABLET    Take 1 tablet by mouth 2 times daily    ATORVASTATIN (LIPITOR) 40 MG TABLET    Take 1 tablet by mouth nightly    EMPAGLIFLOZIN (JARDIANCE) 10 MG TABLET    Take 1 tablet by mouth daily    FINASTERIDE (PROSCAR) 5 MG TABLET    Take 1 tablet by mouth daily    LISINOPRIL (PRINIVIL;ZESTRIL) 2.5 MG TABLET    Take 2.5 mg by mouth daily    METOPROLOL SUCCINATE (TOPROL XL) 25 MG EXTENDED RELEASE TABLET    Take 0.5 tablets by mouth in the morning and at bedtime    POTASSIUM CHLORIDE (KLOR-CON M) 20 MEQ EXTENDED RELEASE TABLET    Take 1 tablet by mouth in the morning and 1 tablet in the evening. Take with meals. TAMSULOSIN (FLOMAX) 0.4 MG CAPSULE    Take 1 capsule by mouth daily    TORSEMIDE (DEMADEX) 20 MG TABLET    Take 2 tablets by mouth in the morning. VITAMIN D (CHOLECALCIFEROL) 50 MCG (2000 UT) TABS TABLET    Take 1 tablet by mouth daily       ALLERGIES     Patient has no known allergies.     FAMILY HISTORY       Family History   Problem Relation Age of Onset    Heart Disease Mother     High Blood Pressure Mother     Heart Attack Mother     Other Father     Stroke Father     High Blood Pressure Father           SOCIAL HISTORY       Social History     Socioeconomic History    Marital status: Single     Spouse name: None    Number of children: None    Years of education: None    Highest education level: None   Tobacco Use    Smoking status: Never     Passive exposure: Never    Smokeless tobacco: Never   Vaping Use    Vaping Use: Never used   Substance and Sexual Activity    Alcohol use: Never    Drug use: Never    Sexual activity: Not Currently       SCREENINGS    Deniz Coma Scale  Eye Opening: Spontaneous  Best Verbal Response: Oriented  Best Motor Response: Obeys commands  Deniz Coma Scale Score: 15        PHYSICAL emergency department physician    Rhythm:  Pacemaker rhythm  Rate: normal  Axis: normal  Ectopy: none  Conduction: normal  ST Segments: no acute change  T Waves: non specific changes  Q Waves: none    Clinical Impression: Demand pacemaker rhythm    Navneet Bravo MD      EKG: All EKG's are interpreted by the Emergency Department Physician who either signs or Co-signs this chart in the absence of a cardiologist.        RADIOLOGY:   Non-plain film images such as CT, Ultrasound and MRI are read by the radiologist. Han Juan Joséin images are visualized and preliminarily interpreted by the  ED Provider with the belowfindings:        Interpretation per the Radiologist below, if available at the time of this note:    XR CHEST PORTABLE   Final Result   Moderate cardiomegaly with only some mild central congestive changes. There   is no overt edema or focal consolidation. PROCEDURES   Unless otherwise noted below, none     Procedures    CRITICAL CARE TIME   N/A      CONSULTS:  None    EMERGENCY DEPARTMENT COURSE and DIFFERENTIAL DIAGNOSIS/MDM:   Vitals:    Vitals:    08/27/22 2341   BP: 111/78   Pulse: 63   Resp: 16   Temp: 97.6 °F (36.4 °C)   TempSrc: Oral   SpO2: 99%   Weight: 217 lb (98.4 kg)   Height: 6' 2\" (1.88 m)       Patient was given the following medications:  Medications   furosemide (LASIX) injection 20 mg (has no administration in time range)   ondansetron (ZOFRAN-ODT) disintegrating tablet 4 mg (4 mg Oral Given 8/28/22 0430)           Is this patient to be included in the SEP-1 Core Measure due to severe sepsis or septic shock? No   Exclusion criteria - the patient is NOT to be included for SEP-1 Core Measure due to: Infection is not suspected  Labs are unremarkable EKG chest x-ray shows mild failure patient was given IV Lasix will increase his Demadex was also given Prilosec and Zofran for his GI distress we will follow-up with his doctor    The patient tolerated their visit well. Thepatient and / or the family were informed of the results of any tests, a time was given to answer questions. FINAL IMPRESSION      1. Abdominal pain, epigastric    2.  Chronic systolic congestive heart failure [facility])        DISPOSITION/PLAN   DISPOSITION Decision To Discharge 08/28/2022 04:57:34 AM      PATIENT REFERRED TO:  Your doctor          DISCHARGE MEDICATIONS:  New Prescriptions    OMEPRAZOLE (PRILOSEC) 20 MG DELAYED RELEASE CAPSULE    Take 1 capsule by mouth every morning (before breakfast)    ONDANSETRON (ZOFRAN ODT) 4 MG DISINTEGRATING TABLET    Take 1-2 tablets by mouth every 12 hours as needed for Nausea       DISCONTINUED MEDICATIONS:  Discontinued Medications    No medications on file              (Please note that portions of this note were completed with a voice recognition program.  Efforts were made to edit the dictations but occasionally words aremis-transcribed.)    Latia Rowe MD (electronically signed)          Latia Rowe MD  08/28/22 5633

## 2022-08-29 NOTE — TELEPHONE ENCOUNTER
Spoke to His friend Trenton Roche on his HIPPA patient was in the ER yesterday and will be here for his appointment on Wednesday.

## 2022-08-31 ENCOUNTER — HOSPITAL ENCOUNTER (OUTPATIENT)
Dept: ONCOLOGY | Age: 62
Setting detail: INFUSION SERIES
Discharge: HOME | End: 2022-08-31
Payer: COMMERCIAL

## 2022-08-31 ENCOUNTER — APPOINTMENT (OUTPATIENT)
Dept: GENERAL RADIOLOGY | Age: 62
DRG: 194 | End: 2022-08-31
Payer: COMMERCIAL

## 2022-08-31 ENCOUNTER — TELEPHONE (OUTPATIENT)
Dept: OTHER | Facility: CLINIC | Age: 62
End: 2022-08-31

## 2022-08-31 ENCOUNTER — OFFICE VISIT (OUTPATIENT)
Dept: CARDIOLOGY CLINIC | Age: 62
End: 2022-08-31
Payer: COMMERCIAL

## 2022-08-31 ENCOUNTER — HOSPITAL ENCOUNTER (INPATIENT)
Age: 62
LOS: 8 days | Discharge: HOME | DRG: 194 | End: 2022-09-08
Attending: EMERGENCY MEDICINE | Admitting: HOSPITALIST
Payer: COMMERCIAL

## 2022-08-31 VITALS — TEMPERATURE: 97.4 F | DIASTOLIC BLOOD PRESSURE: 50 MMHG | SYSTOLIC BLOOD PRESSURE: 88 MMHG | RESPIRATION RATE: 18 BRPM

## 2022-08-31 VITALS
SYSTOLIC BLOOD PRESSURE: 84 MMHG | WEIGHT: 217 LBS | BODY MASS INDEX: 27.86 KG/M2 | HEART RATE: 96 BPM | DIASTOLIC BLOOD PRESSURE: 50 MMHG

## 2022-08-31 DIAGNOSIS — I42.0 DILATED CARDIOMYOPATHY (HCC): ICD-10-CM

## 2022-08-31 DIAGNOSIS — I50.41 ACUTE COMBINED SYSTOLIC AND DIASTOLIC CONGESTIVE HEART FAILURE (HCC): Primary | ICD-10-CM

## 2022-08-31 DIAGNOSIS — I48.0 PAF (PAROXYSMAL ATRIAL FIBRILLATION) (HCC): ICD-10-CM

## 2022-08-31 DIAGNOSIS — Z91.199 NON-COMPLIANCE: ICD-10-CM

## 2022-08-31 DIAGNOSIS — I10 BENIGN ESSENTIAL HTN: ICD-10-CM

## 2022-08-31 DIAGNOSIS — Z95.810 ICD (IMPLANTABLE CARDIOVERTER-DEFIBRILLATOR), BIVENTRICULAR, IN SITU: ICD-10-CM

## 2022-08-31 DIAGNOSIS — I50.43 ACUTE ON CHRONIC COMBINED SYSTOLIC AND DIASTOLIC HEART FAILURE (HCC): Primary | ICD-10-CM

## 2022-08-31 DIAGNOSIS — E87.79 OTHER HYPERVOLEMIA: Primary | ICD-10-CM

## 2022-08-31 DIAGNOSIS — I50.21 ACUTE SYSTOLIC HEART FAILURE (HCC): ICD-10-CM

## 2022-08-31 DIAGNOSIS — Z59.00 HOMELESS: ICD-10-CM

## 2022-08-31 LAB
A/G RATIO: 1 (ref 1.1–2.2)
ALBUMIN SERPL-MCNC: 3.5 G/DL (ref 3.4–5)
ALP BLD-CCNC: 135 U/L (ref 40–129)
ALT SERPL-CCNC: 51 U/L (ref 10–40)
ANION GAP SERPL CALCULATED.3IONS-SCNC: 11 MMOL/L (ref 3–16)
ANION GAP SERPL CALCULATED.3IONS-SCNC: 13 MMOL/L (ref 3–16)
AST SERPL-CCNC: 43 U/L (ref 15–37)
BASOPHILS ABSOLUTE: 0 K/UL (ref 0–0.2)
BASOPHILS RELATIVE PERCENT: 0.7 %
BILIRUB SERPL-MCNC: 2 MG/DL (ref 0–1)
BUN BLDV-MCNC: 31 MG/DL (ref 7–20)
BUN BLDV-MCNC: 33 MG/DL (ref 7–20)
CALCIUM SERPL-MCNC: 8.5 MG/DL (ref 8.3–10.6)
CALCIUM SERPL-MCNC: 8.8 MG/DL (ref 8.3–10.6)
CHLORIDE BLD-SCNC: 105 MMOL/L (ref 99–110)
CHLORIDE BLD-SCNC: 99 MMOL/L (ref 99–110)
CO2: 26 MMOL/L (ref 21–32)
CO2: 26 MMOL/L (ref 21–32)
CREAT SERPL-MCNC: 1.5 MG/DL (ref 0.8–1.3)
CREAT SERPL-MCNC: 1.6 MG/DL (ref 0.8–1.3)
EOSINOPHILS ABSOLUTE: 0.1 K/UL (ref 0–0.6)
EOSINOPHILS RELATIVE PERCENT: 0.9 %
GFR AFRICAN AMERICAN: 53
GFR AFRICAN AMERICAN: 57
GFR NON-AFRICAN AMERICAN: 44
GFR NON-AFRICAN AMERICAN: 47
GLUCOSE BLD-MCNC: 101 MG/DL (ref 70–99)
GLUCOSE BLD-MCNC: 135 MG/DL (ref 70–99)
HCT VFR BLD CALC: 38.8 % (ref 40.5–52.5)
HEMOGLOBIN: 12.5 G/DL (ref 13.5–17.5)
LYMPHOCYTES ABSOLUTE: 1.2 K/UL (ref 1–5.1)
LYMPHOCYTES RELATIVE PERCENT: 21.7 %
MAGNESIUM: 2.2 MG/DL (ref 1.8–2.4)
MCH RBC QN AUTO: 27.8 PG (ref 26–34)
MCHC RBC AUTO-ENTMCNC: 32.1 G/DL (ref 31–36)
MCV RBC AUTO: 86.4 FL (ref 80–100)
MONOCYTES ABSOLUTE: 0.5 K/UL (ref 0–1.3)
MONOCYTES RELATIVE PERCENT: 9.7 %
NEUTROPHILS ABSOLUTE: 3.6 K/UL (ref 1.7–7.7)
NEUTROPHILS RELATIVE PERCENT: 67 %
PDW BLD-RTO: 17 % (ref 12.4–15.4)
PHOSPHORUS: 3.9 MG/DL (ref 2.5–4.9)
PLATELET # BLD: 150 K/UL (ref 135–450)
PMV BLD AUTO: 8.1 FL (ref 5–10.5)
POTASSIUM SERPL-SCNC: 3.2 MMOL/L (ref 3.5–5.1)
POTASSIUM SERPL-SCNC: 3.6 MMOL/L (ref 3.5–5.1)
PRO-BNP: ABNORMAL PG/ML (ref 0–124)
RBC # BLD: 4.5 M/UL (ref 4.2–5.9)
SODIUM BLD-SCNC: 136 MMOL/L (ref 136–145)
SODIUM BLD-SCNC: 144 MMOL/L (ref 136–145)
TOTAL PROTEIN: 6.9 G/DL (ref 6.4–8.2)
TROPONIN: <0.01 NG/ML
WBC # BLD: 5.4 K/UL (ref 4–11)

## 2022-08-31 PROCEDURE — 99211 OFF/OP EST MAY X REQ PHY/QHP: CPT

## 2022-08-31 PROCEDURE — 6360000002 HC RX W HCPCS: Performed by: EMERGENCY MEDICINE

## 2022-08-31 PROCEDURE — 6370000000 HC RX 637 (ALT 250 FOR IP): Performed by: NURSE PRACTITIONER

## 2022-08-31 PROCEDURE — 2060000000 HC ICU INTERMEDIATE R&B

## 2022-08-31 PROCEDURE — 93005 ELECTROCARDIOGRAM TRACING: CPT | Performed by: EMERGENCY MEDICINE

## 2022-08-31 PROCEDURE — 83735 ASSAY OF MAGNESIUM: CPT

## 2022-08-31 PROCEDURE — 99215 OFFICE O/P EST HI 40 MIN: CPT | Performed by: CLINICAL NURSE SPECIALIST

## 2022-08-31 PROCEDURE — 6360000002 HC RX W HCPCS: Performed by: NURSE PRACTITIONER

## 2022-08-31 PROCEDURE — 6370000000 HC RX 637 (ALT 250 FOR IP): Performed by: HOSPITALIST

## 2022-08-31 PROCEDURE — 36415 COLL VENOUS BLD VENIPUNCTURE: CPT

## 2022-08-31 PROCEDURE — 84484 ASSAY OF TROPONIN QUANT: CPT

## 2022-08-31 PROCEDURE — 99285 EMERGENCY DEPT VISIT HI MDM: CPT

## 2022-08-31 PROCEDURE — G8419 CALC BMI OUT NRM PARAM NOF/U: HCPCS | Performed by: CLINICAL NURSE SPECIALIST

## 2022-08-31 PROCEDURE — G8427 DOCREV CUR MEDS BY ELIG CLIN: HCPCS | Performed by: CLINICAL NURSE SPECIALIST

## 2022-08-31 PROCEDURE — 3017F COLORECTAL CA SCREEN DOC REV: CPT | Performed by: CLINICAL NURSE SPECIALIST

## 2022-08-31 PROCEDURE — 96374 THER/PROPH/DIAG INJ IV PUSH: CPT

## 2022-08-31 PROCEDURE — 84100 ASSAY OF PHOSPHORUS: CPT

## 2022-08-31 PROCEDURE — 1111F DSCHRG MED/CURRENT MED MERGE: CPT | Performed by: CLINICAL NURSE SPECIALIST

## 2022-08-31 PROCEDURE — 85025 COMPLETE CBC W/AUTO DIFF WBC: CPT

## 2022-08-31 PROCEDURE — 83880 ASSAY OF NATRIURETIC PEPTIDE: CPT

## 2022-08-31 PROCEDURE — 93005 ELECTROCARDIOGRAM TRACING: CPT | Performed by: HOSPITALIST

## 2022-08-31 PROCEDURE — 1036F TOBACCO NON-USER: CPT | Performed by: CLINICAL NURSE SPECIALIST

## 2022-08-31 PROCEDURE — 80053 COMPREHEN METABOLIC PANEL: CPT

## 2022-08-31 PROCEDURE — 71045 X-RAY EXAM CHEST 1 VIEW: CPT

## 2022-08-31 RX ORDER — SODIUM CHLORIDE 0.9 % (FLUSH) 0.9 %
5-40 SYRINGE (ML) INJECTION ONCE
Status: DISCONTINUED | OUTPATIENT
Start: 2022-08-31 | End: 2022-09-01 | Stop reason: HOSPADM

## 2022-08-31 RX ORDER — ONDANSETRON 4 MG/1
4 TABLET, ORALLY DISINTEGRATING ORAL EVERY 8 HOURS PRN
Status: DISCONTINUED | OUTPATIENT
Start: 2022-08-31 | End: 2022-08-31

## 2022-08-31 RX ORDER — FINASTERIDE 5 MG/1
5 TABLET, FILM COATED ORAL DAILY
Status: DISCONTINUED | OUTPATIENT
Start: 2022-08-31 | End: 2022-09-08 | Stop reason: HOSPADM

## 2022-08-31 RX ORDER — LISINOPRIL 5 MG/1
2.5 TABLET ORAL DAILY
Status: DISCONTINUED | OUTPATIENT
Start: 2022-08-31 | End: 2022-09-08 | Stop reason: HOSPADM

## 2022-08-31 RX ORDER — SODIUM CHLORIDE 0.9 % (FLUSH) 0.9 %
5-40 SYRINGE (ML) INJECTION EVERY 12 HOURS SCHEDULED
Status: DISCONTINUED | OUTPATIENT
Start: 2022-08-31 | End: 2022-09-08 | Stop reason: HOSPADM

## 2022-08-31 RX ORDER — ONDANSETRON 2 MG/ML
8 INJECTION INTRAMUSCULAR; INTRAVENOUS ONCE
Status: COMPLETED | OUTPATIENT
Start: 2022-08-31 | End: 2022-08-31

## 2022-08-31 RX ORDER — METOPROLOL SUCCINATE 25 MG/1
12.5 TABLET, EXTENDED RELEASE ORAL 2 TIMES DAILY
Status: DISCONTINUED | OUTPATIENT
Start: 2022-08-31 | End: 2022-09-08 | Stop reason: HOSPADM

## 2022-08-31 RX ORDER — PANTOPRAZOLE SODIUM 40 MG/1
40 TABLET, DELAYED RELEASE ORAL
Status: DISCONTINUED | OUTPATIENT
Start: 2022-09-01 | End: 2022-09-08 | Stop reason: HOSPADM

## 2022-08-31 RX ORDER — ONDANSETRON 2 MG/ML
4 INJECTION INTRAMUSCULAR; INTRAVENOUS EVERY 6 HOURS PRN
Status: DISCONTINUED | OUTPATIENT
Start: 2022-08-31 | End: 2022-08-31

## 2022-08-31 RX ORDER — POLYETHYLENE GLYCOL 3350 17 G/17G
17 POWDER, FOR SOLUTION ORAL DAILY PRN
Status: DISCONTINUED | OUTPATIENT
Start: 2022-08-31 | End: 2022-09-08 | Stop reason: HOSPADM

## 2022-08-31 RX ORDER — POTASSIUM CHLORIDE 7.45 MG/ML
10 INJECTION INTRAVENOUS
Status: COMPLETED | OUTPATIENT
Start: 2022-08-31 | End: 2022-08-31

## 2022-08-31 RX ORDER — POTASSIUM CHLORIDE 20 MEQ/1
40 TABLET, EXTENDED RELEASE ORAL ONCE
Status: COMPLETED | OUTPATIENT
Start: 2022-08-31 | End: 2022-08-31

## 2022-08-31 RX ORDER — FUROSEMIDE 10 MG/ML
120 INJECTION INTRAMUSCULAR; INTRAVENOUS ONCE
Status: COMPLETED | OUTPATIENT
Start: 2022-08-31 | End: 2022-08-31

## 2022-08-31 RX ORDER — FUROSEMIDE 10 MG/ML
120 INJECTION INTRAMUSCULAR; INTRAVENOUS ONCE
Status: DISCONTINUED | OUTPATIENT
Start: 2022-08-31 | End: 2022-09-01 | Stop reason: HOSPADM

## 2022-08-31 RX ORDER — TAMSULOSIN HYDROCHLORIDE 0.4 MG/1
0.4 CAPSULE ORAL DAILY
Status: DISCONTINUED | OUTPATIENT
Start: 2022-08-31 | End: 2022-09-08 | Stop reason: HOSPADM

## 2022-08-31 RX ORDER — ENOXAPARIN SODIUM 100 MG/ML
40 INJECTION SUBCUTANEOUS DAILY
Status: DISCONTINUED | OUTPATIENT
Start: 2022-08-31 | End: 2022-08-31

## 2022-08-31 RX ORDER — ACETAMINOPHEN 650 MG/1
650 SUPPOSITORY RECTAL EVERY 6 HOURS PRN
Status: DISCONTINUED | OUTPATIENT
Start: 2022-08-31 | End: 2022-09-08 | Stop reason: HOSPADM

## 2022-08-31 RX ORDER — ACETAMINOPHEN 325 MG/1
650 TABLET ORAL EVERY 6 HOURS PRN
Status: DISCONTINUED | OUTPATIENT
Start: 2022-08-31 | End: 2022-09-08 | Stop reason: HOSPADM

## 2022-08-31 RX ORDER — ATORVASTATIN CALCIUM 40 MG/1
40 TABLET, FILM COATED ORAL NIGHTLY
Status: DISCONTINUED | OUTPATIENT
Start: 2022-08-31 | End: 2022-09-08 | Stop reason: HOSPADM

## 2022-08-31 RX ORDER — SODIUM CHLORIDE 0.9 % (FLUSH) 0.9 %
5-40 SYRINGE (ML) INJECTION ONCE
Start: 2022-09-07 | End: 2022-09-07

## 2022-08-31 RX ORDER — SODIUM CHLORIDE 9 MG/ML
INJECTION, SOLUTION INTRAVENOUS PRN
Status: DISCONTINUED | OUTPATIENT
Start: 2022-08-31 | End: 2022-09-08 | Stop reason: HOSPADM

## 2022-08-31 RX ORDER — FUROSEMIDE 10 MG/ML
120 INJECTION INTRAMUSCULAR; INTRAVENOUS ONCE
Start: 2022-09-07 | End: 2022-09-07

## 2022-08-31 RX ORDER — SODIUM CHLORIDE 0.9 % (FLUSH) 0.9 %
5-40 SYRINGE (ML) INJECTION PRN
Status: DISCONTINUED | OUTPATIENT
Start: 2022-08-31 | End: 2022-09-08 | Stop reason: HOSPADM

## 2022-08-31 RX ADMIN — POTASSIUM CHLORIDE 40 MEQ: 1500 TABLET, EXTENDED RELEASE ORAL at 21:55

## 2022-08-31 RX ADMIN — APIXABAN 5 MG: 5 TABLET, FILM COATED ORAL at 21:54

## 2022-08-31 RX ADMIN — FUROSEMIDE 120 MG: 10 INJECTION, SOLUTION INTRAMUSCULAR; INTRAVENOUS at 13:58

## 2022-08-31 RX ADMIN — FINASTERIDE 5 MG: 5 TABLET, FILM COATED ORAL at 18:23

## 2022-08-31 RX ADMIN — LISINOPRIL 2.5 MG: 5 TABLET ORAL at 18:23

## 2022-08-31 RX ADMIN — ONDANSETRON 8 MG: 2 INJECTION INTRAMUSCULAR; INTRAVENOUS at 12:12

## 2022-08-31 RX ADMIN — POTASSIUM CHLORIDE 10 MEQ: 7.45 INJECTION INTRAVENOUS at 22:01

## 2022-08-31 RX ADMIN — POTASSIUM CHLORIDE 10 MEQ: 7.45 INJECTION INTRAVENOUS at 22:50

## 2022-08-31 RX ADMIN — ATORVASTATIN CALCIUM 40 MG: 40 TABLET, FILM COATED ORAL at 21:55

## 2022-08-31 RX ADMIN — TAMSULOSIN HYDROCHLORIDE 0.4 MG: 0.4 CAPSULE ORAL at 18:23

## 2022-08-31 SDOH — ECONOMIC STABILITY - HOUSING INSECURITY: HOMELESSNESS UNSPECIFIED: Z59.00

## 2022-08-31 ASSESSMENT — PAIN - FUNCTIONAL ASSESSMENT: PAIN_FUNCTIONAL_ASSESSMENT: NONE - DENIES PAIN

## 2022-08-31 NOTE — PROGRESS NOTES
[facility], [place], operated by [facility]  Progress Note    Primary Care Doctor:  No primary care provider on file. Chief Complaint   Patient presents with    Shortness of Breath    Dizziness          History of Present Illness:  59 y.o. male with history of with history of PAF, hypertension. Unvaccinated, , homeless  hospitalization 2/5-10/22 for weight gain and lower extremity edema. He recently had covid and did not follow up in office. LVEF 20%, C done. Weight 223->193. He was started on HF therapy, declined life vest.  CONSUELO done with HENOK thrombus (on eliquis per EP). Not able to do CV  4/13-26/22 for fluid overload requiring diureses with milrinone and lasix infusion, UTI and VT requiring ICD placement  6/15-20/22 for acute on chronic systolic heart failure. He was over drinking fluids, diuresed with IV lasix and good weight loss of 23 pounds. 7/28-8/1/2022 for shortness of breath, hypoxia, pneumonia, lisinopril and jardiance held due to hypotension and given oral metolazone times 2    I had the pleasure of seeing Nnamdi Prince in follow up for systolic heart failure and seen in the infusion center today. He came 2 hours late for his IV lasix plus drip for 4 hours today. His BP is down 80s, complaining of dizziness and edema up to his thighs today. He is with his friend, El Weeks. His weight is 217 which has not come down after I gave 120 mg of IV lasix last week. He is coughing but no fevers. No chest pain. He has all his medications with him. HR is 96 AF    Past Medical History:   has a past medical history of Atrial fibrillation ([address] CHF (congestive heart failure) ([address] Hx of blood clots, and Hypertension. Surgical History:   has a past surgical history that includes Insertable Cardiac Monitor (07/26/2019) and Cardioversion (07/26/2019). Social History:   reports that he has never smoked. He has never been exposed to tobacco smoke.  He has never used smokeless tobacco. He reports that he does has no known allergies. Review of Systems:   Constitutional: there has been no unanticipated weight loss. There's been no change in energy level, sleep pattern, or activity level. Eyes: No visual changes or diplopia. No scleral icterus. ENT: No Headaches, hearing loss or vertigo. No mouth sores or sore throat. Cardiovascular: Reviewed in HPI  Respiratory: No cough or wheezing, no sputum production. No hematemesis. Gastrointestinal: No abdominal pain, appetite loss, blood in stools. No change in bowel or bladder habits. Genitourinary: No dysuria, trouble voiding, or hematuria. Musculoskeletal:  No gait disturbance, weakness or joint complaints. Integumentary: No rash or pruritis. Neurological: No headache, diplopia, change in muscle strength, numbness or tingling. No change in gait, balance, coordination, mood, affect, memory, mentation, behavior. Psychiatric: No anxiety, no depression. Endocrine: No malaise, fatigue or temperature intolerance. No excessive thirst, fluid intake, or urination. No tremor. Hematologic/Lymphatic: No abnormal bruising or bleeding, blood clots or swollen lymph nodes. Allergic/Immunologic: No nasal congestion or hives. Physical Examination:    Vitals:    08/31/22 1246   BP: (!) 84/50   Pulse: 96   Weight: 217 lb (98.4 kg)           Constitutional and General Appearance: Warm and dry, no apparent distress, normal coloration   HEENT:  Normocephalic, atraumatic  Respiratory:  Normal excursion and expansion without use of accessory muscles  Resp Auscultation: Normal breath sounds without dullness  Cardiovascular: The apical impulses not displaced  Heart tones are crisp and normal  JVP + cm H2O  irregular rate and rhythm  Peripheral pulses are symmetrical and full  There is no clubbing, cyanosis of the extremities.   Bilateral lower ankle edema to upper thigh edema  Pedal Pulses: 2+ and equal   Abdomen:  No masses or tenderness  Liver/Spleen: No Abnormalities Noted  Neurological/Psychiatric:  Alert and oriented in all spheres  Moves all extremities well  Exhibits normal gait balance and coordination  No abnormalities of mood, affect, memory, mentation, or behavior are noted    Lab Data:    CBC:   Lab Results   Component Value Date/Time    WBC 5.4 08/31/2022 11:17 AM    WBC 4.6 08/28/2022 04:06 AM    WBC 5.6 08/24/2022 12:46 PM    RBC 4.50 08/31/2022 11:17 AM    RBC 4.56 08/28/2022 04:06 AM    RBC 4.39 08/24/2022 12:46 PM    HGB 12.5 08/31/2022 11:17 AM    HGB 12.7 08/28/2022 04:06 AM    HGB 12.3 08/24/2022 12:46 PM    HCT 38.8 08/31/2022 11:17 AM    HCT 39.9 08/28/2022 04:06 AM    HCT 38.4 08/24/2022 12:46 PM    MCV 86.4 08/31/2022 11:17 AM    MCV 87.4 08/28/2022 04:06 AM    MCV 87.4 08/24/2022 12:46 PM    RDW 17.0 08/31/2022 11:17 AM    RDW 17.7 08/28/2022 04:06 AM    RDW 17.3 08/24/2022 12:46 PM     08/31/2022 11:17 AM     08/28/2022 04:06 AM     08/24/2022 12:46 PM     BMP:  Lab Results   Component Value Date/Time     08/31/2022 11:17 AM     08/28/2022 04:06 AM     08/24/2022 12:46 PM    K 3.6 08/31/2022 11:17 AM    K 4.0 08/28/2022 04:06 AM    K 5.1 08/24/2022 12:46 PM    K 4.2 08/18/2022 11:06 PM    K 2.6 07/30/2022 06:43 AM    K 3.8 04/30/2022 05:21 AM     08/31/2022 11:17 AM    CL 99 08/28/2022 04:06 AM     08/24/2022 12:46 PM    CO2 26 08/31/2022 11:17 AM    CO2 24 08/28/2022 04:06 AM    CO2 25 08/24/2022 12:46 PM    PHOS 4.1 07/29/2022 06:18 AM    PHOS 3.7 04/18/2022 02:16 AM    PHOS 4.3 02/09/2022 05:46 AM    BUN 33 08/31/2022 11:17 AM    BUN 39 08/28/2022 04:06 AM    BUN 36 08/24/2022 12:46 PM    CREATININE 1.6 08/31/2022 11:17 AM    CREATININE 1.5 08/28/2022 04:06 AM    CREATININE 1.4 08/24/2022 12:46 PM     BNP:   Lab Results   Component Value Date/Time    PROBNP 26,359 08/28/2022 04:06 AM    PROBNP 27,430 08/24/2022 12:46 PM    PROBNP 30,265 08/18/2022 11:06 PM     Cardiac Imaging:  CONSUELO Dr Pili Elena reduced.   -Indeterminate diastolic function due to atrial fibrillation and moderate to   severe mitral regurgitation.   -Moderate-to-severe mitral regurgitation .   -Dilated left atrium with a volume of 97 ml.   -Left atrial volume is increased probably due to atrial fibrillation .   -There is mild right ventricular hypertrophy.   -The right ventricle is mildly enlarged.   -Right ventricular systolic pressure of 53 mm Hg consistent with pulmonary   hypertension.   -Moderate to severe tricuspid regurgitation. TAPSE = 1.43   -IVC size is dilated (>2.1 cm) but collapses > 50% with respiration   consistent with elevated RA pressure (8 mmHg). Assessment:    1. Acute on chronic systolic heart failure (HCC)  bb; no aldosterone antagonist due to compliance   2. Non-compliance    3. Homeless    4. PAF (paroxysmal atrial fibrillation) ([facility]) Dr Randell rothman Flatten   5. Benign essential HTN    6. Dilated cardiomyopathy ([facility] [address]    7. ICD in place    Plan:   Patient Instructions   I spoke to Jamilah in the ER   Report given  Patient continues with dizziness and shortness of breath, weight unchanged after IV lasix last week  He is taking all his medications and has all the bottles with hime  Recommend ER for evaluation and admission  Can consider adding aldactone and also resume IV lasix with infusion  He may need IV milrinone to facilitate fluid removal    >40 minutes spent in reviewing, examining and coordinating with Infusion center/ER plan of care and treatment.      NYHA 4    I appreciate the opportunity of cooperating in the care of this individual.    Star Marcos, TRENT - CNS, CNS, 8/31/2022, 12:49 PM

## 2022-08-31 NOTE — H&P
HOSPITALISTS HISTORY AND PHYSICAL    8/31/2022 4:34 PM    Patient Information:  Aleisha Garza is a 59 y.o. male [id number]  PCP:  No primary care provider on file. (Tel: None )    Chief complaint:    Chief Complaint   Patient presents with    Shortness of Breath     Arrived per friend d/t cardiology referral for direct admit SOB d/t fluid overload        History of Present Illness:  Jena Rubalcava is a 59 y.o. male who presented with complaints of increasing shortness of breath. Patient has been having increased weight gain. For past month or so has gained about 23 pounds. Despite being on Lasix. Found to have some low BPs at times. But still increased edema. Patient was given 1 and 20 mg of IV Lasix last week with no significant improvement. Patient endorses exertional dyspnea leg swelling shortness of breath and orthopnea as well. No chest pain currently nothing that makes it better or worse. Complex cardiac history including A. fib homelessness noncompliance hypertension dilated cardiomyopathy with ICD s    REVIEW OF SYSTEMS:   Constitutional: Negative for fever,chills or night sweats  ENT: Negative for rhinorrhea, epistaxis, hoarseness, sore throat. Respiratory: Negative for shortness of breath,wheezing  Cardiovascular: Negative for chest pain, palpitations   Gastrointestinal: Negative for nausea, vomiting, diarrhea  Genitourinary: Negative for polyuria, dysuria   Hematologic/Lymphatic: Negative for bleeding tendency, easy bruising  Musculoskeletal: Negative for myalgias and arthralgias  Neurologic: Negative for confusion,dysarthria. Skin: Negative for itching,rash, good capillary refill. Psychiatric: Negative for depression,anxiety, agitation. Endocrine: Negative for polydipsia,polyuria,heat /cold intolerance.     Past Medical History:   has a past medical history of Atrial fibrillation ([address] CHF (congestive heart failure) ([address] Hx of blood clots, and Hypertension. Past Surgical History:   has a past surgical history that includes Insertable Cardiac Monitor (07/26/2019) and Cardioversion (07/26/2019). Medications:  Current Facility-Administered Medications on File Prior to Encounter   Medication Dose Route Frequency Provider Last Rate Last Admin    furosemide (LASIX) injection 120 mg  120 mg IntraVENous Once Deirdre Das, APRN - CNS        sodium chloride flush 0.9 % injection 5-40 mL  5-40 mL IntraVENous Once Deirdre Das, APRN - CNS         Current Outpatient Medications on File Prior to Encounter   Medication Sig Dispense Refill    ondansetron (ZOFRAN ODT) 4 MG disintegrating tablet Take 1-2 tablets by mouth every 12 hours as needed for Nausea 12 tablet 0    omeprazole (PRILOSEC) 20 MG delayed release capsule Take 1 capsule by mouth every morning (before breakfast) 30 capsule 0    lisinopril (PRINIVIL;ZESTRIL) 2.5 MG tablet Take 2.5 mg by mouth daily      empagliflozin (JARDIANCE) 10 MG tablet Take 1 tablet by mouth daily 30 tablet 1    torsemide (DEMADEX) 20 MG tablet Take 2 tablets by mouth in the morning. (Patient taking differently: Take 60 mg by mouth daily) 30 tablet 0    metoprolol succinate (TOPROL XL) 25 MG extended release tablet Take 0.5 tablets by mouth in the morning and at bedtime 30 tablet 3    potassium chloride (KLOR-CON M) 20 MEQ extended release tablet Take 1 tablet by mouth in the morning and 1 tablet in the evening. Take with meals.  60 tablet 3    apixaban (ELIQUIS) 5 MG TABS tablet Take 1 tablet by mouth 2 times daily 60 tablet 3    tamsulosin (FLOMAX) 0.4 MG capsule Take 1 capsule by mouth daily 30 capsule 3    atorvastatin (LIPITOR) 40 MG tablet Take 1 tablet by mouth nightly 30 tablet 2    finasteride (PROSCAR) 5 MG tablet Take 1 tablet by mouth daily 30 tablet 0    vitamin D (CHOLECALCIFEROL) 50 MCG (2000 UT) TABS tablet Take 1 tablet by mouth daily 90 tablet 1       Allergies:  No Known Allergies Social History:   reports that he has never smoked. He has never been exposed to tobacco smoke. He has never used smokeless tobacco. He reports that he does not drink alcohol and does not use drugs. Family History:  family history includes Heart Attack in his mother; Heart Disease in his mother; High Blood Pressure in his father and mother; Other in his father; Stroke in his father. ,     Physical Exam:  /74   Pulse 82   Temp 97 °F (36.1 °C) (Oral)   Resp 19   Ht 6' 2\" (1.88 m)   Wt 217 lb (98.4 kg)   SpO2 96%   BMI 27.86 kg/m²     General appearance:  Appears comfortable. Well nourished  Eyes: Sclera clear, pupils equal  ENT: Moist mucus membranes, no thrush. Trachea midline. Cardiovascular: Regular rhythm, normal S1, S2. No murmur, gallop, rub.  2+ edema in lower extremities  Respiratory: Clear to auscultation bilaterally, no wheeze, good inspiratory effort  Gastrointestinal: Abdomen soft, non-tender, not distended, normal bowel sounds  Musculoskeletal: No cyanosis in digits, neck supple  Neurology: Cranial nerves grossly intact. Alert and oriented in time, place and person. No speech or motor deficits  Psychiatry: Appropriate affect.  Not agitated  Skin: Warm, dry, normal turgor, no rash    Labs:  CBC:   Lab Results   Component Value Date/Time    WBC 5.4 08/31/2022 11:17 AM    RBC 4.50 08/31/2022 11:17 AM    HGB 12.5 08/31/2022 11:17 AM    HCT 38.8 08/31/2022 11:17 AM    MCV 86.4 08/31/2022 11:17 AM    MCH 27.8 08/31/2022 11:17 AM    MCHC 32.1 08/31/2022 11:17 AM    RDW 17.0 08/31/2022 11:17 AM     08/31/2022 11:17 AM    MPV 8.1 08/31/2022 11:17 AM     BMP:    Lab Results   Component Value Date/Time     08/31/2022 11:17 AM    K 3.6 08/31/2022 11:17 AM    K 4.0 08/28/2022 04:06 AM     08/31/2022 11:17 AM    CO2 26 08/31/2022 11:17 AM    BUN 33 08/31/2022 11:17 AM    CREATININE 1.6 08/31/2022 11:17 AM    CALCIUM 8.8 08/31/2022 11:17 AM    GFRAA 53 08/31/2022 11:17 AM LABGLOM 44 08/31/2022 11:17 AM    GLUCOSE 101 08/31/2022 11:17 AM       Chest Xray:   EKG:    I visualized CXR images and EKG strips     Discussed  with     Problem List  Principal Problem:    Acute combined systolic and diastolic CHF, NYHA class 1 ([facility])  Active Problems:    PAF (paroxysmal atrial fibrillation) ([facility])    Benign essential HTN    Homeless    ICD (implantable cardioverter-defibrillator), biventricular, in situ  Resolved Problems:    * No resolved hospital problems. *        Assessment/Plan:     Acute on chronic combined CHF exacerbation  -Likely probably multifactorial in setting of noncompliance with multiple other risk factors  -Responding outpatient treatment.  -Patient given 120 mg of IV Lasix once  -I will hold off on further Lasix today.  -Cardiology has been consulted we will continue to monitor  -Need milrinone drip  -We will follow further recommendation    Paroxysmal A.  Fib  CAD  ICD in place      Jose Lugo MD    8/31/2022 4:34 PM

## 2022-08-31 NOTE — ED PROVIDER NOTES
[address] PROVIDER NOTE    Patient Identification  Pt Name: Perez Melissa  MRN: [id number]  [facility] 1960  Date of evaluation: 8/31/2022  Provider: iTa Tubbs MD  PCP: No primary care provider on file. Chief Complaint  Shortness of Breath (Arrived per friend d/t cardiology referral for direct admit SOB d/t fluid overload)      HPI  (History provided by patient)  This is a 59 y.o. male who was brought in by self for shortness of breath for the last few days. It has been progressively worsening over time. He is also experiencing lower extremity edema. He was evaluated by his cardiologist in the office today and sent here for treatment. Dyspnea is worse with exertion and laying flat. He reports associated cough productive of \"thick fluid. \" He denies hemoptysis and fever. He is having associated abdominal pain (epigastric) and nausea, but denies vomiting. Abdominal pain is moderate in severity and described as aching in character. He denies urinary troubles, constipation, and diarrhea. He is on Eliquis and reports strict compliance. ROS  10 systems reviewed, pertinent positives/negatives per HPI otherwise noted to be negative. I have reviewed the following nursing documentation:  Allergies: Patient has no known allergies.     Past medical history:   Past Medical History:   Diagnosis Date    Atrial fibrillation ([address] 07/25/2019    CHF (congestive heart failure) (HCC)     Hx of blood clots     Hypertension      Past surgical history:   Past Surgical History:   Procedure Laterality Date    CARDIOVERSION  07/26/2019    Dr. Misael Ramirez  07/26/2019       Home medications:   Previous Medications    APIXABAN (ELIQUIS) 5 MG TABS TABLET    Take 1 tablet by mouth 2 times daily    ATORVASTATIN (LIPITOR) 40 MG TABLET    Take 1 tablet by mouth nightly    EMPAGLIFLOZIN (JARDIANCE) 10 MG TABLET    Take 1 tablet by mouth daily    FINASTERIDE (PROSCAR) 5 MG TABLET    Take 1 tablet by mouth daily    LISINOPRIL (PRINIVIL;ZESTRIL) 2.5 MG TABLET    Take 2.5 mg by mouth daily    METOPROLOL SUCCINATE (TOPROL XL) 25 MG EXTENDED RELEASE TABLET    Take 0.5 tablets by mouth in the morning and at bedtime    OMEPRAZOLE (PRILOSEC) 20 MG DELAYED RELEASE CAPSULE    Take 1 capsule by mouth every morning (before breakfast)    ONDANSETRON (ZOFRAN ODT) 4 MG DISINTEGRATING TABLET    Take 1-2 tablets by mouth every 12 hours as needed for Nausea    POTASSIUM CHLORIDE (KLOR-CON M) 20 MEQ EXTENDED RELEASE TABLET    Take 1 tablet by mouth in the morning and 1 tablet in the evening. Take with meals. TAMSULOSIN (FLOMAX) 0.4 MG CAPSULE    Take 1 capsule by mouth daily    TORSEMIDE (DEMADEX) 20 MG TABLET    Take 2 tablets by mouth in the morning. VITAMIN D (CHOLECALCIFEROL) 50 MCG (2000 UT) TABS TABLET    Take 1 tablet by mouth daily       Social history:  reports that he has never smoked. He has never been exposed to tobacco smoke. He has never used smokeless tobacco. He reports that he does not drink alcohol and does not use drugs. Family history:    Family History   Problem Relation Age of Onset    Heart Disease Mother     High Blood Pressure Mother     Heart Attack Mother     Other Father     Stroke Father     High Blood Pressure Father      Exam  ED Triage Vitals [08/31/22 1104]   BP Temp Temp Source Heart Rate Resp SpO2 Height Weight   124/81 97 °F (36.1 °C) Oral 94 18 96 % 6' 2\" (1.88 m) 217 lb (98.4 kg)     Nursing note and vitals reviewed. Constitutional: Well developed, well nourished. Non-toxic in appearance. HENT:      Head: Normocephalic and atraumatic. Ears: External ears normal.      Nose: Nose normal.     Mouth: Membrane mucosa moist and pink. Eyes: Anicteric sclera. No discharge. Neck: Supple. Trachea midline. Cardiovascular: RRR; no murmurs, rubs, or gallops. Pulmonary/Chest: Effort normal. No respiratory distress. CTAB. No stridor. No wheezes. No rales. Abdominal: Soft. No distension. Mild epigastric tenderness to palpation without guarding or rebound. Benign exam.  Musculoskeletal: Moves all extremities. No gross deformity. +2 pitting edema in the bilateral lower extremities, extending up to the thighs bilaterally. Neurological: Alert and oriented. Face symmetric. Speech is clear. Skin: Warm and dry. No rash. Psychiatric: Normal mood and affect. Behavior is normal.    Procedures      EKG  The Ekg interpreted by me in the absence of a cardiologist shows. Demand pacer and atrial fibrillation with a rate of 92  QTc is   prolonged     No specific ST-T wave changes appreciated. No evidence of acute ischemia. No significant change from prior EKG dated 8/28/2022      Radiology  XR CHEST PORTABLE   Final Result   Stable exam with moderate cardiomegaly and central pulmonary vascular   congestion. No evidence of overt edema. Labs  Results for orders placed or performed during the hospital encounter of 08/31/22   CBC with Auto Differential   Result Value Ref Range    WBC 5.4 4.0 - 11.0 K/uL    RBC 4.50 4. 20 - 5.90 M/uL    Hemoglobin 12.5 (L) 13.5 - 17.5 g/dL    Hematocrit 38.8 (L) 40.5 - 52.5 %    MCV 86.4 80.0 - 100.0 fL    MCH 27.8 26.0 - 34.0 pg    MCHC 32.1 31.0 - 36.0 g/dL    RDW 17.0 (H) 12.4 - 15.4 %    Platelets 681 211 - 379 K/uL    MPV 8.1 5.0 - 10.5 fL    Neutrophils % 67.0 %    Lymphocytes % 21.7 %    Monocytes % 9.7 %    Eosinophils % 0.9 %    Basophils % 0.7 %    Neutrophils Absolute 3.6 1.7 - 7.7 K/uL    Lymphocytes Absolute 1.2 1.0 - 5.1 K/uL    Monocytes Absolute 0.5 0.0 - 1.3 K/uL    Eosinophils Absolute 0.1 0.0 - 0.6 K/uL    Basophils Absolute 0.0 0.0 - 0.2 K/uL   Comprehensive Metabolic Panel   Result Value Ref Range    Sodium 144 136 - 145 mmol/L    Potassium 3.6 3.5 - 5.1 mmol/L    Chloride 105 99 - 110 mmol/L    CO2 26 21 - 32 mmol/L    Anion Gap 13 3 - 16    Glucose 101 (H) 70 - 99 mg/dL    BUN 33 (H) 7 - 20 mg/dL    Creatinine 1.6 (H) 0.8 - 1.3 mg/dL    GFR Non- 44 (A) >60    GFR  53 (A) >60    Calcium 8.8 8.3 - 10.6 mg/dL    Total Protein 6.9 6.4 - 8.2 g/dL    Albumin 3.5 3.4 - 5.0 g/dL    Albumin/Globulin Ratio 1.0 (L) 1.1 - 2.2    Total Bilirubin 2.0 (H) 0.0 - 1.0 mg/dL    Alkaline Phosphatase 135 (H) 40 - 129 U/L    ALT 51 (H) 10 - 40 U/L    AST 43 (H) 15 - 37 U/L   Brain Natriuretic Peptide   Result Value Ref Range    Pro-BNP 23,699 (H) 0 - 124 pg/mL   Troponin   Result Value Ref Range    Troponin <0.01 <0.01 ng/mL     MDM and ED Course  Patient was sent in from cardiology clinic for failed outpatient treatment of his heart failure. Patient has had increasing shortness of breath and weight gain for couple of weeks. He was seen approximately 1 week ago and started on an increased dose of Lasix. Despite this, his weight has been persistent symptoms worsened. He was sent to the ED for treatment, further evaluation, and admission. His exam and presentation are consistent with CHF exacerbation. BNP was 23,699. I did not scan for PE as the patient is on Eliquis and expresses compliance. I initiated Lasix at 120mg IV, per documentation by cardiology in the office. I consulted Dr. Gorge Dominguez through [facility] for admission. He reviewed the patient's history, physical exam, labs, imaging studies, and emergency department course and has decided to admit Sebastian Bills for further evaluation and treatment. As I have deemed necessary from their history, physical, and studies, I have considered and evaluated Sebastian Bills for the following diagnoses:  ACUTE CORONARY SYNDROME, SEVERE CHRONIC OBSTRUCTIVE PULMONARY DISEASE, RESPIRATORY FAILURE/ARREST, ACUTE CONGESTIVE HEART FAILURE, CARDIAC TAMPONADE, PNEUMONIA, PNEUMOTHORAX, PERICARDITIS, PULMONARY EMBOLISM, AORTIC DISSECTION, ARDS      The total Critical Care time is 30 minutes which excludes separately billable procedures. Final Impression  1.  Acute combined systolic and diastolic congestive heart failure (HCC)        Blood pressure 102/76, pulse 88, temperature 97 °F (36.1 °C), temperature source Oral, resp. rate 22, height 6' 2\" (1.88 m), weight 217 lb (98.4 kg), SpO2 96 %. Disposition:  DISPOSITION Decision To Admit 08/31/2022 01:30:40 PM    This chart was generated using the [address] [place] [address] dictation system. I created this record but it may contain dictation errors given the limitations of this technology.         Byron Richter MD  09/04/22 7350

## 2022-08-31 NOTE — PROGRESS NOTES
Pt to infusion center for Stat labs, Lasix IVP and Lasix Gtt. Darin Tomlin NP here to evaluate. Pt with Irregular heart rate and B/P 88/50. Pt to be taken to ER for further evaluation.

## 2022-08-31 NOTE — ED NOTES
Inpatient nurse here to transport pt upstairs. No questions comment or concerns voiced.      Emmie Willis RN  08/31/22 4792

## 2022-08-31 NOTE — PATIENT INSTRUCTIONS
I spoke to [facility] in the ER   Report given  Patient continues with dizziness and shortness of breath, weight unchanged after IV lasix last week  He is taking all his medications and has all the bottles with hime  Recommend ER for evaluation and admission  Can consider adding aldactone and also resume IV lasix with infusion  He may need IV milrinone to facilitate fluid removal

## 2022-09-01 PROBLEM — R79.89 ELEVATED LFTS: Status: ACTIVE | Noted: 2022-09-01

## 2022-09-01 PROBLEM — I95.0 IDIOPATHIC HYPOTENSION: Status: ACTIVE | Noted: 2022-09-01

## 2022-09-01 PROBLEM — D64.9 ANEMIA: Status: ACTIVE | Noted: 2022-09-01

## 2022-09-01 PROBLEM — R60.9 PERIPHERAL EDEMA: Status: ACTIVE | Noted: 2022-09-01

## 2022-09-01 PROBLEM — R60.0 PERIPHERAL EDEMA: Status: ACTIVE | Noted: 2022-09-01

## 2022-09-01 LAB
ANION GAP SERPL CALCULATED.3IONS-SCNC: 15 MMOL/L (ref 3–16)
BUN BLDV-MCNC: 32 MG/DL (ref 7–20)
CALCIUM SERPL-MCNC: 8.6 MG/DL (ref 8.3–10.6)
CHLORIDE BLD-SCNC: 105 MMOL/L (ref 99–110)
CHOLESTEROL, TOTAL: 113 MG/DL (ref 0–199)
CO2: 21 MMOL/L (ref 21–32)
CREAT SERPL-MCNC: 1.4 MG/DL (ref 0.8–1.3)
EKG ATRIAL RATE: 170 BPM
EKG ATRIAL RATE: 54 BPM
EKG DIAGNOSIS: NORMAL
EKG DIAGNOSIS: NORMAL
EKG Q-T INTERVAL: 368 MS
EKG Q-T INTERVAL: 448 MS
EKG QRS DURATION: 112 MS
EKG QRS DURATION: 90 MS
EKG QTC CALCULATION (BAZETT): 460 MS
EKG QTC CALCULATION (BAZETT): 554 MS
EKG R AXIS: -11 DEGREES
EKG R AXIS: -7 DEGREES
EKG T AXIS: -31 DEGREES
EKG T AXIS: -7 DEGREES
EKG VENTRICULAR RATE: 92 BPM
EKG VENTRICULAR RATE: 94 BPM
GFR AFRICAN AMERICAN: >60
GFR NON-AFRICAN AMERICAN: 51
GLUCOSE BLD-MCNC: 91 MG/DL (ref 70–99)
HDLC SERPL-MCNC: 38 MG/DL (ref 40–60)
LDL CHOLESTEROL CALCULATED: 60 MG/DL
MAGNESIUM: 2.4 MG/DL (ref 1.8–2.4)
POTASSIUM SERPL-SCNC: 4.1 MMOL/L (ref 3.5–5.1)
SODIUM BLD-SCNC: 141 MMOL/L (ref 136–145)
TRIGL SERPL-MCNC: 75 MG/DL (ref 0–150)
VLDLC SERPL CALC-MCNC: 15 MG/DL

## 2022-09-01 PROCEDURE — 93005 ELECTROCARDIOGRAM TRACING: CPT | Performed by: HOSPITALIST

## 2022-09-01 PROCEDURE — 83735 ASSAY OF MAGNESIUM: CPT

## 2022-09-01 PROCEDURE — 6370000000 HC RX 637 (ALT 250 FOR IP): Performed by: HOSPITALIST

## 2022-09-01 PROCEDURE — 80061 LIPID PANEL: CPT

## 2022-09-01 PROCEDURE — 2060000000 HC ICU INTERMEDIATE R&B

## 2022-09-01 PROCEDURE — 6370000000 HC RX 637 (ALT 250 FOR IP): Performed by: INTERNAL MEDICINE

## 2022-09-01 PROCEDURE — 80048 BASIC METABOLIC PNL TOTAL CA: CPT

## 2022-09-01 PROCEDURE — 99223 1ST HOSP IP/OBS HIGH 75: CPT | Performed by: INTERNAL MEDICINE

## 2022-09-01 PROCEDURE — 36415 COLL VENOUS BLD VENIPUNCTURE: CPT

## 2022-09-01 PROCEDURE — 2580000003 HC RX 258: Performed by: HOSPITALIST

## 2022-09-01 PROCEDURE — 93010 ELECTROCARDIOGRAM REPORT: CPT | Performed by: INTERNAL MEDICINE

## 2022-09-01 PROCEDURE — 2580000003 HC RX 258: Performed by: NURSE PRACTITIONER

## 2022-09-01 PROCEDURE — 94760 N-INVAS EAR/PLS OXIMETRY 1: CPT

## 2022-09-01 PROCEDURE — 6360000002 HC RX W HCPCS: Performed by: NURSE PRACTITIONER

## 2022-09-01 PROCEDURE — 6360000002 HC RX W HCPCS: Performed by: INTERNAL MEDICINE

## 2022-09-01 RX ORDER — MILRINONE LACTATE 0.2 MG/ML
0.12 INJECTION, SOLUTION INTRAVENOUS CONTINUOUS
Status: DISCONTINUED | OUTPATIENT
Start: 2022-09-01 | End: 2022-09-07

## 2022-09-01 RX ORDER — DIGOXIN 125 MCG
125 TABLET ORAL DAILY
Status: DISCONTINUED | OUTPATIENT
Start: 2022-09-01 | End: 2022-09-08 | Stop reason: HOSPADM

## 2022-09-01 RX ADMIN — Medication 10 ML: at 09:11

## 2022-09-01 RX ADMIN — Medication 10 ML: at 21:30

## 2022-09-01 RX ADMIN — TAMSULOSIN HYDROCHLORIDE 0.4 MG: 0.4 CAPSULE ORAL at 09:20

## 2022-09-01 RX ADMIN — FUROSEMIDE 10 MG/HR: 10 INJECTION, SOLUTION INTRAMUSCULAR; INTRAVENOUS at 14:49

## 2022-09-01 RX ADMIN — LISINOPRIL 2.5 MG: 5 TABLET ORAL at 09:20

## 2022-09-01 RX ADMIN — FUROSEMIDE 10 MG/HR: 10 INJECTION, SOLUTION INTRAMUSCULAR; INTRAVENOUS at 21:57

## 2022-09-01 RX ADMIN — DIGOXIN 125 MCG: 125 TABLET ORAL at 18:19

## 2022-09-01 RX ADMIN — MILRINONE LACTATE 0.12 MCG/KG/MIN: 0.2 INJECTION, SOLUTION INTRAVENOUS at 21:29

## 2022-09-01 RX ADMIN — METOPROLOL SUCCINATE 12.5 MG: 25 TABLET, EXTENDED RELEASE ORAL at 09:19

## 2022-09-01 RX ADMIN — ATORVASTATIN CALCIUM 40 MG: 40 TABLET, FILM COATED ORAL at 21:30

## 2022-09-01 RX ADMIN — METOPROLOL SUCCINATE 12.5 MG: 25 TABLET, EXTENDED RELEASE ORAL at 21:30

## 2022-09-01 RX ADMIN — APIXABAN 5 MG: 5 TABLET, FILM COATED ORAL at 21:30

## 2022-09-01 RX ADMIN — APIXABAN 5 MG: 5 TABLET, FILM COATED ORAL at 09:20

## 2022-09-01 RX ADMIN — PANTOPRAZOLE SODIUM 40 MG: 40 TABLET, DELAYED RELEASE ORAL at 09:20

## 2022-09-01 RX ADMIN — FINASTERIDE 5 MG: 5 TABLET, FILM COATED ORAL at 09:19

## 2022-09-01 ASSESSMENT — PAIN SCALES - GENERAL: PAINLEVEL_OUTOF10: 0

## 2022-09-01 NOTE — PROGRESS NOTES
[facility] PROGRESS NOTE    9/1/2022 10:44 AM        Name: Jennyfer Cade . Admitted: 8/31/2022  Primary Care Provider: TRENT Barahona NP (Tel: [phone])                        Subjective:  . No acute events overnight. Resting well. Pain control. Diet ok. Labs reviewed  Denies any chest pain sob. Reviewed interval ancillary notes    Current Medications  apixaban (ELIQUIS) tablet 5 mg, BID  atorvastatin (LIPITOR) tablet 40 mg, Nightly  finasteride (PROSCAR) tablet 5 mg, Daily  lisinopril (PRINIVIL;ZESTRIL) tablet 2.5 mg, Daily  metoprolol succinate (TOPROL XL) extended release tablet 12.5 mg, BID  pantoprazole (PROTONIX) tablet 40 mg, QAM AC  tamsulosin (FLOMAX) capsule 0.4 mg, Daily  sodium chloride flush 0.9 % injection 5-40 mL, 2 times per day  sodium chloride flush 0.9 % injection 5-40 mL, PRN  0.9 % sodium chloride infusion, PRN  polyethylene glycol (GLYCOLAX) packet 17 g, Daily PRN  acetaminophen (TYLENOL) tablet 650 mg, Q6H PRN   Or  acetaminophen (TYLENOL) suppository 650 mg, Q6H PRN        Objective:  /76   Pulse 84   Temp 98 °F (36.7 °C) (Oral)   Resp 18   Ht 6' 2\" (1.88 m)   Wt 215 lb 9.8 oz (97.8 kg)   SpO2 96%   BMI 27.68 kg/m²   No intake or output data in the 24 hours ending 09/01/22 1044   Wt Readings from Last 3 Encounters:   09/01/22 215 lb 9.8 oz (97.8 kg)   08/31/22 217 lb (98.4 kg)   08/27/22 217 lb (98.4 kg)       General appearance:  Appears comfortable  Eyes: Sclera clear. Pupils equal.  ENT: Moist oral mucosa. Trachea midline, no adenopathy. Cardiovascular: Regular rhythm, normal S1, S2. No murmur. No edema in lower extremities  Respiratory: Not using accessory muscles. Good inspiratory effort. Clear to auscultation bilaterally, no wheeze or crackles.    GI: Abdomen soft, no tenderness, not distended, normal bowel sounds  Musculoskeletal: No cyanosis in digits, neck supple  Neurology: CN 2-12 grossly intact. No speech or motor deficits  Psych: Normal affect. Alert and oriented in time, place and person  Skin: Warm, dry, normal turgor    Labs and Tests:  CBC:   Recent Labs     08/31/22  1117   WBC 5.4   HGB 12.5*        BMP:    Recent Labs     08/31/22  1117 08/31/22  1947 09/01/22  0431    136 141   K 3.6 3.2* 4.1    99 105   CO2 26 26 21   BUN 33* 31* 32*   CREATININE 1.6* 1.5* 1.4*   GLUCOSE 101* 135* 91     Hepatic:   Recent Labs     08/31/22  1117   AST 43*   ALT 51*   BILITOT 2.0*   ALKPHOS 135*       Discussed care with patient             Problem List  Principal Problem:    Acute combined systolic and diastolic CHF, NYHA class 1 ([facility])  Active Problems:    PAF (paroxysmal atrial fibrillation) ([facility])    Benign essential HTN    Homeless    ICD (implantable cardioverter-defibrillator), biventricular, in situ  Resolved Problems:    * No resolved hospital problems. *       Assessment & Plan:   Acute CHF exacerbation  -e urine output was good yes yesterday  -[place] secondary to noncompliant  -Electrolytes remained stable renal function is stable  -Continue IV diuresis dosing per cardiology    Atrial fibrillation  -Rate is controlled    Elevated LFTs likely probably secondary to congestion continue to trend daily    Chronic kidney disease creatinine is 1.4 improved from 1.6      Diet: ADULT DIET; Regular;  Low Sodium (2 gm)  Code:Full Code  DVT PPX lovenox       Roland Canela MD   9/1/2022 10:44 AM

## 2022-09-01 NOTE — CONSULTS
is   mildly reduced . Mild to moderate tricuspid regurgitation. CXR:  Impression   Stable exam with moderate cardiomegaly and central pulmonary vascular   congestion. No evidence of overt edema.        Meds prior to admission:  Lisinopril 2.5 qd  Jardiance 10 qd  Torsemide 60 qd  Toprol xl 12.5 bid  Kcl 20 bid  Eliquis 5 bid  Lipitor 40 qd    No Known Allergies  Current Facility-Administered Medications   Medication Dose Route Frequency Provider Last Rate Last Admin    furosemide (LASIX) 100 mg in dextrose 5 % 100 mL infusion  10 mg/hr IntraVENous Continuous Jessica TRENT Allen - CNP 10 mL/hr at 09/01/22 1449 10 mg/hr at 09/01/22 1449    apixaban (ELIQUIS) tablet 5 mg  5 mg Oral BID Raysa Nunez MD   5 mg at 09/01/22 0920    atorvastatin (LIPITOR) tablet 40 mg  40 mg Oral Nightly Saintclair Fujisawa, MD   40 mg at 08/31/22 2155    finasteride (PROSCAR) tablet 5 mg  5 mg Oral Daily Raysa Nunez MD   5 mg at 09/01/22 0919    lisinopril (PRINIVIL;ZESTRIL) tablet 2.5 mg  2.5 mg Oral Daily Saintclair Fujisawa, MD   2.5 mg at 09/01/22 0920    metoprolol succinate (TOPROL XL) extended release tablet 12.5 mg  12.5 mg Oral BID Saintclair Fujisawa, MD   12.5 mg at 09/01/22 0919    pantoprazole (PROTONIX) tablet 40 mg  40 mg Oral QAM AC Raysa Nunez MD   40 mg at 09/01/22 0920    tamsulosin (FLOMAX) capsule 0.4 mg  0.4 mg Oral Daily Raysa Nunez MD   0.4 mg at 09/01/22 0920    sodium chloride flush 0.9 % injection 5-40 mL  5-40 mL IntraVENous 2 times per day Saintclair Fujisawa, MD   10 mL at 09/01/22 0911    sodium chloride flush 0.9 % injection 5-40 mL  5-40 mL IntraVENous PRN Raysa Nunez MD        0.9 % sodium chloride infusion   IntraVENous PRN Saintclair Fujisawa, MD        polyethylene glycol (GLYCOLAX) packet 17 g  17 g Oral Daily PRN Saintclair Fujisawa, MD        acetaminophen (TYLENOL) tablet 650 mg  650 mg Oral Q6H PRN Raysa Nunez MD        Or    acetaminophen (TYLENOL) suppository 650 mg  650 mg Rectal Q6H PRN Saintclair Fujisawa, MD           Past Medical History:   Diagnosis Date    Atrial fibrillation ([facility] [address] 07/25/2019    CHF (congestive heart failure) (HCC)     Hx of blood clots     Hypertension      Past Surgical History:   Procedure Laterality Date    CARDIOVERSION  07/26/2019    Dr. Lynn Can  07/26/2019     Family History   Problem Relation Age of Onset    Heart Disease Mother     High Blood Pressure Mother     Heart Attack Mother     Other Father     Stroke Father     High Blood Pressure Father      Social History     Socioeconomic History    Marital status: Single     Spouse name: Not on file    Number of children: Not on file    Years of education: Not on file    Highest education level: Not on file   Occupational History    Not on file   Tobacco Use    Smoking status: Never     Passive exposure: Never    Smokeless tobacco: Never   Vaping Use    Vaping Use: Never used   Substance and Sexual Activity    Alcohol use: Never    Drug use: Never    Sexual activity: Not Currently   Other Topics Concern    Not on file   Social History Narrative    Not on file     Social Determinants of Health     Financial Resource Strain: Not on file   Food Insecurity: Not on file   Transportation Needs: Not on file   Physical Activity: Not on file   Stress: Not on file   Social Connections: Not on file   Intimate Partner Violence: Not on file   Housing Stability: Not on file       Review of Systems:   Constitutional: there has been no unanticipated weight loss. There's been no change in energy level, sleep pattern, or activity level. Eyes: No visual changes or diplopia. No scleral icterus. ENT: No Headaches, hearing loss or vertigo. No mouth sores or sore throat. Cardiovascular: Reviewed in HPI  Respiratory: No cough or wheezing, no sputum production. No hematemesis. Gastrointestinal: No abdominal pain, appetite loss, blood in stools. No change in bowel or bladder habits.   Genitourinary: No dysuria, trouble voiding, or hematuria. Musculoskeletal:  No gait disturbance, weakness or joint complaints. Integumentary: No rash or pruritis. Neurological: No headache, diplopia, change in muscle strength, numbness or tingling. No change in gait, balance, coordination, mood, affect, memory, mentation, behavior. Psychiatric: No anxiety, no depression. Endocrine: No malaise, fatigue or temperature intolerance. No excessive thirst, fluid intake, or urination. No tremor. Hematologic/Lymphatic: No abnormal bruising or bleeding, blood clots or swollen lymph nodes. Allergic/Immunologic: No nasal congestion or hives. Physical Examination:    Vitals:    08/31/22 2330 09/01/22 0600 09/01/22 0748 09/01/22 1238   BP: 114/83 (!) 114/98 109/76 115/88   Pulse: 85 74 84 85   Resp:  16 18 18   Temp: 97.7 °F (36.5 °C) 97.9 °F (36.6 °C) 98 °F (36.7 °C) 98.5 °F (36.9 °C)   TempSrc: Axillary Oral Oral Oral   SpO2: 94% 94% 96% 95%   Weight:   215 lb 9.8 oz (97.8 kg)    Height:         Body mass index is 27.68 kg/m². Wt Readings from Last 3 Encounters:   09/01/22 215 lb 9.8 oz (97.8 kg)   08/31/22 217 lb (98.4 kg)   08/27/22 217 lb (98.4 kg)     BP Readings from Last 3 Encounters:   09/01/22 115/88   08/31/22 (!) 84/50   08/31/22 (!) 88/50     Constitutional and General Appearance:   Chronically ill appearing  HEENT:  NC/AT  NIKKI  No problems with hearing  Skin:  Warm, dry  Respiratory:  Normal excursion and expansion without use of accessory muscles  Resp Auscultation: Normal breath sounds without dullness  Cardiovascular: The apical impulses not displaced  Heart tones are crisp and normal  Cervical veins are not engorged  The carotid upstroke is normal in amplitude and contour without delay or bruit  JVP elevated to angle of his jaw  RRR with nl S1 and S2 without m,r,g  Peripheral pulses are symmetrical and full  There is no clubbing, cyanosis of the extremities.   2-3+ edema up to thighs  Femoral Arteries: 2+ and equal  Pedal Pulses: 2+ and equal Neck:  No thyromegaly  Abdomen:  No masses or tenderness  Liver/Spleen: No Abnormalities Noted  Neurological/Psychiatric:  Alert and oriented in all spheres  Moves all extremities well  Exhibits normal gait balance and coordination  No abnormalities of mood, affect, memory, mentation, or behavior are noted    Labs were reviewed including labs from other hospital systems through [facility]. Cardiac testing was reviewed including echos, nuclear scans, cardiac catheterization, including from other hospital systems through [facility]. Assessment:    1. Acute combined systolic and diastolic congestive heart failure ([address]     2. Severe peripheral edema  3. Relative hypotension  4. Mild anemia  5. Elevated LFTs, likely due to right heart failure  6.  homelessness    Plan:   Start lasix drip 10 mg/hr  Start milrinone 0.125 mcg/kg/min to help him diurese  Might need metolazone depending upon how he diuresis  Continue low dose lisinopril 2.5 mg po qd, ideally could try entresto if blood pressure allows  Continue low dose metoprolol  Restart jardiance before discharge  I am hesitant to start him on spironolactone due to CRF  Start digoxin 0.125 mg po qd  Needs to diurese about 20 pounds  CHF education reinforced. ~salt restriction  ~fluid restriction  ~medication compliance  ~daily weights and notify of any significant weight gain/loss  ~establish with CHF nurse  ~outpatient follow-up with our CHF team      The patient was seen for > 70 minutes. I reviewed interval history, physical exam, review of data including labs, imaging, development and implementation of treatment plan and coordination of complex care. More than 50% of the time was devoted to counseling the patient on their diagnoses/treatments, as well as coordination of care with the other care teams      I appreciate the opportunity of cooperating in the care of this patient.     Satnam Mcrae M.D., [facility]

## 2022-09-01 NOTE — CONSULTS
[facility] 38      Romina Darnell 1960    History:  Past Medical History:   Diagnosis Date    Atrial fibrillation ([facility] [address] 07/25/2019    CHF (congestive heart failure) ([facility] [address]     Hx of blood clots     Hypertension        ECHO:  2/10/22  Summary   Left ventricular size is moderately increased. Overall left ventricular   systolic function appears severely reduced with ejection fraction of 20-25%   and severe diffuse hypokinesis. Moderate mitral regurgitation. Calculated ERO of 0.33 cm2. Left atrial size appears dilated. There is an echodensity inside the left   atrial appendage consistent with left atrial appendage thrombus. The right ventricle is enlarged. Right ventricular systolic function is   mildly reduced . Mild to moderate tricuspid regurgitation. ACE/ARB/ARNi: lisinopril 2.5 mg daily  BB: toprol xl 12.5 mg daily  Aldosterone Antagonist: not on d/t noncompliance  SGLT2: jardiance 10 mg daily-- currently on hold    History of sleep apnea: No   Spencer Screen ordered: previously screened on last admission    DM History: No      Last Hospital Admission: 7/28-8/1 with pna  Code Status: full code  Discharge plans: from home    Family Present: shahab Darnell was admitted to the hospital after coming to HF clinic yesterday with hypotension. Has been receiving IV lasix as outpatient and follows closely with HF NP. Patient has been seen multiple times for education. Patient does weigh daily. Baseline weight ~200 lb. He was experiencing hypotension during his last admission and HF medications were held at discharge. He has been seen 5 times in clinic and followed closely. Weight had increased and IV lasix given as outpatient without improvement and he experienced hypotension. He states he has not changed his diet, he has been trying to adhere to 64 oz fluid restriction. States he feels weak.  He states compliance with medications but when he comes in for clinic visits there is always some confusion with his medicines. Patient provided with both written and verbal education on CHF signs/ symptoms, causes, discharge medications, daily weights, low sodium diet, activity, and follow-up. Pt to call if gains 3 pounds in one day or 5 pounds in one week. Mutually agreed upon goals were discussed such as calling the MD as soon as they recognize symptoms and weight gain, maintaining proper diet, taking medications as prescribed, joining cardiac rehab when able. Also reviewed importance of risk factor reduction. Patient provided with CHF Zone Management tool and CHF symptoms magnet. Discussed importance of lifestyle changes: may benefit from pill packs? Needs closer medication monitoring/adherence     PATIENT/CAREGIVER TEACHING:    Level of patient/caregiver understanding able to:   [ x] Verbalize understanding [ ] Demonstrate understanding [ ] Teach back   [ ] Needs reinforcement [ ] Other:       Time spent teaching: 10 mins    1. WEIGHT: Admit Weight: 217 lb (98.4 kg)      Today  Weight: 215 lb 9.8 oz (97.8 kg)   2. I/O   Intake/Output Summary (Last 24 hours) at 9/1/2022 1253  Last data filed at 9/1/2022 1208  Gross per 24 hour   Intake 10 ml   Output 750 ml   Net -740 ml       Recommendations:   1. He needs close follow up at discharge-- very high risk for readmissions  2. Educate further on fluid restriction 48 oz- 64 oz during inpatient stay so he can understand how to measure intake at home. 3. Continue to educate on S/S.   4. Emphasize daily weights, diet, and knowing when and who to call  5. Provided patient with CHF Resource Line for questions and concerns. 6. May benefit from pill packs?  Needs closer medication monitoring/adherence          SHANTHI CALL RN 9/1/2022 12:53 PM

## 2022-09-02 LAB
ANION GAP SERPL CALCULATED.3IONS-SCNC: 8 MMOL/L (ref 3–16)
BUN BLDV-MCNC: 30 MG/DL (ref 7–20)
CALCIUM SERPL-MCNC: 8.2 MG/DL (ref 8.3–10.6)
CHLORIDE BLD-SCNC: 103 MMOL/L (ref 99–110)
CO2: 29 MMOL/L (ref 21–32)
CREAT SERPL-MCNC: 1.4 MG/DL (ref 0.8–1.3)
EKG ATRIAL RATE: 85 BPM
EKG DIAGNOSIS: NORMAL
EKG P-R INTERVAL: 84 MS
EKG Q-T INTERVAL: 392 MS
EKG QRS DURATION: 92 MS
EKG QTC CALCULATION (BAZETT): 460 MS
EKG R AXIS: 2 DEGREES
EKG T AXIS: -7 DEGREES
EKG VENTRICULAR RATE: 83 BPM
GFR AFRICAN AMERICAN: >60
GFR NON-AFRICAN AMERICAN: 51
GLUCOSE BLD-MCNC: 87 MG/DL (ref 70–99)
MAGNESIUM: 2.3 MG/DL (ref 1.8–2.4)
POTASSIUM SERPL-SCNC: 3.8 MMOL/L (ref 3.5–5.1)
SODIUM BLD-SCNC: 140 MMOL/L (ref 136–145)

## 2022-09-02 PROCEDURE — 2060000000 HC ICU INTERMEDIATE R&B

## 2022-09-02 PROCEDURE — 36415 COLL VENOUS BLD VENIPUNCTURE: CPT

## 2022-09-02 PROCEDURE — 2580000003 HC RX 258: Performed by: NURSE PRACTITIONER

## 2022-09-02 PROCEDURE — 93010 ELECTROCARDIOGRAM REPORT: CPT | Performed by: INTERNAL MEDICINE

## 2022-09-02 PROCEDURE — 6370000000 HC RX 637 (ALT 250 FOR IP): Performed by: INTERNAL MEDICINE

## 2022-09-02 PROCEDURE — 6360000002 HC RX W HCPCS: Performed by: NURSE PRACTITIONER

## 2022-09-02 PROCEDURE — 6370000000 HC RX 637 (ALT 250 FOR IP): Performed by: HOSPITALIST

## 2022-09-02 PROCEDURE — 99233 SBSQ HOSP IP/OBS HIGH 50: CPT | Performed by: NURSE PRACTITIONER

## 2022-09-02 PROCEDURE — 6360000002 HC RX W HCPCS: Performed by: INTERNAL MEDICINE

## 2022-09-02 PROCEDURE — 83735 ASSAY OF MAGNESIUM: CPT

## 2022-09-02 PROCEDURE — 80048 BASIC METABOLIC PNL TOTAL CA: CPT

## 2022-09-02 PROCEDURE — 94760 N-INVAS EAR/PLS OXIMETRY 1: CPT

## 2022-09-02 RX ADMIN — FUROSEMIDE 10 MG/HR: 10 INJECTION, SOLUTION INTRAMUSCULAR; INTRAVENOUS at 21:05

## 2022-09-02 RX ADMIN — FUROSEMIDE 10 MG/HR: 10 INJECTION, SOLUTION INTRAMUSCULAR; INTRAVENOUS at 10:30

## 2022-09-02 RX ADMIN — LISINOPRIL 2.5 MG: 5 TABLET ORAL at 08:38

## 2022-09-02 RX ADMIN — ATORVASTATIN CALCIUM 40 MG: 40 TABLET, FILM COATED ORAL at 20:29

## 2022-09-02 RX ADMIN — DIGOXIN 125 MCG: 125 TABLET ORAL at 08:38

## 2022-09-02 RX ADMIN — TAMSULOSIN HYDROCHLORIDE 0.4 MG: 0.4 CAPSULE ORAL at 08:38

## 2022-09-02 RX ADMIN — FINASTERIDE 5 MG: 5 TABLET, FILM COATED ORAL at 08:38

## 2022-09-02 RX ADMIN — METOPROLOL SUCCINATE 12.5 MG: 25 TABLET, EXTENDED RELEASE ORAL at 08:38

## 2022-09-02 RX ADMIN — APIXABAN 5 MG: 5 TABLET, FILM COATED ORAL at 08:38

## 2022-09-02 RX ADMIN — MILRINONE LACTATE 0.12 MCG/KG/MIN: 0.2 INJECTION, SOLUTION INTRAVENOUS at 20:29

## 2022-09-02 RX ADMIN — METOPROLOL SUCCINATE 12.5 MG: 25 TABLET, EXTENDED RELEASE ORAL at 20:29

## 2022-09-02 RX ADMIN — PANTOPRAZOLE SODIUM 40 MG: 40 TABLET, DELAYED RELEASE ORAL at 05:22

## 2022-09-02 RX ADMIN — APIXABAN 5 MG: 5 TABLET, FILM COATED ORAL at 20:29

## 2022-09-02 ASSESSMENT — PAIN SCALES - GENERAL
PAINLEVEL_OUTOF10: 0
PAINLEVEL_OUTOF10: 0

## 2022-09-02 NOTE — PROGRESS NOTES
[facility] PROGRESS NOTE    9/2/2022 8:22 AM        Name: Deshawn Patterson . Admitted: 8/31/2022  Primary Care Provider: TRENT Appiah NP (Tel: [phone])      Subjective:  .     Feels thirsty this am  No current reports of chest pain or dyspnea       Admitted with fluid overload  Pro BNP 23K   Diuresed 2 liters   On lasix drip at 10 mg per hour  Also on milrinone drip  Cardiology following     Hx of VT , had ICD placed in April 22  Right leg > Left  on eliquis   No hx of clots    Admits to drinking lots of fluids prior to admission   Not active prior to admission due to chronic dyspnea       Reviewed interval ancillary notes    Current Medications  furosemide (LASIX) 100 mg in dextrose 5 % 100 mL infusion, Continuous  milrinone (PRIMACOR) 20 mg in dextrose 5 % 100 mL infusion, Continuous  digoxin (LANOXIN) tablet 125 mcg, Daily  apixaban (ELIQUIS) tablet 5 mg, BID  atorvastatin (LIPITOR) tablet 40 mg, Nightly  finasteride (PROSCAR) tablet 5 mg, Daily  lisinopril (PRINIVIL;ZESTRIL) tablet 2.5 mg, Daily  metoprolol succinate (TOPROL XL) extended release tablet 12.5 mg, BID  pantoprazole (PROTONIX) tablet 40 mg, QAM AC  tamsulosin (FLOMAX) capsule 0.4 mg, Daily  sodium chloride flush 0.9 % injection 5-40 mL, 2 times per day  sodium chloride flush 0.9 % injection 5-40 mL, PRN  0.9 % sodium chloride infusion, PRN  polyethylene glycol (GLYCOLAX) packet 17 g, Daily PRN  acetaminophen (TYLENOL) tablet 650 mg, Q6H PRN   Or  acetaminophen (TYLENOL) suppository 650 mg, Q6H PRN        Objective:  /69   Pulse 86   Temp 97.5 °F (36.4 °C) (Oral)   Resp 17   Ht 6' 2\" (1.88 m)   Wt 220 lb 6.4 oz (100 kg)   SpO2 97%   BMI 28.30 kg/m²     Intake/Output Summary (Last 24 hours) at 9/2/2022 0509  Last data filed at 9/2/2022 0410  Gross per 24 hour   Intake 15 ml   Output 2150 ml   Net -2135 ml      Wt Readings from Last 3 Encounters:   09/02/22 220 lb 6.4 oz (100 kg)   08/31/22 217 lb (98.4 kg)   08/27/22 217 lb (98.4 kg)       General appearance:  Appears chronically ill,  alert and pleasant   Eyes: Sclera clear. Pupils equal.  ENT: Moist oral mucosa. Trachea midline, no adenopathy. Cardiovascular: Regular rhythm, normal S1, S2. No murmur. + + edema in lower extremities,  R > L   Respiratory: fine bi basilar crackles noted, no wheezing   GI: Abdomen soft, no tenderness, not distended, normal bowel sounds  Musculoskeletal: No cyanosis in digits, neck supple  Neurology: CN 2-12 grossly intact. No speech or motor deficits  Psych: Normal affect. Alert and oriented in time, place and person  Skin: Warm, dry, normal turgor    Labs and Tests:  CBC:   Recent Labs     08/31/22  1117   WBC 5.4   HGB 12.5*        BMP:    Recent Labs     08/31/22  1947 09/01/22  0431 09/02/22  0430    141 140   K 3.2* 4.1 3.8   CL 99 105 103   CO2 26 21 29   BUN 31* 32* 30*   CREATININE 1.5* 1.4* 1.4*   GLUCOSE 135* 91 87     Hepatic:   Recent Labs     08/31/22  1117   AST 43*   ALT 51*   BILITOT 2.0*   ALKPHOS 135*     Cardiac Testing:   ECHO:  2/10/2022      Summary   Left ventricular size is moderately increased. Overall left ventricular   systolic function appears severely reduced with ejection fraction of 20-25%   and severe diffuse hypokinesis. Moderate mitral regurgitation. Calculated ERO of 0.33 cm2. Left atrial size appears dilated. There is an echodensity inside the left   atrial appendage consistent with left atrial appendage thrombus. The right ventricle is enlarged. Right ventricular systolic function is   mildly reduced . Mild to moderate tricuspid regurgitation.     Problem List  Principal Problem:    Acute combined systolic and diastolic congestive heart failure (HCC)  Active Problems:    Elevated LFTs    Idiopathic hypotension    Peripheral edema    Anemia    PAF (paroxysmal atrial fibrillation) ([facility] [address]    Benign essential HTN    Homeless    ICD (implantable cardioverter-defibrillator), biventricular, in situ  Resolved Problems:    * No resolved hospital problems. *       Assessment & Plan:   Acute combined systolic and diastolic HF:  on milrinone at 0.125 mcg/kg/ min and lasix drip at 10 mg per hour. He is also on low dose ACE/BB and digoxin. Goal diuresis is 20 lbs. Appreciate input from cardiology   CKD: stable at 1.4, will monitor closely with IV diuresis   Elevated LFT's :  likely  due to HF, will monitor   ICM:  on BB, ACE and dig   Chronic AF,  rate controlled on BB/ digoxin. He is AC with eliquis       Diet: ADULT DIET; Regular;  Low Sodium (2 gm)  Code:Full Code  DVT PPX      TRENT Marmolejo CNP   9/2/2022 8:22 AM

## 2022-09-02 NOTE — PROGRESS NOTES
[address]  HEART FAILURE  Progress Note      Admit Date 8/31/2022     Reason for Consult:      Reason for Consultation/Chief Complaint: SOB    HPI:    Lorrie Palacio is a 59 y.o. male with PMH HTN, NICM, HFrEF, AF, MR, HENOK thrombus, ICD April 2022, and homelessness admitted with fluid overload. He was sent to the infusion center for a lasix drip and was rescheduled for another treatment, but when he arrived to the infusion center, his blood pressure was in the 80s and he was complaining of dizziness and edema up to his thighs. His weight recently was 217. Lasix and Milrinone gtts started yesterday and he has diuresed about 2700 overnight. Subjective:  Patient is being seen for CHF/CMP. There were no acute overnight cardiac events. Today Mr. Sara Hubbard denies chest pain or palpitations, tells me orthopnea improved, edema and AD some better, no SOB at rest    Review of Systems - General ROS: negative  Hematological and Lymphatic ROS: negative  Respiratory ROS:LANDEROS  Cardiovascular ROS: no chest pain or dyspnea on exertion  Gastrointestinal ROS: no abdominal pain, change in bowel habits, or black or bloody stools  Musculoskeletal ROS: swelling  Neurological ROS: no TIA or stroke symptoms     Baseline Weight: 184 hosp scale   Wt Readings from Last 3 Encounters:   09/02/22 220 lb 6.4 oz (100 kg)   08/31/22 217 lb (98.4 kg)   08/27/22 217 lb (98.4 kg)         Cardiac Testing:   ECHO:  2/10/2022      Summary   Left ventricular size is moderately increased. Overall left ventricular   systolic function appears severely reduced with ejection fraction of 20-25%   and severe diffuse hypokinesis. Moderate mitral regurgitation. Calculated ERO of 0.33 cm2. Left atrial size appears dilated. There is an echodensity inside the left   atrial appendage consistent with left atrial appendage thrombus. The right ventricle is enlarged. Right ventricular systolic function is   mildly reduced .       Mild to moderate tricuspid regurgitation. Stress Test: 7/26/2019   Resting ECG    Normal sinus rhythm. Nonspecific ST-T wave changes. 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     Device: Medtronic ICD with optivol     Core measures for HF:  EF: 20-25%   ACEi/ARB/ARNI: lisinopril  BB: Metoprolol succinate  Damian:  none for noncompliance  with labs  Hydralazine/nitrates:  SGLT2i: Jardiance    NYHA Class III      Objective:   /69   Pulse 95   Temp 97.5 °F (36.4 °C) (Oral)   Resp 17   Ht 6' 2\" (1.88 m)   Wt 220 lb 6.4 oz (100 kg)   SpO2 97%   BMI 28.30 kg/m²     Intake/Output Summary (Last 24 hours) at 9/2/2022 0908  Last data filed at 9/2/2022 6831  Gross per 24 hour   Intake 15 ml   Output 2800 ml   Net -2785 ml      In: 15 [I.V.:15]  Out: 2800       Physical Exam:  General Appearance:  Non-obese/Well Nourished  Respiratory:  Resp Auscultation: Normal breath sounds without dullness  Cardiovascular:   Auscultation: Regular rate and rhythm, normal S1S2, no m/g/r/c  Palpation: Normal    Pedal Pulses: 2+ and equal   Abdomen:  Soft, NT, ND, + bs  Extremities:  No Cyanosis or Clubbing  Extremities: 2+ up to thighs, legs tight  Neurological/Psychiatric:  Oriented to time, place, and person  Non-anxious    MEDICATIONS:   Scheduled Meds:   Scheduled Meds:   digoxin  125 mcg Oral Daily    apixaban  5 mg Oral BID    atorvastatin  40 mg Oral Nightly    finasteride  5 mg Oral Daily    lisinopril  2.5 mg Oral Daily    metoprolol succinate  12.5 mg Oral BID    pantoprazole  40 mg Oral QAM AC    tamsulosin  0.4 mg Oral Daily    sodium chloride flush  5-40 mL IntraVENous 2 times per day     Continuous Infusions:   furosemide (LASIX) 1mg/ml infusion 10 mg/hr (09/01/22 2157)    milrinone 0.125 mcg/kg/min (09/01/22 2129)    sodium chloride       PRN Meds:.sodium chloride flush, sodium chloride, polyethylene glycol, acetaminophen **OR** acetaminophen  Continuous Infusions:   furosemide (LASIX) 1mg/ml infusion 10 mg/hr (09/01/22 2157)    milrinone 0.125 mcg/kg/min (09/01/22 2129)    sodium chloride         Intake/Output Summary (Last 24 hours) at 9/2/2022 0908  Last data filed at 9/2/2022 4404  Gross per 24 hour   Intake 15 ml   Output 2800 ml   Net -2785 ml       Lab Data:  CBC:   Lab Results   Component Value Date/Time    WBC 5.4 08/31/2022 11:17 AM    HGB 12.5 08/31/2022 11:17 AM     08/31/2022 11:17 AM     BMP:  Lab Results   Component Value Date/Time     09/02/2022 04:30 AM    K 3.8 09/02/2022 04:30 AM    K 4.0 08/28/2022 04:06 AM     09/02/2022 04:30 AM    CO2 29 09/02/2022 04:30 AM    BUN 30 09/02/2022 04:30 AM    CREATININE 1.4 09/02/2022 04:30 AM    GLUCOSE 87 09/02/2022 04:30 AM     INR:   Lab Results   Component Value Date/Time    INR 1.59 07/28/2022 10:50 PM    INR 1.50 06/15/2022 06:48 PM    INR 1.50 02/05/2022 11:01 PM        CARDIAC LABS  ENZYMES:  Recent Labs     08/31/22  1117   TROPONINI <0.01     FASTING LIPID PANEL:  Lab Results   Component Value Date/Time    HDL 38 09/01/2022 04:31 AM    LDLCALC 60 09/01/2022 04:31 AM    TRIG 75 09/01/2022 04:31 AM    TSH 2.66 06/15/2022 10:33 PM     LIVER PROFILE:  Lab Results   Component Value Date/Time    AST 43 08/31/2022 11:17 AM    AST 51 08/28/2022 04:06 AM    ALT 51 08/31/2022 11:17 AM    ALT 62 08/28/2022 04:06 AM     BNP:   Lab Results   Component Value Date/Time    PROBNP 23,699 08/31/2022 11:17 AM    PROBNP 26,359 08/28/2022 04:06 AM    PROBNP 27,430 08/24/2022 12:46 PM     Iron Studies:    Lab Results   Component Value Date/Time    FERRITIN 163.1 06/17/2022 05:35 AM     Lab Results   Component Value Date    IRON 82 08/24/2022    TIBC 473 (H) 08/24/2022    FERRITIN 163.1 06/17/2022      Iron Deficiency Anemia:  Yes IV Iron Therapy:  Yes, June 2022 inpt  2017 ACC/AHA HF Guidelines:   intravenous iron replacement in patients with New York Heart Association (NYHA) class II and III HF and iron deficiency(ferritin <100 ng/ml or 100-300 ng/ml if transferrin saturation <20%), to improve functional status and QoL. 1. WEIGHT: Admit Weight: 217 lb (98.4 kg)      Today  Weight: 220 lb 6.4 oz (100 kg)   2. I/O   Intake/Output Summary (Last 24 hours) at 9/2/2022 0908  Last data filed at 9/2/2022 5748  Gross per 24 hour   Intake 15 ml   Output 2800 ml   Net -2785 ml         Assessment/Plan:     AHF- 2700 out, continue lasix and milrinone gtt today and probably through the weekend  ICM- continue low dose ACE, BB, dig, could try to add cathy this admit since he has been more compliant with f/u  AF- rated controlled on BB, Dig, continue [facility]        The patient was seen for >25 minutes. I reviewed interval history, physical exam, review of data including labs, imaging, development and implementation of treatment plan and coordination of complex care.  More than 50% of the time was devoted to counseling the patient on their diagnoses/treatments, as well as coordination of care with the other care teams    I appreciate the opportunity of cooperating in the care of this individual.    Sara Glynn APRN - CNP, ACNP, AGPCNP 9/2/2022, 9:08 AM  Heart Failure  The [address], [address]  Ph: [phone]      Core Measures:   Discharge instructions:   LVEF documented:   ACEI for LV dysfunction:   Smoking Cessation:

## 2022-09-03 LAB
ANION GAP SERPL CALCULATED.3IONS-SCNC: 9 MMOL/L (ref 3–16)
BUN BLDV-MCNC: 22 MG/DL (ref 7–20)
CALCIUM SERPL-MCNC: 8 MG/DL (ref 8.3–10.6)
CHLORIDE BLD-SCNC: 101 MMOL/L (ref 99–110)
CO2: 29 MMOL/L (ref 21–32)
CREAT SERPL-MCNC: 1.1 MG/DL (ref 0.8–1.3)
GFR AFRICAN AMERICAN: >60
GFR NON-AFRICAN AMERICAN: >60
GLUCOSE BLD-MCNC: 85 MG/DL (ref 70–99)
MAGNESIUM: 2.1 MG/DL (ref 1.8–2.4)
MAGNESIUM: 2.2 MG/DL (ref 1.8–2.4)
POTASSIUM SERPL-SCNC: 3.6 MMOL/L (ref 3.5–5.1)
PRO-BNP: ABNORMAL PG/ML (ref 0–124)
SODIUM BLD-SCNC: 139 MMOL/L (ref 136–145)

## 2022-09-03 PROCEDURE — 6370000000 HC RX 637 (ALT 250 FOR IP): Performed by: HOSPITALIST

## 2022-09-03 PROCEDURE — 99233 SBSQ HOSP IP/OBS HIGH 50: CPT | Performed by: NURSE PRACTITIONER

## 2022-09-03 PROCEDURE — 83880 ASSAY OF NATRIURETIC PEPTIDE: CPT

## 2022-09-03 PROCEDURE — 6360000002 HC RX W HCPCS: Performed by: NURSE PRACTITIONER

## 2022-09-03 PROCEDURE — 2580000003 HC RX 258: Performed by: HOSPITALIST

## 2022-09-03 PROCEDURE — 6360000002 HC RX W HCPCS: Performed by: INTERNAL MEDICINE

## 2022-09-03 PROCEDURE — 2580000003 HC RX 258: Performed by: NURSE PRACTITIONER

## 2022-09-03 PROCEDURE — 2060000000 HC ICU INTERMEDIATE R&B

## 2022-09-03 PROCEDURE — 6370000000 HC RX 637 (ALT 250 FOR IP): Performed by: INTERNAL MEDICINE

## 2022-09-03 PROCEDURE — 36415 COLL VENOUS BLD VENIPUNCTURE: CPT

## 2022-09-03 PROCEDURE — 83735 ASSAY OF MAGNESIUM: CPT

## 2022-09-03 PROCEDURE — 80048 BASIC METABOLIC PNL TOTAL CA: CPT

## 2022-09-03 RX ADMIN — FUROSEMIDE 10 MG/HR: 10 INJECTION, SOLUTION INTRAMUSCULAR; INTRAVENOUS at 14:07

## 2022-09-03 RX ADMIN — APIXABAN 5 MG: 5 TABLET, FILM COATED ORAL at 09:34

## 2022-09-03 RX ADMIN — METOPROLOL SUCCINATE 12.5 MG: 25 TABLET, EXTENDED RELEASE ORAL at 09:34

## 2022-09-03 RX ADMIN — APIXABAN 5 MG: 5 TABLET, FILM COATED ORAL at 20:00

## 2022-09-03 RX ADMIN — Medication 10 ML: at 09:36

## 2022-09-03 RX ADMIN — METOPROLOL SUCCINATE 12.5 MG: 25 TABLET, EXTENDED RELEASE ORAL at 20:00

## 2022-09-03 RX ADMIN — Medication 10 ML: at 20:01

## 2022-09-03 RX ADMIN — TAMSULOSIN HYDROCHLORIDE 0.4 MG: 0.4 CAPSULE ORAL at 09:34

## 2022-09-03 RX ADMIN — FUROSEMIDE 10 MG/HR: 10 INJECTION, SOLUTION INTRAMUSCULAR; INTRAVENOUS at 03:48

## 2022-09-03 RX ADMIN — LISINOPRIL 2.5 MG: 5 TABLET ORAL at 09:33

## 2022-09-03 RX ADMIN — FINASTERIDE 5 MG: 5 TABLET, FILM COATED ORAL at 09:34

## 2022-09-03 RX ADMIN — DIGOXIN 125 MCG: 125 TABLET ORAL at 09:34

## 2022-09-03 RX ADMIN — PANTOPRAZOLE SODIUM 40 MG: 40 TABLET, DELAYED RELEASE ORAL at 06:08

## 2022-09-03 RX ADMIN — ATORVASTATIN CALCIUM 40 MG: 40 TABLET, FILM COATED ORAL at 20:01

## 2022-09-03 RX ADMIN — MILRINONE LACTATE 0.12 MCG/KG/MIN: 0.2 INJECTION, SOLUTION INTRAVENOUS at 19:33

## 2022-09-03 NOTE — PLAN OF CARE
Problem: Discharge Planning  Goal: Discharge to home or other facility with appropriate resources  Outcome: Progressing  Flowsheets (Taken 9/3/2022 0736)  Discharge to home or other facility with appropriate resources:   Identify barriers to discharge with patient and caregiver   Arrange for needed discharge resources and transportation as appropriate   Identify discharge learning needs (meds, wound care, etc)   Refer to discharge planning if patient needs post-hospital services based on physician order or complex needs related to functional status, cognitive ability or social support system     Problem: Chronic Conditions and Co-morbidities  Goal: Patient's chronic conditions and co-morbidity symptoms are monitored and maintained or improved  Outcome: Progressing  Flowsheets (Taken 9/3/2022 0736)  Care Plan - Patient's Chronic Conditions and Co-Morbidity Symptoms are Monitored and Maintained or Improved:   Monitor and assess patient's chronic conditions and comorbid symptoms for stability, deterioration, or improvement   Collaborate with multidisciplinary team to address chronic and comorbid conditions and prevent exacerbation or deterioration   Update acute care plan with appropriate goals if chronic or comorbid symptoms are exacerbated and prevent overall improvement and discharge

## 2022-09-03 NOTE — PLAN OF CARE
Problem: Discharge Planning  Goal: Discharge to home or other facility with appropriate resources  Recent Flowsheet Documentation  Taken 9/2/2022 0832 by Berny Olvera RN  Discharge to home or other facility with appropriate resources:   Identify barriers to discharge with patient and caregiver   Arrange for needed discharge resources and transportation as appropriate   Identify discharge learning needs (meds, wound care, etc)   Refer to discharge planning if patient needs post-hospital services based on physician order or complex needs related to functional status, cognitive ability or social support system     Problem: Chronic Conditions and Co-morbidities  Goal: Patient's chronic conditions and co-morbidity symptoms are monitored and maintained or improved  Outcome: Progressing  Flowsheets (Taken 9/2/2022 0832 by Berny Olvera RN)  Care Plan - Patient's Chronic Conditions and Co-Morbidity Symptoms are Monitored and Maintained or Improved:   Monitor and assess patient's chronic conditions and comorbid symptoms for stability, deterioration, or improvement   Collaborate with multidisciplinary team to address chronic and comorbid conditions and prevent exacerbation or deterioration   Update acute care plan with appropriate goals if chronic or comorbid symptoms are exacerbated and prevent overall improvement and discharge

## 2022-09-03 NOTE — PROGRESS NOTES
[facility]   Cardiology Progress Note   Date: 9/3/2022  Admit Date: 8/31/2022     Reason for follow up: CHF, CMP    Chief Complaint:   Chief Complaint   Patient presents with    Shortness of Breath     Arrived per friend d/t cardiology referral for direct admit SOB d/t fluid overload     History of Present Illness: History obtained from patient and medical record. Kiko oTrres is a 59 y.o. male with a past medical history of hypertension, sCHF, NICM with EF 20-25%, persistent atrial fibrillation. Seen by EP 2/2022 and CONSUELO was performed which showed moderate MR, HENOK thrombus, and LV dysfunction 20-25%. Chris Nix was discussed and he declined at that time. Started on GDMT at that time. He has ILR after DCCV 7/26/2019 Dr. Edwina Benjamin. S/p Biv AICD 4/25/2022    Recently hospitalized 7/2022     Admitted with fluid overload. He was sent to the infusion center for a lasix drip and was rescheduled for another treatment, but when he arrived to the infusion center, his blood pressure was in the 80s and he was complaining of dizziness and edema up to his thighs. His weight recently was 217. Lasix and Milrinone gtts started. He is homeless. Interval Hx: Today, he is being seen for follow up. No new complaints today. No major events overnight. Denies having chest pain, palpitations, shortness of breath, orthopnea or dizziness. Patient seen and examined. Clinical notes reviewed. Telemetry reviewed. Assessment and Plan:  Acute on chronic systolic HF   - Remains overloaded    - Down 5 L and 3 lbs    - Continue lasix gtt @ 10, BP tolerating   - Continue milrinone at 0.125   - Heart failure education:   - Salt restriction < 2 grams/day  - Fluid restriction < 2 lit/day  - Exercise 30 min, 5 times/week  - Alcohol abstinence   - Avoiding over the counter NSAIDs ( e.g. Advil)  - Medication compliance   - Weight loss and heart health diet  - Daily weight   - Follow up with heart failure clinic. Ischemic Cardiomyopathy   - S/p AICD  Chronic Atrial Fibrillation    - Rate controlled   - Continue BB, digoxin and Eliquis  PVC/NSVT   - Brief and asymptomatic   - Add mag is normal    Continue lasix gtt and milrinone gtt  Add mag, normal    All pertinent information and plan of care discussed with the rounding/attending cardiologist.    Multiple medical conditions with risk of decompensation. All questions and concerns were addressed to the patient. Alternatives to my treatment were discussed. I have discussed the above stated plan with patient and the nurse. The patient verbalized understanding and agreed with the plan. Thank you for allowing to us to participate in the care of Fam Leal.     Problem List:   Patient Active Problem List    Diagnosis Date Noted    Elevated LFTs 09/01/2022    Idiopathic hypotension 09/01/2022    Peripheral edema 09/01/2022    Anemia 09/01/2022    Acute combined systolic and diastolic congestive heart failure ([address] 08/31/2022    Fluid overload 08/24/2022    Hemoptysis 07/30/2022    PNA (pneumonia) 07/29/2022    Syncope and collapse 04/29/2022    Thrombus of left atrial appendage     VT (ventricular tachycardia) ([address]     ICD (implantable cardioverter-defibrillator), biventricular, in situ 04/26/2022    Acute on chronic congestive heart failure ([address]     Complicated UTI (urinary tract infection)     Chronic atrial fibrillation (HCC)     Moderate mitral regurgitation     Acute systolic heart failure ([address] 04/13/2022    NSTEMI (non-ST elevated myocardial infarction) ([address]     Acute on chronic combined systolic and diastolic heart failure ([address] 02/06/2022    Homeless     COVID     Non-compliance     Acute on chronic systolic heart failure ([address] 11/28/2021    Acute pulmonary edema ([address] 11/27/2021    Encounter for loop recorder check 07/26/2019    Acute urinary retention 07/26/2019    Dilated cardiomyopathy ([address]     Acute renal failure (HCC)     PA[facility]ox[facility]) [facility]     Benign essential HTN     Acute retention of urine 07/24/2019      Allergies:  No Known Allergies    Home Meds:  Prior to Visit Medications    Medication Sig Taking? Authorizing Provider   ondansetron (ZOFRAN ODT) 4 MG disintegrating tablet Take 1-2 tablets by mouth every 12 hours as needed for Nausea  Ameena Shaw MD   omeprazole (PRILOSEC) 20 MG delayed release capsule Take 1 capsule by mouth every morning (before breakfast)  Ameena Shaw MD   lisinopril (PRINIVIL;ZESTRIL) 2.5 MG tablet Take 2.5 mg by mouth daily  Historical Provider, MD   empagliflozin (JARDIANCE) 10 MG tablet Take 1 tablet by mouth daily  TRENT Alamo   torsemide (DEMADEX) 20 MG tablet Take 2 tablets by mouth in the morning. Patient taking differently: Take 60 mg by mouth daily  TRENT Alamo   metoprolol succinate (TOPROL XL) 25 MG extended release tablet Take 0.5 tablets by mouth in the morning and at bedtime  Bridgette Zapata MD   potassium chloride (KLOR-CON M) 20 MEQ extended release tablet Take 1 tablet by mouth in the morning and 1 tablet in the evening. Take with meals.   Bridgette Zapata MD   apixaban (ELIQUIS) 5 MG TABS tablet Take 1 tablet by mouth 2 times daily  TRENT Moran CNP   tamsulosin (FLOMAX) 0.4 MG capsule Take 1 capsule by mouth daily  TRENT Moran CNP   atorvastatin (LIPITOR) 40 MG tablet Take 1 tablet by mouth nightly  TRENT Moran CNP   finasteride (PROSCAR) 5 MG tablet Take 1 tablet by mouth daily  TRENT Alamo   vitamin D (CHOLECALCIFEROL) 50 MCG (2000 UT) TABS tablet Take 1 tablet by mouth daily  TRENT Alamo      Scheduled Meds:   digoxin  125 mcg Oral Daily    apixaban  5 mg Oral BID    atorvastatin  40 mg Oral Nightly    finasteride  5 mg Oral Daily    lisinopril  2.5 mg Oral Daily    metoprolol succinate  12.5 mg Oral BID    pantoprazole  40 mg Oral QAM AC    tamsulosin  0.4 mg Oral Daily sodium chloride flush  5-40 mL IntraVENous 2 times per day     Continuous Infusions:   furosemide (LASIX) 1mg/ml infusion 10 mg/hr (09/03/22 0348)    milrinone 0.125 mcg/kg/min (09/02/22 2029)    sodium chloride       PRN Meds:sodium chloride flush, sodium chloride, polyethylene glycol, acetaminophen **OR** acetaminophen     Past Medical History:  Past Medical History:   Diagnosis Date    Atrial fibrillation ([address] 07/25/2019    CHF (congestive heart failure) ([facility])     Hx of blood clots     Hypertension         Past Surgical History:    has a past surgical history that includes Insertable Cardiac Monitor (07/26/2019) and Cardioversion (07/26/2019). Social History:  Reviewed. reports that he has never smoked. He has never been exposed to tobacco smoke. He has never used smokeless tobacco. He reports that he does not drink alcohol and does not use drugs. Family History:  Reviewed. family history includes Heart Attack in his mother; Heart Disease in his mother; High Blood Pressure in his father and mother; Other in his father; Stroke in his father. Denies family history of sudden cardiac death, arrhythmia, premature CAD    Review of Systems:  Constitutional: Negative for fever, weight changes, or weakness  Skin: Negative for bruising, bleeding, blood clots, or changes in skin pigment  HEENT: Negative for vision changes or dysphagia  Respiratory: Reviewed in HPI  Cardiovascular: Reviewed in HPI  Gastrointestinal: Negative for abdominal pain or black/tarry stools  Genito-Urinary: Negative for hematuria  Musculoskeletal: No focal weakness  Neurological/Psych: Negative for confusion or TIA-like symptoms. Physical Examination:  Vitals:    09/03/22 0933   BP:    Pulse: 81   Resp:    Temp:    SpO2:       In: 330 [P.O.:320;  I.V.:10]  Out: 3050    Wt Readings from Last 3 Encounters:   09/03/22 217 lb 4.8 oz (98.6 kg)   08/31/22 217 lb (98.4 kg)   08/27/22 217 lb (98.4 kg)       Intake/Output Summary (Last 24 hours) at 9/3/2022 0941  Last data filed at 9/3/2022 7485  Gross per 24 hour   Intake 330 ml   Output 2400 ml   Net -2070 ml     Telemetry: Personally Reviewed Atrial fibrillation   Constitutional: Cooperative and in no apparent distress, and appears well nourished  Skin: Warm and pink; no cyanosis, bruising, or clubbing  HEENT: Symmetric and normocephalic. Conjunctiva pink with clear sclera. Mucus membranes pink and moist.   Cardiovascular: irregular and rhythm. S1 & S2, negative for murmurs. Peripheral pulses 2+, capillary refill < 3 seconds. positive elevation of JVP. 2+ LE edema  Respiratory: Respirations symmetric and unlabored. Lungs clear to auscultation bilaterally, no wheezing, crackles, or rhonchi  Gastrointestinal: Abdomen soft and round. Bowel sounds normoactive in all quadrants. Musculoskeletal: No focal weakness. Neurologic/Psych: Awake and orientated to person, place and time. Calm affect, appropriate mood    Pertinent labs, diagnostic, device, and imaging results reviewed as a part of this visit    Labs:    BMP:   Recent Labs     08/31/22  1947 09/01/22  0431 09/02/22  0430 09/03/22  0539    141 140 139   K 3.2* 4.1 3.8 3.6   CL 99 105 103 101   CO2 26 21 29 29   PHOS 3.9  --   --   --    BUN 31* 32* 30* 22*   CREATININE 1.5* 1.4* 1.4* 1.1   MG 2.20 2.40 2.30 2.10     Estimated Creatinine Clearance: 80 mL/min (based on SCr of 1.1 mg/dL).    CBC:   Recent Labs     08/31/22  1117   WBC 5.4   HGB 12.5*   HCT 38.8*   MCV 86.4        Thyroid:   Lab Results   Component Value Date/Time    TSH 2.66 06/15/2022 10:33 PM     Lipids:   Lab Results   Component Value Date/Time    CHOL 113 09/01/2022 04:31 AM    HDL 38 09/01/2022 04:31 AM    TRIG 75 09/01/2022 04:31 AM     LFTS:   Lab Results   Component Value Date/Time    ALT 51 08/31/2022 11:17 AM    AST 43 08/31/2022 11:17 AM    ALKPHOS 135 08/31/2022 11:17 AM    PROT 6.9 08/31/2022 11:17 AM    AGRATIO 1.0 08/31/2022 11:17 AM    BILITOT 2.0 2022 11:17 AM     Cardiac Enzymes:   Lab Results   Component Value Date/Time    TROPONINI <0.01 2022 11:17 AM    TROPONINI <0.01 2022 04:06 AM    TROPONINI <0.01 2022 08:46 AM     Coags:   Lab Results   Component Value Date/Time    PROTIME 18.9 2022 10:50 PM    INR 1.59 2022 10:50 PM     EC2022: Atrial-paced rhythm with frequent ventricular-paced complexes and with occasional Premature ventricular complexes     ECHO:  2/10/2022    Summary   Left ventricular size is moderately increased. Overall left ventricular   systolic function appears severely reduced with ejection fraction of 20-25%   and severe diffuse hypokinesis. Moderate mitral regurgitation. Calculated ERO of 0.33 cm2. Left atrial size appears dilated. There is an echodensity inside the left   atrial appendage consistent with left atrial appendage thrombus. The right ventricle is enlarged. Right ventricular systolic function is   mildly reduced . Mild to moderate tricuspid regurgitation  Stress Test: 2019   Summary    Small sized inferior fixed defect most consistent with diaphragmatic    artifact. Myocardial fibrosis is less likely. There is severe global LV systolic dysfunction with ejection fraction of 27    %. Possible WPW    Overall findings represent a high risk scan.      Rafita Rodriguez, TRENT-KATLYN  [address]   Office: [phone]

## 2022-09-03 NOTE — PROGRESS NOTES
[facility] PROGRESS NOTE    9/3/2022 7:27 AM        Name: Nnamdi Dos Santos Admitted: 8/31/2022  Primary Care Provider: TRENT Arevalo NP (Tel: [phone])      Subjective:  .     BNP down to 11K  Diuresed 5 liters         Admitted with fluid overload  Pro BNP 23K   Diuresed 2 liters   On lasix drip at 10 mg per hour  Also on milrinone drip  Cardiology following     Hx of VT , had ICD placed in April 22  Right leg > Left  on eliquis   No hx of clots    Admits to drinking lots of fluids prior to admission   Not active prior to admission due to chronic dyspnea       Reviewed interval ancillary notes    Current Medications  furosemide (LASIX) 100 mg in dextrose 5 % 100 mL infusion, Continuous  milrinone (PRIMACOR) 20 mg in dextrose 5 % 100 mL infusion, Continuous  digoxin (LANOXIN) tablet 125 mcg, Daily  apixaban (ELIQUIS) tablet 5 mg, BID  atorvastatin (LIPITOR) tablet 40 mg, Nightly  finasteride (PROSCAR) tablet 5 mg, Daily  lisinopril (PRINIVIL;ZESTRIL) tablet 2.5 mg, Daily  metoprolol succinate (TOPROL XL) extended release tablet 12.5 mg, BID  pantoprazole (PROTONIX) tablet 40 mg, QAM AC  tamsulosin (FLOMAX) capsule 0.4 mg, Daily  sodium chloride flush 0.9 % injection 5-40 mL, 2 times per day  sodium chloride flush 0.9 % injection 5-40 mL, PRN  0.9 % sodium chloride infusion, PRN  polyethylene glycol (GLYCOLAX) packet 17 g, Daily PRN  acetaminophen (TYLENOL) tablet 650 mg, Q6H PRN   Or  acetaminophen (TYLENOL) suppository 650 mg, Q6H PRN      Objective:  /72   Pulse 79   Temp 97.5 °F (36.4 °C) (Oral)   Resp 18   Ht 6' 2\" (1.88 m)   Wt 217 lb 4.8 oz (98.6 kg)   SpO2 99%   BMI 27.90 kg/m²     Intake/Output Summary (Last 24 hours) at 9/3/2022 0727  Last data filed at 9/3/2022 0404  Gross per 24 hour   Intake 120 ml   Output 3050 ml   Net -2930 ml        Wt Readings from Last 3 Encounters:   09/03/22 217 lb 4.8 oz (98.6 kg)   08/31/22 217 lb (98.4 kg)   08/27/22 217 lb (98.4 kg)       General appearance:  Appears chronically ill,  alert and pleasant   Eyes: Sclera clear. Pupils equal.  ENT: Moist oral mucosa. Trachea midline, no adenopathy. Cardiovascular: Regular rhythm, normal S1, S2. No murmur. + + edema in lower extremities,  R > L   Respiratory: fine bi basilar crackles noted, no wheezing   GI: Abdomen soft, no tenderness, not distended, normal bowel sounds  Musculoskeletal: No cyanosis in digits, neck supple  Neurology: CN 2-12 grossly intact. No speech or motor deficits  Psych: Normal affect. Alert and oriented in time, place and person  Skin: Warm, dry, normal turgor    Labs and Tests:  CBC:   Recent Labs     08/31/22  1117   WBC 5.4   HGB 12.5*          BMP:    Recent Labs     09/01/22  0431 09/02/22  0430 09/03/22  0539    140 139   K 4.1 3.8 3.6    103 101   CO2 21 29 29   BUN 32* 30* 22*   CREATININE 1.4* 1.4* 1.1   GLUCOSE 91 87 85       Hepatic:   Recent Labs     08/31/22  1117   AST 43*   ALT 51*   BILITOT 2.0*   ALKPHOS 135*       Cardiac Testing:   ECHO:  2/10/2022      Summary   Left ventricular size is moderately increased. Overall left ventricular   systolic function appears severely reduced with ejection fraction of 20-25%   and severe diffuse hypokinesis. Moderate mitral regurgitation. Calculated ERO of 0.33 cm2. Left atrial size appears dilated. There is an echodensity inside the left   atrial appendage consistent with left atrial appendage thrombus. The right ventricle is enlarged. Right ventricular systolic function is   mildly reduced . Mild to moderate tricuspid regurgitation.     Problem List  Principal Problem:    Acute combined systolic and diastolic congestive heart failure (HCC)  Active Problems:    Elevated LFTs    Idiopathic hypotension    Peripheral edema    Anemia    PAF (paroxysmal atrial fibrillation) ([address] Benign essential HTN    Homeless    ICD (implantable cardioverter-defibrillator), biventricular, in situ  Resolved Problems:    * No resolved hospital problems. *       Assessment & Plan:   Acute combined systolic and diastolic HF:  on milrinone at 0.125 mcg/kg/ min and lasix drip at 10 mg per hour. He is also on low dose ACE/BB and digoxin. Goal diuresis is 20 lbs. Appreciate input from cardiology   CKD: stable at 1.1 will monitor closely with IV diuresis   Elevated LFT's :  likely  due to HF, will monitor   ICM:  on BB, ACE and dig   Chronic AF,  rate controlled on BB/ digoxin. He is AC with eliquis       Diet: ADULT DIET; Regular;  Low Sodium (2 gm)  Code:Full Code  DVT PPX      Lennox German, APRN - CNP   9/3/2022 7:27 AM

## 2022-09-04 LAB
ANION GAP SERPL CALCULATED.3IONS-SCNC: 6 MMOL/L (ref 3–16)
BUN BLDV-MCNC: 21 MG/DL (ref 7–20)
CALCIUM SERPL-MCNC: 8.1 MG/DL (ref 8.3–10.6)
CHLORIDE BLD-SCNC: 101 MMOL/L (ref 99–110)
CO2: 30 MMOL/L (ref 21–32)
CREAT SERPL-MCNC: 1.1 MG/DL (ref 0.8–1.3)
GFR AFRICAN AMERICAN: >60
GFR NON-AFRICAN AMERICAN: >60
GLUCOSE BLD-MCNC: 98 MG/DL (ref 70–99)
MAGNESIUM: 2.1 MG/DL (ref 1.8–2.4)
POTASSIUM SERPL-SCNC: 3.5 MMOL/L (ref 3.5–5.1)
SODIUM BLD-SCNC: 137 MMOL/L (ref 136–145)

## 2022-09-04 PROCEDURE — 2580000003 HC RX 258: Performed by: NURSE PRACTITIONER

## 2022-09-04 PROCEDURE — 6370000000 HC RX 637 (ALT 250 FOR IP): Performed by: HOSPITALIST

## 2022-09-04 PROCEDURE — 6370000000 HC RX 637 (ALT 250 FOR IP): Performed by: INTERNAL MEDICINE

## 2022-09-04 PROCEDURE — 2580000003 HC RX 258: Performed by: HOSPITALIST

## 2022-09-04 PROCEDURE — 6360000002 HC RX W HCPCS: Performed by: NURSE PRACTITIONER

## 2022-09-04 PROCEDURE — 36415 COLL VENOUS BLD VENIPUNCTURE: CPT

## 2022-09-04 PROCEDURE — 99233 SBSQ HOSP IP/OBS HIGH 50: CPT | Performed by: NURSE PRACTITIONER

## 2022-09-04 PROCEDURE — 6360000002 HC RX W HCPCS: Performed by: INTERNAL MEDICINE

## 2022-09-04 PROCEDURE — 80048 BASIC METABOLIC PNL TOTAL CA: CPT

## 2022-09-04 PROCEDURE — 2060000000 HC ICU INTERMEDIATE R&B

## 2022-09-04 PROCEDURE — 83735 ASSAY OF MAGNESIUM: CPT

## 2022-09-04 RX ADMIN — DIGOXIN 125 MCG: 125 TABLET ORAL at 09:44

## 2022-09-04 RX ADMIN — LISINOPRIL 2.5 MG: 5 TABLET ORAL at 09:44

## 2022-09-04 RX ADMIN — Medication 10 ML: at 09:44

## 2022-09-04 RX ADMIN — METOPROLOL SUCCINATE 12.5 MG: 25 TABLET, EXTENDED RELEASE ORAL at 20:17

## 2022-09-04 RX ADMIN — APIXABAN 5 MG: 5 TABLET, FILM COATED ORAL at 09:44

## 2022-09-04 RX ADMIN — TAMSULOSIN HYDROCHLORIDE 0.4 MG: 0.4 CAPSULE ORAL at 09:44

## 2022-09-04 RX ADMIN — FUROSEMIDE 10 MG/HR: 10 INJECTION, SOLUTION INTRAMUSCULAR; INTRAVENOUS at 02:06

## 2022-09-04 RX ADMIN — METOPROLOL SUCCINATE 12.5 MG: 25 TABLET, EXTENDED RELEASE ORAL at 09:44

## 2022-09-04 RX ADMIN — MILRINONE LACTATE 0.12 MCG/KG/MIN: 0.2 INJECTION, SOLUTION INTRAVENOUS at 23:50

## 2022-09-04 RX ADMIN — PANTOPRAZOLE SODIUM 40 MG: 40 TABLET, DELAYED RELEASE ORAL at 05:20

## 2022-09-04 RX ADMIN — APIXABAN 5 MG: 5 TABLET, FILM COATED ORAL at 20:17

## 2022-09-04 RX ADMIN — FINASTERIDE 5 MG: 5 TABLET, FILM COATED ORAL at 09:44

## 2022-09-04 RX ADMIN — ATORVASTATIN CALCIUM 40 MG: 40 TABLET, FILM COATED ORAL at 20:17

## 2022-09-04 ASSESSMENT — PAIN SCALES - GENERAL
PAINLEVEL_OUTOF10: 0

## 2022-09-04 NOTE — PROGRESS NOTES
[facility] PROGRESS NOTE    9/4/2022 7:18 AM        Name: Michael Moran . Admitted: 8/31/2022  Primary Care Provider: TRENT Sandy NP (Tel: [phone])      Subjective:  . BNP down to 11K  Diuresed 7 liters   Feels better,   Lower leg swelling improved  Homeless,  stays with friends.          Admitted with fluid overload  Pro BNP 23K upon admission   On lasix drip at 10 mg per hour  Also on milrinone drip  Cardiology following     Hx of VT , had ICD placed in April 22  Right leg > Left  on eliquis   No hx of clots    Admits to drinking lots of fluids prior to admission   Not active prior to admission due to chronic dyspnea       Reviewed interval ancillary notes    Current Medications  furosemide (LASIX) 100 mg in dextrose 5 % 100 mL infusion, Continuous  milrinone (PRIMACOR) 20 mg in dextrose 5 % 100 mL infusion, Continuous  digoxin (LANOXIN) tablet 125 mcg, Daily  apixaban (ELIQUIS) tablet 5 mg, BID  atorvastatin (LIPITOR) tablet 40 mg, Nightly  finasteride (PROSCAR) tablet 5 mg, Daily  lisinopril (PRINIVIL;ZESTRIL) tablet 2.5 mg, Daily  metoprolol succinate (TOPROL XL) extended release tablet 12.5 mg, BID  pantoprazole (PROTONIX) tablet 40 mg, QAM AC  tamsulosin (FLOMAX) capsule 0.4 mg, Daily  sodium chloride flush 0.9 % injection 5-40 mL, 2 times per day  sodium chloride flush 0.9 % injection 5-40 mL, PRN  0.9 % sodium chloride infusion, PRN  polyethylene glycol (GLYCOLAX) packet 17 g, Daily PRN  acetaminophen (TYLENOL) tablet 650 mg, Q6H PRN   Or  acetaminophen (TYLENOL) suppository 650 mg, Q6H PRN      Objective:  /73   Pulse 75   Temp 97.6 °F (36.4 °C) (Oral)   Resp 18   Ht 6' 2\" (1.88 m)   Wt 213 lb 6.5 oz (96.8 kg)   SpO2 96%   BMI 27.40 kg/m²     Intake/Output Summary (Last 24 hours) at 9/4/2022 0718  Last data filed at 9/4/2022 0519  Gross per 24 hour   Intake 1441.94 ml   Output 3550 ml   Net -2108.06 ml        Wt Readings from Last 3 Encounters:   09/04/22 213 lb 6.5 oz (96.8 kg)   08/31/22 217 lb (98.4 kg)   08/27/22 217 lb (98.4 kg)       General appearance:  Appears chronically ill,  alert and pleasant   Eyes: Sclera clear. Pupils equal.  ENT: Moist oral mucosa. Trachea midline, no adenopathy. Cardiovascular: Regular rhythm, normal S1, S2. No murmur. + + edema in lower extremities,  R > L , edema is improving   Respiratory: fine bi basilar crackles noted, no wheezing   GI: Abdomen soft, no tenderness, not distended, normal bowel sounds  Musculoskeletal: No cyanosis in digits, neck supple  Neurology: CN 2-12 grossly intact. No speech or motor deficits  Psych: Normal affect. Alert and oriented in time, place and person  Skin: Warm, dry, normal turgor    Labs and Tests:  CBC:   No results for input(s): WBC, HGB, PLT in the last 72 hours. BMP:    Recent Labs     09/02/22  0430 09/03/22  0539 09/04/22  0512    139 137   K 3.8 3.6 3.5    101 101   CO2 29 29 30   BUN 30* 22* 21*   CREATININE 1.4* 1.1 1.1   GLUCOSE 87 85 98       Hepatic:   No results for input(s): AST, ALT, ALB, BILITOT, ALKPHOS in the last 72 hours. Cardiac Testing:   ECHO:  2/10/2022      Summary   Left ventricular size is moderately increased. Overall left ventricular   systolic function appears severely reduced with ejection fraction of 20-25%   and severe diffuse hypokinesis. Moderate mitral regurgitation. Calculated ERO of 0.33 cm2. Left atrial size appears dilated. There is an echodensity inside the left   atrial appendage consistent with left atrial appendage thrombus. The right ventricle is enlarged. Right ventricular systolic function is   mildly reduced . Mild to moderate tricuspid regurgitation.     Problem List  Principal Problem:    Acute combined systolic and diastolic congestive heart failure (HCC)  Active Problems:    Elevated LFTs    Idiopathic hypotension    Peripheral edema    Anemia    PAF (paroxysmal atrial fibrillation) (HCC)    Benign essential HTN    Homeless    ICD (implantable cardioverter-defibrillator), biventricular, in situ  Resolved Problems:    * No resolved hospital problems. *       Assessment & Plan:   Acute combined systolic and diastolic HF:  on milrinone at 0.125 mcg/kg/ min and lasix drip at 10 mg per hour. He is also on low dose ACE/BB and digoxin. Goal diuresis is 20 lbs. Appreciate input from cardiology   CKD: stable at 1.1 will monitor closely with IV diuresis   Elevated LFT's :  likely  due to HF, will monitor   ICM:  on BB, ACE and dig , has AICD   Chronic AF,  rate controlled on BB/ digoxin. He is AC with eliquis   Homeless, stays with friends       Diet: ADULT DIET; Regular;  Low Sodium (2 gm)  Code:Full Code  DVT PPX      TRENT Simpson CNP   9/4/2022 7:18 AM

## 2022-09-04 NOTE — PROGRESS NOTES
[address]   Cardiology Progress Note   Date: 9/4/2022  Admit Date: 8/31/2022     Reason for follow up: CHF, CMP    Chief Complaint:   Chief Complaint   Patient presents with    Shortness of Breath     Arrived per friend d/t cardiology referral for direct admit SOB d/t fluid overload     History of Present Illness: History obtained from patient and medical record. Rakel Moreau is a 59 y.o. male with a past medical history of hypertension, sCHF, NICM with EF 20-25%, persistent atrial fibrillation. Seen by EP 2/2022 and CONSUELO was performed which showed moderate MR, HENOK thrombus, and LV dysfunction 20-25%. Jocelyn Angry was discussed and he declined at that time. Started on GDMT at that time. He has ILR after DCCV 7/26/2019 Dr. Quintin Miranda. S/p Biv AICD 4/25/2022    Recently hospitalized 7/2022     Admitted with fluid overload. He was sent to the infusion center for a lasix drip and was rescheduled for another treatment, but when he arrived to the infusion center, his blood pressure was in the 80s and he was complaining of dizziness and edema up to his thighs. His weight recently was 217. Lasix and Milrinone gtts started. He is homeless. Interval Hx: Today, he is being seen for follow up. SOB and swelling is gradualy improving. No new complaints today. No major events overnight. Denies having chest pain, palpitations, or dizziness. Patient seen and examined. Clinical notes reviewed. Telemetry reviewed. Assessment and Plan:  Acute on chronic systolic HF   - Remains overloaded    - Down 7 L and 7 lbs    - Continue lasix gtt @ 10   - Continue milrinone at 0.125   - Heart failure education:   - Salt restriction < 2 grams/day  - Fluid restriction < 2 lit/day  - Exercise 30 min, 5 times/week  - Alcohol abstinence   - Avoiding over the counter NSAIDs ( e.g. Advil)  - Medication compliance   - Weight loss and heart health diet  - Daily weight   - Follow up with heart failure clinic. Ischemic Cardiomyopathy   - S/p AICD  Chronic Atrial Fibrillation    - Rate controlled   - Continue BB, digoxin and Eliquis  PVC/NSVT   - Brief and asymptomatic   - Add mag is normal    Continue lasix gtt and milrinone gtt    All pertinent information and plan of care discussed with the rounding/attending cardiologist.    Multiple medical conditions with risk of decompensation. All questions and concerns were addressed to the patient. Alternatives to my treatment were discussed. I have discussed the above stated plan with patient and the nurse. The patient verbalized understanding and agreed with the plan. Thank you for allowing to us to participate in the care of Rashad Melissa.     Problem List:   Patient Active Problem List    Diagnosis Date Noted    Elevated LFTs 09/01/2022    Idiopathic hypotension 09/01/2022    Peripheral edema 09/01/2022    Anemia 09/01/2022    Acute combined systolic and diastolic congestive heart failure ([address] 08/31/2022    Fluid overload 08/24/2022    Hemoptysis 07/30/2022    PNA (pneumonia) 07/29/2022    Syncope and collapse 04/29/2022    Thrombus of left atrial appendage     VT (ventricular tachycardia) ([address]     ICD (implantable cardioverter-defibrillator), biventricular, in situ 04/26/2022    Acute on chronic congestive heart failure ([address]     Complicated UTI (urinary tract infection)     Chronic atrial fibrillation (HCC)     Moderate mitral regurgitation     Acute systolic heart failure ([address] 04/13/2022    NSTEMI (non-ST elevated myocardial infarction) ([address]     Acute on chronic combined systolic and diastolic heart failure ([address] 02/06/2022    Homeless     COVID     Non-compliance     Acute on chronic systolic heart failure ([address] 11/28/2021    Acute pulmonary edema ([address] 11/27/2021    Encounter for loop recorder check 07/26/2019    Acute urinary retention 07/26/2019    Dilated cardiomyopathy (HCC)     Acute renal failure (HCC)     PAF (paroxysmal atrial fibrillation) ([facility]     Benign essential HTN     Acute retention of urine 07/24/2019      Allergies:  No Known Allergies    Home Meds:  Prior to Visit Medications    Medication Sig Taking? Authorizing Provider   ondansetron (ZOFRAN ODT) 4 MG disintegrating tablet Take 1-2 tablets by mouth every 12 hours as needed for Nausea  Manuela Marinelli MD   omeprazole (PRILOSEC) 20 MG delayed release capsule Take 1 capsule by mouth every morning (before breakfast)  Manuela Marinelli MD   lisinopril (PRINIVIL;ZESTRIL) 2.5 MG tablet Take 2.5 mg by mouth daily  Historical Provider, MD   empagliflozin (JARDIANCE) 10 MG tablet Take 1 tablet by mouth daily  TRENT Alamo   torsemide (DEMADEX) 20 MG tablet Take 2 tablets by mouth in the morning. Patient taking differently: Take 60 mg by mouth daily  TRENT Alamo   metoprolol succinate (TOPROL XL) 25 MG extended release tablet Take 0.5 tablets by mouth in the morning and at bedtime  Candis Aburto MD   potassium chloride (KLOR-CON M) 20 MEQ extended release tablet Take 1 tablet by mouth in the morning and 1 tablet in the evening. Take with meals.   Candis Aburto MD   apixaban (ELIQUIS) 5 MG TABS tablet Take 1 tablet by mouth 2 times daily  TRENT Cesar CNP   tamsulosin (FLOMAX) 0.4 MG capsule Take 1 capsule by mouth daily  TRENT Cesar CNP   atorvastatin (LIPITOR) 40 MG tablet Take 1 tablet by mouth nightly  TRENT Cesar CNP   finasteride (PROSCAR) 5 MG tablet Take 1 tablet by mouth daily  TRENT Alamo   vitamin D (CHOLECALCIFEROL) 50 MCG (2000 UT) TABS tablet Take 1 tablet by mouth daily  TRENT Alamo      Scheduled Meds:   digoxin  125 mcg Oral Daily    apixaban  5 mg Oral BID    atorvastatin  40 mg Oral Nightly    finasteride  5 mg Oral Daily    lisinopril  2.5 mg Oral Daily    metoprolol succinate  12.5 mg Oral BID    pantoprazole  40 mg Oral QAM AC    tamsulosin  0.4 mg Oral Daily    sodium chloride Component Value Date/Time    TROPONINI <0.01 2022 11:17 AM    TROPONINI <0.01 2022 04:06 AM    TROPONINI <0.01 2022 08:46 AM     Coags:   Lab Results   Component Value Date/Time    PROTIME 18.9 2022 10:50 PM    INR 1.59 2022 10:50 PM     EC2022: Atrial-paced rhythm with frequent ventricular-paced complexes and with occasional Premature ventricular complexes     ECHO:  2/10/2022    Summary   Left ventricular size is moderately increased. Overall left ventricular   systolic function appears severely reduced with ejection fraction of 20-25%   and severe diffuse hypokinesis. Moderate mitral regurgitation. Calculated ERO of 0.33 cm2. Left atrial size appears dilated. There is an echodensity inside the left   atrial appendage consistent with left atrial appendage thrombus. The right ventricle is enlarged. Right ventricular systolic function is   mildly reduced . Mild to moderate tricuspid regurgitation  Stress Test: 2019   Summary    Small sized inferior fixed defect most consistent with diaphragmatic    artifact. Myocardial fibrosis is less likely. There is severe global LV systolic dysfunction with ejection fraction of 27    %. Possible WPW    Overall findings represent a high risk scan.      TRENT Das-KATLYN  [address]   Office: [phone]

## 2022-09-05 LAB
ANION GAP SERPL CALCULATED.3IONS-SCNC: 6 MMOL/L (ref 3–16)
BASOPHILS ABSOLUTE: 0.1 K/UL (ref 0–0.2)
BASOPHILS RELATIVE PERCENT: 1.1 %
BUN BLDV-MCNC: 18 MG/DL (ref 7–20)
CALCIUM SERPL-MCNC: 8.2 MG/DL (ref 8.3–10.6)
CHLORIDE BLD-SCNC: 99 MMOL/L (ref 99–110)
CO2: 32 MMOL/L (ref 21–32)
CREAT SERPL-MCNC: 1.1 MG/DL (ref 0.8–1.3)
EOSINOPHILS ABSOLUTE: 0.1 K/UL (ref 0–0.6)
EOSINOPHILS RELATIVE PERCENT: 2.4 %
GFR AFRICAN AMERICAN: >60
GFR NON-AFRICAN AMERICAN: >60
GLUCOSE BLD-MCNC: 103 MG/DL (ref 70–99)
HCT VFR BLD CALC: 35.4 % (ref 40.5–52.5)
HEMOGLOBIN: 11.5 G/DL (ref 13.5–17.5)
LYMPHOCYTES ABSOLUTE: 0.9 K/UL (ref 1–5.1)
LYMPHOCYTES RELATIVE PERCENT: 19.2 %
MAGNESIUM: 2.1 MG/DL (ref 1.8–2.4)
MCH RBC QN AUTO: 27.7 PG (ref 26–34)
MCHC RBC AUTO-ENTMCNC: 32.5 G/DL (ref 31–36)
MCV RBC AUTO: 85.3 FL (ref 80–100)
MONOCYTES ABSOLUTE: 0.4 K/UL (ref 0–1.3)
MONOCYTES RELATIVE PERCENT: 8.3 %
NEUTROPHILS ABSOLUTE: 3.3 K/UL (ref 1.7–7.7)
NEUTROPHILS RELATIVE PERCENT: 69 %
PDW BLD-RTO: 16.8 % (ref 12.4–15.4)
PLATELET # BLD: 160 K/UL (ref 135–450)
PMV BLD AUTO: 7.6 FL (ref 5–10.5)
POTASSIUM SERPL-SCNC: 3.1 MMOL/L (ref 3.5–5.1)
RBC # BLD: 4.15 M/UL (ref 4.2–5.9)
SODIUM BLD-SCNC: 137 MMOL/L (ref 136–145)
WBC # BLD: 4.8 K/UL (ref 4–11)

## 2022-09-05 PROCEDURE — 2580000003 HC RX 258: Performed by: HOSPITALIST

## 2022-09-05 PROCEDURE — 6360000002 HC RX W HCPCS: Performed by: NURSE PRACTITIONER

## 2022-09-05 PROCEDURE — 2580000003 HC RX 258: Performed by: NURSE PRACTITIONER

## 2022-09-05 PROCEDURE — 6370000000 HC RX 637 (ALT 250 FOR IP): Performed by: NURSE PRACTITIONER

## 2022-09-05 PROCEDURE — 6370000000 HC RX 637 (ALT 250 FOR IP): Performed by: HOSPITALIST

## 2022-09-05 PROCEDURE — 80048 BASIC METABOLIC PNL TOTAL CA: CPT

## 2022-09-05 PROCEDURE — 99233 SBSQ HOSP IP/OBS HIGH 50: CPT | Performed by: INTERNAL MEDICINE

## 2022-09-05 PROCEDURE — 6370000000 HC RX 637 (ALT 250 FOR IP): Performed by: INTERNAL MEDICINE

## 2022-09-05 PROCEDURE — 85025 COMPLETE CBC W/AUTO DIFF WBC: CPT

## 2022-09-05 PROCEDURE — 36415 COLL VENOUS BLD VENIPUNCTURE: CPT

## 2022-09-05 PROCEDURE — 2060000000 HC ICU INTERMEDIATE R&B

## 2022-09-05 PROCEDURE — 83735 ASSAY OF MAGNESIUM: CPT

## 2022-09-05 RX ORDER — POTASSIUM CHLORIDE 20 MEQ/1
40 TABLET, EXTENDED RELEASE ORAL
Status: DISCONTINUED | OUTPATIENT
Start: 2022-09-05 | End: 2022-09-05

## 2022-09-05 RX ORDER — METOLAZONE 2.5 MG/1
5 TABLET ORAL DAILY
Status: COMPLETED | OUTPATIENT
Start: 2022-09-05 | End: 2022-09-05

## 2022-09-05 RX ORDER — POTASSIUM CHLORIDE 7.45 MG/ML
10 INJECTION INTRAVENOUS PRN
Status: DISCONTINUED | OUTPATIENT
Start: 2022-09-05 | End: 2022-09-08 | Stop reason: HOSPADM

## 2022-09-05 RX ORDER — POTASSIUM CHLORIDE 20 MEQ/1
20 TABLET, EXTENDED RELEASE ORAL DAILY
Status: DISCONTINUED | OUTPATIENT
Start: 2022-09-05 | End: 2022-09-07

## 2022-09-05 RX ORDER — POTASSIUM CHLORIDE 20 MEQ/1
40 TABLET, EXTENDED RELEASE ORAL PRN
Status: DISCONTINUED | OUTPATIENT
Start: 2022-09-05 | End: 2022-09-08 | Stop reason: HOSPADM

## 2022-09-05 RX ORDER — MAGNESIUM SULFATE IN WATER 40 MG/ML
2000 INJECTION, SOLUTION INTRAVENOUS PRN
Status: DISCONTINUED | OUTPATIENT
Start: 2022-09-05 | End: 2022-09-08 | Stop reason: HOSPADM

## 2022-09-05 RX ADMIN — POTASSIUM CHLORIDE 20 MEQ: 1500 TABLET, EXTENDED RELEASE ORAL at 12:23

## 2022-09-05 RX ADMIN — Medication 10 ML: at 08:30

## 2022-09-05 RX ADMIN — ATORVASTATIN CALCIUM 40 MG: 40 TABLET, FILM COATED ORAL at 20:40

## 2022-09-05 RX ADMIN — METOPROLOL SUCCINATE 12.5 MG: 25 TABLET, EXTENDED RELEASE ORAL at 08:28

## 2022-09-05 RX ADMIN — PANTOPRAZOLE SODIUM 40 MG: 40 TABLET, DELAYED RELEASE ORAL at 06:08

## 2022-09-05 RX ADMIN — METOPROLOL SUCCINATE 12.5 MG: 25 TABLET, EXTENDED RELEASE ORAL at 20:40

## 2022-09-05 RX ADMIN — LISINOPRIL 2.5 MG: 5 TABLET ORAL at 08:29

## 2022-09-05 RX ADMIN — FUROSEMIDE 10 MG/HR: 10 INJECTION, SOLUTION INTRAMUSCULAR; INTRAVENOUS at 15:17

## 2022-09-05 RX ADMIN — TAMSULOSIN HYDROCHLORIDE 0.4 MG: 0.4 CAPSULE ORAL at 08:28

## 2022-09-05 RX ADMIN — METOLAZONE 5 MG: 2.5 TABLET ORAL at 09:00

## 2022-09-05 RX ADMIN — FINASTERIDE 5 MG: 5 TABLET, FILM COATED ORAL at 08:29

## 2022-09-05 RX ADMIN — DIGOXIN 125 MCG: 125 TABLET ORAL at 08:28

## 2022-09-05 RX ADMIN — POTASSIUM CHLORIDE 40 MEQ: 1500 TABLET, EXTENDED RELEASE ORAL at 02:15

## 2022-09-05 RX ADMIN — APIXABAN 5 MG: 5 TABLET, FILM COATED ORAL at 20:40

## 2022-09-05 RX ADMIN — FUROSEMIDE 10 MG/HR: 10 INJECTION, SOLUTION INTRAMUSCULAR; INTRAVENOUS at 02:15

## 2022-09-05 RX ADMIN — APIXABAN 5 MG: 5 TABLET, FILM COATED ORAL at 08:28

## 2022-09-05 ASSESSMENT — PAIN SCALES - GENERAL
PAINLEVEL_OUTOF10: 0

## 2022-09-05 NOTE — PROGRESS NOTES
[facility]   Progress Note  CHF/Pulmonary Hypertension Cardiology    Chief complaint: We are following this patient for acute on chronic systolic HF  HPI:  Romina Darnell is a 60 yo male with a history of PAF, HTN, chronic systolic HF. He is homeless but lives with friends. He has had several recent admissions for CHF exacerbations, and most recently pneumonia. He was admitted June and July for fluid overload. He has responded well to metolazone in the past.       He came in one week ago with fluid overload. He was sent to the infusion center for a lasix drip and was rescheduled for another treatment yesterday. When he arrived to the infusion center, his blood pressure was in the 80s. He was complaining of dizziness and edema up to his thighs. His weight recently was 217. He has cough but no fever. He is very weak. No chest pain. He is also complaining of profound fatigue. He admits to orthopnea and PND. Denies fever or chills. No abdominal pain, nausea, vomiting, or diarrhea. No urinary symptoms. He had ICD placed in April, 2022 for VT. Today, he has diuresed some, but remains very edematous. I/O's negative 740 cc     Labs:  Sodium 141  K 4.1  BUN/cre 32/1.4  Magnesium 2.4  H/H 12.5/38.8  Iron 82,17%     Echo:  2/10/22:   Summary   Left ventricular size is moderately increased. Overall left ventricular   systolic function appears severely reduced with ejection fraction of 20-25%   and severe diffuse hypokinesis. Moderate mitral regurgitation. Calculated ERO of 0.33 cm2. Left atrial size appears dilated. There is an echodensity inside the left   atrial appendage consistent with left atrial appendage thrombus. The right ventricle is enlarged. Right ventricular systolic function is   mildly reduced . Mild to moderate tricuspid regurgitation. CXR:  Impression   Stable exam with moderate cardiomegaly and central pulmonary vascular   congestion.   No evidence of overt edema. Meds prior to admission:  Lisinopril 2.5 qd  Jardiance 10 qd  Torsemide 60 qd  Toprol xl 12.5 bid  Kcl 20 bid  Eliquis 5 bid  Lipitor 40 qd      ROS:  he has diuresed about 7 liters. He has several drinks on his bedside table. Encouraged him not to drink much fluid. Need to keep better track. He is not diuresing as fast as we hoped. BUN/cre 18/1.1, H/H 11.5/35.4  I/O's negtive 7000 cc, weight   Medications/Labs all Reviewed    Lab Results   Component Value Date    WBC 4.8 09/05/2022    HGB 11.5 (L) 09/05/2022    HCT 35.4 (L) 09/05/2022    MCV 85.3 09/05/2022     09/05/2022     Lab Results   Component Value Date    CREATININE 1.1 09/05/2022    BUN 18 09/05/2022     09/05/2022    K 3.1 (L) 09/05/2022    CL 99 09/05/2022    CO2 32 09/05/2022     Lab Results   Component Value Date    INR 1.59 (H) 07/28/2022    PROTIME 18.9 (H) 07/28/2022        Physical Examination:    /62   Pulse 67   Temp 97.8 °F (36.6 °C) (Oral)   Resp 18   Ht 6' 2\" (1.88 m)   Wt 216 lb 14.9 oz (98.4 kg)   SpO2 95%   BMI 27.85 kg/m²      Chronically ill appearing  HEENT:  NC/AT  Respiratory:  Resp Assessment: Normal respiratory effort  Resp Auscultation: Clear to auscultation bilaterally   Cardiovascular:   Auscultation: regular rate and rhythm, normal S1S2, no murmur, rub or gallop  Palpation:  Nl PMI  JVP:  elevated to angle of jaw  Extremities: 3+ bilateral \"woody\" Edema up to thighs  Abdomen:  Soft, non-tender  Normal bowel sounds  Extremities:   No Cyanosis or Clubbing  Neurological/Psychiatric:  Oriented to time, place, and person  Non-anxious  Skin Warm and dry    Lab Results   Component Value Date/Time     09/05/2022 01:02 AM     09/04/2022 05:12 AM     09/03/2022 05:39 AM    K 3.1 09/05/2022 01:02 AM    K 3.5 09/04/2022 05:12 AM    K 3.6 09/03/2022 05:39 AM    K 4.0 08/28/2022 04:06 AM    K 2.6 07/30/2022 06:43 AM    K 3.8 04/30/2022 05:21 AM    BUN 18 09/05/2022 01:02 AM    BUN 21 09/04/2022 05:12 AM    BUN 22 09/03/2022 05:39 AM    CREATININE 1.1 09/05/2022 01:02 AM    CREATININE 1.1 09/04/2022 05:12 AM    CREATININE 1.1 09/03/2022 05:39 AM    GLUCOSE 103 09/05/2022 01:02 AM    GLUCOSE 98 09/04/2022 05:12 AM     Lab Results   Component Value Date    PROBNP 11,280 (H) 09/03/2022    PROBNP 23,699 (H) 08/31/2022    PROBNP 26,359 (H) 08/28/2022     Lab Results   Component Value Date    ALT 51 (H) 08/31/2022    ALT 62 (H) 08/28/2022    AST 43 (H) 08/31/2022    AST 51 (H) 08/28/2022     Lab Results   Component Value Date/Time    HGB 11.5 09/05/2022 01:02 AM    HGB 12.5 08/31/2022 11:17 AM    HCT 35.4 09/05/2022 01:02 AM    HCT 38.8 08/31/2022 11:17 AM     09/05/2022 01:02 AM     08/31/2022 11:17 AM     Lab Results   Component Value Date/Time    TRIG 75 09/01/2022 04:31 AM    TRIG 65 06/16/2022 04:39 AM    HDL 38 09/01/2022 04:31 AM    HDL 48 06/16/2022 04:39 AM    LDLCALC 60 09/01/2022 04:31 AM    LDLCALC 41 06/16/2022 04:39 AM     Assessment:    1. Acute combined systolic and diastolic congestive heart failure ([facility]     2. Severe peripheral edema  3. Relative hypotension  4. Mild anemia  5. Elevated LFTs, likely due to right heart failure  6.  homelessness     Plan:   continue lasix drip 10 mg/hr  Continue milrinone 0.125 mcg/kg/min to help him diurese  Metolazone 5 mg po today, repeat tomorrow if creatinine stable  Continue low dose lisinopril 2.5 mg po qd, ideally could try entresto if blood pressure allows  Continue low dose metoprolol  Restart jardiance before discharge  I am hesitant to start him on spironolactone due to CRF  Start digoxin 0.125 mg po qd  Needs to diurese about 20 pounds  CHF education reinforced.   ~salt restriction  ~fluid restriction  ~medication compliance  ~daily weights and notify of any significant weight gain/loss  ~establish with CHF nurse  ~outpatient follow-up with our CHF team     Metolazone 5 mg x 1 today    NYHA Class: 4    The patient was seen for > 35 minutes. I reviewed interval history, physical exam, review of data including labs, imaging, development and implementation of treatment plan and coordination of complex care.  More than 50% of the time was devoted to counseling the patient on their diagnoses/treatments, as well as coordination of care with the other care teams    Kennedy Canela MD, 9/5/2022 8:28 AM

## 2022-09-05 NOTE — PROGRESS NOTES
[facility] PROGRESS NOTE    9/6/2022 10:21 AM        Name: Heath Ceballos . Admitted: 8/31/2022  Primary Care Provider: TRENT Ch NP (Tel: [phone])      Brief History: Hx chronic atrial fib, chronic s/dCHF. He presented from [facility] with increased shortness of breath and weight gain. Admitted with CHF exacerbation. Placed on Lasix drip and milrinone. Subjective:  Resting in bed. States he feels so much better compared to admission. Breathing is back to baseline, swelling much improved. Denies chest pain, palpitations, abdominal pain, nausea. Remains on milrinone and Lasix drips.     Reviewed interval ancillary notes    Current Medications  potassium chloride (KLOR-CON M) extended release tablet 40 mEq, PRN   Or  potassium bicarb-citric acid (EFFER-K) effervescent tablet 40 mEq, PRN   Or  potassium chloride 10 mEq/100 mL IVPB (Peripheral Line), PRN  magnesium sulfate 2000 mg in 50 mL IVPB premix, PRN  potassium chloride (KLOR-CON M) extended release tablet 20 mEq, Daily  furosemide (LASIX) 100 mg in dextrose 5 % 100 mL infusion, Continuous  milrinone (PRIMACOR) 20 mg in dextrose 5 % 100 mL infusion, Continuous  digoxin (LANOXIN) tablet 125 mcg, Daily  apixaban (ELIQUIS) tablet 5 mg, BID  atorvastatin (LIPITOR) tablet 40 mg, Nightly  finasteride (PROSCAR) tablet 5 mg, Daily  lisinopril (PRINIVIL;ZESTRIL) tablet 2.5 mg, Daily  metoprolol succinate (TOPROL XL) extended release tablet 12.5 mg, BID  pantoprazole (PROTONIX) tablet 40 mg, QAM AC  tamsulosin (FLOMAX) capsule 0.4 mg, Daily  sodium chloride flush 0.9 % injection 5-40 mL, 2 times per day  sodium chloride flush 0.9 % injection 5-40 mL, PRN  0.9 % sodium chloride infusion, PRN  polyethylene glycol (GLYCOLAX) packet 17 g, Daily PRN  acetaminophen (TYLENOL) tablet 650 mg, Q6H PRN   Or  acetaminophen (TYLENOL) suppository 650 mg, Q6H PRN      Objective:  BP 95/69   Pulse 63   Temp 97.7 °F (36.5 °C) (Oral)   Resp 18   Ht 6' 2\" (1.88 m)   Wt 199 lb 15.3 oz (90.7 kg)   SpO2 97%   BMI 25.67 kg/m²     Intake/Output Summary (Last 24 hours) at 9/6/2022 1021  Last data filed at 9/6/2022 0930  Gross per 24 hour   Intake 840 ml   Output 37114 ml   Net -9210 ml      Wt Readings from Last 3 Encounters:   09/06/22 199 lb 15.3 oz (90.7 kg)   08/31/22 217 lb (98.4 kg)   08/27/22 217 lb (98.4 kg)       General appearance:  Appears comfortable  Eyes: Sclera clear. Pupils equal.  ENT: Moist oral mucosa. Trachea midline, no adenopathy. Cardiovascular: Regular rhythm, normal S1, S2. No murmur. 1+  edema in lower extremities  Respiratory: Not using accessory muscles. Good inspiratory effort. Diminished in bases with bibasilar crackles. GI: Abdomen soft, no tenderness, not distended, normal bowel sounds  Musculoskeletal: No cyanosis in digits, neck supple  Neurology: CN 2-12 grossly intact. No speech or motor deficits  Psych: Normal affect. Alert and oriented in time, place and person  Skin: Warm, dry, normal turgor    Labs and Tests:  CBC:   Recent Labs     09/05/22  0102   WBC 4.8   HGB 11.5*        BMP:    Recent Labs     09/04/22  0512 09/05/22  0102 09/06/22  0446    137 137   K 3.5 3.1* 3.6    99 93*   CO2 30 32 33*   BUN 21* 18 15   CREATININE 1.1 1.1 1.0   GLUCOSE 98 103* 91     Hepatic: No results for input(s): AST, ALT, ALB, BILITOT, ALKPHOS in the last 72 hours. CXR 8/31/2022:  Stable exam with moderate cardiomegaly and central pulmonary vascular   congestion. No evidence of overt edema. Echo 2/10/2022:  Summary   Left ventricular size is moderately increased. Overall left ventricular   systolic function appears severely reduced with ejection fraction of 20-25% and severe diffuse hypokinesis. Moderate mitral regurgitation. Calculated ERO of 0.33 cm2. Left atrial size appears dilated.  There is an echodensity inside the left atrial appendage consistent with left atrial appendage thrombus. The right ventricle is enlarged. Right ventricular systolic function is   mildly reduced . Mild to moderate tricuspid regurgitation. Problem List  Principal Problem:    Acute combined systolic and diastolic congestive heart failure (HCC)  Active Problems:    Elevated LFTs    Idiopathic hypotension    Peripheral edema    Anemia    PAF (paroxysmal atrial fibrillation) (HCC)    Benign essential HTN    Homeless    ICD (implantable cardioverter-defibrillator), biventricular, in situ  Resolved Problems:    * No resolved hospital problems. *       Assessment & Plan:   Acute on chronic combined systolic and diastolic HF . EF 20-25%. On milrinone at 0.125 mcg/kg/ min and lasix drip at 10 mg per hour, also received metolazone. Good diuresis, weight is down ~ 21 lbs. Continue lisinopril and metoprolol. Appreciate input from cardiology   CKD stage III. Baseline creatinine appears 1.4-1.6. Creatinine improved with diuresis and milrinone, 1.1 today. Continue to monitor. Hypokalemia. Secondary to diuresis, magnesium 2.10. Continue replacement therapy. Elevated LFT's. Most likely secondary to CHF. Recheck LFTs in am.    ICM. EF 20-25%. Continue beta blocker, ACE and digoxin. Has AICD. Check digoxin level with am blood draw. Chronic AF. Controlled rate. Continue beta blocker and digoxin. On chronic AC with Eliquis. Disposition: Patient homeless and stays with friend. Plans to return there on DC. Diet: ADULT DIET; Regular;  Low Sodium (2 gm); 1800 ml  Code:Full Code  DVT PPX: Shola Schmidt, TRENT - CNP   9/6/2022 10:21 AM

## 2022-09-05 NOTE — PROGRESS NOTES
[facility] PROGRESS NOTE    9/5/2022 8:10 AM        Name: Lonny Sharif . Admitted: 8/31/2022  Primary Care Provider: TRENT Becerril NP (Tel: [phone])      Subjective:  . BNP down to 11K  Diuresed 7 liters   Weight unchanged at 216   Feels better each day  No reports of pain  Very appreciative of all his care   Walked in halls yesterday       Homeless,  stays with friends.          Admitted with fluid overload  Pro BNP 23K upon admission   On lasix drip at 10 mg per hour  Also on milrinone drip  Cardiology following     Hx of VT , had ICD placed in April 22  Right leg > Left  on eliquis   No hx of clots        Reviewed interval ancillary notes    Current Medications  potassium chloride (KLOR-CON M) extended release tablet 40 mEq, PRN   Or  potassium bicarb-citric acid (EFFER-K) effervescent tablet 40 mEq, PRN   Or  potassium chloride 10 mEq/100 mL IVPB (Peripheral Line), PRN  magnesium sulfate 2000 mg in 50 mL IVPB premix, PRN  potassium chloride (KLOR-CON M) extended release tablet 40 mEq, Daily with breakfast  furosemide (LASIX) 100 mg in dextrose 5 % 100 mL infusion, Continuous  milrinone (PRIMACOR) 20 mg in dextrose 5 % 100 mL infusion, Continuous  digoxin (LANOXIN) tablet 125 mcg, Daily  apixaban (ELIQUIS) tablet 5 mg, BID  atorvastatin (LIPITOR) tablet 40 mg, Nightly  finasteride (PROSCAR) tablet 5 mg, Daily  lisinopril (PRINIVIL;ZESTRIL) tablet 2.5 mg, Daily  metoprolol succinate (TOPROL XL) extended release tablet 12.5 mg, BID  pantoprazole (PROTONIX) tablet 40 mg, QAM AC  tamsulosin (FLOMAX) capsule 0.4 mg, Daily  sodium chloride flush 0.9 % injection 5-40 mL, 2 times per day  sodium chloride flush 0.9 % injection 5-40 mL, PRN  0.9 % sodium chloride infusion, PRN  polyethylene glycol (GLYCOLAX) packet 17 g, Daily PRN  acetaminophen (TYLENOL) tablet 650 mg, Q6H PRN Or  acetaminophen (TYLENOL) suppository 650 mg, Q6H PRN      Objective:  /62   Pulse 67   Temp 97.8 °F (36.6 °C) (Oral)   Resp 18   Ht 6' 2\" (1.88 m)   Wt 216 lb 14.9 oz (98.4 kg)   SpO2 95%   BMI 27.85 kg/m²     Intake/Output Summary (Last 24 hours) at 9/5/2022 0810  Last data filed at 9/4/2022 2350  Gross per 24 hour   Intake 565.07 ml   Output 450 ml   Net 115.07 ml        Wt Readings from Last 3 Encounters:   09/05/22 216 lb 14.9 oz (98.4 kg)   08/31/22 217 lb (98.4 kg)   08/27/22 217 lb (98.4 kg)       General appearance:  Appears chronically ill,  alert and pleasant   Eyes: Sclera clear. Pupils equal.  ENT: Moist oral mucosa. Trachea midline, no adenopathy. Cardiovascular: Regular rhythm, normal S1, S2. No murmur. + edema in lower extremities,  R > L , edema is improving   Respiratory: fine bi basilar crackles noted, no wheezing   GI: Abdomen soft, no tenderness, not distended, normal bowel sounds  Musculoskeletal: No cyanosis in digits, neck supple  Neurology: CN 2-12 grossly intact. No speech or motor deficits  Psych: Normal affect. Alert and oriented in time, place and person  Skin: Warm, dry, normal turgor    Labs and Tests:  CBC:   Recent Labs     09/05/22  0102   WBC 4.8   HGB 11.5*          BMP:    Recent Labs     09/03/22  0539 09/04/22  0512 09/05/22  0102    137 137   K 3.6 3.5 3.1*    101 99   CO2 29 30 32   BUN 22* 21* 18   CREATININE 1.1 1.1 1.1   GLUCOSE 85 98 103*       Hepatic:   No results for input(s): AST, ALT, ALB, BILITOT, ALKPHOS in the last 72 hours. Cardiac Testing:   ECHO:  2/10/2022      Summary   Left ventricular size is moderately increased. Overall left ventricular   systolic function appears severely reduced with ejection fraction of 20-25%   and severe diffuse hypokinesis. Moderate mitral regurgitation. Calculated ERO of 0.33 cm2. Left atrial size appears dilated.  There is an echodensity inside the left   atrial appendage consistent with left atrial appendage thrombus. The right ventricle is enlarged. Right ventricular systolic function is   mildly reduced . Mild to moderate tricuspid regurgitation. Problem List  Principal Problem:    Acute combined systolic and diastolic congestive heart failure (HCC)  Active Problems:    Elevated LFTs    Idiopathic hypotension    Peripheral edema    Anemia    PAF (paroxysmal atrial fibrillation) (HCC)    Benign essential HTN    Homeless    ICD (implantable cardioverter-defibrillator), biventricular, in situ  Resolved Problems:    * No resolved hospital problems. *       Assessment & Plan:   Acute combined systolic and diastolic HF:  on milrinone at 0.125 mcg/kg/ min and lasix drip at 10 mg per hour. He is also on low dose ACE/BB and digoxin. Goal diuresis is 20 lbs. Metolazone given this am. Appreciate input from cardiology   CKD: stable at 1.1 will monitor closely with IV diuresis   Hypokalemia: received 40 meq this am.  Will add daily K dur at 20 meq, will follow closely   Elevated LFT's :  likely  due to HF, will monitor   ICM:  on BB, ACE and dig , has AICD   Chronic AF,  rate controlled on BB/ digoxin. He is AC with eliquis   Homeless, stays with friends       Diet: ADULT DIET; Regular;  Low Sodium (2 gm)  Code:Full Code  DVT PPX      TRENT Harris - CNP   9/5/2022 8:10 AM

## 2022-09-06 LAB
ANION GAP SERPL CALCULATED.3IONS-SCNC: 11 MMOL/L (ref 3–16)
BUN BLDV-MCNC: 15 MG/DL (ref 7–20)
CALCIUM SERPL-MCNC: 8.7 MG/DL (ref 8.3–10.6)
CHLORIDE BLD-SCNC: 93 MMOL/L (ref 99–110)
CO2: 33 MMOL/L (ref 21–32)
CREAT SERPL-MCNC: 1 MG/DL (ref 0.8–1.3)
GFR AFRICAN AMERICAN: >60
GFR NON-AFRICAN AMERICAN: >60
GLUCOSE BLD-MCNC: 91 MG/DL (ref 70–99)
POTASSIUM REFLEX MAGNESIUM: 3.6 MMOL/L (ref 3.5–5.1)
PRO-BNP: 8835 PG/ML (ref 0–124)
SODIUM BLD-SCNC: 137 MMOL/L (ref 136–145)

## 2022-09-06 PROCEDURE — 6370000000 HC RX 637 (ALT 250 FOR IP): Performed by: HOSPITALIST

## 2022-09-06 PROCEDURE — 2580000003 HC RX 258: Performed by: HOSPITALIST

## 2022-09-06 PROCEDURE — 2580000003 HC RX 258: Performed by: NURSE PRACTITIONER

## 2022-09-06 PROCEDURE — 6360000002 HC RX W HCPCS: Performed by: NURSE PRACTITIONER

## 2022-09-06 PROCEDURE — 2060000000 HC ICU INTERMEDIATE R&B

## 2022-09-06 PROCEDURE — 80048 BASIC METABOLIC PNL TOTAL CA: CPT

## 2022-09-06 PROCEDURE — 36415 COLL VENOUS BLD VENIPUNCTURE: CPT

## 2022-09-06 PROCEDURE — 6370000000 HC RX 637 (ALT 250 FOR IP): Performed by: NURSE PRACTITIONER

## 2022-09-06 PROCEDURE — 6370000000 HC RX 637 (ALT 250 FOR IP): Performed by: INTERNAL MEDICINE

## 2022-09-06 PROCEDURE — 99232 SBSQ HOSP IP/OBS MODERATE 35: CPT | Performed by: NURSE PRACTITIONER

## 2022-09-06 PROCEDURE — 6360000002 HC RX W HCPCS: Performed by: INTERNAL MEDICINE

## 2022-09-06 PROCEDURE — 83880 ASSAY OF NATRIURETIC PEPTIDE: CPT

## 2022-09-06 RX ORDER — METOLAZONE 2.5 MG/1
5 TABLET ORAL DAILY
Status: COMPLETED | OUTPATIENT
Start: 2022-09-06 | End: 2022-09-06

## 2022-09-06 RX ADMIN — MILRINONE LACTATE 0.12 MCG/KG/MIN: 0.2 INJECTION, SOLUTION INTRAVENOUS at 02:11

## 2022-09-06 RX ADMIN — Medication 10 ML: at 20:52

## 2022-09-06 RX ADMIN — METOLAZONE 5 MG: 2.5 TABLET ORAL at 09:27

## 2022-09-06 RX ADMIN — APIXABAN 5 MG: 5 TABLET, FILM COATED ORAL at 09:27

## 2022-09-06 RX ADMIN — METOPROLOL SUCCINATE 12.5 MG: 25 TABLET, EXTENDED RELEASE ORAL at 09:28

## 2022-09-06 RX ADMIN — Medication 10 ML: at 09:29

## 2022-09-06 RX ADMIN — TAMSULOSIN HYDROCHLORIDE 0.4 MG: 0.4 CAPSULE ORAL at 09:27

## 2022-09-06 RX ADMIN — LISINOPRIL 2.5 MG: 5 TABLET ORAL at 09:27

## 2022-09-06 RX ADMIN — FINASTERIDE 5 MG: 5 TABLET, FILM COATED ORAL at 09:28

## 2022-09-06 RX ADMIN — FUROSEMIDE 10 MG/HR: 10 INJECTION, SOLUTION INTRAMUSCULAR; INTRAVENOUS at 02:12

## 2022-09-06 RX ADMIN — DIGOXIN 125 MCG: 125 TABLET ORAL at 09:29

## 2022-09-06 RX ADMIN — APIXABAN 5 MG: 5 TABLET, FILM COATED ORAL at 20:59

## 2022-09-06 RX ADMIN — METOPROLOL SUCCINATE 12.5 MG: 25 TABLET, EXTENDED RELEASE ORAL at 20:58

## 2022-09-06 RX ADMIN — PANTOPRAZOLE SODIUM 40 MG: 40 TABLET, DELAYED RELEASE ORAL at 06:37

## 2022-09-06 RX ADMIN — FUROSEMIDE 10 MG/HR: 10 INJECTION, SOLUTION INTRAMUSCULAR; INTRAVENOUS at 15:41

## 2022-09-06 RX ADMIN — POTASSIUM CHLORIDE 20 MEQ: 1500 TABLET, EXTENDED RELEASE ORAL at 09:27

## 2022-09-06 RX ADMIN — ATORVASTATIN CALCIUM 40 MG: 40 TABLET, FILM COATED ORAL at 20:58

## 2022-09-06 ASSESSMENT — PAIN SCALES - GENERAL
PAINLEVEL_OUTOF10: 0

## 2022-09-06 NOTE — PROGRESS NOTES
Nutrition Note    RECOMMENDATIONS  PO Diet: Continue current diet    NUTRITION ASSESSMENT   Pt triggered for LOS assessment. Hx of CHF. On 2 gm Na, 1800 mL fluid restricted diet with documented intakes of % throughout admission. Wt hx in EMR shows wt fluctuations, some of which likely d/t fluids, hard to accurately assess for recent wt changes. Receiving IV lasix. RD will continue to monitor for adequate po intake. Nutrition Related Findings: Receiving IV lasix, milrinone. Wt down 16 lb from admission, -17.4L. Cl 93, LBM 9/3, BS+. +1 non-pitting BLE edema. Wounds: None  Nutrition Education:  Education completed ([facility] edu 9/1)   Nutrition Goals: PO intake 75% or greater, by next RD assessment     MALNUTRITION ASSESSMENT   Malnutrition Status: No malnutrition    NUTRITION DIAGNOSIS   No nutrition diagnosis at this time     CURRENT NUTRITION THERAPIES  ADULT DIET; Regular; Low Sodium (2 gm); 1800 ml     PO Intake: %   PO Supplement Intake:None Ordered    ANTHROPOMETRICS  Current Height: 6' 2\" (188 cm)  Current Weight: 199 lb 15.3 oz (90.7 kg)    Admission weight: 215 lb (97.5 kg) (wt method not specified)  Ideal Body Weight (IBW): 190 lbs  (86 kg)        BMI: 25.5    The patient will be monitored per nutrition standards of care. Consult dietitian if additional nutrition interventions are needed prior to RD reassessment.      Sharon Francisco, MS, RD, LD    Contact: [phone]

## 2022-09-06 NOTE — PROGRESS NOTES
Diuresing well this shift. 5300 cc noted since 7 pm. BP remains stable. Denies any cramping. Weight obtained using stand up scale. 199 lbs, yesterdays weight was 216 lbs.

## 2022-09-06 NOTE — CARE COORDINATION
Discharge Planning Assessment  Discharge Planning Assessment  RN/SW discharge planner met with patient/ (and family member) to discuss reason for admission, current living situation, and potential needs at the time of discharge    Demographics/Insurance verified Yes    Current type of dwelling: apartment with no steps to enter     Patient from ECF/SW confirmed with:n/a     Living arrangements: States lives with a friend, will be returning there. Is aware of being able to apply for housing through local job and family services and states \" I may look into that. \"     Level of function/Support: Independent, has supportive friends    Rosalva Apgar APRN-NP    Last Visit to PCP: has not had recent appt states will make one     DME:none     Active with any community resources/agencies/skilled home care: none     Medication compliance issues:independent states gets his prescriptions filled here at [facility]pt pharmacy and occasionally [facility]     Financial issues that could impact healthcare: will need meds to bed on discharge         Tentative discharge plan: home   Discussed and provided facilities of choice if transition to a skilled nursing facility is required at the time of discharge      Discussed with patient and/or family that on the day of discharge home tentative time of discharge will be between 10 AM and noon.     Transportation at the time of discharge: friend in private vehicle    [facility]  [phone]

## 2022-09-06 NOTE — PROGRESS NOTES
Run of VT noted per monitor. Upon entering the room, episode resolved. Vitals are stable. HR back in 70's. Patient is asymptomatic. HR was 140-155.

## 2022-09-06 NOTE — PLAN OF CARE
Problem: Pain  Goal: Verbalizes/displays adequate comfort level or baseline comfort level  Outcome: Progressing  Note: Pt denies pain at this time. No acute distress noted. Will continue to assess.

## 2022-09-06 NOTE — PROGRESS NOTES
[address]  HEART FAILURE  Progress Note      Admit Date 8/31/2022     Reason for Consult:      Reason for Consultation/Chief Complaint: SOB    HPI:    Romina Darnell is a 59 y.o. male with PMH HTN, NICM, HFrEF, AF, MR, HENOK thrombus, ICD April 2022, and homelessness admitted with fluid overload. He was sent to the infusion center for a lasix drip and was rescheduled for another treatment, but when he arrived to the infusion center, his blood pressure was in the 80s and he was complaining of dizziness and edema up to his thighs. Subjective:  Patient is being seen for CHF/CMP. Run of VT early am today- asx and K replaced  Today Mr. Pascual Navas feels much better and denies chest pain, SOB, or palpitations. Review of Systems - General ROS: negative  Hematological and Lymphatic ROS: negative  Respiratory ROS:LANDEROS  Cardiovascular ROS: no chest pain or dyspnea on exertion  Gastrointestinal ROS: no abdominal pain, change in bowel habits, or black or bloody stools  Musculoskeletal ROS: swelling  Neurological ROS: no TIA or stroke symptoms     Baseline Weight: 184 hosp scale   Wt Readings from Last 3 Encounters:   09/06/22 199 lb 15.3 oz (90.7 kg)   08/31/22 217 lb (98.4 kg)   08/27/22 217 lb (98.4 kg)         Cardiac Testing:   ECHO:  2/10/2022   Summary   Left ventricular size is moderately increased. Overall left ventricular   systolic function appears severely reduced with ejection fraction of 20-25%   and severe diffuse hypokinesis. Moderate mitral regurgitation. Calculated ERO of 0.33 cm2. Left atrial size appears dilated. There is an echodensity inside the left   atrial appendage consistent with left atrial appendage thrombus. The right ventricle is enlarged. Right ventricular systolic function is   mildly reduced . Mild to moderate tricuspid regurgitation. Stress Test: 7/26/2019   Resting ECG    Normal sinus rhythm. Nonspecific ST-T wave changes.     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 Core measures for HF:  EF: 20-25%   ACEi/ARB/ARNI: lisinopril  BB: Metoprolol succinate  Damian:  none for noncompliance  with labs  Hydralazine/nitrates:  SGLT2i: Jardiance    NYHA Class III      Objective:   BP 95/69   Pulse 63   Temp 97.7 °F (36.5 °C) (Oral)   Resp 18   Ht 6' 2\" (1.88 m)   Wt 199 lb 15.3 oz (90.7 kg)   SpO2 97%   BMI 25.67 kg/m²     Intake/Output Summary (Last 24 hours) at 9/6/2022 1003  Last data filed at 9/6/2022 0930  Gross per 24 hour   Intake 840 ml   Output 23321 ml   Net -9210 ml      In: 1240 [P.O.:1240]  Out: 58781       Physical Exam:  General Appearance:  Non-obese/Well Nourished  Respiratory:  Resp Auscultation: Normal breath sounds without dullness  Cardiovascular:   Auscultation: Regular rate and rhythm, normal S1S2, no m/g/r/c  Palpation: Normal    Pedal Pulses: 2+ and equal   Abdomen:  Soft, NT, ND, + bs  Extremities:  No Cyanosis or Clubbing  Extremities: 1+, edema  Neurological/Psychiatric:  Oriented to time, place, and person  Non-anxious    MEDICATIONS:   Scheduled Meds:   Scheduled Meds:   potassium chloride  20 mEq Oral Daily    digoxin  125 mcg Oral Daily    apixaban  5 mg Oral BID    atorvastatin  40 mg Oral Nightly    finasteride  5 mg Oral Daily    lisinopril  2.5 mg Oral Daily    metoprolol succinate  12.5 mg Oral BID    pantoprazole  40 mg Oral QAM AC    tamsulosin  0.4 mg Oral Daily    sodium chloride flush  5-40 mL IntraVENous 2 times per day     Continuous Infusions:   furosemide (LASIX) 1mg/ml infusion 10 mg/hr (09/06/22 0212)    milrinone 0.125 mcg/kg/min (09/06/22 0211)    sodium chloride       PRN Meds:.potassium chloride **OR** potassium alternative oral replacement **OR** potassium chloride, magnesium sulfate, sodium chloride flush, sodium chloride, polyethylene glycol, acetaminophen **OR** acetaminophen  Continuous Infusions:   furosemide (LASIX) 1mg/ml infusion 10 mg/hr (09/06/22 0212)    milrinone 0.125 mcg/kg/min (09/06/22 0211)    sodium chloride         Intake/Output Summary (Last 24 hours) at 9/6/2022 1003  Last data filed at 9/6/2022 0930  Gross per 24 hour   Intake 840 ml   Output 25460 ml   Net -9210 ml       Lab Data:  CBC:   Lab Results   Component Value Date/Time    WBC 4.8 09/05/2022 01:02 AM    HGB 11.5 09/05/2022 01:02 AM     09/05/2022 01:02 AM     BMP:  Lab Results   Component Value Date/Time     09/06/2022 04:46 AM    K 3.6 09/06/2022 04:46 AM    CL 93 09/06/2022 04:46 AM    CO2 33 09/06/2022 04:46 AM    BUN 15 09/06/2022 04:46 AM    CREATININE 1.0 09/06/2022 04:46 AM    GLUCOSE 91 09/06/2022 04:46 AM     INR:   Lab Results   Component Value Date/Time    INR 1.59 07/28/2022 10:50 PM    INR 1.50 06/15/2022 06:48 PM    INR 1.50 02/05/2022 11:01 PM        CARDIAC LABS  ENZYMES:  No results for input(s): CKMB, CKMBINDEX, TROPONINI in the last 72 hours.     Invalid input(s): CKTOTAL;3    FASTING LIPID PANEL:  Lab Results   Component Value Date/Time    HDL 38 09/01/2022 04:31 AM    LDLCALC 60 09/01/2022 04:31 AM    TRIG 75 09/01/2022 04:31 AM    TSH 2.66 06/15/2022 10:33 PM     LIVER PROFILE:  Lab Results   Component Value Date/Time    AST 43 08/31/2022 11:17 AM    AST 51 08/28/2022 04:06 AM    ALT 51 08/31/2022 11:17 AM    ALT 62 08/28/2022 04:06 AM     BNP:   Lab Results   Component Value Date/Time    PROBNP 8,835 09/06/2022 04:46 AM    PROBNP 11,280 09/03/2022 05:39 AM    PROBNP 23,699 08/31/2022 11:17 AM     Iron Studies:    Lab Results   Component Value Date/Time    FERRITIN 163.1 06/17/2022 05:35 AM     Lab Results   Component Value Date    IRON 82 08/24/2022    TIBC 473 (H) 08/24/2022    FERRITIN 163.1 06/17/2022      Iron Deficiency Anemia:  Yes IV Iron Therapy:  Yes, June 2022 inpt  2017 ACC/AHA HF Guidelines:   intravenous iron replacement in patients with New York Heart Association (NYHA) class II and III HF and iron deficiency(ferritin <100 ng/ml or 100-300 ng/ml if transferrin saturation <20%), to improve functional status and QoL. 1. WEIGHT: Admit Weight: 217 lb (98.4 kg)      Today  Weight: 199 lb 15.3 oz (90.7 kg)   2. I/O   Intake/Output Summary (Last 24 hours) at 9/6/2022 1003  Last data filed at 9/6/2022 0930  Gross per 24 hour   Intake 840 ml   Output 13344 ml   Net -9210 ml         Assessment/Plan:     AHF- 15L out with lasix and milrinone gtt, metolazone - repeat today.    ICM- continue low dose ACE, BB, dig, will get device interrogation, restart jardiance at d/c, no cathy for CRF and noncompliance, may try to change to entresto if BP ok  AF- rate controlled on BB, Dig, continue [facility]      I appreciate the opportunity of cooperating in the care of this individual.    Wilder Chirinos APRN - CNP, ACNP, [address] 9/6/2022, 10:03 AM  Heart Failure  The [address], [address]  Ph: [phone]      Core Measures:   Discharge instructions:   LVEF documented:   ACEI for LV dysfunction:   Smoking Cessation:

## 2022-09-07 ENCOUNTER — NURSE ONLY (OUTPATIENT)
Dept: CARDIOLOGY CLINIC | Age: 62
End: 2022-09-07
Payer: COMMERCIAL

## 2022-09-07 DIAGNOSIS — I48.20 CHRONIC ATRIAL FIBRILLATION (HCC): ICD-10-CM

## 2022-09-07 DIAGNOSIS — Z95.810 ICD (IMPLANTABLE CARDIOVERTER-DEFIBRILLATOR), BIVENTRICULAR, IN SITU: ICD-10-CM

## 2022-09-07 DIAGNOSIS — I50.22 CHRONIC SYSTOLIC CONGESTIVE HEART FAILURE (HCC): ICD-10-CM

## 2022-09-07 DIAGNOSIS — I48.0 PAF (PAROXYSMAL ATRIAL FIBRILLATION) (HCC): ICD-10-CM

## 2022-09-07 DIAGNOSIS — I42.0 DILATED CARDIOMYOPATHY (HCC): ICD-10-CM

## 2022-09-07 DIAGNOSIS — I47.20 VT (VENTRICULAR TACHYCARDIA): ICD-10-CM

## 2022-09-07 LAB
ALBUMIN SERPL-MCNC: 3.2 G/DL (ref 3.4–5)
ALP BLD-CCNC: 121 U/L (ref 40–129)
ALT SERPL-CCNC: 38 U/L (ref 10–40)
ANION GAP SERPL CALCULATED.3IONS-SCNC: 9 MMOL/L (ref 3–16)
AST SERPL-CCNC: 37 U/L (ref 15–37)
BILIRUB SERPL-MCNC: 1.7 MG/DL (ref 0–1)
BILIRUBIN DIRECT: 0.6 MG/DL (ref 0–0.3)
BILIRUBIN, INDIRECT: 1.1 MG/DL (ref 0–1)
BUN BLDV-MCNC: 19 MG/DL (ref 7–20)
CALCIUM SERPL-MCNC: 9.5 MG/DL (ref 8.3–10.6)
CHLORIDE BLD-SCNC: 87 MMOL/L (ref 99–110)
CO2: 39 MMOL/L (ref 21–32)
CREAT SERPL-MCNC: 1.2 MG/DL (ref 0.8–1.3)
DIGOXIN LEVEL: 0.5 NG/ML (ref 0.8–2)
GFR AFRICAN AMERICAN: >60
GFR NON-AFRICAN AMERICAN: >60
GLUCOSE BLD-MCNC: 84 MG/DL (ref 70–99)
HCT VFR BLD CALC: 40.8 % (ref 40.5–52.5)
HEMOGLOBIN: 13.3 G/DL (ref 13.5–17.5)
MCH RBC QN AUTO: 27.7 PG (ref 26–34)
MCHC RBC AUTO-ENTMCNC: 32.6 G/DL (ref 31–36)
MCV RBC AUTO: 85 FL (ref 80–100)
PDW BLD-RTO: 16.6 % (ref 12.4–15.4)
PLATELET # BLD: 217 K/UL (ref 135–450)
PMV BLD AUTO: 7.6 FL (ref 5–10.5)
POTASSIUM SERPL-SCNC: 3.9 MMOL/L (ref 3.5–5.1)
RBC # BLD: 4.8 M/UL (ref 4.2–5.9)
SODIUM BLD-SCNC: 135 MMOL/L (ref 136–145)
TOTAL PROTEIN: 6.6 G/DL (ref 6.4–8.2)
WBC # BLD: 4.7 K/UL (ref 4–11)

## 2022-09-07 PROCEDURE — 93282 PRGRMG EVAL IMPLANTABLE DFB: CPT | Performed by: INTERNAL MEDICINE

## 2022-09-07 PROCEDURE — 2580000003 HC RX 258: Performed by: HOSPITALIST

## 2022-09-07 PROCEDURE — 80076 HEPATIC FUNCTION PANEL: CPT

## 2022-09-07 PROCEDURE — 80048 BASIC METABOLIC PNL TOTAL CA: CPT

## 2022-09-07 PROCEDURE — 2060000000 HC ICU INTERMEDIATE R&B

## 2022-09-07 PROCEDURE — 99232 SBSQ HOSP IP/OBS MODERATE 35: CPT | Performed by: NURSE PRACTITIONER

## 2022-09-07 PROCEDURE — 85027 COMPLETE CBC AUTOMATED: CPT

## 2022-09-07 PROCEDURE — 6370000000 HC RX 637 (ALT 250 FOR IP): Performed by: NURSE PRACTITIONER

## 2022-09-07 PROCEDURE — 6370000000 HC RX 637 (ALT 250 FOR IP): Performed by: HOSPITALIST

## 2022-09-07 PROCEDURE — 6360000002 HC RX W HCPCS: Performed by: INTERNAL MEDICINE

## 2022-09-07 PROCEDURE — 2580000003 HC RX 258: Performed by: NURSE PRACTITIONER

## 2022-09-07 PROCEDURE — 6360000002 HC RX W HCPCS: Performed by: NURSE PRACTITIONER

## 2022-09-07 PROCEDURE — 36415 COLL VENOUS BLD VENIPUNCTURE: CPT

## 2022-09-07 PROCEDURE — 80162 ASSAY OF DIGOXIN TOTAL: CPT

## 2022-09-07 PROCEDURE — 6370000000 HC RX 637 (ALT 250 FOR IP): Performed by: PHYSICIAN ASSISTANT

## 2022-09-07 PROCEDURE — 6370000000 HC RX 637 (ALT 250 FOR IP): Performed by: INTERNAL MEDICINE

## 2022-09-07 RX ORDER — SPIRONOLACTONE 25 MG/1
25 TABLET ORAL DAILY
Status: DISCONTINUED | OUTPATIENT
Start: 2022-09-07 | End: 2022-09-08 | Stop reason: HOSPADM

## 2022-09-07 RX ORDER — TORSEMIDE 20 MG/1
40 TABLET ORAL 2 TIMES DAILY
Status: DISCONTINUED | OUTPATIENT
Start: 2022-09-08 | End: 2022-09-08 | Stop reason: HOSPADM

## 2022-09-07 RX ORDER — MIDODRINE HYDROCHLORIDE 5 MG/1
5 TABLET ORAL ONCE
Status: COMPLETED | OUTPATIENT
Start: 2022-09-07 | End: 2022-09-07

## 2022-09-07 RX ADMIN — MILRINONE LACTATE 0.12 MCG/KG/MIN: 0.2 INJECTION, SOLUTION INTRAVENOUS at 06:23

## 2022-09-07 RX ADMIN — FUROSEMIDE 10 MG/HR: 10 INJECTION, SOLUTION INTRAMUSCULAR; INTRAVENOUS at 03:08

## 2022-09-07 RX ADMIN — TAMSULOSIN HYDROCHLORIDE 0.4 MG: 0.4 CAPSULE ORAL at 09:19

## 2022-09-07 RX ADMIN — LISINOPRIL 2.5 MG: 5 TABLET ORAL at 09:18

## 2022-09-07 RX ADMIN — Medication 10 ML: at 21:29

## 2022-09-07 RX ADMIN — APIXABAN 5 MG: 5 TABLET, FILM COATED ORAL at 21:21

## 2022-09-07 RX ADMIN — Medication 10 ML: at 09:20

## 2022-09-07 RX ADMIN — APIXABAN 5 MG: 5 TABLET, FILM COATED ORAL at 09:19

## 2022-09-07 RX ADMIN — METOPROLOL SUCCINATE 12.5 MG: 25 TABLET, EXTENDED RELEASE ORAL at 23:55

## 2022-09-07 RX ADMIN — EMPAGLIFLOZIN 10 MG: 10 TABLET, FILM COATED ORAL at 15:08

## 2022-09-07 RX ADMIN — POTASSIUM CHLORIDE 20 MEQ: 1500 TABLET, EXTENDED RELEASE ORAL at 09:17

## 2022-09-07 RX ADMIN — ATORVASTATIN CALCIUM 40 MG: 40 TABLET, FILM COATED ORAL at 21:21

## 2022-09-07 RX ADMIN — DIGOXIN 125 MCG: 125 TABLET ORAL at 09:19

## 2022-09-07 RX ADMIN — FINASTERIDE 5 MG: 5 TABLET, FILM COATED ORAL at 09:19

## 2022-09-07 RX ADMIN — SPIRONOLACTONE 25 MG: 25 TABLET ORAL at 15:09

## 2022-09-07 RX ADMIN — MIDODRINE HYDROCHLORIDE 5 MG: 5 TABLET ORAL at 21:21

## 2022-09-07 RX ADMIN — METOPROLOL SUCCINATE 12.5 MG: 25 TABLET, EXTENDED RELEASE ORAL at 09:17

## 2022-09-07 RX ADMIN — PANTOPRAZOLE SODIUM 40 MG: 40 TABLET, DELAYED RELEASE ORAL at 06:22

## 2022-09-07 ASSESSMENT — PAIN SCALES - GENERAL: PAINLEVEL_OUTOF10: 0

## 2022-09-07 NOTE — PROGRESS NOTES
[address]  HEART FAILURE  Progress Note      Admit Date 8/31/2022     Reason for Consult:      Reason for Consultation/Chief Complaint: SOB    HPI:    Rakel Moreau is a 59 y.o. male with PMH HTN, NICM, HFrEF, AF, MR, HENOK thrombus, ICD April 2022, and homelessness admitted with fluid overload. He was sent to the infusion center for a lasix drip and was rescheduled for another treatment, but when he arrived to the infusion center, his blood pressure was in the 80s and he was complaining of dizziness and edema up to his thighs. Subjective:  Patient is being seen for CHF/CMP. Run of VT early am today- asx and K replaced  Today Mr. Nathalie Watson feels great today. He denies chest pain, SOB, or palpitations. Review of Systems - General ROS: negative  Hematological and Lymphatic ROS: negative  Respiratory ROS:LANDEROS  Cardiovascular ROS: no chest pain or dyspnea on exertion  Gastrointestinal ROS: no abdominal pain, change in bowel habits, or black or bloody stools  Musculoskeletal ROS: swelling  Neurological ROS: no TIA or stroke symptoms     Baseline Weight: 184 hosp scale   Wt Readings from Last 3 Encounters:   09/07/22 182 lb 5.1 oz (82.7 kg)   08/31/22 217 lb (98.4 kg)   08/27/22 217 lb (98.4 kg)         Cardiac Testing:   ECHO:  2/10/2022   Summary   Left ventricular size is moderately increased. Overall left ventricular   systolic function appears severely reduced with ejection fraction of 20-25%   and severe diffuse hypokinesis. Moderate mitral regurgitation. Calculated ERO of 0.33 cm2. Left atrial size appears dilated. There is an echodensity inside the left   atrial appendage consistent with left atrial appendage thrombus. The right ventricle is enlarged. Right ventricular systolic function is   mildly reduced . Mild to moderate tricuspid regurgitation. Stress Test: 7/26/2019   Resting ECG    Normal sinus rhythm. Nonspecific ST-T wave changes.     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 Core measures for HF:  EF: 20-25%   ACEi/ARB/ARNI: lisinopril  BB: Metoprolol succinate  Damian:  none for noncompliance  with labs  Hydralazine/nitrates:  SGLT2i: Jardiance    NYHA Class III      Objective:   /79   Pulse 78   Temp 97.9 °F (36.6 °C) (Oral)   Resp 16   Ht 6' 2\" (1.88 m)   Wt 182 lb 5.1 oz (82.7 kg)   SpO2 97%   BMI 23.41 kg/m²     Intake/Output Summary (Last 24 hours) at 9/7/2022 1033  Last data filed at 9/7/2022 0916  Gross per 24 hour   Intake 1503.84 ml   Output 8900 ml   Net -7396.16 ml      In: 1743.8 [P.O.:990; I.V.:753.8]  Out: 82626       Physical Exam:  General Appearance:  Non-obese/Well Nourished  Respiratory:  Resp Auscultation: Normal breath sounds without dullness  Cardiovascular:   Auscultation: Regular rate and rhythm, normal S1S2, no m/g/r/c  Palpation: Normal    Pedal Pulses: 2+ and equal   Abdomen:  Soft, NT, ND, + bs  Extremities:  No Cyanosis or Clubbing  Extremities: trace edema, tight  Neurological/Psychiatric:  Oriented to time, place, and person  Non-anxious    MEDICATIONS:   Scheduled Meds:   Scheduled Meds:   potassium chloride  20 mEq Oral Daily    digoxin  125 mcg Oral Daily    apixaban  5 mg Oral BID    atorvastatin  40 mg Oral Nightly    finasteride  5 mg Oral Daily    lisinopril  2.5 mg Oral Daily    metoprolol succinate  12.5 mg Oral BID    pantoprazole  40 mg Oral QAM AC    tamsulosin  0.4 mg Oral Daily    sodium chloride flush  5-40 mL IntraVENous 2 times per day     Continuous Infusions:   furosemide (LASIX) 1mg/ml infusion 10 mg/hr (09/07/22 0720)    milrinone 0.125 mcg/kg/min (09/07/22 0720)    sodium chloride       PRN Meds:.potassium chloride **OR** potassium alternative oral replacement **OR** potassium chloride, magnesium sulfate, sodium chloride flush, sodium chloride, polyethylene glycol, acetaminophen **OR** acetaminophen  Continuous Infusions:   furosemide (LASIX) 1mg/ml infusion 10 mg/hr (09/07/22 0720)    milrinone 0.125 mcg/kg/min (09/07/22 0720)    sodium chloride         Intake/Output Summary (Last 24 hours) at 9/7/2022 1033  Last data filed at 9/7/2022 0916  Gross per 24 hour   Intake 1503.84 ml   Output 8900 ml   Net -7396.16 ml       Lab Data:  CBC:   Lab Results   Component Value Date/Time    WBC 4.7 09/07/2022 05:04 AM    HGB 13.3 09/07/2022 05:04 AM     09/07/2022 05:04 AM     BMP:  Lab Results   Component Value Date/Time     09/07/2022 05:04 AM    K 3.9 09/07/2022 05:04 AM    K 3.6 09/06/2022 04:46 AM    CL 87 09/07/2022 05:04 AM    CO2 39 09/07/2022 05:04 AM    BUN 19 09/07/2022 05:04 AM    CREATININE 1.2 09/07/2022 05:04 AM    GLUCOSE 84 09/07/2022 05:04 AM     INR:   Lab Results   Component Value Date/Time    INR 1.59 07/28/2022 10:50 PM    INR 1.50 06/15/2022 06:48 PM    INR 1.50 02/05/2022 11:01 PM        CARDIAC LABS  ENZYMES:  No results for input(s): CKMB, CKMBINDEX, TROPONINI in the last 72 hours.     Invalid input(s): CKTOTAL;3    FASTING LIPID PANEL:  Lab Results   Component Value Date/Time    HDL 38 09/01/2022 04:31 AM    LDLCALC 60 09/01/2022 04:31 AM    TRIG 75 09/01/2022 04:31 AM    TSH 2.66 06/15/2022 10:33 PM     LIVER PROFILE:  Lab Results   Component Value Date/Time    AST 37 09/07/2022 05:04 AM    AST 43 08/31/2022 11:17 AM    ALT 38 09/07/2022 05:04 AM    ALT 51 08/31/2022 11:17 AM     BNP:   Lab Results   Component Value Date/Time    PROBNP 8,835 09/06/2022 04:46 AM    PROBNP 11,280 09/03/2022 05:39 AM    PROBNP 23,699 08/31/2022 11:17 AM     Iron Studies:    Lab Results   Component Value Date/Time    FERRITIN 163.1 06/17/2022 05:35 AM     Lab Results   Component Value Date    IRON 82 08/24/2022    TIBC 473 (H) 08/24/2022    FERRITIN 163.1 06/17/2022      Iron Deficiency Anemia:  Yes IV Iron Therapy:  Yes, June 2022 inpt  2017 ACC/AHA HF Guidelines:   intravenous iron replacement in patients with New York Heart Association (NYHA) class II and III HF and iron deficiency(ferritin <100 ng/ml or 100-300 ng/ml if transferrin saturation <20%), to improve functional status and QoL. 1. WEIGHT: Admit Weight: 217 lb (98.4 kg)      Today  Weight: 182 lb 5.1 oz (82.7 kg)   2. I/O   Intake/Output Summary (Last 24 hours) at 9/7/2022 1033  Last data filed at 9/7/2022 0916  Gross per 24 hour   Intake 1503.84 ml   Output 8900 ml   Net -7396.16 ml         Assessment/Plan:     AHF-23L out on lasix gtt, milrinone and metolazone. Will stop gtts today, restart jardiance, start po lasix and will try cathy since compliance has been improved. Consider weekly metolazone at f/u visit instead of weekly infusions as he responded very well to this.  Pt should be ok for discharge tomorrow  ICM- continue low dose ACE, BB, dig, will get device interrogation, restart jardiance, may try to change to entresto if BP ok  AF- rate controlled on BB, Dig, continue [facility]  ICD - device interrogation today re: RV capture      I appreciate the opportunity of cooperating in the care of this individual.    Farzana Kelly, TRENT - CNP, ACNP, [address] 9/7/2022, 10:33 AM  Heart Failure  [facility]  [address] [place] [address], [address]  Ph: [phone]      Core Measures:   Discharge instructions:   LVEF documented:   ACEI for LV dysfunction:   Smoking Cessation:

## 2022-09-07 NOTE — PLAN OF CARE
Problem: Discharge Planning  Goal: Discharge to home or other facility with appropriate resources  Outcome: Progressing     Problem: Chronic Conditions and Co-morbidities  Goal: Patient's chronic conditions and co-morbidity symptoms are monitored and maintained or improved  Outcome: Progressing     Problem: Pain  Goal: Verbalizes/displays adequate comfort level or baseline comfort level  Outcome: Progressing     Problem: Safety - Adult  Goal: Free from fall injury  Outcome: Progressing       Uneventful night. BP remains on the soft side. Asymptomatic. Continues to diurese well.

## 2022-09-07 NOTE — PROGRESS NOTES
[facility] PROGRESS NOTE    9/7/2022 1:29 PM        Name: Manfred Leal . Admitted: 8/31/2022  Primary Care Provider: TRENT Oliveira NP (Tel: [phone])      Brief History: Hx chronic atrial fib, chronic s/dCHF. He presented from [facility] with increased shortness of breath and weight gain. Admitted with CHF exacerbation. Placed on Lasix drip and milrinone. Subjective:  Resting in bed, states he feels so much better, wants to go home. [employer] rep checking device, evidence of failure to capture on telemetry and LV output adjusted. Denies chest pain, shortness of breath, palpitations, dizziness, abdominal pain. Cardiology has DCd milrinone and Lasix drips.      Reviewed interval ancillary notes    Current Medications  empagliflozin (JARDIANCE) tablet 10 mg, Daily  spironolactone (ALDACTONE) tablet 25 mg, Daily  [START ON 9/8/2022] torsemide (DEMADEX) tablet 40 mg, BID  potassium chloride (KLOR-CON M) extended release tablet 40 mEq, PRN   Or  potassium bicarb-citric acid (EFFER-K) effervescent tablet 40 mEq, PRN   Or  potassium chloride 10 mEq/100 mL IVPB (Peripheral Line), PRN  magnesium sulfate 2000 mg in 50 mL IVPB premix, PRN  digoxin (LANOXIN) tablet 125 mcg, Daily  apixaban (ELIQUIS) tablet 5 mg, BID  atorvastatin (LIPITOR) tablet 40 mg, Nightly  finasteride (PROSCAR) tablet 5 mg, Daily  lisinopril (PRINIVIL;ZESTRIL) tablet 2.5 mg, Daily  metoprolol succinate (TOPROL XL) extended release tablet 12.5 mg, BID  pantoprazole (PROTONIX) tablet 40 mg, QAM AC  tamsulosin (FLOMAX) capsule 0.4 mg, Daily  sodium chloride flush 0.9 % injection 5-40 mL, 2 times per day  sodium chloride flush 0.9 % injection 5-40 mL, PRN  0.9 % sodium chloride infusion, PRN  polyethylene glycol (GLYCOLAX) packet 17 g, Daily PRN  acetaminophen (TYLENOL) tablet 650 mg, Q6H PRN   Or  acetaminophen (TYLENOL) suppository 650 mg, Q6H PRN      Objective:  BP (!) 99/55   Pulse 65   Temp 97.8 °F (36.6 °C) (Oral)   Resp 16   Ht 6' 2\" (1.88 m)   Wt 182 lb 5.1 oz (82.7 kg)   SpO2 97%   BMI 23.41 kg/m²     Intake/Output Summary (Last 24 hours) at 9/7/2022 1329  Last data filed at 9/7/2022 0916  Gross per 24 hour   Intake 1503.84 ml   Output 7300 ml   Net -5796.16 ml      Wt Readings from Last 3 Encounters:   09/07/22 182 lb 5.1 oz (82.7 kg)   08/31/22 217 lb (98.4 kg)   08/27/22 217 lb (98.4 kg)     General:  Awake, alert, oriented in NAD  Skin:  Warm and dry. No unusual bruising or rash  Neck:  Supple. No JVD appreciated  Chest:  Normal effort. Diminished in bases, no wheezes Cardiovascular:  Irregular, normal S1/S2, no murmur/gallop/rub  Abdomen:  Soft, nontender, +bowel sounds  Extremities:  Trace BLE edema  Neurological: No focal deficits  Psychological: Normal mood and affect       Labs and Tests:  CBC:   Recent Labs     09/05/22  0102 09/07/22  0504   WBC 4.8 4.7   HGB 11.5* 13.3*    217     BMP:    Recent Labs     09/05/22  0102 09/06/22  0446 09/07/22  0504    137 135*   K 3.1* 3.6 3.9   CL 99 93* 87*   CO2 32 33* 39*   BUN 18 15 19   CREATININE 1.1 1.0 1.2   GLUCOSE 103* 91 84     Hepatic:   Recent Labs     09/07/22  0504   AST 37   ALT 38   BILITOT 1.7*   ALKPHOS 121         CXR 8/31/2022:  Stable exam with moderate cardiomegaly and central pulmonary vascular   congestion. No evidence of overt edema. Echo 2/10/2022:  Summary   Left ventricular size is moderately increased. Overall left ventricular   systolic function appears severely reduced with ejection fraction of 20-25% and severe diffuse hypokinesis. Moderate mitral regurgitation. Calculated ERO of 0.33 cm2. Left atrial size appears dilated. There is an echodensity inside the left atrial appendage consistent with left atrial appendage thrombus. The right ventricle is enlarged.  Right ventricular systolic function is   mildly reduced .   Mild to moderate tricuspid regurgitation. Problem List  Principal Problem:    Acute combined systolic and diastolic congestive heart failure (HCC)  Active Problems:    Elevated LFTs    Idiopathic hypotension    Peripheral edema    Anemia    PAF (paroxysmal atrial fibrillation) (HCC)    Benign essential HTN    Homeless    ICD (implantable cardioverter-defibrillator), biventricular, in situ  Resolved Problems:    * No resolved hospital problems. *       Assessment & Plan:   Acute on chronic combined systolic and diastolic HF . EF 20-25%. Received milrinone at 0.125 mcg/kg/ min and lasix drips, discontinued today (9/7), transitioned to torsemide. He also received metolazone. Good diuresis, weight if accurate is down 40 lbs compared to admission. Jardiance and spironolactone started per cardiology, continued lisinopril and metoprolol. Appreciate input from cardiology   CKD stage III. Baseline creatinine appears 1.4-1.6. Creatinine improved with diuresis and milrinone, 1.2 today. BMP in am.    Hypokalemia. Resolved. Secondary to diuresis, magnesium 2.10. Spironolactone added per cardiology. Elevated LFT's. Most likely secondary to CHF. Improved. ICM. EF 20-25%. Continue beta blocker, ACE and digoxin. Has AICD. Noted to have failure to capture on telemetry. Deice interrogated per rep and adjustments made to LV output. Digoxin level 0.5. Chronic AF. Controlled rate. Continue beta blocker and digoxin. On chronic AC with Eliquis. Disposition: Patient homeless and stays with friend. Plans to return there on DC. Likely ready for DC tomorrow, no anticipated needs. Diet: ADULT DIET; Regular;  Low Sodium (2 gm); 1800 ml  Code:Full Code  DVT PPX: Shola Jamison, APRN - CNP   9/7/2022 1:29 PM

## 2022-09-07 NOTE — CARE COORDINATION
Per IDR pt may possible d/c tomorrow. Only need is meds to bed.     Tereza Roldan RN, BSN  [phone]

## 2022-09-08 VITALS
RESPIRATION RATE: 18 BRPM | HEART RATE: 58 BPM | OXYGEN SATURATION: 98 % | SYSTOLIC BLOOD PRESSURE: 110 MMHG | WEIGHT: 180.8 LBS | BODY MASS INDEX: 23.2 KG/M2 | HEIGHT: 74 IN | TEMPERATURE: 97.5 F | DIASTOLIC BLOOD PRESSURE: 71 MMHG

## 2022-09-08 LAB
ANION GAP SERPL CALCULATED.3IONS-SCNC: 6 MMOL/L (ref 3–16)
BUN BLDV-MCNC: 22 MG/DL (ref 7–20)
CALCIUM SERPL-MCNC: 9.3 MG/DL (ref 8.3–10.6)
CHLORIDE BLD-SCNC: 87 MMOL/L (ref 99–110)
CO2: 43 MMOL/L (ref 21–32)
CREAT SERPL-MCNC: 1.2 MG/DL (ref 0.8–1.3)
GFR AFRICAN AMERICAN: >60
GFR NON-AFRICAN AMERICAN: >60
GLUCOSE BLD-MCNC: 85 MG/DL (ref 70–99)
POTASSIUM SERPL-SCNC: 3.5 MMOL/L (ref 3.5–5.1)
PRO-BNP: 6355 PG/ML (ref 0–124)
SODIUM BLD-SCNC: 136 MMOL/L (ref 136–145)

## 2022-09-08 PROCEDURE — 6370000000 HC RX 637 (ALT 250 FOR IP): Performed by: NURSE PRACTITIONER

## 2022-09-08 PROCEDURE — 99232 SBSQ HOSP IP/OBS MODERATE 35: CPT | Performed by: NURSE PRACTITIONER

## 2022-09-08 PROCEDURE — 36415 COLL VENOUS BLD VENIPUNCTURE: CPT

## 2022-09-08 PROCEDURE — 6370000000 HC RX 637 (ALT 250 FOR IP): Performed by: INTERNAL MEDICINE

## 2022-09-08 PROCEDURE — 94760 N-INVAS EAR/PLS OXIMETRY 1: CPT

## 2022-09-08 PROCEDURE — 2580000003 HC RX 258: Performed by: HOSPITALIST

## 2022-09-08 PROCEDURE — 6370000000 HC RX 637 (ALT 250 FOR IP): Performed by: HOSPITALIST

## 2022-09-08 PROCEDURE — 80048 BASIC METABOLIC PNL TOTAL CA: CPT

## 2022-09-08 PROCEDURE — 83880 ASSAY OF NATRIURETIC PEPTIDE: CPT

## 2022-09-08 RX ORDER — SPIRONOLACTONE 25 MG/1
25 TABLET ORAL DAILY
Qty: 30 TABLET | Refills: 0 | Status: SHIPPED | OUTPATIENT
Start: 2022-09-09 | End: 2022-10-26 | Stop reason: SDUPTHER

## 2022-09-08 RX ORDER — DIGOXIN 125 MCG
125 TABLET ORAL DAILY
Qty: 30 TABLET | Refills: 3 | Status: SHIPPED | OUTPATIENT
Start: 2022-09-09

## 2022-09-08 RX ORDER — METOPROLOL SUCCINATE 25 MG/1
12.5 TABLET, EXTENDED RELEASE ORAL 2 TIMES DAILY
Qty: 30 TABLET | Refills: 3 | Status: SHIPPED | OUTPATIENT
Start: 2022-09-08 | End: 2022-09-14

## 2022-09-08 RX ADMIN — TAMSULOSIN HYDROCHLORIDE 0.4 MG: 0.4 CAPSULE ORAL at 09:39

## 2022-09-08 RX ADMIN — Medication 10 ML: at 09:00

## 2022-09-08 RX ADMIN — SPIRONOLACTONE 25 MG: 25 TABLET ORAL at 09:39

## 2022-09-08 RX ADMIN — TORSEMIDE 40 MG: 20 TABLET ORAL at 09:38

## 2022-09-08 RX ADMIN — METOPROLOL SUCCINATE 12.5 MG: 25 TABLET, EXTENDED RELEASE ORAL at 09:38

## 2022-09-08 RX ADMIN — PANTOPRAZOLE SODIUM 40 MG: 40 TABLET, DELAYED RELEASE ORAL at 06:48

## 2022-09-08 RX ADMIN — DIGOXIN 125 MCG: 125 TABLET ORAL at 09:38

## 2022-09-08 RX ADMIN — LISINOPRIL 2.5 MG: 5 TABLET ORAL at 09:38

## 2022-09-08 RX ADMIN — FINASTERIDE 5 MG: 5 TABLET, FILM COATED ORAL at 09:38

## 2022-09-08 RX ADMIN — EMPAGLIFLOZIN 10 MG: 10 TABLET, FILM COATED ORAL at 09:39

## 2022-09-08 RX ADMIN — APIXABAN 5 MG: 5 TABLET, FILM COATED ORAL at 09:38

## 2022-09-08 ASSESSMENT — PAIN SCALES - GENERAL: PAINLEVEL_OUTOF10: 0

## 2022-09-08 NOTE — CONSULTS
Patient seen earlier in admission for full consult. See previous note. Patient discharged home. Discussed new baseline weight. Down to 180 lb on scale. Patient feels weight is even less. Discussed weighing first thing tomorrow for new baseline and discussed calling with 3-5 lb weight change. Patient has home meds in duffel bag at bedside. Reviewed changes in medication and had pharmacy come to room for medication reconciliation. Awaiting new RX from outpatient pharmacy. Patient has follow up next week with HF NP-- discussed with patient. Patient to call HF RN tomorrow for update on weight and symptoms. Reviewed with bedside RN.

## 2022-09-08 NOTE — PROGRESS NOTES
Clinical Pharmacy Note:    Reviewed patient's bag of medications from home, along with CHF RN Harini Chen      Your medications have changed    Icon medications to start taking   START taking:  digoxin Sangita Fuelling)   Start taking on: September 9, 2022  spironolactone (ALDACTONE)   Start taking on: September 9, 2022  Icon medications to change how you take   CHANGE how you take:  metoprolol succinate (TOPROL XL)   Torsemide   Icon medications to stop taking   STOP taking:  potassium chloride 20 MEQ extended release tablet (KLOR-CON M)       Discussed that He no longer needs to potassium tablets since now started on spironolactone. Patient allowed me to dispose of this to avoid confusion. Discussed metoprolol dose is 12.5mg so medication must be split in half. Provided pill splitter. Discussed torsemide dose increased to 40mg. Patient allowed me to dispose of his old prescription of 20mg to avoid confusion and accidental double dosing. Patient also allowed disposal of empty prescription bottles in his bag. The patient verbalized/repeated back an understanding and no further education on these medications is needed at this time. There were no barriers to the education process. Thank you for allowing pharmacy to participate in the care of this patient.   Bella Corado, MatthewD

## 2022-09-08 NOTE — PROGRESS NOTES
[address]  HEART FAILURE  Progress Note      Admit Date 8/31/2022     Reason for Consult:      Reason for Consultation/Chief Complaint: SOB    HPI:    Aries Terrazas is a 59 y.o. male with PMH HTN, NICM, HFrEF, AF, MR, HENOK thrombus, ICD April 2022, and homelessness admitted with fluid overload. He was sent to the infusion center for a lasix drip and was rescheduled for another treatment, but when he arrived to the infusion center, his blood pressure was in the 80s and he was complaining of dizziness and edema up to his thighs. Subjective:  Patient is being seen for CHF/CMP. Today Mr. Alva Bennett feels great today. He denies chest pain, SOB, or palpitations. BP low this am but improved now    Review of Systems - General ROS: negative  Hematological and Lymphatic ROS: negative  Respiratory ROS:LANDEROS  Cardiovascular ROS: no chest pain or dyspnea on exertion  Gastrointestinal ROS: no abdominal pain, change in bowel habits, or black or bloody stools  Musculoskeletal ROS: negative  Neurological ROS: no TIA or stroke symptoms     Baseline Weight: 184 hosp scale   Wt Readings from Last 3 Encounters:   09/08/22 180 lb 12.8 oz (82 kg)   08/31/22 217 lb (98.4 kg)   08/27/22 217 lb (98.4 kg)         Cardiac Testing:   ECHO:  2/10/2022   Summary   Left ventricular size is moderately increased. Overall left ventricular   systolic function appears severely reduced with ejection fraction of 20-25%   and severe diffuse hypokinesis. Moderate mitral regurgitation. Calculated ERO of 0.33 cm2. Left atrial size appears dilated. There is an echodensity inside the left   atrial appendage consistent with left atrial appendage thrombus. The right ventricle is enlarged. Right ventricular systolic function is   mildly reduced . Mild to moderate tricuspid regurgitation. Stress Test: 7/26/2019   Resting ECG    Normal sinus rhythm. Nonspecific ST-T wave changes.     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 Core measures for HF:  EF: 20-25%   ACEi/ARB/ARNI: lisinopril  BB: Metoprolol succinate  Damian: cathy  Hydralazine/nitrates:  SGLT2i: Jardiance    NYHA Class III      Objective:   /66   Pulse 64   Temp 97.7 °F (36.5 °C) (Oral)   Resp 16   Ht 6' 2\" (1.88 m)   Wt 180 lb 12.8 oz (82 kg)   SpO2 97%   BMI 23.21 kg/m²     Intake/Output Summary (Last 24 hours) at 9/8/2022 0945  Last data filed at 9/8/2022 0650  Gross per 24 hour   Intake 1070 ml   Output 1525 ml   Net -455 ml      In: 2063.8 [P.O.:1310; I.V.:753.8]  Out: 2475       Physical Exam:  General Appearance:  Non-obese/Well Nourished  Respiratory:  Resp Auscultation: Normal breath sounds without dullness  Cardiovascular:   Auscultation: Regular rate and rhythm, normal S1S2, no m/g/r/c  Palpation: Normal    Pedal Pulses: 2+ and equal   Abdomen:  Soft, NT, ND, + bs  Extremities:  No Cyanosis or Clubbing  Extremities: trace edema  Neurological/Psychiatric:  Oriented to time, place, and person  Non-anxious    MEDICATIONS:   Scheduled Meds:   Scheduled Meds:   empagliflozin  10 mg Oral Daily    spironolactone  25 mg Oral Daily    torsemide  40 mg Oral BID    digoxin  125 mcg Oral Daily    apixaban  5 mg Oral BID    atorvastatin  40 mg Oral Nightly    finasteride  5 mg Oral Daily    lisinopril  2.5 mg Oral Daily    metoprolol succinate  12.5 mg Oral BID    pantoprazole  40 mg Oral QAM AC    tamsulosin  0.4 mg Oral Daily    sodium chloride flush  5-40 mL IntraVENous 2 times per day     Continuous Infusions:   sodium chloride       PRN Meds:.potassium chloride **OR** potassium alternative oral replacement **OR** potassium chloride, magnesium sulfate, sodium chloride flush, sodium chloride, polyethylene glycol, acetaminophen **OR** acetaminophen  Continuous Infusions:   sodium chloride         Intake/Output Summary (Last 24 hours) at 9/8/2022 0945  Last data filed at 9/8/2022 0650  Gross per 24 hour   Intake 1070 ml   Output 1525 ml   Net -455 ml       Lab Data:  CBC:   Lab Results   Component Value Date/Time    WBC 4.7 09/07/2022 05:04 AM    HGB 13.3 09/07/2022 05:04 AM     09/07/2022 05:04 AM     BMP:  Lab Results   Component Value Date/Time     09/08/2022 07:34 AM    K 3.5 09/08/2022 07:34 AM    K 3.6 09/06/2022 04:46 AM    CL 87 09/08/2022 07:34 AM    CO2 43 09/08/2022 07:34 AM    BUN 22 09/08/2022 07:34 AM    CREATININE 1.2 09/08/2022 07:34 AM    GLUCOSE 85 09/08/2022 07:34 AM     INR:   Lab Results   Component Value Date/Time    INR 1.59 07/28/2022 10:50 PM    INR 1.50 06/15/2022 06:48 PM    INR 1.50 02/05/2022 11:01 PM        CARDIAC LABS  ENZYMES:  No results for input(s): CKMB, CKMBINDEX, TROPONINI in the last 72 hours. Invalid input(s): CKTOTAL;3    FASTING LIPID PANEL:  Lab Results   Component Value Date/Time    HDL 38 09/01/2022 04:31 AM    LDLCALC 60 09/01/2022 04:31 AM    TRIG 75 09/01/2022 04:31 AM    TSH 2.66 06/15/2022 10:33 PM     LIVER PROFILE:  Lab Results   Component Value Date/Time    AST 37 09/07/2022 05:04 AM    AST 43 08/31/2022 11:17 AM    ALT 38 09/07/2022 05:04 AM    ALT 51 08/31/2022 11:17 AM     BNP:   Lab Results   Component Value Date/Time    PROBNP 8,835 09/06/2022 04:46 AM    PROBNP 11,280 09/03/2022 05:39 AM    PROBNP 23,699 08/31/2022 11:17 AM     Iron Studies:    Lab Results   Component Value Date/Time    FERRITIN 163.1 06/17/2022 05:35 AM     Lab Results   Component Value Date    IRON 82 08/24/2022    TIBC 473 (H) 08/24/2022    FERRITIN 163.1 06/17/2022      Iron Deficiency Anemia:  Yes IV Iron Therapy:  Yes, June 2022 inpt  2017 ACC/AHA HF Guidelines:   intravenous iron replacement in patients with New York Heart Association (NYHA) class II and III HF and iron deficiency(ferritin <100 ng/ml or 100-300 ng/ml if transferrin saturation <20%), to improve functional status and QoL.       1. WEIGHT: Admit Weight: 217 lb (98.4 kg)      Today  Weight: 180 lb 12.8 oz (82 kg)   2. I/O   Intake/Output Summary (Last 24 hours) at 9/8/2022 0945  Last data filed at 9/8/2022 0650  Gross per 24 hour   Intake 1070 ml   Output 1525 ml   Net -455 ml         Assessment/Plan:     AHF-23L out on lasix gtt, milrinone and metolazone. Pt compensated, continue jardiance, torsemide, and cathy since compliance has been improved. Consider weekly metolazone at f/u visit instead of weekly infusions as he responded very well to this.  Pt ok for d/c today, meds to beds sent, will f/u in CHF clinic next week  ICM- continue low dose ACE, BB, dig, ardiance, may try to change to entresto if BP improved  AF- rate controlled on BB, Dig, continue AC  ICD - interrogation yesterday and LV output increased to 3.75V      I appreciate the opportunity of cooperating in the care of this individual.    Judge Doyle, APRN - CNP, ACNP, [address] 9/8/2022, 9:45 AM  Heart Failure  The [address], [address]  Ph: [phone]      Core Measures:   Discharge instructions:   LVEF documented:   ACEI for LV dysfunction:   Smoking Cessation:

## 2022-09-08 NOTE — DISCHARGE INSTRUCTIONS
Start light exercise (if your doctor says it is okay). Even if you can only do a small amount, exercise will help you get stronger, have more energy, and manage your weight and your stress. Walking is an easy way to get exercise. Start out by walking a little more than you did before. Bit by bit, increase the amount you walk. When you exercise, watch for signs that your heart is working too hard. You are pushing yourself too hard if you cannot talk while you are exercising. If you become short of breath or dizzy or have chest pain, stop, sit down, and rest.     If you feel \"wiped out\" the day after you exercise, walk slower or for a shorter distance until you can work up to a better pace. Get enough rest at night. Sleeping with 1 or 2 pillows under your upper body and head may help you breathe easier. Lifestyle changes    Do not smoke. Smoking can make a heart condition worse. If you need help quitting, talk to your doctor about stop-smoking programs and medicines. These can increase your chances of quitting for good. Quitting smoking may be the most important step you can take to protect your heart. Limit alcohol to 2 drinks a day for men and 1 drink a day for women. Too much alcohol can cause health problems. Avoid getting sick from colds and the flu. Get a pneumococcal vaccine shot. If you have had one before, ask your doctor whether you need another dose. Get a flu shot each year. If you must be around people with colds or the flu, wash your hands often. When should you call for help? Call 911  if you have symptoms of sudden heart failure such as: You have severe trouble breathing. You cough up pink, foamy mucus. You have a new irregular or rapid heartbeat. Call your doctor now or seek immediate medical care if:    You have new or increased shortness of breath. You are dizzy or lightheaded, or you feel like you may faint.      You have sudden weight gain, such as more than 2 to 3 pounds in a day or 5 pounds in a week. (Your doctor may suggest a different range of weight gain.)     You have increased swelling in your legs, ankles, or feet. You are suddenly so tired or weak that you cannot do your usual activities. Watch closely for changes in your health, and be sure to contact your doctor ifyou develop new symptoms. Where can you learn more? Go to https://[employer]Categorical. org and sign in to your [employer] account. Enter C017 in the [employer] box to learn more about \"Heart Failure: Care Instructions. \"     If you do not have an account, please click on the \"Sign Up Now\" link. Current as of: January 10, 2022               Content Version: 13.3  © 5819-9435 Healthwise, Incorporated. Care instructions adapted under license by [facility] ([facility]). If you have questions about a medical condition or this instruction, always ask your healthcare professional. [address] any warranty or liability for your use of this information.

## 2022-09-08 NOTE — PROGRESS NOTES
CLINICAL PHARMACY NOTE: MEDS TO BEDS    Total # of Prescriptions Filled: 4   The following medications were delivered to the patient:  Metoprolol ER 25 mg  Spironolactone 25 mg  Digoxin 125 mcg  Torsemide 20 mg    Additional Documentation:  Delivered to Laura ANAYA=Signed  Sarina Salinas CP  Zofran and Omeprazole was also delivered

## 2022-09-08 NOTE — PROGRESS NOTES
BP is low 80/52 per monitor, 80/60 manually. Asymptomatic. Message sent to Hospitalist. Order for Midodrine received and given at this time. BP remains 80/52. Will hold Metoprolol for now and recheck BP.

## 2022-09-08 NOTE — CARE COORDINATION
Patient discharged 9/8/2022 to home  All discharge needs met per case management     Marysvalesarina Whitten RN, BSN  [phone]

## 2022-09-08 NOTE — DISCHARGE SUMMARY
[facility]    Patient Demographics    Patient. Sebastian Bills  Date of Birth. 1960  MRN. [id number]     Primary care provider. Stephen Dhillon, TRENT Briones NP  (Tel: [phone])    Admit date: 8/31/2022    Discharge date (blank if same as Note Date): Note Date: 9/8/2022     Reason for Hospitalization. Chief Complaint   Patient presents with    Shortness of Breath     Arrived per friend d/t cardiology referral for direct admit SOB d/t fluid overload         Significant Findings. Principal Problem:    Acute combined systolic and diastolic congestive heart failure (HCC)  Active Problems:    Elevated LFTs    Idiopathic hypotension    Peripheral edema    Anemia    PAF (paroxysmal atrial fibrillation) (HCC)    Benign essential HTN    Homeless    ICD (implantable cardioverter-defibrillator), biventricular, in situ  Resolved Problems:    * No resolved hospital problems. *       Problems and results from this hospitalization that need follow up. Started on spironolactone. Recommend BMP on follow up. Significant test results and incidental findings. CXR 8/31/2022:  Stable exam with moderate cardiomegaly and central pulmonary vascular   congestion. No evidence of overt edema. Invasive procedures and treatments. None     [facility]. Hx chronic atrial fib, chronic s/dCHF. [facility] with increased shortness of breath and weight gain. Admitted with CHF exacerbation. Placed on Lasix and milrinone drips. Acute on chronic combined systolic and diastolic HF . EF 20-25%. Received milrinone at 0.125 mcg/kg/ min and lasix drip at 10 mg, transitioned to torsemide 9/7. He also received metolazone. Good diuresis, weight if accurate is down 40 lbs compared to admission.  Jardiance and spironolactone started per cardiology, continued spironolactone 25 MG tablet  Commonly known as: ALDACTONE  Take 1 tablet by mouth daily  Start taking on: September 9, 2022            CHANGE how you take these medications      * metoprolol succinate 25 MG extended release tablet  Commonly known as: TOPROL XL  Take 0.5 tablets by mouth in the morning and at bedtime  What changed: Another medication with the same name was added. Make sure you understand how and when to take each. * metoprolol succinate 25 MG extended release tablet  Commonly known as: TOPROL XL  Take 0.5 tablets by mouth in the morning and at bedtime  What changed: You were already taking a medication with the same name, and this prescription was added. Make sure you understand how and when to take each. Torsemide 40 MG Tabs  Take 40 mg by mouth 2 times daily  What changed:   medication strength  when to take this           * This list has 2 medication(s) that are the same as other medications prescribed for you. Read the directions carefully, and ask your doctor or other care provider to review them with you.                 CONTINUE taking these medications      apixaban 5 MG Tabs tablet  Commonly known as: ELIQUIS  Take 1 tablet by mouth 2 times daily     atorvastatin 40 MG tablet  Commonly known as: LIPITOR  Take 1 tablet by mouth nightly     empagliflozin 10 MG tablet  Commonly known as: Jardiance  Take 1 tablet by mouth daily     finasteride 5 MG tablet  Commonly known as: PROSCAR  Take 1 tablet by mouth daily     lisinopril 2.5 MG tablet  Commonly known as: PRINIVIL;ZESTRIL     omeprazole 20 MG delayed release capsule  Commonly known as: PRILOSEC  Take 1 capsule by mouth every morning (before breakfast)     ondansetron 4 MG disintegrating tablet  Commonly known as: Zofran ODT  Take 1-2 tablets by mouth every 12 hours as needed for Nausea     tamsulosin 0.4 MG capsule  Commonly known as: FLOMAX  Take 1 capsule by mouth daily     vitamin D 50 MCG (2000 UT) Tabs tablet  Commonly known as: CHOLECALCIFEROL  Take 1 tablet by mouth daily            STOP taking these medications      potassium chloride 20 MEQ extended release tablet  Commonly known as: KLOR-CON M               Where to Get Your Medications        These medications were sent to [facility], [address] [place] [address], [address] [place] [address] Road      Phone: [phone]   digoxin 125 MCG tablet  metoprolol succinate 25 MG extended release tablet  spironolactone 25 MG tablet  Torsemide 40 MG Tabs         Discharge recommendations given to patient. Follow Up. CHF clinic in 1 week   Disposition. home  Activity. activity as tolerated  Diet: ADULT DIET; Regular; Low Sodium (2 gm); 1800 ml      Spent > 30 minutes in discharge process.     Signed:  TRENT Collins CNP     9/8/2022 12:43 PM

## 2022-09-09 ENCOUNTER — TELEPHONE (OUTPATIENT)
Dept: OTHER | Age: 62
End: 2022-09-09

## 2022-09-09 NOTE — PROGRESS NOTES
Data- discharge order received, pt verbalized agreement to discharge, disposition to previous residence, no needs for HHC/DME. Action- discharge instructions prepared and given to pt, pt verbalized understanding. Medication information packet given r/t NEW and/or CHANGED prescriptions emphasizing name/purpose/side effects, pt verbalized understanding. Discharge instruction summary: Diet- ;ow sodium with fluid restriction, Activity- as tavares, Primary Care Physician as follows: TRENT Hensley - FERNANDA [phone] f/u appointment with Cardio 9/14/22, prescription medications filled by out pt pharmacy. CHF Education reviewed. Pt/ Family has had a total of 60 minutes CHF education this admission encounter. 1. WEIGHT: Admit Weight: 217 lb (98.4 kg) (08/31/22 1104)        Today  Weight: 180 lb 12.8 oz (82 kg) (09/08/22 0825)       2. O2 SAT.: SpO2: 98 % (09/08/22 1539)    Response- Pt belongings gathered, IV removed. Disposition is home (no HHC/DME needs), transported with papers and belongings, taken to lobby via w/c w/ RN, no complications.

## 2022-09-09 NOTE — TELEPHONE ENCOUNTER
[facility]  TELEPHONE ENCOUNTER FORM    Spencer Barker 1960    ASSESSMENT:   Baseline weight: 174 lbs  Current weight: 174 lbs  Patient weighing daily: Yes  What are your symptoms of heart failure: chest pain, dyspnea, edema  Are you having any symptoms:  Yes  Patient knows who to call with symptoms: Yes  Diet history:      Patient states sodium limitation is : 3000 mg      Patient states fluid limitation is 48 oz  Patient following diet as instructed: Yes  Medication history: taking medications as instructed Yes; medication side effects noted No  Patient is being active at home: Yes  Patient knows date and time of follow up appointment: Yes   Patient has transportation to appointments: Yes    RECOMMENDATIONS:   Medication: taking as prescribed   Diet: no added salt diet  MD/ Clinic appointment: 9/14 with Marley Ramey  Other:  Patient came to HF Clinic. Feels good. Doing well. Encouraged patient to call with any questions or concerns.

## 2022-09-14 ENCOUNTER — OFFICE VISIT (OUTPATIENT)
Dept: CARDIOLOGY CLINIC | Age: 62
End: 2022-09-14
Payer: COMMERCIAL

## 2022-09-14 VITALS
BODY MASS INDEX: 24.26 KG/M2 | HEIGHT: 74 IN | HEART RATE: 73 BPM | WEIGHT: 189 LBS | SYSTOLIC BLOOD PRESSURE: 110 MMHG | OXYGEN SATURATION: 99 % | DIASTOLIC BLOOD PRESSURE: 60 MMHG

## 2022-09-14 DIAGNOSIS — Z59.00 HOMELESS: ICD-10-CM

## 2022-09-14 DIAGNOSIS — I42.0 DILATED CARDIOMYOPATHY (HCC): ICD-10-CM

## 2022-09-14 DIAGNOSIS — Z91.199 NON-COMPLIANCE: ICD-10-CM

## 2022-09-14 DIAGNOSIS — I48.0 PAF (PAROXYSMAL ATRIAL FIBRILLATION) (HCC): ICD-10-CM

## 2022-09-14 DIAGNOSIS — Z95.810 ICD (IMPLANTABLE CARDIOVERTER-DEFIBRILLATOR), BIVENTRICULAR, IN SITU: ICD-10-CM

## 2022-09-14 DIAGNOSIS — I10 BENIGN ESSENTIAL HTN: ICD-10-CM

## 2022-09-14 DIAGNOSIS — I50.43 ACUTE ON CHRONIC COMBINED SYSTOLIC AND DIASTOLIC HEART FAILURE (HCC): Primary | ICD-10-CM

## 2022-09-14 PROCEDURE — 99214 OFFICE O/P EST MOD 30 MIN: CPT | Performed by: CLINICAL NURSE SPECIALIST

## 2022-09-14 SDOH — ECONOMIC STABILITY - HOUSING INSECURITY: HOMELESSNESS UNSPECIFIED: Z59.00

## 2022-09-14 NOTE — PATIENT INSTRUCTIONS
Bring medications with you to your appointments  Blood work today  Call me if weight >3 pounds in a day or 5 in a week  Continue all current medications  RTO in 1 week

## 2022-09-14 NOTE — PROGRESS NOTES
Cardioversion (07/26/2019). Social History:   reports that he has never smoked. He has never been exposed to tobacco smoke. He has never used smokeless tobacco. He reports that he does not drink alcohol and does not use drugs. Family History:   Family History   Problem Relation Age of Onset    Heart Disease Mother     High Blood Pressure Mother     Heart Attack Mother     Other Father     Stroke Father     High Blood Pressure Father        Home Medications:  Prior to Admission medications    Medication Sig Start Date End Date Taking?  Authorizing Provider   digoxin (LANOXIN) 125 MCG tablet Take 1 tablet by mouth daily 9/9/22  Yes TRENT Swain CNP   spironolactone (ALDACTONE) 25 MG tablet Take 1 tablet by mouth daily 9/9/22  Yes TRENT Swain CNP   torsemide 40 MG TABS Take 40 mg by mouth 2 times daily 9/8/22  Yes TRENT Swain CNP   ondansetron (ZOFRAN ODT) 4 MG disintegrating tablet Take 1-2 tablets by mouth every 12 hours as needed for Nausea 8/28/22  Yes Jessika Matthews MD   omeprazole (PRILOSEC) 20 MG delayed release capsule Take 1 capsule by mouth every morning (before breakfast) 8/28/22  Yes Jessika Matthews MD   lisinopril (PRINIVIL;ZESTRIL) 2.5 MG tablet Take 2.5 mg by mouth daily   Yes Historical Provider, MD   empagliflozin (JARDIANCE) 10 MG tablet Take 1 tablet by mouth daily 8/19/22  Yes TRENT Maunel - CNS   metoprolol succinate (TOPROL XL) 25 MG extended release tablet Take 0.5 tablets by mouth in the morning and at bedtime 8/1/22  Yes Tsering Bedoya MD   apixaban (ELIQUIS) 5 MG TABS tablet Take 1 tablet by mouth 2 times daily 6/17/22  Yes TRENT Swain - CNP   tamsulosin [facility]) 0.4 MG capsule Take 1 capsule by mouth daily 6/18/22  Yes TRENT Swain CNP   atorvastatin (LIPITOR) 40 MG tablet Take 1 tablet by mouth nightly 6/17/22  Yes TRENT Swain CNP   finasteride (PROSCAR) 5 MG tablet Take 1 tablet by mouth daily 5/31/22  Yes Patricia Lance, APRN - CNS   vitamin D (CHOLECALCIFEROL) 50 MCG (2000 UT) TABS tablet Take 1 tablet by mouth daily 2/16/22  Yes Deirdre Pinedoman, APRN - CNS   potassium chloride (KLOR-CON M) 20 MEQ extended release tablet Take 1 tablet by mouth in the morning and 1 tablet in the evening. Take with meals. 8/1/22 9/8/22  Dwyane Mortimer, MD        Allergies:  Patient has no known allergies. Review of Systems:   Constitutional: there has been no unanticipated weight loss. There's been no change in energy level, sleep pattern, or activity level. Eyes: No visual changes or diplopia. No scleral icterus. ENT: No Headaches, hearing loss or vertigo. No mouth sores or sore throat. Cardiovascular: Reviewed in HPI  Respiratory: No cough or wheezing, no sputum production. No hematemesis. Gastrointestinal: No abdominal pain, appetite loss, blood in stools. No change in bowel or bladder habits. Genitourinary: No dysuria, trouble voiding, or hematuria. Musculoskeletal:  No gait disturbance, weakness or joint complaints. Integumentary: No rash or pruritis. Neurological: No headache, diplopia, change in muscle strength, numbness or tingling. No change in gait, balance, coordination, mood, affect, memory, mentation, behavior. Psychiatric: No anxiety, no depression. Endocrine: No malaise, fatigue or temperature intolerance. No excessive thirst, fluid intake, or urination. No tremor. Hematologic/Lymphatic: No abnormal bruising or bleeding, blood clots or swollen lymph nodes. Allergic/Immunologic: No nasal congestion or hives.     Physical Examination:    Vitals:    09/14/22 1007   BP: 110/60   Site: Right Upper Arm   Position: Sitting   Cuff Size: Medium Adult   Pulse: 73   SpO2: 99%   Weight: 189 lb (85.7 kg)   Height: 6' 2\" (1.88 m)           Constitutional and General Appearance: Warm and dry, no apparent distress, normal coloration   HEENT:  Normocephalic, atraumatic  Respiratory:  Normal excursion and expansion without use of accessory muscles  Resp Auscultation: Normal breath sounds without dullness  Cardiovascular: The apical impulses not displaced  Heart tones are crisp and normal  JVP jill cm H2O  irregular rate and rhythm  Peripheral pulses are symmetrical and full  There is no clubbing, cyanosis of the extremities.   Bilateral lower ankle edema only  Pedal Pulses: 2+ and equal   Abdomen:  No masses or tenderness  Liver/Spleen: No Abnormalities Noted  Neurological/Psychiatric:  Alert and oriented in all spheres  Moves all extremities well  Exhibits normal gait balance and coordination  No abnormalities of mood, affect, memory, mentation, or behavior are noted    Lab Data:    CBC:   Lab Results   Component Value Date/Time    WBC 4.7 09/07/2022 05:04 AM    WBC 4.8 09/05/2022 01:02 AM    WBC 5.4 08/31/2022 11:17 AM    RBC 4.80 09/07/2022 05:04 AM    RBC 4.15 09/05/2022 01:02 AM    RBC 4.50 08/31/2022 11:17 AM    HGB 13.3 09/07/2022 05:04 AM    HGB 11.5 09/05/2022 01:02 AM    HGB 12.5 08/31/2022 11:17 AM    HCT 40.8 09/07/2022 05:04 AM    HCT 35.4 09/05/2022 01:02 AM    HCT 38.8 08/31/2022 11:17 AM    MCV 85.0 09/07/2022 05:04 AM    MCV 85.3 09/05/2022 01:02 AM    MCV 86.4 08/31/2022 11:17 AM    RDW 16.6 09/07/2022 05:04 AM    RDW 16.8 09/05/2022 01:02 AM    RDW 17.0 08/31/2022 11:17 AM     09/07/2022 05:04 AM     09/05/2022 01:02 AM     08/31/2022 11:17 AM     BMP:  Lab Results   Component Value Date/Time     09/08/2022 07:34 AM     09/07/2022 05:04 AM     09/06/2022 04:46 AM    K 3.5 09/08/2022 07:34 AM    K 3.9 09/07/2022 05:04 AM    K 3.6 09/06/2022 04:46 AM    K 3.1 09/05/2022 01:02 AM    K 4.0 08/28/2022 04:06 AM    K 2.6 07/30/2022 06:43 AM    CL 87 09/08/2022 07:34 AM    CL 87 09/07/2022 05:04 AM    CL 93 09/06/2022 04:46 AM    CO2 43 09/08/2022 07:34 AM    CO2 39 09/07/2022 05:04 AM    CO2 33 09/06/2022 04:46 AM    PHOS 3.9 08/31/2022 07:47 PM    PHOS 4.1 07/29/2022 06:18 AM    PHOS 3.7 04/18/2022 02:16 AM    BUN 22 09/08/2022 07:34 AM    BUN 19 09/07/2022 05:04 AM    BUN 15 09/06/2022 04:46 AM    CREATININE 1.2 09/08/2022 07:34 AM    CREATININE 1.2 09/07/2022 05:04 AM    CREATININE 1.0 09/06/2022 04:46 AM     BNP:   Lab Results   Component Value Date/Time    PROBNP 6,355 09/08/2022 07:34 AM    PROBNP 8,835 09/06/2022 04:46 AM    PROBNP 11,280 09/03/2022 05:39 AM     Cardiac Imaging:  CONSU[facility] Dr Edie Moncada 2/10/22  Preliminary CONSUELO results are:      - Severe LV dysfunction,  EF: 20-25%  - LA dilated. There was HENOK thrombus. - Moderate MR    Cardiac Cath PCI: Dr Angelo De Santiago 2/8/2022  Anatomy:   LM-nml   LAD-nml  Cx-nml  OM- nml  RCA-nml  RPDA- nml  LVEF- 10%  LVG- global hypokinesis  LVEDP- 10     Hemodynamics:  RA- mean 3  RV- 37/0  PAWP- 10  PA- 34/12 (20)     C. O.- 5.7 (4.9)  C. I.- 2.6 (2.25)  PA sat 60%  AO sat 91%     Contrast: 70  Flouro Time: 5.3  Access: R radial a, R CFV     Impression  ~Coronary Angiography w/ normal cors  ~LVG with LVEF of 10% and global regional wall motion abnormalities  ~Severe MR  ~Normal CO/CI  ~normal L and R filling pressures     Recommendation  ~Aggressive medical treatment and risk factor modification  ~1. Medications reviewed, no changes at this time. 2. Post cath IVF. Bedrest.  3.Consider CONSUELO for evaluation of MR.   4. Patient has been advised on the importance of regular exercise of at least 20-30 minutes daily alternating with aerobic and isometric activities. 5. Patient counseled about and offered assistance for smoking cessation   6. No indication for cardiac rehab  7. CHF service to evaluate tomorrow. Echo 11/29/2021  Summary   -Covid+   -Severely reduced global systolic function with an ejection fraction   estimated at 20%. -Severe global hypokinesis with regional variations noted.    -Right ventricular systolic function appears to be mildly reduced based on   visual inspection.   -Severe biatrial enlargement.   -Thickened mitral valve without evidence of stenosis. There is   mild-to-moderate mitral regurgitation.   -There is mild-to-moderate tricuspid regurgitation with a RVSP estimation of   37 mmHg. -Diastolic dysfunction with elevated LV filling pressures. Echo 7/25/2019  Summary   -Overall left ventricular systolic function is moderately depressed .   -Ejection fraction is visually estimated to be 30-35 %. -Global left ventricular function moderately reduced.   -Indeterminate diastolic function due to atrial fibrillation and moderate to   severe mitral regurgitation.   -Moderate-to-severe mitral regurgitation .   -Dilated left atrium with a volume of 97 ml.   -Left atrial volume is increased probably due to atrial fibrillation .   -There is mild right ventricular hypertrophy.   -The right ventricle is mildly enlarged.   -Right ventricular systolic pressure of 53 mm Hg consistent with pulmonary   hypertension.   -Moderate to severe tricuspid regurgitation. TAPSE = 1.43   -IVC size is dilated (>2.1 cm) but collapses > 50% with respiration   consistent with elevated RA pressure (8 mmHg). Assessment:    1. Acute on chronic systolic heart failure (HCC)  on ace, bb, digoxin and aldosterone antagonist    2. Non-compliance    3. Homeless    4. PAF (paroxysmal atrial fibrillation) ([facility]) Dr Jules rothman   5. Benign essential HTN    6. Dilated cardiomyopathy ([facility] [address]    7.    ICD in place    Plan:   Patient Instructions   Bring medications with you to your appointments  Blood work today  Call me if weight >3 pounds in a day or 5 in a week  Continue all current medications  RTO in 1 week    Can try metolazone if weight increases, prn    NYHA 4    I appreciate the opportunity of cooperating in the care of this individual.    TRENT Moss - CNS, CNS, 9/14/2022, 12:52 PM

## 2022-09-19 NOTE — PROGRESS NOTES
[address]  HEART FAILURE  Progress Note      Admit Date 4/13/2022     Reason for Consult:      Reason for Consultation/Chief Complaint: SOB    HPI:    Rob Newton is a 59 y.o. male with PMH NICM, HFrEF, homelessness, AF s/p DCCV, and HTN admitted with edema and SOB. COVID +, has had some VT, anemia - got IV venofer and was on Milrinone and Lasix gtt. He has diuresed about 10L, long episode VT Sat night and EP plans for ICD implant this admit. Subjective:  Patient is being seen for CHF, CMP. There were no acute overnight cardiac events. Today Mr. Siobhan Miguel denies chest pain, shortness of breath, palpitations and feels well, c/o some dizziness when BP gets low      Baseline Weight:    Wt Readings from Last 3 Encounters:   04/25/22 194 lb 8 oz (88.2 kg)   04/12/22 227 lb 9.6 oz (103.2 kg)   03/22/22 220 lb 1.6 oz (99.8 kg)         Cardiac Testing:   [facility] Dr Gabriel Smith 2/10/22  Preliminary CONSUELO results are:      - Severe LV dysfunction,  EF: 20-25%  - LA dilated. There was HENOK thrombus. - Moderate MR     Cardiac Cath PCI: Dr Paulo Buckley 2/8/2022  Anatomy:   LM-nml   LAD-nml  Cx-nml  OM- nml  RCA-nml  RPDA- nml  LVEF- 10%  LVG- global hypokinesis  LVEDP- 10     Hemodynamics:  RA- mean 3  RV- 37/0  PAWP- 10  PA- 34/12 (20)     C.O.- 5.7 (4.9)  C. I.- 2.6 (2.25)  PA sat 60%  AO sat 91%     Contrast: 70  Flouro Time: 5.3  Access: R radial a, R CFV     Impression  ~Coronary Angiography w/ normal cors  ~LVG with LVEF of 10% and global regional wall motion abnormalities  ~Severe MR  ~Normal CO/CI  ~normal L and R filling pressures     Echo 11/29/2021  Summary   -Covid+   -Severely reduced global systolic function with an ejection fraction   estimated at 20%.  -Severe global hypokinesis with regional variations noted.   -Right ventricular systolic function appears to be mildly reduced based on   visual inspection.   -Severe biatrial enlargement.   -Thickened mitral valve without evidence of stenosis.  There is   mild-to-moderate mitral regurgitation.   -There is mild-to-moderate tricuspid regurgitation with a RVSP estimation of   58 mmHg.   -Diastolic dysfunction with elevated LV filling pressures.       Core measures for HF:  EF: 20-25%   ACEi/ARB/ARNI: ACE on hold for hypotension  BB: Metoprolol succinate  Damian: none for noncompliance  Hydralazine/nitrates:  SGLT2i: unaffordable for pt    NYHA Class III    Objective:   BP 92/84   Pulse 55   Temp 97.6 °F (36.4 °C) (Oral)   Resp 18   Ht 6' 2\" (1.88 m)   Wt 194 lb 8 oz (88.2 kg)   SpO2 100%   BMI 24.97 kg/m²       Intake/Output Summary (Last 24 hours) at 4/25/2022 0067  Last data filed at 4/25/2022 0416  Gross per 24 hour   Intake 1200 ml   Output 1545 ml   Net -345 ml      In: 1440 [P.O.:1440]  Out: 1545       Physical Exam:  General Appearance:  Non-obese/Well Nourished  Respiratory:  · Resp Auscultation: Normal breath sounds without dullness  Cardiovascular:  · Auscultation: Regular rate and rhythm, normal S1S2, no m/g/r/c  · Palpation: Normal    · JVD: none  · Pedal Pulses: 2+ and equal   Abdomen:  · Soft, NT, ND, + bs  Extremities:  · No Cyanosis or Clubbing  · Extremities: 1+ edema  Neurological/Psychiatric:  · Oriented to time, place, and person  · Non-anxious    MEDICATIONS:   Scheduled Meds:   Scheduled Meds:   torsemide  40 mg Oral BID    sodium chloride flush  5-40 mL IntraVENous 2 times per day    apixaban  5 mg Oral BID    metoprolol succinate  25 mg Oral Daily    atorvastatin  40 mg Oral Nightly    finasteride  5 mg Oral Daily    tamsulosin  0.4 mg Oral Daily    sodium chloride flush  5-40 mL IntraVENous 2 times per day     Continuous Infusions:   amiodarone      sodium chloride      sodium chloride       PRN Meds:.sodium chloride flush, sodium chloride, sodium chloride flush, sodium chloride, polyethylene glycol, acetaminophen **OR** acetaminophen, prochlorperazine **OR** prochlorperazine (COMPAZINE) with normal saline sitting injection  Continuous Infusions:   amiodarone      sodium chloride      sodium chloride         Intake/Output Summary (Last 24 hours) at 4/25/2022 0917  Last data filed at 4/25/2022 0416  Gross per 24 hour   Intake 1200 ml   Output 1545 ml   Net -345 ml       Lab Data:  CBC:   Lab Results   Component Value Date    WBC 4.7 04/24/2022    HGB 11.0 04/24/2022     04/24/2022     BMP:  Lab Results   Component Value Date     04/24/2022    K 4.2 04/24/2022    CL 96 04/24/2022    CO2 31 04/24/2022    BUN 18 04/24/2022    CREATININE 1.1 04/24/2022    GLUCOSE 94 04/24/2022     INR:   Lab Results   Component Value Date    INR 1.50 02/05/2022    INR 1.50 11/28/2021        CARDIAC LABS  ENZYMES:No results for input(s): CKMB, CKMBINDEX, TROPONINI in the last 72 hours. Invalid input(s): CKTOTAL;3  FASTING LIPID PANEL:No results found for: HDL, LDLDIRECT, LDLCALC, TRIG, TSH  LIVER PROFILE:  Lab Results   Component Value Date    AST 33 04/24/2022    AST 28 04/23/2022    ALT 35 04/24/2022    ALT 31 04/23/2022     BNP:   Lab Results   Component Value Date    PROBNP 5,842 04/21/2022    PROBNP 11,887 04/16/2022    PROBNP 27,525 04/13/2022     Iron Studies:  No results found for: FERRITIN  Lab Results   Component Value Date    IRON 38 (L) 04/15/2022    TIBC 372 04/15/2022      Iron Deficiency Anemia:  Yes IV Iron Therapy:  Yes  2017 ACC/AHA HF Guidelines:   intravenous iron replacement in patients with New York Heart Association (NYHA) class II and III HF and iron deficiency(ferritin <100 ng/ml or 100-300 ng/ml if transferrin saturation <20%), to improve functional status and QoL. 1. WEIGHT: Admit Weight: 227 lb (103 kg)      Today  Weight: 194 lb 8 oz (88.2 kg)   2. I/O     Intake/Output Summary (Last 24 hours) at 4/25/2022 0917  Last data filed at 4/25/2022 0416  Gross per 24 hour   Intake 1200 ml   Output 1545 ml   Net -345 ml           Assessment/Plan:     1. AHF- compensated, continue torsemide  2.  NICM - on low dose BB, ACE on hold for hypotension, hope to restart before d/c, no cathy for noncompliance, SGLT2i unaffordable  3. AF- rate controlled on BB, continue AC  4.  VT- ICD today        I appreciate the opportunity of cooperating in the care of this individual.    Tisha Suero, APRN - CNP, ACNP, [address] 4/25/2022, 9:17 AM  Heart Failure  [facility], [address] [place] [address]  Ph: [phone]      Core Measures:   · Discharge instructions:   · LVEF documented:   · ACEI for LV dysfunction:   · Smoking Cessation:

## 2022-09-21 ENCOUNTER — HOSPITAL ENCOUNTER (OUTPATIENT)
Age: 62
Discharge: HOME | End: 2022-09-21
Payer: COMMERCIAL

## 2022-09-21 ENCOUNTER — OFFICE VISIT (OUTPATIENT)
Dept: CARDIOLOGY CLINIC | Age: 62
End: 2022-09-21
Payer: COMMERCIAL

## 2022-09-21 VITALS
HEART RATE: 63 BPM | BODY MASS INDEX: 24.38 KG/M2 | DIASTOLIC BLOOD PRESSURE: 60 MMHG | WEIGHT: 190 LBS | HEIGHT: 74 IN | SYSTOLIC BLOOD PRESSURE: 90 MMHG | OXYGEN SATURATION: 96 %

## 2022-09-21 DIAGNOSIS — Z95.810 ICD (IMPLANTABLE CARDIOVERTER-DEFIBRILLATOR), BIVENTRICULAR, IN SITU: ICD-10-CM

## 2022-09-21 DIAGNOSIS — I48.0 PAF (PAROXYSMAL ATRIAL FIBRILLATION) (HCC): ICD-10-CM

## 2022-09-21 DIAGNOSIS — Z59.00 HOMELESS: ICD-10-CM

## 2022-09-21 DIAGNOSIS — I10 BENIGN ESSENTIAL HTN: ICD-10-CM

## 2022-09-21 DIAGNOSIS — I50.22 CHRONIC SYSTOLIC CONGESTIVE HEART FAILURE (HCC): Primary | ICD-10-CM

## 2022-09-21 DIAGNOSIS — Z91.199 NON-COMPLIANCE: ICD-10-CM

## 2022-09-21 DIAGNOSIS — I42.0 DILATED CARDIOMYOPATHY (HCC): ICD-10-CM

## 2022-09-21 DIAGNOSIS — I50.43 ACUTE ON CHRONIC COMBINED SYSTOLIC AND DIASTOLIC HEART FAILURE (HCC): ICD-10-CM

## 2022-09-21 LAB
ANION GAP SERPL CALCULATED.3IONS-SCNC: 13 MMOL/L (ref 3–16)
BUN BLDV-MCNC: 16 MG/DL (ref 7–20)
CALCIUM SERPL-MCNC: 9.2 MG/DL (ref 8.3–10.6)
CHLORIDE BLD-SCNC: 102 MMOL/L (ref 99–110)
CO2: 29 MMOL/L (ref 21–32)
CREAT SERPL-MCNC: 1.3 MG/DL (ref 0.8–1.3)
GFR AFRICAN AMERICAN: >60
GFR NON-AFRICAN AMERICAN: 56
GLUCOSE BLD-MCNC: 83 MG/DL (ref 70–99)
POTASSIUM SERPL-SCNC: 3.9 MMOL/L (ref 3.5–5.1)
PRO-BNP: ABNORMAL PG/ML (ref 0–124)
SODIUM BLD-SCNC: 144 MMOL/L (ref 136–145)

## 2022-09-21 PROCEDURE — 1111F DSCHRG MED/CURRENT MED MERGE: CPT | Performed by: CLINICAL NURSE SPECIALIST

## 2022-09-21 PROCEDURE — 36415 COLL VENOUS BLD VENIPUNCTURE: CPT

## 2022-09-21 PROCEDURE — 1036F TOBACCO NON-USER: CPT | Performed by: CLINICAL NURSE SPECIALIST

## 2022-09-21 PROCEDURE — 80048 BASIC METABOLIC PNL TOTAL CA: CPT

## 2022-09-21 PROCEDURE — G8420 CALC BMI NORM PARAMETERS: HCPCS | Performed by: CLINICAL NURSE SPECIALIST

## 2022-09-21 PROCEDURE — 3017F COLORECTAL CA SCREEN DOC REV: CPT | Performed by: CLINICAL NURSE SPECIALIST

## 2022-09-21 PROCEDURE — 99214 OFFICE O/P EST MOD 30 MIN: CPT | Performed by: CLINICAL NURSE SPECIALIST

## 2022-09-21 PROCEDURE — G8427 DOCREV CUR MEDS BY ELIG CLIN: HCPCS | Performed by: CLINICAL NURSE SPECIALIST

## 2022-09-21 PROCEDURE — 83880 ASSAY OF NATRIURETIC PEPTIDE: CPT

## 2022-09-21 RX ORDER — CHOLECALCIFEROL (VITAMIN D3) 50 MCG
2000 TABLET ORAL DAILY
Qty: 90 TABLET | Refills: 1 | Status: SHIPPED | OUTPATIENT
Start: 2022-09-21

## 2022-09-21 SDOH — ECONOMIC STABILITY - HOUSING INSECURITY: HOMELESSNESS UNSPECIFIED: Z59.00

## 2022-09-21 NOTE — PROGRESS NOTES
[address]  Progress Note    Primary Care Doctor:  Julia Lockhart, APRN - NP    Chief Complaint   Patient presents with    Follow-up    Congestive Heart Failure          History of Present Illness:  59 y.o. male with history of with history of PAF, hypertension. Unvaccinated, , homeless  hospitalization 2/5-10/22 for weight gain and lower extremity edema. He recently had covid and did not follow up in office. LVEF 20%, LHC done. Weight 223->193. He was started on HF therapy, declined life vest.  CONSUELO done with HENOK thrombus (on eliquis per EP). Not able to do CV  4/13-26/22 for fluid overload requiring diureses with milrinone and lasix infusion, UTI and VT requiring ICD placement  6/15-20/22 for acute on chronic systolic heart failure. He was over drinking fluids, diuresed with IV lasix and good weight loss of 23 pounds. 7/28-8/1/2022 for shortness of breath, hypoxia, pneumonia, lisinopril and jardiance held due to hypotension and given oral metolazone times 2  8/31-9/8/2022 for acute on chronic systolic heart failure. He received lasix and milrinone infusions plus a dose of metolazone. His weight dropped from 220->189    I had the pleasure of seeing Manfred Miners in follow up for systolic heart failure. He is ambulatory and by his self. He has all his medications and can tell me by the bottle when and how often he takes it. His weight is staying 190 and edema under control. His optival shows normal thoracic impedence. He denies any increased shortness of breath, palpitations, lightheadedness or chest pain. He had a great weekend and energy level is improved. He still has not seen his PCP and needs refills on his flomax and proscar. Past Medical History:   has a past medical history of Atrial fibrillation ([address] CHF (congestive heart failure) ([address] Hx of blood clots, and Hypertension.   Surgical History:   has a past surgical history that includes Insertable Cardiac Monitor (07/26/2019) and Cardioversion (07/26/2019). Social History:   reports that he has never smoked. He has never been exposed to tobacco smoke. He has never used smokeless tobacco. He reports that he does not drink alcohol and does not use drugs. Family History:   Family History   Problem Relation Age of Onset    Heart Disease Mother     High Blood Pressure Mother     Heart Attack Mother     Other Father     Stroke Father     High Blood Pressure Father        Home Medications:  Prior to Admission medications    Medication Sig Start Date End Date Taking?  Authorizing Provider   vitamin D (CHOLECALCIFEROL) 50 MCG (2000 UT) TABS tablet Take 1 tablet by mouth daily 9/21/22  Yes TRENT Combs   digoxin (LANOXIN) 125 MCG tablet Take 1 tablet by mouth daily 9/9/22  Yes TRENT Saldana CNP   spironolactone (ALDACTONE) 25 MG tablet Take 1 tablet by mouth daily 9/9/22  Yes TRENT Saldana CNP   torsemide 40 MG TABS Take 40 mg by mouth 2 times daily 9/8/22  Yes TRENT Saldana CNP   ondansetron (ZOFRAN ODT) 4 MG disintegrating tablet Take 1-2 tablets by mouth every 12 hours as needed for Nausea 8/28/22  Yes Robyn Canada MD   omeprazole (PRILOSEC) 20 MG delayed release capsule Take 1 capsule by mouth every morning (before breakfast) 8/28/22  Yes Robyn Canada MD   lisinopril (PRINIVIL;ZESTRIL) 2.5 MG tablet Take 2.5 mg by mouth daily   Yes Historical Provider, MD   empagliflozin (JARDIANCE) 10 MG tablet Take 1 tablet by mouth daily 8/19/22  Yes TRENT Zimmerman   metoprolol succinate (TOPROL XL) 25 MG extended release tablet Take 0.5 tablets by mouth in the morning and at bedtime 8/1/22  Yes Jun Figueroa MD   apixaban (ELIQUIS) 5 MG TABS tablet Take 1 tablet by mouth 2 times daily 6/17/22  Yes TRENT Saldana CNP   tamsulosin [facility]) 0.4 MG capsule Take 1 capsule by mouth daily 6/18/22  Yes TRENT Saldana CNP   atorvastatin (LIPITOR) 40 MG tablet Take 1 tablet by mouth nightly 6/17/22  Yes TRENT Saldana - CNP   finasteride (PROSCAR) 5 MG tablet Take 1 tablet by mouth daily 5/31/22  Yes TRENT Nazario CNS   potassium chloride (KLOR-CON M) 20 MEQ extended release tablet Take 1 tablet by mouth in the morning and 1 tablet in the evening. Take with meals. 8/1/22 9/8/22  Jun Figueroa MD        Allergies:  Patient has no known allergies. Review of Systems:   Constitutional: there has been no unanticipated weight loss. There's been no change in energy level, sleep pattern, or activity level. Eyes: No visual changes or diplopia. No scleral icterus. ENT: No Headaches, hearing loss or vertigo. No mouth sores or sore throat. Cardiovascular: Reviewed in HPI  Respiratory: No cough or wheezing, no sputum production. No hematemesis. Gastrointestinal: No abdominal pain, appetite loss, blood in stools. No change in bowel or bladder habits. Genitourinary: No dysuria, trouble voiding, or hematuria. Musculoskeletal:  No gait disturbance, weakness or joint complaints. Integumentary: No rash or pruritis. Neurological: No headache, diplopia, change in muscle strength, numbness or tingling. No change in gait, balance, coordination, mood, affect, memory, mentation, behavior. Psychiatric: No anxiety, no depression. Endocrine: No malaise, fatigue or temperature intolerance. No excessive thirst, fluid intake, or urination. No tremor. Hematologic/Lymphatic: No abnormal bruising or bleeding, blood clots or swollen lymph nodes. Allergic/Immunologic: No nasal congestion or hives.     Physical Examination:    Vitals:    09/21/22 1126   BP: 90/60   Site: Right Upper Arm   Position: Sitting   Cuff Size: Medium Adult   Pulse: 63   SpO2: 96%   Weight: 190 lb (86.2 kg)   Height: 6' 2\" (1.88 m)           Constitutional and General Appearance: Warm and dry, no apparent distress, normal coloration   HEENT:  Normocephalic, atraumatic  Respiratory:  Normal excursion and expansion without use of accessory muscles  Resp Auscultation: Normal breath sounds without dullness  Cardiovascular: The apical impulses not displaced  Heart tones are crisp and normal  JVP normal cm H2O  irregular rate and rhythm  Peripheral pulses are symmetrical and full  There is no clubbing, cyanosis of the extremities.   Bilateral lower ankle edema only  Pedal Pulses: 2+ and equal   Abdomen:  No masses or tenderness  Liver/Spleen: No Abnormalities Noted  Neurological/Psychiatric:  Alert and oriented in all spheres  Moves all extremities well  Exhibits normal gait balance and coordination  No abnormalities of mood, affect, memory, mentation, or behavior are noted    Lab Data:    CBC:   Lab Results   Component Value Date/Time    WBC 4.7 09/07/2022 05:04 AM    WBC 4.8 09/05/2022 01:02 AM    WBC 5.4 08/31/2022 11:17 AM    RBC 4.80 09/07/2022 05:04 AM    RBC 4.15 09/05/2022 01:02 AM    RBC 4.50 08/31/2022 11:17 AM    HGB 13.3 09/07/2022 05:04 AM    HGB 11.5 09/05/2022 01:02 AM    HGB 12.5 08/31/2022 11:17 AM    HCT 40.8 09/07/2022 05:04 AM    HCT 35.4 09/05/2022 01:02 AM    HCT 38.8 08/31/2022 11:17 AM    MCV 85.0 09/07/2022 05:04 AM    MCV 85.3 09/05/2022 01:02 AM    MCV 86.4 08/31/2022 11:17 AM    RDW 16.6 09/07/2022 05:04 AM    RDW 16.8 09/05/2022 01:02 AM    RDW 17.0 08/31/2022 11:17 AM     09/07/2022 05:04 AM     09/05/2022 01:02 AM     08/31/2022 11:17 AM     BMP:  Lab Results   Component Value Date/Time     09/08/2022 07:34 AM     09/07/2022 05:04 AM     09/06/2022 04:46 AM    K 3.5 09/08/2022 07:34 AM    K 3.9 09/07/2022 05:04 AM    K 3.6 09/06/2022 04:46 AM    K 3.1 09/05/2022 01:02 AM    K 4.0 08/28/2022 04:06 AM    K 2.6 07/30/2022 06:43 AM    CL 87 09/08/2022 07:34 AM    CL 87 09/07/2022 05:04 AM    CL 93 09/06/2022 04:46 AM    CO2 43 09/08/2022 07:34 AM    CO2 39 09/07/2022 05:04 AM    CO2 33 09/06/2022 04:46 AM    PHOS 3.9 08/31/2022 07:47 PM PHOS 4.1 07/29/2022 06:18 AM    PHOS 3.7 04/18/2022 02:16 AM    BUN 22 09/08/2022 07:34 AM    BUN 19 09/07/2022 05:04 AM    BUN 15 09/06/2022 04:46 AM    CREATININE 1.2 09/08/2022 07:34 AM    CREATININE 1.2 09/07/2022 05:04 AM    CREATININE 1.0 09/06/2022 04:46 AM     BNP:   Lab Results   Component Value Date/Time    PROBNP 6,355 09/08/2022 07:34 AM    PROBNP 8,835 09/06/2022 04:46 AM    PROBNP 11,280 09/03/2022 05:39 AM     Cardiac Imaging:  CONSUELO Dr Renuka Fortune 2/10/22  Preliminary CONSUELO results are:      - Severe LV dysfunction,  EF: 20-25%  - LA dilated. There was HENOK thrombus. - Moderate MR    Cardiac Cath PCI: Dr Lopez Situ 2/8/2022  Anatomy:   LM-nml   LAD-nml  Cx-nml  OM- nml  RCA-nml  RPDA- nml  LVEF- 10%  LVG- global hypokinesis  LVEDP- 10     Hemodynamics:  RA- mean 3  RV- 37/0  PAWP- 10  PA- 34/12 (20)     C. O.- 5.7 (4.9)  C. I.- 2.6 (2.25)  PA sat 60%  AO sat 91%     Contrast: 70  Flouro Time: 5.3  Access: R radial a, R CFV     Impression  ~Coronary Angiography w/ normal cors  ~LVG with LVEF of 10% and global regional wall motion abnormalities  ~Severe MR  ~Normal CO/CI  ~normal L and R filling pressures     Recommendation  ~Aggressive medical treatment and risk factor modification  ~1. Medications reviewed, no changes at this time. 2. Post cath IVF. Bedrest.  3.Consider CONSUELO for evaluation of MR.   4. Patient has been advised on the importance of regular exercise of at least 20-30 minutes daily alternating with aerobic and isometric activities. 5. Patient counseled about and offered assistance for smoking cessation   6. No indication for cardiac rehab  7. CHF service to evaluate tomorrow. Echo 11/29/2021  Summary   -Covid+   -Severely reduced global systolic function with an ejection fraction   estimated at 20%. -Severe global hypokinesis with regional variations noted. -Right ventricular systolic function appears to be mildly reduced based on   visual inspection.   -Severe biatrial enlargement.

## 2022-09-22 ENCOUNTER — TELEPHONE (OUTPATIENT)
Dept: CARDIOLOGY CLINIC | Age: 62
End: 2022-09-22

## 2022-09-22 DIAGNOSIS — I50.22 CHRONIC SYSTOLIC CONGESTIVE HEART FAILURE (HCC): Primary | ICD-10-CM

## 2022-09-22 NOTE — TELEPHONE ENCOUNTER
----- Message from TRENT Blue - CNS sent at 9/22/2022  2:12 PM EDT -----  Fluid level is up but his weight and optival stable  Ask him to get labs next week again  thanks

## 2022-10-05 ENCOUNTER — OFFICE VISIT (OUTPATIENT)
Dept: CARDIOLOGY CLINIC | Age: 62
End: 2022-10-05
Payer: COMMERCIAL

## 2022-10-05 ENCOUNTER — HOSPITAL ENCOUNTER (OUTPATIENT)
Age: 62
Discharge: HOME | End: 2022-10-05
Payer: COMMERCIAL

## 2022-10-05 VITALS
OXYGEN SATURATION: 99 % | BODY MASS INDEX: 26 KG/M2 | WEIGHT: 202.6 LBS | DIASTOLIC BLOOD PRESSURE: 60 MMHG | HEIGHT: 74 IN | HEART RATE: 67 BPM | SYSTOLIC BLOOD PRESSURE: 92 MMHG

## 2022-10-05 DIAGNOSIS — Z91.199 NON-COMPLIANCE: ICD-10-CM

## 2022-10-05 DIAGNOSIS — I50.22 CHRONIC SYSTOLIC CONGESTIVE HEART FAILURE (HCC): ICD-10-CM

## 2022-10-05 DIAGNOSIS — I48.0 PAF (PAROXYSMAL ATRIAL FIBRILLATION) (HCC): ICD-10-CM

## 2022-10-05 DIAGNOSIS — I10 BENIGN ESSENTIAL HTN: ICD-10-CM

## 2022-10-05 DIAGNOSIS — I50.22 CHRONIC SYSTOLIC CONGESTIVE HEART FAILURE (HCC): Primary | ICD-10-CM

## 2022-10-05 DIAGNOSIS — Z59.00 HOMELESS: ICD-10-CM

## 2022-10-05 DIAGNOSIS — I42.0 DILATED CARDIOMYOPATHY (HCC): ICD-10-CM

## 2022-10-05 DIAGNOSIS — Z95.810 ICD (IMPLANTABLE CARDIOVERTER-DEFIBRILLATOR), BIVENTRICULAR, IN SITU: ICD-10-CM

## 2022-10-05 LAB
ANION GAP SERPL CALCULATED.3IONS-SCNC: 18 MMOL/L (ref 3–16)
BUN BLDV-MCNC: 19 MG/DL (ref 7–20)
CALCIUM SERPL-MCNC: 8.9 MG/DL (ref 8.3–10.6)
CHLORIDE BLD-SCNC: 104 MMOL/L (ref 99–110)
CO2: 19 MMOL/L (ref 21–32)
CREAT SERPL-MCNC: 1.1 MG/DL (ref 0.8–1.3)
GFR AFRICAN AMERICAN: >60
GFR NON-AFRICAN AMERICAN: >60
GLUCOSE BLD-MCNC: 78 MG/DL (ref 70–99)
POTASSIUM SERPL-SCNC: 4 MMOL/L (ref 3.5–5.1)
PRO-BNP: ABNORMAL PG/ML (ref 0–124)
SODIUM BLD-SCNC: 141 MMOL/L (ref 136–145)

## 2022-10-05 PROCEDURE — 80048 BASIC METABOLIC PNL TOTAL CA: CPT

## 2022-10-05 PROCEDURE — 83880 ASSAY OF NATRIURETIC PEPTIDE: CPT

## 2022-10-05 PROCEDURE — 1036F TOBACCO NON-USER: CPT | Performed by: CLINICAL NURSE SPECIALIST

## 2022-10-05 PROCEDURE — 99214 OFFICE O/P EST MOD 30 MIN: CPT | Performed by: CLINICAL NURSE SPECIALIST

## 2022-10-05 PROCEDURE — G8484 FLU IMMUNIZE NO ADMIN: HCPCS | Performed by: CLINICAL NURSE SPECIALIST

## 2022-10-05 PROCEDURE — 1111F DSCHRG MED/CURRENT MED MERGE: CPT | Performed by: CLINICAL NURSE SPECIALIST

## 2022-10-05 PROCEDURE — 3017F COLORECTAL CA SCREEN DOC REV: CPT | Performed by: CLINICAL NURSE SPECIALIST

## 2022-10-05 PROCEDURE — G8427 DOCREV CUR MEDS BY ELIG CLIN: HCPCS | Performed by: CLINICAL NURSE SPECIALIST

## 2022-10-05 PROCEDURE — G8419 CALC BMI OUT NRM PARAM NOF/U: HCPCS | Performed by: CLINICAL NURSE SPECIALIST

## 2022-10-05 PROCEDURE — 36415 COLL VENOUS BLD VENIPUNCTURE: CPT

## 2022-10-05 RX ORDER — ATORVASTATIN CALCIUM 40 MG/1
40 TABLET, FILM COATED ORAL NIGHTLY
Qty: 30 TABLET | Refills: 2 | Status: SHIPPED | OUTPATIENT
Start: 2022-10-05

## 2022-10-05 RX ORDER — METOLAZONE 5 MG/1
5 TABLET ORAL PRN
Qty: 2 TABLET | Refills: 0 | Status: SHIPPED | OUTPATIENT
Start: 2022-10-05

## 2022-10-05 SDOH — ECONOMIC STABILITY - HOUSING INSECURITY: HOMELESSNESS UNSPECIFIED: Z59.00

## 2022-10-05 NOTE — PROGRESS NOTES
[address]  Progress Note    Primary Care Doctor:  No primary care provider on file. Chief Complaint   Patient presents with    Congestive Heart Failure          History of Present Illness:  59 y.o. male with history of with history of PAF, hypertension. Unvaccinated, , homeless  hospitalization 2/5-10/22 for weight gain and lower extremity edema. He recently had covid and did not follow up in office. LVEF 20%, LHC done. Weight 223->193. He was started on HF therapy, declined life vest.  CONSUELO done with HENOK thrombus (on eliquis per EP). Not able to do CV  4/13-26/22 for fluid overload requiring diureses with milrinone and lasix infusion, UTI and VT requiring ICD placement  6/15-20/22 for acute on chronic systolic heart failure. He was over drinking fluids, diuresed with IV lasix and good weight loss of 23 pounds. 7/28-8/1/2022 for shortness of breath, hypoxia, pneumonia, lisinopril and jardiance held due to hypotension and given oral metolazone times 2  8/31-9/8/2022 for acute on chronic systolic heart failure. He received lasix and milrinone infusions plus a dose of metolazone. His weight dropped from 220->189    I had the pleasure of seeing Jose Graves in follow up for systolic heart failure. He is ambulatory and by his self. He has his a bag with that has all his clothes, tooth brush and medications in it. He admits to missing his medications twice last week as his bag was in his friends car. His weight is up to 202. He admits to drinking more fluids. He still feels good, no increased shortness of breath. He did take his lisinopril twice in one day. He did not get his labs done last week. He has an appt with new PCP today      Past Medical History:   has a past medical history of Atrial fibrillation ([address] CHF (congestive heart failure) ([address] Hx of blood clots, and Hypertension.   Surgical History:   has a past surgical history that includes Insertable Cardiac Monitor (07/26/2019) and Cardioversion (07/26/2019). Social History:   reports that he has never smoked. He has never been exposed to tobacco smoke. He has never used smokeless tobacco. He reports that he does not drink alcohol and does not use drugs. Family History:   Family History   Problem Relation Age of Onset    Heart Disease Mother     High Blood Pressure Mother     Heart Attack Mother     Other Father     Stroke Father     High Blood Pressure Father        Home Medications:  Prior to Admission medications    Medication Sig Start Date End Date Taking?  Authorizing Provider   atorvastatin (LIPITOR) 40 MG tablet Take 1 tablet by mouth nightly 10/5/22  Yes TRENT Alamo   metOLazone (ZAROXOLYN) 5 MG tablet Take 1 tablet by mouth as needed (prn) 10/5/22  Yes Severa Slider, APRN - CNS   vitamin D (CHOLECALCIFEROL) 50 MCG (2000 UT) TABS tablet Take 1 tablet by mouth daily 9/21/22  Yes Ginette Last JuneTRENT - CNS   digoxin (LANOXIN) 125 MCG tablet Take 1 tablet by mouth daily 9/9/22  Yes TRENT Nelson - CNP   spironolactone (ALDACTONE) 25 MG tablet Take 1 tablet by mouth daily 9/9/22  Yes TRENT Nelson CNP   torsemide 40 MG TABS Take 40 mg by mouth 2 times daily 9/8/22  Yes TRENT Nelson CNP   omeprazole (PRILOSEC) 20 MG delayed release capsule Take 1 capsule by mouth every morning (before breakfast) 8/28/22  Yes Maynor Huertas MD   lisinopril (PRINIVIL;ZESTRIL) 2.5 MG tablet Take 2.5 mg by mouth daily   Yes Historical Provider, MD   empagliflozin (JARDIANCE) 10 MG tablet Take 1 tablet by mouth daily 8/19/22  Yes Deirdre Last June, APRN - CNS   metoprolol succinate (TOPROL XL) 25 MG extended release tablet Take 0.5 tablets by mouth in the morning and at bedtime 8/1/22  Yes Heather Webster MD   apixaban (ELIQUIS) 5 MG TABS tablet Take 1 tablet by mouth 2 times daily 6/17/22  Yes TRENT Nelson CNP   tamsulosin (FLOMAX) 0.4 MG capsule Take 1 capsule by mouth daily 6/18/22 Appearance: Warm and dry, no apparent distress, normal coloration   HEENT:  Normocephalic, atraumatic  Respiratory:  Normal excursion and expansion without use of accessory muscles  Resp Auscultation: Normal breath sounds without dullness  Cardiovascular: The apical impulses not displaced  Heart tones are crisp and normal  JVP normal cm H2O  irregular rate and rhythm  Peripheral pulses are symmetrical and full  There is no clubbing, cyanosis of the extremities.   Bilateral lower ankle to mid calf edema which is worse  Pedal Pulses: 2+ and equal   Abdomen:  No masses or tenderness  Liver/Spleen: No Abnormalities Noted  Neurological/Psychiatric:  Alert and oriented in all spheres  Moves all extremities well  Exhibits normal gait balance and coordination  No abnormalities of mood, affect, memory, mentation, or behavior are noted    Lab Data:    CBC:   Lab Results   Component Value Date/Time    WBC 4.7 09/07/2022 05:04 AM    WBC 4.8 09/05/2022 01:02 AM    WBC 5.4 08/31/2022 11:17 AM    RBC 4.80 09/07/2022 05:04 AM    RBC 4.15 09/05/2022 01:02 AM    RBC 4.50 08/31/2022 11:17 AM    HGB 13.3 09/07/2022 05:04 AM    HGB 11.5 09/05/2022 01:02 AM    HGB 12.5 08/31/2022 11:17 AM    HCT 40.8 09/07/2022 05:04 AM    HCT 35.4 09/05/2022 01:02 AM    HCT 38.8 08/31/2022 11:17 AM    MCV 85.0 09/07/2022 05:04 AM    MCV 85.3 09/05/2022 01:02 AM    MCV 86.4 08/31/2022 11:17 AM    RDW 16.6 09/07/2022 05:04 AM    RDW 16.8 09/05/2022 01:02 AM    RDW 17.0 08/31/2022 11:17 AM     09/07/2022 05:04 AM     09/05/2022 01:02 AM     08/31/2022 11:17 AM     BMP:  Lab Results   Component Value Date/Time     09/21/2022 12:19 PM     09/08/2022 07:34 AM     09/07/2022 05:04 AM    K 3.9 09/21/2022 12:19 PM    K 3.5 09/08/2022 07:34 AM    K 3.9 09/07/2022 05:04 AM    K 3.6 09/06/2022 04:46 AM    K 4.0 08/28/2022 04:06 AM    K 2.6 07/30/2022 06:43 AM     09/21/2022 12:19 PM    CL 87 09/08/2022 07:34 AM    CL 87 09/07/2022 05:04 AM    CO2 29 09/21/2022 12:19 PM    CO2 43 09/08/2022 07:34 AM    CO2 39 09/07/2022 05:04 AM    PHOS 3.9 08/31/2022 07:47 PM    PHOS 4.1 07/29/2022 06:18 AM    PHOS 3.7 04/18/2022 02:16 AM    BUN 16 09/21/2022 12:19 PM    BUN 22 09/08/2022 07:34 AM    BUN 19 09/07/2022 05:04 AM    CREATININE 1.3 09/21/2022 12:19 PM    CREATININE 1.2 09/08/2022 07:34 AM    CREATININE 1.2 09/07/2022 05:04 AM     BNP:   Lab Results   Component Value Date/Time    PROBNP 18,881 09/21/2022 12:19 PM    PROBNP 6,355 09/08/2022 07:34 AM    PROBNP 8,835 09/06/2022 04:46 AM     Cardiac Imaging:  CONSUEL[facility] Dr Shazia Fernandez 2/10/22  Preliminary CONSUELO results are:      - Severe LV dysfunction,  EF: 20-25%  - LA dilated. There was HENOK thrombus. - Moderate MR    Cardiac Cath PCI: Dr Luda Mueller 2/8/2022  Anatomy:   LM-nml   LAD-nml  Cx-nml  OM- nml  RCA-nml  RPDA- nml  LVEF- 10%  LVG- global hypokinesis  LVEDP- 10     Hemodynamics:  RA- mean 3  RV- 37/0  PAWP- 10  PA- 34/12 (20)     C. O.- 5.7 (4.9)  C. I.- 2.6 (2.25)  PA sat 60%  AO sat 91%     Contrast: 70  Flouro Time: 5.3  Access: R radial a, R CFV     Impression  ~Coronary Angiography w/ normal cors  ~LVG with LVEF of 10% and global regional wall motion abnormalities  ~Severe MR  ~Normal CO/CI  ~normal L and R filling pressures     Recommendation  ~Aggressive medical treatment and risk factor modification  ~1. Medications reviewed, no changes at this time. 2. Post cath IVF. Bedrest.  3.Consider CONSUELO for evaluation of MR.   4. Patient has been advised on the importance of regular exercise of at least 20-30 minutes daily alternating with aerobic and isometric activities. 5. Patient counseled about and offered assistance for smoking cessation   6. No indication for cardiac rehab  7. CHF service to evaluate tomorrow. Echo 11/29/2021  Summary   -Covid+   -Severely reduced global systolic function with an ejection fraction   estimated at 20%.    -Severe global hypokinesis with regional variations noted.   -Right ventricular systolic function appears to be mildly reduced based on   visual inspection.   -Severe biatrial enlargement.   -Thickened mitral valve without evidence of stenosis. There is   mild-to-moderate mitral regurgitation.   -There is mild-to-moderate tricuspid regurgitation with a RVSP estimation of   37 mmHg. -Diastolic dysfunction with elevated LV filling pressures. Echo 7/25/2019  Summary   -Overall left ventricular systolic function is moderately depressed .   -Ejection fraction is visually estimated to be 30-35 %. -Global left ventricular function moderately reduced.   -Indeterminate diastolic function due to atrial fibrillation and moderate to   severe mitral regurgitation.   -Moderate-to-severe mitral regurgitation .   -Dilated left atrium with a volume of 97 ml.   -Left atrial volume is increased probably due to atrial fibrillation .   -There is mild right ventricular hypertrophy.   -The right ventricle is mildly enlarged.   -Right ventricular systolic pressure of 53 mm Hg consistent with pulmonary   hypertension.   -Moderate to severe tricuspid regurgitation. TAPSE = 1.43   -IVC size is dilated (>2.1 cm) but collapses > 50% with respiration   consistent with elevated RA pressure (8 mmHg). Assessment:    1. Chronic systolic heart failure (HCC)  on ace, bb, digoxin, sglt2 and aldosterone antagonist    2. Non-compliance    3. Homeless    4. PAF (paroxysmal atrial fibrillation) (HCC) Dr Janie rothman Organ   5. Benign essential HTN    6. Dilated cardiomyopathy ([facility] [address]    7. ICD in place    Plan:   Patient Instructions   Take metolazone 1 tablet 30 minutes prior to torsemide dose tomorrow (ONLY DO THIS ONCE)   2 prescriptions from pharmacy  Blood work  Cut back on the fluids  Fill pill boxes so you dont miss your medicine  RTO in 3week    We stopped by the pharmacy and picked up 2 pill boxes for him to fill his medications.     [address]    I appreciate the opportunity of cooperating in the care of this individual.    TRENT Shahid - CNS, CNS, 10/5/2022, 9:40 AM

## 2022-10-05 NOTE — PATIENT INSTRUCTIONS
Take metolazone 1 tablet 30 minutes prior to torsemide dose tomorrow (ONLY DO THIS ONCE)   2 prescriptions from pharmacy  Blood work  Cut back on the fluids  Fill pill boxes so you dont miss your medicine  RTO in 3week

## 2022-10-06 ENCOUNTER — TELEPHONE (OUTPATIENT)
Dept: CARDIOLOGY CLINIC | Age: 62
End: 2022-10-06

## 2022-10-06 NOTE — TELEPHONE ENCOUNTER
----- Message from [place], APRN - CNS sent at 10/6/2022  8:35 AM EDT -----  Labs are stable  Make sure he picked up his metolazone and took a dose 30 minutes before his lasix  thanks

## 2022-10-26 ENCOUNTER — OFFICE VISIT (OUTPATIENT)
Dept: CARDIOLOGY CLINIC | Age: 62
End: 2022-10-26
Payer: COMMERCIAL

## 2022-10-26 ENCOUNTER — HOSPITAL ENCOUNTER (OUTPATIENT)
Age: 62
Discharge: HOME | End: 2022-10-26
Payer: COMMERCIAL

## 2022-10-26 VITALS
HEIGHT: 74 IN | BODY MASS INDEX: 27.08 KG/M2 | HEART RATE: 65 BPM | DIASTOLIC BLOOD PRESSURE: 80 MMHG | OXYGEN SATURATION: 98 % | WEIGHT: 211 LBS | SYSTOLIC BLOOD PRESSURE: 108 MMHG

## 2022-10-26 DIAGNOSIS — I42.0 DILATED CARDIOMYOPATHY (HCC): ICD-10-CM

## 2022-10-26 DIAGNOSIS — I50.22 CHRONIC SYSTOLIC CONGESTIVE HEART FAILURE (HCC): Primary | ICD-10-CM

## 2022-10-26 DIAGNOSIS — Z59.00 HOMELESS: ICD-10-CM

## 2022-10-26 DIAGNOSIS — Z91.199 NON-COMPLIANCE: ICD-10-CM

## 2022-10-26 DIAGNOSIS — I50.22 CHRONIC SYSTOLIC CONGESTIVE HEART FAILURE (HCC): ICD-10-CM

## 2022-10-26 DIAGNOSIS — I10 BENIGN ESSENTIAL HTN: ICD-10-CM

## 2022-10-26 DIAGNOSIS — Z95.810 ICD (IMPLANTABLE CARDIOVERTER-DEFIBRILLATOR), BIVENTRICULAR, IN SITU: ICD-10-CM

## 2022-10-26 DIAGNOSIS — I48.0 PAF (PAROXYSMAL ATRIAL FIBRILLATION) (HCC): ICD-10-CM

## 2022-10-26 LAB
ANION GAP SERPL CALCULATED.3IONS-SCNC: 16 MMOL/L (ref 3–16)
BUN BLDV-MCNC: 17 MG/DL (ref 7–20)
CALCIUM SERPL-MCNC: 9.6 MG/DL (ref 8.3–10.6)
CHLORIDE BLD-SCNC: 104 MMOL/L (ref 99–110)
CO2: 24 MMOL/L (ref 21–32)
CREAT SERPL-MCNC: 1.3 MG/DL (ref 0.8–1.3)
GFR SERPL CREATININE-BSD FRML MDRD: >60 ML/MIN/{1.73_M2}
GLUCOSE BLD-MCNC: 79 MG/DL (ref 70–99)
POTASSIUM SERPL-SCNC: 4.6 MMOL/L (ref 3.5–5.1)
PRO-BNP: ABNORMAL PG/ML (ref 0–124)
SODIUM BLD-SCNC: 144 MMOL/L (ref 136–145)

## 2022-10-26 PROCEDURE — 83880 ASSAY OF NATRIURETIC PEPTIDE: CPT

## 2022-10-26 PROCEDURE — G8419 CALC BMI OUT NRM PARAM NOF/U: HCPCS | Performed by: CLINICAL NURSE SPECIALIST

## 2022-10-26 PROCEDURE — 80048 BASIC METABOLIC PNL TOTAL CA: CPT

## 2022-10-26 PROCEDURE — 1036F TOBACCO NON-USER: CPT | Performed by: CLINICAL NURSE SPECIALIST

## 2022-10-26 PROCEDURE — 99214 OFFICE O/P EST MOD 30 MIN: CPT | Performed by: CLINICAL NURSE SPECIALIST

## 2022-10-26 PROCEDURE — 3078F DIAST BP <80 MM HG: CPT | Performed by: CLINICAL NURSE SPECIALIST

## 2022-10-26 PROCEDURE — G8427 DOCREV CUR MEDS BY ELIG CLIN: HCPCS | Performed by: CLINICAL NURSE SPECIALIST

## 2022-10-26 PROCEDURE — G8484 FLU IMMUNIZE NO ADMIN: HCPCS | Performed by: CLINICAL NURSE SPECIALIST

## 2022-10-26 PROCEDURE — 3074F SYST BP LT 130 MM HG: CPT | Performed by: CLINICAL NURSE SPECIALIST

## 2022-10-26 PROCEDURE — 36415 COLL VENOUS BLD VENIPUNCTURE: CPT

## 2022-10-26 PROCEDURE — 3017F COLORECTAL CA SCREEN DOC REV: CPT | Performed by: CLINICAL NURSE SPECIALIST

## 2022-10-26 RX ORDER — OMEPRAZOLE 20 MG/1
20 CAPSULE, DELAYED RELEASE ORAL
Qty: 90 CAPSULE | Refills: 1 | Status: SHIPPED | OUTPATIENT
Start: 2022-10-26

## 2022-10-26 RX ORDER — SPIRONOLACTONE 25 MG/1
25 TABLET ORAL DAILY
Qty: 90 TABLET | Refills: 0 | Status: ON HOLD | OUTPATIENT
Start: 2022-10-26 | End: 2022-10-31 | Stop reason: HOSPADM

## 2022-10-26 RX ORDER — LISINOPRIL 2.5 MG/1
2.5 TABLET ORAL DAILY
Qty: 90 TABLET | Refills: 1 | Status: SHIPPED | OUTPATIENT
Start: 2022-10-26

## 2022-10-26 SDOH — ECONOMIC STABILITY - HOUSING INSECURITY: HOMELESSNESS UNSPECIFIED: Z59.00

## 2022-10-26 NOTE — PATIENT INSTRUCTIONS
scripts at pharmacy today (8 prescriptions)  Tomorrow take metolazone 2 pills (rubber band bottle) tomorrow and then 30 minutes later take the rest of your medications  Blood work next week  RTO in 2 weeks  Use the pill box that I gave you

## 2022-10-26 NOTE — PROGRESS NOTES
[facility]  Progress Note    Primary Care Doctor:  No primary care provider on file. Chief Complaint   Patient presents with    Follow-up    Congestive Heart Failure    Shortness of Breath          History of Present Illness:  59 y.o. male with history of with history of PAF, hypertension. Unvaccinated, , homeless  hospitalization 2/5-10/22 for weight gain and lower extremity edema. He recently had covid and did not follow up in office. LVEF 20%, [facility] done. Weight 223->193. He was started on HF therapy, declined life vest.  CONSUELO done with HENOK thrombus (on eliquis per EP). Not able to do CV  4/13-26/22 for fluid overload requiring diureses with milrinone and lasix infusion, UTI and VT requiring ICD placement  6/15-20/22 for acute on chronic systolic heart failure. He was over drinking fluids, diuresed with IV lasix and good weight loss of 23 pounds. 7/28-8/1/2022 for shortness of breath, hypoxia, pneumonia, lisinopril and jardiance held due to hypotension and given oral metolazone times 2  8/31-9/8/2022 for acute on chronic systolic heart failure. He received lasix and milrinone infusions plus a dose of metolazone. His weight dropped from 220->189    I had the pleasure of seeing Kavitha Ramos in follow up for systolic heart failure. He is ambulatory and by his self. He has been out of his medications for more then 3 days. He had refills on many of them but did not call the pharmacy to refill. 4 of them he is completely out of. He says he is doing well, no fatigue but has started with a little cough. He denies any chest pain, palpitations. His edema is up to his knees. He has a bottle of metolazone 2.5 mg (3 pills). His best weight is 189 and now up to 211. He took metolazone after his last visit      Past Medical History:   has a past medical history of Atrial fibrillation ([address] CHF (congestive heart failure) ([address] Hx of blood clots, and Hypertension.   Surgical History: has a past surgical history that includes Insertable Cardiac Monitor (07/26/2019) and Cardioversion (07/26/2019). Social History:   reports that he has never smoked. He has never been exposed to tobacco smoke. He has never used smokeless tobacco. He reports that he does not drink alcohol and does not use drugs. Family History:   Family History   Problem Relation Age of Onset    Heart Disease Mother     High Blood Pressure Mother     Heart Attack Mother     Other Father     Stroke Father     High Blood Pressure Father        Home Medications:  Prior to Admission medications    Medication Sig Start Date End Date Taking?  Authorizing Provider   spironolactone (ALDACTONE) 25 MG tablet Take 1 tablet by mouth daily 10/26/22  Yes TRENT Alamo   empagliflozin (JARDIANCE) 10 MG tablet Take 1 tablet by mouth daily 10/26/22  Yes TRENT Alamo   lisinopril (PRINIVIL;ZESTRIL) 2.5 MG tablet Take 1 tablet by mouth daily 10/26/22  Yes TRENT Kaminski   omeprazole (PRILOSEC) 20 MG delayed release capsule Take 1 capsule by mouth every morning (before breakfast) 10/26/22  Yes TRENT Morris   atorvastatin (LIPITOR) 40 MG tablet Take 1 tablet by mouth nightly 10/5/22  Yes TRENT Kaminski   metOLazone (ZAROXOLYN) 5 MG tablet Take 1 tablet by mouth as needed (prn) 10/5/22  Yes TRENT Kaminski   vitamin D (CHOLECALCIFEROL) 50 MCG (2000 UT) TABS tablet Take 1 tablet by mouth daily 9/21/22  Yes TRENT Morris   digoxin (LANOXIN) 125 MCG tablet Take 1 tablet by mouth daily 9/9/22  Yes TRENT Beasley CNP   torsemide 40 MG TABS Take 40 mg by mouth 2 times daily 9/8/22  Yes TRENT Beasley CNP   metoprolol succinate (TOPROL XL) 25 MG extended release tablet Take 0.5 tablets by mouth in the morning and at bedtime 8/1/22  Yes Shan Blizzard, MD   apixaban (ELIQUIS) 5 MG TABS tablet Take 1 tablet by mouth 2 times daily 6/17/22  Yes TRENT Kendall CNP   tamsulosin [facility]) 0.4 MG capsule Take 1 capsule by mouth daily 6/18/22  Yes TRENT Kendall CNP   ondansetron (ZOFRAN ODT) 4 MG disintegrating tablet Take 1-2 tablets by mouth every 12 hours as needed for Nausea  Patient not taking: No sig reported 8/28/22   Andie Aden MD   potassium chloride (KLOR-CON M) 20 MEQ extended release tablet Take 1 tablet by mouth in the morning and 1 tablet in the evening. Take with meals. 8/1/22 9/8/22  Carlos Mckeon MD   finasteride (PROSCAR) 5 MG tablet Take 1 tablet by mouth daily  Patient not taking: No sig reported 5/31/22   TRENT Lainez CNS        Allergies:  Patient has no known allergies. Review of Systems:   Constitutional: there has been no unanticipated weight loss. There's been no change in energy level, sleep pattern, or activity level. Eyes: No visual changes or diplopia. No scleral icterus. ENT: No Headaches, hearing loss or vertigo. No mouth sores or sore throat. Cardiovascular: Reviewed in HPI  Respiratory: No cough or wheezing, no sputum production. No hematemesis. Gastrointestinal: No abdominal pain, appetite loss, blood in stools. No change in bowel or bladder habits. Genitourinary: No dysuria, trouble voiding, or hematuria. Musculoskeletal:  No gait disturbance, weakness or joint complaints. Integumentary: No rash or pruritis. Neurological: No headache, diplopia, change in muscle strength, numbness or tingling. No change in gait, balance, coordination, mood, affect, memory, mentation, behavior. Psychiatric: No anxiety, no depression. Endocrine: No malaise, fatigue or temperature intolerance. No excessive thirst, fluid intake, or urination. No tremor. Hematologic/Lymphatic: No abnormal bruising or bleeding, blood clots or swollen lymph nodes. Allergic/Immunologic: No nasal congestion or hives.     Physical Examination:    Vitals:    10/26/22 1415   BP: 108/80   Site: Right Upper Arm Position: Sitting   Cuff Size: Medium Adult   Pulse: 65   SpO2: 98%   Weight: 211 lb (95.7 kg)   Height: 6' 2\" (1.88 m)         Constitutional and General Appearance: Warm and dry, no apparent distress, normal coloration   HEENT:  Normocephalic, atraumatic  Respiratory:  Normal excursion and expansion without use of accessory muscles  Resp Auscultation: Normal breath sounds without dullness  Cardiovascular: The apical impulses not displaced  Heart tones are crisp and normal  JVP +cm H2O  irregular rate and rhythm  Peripheral pulses are symmetrical and full  There is no clubbing, cyanosis of the extremities.   Bilateral lower ankle to knee edema   Pedal Pulses: 2+ and equal   Abdomen:  No masses or tenderness  Liver/Spleen: No Abnormalities Noted  Neurological/Psychiatric:  Alert and oriented in all spheres  Moves all extremities well  Exhibits normal gait balance and coordination  No abnormalities of mood, affect, memory, mentation, or behavior are noted    Lab Data:    CBC:   Lab Results   Component Value Date/Time    WBC 4.7 09/07/2022 05:04 AM    WBC 4.8 09/05/2022 01:02 AM    WBC 5.4 08/31/2022 11:17 AM    RBC 4.80 09/07/2022 05:04 AM    RBC 4.15 09/05/2022 01:02 AM    RBC 4.50 08/31/2022 11:17 AM    HGB 13.3 09/07/2022 05:04 AM    HGB 11.5 09/05/2022 01:02 AM    HGB 12.5 08/31/2022 11:17 AM    HCT 40.8 09/07/2022 05:04 AM    HCT 35.4 09/05/2022 01:02 AM    HCT 38.8 08/31/2022 11:17 AM    MCV 85.0 09/07/2022 05:04 AM    MCV 85.3 09/05/2022 01:02 AM    MCV 86.4 08/31/2022 11:17 AM    RDW 16.6 09/07/2022 05:04 AM    RDW 16.8 09/05/2022 01:02 AM    RDW 17.0 08/31/2022 11:17 AM     09/07/2022 05:04 AM     09/05/2022 01:02 AM     08/31/2022 11:17 AM     BMP:  Lab Results   Component Value Date/Time     10/05/2022 10:10 AM     09/21/2022 12:19 PM     09/08/2022 07:34 AM    K 4.0 10/05/2022 10:10 AM    K 3.9 09/21/2022 12:19 PM    K 3.5 09/08/2022 07:34 AM    K 3.6 09/06/2022 04:46 AM    K 4.0 08/28/2022 04:06 AM    K 2.6 07/30/2022 06:43 AM     10/05/2022 10:10 AM     09/21/2022 12:19 PM    CL 87 09/08/2022 07:34 AM    CO2 19 10/05/2022 10:10 AM    CO2 29 09/21/2022 12:19 PM    CO2 43 09/08/2022 07:34 AM    PHOS 3.9 08/31/2022 07:47 PM    PHOS 4.1 07/29/2022 06:18 AM    PHOS 3.7 04/18/2022 02:16 AM    BUN 19 10/05/2022 10:10 AM    BUN 16 09/21/2022 12:19 PM    BUN 22 09/08/2022 07:34 AM    CREATININE 1.1 10/05/2022 10:10 AM    CREATININE 1.3 09/21/2022 12:19 PM    CREATININE 1.2 09/08/2022 07:34 AM     BNP:   Lab Results   Component Value Date/Time    PROBNP 16,841 10/05/2022 10:10 AM    PROBNP 18,881 09/21/2022 12:19 PM    PROBNP 6,355 09/08/2022 07:34 AM     Cardiac Imaging:  CONSUELO Dr Du Mercedes 2/10/22  Preliminary CONSUELO results are:      - Severe LV dysfunction,  EF: 20-25%  - LA dilated. There was HENOK thrombus. - Moderate MR    Cardiac Cath PCI: Dr Skyler Hartman 2/8/2022  Anatomy:   LM-nml   LAD-nml  Cx-nml  OM- nml  RCA-nml  RPDA- nml  LVEF- 10%  LVG- global hypokinesis  LVEDP- 10     Hemodynamics:  RA- mean 3  RV- 37/0  PAWP- 10  PA- 34/12 (20)     C. O.- 5.7 (4.9)  C. I.- 2.6 (2.25)  PA sat 60%  AO sat 91%     Contrast: 70  Flouro Time: 5.3  Access: R radial a, R CFV     Impression  ~Coronary Angiography w/ normal cors  ~LVG with LVEF of 10% and global regional wall motion abnormalities  ~Severe MR  ~Normal CO/CI  ~normal L and R filling pressures     Recommendation  ~Aggressive medical treatment and risk factor modification  ~1. Medications reviewed, no changes at this time. 2. Post cath IVF. Bedrest.  3.Consider CONSUELO for evaluation of MR.   4. Patient has been advised on the importance of regular exercise of at least 20-30 minutes daily alternating with aerobic and isometric activities. 5. Patient counseled about and offered assistance for smoking cessation   6. No indication for cardiac rehab  7. CHF service to evaluate tomorrow.      Echo 11/29/2021  Summary   -Covid+ -Severely reduced global systolic function with an ejection fraction   estimated at 20%. -Severe global hypokinesis with regional variations noted. -Right ventricular systolic function appears to be mildly reduced based on   visual inspection.   -Severe biatrial enlargement.   -Thickened mitral valve without evidence of stenosis. There is   mild-to-moderate mitral regurgitation.   -There is mild-to-moderate tricuspid regurgitation with a RVSP estimation of   37 mmHg. -Diastolic dysfunction with elevated LV filling pressures. Echo 7/25/2019  Summary   -Overall left ventricular systolic function is moderately depressed .   -Ejection fraction is visually estimated to be 30-35 %. -Global left ventricular function moderately reduced.   -Indeterminate diastolic function due to atrial fibrillation and moderate to   severe mitral regurgitation.   -Moderate-to-severe mitral regurgitation .   -Dilated left atrium with a volume of 97 ml.   -Left atrial volume is increased probably due to atrial fibrillation .   -There is mild right ventricular hypertrophy.   -The right ventricle is mildly enlarged.   -Right ventricular systolic pressure of 53 mm Hg consistent with pulmonary   hypertension.   -Moderate to severe tricuspid regurgitation. TAPSE = 1.43   -IVC size is dilated (>2.1 cm) but collapses > 50% with respiration   consistent with elevated RA pressure (8 mmHg). Assessment:    1. Chronic systolic heart failure (HCC)  on ace, bb, digoxin, sglt2 and aldosterone antagonist    2. Non-compliance    3. Homeless    4. PAF (paroxysmal atrial fibrillation) ([facility] Dr Norma rothman   5. Benign essential HTN    6. Dilated cardiomyopathy ([facility]    7.    ICD in place    Plan:   Patient Instructions    scripts at pharmacy today (8 prescriptions)  Tomorrow take metolazone 2 pills (rubber band bottle) tomorrow and then 30 minutes later take the rest of your medications  Blood work next week  RTO in 2 weeks  Use the pill box that I gave you    I walked him to the outpatient pharmacy and pharmacist will fill them today    NYHA 4    I appreciate the opportunity of cooperating in the care of this individual.    TRENT Michelle - CNS, CNS, 10/26/2022, 2:55 PM

## 2022-10-27 ENCOUNTER — TELEPHONE (OUTPATIENT)
Dept: CARDIOLOGY CLINIC | Age: 62
End: 2022-10-27

## 2022-10-27 NOTE — TELEPHONE ENCOUNTER
Tried to reach patient[facility] our call if he has not lost any weight since taking the metolazone.

## 2022-10-27 NOTE — TELEPHONE ENCOUNTER
----- Message from TRENT Haney CNP sent at 10/27/2022  9:29 AM EDT -----  Labs show lots of extra fluid, please ask him to call Friday if he has not lost any wt with metolazone RG gave him.  Juan Madison Detail Level: Detailed Detail Level: Generalized

## 2022-10-29 ENCOUNTER — HOSPITAL ENCOUNTER (OUTPATIENT)
Age: 62
Setting detail: OBSERVATION
Discharge: HOME | End: 2022-10-31
Attending: EMERGENCY MEDICINE | Admitting: STUDENT IN AN ORGANIZED HEALTH CARE EDUCATION/TRAINING PROGRAM
Payer: COMMERCIAL

## 2022-10-29 ENCOUNTER — APPOINTMENT (OUTPATIENT)
Dept: CT IMAGING | Age: 62
End: 2022-10-29
Payer: COMMERCIAL

## 2022-10-29 ENCOUNTER — APPOINTMENT (OUTPATIENT)
Dept: GENERAL RADIOLOGY | Age: 62
End: 2022-10-29
Payer: COMMERCIAL

## 2022-10-29 DIAGNOSIS — N17.9 AKI (ACUTE KIDNEY INJURY) (HCC): ICD-10-CM

## 2022-10-29 DIAGNOSIS — R42 DIZZINESS: Primary | ICD-10-CM

## 2022-10-29 PROBLEM — I50.20 HFREF (HEART FAILURE WITH REDUCED EJECTION FRACTION) (HCC): Status: ACTIVE | Noted: 2022-10-29

## 2022-10-29 PROBLEM — I50.43 ACUTE ON CHRONIC COMBINED SYSTOLIC AND DIASTOLIC HEART FAILURE (HCC): Status: RESOLVED | Noted: 2022-02-06 | Resolved: 2022-10-29

## 2022-10-29 PROBLEM — I50.41 ACUTE COMBINED SYSTOLIC AND DIASTOLIC CONGESTIVE HEART FAILURE (HCC): Status: RESOLVED | Noted: 2022-08-31 | Resolved: 2022-10-29

## 2022-10-29 PROBLEM — I50.32 CHRONIC DIASTOLIC (CONGESTIVE) HEART FAILURE (HCC): Status: ACTIVE | Noted: 2022-10-29

## 2022-10-29 LAB
A/G RATIO: 1.4 (ref 1.1–2.2)
ALBUMIN SERPL-MCNC: 4.2 G/DL (ref 3.4–5)
ALP BLD-CCNC: 108 U/L (ref 40–129)
ALT SERPL-CCNC: 27 U/L (ref 10–40)
ANION GAP SERPL CALCULATED.3IONS-SCNC: 12 MMOL/L (ref 3–16)
AST SERPL-CCNC: 28 U/L (ref 15–37)
BASOPHILS ABSOLUTE: 0 K/UL (ref 0–0.2)
BASOPHILS RELATIVE PERCENT: 0.7 %
BILIRUB SERPL-MCNC: 1.8 MG/DL (ref 0–1)
BILIRUBIN URINE: NEGATIVE
BLOOD, URINE: NEGATIVE
BUN BLDV-MCNC: 33 MG/DL (ref 7–20)
CALCIUM SERPL-MCNC: 9.3 MG/DL (ref 8.3–10.6)
CHLORIDE BLD-SCNC: 95 MMOL/L (ref 99–110)
CLARITY: CLEAR
CO2: 33 MMOL/L (ref 21–32)
COLOR: YELLOW
CREAT SERPL-MCNC: 2 MG/DL (ref 0.8–1.3)
DIGOXIN LEVEL: 0.6 NG/ML (ref 0.8–2)
EOSINOPHILS ABSOLUTE: 0.1 K/UL (ref 0–0.6)
EOSINOPHILS RELATIVE PERCENT: 1.6 %
ETHANOL: NORMAL MG/DL (ref 0–0.08)
GFR SERPL CREATININE-BSD FRML MDRD: 37 ML/MIN/{1.73_M2}
GLUCOSE BLD-MCNC: 117 MG/DL (ref 70–99)
GLUCOSE URINE: 250 MG/DL
HCT VFR BLD CALC: 39.9 % (ref 40.5–52.5)
HEMOGLOBIN: 12.7 G/DL (ref 13.5–17.5)
KETONES, URINE: NEGATIVE MG/DL
LEUKOCYTE ESTERASE, URINE: NEGATIVE
LYMPHOCYTES ABSOLUTE: 1.2 K/UL (ref 1–5.1)
LYMPHOCYTES RELATIVE PERCENT: 19.8 %
MCH RBC QN AUTO: 26.7 PG (ref 26–34)
MCHC RBC AUTO-ENTMCNC: 31.7 G/DL (ref 31–36)
MCV RBC AUTO: 84.1 FL (ref 80–100)
MICROSCOPIC EXAMINATION: ABNORMAL
MONOCYTES ABSOLUTE: 0.6 K/UL (ref 0–1.3)
MONOCYTES RELATIVE PERCENT: 10.7 %
NEUTROPHILS ABSOLUTE: 4 K/UL (ref 1.7–7.7)
NEUTROPHILS RELATIVE PERCENT: 67.2 %
NITRITE, URINE: NEGATIVE
PDW BLD-RTO: 17.7 % (ref 12.4–15.4)
PH UA: 6 (ref 5–8)
PLATELET # BLD: 252 K/UL (ref 135–450)
PMV BLD AUTO: 7.6 FL (ref 5–10.5)
POTASSIUM REFLEX MAGNESIUM: 4 MMOL/L (ref 3.5–5.1)
PRO-BNP: 8902 PG/ML (ref 0–124)
PROTEIN UA: NEGATIVE MG/DL
RBC # BLD: 4.75 M/UL (ref 4.2–5.9)
SODIUM BLD-SCNC: 140 MMOL/L (ref 136–145)
SPECIFIC GRAVITY UA: 1.01 (ref 1–1.03)
TOTAL PROTEIN: 7.2 G/DL (ref 6.4–8.2)
TROPONIN: 0.01 NG/ML
URINE REFLEX TO CULTURE: ABNORMAL
URINE TYPE: ABNORMAL
UROBILINOGEN, URINE: 0.2 E.U./DL
WBC # BLD: 5.9 K/UL (ref 4–11)

## 2022-10-29 PROCEDURE — 83540 ASSAY OF IRON: CPT

## 2022-10-29 PROCEDURE — 96361 HYDRATE IV INFUSION ADD-ON: CPT

## 2022-10-29 PROCEDURE — G0378 HOSPITAL OBSERVATION PER HR: HCPCS

## 2022-10-29 PROCEDURE — 2580000003 HC RX 258: Performed by: STUDENT IN AN ORGANIZED HEALTH CARE EDUCATION/TRAINING PROGRAM

## 2022-10-29 PROCEDURE — 81003 URINALYSIS AUTO W/O SCOPE: CPT

## 2022-10-29 PROCEDURE — 84443 ASSAY THYROID STIM HORMONE: CPT

## 2022-10-29 PROCEDURE — 6370000000 HC RX 637 (ALT 250 FOR IP): Performed by: STUDENT IN AN ORGANIZED HEALTH CARE EDUCATION/TRAINING PROGRAM

## 2022-10-29 PROCEDURE — 85025 COMPLETE CBC W/AUTO DIFF WBC: CPT

## 2022-10-29 PROCEDURE — 96360 HYDRATION IV INFUSION INIT: CPT

## 2022-10-29 PROCEDURE — 99285 EMERGENCY DEPT VISIT HI MDM: CPT

## 2022-10-29 PROCEDURE — 83880 ASSAY OF NATRIURETIC PEPTIDE: CPT

## 2022-10-29 PROCEDURE — 70450 CT HEAD/BRAIN W/O DYE: CPT

## 2022-10-29 PROCEDURE — 71045 X-RAY EXAM CHEST 1 VIEW: CPT

## 2022-10-29 PROCEDURE — 84484 ASSAY OF TROPONIN QUANT: CPT

## 2022-10-29 PROCEDURE — 83550 IRON BINDING TEST: CPT

## 2022-10-29 PROCEDURE — 80053 COMPREHEN METABOLIC PANEL: CPT

## 2022-10-29 PROCEDURE — 36415 COLL VENOUS BLD VENIPUNCTURE: CPT

## 2022-10-29 PROCEDURE — 82607 VITAMIN B-12: CPT

## 2022-10-29 PROCEDURE — 80162 ASSAY OF DIGOXIN TOTAL: CPT

## 2022-10-29 PROCEDURE — 82077 ASSAY SPEC XCP UR&BREATH IA: CPT

## 2022-10-29 PROCEDURE — 82746 ASSAY OF FOLIC ACID SERUM: CPT

## 2022-10-29 PROCEDURE — 83036 HEMOGLOBIN GLYCOSYLATED A1C: CPT

## 2022-10-29 PROCEDURE — 2580000003 HC RX 258: Performed by: PHYSICIAN ASSISTANT

## 2022-10-29 PROCEDURE — 80061 LIPID PANEL: CPT

## 2022-10-29 RX ORDER — METOPROLOL SUCCINATE 25 MG/1
12.5 TABLET, EXTENDED RELEASE ORAL 2 TIMES DAILY
Status: DISCONTINUED | OUTPATIENT
Start: 2022-10-29 | End: 2022-10-31 | Stop reason: HOSPADM

## 2022-10-29 RX ORDER — ATORVASTATIN CALCIUM 40 MG/1
40 TABLET, FILM COATED ORAL NIGHTLY
Status: DISCONTINUED | OUTPATIENT
Start: 2022-10-29 | End: 2022-10-31 | Stop reason: HOSPADM

## 2022-10-29 RX ORDER — SODIUM CHLORIDE 9 MG/ML
INJECTION, SOLUTION INTRAVENOUS PRN
Status: DISCONTINUED | OUTPATIENT
Start: 2022-10-29 | End: 2022-10-31 | Stop reason: HOSPADM

## 2022-10-29 RX ORDER — SODIUM CHLORIDE 0.9 % (FLUSH) 0.9 %
5-40 SYRINGE (ML) INJECTION EVERY 12 HOURS SCHEDULED
Status: DISCONTINUED | OUTPATIENT
Start: 2022-10-29 | End: 2022-10-31 | Stop reason: HOSPADM

## 2022-10-29 RX ORDER — DIGOXIN 125 MCG
125 TABLET ORAL DAILY
Status: DISCONTINUED | OUTPATIENT
Start: 2022-10-30 | End: 2022-10-31 | Stop reason: HOSPADM

## 2022-10-29 RX ORDER — 0.9 % SODIUM CHLORIDE 0.9 %
1000 INTRAVENOUS SOLUTION INTRAVENOUS ONCE
Status: DISCONTINUED | OUTPATIENT
Start: 2022-10-29 | End: 2022-10-29

## 2022-10-29 RX ORDER — ONDANSETRON 4 MG/1
4 TABLET, ORALLY DISINTEGRATING ORAL EVERY 8 HOURS PRN
Status: DISCONTINUED | OUTPATIENT
Start: 2022-10-29 | End: 2022-10-31 | Stop reason: HOSPADM

## 2022-10-29 RX ORDER — PANTOPRAZOLE SODIUM 40 MG/1
40 TABLET, DELAYED RELEASE ORAL
Status: DISCONTINUED | OUTPATIENT
Start: 2022-10-30 | End: 2022-10-31 | Stop reason: HOSPADM

## 2022-10-29 RX ORDER — ONDANSETRON 2 MG/ML
4 INJECTION INTRAMUSCULAR; INTRAVENOUS EVERY 6 HOURS PRN
Status: DISCONTINUED | OUTPATIENT
Start: 2022-10-29 | End: 2022-10-31 | Stop reason: HOSPADM

## 2022-10-29 RX ORDER — ACETAMINOPHEN 650 MG/1
650 SUPPOSITORY RECTAL EVERY 6 HOURS PRN
Status: DISCONTINUED | OUTPATIENT
Start: 2022-10-29 | End: 2022-10-31 | Stop reason: HOSPADM

## 2022-10-29 RX ORDER — SODIUM CHLORIDE 0.9 % (FLUSH) 0.9 %
5-40 SYRINGE (ML) INJECTION PRN
Status: DISCONTINUED | OUTPATIENT
Start: 2022-10-29 | End: 2022-10-31 | Stop reason: HOSPADM

## 2022-10-29 RX ORDER — POLYETHYLENE GLYCOL 3350 17 G/17G
17 POWDER, FOR SOLUTION ORAL DAILY PRN
Status: DISCONTINUED | OUTPATIENT
Start: 2022-10-29 | End: 2022-10-31 | Stop reason: HOSPADM

## 2022-10-29 RX ORDER — ACETAMINOPHEN 325 MG/1
650 TABLET ORAL EVERY 6 HOURS PRN
Status: DISCONTINUED | OUTPATIENT
Start: 2022-10-29 | End: 2022-10-31 | Stop reason: HOSPADM

## 2022-10-29 RX ORDER — POTASSIUM CHLORIDE 7.45 MG/ML
10 INJECTION INTRAVENOUS PRN
Status: DISCONTINUED | OUTPATIENT
Start: 2022-10-29 | End: 2022-10-31 | Stop reason: HOSPADM

## 2022-10-29 RX ORDER — SODIUM CHLORIDE, SODIUM LACTATE, POTASSIUM CHLORIDE, CALCIUM CHLORIDE 600; 310; 30; 20 MG/100ML; MG/100ML; MG/100ML; MG/100ML
INJECTION, SOLUTION INTRAVENOUS CONTINUOUS
Status: DISCONTINUED | OUTPATIENT
Start: 2022-10-29 | End: 2022-10-31 | Stop reason: HOSPADM

## 2022-10-29 RX ORDER — POTASSIUM CHLORIDE 20 MEQ/1
40 TABLET, EXTENDED RELEASE ORAL PRN
Status: DISCONTINUED | OUTPATIENT
Start: 2022-10-29 | End: 2022-10-31 | Stop reason: HOSPADM

## 2022-10-29 RX ORDER — 0.9 % SODIUM CHLORIDE 0.9 %
1000 INTRAVENOUS SOLUTION INTRAVENOUS ONCE
Status: COMPLETED | OUTPATIENT
Start: 2022-10-29 | End: 2022-10-29

## 2022-10-29 RX ORDER — MAGNESIUM SULFATE IN WATER 40 MG/ML
2000 INJECTION, SOLUTION INTRAVENOUS PRN
Status: DISCONTINUED | OUTPATIENT
Start: 2022-10-29 | End: 2022-10-31 | Stop reason: HOSPADM

## 2022-10-29 RX ORDER — VITAMIN B COMPLEX
2000 TABLET ORAL DAILY
Status: DISCONTINUED | OUTPATIENT
Start: 2022-10-30 | End: 2022-10-31 | Stop reason: HOSPADM

## 2022-10-29 RX ORDER — TAMSULOSIN HYDROCHLORIDE 0.4 MG/1
0.4 CAPSULE ORAL DAILY
Status: DISCONTINUED | OUTPATIENT
Start: 2022-10-30 | End: 2022-10-31 | Stop reason: HOSPADM

## 2022-10-29 RX ADMIN — APIXABAN 5 MG: 5 TABLET, FILM COATED ORAL at 23:50

## 2022-10-29 RX ADMIN — SODIUM CHLORIDE, POTASSIUM CHLORIDE, SODIUM LACTATE AND CALCIUM CHLORIDE: 600; 310; 30; 20 INJECTION, SOLUTION INTRAVENOUS at 22:58

## 2022-10-29 RX ADMIN — Medication 10 ML: at 22:58

## 2022-10-29 RX ADMIN — SODIUM CHLORIDE 1000 ML: 9 INJECTION, SOLUTION INTRAVENOUS at 18:28

## 2022-10-29 RX ADMIN — ATORVASTATIN CALCIUM 40 MG: 40 TABLET, FILM COATED ORAL at 23:50

## 2022-10-29 ASSESSMENT — ENCOUNTER SYMPTOMS
VOMITING: 0
EYE PAIN: 0
CONSTIPATION: 0
SHORTNESS OF BREATH: 0
NAUSEA: 0
RHINORRHEA: 0
BACK PAIN: 0
ABDOMINAL PAIN: 0
SORE THROAT: 0
COUGH: 0
DIARRHEA: 0

## 2022-10-29 ASSESSMENT — PAIN - FUNCTIONAL ASSESSMENT
PAIN_FUNCTIONAL_ASSESSMENT: NONE - DENIES PAIN

## 2022-10-29 NOTE — H&P
Hospital Medicine History & Physical        Name:  Melva Carney  /Age/Sex: 1960  (59 y.o. male)  MRN & CSN:  [id number] & [id number]     PCP: No primary care provider on file. Date of Admission: 10/29/2022    Date of Service: Pt seen/examined on 10/29/2022    Patient Status:  Observation - Patient can most likely be adequately treated within 2 midnights from hospital admission     Chief Complaint:    Chief Complaint   Patient presents with    Dizziness     C/o blurred vision and dizziness that started approx 1200. Currently on Eliquis. Denies speech impairment. History Of Present Illness:     59 y.o. male with PMHx of HFrEF, diastolic HF, HTN, HLD, Afib on anticoagulation with ICD who presented to [facility] with complaints of dizziness. Incredibly pleasant gentleman. Patient states that he has felt dizzy and had vision changes for the last few hours. He states that \"he could not see as lights as were to bright. \"  He denies losing vision. He denies syncope, lightheadedness, or falling. He has never had this happen to him before. He states he has had drastically increased urine output the last 4 days after his PCP placed him on a pill, for which she could not remember, that would help his swelling. Denies chest pain, shortness of breath, nausea, vomiting, GI/ symptoms. He states the edema in his lower extremities have drastically decreased over the last 4 days. Denies tobacco, alcohol, illicit drug use.     Past Medical History:          Diagnosis Date    Acute combined systolic and diastolic congestive heart failure ([address] 2022    Atrial fibrillation ([address] 2019    CHF (congestive heart failure) (HCC)     Hx of blood clots     Hypertension        Past Surgical History:          Procedure Laterality Date    CARDIOVERSION  2019    Dr. Yudith Salgado  2019       Medications Prior to Admission:      Prior to Admission medications    Medication Sig Start Date End Date Taking? Authorizing Provider   spironolactone (ALDACTONE) 25 MG tablet Take 1 tablet by mouth daily 10/26/22   TRENT Parker   empagliflozin (JARDIANCE) 10 MG tablet Take 1 tablet by mouth daily 10/26/22   TRENT Parker   lisinopril (PRINIVIL;ZESTRIL) 2.5 MG tablet Take 1 tablet by mouth daily 10/26/22   TRENT Parker   omeprazole (PRILOSEC) 20 MG delayed release capsule Take 1 capsule by mouth every morning (before breakfast) 10/26/22   TRENT Parker   atorvastatin (LIPITOR) 40 MG tablet Take 1 tablet by mouth nightly 10/5/22   TRENT Ulloa   metOLazone (ZAROXOLYN) 5 MG tablet Take 1 tablet by mouth as needed (prn) 10/5/22   TRENT Ulloa   vitamin D (CHOLECALCIFEROL) 50 MCG (2000 UT) TABS tablet Take 1 tablet by mouth daily 9/21/22   TRENT Parker   digoxin (LANOXIN) 125 MCG tablet Take 1 tablet by mouth daily 9/9/22   TRENT Dorado CNP   torsemide 40 MG TABS Take 40 mg by mouth 2 times daily 9/8/22   TRENT Dorado CNP   ondansetron (ZOFRAN ODT) 4 MG disintegrating tablet Take 1-2 tablets by mouth every 12 hours as needed for Nausea  Patient not taking: No sig reported 8/28/22   Cheryl Alicea MD   metoprolol succinate (TOPROL XL) 25 MG extended release tablet Take 0.5 tablets by mouth in the morning and at bedtime 8/1/22   Alotn Sánchez MD   potassium chloride (KLOR-CON M) 20 MEQ extended release tablet Take 1 tablet by mouth in the morning and 1 tablet in the evening. Take with meals.  8/1/22 9/8/22  Alton Sánchez MD   apixaban (ELIQUIS) 5 MG TABS tablet Take 1 tablet by mouth 2 times daily 6/17/22   TRENT Dorado CNP   tamsulosin [facility]) 0.4 MG capsule Take 1 capsule by mouth daily 6/18/22   Kee Letters, APRN - CNP   finasteride (PROSCAR) 5 MG tablet Take 1 tablet by mouth daily  Patient not taking: No sig reported 5/31/22 TRENT Morris - CNS       Allergies:  Patient has no known allergies. Social History:      The patient currently lives at home    TOBACCO:   reports that he has never smoked. He has never been exposed to tobacco smoke. He has never used smokeless tobacco.  ETOH:   reports no history of alcohol use. E-cigarette/Vaping       Questions Responses    E-cigarette/Vaping Use Never User    Start Date     Passive Exposure     Quit Date     Counseling Given     Comments               Family History:      Reviewed and negative in regards to presenting illness/complaint. Problem Relation Age of Onset    Heart Disease Mother     High Blood Pressure Mother     Heart Attack Mother     Other Father     Stroke Father     High Blood Pressure Father        REVIEW OF SYSTEMS COMPLETED:   Pertinent positives as noted in the HPI. All other systems reviewed and negative. PHYSICAL EXAM PERFORMED:    /83   Pulse 83   Temp 97.8 °F (36.6 °C) (Oral)   Resp 20   Ht 6' 2\" (1.88 m)   Wt 211 lb (95.7 kg)   SpO2 97%   BMI 27.09 kg/m²     General appearance:  No apparent distress, appears stated age and cooperative. HEENT:  Normal cephalic, atraumatic without obvious deformity. Pupils equal, round, and reactive to light. Extra ocular muscles intact. Conjunctivae/corneas clear. Neck: Supple, with full range of motion. No jugular venous distention. Trachea midline. Respiratory:  Normal respiratory effort. Clear to auscultation, bilaterally without Rales/Wheezes/Rhonchi. Cardiovascular:  Regular rate and irregularly irregular rhythm with normal S1/S2 without murmurs, rubs or gallops. No peripheral edema. Abdomen: Soft, non-tender, non-distended with normal bowel sounds. : No CVA tenderness  Musculoskeletal:  No clubbing or cyanosis. Full range of motion without deformity. Skin: Skin color, texture, turgor normal.  No rashes or lesions.   Neurologic:  Neurovascularly intact without any focal sensory/motor deficits. Cranial nerves: II-XII intact, grossly non-focal.  Psychiatric:  Alert and oriented, thought content appropriate, normal insight  Peripheral Pulses: +2 palpable, equal bilaterally       Labs:     Recent Labs     10/29/22  1611   WBC 5.9   HGB 12.7*   HCT 39.9*        Recent Labs     10/29/22  1611      K 4.0   CL 95*   CO2 33*   BUN 33*   CREATININE 2.0*   CALCIUM 9.3     Recent Labs     10/29/22  1611   AST 28   ALT 27   BILITOT 1.8*   ALKPHOS 108     No results for input(s): INR in the last 72 hours. Recent Labs     10/29/22  1611   TROPONINI 0.01       Urinalysis:      Lab Results   Component Value Date/Time    NITRU Negative 10/29/2022 04:07 PM    WBCUA 1 06/17/2022 05:00 PM    BACTERIA None Seen 06/17/2022 05:00 PM    RBCUA 7 06/17/2022 05:00 PM    BLOODU Negative 10/29/2022 04:07 PM    SPECGRAV 1.010 10/29/2022 04:07 PM    GLUCOSEU 250 10/29/2022 04:07 PM       Radiology:     CXR: I have reviewed the CXR with the following interpretation: non acute; stable cardiac enlargement  EKG:  I have reviewed the EKG with the following interpretation: ordered    CT HEAD WO CONTRAST   Final Result   Mild small vessel ischemic disease. XR CHEST PORTABLE   Final Result   1. No acute pulmonary abnormality. 2. Stable mild to moderate enlargement of the cardiac silhouette. CTA HEAD NECK W CONTRAST    (Results Pending)       Medications:  Not in a hospital admission.   Current Facility-Administered Medications   Medication Dose Route Frequency Provider Last Rate Last Admin    apixaban (ELIQUIS) tablet 5 mg  5 mg Oral BID Patricia Dominic, DO        atorvastatin (LIPITOR) tablet 40 mg  40 mg Oral Nightly Allan Pritchett DO        digoxin (LANOXIN) tablet 125 mcg  125 mcg Oral Daily Allan Pritchett DO        metoprolol succinate (TOPROL XL) extended release tablet 12.5 mg  12.5 mg Oral BID Allan Pritchett DO        [START ON 10/30/2022] pantoprazole (PROTONIX) tablet 40 mg  40 mg Oral QAM AC Carlin Read,         tamsulosin (FLOMAX) capsule 0.4 mg  0.4 mg Oral Daily Carlin Read DO        vitamin D (CHOLECALCIFEROL) tablet 2,000 Units  2,000 Units Oral Daily Carlin Read,          Current Outpatient Medications   Medication Sig Dispense Refill    spironolactone (ALDACTONE) 25 MG tablet Take 1 tablet by mouth daily 90 tablet 0    empagliflozin (JARDIANCE) 10 MG tablet Take 1 tablet by mouth daily 90 tablet 1    lisinopril (PRINIVIL;ZESTRIL) 2.5 MG tablet Take 1 tablet by mouth daily 90 tablet 1    omeprazole (PRILOSEC) 20 MG delayed release capsule Take 1 capsule by mouth every morning (before breakfast) 90 capsule 1    atorvastatin (LIPITOR) 40 MG tablet Take 1 tablet by mouth nightly 30 tablet 2    metOLazone (ZAROXOLYN) 5 MG tablet Take 1 tablet by mouth as needed (prn) 2 tablet 0    vitamin D (CHOLECALCIFEROL) 50 MCG (2000 UT) TABS tablet Take 1 tablet by mouth daily 90 tablet 1    digoxin (LANOXIN) 125 MCG tablet Take 1 tablet by mouth daily 30 tablet 3    torsemide 40 MG TABS Take 40 mg by mouth 2 times daily 30 tablet 3    ondansetron (ZOFRAN ODT) 4 MG disintegrating tablet Take 1-2 tablets by mouth every 12 hours as needed for Nausea (Patient not taking: No sig reported) 12 tablet 0    metoprolol succinate (TOPROL XL) 25 MG extended release tablet Take 0.5 tablets by mouth in the morning and at bedtime 30 tablet 3    apixaban (ELIQUIS) 5 MG TABS tablet Take 1 tablet by mouth 2 times daily 60 tablet 3    tamsulosin (FLOMAX) 0.4 MG capsule Take 1 capsule by mouth daily 30 capsule 3    finasteride (PROSCAR) 5 MG tablet Take 1 tablet by mouth daily (Patient not taking: No sig reported) 30 tablet 0       Consults:    None    ASSESSMENT:    Active Hospital Problems    Diagnosis Date Noted    HFrEF (heart failure with reduced ejection fraction) ([facility] [I50.20] 10/29/2022     Priority: Medium    Chronic diastolic (congestive) heart failure ([facility] [I50.32] 10/29/2022     Priority: Medium Dizziness [R42] 10/29/2022     Priority: Medium    ICD (implantable cardioverter-defibrillator), biventricular, in situ [Z95.810] 04/26/2022    Chronic atrial fibrillation (HCC) [I48.20]     Dilated cardiomyopathy ([facility] [I42.0]     SACHIN (acute kidney injury) ([facility] [N17.9]     Benign essential HTN [I10]          PLAN:  This is a 59 y.o. male who presented to [facility] and is being treated for:    Dizziness  -Likely 2/2 overdiuresis  -CT head showed mild small vessel ischemic disease  -CTA head neck ordered - on hold given SACHIN  -Carotid U/S ordered  -Consider MRI head  -Hold home torsemide and metolazone  -Gentle IV hydration given severe HFrEF    SACHIN  -Cr 2.0 on admission; baseline ~1.1-1.3  -Perform CTA head neck when Cr lowers  -Monitor Cr daily  -Avoid nephrotoxins  -Hold home lisinopril, metolazone, aldactone, and torsemide given SACHIN - believe patient is dehydrated  -Gentle IVF    HFrEF and diastolic HF  -Echo 2/57/24 showed LVEF 20-25% with severe diffuse hypokinesis and moderate MR  -s/p ICD in place  -No need to repeat echo at this time  -I/Os  -Hold off on metolazone and torsemide for now  -Continue gentle IVF    HTN  -Hold home lisinopril, metolazone, aldactone, and torsemide given SACHIN - believe patient is dehydrated  -Continue home Toprol  -Restart other antihypertensives when able  -Monitor BP    Afib  -Continue home Eliquis and Digoxin  -Low digoxin level on admission - 0.6      DVT ppx: Oral anticoagulation - eliquis  GI ppx: PPI  Diet: No diet orders on file  Code Status: Prior    PT/OT Eval Status: Ordered    Disposition - home after medical stabilization and treatment       Noreen Benz DO  10/29/2022  7:39 PM    Please note that some part of this chart was generated using Dragon dictation software. Although every effort was made to ensure the accuracy of this automated transcription, some errors in transcription may have occurred inadvertently.  If you may need any clarification, please do not hesitate to contact me through [facility].

## 2022-10-29 NOTE — ED PROVIDER NOTES
[address]        Pt Name: Leobardo Morelos  MRN: [id number]  [place] 1960  Date of evaluation: 10/29/2022  Provider: BISI Monterroso  PCP: No primary care provider on file. Note Started: 3:56 PM EDT        I have seen and evaluated this patient with my supervising physician Theodora Verdin MD.    [facility]       Chief Complaint   Patient presents with    Dizziness     C/o blurred vision and dizziness that started approx 1200. Currently on Eliquis. Denies speech impairment. HISTORY OF PRESENT ILLNESS   (Location, Timing/Onset, Context/Setting, Quality, Duration, Modifying Factors, Severity, Associated Signs and Symptoms)  Note limiting factors. Chief Complaint: Dizziness and blurry vision    Leobardo Morelos is a 59 y.o. male who presents to the emergency department due to dizziness and blurry vision with a history of A. fib, CHF, history of blood clots, hypertension. Patient states that he was helping his friend work on his car and noticed that he developed some lightheadedness and dizziness especially when he stands up. Patient states that he also had blurry vision stating it was like a white out that would last a few seconds and then resolve. Currently in the emergency department he is not having any blurry vision. Patient states that he has not missed any of his medications and actually has them in his pocket here in the emergency department. Patient denies any chest pain, shortness of breath or difficulty breathing. Patient denies any recent head traumas or falls. Patient states this started approximately 3 hours prior to arrival.    Nursing Notes were all reviewed and agreed with or any disagreements were addressed in the HPI. REVIEW OF SYSTEMS    (2-9 systems for level 4, 10 or more for level 5)     Review of Systems   Constitutional:  Negative for chills, diaphoresis and fever.    HENT:  Negative for congestion, rhinorrhea and sore throat. Eyes:  Positive for visual disturbance. Negative for pain. Respiratory:  Negative for cough and shortness of breath. Cardiovascular:  Negative for chest pain and leg swelling. Gastrointestinal:  Negative for abdominal pain, constipation, diarrhea, nausea and vomiting. Genitourinary:  Negative for difficulty urinating, dysuria and frequency. Musculoskeletal:  Negative for back pain and neck pain. Skin:  Negative for rash and wound. Neurological:  Positive for dizziness and light-headedness. Positives and Pertinent negatives as per HPI. Except as noted above in the ROS, all other systems were reviewed and negative.        PAST MEDICAL HISTORY     Past Medical History:   Diagnosis Date    Acute combined systolic and diastolic congestive heart failure ([facility] 8/31/2022    Atrial fibrillation ([facility] [address] 07/25/2019    CHF (congestive heart failure) (HCC)     Hx of blood clots     Hypertension          SURGICAL HISTORY     Past Surgical History:   Procedure Laterality Date    CARDIOVERSION  07/26/2019    Dr. Yudith Salgado  07/26/2019         CURRENTMEDICATIONS       Current Discharge Medication List        CONTINUE these medications which have NOT CHANGED    Details   spironolactone (ALDACTONE) 25 MG tablet Take 1 tablet by mouth daily  Qty: 90 tablet, Refills: 0      empagliflozin (JARDIANCE) 10 MG tablet Take 1 tablet by mouth daily  Qty: 90 tablet, Refills: 1      lisinopril (PRINIVIL;ZESTRIL) 2.5 MG tablet Take 1 tablet by mouth daily  Qty: 90 tablet, Refills: 1      omeprazole (PRILOSEC) 20 MG delayed release capsule Take 1 capsule by mouth every morning (before breakfast)  Qty: 90 capsule, Refills: 1      atorvastatin (LIPITOR) 40 MG tablet Take 1 tablet by mouth nightly  Qty: 30 tablet, Refills: 2      metOLazone (ZAROXOLYN) 5 MG tablet Take 1 tablet by mouth as needed (prn)  Qty: 2 tablet, Refills: 0      vitamin D (CHOLECALCIFEROL) 50 MCG (2000 UT) TABS tablet Take 1 tablet by mouth daily  Qty: 90 tablet, Refills: 1      digoxin (LANOXIN) 125 MCG tablet Take 1 tablet by mouth daily  Qty: 30 tablet, Refills: 3      torsemide 40 MG TABS Take 40 mg by mouth 2 times daily  Qty: 30 tablet, Refills: 3      ondansetron (ZOFRAN ODT) 4 MG disintegrating tablet Take 1-2 tablets by mouth every 12 hours as needed for Nausea  Qty: 12 tablet, Refills: 0      metoprolol succinate (TOPROL XL) 25 MG extended release tablet Take 0.5 tablets by mouth in the morning and at bedtime  Qty: 30 tablet, Refills: 3      apixaban (ELIQUIS) 5 MG TABS tablet Take 1 tablet by mouth 2 times daily  Qty: 60 tablet, Refills: 3      tamsulosin (FLOMAX) 0.4 MG capsule Take 1 capsule by mouth daily  Qty: 30 capsule, Refills: 3      finasteride (PROSCAR) 5 MG tablet Take 1 tablet by mouth daily  Qty: 30 tablet, Refills: 0               ALLERGIES     Patient has no known allergies. FAMILYHISTORY       Family History   Problem Relation Age of Onset    Heart Disease Mother     High Blood Pressure Mother     Heart Attack Mother     Other Father     Stroke Father     High Blood Pressure Father           SOCIAL HISTORY       Social History     Tobacco Use    Smoking status: Never     Passive exposure: Never    Smokeless tobacco: Never   Vaping Use    Vaping Use: Never used   Substance Use Topics    Alcohol use: Never    Drug use: Never       SCREENINGS    [place] Coma Scale  Eye Opening: Spontaneous  Best Verbal Response: Oriented  Best Motor Response: Obeys commands  [place] Coma Scale Score: 15        PHYSICAL EXAM    (up to 7 for level 4, 8 or more for level 5)     ED Triage Vitals [10/29/22 1540]   BP Temp Temp Source Heart Rate Resp SpO2 Height Weight   107/71 97.8 °F (36.6 °C) Oral 52 18 96 % 6' 2\" (1.88 m) 211 lb (95.7 kg)       Physical Exam  Vitals and nursing note reviewed. Constitutional:       General: He is not in acute distress. Appearance: He is normal weight.  He is not ill-appearing or toxic-appearing. HENT:      Head: Normocephalic. Mouth/Throat:      Mouth: Mucous membranes are moist.      Pharynx: Oropharynx is clear. No oropharyngeal exudate or posterior oropharyngeal erythema. Eyes:      Extraocular Movements: Extraocular movements intact. Pupils: Pupils are equal, round, and reactive to light. Cardiovascular:      Rate and Rhythm: Normal rate and regular rhythm. Pulses: Normal pulses. Heart sounds: Normal heart sounds. No murmur heard. No friction rub. No gallop. Pulmonary:      Effort: Pulmonary effort is normal. No respiratory distress. Breath sounds: Normal breath sounds. No stridor. No wheezing. Abdominal:      General: Bowel sounds are normal. There is no distension. Palpations: There is no mass. Musculoskeletal:      Cervical back: Normal range of motion and neck supple. Right lower leg: No edema. Left lower leg: No edema. Skin:     Coloration: Skin is not jaundiced or pale. Findings: No bruising, erythema, lesion or rash. Neurological:      Mental Status: He is alert and oriented to person, place, and time. GCS: GCS eye subscore is 4. GCS verbal subscore is 5. GCS motor subscore is 6. Cranial Nerves: Cranial nerves 2-12 are intact. Sensory: Sensation is intact. Motor: Motor function is intact. Coordination: Romberg sign negative. Gait: Gait is intact.       Comments: Negative test of skew, negative truncal instability   Psychiatric:         Mood and Affect: Mood normal.         Behavior: Behavior normal.       DIAGNOSTIC RESULTS   LABS:    Labs Reviewed   CBC WITH AUTO DIFFERENTIAL - Abnormal; Notable for the following components:       Result Value    Hemoglobin 12.7 (*)     Hematocrit 39.9 (*)     RDW 17.7 (*)     All other components within normal limits   COMPREHENSIVE METABOLIC PANEL W/ REFLEX TO MG FOR LOW K - Abnormal; Notable for the following components:    Chloride 95 (*)     CO2 33 (*)     Glucose 117 (*)     BUN 33 (*)     Creatinine 2.0 (*)     Est, Glom Filt Rate 37 (*)     Total Bilirubin 1.8 (*)     All other components within normal limits   URINALYSIS WITH REFLEX TO CULTURE - Abnormal; Notable for the following components:    Glucose, Ur 250 (*)     All other components within normal limits   DIGOXIN LEVEL - Abnormal; Notable for the following components:    Digoxin Lvl 0.6 (*)     All other components within normal limits   BRAIN NATRIURETIC PEPTIDE - Abnormal; Notable for the following components:    Pro-BNP 8,902 (*)     All other components within normal limits   TROPONIN   ETHANOL   LACTIC ACID   RENAL FUNCTION PANEL   TSH WITH REFLEX   CBC WITH AUTO DIFFERENTIAL   HEMOGLOBIN A1C   LIPID PANEL   IRON AND TIBC   VITAMIN B12 & FOLATE   MAGNESIUM       When ordered only abnormal lab results are displayed. All other labs were within normal range or not returned as of this dictation. EKG: When ordered, EKG's are interpreted by the Emergency Department Physician in the absence of a cardiologist.  Please see their note for interpretation of EKG. RADIOLOGY:   Non-plain film images such as CT, Ultrasound and MRI are read by the radiologist. Plain radiographic images are visualized and preliminarily interpreted by the ED Provider with the below findings:        Interpretation per the Radiologist below, if available at the time of this note:    CT HEAD WO CONTRAST   Final Result   Mild small vessel ischemic disease. XR CHEST PORTABLE   Final Result   1. No acute pulmonary abnormality. 2. Stable mild to moderate enlargement of the cardiac silhouette. CTA HEAD NECK W CONTRAST    (Results Pending)   VL DUP CAROTID BILATERAL    (Results Pending)     No results found.         PROCEDURES   Unless otherwise noted below, none     Procedures    CRITICAL CARE TIME       CONSULTS:  None      EMERGENCY DEPARTMENT COURSE and DIFFERENTIAL DIAGNOSIS/MDM:   Vitals: Vitals:    10/29/22 1819 10/29/22 1822 10/29/22 1823 10/29/22 2115   BP: 94/76 93/82 105/83 119/82   Pulse: 67 66 83 74   Resp: 23 23 20 20   Temp:    97.6 °F (36.4 °C)   TempSrc:    Oral   SpO2: 97%   97%   Weight:       Height:           Patient was given the following medications:  Medications   apixaban (ELIQUIS) tablet 5 mg (has no administration in time range)   atorvastatin (LIPITOR) tablet 40 mg (has no administration in time range)   digoxin (LANOXIN) tablet 125 mcg (has no administration in time range)   metoprolol succinate (TOPROL XL) extended release tablet 12.5 mg (has no administration in time range)   pantoprazole (PROTONIX) tablet 40 mg (has no administration in time range)   tamsulosin (FLOMAX) capsule 0.4 mg (has no administration in time range)   vitamin D (CHOLECALCIFEROL) tablet 2,000 Units (has no administration in time range)   sodium chloride flush 0.9 % injection 5-40 mL (has no administration in time range)   sodium chloride flush 0.9 % injection 5-40 mL (has no administration in time range)   0.9 % sodium chloride infusion (has no administration in time range)   ondansetron (ZOFRAN-ODT) disintegrating tablet 4 mg (has no administration in time range)     Or   ondansetron (ZOFRAN) injection 4 mg (has no administration in time range)   polyethylene glycol (GLYCOLAX) packet 17 g (has no administration in time range)   acetaminophen (TYLENOL) tablet 650 mg (has no administration in time range)     Or   acetaminophen (TYLENOL) suppository 650 mg (has no administration in time range)   magnesium sulfate 2000 mg in 50 mL IVPB premix (has no administration in time range)   potassium chloride (KLOR-CON M) extended release tablet 40 mEq (has no administration in time range)     Or   potassium bicarb-citric acid (EFFER-K) effervescent tablet 40 mEq (has no administration in time range)     Or   potassium chloride 10 mEq/100 mL IVPB (Peripheral Line) (has no administration in time range)   lactated ringers infusion (has no administration in time range)   0.9 % sodium chloride bolus (0 mLs IntraVENous Stopped 10/29/22 2132)         Is this patient to be included in the SEP-1 Core Measure due to severe sepsis or septic shock? No   Exclusion criteria - the patient is NOT to be included for SEP-1 Core Measure due to: Infection is not suspected    78-year-old male presents emergency department due to dizziness and blurry vision starting today around 3:00. Patient was also noted to be hypotensive than what he normally is. On exam patient otherwise has a reassuring neuro exam. CT scan of the head without contrast was ordered showing mild small vessel ischemic disease. Unable to obtain CTA of head and neck with contrast due to creatinine being 2.0. This is above patient's baseline. SACHIN. Patient's rest of work-up was otherwise were unremarkable. Patient will be admitted to the hospital for further evaluation for his dizziness, and SACHIN. FINAL IMPRESSION      1. Dizziness    2. SACHIN (acute kidney injury) [facility])          DISPOSITION/PLAN   DISPOSITION Admitted 10/29/2022 08:02:36 PM      PATIENT REFERRED TO:  No follow-up provider specified.     DISCHARGE MEDICATIONS:  Current Discharge Medication List          DISCONTINUED MEDICATIONS:  Current Discharge Medication List        STOP taking these medications       potassium chloride (KLOR-CON M) 20 MEQ extended release tablet Comments:   Reason for Stopping:                      (Please note that portions of this note were completed with a voice recognition program.  Efforts were made to edit the dictations but occasionally words are mis-transcribed.)    BISI Barlow (electronically signed)           BISI Barlow  10/29/22 2152

## 2022-10-29 NOTE — ED NOTES
Dizzy and eyes became bright then fading then couldn't see. Started a few hours ago. Pt denies dizzy and visual disturbance at this time.       Dory Collins RN  10/29/22 2985

## 2022-10-29 NOTE — ACP (ADVANCE CARE PLANNING)
Advanced Care Planning Note. Purpose of Encounter: Advanced care planning in light of SACHIN and dizziness. Parties In Attendance: Patient,   Decisional Capacity: Yes  Subjective: Dizziness  Objective: Cr 2.0  Goals of Care Determination: Patient/POA wishes to seek full treatment  Plan:  IVF. Labs. Imaging. Code Status: Full code   Time spent on Advanced care Plannin minutes  Advanced Care Planning Documents: Completed advanced directives on chart. At this time, patient does not have a POA. His parents are . He has no children. He has a brother and a sister, neither of whom he is very close with. Patient states he is going to make some phone calls to try and determine who would be a good POA for him.     Mi Jeff DO  10/29/2022 7:38 PM

## 2022-10-29 NOTE — ED PROVIDER NOTES
I independently performed a history and physical on Suzie Gao. All diagnostic, treatment, and disposition decisions were made by myself in conjunction with the advanced practice provider. For further details of Josh Mistry emergency department encounter, please see BISI Lundberg's documentation. Patient reports that he is having dizziness and blurry vision particular when standing up especially if he has been leaning over. He denies any chest pain or shortness of breath. On exam heart regular rate and rhythm. Patient with stable gait and station, no ataxia noted. Labs Reviewed   CBC WITH AUTO DIFFERENTIAL - Abnormal; Notable for the following components:       Result Value    Hemoglobin 12.7 (*)     Hematocrit 39.9 (*)     RDW 17.7 (*)     All other components within normal limits   COMPREHENSIVE METABOLIC PANEL W/ REFLEX TO MG FOR LOW K - Abnormal; Notable for the following components:    Chloride 95 (*)     CO2 33 (*)     Glucose 117 (*)     BUN 33 (*)     Creatinine 2.0 (*)     Est, Glom Filt Rate 37 (*)     Total Bilirubin 1.8 (*)     All other components within normal limits   URINALYSIS WITH REFLEX TO CULTURE - Abnormal; Notable for the following components:    Glucose, Ur 250 (*)     All other components within normal limits   DIGOXIN LEVEL - Abnormal; Notable for the following components:    Digoxin Lvl 0.6 (*)     All other components within normal limits   BRAIN NATRIURETIC PEPTIDE - Abnormal; Notable for the following components:    Pro-BNP 8,902 (*)     All other components within normal limits   TROPONIN   ETHANOL   LACTIC ACID   RENAL FUNCTION PANEL   TSH WITH REFLEX   CBC WITH AUTO DIFFERENTIAL   HEMOGLOBIN A1C   LIPID PANEL   IRON AND TIBC   VITAMIN B12 & FOLATE   MAGNESIUM     CT HEAD WO CONTRAST   Final Result   Mild small vessel ischemic disease. XR CHEST PORTABLE   Final Result   1. No acute pulmonary abnormality.    2. Stable mild to moderate enlargement

## 2022-10-30 ENCOUNTER — APPOINTMENT (OUTPATIENT)
Dept: CT IMAGING | Age: 62
End: 2022-10-30
Payer: COMMERCIAL

## 2022-10-30 LAB
ALBUMIN SERPL-MCNC: 3.7 G/DL (ref 3.4–5)
ANION GAP SERPL CALCULATED.3IONS-SCNC: 12 MMOL/L (ref 3–16)
BASOPHILS ABSOLUTE: 0 K/UL (ref 0–0.2)
BASOPHILS RELATIVE PERCENT: 1.1 %
BUN BLDV-MCNC: 28 MG/DL (ref 7–20)
CALCIUM SERPL-MCNC: 8.9 MG/DL (ref 8.3–10.6)
CHLORIDE BLD-SCNC: 97 MMOL/L (ref 99–110)
CHOLESTEROL, TOTAL: 121 MG/DL (ref 0–199)
CO2: 28 MMOL/L (ref 21–32)
CREAT SERPL-MCNC: 1.5 MG/DL (ref 0.8–1.3)
EOSINOPHILS ABSOLUTE: 0.1 K/UL (ref 0–0.6)
EOSINOPHILS RELATIVE PERCENT: 2.8 %
ESTIMATED AVERAGE GLUCOSE: 119.8 MG/DL
FOLATE: 13.55 NG/ML (ref 4.78–24.2)
GFR SERPL CREATININE-BSD FRML MDRD: 52 ML/MIN/{1.73_M2}
GLUCOSE BLD-MCNC: 83 MG/DL (ref 70–99)
HBA1C MFR BLD: 5.8 %
HCT VFR BLD CALC: 37.6 % (ref 40.5–52.5)
HDLC SERPL-MCNC: 69 MG/DL (ref 40–60)
HEMOGLOBIN: 12.2 G/DL (ref 13.5–17.5)
IRON SATURATION: 16 % (ref 20–50)
IRON: 58 UG/DL (ref 59–158)
LACTIC ACID: 1.3 MMOL/L (ref 0.4–2)
LDL CHOLESTEROL CALCULATED: 37 MG/DL
LYMPHOCYTES ABSOLUTE: 1 K/UL (ref 1–5.1)
LYMPHOCYTES RELATIVE PERCENT: 26.8 %
MAGNESIUM: 2 MG/DL (ref 1.8–2.4)
MCH RBC QN AUTO: 27.5 PG (ref 26–34)
MCHC RBC AUTO-ENTMCNC: 32.4 G/DL (ref 31–36)
MCV RBC AUTO: 84.8 FL (ref 80–100)
MONOCYTES ABSOLUTE: 0.4 K/UL (ref 0–1.3)
MONOCYTES RELATIVE PERCENT: 12 %
NEUTROPHILS ABSOLUTE: 2.1 K/UL (ref 1.7–7.7)
NEUTROPHILS RELATIVE PERCENT: 57.3 %
PDW BLD-RTO: 18 % (ref 12.4–15.4)
PHOSPHORUS: 5 MG/DL (ref 2.5–4.9)
PLATELET # BLD: 247 K/UL (ref 135–450)
PMV BLD AUTO: 7.8 FL (ref 5–10.5)
POTASSIUM SERPL-SCNC: 3.5 MMOL/L (ref 3.5–5.1)
RBC # BLD: 4.43 M/UL (ref 4.2–5.9)
SODIUM BLD-SCNC: 137 MMOL/L (ref 136–145)
TOTAL IRON BINDING CAPACITY: 373 UG/DL (ref 260–445)
TRIGL SERPL-MCNC: 75 MG/DL (ref 0–150)
TSH REFLEX: 1.24 UIU/ML (ref 0.27–4.2)
VITAMIN B-12: 487 PG/ML (ref 211–911)
VLDLC SERPL CALC-MCNC: 15 MG/DL
WBC # BLD: 3.6 K/UL (ref 4–11)

## 2022-10-30 PROCEDURE — 94760 N-INVAS EAR/PLS OXIMETRY 1: CPT

## 2022-10-30 PROCEDURE — 83735 ASSAY OF MAGNESIUM: CPT

## 2022-10-30 PROCEDURE — G0378 HOSPITAL OBSERVATION PER HR: HCPCS

## 2022-10-30 PROCEDURE — 70450 CT HEAD/BRAIN W/O DYE: CPT

## 2022-10-30 PROCEDURE — 83605 ASSAY OF LACTIC ACID: CPT

## 2022-10-30 PROCEDURE — 6370000000 HC RX 637 (ALT 250 FOR IP): Performed by: STUDENT IN AN ORGANIZED HEALTH CARE EDUCATION/TRAINING PROGRAM

## 2022-10-30 PROCEDURE — 70498 CT ANGIOGRAPHY NECK: CPT

## 2022-10-30 PROCEDURE — 85025 COMPLETE CBC W/AUTO DIFF WBC: CPT

## 2022-10-30 PROCEDURE — 6360000004 HC RX CONTRAST MEDICATION: Performed by: PHYSICIAN ASSISTANT

## 2022-10-30 PROCEDURE — 36415 COLL VENOUS BLD VENIPUNCTURE: CPT

## 2022-10-30 PROCEDURE — 2580000003 HC RX 258: Performed by: STUDENT IN AN ORGANIZED HEALTH CARE EDUCATION/TRAINING PROGRAM

## 2022-10-30 PROCEDURE — 80069 RENAL FUNCTION PANEL: CPT

## 2022-10-30 PROCEDURE — 96361 HYDRATE IV INFUSION ADD-ON: CPT

## 2022-10-30 RX ADMIN — POTASSIUM CHLORIDE 40 MEQ: 1500 TABLET, EXTENDED RELEASE ORAL at 11:40

## 2022-10-30 RX ADMIN — PANTOPRAZOLE SODIUM 40 MG: 40 TABLET, DELAYED RELEASE ORAL at 06:56

## 2022-10-30 RX ADMIN — APIXABAN 5 MG: 5 TABLET, FILM COATED ORAL at 21:16

## 2022-10-30 RX ADMIN — IOPAMIDOL 75 ML: 755 INJECTION, SOLUTION INTRAVENOUS at 15:16

## 2022-10-30 RX ADMIN — APIXABAN 5 MG: 5 TABLET, FILM COATED ORAL at 08:14

## 2022-10-30 RX ADMIN — METOPROLOL SUCCINATE 12.5 MG: 25 TABLET, FILM COATED, EXTENDED RELEASE ORAL at 08:15

## 2022-10-30 RX ADMIN — Medication 2000 UNITS: at 08:14

## 2022-10-30 RX ADMIN — DIGOXIN 125 MCG: 125 TABLET ORAL at 08:15

## 2022-10-30 RX ADMIN — ATORVASTATIN CALCIUM 40 MG: 40 TABLET, FILM COATED ORAL at 21:16

## 2022-10-30 RX ADMIN — TAMSULOSIN HYDROCHLORIDE 0.4 MG: 0.4 CAPSULE ORAL at 08:15

## 2022-10-30 RX ADMIN — SODIUM CHLORIDE, POTASSIUM CHLORIDE, SODIUM LACTATE AND CALCIUM CHLORIDE: 600; 310; 30; 20 INJECTION, SOLUTION INTRAVENOUS at 08:16

## 2022-10-30 RX ADMIN — Medication 10 ML: at 08:15

## 2022-10-30 RX ADMIN — Medication 10 ML: at 21:18

## 2022-10-30 NOTE — PROGRESS NOTES
Patient has arrived to unit in stable condition. Vitals stable. Patient is awake, alert and oriented. Respirations are easy and unlabored. Patient does not appear to be in distress. Patient oriented to room and call light. Plan of care discussed with patient, patient agreeable. Call light within reach.

## 2022-10-30 NOTE — PROGRESS NOTES
4 Eyes Skin Assessment     NAME:  Haider Layne  YOB: 1960  MEDICAL RECORD NUMBER:  [id number]    The patient is being assess for  Admission    I agree that 2 RN's have performed a thorough Head to Toe Skin Assessment on the patient. ALL assessment sites listed below have been assessed. Areas assessed by both nurses:    Head, Face, Ears, Shoulders, Back, Chest, Arms, Elbows, Hands, Sacrum. Buttock, Coccyx, Ischium, and Legs. Feet and Heels        Does the Patient have a Wound?  No noted wound(s)       J Carlos Prevention initiated:  No   Wound Care Orders initiated:  No    Pressure Injury (Stage 3,4, Unstageable, DTI, NWPT, and Complex wounds) if present place referral/consult order under [de-identified] No    New and Established Ostomies if present place consult order under : No      Nurse 1 eSignature: Electronically signed by Diony Rodriguez RN on 10/29/22 at 9:45 PM EDT    **SHARE this note so that the co-signing nurse is able to place an eSignature**    Nurse 2 eSignature: Electronically signed by Prateek Leach RN on 10/30/22 at 5:01 AM EDT

## 2022-10-30 NOTE — PROGRESS NOTES
Hospitalist Progress Note      PCP: No primary care provider on file. Date of Admission: 10/29/2022    Chief Complaint: Dizziness      Subjective:   Denies dizziness or blurry vision. Otherwise no new acute complaints. Cr down to 1.5     Medications:  Reviewed    Infusion Medications    sodium chloride      lactated ringers 75 mL/hr at 10/30/22 0816     Scheduled Medications    apixaban  5 mg Oral BID    atorvastatin  40 mg Oral Nightly    digoxin  125 mcg Oral Daily    metoprolol succinate  12.5 mg Oral BID    pantoprazole  40 mg Oral QAM AC    tamsulosin  0.4 mg Oral Daily    Vitamin D  2,000 Units Oral Daily    sodium chloride flush  5-40 mL IntraVENous 2 times per day     PRN Meds: sodium chloride flush, sodium chloride, ondansetron **OR** ondansetron, polyethylene glycol, acetaminophen **OR** acetaminophen, magnesium sulfate, potassium chloride **OR** potassium alternative oral replacement **OR** potassium chloride      Intake/Output Summary (Last 24 hours) at 10/30/2022 1601  Last data filed at 10/30/2022 1342  Gross per 24 hour   Intake 1600 ml   Output 1100 ml   Net 500 ml       Exam:    BP 99/64   Pulse 59   Temp 97.6 °F (36.4 °C) (Oral)   Resp 18   Ht 6' 2\" (1.88 m)   Wt 185 lb 9.6 oz (84.2 kg) Comment: one pillow and one blanket  SpO2 98%   BMI 23.83 kg/m²     Gen/overall appearance: Not in acute distress. Alert. Head: Normocephalic, atraumatic  Eyes: EOMI, no scleral icterus  CVS: regular rate and rhythm, Normal S1S2  Pulm: Clear to auscultation bilaterally. No crackles/wheezes  Extremities: No edema.  No erythema or warmth  Neuro: No gross focal deficits noted, 5/5 strength bilat, sensation intact, CNs gross intact  Skin: Warm, dry    Labs:   Recent Labs     10/29/22  1611 10/30/22  0504   WBC 5.9 3.6*   HGB 12.7* 12.2*   HCT 39.9* 37.6*    247     Recent Labs     10/29/22  1611 10/30/22  0504    137   K 4.0 3.5   CL 95* 97*   CO2 33* 28   BUN 33* 28*   CREATININE 2.0* 1.5*   CALCIUM 9.3 8.9   PHOS  --  5.0*     Recent Labs     10/29/22  1611   AST 28   ALT 27   BILITOT 1.8*   ALKPHOS 108     No results for input(s): INR in the last 72 hours. Recent Labs     10/29/22  1611   TROPONINI 0.01       Assessment/Plan:    Active Hospital Problems    Diagnosis Date Noted    HFrEF (heart failure with reduced ejection fraction) ([facility] [address] [I50.20] 10/29/2022     Priority: Medium    Chronic diastolic (congestive) heart failure ([address] [I50.32] 10/29/2022     Priority: Medium    Dizziness [R42] 10/29/2022     Priority: Medium    ICD (implantable cardioverter-defibrillator), biventricular, in situ [Z95.810] 04/26/2022    Chronic atrial fibrillation (HCC) [I48.20]     Dilated cardiomyopathy ([facility] [address] [I42.0]     SACHIN (acute kidney injury) ([facility] [address] [N17.9]     Benign essential HTN [I10]        Dizziness. Suspect 2/2 overdiuresis and orthostatic hypotension  -CT head showed mild small vessel ischemic disease  -CTA head neck ordered - on hold given SACHIN  -Carotid U/S pending  -Consideration for MRI head  -Hold home torsemide and metolazone  -Gentle IV hydration given severe HFrEF     SACHIN 2/2 prerenal azotemia for diuretics  -Cr 2.0 on admission; baseline ~1.1-1.3  -Monitor Cr daily  -Avoid nephrotoxins  -Hold home lisinopril, metolazone, aldactone, and torsemide for now  -Gentle IVF hydraiton     HFrEF and diastolic HF  -Echo 4/61/59 showed LVEF 20-25% with severe diffuse hypokinesis and moderate MR  -s/p ICD in place  -I/Os  -Hold off on metolazone and torsemide given above  -Continue gentle IVF  -monitor fluid status closely     Afib  -Continue home Eliquis and Digoxin  -Low digoxin level on admission - 0.6    DVT Prophylaxis: eliquis  Diet: ADULT DIET;  Regular; 2000 ml  Code Status: Full Code      Laxmi Berry MD

## 2022-10-31 VITALS
SYSTOLIC BLOOD PRESSURE: 117 MMHG | WEIGHT: 188.71 LBS | HEIGHT: 74 IN | TEMPERATURE: 98.2 F | OXYGEN SATURATION: 97 % | RESPIRATION RATE: 18 BRPM | DIASTOLIC BLOOD PRESSURE: 75 MMHG | BODY MASS INDEX: 24.22 KG/M2 | HEART RATE: 61 BPM

## 2022-10-31 LAB
ALBUMIN SERPL-MCNC: 3.2 G/DL (ref 3.4–5)
ANION GAP SERPL CALCULATED.3IONS-SCNC: 9 MMOL/L (ref 3–16)
BASOPHILS ABSOLUTE: 0 K/UL (ref 0–0.2)
BASOPHILS RELATIVE PERCENT: 0.9 %
BUN BLDV-MCNC: 18 MG/DL (ref 7–20)
CALCIUM SERPL-MCNC: 8.5 MG/DL (ref 8.3–10.6)
CHLORIDE BLD-SCNC: 101 MMOL/L (ref 99–110)
CO2: 24 MMOL/L (ref 21–32)
CREAT SERPL-MCNC: 1.1 MG/DL (ref 0.8–1.3)
EOSINOPHILS ABSOLUTE: 0.1 K/UL (ref 0–0.6)
EOSINOPHILS RELATIVE PERCENT: 3.3 %
GFR SERPL CREATININE-BSD FRML MDRD: >60 ML/MIN/{1.73_M2}
GLUCOSE BLD-MCNC: 83 MG/DL (ref 70–99)
HCT VFR BLD CALC: 37 % (ref 40.5–52.5)
HEMOGLOBIN: 11.9 G/DL (ref 13.5–17.5)
LYMPHOCYTES ABSOLUTE: 1.1 K/UL (ref 1–5.1)
LYMPHOCYTES RELATIVE PERCENT: 34.5 %
MCH RBC QN AUTO: 27.1 PG (ref 26–34)
MCHC RBC AUTO-ENTMCNC: 32 G/DL (ref 31–36)
MCV RBC AUTO: 84.6 FL (ref 80–100)
MONOCYTES ABSOLUTE: 0.4 K/UL (ref 0–1.3)
MONOCYTES RELATIVE PERCENT: 12 %
NEUTROPHILS ABSOLUTE: 1.5 K/UL (ref 1.7–7.7)
NEUTROPHILS RELATIVE PERCENT: 49.3 %
PDW BLD-RTO: 17.9 % (ref 12.4–15.4)
PHOSPHORUS: 3.3 MG/DL (ref 2.5–4.9)
PLATELET # BLD: 206 K/UL (ref 135–450)
PMV BLD AUTO: 7.7 FL (ref 5–10.5)
POTASSIUM SERPL-SCNC: 3.9 MMOL/L (ref 3.5–5.1)
RBC # BLD: 4.38 M/UL (ref 4.2–5.9)
SODIUM BLD-SCNC: 134 MMOL/L (ref 136–145)
WBC # BLD: 3.1 K/UL (ref 4–11)

## 2022-10-31 PROCEDURE — 80069 RENAL FUNCTION PANEL: CPT

## 2022-10-31 PROCEDURE — 6370000000 HC RX 637 (ALT 250 FOR IP): Performed by: HOSPITALIST

## 2022-10-31 PROCEDURE — 97161 PT EVAL LOW COMPLEX 20 MIN: CPT

## 2022-10-31 PROCEDURE — 36415 COLL VENOUS BLD VENIPUNCTURE: CPT

## 2022-10-31 PROCEDURE — G0378 HOSPITAL OBSERVATION PER HR: HCPCS

## 2022-10-31 PROCEDURE — 97116 GAIT TRAINING THERAPY: CPT

## 2022-10-31 PROCEDURE — 2580000003 HC RX 258: Performed by: STUDENT IN AN ORGANIZED HEALTH CARE EDUCATION/TRAINING PROGRAM

## 2022-10-31 PROCEDURE — 85025 COMPLETE CBC W/AUTO DIFF WBC: CPT

## 2022-10-31 PROCEDURE — 96361 HYDRATE IV INFUSION ADD-ON: CPT

## 2022-10-31 PROCEDURE — 93880 EXTRACRANIAL BILAT STUDY: CPT

## 2022-10-31 PROCEDURE — 97165 OT EVAL LOW COMPLEX 30 MIN: CPT

## 2022-10-31 PROCEDURE — 6370000000 HC RX 637 (ALT 250 FOR IP): Performed by: STUDENT IN AN ORGANIZED HEALTH CARE EDUCATION/TRAINING PROGRAM

## 2022-10-31 RX ORDER — MIDODRINE HYDROCHLORIDE 5 MG/1
2.5 TABLET ORAL
Status: DISCONTINUED | OUTPATIENT
Start: 2022-10-31 | End: 2022-10-31 | Stop reason: HOSPADM

## 2022-10-31 RX ORDER — MIDODRINE HYDROCHLORIDE 2.5 MG/1
2.5 TABLET ORAL
Qty: 90 TABLET | Refills: 3 | Status: SHIPPED | OUTPATIENT
Start: 2022-10-31

## 2022-10-31 RX ORDER — TORSEMIDE 20 MG/1
20 TABLET ORAL DAILY
Qty: 30 TABLET | Refills: 3 | Status: SHIPPED | OUTPATIENT
Start: 2022-10-31

## 2022-10-31 RX ADMIN — Medication 2000 UNITS: at 08:27

## 2022-10-31 RX ADMIN — SODIUM CHLORIDE, POTASSIUM CHLORIDE, SODIUM LACTATE AND CALCIUM CHLORIDE: 600; 310; 30; 20 INJECTION, SOLUTION INTRAVENOUS at 05:18

## 2022-10-31 RX ADMIN — PANTOPRAZOLE SODIUM 40 MG: 40 TABLET, DELAYED RELEASE ORAL at 05:19

## 2022-10-31 RX ADMIN — MIDODRINE HYDROCHLORIDE 2.5 MG: 5 TABLET ORAL at 08:33

## 2022-10-31 RX ADMIN — APIXABAN 5 MG: 5 TABLET, FILM COATED ORAL at 08:27

## 2022-10-31 RX ADMIN — METOPROLOL SUCCINATE 12.5 MG: 25 TABLET, FILM COATED, EXTENDED RELEASE ORAL at 08:28

## 2022-10-31 RX ADMIN — Medication 10 ML: at 08:30

## 2022-10-31 RX ADMIN — DIGOXIN 125 MCG: 125 TABLET ORAL at 08:28

## 2022-10-31 RX ADMIN — TAMSULOSIN HYDROCHLORIDE 0.4 MG: 0.4 CAPSULE ORAL at 08:27

## 2022-10-31 RX ADMIN — MIDODRINE HYDROCHLORIDE 2.5 MG: 5 TABLET ORAL at 12:28

## 2022-10-31 NOTE — PROGRESS NOTES
[facility],[address] Msb Department   Phone: [phone]    Occupational Therapy    [x] Initial Evaluation            [] Daily Treatment Note         [x] Discharge Summary      Patient: Ollie Gomez   : 1960   MRN: [id number]   Date of Service:  10/31/2022    Admitting Diagnosis:  SACHIN (acute kidney injury) [facility])  Current Admission Summary:    Dizziness       C/o blurred vision and dizziness that started approx 1200. Currently on Eliquis. Denies speech impairment. HISTORY OF PRESENT ILLNESS   (Location, Timing/Onset, Context/Setting, Quality, Duration, Modifying Factors, Severity, Associated Signs and Symptoms)  Note limiting factors. Chief Complaint: Dizziness and blurry vision     Ollie Gomez is a 59 y.o. male who presents to the emergency department due to dizziness and blurry vision with a history of A. fib, CHF, history of blood clots, hypertension. Patient states that he was helping his friend work on his car and noticed that he developed some lightheadedness and dizziness especially when he stands up. Patient states that he also had blurry vision stating it was like a white out that would last a few seconds and then resolve. Currently in the emergency department he is not having any blurry vision. Patient states that he has not missed any of his medications and actually has them in his pocket here in the emergency department. Patient denies any chest pain, shortness of breath or difficulty breathing. Patient denies any recent head traumas or falls. Patient states this started approximately 3 hours prior to arrival.     Past Medical History:  has a past medical history of Acute combined systolic and diastolic congestive heart failure ([address] Atrial fibrillation ([address] CHF (congestive heart failure) ([address] Hx of blood clots, and Hypertension.   Past Surgical History:  has a past surgical history that includes Insertable Cardiac Monitor (07/26/2019) and Cardioversion (07/26/2019). Discharge Recommendations: Kuldeep Frederick scored a 24/24 on the AM-PAC ADL Inpatient form. At this time, no further OT is recommended upon discharge due to patient at independent level. Recommend patient returns to prior setting with prior services. DME Required For Discharge: No DME required    Precautions/Restrictions: medium fall risk, pacemaker  Positional Restrictions:no positional restrictions      Pre-Admission Information   Lives With: friends     -- patient stated goes back and forth to friend's house. Information of [place] that patient is currently staying at. Type of Home: [employer]  Home Layout: one level  Home Access:  2 step to enter with handrail. Handrails are located on L side. Bathroom Layout: walk in shower  Bathroom Equipment:  No equipment used in bathroom   Toilet Height: standard height  Home Equipment: no prior equipment  Transfer Assistance: Independent without use of device  Ambulation Assistance:Independent without use of device  ADL Assistance: independent with all ADL's  IADL Assistance: independent with homemaking tasks  Active :        [] Yes                 [x] No  Hand Dominance: [] Left                 [] Right  Current Employment: full time employment. Occupation: car shops   Hobbies:   Recent Falls: 3 falls in last 6 months- one was in hospital due to impaired vision      Information gathered for his friend [address] [facility]. Patient will rotate with who he stays with.        Examination   Vision:   Vision Corrective Device: wears glasses for reading  Hearing:   [facility]  Observation:   General Observation:  patient stated feels great, no c/o dizziness or vision issues (reason why was admitted)  Posture:   Good posture  Sensation:   denies numbness and tingling  ROM:   (B) UE ROM WFL  Strength:   (B) UE gross strength WFL    Decision Making: low complexity  Clinical Presentation: stable      Subjective  General: Patient sitting EOB upon entering room, stated just had a full shower with nurse aide. Reports indep ADLs and took shower without assist, indep dressing, indep toileting. Pain: 0/10  Pain Interventions: not applicable     BP /88, room air 99%. After ambulation 126/89. No c/o dizziness, vision changes or any other symptoms   Activities of Daily Living  Basic Activities of Daily Living  General Comments: patient reports just had a full shower with nurse assist to wrap IV only. Reports indep in and out of shower stall, indep donning pants and socks. Indep toileting. Instrumental Activities of Daily Living  No IADL completed on this date. Functional Mobility  Bed Mobility  Bed mobility not completed on this date. Comments:  Transfers  Sit to stand transfer:Independent  Stand to sit transfer: Independent  Bed / Chair comments: indep without any c/o dizziness  Comments:  Functional Mobility:  Functional Mobility Comment: ambulation 600 feet without assist of c/o dizziness or vision changes. Other Therapeutic Interventions    Functional Outcomes  AM-PAC Inpatient Daily Activity Raw Score: 24    Cognition  WFL  Orientation:    A&O x 4  Command Following:   [facility]     Education  Barriers To Learning: none  Patient Education: Patient educated on discharge recommendations  Learning Assessment:  Patient verbalized and demonstrates understanding    Assessment  Impairments Requiring Therapeutic Intervention: none - eval with same day discharge  Prognosis: good without need for therapy intervention  Clinical Assessment: Patient presenting at independent level for completion of required self care tasks for return to home. Eval with d/c at this time. No therapy services indicated. Safety Interventions: bed alarm in place, call light within reach, and nurse notified    Plan  Frequency: Eval with same day discharge. No follow up required.   Current Treatment Recommendations: Not applicable, evaluation completed with same day discharge. Goals  Patient eval with same day discharge. No goals set as patient is at baseline self care status.       Therapy Session Time     Individual Group Co-treatment   Time In    1899   Time Out    1032   Minutes    39        Timed Code Treatment Minutes: 24    Total Treatment Minutes:  39   Patient in [address] [employer] [employer], charges split with PT    Electronically Signed By: OLLIE Ponce/LINDA [phone]

## 2022-10-31 NOTE — PLAN OF CARE
Patient discharged home. All personal belongings, discharge instructions, prescriptions and follow up information are with patient at discharge. Patient stable at time of discharge.

## 2022-10-31 NOTE — PROGRESS NOTES
CLINICAL PHARMACY NOTE: MEDS TO BEDS    Total # of Prescriptions Filled: 1   The following medications were delivered to the patient:  Midodrine 2.5 mg    Additional Documentation:  Delivered to Patient=Signed  Ok to be delivered per Sarabjit Salinas CPhT

## 2022-10-31 NOTE — PLAN OF CARE
Patient given discharge instructions, follow up and prescription information. Patient verbalized understanding.

## 2022-10-31 NOTE — PROGRESS NOTES
[facility] Department   Phone: [phone]    Physical Therapy    [x] Initial Evaluation            [] Daily Treatment Note         [x] Discharge Summary      Patient: Ronal Zhang   : 1960   MRN: [id number]   Date of Service:  10/31/2022  Admitting Diagnosis: SACHIN (acute kidney injury) [facility])  Current Admission Summary: Ronal Zhang is a 59 y.o. male who presents to the emergency department due to dizziness and blurry vision with a history of A. fib, CHF, history of blood clots, hypertension. Patient states that he was helping his friend work on his car and noticed that he developed some lightheadedness and dizziness especially when he stands up. Patient states that he also had blurry vision stating it was like a white out that would last a few seconds and then resolve. Currently in the emergency department he is not having any blurry vision. Patient states that he has not missed any of his medications and actually has them in his pocket here in the emergency department. Patient denies any chest pain, shortness of breath or difficulty breathing. Patient denies any recent head traumas or falls. Patient states this started approximately 3 hours prior to arrival.  Past Medical History:  has a past medical history of Acute combined systolic and diastolic congestive heart failure ([address] Atrial fibrillation ([address] CHF (congestive heart failure) ([address] Hx of blood clots, and Hypertension. Past Surgical History:  has a past surgical history that includes Insertable Cardiac Monitor (2019) and Cardioversion (2019). Discharge Recommendations: Ronal Zhang scored a 23/24 on the AM-PAC short mobility form. At this time, no further PT is recommended upon discharge due to patient returned to baseline function. Recommend patient returns to prior setting with prior services.      DME Required For Discharge: no DME required at discharge  Precautions/Restrictions: medium fall risk  Weight Bearing Restrictions: no restrictions  [] Right Upper Extremity  [] Left Upper Extremity [] Right Lower Extremity  [] Left Lower Extremity     Required Braces/Orthotics: no braces required   [] Right  [] Left  Positional Restrictions:no positional restrictions    Pre-Admission Information   Lives With: friends    Type of Home: condo  Home Layout: one level  Home Access:  2 step to enter with handrail. Handrails are located on L side. Bathroom Layout: walk in shower  Bathroom Equipment:  No equipment used in bathroom   Toilet Height: standard height  Home Equipment: no prior equipment  Transfer Assistance: Independent without use of device  Ambulation Assistance:Independent without use of device  ADL Assistance: independent with all ADL's  IADL Assistance: independent with homemaking tasks  Active :        [] Yes  [x] No  Hand Dominance: [] Left  [] Right  Current Employment: full time employment. Occupation: car shops   Hobbies:   Recent Falls: 3 falls in last 6 months- one was in hospital due to impaired vision     Information gathered for his friend [address] [place] [address]. Patient will rotate with who he stays with. Examination   Vision:   Vision Gross Assessment: Impaired and Vision Corrective Device: wears glasses for reading  Hearing:   WFL  Posture: Forward head and rounded shoulders   Sensation:   WFL  Tone:   Normotonic  Coordination Testing:   WFL    ROM:   (B) LE AROM WFL  Strength:   (B) LE strength grossly WFL  Decision Making: low complexity  Clinical Presentation: stable      Subjective  General: Patient was sitting EOB with nurse when PT arrived. Agreeable to OT/PT evaluation. Pain: 0/10  Pain Interventions: not applicable       Functional Mobility  Bed Mobility  Bed mobility not completed on this date.   Comments:  Transfers  Sit to stand transfer: modified independent  Stand to sit transfer: modified independent  Comments:  Ambulation  Assistive Device: no device  Assistance: CGA progressing to independent  Distance: 600 feet + 10 feet   Gait Mechanics: uneven step length, decreased savanah, wide VITO, steady and no LOB   Comments:  Patient walked around unit with no reports of symptoms   Stair Mobility  Stair mobility not completed on this date. Comments:  Wheelchair Mobility:  No w/c mobility completed on this date. Comments:  Balance  Static Sitting Balance: good: independent with functional balance in unsupported position  Dynamic Sitting Balance: good: independent with functional balance in unsupported position  Static Standing Balance: good: independent with functional balance in unsupported position  Dynamic Standing Balance: good: independent with functional balance in unsupported position  Comments:    Other Therapeutic Interventions    Functional Outcomes  AM-PAC Inpatient Mobility Raw Score : 23              Cognition  WFL  Orientation:    alert and oriented x 4  Command Following:   [facility]    Education  Barriers To Learning: none  Patient Education: patient educated on goals, PT role and benefits, plan of care, precautions, general safety, discharge recommendations  Learning Assessment:  patient verbalizes understanding, would benefit from continued reinforcement    Assessment  Activity Tolerance: Patient tolerated treatment well. Asymptomatic throughout treatment with no reports of dizziness or blurred vision. BP was 116/80 and Sp O2 was 99% pre ambulation. BP was 126/89 post ambulation. Impairments Requiring Therapeutic Intervention: none - eval with same day discharge  Prognosis: good  Clinical Assessment: Patient was asymptomatic throughout evaluation and reported feeling good with ambulation. Patient feels that he is back to his PLOF and did not report fatigue with initiation of ambulation around unit.  Patient is currently at baseline function and no further skilled therapeutic services required during hospital stay. Safety Interventions: patient left in chair, call light within reach, and gait belt    Plan  Frequency: Eval with same day discharge. No follow up required. Current Treatment Recommendations: not applicable, evaluation completed with same day discharge.     Goals  Patient Goals: Return home    Short Term Goals:  Time Frame: No goals made this date    Therapy Session Time      Individual Group Co-treatment   Time In     0953   Time Out     1032   Minutes     39     Timed Code Treatment Minutes:  24 Minutes  Total Treatment Minutes:  [facility]   Electronically Signed By: Darling Hussein PT  This evaluation/treatment was observed and supervised by Darling Hussein, [address] [place] [address]

## 2022-11-04 NOTE — DISCHARGE SUMMARY
[facility] DISCHARGE SUMMARY    Patient Demographics    Patient. Dennise Kelly  Date of Birth. 1960  MRN. [id number]     Primary care provider. No primary care provider on file. (Tel: None)    Admit date: 10/29/2022    Discharge date (blank if same as Note Date): 10/31/2022  Note Date: 11/4/2022     Reason for Hospitalization. Chief Complaint   Patient presents with    Dizziness     C/o blurred vision and dizziness that started approx 1200. Currently on Eliquis. Denies speech impairment. [facility] Course. Dizziness. Suspect 2/2 overdiuresis and orthostatic hypotension  -CT head showed mild small vessel ischemic disease  -CTA head neck ordered - on hold given SACHIN  Work-up negative medication adjusted likely due to low BP his diuretics were adjusted with improvement in symptoms F     SACHIN 2/2 prerenal azotemia for diuretics  -Cr 2.0 on admission; baseline ~1.1-1.3  -Monitor Cr daily  -Avoid nephrotoxins  Meds adjusted again. Creatinine improving     HFrEF and diastolic HF  -Echo 4/60/24 showed LVEF 20-25% with severe diffuse hypokinesis and moderate MR  -s/p ICD in place  -I/Os  -y    Consults. None    Physical examination on discharge day. /75   Pulse 61   Temp 98.2 °F (36.8 °C) (Oral)   Resp 18   Ht 6' 2\" (1.88 m)   Wt 188 lb 11.4 oz (85.6 kg)   SpO2 97%   BMI 24.23 kg/m²   General appearance. Alert. Looks comfortable. HEENT. Sclera clear. Moist mucus membranes. Cardiovascular. Regular rate and rhythm, normal S1, S2. No murmur. Respiratory. Not using accessory muscles. Clear to auscultation bilaterally, no wheeze. Gastrointestinal. Abdomen soft, non-tender, not distended, normal bowel sounds  Neurology. Facial symmetry. No speech deficits. Moving all extremities equally. Extremities. No edema in lower extremities. Skin.  Warm, dry, normal turgor    Condition at time of discharge stable     Medication instructions provided to patient at discharge.      Medication List        START taking these medications      midodrine 2.5 MG tablet  Commonly known as: PROAMATINE  Take 1 tablet by mouth 3 times daily (with meals)            CHANGE how you take these medications      torsemide 20 MG tablet  Commonly known as: DEMADEX  Take 1 tablet by mouth daily  What changed:   medication strength  how much to take  when to take this            CONTINUE taking these medications      apixaban 5 MG Tabs tablet  Commonly known as: ELIQUIS  Take 1 tablet by mouth 2 times daily     atorvastatin 40 MG tablet  Commonly known as: LIPITOR  Take 1 tablet by mouth nightly     digoxin 125 MCG tablet  Commonly known as: LANOXIN  Take 1 tablet by mouth daily     empagliflozin 10 MG tablet  Commonly known as: Jardiance  Take 1 tablet by mouth daily     lisinopril 2.5 MG tablet  Commonly known as: PRINIVIL;ZESTRIL  Take 1 tablet by mouth daily     metOLazone 5 MG tablet  Commonly known as: ZAROXOLYN  Take 1 tablet by mouth as needed (prn)     metoprolol succinate 25 MG extended release tablet  Commonly known as: TOPROL XL  Take 0.5 tablets by mouth in the morning and at bedtime     omeprazole 20 MG delayed release capsule  Commonly known as: PRILOSEC  Take 1 capsule by mouth every morning (before breakfast)     tamsulosin 0.4 MG capsule  Commonly known as: FLOMAX  Take 1 capsule by mouth daily     vitamin D 50 MCG (2000 UT) Tabs tablet  Commonly known as: CHOLECALCIFEROL  Take 1 tablet by mouth daily            STOP taking these medications      finasteride 5 MG tablet  Commonly known as: PROSCAR     ondansetron 4 MG disintegrating tablet  Commonly known as: Zofran ODT     potassium chloride 20 MEQ extended release tablet  Commonly known as: KLOR-CON M     spironolactone 25 MG tablet  Commonly known as: ALDACTONE               Where to Get Your Medications        These medications were sent to [facility], [address] [place] [id number]      Phone: [phone]   midodrine 2.5 MG tablet  torsemide 20 MG tablet         Discharge recommendations given to patient. Follow Up. pcp in 1 week   Disposition. home  Activity. activity as tolerated  Diet: No diet orders on file      Spent 45  minutes in discharge process.     Signed:  Brandi Mendez MD     11/4/2022 4:00 PM

## 2022-11-10 ENCOUNTER — OFFICE VISIT (OUTPATIENT)
Dept: CARDIOLOGY CLINIC | Age: 62
End: 2022-11-10
Payer: COMMERCIAL

## 2022-11-10 VITALS
DIASTOLIC BLOOD PRESSURE: 80 MMHG | WEIGHT: 202 LBS | HEART RATE: 58 BPM | HEIGHT: 74 IN | SYSTOLIC BLOOD PRESSURE: 124 MMHG | BODY MASS INDEX: 25.93 KG/M2 | OXYGEN SATURATION: 99 %

## 2022-11-10 DIAGNOSIS — Z95.810 ICD (IMPLANTABLE CARDIOVERTER-DEFIBRILLATOR), BIVENTRICULAR, IN SITU: ICD-10-CM

## 2022-11-10 DIAGNOSIS — I50.22 CHRONIC SYSTOLIC CONGESTIVE HEART FAILURE (HCC): Primary | ICD-10-CM

## 2022-11-10 DIAGNOSIS — I10 BENIGN ESSENTIAL HTN: ICD-10-CM

## 2022-11-10 DIAGNOSIS — I48.0 PAF (PAROXYSMAL ATRIAL FIBRILLATION) (HCC): ICD-10-CM

## 2022-11-10 DIAGNOSIS — Z59.00 HOMELESS: ICD-10-CM

## 2022-11-10 DIAGNOSIS — I42.0 DILATED CARDIOMYOPATHY (HCC): ICD-10-CM

## 2022-11-10 DIAGNOSIS — Z91.199 NON-COMPLIANCE: ICD-10-CM

## 2022-11-10 PROCEDURE — 3074F SYST BP LT 130 MM HG: CPT | Performed by: CLINICAL NURSE SPECIALIST

## 2022-11-10 PROCEDURE — 1036F TOBACCO NON-USER: CPT | Performed by: CLINICAL NURSE SPECIALIST

## 2022-11-10 PROCEDURE — G8419 CALC BMI OUT NRM PARAM NOF/U: HCPCS | Performed by: CLINICAL NURSE SPECIALIST

## 2022-11-10 PROCEDURE — G8484 FLU IMMUNIZE NO ADMIN: HCPCS | Performed by: CLINICAL NURSE SPECIALIST

## 2022-11-10 PROCEDURE — G8427 DOCREV CUR MEDS BY ELIG CLIN: HCPCS | Performed by: CLINICAL NURSE SPECIALIST

## 2022-11-10 PROCEDURE — 3017F COLORECTAL CA SCREEN DOC REV: CPT | Performed by: CLINICAL NURSE SPECIALIST

## 2022-11-10 PROCEDURE — 99214 OFFICE O/P EST MOD 30 MIN: CPT | Performed by: CLINICAL NURSE SPECIALIST

## 2022-11-10 PROCEDURE — 3078F DIAST BP <80 MM HG: CPT | Performed by: CLINICAL NURSE SPECIALIST

## 2022-11-10 SDOH — ECONOMIC STABILITY - HOUSING INSECURITY: HOMELESSNESS UNSPECIFIED: Z59.00

## 2022-11-10 NOTE — PROGRESS NOTES
Assessment complete. VSS. Patient resting in bed. Respirations even and easy. Call light in reach. No needs expressed at this time. Will continue to monitor. Chronic and stable problem.  Update thyroid labs before next visit.  Continue levothyroxine 75 mcg daily.  No signs or symptoms of overtreatment under treatment currently.

## 2022-11-10 NOTE — PROGRESS NOTES
[facility] 79  Progress Note    Primary Care Doctor:  No primary care provider on file. Chief Complaint   Patient presents with    Follow-up    Congestive Heart Failure            History of Present Illness:  59 y.o. male with history of with history of PAF, hypertension. Unvaccinated, , homeless  hospitalization 2/5-10/22 for weight gain and lower extremity edema. He recently had covid and did not follow up in office. LVEF 20%, LHC done. Weight 223->193. He was started on HF therapy, declined life vest.  CONSUELO done with HENOK thrombus (on eliquis per EP). Not able to do CV  4/13-26/22 for fluid overload requiring diureses with milrinone and lasix infusion, UTI and VT requiring ICD placement  6/15-20/22 for acute on chronic systolic heart failure. He was over drinking fluids, diuresed with IV lasix and good weight loss of 23 pounds. 7/28-8/1/2022 for shortness of breath, hypoxia, pneumonia, lisinopril and jardiance held due to hypotension and given oral metolazone times 2  8/31-9/8/2022 for acute on chronic systolic heart failure. He received lasix and milrinone infusions plus a dose of metolazone. His weight dropped from 220->189    I had the pleasure of seeing Claudette Laos in follow up for systolic heart failure. He is ambulatory and by his self. He was in the hospital for dizziness and found to have creat of 2.0->1.1. His torsemide dose was adjusted and midodrine added. His weight is 202 (has some really heavy boots on). He denies any shortness of breath, palpitations, lightheadedness or increased edema. He feels great and edema remains in his lower legs only to mid calf area. He is taking the midodrine and decreased dose of torsemide.   His optival shows normal thoracic impedence      Past Medical History:   has a past medical history of Acute combined systolic and diastolic congestive heart failure ([address] Atrial fibrillation ([address] CHF (congestive heart failure) ([address] Hx of blood clots, and Hypertension. Surgical History:   has a past surgical history that includes Insertable Cardiac Monitor (07/26/2019) and Cardioversion (07/26/2019). Social History:   reports that he has never smoked. He has never been exposed to tobacco smoke. He has never used smokeless tobacco. He reports that he does not drink alcohol and does not use drugs. Family History:   Family History   Problem Relation Age of Onset    Heart Disease Mother     High Blood Pressure Mother     Heart Attack Mother     Other Father     Stroke Father     High Blood Pressure Father        Home Medications:  Prior to Admission medications    Medication Sig Start Date End Date Taking?  Authorizing Provider   torsemide (DEMADEX) 20 MG tablet Take 1 tablet by mouth daily 10/31/22  Yes Ember Guevara MD   midodrine (PROAMATINE) 2.5 MG tablet Take 1 tablet by mouth 3 times daily (with meals) 10/31/22  Yes Ember Guevara MD   empagliflozin (JARDIANCE) 10 MG tablet Take 1 tablet by mouth daily 10/26/22  Yes TRENT Banerjee   lisinopril (PRINIVIL;ZESTRIL) 2.5 MG tablet Take 1 tablet by mouth daily 10/26/22  Yes TRENT Banerjee   omeprazole (PRILOSEC) 20 MG delayed release capsule Take 1 capsule by mouth every morning (before breakfast) 10/26/22  Yes TRENT Torres   atorvastatin (LIPITOR) 40 MG tablet Take 1 tablet by mouth nightly 10/5/22  Yes TRENT Banerjee   metOLazone (ZAROXOLYN) 5 MG tablet Take 1 tablet by mouth as needed (prn) 10/5/22  Yes TRENT Banerjee   vitamin D (CHOLECALCIFEROL) 50 MCG (2000 UT) TABS tablet Take 1 tablet by mouth daily 9/21/22  Yes TRENT Torres   digoxin (LANOXIN) 125 MCG tablet Take 1 tablet by mouth daily 9/9/22  Yes TRENT Medina CNP   metoprolol succinate (TOPROL XL) 25 MG extended release tablet Take 0.5 tablets by mouth in the morning and at bedtime 8/1/22  Yes Alva Mckeon MD   apixaban (ELIQUIS) 5 MG TABS tablet Take 1 tablet by mouth 2 times daily 6/17/22  Yes TRENT Dumas CNP   tamsulosin [facility]) 0.4 MG capsule Take 1 capsule by mouth daily 6/18/22  Yes TRENT Dumas CNP   potassium chloride (KLOR-CON M) 20 MEQ extended release tablet Take 1 tablet by mouth in the morning and 1 tablet in the evening. Take with meals. 8/1/22 9/8/22  Ever Luna MD        Allergies:  Patient has no known allergies. Review of Systems:   Constitutional: there has been no unanticipated weight loss. There's been no change in energy level, sleep pattern, or activity level. Eyes: No visual changes or diplopia. No scleral icterus. ENT: No Headaches, hearing loss or vertigo. No mouth sores or sore throat. Cardiovascular: Reviewed in HPI  Respiratory: No cough or wheezing, no sputum production. No hematemesis. Gastrointestinal: No abdominal pain, appetite loss, blood in stools. No change in bowel or bladder habits. Genitourinary: No dysuria, trouble voiding, or hematuria. Musculoskeletal:  No gait disturbance, weakness or joint complaints. Integumentary: No rash or pruritis. Neurological: No headache, diplopia, change in muscle strength, numbness or tingling. No change in gait, balance, coordination, mood, affect, memory, mentation, behavior. Psychiatric: No anxiety, no depression. Endocrine: No malaise, fatigue or temperature intolerance. No excessive thirst, fluid intake, or urination. No tremor. Hematologic/Lymphatic: No abnormal bruising or bleeding, blood clots or swollen lymph nodes. Allergic/Immunologic: No nasal congestion or hives.     Physical Examination:    Vitals:    11/10/22 1507   BP: 124/80   Site: Right Upper Arm   Position: Sitting   Cuff Size: Medium Adult   Pulse: 58   SpO2: 99%   Weight: 202 lb (91.6 kg)   Height: 6' 2\" (1.88 m)           Constitutional and General Appearance: Warm and dry, no apparent distress, normal coloration   HEENT:  Normocephalic, atraumatic  Respiratory:  Normal excursion and expansion without use of accessory muscles  Resp Auscultation: Normal breath sounds without dullness  Cardiovascular: The apical impulses not displaced  Heart tones are crisp and normal  JVP normal cm H2O  irregular rate and rhythm  Peripheral pulses are symmetrical and full  There is no clubbing, cyanosis of the extremities.   Bilateral lower ankle to mid calf edema   Pedal Pulses: 2+ and equal   Abdomen:  No masses or tenderness  Liver/Spleen: No Abnormalities Noted  Neurological/Psychiatric:  Alert and oriented in all spheres  Moves all extremities well  Exhibits normal gait balance and coordination  No abnormalities of mood, affect, memory, mentation, or behavior are noted    Lab Data:    CBC:   Lab Results   Component Value Date/Time    WBC 3.1 10/31/2022 06:44 AM    WBC 3.6 10/30/2022 05:04 AM    WBC 5.9 10/29/2022 04:11 PM    RBC 4.38 10/31/2022 06:44 AM    RBC 4.43 10/30/2022 05:04 AM    RBC 4.75 10/29/2022 04:11 PM    HGB 11.9 10/31/2022 06:44 AM    HGB 12.2 10/30/2022 05:04 AM    HGB 12.7 10/29/2022 04:11 PM    HCT 37.0 10/31/2022 06:44 AM    HCT 37.6 10/30/2022 05:04 AM    HCT 39.9 10/29/2022 04:11 PM    MCV 84.6 10/31/2022 06:44 AM    MCV 84.8 10/30/2022 05:04 AM    MCV 84.1 10/29/2022 04:11 PM    RDW 17.9 10/31/2022 06:44 AM    RDW 18.0 10/30/2022 05:04 AM    RDW 17.7 10/29/2022 04:11 PM     10/31/2022 06:44 AM     10/30/2022 05:04 AM     10/29/2022 04:11 PM     BMP:  Lab Results   Component Value Date/Time     10/31/2022 06:44 AM     10/30/2022 05:04 AM     10/29/2022 04:11 PM    K 3.9 10/31/2022 06:44 AM    K 3.5 10/30/2022 05:04 AM    K 4.0 10/29/2022 04:11 PM    K 4.6 10/26/2022 03:25 PM    K 3.6 09/06/2022 04:46 AM    K 4.0 08/28/2022 04:06 AM     10/31/2022 06:44 AM    CL 97 10/30/2022 05:04 AM    CL 95 10/29/2022 04:11 PM    CO2 24 10/31/2022 06:44 AM    CO2 28 10/30/2022 05:04 AM    CO2 33 10/29/2022 04:11 PM    PHOS 3.3 10/31/2022 06:44 AM    PHOS 5.0 10/30/2022 05:04 AM    PHOS 3.9 08/31/2022 07:47 PM    BUN 18 10/31/2022 06:44 AM    BUN 28 10/30/2022 05:04 AM    BUN 33 10/29/2022 04:11 PM    CREATININE 1.1 10/31/2022 06:44 AM    CREATININE 1.5 10/30/2022 05:04 AM    CREATININE 2.0 10/29/2022 04:11 PM     BNP:   Lab Results   Component Value Date/Time    PROBNP 8,897 10/29/2022 04:11 PM    PROBNP 23,552 10/26/2022 03:25 PM    PROBNP 31,297 10/05/2022 10:10 AM     Cardiac Imaging:  [facility] Dr Margot Rojas 2/10/22  Preliminary CONSUELO results are:      - Severe LV dysfunction,  EF: 20-25%  - LA dilated. There was HENOK thrombus. - Moderate MR    Cardiac Cath PCI: Dr Robert Belle 2/8/2022  Anatomy:   LM-nml   LAD-nml  Cx-nml  OM- nml  RCA-nml  RPDA- nml  LVEF- 10%  LVG- global hypokinesis  LVEDP- 10     Hemodynamics:  RA- mean 3  RV- 37/0  PAWP- 10  PA- 34/12 (20)     C. O.- 5.7 (4.9)  C. I.- 2.6 (2.25)  PA sat 60%  AO sat 91%     Contrast: 70  Flouro Time: 5.3  Access: R radial a, R CFV     Impression  ~Coronary Angiography w/ normal cors  ~LVG with LVEF of 10% and global regional wall motion abnormalities  ~Severe MR  ~Normal CO/CI  ~normal L and R filling pressures     Recommendation  ~Aggressive medical treatment and risk factor modification  ~1. Medications reviewed, no changes at this time. 2. Post cath IVF. Bedrest.  3.Consider CONSUELO for evaluation of MR.   4. Patient has been advised on the importance of regular exercise of at least 20-30 minutes daily alternating with aerobic and isometric activities. 5. Patient counseled about and offered assistance for smoking cessation   6. No indication for cardiac rehab  7. CHF service to evaluate tomorrow. Echo 11/29/2021  Summary   -Covid+   -Severely reduced global systolic function with an ejection fraction   estimated at 20%. -Severe global hypokinesis with regional variations noted.    -Right ventricular systolic function appears to be mildly reduced based on   visual inspection.   -Severe biatrial enlargement.   -Thickened mitral valve without evidence of stenosis. There is   mild-to-moderate mitral regurgitation.   -There is mild-to-moderate tricuspid regurgitation with a RVSP estimation of   37 mmHg. -Diastolic dysfunction with elevated LV filling pressures. Echo 7/25/2019  Summary   -Overall left ventricular systolic function is moderately depressed .   -Ejection fraction is visually estimated to be 30-35 %. -Global left ventricular function moderately reduced.   -Indeterminate diastolic function due to atrial fibrillation and moderate to   severe mitral regurgitation.   -Moderate-to-severe mitral regurgitation .   -Dilated left atrium with a volume of 97 ml.   -Left atrial volume is increased probably due to atrial fibrillation .   -There is mild right ventricular hypertrophy.   -The right ventricle is mildly enlarged.   -Right ventricular systolic pressure of 53 mm Hg consistent with pulmonary   hypertension.   -Moderate to severe tricuspid regurgitation. TAPSE = 1.43   -IVC size is dilated (>2.1 cm) but collapses > 50% with respiration   consistent with elevated RA pressure (8 mmHg). Assessment:    1. Chronic systolic heart failure (HCC)  on ace, bb, digoxin, sglt2 and aldosterone antagonist; on midodrine for hypotension   2. Non-compliance    3. Homeless    4. PAF (paroxysmal atrial fibrillation) ([facility] Dr Mei rothman   5. Benign essential HTN    6. Dilated cardiomyopathy ([address]    7.    ICD in place    Plan:   Patient Instructions   Check blood in 3 weeks  Continue current medications  RTO in 2 months    Check echo after next visit    NYHA 4    I appreciate the opportunity of cooperating in the care of this individual.    TRENT Verdugo - CNS, CNS, 11/10/2022, 3:37 PM

## 2022-11-11 ENCOUNTER — TELEPHONE (OUTPATIENT)
Dept: CARDIOLOGY CLINIC | Age: 62
End: 2022-11-11

## 2022-11-11 NOTE — TELEPHONE ENCOUNTER
Please schedule first available with RMM, unless reason is urgent, I could not find documentation of that.      Colin Saravia, APRN-CNP

## 2022-12-30 ENCOUNTER — HOSPITAL ENCOUNTER (OUTPATIENT)
Age: 62
Discharge: HOME | End: 2022-12-30
Payer: COMMERCIAL

## 2022-12-30 DIAGNOSIS — I50.22 CHRONIC SYSTOLIC CONGESTIVE HEART FAILURE (HCC): ICD-10-CM

## 2022-12-30 LAB
ANION GAP SERPL CALCULATED.3IONS-SCNC: 11 MMOL/L (ref 3–16)
BUN BLDV-MCNC: 19 MG/DL (ref 7–20)
CALCIUM SERPL-MCNC: 8.9 MG/DL (ref 8.3–10.6)
CHLORIDE BLD-SCNC: 102 MMOL/L (ref 99–110)
CO2: 27 MMOL/L (ref 21–32)
CREAT SERPL-MCNC: 1.3 MG/DL (ref 0.8–1.3)
GFR SERPL CREATININE-BSD FRML MDRD: >60 ML/MIN/{1.73_M2}
GLUCOSE BLD-MCNC: 97 MG/DL (ref 70–99)
POTASSIUM SERPL-SCNC: 4.2 MMOL/L (ref 3.5–5.1)
PRO-BNP: ABNORMAL PG/ML (ref 0–124)
SODIUM BLD-SCNC: 140 MMOL/L (ref 136–145)

## 2022-12-30 PROCEDURE — 80048 BASIC METABOLIC PNL TOTAL CA: CPT

## 2022-12-30 PROCEDURE — 83880 ASSAY OF NATRIURETIC PEPTIDE: CPT

## 2022-12-30 PROCEDURE — 36415 COLL VENOUS BLD VENIPUNCTURE: CPT

## 2023-01-03 ENCOUNTER — TELEPHONE (OUTPATIENT)
Dept: CARDIOLOGY CLINIC | Age: 63
End: 2023-01-03

## 2023-01-03 NOTE — TELEPHONE ENCOUNTER
----- Message from Cam Siemens, [place] sent at 1/3/2023 11:24 AM EST -----    ----- Message -----  From: TRENT Hernandez [facility]  Sent: 1/3/2023   8:36 AM EST  To: [facility] [place] [facility]    His fluid level is extremely elevated  Call and see how his weight and swelling are doing  He most likely needs to take a metolazone  thanks

## 2023-01-06 NOTE — TELEPHONE ENCOUNTER
Tried to reach patient, [facility] for patient to return our call to ask CHF questions.

## 2023-01-10 ENCOUNTER — OFFICE VISIT (OUTPATIENT)
Dept: CARDIOLOGY CLINIC | Age: 63
End: 2023-01-10
Payer: COMMERCIAL

## 2023-01-10 ENCOUNTER — NURSE ONLY (OUTPATIENT)
Dept: CARDIOLOGY CLINIC | Age: 63
End: 2023-01-10

## 2023-01-10 ENCOUNTER — HOSPITAL ENCOUNTER (OUTPATIENT)
Age: 63
Discharge: HOME | End: 2023-01-10
Payer: COMMERCIAL

## 2023-01-10 VITALS
HEART RATE: 52 BPM | DIASTOLIC BLOOD PRESSURE: 60 MMHG | BODY MASS INDEX: 25.15 KG/M2 | SYSTOLIC BLOOD PRESSURE: 110 MMHG | HEIGHT: 74 IN | OXYGEN SATURATION: 98 % | WEIGHT: 196 LBS

## 2023-01-10 DIAGNOSIS — Z95.810 ICD (IMPLANTABLE CARDIOVERTER-DEFIBRILLATOR), BIVENTRICULAR, IN SITU: ICD-10-CM

## 2023-01-10 DIAGNOSIS — I50.22 CHRONIC SYSTOLIC CONGESTIVE HEART FAILURE (HCC): ICD-10-CM

## 2023-01-10 DIAGNOSIS — I42.0 DILATED CARDIOMYOPATHY (HCC): ICD-10-CM

## 2023-01-10 DIAGNOSIS — I48.0 PAF (PAROXYSMAL ATRIAL FIBRILLATION) (HCC): ICD-10-CM

## 2023-01-10 DIAGNOSIS — Z91.199 NON-COMPLIANCE: ICD-10-CM

## 2023-01-10 DIAGNOSIS — Z59.00 HOMELESS: ICD-10-CM

## 2023-01-10 DIAGNOSIS — I10 BENIGN ESSENTIAL HTN: ICD-10-CM

## 2023-01-10 DIAGNOSIS — I47.20 VT (VENTRICULAR TACHYCARDIA): Primary | ICD-10-CM

## 2023-01-10 DIAGNOSIS — I50.22 CHRONIC SYSTOLIC HF (HEART FAILURE) (HCC): ICD-10-CM

## 2023-01-10 DIAGNOSIS — I50.22 CHRONIC SYSTOLIC CONGESTIVE HEART FAILURE (HCC): Primary | ICD-10-CM

## 2023-01-10 LAB
ANION GAP SERPL CALCULATED.3IONS-SCNC: 8 MMOL/L (ref 3–16)
BUN BLDV-MCNC: 19 MG/DL (ref 7–20)
CALCIUM SERPL-MCNC: 9 MG/DL (ref 8.3–10.6)
CHLORIDE BLD-SCNC: 98 MMOL/L (ref 99–110)
CO2: 32 MMOL/L (ref 21–32)
CREAT SERPL-MCNC: 1.2 MG/DL (ref 0.8–1.3)
GFR SERPL CREATININE-BSD FRML MDRD: >60 ML/MIN/{1.73_M2}
GLUCOSE BLD-MCNC: 82 MG/DL (ref 70–99)
POTASSIUM SERPL-SCNC: 4.2 MMOL/L (ref 3.5–5.1)
PRO-BNP: 9315 PG/ML (ref 0–124)
SODIUM BLD-SCNC: 138 MMOL/L (ref 136–145)
VITAMIN D 25-HYDROXY: 21.4 NG/ML

## 2023-01-10 PROCEDURE — 93290 INTERROG DEV EVAL ICPMS IP: CPT | Performed by: CLINICAL NURSE SPECIALIST

## 2023-01-10 PROCEDURE — 3017F COLORECTAL CA SCREEN DOC REV: CPT | Performed by: CLINICAL NURSE SPECIALIST

## 2023-01-10 PROCEDURE — G8419 CALC BMI OUT NRM PARAM NOF/U: HCPCS | Performed by: CLINICAL NURSE SPECIALIST

## 2023-01-10 PROCEDURE — 80048 BASIC METABOLIC PNL TOTAL CA: CPT

## 2023-01-10 PROCEDURE — 93289 INTERROG DEVICE EVAL HEART: CPT | Performed by: CLINICAL NURSE SPECIALIST

## 2023-01-10 PROCEDURE — 3074F SYST BP LT 130 MM HG: CPT | Performed by: CLINICAL NURSE SPECIALIST

## 2023-01-10 PROCEDURE — G8427 DOCREV CUR MEDS BY ELIG CLIN: HCPCS | Performed by: CLINICAL NURSE SPECIALIST

## 2023-01-10 PROCEDURE — 3078F DIAST BP <80 MM HG: CPT | Performed by: CLINICAL NURSE SPECIALIST

## 2023-01-10 PROCEDURE — 83880 ASSAY OF NATRIURETIC PEPTIDE: CPT

## 2023-01-10 PROCEDURE — 1036F TOBACCO NON-USER: CPT | Performed by: CLINICAL NURSE SPECIALIST

## 2023-01-10 PROCEDURE — 82306 VITAMIN D 25 HYDROXY: CPT

## 2023-01-10 PROCEDURE — 36415 COLL VENOUS BLD VENIPUNCTURE: CPT

## 2023-01-10 PROCEDURE — 99214 OFFICE O/P EST MOD 30 MIN: CPT | Performed by: CLINICAL NURSE SPECIALIST

## 2023-01-10 PROCEDURE — G8484 FLU IMMUNIZE NO ADMIN: HCPCS | Performed by: CLINICAL NURSE SPECIALIST

## 2023-01-10 RX ORDER — METOPROLOL SUCCINATE 25 MG/1
25 TABLET, EXTENDED RELEASE ORAL 2 TIMES DAILY
Qty: 90 TABLET | Refills: 1 | Status: SHIPPED | OUTPATIENT
Start: 2023-01-10

## 2023-01-10 SDOH — ECONOMIC STABILITY - HOUSING INSECURITY: HOMELESSNESS UNSPECIFIED: Z59.00

## 2023-01-10 NOTE — PATIENT INSTRUCTIONS
Keep taking all your medications  You look fabulous  Blood work today  Schedule an echo and appt in 1-2 months

## 2023-01-10 NOTE — PROGRESS NOTES
[address]ren[address] transmission received 1/5/23 from [address] for patient's CRT-D.    TECHNICAL FINDINGS  No device or lead performance issue(s) observed    Biventricular ICD Programmed VVIR  LV only pacing     DEVICE ASSESSMENT: Available daily battery/lead measurements within expected range. ARRHYTHMIA SUMMARY: Clinical Status since 10-Nov-2022 Based on programmed zones, device detected: 4 VT-NS episodes ; most recent on 24-Dec-2022 See attached episode list & stored EGMs for details. Recommend review of stored EGMs for rhythm determination. OBSERVATIONS: Device appears to be currently functioning as programmed.   Optivol is WNL.    (LIAT)

## 2023-01-11 ENCOUNTER — TELEPHONE (OUTPATIENT)
Dept: CARDIOLOGY CLINIC | Age: 63
End: 2023-01-11

## 2023-01-11 NOTE — LETTER
[facility]  [address] [id number]  Phone: [phone]  Fax: [phone]    TRENT Goldberg        January 11, 2023     [id number]  [facility] [address]      Dear Ellen Romo:    Below are the results from your recent visit:    ----- Message from TRENT Haney CNP sent at 1/11/2023  8:43 AM EST -----  Labs look good except Vitamin d is still low - continue taking Vitamin D supplement.        Resulted Orders   Vitamin D 25 Hydroxy   Result Value Ref Range    Vit D, 25-Hydroxy 21.4 (L) >=30 ng/mL      Comment:      <=20 ng/mL. ........... Dallas Luster Deficient  21-29 ng/mL. ......... Dallas Luster Insufficient  >=30 ng/mL. ........ Laxmi Luster Sufficient     Basic Metabolic Panel   Result Value Ref Range    Sodium 138 136 - 145 mmol/L    Potassium 4.2 3.5 - 5.1 mmol/L    Chloride 98 (L) 99 - 110 mmol/L    CO2 32 21 - 32 mmol/L    Anion Gap 8 3 - 16    Glucose 82 70 - 99 mg/dL    BUN 19 7 - 20 mg/dL    Creatinine 1.2 0.8 - 1.3 mg/dL    Est, Glom Filt Rate >60 >60      Comment:      Pediatric calculator link  Gay.at. org/professionals/kdoqi/gfr_calculatorped  Effective Oct 3, 2022  These results are not intended for use in patients  <25years of age. eGFR results are calculated without  a race factor using the 2021 CKD-EPI equation. Careful  clinical correlation is recommended, particularly when  comparing to results calculated using previous equations. The CKD-EPI equation is less accurate in patients with  extremes of muscle mass, extra-renal metabolism of  creatinine, excessive creatinine ingestion, or following  therapy that affects renal tubular secretion.       Calcium 9.0 8.3 - 10.6 mg/dL   Brain Natriuretic Peptide   Result Value Ref Range    Pro-BNP 9,315 (H) 0 - 124 pg/mL      Comment:      Methodology by NT-proBNP    An age-independent cutoff point of 300 pg/ml has a 98%  negative predictive value excluding acute heart failure. Values exceeding the age-related cutoff values (450 pg/mL if  age<50, 900 if 50-75 and 1800 if >75) has 90% sensitivity and  84% specificity for diagnosing acute HF. In patients with  renal compromise (eGFR<60) values greater than 1200pg/ml have  a diagnostic sensitivity and specificity of 89% and 72% for  acute HF. The test results show that your current treatment is working. If you have any questions or concerns, please don't hesitate to call.     Sincerely,        TRENT Hayden - CNS

## 2023-01-11 NOTE — TELEPHONE ENCOUNTER
----- Message from TRENT Finn CNP sent at 1/11/2023  8:43 AM EST -----  Labs look good except Vit d is still low - continue supplement.  Sarmad Gordon

## 2023-01-11 NOTE — TELEPHONE ENCOUNTER
Can not contact patient at [facility] phone number he has not seen the patient for 3 months. He asked that his number be removed as his emergency contact.   Will send a letter

## 2023-01-30 ENCOUNTER — APPOINTMENT (OUTPATIENT)
Dept: CT IMAGING | Age: 63
End: 2023-01-30
Payer: COMMERCIAL

## 2023-01-30 ENCOUNTER — APPOINTMENT (OUTPATIENT)
Dept: ULTRASOUND IMAGING | Age: 63
End: 2023-01-30
Payer: COMMERCIAL

## 2023-01-30 ENCOUNTER — HOSPITAL ENCOUNTER (INPATIENT)
Age: 63
LOS: 4 days | Discharge: HOME | End: 2023-02-03
Attending: FAMILY MEDICINE | Admitting: FAMILY MEDICINE
Payer: COMMERCIAL

## 2023-01-30 DIAGNOSIS — K82.8 GALLBLADDER SLUDGE: ICD-10-CM

## 2023-01-30 DIAGNOSIS — E80.6 BILIRUBINEMIA: ICD-10-CM

## 2023-01-30 DIAGNOSIS — R10.11 ABDOMINAL PAIN, RIGHT UPPER QUADRANT: Primary | ICD-10-CM

## 2023-01-30 PROBLEM — R10.9 ABDOMINAL PAIN: Status: ACTIVE | Noted: 2023-01-30

## 2023-01-30 LAB
A/G RATIO: 1.3 (ref 1.1–2.2)
ALBUMIN SERPL-MCNC: 4 G/DL (ref 3.4–5)
ALP BLD-CCNC: 90 U/L (ref 40–129)
ALT SERPL-CCNC: 34 U/L (ref 10–40)
ANION GAP SERPL CALCULATED.3IONS-SCNC: 11 MMOL/L (ref 3–16)
AST SERPL-CCNC: 34 U/L (ref 15–37)
BASOPHILS ABSOLUTE: 0 K/UL (ref 0–0.2)
BASOPHILS RELATIVE PERCENT: 0.8 %
BILIRUB SERPL-MCNC: 2.6 MG/DL (ref 0–1)
BILIRUB SERPL-MCNC: 2.9 MG/DL (ref 0–1)
BILIRUBIN DIRECT: 0.5 MG/DL (ref 0–0.3)
BILIRUBIN, INDIRECT: 2.1 MG/DL (ref 0–1)
BUN BLDV-MCNC: 23 MG/DL (ref 7–20)
CALCIUM SERPL-MCNC: 9.4 MG/DL (ref 8.3–10.6)
CHLORIDE BLD-SCNC: 103 MMOL/L (ref 99–110)
CO2: 26 MMOL/L (ref 21–32)
CREAT SERPL-MCNC: 1.3 MG/DL (ref 0.8–1.3)
EOSINOPHILS ABSOLUTE: 0 K/UL (ref 0–0.6)
EOSINOPHILS RELATIVE PERCENT: 0.7 %
GFR SERPL CREATININE-BSD FRML MDRD: >60 ML/MIN/{1.73_M2}
GLUCOSE BLD-MCNC: 101 MG/DL (ref 70–99)
HCT VFR BLD CALC: 36.4 % (ref 40.5–52.5)
HEMOGLOBIN: 12.2 G/DL (ref 13.5–17.5)
LIPASE: 27 U/L (ref 13–60)
LYMPHOCYTES ABSOLUTE: 1.2 K/UL (ref 1–5.1)
LYMPHOCYTES RELATIVE PERCENT: 21.1 %
MCH RBC QN AUTO: 27.6 PG (ref 26–34)
MCHC RBC AUTO-ENTMCNC: 33.6 G/DL (ref 31–36)
MCV RBC AUTO: 82.3 FL (ref 80–100)
MONOCYTES ABSOLUTE: 0.5 K/UL (ref 0–1.3)
MONOCYTES RELATIVE PERCENT: 8.4 %
NEUTROPHILS ABSOLUTE: 3.9 K/UL (ref 1.7–7.7)
NEUTROPHILS RELATIVE PERCENT: 69 %
PDW BLD-RTO: 14.6 % (ref 12.4–15.4)
PLATELET # BLD: 233 K/UL (ref 135–450)
PMV BLD AUTO: 8.1 FL (ref 5–10.5)
POTASSIUM SERPL-SCNC: 4.6 MMOL/L (ref 3.5–5.1)
RBC # BLD: 4.43 M/UL (ref 4.2–5.9)
SODIUM BLD-SCNC: 140 MMOL/L (ref 136–145)
TOTAL PROTEIN: 7 G/DL (ref 6.4–8.2)
WBC # BLD: 5.6 K/UL (ref 4–11)

## 2023-01-30 PROCEDURE — 36415 COLL VENOUS BLD VENIPUNCTURE: CPT

## 2023-01-30 PROCEDURE — 6370000000 HC RX 637 (ALT 250 FOR IP): Performed by: PHYSICIAN ASSISTANT

## 2023-01-30 PROCEDURE — 94640 AIRWAY INHALATION TREATMENT: CPT

## 2023-01-30 PROCEDURE — 6360000002 HC RX W HCPCS: Performed by: FAMILY MEDICINE

## 2023-01-30 PROCEDURE — 2580000003 HC RX 258: Performed by: FAMILY MEDICINE

## 2023-01-30 PROCEDURE — 94761 N-INVAS EAR/PLS OXIMETRY MLT: CPT

## 2023-01-30 PROCEDURE — 99285 EMERGENCY DEPT VISIT HI MDM: CPT

## 2023-01-30 PROCEDURE — 6360000002 HC RX W HCPCS: Performed by: HOSPITALIST

## 2023-01-30 PROCEDURE — 6370000000 HC RX 637 (ALT 250 FOR IP): Performed by: FAMILY MEDICINE

## 2023-01-30 PROCEDURE — 2580000003 HC RX 258: Performed by: NURSE PRACTITIONER

## 2023-01-30 PROCEDURE — 6360000002 HC RX W HCPCS: Performed by: NURSE PRACTITIONER

## 2023-01-30 PROCEDURE — 85025 COMPLETE CBC W/AUTO DIFF WBC: CPT

## 2023-01-30 PROCEDURE — 82247 BILIRUBIN TOTAL: CPT

## 2023-01-30 PROCEDURE — 99222 1ST HOSP IP/OBS MODERATE 55: CPT | Performed by: SURGERY

## 2023-01-30 PROCEDURE — 82248 BILIRUBIN DIRECT: CPT

## 2023-01-30 PROCEDURE — APPNB30 APP NON BILLABLE TIME 0-30 MINS: Performed by: NURSE PRACTITIONER

## 2023-01-30 PROCEDURE — 1200000000 HC SEMI PRIVATE

## 2023-01-30 PROCEDURE — 83690 ASSAY OF LIPASE: CPT

## 2023-01-30 PROCEDURE — 80053 COMPREHEN METABOLIC PANEL: CPT

## 2023-01-30 PROCEDURE — 6360000004 HC RX CONTRAST MEDICATION: Performed by: PHYSICIAN ASSISTANT

## 2023-01-30 PROCEDURE — 76705 ECHO EXAM OF ABDOMEN: CPT

## 2023-01-30 PROCEDURE — 74177 CT ABD & PELVIS W/CONTRAST: CPT

## 2023-01-30 PROCEDURE — C9113 INJ PANTOPRAZOLE SODIUM, VIA: HCPCS | Performed by: NURSE PRACTITIONER

## 2023-01-30 RX ORDER — PANTOPRAZOLE SODIUM 40 MG/10ML
40 INJECTION, POWDER, LYOPHILIZED, FOR SOLUTION INTRAVENOUS DAILY
Status: DISCONTINUED | OUTPATIENT
Start: 2023-01-30 | End: 2023-02-03 | Stop reason: HOSPADM

## 2023-01-30 RX ORDER — METOPROLOL SUCCINATE 25 MG/1
25 TABLET, EXTENDED RELEASE ORAL 2 TIMES DAILY
Status: DISCONTINUED | OUTPATIENT
Start: 2023-01-30 | End: 2023-02-03 | Stop reason: HOSPADM

## 2023-01-30 RX ORDER — METHYLPREDNISOLONE SODIUM SUCCINATE 40 MG/ML
40 INJECTION, POWDER, LYOPHILIZED, FOR SOLUTION INTRAMUSCULAR; INTRAVENOUS ONCE
Status: COMPLETED | OUTPATIENT
Start: 2023-01-30 | End: 2023-01-30

## 2023-01-30 RX ORDER — SODIUM CHLORIDE 0.9 % (FLUSH) 0.9 %
5-40 SYRINGE (ML) INJECTION PRN
Status: DISCONTINUED | OUTPATIENT
Start: 2023-01-30 | End: 2023-02-03 | Stop reason: HOSPADM

## 2023-01-30 RX ORDER — ACETAMINOPHEN 650 MG/1
650 SUPPOSITORY RECTAL EVERY 6 HOURS PRN
Status: DISCONTINUED | OUTPATIENT
Start: 2023-01-30 | End: 2023-02-03 | Stop reason: HOSPADM

## 2023-01-30 RX ORDER — SODIUM CHLORIDE 9 MG/ML
INJECTION, SOLUTION INTRAVENOUS PRN
Status: DISCONTINUED | OUTPATIENT
Start: 2023-01-30 | End: 2023-02-03 | Stop reason: HOSPADM

## 2023-01-30 RX ORDER — ENOXAPARIN SODIUM 100 MG/ML
1 INJECTION SUBCUTANEOUS 2 TIMES DAILY
Status: DISCONTINUED | OUTPATIENT
Start: 2023-01-30 | End: 2023-02-01

## 2023-01-30 RX ORDER — ACETAMINOPHEN 325 MG/1
650 TABLET ORAL EVERY 6 HOURS PRN
Status: DISCONTINUED | OUTPATIENT
Start: 2023-01-30 | End: 2023-02-03 | Stop reason: HOSPADM

## 2023-01-30 RX ORDER — SODIUM CHLORIDE 9 MG/ML
INJECTION, SOLUTION INTRAVENOUS CONTINUOUS
Status: DISCONTINUED | OUTPATIENT
Start: 2023-01-30 | End: 2023-01-31

## 2023-01-30 RX ORDER — ONDANSETRON 2 MG/ML
4 INJECTION INTRAMUSCULAR; INTRAVENOUS EVERY 6 HOURS PRN
Status: DISCONTINUED | OUTPATIENT
Start: 2023-01-30 | End: 2023-02-03 | Stop reason: HOSPADM

## 2023-01-30 RX ORDER — IPRATROPIUM BROMIDE AND ALBUTEROL SULFATE 2.5; .5 MG/3ML; MG/3ML
1 SOLUTION RESPIRATORY (INHALATION)
Status: DISCONTINUED | OUTPATIENT
Start: 2023-01-30 | End: 2023-01-30

## 2023-01-30 RX ORDER — ONDANSETRON 4 MG/1
4 TABLET, ORALLY DISINTEGRATING ORAL EVERY 8 HOURS PRN
Status: DISCONTINUED | OUTPATIENT
Start: 2023-01-30 | End: 2023-02-03 | Stop reason: HOSPADM

## 2023-01-30 RX ORDER — HYDRALAZINE HYDROCHLORIDE 20 MG/ML
10 INJECTION INTRAMUSCULAR; INTRAVENOUS EVERY 4 HOURS PRN
Status: DISCONTINUED | OUTPATIENT
Start: 2023-01-30 | End: 2023-02-03 | Stop reason: HOSPADM

## 2023-01-30 RX ORDER — SODIUM CHLORIDE 0.9 % (FLUSH) 0.9 %
5-40 SYRINGE (ML) INJECTION EVERY 12 HOURS SCHEDULED
Status: DISCONTINUED | OUTPATIENT
Start: 2023-01-30 | End: 2023-02-03 | Stop reason: HOSPADM

## 2023-01-30 RX ORDER — DIGOXIN 125 MCG
125 TABLET ORAL DAILY
Status: DISCONTINUED | OUTPATIENT
Start: 2023-01-30 | End: 2023-02-02

## 2023-01-30 RX ORDER — POLYETHYLENE GLYCOL 3350 17 G/17G
17 POWDER, FOR SOLUTION ORAL DAILY PRN
Status: DISCONTINUED | OUTPATIENT
Start: 2023-01-30 | End: 2023-02-03 | Stop reason: HOSPADM

## 2023-01-30 RX ORDER — FINASTERIDE 5 MG/1
5 TABLET, FILM COATED ORAL DAILY
Status: DISCONTINUED | OUTPATIENT
Start: 2023-01-30 | End: 2023-02-03 | Stop reason: HOSPADM

## 2023-01-30 RX ORDER — IPRATROPIUM BROMIDE AND ALBUTEROL SULFATE 2.5; .5 MG/3ML; MG/3ML
1 SOLUTION RESPIRATORY (INHALATION) 2 TIMES DAILY
Status: DISCONTINUED | OUTPATIENT
Start: 2023-01-30 | End: 2023-02-02

## 2023-01-30 RX ORDER — FAMOTIDINE 20 MG/1
40 TABLET, FILM COATED ORAL ONCE
Status: COMPLETED | OUTPATIENT
Start: 2023-01-30 | End: 2023-01-30

## 2023-01-30 RX ORDER — MORPHINE SULFATE 2 MG/ML
2 INJECTION, SOLUTION INTRAMUSCULAR; INTRAVENOUS EVERY 4 HOURS PRN
Status: DISCONTINUED | OUTPATIENT
Start: 2023-01-30 | End: 2023-02-03 | Stop reason: HOSPADM

## 2023-01-30 RX ORDER — FINASTERIDE 5 MG/1
5 TABLET, FILM COATED ORAL DAILY
COMMUNITY

## 2023-01-30 RX ADMIN — HYDRALAZINE HYDROCHLORIDE 10 MG: 20 INJECTION INTRAMUSCULAR; INTRAVENOUS at 17:23

## 2023-01-30 RX ADMIN — IPRATROPIUM BROMIDE AND ALBUTEROL SULFATE 1 AMPULE: 2.5; .5 SOLUTION RESPIRATORY (INHALATION) at 21:02

## 2023-01-30 RX ADMIN — SODIUM CHLORIDE: 9 INJECTION, SOLUTION INTRAVENOUS at 17:23

## 2023-01-30 RX ADMIN — PIPERACILLIN AND TAZOBACTAM 3375 MG: 3; .375 INJECTION, POWDER, FOR SOLUTION INTRAVENOUS at 16:23

## 2023-01-30 RX ADMIN — METHYLPREDNISOLONE SODIUM SUCCINATE 40 MG: 40 INJECTION, POWDER, FOR SOLUTION INTRAMUSCULAR; INTRAVENOUS at 18:40

## 2023-01-30 RX ADMIN — IOPAMIDOL 75 ML: 755 INJECTION, SOLUTION INTRAVENOUS at 09:37

## 2023-01-30 RX ADMIN — PANTOPRAZOLE SODIUM 40 MG: 40 INJECTION, POWDER, FOR SOLUTION INTRAVENOUS at 16:22

## 2023-01-30 RX ADMIN — FAMOTIDINE 40 MG: 20 TABLET, FILM COATED ORAL at 08:51

## 2023-01-30 RX ADMIN — LIDOCAINE HYDROCHLORIDE: 20 SOLUTION ORAL; TOPICAL at 08:52

## 2023-01-30 RX ADMIN — ENOXAPARIN SODIUM 90 MG: 100 INJECTION SUBCUTANEOUS at 21:14

## 2023-01-30 RX ADMIN — METOPROLOL SUCCINATE 25 MG: 25 TABLET, EXTENDED RELEASE ORAL at 21:14

## 2023-01-30 RX ADMIN — DIGOXIN 125 MCG: 125 TABLET ORAL at 16:33

## 2023-01-30 RX ADMIN — FINASTERIDE 5 MG: 5 TABLET, FILM COATED ORAL at 17:23

## 2023-01-30 ASSESSMENT — ENCOUNTER SYMPTOMS
DIARRHEA: 0
COUGH: 0
NAUSEA: 0
WHEEZING: 0
RHINORRHEA: 0
SHORTNESS OF BREATH: 0
VOMITING: 0
BLOOD IN STOOL: 0
ABDOMINAL PAIN: 1

## 2023-01-30 ASSESSMENT — PAIN - FUNCTIONAL ASSESSMENT: PAIN_FUNCTIONAL_ASSESSMENT: 0-10

## 2023-01-30 ASSESSMENT — LIFESTYLE VARIABLES
HOW MANY STANDARD DRINKS CONTAINING ALCOHOL DO YOU HAVE ON A TYPICAL DAY: PATIENT DOES NOT DRINK
HOW OFTEN DO YOU HAVE A DRINK CONTAINING ALCOHOL: NEVER
HOW OFTEN DO YOU HAVE A DRINK CONTAINING ALCOHOL: NEVER

## 2023-01-30 ASSESSMENT — PAIN SCALES - GENERAL: PAINLEVEL_OUTOF10: 7

## 2023-01-30 ASSESSMENT — PAIN DESCRIPTION - LOCATION: LOCATION: ABDOMEN

## 2023-01-30 ASSESSMENT — PAIN DESCRIPTION - DESCRIPTORS: DESCRIPTORS: OTHER (COMMENT)

## 2023-01-30 ASSESSMENT — PAIN DESCRIPTION - PAIN TYPE: TYPE: ACUTE PAIN

## 2023-01-30 NOTE — PROGRESS NOTES
Patient seen in ED, room 9. Admission completed except for: 4 Eyes Assessment, Immunizations, Covid Vaccines, Rights and Responsibilities, Orientation to room, Plan of Care, education, white board, height and weight, pain assessment and head to toe assessment. Patient is alert and oriented X 4. Patient lives alone in a single story house and is being admitted for abdominal pain. Home Medication Reconciliation has been verbally reviewed with patient and updated as appropriate. It continues to be marked as \"In process\" as the verification with pharmacy has not been completed. Plan of care updated if indicated. All questions answered.

## 2023-01-30 NOTE — ED PROVIDER NOTES
[facility] EMERGENCY DEPARTMENT  EMERGENCY DEPARTMENT ENCOUNTER        Pt Name: Manoj Feliz  MRN: [id number]  Birthdate 1960  Date of evaluation: 1/30/2023  Provider: Lyssa Gottlieb PA-C  PCP: No primary care provider on file.  Note Started: 8:23 AM EST 1/30/23      SHONA. I have evaluated this patient.  My supervising physician was available for consultation.      CHIEF COMPLAINT       Chief Complaint   Patient presents with    Abdominal Pain     Arrived per friend d/t abdomen pain that started \"a while ago\"; however was worse last night was unable to sleep last night; described as a knot in stomach; BM small this morning and normal yesterday;       HISTORY OF PRESENT ILLNESS: 1 or more Elements     History From: patient  Limitations to history : None    Manoj Feliz is a 62 y.o. male who presents for evaluation of abdominal pain.  States that this started about 4 to 5 days ago.  States that it has been constant with no known aggravating or alleviating factors but more severe last night.  States that he is not able to sleep and describes it as a knot.  No radiation of the pain.  No nausea vomiting or diarrhea.  No bloody stools.  Normal bowel movement this morning.  He denies any chest pain or shortness of breath.  No fevers or chills.  No history of any abdominal surgeries or similar symptoms.  He has no other complaints or concerns at this time.    Nursing Notes were all reviewed and agreed with or any disagreements were addressed in the HPI.    REVIEW OF SYSTEMS :      Review of Systems   Constitutional:  Negative for appetite change, chills and fever.   HENT:  Negative for congestion and rhinorrhea.    Respiratory:  Negative for cough, shortness of breath and wheezing.    Cardiovascular:  Negative for chest pain.   Gastrointestinal:  Positive for abdominal pain. Negative for blood in stool, diarrhea, nausea and vomiting.   Genitourinary:  Negative for difficulty urinating,  dysuria and hematuria. Musculoskeletal:  Negative for neck pain and neck stiffness. Skin:  Negative for rash. Neurological:  Negative for headaches. Positives and Pertinent negatives as per HPI. SURGICAL HISTORY     Past Surgical History:   Procedure Laterality Date    CARDIOVERSION  07/26/2019    Dr. Shabbir You  07/26/2019       CURRENTMEDICATIONS       Previous Medications    APIXABAN (ELIQUIS) 5 MG TABS TABLET    Take 1 tablet by mouth 2 times daily    ATORVASTATIN (LIPITOR) 40 MG TABLET    Take 1 tablet by mouth nightly    DIGOXIN (LANOXIN) 125 MCG TABLET    Take 1 tablet by mouth daily    EMPAGLIFLOZIN (JARDIANCE) 10 MG TABLET    Take 1 tablet by mouth daily    FINASTERIDE (PROSCAR) 5 MG TABLET    Take 5 mg by mouth daily    LISINOPRIL (PRINIVIL;ZESTRIL) 2.5 MG TABLET    Take 1 tablet by mouth daily    METOLAZONE (ZAROXOLYN) 5 MG TABLET    Take 1 tablet by mouth as needed (prn)    METOPROLOL SUCCINATE (TOPROL XL) 25 MG EXTENDED RELEASE TABLET    Take 1 tablet by mouth in the morning and at bedtime    MIDODRINE (PROAMATINE) 2.5 MG TABLET    Take 1 tablet by mouth 3 times daily (with meals)    OMEPRAZOLE (PRILOSEC) 20 MG DELAYED RELEASE CAPSULE    Take 1 capsule by mouth every morning (before breakfast)    TAMSULOSIN (FLOMAX) 0.4 MG CAPSULE    Take 1 capsule by mouth daily    TORSEMIDE (DEMADEX) 20 MG TABLET    Take 1 tablet by mouth daily    VITAMIN D (CHOLECALCIFEROL) 50 MCG (2000 UT) TABS TABLET    Take 1 tablet by mouth daily       ALLERGIES     Patient has no known allergies.     FAMILYHISTORY       Family History   Problem Relation Age of Onset    Heart Disease Mother     High Blood Pressure Mother     Heart Attack Mother     Other Father     Stroke Father     High Blood Pressure Father         SOCIAL HISTORY       Social History     Tobacco Use    Smoking status: Never     Passive exposure: Never    Smokeless tobacco: Never   Vaping Use    Vaping Use: Never used Substance Use Topics    Alcohol use: Never    Drug use: Never       SCREENINGS        Deniz Coma Scale  Eye Opening: Spontaneous  Best Verbal Response: Oriented  Best Motor Response: Obeys commands  Carlton Coma Scale Score: 15                CIWA Assessment  BP: (!) 140/95  Heart Rate: 86           PHYSICAL EXAM  1 or more Elements     ED Triage Vitals [01/30/23 0811]   BP Temp Temp Source Heart Rate Resp SpO2 Height Weight   (!) 140/95 98.6 °F (37 °C) Oral 86 20 99 % 6' 2\" (1.88 m) 204 lb (92.5 kg)       Physical Exam  Vitals and nursing note reviewed. Constitutional:       General: He is not in acute distress. Appearance: He is well-developed. He is not ill-appearing or diaphoretic. HENT:      Head: Normocephalic and atraumatic. Right Ear: External ear normal.      Left Ear: External ear normal.      Nose: Nose normal.   Eyes:      General:         Right eye: No discharge. Left eye: No discharge. Cardiovascular:      Rate and Rhythm: Normal rate and regular rhythm. Heart sounds: Normal heart sounds. Pulmonary:      Effort: Pulmonary effort is normal. No respiratory distress. Breath sounds: Normal breath sounds. Chest:      Chest wall: No tenderness. Abdominal:      General: Abdomen is flat. Bowel sounds are normal. There is no distension. Palpations: Abdomen is soft. Tenderness: There is abdominal tenderness in the epigastric area. There is no right CVA tenderness, left CVA tenderness, guarding or rebound. Musculoskeletal:         General: Normal range of motion. Cervical back: Normal range of motion and neck supple. Skin:     General: Skin is warm and dry. Neurological:      Mental Status: He is alert and oriented to person, place, and time.    Psychiatric:         Behavior: Behavior normal.         DIAGNOSTIC RESULTS   LABS:    Labs Reviewed   CBC WITH AUTO DIFFERENTIAL - Abnormal; Notable for the following components:       Result Value Hemoglobin 12.2 (*)     Hematocrit 36.4 (*)     All other components within normal limits   COMPREHENSIVE METABOLIC PANEL - Abnormal; Notable for the following components:    Glucose 101 (*)     BUN 23 (*)     Total Bilirubin 2.9 (*)     All other components within normal limits   LIPASE   BILIRUBIN TOTAL DIRECT & INDIRECT       When ordered only abnormal lab results are displayed. All other labs were within normal range or not returned as of this dictation. EKG: When ordered, EKG's are interpreted by the Emergency Department Physician in the absence of a cardiologist.  Please see their note for interpretation of EKG. RADIOLOGY:   Non-plain film images such as CT, Ultrasound and MRI are read by the radiologist. Kamilla Davila radiographic images are visualized and preliminarily interpreted by the ED Provider with the below findings:    Gallbladder sludge and wall thickening    Interpretation per the Radiologist below, if available at the time of this note:    US GALLBLADDER RUQ   Final Result   Mild gallbladder wall thickening with a small amount of pericholecystic   fluid. No evidence of cholelithiasis. Questionable mild prominence of the pancreatic head. Further evaluation with   pancreatic protocol contrast enhanced MRI may be obtained for further   evaluation. CT ABDOMEN PELVIS W IV CONTRAST Additional Contrast? None   Preliminary Result   Question of gallbladder sludge and gallbladder wall edema. Consider right   upper quadrant ultrasound for further evaluation. Streak artifact and suboptimal contrast opacification limits the exam.      Marked cardiomegaly. Reflux of contrast into enlarged hepatic veins and IVC   may reflect right heart failure. The appendix is within normal limits. Diverticulosis. Small amount of free fluid in the pelvis. Prostate gland enlargement. No results found. No results found.     PROCEDURES   Unless otherwise noted below, none Procedures    CRITICAL CARE TIME (.cctime)       PAST MEDICAL HISTORY      has a past medical history of Acute combined systolic and diastolic congestive heart failure ([address] (8/31/2022), Atrial fibrillation ([facility] [address] (07/25/2019), CHF (congestive heart failure) ([address] blood clots, and Hypertension. EMERGENCY DEPARTMENT COURSE and DIFFERENTIAL DIAGNOSIS/MDM:   Vitals:    Vitals:    01/30/23 0811   BP: (!) 140/95   Pulse: 86   Resp: 20   Temp: 98.6 °F (37 °C)   TempSrc: Oral   SpO2: 99%   Weight: 204 lb (92.5 kg)   Height: 6' 2\" (1.88 m)       Patient was given the following medications:  Medications   pantoprazole (PROTONIX) injection 40 mg (has no administration in time range)   piperacillin-tazobactam (ZOSYN) 3,375 mg in sodium chloride 0.9 % 50 mL IVPB (mini-bag) (has no administration in time range)   aluminum & magnesium hydroxide-simethicone (MAALOX) 30 mL, lidocaine viscous hcl (XYLOCAINE) 5 mL (GI COCKTAIL) ( Oral Given 1/30/23 0852)   famotidine (PEPCID) tablet 40 mg (40 mg Oral Given 1/30/23 0851)   iopamidol (ISOVUE-370) 76 % injection 75 mL (75 mLs IntraVENous Given 1/30/23 0937)             Is this patient to be included in the SEP-1 Core Measure due to severe sepsis or septic shock? No   Exclusion criteria - the patient is NOT to be included for SEP-1 Core Measure due to: Infection is not suspected    Chronic Conditions affecting care:    has a past medical history of Acute combined systolic and diastolic congestive heart failure ([address] (8/31/2022), Atrial fibrillation ([facility] [address] (07/25/2019), CHF (congestive heart failure) ([facility] [address] blood clots, and Hypertension. CONSULTS: (Who and What was discussed)  IP CONSULT TO GENERAL SURGERY      Social Determinants : None and Patient lacks transportation    Records Reviewed (Source): pmh, recent imaging    CC/HPI Summary, DDx, ED Course, and Reassessment: Patient presents for evaluation of epigastric abdominal pain x4 to 5 days.   On exam, he is resting comfortably in bed no acute distress and nontoxic. Vitals are stable and he is afebrile. Lungs are clear to auscultation bilaterally. He has minimal tenderness to the epigastrium. No peritoneal signs. No McBurney's point tenderness, CVA tenderness. Negative Kebede sign. Given GI cocktail and Pepcid for symptomatic relief and will be reevaluated. Disposition Considerations (tests considered but not done, Admit vs D/C, Shared Decision Making, Pt Expectation of Test or Tx.): CBC and CMP unremarkable aside from bili of 2.9. Lipase 27. CT abdomen pelvis shows gallbladder sludge and gallbladder wall edema. Ultrasound recommended. Again showing pericholecystic fluid and gallbladder wall thickening as well as mild prominence of the pancreatic head. I spoke with general surgery came down and evaluated patient at bedside. They are recommending admission and started patient on IV antibiotic therapy as well as PPI. Eliquis to be held. Hospitalist resume care the patient at this time. Patient informed and agreeable. Stable for admission. I am the Primary Clinician of Record. FINAL IMPRESSION      1. Abdominal pain, right upper quadrant    2. Gallbladder sludge    3. Bilirubinemia          DISPOSITION/PLAN     DISPOSITION Decision To Admit 01/30/2023 02:12:49 PM      PATIENT REFERRED TO:  No follow-up provider specified.     DISCHARGE MEDICATIONS:  New Prescriptions    No medications on file       DISCONTINUED MEDICATIONS:  Discontinued Medications    No medications on file              (Please note that portions of this note were completed with a voice recognition program.  Efforts were made to edit the dictations but occasionally words are mis-transcribed.)    Moose Sierra PA-C (electronically signed)           Jennifer Patel PA-C  01/30/23 1110

## 2023-01-30 NOTE — LETTER
[facility]  EP Procedure Sheet    2/2/23  Danny Olivo  1960  EP Procedures  [] Pacemaker implant (single/dual) [] EP Study   [] ICD implant (single/dual) [] Atrial flutter ablation (CONSUELO Y/N)   [] Biv implant ICD [] Tilt Table   [] Biv implant PPM [] Atrial fibrillation ablation (CONSUELO Yes)   [] Generator Change (PPM/ICD/BiV) [] SVT ablation   [] Lead revision (RV/LA/RA) (<1 month) [] PVC ablation     [] Lead extraction +/- upgrade (BiV/PPM/ICD) [] VT Ischemic/ non-ischemic   [] Loop implant/ removal [] VT RVOT   [x] Cardioversion [] VT Left sided   [x] CONSUELO [] AVN ablation   Equipment  [] Medtronic  [] LADY Mapping System   [] [address] [] [address]   [] [employer] [] CryoAblation   [] Biotronik [] Laser Lead Extraction   EP Procedures Scheduling Request  # hours Requested  []1 []2 []2-4 [] 4-6 Scheduled  Date:   Specific Day 3-4 weeks Completed    Anesthesia []yes []no F/u Date:   CT surgery backup []yes []no COVID     Overnight stay      Performing MD  [x]RMM []MXA   []MKW [] CMV First vs repeat   []1st [] 2nd [] 3rd   Pre-Procedure Labs / Imaging  [] PT/INR [] Type & cross   [] CBC [] Units PRBC   [] BMP/Mg [] Units FFP   [] Venogram [] Cardiac CTA for Pulmonary vein mapping     RN INITIALS: SR    Patient Instructions  Do not eat or drink after midnight the night prior to procedure  Dx:Persistent AF  ICD-10 code: I49

## 2023-01-30 NOTE — CONSULTS
[facility] GENERAL AND LAPAROSCOPIC SURGERY                       PATIENT NAME: Dennise Kelly     ADMISSION DATE: 1/30/2023  8:06 AM      TODAY'S DATE: 1/30/2023    Reason for Consult:  Abd pain    Requesting Physician:  BISI Frazier    HISTORY OF PRESENT ILLNESS:              The patient is a 57 y.o. male who presents with abd pain, epigastric area, focal, more with pressure, over the past couple days at home. Has had prior bouts of stomach ache, this one feels different. Came to ER since it was more severe. No emesis, no fever. Has severe cardiac problems with CHF, afib, pacer. On Eliquis at home. No prior and surgery. Past Medical History:        Diagnosis Date    Acute combined systolic and diastolic congestive heart failure ([address] 8/31/2022    Atrial fibrillation ([facility] [facility] 07/25/2019    CHF (congestive heart failure) ([facility])     Hx of blood clots     Hypertension        Past Surgical History:        Procedure Laterality Date    CARDIOVERSION  07/26/2019    Dr. Milo Zhang  07/26/2019       Current Medications:   Current Facility-Administered Medications: pantoprazole (PROTONIX) injection 40 mg, 40 mg, IntraVENous, Daily  piperacillin-tazobactam (ZOSYN) 3,375 mg in sodium chloride 0.9 % 50 mL IVPB (mini-bag), 3,375 mg, IntraVENous, Q8H  Prior to Admission medications    Medication Sig Start Date End Date Taking?  Authorizing Provider   finasteride (PROSCAR) 5 MG tablet Take 5 mg by mouth daily   Yes Historical Provider, MD   metoprolol succinate (TOPROL XL) 25 MG extended release tablet Take 1 tablet by mouth in the morning and at bedtime 1/10/23   TRENT Fischer - CNS   torsemide (DEMADEX) 20 MG tablet Take 1 tablet by mouth daily 10/31/22   Serenity Alston MD   empagliflozin (JARDIANCE) 10 MG tablet Take 1 tablet by mouth daily 10/26/22   TRENT Hernandez - CNS   lisinopril (PRINIVIL;ZESTRIL) 2.5 MG tablet Take 1 tablet by mouth daily 10/26/22   Deirdre KERNS TRENT Das   atorvastatin (LIPITOR) 40 MG tablet Take 1 tablet by mouth nightly 10/5/22   TRENT Deluna   metOLazone (ZAROXOLYN) 5 MG tablet Take 1 tablet by mouth as needed (prn) 10/5/22   TRENT Deluna   vitamin D (CHOLECALCIFEROL) 50 MCG (2000 UT) TABS tablet Take 1 tablet by mouth daily 9/21/22   TRENT Hill   digoxin (LANOXIN) 125 MCG tablet Take 1 tablet by mouth daily 9/9/22   TRENT López CNP   potassium chloride (KLOR-CON M) 20 MEQ extended release tablet Take 1 tablet by mouth in the morning and 1 tablet in the evening. Take with meals. 8/1/22 9/8/22  Kimmie Aparicio MD   apixaban (ELIQUIS) 5 MG TABS tablet Take 1 tablet by mouth 2 times daily 6/17/22   TRENT López CNP   tamsulosin [facility]) 0.4 MG capsule Take 1 capsule by mouth daily 6/18/22   TRENT López CNP        Allergies:  Patient has no known allergies. Social History:    reports that he has never smoked. He has never been exposed to tobacco smoke. He has never used smokeless tobacco. He reports that he does not drink alcohol and does not use drugs.     Family History:        Problem Relation Age of Onset    Heart Disease Mother     High Blood Pressure Mother     Heart Attack Mother     Other Father     Stroke Father     High Blood Pressure Father        REVIEW OF SYSTEMS:  CONSTITUTIONAL:  positive for  fatigue  HEENT:  negative  RESPIRATORY:  negative  CARDIOVASCULAR:  negative  GASTROINTESTINAL:  negative except for abdominal pain  GENITOURINARY:  negative  HEMATOLOGIC/LYMPHATIC:  negative  NEUROLOGICAL:  negative  SKIN: negative    PHYSICAL EXAM:  VITALS:  BP (!) 140/95   Pulse 86   Temp 98.6 °F (37 °C) (Oral)   Resp 20   Ht 6' 2\" (1.88 m)   Wt 204 lb (92.5 kg)   SpO2 99%   BMI 26.19 kg/m²   24HR INTAKE/OUTPUT:  No intake or output data in the 24 hours ending 01/30/23 1430  DRAIN/TUBE OUTPUT:     CONSTITUTIONAL:  alert, mild distress and normal weight  EYES:  sclera clear  ENT:  normocepalic, without obvious abnormality  NECK:  supple, symmetrical, trachea midline and no carotid bruits  LUNGS:  clear to auscultation  CARDIOVASCULAR:  irregular rate and rhythm  ABDOMEN:  no scars, normal bowel sounds, soft, non-distended, tenderness noted in the epigastric region, voluntary guarding absent, liver palpated 3 cm below the costal margin and hernia absent  MUSCULOSKELETAL:  0+ pitting edema lower extremities  NEUROLOGIC:  Mental Status Exam:  Level of Alertness:   awake  Orientation:   person, place, time  SKIN:  no bruising or bleeding and normal skin color, texture, turgor    DATA:    CBC:   Recent Labs     01/30/23  0848   WBC 5.6   HGB 12.2*   HCT 36.4*        BMP:    Recent Labs     01/30/23  0848      K 4.6      CO2 26   BUN 23*   CREATININE 1.3   GLUCOSE 101*     Hepatic:   Recent Labs     01/30/23  0848   AST 34   ALT 34   BILITOT 2.9*   ALKPHOS 90     Mag:    No results for input(s): MG in the last 72 hours. Phos:   No results for input(s): PHOS in the last 72 hours. INR: No results for input(s): INR in the last 72 hours. LIPASE:   Recent Labs     01/30/23  0848   LIPASE 27.0      AMYLASE: No results for input(s): AMYLASE in the last 72 hours. Radiology Review:      CT ABDOMEN PELVIS W IV CONTRAST Additional Contrast? None    Result Date: 1/30/2023  EXAMINATION: CT OF THE ABDOMEN AND PELVIS WITH CONTRAST 1/30/2023 9:37 am TECHNIQUE: CT of the abdomen and pelvis was performed with the administration of intravenous contrast. Multiplanar reformatted images are provided for review. Automated exposure control, iterative reconstruction, and/or weight based adjustment of the mA/kV was utilized to reduce the radiation dose to as low as reasonably achievable.  COMPARISON: 07/24/2019 HISTORY: ORDERING SYSTEM PROVIDED HISTORY: epigastric pain TECHNOLOGISTROVIDED HISTORY: Reason for exam:->epigastric pain Additional Contrast?->None Decision Support Exception - unselect if not a suspected or confirmed emergency medical condition->Emergency Medical Condition (MA) Reason for Exam: epigastric pain FINDINGS: Lower Chest: The heart size is significantly enlarged with enlargement of all four chambers. Contrast refluxes into the hepatic veins. A pacemaker/defibrillator device is in place. There is no pericardial effusion. An acute process is not appreciated at the lung bases. Organs: A focal hepatic abnormality is not identified. There is question of mild gallbladder wall thickening. There is subtle increased density within the dependent portion of the gallbladder which may reflect sludge. A focal splenic abnormality is not appreciated. A focal pancreatic abnormality is not identified. A focal adrenal abnormality is not appreciated. Renal cysts are noted. The kidneys are not obstructed. There is limited contrast opacification of the abdominal organs likely related to underlying heart disease. Streak artifact diffusely compromises the quality of the exam. GI/Bowel: The bowel is not obstructed. The appendix is within normal limits. Diverticulosis is present. Pelvis: There is a small amount of free fluid in the pelvis. The prostate gland is enlarged measuring 6.0 cm in transverse dimension. The urinary bladder is unremarkable. Peritoneum/retroperitoneum: The inferior vena cava is enlarged. There is mild tortuosity of the abdominal aorta. There is no free intraperitoneal air. There is a small fat containing periumbilical hernia. Bones/soft tissues: There is mild diffuse subcutaneous edema. Degenerative features are noted at the SI joints and throughout the spine. An acute osseous abnormality is not identified. Question of gallbladder sludge and gallbladder wall edema. Consider right upper quadrant ultrasound for further evaluation. Streak artifact and suboptimal contrast opacification limits the exam. Marked cardiomegaly.   Reflux of contrast into enlarged hepatic veins and IVC may reflect right heart failure. The appendix is within normal limits. Diverticulosis. Small amount of free fluid in the pelvis. Prostate gland enlargement. US GALLBLADDER RUQ    Result Date: 1/30/2023  EXAMINATION: RIGHT UPPER QUADRANT ULTRASOUND 1/30/2023 11:23 am COMPARISON: None. HISTORY: ORDERING SYSTEM PROVIDED HISTORY: RUQ PAIN TECHNOLOGIST PROVIDED HISTORY: Reason for exam:->RUQ PAIN Reason for Exam: fu ct FINDINGS: LIVER:  The liver demonstrates normal echogenicity without evidence of intrahepatic biliary ductal dilatation. BILIARY SYSTEM:  No sonographic evidence of gallstones. There is mild thickening of the gallbladder measuring up to 3 mm. There is a small amount of pericholecystic fluid. Negative sonographic Kebede's sign. Common bile duct is within normal limits measuring 0.5 cm. RIGHT KIDNEY: The right kidney is without evidence of hydronephrosis. There is a simple right renal cyst measuring 3.4 x 3.3 x 2.7 cm. PANCREAS:  There is questionable mild prominence of the pancreatic head. OTHER: No evidence of right upper quadrant ascites. Mild gallbladder wall thickening with a small amount of pericholecystic fluid. No evidence of cholelithiasis. Questionable mild prominence of the pancreatic head. Further evaluation with pancreatic protocol contrast enhanced MRI may be obtained for further evaluation. IMPRESSION/RECOMMENDATIONS:    Upper abd pain  Possible acalculous cholecystitis, however pt's cardias condition is poor and fluid around GB wall and thickening can be seen with right heart failure especially. CT looks bad / severe for this issue. He does have hepatomegaly. Medical admit and assessment of cardiopulmonary status.   Pt also on Eliquis, hold that today  Will try antibiotic and PPI for treatment  Check fractionated bili levels    Thank you      Melinda Jackson MD

## 2023-01-31 PROBLEM — I50.41 ACUTE COMBINED SYSTOLIC AND DIASTOLIC HEART FAILURE (HCC): Status: ACTIVE | Noted: 2022-04-13

## 2023-01-31 PROBLEM — K81.9 ACALCULOUS CHOLECYSTITIS: Status: ACTIVE | Noted: 2023-01-31

## 2023-01-31 PROBLEM — R10.13 EPIGASTRIC ABDOMINAL PAIN: Status: ACTIVE | Noted: 2023-01-31

## 2023-01-31 LAB
A/G RATIO: 1.2 (ref 1.1–2.2)
ALBUMIN SERPL-MCNC: 3.6 G/DL (ref 3.4–5)
ALP BLD-CCNC: 81 U/L (ref 40–129)
ALT SERPL-CCNC: 29 U/L (ref 10–40)
ANION GAP SERPL CALCULATED.3IONS-SCNC: 12 MMOL/L (ref 3–16)
AST SERPL-CCNC: 31 U/L (ref 15–37)
BASOPHILS ABSOLUTE: 0 K/UL (ref 0–0.2)
BASOPHILS RELATIVE PERCENT: 0.3 %
BILIRUB SERPL-MCNC: 3.1 MG/DL (ref 0–1)
BUN BLDV-MCNC: 22 MG/DL (ref 7–20)
CALCIUM SERPL-MCNC: 9.2 MG/DL (ref 8.3–10.6)
CHLORIDE BLD-SCNC: 104 MMOL/L (ref 99–110)
CO2: 23 MMOL/L (ref 21–32)
CREAT SERPL-MCNC: 1.2 MG/DL (ref 0.8–1.3)
EKG ATRIAL RATE: 133 BPM
EKG DIAGNOSIS: NORMAL
EKG Q-T INTERVAL: 326 MS
EKG QRS DURATION: 106 MS
EKG QTC CALCULATION (BAZETT): 328 MS
EKG R AXIS: -12 DEGREES
EKG T AXIS: -77 DEGREES
EKG VENTRICULAR RATE: 61 BPM
EOSINOPHILS ABSOLUTE: 0 K/UL (ref 0–0.6)
EOSINOPHILS RELATIVE PERCENT: 0 %
GFR SERPL CREATININE-BSD FRML MDRD: >60 ML/MIN/{1.73_M2}
GLUCOSE BLD-MCNC: 103 MG/DL (ref 70–99)
HAPTOGLOBIN: 69 MG/DL (ref 30–200)
HCT VFR BLD CALC: 38.3 % (ref 40.5–52.5)
HEMOGLOBIN: 12.4 G/DL (ref 13.5–17.5)
LACTATE DEHYDROGENASE: 461 U/L (ref 100–190)
LV EF: 18 %
LVEF MODALITY: NORMAL
LYMPHOCYTES ABSOLUTE: 0.6 K/UL (ref 1–5.1)
LYMPHOCYTES RELATIVE PERCENT: 11.1 %
MCH RBC QN AUTO: 26.8 PG (ref 26–34)
MCHC RBC AUTO-ENTMCNC: 32.5 G/DL (ref 31–36)
MCV RBC AUTO: 82.6 FL (ref 80–100)
MONOCYTES ABSOLUTE: 0.2 K/UL (ref 0–1.3)
MONOCYTES RELATIVE PERCENT: 3.7 %
NEUTROPHILS ABSOLUTE: 4.3 K/UL (ref 1.7–7.7)
NEUTROPHILS RELATIVE PERCENT: 84.9 %
PDW BLD-RTO: 14.4 % (ref 12.4–15.4)
PLATELET # BLD: 224 K/UL (ref 135–450)
PMV BLD AUTO: 8.4 FL (ref 5–10.5)
POTASSIUM SERPL-SCNC: 4.7 MMOL/L (ref 3.5–5.1)
RBC # BLD: 4.64 M/UL (ref 4.2–5.9)
SODIUM BLD-SCNC: 139 MMOL/L (ref 136–145)
TOTAL PROTEIN: 6.6 G/DL (ref 6.4–8.2)
TROPONIN: <0.01 NG/ML
WBC # BLD: 5.1 K/UL (ref 4–11)

## 2023-01-31 PROCEDURE — 93005 ELECTROCARDIOGRAM TRACING: CPT | Performed by: INTERNAL MEDICINE

## 2023-01-31 PROCEDURE — 94640 AIRWAY INHALATION TREATMENT: CPT

## 2023-01-31 PROCEDURE — 84484 ASSAY OF TROPONIN QUANT: CPT

## 2023-01-31 PROCEDURE — 83615 LACTATE (LD) (LDH) ENZYME: CPT

## 2023-01-31 PROCEDURE — 6360000002 HC RX W HCPCS: Performed by: FAMILY MEDICINE

## 2023-01-31 PROCEDURE — 93306 TTE W/DOPPLER COMPLETE: CPT

## 2023-01-31 PROCEDURE — 1200000000 HC SEMI PRIVATE

## 2023-01-31 PROCEDURE — 80053 COMPREHEN METABOLIC PANEL: CPT

## 2023-01-31 PROCEDURE — 6370000000 HC RX 637 (ALT 250 FOR IP): Performed by: FAMILY MEDICINE

## 2023-01-31 PROCEDURE — APPSS15 APP SPLIT SHARED TIME 0-15 MINUTES: Performed by: NURSE PRACTITIONER

## 2023-01-31 PROCEDURE — 99223 1ST HOSP IP/OBS HIGH 75: CPT | Performed by: INTERNAL MEDICINE

## 2023-01-31 PROCEDURE — 36415 COLL VENOUS BLD VENIPUNCTURE: CPT

## 2023-01-31 PROCEDURE — 94761 N-INVAS EAR/PLS OXIMETRY MLT: CPT

## 2023-01-31 PROCEDURE — 99232 SBSQ HOSP IP/OBS MODERATE 35: CPT | Performed by: SURGERY

## 2023-01-31 PROCEDURE — 6360000002 HC RX W HCPCS: Performed by: NURSE PRACTITIONER

## 2023-01-31 PROCEDURE — 85025 COMPLETE CBC W/AUTO DIFF WBC: CPT

## 2023-01-31 PROCEDURE — 93010 ELECTROCARDIOGRAM REPORT: CPT | Performed by: INTERNAL MEDICINE

## 2023-01-31 PROCEDURE — APPNB30 APP NON BILLABLE TIME 0-30 MINS: Performed by: NURSE PRACTITIONER

## 2023-01-31 PROCEDURE — 83010 ASSAY OF HAPTOGLOBIN QUANT: CPT

## 2023-01-31 PROCEDURE — C9113 INJ PANTOPRAZOLE SODIUM, VIA: HCPCS | Performed by: NURSE PRACTITIONER

## 2023-01-31 PROCEDURE — 2580000003 HC RX 258: Performed by: NURSE PRACTITIONER

## 2023-01-31 RX ADMIN — PIPERACILLIN AND TAZOBACTAM 3375 MG: 3; .375 INJECTION, POWDER, FOR SOLUTION INTRAVENOUS at 07:44

## 2023-01-31 RX ADMIN — PANTOPRAZOLE SODIUM 40 MG: 40 INJECTION, POWDER, FOR SOLUTION INTRAVENOUS at 07:41

## 2023-01-31 RX ADMIN — PIPERACILLIN AND TAZOBACTAM 3375 MG: 3; .375 INJECTION, POWDER, FOR SOLUTION INTRAVENOUS at 16:04

## 2023-01-31 RX ADMIN — METOPROLOL SUCCINATE 25 MG: 25 TABLET, EXTENDED RELEASE ORAL at 20:08

## 2023-01-31 RX ADMIN — PIPERACILLIN AND TAZOBACTAM 3375 MG: 3; .375 INJECTION, POWDER, FOR SOLUTION INTRAVENOUS at 01:05

## 2023-01-31 RX ADMIN — METOPROLOL SUCCINATE 25 MG: 25 TABLET, EXTENDED RELEASE ORAL at 07:41

## 2023-01-31 RX ADMIN — FINASTERIDE 5 MG: 5 TABLET, FILM COATED ORAL at 07:41

## 2023-01-31 RX ADMIN — DIGOXIN 125 MCG: 125 TABLET ORAL at 07:41

## 2023-01-31 RX ADMIN — IPRATROPIUM BROMIDE AND ALBUTEROL SULFATE 1 AMPULE: 2.5; .5 SOLUTION RESPIRATORY (INHALATION) at 11:27

## 2023-01-31 RX ADMIN — IPRATROPIUM BROMIDE AND ALBUTEROL SULFATE 1 AMPULE: 2.5; .5 SOLUTION RESPIRATORY (INHALATION) at 19:51

## 2023-01-31 RX ADMIN — ENOXAPARIN SODIUM 90 MG: 100 INJECTION SUBCUTANEOUS at 20:08

## 2023-01-31 RX ADMIN — ENOXAPARIN SODIUM 90 MG: 100 INJECTION SUBCUTANEOUS at 07:41

## 2023-01-31 ASSESSMENT — PAIN SCALES - GENERAL: PAINLEVEL_OUTOF10: 0

## 2023-01-31 NOTE — CARE COORDINATION
Patient admitted  with an anticipated short hospitalization length of stay. Chart reviewed and it appears that patient has minimal needs for discharge at this time. Discussed with patients nurse and requested that case management be notified if discharge needs are identified. Case management will continue to follow progress and update discharge plan as needed.        Electronically signed by DAVINA Parry on 1/31/2023 at 9:02 AM

## 2023-01-31 NOTE — RT PROTOCOL NOTE
RT Nebulizer Bronchodilator Protocol Note    There is a bronchodilator order in the chart from a provider indicating to follow the RT Bronchodilator Protocol and there is an Initiate RT Bronchodilator Protocol order as well (see protocol at bottom of note). CXR Findings:  No results found. The findings from the last RT Protocol Assessment were as follows:  Smoking: None or smoker <15 pack years  Respiratory Pattern: Mild dyspnea at rest, irregular pattern, or RR 21-25 bpm  Breath Sounds: Slightly diminished and/or crackles  Cough: Strong, spontaneous, non-productive  Indication for Bronchodilator Therapy:    Bronchodilator Assessment Score: 6    Aerosolized bronchodilator medication orders have been revised according to the RT Nebulizer Bronchodilator Protocol below. Respiratory Therapist to perform RT Therapy Protocol Assessment initially then follow the protocol. Repeat RT Therapy Protocol Assessment PRN for score 0-3 or on second treatment, BID, and PRN for scores above 3. No Indications - adjust the frequency to every 6 hours PRN wheezing or bronchospasm, if no treatments needed after 48 hours then discontinue using Per Protocol order mode. If indication present, adjust the RT bronchodilator orders based on the Bronchodilator Assessment Score as indicated below. If a patient is on this medication at home then do not decrease Frequency below that used at home. 0-3 - enter or revise RT bronchodilator order(s) to equivalent RT Bronchodilator order with Frequency of every 4 hours PRN for wheezing or increased work of breathing using Per Protocol order mode. 4-6 - enter or revise RT Bronchodilator order(s) to two equivalent RT bronchodilator orders with one order with BID Frequency and one order with Frequency of every 4 hours PRN wheezing or increased work of breathing using Per Protocol order mode.          7-10 - enter or revise RT Bronchodilator order(s) to two equivalent RT bronchodilator orders with one order with TID Frequency and one order with Frequency of every 4 hours PRN wheezing or increased work of breathing using Per Protocol order mode. 11-13 - enter or revise RT Bronchodilator order(s) to one equivalent RT bronchodilator order with QID Frequency and an Albuterol order with Frequency of every 4 hours PRN wheezing or increased work of breathing using Per Protocol order mode. Greater than 13 - enter or revise RT Bronchodilator order(s) to one equivalent RT bronchodilator order with every 4 hours Frequency and an Albuterol order with Frequency of every 2 hours PRN wheezing or increased work of breathing using Per Protocol order mode. RT to enter RT Home Evaluation for COPD & MDI Assessment order using Per Protocol order mode.     Electronically signed by Jacob Bee RCP on 1/30/2023 at 7:17 PM

## 2023-01-31 NOTE — CONSULTS
[facility]  [phone]      Chief Complaint   Patient presents with    Abdominal Pain     Arrived per friend d/t abdomen pain that started \"a while ago\"; however was worse last night was unable to sleep last night; described as a knot in stomach; BM small this morning and normal yesterday; History of Present Illness:  Maryjo Foster is a 57 y.o. patient who presented to the hospital with complaints of abdominal pain. I have been asked to provide consultation regarding further management and testing. He reports that the pain is centralized. No association with food. He states that it does not radiate to his arm or shoulder. No associated vomiting or diarrhea. He states that he feels full quickly when he eats. He states that his dry weight is less than 200 LBS. He states that he has not been eating or drinking. He temporarily stopped taking his medications due to feeling sick with no improvement. He states that he feels much better today. Past Medical History:   has a past medical history of Acute combined systolic and diastolic congestive heart failure ([address] Atrial fibrillation ([address] CHF (congestive heart failure) ([address] Hx of blood clots, and Hypertension. Surgical History:   has a past surgical history that includes Insertable Cardiac Monitor (07/26/2019); Cardioversion (07/26/2019); and Cardiac defibrillator placement (Left). Social History:   reports that he has never smoked. He has never been exposed to tobacco smoke. He has never used smokeless tobacco. He reports that he does not drink alcohol and does not use drugs. Family History:  Multiple family members with CHF    Home Medications:  Were reviewed and are listed in nursing record. and/or listed below  Prior to Admission medications    Medication Sig Start Date End Date Taking?  Authorizing Provider   finasteride (PROSCAR) 5 MG tablet Take 5 mg by mouth daily   Yes Historical Provider, MD   metoprolol succinate (TOPROL XL) 25 MG extended release tablet Take 1 tablet by mouth in the morning and at bedtime 1/10/23   TRENT Brar   torsemide (DEMADEX) 20 MG tablet Take 1 tablet by mouth daily 10/31/22   Margarita Macario MD   empagliflozin (JARDIANCE) 10 MG tablet Take 1 tablet by mouth daily 10/26/22   TRENT Brar   lisinopril (PRINIVIL;ZESTRIL) 2.5 MG tablet Take 1 tablet by mouth daily 10/26/22   TRENT Brar   atorvastatin (LIPITOR) 40 MG tablet Take 1 tablet by mouth nightly 10/5/22   TRENT Andujar   metOLazone (ZAROXOLYN) 5 MG tablet Take 1 tablet by mouth as needed (prn) 10/5/22   TRENT Andujar   vitamin D (CHOLECALCIFEROL) 50 MCG (2000 UT) TABS tablet Take 1 tablet by mouth daily 9/21/22   TRENT Brar   digoxin (LANOXIN) 125 MCG tablet Take 1 tablet by mouth daily 9/9/22   TRENT Ferrari CNP   potassium chloride (KLOR-CON M) 20 MEQ extended release tablet Take 1 tablet by mouth in the morning and 1 tablet in the evening. Take with meals.  8/1/22 9/8/22  Houston Bae MD   apixaban (ELIQUIS) 5 MG TABS tablet Take 1 tablet by mouth 2 times daily 6/17/22   TRENT Ferrari CNP   tamsulosin [facility]) 0.4 MG capsule Take 1 capsule by mouth daily 6/18/22   TRENT Ferrari CNP        Current Medications:  Current Facility-Administered Medications   Medication Dose Route Frequency Provider Last Rate Last Admin    pantoprazole (PROTONIX) injection 40 mg  40 mg IntraVENous Daily TRENT Chu CNP   40 mg at 01/31/23 0741    piperacillin-tazobactam (ZOSYN) 3,375 mg in sodium chloride 0.9 % 50 mL IVPB (mini-bag)  3,375 mg IntraVENous Q8H TRENT Chu CNP 12.5 mL/hr at 01/31/23 0744 3,375 mg at 01/31/23 0744    digoxin (LANOXIN) tablet 125 mcg  125 mcg Oral Daily Jose House MD   125 mcg at 01/31/23 0741    metoprolol succinate (TOPROL XL) extended release tablet 25 mg  25 mg Oral BID Abigail MD Dante   25 mg at 01/31/23 0741    0.9 % sodium chloride infusion   IntraVENous Continuous Abigail Howell MD 50 mL/hr at 01/30/23 1723 New Bag at 01/30/23 1723    morphine (PF) injection 2 mg  2 mg IntraVENous Q4H PRN Abigail Howell MD        finasteride (PROSCAR) tablet 5 mg  5 mg Oral Daily Abigail Howell MD   5 mg at 01/31/23 0741    sodium chloride flush 0.9 % injection 5-40 mL  5-40 mL IntraVENous 2 times per day Abigail Howell MD        sodium chloride flush 0.9 % injection 5-40 mL  5-40 mL IntraVENous PRN Abigail Howell MD        0.9 % sodium chloride infusion   IntraVENous PRN Abigail Howell MD        enoxaparin (LOVENOX) injection 90 mg  1 mg/kg SubCUTAneous BID Abigail Howell MD   90 mg at 01/31/23 0741    ondansetron (ZOFRAN-ODT) disintegrating tablet 4 mg  4 mg Oral Q8H PRN Abigail Howell MD        Or    ondansetron (ZOFRAN) injection 4 mg  4 mg IntraVENous Q6H PRN Abigail Howell MD        polyethylene glycol (GLYCOLAX) packet 17 g  17 g Oral Daily PRN Abigail Howell MD        acetaminophen (TYLENOL) tablet 650 mg  650 mg Oral Q6H PRN Abigail Howell MD        Or    acetaminophen (TYLENOL) suppository 650 mg  650 mg Rectal Q6H PRN Abigail Howell MD        hydrALAZINE (APRESOLINE) injection 10 mg  10 mg IntraVENous Q4H PRN Abigail Howell MD   10 mg at 01/30/23 1723    ipratropium-albuterol (DUONEB) nebulizer solution 1 ampule  1 ampule Inhalation BID Abigail Howell MD   1 ampule at 01/30/23 2102        Allergies:  Patient has no known allergies.     Review of Systems:     Constitutional: there has been no unanticipated weight loss. There's been no change in energy level, sleep pattern, or activity level.     Eyes: No visual changes or diplopia. No scleral icterus.  ENT: No Headaches, hearing loss or vertigo. No mouth sores or sore throat.  Cardiovascular: Reviewed in HPI  Respiratory: No cough or wheezing, no sputum production. No hematemesis.    Gastrointestinal: + abdominal discomfort  Genitourinary: No dysuria,  trouble voiding, or hematuria. Musculoskeletal:  No gait disturbance, weakness or joint complaints. Integumentary: No rash or pruritis. Neurological: No headache, diplopia, change in muscle strength, numbness or tingling. No change in gait, balance, coordination, mood, affect, memory, mentation, behavior. Psychiatric: No anxiety, no depression. Endocrine: No malaise, fatigue or temperature intolerance. No excessive thirst, fluid intake, or urination. No tremor. Hematologic/Lymphatic: No abnormal bruising or bleeding, blood clots or swollen lymph nodes. Allergic/Immunologic: No nasal congestion or hives.       Physical Examination:    Vitals:    01/31/23 0745   BP: (!) 153/96   Pulse: 55   Resp:    Temp: 98.4 °F (36.9 °C)   SpO2: 98%    Weight: 204 lb (92.5 kg)         General Appearance:  Alert, cooperative, no distress, appears stated age   Head:  Normocephalic, without obvious abnormality, atraumatic   Eyes:  PERRL, conjunctiva/corneas clear       Nose: Nares normal, no drainage or sinus tenderness   Throat: Lips, mucosa, and tongue normal   Neck: Supple, symmetrical, trachea midline, no adenopathy, thyroid: not enlarged, symmetric, no tenderness/mass/nodules, +JVD        Lungs:   Clear to auscultation bilaterally, respirations unlabored   Chest Wall:  No tenderness or deformity   Heart:  Regular rate and rhythm, S1, S2 normal, 3/6 systolic murmur    Abdomen:   Soft, non-tender, bowel sounds active all four quadrants,  no masses, no organomegaly           Extremities: Extremities normal, atraumatic, +1 edema, cool    Pulses: 2+ and symmetric   Skin: Skin color, texture, turgor normal, no rashes or lesions   Pysch: Normal mood and affect   Neurologic: Normal gross motor and sensory exam.         Labs  CBC:   Lab Results   Component Value Date/Time    WBC 5.1 01/31/2023 04:56 AM    RBC 4.64 01/31/2023 04:56 AM    HGB 12.4 01/31/2023 04:56 AM    HCT 38.3 01/31/2023 04:56 AM    MCV 82.6 01/31/2023 04:56 AM RDW 14.4 01/31/2023 04:56 AM     01/31/2023 04:56 AM     CMP:    Lab Results   Component Value Date/Time     01/31/2023 04:56 AM    K 4.7 01/31/2023 04:56 AM    K 4.0 10/29/2022 04:11 PM     01/31/2023 04:56 AM    CO2 23 01/31/2023 04:56 AM    BUN 22 01/31/2023 04:56 AM    CREATININE 1.2 01/31/2023 04:56 AM    GFRAA >60 10/05/2022 10:10 AM    AGRATIO 1.2 01/31/2023 04:56 AM    LABGLOM >60 01/31/2023 04:56 AM    GLUCOSE 103 01/31/2023 04:56 AM    PROT 6.6 01/31/2023 04:56 AM    CALCIUM 9.2 01/31/2023 04:56 AM    BILITOT 3.1 01/31/2023 04:56 AM    ALKPHOS 81 01/31/2023 04:56 AM    AST 31 01/31/2023 04:56 AM    ALT 29 01/31/2023 04:56 AM     PT/INR:  No results found for: PTINR  Lab Results   Component Value Date    TROPONINI 0.01 10/29/2022       EKG:  I have reviewed EKG with the following interpretation:  Impression:  atrial fibrillation, nonspecific st-t wave changes     Echo:  -Definity administered for LV thrombus. -Left ventricular cavity size is severely dilated with normal left   ventricular wall thickness.   -Overall left ventricular systolic function appears severely reduced. Ejection fraction is visually estimated to be 20%. -There is severe diffuse hypokinesis. -Cannot grade diastology due to atrial fibrillation, although there are   elevated LA pressures.   -Prominent trabeculations in the LV apex. Cannot exclude Non-compaction   cardiomyopathy.   -No evidence of left ventricular mass or thrombus noted. -The right ventricle is severely enlarged. Right ventricular systolic   function is moderately reduced.   -There is severe bi-atrial enlargement. -Mitral valve leaflets appear mildly thickened. Mitral regurgitation   directed centrally and posteriorly that may be severe. -The ascending aorta is mildly dilated. -Moderate to severe tricuspid regurgitation with a PASP of 77 mmHg.   -Trivial pulmonic regurgitation.   Stress:    Small sized inferior fixed defect most consistent with diaphragmatic    artifact. Myocardial fibrosis is less likely. There is severe global LV systolic dysfunction with ejection fraction of 27    %. Possible WPW    Overall findings represent a high risk scan. Cath: Anatomy:   LM-nml   LAD-nml  Cx-nml  OM- nml  RCA-nml  RPDA- nml  LVEF- 10%  LVG- global hypokinesis  LVEDP- 10     Hemodynamics:  RA- mean 3  RV- 37/0  PAWP- 10  PA- 34/12 (20)     C. O.- 5.7 (4.9)  C. I.- 2.6 (2.25)  PA sat 60%  AO sat 91%  MRI/EP/Other:     Old notes reviewed  Telemetry reviewd  Ekg personally reviewed  Chest xray personally reviewed  Echo, cath, and   Medications and labs reviewed  highi complexity/medical decision making due to extensive data review, extensive history review, independent review of data, life threatening problem   high risk due to acute illness, evaluation of drug-drug interactions, medication management and diagnostic interventions      Assessment  Patient Active Problem List   Diagnosis    Acute retention of urine    SACHIN (acute kidney injury) ([facility]    PAF (paroxysmal atrial fibrillation) ([facility])    Benign essential HTN    Dilated cardiomyopathy ([facility]    Encounter for loop recorder check    Acute urinary retention    Acute pulmonary edema (HCC)    Acute on chronic systolic heart failure ([facility]    COVID    Non-compliance    Homeless    NSTEMI (non-ST elevated myocardial infarction) ([facility]    Acute systolic heart failure (HCC)    Complicated UTI (urinary tract infection)    Chronic atrial fibrillation (HCC)    Moderate mitral regurgitation    Acute on chronic congestive heart failure ([facility])    VT (ventricular tachycardia)    ICD (implantable cardioverter-defibrillator), biventricular, in situ    Thrombus of left atrial appendage    Syncope and collapse    PNA (pneumonia)    Hemoptysis    Fluid overload    Elevated LFTs    Idiopathic hypotension    Peripheral edema    Anemia    HFrEF (heart failure with reduced ejection fraction) ([facility])    Chronic diastolic (congestive) heart failure (HCC)    Dizziness    Abdominal pain    Abdominal pain, right upper quadrant         Plan:    I had the opportunity to review the clinical symptoms and presentation of Ollie Gomez. Assessment/Plan:  Abdominal pain  - sounds like hepatic congestion from right heart failure, not the gallbladder  - new problem  Plan:  - stop IVF. keep euvolemic today, diuresis tomorrow   - obtain ekg. It has not been performed yet  - check troponin     2. Acute on chronic biventricular heart failure   - new problem  - ACC C NYHA III  Plan:  - obtain echo  - continue metoprolol succinate 25 mg bid  - need to start entresto, spironolactone and SGLT2  - evaluate for sleep apnea     3. Paroxsymal atrial fibrillation  - new problem  Plan:  Hold anticoagulation today     4. Bi-v ICD  - new problem  Plan:  - interrogate device     5. Homeless     I will address the patient's cardiac risk factors and adjusted pharmacologic treatment as needed. In addition, I have reinforced the need for patient directed risk factor modification. Tobacco use was discussed with the patient and educated on the negative effects. I have asked the patient to not utilize these agents. Thank you for allowing to us to participate in the care or Ollie Gomez. Further evaluation will be based upon the patient's clinical course and testing results. All questions and concerns were addressed to the patient/family. Alternatives to my treatment were discussed. The note was completed using EMR. Every effort was made to ensure accuracy; however, inadvertent computerized transcription errors may be present. All questions and concerns were addressed to the patient/family. Alternatives to my treatment were discussed. The note was completed using EMR. Every effort was made to ensure accuracy; however, inadvertent computerized transcription errors may be present.   Rod Ridley DO, MD 1/31/2023 8:30 AM

## 2023-01-31 NOTE — H&P
HOSPITALISTS HISTORY AND PHYSICAL    1/30/2023 8:49 PM    Patient Information:  Fernanda David is a 57 y.o. male [id number]  PCP:  No primary care provider on file. (Tel: None )    Chief complaint:    Chief Complaint   Patient presents with    Abdominal Pain     Arrived per friend d/t abdomen pain that started \"a while ago\"; however was worse last night was unable to sleep last night; described as a knot in stomach; BM small this morning and normal yesterday; History of Present Illness:  Angelica Jones is a 57 y.o. male with h/o CHF, chronic anticoagulation , Afib, presented with co abdominal pain . The symptoms present intermittently for some time . Got worse since yesterday. Denies nausea, vomiting, fever, chills   CT abdomen and pelvis showed   Question of gallbladder sludge and gallbladder wall edema. Consider right   upper quadrant ultrasound for further evaluation. Streak artifact and suboptimal contrast opacification limits the exam.       Marked cardiomegaly. Reflux of contrast into enlarged hepatic veins and IVC   may reflect right heart failure. The appendix is within normal limits. Diverticulosis. Small amount of free fluid in the pelvis. Prostate gland enlargement     Labs showed elevated bili 2.9   Liver enzymes and lipase remain with in normal limits  General surgery has been consulted       REVIEW OF SYSTEMS:   Constitutional: Negative for fever,chills or night sweats  ENT: Negative for rhinorrhea, epistaxis, hoarseness, sore throat.   Respiratory: Negative for shortness of breath,wheezing  Cardiovascular: Negative for chest pain, palpitations   Gastrointestinal: +Ve abdominal pain neg for nausea, vomiting, diarrhea  Genitourinary: Negative for polyuria, dysuria   Hematologic/Lymphatic: Negative for bleeding tendency, easy bruising  Musculoskeletal: Negative for myalgias and arthralgias  Neurologic: Negative for confusion,dysarthria. Skin: Negative for itching,rash  Psychiatric: Negative for depression,anxiety, agitation. Endocrine: Negative for polydipsia,polyuria,heat /cold intolerance. Past Medical History:   has a past medical history of Acute combined systolic and diastolic congestive heart failure ([facility] [address] Atrial fibrillation ([facility] [address] CHF (congestive heart failure) ([facility] [address] Hx of blood clots, and Hypertension. Past Surgical History:   has a past surgical history that includes Insertable Cardiac Monitor (07/26/2019); Cardioversion (07/26/2019); and Cardiac defibrillator placement (Left). Medications:  No current facility-administered medications on file prior to encounter. Current Outpatient Medications on File Prior to Encounter   Medication Sig Dispense Refill    finasteride (PROSCAR) 5 MG tablet Take 5 mg by mouth daily      metoprolol succinate (TOPROL XL) 25 MG extended release tablet Take 1 tablet by mouth in the morning and at bedtime 90 tablet 1    torsemide (DEMADEX) 20 MG tablet Take 1 tablet by mouth daily 30 tablet 3    empagliflozin (JARDIANCE) 10 MG tablet Take 1 tablet by mouth daily 90 tablet 1    lisinopril (PRINIVIL;ZESTRIL) 2.5 MG tablet Take 1 tablet by mouth daily 90 tablet 1    atorvastatin (LIPITOR) 40 MG tablet Take 1 tablet by mouth nightly 30 tablet 2    metOLazone (ZAROXOLYN) 5 MG tablet Take 1 tablet by mouth as needed (prn) 2 tablet 0    vitamin D (CHOLECALCIFEROL) 50 MCG (2000 UT) TABS tablet Take 1 tablet by mouth daily 90 tablet 1    digoxin (LANOXIN) 125 MCG tablet Take 1 tablet by mouth daily 30 tablet 3    [DISCONTINUED] potassium chloride (KLOR-CON M) 20 MEQ extended release tablet Take 1 tablet by mouth in the morning and 1 tablet in the evening. Take with meals.  60 tablet 3    apixaban (ELIQUIS) 5 MG TABS tablet Take 1 tablet by mouth 2 times daily 60 tablet 3    tamsulosin (FLOMAX) 0.4 MG capsule Take 1 capsule by mouth daily 30 capsule 3     Current Facility-Administered Medications   Medication Dose Route Frequency Provider Last Rate Last Admin    pantoprazole (PROTONIX) injection 40 mg  40 mg IntraVENous Daily TRENT Vo CNP   40 mg at 01/30/23 1622    piperacillin-tazobactam (ZOSYN) 3,375 mg in sodium chloride 0.9 % 50 mL IVPB (mini-bag)  3,375 mg IntraVENous Q8H TRENT Vo CNP 12.5 mL/hr at 01/30/23 1623 3,375 mg at 01/30/23 1623    digoxin (LANOXIN) tablet 125 mcg  125 mcg Oral Daily Amy Rob MD   125 mcg at 01/30/23 1633    metoprolol succinate (TOPROL XL) extended release tablet 25 mg  25 mg Oral BID Amy Rob MD        0.9 % sodium chloride infusion   IntraVENous Continuous Amy Rob MD 50 mL/hr at 01/30/23 1723 New Bag at 01/30/23 1723    morphine (PF) injection 2 mg  2 mg IntraVENous Q4H PRN Amy Rob MD        finasteride (PROSCAR) tablet 5 mg  5 mg Oral Daily Amy Rob MD   5 mg at 01/30/23 1723    sodium chloride flush 0.9 % injection 5-40 mL  5-40 mL IntraVENous 2 times per day Amy Rob MD        sodium chloride flush 0.9 % injection 5-40 mL  5-40 mL IntraVENous PRN Amy Rob MD        0.9 % sodium chloride infusion   IntraVENous PRN Amy Rob MD        enoxaparin (LOVENOX) injection 90 mg  1 mg/kg SubCUTAneous BID Abigail Howell MD        ondansetron (ZOFRAN-ODT) disintegrating tablet 4 mg  4 mg Oral Q8H PRN Amy Rob MD        Or    ondansetron (ZOFRAN) injection 4 mg  4 mg IntraVENous Q6H PRN Amy Rob MD        polyethylene glycol (GLYCOLAX) packet 17 g  17 g Oral Daily PRN Amy Rob MD        acetaminophen (TYLENOL) tablet 650 mg  650 mg Oral Q6H PRN Amy Rob MD        Or    acetaminophen (TYLENOL) suppository 650 mg  650 mg Rectal Q6H PRN Amy Rob MD        hydrALAZINE (APRESOLINE) injection 10 mg  10 mg IntraVENous Q4H PRN Amy Rob MD   10 mg at 01/30/23 1723    ipratropium-albuterol (DUONEB) nebulizer solution 1 ampule  1 ampule Inhalation BID Amy Rob MD            Allergies:  No Known Allergies     Social History:  [facility] Lives    reports that he has never smoked. He has never been exposed to tobacco smoke. He has never used smokeless tobacco. He reports that he does not drink alcohol and does not use drugs. Family History:  family history includes Heart Attack in his mother; Heart Disease in his mother; High Blood Pressure in his father and mother; Other in his father; Stroke in his father. ,     Physical Exam:  BP (!) 156/103   Pulse 64   Temp 97.6 °F (36.4 °C) (Oral)   Resp 20   Ht 6' 2\" (1.88 m)   Wt 204 lb (92.5 kg)   SpO2 96%   BMI 26.19 kg/m²     General appearance:  Appears comfortable. Well nourished  Eyes: Sclera clear, pupils equal  ENT: Moist mucus membranes, no thrush. Trachea midline. Cardiovascular: Regular rhythm, normal S1, S2. No murmur, gallop, rub. No edema in lower extremities  Respiratory: Clear to auscultation bilaterally, no wheeze, good inspiratory effort  Gastrointestinal: Abdomen soft, non-tender, not distended, normal bowel sounds  Musculoskeletal: No cyanosis in digits, neck supple  Neurology: Cranial nerves grossly intact. Alert and oriented in time, place and person. No speech or motor deficits  Psychiatry: Appropriate affect.  Not agitated  Skin: Warm, dry, normal turgor, no rash  Brisk capillary refill, peripheral pulses palpable   Labs:  CBC:   Lab Results   Component Value Date/Time    WBC 5.6 01/30/2023 08:48 AM    RBC 4.43 01/30/2023 08:48 AM    HGB 12.2 01/30/2023 08:48 AM    HCT 36.4 01/30/2023 08:48 AM    MCV 82.3 01/30/2023 08:48 AM    MCH 27.6 01/30/2023 08:48 AM    MCHC 33.6 01/30/2023 08:48 AM    RDW 14.6 01/30/2023 08:48 AM     01/30/2023 08:48 AM    MPV 8.1 01/30/2023 08:48 AM     BMP:    Lab Results   Component Value Date/Time     01/30/2023 08:48 AM    K 4.6 01/30/2023 08:48 AM    K 4.0 10/29/2022 04:11 PM     01/30/2023 08:48 AM    CO2 26 01/30/2023 08:48 AM    BUN 23 01/30/2023 08:48 AM    CREATININE 1.3 01/30/2023 08:48 AM    CALCIUM 9.4 01/30/2023 08:48 AM    GFRAA >60 10/05/2022 10:10 AM    LABGLOM >60 01/30/2023 08:48 AM    GLUCOSE 101 01/30/2023 08:48 AM     US GALLBLADDER RUQ   Final Result   Mild gallbladder wall thickening with a small amount of pericholecystic   fluid. No evidence of cholelithiasis. Questionable mild prominence of the pancreatic head. Further evaluation with   pancreatic protocol contrast enhanced MRI may be obtained for further   evaluation. CT ABDOMEN PELVIS W IV CONTRAST Additional Contrast? None   Preliminary Result   Question of gallbladder sludge and gallbladder wall edema. Consider right   upper quadrant ultrasound for further evaluation. Streak artifact and suboptimal contrast opacification limits the exam.      Marked cardiomegaly. Reflux of contrast into enlarged hepatic veins and IVC   may reflect right heart failure. The appendix is within normal limits. Diverticulosis. Small amount of free fluid in the pelvis. Prostate gland enlargement. Chest Xray:   EKG:    I visualized CXR images and EKG strips    Discussed case  with     Problem List  Principal Problem:    Abdominal pain  Active Problems:    Abdominal pain, right upper quadrant  Resolved Problems:    * No resolved hospital problems. *        Assessment/Plan:   Abdominal pain d/t gall bladder sludge  Keep NPO   Gentle hydration  Cont on IV Zosyn  Pain control   Hold Eliquis  Surgery on board    CHF acute on chronic   Chest xray showed cardiomegaly   Elevated pro bnp  Will get cardiac ECHO  Cardiology consulted for pre- op clearance         DVT prophylaxis Lovenox   Code status   Diet   IV access   Don Catheter    Admit as inpatien. I anticipate hospitalization spanning more than two midnights for investigation and treatment of the above medically necessary diagnoses.     Please note that some part of this chart was generated using Dragon dictation software. Although every effort was made to ensure the accuracy of this automated transcription, some errors in transcription may have occurred inadvertently. If you may need any clarification, please do not hesitate to contact me through [facility].        Letha Haney MD    1/30/2023 8:49 PM

## 2023-01-31 NOTE — PROGRESS NOTES
Pt had 8 run beat of V-tach. Pt rounded on and pt asymptomatic, resting in no S/S of distress with eyes closed. WCTM.

## 2023-01-31 NOTE — PROGRESS NOTES
[facility] Lapar[facility]        Progress Note    Patient Name: Shruti Horton  MRN: [id number]  YOB: 1960  Date of Evaluation: 2023    Chief Complaint: Abdominal pain    Subjective:  No acute events overnight  Pain resolved  No nausea or vomiting  Had some SOB overnight, resolved this morning, denies chest pain  Sitting up on edge of bed at this time      Vital Signs:  Patient Vitals for the past 24 hrs:   BP Temp Temp src Pulse Resp SpO2   23 1127 -- -- -- -- 16 96 %   23 1110 124/86 97.7 °F (36.5 °C) Oral 55 18 98 %   23 0745 (!) 153/96 98.4 °F (36.9 °C) Oral 55 -- 98 %   23 0300 (!) 141/88 98.7 °F (37.1 °C) Oral 62 18 97 %   23 2330 (!) 130/94 98.7 °F (37.1 °C) Oral 61 18 97 %   23 2103 (!) 150/100 -- -- 78 18 98 %   23 1925 (!) 156/103 97.6 °F (36.4 °C) Oral 64 20 96 %   23 1800 (!) 155/107 -- -- -- -- --   23 1606 (!) 159/121 98.2 °F (36.8 °C) Oral 80 18 --   23 1453 (!) 147/123 -- -- 64 18 97 %   23 1323 (!) 146/124 -- -- 67 20 96 %      TEMPERATURE HISTORY 24H: Temp (24hrs), Av.2 °F (36.8 °C), Min:97.6 °F (36.4 °C), Max:98.7 °F (37.1 °C)    BLOOD PRESSURE HISTORY: Systolic (26LEA), YJA:595 , Min:124 , MOC:622    Diastolic (69FSP), BNQ:708, Min:86, Max:124      Intake/Output:  I/O last 3 completed shifts: In: 325.9 [I.V.:229.1;  IV Piggyback:96.8]  Out: 150 [Urine:150]  I/O this shift:  In: 350 [P.O.:350]  Out: -   Drain/tube Output:       Physical Exam:  General: awake, alert, oriented to  person, place, time  Cardiovascular:  regular rate and rhythm and no murmur noted  Lungs: clear to auscultation  Abdomen: soft, nontender, nondistended, bowel sounds normal     Labs:  CBC:    Recent Labs     23  0848 23  0456   WBC 5.6 5.1   HGB 12.2* 12.4*   HCT 36.4* 38.3*    224     BMP:    Recent Labs     23  0848 23  0456    139   K 4.6 4.7    104   CO2 26 23   BUN 23* 22*   CREATININE 1.3 1.2   GLUCOSE 101* 103*     Hepatic:    Recent Labs     01/30/23  0848 01/31/23  0456   AST 34 31   ALT 34 29   BILITOT 2.6*  2.9* 3.1*   ALKPHOS 90 81     Amylase:  No results found for: AMYLASE  Lipase:    Lab Results   Component Value Date/Time    LIPASE 27.0 01/30/2023 08:48 AM    LIPASE 36.0 08/28/2022 04:06 AM      Mag:    Lab Results   Component Value Date/Time    MG 2.00 10/30/2022 05:04 AM    MG 2.10 09/05/2022 01:02 AM     Phos:     Lab Results   Component Value Date/Time    PHOS 3.3 10/31/2022 06:44 AM    PHOS 5.0 10/30/2022 05:04 AM      Coags:   Lab Results   Component Value Date/Time    PROTIME 18.9 07/28/2022 10:50 PM    INR 1.59 07/28/2022 10:50 PM    APTT 28.8 07/28/2022 10:50 PM       Cultures:  Anaerobic culture  No results found for: LABANAE  Fungus stain  No results found for requested labs within last 30 days. Gram stain  No results found for requested labs within last 30 days. Organism  Lab Results   Component Value Date/Time    ORG Klebsiella pneumoniae (A) 04/13/2022 12:59 PM     Surgical culture  No results found for: CXSURG  Blood culture 1  No results found for requested labs within last 30 days. Blood culture 2  No results found for requested labs within last 30 days. Fecal occult  No results found for requested labs within last 30 days. GI bacterial pathogens by PCR  No results found for requested labs within last 30 days. C. difficile  No results found for requested labs within last 30 days.      Urine culture  Lab Results   Component Value Date/Time    LABURIN >100,000 CFU/ml 04/13/2022 12:59 PM       Pathology:  No relevant pathology     Imaging:  I have personally reviewed the following films:    CT ABDOMEN PELVIS W IV CONTRAST Additional Contrast? None    Result Date: 1/30/2023  EXAMINATION: CT OF THE ABDOMEN AND PELVIS WITH CONTRAST 1/30/2023 9:37 am TECHNIQUE: CT of the abdomen and pelvis was performed with the administration of intravenous contrast. Multiplanar reformatted images are provided for review. Automated exposure control, iterative reconstruction, and/or weight based adjustment of the mA/kV was utilized to reduce the radiation dose to as low as reasonably achievable. COMPARISON: 07/24/2019 HISTORY: ORDERING SYSTEM PROVIDED HISTORY: epigastric pain TECHNOLOGIST PROVIDED HISTORY: Reason for exam:->epigastric pain Additional Contrast?->None Decision Support Exception - unselect if not a suspected or confirmed emergency medical condition->Emergency Medical Condition (MA) Reason for Exam: epigastric pain FINDINGS: Lower Chest: The heart size is significantly enlarged with enlargement of all four chambers. Contrast refluxes into the hepatic veins. A pacemaker/defibrillator device is in place. There is no pericardial effusion. An acute process is not appreciated at the lung bases. Organs: A focal hepatic abnormality is not identified. There is question of mild gallbladder wall thickening. There is subtle increased density within the dependent portion of the gallbladder which may reflect sludge. A focal splenic abnormality is not appreciated. A focal pancreatic abnormality is not identified. A focal adrenal abnormality is not appreciated. Renal cysts are noted. The kidneys are not obstructed. There is limited contrast opacification of the abdominal organs likely related to underlying heart disease. Streak artifact diffusely compromises the quality of the exam. GI/Bowel: The bowel is not obstructed. The appendix is within normal limits. Diverticulosis is present. Pelvis: There is a small amount of free fluid in the pelvis. The prostate gland is enlarged measuring 6.0 cm in transverse dimension. The urinary bladder is unremarkable. Peritoneum/retroperitoneum: The inferior vena cava is enlarged. There is mild tortuosity of the abdominal aorta. There is no free intraperitoneal air.   There is a small fat containing periumbilical hernia. Bones/soft tissues: There is mild diffuse subcutaneous edema. Degenerative features are noted at the SI joints and throughout the spine. An acute osseous abnormality is not identified. Question of gallbladder sludge and gallbladder wall edema. Consider right upper quadrant ultrasound for further evaluation. Streak artifact and suboptimal contrast opacification limits the exam. Marked cardiomegaly. Reflux of contrast into enlarged hepatic veins and IVC may reflect right heart failure. The appendix is within normal limits. Diverticulosis. Small amount of free fluid in the pelvis. Prostate gland enlargement. US GALLBLADDER RUQ    Result Date: 1/30/2023  EXAMINATION: RIGHT UPPER QUADRANT ULTRASOUND 1/30/2023 11:23 am COMPARISON: None. HISTORY: ORDERING SYSTEM PROVIDED HISTORY: RUQ PAIN TECHNOLOGIST PROVIDED HISTORY: Reason for exam:->RUQ PAIN Reason for Exam: fu ct FINDINGS: LIVER:  The liver demonstrates normal echogenicity without evidence of intrahepatic biliary ductal dilatation. BILIARY SYSTEM:  No sonographic evidence of gallstones. There is mild thickening of the gallbladder measuring up to 3 mm. There is a small amount of pericholecystic fluid. Negative sonographic Kebede's sign. Common bile duct is within normal limits measuring 0.5 cm. RIGHT KIDNEY: The right kidney is without evidence of hydronephrosis. There is a simple right renal cyst measuring 3.4 x 3.3 x 2.7 cm. PANCREAS:  There is questionable mild prominence of the pancreatic head. OTHER: No evidence of right upper quadrant ascites. Mild gallbladder wall thickening with a small amount of pericholecystic fluid. No evidence of cholelithiasis. Questionable mild prominence of the pancreatic head. Further evaluation with pancreatic protocol contrast enhanced MRI may be obtained for further evaluation.      Scheduled Meds:   pantoprazole  40 mg IntraVENous Daily    piperacillin-tazobactam  3,375 mg IntraVENous Q8H    digoxin  125 mcg Oral Daily    metoprolol succinate  25 mg Oral BID    finasteride  5 mg Oral Daily    sodium chloride flush  5-40 mL IntraVENous 2 times per day    enoxaparin  1 mg/kg SubCUTAneous BID    ipratropium-albuterol  1 ampule Inhalation BID     Continuous Infusions:   sodium chloride       PRN Meds:.morphine, sodium chloride flush, sodium chloride, ondansetron **OR** ondansetron, polyethylene glycol, acetaminophen **OR** acetaminophen, hydrALAZINE      Assessment:  57 y.o. male admitted with   1. Abdominal pain, right upper quadrant    2. Gallbladder sludge    3. Bilirubinemia        Upper abdominal pain, possible acalculous cholecystitis  Acute on chronic CHF  Paroxysmal atrial fibrillation       Plan:  1. Pain resolved, no nausea/vomiting, abdominal exam benign, vitals/labs stable--bilirubin elevated but this appears to be close to baseline; continued supportive care, no plans for surgical intervention at this time, continue PPI  2. Start clear liquid diet as tolerated; monitor bowel function  3. Gentle IV hydration (CHF) until PO intake is adequate; monitor and correct electrolytes  4. Antibiotics  5. Activity as tolerated  6. PRN analgesics and antiemetics--minimizing narcotics as tolerated  7. Hold Eliquis  8. Management of medical comorbid etiologies per primary team and consulting services    EDUCATION:  Educated patient on plan of care and disease process--all questions answered. Plans discussed with patient and nursing. Reviewed and discussed with Dr. Rimma Cortez. Signed:  TRENT Triana CNP  1/31/2023 11:54 AM    Surgery Staff:     I have personally performed a face to face diagnostic evaluation on this patient. I have interviewed and examined the patient and I agree with the assessment above.  Time was spent reviewing patient chart (including but not limited to notes, labs, imaging and other testing), interviewing and counseling patient and present family members, performing physical exam, documentation of my findings and subsequent follow up of ordered medication and testing, placing referrals and communication with patient care providers, coordinating future care as well as documentation in the EHR. This encompassed more than 50% of the total encounter time.  In summary, my findings and plan are the following:   Pt sitting up, in good spirits, no pain, no N/V.  PE: normal abdomen, not tender, No distension  Will begin diet - clears - likely regular diet tomorrow  Continue PPI, atbx today  Echo reviewed  Appears he will not need any surgery at this point    Sumi Duran MD

## 2023-01-31 NOTE — PROGRESS NOTES
HOSPITALISTS PROGRESS NOTE    1/31/2023 8:49 AM        Name: Shruti Dos Santos Admitted: 1/30/2023  Primary Care Provider: No primary care provider on file. (Tel: None)      Brief Course: This 57 y.o. male with PMHx of hypertension, CHF and paroxysmal presented with abdominal pain. CT abdomen and pelvis showed gallbladder sludge? GB wall edema and diverticulosis. Marked cardiomegaly and reflux of contrast into enlarged hepatic veins and IVC suggestive of mild heart failure. Interval history:   Pt seen and examined today   Overnight events noted and interval ancillary notes reviewed. Reported that his abdominal pain has resolved. Denied any fever, chills, chest pain, shortness of breath nausea, vomiting or diarrhea. Assessment & Plan:     Abdominal pain likely 2/2 congestive hepatomegaly/acalculous cholecystitis? CT abdomen and pelvis showed gallbladder sludge? GB wall edema and diverticulosis. Marked cardiomegaly and reflux of contrast into enlarged hepatic veins and IVC suggestive of mild heart failure. General surgery consulted; started on antibiotics and PPI per the recs  Intake fractionated bilirubin levels. Acute on chronic combined CHF  Echo showed EF of 15 to 20%, severe global hypokinesis, grade 3 DD with elevated LVEDP, severely dilated RV, moderate eccentric MR, severe TR with RVSP of 100 mmHg, severe pulmonary hypertension  Cardiology on board; appreciate recs  Continue digoxin and metoprolol. Plan to start Entresto, spironolactone  Fluid and salt restriction. Daily weights    HL paroxysmal A. fib; continue beta-blocker.   Hold anticoagulation for now    Biventricular ICD; plan to interrogate device     DVT PPX: Eliquis  Code:Full Code    Disposition: Once acute medical issues have resolved    Current Medications  pantoprazole (PROTONIX) injection 40 mg, Daily  piperacillin-tazobactam (ZOSYN) 3,375 mg in sodium chloride 0.9 % 50 mL IVPB (mini-bag), Q8H  digoxin (LANOXIN) tablet 125 mcg, Daily  metoprolol succinate (TOPROL XL) extended release tablet 25 mg, BID  morphine (PF) injection 2 mg, Q4H PRN  finasteride (PROSCAR) tablet 5 mg, Daily  sodium chloride flush 0.9 % injection 5-40 mL, 2 times per day  sodium chloride flush 0.9 % injection 5-40 mL, PRN  0.9 % sodium chloride infusion, PRN  enoxaparin (LOVENOX) injection 90 mg, BID  ondansetron (ZOFRAN-ODT) disintegrating tablet 4 mg, Q8H PRN   Or  ondansetron (ZOFRAN) injection 4 mg, Q6H PRN  polyethylene glycol (GLYCOLAX) packet 17 g, Daily PRN  acetaminophen (TYLENOL) tablet 650 mg, Q6H PRN   Or  acetaminophen (TYLENOL) suppository 650 mg, Q6H PRN  hydrALAZINE (APRESOLINE) injection 10 mg, Q4H PRN  ipratropium-albuterol (DUONEB) nebulizer solution 1 ampule, BID        Objective:  BP (!) 153/96   Pulse 55   Temp 98.4 °F (36.9 °C) (Oral)   Resp 18   Ht 6' 2\" (1.88 m)   Wt 204 lb (92.5 kg)   SpO2 98%   BMI 26.19 kg/m²     Intake/Output Summary (Last 24 hours) at 1/31/2023 0849  Last data filed at 1/31/2023 0606  Gross per 24 hour   Intake 325.87 ml   Output 150 ml   Net 175.87 ml      Wt Readings from Last 3 Encounters:   01/30/23 204 lb (92.5 kg)   01/10/23 196 lb (88.9 kg)   11/10/22 202 lb (91.6 kg)       Physical Examination:   General appearance:  No apparent distress, appears stated age and cooperative. HEENT: Normocephalic, sclera clear., PERRLA. Trachea midline, no adenopathy. Cardiovascular: Regular rate and rhythm, normal S1, S2. No murmur. Respiratory:Clear to auscultation bilaterally, no wheeze or crackles. GI: Abdomen soft, no tenderness, not distended, normal bowel sounds  Musculoskeletal: No cyanosis in digits. No BLE edema present  Neurology: CN 2-12 grossly intact.  No speech or motor deficits  Psych: Not agitated, appropriate affect  Skin: Warm, dry, normal turgor    Labs and Tests:  CBC:   Recent Labs     01/30/23  0848 01/31/23 0456   WBC 5.6 5.1   HGB 12.2* 12.4*    224     BMP:    Recent Labs     01/30/23  0848 01/31/23 0456    139   K 4.6 4.7    104   CO2 26 23   BUN 23* 22*   CREATININE 1.3 1.2   GLUCOSE 101* 103*     Hepatic:   Recent Labs     01/30/23  0848 01/31/23 0456   AST 34 31   ALT 34 29   BILITOT 2.6*  2.9* 3.1*   ALKPHOS 90 81     US GALLBLADDER RUQ   Final Result   Mild gallbladder wall thickening with a small amount of pericholecystic   fluid. No evidence of cholelithiasis. Questionable mild prominence of the pancreatic head. Further evaluation with   pancreatic protocol contrast enhanced MRI may be obtained for further   evaluation. CT ABDOMEN PELVIS W IV CONTRAST Additional Contrast? None   Preliminary Result   Question of gallbladder sludge and gallbladder wall edema. Consider right   upper quadrant ultrasound for further evaluation. Streak artifact and suboptimal contrast opacification limits the exam.      Marked cardiomegaly. Reflux of contrast into enlarged hepatic veins and IVC   may reflect right heart failure. The appendix is within normal limits. Diverticulosis. Small amount of free fluid in the pelvis. Prostate gland enlargement. Problem List  Principal Problem:    Abdominal pain  Active Problems:    Abdominal pain, right upper quadrant  Resolved Problems:    * No resolved hospital problems.  Lisbet Almeida MD   1/31/2023 8:49 AM

## 2023-01-31 NOTE — PLAN OF CARE
Problem: ABCDS Injury Assessment  Goal: Absence of physical injury  1/31/2023 0750 by TRENT Jean CNP  Outcome: Progressing  1/31/2023 0233 by Dolores Denton RN  Outcome: Progressing     Problem: Discharge Planning  Goal: Discharge to home or other facility with appropriate resources  1/31/2023 0750 by TRENT Jean CNP  Outcome: Progressing  1/31/2023 0233 by Dolores Denton RN  Outcome: Progressing

## 2023-02-01 PROBLEM — I48.11 LONGSTANDING PERSISTENT ATRIAL FIBRILLATION (HCC): Status: ACTIVE | Noted: 2023-02-01

## 2023-02-01 LAB
A/G RATIO: 1.2 (ref 1.1–2.2)
ALBUMIN SERPL-MCNC: 3.5 G/DL (ref 3.4–5)
ALP BLD-CCNC: 78 U/L (ref 40–129)
ALT SERPL-CCNC: 27 U/L (ref 10–40)
ANION GAP SERPL CALCULATED.3IONS-SCNC: 10 MMOL/L (ref 3–16)
AST SERPL-CCNC: 28 U/L (ref 15–37)
BASOPHILS ABSOLUTE: 0.2 K/UL (ref 0–0.2)
BASOPHILS RELATIVE PERCENT: 3 %
BILIRUB SERPL-MCNC: 2.2 MG/DL (ref 0–1)
BUN BLDV-MCNC: 18 MG/DL (ref 7–20)
CALCIUM SERPL-MCNC: 8.3 MG/DL (ref 8.3–10.6)
CHLORIDE BLD-SCNC: 101 MMOL/L (ref 99–110)
CO2: 25 MMOL/L (ref 21–32)
CREAT SERPL-MCNC: 1.5 MG/DL (ref 0.8–1.3)
EOSINOPHILS ABSOLUTE: 0.1 K/UL (ref 0–0.6)
EOSINOPHILS RELATIVE PERCENT: 2 %
GFR SERPL CREATININE-BSD FRML MDRD: 52 ML/MIN/{1.73_M2}
GLUCOSE BLD-MCNC: 85 MG/DL (ref 70–99)
HCT VFR BLD CALC: 38 % (ref 40.5–52.5)
HEMOGLOBIN: 12 G/DL (ref 13.5–17.5)
LYMPHOCYTES ABSOLUTE: 1.4 K/UL (ref 1–5.1)
LYMPHOCYTES RELATIVE PERCENT: 24.7 %
MAGNESIUM: 2.1 MG/DL (ref 1.8–2.4)
MCH RBC QN AUTO: 26.1 PG (ref 26–34)
MCHC RBC AUTO-ENTMCNC: 31.7 G/DL (ref 31–36)
MCV RBC AUTO: 82.4 FL (ref 80–100)
MONOCYTES ABSOLUTE: 0.5 K/UL (ref 0–1.3)
MONOCYTES RELATIVE PERCENT: 8 %
NEUTROPHILS ABSOLUTE: 3.6 K/UL (ref 1.7–7.7)
NEUTROPHILS RELATIVE PERCENT: 62.3 %
PDW BLD-RTO: 14.6 % (ref 12.4–15.4)
PHOSPHORUS: 3.8 MG/DL (ref 2.5–4.9)
PLATELET # BLD: 202 K/UL (ref 135–450)
PMV BLD AUTO: 7.9 FL (ref 5–10.5)
POTASSIUM SERPL-SCNC: 3.8 MMOL/L (ref 3.5–5.1)
PRO-BNP: ABNORMAL PG/ML (ref 0–124)
RBC # BLD: 4.61 M/UL (ref 4.2–5.9)
SODIUM BLD-SCNC: 136 MMOL/L (ref 136–145)
TOTAL PROTEIN: 6.5 G/DL (ref 6.4–8.2)
TROPONIN: <0.01 NG/ML
WBC # BLD: 5.8 K/UL (ref 4–11)

## 2023-02-01 PROCEDURE — 84484 ASSAY OF TROPONIN QUANT: CPT

## 2023-02-01 PROCEDURE — 83735 ASSAY OF MAGNESIUM: CPT

## 2023-02-01 PROCEDURE — 1200000000 HC SEMI PRIVATE

## 2023-02-01 PROCEDURE — 80053 COMPREHEN METABOLIC PANEL: CPT

## 2023-02-01 PROCEDURE — 99223 1ST HOSP IP/OBS HIGH 75: CPT | Performed by: INTERNAL MEDICINE

## 2023-02-01 PROCEDURE — 6370000000 HC RX 637 (ALT 250 FOR IP): Performed by: NURSE PRACTITIONER

## 2023-02-01 PROCEDURE — 84100 ASSAY OF PHOSPHORUS: CPT

## 2023-02-01 PROCEDURE — APPSS15 APP SPLIT SHARED TIME 0-15 MINUTES: Performed by: NURSE PRACTITIONER

## 2023-02-01 PROCEDURE — 6370000000 HC RX 637 (ALT 250 FOR IP): Performed by: INTERNAL MEDICINE

## 2023-02-01 PROCEDURE — 6370000000 HC RX 637 (ALT 250 FOR IP): Performed by: FAMILY MEDICINE

## 2023-02-01 PROCEDURE — APPNB30 APP NON BILLABLE TIME 0-30 MINS: Performed by: NURSE PRACTITIONER

## 2023-02-01 PROCEDURE — 2580000003 HC RX 258: Performed by: NURSE PRACTITIONER

## 2023-02-01 PROCEDURE — 85025 COMPLETE CBC W/AUTO DIFF WBC: CPT

## 2023-02-01 PROCEDURE — 6360000002 HC RX W HCPCS: Performed by: FAMILY MEDICINE

## 2023-02-01 PROCEDURE — 2580000003 HC RX 258: Performed by: FAMILY MEDICINE

## 2023-02-01 PROCEDURE — 94761 N-INVAS EAR/PLS OXIMETRY MLT: CPT

## 2023-02-01 PROCEDURE — 94640 AIRWAY INHALATION TREATMENT: CPT

## 2023-02-01 PROCEDURE — 99232 SBSQ HOSP IP/OBS MODERATE 35: CPT | Performed by: NURSE PRACTITIONER

## 2023-02-01 PROCEDURE — 99232 SBSQ HOSP IP/OBS MODERATE 35: CPT | Performed by: INTERNAL MEDICINE

## 2023-02-01 PROCEDURE — 83880 ASSAY OF NATRIURETIC PEPTIDE: CPT

## 2023-02-01 PROCEDURE — 6360000002 HC RX W HCPCS: Performed by: NURSE PRACTITIONER

## 2023-02-01 PROCEDURE — C9113 INJ PANTOPRAZOLE SODIUM, VIA: HCPCS | Performed by: NURSE PRACTITIONER

## 2023-02-01 PROCEDURE — 99232 SBSQ HOSP IP/OBS MODERATE 35: CPT | Performed by: SURGERY

## 2023-02-01 RX ORDER — LISINOPRIL 5 MG/1
2.5 TABLET ORAL DAILY
Status: DISCONTINUED | OUTPATIENT
Start: 2023-02-01 | End: 2023-02-03 | Stop reason: HOSPADM

## 2023-02-01 RX ORDER — TORSEMIDE 20 MG/1
20 TABLET ORAL DAILY
Status: DISCONTINUED | OUTPATIENT
Start: 2023-02-01 | End: 2023-02-03 | Stop reason: HOSPADM

## 2023-02-01 RX ORDER — SPIRONOLACTONE 25 MG/1
12.5 TABLET ORAL
Status: DISCONTINUED | OUTPATIENT
Start: 2023-02-02 | End: 2023-02-03

## 2023-02-01 RX ADMIN — PIPERACILLIN AND TAZOBACTAM 3375 MG: 3; .375 INJECTION, POWDER, FOR SOLUTION INTRAVENOUS at 01:06

## 2023-02-01 RX ADMIN — IPRATROPIUM BROMIDE AND ALBUTEROL SULFATE 1 AMPULE: 2.5; .5 SOLUTION RESPIRATORY (INHALATION) at 08:23

## 2023-02-01 RX ADMIN — SODIUM CHLORIDE, PRESERVATIVE FREE 10 ML: 5 INJECTION INTRAVENOUS at 01:04

## 2023-02-01 RX ADMIN — LISINOPRIL 2.5 MG: 5 TABLET ORAL at 14:51

## 2023-02-01 RX ADMIN — FINASTERIDE 5 MG: 5 TABLET, FILM COATED ORAL at 09:52

## 2023-02-01 RX ADMIN — PIPERACILLIN AND TAZOBACTAM 3375 MG: 3; .375 INJECTION, POWDER, FOR SOLUTION INTRAVENOUS at 17:57

## 2023-02-01 RX ADMIN — TORSEMIDE 20 MG: 20 TABLET ORAL at 14:53

## 2023-02-01 RX ADMIN — PANTOPRAZOLE SODIUM 40 MG: 40 INJECTION, POWDER, FOR SOLUTION INTRAVENOUS at 09:51

## 2023-02-01 RX ADMIN — IPRATROPIUM BROMIDE AND ALBUTEROL SULFATE 1 AMPULE: 2.5; .5 SOLUTION RESPIRATORY (INHALATION) at 21:24

## 2023-02-01 RX ADMIN — PIPERACILLIN AND TAZOBACTAM 3375 MG: 3; .375 INJECTION, POWDER, FOR SOLUTION INTRAVENOUS at 09:48

## 2023-02-01 RX ADMIN — METOPROLOL SUCCINATE 25 MG: 25 TABLET, EXTENDED RELEASE ORAL at 09:51

## 2023-02-01 RX ADMIN — DIGOXIN 125 MCG: 125 TABLET ORAL at 09:51

## 2023-02-01 RX ADMIN — EMPAGLIFLOZIN 10 MG: 10 TABLET, FILM COATED ORAL at 14:51

## 2023-02-01 RX ADMIN — ENOXAPARIN SODIUM 90 MG: 100 INJECTION SUBCUTANEOUS at 09:47

## 2023-02-01 RX ADMIN — APIXABAN 5 MG: 5 TABLET, FILM COATED ORAL at 20:06

## 2023-02-01 RX ADMIN — SODIUM CHLORIDE, PRESERVATIVE FREE 10 ML: 5 INJECTION INTRAVENOUS at 09:53

## 2023-02-01 ASSESSMENT — PAIN SCALES - GENERAL
PAINLEVEL_OUTOF10: 0

## 2023-02-01 NOTE — PROGRESS NOTES
HOSPITALISTS PROGRESS NOTE    2/1/2023 8:54 AM        Name: Ronal Zhang . Admitted: 1/30/2023  Primary Care Provider: No primary care provider on file. (Tel: None)      Brief Course: This 57 y.o. male with PMHx of hypertension, CHF and paroxysmal presented with abdominal pain. CT abdomen and pelvis showed gallbladder sludge? GB wall edema and diverticulosis. Marked cardiomegaly and reflux of contrast into enlarged hepatic veins and IVC suggestive of mild heart failure. Interval history:   Pt seen and examined today. Overnight events noted, interval ancillary notes and labs reviewed. Creatinine trended up to 1.5  Reported that he feels great; abdominal pain completely resolved. Advance to low-fat diet  denied cough, SOB, chest pain, nausea, vomiting or abdominal pain        Assessment & Plan:     Acute on chronic combined CHF  Echo showed EF of 15 to 20%, severe global hypokinesis, grade 3 DD with elevated LVEDP, severely dilated RV, moderate eccentric MR, severe TR with RVSP of 100 mmHg, severe pulmonary hypertension  Cardiology on board; appreciate recs  Continue digoxin and metoprolol. Started on torsemide, Jardiance and lisinopril. Plan to start on spironolactone tomorrow if renal function stable  Fluid and salt restriction. Daily weights    SACHIN likely cardiorenal  Creatinine 1.2 on admission; trended up to 1.5  Avoid nephrotoxin, strict and output, daily weights and monitor renal function closely    Hx of paroxysmal A. fib; continue beta-blocker and Eliquis    Biventricular ICD; plan to interrogate device    Abdominal pain likely 2/2 congestive hepatomegaly/acalculous cholecystitis? CT abdomen and pelvis showed gallbladder sludge? GB wall edema and diverticulosis. Marked cardiomegaly and reflux of contrast into enlarged hepatic veins and IVC suggestive of mild heart failure.   RUQ US showed mild GB wall thickening with small amount of pericholecystic fluid. No cholelithiasis seen  General surgery consulted; started on PPI and antibiotics. no plans for any surgical intervention       DVT PPX: Eliquis  Code:Full Code    Disposition: Once acute medical issues have resolved    Current Medications  pantoprazole (PROTONIX) injection 40 mg, Daily  piperacillin-tazobactam (ZOSYN) 3,375 mg in sodium chloride 0.9 % 50 mL IVPB (mini-bag), Q8H  digoxin (LANOXIN) tablet 125 mcg, Daily  metoprolol succinate (TOPROL XL) extended release tablet 25 mg, BID  morphine (PF) injection 2 mg, Q4H PRN  finasteride (PROSCAR) tablet 5 mg, Daily  sodium chloride flush 0.9 % injection 5-40 mL, 2 times per day  sodium chloride flush 0.9 % injection 5-40 mL, PRN  0.9 % sodium chloride infusion, PRN  enoxaparin (LOVENOX) injection 90 mg, BID  ondansetron (ZOFRAN-ODT) disintegrating tablet 4 mg, Q8H PRN   Or  ondansetron (ZOFRAN) injection 4 mg, Q6H PRN  polyethylene glycol (GLYCOLAX) packet 17 g, Daily PRN  acetaminophen (TYLENOL) tablet 650 mg, Q6H PRN   Or  acetaminophen (TYLENOL) suppository 650 mg, Q6H PRN  hydrALAZINE (APRESOLINE) injection 10 mg, Q4H PRN  ipratropium-albuterol (DUONEB) nebulizer solution 1 ampule, BID      Objective:  BP (!) 144/96   Pulse 69   Temp 97.7 °F (36.5 °C) (Oral)   Resp 18   Ht 6' 2\" (1.88 m)   Wt 199 lb 3.2 oz (90.4 kg)   SpO2 98%   BMI 25.58 kg/m²     Intake/Output Summary (Last 24 hours) at 2/1/2023 0854  Last data filed at 2/1/2023 0315  Gross per 24 hour   Intake 1050.15 ml   Output 575 ml   Net 475.15 ml        Wt Readings from Last 3 Encounters:   02/01/23 199 lb 3.2 oz (90.4 kg)   01/10/23 196 lb (88.9 kg)   11/10/22 202 lb (91.6 kg)       Physical Examination:   General appearance:  No apparent distress, appears stated age and cooperative. HEENT: Normocephalic, sclera clear., PERRLA. Trachea midline, no adenopathy. Cardiovascular: Regular rate and rhythm, normal S1, S2. No murmur. Respiratory:Clear to auscultation bilaterally, no wheeze or crackles. GI: Abdomen soft, no tenderness, not distended, normal bowel sounds  Musculoskeletal: No cyanosis in digits. No BLE edema present  Neurology: CN 2-12 grossly intact. No speech or motor deficits  Psych: Not agitated, appropriate affect  Skin: Warm, dry, normal turgor    Labs and Tests:  CBC:   Recent Labs     01/30/23  0848 01/31/23 0456 02/01/23 0318   WBC 5.6 5.1 5.8   HGB 12.2* 12.4* 12.0*    224 202       BMP:    Recent Labs     01/30/23  0848 01/31/23 0456 02/01/23 0319    139 136   K 4.6 4.7 3.8    104 101   CO2 26 23 25   BUN 23* 22* 18   CREATININE 1.3 1.2 1.5*   GLUCOSE 101* 103* 85       Hepatic:   Recent Labs     01/30/23  0848 01/31/23 0456 02/01/23 0319   AST 34 31 28   ALT 34 29 27   BILITOT 2.6*  2.9* 3.1* 2.2*   ALKPHOS 90 81 78       US GALLBLADDER RUQ   Final Result   Mild gallbladder wall thickening with a small amount of pericholecystic   fluid. No evidence of cholelithiasis. Questionable mild prominence of the pancreatic head. Further evaluation with   pancreatic protocol contrast enhanced MRI may be obtained for further   evaluation. CT ABDOMEN PELVIS W IV CONTRAST Additional Contrast? None   Preliminary Result   Question of gallbladder sludge and gallbladder wall edema. Consider right   upper quadrant ultrasound for further evaluation. Streak artifact and suboptimal contrast opacification limits the exam.      Marked cardiomegaly. Reflux of contrast into enlarged hepatic veins and IVC   may reflect right heart failure. The appendix is within normal limits. Diverticulosis. Small amount of free fluid in the pelvis. Prostate gland enlargement.              Problem List  Principal Problem:    Abdominal pain  Active Problems:    Abdominal pain, right upper quadrant    Epigastric abdominal pain    Acalculous cholecystitis    Paroxysmal atrial fibrillation (HCC) Acute combined systolic and diastolic heart failure (HCC)  Resolved Problems:    * No resolved hospital problems.  Ry Lua MD   2/1/2023 8:54 AM

## 2023-02-01 NOTE — CONSULTS
[address]   Electrophysiology Consultation   Date: 2/1/2023  Reason for Consultation: Atrial fibrillation  Consult Requesting Physician: Aletha Morrison MD   Chief Complaint   Patient presents with    Abdominal Pain     Arrived per friend d/t abdomen pain that started \"a while ago\"; however was worse last night was unable to sleep last night; described as a knot in stomach; BM small this morning and normal yesterday;       CC: Abdominal pain  HPI: Haider Layne is a 57 y.o. male who presented to the hospital with abdominal pain. He has a complex past medical history significant for long persistent atrial fibrillation, HTN, nonischemic cardiomyopathy, HFrEF, left atrial appendage thrombus, challenging social circumstances being homeless, history of noncompliance, s/p BiV ICD on 4/25/2022. EP has been consulted to assess for atrial fibrillation and considering CONSUELO cardioversion. Patient has history of long persistent atrial fibrillation since 2021. At the time of implantation of biventricular ICD his device has been programmed to VVIR and atrial lead has been turned off. Patient reports no palpitation. Not aware of his irregular heart rhythm. Assessment:   Long persistent atrial fibrillation  Acute on chronic HFrEF  Cardiomyopathy, nonischemic  HTN  History of left atrial appendage thrombus  Abdominal pain  History of noncompliance    Plan:   Atrial fibrillation is chronic. It is not clear whether patient's symptoms are related to atrial fibrillation or not. We discussed treatment options in detail with him. These include CONSUELO cardioversion, antiarrhythmic therapy, and/or ablation. He may benefit from restoration of sinus rhythm if possible. However this requires compliance with medical therapy including anticoagulation. He has history of HENOK thrombus and anticoagulation is important.     Since he has chronic long persistent atrial fibrillation cardioversion without antiarrhythmic therapy would be less helpful. Discussed amiodarone therapy with patient. Side effects of amiodarone discussed. Needs close monitoring for amiodarone toxicity. Will start amiodarone and plan for cardioversion after loading with amiodarone for better chance of success. He needs to be compliant with his anticoagulation due to prior history of HENOK thrombus. Can consider cardioversion after loading with amiodarone as outpatient when he is consistently on anticoagulation for 3 to 4 weeks. Then he needs CONSUELO followed by cardioversion. Of note his device has been programmed to VVIR since implantation due to chronic persistent atrial fibrillation. Discussed with nursing staff. Active Hospital Problems    Diagnosis Date Noted    Epigastric abdominal pain [R10.13] 01/31/2023     Priority: Medium    Acalculous cholecystitis [K81.9] 01/31/2023     Priority: Medium    Abdominal pain [R10.9] 01/30/2023     Priority: Medium    Abdominal pain, right upper quadrant [R10.11] 01/30/2023     Priority: Medium    Acute combined systolic and diastolic heart failure ([address] [I50.41] 04/13/2022    Paroxysmal atrial fibrillation (HCC) [I48.0]        Diagnostic studies:   TSH 1.24  ALT 27  AST 28  Creatinine 1.5    proBNP is 16 142  Echo reviewed. LV is dilated. Severe LV dysfunction with ejection fraction of 15%. Moderate MR. Dilated RV and hypokinetic. Consistent with biventricular failure. Prior ECGs reviewed  Device interrogations reviewed  Holter monitor results reviewed   Outside notes reviewed. I independently reviewed the cardiac diagnostic studies, ECG and relevant imaging studies.      Lab Results   Component Value Date    LVEF 18 01/31/2023    LVEFMODE Cardiac Cath 02/08/2022     Lab Results   Component Value Date    TSH 2.66 06/15/2022       Physical Examination:  Vitals:    02/01/23 1611   BP: (!) 136/102   Pulse: 67   Resp: 18   Temp: 98.2 °F (36.8 °C)   SpO2: 93%      In: 1050.2 [P.O.:850; I.V.:138.8]  Out: 875    Wt Readings from Last 3 Encounters:   23 199 lb 3.2 oz (90.4 kg)   01/10/23 196 lb (88.9 kg)   11/10/22 202 lb (91.6 kg)     Temp  Av °F (36.7 °C)  Min: 97.7 °F (36.5 °C)  Max: 98.2 °F (36.8 °C)  Pulse  Av.1  Min: 56  Max: 70  BP  Min: 106/66  Max: 144/96  SpO2  Av.8 %  Min: 93 %  Max: 100 %    Intake/Output Summary (Last 24 hours) at 2023 1713  Last data filed at 2023 4733  Gross per 24 hour   Intake 200.15 ml   Output 875 ml   Net -674.85 ml         I independently reviewed all cardiac tracing from cardiac telemetry. Constitutional: Oriented. No distress. Head: Normocephalic and atraumatic. Mouth/Throat: Oropharynx is clear and moist.   Eyes: Conjunctivae normal. EOM are normal.   Neck: Neck supple. No JVD present. Cardiovascular: Normal rate, irregular rhythm, D6&Z2.  + Systolic murmur  Pulmonary/Chest: Bilateral respiratory sounds. No rhonchi. Abdominal: Soft. No tenderness. Musculoskeletal: No tenderness.  + Edema    Lymphadenopathy: Has no cervical adenopathy. Neurological: Alert and oriented. Follows command, No Gross deficit   Skin: Skin is warm, No rash noted.    Psychiatric: Has a normal behavior       Scheduled Meds:   lisinopril  2.5 mg Oral Daily    torsemide  20 mg Oral Daily    empagliflozin  10 mg Oral Daily    apixaban  5 mg Oral BID    [START ON 2023] spironolactone  12.5 mg Oral Lunch    pantoprazole  40 mg IntraVENous Daily    piperacillin-tazobactam  3,375 mg IntraVENous Q8H    digoxin  125 mcg Oral Daily    metoprolol succinate  25 mg Oral BID    finasteride  5 mg Oral Daily    sodium chloride flush  5-40 mL IntraVENous 2 times per day    ipratropium-albuterol  1 ampule Inhalation BID     Continuous Infusions:   sodium chloride 25 mL/hr (23 0105)     PRN Meds:.morphine, sodium chloride flush, sodium chloride, ondansetron **OR** ondansetron, polyethylene glycol, acetaminophen **OR** acetaminophen, hydrALAZINE     Review of System:  [x] Full ROS obtained and negative except as mentioned in HPI    Prior to Admission medications    Medication Sig Start Date End Date Taking? Authorizing Provider   finasteride (PROSCAR) 5 MG tablet Take 5 mg by mouth daily   Yes Historical Provider, MD   metoprolol succinate (TOPROL XL) 25 MG extended release tablet Take 1 tablet by mouth in the morning and at bedtime 1/10/23   TRENT Downs   torsemide (DEMADEX) 20 MG tablet Take 1 tablet by mouth daily 10/31/22   Oneil Estrada MD   empagliflozin (JARDIANCE) 10 MG tablet Take 1 tablet by mouth daily 10/26/22   [facility], APRN - CNS   lisinopril (PRINIVIL;ZESTRIL) 2.5 MG tablet Take 1 tablet by mouth daily 10/26/22   TRENT Gavin   atorvastatin (LIPITOR) 40 MG tablet Take 1 tablet by mouth nightly 10/5/22   TRENT Downs   metOLazone (ZAROXOLYN) 5 MG tablet Take 1 tablet by mouth as needed (prn) 10/5/22   TRENT Downs   vitamin D (CHOLECALCIFEROL) 50 MCG (2000 UT) TABS tablet Take 1 tablet by mouth daily 9/21/22   TRENT Gavin   digoxin (LANOXIN) 125 MCG tablet Take 1 tablet by mouth daily 9/9/22   TRENT Corbett CNP   potassium chloride (KLOR-CON M) 20 MEQ extended release tablet Take 1 tablet by mouth in the morning and 1 tablet in the evening. Take with meals.  8/1/22 9/8/22  Rubén Esteves MD   apixaban (ELIQUIS) 5 MG TABS tablet Take 1 tablet by mouth 2 times daily 6/17/22   TRENT Corbett CNP   tamsulosin [facility]) 0.4 MG capsule Take 1 capsule by mouth daily 6/18/22   TRENT Corbett CNP       Past Medical History:   Diagnosis Date    Acute combined systolic and diastolic congestive heart failure ([facility] [address] 8/31/2022    Atrial fibrillation ([facility] 07/25/2019    CHF (congestive heart failure) (HCC)     Hx of blood clots     Hypertension         Past Surgical History:   Procedure Laterality Date    CARDIAC DEFIBRILLATOR PLACEMENT Left     CARDIOVERSION  07/26/2019    Dr. Angel Senior  07/26/2019       No Known Allergies    Social History:  Reviewed. reports that he has never smoked. He has never been exposed to tobacco smoke. He has never used smokeless tobacco. He reports that he does not drink alcohol and does not use drugs. Family History:  Reviewed. Reviewed. No family history of SCD. Relevant and available labs, and cardiovascular diagnostics reviewed. Reviewed. Recent Labs     01/30/23 0848 01/31/23 0456 02/01/23 0319    139 136   K 4.6 4.7 3.8    104 101   CO2 26 23 25   PHOS  --   --  3.8   BUN 23* 22* 18   CREATININE 1.3 1.2 1.5*     Recent Labs     01/30/23 0848 01/31/23 0456 02/01/23 0318   WBC 5.6 5.1 5.8   HGB 12.2* 12.4* 12.0*   HCT 36.4* 38.3* 38.0*   MCV 82.3 82.6 82.4    224 202     Estimated Creatinine Clearance: 58 mL/min (A) (based on SCr of 1.5 mg/dL (H)). No results found for: BNP    I independently reviewed all cardiac tracing from cardiac telemetry. I independently reviewed relevant and available cardiac diagnostic tests ECG, CXR, Echo, Stress test, Device interrogation, Holter, CT scan. Complex medical condition with multiple medical problems affecting prognosis and outcome of EP interventions  Severe exacerbation of underlying medical condition requiring hospitalization and at risk of decompensation. All questions and concerns were addressed to the patient/family. Alternatives to my treatment were discussed. I have discussed the above stated plan and the patient verbalized understanding and agreed with the plan. NOTE: This report was transcribed using voice recognition software. Every effort was made to ensure accuracy, however, inadvertent computerized transcription errors may be present.      Yeison Kent MD, MPH  [facility]   Office: [phone]  Fax: [phone]

## 2023-02-01 NOTE — PROGRESS NOTES
[address]   Progress notes  [phone]      Chief Complaint   Patient presents with    Abdominal Pain     Arrived per friend d/t abdomen pain that started \"a while ago\"; however was worse last night was unable to sleep last night; described as a knot in stomach; BM small this morning and normal yesterday; History of Present Illness:  Markell Raines is a 57 y.o. patient who presented to the hospital with complaints of abdominal pain. I have been asked to provide consultation regarding further management and testing. He reports that the pain is centralized. No association with food. He states that it does not radiate to his arm or shoulder. No associated vomiting or diarrhea. He states that he feels full quickly when he eats. He states that his dry weight is less than 200 LBS. He states that he has not been eating or drinking. He temporarily stopped taking his medications due to feeling sick with no improvement. He states that he feels much better today. Subjective: no acute events overnight. No chest pain or pressure. No shortness of breath or cough. He reports that his abdominal pain is improved. Past Medical History:   has a past medical history of Acute combined systolic and diastolic congestive heart failure ([address] Atrial fibrillation ([address] CHF (congestive heart failure) ([address] Hx of blood clots, and Hypertension. Surgical History:   has a past surgical history that includes Insertable Cardiac Monitor (07/26/2019); Cardioversion (07/26/2019); and Cardiac defibrillator placement (Left). Social History:   reports that he has never smoked. He has never been exposed to tobacco smoke. He has never used smokeless tobacco. He reports that he does not drink alcohol and does not use drugs. Family History:  Multiple family members with CHF    Home Medications:  Were reviewed and are listed in nursing record.  and/or listed below  Prior to Admission medications    Medication Sig Start Date End Date Taking? Authorizing Provider   finasteride (PROSCAR) 5 MG tablet Take 5 mg by mouth daily   Yes Historical Provider, MD   metoprolol succinate (TOPROL XL) 25 MG extended release tablet Take 1 tablet by mouth in the morning and at bedtime 1/10/23   TRENT Morris   torsemide (DEMADEX) 20 MG tablet Take 1 tablet by mouth daily 10/31/22   Devante Mayfield MD   empagliflozin (JARDIANCE) 10 MG tablet Take 1 tablet by mouth daily 10/26/22   TRENT Kaminski   lisinopril (PRINIVIL;ZESTRIL) 2.5 MG tablet Take 1 tablet by mouth daily 10/26/22   TRENT Kaminski   atorvastatin (LIPITOR) 40 MG tablet Take 1 tablet by mouth nightly 10/5/22   TRENT Morris   metOLazone (ZAROXOLYN) 5 MG tablet Take 1 tablet by mouth as needed (prn) 10/5/22   TRENT Morris   vitamin D (CHOLECALCIFEROL) 50 MCG (2000 UT) TABS tablet Take 1 tablet by mouth daily 9/21/22   TRENT Kaminski   digoxin (LANOXIN) 125 MCG tablet Take 1 tablet by mouth daily 9/9/22   TRENT Beasley CNP   potassium chloride (KLOR-CON M) 20 MEQ extended release tablet Take 1 tablet by mouth in the morning and 1 tablet in the evening. Take with meals.  8/1/22 9/8/22  Shan Blizzard, MD   apixaban (ELIQUIS) 5 MG TABS tablet Take 1 tablet by mouth 2 times daily 6/17/22   TRENT Beasley CNP   tamsulosin [facility]) 0.4 MG capsule Take 1 capsule by mouth daily 6/18/22   TRENT Beasley CNP        Current Medications:  Current Facility-Administered Medications   Medication Dose Route Frequency Provider Last Rate Last Admin    lisinopril (PRINIVIL;ZESTRIL) tablet 2.5 mg  2.5 mg Oral Daily TRENT Beasley CNP   2.5 mg at 02/01/23 1451    torsemide (DEMADEX) tablet 20 mg  20 mg Oral Daily TRENT Beasley CNP   20 mg at 02/01/23 1453    empagliflozin (JARDIANCE) tablet 10 mg  10 mg Oral Daily TRENT Beasley - CNP   10 mg at 02/01/23 1451    pantoprazole (PROTONIX) injection 40 mg  40 mg IntraVENous Daily Rilla Moreau, APRN - CNP   40 mg at 02/01/23 0951    piperacillin-tazobactam (ZOSYN) 3,375 mg in sodium chloride 0.9 % 50 mL IVPB (mini-bag)  3,375 mg IntraVENous Q8H Rilla Moreau, APRN - CNP   Stopped at 02/01/23 1450    digoxin (LANOXIN) tablet 125 mcg  125 mcg Oral Daily Irlanda Yo MD   125 mcg at 02/01/23 0951    metoprolol succinate (TOPROL XL) extended release tablet 25 mg  25 mg Oral BID Irlanda Yo MD   25 mg at 02/01/23 0951    morphine (PF) injection 2 mg  2 mg IntraVENous Q4H PRN Irlanda Yo MD        finasteride (PROSCAR) tablet 5 mg  5 mg Oral Daily Irlanda Yo MD   5 mg at 02/01/23 2190    sodium chloride flush 0.9 % injection 5-40 mL  5-40 mL IntraVENous 2 times per day Irlanda Yo MD   10 mL at 02/01/23 0953    sodium chloride flush 0.9 % injection 5-40 mL  5-40 mL IntraVENous PRN Irlanda Yo MD   10 mL at 02/01/23 0104    0.9 % sodium chloride infusion   IntraVENous PRN Irlanda Yo MD 25 mL/hr at 02/01/23 0105 25 mL/hr at 02/01/23 0105    enoxaparin (LOVENOX) injection 90 mg  1 mg/kg SubCUTAneous BID Irlanda Yo MD   90 mg at 02/01/23 0947    ondansetron (ZOFRAN-ODT) disintegrating tablet 4 mg  4 mg Oral Q8H PRN Irlanda Yo MD        Or    ondansetron (ZOFRAN) injection 4 mg  4 mg IntraVENous Q6H PRN Irlanda Yo MD        polyethylene glycol (GLYCOLAX) packet 17 g  17 g Oral Daily PRN Irlanda Yo MD        acetaminophen (TYLENOL) tablet 650 mg  650 mg Oral Q6H PRN Irlanda Yo MD        Or    acetaminophen (TYLENOL) suppository 650 mg  650 mg Rectal Q6H PRN Irlanda Yo MD        hydrALAZINE (APRESOLINE) injection 10 mg  10 mg IntraVENous Q4H PRN Irlanda Yo MD   10 mg at 01/30/23 5468    ipratropium-albuterol (DUONEB) nebulizer solution 1 ampule  1 ampule Inhalation BID Irlanda Yo MD   1 ampule at 02/01/23 6178        Allergies:  Patient has no known allergies.      Review of Systems:     Constitutional: there has been no unanticipated weight loss. There's been no change in energy level, sleep pattern, or activity level. Eyes: No visual changes or diplopia. No scleral icterus. ENT: No Headaches, hearing loss or vertigo. No mouth sores or sore throat. Cardiovascular: Reviewed in HPI  Respiratory: No cough or wheezing, no sputum production. No hematemesis. Gastrointestinal: + abdominal discomfort  Genitourinary: No dysuria, trouble voiding, or hematuria. Musculoskeletal:  No gait disturbance, weakness or joint complaints. Integumentary: No rash or pruritis. Neurological: No headache, diplopia, change in muscle strength, numbness or tingling. No change in gait, balance, coordination, mood, affect, memory, mentation, behavior. Psychiatric: No anxiety, no depression. Endocrine: No malaise, fatigue or temperature intolerance. No excessive thirst, fluid intake, or urination. No tremor. Hematologic/Lymphatic: No abnormal bruising or bleeding, blood clots or swollen lymph nodes. Allergic/Immunologic: No nasal congestion or hives.       Physical Examination:    Vitals:    02/01/23 1611   BP: (!) 136/102   Pulse: 67   Resp: 18   Temp: 98.2 °F (36.8 °C)   SpO2: 93%    Weight: 199 lb 3.2 oz (90.4 kg)         General Appearance:  Alert, cooperative, no distress, appears stated age   Head:  Normocephalic, without obvious abnormality, atraumatic   Eyes:  PERRL, conjunctiva/corneas clear       Nose: Nares normal, no drainage or sinus tenderness   Throat: Lips, mucosa, and tongue normal   Neck: Supple, symmetrical, trachea midline, no adenopathy, thyroid: not enlarged, symmetric, no tenderness/mass/nodules, +JVD        Lungs:   Clear to auscultation bilaterally, respirations unlabored   Chest Wall:  No tenderness or deformity   Heart:  Regular rate and rhythm, S1, S2 normal, 3/6 systolic murmur    Abdomen:   Soft, non-tender, bowel sounds active all four quadrants,  no masses, no organomegaly           Extremities: Extremities normal, atraumatic, +1 edema, cool    Pulses: 2+ and symmetric   Skin: Skin color, texture, turgor normal, no rashes or lesions   Pysch: Normal mood and affect   Neurologic: Normal gross motor and sensory exam.         Labs  CBC:   Lab Results   Component Value Date/Time    WBC 5.8 02/01/2023 03:18 AM    RBC 4.61 02/01/2023 03:18 AM    HGB 12.0 02/01/2023 03:18 AM    HCT 38.0 02/01/2023 03:18 AM    MCV 82.4 02/01/2023 03:18 AM    RDW 14.6 02/01/2023 03:18 AM     02/01/2023 03:18 AM     CMP:    Lab Results   Component Value Date/Time     02/01/2023 03:19 AM    K 3.8 02/01/2023 03:19 AM    K 4.0 10/29/2022 04:11 PM     02/01/2023 03:19 AM    CO2 25 02/01/2023 03:19 AM    BUN 18 02/01/2023 03:19 AM    CREATININE 1.5 02/01/2023 03:19 AM    GFRAA >60 10/05/2022 10:10 AM    AGRATIO 1.2 02/01/2023 03:19 AM    LABGLOM 52 02/01/2023 03:19 AM    GLUCOSE 85 02/01/2023 03:19 AM    PROT 6.5 02/01/2023 03:19 AM    CALCIUM 8.3 02/01/2023 03:19 AM    BILITOT 2.2 02/01/2023 03:19 AM    ALKPHOS 78 02/01/2023 03:19 AM    AST 28 02/01/2023 03:19 AM    ALT 27 02/01/2023 03:19 AM     PT/INR:  No results found for: PTINR  Lab Results   Component Value Date    TROPONINI <0.01 02/01/2023       EKG:  I have reviewed EKG with the following interpretation:  Impression:  atrial fibrillation, nonspecific st-t wave changes     Echo:  -Definity administered for LV thrombus. -Left ventricular cavity size is severely dilated with normal left   ventricular wall thickness.   -Overall left ventricular systolic function appears severely reduced. Ejection fraction is visually estimated to be 20%. -There is severe diffuse hypokinesis. -Cannot grade diastology due to atrial fibrillation, although there are   elevated LA pressures.   -Prominent trabeculations in the LV apex. Cannot exclude Non-compaction   cardiomyopathy.   -No evidence of left ventricular mass or thrombus noted. -The right ventricle is severely enlarged.  Right ventricular systolic   function is moderately reduced.   -There is severe bi-atrial enlargement. -Mitral valve leaflets appear mildly thickened. Mitral regurgitation   directed centrally and posteriorly that may be severe. -The ascending aorta is mildly dilated. -Moderate to severe tricuspid regurgitation with a PASP of 77 mmHg.   -Trivial pulmonic regurgitation. Stress:    Small sized inferior fixed defect most consistent with diaphragmatic    artifact. Myocardial fibrosis is less likely. There is severe global LV systolic dysfunction with ejection fraction of 27    %. Possible WPW    Overall findings represent a high risk scan. Cath: Anatomy:   LM-nml   LAD-nml  Cx-nml  OM- nml  RCA-nml  RPDA- nml  LVEF- 10%  LVG- global hypokinesis  LVEDP- 10     Hemodynamics:  RA- mean 3  RV- 37/0  PAWP- 10  PA- 34/12 (20)     C. O.- 5.7 (4.9)  C. I.- 2.6 (2.25)  PA sat 60%  AO sat 91%  MRI/EP/Other:     Old notes reviewed  Telemetry reviewd  Ekg personally reviewed  Chest xray personally reviewed  Echo, cath, and   Medications and labs reviewed  Moderate complexity/medical decision making due to extensive data review, extensive history review, independent review of data, life threatening problem   moderate risk due to acute illness, evaluation of drug-drug interactions, medication management and diagnostic interventions      Assessment  Patient Active Problem List   Diagnosis    Acute retention of urine    SACHIN (acute kidney injury) ([address]    Paroxysmal atrial fibrillation (HCC)    Benign essential HTN    Dilated cardiomyopathy ([address]    Encounter for loop recorder check    Acute urinary retention    Acute pulmonary edema (HCC)    Acute on chronic systolic heart failure ([address]    COVID    Non-compliance    Homeless    NSTEMI (non-ST elevated myocardial infarction) ([address]    Acute combined systolic and diastolic heart failure (HCC)    Complicated UTI (urinary tract infection)    Chronic atrial fibrillation ([address] Moderate mitral regurgitation    Acute on chronic congestive heart failure (HCC)    VT (ventricular tachycardia)    ICD (implantable cardioverter-defibrillator), biventricular, in situ    Thrombus of left atrial appendage    Syncope and collapse    PNA (pneumonia)    Hemoptysis    Fluid overload    Elevated LFTs    Idiopathic hypotension    Peripheral edema    Anemia    HFrEF (heart failure with reduced ejection fraction) (HCC)    Chronic diastolic (congestive) heart failure (HCC)    Dizziness    Abdominal pain    Abdominal pain, right upper quadrant    Epigastric abdominal pain    Acalculous cholecystitis         Plan:    I had the opportunity to review the clinical symptoms and presentation of [place]. Assessment/Plan:  Abdominal pain  - sounds like hepatic congestion from right heart failure, not the gallbladder  - new problem  Plan:  - restart home diuresis     2. Acute on chronic biventricular heart failure   - new problem  - ACC C NYHA IV  - nonischemic, family history of heart disease. No cardiac Mri performed   Plan:  - obtain echo  - continue metoprolol succinate 25 mg bid  - continue lisinopril and sgtl2  - start spironolactone 12.5 mg at lunch tomorrow if renal function stable. - need to re-trial entresto  - evaluate for sleep apnea     3. Bi-v ICD and paroxsymal atrial fibrillation   - new problem  Plan:  - Ep to see. npo for possible CONSUELO cardioversion. Low BI-v pacing on device. Would benefit from ablation long term for maintenance of sinus rhythm     4.  Unstable housing     Dami Martinez DO, MD 2/1/2023 4:39 PM

## 2023-02-01 NOTE — PROGRESS NOTES
Physician Progress Note      PATIENT:               Gattis Brittle  CSN #:                  [id number]  :                       1960  ADMIT DATE:       2023 8:06 AM  DISCH DATE:  RESPONDING  PROVIDER #:        Ana Hoff MD          QUERY TEXT:    Patient admitted with Abdominal Pain, noted to have atrial fibrillation and is   maintained on Eliquis. If possible, please document in progress notes and   discharge summary if you are evaluating and/or treating any of the following: The medical record reflects the following:  Risk Factors: A-fib, htn, chf.  Clinical Indicators: H&P notes -chronic anticoagulation , Afib, HTN, Pt is   62M yrs. Treatment: On Eliquis at home. Options provided:  -- Secondary hypercoagulable state in a patient with atrial fibrillation  -- Other - I will add my own diagnosis  -- Disagree - Not applicable / Not valid  -- Disagree - Clinically unable to determine / Unknown  -- Refer to Clinical Documentation Reviewer    PROVIDER RESPONSE TEXT:    This patient has secondary hypercoagulable state in a patient with atrial   fibrillation.     Query created by: Mac Oliver on 2023 9:31 PM      Electronically signed by:  Ana Hoff MD 2023 8:54 AM

## 2023-02-01 NOTE — PROGRESS NOTES
[address]  HEART FAILURE  Progress Note      Admit Date 1/30/2023     Reason for Consult:      Reason for Consultation/Chief Complaint: abd pain    HPI:    Kelton Jimenez is a 57 y.o. male with PMH PAF, HTN, NICM, HFrF, HENOK thrombus and challenging social circumstances admitted with abd pain. CT abd and pelvis with GB sludge, wall edema and diverticulosis. Echo repeated that showed very low EF 15-20%. HF meds have been on hold, IVF stopped yesterday      Subjective:  Patient is being seen for CHF, NICM. There were no acute overnight cardiac events. Today Mr. Michelle Recinos denies chest pain, shortness of breath, palpitations, or dizziness and feels well today  I spent 10 minutes educating the patient on heart failure, medications, lifestyle modifications and dietary guidelines. Review of Systems - History obtained from the patient  General ROS: negative  ENT ROS: negative  Allergy and Immunology ROS: negative  Hematological and Lymphatic ROS: negative  Respiratory ROS: no cough, shortness of breath, or wheezing  Cardiovascular ROS: no chest pain or dyspnea on exertion  Gastrointestinal ROS: no abdominal pain, change in bowel habits, or black or bloody stools  Musculoskeletal ROS: negative  Neurological ROS: no TIA or stroke symptoms  Dermatological ROS: negative     Baseline Weight: 196   Wt Readings from Last 3 Encounters:   02/01/23 199 lb 3.2 oz (90.4 kg)   01/10/23 196 lb (88.9 kg)   11/10/22 202 lb (91.6 kg)         Cardiac Testing: Echo 1/30/23:   Summary   The left ventricle is severely dilated. Severely reduced systolic function. LVEF 15-20%. Severe global hypokinesis. Grade III diastolic dysfunction. Elevated LVEDP. RV severely dilated with severely depressed function. Moderate eccentric MR. Mild AI. Severe TR with a RVSP   100 mmHg. Severe pulmonary hypertension. IVC dynamics c/w markedly elevated RA pressure (15 mmHg).    A bubble study was performed and fails to show evidence of shunting. Incidental Finding: Right kidney cyst noted. Consider additional dedicated   abdominal imaging if clinically indicated. CONSUELO Dr Dalphine Aase 2/10/22  Preliminary CONSUELO results are:      - Severe LV dysfunction,  EF: 20-25%  - LA dilated. There was HENOK thrombus. - Moderate MR     Cardiac Cath PCI: Dr Luis Viveros 2/8/2022  Anatomy:   LM-nml   LAD-nml  Cx-nml  OM- nml  RCA-nml  RPDA- nml  LVEF- 10%  LVG- global hypokinesis  LVEDP- 10     Hemodynamics:  RA- mean 3  RV- 37/0  PAWP- 10  PA- 34/12 (20)     C. O.- 5.7 (4.9)  C. I.- 2.6 (2.25)  PA sat 60%  AO sat 91%     Contrast: 70  Flouro Time: 5.3  Access: R radial a, R CFV     Impression  ~Coronary Angiography w/ normal cors  ~LVG with LVEF of 10% and global regional wall motion abnormalities  ~Severe MR  ~Normal CO/CI  ~normal L and R filling pressures       Echo 11/29/2021  Summary   -Covid+   -Severely reduced global systolic function with an ejection fraction   estimated at 20%. -Severe global hypokinesis with regional variations noted. -Right ventricular systolic function appears to be mildly reduced based on   visual inspection.   -Severe biatrial enlargement.   -Thickened mitral valve without evidence of stenosis. There is   mild-to-moderate mitral regurgitation.   -There is mild-to-moderate tricuspid regurgitation with a RVSP estimation of   37 mmHg. -Diastolic dysfunction with elevated LV filling pressures. Device: Medtronic ICD   Optivol at Bedside 2/1/23:     NYHA Class III    Objective:   BP (!) 144/96   Pulse 69   Temp 97.7 °F (36.5 °C) (Oral)   Resp 18   Ht 6' 2\" (1.88 m)   Wt 199 lb 3.2 oz (90.4 kg)   SpO2 98%   BMI 25.58 kg/m²     Intake/Output Summary (Last 24 hours) at 2/1/2023 0937  Last data filed at 2/1/2023 0315  Gross per 24 hour   Intake 1050.15 ml   Output 575 ml   Net 475.15 ml      In: 1050.2 [P.O.:850;  I.V.:138.8]  Out: 575     TELEMETRY: Vpaced    Physical Exam:  General Appearance:  Non-obese/Well Nourished  Respiratory:  Resp Auscultation: Normal breath sounds without dullness  Cardiovascular:   Auscultation: Regular rate and rhythm, normal S1S2, no m/g/r/c  Palpation: Normal    Pedal Pulses: 2+ and equal   Abdomen:  Soft, NT, ND, + bs  Extremities:  No Cyanosis or Clubbing  Extremities: 1+ and non-pitting edema  Neurological/Psychiatric:  Oriented to time, place, and person  Non-anxious    Pertinent labs, diagnostic, device, and imaging results reviewed as a part of this visit    MEDICATIONS:   Scheduled Meds:   Scheduled Meds:   pantoprazole  40 mg IntraVENous Daily    piperacillin-tazobactam  3,375 mg IntraVENous Q8H    digoxin  125 mcg Oral Daily    metoprolol succinate  25 mg Oral BID    finasteride  5 mg Oral Daily    sodium chloride flush  5-40 mL IntraVENous 2 times per day    enoxaparin  1 mg/kg SubCUTAneous BID    ipratropium-albuterol  1 ampule Inhalation BID     Continuous Infusions:   sodium chloride 25 mL/hr (02/01/23 0105)     PRN Meds:.morphine, sodium chloride flush, sodium chloride, ondansetron **OR** ondansetron, polyethylene glycol, acetaminophen **OR** acetaminophen, hydrALAZINE  Continuous Infusions:   sodium chloride 25 mL/hr (02/01/23 0105)       Intake/Output Summary (Last 24 hours) at 2/1/2023 0937  Last data filed at 2/1/2023 0315  Gross per 24 hour   Intake 1050.15 ml   Output 575 ml   Net 475.15 ml       Lab Data:  CBC:   Lab Results   Component Value Date/Time    WBC 5.8 02/01/2023 03:18 AM    HGB 12.0 02/01/2023 03:18 AM     02/01/2023 03:18 AM     BMP:  Lab Results   Component Value Date/Time     02/01/2023 03:19 AM    K 3.8 02/01/2023 03:19 AM    K 4.0 10/29/2022 04:11 PM     02/01/2023 03:19 AM    CO2 25 02/01/2023 03:19 AM    BUN 18 02/01/2023 03:19 AM    CREATININE 1.5 02/01/2023 03:19 AM    GLUCOSE 85 02/01/2023 03:19 AM     INR:   Lab Results   Component Value Date/Time    INR 1.59 07/28/2022 10:50 PM    INR 1.50 06/15/2022 06:48 PM    INR 1.50 02/05/2022 11:01 PM        CARDIAC LABS  ENZYMES:  Recent Labs     01/31/23  0456 02/01/23  0318   TROPONINI <0.01 <0.01     FASTING LIPID PANEL:  Lab Results   Component Value Date/Time    HDL 69 10/29/2022 04:11 PM    LDLCALC 37 10/29/2022 04:11 PM    TRIG 75 10/29/2022 04:11 PM    TSH 2.66 06/15/2022 10:33 PM     LIVER PROFILE:  Lab Results   Component Value Date/Time    AST 28 02/01/2023 03:19 AM    AST 31 01/31/2023 04:56 AM    ALT 27 02/01/2023 03:19 AM    ALT 29 01/31/2023 04:56 AM     BNP:   Lab Results   Component Value Date/Time    PROBNP 16,142 02/01/2023 03:18 AM    PROBNP 9,315 01/10/2023 03:03 PM    PROBNP 20,275 12/30/2022 01:05 PM     Iron Studies:    Lab Results   Component Value Date/Time    FERRITIN 163.1 06/17/2022 05:35 AM     Lab Results   Component Value Date    IRON 58 (L) 10/29/2022    TIBC 373 10/29/2022    FERRITIN 163.1 06/17/2022      Iron Deficiency Anemia:  Yes, Oct 2022 IV Iron Therapy:  No  2017 ACC/AHA HF Guidelines:   intravenous iron replacement in patients with New York Heart Association (NYHA) class II and III HF and iron deficiency(ferritin <100 ng/ml or 100-300 ng/ml if transferrin saturation <20%), to improve functional status and QoL. 1. WEIGHT: Admit Weight: 204 lb (92.5 kg)      Today  Weight: 199 lb 3.2 oz (90.4 kg)   2.  I/O   Intake/Output Summary (Last 24 hours) at 2/1/2023 6130  Last data filed at 2/1/2023 0315  Gross per 24 hour   Intake 1050.15 ml   Output 575 ml   Net 475.15 ml         Assessment/Plan:     Acute Heart Failure:  ~compensated   ~Pt sx: denies SOB  ~Labs/Testing: BNP 16K, baseline <10k, bedside optivol today shows TI under baseline  ~Meds: diuretics on hold, IVF stopped yesterday  ~Plan:restart ACE, jardiance and torsemide, labs in am, restart cathy if ok, Monitor I&O's, Daily weights, Pt education  Cardiomyopathy: Nonischemic  ~Echo:  EF: 15-20%   Core measures for HF:  ACEi/ARB/ARNI: lisinopril on hold- BP has not tolerated entresto in the past  BB: Metoprolol succinate  Damian: Spironolactone on hold  Hydralazine/nitrates:  SGLT2i: Jardiance on hold  ~Device: Medtronic BiV ICD with Optivol  ~Plan: continue BB and Dig, restart ACE and jardiance  HTN:   ~diastolic elevated, pt usually runs hypotensive, will get meds restarted  ~Meds: none  ~Plan: restart ACE  Atrial Fib   ~Vpaced, rate controlled on BB and Dig, on AC   ~Pt sx: denies palpitations                         Abdominal Pain  ~resolved, restart HF meds       All questions and concerns were addressed to the patient. Alternatives to my treatment were discussed. I have discussed the above stated plan with patient and the nurse. The patient verbalized understanding and agreed with the plan.     I appreciate the opportunity of cooperating in the care of this individual.    Isi Celestin, APRN - CNP, ACNP, [address] 2/1/2023, 9:37 AM  Heart Failure  The [address], [address] [facility]  Ph: [phone]      Core Measures:   Discharge instructions:   LVEF documented:   ACEI for LV dysfunction:   Smoking Cessation:

## 2023-02-01 NOTE — PROGRESS NOTES
[facility]        Progress Note    Patient Name: Jose Graves  MRN: [id number]  YOB: 1960  Date of Evaluation: 2023    Chief Complaint: Abdominal pain    Subjective:  No acute events overnight  No recurrent abdominal pain  No nausea or vomiting, tolerating clear liquids  Resting in bed bed at this time      Vital Signs:  Patient Vitals for the past 24 hrs:   BP Temp Temp src Pulse Resp SpO2 Weight   23 0823 -- -- -- 69 18 98 % --   23 0744 (!) 144/96 97.7 °F (36.5 °C) Oral 70 18 98 % --   23 0720 -- -- -- -- -- -- 199 lb 3.2 oz (90.4 kg)   23 0457 (!) 121/90 98 °F (36.7 °C) Oral 56 17 100 % --   23 0100 106/66 98 °F (36.7 °C) Oral 56 17 100 % --   23 -- -- -- 57 18 99 % --   23 1915 116/81 97.8 °F (36.6 °C) Oral 62 20 97 % --   23 1500 111/80 97.9 °F (36.6 °C) Oral 77 18 97 % --        TEMPERATURE HISTORY 24H: Temp (24hrs), Av.9 °F (36.6 °C), Min:97.7 °F (36.5 °C), Max:98 °F (36.7 °C)    BLOOD PRESSURE HISTORY: Systolic (04VEZ), WPD:502 , Min:106 , SOLITARIO:370    Diastolic (35KXO), FYK:46, Min:66, Max:96      Intake/Output:  I/O last 3 completed shifts: In: 7462 [P.O.:850;  I.V.:367.9; IV Piggyback:158.1]  Out: 725 [Urine:725]  I/O this shift:  In: -   Out: 300 [Urine:300]  Drain/tube Output:       Physical Exam:  General: awake, alert, oriented to person, place, time  Cardiovascular:  regular rate and rhythm and no murmur noted  Lungs: clear to auscultation, cough  Abdomen: soft, nontender, nondistended, bowel sounds normal     Labs:  CBC:    Recent Labs     23  0848 23  0456 23   WBC 5.6 5.1 5.8   HGB 12.2* 12.4* 12.0*   HCT 36.4* 38.3* 38.0*    224 202       BMP:    Recent Labs     23  0848 23  0456 23    139 136   K 4.6 4.7 3.8    104 101   CO2 26 23 25   BUN 23* 22* 18   CREATININE 1.3 1.2 1.5*   GLUCOSE 101* 103* 85 Hepatic:    Recent Labs     01/30/23  0848 01/31/23  0456 02/01/23  0319   AST 34 31 28   ALT 34 29 27   BILITOT 2.6*  2.9* 3.1* 2.2*   ALKPHOS 90 81 78       Amylase:  No results found for: AMYLASE  Lipase:    Lab Results   Component Value Date/Time    LIPASE 27.0 01/30/2023 08:48 AM    LIPASE 36.0 08/28/2022 04:06 AM        Mag:    Lab Results   Component Value Date/Time    MG 2.10 02/01/2023 03:19 AM    MG 2.00 10/30/2022 05:04 AM     Phos:     Lab Results   Component Value Date/Time    PHOS 3.8 02/01/2023 03:19 AM    PHOS 3.3 10/31/2022 06:44 AM      Coags:   Lab Results   Component Value Date/Time    PROTIME 18.9 07/28/2022 10:50 PM    INR 1.59 07/28/2022 10:50 PM    APTT 28.8 07/28/2022 10:50 PM       Cultures:  Anaerobic culture  No results found for: LABANAE  Fungus stain  No results found for requested labs within last 30 days. Gram stain  No results found for requested labs within last 30 days. Organism  Lab Results   Component Value Date/Time    ORG Klebsiella pneumoniae (A) 04/13/2022 12:59 PM     Surgical culture  No results found for: CXSURG  Blood culture 1  No results found for requested labs within last 30 days. Blood culture 2  No results found for requested labs within last 30 days. Fecal occult  No results found for requested labs within last 30 days. GI bacterial pathogens by PCR  No results found for requested labs within last 30 days. C. difficile  No results found for requested labs within last 30 days. Urine culture  Lab Results   Component Value Date/Time    LABURIN >100,000 CFU/ml 04/13/2022 12:59 PM       Pathology:  No relevant pathology     Imaging:  I have personally reviewed the following films:    No results found.     Scheduled Meds:   lisinopril  2.5 mg Oral Daily    torsemide  20 mg Oral Daily    empagliflozin  10 mg Oral Daily    pantoprazole  40 mg IntraVENous Daily    piperacillin-tazobactam  3,375 mg IntraVENous Q8H    digoxin  125 mcg Oral Daily    metoprolol succinate  25 mg Oral BID    finasteride  5 mg Oral Daily    sodium chloride flush  5-40 mL IntraVENous 2 times per day    enoxaparin  1 mg/kg SubCUTAneous BID    ipratropium-albuterol  1 ampule Inhalation BID     Continuous Infusions:   sodium chloride 25 mL/hr (02/01/23 0105)     PRN Meds:.morphine, sodium chloride flush, sodium chloride, ondansetron **OR** ondansetron, polyethylene glycol, acetaminophen **OR** acetaminophen, hydrALAZINE      Assessment:  57 y.o. male admitted with   1. Abdominal pain, right upper quadrant    2. Gallbladder sludge    3. Bilirubinemia        Upper abdominal pain, possible acalculous cholecystitis  Acute on chronic CHF  Paroxysmal atrial fibrillation       Plan:  1. No recurrent pain, no nausea/vomiting--tolerating clear liquid diet, abdominal exam benign, vitals/labs stable--mild increase in creatinine; continued supportive care, no plans for surgical intervention at this time, continue PPI  2. Advance to low fat diet as tolerated; monitor bowel function  3. Gentle IV hydration (CHF) until PO intake is adequate; monitor and correct electrolytes  4. Antibiotics, switch to PO at discharge for 1 week total course  5. Activity as tolerated  6. PRN analgesics and antiemetics--minimizing narcotics as tolerated  7. Okay to resume Eliquis from a surgical perspective  8. Management of medical comorbid etiologies per primary team and consulting services  9. Disposition: Per primary team    EDUCATION:  Educated patient on plan of care and disease process--all questions answered. Plans discussed with patient and nursing. Reviewed and discussed with Dr. Adalgisa Tran. Signed:  TRENT Salinas CNP  2/1/2023 1:51 PM    Surgery Staff:     I have personally performed a face to face diagnostic evaluation on this patient. I have interviewed and examined the patient and I agree with the assessment above.  Time was spent reviewing patient chart (including but not limited to notes, labs, imaging and other testing), interviewing and counseling patient and present family members, performing physical exam, documentation of my findings and subsequent follow up of ordered medication and testing, placing referrals and communication with patient care providers, coordinating future care as well as documentation in the EHR. This encompassed more than 50% of the total encounter time.  In summary, my findings and plan are the following:   Pt alert and in good spirits  Coughing  No c/o abd pain  Abd soft, ND, NT  Will plan to adv to low fat diet today  Plan for a week of antibiotics and would do PPI for 30 days  Resume Eliquis, not planning on needing any surgery at this point    Anant Fortune MD

## 2023-02-01 NOTE — PROGRESS NOTES
Pt has magnesium and phosphorus labs ordered now for 20 beat run of V-Tach. No need to call cardiology on call as pt was asymptomatic.

## 2023-02-01 NOTE — ACP (ADVANCE CARE PLANNING)
Advance Care Planning     Advance Care Planning Inpatient Note  [facility] Department    Today's Date: 1/31/2023  Unit: [facility]    Received request from IDT Member, Gutierrez Avina RN. Upon review of chart and communication with care team, [facility] will defer advance care planning with patient at this time. . Patient was present in the room during visit. Goals of ACP Conversation:  Discuss advance care planning documents    Health Care Decision Makers:    None named by patient or documented in chart at this time. Advance Care Planning   Documents (Patient Wishes):  None     Assessment:  Patient expressed an interest in advance directives and requested an opportunity to review information before discussing. Interventions:  Provided patient with copy of frequently asked questions and answers handout about advance directives for patient review. Deferred conversation as patient requesting time to review information given. Encouraged ongoing conversation. Care Preferences Communicated:   No    Outcomes/Plan:  Patient to review frequently asked questions and answers handout given about advance directives. Patient to notify RN if/when he would like  follow-up for conversation about advance directives.     Electronically signed by Davon Pereira on 1/31/2023 at 7:11 PM

## 2023-02-01 NOTE — PROGRESS NOTES
Pt had a 20 beat run of V-tach that was asymptomatic as patient was in bed, easy to wake up and had no complaints. WCTM.

## 2023-02-02 PROBLEM — I50.22 CHRONIC SYSTOLIC (CONGESTIVE) HEART FAILURE (HCC): Status: ACTIVE | Noted: 2021-11-28

## 2023-02-02 PROBLEM — I48.19 PERSISTENT ATRIAL FIBRILLATION (HCC): Status: ACTIVE | Noted: 2023-02-01

## 2023-02-02 LAB
A/G RATIO: 1.2 (ref 1.1–2.2)
ALBUMIN SERPL-MCNC: 3.5 G/DL (ref 3.4–5)
ALP BLD-CCNC: 76 U/L (ref 40–129)
ALT SERPL-CCNC: 26 U/L (ref 10–40)
ANION GAP SERPL CALCULATED.3IONS-SCNC: 8 MMOL/L (ref 3–16)
AST SERPL-CCNC: 27 U/L (ref 15–37)
BASOPHILS ABSOLUTE: 0 K/UL (ref 0–0.2)
BASOPHILS RELATIVE PERCENT: 1 %
BILIRUB SERPL-MCNC: 1.6 MG/DL (ref 0–1)
BUN BLDV-MCNC: 15 MG/DL (ref 7–20)
CALCIUM SERPL-MCNC: 8.5 MG/DL (ref 8.3–10.6)
CHLORIDE BLD-SCNC: 105 MMOL/L (ref 99–110)
CO2: 27 MMOL/L (ref 21–32)
CREAT SERPL-MCNC: 1.4 MG/DL (ref 0.8–1.3)
EOSINOPHILS ABSOLUTE: 0.1 K/UL (ref 0–0.6)
EOSINOPHILS RELATIVE PERCENT: 2.4 %
GFR SERPL CREATININE-BSD FRML MDRD: 56 ML/MIN/{1.73_M2}
GLUCOSE BLD-MCNC: 82 MG/DL (ref 70–99)
HCT VFR BLD CALC: 35.2 % (ref 40.5–52.5)
HEMOGLOBIN: 11.4 G/DL (ref 13.5–17.5)
LYMPHOCYTES ABSOLUTE: 0.9 K/UL (ref 1–5.1)
LYMPHOCYTES RELATIVE PERCENT: 19.1 %
MCH RBC QN AUTO: 26.6 PG (ref 26–34)
MCHC RBC AUTO-ENTMCNC: 32.5 G/DL (ref 31–36)
MCV RBC AUTO: 82.1 FL (ref 80–100)
MONOCYTES ABSOLUTE: 0.4 K/UL (ref 0–1.3)
MONOCYTES RELATIVE PERCENT: 8.8 %
NEUTROPHILS ABSOLUTE: 3.1 K/UL (ref 1.7–7.7)
NEUTROPHILS RELATIVE PERCENT: 68.7 %
PDW BLD-RTO: 14.4 % (ref 12.4–15.4)
PLATELET # BLD: 185 K/UL (ref 135–450)
PMV BLD AUTO: 8.3 FL (ref 5–10.5)
POTASSIUM SERPL-SCNC: 4 MMOL/L (ref 3.5–5.1)
PROSTATE SPECIFIC ANTIGEN: 15.49 NG/ML (ref 0–4)
RBC # BLD: 4.29 M/UL (ref 4.2–5.9)
SODIUM BLD-SCNC: 140 MMOL/L (ref 136–145)
TOTAL PROTEIN: 6.5 G/DL (ref 6.4–8.2)
WBC # BLD: 4.5 K/UL (ref 4–11)

## 2023-02-02 PROCEDURE — 2580000003 HC RX 258: Performed by: NURSE PRACTITIONER

## 2023-02-02 PROCEDURE — 6360000002 HC RX W HCPCS: Performed by: NURSE PRACTITIONER

## 2023-02-02 PROCEDURE — C9113 INJ PANTOPRAZOLE SODIUM, VIA: HCPCS | Performed by: NURSE PRACTITIONER

## 2023-02-02 PROCEDURE — 84153 ASSAY OF PSA TOTAL: CPT

## 2023-02-02 PROCEDURE — 1200000000 HC SEMI PRIVATE

## 2023-02-02 PROCEDURE — 6370000000 HC RX 637 (ALT 250 FOR IP): Performed by: INTERNAL MEDICINE

## 2023-02-02 PROCEDURE — 99232 SBSQ HOSP IP/OBS MODERATE 35: CPT | Performed by: INTERNAL MEDICINE

## 2023-02-02 PROCEDURE — 6370000000 HC RX 637 (ALT 250 FOR IP): Performed by: NURSE PRACTITIONER

## 2023-02-02 PROCEDURE — 6370000000 HC RX 637 (ALT 250 FOR IP): Performed by: FAMILY MEDICINE

## 2023-02-02 PROCEDURE — 2580000003 HC RX 258: Performed by: FAMILY MEDICINE

## 2023-02-02 PROCEDURE — 80053 COMPREHEN METABOLIC PANEL: CPT

## 2023-02-02 PROCEDURE — 94640 AIRWAY INHALATION TREATMENT: CPT

## 2023-02-02 PROCEDURE — 85025 COMPLETE CBC W/AUTO DIFF WBC: CPT

## 2023-02-02 PROCEDURE — 99233 SBSQ HOSP IP/OBS HIGH 50: CPT | Performed by: NURSE PRACTITIONER

## 2023-02-02 PROCEDURE — 99232 SBSQ HOSP IP/OBS MODERATE 35: CPT | Performed by: SURGERY

## 2023-02-02 PROCEDURE — 94761 N-INVAS EAR/PLS OXIMETRY MLT: CPT

## 2023-02-02 PROCEDURE — 36415 COLL VENOUS BLD VENIPUNCTURE: CPT

## 2023-02-02 RX ORDER — IPRATROPIUM BROMIDE AND ALBUTEROL SULFATE 2.5; .5 MG/3ML; MG/3ML
1 SOLUTION RESPIRATORY (INHALATION) 3 TIMES DAILY
Status: DISCONTINUED | OUTPATIENT
Start: 2023-02-02 | End: 2023-02-02

## 2023-02-02 RX ORDER — TAMSULOSIN HYDROCHLORIDE 0.4 MG/1
0.4 CAPSULE ORAL DAILY
Status: DISCONTINUED | OUTPATIENT
Start: 2023-02-02 | End: 2023-02-03 | Stop reason: HOSPADM

## 2023-02-02 RX ORDER — IPRATROPIUM BROMIDE AND ALBUTEROL SULFATE 2.5; .5 MG/3ML; MG/3ML
1 SOLUTION RESPIRATORY (INHALATION) 2 TIMES DAILY
Status: DISCONTINUED | OUTPATIENT
Start: 2023-02-02 | End: 2023-02-03 | Stop reason: HOSPADM

## 2023-02-02 RX ORDER — AMIODARONE HYDROCHLORIDE 200 MG/1
200 TABLET ORAL 2 TIMES DAILY
Status: DISCONTINUED | OUTPATIENT
Start: 2023-02-02 | End: 2023-02-03 | Stop reason: HOSPADM

## 2023-02-02 RX ORDER — IPRATROPIUM BROMIDE AND ALBUTEROL SULFATE 2.5; .5 MG/3ML; MG/3ML
1 SOLUTION RESPIRATORY (INHALATION) EVERY 4 HOURS PRN
Status: DISCONTINUED | OUTPATIENT
Start: 2023-02-02 | End: 2023-02-03 | Stop reason: HOSPADM

## 2023-02-02 RX ORDER — PANTOPRAZOLE SODIUM 40 MG/1
40 TABLET, DELAYED RELEASE ORAL
Qty: 60 TABLET | Refills: 0 | Status: CANCELLED | OUTPATIENT
Start: 2023-02-02 | End: 2023-03-04

## 2023-02-02 RX ADMIN — PIPERACILLIN AND TAZOBACTAM 3375 MG: 3; .375 INJECTION, POWDER, FOR SOLUTION INTRAVENOUS at 23:51

## 2023-02-02 RX ADMIN — SPIRONOLACTONE 12.5 MG: 25 TABLET ORAL at 12:13

## 2023-02-02 RX ADMIN — IPRATROPIUM BROMIDE AND ALBUTEROL SULFATE 1 AMPULE: .5; 3 SOLUTION RESPIRATORY (INHALATION) at 20:19

## 2023-02-02 RX ADMIN — TAMSULOSIN HYDROCHLORIDE 0.4 MG: 0.4 CAPSULE ORAL at 11:27

## 2023-02-02 RX ADMIN — AMIODARONE HYDROCHLORIDE 200 MG: 200 TABLET ORAL at 20:53

## 2023-02-02 RX ADMIN — SODIUM CHLORIDE, PRESERVATIVE FREE 10 ML: 5 INJECTION INTRAVENOUS at 10:06

## 2023-02-02 RX ADMIN — METOPROLOL SUCCINATE 25 MG: 25 TABLET, EXTENDED RELEASE ORAL at 20:53

## 2023-02-02 RX ADMIN — APIXABAN 5 MG: 5 TABLET, FILM COATED ORAL at 10:03

## 2023-02-02 RX ADMIN — PIPERACILLIN AND TAZOBACTAM 3375 MG: 3; .375 INJECTION, POWDER, FOR SOLUTION INTRAVENOUS at 05:14

## 2023-02-02 RX ADMIN — PIPERACILLIN AND TAZOBACTAM 3375 MG: 3; .375 INJECTION, POWDER, FOR SOLUTION INTRAVENOUS at 15:24

## 2023-02-02 RX ADMIN — LISINOPRIL 2.5 MG: 5 TABLET ORAL at 10:02

## 2023-02-02 RX ADMIN — FINASTERIDE 5 MG: 5 TABLET, FILM COATED ORAL at 10:04

## 2023-02-02 RX ADMIN — PANTOPRAZOLE SODIUM 40 MG: 40 INJECTION, POWDER, FOR SOLUTION INTRAVENOUS at 10:04

## 2023-02-02 RX ADMIN — METOPROLOL SUCCINATE 25 MG: 25 TABLET, EXTENDED RELEASE ORAL at 10:02

## 2023-02-02 RX ADMIN — APIXABAN 5 MG: 5 TABLET, FILM COATED ORAL at 20:53

## 2023-02-02 RX ADMIN — IPRATROPIUM BROMIDE AND ALBUTEROL SULFATE 1 AMPULE: 2.5; .5 SOLUTION RESPIRATORY (INHALATION) at 09:01

## 2023-02-02 RX ADMIN — AMIODARONE HYDROCHLORIDE 200 MG: 200 TABLET ORAL at 10:02

## 2023-02-02 RX ADMIN — SODIUM CHLORIDE, PRESERVATIVE FREE 10 ML: 5 INJECTION INTRAVENOUS at 20:53

## 2023-02-02 RX ADMIN — EMPAGLIFLOZIN 10 MG: 10 TABLET, FILM COATED ORAL at 10:02

## 2023-02-02 RX ADMIN — TORSEMIDE 20 MG: 20 TABLET ORAL at 10:03

## 2023-02-02 ASSESSMENT — PAIN SCALES - GENERAL
PAINLEVEL_OUTOF10: 0

## 2023-02-02 NOTE — RT PROTOCOL NOTE
RT Inhaler-Nebulizer Bronchodilator Protocol Note    There is a bronchodilator order in the chart from a provider indicating to follow the RT Bronchodilator Protocol and there is an Initiate RT Inhaler-Nebulizer Bronchodilator Protocol order as well (see protocol at bottom of note). CXR Findings:  No results found. The findings from the last RT Protocol Assessment were as follows:   History Pulmonary Disease: None or smoker <15 pack years  Respiratory Pattern: Mild dyspnea at rest, irregular pattern, or RR 21-25 bpm  Breath Sounds: Intermittent or unilateral wheezes  Cough: Strong, spontaneous, non-productive  Indication for Bronchodilator Therapy:    Bronchodilator Assessment Score: 8    Aerosolized bronchodilator medication orders have been revised according to the RT Inhaler-Nebulizer Bronchodilator Protocol below. Respiratory Therapist to perform RT Therapy Protocol Assessment initially then follow the protocol. Repeat RT Therapy Protocol Assessment PRN for score 0-3 or on second treatment, BID, and PRN for scores above 3. No Indications - adjust the frequency to every 6 hours PRN wheezing or bronchospasm, if no treatments needed after 48 hours then discontinue using Per Protocol order mode. If indication present, adjust the RT bronchodilator orders based on the Bronchodilator Assessment Score as indicated below. Use Inhaler orders unless patient has one or more of the following: on home nebulizer, not able to hold breath for 10 seconds, is not alert and oriented, cannot activate and use MDI correctly, or respiratory rate 25 breaths per minute or more, then use the equivalent nebulizer order(s) with same Frequency and PRN reasons based on the score. If a patient is on this medication at home then do not decrease Frequency below that used at home.     0-3 - enter or revise RT bronchodilator order(s) to equivalent RT Bronchodilator order with Frequency of every 4 hours PRN for wheezing or increased work of breathing using Per Protocol order mode. 4-6 - enter or revise RT Bronchodilator order(s) to two equivalent RT bronchodilator orders with one order with BID Frequency and one order with Frequency of every 4 hours PRN wheezing or increased work of breathing using Per Protocol order mode. 7-10 - enter or revise RT Bronchodilator order(s) to two equivalent RT bronchodilator orders with one order with TID Frequency and one order with Frequency of every 4 hours PRN wheezing or increased work of breathing using Per Protocol order mode. 11-13 - enter or revise RT Bronchodilator order(s) to one equivalent RT bronchodilator order with QID Frequency and an Albuterol order with Frequency of every 4 hours PRN wheezing or increased work of breathing using Per Protocol order mode. Greater than 13 - enter or revise RT Bronchodilator order(s) to one equivalent RT bronchodilator order with every 4 hours Frequency and an Albuterol order with Frequency of every 2 hours PRN wheezing or increased work of breathing using Per Protocol order mode. RT to enter RT Home Evaluation for COPD & MDI Assessment order using Per Protocol order mode.     Electronically signed by Sherie Nolen RCP on 2/2/2023 at 4:10 PM

## 2023-02-02 NOTE — PROGRESS NOTES
Pt called out voicing concern that he felt he needed to urinate but couldn't, also stated he has had this issue before and required a coude catheter. Bladder scan shows over a liter.  Message sent to MD

## 2023-02-02 NOTE — PROGRESS NOTES
[address]   Progress notes  [phone]      Chief Complaint   Patient presents with    Abdominal Pain     Arrived per friend d/t abdomen pain that started \"a while ago\"; however was worse last night was unable to sleep last night; described as a knot in stomach; BM small this morning and normal yesterday; History of Present Illness:  Ollie Gomez is a 57 y.o. patient who presented to the hospital with complaints of abdominal pain. I have been asked to provide consultation regarding further management and testing. He reports that the pain is centralized. No association with food. He states that it does not radiate to his arm or shoulder. No associated vomiting or diarrhea. He states that he feels full quickly when he eats. He states that his dry weight is less than 200 LBS. He states that he has not been eating or drinking. He temporarily stopped taking his medications due to feeling sick with no improvement. He states that he feels much better today. Subjective: no acute events overnight. No chest pain or pressure. No shortness of breath or cough. He reports that he had an episode of urinary retention last evening. Past Medical History:   has a past medical history of Acute combined systolic and diastolic congestive heart failure ([address] Atrial fibrillation ([address] CHF (congestive heart failure) ([address] Hx of blood clots, and Hypertension. Surgical History:   has a past surgical history that includes Insertable Cardiac Monitor (07/26/2019); Cardioversion (07/26/2019); and Cardiac defibrillator placement (Left). Social History:   reports that he has never smoked. He has never been exposed to tobacco smoke. He has never used smokeless tobacco. He reports that he does not drink alcohol and does not use drugs. Family History:  Multiple family members with CHF    Home Medications:  Were reviewed and are listed in nursing record.  and/or listed below  Prior to Admission medications Medication Sig Start Date End Date Taking? Authorizing Provider   finasteride (PROSCAR) 5 MG tablet Take 5 mg by mouth daily   Yes Historical Provider, MD   metoprolol succinate (TOPROL XL) 25 MG extended release tablet Take 1 tablet by mouth in the morning and at bedtime 1/10/23   TRENT Bryant   torsemide (DEMADEX) 20 MG tablet Take 1 tablet by mouth daily 10/31/22   Rosana Orozco MD   empagliflozin (JARDIANCE) 10 MG tablet Take 1 tablet by mouth daily 10/26/22   Annia Olszewski, APRN - CNS   lisinopril (PRINIVIL;ZESTRIL) 2.5 MG tablet Take 1 tablet by mouth daily 10/26/22   Annia Olszewski, APRN - CNS   atorvastatin (LIPITOR) 40 MG tablet Take 1 tablet by mouth nightly 10/5/22   TRENT Bryant   metOLazone (ZAROXOLYN) 5 MG tablet Take 1 tablet by mouth as needed (prn) 10/5/22   TRENT Bryant   vitamin D (CHOLECALCIFEROL) 50 MCG (2000 UT) TABS tablet Take 1 tablet by mouth daily 9/21/22   Annia Olszewski, APRN - CNS   digoxin (LANOXIN) 125 MCG tablet Take 1 tablet by mouth daily 9/9/22   TRENT Kathleen CNP   potassium chloride (KLOR-CON M) 20 MEQ extended release tablet Take 1 tablet by mouth in the morning and 1 tablet in the evening. Take with meals.  8/1/22 9/8/22  Blanca Tam MD   apixaban (ELIQUIS) 5 MG TABS tablet Take 1 tablet by mouth 2 times daily 6/17/22   TRENT Kathleen CNP   tamsulosin [facility]) 0.4 MG capsule Take 1 capsule by mouth daily 6/18/22   TRENT Kathleen CNP        Current Medications:  Current Facility-Administered Medications   Medication Dose Route Frequency Provider Last Rate Last Admin    amiodarone (CORDARONE) tablet 200 mg  200 mg Oral BID Jules Frey MD        lisinopril (PRINIVIL;ZESTRIL) tablet 2.5 mg  2.5 mg Oral Daily TRENT Kathleen CNP   2.5 mg at 02/01/23 1451    torsemide (DEMADEX) tablet 20 mg  20 mg Oral Daily TRENT Kathleen CNP   20 mg at 02/01/23 1453    empagliflozin (JARDIANCE) tablet 10 mg  10 mg Oral Daily Marcel Sorenson, APRN - CNP   10 mg at 02/01/23 1451    apixaban (ELIQUIS) tablet 5 mg  5 mg Oral BID Segun Benz DO   5 mg at 02/01/23 2006    spironolactone (ALDACTONE) tablet 12.5 mg  12.5 mg Oral Lunch Nghia Dowell DO        pantoprazole (PROTONIX) injection 40 mg  40 mg IntraVENous Daily Gisele Jacome APRN - CNP   40 mg at 02/01/23 0951    piperacillin-tazobactam (ZOSYN) 3,375 mg in sodium chloride 0.9 % 50 mL IVPB (mini-bag)  3,375 mg IntraVENous Q8H TRENT Chu - CNP 12.5 mL/hr at 02/02/23 0614 Rate Verify at 02/02/23 4376    metoprolol succinate (TOPROL XL) extended release tablet 25 mg  25 mg Oral BID Jose House MD   25 mg at 02/01/23 0951    morphine (PF) injection 2 mg  2 mg IntraVENous Q4H PRN Jose House MD        finasteride (PROSCAR) tablet 5 mg  5 mg Oral Daily Jose House MD   5 mg at 02/01/23 8127    sodium chloride flush 0.9 % injection 5-40 mL  5-40 mL IntraVENous 2 times per day Jose House MD   10 mL at 02/01/23 0953    sodium chloride flush 0.9 % injection 5-40 mL  5-40 mL IntraVENous PRN Jose House MD   10 mL at 02/01/23 0104    0.9 % sodium chloride infusion   IntraVENous PRN Jose House MD 25 mL/hr at 02/01/23 0105 25 mL/hr at 02/01/23 0105    ondansetron (ZOFRAN-ODT) disintegrating tablet 4 mg  4 mg Oral Q8H PRN Jose House MD        Or    ondansetron (ZOFRAN) injection 4 mg  4 mg IntraVENous Q6H PRN Jose House MD        polyethylene glycol (GLYCOLAX) packet 17 g  17 g Oral Daily PRN Jose House MD        acetaminophen (TYLENOL) tablet 650 mg  650 mg Oral Q6H PRN Jose House MD        Or    acetaminophen (TYLENOL) suppository 650 mg  650 mg Rectal Q6H PRN Jose House MD        hydrALAZINE (APRESOLINE) injection 10 mg  10 mg IntraVENous Q4H PRN Jose House MD   10 mg at 01/30/23 1723    ipratropium-albuterol (DUONEB) nebulizer solution 1 ampule  1 ampule Inhalation BID Jose House MD   1 ampule at 02/01/23 9981 Allergies:  Patient has no known allergies. Review of Systems:     Constitutional: there has been no unanticipated weight loss. There's been no change in energy level, sleep pattern, or activity level. Eyes: No visual changes or diplopia. No scleral icterus. ENT: No Headaches, hearing loss or vertigo. No mouth sores or sore throat. Cardiovascular: Reviewed in HPI  Respiratory: No cough or wheezing, no sputum production. No hematemesis. Gastrointestinal: + abdominal discomfort  Genitourinary: No dysuria, trouble voiding, or hematuria. Musculoskeletal:  No gait disturbance, weakness or joint complaints. Integumentary: No rash or pruritis. Neurological: No headache, diplopia, change in muscle strength, numbness or tingling. No change in gait, balance, coordination, mood, affect, memory, mentation, behavior. Psychiatric: No anxiety, no depression. Endocrine: No malaise, fatigue or temperature intolerance. No excessive thirst, fluid intake, or urination. No tremor. Hematologic/Lymphatic: No abnormal bruising or bleeding, blood clots or swollen lymph nodes. Allergic/Immunologic: No nasal congestion or hives.       Physical Examination:    Vitals:    02/02/23 0724   BP: (!) 133/96   Pulse: 72   Resp: 16   Temp: 98.1 °F (36.7 °C)   SpO2: 93%    Weight: 182 lb 14.4 oz (83 kg)         General Appearance:  Alert, cooperative, no distress, appears stated age   Head:  Normocephalic, without obvious abnormality, atraumatic   Eyes:  PERRL, conjunctiva/corneas clear       Nose: Nares normal, no drainage or sinus tenderness   Throat: Lips, mucosa, and tongue normal   Neck: Supple, symmetrical, trachea midline, no adenopathy, thyroid: not enlarged, symmetric, no tenderness/mass/nodules, +JVD        Lungs:   Clear to auscultation bilaterally, respirations unlabored   Chest Wall:  No tenderness or deformity   Heart:  Regular rate and rhythm, S1, S2 normal, 3/6 systolic murmur    Abdomen:   Soft, non-tender, bowel sounds active all four quadrants,  no masses, no organomegaly           Extremities: Extremities normal, atraumatic, +1 edema, cool    Pulses: 2+ and symmetric   Skin: Skin color, texture, turgor normal, no rashes or lesions   Pysch: Normal mood and affect   Neurologic: Normal gross motor and sensory exam.         Labs  CBC:   Lab Results   Component Value Date/Time    WBC 4.5 02/02/2023 05:07 AM    RBC 4.29 02/02/2023 05:07 AM    HGB 11.4 02/02/2023 05:07 AM    HCT 35.2 02/02/2023 05:07 AM    MCV 82.1 02/02/2023 05:07 AM    RDW 14.4 02/02/2023 05:07 AM     02/02/2023 05:07 AM     CMP:    Lab Results   Component Value Date/Time     02/02/2023 05:07 AM    K 4.0 02/02/2023 05:07 AM    K 4.0 10/29/2022 04:11 PM     02/02/2023 05:07 AM    CO2 27 02/02/2023 05:07 AM    BUN 15 02/02/2023 05:07 AM    CREATININE 1.4 02/02/2023 05:07 AM    GFRAA >60 10/05/2022 10:10 AM    AGRATIO 1.2 02/02/2023 05:07 AM    LABGLOM 56 02/02/2023 05:07 AM    GLUCOSE 82 02/02/2023 05:07 AM    PROT 6.5 02/02/2023 05:07 AM    CALCIUM 8.5 02/02/2023 05:07 AM    BILITOT 1.6 02/02/2023 05:07 AM    ALKPHOS 76 02/02/2023 05:07 AM    AST 27 02/02/2023 05:07 AM    ALT 26 02/02/2023 05:07 AM     PT/INR:  No results found for: PTINR  Lab Results   Component Value Date    TROPONINI <0.01 02/01/2023       EKG:  I have reviewed EKG with the following interpretation:  Impression:  atrial fibrillation, nonspecific st-t wave changes     Echo:  -Definity administered for LV thrombus. -Left ventricular cavity size is severely dilated with normal left   ventricular wall thickness.   -Overall left ventricular systolic function appears severely reduced. Ejection fraction is visually estimated to be 20%. -There is severe diffuse hypokinesis. -Cannot grade diastology due to atrial fibrillation, although there are   elevated LA pressures.   -Prominent trabeculations in the LV apex.  Cannot exclude Non-compaction   cardiomyopathy.   -No evidence of left ventricular mass or thrombus noted. -The right ventricle is severely enlarged. Right ventricular systolic   function is moderately reduced.   -There is severe bi-atrial enlargement. -Mitral valve leaflets appear mildly thickened. Mitral regurgitation   directed centrally and posteriorly that may be severe. -The ascending aorta is mildly dilated. -Moderate to severe tricuspid regurgitation with a PASP of 77 mmHg.   -Trivial pulmonic regurgitation. Stress:    Small sized inferior fixed defect most consistent with diaphragmatic    artifact. Myocardial fibrosis is less likely. There is severe global LV systolic dysfunction with ejection fraction of 27    %. Possible WPW    Overall findings represent a high risk scan. Cath: Anatomy:   LM-nml   LAD-nml  Cx-nml  OM- nml  RCA-nml  RPDA- nml  LVEF- 10%  LVG- global hypokinesis  LVEDP- 10     Hemodynamics:  RA- mean 3  RV- 37/0  PAWP- 10  PA- 34/12 (20)     C. O.- 5.7 (4.9)  C. I.- 2.6 (2.25)  PA sat 60%  AO sat 91%  MRI/EP/Other:     Old notes reviewed  Telemetry reviewd  Ekg personally reviewed  Chest xray personally reviewed  Echo, cath, and   Medications and labs reviewed  Moderate complexity/medical decision making due to extensive data review, extensive history review, independent review of data, life threatening problem   moderate risk due to acute illness, evaluation of drug-drug interactions, medication management and diagnostic interventions    Discussed with miguelito russo CNP EP  Assessment  Patient Active Problem List   Diagnosis    Acute retention of urine    SACHIN (acute kidney injury) ([address]    Paroxysmal atrial fibrillation (HCC)    Benign essential HTN    Dilated cardiomyopathy ([address]    Encounter for loop recorder check    Acute urinary retention    Acute pulmonary edema (HCC)    Acute on chronic systolic heart failure (HCC)    COVID    Non-compliance    Homeless    NSTEMI (non-ST elevated myocardial infarction) ([address]    Acute combined systolic and diastolic heart failure (HCC)    Complicated UTI (urinary tract infection)    Chronic atrial fibrillation (HCC)    Moderate mitral regurgitation    Acute on chronic congestive heart failure (HCC)    VT (ventricular tachycardia)    ICD (implantable cardioverter-defibrillator), biventricular, in situ    Thrombus of left atrial appendage    Syncope and collapse    PNA (pneumonia)    Hemoptysis    Fluid overload    Elevated LFTs    Idiopathic hypotension    Peripheral edema    Anemia    HFrEF (heart failure with reduced ejection fraction) (HCC)    Chronic diastolic (congestive) heart failure (HCC)    Dizziness    Abdominal pain    Abdominal pain, right upper quadrant    Epigastric abdominal pain    Acalculous cholecystitis    Longstanding persistent atrial fibrillation ([address]         Plan:    I had the opportunity to review the clinical symptoms and presentation of [place]. Assessment/Plan:  Abdominal pain  - sounds like hepatic congestion from right heart failure, not the gallbladder  - new problem  Plan:  - restart home diuresis     2. Acute on chronic biventricular heart failure   - stable  - ACC C NYHA III  - nonischemic, family history of heart disease. No cardiac Mri performed   Plan:  - continue metoprolol succinate 25 mg bid and spironolactone 12.5 mg bid  - continue lisinopril and sgtl2  - need to re-trial entresto  - evaluate for sleep apnea     3. Bi-v ICD and atrial fibrillation   - stable  Plan:  - Ep to see, recommended amiodarone load followed by outpatient CONSUELO cardioversion. Low BI-v pacing on device. Would benefit from ablation long term for maintenance of sinus rhythm     4.  Unstable housing     Libertad Novak DO, MD 2/2/2023 8:49 AM

## 2023-02-02 NOTE — PLAN OF CARE
Problem: ABCDS Injury Assessment  Goal: Absence of physical injury  2/2/2023 0137 by Alberto Roldan RN  Outcome: Progressing     Problem: Discharge Planning  Goal: Discharge to home or other facility with appropriate resources  2/2/2023 0137 by Alberto Roldan RN  Outcome: Progressing     Problem: Pain  Goal: Verbalizes/displays adequate comfort level or baseline comfort level  2/2/2023 0137 by Alberto Roldan RN  Outcome: Progressing     Problem: Safety - Adult  Goal: Free from fall injury  Outcome: Progressing

## 2023-02-02 NOTE — PROGRESS NOTES
HOSPITALISTS PROGRESS NOTE    2/2/2023 8:57 AM        Name: Jenifer Melissa . Admitted: 1/30/2023  Primary Care Provider: No primary care provider on file. (Tel: None)      Brief Course: This 57 y.o. male with PMHx of hypertension, CHF and paroxysmal presented with abdominal pain. CT abdomen and pelvis showed gallbladder sludge? GB wall edema and diverticulosis. Marked cardiomegaly and reflux of contrast into enlarged hepatic veins and IVC suggestive of mild heart failure. Interval history:   Pt seen and examined today. Overnight events noted, interval ancillary notes and labs reviewed. On room air satting well, afebrile overnight, WC WNL  Diuresing with torsemide; -2.1 L since admission. Creatinine 1.4 this morning  Patient very upset about last night; stated that he had to wait for 4 hours before Don's was placed   denied cough, SOB, chest pain, nausea, vomiting or abdominal pain      Assessment & Plan:     Acute on chronic combined CHF  Echo showed EF of 15 to 20%, severe global hypokinesis, grade 3 DD with elevated LVEDP, severely dilated RV, moderate eccentric MR, severe TR with RVSP of 100 mmHg, severe pulmonary hypertension  Cardiology on board; appreciate recs  Continue digoxin metoprolol torsemide, Jardiance and lisinopril. Started on spironolactone  Monitor electrolytes closely while on diuresis. Replace as needed  Fluid and salt restriction. Daily weights    SACHIN likely cardiorenal  Creatinine 1.2 on admission; trended up to 1.5. Down to 1.4 this morning  Avoid nephrotoxin, strict and output, daily weights and monitor renal function closely    Chronic A. fib; continue beta-blocker and Eliquis  EP on board; started on amiodarone. plan for cardioversion outpatient after loading with amiodarone.   Instructed patient to be compliant with anticoagulant given history of LA thrombus     Hx of LA thrombus: Continue anticoagulation    Biventricular ICD; plan to interrogate device    Urinary retention; status post Don's catheter placement  Urology consulted; continue finasteride. Started on Flomax  Continue finasteride. Will start Flomax 0.4 mg daily   High suspicion of prostate cancer given elevated PSA repeat PSA ordered  Recommended outpatient cysto/TRUS + biopsy. Abdominal pain likely 2/2 congestive hepatomegaly/acalculous cholecystitis? CT abdomen and pelvis showed gallbladder sludge? GB wall edema and diverticulosis. Marked cardiomegaly and reflux of contrast into enlarged hepatic veins and IVC suggestive of mild heart failure. RUQ US showed mild GB wall thickening with small amount of pericholecystic fluid. No cholelithiasis seen  General surgery consulted; continue PPI for 30 days and antibiotic for total of 7 days.        DVT PPX: Eliquis  Code:Full Code    Disposition: Once acute medical issues have resolved    Current Medications  amiodarone (CORDARONE) tablet 200 mg, BID  lisinopril (PRINIVIL;ZESTRIL) tablet 2.5 mg, Daily  torsemide (DEMADEX) tablet 20 mg, Daily  empagliflozin (JARDIANCE) tablet 10 mg, Daily  apixaban (ELIQUIS) tablet 5 mg, BID  spironolactone (ALDACTONE) tablet 12.5 mg, Lunch  pantoprazole (PROTONIX) injection 40 mg, Daily  piperacillin-tazobactam (ZOSYN) 3,375 mg in sodium chloride 0.9 % 50 mL IVPB (mini-bag), Q8H  metoprolol succinate (TOPROL XL) extended release tablet 25 mg, BID  morphine (PF) injection 2 mg, Q4H PRN  finasteride (PROSCAR) tablet 5 mg, Daily  sodium chloride flush 0.9 % injection 5-40 mL, 2 times per day  sodium chloride flush 0.9 % injection 5-40 mL, PRN  0.9 % sodium chloride infusion, PRN  ondansetron (ZOFRAN-ODT) disintegrating tablet 4 mg, Q8H PRN   Or  ondansetron (ZOFRAN) injection 4 mg, Q6H PRN  polyethylene glycol (GLYCOLAX) packet 17 g, Daily PRN  acetaminophen (TYLENOL) tablet 650 mg, Q6H PRN   Or  acetaminophen (TYLENOL) suppository 650 mg, Q6H PRN  hydrALAZINE (APRESOLINE) injection 10 mg, Q4H PRN  ipratropium-albuterol (DUONEB) nebulizer solution 1 ampule, BID      Objective:  BP (!) 133/96   Pulse 72   Temp 98.1 °F (36.7 °C) (Oral)   Resp 16   Ht 6' 2\" (1.88 m)   Wt 182 lb 14.4 oz (83 kg)   SpO2 93%   BMI 23.48 kg/m²     Intake/Output Summary (Last 24 hours) at 2/2/2023 0857  Last data filed at 2/2/2023 3327  Gross per 24 hour   Intake 965.66 ml   Output 3775 ml   Net -2809.34 ml        Wt Readings from Last 3 Encounters:   02/02/23 182 lb 14.4 oz (83 kg)   01/10/23 196 lb (88.9 kg)   11/10/22 202 lb (91.6 kg)       Physical Examination:   General appearance:  No apparent distress, appears stated age and cooperative. HEENT: Normocephalic, sclera clear., PERRLA. Trachea midline, no adenopathy. Cardiovascular: Regular rate and rhythm, normal S1, S2. No murmur. Respiratory:Clear to auscultation bilaterally, no wheeze or crackles. GI: Abdomen soft, no tenderness, not distended, normal bowel sounds  Musculoskeletal: No cyanosis in digits. No BLE edema present  Neurology: CN 2-12 grossly intact. No speech or motor deficits  Psych: Not agitated, appropriate affect  Skin: Warm, dry, normal turgor    Labs and Tests:  CBC:   Recent Labs     01/31/23 0456 02/01/23 0318 02/02/23  0507   WBC 5.1 5.8 4.5   HGB 12.4* 12.0* 11.4*    202 185       BMP:    Recent Labs     01/31/23 0456 02/01/23  0319 02/02/23  0507    136 140   K 4.7 3.8 4.0    101 105   CO2 23 25 27   BUN 22* 18 15   CREATININE 1.2 1.5* 1.4*   GLUCOSE 103* 85 82       Hepatic:   Recent Labs     01/31/23 0456 02/01/23  0319 02/02/23  0507   AST 31 28 27   ALT 29 27 26   BILITOT 3.1* 2.2* 1.6*   ALKPHOS 81 78 76       US GALLBLADDER RUQ   Final Result   Mild gallbladder wall thickening with a small amount of pericholecystic   fluid. No evidence of cholelithiasis. Questionable mild prominence of the pancreatic head.   Further evaluation with   pancreatic protocol contrast enhanced MRI may be obtained for further   evaluation. CT ABDOMEN PELVIS W IV CONTRAST Additional Contrast? None   Final Result   Question of gallbladder sludge and gallbladder wall edema. Consider right   upper quadrant ultrasound for further evaluation. Streak artifact and suboptimal contrast opacification limits the exam.      Marked cardiomegaly. Reflux of contrast into enlarged hepatic veins and IVC   may reflect right heart failure. The appendix is within normal limits. Diverticulosis. Small amount of free fluid in the pelvis. Prostate gland enlargement. Problem List  Principal Problem:    Abdominal pain  Active Problems:    Abdominal pain, right upper quadrant    Epigastric abdominal pain    Acalculous cholecystitis    Longstanding persistent atrial fibrillation (HCC)    Paroxysmal atrial fibrillation (HCC)    Acute combined systolic and diastolic heart failure ([address]  Resolved Problems:    * No resolved hospital problems.  Orlando Mcgee MD   2/2/2023 8:57 AM

## 2023-02-02 NOTE — PROGRESS NOTES
02/02/23 1609   RT Protocol   History Pulmonary Disease 0   Respiratory pattern 4   Breath sounds 4   Cough 0   Bronchodilator Assessment Score 8      02/02/23 1609   RT Protocol   History Pulmonary Disease 0   Respiratory pattern 4   Breath sounds 4   Cough 0   Bronchodilator Assessment Score 8

## 2023-02-02 NOTE — CONSULTS
Urology Consult Note  [facility]     Patient: Serena Reyes MRN: [id number]  Room/Bed: [address] [place], [place] [address]/1191-34   YOB: 1960  Age/Sex: 57 y.o.male  Admission Date: 1/30/2023     Date of Service:  2/2/2023    Consulting Provider: TRENT Eng - KATLYN  Admitting/Requesting Physician: Andrey Olvera MD  Primary Care Physician: No primary care provider on file. Reason for Consult: Urinary retention     ASSESSMENT/PLAN     57 y.o. AA male with h/o CHF, chronic anticoagulation , Afib, presented with co abdominal pain. Urology consulted for urinary retention- he has a long standing hx of this with chronic ernst. He was previously seen in 2020 for BPH and elevated psa of 20, and at that time Dr. Radha Farrell  recommended cysto/TRUS bx- numerous calls to patient, non-comply letter sent to his address. MEET today 3+, firm, right lateral base nodule. Ernst placed 2/2 with 625 mL output. Recommendations:  PSA today  ontinue Ernst - draining CYU - will discharge with the ernst  Continue finasteride. Will start Flomax 0.4 mg daily   High suspicion of prostate cancer given elevated PSA, + MEET and risk factors. Needs outpatient cysto and trus + biopsy. Urology will continue to follow, please call with any questions     All patient questions were answered. He understands the plan as listed above. HISTORY     Chief Complaint:   Chief Complaint   Patient presents with    Abdominal Pain     Arrived per friend d/t abdomen pain that started \"a while ago\"; however was worse last night was unable to sleep last night; described as a knot in stomach; BM small this morning and normal yesterday; History of Present Illness: Serena Reyes is a 57 y.o. male with urinary retention. Onset of symptoms was days ago with improving course since that time. Symptoms are aggravated by abdominal pain. Symptoms improved with ernst.  Associated symptoms include n/v. He has tried the following treatments: finasteride, ernst     Past Medical History:  He has a past medical history of Acute combined systolic and diastolic congestive heart failure ([facility] (8/31/2022), Atrial fibrillation ([facility] (07/25/2019), CHF (congestive heart failure) ([facility] blood clots, and Hypertension. Hospital Problem List:  Principal Problem:    Abdominal pain  Active Problems:    Abdominal pain, right upper quadrant    Epigastric abdominal pain    Acalculous cholecystitis    Longstanding persistent atrial fibrillation (HCC)    Paroxysmal atrial fibrillation (HCC)    Acute combined systolic and diastolic heart failure ([facility]  Resolved Problems:    * No resolved hospital problems. *      Past Surgical History:  He has a past surgical history that includes Insertable Cardiac Monitor (07/26/2019); Cardioversion (07/26/2019); and Cardiac defibrillator placement (Left). Social History:  He reports that he has never smoked. He has never been exposed to tobacco smoke. He has never used smokeless tobacco. He reports that he does not drink alcohol and does not use drugs. Family History:  family history includes Heart Attack in his mother; Heart Disease in his mother; High Blood Pressure in his father and mother; Other in his father; Stroke in his father.     Allergies:  No Known Allergies    Medications:  Scheduled Meds:   amiodarone  200 mg Oral BID    lisinopril  2.5 mg Oral Daily    torsemide  20 mg Oral Daily    empagliflozin  10 mg Oral Daily    apixaban  5 mg Oral BID    spironolactone  12.5 mg Oral Lunch    pantoprazole  40 mg IntraVENous Daily    piperacillin-tazobactam  3,375 mg IntraVENous Q8H    metoprolol succinate  25 mg Oral BID    finasteride  5 mg Oral Daily    sodium chloride flush  5-40 mL IntraVENous 2 times per day    ipratropium-albuterol  1 ampule Inhalation BID     Continuous Infusions:   sodium chloride 25 mL/hr (02/01/23 0105)     PRN Meds:morphine, sodium chloride flush, sodium chloride, ondansetron **OR** ondansetron, polyethylene glycol, acetaminophen **OR** acetaminophen, hydrALAZINE    Review of Systems:  Pertinent positives/negatives reviewed in HPI. All other systems reviewed and negative, unless noted below. Constitutional: Negative  Genitourinary: see HPI  HEENT: Negative   Cardiovascular: Negative   Respiratory: Negative   Gastrointestinal: Negative   Musculoskeletal: Negative   Neurological: Negative   Psychiatric: Negative   Integumentary: Negative     PHYSICAL EXAM     Vitals:    02/02/23 0902   BP:    Pulse: 74   Resp: 16   Temp:    SpO2: 93%     CONSTITUTIONAL: The patient is well nourished/developed, with no distress noted. NEUROLOGICAL/PSYCHIATRIC: Oriented to place and time, normal affected noted. NECK: The neck is symmetrical and supple, with no masses noted. CARDIOVASCULAR: Regular rate and rhythm, no evidence of swelling noted. RESPIRATORY: Normal respiratory effort with no wheezing noted. ABDOMEN: Abdomen soft, non-tender, non-distended. No enlarged liver or spleen. No hernias noted. Stool occult blood not indicated. SKIN: Skin appears normal.  LYMPHATICS: No adenopathy noted. CVA: No CVA tenderness bilaterally. GENITOURINARY: Don in place draining CYU. The penis is without rash or lesions and meatus with expected size and location. The scrotum appears normal. Bilateral testicles appears to be of normal size and location. No masses or tenderness noted of testicles or epididymis.   MEET: RE today 3+, firm, right lateral base nodule     Ins/Outs:    Intake/Output Summary (Last 24 hours) at 2/2/2023 0904  Last data filed at 2/2/2023 0856  Gross per 24 hour   Intake 965.66 ml   Output 3775 ml   Net -2809.34 ml       LABS     CBC   Lab Results   Component Value Date/Time    WBC 4.5 02/02/2023 05:07 AM    RBC 4.29 02/02/2023 05:07 AM    HGB 11.4 02/02/2023 05:07 AM    HCT 35.2 02/02/2023 05:07 AM    MCV 82.1 02/02/2023 05:07 AM    MCH 26.6 02/02/2023 05:07 AM    MCHC 32.5 02/02/2023 05:07 AM RDW 14.4 02/02/2023 05:07 AM     02/02/2023 05:07 AM    MPV 8.3 02/02/2023 05:07 AM     BMP   Lab Results   Component Value Date/Time     02/02/2023 05:07 AM    K 4.0 02/02/2023 05:07 AM    K 4.0 10/29/2022 04:11 PM     02/02/2023 05:07 AM    CO2 27 02/02/2023 05:07 AM    BUN 15 02/02/2023 05:07 AM    CREATININE 1.4 02/02/2023 05:07 AM    GLUCOSE 82 02/02/2023 05:07 AM    CALCIUM 8.5 02/02/2023 05:07 AM     Urinalysis:   Lab Results   Component Value Date/Time    COLORU Yellow 10/29/2022 04:07 PM    GLUCOSEU 250 10/29/2022 04:07 PM    BLOODU Negative 10/29/2022 04:07 PM    NITRU Negative 10/29/2022 04:07 PM    LEUKOCYTESUR Negative 10/29/2022 04:07 PM     Urine culture: No results for input(s): LABURIN in the last 72 hours. PSA: No results found for: PSA      IMAGING     CT ABDOMEN PELVIS W IV CONTRAST Additional Contrast? None    Result Date: 2/1/2023  EXAMINATION: CT OF THE ABDOMEN AND PELVIS WITH CONTRAST 1/30/2023 9:37 am TECHNIQUE: CT of the abdomen and pelvis was performed with the administration of intravenous contrast. Multiplanar reformatted images are provided for review. Automated exposure control, iterative reconstruction, and/or weight based adjustment of the mA/kV was utilized to reduce the radiation dose to as low as reasonably achievable. COMPARISON: 07/24/2019 HISTORY: ORDERING SYSTEM PROVIDED HISTORY: epigastric pain TECHNOLOGIST PROVIDED HISTORY: Reason for exam:->epigastric pain Additional Contrast?->None Decision Support Exception - unselect if not a suspected or confirmed emergency medical condition->Emergency Medical Condition (MA) Reason for Exam: epigastric pain FINDINGS: Lower Chest: The heart size is significantly enlarged with enlargement of all four chambers. Contrast refluxes into the hepatic veins. A pacemaker/defibrillator device is in place. There is no pericardial effusion. An acute process is not appreciated at the lung bases.  Organs: A focal hepatic abnormality is not identified. There is question of mild gallbladder wall thickening. There is subtle increased density within the dependent portion of the gallbladder which may reflect sludge. A focal splenic abnormality is not appreciated. A focal pancreatic abnormality is not identified. A focal adrenal abnormality is not appreciated. Renal cysts are noted. The kidneys are not obstructed. There is limited contrast opacification of the abdominal organs likely related to underlying heart disease. Streak artifact diffusely compromises the quality of the exam. GI/Bowel: The bowel is not obstructed. The appendix is within normal limits. Diverticulosis is present. Pelvis: There is a small amount of free fluid in the pelvis. The prostate gland is enlarged measuring 6.0 cm in transverse dimension. The urinary bladder is unremarkable. Peritoneum/retroperitoneum: The inferior vena cava is enlarged. There is mild tortuosity of the abdominal aorta. There is no free intraperitoneal air. There is a small fat containing periumbilical hernia. Bones/soft tissues: There is mild diffuse subcutaneous edema. Degenerative features are noted at the SI joints and throughout the spine. An acute osseous abnormality is not identified. Question of gallbladder sludge and gallbladder wall edema. Consider right upper quadrant ultrasound for further evaluation. Streak artifact and suboptimal contrast opacification limits the exam. Marked cardiomegaly. Reflux of contrast into enlarged hepatic veins and IVC may reflect right heart failure. The appendix is within normal limits. Diverticulosis. Small amount of free fluid in the pelvis. Prostate gland enlargement. US GALLBLADDER RUQ    Result Date: 1/30/2023  EXAMINATION: RIGHT UPPER QUADRANT ULTRASOUND 1/30/2023 11:23 am COMPARISON: None.  HISTORY: ORDERING SYSTEM PROVIDED HISTORY: RUQ PAIN TECHNOLOGIST PROVIDED HISTORY: Reason for exam:->RUQ PAIN Reason for Exam: fu ct FINDINGS: LIVER:  The liver demonstrates normal echogenicity without evidence of intrahepatic biliary ductal dilatation. BILIARY SYSTEM:  No sonographic evidence of gallstones. There is mild thickening of the gallbladder measuring up to 3 mm. There is a small amount of pericholecystic fluid. Negative sonographic Kebede's sign. Common bile duct is within normal limits measuring 0.5 cm. RIGHT KIDNEY: The right kidney is without evidence of hydronephrosis. There is a simple right renal cyst measuring 3.4 x 3.3 x 2.7 cm. PANCREAS:  There is questionable mild prominence of the pancreatic head. OTHER: No evidence of right upper quadrant ascites. Mild gallbladder wall thickening with a small amount of pericholecystic fluid. No evidence of cholelithiasis. Questionable mild prominence of the pancreatic head. Further evaluation with pancreatic protocol contrast enhanced MRI may be obtained for further evaluation.             Electronically signed by: TRENT Fan CNP  2/2/2023   [facility]  Office Contact: [phone]

## 2023-02-02 NOTE — PROGRESS NOTES
[facility]        Progress Note    Patient Name: Claudette Laos  MRN: [id number]  YOB: 1960  Date of Evaluation: 2023    Chief Complaint: Abdominal pain    Subjective:  Pt had severe urinary retention pain, ernst placed, back to baseline  No recurrent abdominal pain  No nausea or vomiting, tolerating diet  Resting in bed bed at this time      Vital Signs:  Patient Vitals for the past 24 hrs:   BP Temp Temp src Pulse Resp SpO2 Height Weight   23 1200 123/81 -- -- -- -- -- -- --   23 1100 123/81 97.7 °F (36.5 °C) Oral 74 18 99 % -- --   23 0902 -- -- -- 74 16 93 % -- --   23 0724 (!) 133/96 98.1 °F (36.7 °C) Oral 72 16 93 % -- --   23 0518 -- -- -- -- -- -- 6' 2\" (1.88 m) 182 lb 14.4 oz (83 kg)   23 0516 124/88 98 °F (36.7 °C) Oral 68 18 99 % -- --   23 0004 116/84 97.6 °F (36.4 °C) Oral 77 18 95 % -- --   23 -- -- -- 63 -- -- -- --   23 -- -- -- 76 18 95 % -- --   23 -- -- -- 60 -- -- -- --   23 97/64 98.6 °F (37 °C) Oral 56 18 97 % -- --   23 1756 -- -- -- 68 -- -- -- --   23 1700 -- -- -- 69 -- -- -- --   23 1611 (!) 136/102 98.2 °F (36.8 °C) Oral 67 18 93 % -- --        TEMPERATURE HISTORY 24H: Temp (24hrs), Av °F (36.7 °C), Min:97.6 °F (36.4 °C), Max:98.6 °F (37 °C)    BLOOD PRESSURE HISTORY: Systolic (19CEH), BAR:283 , Min:97 , MDH:096    Diastolic (87FAQ), ESD:82, Min:64, Max:102      Intake/Output:  I/O last 3 completed shifts: In: 1165.8 [P.O.:840; I.V.:148.8;  IV Piggyback:177]  Out: 3918 [QNVXC:7589]  I/O this shift:  In: -   Out: 1425 [Urine:1425]  Drain/tube Output:       Physical Exam:  General: awake, alert, oriented to person, place, time  Cardiovascular:  regular rate and rhythm and no murmur noted  Lungs: clear to auscultation, cough  Abdomen: soft, nontender, nondistended, bowel sounds normal     Labs:  CBC:    Recent Labs 01/31/23  0456 02/01/23 0318 02/02/23  0507   WBC 5.1 5.8 4.5   HGB 12.4* 12.0* 11.4*   HCT 38.3* 38.0* 35.2*    202 185       BMP:    Recent Labs     01/31/23  0456 02/01/23  0319 02/02/23  0507    136 140   K 4.7 3.8 4.0    101 105   CO2 23 25 27   BUN 22* 18 15   CREATININE 1.2 1.5* 1.4*   GLUCOSE 103* 85 82       Hepatic:    Recent Labs     01/31/23  0456 02/01/23  0319 02/02/23  0507   AST 31 28 27   ALT 29 27 26   BILITOT 3.1* 2.2* 1.6*   ALKPHOS 81 78 76       Amylase:  No results found for: AMYLASE  Lipase:    Lab Results   Component Value Date/Time    LIPASE 27.0 01/30/2023 08:48 AM    LIPASE 36.0 08/28/2022 04:06 AM        Mag:    Lab Results   Component Value Date/Time    MG 2.10 02/01/2023 03:19 AM    MG 2.00 10/30/2022 05:04 AM     Phos:     Lab Results   Component Value Date/Time    PHOS 3.8 02/01/2023 03:19 AM    PHOS 3.3 10/31/2022 06:44 AM      Coags:   Lab Results   Component Value Date/Time    PROTIME 18.9 07/28/2022 10:50 PM    INR 1.59 07/28/2022 10:50 PM    APTT 28.8 07/28/2022 10:50 PM       Cultures:  Anaerobic culture  No results found for: LABANAE  Fungus stain  No results found for requested labs within last 30 days. Gram stain  No results found for requested labs within last 30 days. Organism  Lab Results   Component Value Date/Time    ORG Klebsiella pneumoniae (A) 04/13/2022 12:59 PM     Surgical culture  No results found for: CXSURG  Blood culture 1  No results found for requested labs within last 30 days. Blood culture 2  No results found for requested labs within last 30 days. Fecal occult  No results found for requested labs within last 30 days. GI bacterial pathogens by PCR  No results found for requested labs within last 30 days. C. difficile  No results found for requested labs within last 30 days.      Urine culture  Lab Results   Component Value Date/Time    LABURIN >100,000 CFU/ml 04/13/2022 12:59 PM       Pathology:  No relevant pathology     Imaging:  I have personally reviewed the following films:    No results found. Scheduled Meds:   amiodarone  200 mg Oral BID    tamsulosin  0.4 mg Oral Daily    lisinopril  2.5 mg Oral Daily    torsemide  20 mg Oral Daily    empagliflozin  10 mg Oral Daily    apixaban  5 mg Oral BID    spironolactone  12.5 mg Oral Lunch    pantoprazole  40 mg IntraVENous Daily    piperacillin-tazobactam  3,375 mg IntraVENous Q8H    metoprolol succinate  25 mg Oral BID    finasteride  5 mg Oral Daily    sodium chloride flush  5-40 mL IntraVENous 2 times per day    ipratropium-albuterol  1 ampule Inhalation BID     Continuous Infusions:   sodium chloride 25 mL/hr (02/01/23 0105)     PRN Meds:.morphine, sodium chloride flush, sodium chloride, ondansetron **OR** ondansetron, polyethylene glycol, acetaminophen **OR** acetaminophen, hydrALAZINE      Assessment:  57 y.o. male admitted with   1. Abdominal pain, right upper quadrant    2. Gallbladder sludge    3. Bilirubinemia        Upper abdominal pain, possible acalculous cholecystitis  Acute on chronic CHF  Paroxysmal atrial fibrillation       Plan:    Pt alert and in good spirits  Coughing is better  No c/o abd pain -  eval ongoing - ernst in place. Concern of Prostate cancer raised  Abd soft, ND, NT  Tolerating diet  Plan for a week of antibiotics and would do PPI for 30 days  On Eliquis  Acute GB / abd pain issues are resolve. No surgery planned. We will see prn, please recall if needed.   Thank you,    Michele Gonzalez MD

## 2023-02-02 NOTE — PROGRESS NOTES
[address]   Electrophysiology Progress Note     Date: 2/2/2023  Admit Date: 1/30/2023     Reason for consultation: AF    Chief Complaint:   Chief Complaint   Patient presents with    Abdominal Pain     Arrived per friend d/t abdomen pain that started \"a while ago\"; however was worse last night was unable to sleep last night; described as a knot in stomach; BM small this morning and normal yesterday; History of Present Illness: History obtained from patient and medical record. Ollie Gomez is a 57 y.o. male with a past medical history of long persistent atrial fibrillation, HTN, nonischemic cardiomyopathy, HFrEF, left atrial appendage thrombus, challenging social circumstances being homeless, history of noncompliance, s/p BiV ICD on 4/25/2022    Pt presented to the hospital with abdominal pain. EP has been consulted to assess for atrial fibrillation and considering CONSUELO cardioversion. Patient has history of long persistent atrial fibrillation since 2021. At the time of implantation of biventricular ICD his device has been programmed to VVIR and atrial lead has been turned off. Patient reports no palpitation. Not aware of his irregular heart rhythm. Interval Hx: Today, he is being seen for EP follow up. He remains in atrial fibrillation. We discussed need to load with amiodarone and do outpatient cardioversion. Patient seen and examined. Clinical notes reviewed. Telemetry reviewed. No new complaints today. No major events overnight. Denies having chest pain, palpitations, shortness of breath, orthopnea/PND, cough, or dizziness at the time of this visit. Allergies:  No Known Allergies    Home Meds:  Prior to Visit Medications    Medication Sig Taking?  Authorizing Provider   finasteride (PROSCAR) 5 MG tablet Take 5 mg by mouth daily Yes Historical Provider, MD   metoprolol succinate (TOPROL XL) 25 MG extended release tablet Take 1 tablet by mouth in the morning and at bedtime [facility] [place] [facility] brown - CNS   torsemide (DEMADEX) 20 MG tablet Take 1 tablet by mouth daily  Cayden Flores MD   empagliflozin (JARDIANCE) 10 MG tablet Take 1 tablet by mouth daily  TRENT [place] - CNS   lisinopril (PRINIVIL;ZESTRIL) 2.5 MG tablet Take 1 tablet by mouth daily  TRENT [place] - CNS   atorvastatin (LIPITOR) 40 MG tablet Take 1 tablet by mouth nightly  TRENT Nayak - CNS   metOLazone (ZAROXOLYN) 5 MG tablet Take 1 tablet by mouth as needed (prn)  TRENT Nayak - CNS   vitamin D (CHOLECALCIFEROL) 50 MCG (2000 UT) TABS tablet Take 1 tablet by mouth daily  TRENT [place] - CNS   digoxin (LANOXIN) 125 MCG tablet Take 1 tablet by mouth daily  TRENT Gaitan CNP   potassium chloride (KLOR-CON M) 20 MEQ extended release tablet Take 1 tablet by mouth in the morning and 1 tablet in the evening. Take with meals.   Franklin Valderrama MD   apixaban (ELIQUIS) 5 MG TABS tablet Take 1 tablet by mouth 2 times daily  TRENT Gaitan CNP   tamsulosin (FLOMAX) 0.4 MG capsule Take 1 capsule by mouth daily  TRENT Gaitan CNP      Scheduled Meds:   amiodarone  200 mg Oral BID    tamsulosin  0.4 mg Oral Daily    ipratropium-albuterol  1 ampule Inhalation TID    lisinopril  2.5 mg Oral Daily    torsemide  20 mg Oral Daily    empagliflozin  10 mg Oral Daily    apixaban  5 mg Oral BID    spironolactone  12.5 mg Oral Lunch    pantoprazole  40 mg IntraVENous Daily    piperacillin-tazobactam  3,375 mg IntraVENous Q8H    metoprolol succinate  25 mg Oral BID    finasteride  5 mg Oral Daily    sodium chloride flush  5-40 mL IntraVENous 2 times per day     Continuous Infusions:   sodium chloride 25 mL/hr (02/01/23 0105)     PRN Meds:ipratropium-albuterol, morphine, sodium chloride flush, sodium chloride, ondansetron **OR** ondansetron, polyethylene glycol, acetaminophen **OR** acetaminophen, hydrALAZINE     Past Medical History:  Past Medical History:   Diagnosis Date Acute combined systolic and diastolic congestive heart failure ([facility] [address] 8/31/2022    [facility] 07/25/2019    CHF (congestive heart failure) (HCC)     Hx of blood clots     Hypertension         Past Surgical History:    has a past surgical history that includes Insertable Cardiac Monitor (07/26/2019); Cardioversion (07/26/2019); and Cardiac defibrillator placement (Left). Social History:  Reviewed. reports that he has never smoked. He has never been exposed to tobacco smoke. He has never used smokeless tobacco. He reports that he does not drink alcohol and does not use drugs. Family History:  Reviewed. family history includes Heart Attack in his mother; Heart Disease in his mother; High Blood Pressure in his father and mother; Other in his father; Stroke in his father. Review of Systems:  Constitutional: Positive for fatigue, weakness. Negative for fever, night sweats, chills, weight changes  Skin: Negative for rash, dry skin, pruritus, bruising, bleeding, blood clots, or changes in skin pigment  HEENT: Negative for vision changes, ringing in the ears, sore throat, dysphagia, or swollen lymph nodes  Respiratory: Reviewed in HPI  Cardiovascular: Reviewed in HPI  Gastrointestinal: Negative for abdominal pain, N/V/D, constipation, or black/tarry stools  Genito-Urinary: Negative for dysuria, incontinence, urgency, or hematuria  Musculoskeletal: Negative for joint swelling, muscle pain, or injuries  Neurological/Psych: Negative for confusion, seizures, headaches, balance issues or TIA-like symptoms. No anxiety, depression, or insomnia    Physical Examination:  Vitals:    02/02/23 1615   BP: 136/76   Pulse: 60   Resp: 16   Temp: 98.5 °F (36.9 °C)   SpO2: 95%      In: 965.7 [P.O.:840;  I.V.:10]  Out: 7075    Wt Readings from Last 3 Encounters:   02/02/23 182 lb 14.4 oz (83 kg)   01/10/23 196 lb (88.9 kg)   11/10/22 202 lb (91.6 kg)       Intake/Output Summary (Last 24 hours) at 2/2/2023 1648  Last data filed at 2/2/2023 1625  Gross per 24 hour   Intake 965.66 ml   Output 6775 ml   Net -5809.34 ml       Telemetry: Personally Reviewed  - V-paced (underlying AF)  Constitutional: Cooperative and in no apparent distress, and appears well nourished  Skin: Warm and pink; no pallor, cyanosis, bruising, or clubbing  HEENT: Symmetric and normocephalic. PERRL, EOM intact. Conjunctiva pink with clear sclera. Mucus membranes pink and moist. Teeth intact. Thyroid smooth without nodules or goiter. Cardiovascular: Regular rate and irregular rhythm. S1/S2 present without murmurs, rubs, or gallops. Peripheral pulses 2+, capillary refill < 3 seconds. No elevation of JVP. No peripheral edema  Respiratory: Respirations symmetric and unlabored. Lungs clear to auscultation bilaterally, no wheezing, crackles, or rhonchi  Gastrointestinal: Abdomen soft and round. Bowel sounds normoactive in all quadrants without tenderness or masses. + ernst catheter  Musculoskeletal: Bilateral upper and lower extremity strength 5/5 with full ROM  Neurologic/Psych: Awake and orientated to person, place and time. Calm affect, appropriate mood    Pertinent labs, diagnostic, device, and imaging results reviewed as a part of this visit    Labs:    BMP:   Recent Labs     01/31/23 0456 02/01/23 0319 02/02/23  0507    136 140   K 4.7 3.8 4.0    101 105   CO2 23 25 27   PHOS  --  3.8  --    BUN 22* 18 15   CREATININE 1.2 1.5* 1.4*   MG  --  2.10  --      Estimated Creatinine Clearance: 62 mL/min (A) (based on SCr of 1.4 mg/dL (H)).    CBC:   Recent Labs     01/31/23 0456 02/01/23 0318 02/02/23  0507   WBC 5.1 5.8 4.5   HGB 12.4* 12.0* 11.4*   HCT 38.3* 38.0* 35.2*   MCV 82.6 82.4 82.1    202 185     Thyroid:   Lab Results   Component Value Date/Time    TSH 2.66 06/15/2022 10:33 PM     Lipids:   Lab Results   Component Value Date/Time    CHOL 121 10/29/2022 04:11 PM    HDL 69 10/29/2022 04:11 PM    TRIG 75 10/29/2022 04:11 PM     LFTS: Lab Results   Component Value Date/Time    ALT 26 2023 05:07 AM    AST 27 2023 05:07 AM    ALKPHOS 76 2023 05:07 AM    PROT 6.5 2023 05:07 AM    AGRATIO 1.2 2023 05:07 AM    BILITOT 1.6 2023 05:07 AM     Cardiac Enzymes:   Lab Results   Component Value Date/Time    TROPONINI <0.01 2023 03:18 AM    TROPONINI <0.01 2023 04:56 AM    TROPONINI 0.01 10/29/2022 04:11 PM     Coags:   Lab Results   Component Value Date/Time    PROTIME 18.9 2022 10:50 PM    INR 1.59 2022 10:50 PM       EC23 (Personally Interpreted)   - Atrial fibrillation with PVC    ECHO: 23   The left ventricle is severely dilated. Severely reduced systolic function. LVEF 15-20%. Severe global hypokinesis. Grade III diastolic dysfunction. Elevated LVEDP. RV severely dilated with severely depressed function. Moderate eccentric MR. Mild AI. Severe TR with a RVSP 100 mmHg. Severe pulmonary hypertension. IVC dynamics c/w markedly elevated RA pressure (15 mmHg). A bubble study was performed and fails to show evidence of shunting. Incidental Finding: Right kidney cyst noted. Consider additional dedicated abdominal imaging if clinically indicated. Cath: 22   LM-nml   LAD-nml  Cx-nml  OM- nml  RCA-nml  RPDA- nml  LVEF- 10%  LVG- global hypokinesis  LVEDP- 10     Hemodynamics:  RA- mean 3  RV- 37/0  PAWP- 10  PA- 34/12 (20)     C. O.- 5.7 (4.9)  C. I.- 2.6 (2.25)  PA sat 60%  AO sat 91%      Impression  ~Coronary Angiography w/ normal cors  ~LVG with LVEF of 10% and global regional wall motion abnormalities  ~Severe MR  ~Normal CO/CI  ~normal L and R filling pressures    CT Abd: 23  Question of gallbladder sludge and gallbladder wall edema. Consider right   upper quadrant ultrasound for further evaluation. Streak artifact and suboptimal contrast opacification limits the exam.       Marked cardiomegaly.   Reflux of contrast into enlarged hepatic veins and IVC   may reflect right heart failure. The appendix is within normal limits. Diverticulosis. Small amount of free fluid in the pelvis. Prostate gland enlargement. Problem List:   Patient Active Problem List    Diagnosis Date Noted    Longstanding persistent atrial fibrillation ([facility] [address] 02/01/2023    Epigastric abdominal pain 01/31/2023    Acalculous cholecystitis 01/31/2023    Abdominal pain 01/30/2023    Abdominal pain, right upper quadrant 01/30/2023    HFrEF (heart failure with reduced ejection fraction) ([address] 10/29/2022    Chronic diastolic (congestive) heart failure ([address] 10/29/2022    Dizziness 10/29/2022    Elevated LFTs 09/01/2022    Idiopathic hypotension 09/01/2022    Peripheral edema 09/01/2022    Anemia 09/01/2022    Fluid overload 08/24/2022    Hemoptysis 07/30/2022    PNA (pneumonia) 07/29/2022    Syncope and collapse 04/29/2022    Thrombus of left atrial appendage     VT (ventricular tachycardia)     ICD (implantable cardioverter-defibrillator), biventricular, in situ 04/26/2022    Acute on chronic congestive heart failure (HCC)     Complicated UTI (urinary tract infection)     Chronic atrial fibrillation (HCC)     Moderate mitral regurgitation     Acute combined systolic and diastolic heart failure ([address] 04/13/2022    NSTEMI (non-ST elevated myocardial infarction) ([address]     Homeless     COVID     Non-compliance     Acute on chronic systolic heart failure ([address] 11/28/2021    Acute pulmonary edema ([address] 11/27/2021    Encounter for loop recorder check 07/26/2019    Acute urinary retention 07/26/2019    Dilated cardiomyopathy (HCC)     SACHIN (acute kidney injury) ([address]     Paroxysmal atrial fibrillation (HCC)     Benign essential HTN     Acute retention of urine 07/24/2019        Assessment and Plan: 1. Long standing Persistent Atrial Fibrillation  - Currently in AF  - Continue Toprol XL 25 mg BID  - I have discussed with patient side effects of amiodarone including but not limited to thyroid disease, discoloration of skin and cornea and pulmonary fibrosis. Patient would like to continue with it.      ~ Continue oral amiodarone loadin mg BID x2 weeks, then reduce to 200 mg daily    ~ TSH 1.24 (10/22). ALT 26, AST 27 ()    - [id number] score:high; [id number] Vasc score and anticoagulation discussed. High risk for stroke and thromboembolism. Anticoagulation is recommended. Risk of bleeding was discussed.  ~ On Eliquis 5 mg BID    - Afib risk factors including age, HTN, obesity, inactivity and JOLENE were discussed with patient. Risk factor modification recommended      - Treatment options including cardioversion, rate control strategy, antiarrhythmics, anticoagulation and possible ablation were discussed with patient. Risks, benefits and alternative of each treatment options were explained. All questions answered    ~ Will plan for CONSUELO/DCCV in 3-4 weeks after compliant use of his anti-coagulation and amiodarone use. Letter sent to our scheduling team. Will do CONSUELO prior given hx of HENOK clot. He would benefit from maintaining sinus rhythm given his cardiomyopathy. Ablation can be considered long term    2. Hx of HENOK thrombus   - Noted on CONSUELO ()    3. NICM  - S/p [employer] Biv AICD ()  - I reviewed device interrogation. Device functioning normally.    ~ Device set to VVIR. WIll need to turn on atrial therapies at follow up  - Follow up with device clinic as scheduled    4. Acute on Chronic systolic heart failure (NYHA Class II)  - Appears fairly compensated   ~ EF 10-15% per echo  - Continue with BB, ACE, cathy, torsemide  - Monitor I&O's, Daily weights    5. HTN  - Controlled: Goal <130/80  - Continue current medications    6. At Risk for Sleep Apnea   - Multiple risk factors   - Recommend outpatient sleep study    No further EP recommendations. Will follow up in office in 2-3 months to see if ablation is needed.      All pertinent information and plan of care discussed with the EP physician. All questions and concerns were addressed to the patient. Alternatives to my treatment were discussed. I have discussed the above stated plan with patient and the nurse. The patient verbalized understanding and agreed with the plan.     Thank you for allowing to us to participate in the care of MARIA G Lee  [facility]   Office: [phone]

## 2023-02-03 VITALS
BODY MASS INDEX: 23.4 KG/M2 | HEART RATE: 58 BPM | SYSTOLIC BLOOD PRESSURE: 123 MMHG | RESPIRATION RATE: 18 BRPM | TEMPERATURE: 98.3 F | HEIGHT: 74 IN | OXYGEN SATURATION: 98 % | WEIGHT: 182.3 LBS | DIASTOLIC BLOOD PRESSURE: 85 MMHG

## 2023-02-03 LAB
ALBUMIN SERPL-MCNC: 3.3 G/DL (ref 3.4–5)
ALP BLD-CCNC: 93 U/L (ref 40–129)
ALT SERPL-CCNC: 23 U/L (ref 10–40)
ANION GAP SERPL CALCULATED.3IONS-SCNC: 10 MMOL/L (ref 3–16)
AST SERPL-CCNC: 22 U/L (ref 15–37)
BASOPHILS ABSOLUTE: 0 K/UL (ref 0–0.2)
BASOPHILS RELATIVE PERCENT: 0.5 %
BILIRUB SERPL-MCNC: 1.1 MG/DL (ref 0–1)
BILIRUBIN DIRECT: 0.3 MG/DL (ref 0–0.3)
BILIRUBIN, INDIRECT: 0.8 MG/DL (ref 0–1)
BUN BLDV-MCNC: 15 MG/DL (ref 7–20)
CALCIUM SERPL-MCNC: 8.5 MG/DL (ref 8.3–10.6)
CHLORIDE BLD-SCNC: 103 MMOL/L (ref 99–110)
CO2: 27 MMOL/L (ref 21–32)
CREAT SERPL-MCNC: 1.1 MG/DL (ref 0.8–1.3)
EOSINOPHILS ABSOLUTE: 0.2 K/UL (ref 0–0.6)
EOSINOPHILS RELATIVE PERCENT: 3.5 %
GFR SERPL CREATININE-BSD FRML MDRD: >60 ML/MIN/{1.73_M2}
GLUCOSE BLD-MCNC: 79 MG/DL (ref 70–99)
HCT VFR BLD CALC: 36.6 % (ref 40.5–52.5)
HEMOGLOBIN: 11.7 G/DL (ref 13.5–17.5)
LYMPHOCYTES ABSOLUTE: 1.1 K/UL (ref 1–5.1)
LYMPHOCYTES RELATIVE PERCENT: 24 %
MCH RBC QN AUTO: 26.5 PG (ref 26–34)
MCHC RBC AUTO-ENTMCNC: 31.9 G/DL (ref 31–36)
MCV RBC AUTO: 82.9 FL (ref 80–100)
MONOCYTES ABSOLUTE: 0.5 K/UL (ref 0–1.3)
MONOCYTES RELATIVE PERCENT: 10.7 %
NEUTROPHILS ABSOLUTE: 2.8 K/UL (ref 1.7–7.7)
NEUTROPHILS RELATIVE PERCENT: 61.3 %
PDW BLD-RTO: 14.9 % (ref 12.4–15.4)
PLATELET # BLD: 191 K/UL (ref 135–450)
PMV BLD AUTO: 7.8 FL (ref 5–10.5)
POTASSIUM REFLEX MAGNESIUM: 4.2 MMOL/L (ref 3.5–5.1)
RBC # BLD: 4.42 M/UL (ref 4.2–5.9)
SODIUM BLD-SCNC: 140 MMOL/L (ref 136–145)
TOTAL PROTEIN: 6.2 G/DL (ref 6.4–8.2)
WBC # BLD: 4.6 K/UL (ref 4–11)

## 2023-02-03 PROCEDURE — 6370000000 HC RX 637 (ALT 250 FOR IP): Performed by: NURSE PRACTITIONER

## 2023-02-03 PROCEDURE — 94640 AIRWAY INHALATION TREATMENT: CPT

## 2023-02-03 PROCEDURE — 2580000003 HC RX 258: Performed by: FAMILY MEDICINE

## 2023-02-03 PROCEDURE — 36415 COLL VENOUS BLD VENIPUNCTURE: CPT

## 2023-02-03 PROCEDURE — 6370000000 HC RX 637 (ALT 250 FOR IP): Performed by: INTERNAL MEDICINE

## 2023-02-03 PROCEDURE — 6370000000 HC RX 637 (ALT 250 FOR IP): Performed by: FAMILY MEDICINE

## 2023-02-03 PROCEDURE — 6360000002 HC RX W HCPCS: Performed by: NURSE PRACTITIONER

## 2023-02-03 PROCEDURE — 80048 BASIC METABOLIC PNL TOTAL CA: CPT

## 2023-02-03 PROCEDURE — C9113 INJ PANTOPRAZOLE SODIUM, VIA: HCPCS | Performed by: NURSE PRACTITIONER

## 2023-02-03 PROCEDURE — 2580000003 HC RX 258: Performed by: NURSE PRACTITIONER

## 2023-02-03 PROCEDURE — 80076 HEPATIC FUNCTION PANEL: CPT

## 2023-02-03 PROCEDURE — 99232 SBSQ HOSP IP/OBS MODERATE 35: CPT | Performed by: NURSE PRACTITIONER

## 2023-02-03 PROCEDURE — 85025 COMPLETE CBC W/AUTO DIFF WBC: CPT

## 2023-02-03 PROCEDURE — 99232 SBSQ HOSP IP/OBS MODERATE 35: CPT | Performed by: INTERNAL MEDICINE

## 2023-02-03 PROCEDURE — 94761 N-INVAS EAR/PLS OXIMETRY MLT: CPT

## 2023-02-03 RX ORDER — AMIODARONE HYDROCHLORIDE 200 MG/1
200 TABLET ORAL 2 TIMES DAILY
Qty: 45 TABLET | Refills: 0 | Status: SHIPPED | OUTPATIENT
Start: 2023-02-03

## 2023-02-03 RX ORDER — AMOXICILLIN AND CLAVULANATE POTASSIUM 875; 125 MG/1; MG/1
1 TABLET, FILM COATED ORAL 2 TIMES DAILY
Qty: 6 TABLET | Refills: 0 | Status: SHIPPED | OUTPATIENT
Start: 2023-02-03 | End: 2023-02-06

## 2023-02-03 RX ORDER — METOPROLOL SUCCINATE 25 MG/1
25 TABLET, EXTENDED RELEASE ORAL 2 TIMES DAILY
Qty: 30 TABLET | Refills: 3 | Status: SHIPPED | OUTPATIENT
Start: 2023-02-03

## 2023-02-03 RX ORDER — SPIRONOLACTONE 25 MG/1
12.5 TABLET ORAL
Qty: 30 TABLET | Refills: 3 | Status: SHIPPED | OUTPATIENT
Start: 2023-02-03 | End: 2023-02-03 | Stop reason: HOSPADM

## 2023-02-03 RX ORDER — SPIRONOLACTONE 25 MG/1
25 TABLET ORAL
Qty: 30 TABLET | Refills: 1 | Status: SHIPPED | OUTPATIENT
Start: 2023-02-04

## 2023-02-03 RX ORDER — SPIRONOLACTONE 25 MG/1
25 TABLET ORAL
Status: DISCONTINUED | OUTPATIENT
Start: 2023-02-03 | End: 2023-02-03 | Stop reason: HOSPADM

## 2023-02-03 RX ORDER — PANTOPRAZOLE SODIUM 40 MG/1
40 TABLET, DELAYED RELEASE ORAL DAILY
Qty: 30 TABLET | Refills: 0 | Status: SHIPPED | OUTPATIENT
Start: 2023-02-03 | End: 2023-03-05

## 2023-02-03 RX ADMIN — PIPERACILLIN AND TAZOBACTAM 3375 MG: 3; .375 INJECTION, POWDER, FOR SOLUTION INTRAVENOUS at 07:45

## 2023-02-03 RX ADMIN — SPIRONOLACTONE 25 MG: 25 TABLET ORAL at 12:05

## 2023-02-03 RX ADMIN — TORSEMIDE 20 MG: 20 TABLET ORAL at 08:12

## 2023-02-03 RX ADMIN — METOPROLOL SUCCINATE 25 MG: 25 TABLET, EXTENDED RELEASE ORAL at 08:12

## 2023-02-03 RX ADMIN — AMIODARONE HYDROCHLORIDE 200 MG: 200 TABLET ORAL at 08:12

## 2023-02-03 RX ADMIN — FINASTERIDE 5 MG: 5 TABLET, FILM COATED ORAL at 08:11

## 2023-02-03 RX ADMIN — LISINOPRIL 2.5 MG: 5 TABLET ORAL at 08:11

## 2023-02-03 RX ADMIN — PANTOPRAZOLE SODIUM 40 MG: 40 INJECTION, POWDER, FOR SOLUTION INTRAVENOUS at 08:14

## 2023-02-03 RX ADMIN — TAMSULOSIN HYDROCHLORIDE 0.4 MG: 0.4 CAPSULE ORAL at 08:11

## 2023-02-03 RX ADMIN — SODIUM CHLORIDE, PRESERVATIVE FREE 10 ML: 5 INJECTION INTRAVENOUS at 08:12

## 2023-02-03 RX ADMIN — APIXABAN 5 MG: 5 TABLET, FILM COATED ORAL at 08:12

## 2023-02-03 RX ADMIN — IPRATROPIUM BROMIDE AND ALBUTEROL SULFATE 1 AMPULE: .5; 3 SOLUTION RESPIRATORY (INHALATION) at 09:04

## 2023-02-03 RX ADMIN — EMPAGLIFLOZIN 10 MG: 10 TABLET, FILM COATED ORAL at 08:12

## 2023-02-03 NOTE — PROGRESS NOTES
[address]   Progress notes  [phone]      Chief Complaint   Patient presents with    Abdominal Pain     Arrived per friend d/t abdomen pain that started \"a while ago\"; however was worse last night was unable to sleep last night; described as a knot in stomach; BM small this morning and normal yesterday; History of Present Illness:  Kavitha Ramos is a 57 y.o. patient who presented to the hospital with complaints of abdominal pain. I have been asked to provide consultation regarding further management and testing. He reports that the pain is centralized. No association with food. He states that it does not radiate to his arm or shoulder. No associated vomiting or diarrhea. He states that he feels full quickly when he eats. He states that his dry weight is less than 200 LBS. He states that he has not been eating or drinking. He temporarily stopped taking his medications due to feeling sick with no improvement. He states that he feels much better today. Subjective: no acute events overnight. No chest pain or pressure. No shortness of breath or cough. He states that he feels well today. Weight 198lb    Past Medical History:   has a past medical history of Acute combined systolic and diastolic congestive heart failure ([address] Atrial fibrillation ([address] CHF (congestive heart failure) ([address] Hx of blood clots, and Hypertension. Surgical History:   has a past surgical history that includes Insertable Cardiac Monitor (07/26/2019); Cardioversion (07/26/2019); and Cardiac defibrillator placement (Left). Social History:   reports that he has never smoked. He has never been exposed to tobacco smoke. He has never used smokeless tobacco. He reports that he does not drink alcohol and does not use drugs. Family History:  Multiple family members with CHF    Home Medications:  Were reviewed and are listed in nursing record.  and/or listed below  Prior to Admission medications    Medication Sig Start Date End Date Taking? Authorizing Provider   amiodarone (CORDARONE) 200 MG tablet Take 1 tablet by mouth 2 times daily For 2 weeks then decrease to 200mg once a day 2/3/23  Yes TRENT Gutierrez Do, CNP   metoprolol succinate (TOPROL XL) 25 MG extended release tablet Take 1 tablet by mouth in the morning and at bedtime 2/3/23  Yes TRENT Gutierrez Do, CNP   spironolactone (ALDACTONE) 25 MG tablet Take 0.5 tablets by mouth Daily with lunch 2/3/23  Yes TRENT Gutierrez Do, CNP   finasteride (PROSCAR) 5 MG tablet Take 5 mg by mouth daily   Yes Historical Provider, MD   torsemide (DEMADEX) 20 MG tablet Take 1 tablet by mouth daily 10/31/22   Perez Hightower MD   empagliflozin (JARDIANCE) 10 MG tablet Take 1 tablet by mouth daily 10/26/22   TRENT Chowdhury   lisinopril (PRINIVIL;ZESTRIL) 2.5 MG tablet Take 1 tablet by mouth daily 10/26/22   TRENT Salazar   atorvastatin (LIPITOR) 40 MG tablet Take 1 tablet by mouth nightly 10/5/22   TRENT Salazar   metOLazone (ZAROXOLYN) 5 MG tablet Take 1 tablet by mouth as needed (prn) 10/5/22   TRENT Salazar   vitamin D (CHOLECALCIFEROL) 50 MCG (2000 UT) TABS tablet Take 1 tablet by mouth daily 9/21/22   TRENT Salazar   potassium chloride (KLOR-CON M) 20 MEQ extended release tablet Take 1 tablet by mouth in the morning and 1 tablet in the evening. Take with meals.  8/1/22 9/8/22  Warden Nicole MD   apixaban (ELIQUIS) 5 MG TABS tablet Take 1 tablet by mouth 2 times daily 6/17/22   TRENT Gutierrez Do, CNP   tamsulosin [facility]) 0.4 MG capsule Take 1 capsule by mouth daily 6/18/22   TRENT Gutierrez Do, CNP        Current Medications:  Current Facility-Administered Medications   Medication Dose Route Frequency Provider Last Rate Last Admin    amiodarone (CORDARONE) tablet 200 mg  200 mg Oral BID Billy Khan MD   200 mg at 02/03/23 1498    tamsulosin (FLOMAX) capsule 0.4 mg  0.4 mg Oral Daily TRENT Merrill - CNP   0.4 mg at 02/03/23 3438    ipratropium-albuterol (DUONEB) nebulizer solution 1 ampule  1 ampule Inhalation Q4H PRN Kimberly Clifton MD        ipratropium-albuterol (DUONEB) nebulizer solution 1 ampule  1 ampule Inhalation BID Kimberly Clifton MD   1 ampule at 02/03/23 0904    lisinopril (PRINIVIL;ZESTRIL) tablet 2.5 mg  2.5 mg Oral Daily TRENT Carrasquillo CNP   2.5 mg at 02/03/23 9896    torsemide (DEMADEX) tablet 20 mg  20 mg Oral Daily TRENT Carrasquillo CNP   20 mg at 02/03/23 9730    empagliflozin (JARDIANCE) tablet 10 mg  10 mg Oral Daily TRENT Carrasquillo CNP   10 mg at 02/03/23 0264    apixaban (ELIQUIS) tablet 5 mg  5 mg Oral BID Colletta Prime, DO   5 mg at 02/03/23 7777    spironolactone (ALDACTONE) tablet 12.5 mg  12.5 mg Oral Lunch Colletta Prime, DO   12.5 mg at 02/02/23 1213    pantoprazole (PROTONIX) injection 40 mg  40 mg IntraVENous Daily TRENT Cagle CNP   40 mg at 02/03/23 0814    piperacillin-tazobactam (ZOSYN) 3,375 mg in sodium chloride 0.9 % 50 mL IVPB (mini-bag)  3,375 mg IntraVENous Q8H TRENT Cagle CNP 12.5 mL/hr at 02/03/23 0745 3,375 mg at 02/03/23 0745    metoprolol succinate (TOPROL XL) extended release tablet 25 mg  25 mg Oral BID Ginette Tripathi MD   25 mg at 02/03/23 8701    morphine (PF) injection 2 mg  2 mg IntraVENous Q4H PRN Ginette Tripathi MD        finasteride (PROSCAR) tablet 5 mg  5 mg Oral Daily Abigail Howell MD   5 mg at 02/03/23 0811    sodium chloride flush 0.9 % injection 5-40 mL  5-40 mL IntraVENous 2 times per day Ginette Tripathi MD   10 mL at 02/03/23 6923    sodium chloride flush 0.9 % injection 5-40 mL  5-40 mL IntraVENous PRN Abigail Howell MD   10 mL at 02/01/23 0104    0.9 % sodium chloride infusion   IntraVENous PRN Ginette Tripathi MD 25 mL/hr at 02/01/23 0105 25 mL/hr at 02/01/23 0105    ondansetron (ZOFRAN-ODT) disintegrating tablet 4 mg  4 mg Oral Q8H PRN Ginette Tripathi MD        Or    ondansetron (ZOFRAN) injection 4 mg  4 mg IntraVENous Q6H PRN Vito Patel MD        polyethylene glycol (GLYCOLAX) packet 17 g  17 g Oral Daily PRN Vito Patel MD        acetaminophen (TYLENOL) tablet 650 mg  650 mg Oral Q6H PRN Vito Patel MD        Or    acetaminophen (TYLENOL) suppository 650 mg  650 mg Rectal Q6H PRRAGHAV Patel MD        hydrALAZINE (APRESOLINE) injection 10 mg  10 mg IntraVENous Q4H PRN Vito Patel MD   10 mg at 01/30/23 1723        Allergies:  Patient has no known allergies. Review of Systems:     Constitutional: there has been no unanticipated weight loss. There's been no change in energy level, sleep pattern, or activity level. Eyes: No visual changes or diplopia. No scleral icterus. ENT: No Headaches, hearing loss or vertigo. No mouth sores or sore throat. Cardiovascular: Reviewed in HPI  Respiratory: No cough or wheezing, no sputum production. No hematemesis. Gastrointestinal: + abdominal discomfort  Genitourinary: No dysuria, trouble voiding, or hematuria. Musculoskeletal:  No gait disturbance, weakness or joint complaints. Integumentary: No rash or pruritis. Neurological: No headache, diplopia, change in muscle strength, numbness or tingling. No change in gait, balance, coordination, mood, affect, memory, mentation, behavior. Psychiatric: No anxiety, no depression. Endocrine: No malaise, fatigue or temperature intolerance. No excessive thirst, fluid intake, or urination. No tremor. Hematologic/Lymphatic: No abnormal bruising or bleeding, blood clots or swollen lymph nodes. Allergic/Immunologic: No nasal congestion or hives.       Physical Examination:    Vitals:    02/03/23 0906   BP:    Pulse:    Resp:    Temp:    SpO2: 97%    Weight: 182 lb 4.8 oz (82.7 kg)         General Appearance:  Alert, cooperative, no distress, appears stated age   Head:  Normocephalic, without obvious abnormality, atraumatic   Eyes:  PERRL, conjunctiva/corneas clear       Nose: Nares normal, no drainage or sinus tenderness   Throat: Lips, mucosa, and tongue normal   Neck: Supple, symmetrical, trachea midline, no adenopathy, thyroid: not enlarged, symmetric, no tenderness/mass/nodules, +JVD        Lungs:   Clear to auscultation bilaterally, respirations unlabored   Chest Wall:  No tenderness or deformity   Heart:  Regular rate and rhythm, S1, S2 normal, 3/6 systolic murmur    Abdomen:   Soft, non-tender, bowel sounds active all four quadrants,  no masses, no organomegaly           Extremities: Extremities normal, atraumatic, +1 edema, cool    Pulses: 2+ and symmetric   Skin: Skin color, texture, turgor normal, no rashes or lesions   Pysch: Normal mood and affect   Neurologic: Normal gross motor and sensory exam.         Labs  CBC:   Lab Results   Component Value Date/Time    WBC 4.6 02/03/2023 05:33 AM    RBC 4.42 02/03/2023 05:33 AM    HGB 11.7 02/03/2023 05:33 AM    HCT 36.6 02/03/2023 05:33 AM    MCV 82.9 02/03/2023 05:33 AM    RDW 14.9 02/03/2023 05:33 AM     02/03/2023 05:33 AM     CMP:    Lab Results   Component Value Date/Time     02/03/2023 05:33 AM    K 4.2 02/03/2023 05:33 AM     02/03/2023 05:33 AM    CO2 27 02/03/2023 05:33 AM    BUN 15 02/03/2023 05:33 AM    CREATININE 1.1 02/03/2023 05:33 AM    GFRAA >60 10/05/2022 10:10 AM    AGRATIO 1.2 02/02/2023 05:07 AM    LABGLOM >60 02/03/2023 05:33 AM    GLUCOSE 79 02/03/2023 05:33 AM    PROT 6.2 02/03/2023 05:33 AM    CALCIUM 8.5 02/03/2023 05:33 AM    BILITOT 1.1 02/03/2023 05:33 AM    ALKPHOS 93 02/03/2023 05:33 AM    AST 22 02/03/2023 05:33 AM    ALT 23 02/03/2023 05:33 AM     PT/INR:  No results found for: PTINR  Lab Results   Component Value Date    TROPONINI <0.01 02/01/2023       EKG:  I have reviewed EKG with the following interpretation:  Impression:  atrial fibrillation, nonspecific st-t wave changes     Echo:  -Definity administered for LV thrombus.    -Left ventricular cavity size is severely dilated with normal left   ventricular wall thickness.   -Overall left ventricular systolic function appears severely reduced. Ejection fraction is visually estimated to be 20%. -There is severe diffuse hypokinesis. -Cannot grade diastology due to atrial fibrillation, although there are   elevated LA pressures.   -Prominent trabeculations in the LV apex. Cannot exclude Non-compaction   cardiomyopathy.   -No evidence of left ventricular mass or thrombus noted. -The right ventricle is severely enlarged. Right ventricular systolic   function is moderately reduced.   -There is severe bi-atrial enlargement. -Mitral valve leaflets appear mildly thickened. Mitral regurgitation   directed centrally and posteriorly that may be severe. -The ascending aorta is mildly dilated. -Moderate to severe tricuspid regurgitation with a PASP of 77 mmHg.   -Trivial pulmonic regurgitation. Stress:    Small sized inferior fixed defect most consistent with diaphragmatic    artifact. Myocardial fibrosis is less likely. There is severe global LV systolic dysfunction with ejection fraction of 27    %. Possible WPW    Overall findings represent a high risk scan. Cath: Anatomy:   LM-nml   LAD-nml  Cx-nml  OM- nml  RCA-nml  RPDA- nml  LVEF- 10%  LVG- global hypokinesis  LVEDP- 10     Hemodynamics:  RA- mean 3  RV- 37/0  PAWP- 10  PA- 34/12 (20)     C. O.- 5.7 (4.9)  C. I.- 2.6 (2.25)  PA sat 60%  AO sat 91%  MRI/EP/Other:     Old notes reviewed  Telemetry reviewd  Ekg personally reviewed  Chest xray personally reviewed  Echo, cath, and   Medications and labs reviewed  Moderate complexity/medical decision making due to extensive data review, extensive history review, independent review of data, life threatening problem   moderate risk due to acute illness, evaluation of drug-drug interactions, medication management and diagnostic interventions    Discussed with Dr Susanne Ayers  Assessment  Patient Active Problem List   Diagnosis    Acute retention of urine    SACHIN (acute kidney injury) ([facility] Paroxysmal atrial fibrillation (HCC)    Primary hypertension    Dilated cardiomyopathy ([facility]    Encounter for loop recorder check    Acute urinary retention    Acute pulmonary edema (HCC)    Chronic systolic (congestive) heart failure (HCC)    COVID    Non-compliance    Homeless    NSTEMI (non-ST elevated myocardial infarction) (HCC)    Acute combined systolic and diastolic heart failure (HCC)    Complicated UTI (urinary tract infection)    Chronic atrial fibrillation (HCC)    Moderate mitral regurgitation    Acute on chronic congestive heart failure (HCC)    VT (ventricular tachycardia)    ICD (implantable cardioverter-defibrillator), biventricular, in situ    Thrombus of left atrial appendage    Syncope and collapse    PNA (pneumonia)    Hemoptysis    Fluid overload    Elevated LFTs    Idiopathic hypotension    Peripheral edema    Anemia    HFrEF (heart failure with reduced ejection fraction) (HCC)    Chronic diastolic (congestive) heart failure (HCC)    Dizziness    Abdominal pain    Abdominal pain, right upper quadrant    Epigastric abdominal pain    Acalculous cholecystitis    Persistent atrial fibrillation ([facility]         Plan:    I had the opportunity to review the clinical symptoms and presentation of Claudette Laos. Assessment/Plan:  Abdominal pain  - sounds like hepatic congestion from right heart failure, not the gallbladder  - stable  Plan:  - continue torsemide    2. Acute on chronic biventricular heart failure   - stable  - ACC C NYHA III  - nonischemic, family history of heart disease. No cardiac Mri performed   Plan:  - continue metoprolol succinate 25 mg bid and increase spironolactone 25 mg   - continue lisinopril and sgtl2  - need to re-trial entresto  - evaluate for sleep apnea     3. Bi-v ICD and atrial fibrillation   - stable  Plan:  - Ep to see, recommended amiodarone load followed by outpatient CONSUELO cardioversion. Low BI-v pacing on device.  Would benefit from ablation long term for maintenance of sinus rhythm     4.  Unstable housing     Caroline Gentile DO, MD 2/3/2023 11:40 AM

## 2023-02-03 NOTE — PROGRESS NOTES
[address]  HEART FAILURE  Progress Note      Admit Date 1/30/2023     Reason for Consult:      Reason for Consultation/Chief Complaint: abd pain    HPI:    Megha Ferguson is a 57 y.o. male with PMH PAF, HTN, NICM, HFrF, HENOK thrombus and challenging social circumstances admitted with abd pain. CT abd and pelvis with GB sludge, wall edema and diverticulosis. Echo repeated that showed very low EF 15-20%. Subjective:  Patient is being seen for CHF, NICM. There were no acute overnight cardiac events. Today Mr. Shana Dooley denies chest pain, shortness of breath, palpitations, or dizziness and feels great today      Review of Systems - History obtained from the patient  General ROS: negative  ENT ROS: negative  Allergy and Immunology ROS: negative  Hematological and Lymphatic ROS: negative  Respiratory ROS: no cough, shortness of breath, or wheezing  Cardiovascular ROS: no chest pain or dyspnea on exertion  Gastrointestinal ROS: no abdominal pain, change in bowel habits, or black or bloody stools  Musculoskeletal ROS: negative  Neurological ROS: no TIA or stroke symptoms  Dermatological ROS: negative     Baseline Weight: 196 previously   Wt Readings from Last 3 Encounters:   02/03/23 182 lb 4.8 oz (82.7 kg)   01/10/23 196 lb (88.9 kg)   11/10/22 202 lb (91.6 kg)         Cardiac Testing: Echo 1/30/23:   Summary   The left ventricle is severely dilated. Severely reduced systolic function. LVEF 15-20%. Severe global hypokinesis. Grade III diastolic dysfunction. Elevated LVEDP. RV severely dilated with severely depressed function. Moderate eccentric MR. Mild AI. Severe TR with a RVSP   100 mmHg. Severe pulmonary hypertension. IVC dynamics c/w markedly elevated RA pressure (15 mmHg). A bubble study was performed and fails to show evidence of shunting. Incidental Finding: Right kidney cyst noted. Consider additional dedicated   abdominal imaging if clinically indicated.     CONSUELO  Michael 2/10/22  Preliminary CONSUELO results are:      - Severe LV dysfunction,  EF: 20-25%  - LA dilated. There was HENOK thrombus. - Moderate MR     Cardiac Cath PCI: Dr Griselda Alexis 2/8/2022  Anatomy:   LM-nml   LAD-nml  Cx-nml  OM- nml  RCA-nml  RPDA- nml  LVEF- 10%  LVG- global hypokinesis  LVEDP- 10     Hemodynamics:  RA- mean 3  RV- 37/0  PAWP- 10  PA- 34/12 (20)     C. O.- 5.7 (4.9)  C. I.- 2.6 (2.25)  PA sat 60%  AO sat 91%     Contrast: 70  Flouro Time: 5.3  Access: R radial a, R CFV     Impression  ~Coronary Angiography w/ normal cors  ~LVG with LVEF of 10% and global regional wall motion abnormalities  ~Severe MR  ~Normal CO/CI  ~normal L and R filling pressures       Echo 11/29/2021  Summary   -Covid+   -Severely reduced global systolic function with an ejection fraction   estimated at 20%. -Severe global hypokinesis with regional variations noted. -Right ventricular systolic function appears to be mildly reduced based on   visual inspection.   -Severe biatrial enlargement.   -Thickened mitral valve without evidence of stenosis. There is   mild-to-moderate mitral regurgitation.   -There is mild-to-moderate tricuspid regurgitation with a RVSP estimation of   37 mmHg. -Diastolic dysfunction with elevated LV filling pressures. Device: Medtronic ICD   Optivol at Bedside 2/1/23:     NYHA Class III    Objective:   BP (!) 141/70   Pulse 80   Temp 97.9 °F (36.6 °C) (Oral)   Resp 16   Ht 6' 2\" (1.88 m)   Wt 182 lb 4.8 oz (82.7 kg)   SpO2 99%   BMI 23.41 kg/m²     Intake/Output Summary (Last 24 hours) at 2/3/2023 0851  Last data filed at 2/2/2023 1825  Gross per 24 hour   Intake 400 ml   Output 3925 ml   Net -3525 ml      In: 400 [P.O.:400]  Out: 3925     TELEMETRY: Vpaced    Physical Exam:  General Appearance:  Non-obese/Well Nourished  Respiratory:  Resp Auscultation: Normal breath sounds without dullness  Cardiovascular:   Auscultation: Regular rate and rhythm, normal S1S2, no m/g/r/c  Palpation: Normal Pedal Pulses: 2+ and equal   Abdomen:  Soft, NT, ND, + bs  Extremities:  No Cyanosis or Clubbing  Extremities: trace and non-pitting edema  Neurological/Psychiatric:  Oriented to time, place, and person  Non-anxious    Pertinent labs, diagnostic, device, and imaging results reviewed as a part of this visit    MEDICATIONS:   Scheduled Meds:   Scheduled Meds:   amiodarone  200 mg Oral BID    tamsulosin  0.4 mg Oral Daily    ipratropium-albuterol  1 ampule Inhalation BID    lisinopril  2.5 mg Oral Daily    torsemide  20 mg Oral Daily    empagliflozin  10 mg Oral Daily    apixaban  5 mg Oral BID    spironolactone  12.5 mg Oral Lunch    pantoprazole  40 mg IntraVENous Daily    piperacillin-tazobactam  3,375 mg IntraVENous Q8H    metoprolol succinate  25 mg Oral BID    finasteride  5 mg Oral Daily    sodium chloride flush  5-40 mL IntraVENous 2 times per day     Continuous Infusions:   sodium chloride 25 mL/hr (02/01/23 0105)     PRN Meds:.ipratropium-albuterol, morphine, sodium chloride flush, sodium chloride, ondansetron **OR** ondansetron, polyethylene glycol, acetaminophen **OR** acetaminophen, hydrALAZINE  Continuous Infusions:   sodium chloride 25 mL/hr (02/01/23 0105)       Intake/Output Summary (Last 24 hours) at 2/3/2023 0851  Last data filed at 2/2/2023 1825  Gross per 24 hour   Intake 400 ml   Output 3925 ml   Net -3525 ml       Lab Data:  CBC:   Lab Results   Component Value Date/Time    WBC 4.6 02/03/2023 05:33 AM    HGB 11.7 02/03/2023 05:33 AM     02/03/2023 05:33 AM     BMP:  Lab Results   Component Value Date/Time     02/03/2023 05:33 AM    K 4.2 02/03/2023 05:33 AM     02/03/2023 05:33 AM    CO2 27 02/03/2023 05:33 AM    BUN 15 02/03/2023 05:33 AM    CREATININE 1.1 02/03/2023 05:33 AM    GLUCOSE 79 02/03/2023 05:33 AM     INR:   Lab Results   Component Value Date/Time    INR 1.59 07/28/2022 10:50 PM    INR 1.50 06/15/2022 06:48 PM    INR 1.50 02/05/2022 11:01 PM        CARDIAC LABS  ENZYMES:  Recent Labs     02/01/23  0318   TROPONINI <0.01     FASTING LIPID PANEL:  Lab Results   Component Value Date/Time    HDL 69 10/29/2022 04:11 PM    LDLCALC 37 10/29/2022 04:11 PM    TRIG 75 10/29/2022 04:11 PM    TSH 2.66 06/15/2022 10:33 PM     LIVER PROFILE:  Lab Results   Component Value Date/Time    AST 22 02/03/2023 05:33 AM    AST 27 02/02/2023 05:07 AM    ALT 23 02/03/2023 05:33 AM    ALT 26 02/02/2023 05:07 AM     BNP:   Lab Results   Component Value Date/Time    PROBNP 16,142 02/01/2023 03:18 AM    PROBNP 9,315 01/10/2023 03:03 PM    PROBNP 20,275 12/30/2022 01:05 PM     Iron Studies:    Lab Results   Component Value Date/Time    FERRITIN 163.1 06/17/2022 05:35 AM     Lab Results   Component Value Date    IRON 58 (L) 10/29/2022    TIBC 373 10/29/2022    FERRITIN 163.1 06/17/2022      Iron Deficiency Anemia:  Yes, Oct 2022 IV Iron Therapy:  No  2017 ACC/AHA HF Guidelines:   intravenous iron replacement in patients with New York Heart Association (NYHA) class II and III HF and iron deficiency(ferritin <100 ng/ml or 100-300 ng/ml if transferrin saturation <20%), to improve functional status and QoL. 1. WEIGHT: Admit Weight: 204 lb (92.5 kg)      Today  Weight: 182 lb 4.8 oz (82.7 kg)   2. I/O   Intake/Output Summary (Last 24 hours) at 2/3/2023 0851  Last data filed at 2/2/2023 1825  Gross per 24 hour   Intake 400 ml   Output 3925 ml   Net -3525 ml         Assessment/Plan:     Acute Heart Failure:  ~compensated   ~Pt sx: denies SOB  ~Labs/Testing: BNP 16K, baseline <10k  ~Meds: torsemide, cathy  ~Plan: pt ok for discharge, will f/u in CHF clinic next week.  Needs meds to beds and PLEASE go over meds with him before discharge, he has a hard time with changes    Cardiomyopathy: Nonischemic  ~Echo:  EF: 15-20%   Core measures for HF:  ACEi/ARB/ARNI: lisinopril - BP has not tolerated entresto in the past   BB: Metoprolol succinate  Damian: Spironolactone   SGLT2i: Jardiance   ~Device: BeVocal BiV ICD with Optivol, Device set to VVIR. WIll need to turn on atrial therapies at follow up per EP  ~Plan: continue BB,ACE, cathy and jardiance, can retry entresto if BP remains higher at f/u  HTN:   ~controlled  ~Meds: ACE/BB  ~Plan: monitor  Atrial Fib   ~ rate controlled   ~Pt sx: denies palpitations   ~Meds: Amiodarone started- 200 mg BID x 2 weeks, then reduce to 200 mg daily, BB, AC. Dig stopped   ~Plan: EP recs: plan for CONSUELO/DCCV in 3-4 weeks after compliant use of his anti-coagulation and amiodarone use. Letter sent to our scheduling team. Will do CONSUELO prior given hx of HENOK clot. He would benefit from maintaining sinus rhythm given his cardiomyopathy. Ablation can be considered long term                         Abdominal Pain  ~resolved  6. Urinary Retention   ~ PSA 15   ~ seen by urology: plans for ernst at discharge, outpt cysto and bx   ~ flomax .4mg/day      All questions and concerns were addressed to the patient. Alternatives to my treatment were discussed. I have discussed the above stated plan with patient and the nurse. The patient verbalized understanding and agreed with the plan.     I appreciate the opportunity of cooperating in the care of this individual.    Micha Patrick, APRN - CNP, ACNP, [address] 2/3/2023, 8:51 AM  Heart Failure  The [address], [address]  Ph: [phone]      Core Measures:   Discharge instructions:   LVEF documented:   ACEI for LV dysfunction:   Smoking Cessation:

## 2023-02-03 NOTE — PROGRESS NOTES
The [place] Sleepiness Scale       The [place] Sleepiness Scale is widely used in the field of sleep medicine as a subjective measure of a patient's sleepiness. The test is a list of eight situations in which you rate your tendency to become sleepy on a scale of 0, no chance to 3, high chance of dozing. Your score is based on a scale of 0 to 24. The scale estimates whether you are experiencing excessive sleepiness that possibly requires medical attention. How Sleepy Are You? How sleepy are you to doze off or fall asleep in the following situations? You should rate your chances of dozing off, not just feeling tired. Even if you have not done some of these things recently try to determine how they would have affected you.  For each situation, decide whether or not you would have:     0 = No chance of dozing 1 = Slight chance of dozing   2 = Moderate chance of  dozing 3 = High change of dozing       Situation                                                                                     Chance of Dozing    Sitting and reading  0 =  [x]  1 =    [] 2 =    [] 3 =    []    Watching TV  0 =  []  1 =    [x] 2 =    [] 3 =    []      Sitting inactive in public place (e.g., a theater or a meeting)  0 =  [x]  1 =    [] 2 =    [] 3 =    []    As a passenger in a car for an hour without a break          0  =  []  1 =    [] 2 =    [x] 3 =    []    Lying down to rest in the afternoon when circumstances permit    0 =  []  1 =    [x] 2 =    [] 3 =    []    Sitting and talking to someone  0 =  [x]  1 =    [] 2 =    [] 3 =    []      Sitting quietly after a lunch without alcohol  0 =  []  1 =    [x] 2 =    [] 3 =    []    In a car, while stopped for a few minutes in traffic                                                                      0 =  []  1 =    [x] 2 =    [] 3 =    []    Total Score = 5    If your total score is 10 or greater, you are experiencing excessive sleepiness and should consider seeking a medical follow-up. Take a copy of this screening test to your primary care physician on your next office visit. Interpretation:      0 -   7: It is unlikely that you are abnormally sleepy. 8 -   9: You have an average amount of daytime sleepiness. 10 - 15: You may be excessively sleepy depending on the situation. You may want to consider seeking medical attention. 16 - 24: You are excessively sleepy and should consider seeking medical attention.       Electronically signed by Adelfo Ryan RCP on 2/3/2023 at 12:21 PM

## 2023-02-03 NOTE — DISCHARGE SUMMARY
Pt discharge instructions given with verbal teach back and understanding. IV and tele removed. Pt taught how to manage ernst and given follow-up appointment instructions.

## 2023-02-03 NOTE — PLAN OF CARE
Pt updated on current plan of care, VSS. See flowsheet for assessment.    Problem: ABCDS Injury Assessment  Goal: Absence of physical injury  2/3/2023 1056 by Ledy Levin RN  Outcome: Progressing     Problem: Discharge Planning  Goal: Discharge to home or other facility with appropriate resources  2/3/2023 1056 by Ledy Levin RN  Outcome: Progressing  Flowsheets (Taken 2/3/2023 0815)  Discharge to home or other facility with appropriate resources: Identify barriers to discharge with patient and caregiver     Problem: Pain  Goal: Verbalizes/displays adequate comfort level or baseline comfort level  2/3/2023 1056 by Ledy Levin RN  Outcome: Progressing     Problem: Safety - Adult  Goal: Free from fall injury  2/3/2023 1056 by Ledy Levin RN  Outcome: Progressing

## 2023-02-03 NOTE — PLAN OF CARE
Problem: ABCDS Injury Assessment  Goal: Absence of physical injury  Outcome: Progressing     Problem: Discharge Planning  Goal: Discharge to home or other facility with appropriate resources  Outcome: Progressing  Flowsheets (Taken 2/2/2023 1630 by Dario Bennett RN)  Discharge to home or other facility with appropriate resources: Identify barriers to discharge with patient and caregiver     Problem: Pain  Goal: Verbalizes/displays adequate comfort level or baseline comfort level  Outcome: Progressing     Problem: Safety - Adult  Goal: Free from fall injury  Outcome: Progressing

## 2023-02-03 NOTE — PROGRESS NOTES
Urology Progress Note  [facility]    Provider: TRENT Adam CNP  Patient ID:  Admission Date: 2023 Name: Cherry Thomason  OR Date: 2023 MRN: [id number]   Patient Location: [id number] : 1960  Attending: Maggie Mandujano MD Date of Service: 2/3/2023  PCP: No primary care provider on file. Diagnoses:  1. Abdominal pain, right upper quadrant    2. Gallbladder sludge    3. Bilirubinemia       Assessment/Plan:  57 y.o. AA male with h/o CHF, chronic anticoagulation , Afib, presented with co abdominal pain. Urology consulted for urinary retention- he has a long standing hx of this with chronic ernst. He was previously seen in  for BPH and elevated psa of 20, and at that time Dr. Nathan Perez  recommended cysto/TRUS bx- numerous calls to patient, non-comply letter sent to his address. MEET today 3+, firm, right lateral base nodule. Ernst placed 2/2 with 625 mL output. Recommendations:  PSA -15  ontinue Ernst - draining CYU - will discharge with the ernst to plug  Continue finasteride. Continue Flomax 0.4 mg daily   High suspicion of prostate cancer given elevated PSA, + MEET and risk factors. Needs outpatient cysto and trus + biopsy. Urology will continue to follow, please call with any questions     The patient had a chance to ask questions which were answered. he understands the above plan. Subjective:   Cherry Thomason is a 57 y.o. male. He was seen and examined this morning. Today we discussed outpatient cysto/trus bx, elevated psa, discussed that he is at high risk of pca and needs to follow up for diagnosis of this.       Objective:   Vitals:  Vitals:    23 0745   BP: (!) 141/70   Pulse: 80   Resp: 16   Temp: 97.9 °F (36.6 °C)   SpO2: 99%       Intake/Output Summary (Last 24 hours) at 2/3/2023 0905  Last data filed at 2023 1825  Gross per 24 hour   Intake 400 ml   Output 3300 ml   Net -2900 ml       Physical Exam:  Gen: Alert and oriented x3, no acute distress  Skin: warmand well perfused, no rashes noted on the face, or arms.      Labs:  Lab Results   Component Value Date    WBC 4.6 02/03/2023    HGB 11.7 (L) 02/03/2023    HCT 36.6 (L) 02/03/2023    MCV 82.9 02/03/2023     02/03/2023     Lab Results   Component Value Date    CREATININE 1.1 02/03/2023    BUN 15 02/03/2023     02/03/2023    K 4.2 02/03/2023     02/03/2023    CO2 27 02/03/2023       Darling Pair, APRN - CNP   2/3/2023

## 2023-02-03 NOTE — PROGRESS NOTES
Urology Progress Note  [facility]    Provider: TRENT Eng CNP  Patient ID:  Admission Date: 2023 Name: Serena Reyes  OR Date: 2023 MRN: [id number]   Patient Location: [id number] : 1960  Attending: Vishnu Salcedo MD Date of Service: 2/3/2023  PCP: No primary care provider on file. Diagnoses:  1. Abdominal pain, right upper quadrant    2. Gallbladder sludge    3. Bilirubinemia       Assessment/Plan:  57 y.o. AA male with h/o CHF, chronic anticoagulation , Afib, presented with co abdominal pain. Urology consulted for urinary retention- he has a long standing hx of this with chronic ernst. He was previously seen in  for BPH and elevated psa of 20, and at that time Dr. Radha Farrell  recommended cysto/TRUS bx- numerous calls to patient, non-comply letter sent to his address. MEET today 3+, firm, right lateral base nodule. Ernst placed 2/2 with 625 mL output. Recommendations:  PSA today  ontinue Ernst - draining CYU - will discharge with the ernst  Continue finasteride. Will start Flomax 0.4 mg daily   High suspicion of prostate cancer given elevated PSA, + MEET and risk factors. Needs outpatient cysto and trus + biopsy. Urology will continue to follow, please call with any questions     The patient had a chance to ask questions which were answered. he understands the above plan. Subjective:   Serena Reyes is a 57 y.o. male. He was seen and examined this morning.  Today we discussed ***     Objective:   Vitals:  Vitals:    23 0745   BP: (!) 141/70   Pulse: 80   Resp: 16   Temp: 97.9 °F (36.6 °C)   SpO2: 99%       Intake/Output Summary (Last 24 hours) at 2/3/2023 0836  Last data filed at 2023 1825  Gross per 24 hour   Intake 400 ml   Output 3925 ml   Net -3525 ml     Physical Exam:  Gen: Alert and oriented x3, no acute distress  CV: Regular rate   Resp: unlabored respirations  Abd: Soft, non-distended, non-tender, no masses  Ext: no peripheral edema noted, moves upper and lower extremities spontaneously  Skin: warmand well perfused, no rashes noted on the face, or arms.      Labs:  Lab Results   Component Value Date    WBC 4.6 02/03/2023    HGB 11.7 (L) 02/03/2023    HCT 36.6 (L) 02/03/2023    MCV 82.9 02/03/2023     02/03/2023     Lab Results   Component Value Date    CREATININE 1.1 02/03/2023    BUN 15 02/03/2023     02/03/2023    K 4.2 02/03/2023     02/03/2023    CO2 27 02/03/2023       Estelita Esqueda, APRN - CNP   2/3/2023

## 2023-02-03 NOTE — CONSULTS
[address]      Serena Aguadilla 1960    History:  Past Medical History:   Diagnosis Date    Acute combined systolic and diastolic congestive heart failure ([facility] [address] 8/31/2022    Atrial fibrillation ([facility] [address] 07/25/2019    CHF (congestive heart failure) (HCC)     Hx of blood clots     Hypertension        ECHO:  1/31/23   Summary   The left ventricle is severely dilated. Severely reduced systolic function. LVEF 15-20%. Severe global hypokinesis. Grade III diastolic dysfunction. Elevated LVEDP. RV severely dilated with severely depressed function. Moderate eccentric MR. Mild AI. Severe TR with a RVSP   100 mmHg. Severe pulmonary hypertension. IVC dynamics c/w markedly elevated RA pressure (15 mmHg). A bubble study was performed and fails to show evidence of shunting. Incidental Finding: Right kidney cyst noted. Consider additional dedicated   abdominal imaging if clinically indicated. ACE/ARB/ARNi: lisinopril 2.5 mg daily  BB: toprol xl 25 mg bid  Aldosterone Antagonist: aldactone 12.5 mg daily  SGLT2: jardiance 10 mg daily    History of sleep apnea: No  [place] Screen ordered: Yes    DM History: No    Last Hospital Admission: 10/29/22 with [facility]  Code Status: full  Discharge plans: stays with friends    Family Present: no    Serena Aguadilla was admitted to the hospital with abdomen pain. Has history of HFrEF and follows with HF NP as an outpatient. Has been seen many times in the past for education. Difficult for patient to retain information at times. Patient does not always weigh often. Lives with friends and if reminded will weigh. Baseline is about 180 lb. Patient does not add salt but foods will be salty and processed due to convenience. Patient states he has been drinking more water than normal. States he is constantly thirsty and was going over 64 oz. Patient has been compliant with his medications.  He has been compliant with follow ups.    Patient provided with both written and verbal education on CHF signs/ symptoms, causes, discharge medications, daily weights, low sodium diet, activity, and follow-up. Pt to call if gains 3 pounds in one day or 5 pounds in one week. Mutually agreed upon goals were discussed such as calling the MD as soon as they recognize symptoms and weight gain, maintaining proper diet, taking medications as prescribed, joining cardiac rehab when able. Also reviewed importance of risk factor reduction. Patient provided with CHF Zone Management tool and CHF symptoms magnet. Discussed importance of lifestyle changes: encouraged fluid restriction    PATIENT/CAREGIVER TEACHING:    Level of patient/caregiver understanding able to:   [ x] Verbalize understanding [ ] Demonstrate understanding [ ] Teach back   [ ] Needs reinforcement [ ] Other:       Time spent teaching: 15 mins    1. WEIGHT: Admit Weight: 204 lb (92.5 kg)      Today  Weight: 182 lb 4.8 oz (82.7 kg)   2. I/O   Intake/Output Summary (Last 24 hours) at 2/3/2023 1027  Last data filed at 2/3/2023 1025  Gross per 24 hour   Intake 400 ml   Output 4250 ml   Net -3850 ml       Recommendations:   1. Patient needs one week hospital follow up at discharge  2. Educate further on fluid restriction 48 oz- 64 oz during inpatient stay so he can understand how to measure intake at home. 3. Continue to educate on S/S.   4. Emphasize daily weights, diet, and knowing when and who to call  5. Provided patient with CHF Resource Line for questions and concerns. 6. Patient would benefit from cardiac rehab as an outpatient. Flyer provided.        SHANTHI CALL RN 2/3/2023 10:27 AM

## 2023-02-03 NOTE — PROGRESS NOTES
CLINICAL PHARMACY NOTE: MEDS TO BEDS    Total # of Prescriptions Filled: 5   The following medications were delivered to the patient:  Metoprolol ER 25 mg  Spironolactone 25 mg  Amiodarone 200 mg  Augmentin 875-125  Protonix 40 mg    Additional Documentation:  Delivered to Patient=Signed  Ok to be delivered per Keagan Salinas CPhT

## 2023-02-03 NOTE — CONSULTS
Nutrition Education    Provided heart failure diet education. Education included low sodium diet guidelines (3-4 gm Na+/day) and fluid restriction (64 oz/day). Reviewed foods to choose and foods to avoid, along with label reading and ways to add flavor to food. Pt currently does not add salt to food. Pt ask many questions about the education info. He confused about the salt and sodium. Explained to pt that salt is a chemical compound made up of sodium and chloride. So when talks about salt it mainly means sodium. Pt states understanding of education. Educated on CHF diet  Time spent with patient: 15 minutes. Learners: Patient  Readiness: Acceptance  Method: Explanation and Handout  Response: Verbalizes Understanding  Contact name and number provided.     Chayo Beltran RD  Contact Number: [phone]

## 2023-02-03 NOTE — PROGRESS NOTES
No phone # on file valid Phyllis Watkins asked not to call him) - sent letter asking for pt to call to schedule.

## 2023-02-03 NOTE — DISCHARGE INSTRUCTIONS
Follow up with your PCP within 5-7 days of discharge. Follow up with cardiology as instructed; plan for CONSUELO/DCCV in 3-4 weeks  Follow up with urology as instructed   Follow-up with general surgery as instructed  Take all your medications as prescribed. The [place] Sleepiness Scale       The [place] Sleepiness Scale is widely used in the field of sleep medicine as a subjective measure of a patient's sleepiness. The test is a list of eight situations in which you rate your tendency to become sleepy on a scale of 0, no chance to 3, high chance of dozing. Your score is based on a scale of 0 to 24. The scale estimates whether you are experiencing excessive sleepiness that possibly requires medical attention. How Sleepy Are You? How sleepy are you to doze off or fall asleep in the following situations? You should rate your chances of dozing off, not just feeling tired. Even if you have not done some of these things recently try to determine how they would have affected you.  For each situation, decide whether or not you would have:     0 = No chance of dozing 1 = Slight chance of dozing   2 = Moderate chance of  dozing 3 = High change of dozing       Situation                                                                                     Chance of Dozing    Sitting and reading  0 =  [x]  1 =    [] 2 =    [] 3 =    []    Watching TV  0 =  []  1 =    [x] 2 =    [] 3 =    []      Sitting inactive in public place (e.g., a theater or a meeting)  0 =  [x]  1 =    [] 2 =    [] 3 =    []    As a passenger in a car for an hour without a break          0  =  []  1 =    [] 2 =    [x] 3 =    []    Lying down to rest in the afternoon when circumstances permit    0 =  []  1 =    [x] 2 =    [] 3 =    []    Sitting and talking to someone  0 =  [x]  1 =    [] 2 =    [] 3 =    []      Sitting quietly after a lunch without alcohol  0 =  []  1 =    [x] 2 =    [] 3 =    []    In a car, while stopped for a few minutes in traffic 0 =  []  1 =    [x] 2 =    [] 3 =    []    Total Score = 5    If your total score is 10 or greater, you are experiencing excessive sleepiness and should consider seeking a medical follow-up. Take a copy of this screening test to your primary care physician on your next office visit. Interpretation:      0 -   7: It is unlikely that you are abnormally sleepy. 8 -   9: You have an average amount of daytime sleepiness. 10 - 15: You may be excessively sleepy depending on the situation. You may want to consider seeking medical attention. 16 - 24: You are excessively sleepy and should consider seeking medical attention.

## 2023-02-06 ENCOUNTER — TELEPHONE (OUTPATIENT)
Dept: OTHER | Age: 63
End: 2023-02-06

## 2023-02-06 NOTE — TELEPHONE ENCOUNTER
No phone number listed for patient. Attempted to reach emergency contact x 3. No answer.  Patient has follow up on 2/9 with Coty Beal NP

## 2023-02-17 ENCOUNTER — APPOINTMENT (OUTPATIENT)
Dept: GENERAL RADIOLOGY | Age: 63
DRG: 463 | End: 2023-02-17
Payer: COMMERCIAL

## 2023-02-17 ENCOUNTER — HOSPITAL ENCOUNTER (INPATIENT)
Age: 63
LOS: 4 days | Discharge: HOME | DRG: 463 | End: 2023-02-21
Attending: EMERGENCY MEDICINE | Admitting: INTERNAL MEDICINE
Payer: COMMERCIAL

## 2023-02-17 ENCOUNTER — APPOINTMENT (OUTPATIENT)
Dept: CT IMAGING | Age: 63
DRG: 463 | End: 2023-02-17
Payer: COMMERCIAL

## 2023-02-17 DIAGNOSIS — N39.0 URINARY TRACT INFECTION IN MALE: Primary | ICD-10-CM

## 2023-02-17 DIAGNOSIS — R53.1 GENERAL WEAKNESS: ICD-10-CM

## 2023-02-17 LAB
A/G RATIO: 1.3 (ref 1.1–2.2)
ALBUMIN SERPL-MCNC: 4 G/DL (ref 3.4–5)
ALP BLD-CCNC: 94 U/L (ref 40–129)
ALT SERPL-CCNC: 32 U/L (ref 10–40)
ANION GAP SERPL CALCULATED.3IONS-SCNC: 9 MMOL/L (ref 3–16)
AST SERPL-CCNC: 37 U/L (ref 15–37)
BACTERIA: ABNORMAL /HPF
BASOPHILS ABSOLUTE: 0 K/UL (ref 0–0.2)
BASOPHILS RELATIVE PERCENT: 0.8 %
BILIRUB SERPL-MCNC: 2.9 MG/DL (ref 0–1)
BILIRUBIN URINE: NEGATIVE
BLOOD, URINE: ABNORMAL
BUN BLDV-MCNC: 24 MG/DL (ref 7–20)
CALCIUM SERPL-MCNC: 9.5 MG/DL (ref 8.3–10.6)
CHLORIDE BLD-SCNC: 104 MMOL/L (ref 99–110)
CLARITY: ABNORMAL
CO2: 23 MMOL/L (ref 21–32)
COLOR: ABNORMAL
COMMENT UA: ABNORMAL
CREAT SERPL-MCNC: 1.4 MG/DL (ref 0.8–1.3)
EOSINOPHILS ABSOLUTE: 0.1 K/UL (ref 0–0.6)
EOSINOPHILS RELATIVE PERCENT: 1.2 %
GFR SERPL CREATININE-BSD FRML MDRD: 56 ML/MIN/{1.73_M2}
GLUCOSE BLD-MCNC: 77 MG/DL (ref 70–99)
GLUCOSE URINE: 500 MG/DL
HCT VFR BLD CALC: 39.5 % (ref 40.5–52.5)
HEMOGLOBIN: 12.8 G/DL (ref 13.5–17.5)
KETONES, URINE: NEGATIVE MG/DL
LACTIC ACID, SEPSIS: 1.5 MMOL/L (ref 0.4–1.9)
LEUKOCYTE ESTERASE, URINE: ABNORMAL
LIPASE: 36 U/L (ref 13–60)
LYMPHOCYTES ABSOLUTE: 1.2 K/UL (ref 1–5.1)
LYMPHOCYTES RELATIVE PERCENT: 21.1 %
MCH RBC QN AUTO: 27 PG (ref 26–34)
MCHC RBC AUTO-ENTMCNC: 32.5 G/DL (ref 31–36)
MCV RBC AUTO: 83.2 FL (ref 80–100)
MICROSCOPIC EXAMINATION: YES
MONOCYTES ABSOLUTE: 0.4 K/UL (ref 0–1.3)
MONOCYTES RELATIVE PERCENT: 6.8 %
NEUTROPHILS ABSOLUTE: 4 K/UL (ref 1.7–7.7)
NEUTROPHILS RELATIVE PERCENT: 70.1 %
NITRITE, URINE: POSITIVE
PDW BLD-RTO: 16.1 % (ref 12.4–15.4)
PH UA: 5.5 (ref 5–8)
PLATELET # BLD: 243 K/UL (ref 135–450)
PMV BLD AUTO: 8.3 FL (ref 5–10.5)
POTASSIUM SERPL-SCNC: 4.8 MMOL/L (ref 3.5–5.1)
PRO-BNP: ABNORMAL PG/ML (ref 0–124)
PROTEIN UA: 300 MG/DL
RBC # BLD: 4.75 M/UL (ref 4.2–5.9)
RBC UA: ABNORMAL /HPF (ref 0–4)
SODIUM BLD-SCNC: 136 MMOL/L (ref 136–145)
SPECIFIC GRAVITY UA: 1.02 (ref 1–1.03)
TOTAL PROTEIN: 7.2 G/DL (ref 6.4–8.2)
TROPONIN: <0.01 NG/ML
URINE REFLEX TO CULTURE: YES
URINE TYPE: ABNORMAL
UROBILINOGEN, URINE: 0.2 E.U./DL
WBC # BLD: 5.7 K/UL (ref 4–11)
WBC UA: ABNORMAL /HPF (ref 0–5)
YEAST: PRESENT /HPF

## 2023-02-17 PROCEDURE — 84484 ASSAY OF TROPONIN QUANT: CPT

## 2023-02-17 PROCEDURE — 93005 ELECTROCARDIOGRAM TRACING: CPT | Performed by: NURSE PRACTITIONER

## 2023-02-17 PROCEDURE — 81001 URINALYSIS AUTO W/SCOPE: CPT

## 2023-02-17 PROCEDURE — 85025 COMPLETE CBC W/AUTO DIFF WBC: CPT

## 2023-02-17 PROCEDURE — 80053 COMPREHEN METABOLIC PANEL: CPT

## 2023-02-17 PROCEDURE — 87184 SC STD DISK METHOD PER PLATE: CPT

## 2023-02-17 PROCEDURE — 87086 URINE CULTURE/COLONY COUNT: CPT

## 2023-02-17 PROCEDURE — 87186 SC STD MICRODIL/AGAR DIL: CPT

## 2023-02-17 PROCEDURE — 74177 CT ABD & PELVIS W/CONTRAST: CPT

## 2023-02-17 PROCEDURE — 1200000000 HC SEMI PRIVATE

## 2023-02-17 PROCEDURE — 83690 ASSAY OF LIPASE: CPT

## 2023-02-17 PROCEDURE — 6370000000 HC RX 637 (ALT 250 FOR IP): Performed by: NURSE PRACTITIONER

## 2023-02-17 PROCEDURE — 2580000003 HC RX 258: Performed by: NURSE PRACTITIONER

## 2023-02-17 PROCEDURE — 83605 ASSAY OF LACTIC ACID: CPT

## 2023-02-17 PROCEDURE — 6370000000 HC RX 637 (ALT 250 FOR IP): Performed by: EMERGENCY MEDICINE

## 2023-02-17 PROCEDURE — 36415 COLL VENOUS BLD VENIPUNCTURE: CPT

## 2023-02-17 PROCEDURE — 83880 ASSAY OF NATRIURETIC PEPTIDE: CPT

## 2023-02-17 PROCEDURE — 6360000004 HC RX CONTRAST MEDICATION: Performed by: NURSE PRACTITIONER

## 2023-02-17 PROCEDURE — 6360000002 HC RX W HCPCS: Performed by: NURSE PRACTITIONER

## 2023-02-17 PROCEDURE — 71045 X-RAY EXAM CHEST 1 VIEW: CPT

## 2023-02-17 PROCEDURE — 99285 EMERGENCY DEPT VISIT HI MDM: CPT

## 2023-02-17 PROCEDURE — 96365 THER/PROPH/DIAG IV INF INIT: CPT

## 2023-02-17 PROCEDURE — 87077 CULTURE AEROBIC IDENTIFY: CPT

## 2023-02-17 RX ORDER — FLUCONAZOLE 100 MG/1
150 TABLET ORAL ONCE
Status: COMPLETED | OUTPATIENT
Start: 2023-02-17 | End: 2023-02-17

## 2023-02-17 RX ORDER — ONDANSETRON 4 MG/1
4 TABLET, ORALLY DISINTEGRATING ORAL ONCE
Status: COMPLETED | OUTPATIENT
Start: 2023-02-17 | End: 2023-02-17

## 2023-02-17 RX ADMIN — IOPAMIDOL 75 ML: 755 INJECTION, SOLUTION INTRAVENOUS at 19:47

## 2023-02-17 RX ADMIN — ONDANSETRON 4 MG: 4 TABLET, ORALLY DISINTEGRATING ORAL at 18:10

## 2023-02-17 RX ADMIN — FLUCONAZOLE 150 MG: 100 TABLET ORAL at 22:57

## 2023-02-17 RX ADMIN — CEFTRIAXONE SODIUM 1000 MG: 1 INJECTION, POWDER, FOR SOLUTION INTRAMUSCULAR; INTRAVENOUS at 22:10

## 2023-02-17 ASSESSMENT — PAIN - FUNCTIONAL ASSESSMENT: PAIN_FUNCTIONAL_ASSESSMENT: 0-10

## 2023-02-17 ASSESSMENT — ENCOUNTER SYMPTOMS
DIARRHEA: 0
ABDOMINAL PAIN: 1
NAUSEA: 1
VOMITING: 0
SHORTNESS OF BREATH: 0
CHEST TIGHTNESS: 0

## 2023-02-17 ASSESSMENT — PAIN DESCRIPTION - LOCATION: LOCATION: ABDOMEN

## 2023-02-17 ASSESSMENT — PAIN SCALES - GENERAL: PAINLEVEL_OUTOF10: 5

## 2023-02-17 NOTE — ED PROVIDER NOTES
[facility]        Pt Name: Irlanda West  MRN: [id number]  [place] 1960  Date of evaluation: 2/17/2023  Provider: TRENT Martin CNP  PCP: No primary care provider on file. Note Started: 5:48 PM EST 2/17/23       I have seen and evaluated this patient with my supervising physician Kennedy Bennett, *. CHIEF COMPLAINT       Chief Complaint   Patient presents with    Abdominal Pain     Pt c/o diffuse abdominal pain for the past few days with low energy. Denies vomiting or diarrhea       HISTORY OF PRESENT ILLNESS: 1 or more Elements     History from : Patient    Limitations to history : None    Irlanda West is a 57 y.o. male who presents to the emergency department with complaint of fatigue and generalized abdominal pain. The patient was hospitalized recently with right upper quadrant abdominal pain to rule out a calculus cholecystitis, he was seen by general surgery, and discharged with a week of antibiotics, the patient reports that he did complete the course. He reports that pain did return about 3 days ago and has worsened since time of onset. Patient does have significant history of heart failure, denies chest pain. Denies any headache, fever, visual disturbances. No chest pain or pressure. No neck or back pain. No shortness of breath, cough, or congestion. No abdominal pain, vomiting, diarrhea, constipation, or dysuria. No rash. Nursing Notes were all reviewed and agreed with or any disagreements were addressed in the HPI. REVIEW OF SYSTEMS :      Review of Systems   Constitutional:  Positive for fatigue. Negative for activity change, chills and fever. Respiratory:  Negative for chest tightness and shortness of breath. Cardiovascular:  Negative for chest pain. Gastrointestinal:  Positive for abdominal pain and nausea. Negative for diarrhea and vomiting.    Genitourinary: Negative for dysuria. All other systems reviewed and are negative. Positives and Pertinent negatives as per HPI. SURGICAL HISTORY     Past Surgical History:   Procedure Laterality Date    CARDIAC DEFIBRILLATOR PLACEMENT Left     CARDIOVERSION  07/26/2019    Dr. Patti Spivey  07/26/2019       CURRENTMEDICATIONS       Previous Medications    AMIODARONE (CORDARONE) 200 MG TABLET    Take 1 tablet by mouth 2 times daily For 2 weeks then decrease to 200mg once a day    APIXABAN (ELIQUIS) 5 MG TABS TABLET    Take 1 tablet by mouth 2 times daily    ATORVASTATIN (LIPITOR) 40 MG TABLET    Take 1 tablet by mouth nightly    EMPAGLIFLOZIN (JARDIANCE) 10 MG TABLET    Take 1 tablet by mouth daily    FINASTERIDE (PROSCAR) 5 MG TABLET    Take 5 mg by mouth daily    LISINOPRIL (PRINIVIL;ZESTRIL) 2.5 MG TABLET    Take 1 tablet by mouth daily    METOPROLOL SUCCINATE (TOPROL XL) 25 MG EXTENDED RELEASE TABLET    Take 1 tablet by mouth in the morning and at bedtime    PANTOPRAZOLE (PROTONIX) 40 MG TABLET    Take 1 tablet by mouth daily    SPIRONOLACTONE (ALDACTONE) 25 MG TABLET    Take 1 tablet by mouth Daily with lunch    TAMSULOSIN (FLOMAX) 0.4 MG CAPSULE    Take 1 capsule by mouth daily    TORSEMIDE (DEMADEX) 20 MG TABLET    Take 1 tablet by mouth daily    VITAMIN D (CHOLECALCIFEROL) 50 MCG (2000 UT) TABS TABLET    Take 1 tablet by mouth daily       ALLERGIES     Patient has no known allergies.     FAMILYHISTORY       Family History   Problem Relation Age of Onset    Heart Disease Mother     High Blood Pressure Mother     Heart Attack Mother     Other Father     Stroke Father     High Blood Pressure Father         SOCIAL HISTORY       Social History     Tobacco Use    Smoking status: Never     Passive exposure: Never    Smokeless tobacco: Never   Vaping Use    Vaping Use: Never used   Substance Use Topics    Alcohol use: Never    Drug use: Never       SCREENINGS        [place] Coma Scale  Eye Opening: Spontaneous  Best Verbal Response: Oriented  Best Motor Response: Obeys commands  [place] Coma Scale Score: 15                CIWA Assessment  BP: 104/80  Heart Rate: 53           PHYSICAL EXAM  1 or more Elements     ED Triage Vitals   BP Temp Temp Source Heart Rate Resp SpO2 Height Weight   02/17/23 1607 02/17/23 1605 02/17/23 1605 02/17/23 1607 02/17/23 1605 02/17/23 1607 -- 02/17/23 1605   (!) 150/97 97.5 °F (36.4 °C) Oral 77 18 96 %  190 lb (86.2 kg)       Physical Exam  Vitals and nursing note reviewed. Constitutional:       Appearance: He is well-developed. He is not diaphoretic. HENT:      Head: Normocephalic and atraumatic. Right Ear: External ear normal.      Left Ear: External ear normal.   Eyes:      General:         Right eye: No discharge. Left eye: No discharge. Neck:      Vascular: No JVD. Cardiovascular:      Rate and Rhythm: Normal rate. Pulses: Normal pulses. Heart sounds: Murmur heard. Pulmonary:      Effort: Pulmonary effort is normal. No respiratory distress. Breath sounds: Normal breath sounds. Abdominal:      Palpations: Abdomen is soft. Tenderness: There is abdominal tenderness. Musculoskeletal:         General: Normal range of motion. Skin:     General: Skin is warm and dry. Coloration: Skin is not pale. Neurological:      Mental Status: He is alert.    Psychiatric:         Behavior: Behavior normal.           DIAGNOSTIC RESULTS   LABS:    Labs Reviewed   CBC WITH AUTO DIFFERENTIAL - Abnormal; Notable for the following components:       Result Value    Hemoglobin 12.8 (*)     Hematocrit 39.5 (*)     RDW 16.1 (*)     All other components within normal limits   COMPREHENSIVE METABOLIC PANEL - Abnormal; Notable for the following components:    BUN 24 (*)     Creatinine 1.4 (*)     Est, Glom Filt Rate 56 (*)     Total Bilirubin 2.9 (*)     All other components within normal limits   URINALYSIS WITH REFLEX TO CULTURE - Abnormal; Notable for the following components:    Color, UA DARK YELLOW (*)     Clarity, UA TURBID (*)     Glucose, Ur 500 (*)     Blood, Urine LARGE (*)     Nitrite, Urine POSITIVE (*)     Leukocyte Esterase, Urine MODERATE (*)     All other components within normal limits   BRAIN NATRIURETIC PEPTIDE - Abnormal; Notable for the following components:    Pro-BNP 14,327 (*)     All other components within normal limits   MICROSCOPIC URINALYSIS - Abnormal; Notable for the following components:    WBC, UA  (*)     RBC, UA 11-20 (*)     Bacteria, UA 3+ (*)     Yeast, UA Present (*)     All other components within normal limits   CULTURE, URINE   LACTATE, SEPSIS   LIPASE   TROPONIN       When ordered only abnormal lab results are displayed. All other labs were within normal range or not returned as of this dictation. EKG: When ordered, EKG's are interpreted by the Emergency Department Physician in the absence of a cardiologist.  Please see their note for interpretation of EKG. RADIOLOGY:   Non-plain film images such as CT, Ultrasound and MRI are read by the radiologist. Plain radiographic images are visualized and preliminarily interpreted by the ED Provider with the below findings:        Interpretation per the Radiologist below, if available at the time of this note:    CT ABDOMEN PELVIS W IV CONTRAST Additional Contrast? None   Final Result   1. Trace abdominopelvic ascites and body wall edema. These findings appear   more prominent since prior CT imaging. 2.  Contracted gallbladder. Mild apparent gallbladder wall thickening may be   due to contracted state and accentuated related to ascites. 3.  Additional chronic and benign findings, as above. XR CHEST PORTABLE   Final Result   Stable cardiomegaly without acute abnormality. No results found. No results found.     PROCEDURES   Unless otherwise noted below, none     Procedures    CRITICAL CARE TIME (.cctime)       PAST MEDICAL HISTORY      has a past medical history of Acute combined systolic and diastolic congestive heart failure ([address] (8/31/2022), Atrial fibrillation ([address] (07/25/2019), CHF (congestive heart failure) ([address] blood clots, and Hypertension. Chronic Conditions affecting Care:  has a past medical history of Acute combined systolic and diastolic congestive heart failure ([address] (8/31/2022), Atrial fibrillation ([address] (07/25/2019), CHF (congestive heart failure) ([address] blood clots, and Hypertension. EMERGENCY DEPARTMENT COURSE and DIFFERENTIAL DIAGNOSIS/MDM:   Vitals:    Vitals:    02/17/23 2115 02/17/23 2130 02/17/23 2145 02/17/23 2204   BP: 96/75 96/76 95/83 104/80   Pulse: 51 51 53    Resp: 12 17 15    Temp:       TempSrc:       SpO2: 100% 100% 100%    Weight:           Patient was given the following medications:  Medications   cefTRIAXone (ROCEPHIN) 1,000 mg in sodium chloride 0.9 % 50 mL IVPB (mini-bag) (1,000 mg IntraVENous New Bag 2/17/23 2210)   ondansetron (ZOFRAN-ODT) disintegrating tablet 4 mg (4 mg Oral Given 2/17/23 1810)   iopamidol (ISOVUE-370) 76 % injection 75 mL (75 mLs IntraVENous Given 2/17/23 1947)             Is this patient to be included in the SEP-1 Core Measure due to severe sepsis or septic shock? No   Exclusion criteria - the patient is NOT to be included for SEP-1 Core Measure due to:  2+ SIRS criteria are not met    CONSULTS: (Who and What was discussed)  None  Discussion with Other Profesionals : Admitting Team Marylou    Social Determinants : No PCP    Records Reviewed : Inpatient Notes recent admission the end of January 2023 for abdominal pain, the patient did have general surgery consultation. General surgery felt that abnormal CT scan was likely due to right-sided heart failure and he was managed with 1 week of antibiotics outpatient. The patient reports that he did complete the course of antibiotics.     CC/HPI Summary, DDx, ED Course, and Reassessment:     Briefly, this is a 57year old male who presents to the emergency department with complaint of fatigue and generalized abdominal pain. The patient was hospitalized recently with right upper quadrant abdominal pain to rule out a calculus cholecystitis, he was seen by general surgery, and discharged with a week of antibiotics, the patient reports that he did complete the course. He reports that pain did return about 3 days ago and has worsened since time of onset. Patient does have significant history of heart failure, dilated cardiomyopathy, ventricular tachycardia, chronic atrial fibrillation. Denies chest pain. Per general surgery note, there was concern of possible acalculous cholecystitis however his cardiac condition is poor and fluid around the gallbladder wall and thickening can be seen with right-sided heart failure. Patient is anticoagulated on Eliquis with history of pulmonary embolus and atrial fibrillation. CBC is unremarkable, hemoglobin is 12.8, up from 11.7 on 2/3/2023. CMP does show a BUN of 24 with a creatinine of 1.4, this is at or about patient's baseline. Total bilirubin is elevated tonight at 2.9, up from 1.1 at time of discharge on 2/3/2023. Lactate is 1.5. Lipase is 36. Urine is infected with positive nitrites and moderate leukocytes, this will be sent for culture. The patient is placed on Rocephin. BNP is elevated 14,327. CT ABDOMEN PELVIS W IV CONTRAST Additional Contrast? None (Final result)  Result time 02/17/23 20:24:51  Final result by Shirley Leiva MD (02/17/23 20:24:51)                Impression:    1. Trace abdominopelvic ascites and body wall edema. These findings appear   more prominent since prior CT imaging. 2.  Contracted gallbladder. Mild apparent gallbladder wall thickening may be   due to contracted state and accentuated related to ascites. 3.  Additional chronic and benign findings, as above.                XR CHEST PORTABLE (Final result)  Result time 02/17/23 17:57:49  Final result by Obie Bonds MD (02/17/23 17:57:49)                Impression:    Stable cardiomegaly without acute abnormality. Patient reports that he is too weak to return home today and is requesting admission. Hospitalist consulted for admission of this patient. Disposition Considerations (include 1 Tests not done, Shared Decision Making, Pt Expectation of Test or Tx.): I did consider ultrasound right upper quadrant, this can be completed by hospitalist if needed    Shared decision making used throughout     admit      I am the Primary Clinician of Record. FINAL IMPRESSION      1. Urinary tract infection in male    2. General weakness          DISPOSITION/PLAN     DISPOSITION Decision To Admit 02/17/2023 09:59:15 PM      PATIENT REFERRED TO:  No follow-up provider specified.     DISCHARGE MEDICATIONS:  New Prescriptions    No medications on file       DISCONTINUED MEDICATIONS:  Discontinued Medications    No medications on file              (Please note that portions of this note were completed with a voice recognition program.  Efforts were made to edit the dictations but occasionally words are mis-transcribed.)    TRENT Black CNP (electronically signed)            TRENT Black CNP  02/17/23 1988

## 2023-02-18 LAB
ALBUMIN SERPL-MCNC: 3.5 G/DL (ref 3.4–5)
ANION GAP SERPL CALCULATED.3IONS-SCNC: 9 MMOL/L (ref 3–16)
BUN BLDV-MCNC: 21 MG/DL (ref 7–20)
CALCIUM SERPL-MCNC: 8.6 MG/DL (ref 8.3–10.6)
CHLORIDE BLD-SCNC: 104 MMOL/L (ref 99–110)
CO2: 21 MMOL/L (ref 21–32)
CREAT SERPL-MCNC: 1.5 MG/DL (ref 0.8–1.3)
EKG ATRIAL RATE: 53 BPM
EKG DIAGNOSIS: NORMAL
EKG Q-T INTERVAL: 482 MS
EKG QRS DURATION: 90 MS
EKG QTC CALCULATION (BAZETT): 461 MS
EKG R AXIS: -21 DEGREES
EKG T AXIS: 0 DEGREES
EKG VENTRICULAR RATE: 55 BPM
ESTIMATED AVERAGE GLUCOSE: 116.9 MG/DL
GFR SERPL CREATININE-BSD FRML MDRD: 52 ML/MIN/{1.73_M2}
GLUCOSE BLD-MCNC: 101 MG/DL (ref 70–99)
GLUCOSE BLD-MCNC: 75 MG/DL (ref 70–99)
GLUCOSE BLD-MCNC: 79 MG/DL (ref 70–99)
GLUCOSE BLD-MCNC: 84 MG/DL (ref 70–99)
GLUCOSE BLD-MCNC: 85 MG/DL (ref 70–99)
GLUCOSE BLD-MCNC: 87 MG/DL (ref 70–99)
HBA1C MFR BLD: 5.7 %
PERFORMED ON: NORMAL
PHOSPHORUS: 4.3 MG/DL (ref 2.5–4.9)
POTASSIUM SERPL-SCNC: 4.7 MMOL/L (ref 3.5–5.1)
SODIUM BLD-SCNC: 134 MMOL/L (ref 136–145)

## 2023-02-18 PROCEDURE — 6370000000 HC RX 637 (ALT 250 FOR IP): Performed by: INTERNAL MEDICINE

## 2023-02-18 PROCEDURE — 83036 HEMOGLOBIN GLYCOSYLATED A1C: CPT

## 2023-02-18 PROCEDURE — 2580000003 HC RX 258: Performed by: INTERNAL MEDICINE

## 2023-02-18 PROCEDURE — 85025 COMPLETE CBC W/AUTO DIFF WBC: CPT

## 2023-02-18 PROCEDURE — 1200000000 HC SEMI PRIVATE

## 2023-02-18 PROCEDURE — 2580000003 HC RX 258: Performed by: NURSE PRACTITIONER

## 2023-02-18 PROCEDURE — 6370000000 HC RX 637 (ALT 250 FOR IP): Performed by: NURSE PRACTITIONER

## 2023-02-18 PROCEDURE — 36415 COLL VENOUS BLD VENIPUNCTURE: CPT

## 2023-02-18 PROCEDURE — 80069 RENAL FUNCTION PANEL: CPT

## 2023-02-18 PROCEDURE — 6360000002 HC RX W HCPCS: Performed by: NURSE PRACTITIONER

## 2023-02-18 PROCEDURE — 93010 ELECTROCARDIOGRAM REPORT: CPT | Performed by: INTERNAL MEDICINE

## 2023-02-18 RX ORDER — FINASTERIDE 5 MG/1
5 TABLET, FILM COATED ORAL DAILY
Status: DISCONTINUED | OUTPATIENT
Start: 2023-02-18 | End: 2023-02-21 | Stop reason: HOSPADM

## 2023-02-18 RX ORDER — ONDANSETRON 2 MG/ML
4 INJECTION INTRAMUSCULAR; INTRAVENOUS EVERY 6 HOURS PRN
Status: DISCONTINUED | OUTPATIENT
Start: 2023-02-18 | End: 2023-02-21 | Stop reason: HOSPADM

## 2023-02-18 RX ORDER — TAMSULOSIN HYDROCHLORIDE 0.4 MG/1
0.4 CAPSULE ORAL DAILY
Status: DISCONTINUED | OUTPATIENT
Start: 2023-02-18 | End: 2023-02-21 | Stop reason: HOSPADM

## 2023-02-18 RX ORDER — ACETAMINOPHEN 650 MG/1
650 SUPPOSITORY RECTAL EVERY 6 HOURS PRN
Status: DISCONTINUED | OUTPATIENT
Start: 2023-02-18 | End: 2023-02-21 | Stop reason: HOSPADM

## 2023-02-18 RX ORDER — SPIRONOLACTONE 25 MG/1
25 TABLET ORAL
Status: DISCONTINUED | OUTPATIENT
Start: 2023-02-18 | End: 2023-02-21 | Stop reason: HOSPADM

## 2023-02-18 RX ORDER — ATORVASTATIN CALCIUM 40 MG/1
40 TABLET, FILM COATED ORAL NIGHTLY
Status: DISCONTINUED | OUTPATIENT
Start: 2023-02-18 | End: 2023-02-21 | Stop reason: HOSPADM

## 2023-02-18 RX ORDER — ACETAMINOPHEN 325 MG/1
650 TABLET ORAL EVERY 6 HOURS PRN
Status: DISCONTINUED | OUTPATIENT
Start: 2023-02-18 | End: 2023-02-21 | Stop reason: HOSPADM

## 2023-02-18 RX ORDER — POTASSIUM CHLORIDE 7.45 MG/ML
10 INJECTION INTRAVENOUS PRN
Status: DISCONTINUED | OUTPATIENT
Start: 2023-02-18 | End: 2023-02-21 | Stop reason: HOSPADM

## 2023-02-18 RX ORDER — MAGNESIUM SULFATE IN WATER 40 MG/ML
2000 INJECTION, SOLUTION INTRAVENOUS PRN
Status: DISCONTINUED | OUTPATIENT
Start: 2023-02-18 | End: 2023-02-21 | Stop reason: HOSPADM

## 2023-02-18 RX ORDER — SODIUM CHLORIDE 0.9 % (FLUSH) 0.9 %
10 SYRINGE (ML) INJECTION EVERY 12 HOURS SCHEDULED
Status: DISCONTINUED | OUTPATIENT
Start: 2023-02-18 | End: 2023-02-21 | Stop reason: HOSPADM

## 2023-02-18 RX ORDER — INSULIN LISPRO 100 [IU]/ML
0-4 INJECTION, SOLUTION INTRAVENOUS; SUBCUTANEOUS EVERY 4 HOURS
Status: DISCONTINUED | OUTPATIENT
Start: 2023-02-18 | End: 2023-02-19

## 2023-02-18 RX ORDER — ENOXAPARIN SODIUM 100 MG/ML
40 INJECTION SUBCUTANEOUS DAILY
Status: DISCONTINUED | OUTPATIENT
Start: 2023-02-18 | End: 2023-02-18

## 2023-02-18 RX ORDER — FLUCONAZOLE 100 MG/1
100 TABLET ORAL ONCE
Status: COMPLETED | OUTPATIENT
Start: 2023-02-18 | End: 2023-02-18

## 2023-02-18 RX ORDER — PROMETHAZINE HYDROCHLORIDE 25 MG/1
12.5 TABLET ORAL EVERY 6 HOURS PRN
Status: DISCONTINUED | OUTPATIENT
Start: 2023-02-18 | End: 2023-02-21 | Stop reason: HOSPADM

## 2023-02-18 RX ORDER — SODIUM CHLORIDE 9 MG/ML
INJECTION, SOLUTION INTRAVENOUS PRN
Status: DISCONTINUED | OUTPATIENT
Start: 2023-02-18 | End: 2023-02-21 | Stop reason: HOSPADM

## 2023-02-18 RX ORDER — PANTOPRAZOLE SODIUM 40 MG/1
40 TABLET, DELAYED RELEASE ORAL DAILY
Status: DISCONTINUED | OUTPATIENT
Start: 2023-02-18 | End: 2023-02-21 | Stop reason: HOSPADM

## 2023-02-18 RX ORDER — METOPROLOL SUCCINATE 25 MG/1
25 TABLET, EXTENDED RELEASE ORAL 2 TIMES DAILY
Status: DISCONTINUED | OUTPATIENT
Start: 2023-02-18 | End: 2023-02-19

## 2023-02-18 RX ORDER — AMIODARONE HYDROCHLORIDE 200 MG/1
200 TABLET ORAL DAILY
Status: DISCONTINUED | OUTPATIENT
Start: 2023-02-18 | End: 2023-02-21 | Stop reason: HOSPADM

## 2023-02-18 RX ORDER — DEXTROSE MONOHYDRATE 100 MG/ML
INJECTION, SOLUTION INTRAVENOUS CONTINUOUS PRN
Status: DISCONTINUED | OUTPATIENT
Start: 2023-02-18 | End: 2023-02-21 | Stop reason: HOSPADM

## 2023-02-18 RX ORDER — SODIUM CHLORIDE 0.9 % (FLUSH) 0.9 %
10 SYRINGE (ML) INJECTION PRN
Status: DISCONTINUED | OUTPATIENT
Start: 2023-02-18 | End: 2023-02-21 | Stop reason: HOSPADM

## 2023-02-18 RX ADMIN — APIXABAN 5 MG: 5 TABLET, FILM COATED ORAL at 05:15

## 2023-02-18 RX ADMIN — CEFTRIAXONE SODIUM 1000 MG: 1 INJECTION, POWDER, FOR SOLUTION INTRAMUSCULAR; INTRAVENOUS at 22:04

## 2023-02-18 RX ADMIN — FLUCONAZOLE 100 MG: 100 TABLET ORAL at 18:17

## 2023-02-18 RX ADMIN — SODIUM CHLORIDE, PRESERVATIVE FREE 10 ML: 5 INJECTION INTRAVENOUS at 21:28

## 2023-02-18 RX ADMIN — ATORVASTATIN CALCIUM 40 MG: 40 TABLET, FILM COATED ORAL at 05:15

## 2023-02-18 RX ADMIN — FINASTERIDE 5 MG: 5 TABLET, FILM COATED ORAL at 05:15

## 2023-02-18 RX ADMIN — PANTOPRAZOLE SODIUM 40 MG: 40 TABLET, DELAYED RELEASE ORAL at 09:39

## 2023-02-18 RX ADMIN — APIXABAN 5 MG: 5 TABLET, FILM COATED ORAL at 21:26

## 2023-02-18 RX ADMIN — TAMSULOSIN HYDROCHLORIDE 0.4 MG: 0.4 CAPSULE ORAL at 05:15

## 2023-02-18 ASSESSMENT — PAIN SCALES - GENERAL
PAINLEVEL_OUTOF10: 0

## 2023-02-18 NOTE — PROGRESS NOTES
Pt resting awake in bed. Denies pain at this time. States he had previously been having abdominal pain at home. States he was to have had his ernst removed at some point after recent discharge but did not remove or have it removed per pt. PO meds taken easily with water. Noted to have lower BP this a.m. and overnight with BP meds held. Tolerating PO well. PO meds taken easily with water. States he is independent at home. Pt is axox4 and able to answer questions and follow commands throughout assessment.

## 2023-02-18 NOTE — PROGRESS NOTES
[facility] PROGRESS NOTE    2/18/2023 8:57 AM        Name: Lorrie Paige . Admitted: 2/17/2023  Primary Care Provider: No primary care provider on file. (Tel: None)      Subjective:  . Admitted with UTI and abdominal pain   Started on antibiotics for UTI and feels better this am    Pt was seen this am with Dr Emilia Mason from urology. Stressed the need for follow up care  ( he is homeless at intervals and does not have a car or cell phone )  currently staying at a friends house.      Appt February 24 with urology     Reviewed interval ancillary notes    Current Medications  sodium chloride flush 0.9 % injection 10 mL, 2 times per day  sodium chloride flush 0.9 % injection 10 mL, PRN  0.9 % sodium chloride infusion, PRN  potassium chloride 10 mEq/100 mL IVPB (Peripheral Line), PRN  magnesium sulfate 2000 mg in 50 mL IVPB premix, PRN  promethazine (PHENERGAN) tablet 12.5 mg, Q6H PRN   Or  ondansetron (ZOFRAN) injection 4 mg, Q6H PRN  acetaminophen (TYLENOL) tablet 650 mg, Q6H PRN   Or  acetaminophen (TYLENOL) suppository 650 mg, Q6H PRN  amiodarone (CORDARONE) tablet 200 mg, Daily  apixaban (ELIQUIS) tablet 5 mg, BID  atorvastatin (LIPITOR) tablet 40 mg, Nightly  finasteride (PROSCAR) tablet 5 mg, Daily  metoprolol succinate (TOPROL XL) extended release tablet 25 mg, BID  pantoprazole (PROTONIX) tablet 40 mg, Daily  tamsulosin (FLOMAX) capsule 0.4 mg, Daily  spironolactone (ALDACTONE) tablet 25 mg, Lunch  dextrose bolus 10% 125 mL, PRN   Or  dextrose bolus 10% 250 mL, PRN  glucagon (rDNA) injection 1 mg, PRN  dextrose 10 % infusion, Continuous PRN  insulin lispro (HUMALOG) injection vial 0-4 Units, Q4H  cefTRIAXone (ROCEPHIN) 1,000 mg in sodium chloride 0.9 % 50 mL IVPB (mini-bag), Q24H        Objective:  BP 95/73   Pulse 57   Temp 97.1 °F (36.2 °C) (Oral)   Resp 16   Ht 6' 2\" (1.88 m)   Wt 205 lb 7.5 oz (93.2 kg)   SpO2 96%   BMI 26.38 kg/m²     Intake/Output Summary (Last 24 hours) at 2/18/2023 0857  Last data filed at 2/18/2023 0528  Gross per 24 hour   Intake 960 ml   Output 300 ml   Net 660 ml      Wt Readings from Last 3 Encounters:   02/18/23 205 lb 7.5 oz (93.2 kg)   02/03/23 182 lb 4.8 oz (82.7 kg)   01/10/23 196 lb (88.9 kg)       General appearance:  Appears comfortable,  alert and pleasant   Eyes: Sclera clear. Pupils equal.  ENT: Moist oral mucosa. Trachea midline, no adenopathy. Cardiovascular: Regular rhythm, normal S1, S2. + murmur. + edema in lower extremities  Respiratory: Not using accessory muscles. Good inspiratory effort. Clear to auscultation bilaterally, no wheeze or crackles. GI: Abdomen soft, no tenderness, not distended, normal bowel sounds, ernst with yellow urine with scant sediment   Musculoskeletal: No cyanosis in digits, neck supple  Neurology: CN 2-12 grossly intact. No speech or motor deficits  Psych: Normal affect. Alert and oriented in time, place and person  Skin: Warm, dry, normal turgor    Labs and Tests:  CBC:   Recent Labs     02/17/23  1808   WBC 5.7   HGB 12.8*        BMP:    Recent Labs     02/17/23  1808      K 4.8      CO2 23   BUN 24*   CREATININE 1.4*   GLUCOSE 77     Hepatic:   Recent Labs     02/17/23  1808   AST 37   ALT 32   BILITOT 2.9*   ALKPHOS 94      Trace abdominopelvic ascites and body wall edema. These findings appear   more prominent since prior CT imaging. 2.  Contracted gallbladder. Mild apparent gallbladder wall thickening may be   due to contracted state and accentuated related to ascites. 3.  Additional chronic and benign findings, as above. Chest xray:  Stable cardiomegaly without acute abnormality. Problem List  Principal Problem:    UTI (urinary tract infection)  Resolved Problems:    * No resolved hospital problems.  *       Assessment & Plan:   UTI:  on rocephin  and diflucan will follow the cultures. Urinary retention:  pt was seen by urology, Dr Ramon Ko, ernst needs to remain in place. Has appt next week. PSA has been elevated which may represent prostate cancer,  pt again updated ,  he is aware he may need surgical intervention in the future. Chronic systolic HF with EF 16%: follows with cardiology,  has declined life vest. BNP elevated, however BP soft, consider some IV lasix in the am if bp tolerates. 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

## 2023-02-18 NOTE — PROGRESS NOTES
Hospital Medicine History & Physical      PCP: No primary care provider on file. Date of Admission: 2/17/2023    Date of Service: Pt seen/examined on 2/17/2023    Pt seen/examined face to face on and admitted as inpatient with expected LOS to be two days but can change depending on diagnostic work up and treatment response. Chief Complaint:    Chief Complaint   Patient presents with    Abdominal Pain     Pt c/o diffuse abdominal pain for the past few days with low energy. Denies vomiting or diarrhea        History Of Present Illness:      57 y.o. male who presented to [facility] with reported that has been having abdominal pain started 3 days ago progressively worsening nothing really improving with association of nausea and vomiting reported with clear nonbloody, patient does report having no diarrhea reports that he was in the hospital was having pain with peeing he had a Don in, patient reported that he wanted to keep the Don even though he was told that he could have UTI. Patient reports that he kept the Don for too long. Otherwise patient reports that he tries to eat healthy and avoids any fried food not because of the pain Mize for healthy lifestyle also admits that has been having brown rectal stool nonbloody also does not smoke drink or do drugs. Patient reports he does have family history of mother having enlarged heart patient otherwise reports he been having progressively worsening fatigue.         Past Medical History:          Diagnosis Date    Acute combined systolic and diastolic congestive heart failure ([address] 8/31/2022    Atrial fibrillation ([address] 07/25/2019    CHF (congestive heart failure) (HCC)     Hx of blood clots     Hypertension        Past Surgical History:          Procedure Laterality Date    CARDIAC DEFIBRILLATOR PLACEMENT Left     CARDIOVERSION  07/26/2019    Dr. Diantha Lesch  07/26/2019       Medications Prior to Admission:      Prior to Admission medications    Medication Sig Start Date End Date Taking? Authorizing Provider   amiodarone (CORDARONE) 200 MG tablet Take 1 tablet by mouth 2 times daily For 2 weeks then decrease to 200mg once a day 2/3/23   TRENT Camacho CNP   metoprolol succinate (TOPROL XL) 25 MG extended release tablet Take 1 tablet by mouth in the morning and at bedtime 2/3/23   TRENT Camacho CNP   spironolactone (ALDACTONE) 25 MG tablet Take 1 tablet by mouth Daily with lunch 2/4/23   Loc Garvin MD   pantoprazole (PROTONIX) 40 MG tablet Take 1 tablet by mouth daily 2/3/23 3/5/23  Loc Garvin MD   finasteride (PROSCAR) 5 MG tablet Take 5 mg by mouth daily    Historical Provider, MD   torsemide (DEMADEX) 20 MG tablet Take 1 tablet by mouth daily 10/31/22   Joaquin Nguyen MD   empagliflozin (JARDIANCE) 10 MG tablet Take 1 tablet by mouth daily 10/26/22   TRENT Parekh   lisinopril (PRINIVIL;ZESTRIL) 2.5 MG tablet Take 1 tablet by mouth daily 10/26/22   TRENT Parekh   atorvastatin (LIPITOR) 40 MG tablet Take 1 tablet by mouth nightly 10/5/22   TRENT Vásquez   vitamin D (CHOLECALCIFEROL) 50 MCG (2000 UT) TABS tablet Take 1 tablet by mouth daily 9/21/22   TRENT Vásquez   potassium chloride (KLOR-CON M) 20 MEQ extended release tablet Take 1 tablet by mouth in the morning and 1 tablet in the evening. Take with meals. 8/1/22 9/8/22  Creed Hamman, MD   apixaban (ELIQUIS) 5 MG TABS tablet Take 1 tablet by mouth 2 times daily 6/17/22   TRENT Camacho CNP   tamsulosin [facility]) 0.4 MG capsule Take 1 capsule by mouth daily 6/18/22   TRENT Camacho CNP       Allergies:  Patient has no known allergies. Social History:          TOBACCO:   reports that he has never smoked. He has never been exposed to tobacco smoke. He has never used smokeless tobacco.  ETOH:   reports no history of alcohol use.   E-cigarette/Vaping       Questions Responses E-cigarette/Vaping Use Never User    Start Date     Passive Exposure     Quit Date     Counseling Given     Comments               Family History:      Family History reviewed with patient, and does not pertain and non-contributory to the current illness        Problem Relation Age of Onset    Heart Disease Mother     High Blood Pressure Mother     Heart Attack Mother     Other Father     Stroke Father     High Blood Pressure Father        REVIEW OF SYSTEMS:     Constitutional:  No Fever, No Chills, No Night Sweats  ENT/Mouth:  No Nasal Congestion,  No Hoarseness, No new mouth lesion  Eyes:  No Eye Pain, No Redness, No Discharge  Cardiovascular:  No Chest Pain, No Orthopnea, No Palpitations  Respiratory:  No Cough, No Sputum, No Dyspnea  Gastrointestinal: No Vomiting, No Diarrhea, + abdominal pain  Genitourinary: No Urinary Frequency, No Hematuria, No Urinary pain  Musculoskeletal:  No worsening Arthralgias, No worsening Myalgias  Skin:  No new Skin Lesions, No new skin rash  Neuro:  No new weakness, No new numbness. Psych:  No suicial ideation, No Violence ideation    PHYSICAL EXAM PERFORMED:    /88   Pulse 50   Temp 97.2 °F (36.2 °C) (Oral)   Resp 16   Ht 6' 2\" (1.88 m)   Wt 205 lb 7.5 oz (93.2 kg)   SpO2 99%   BMI 26.38 kg/m²     General appearance:  mild acute distress, appears older than stated age  HEENT:   atraumatic, sclera anicteric, Conjunctivae clear. Neck: Supple,Trachea midline, no goiter  Respiratory:minimal accessory muscle usage, Normal respiratory effort. Clear to auscultation, bilaterally without wheezing  Cardiovascular:  Regular rate and rhythm, capillary refill 2 seconds  Abdomen: Soft, non-tender, non-distended with normal bowel sounds. Musculoskeletal:  No clubbing, cyanosis. trace edema LE bilaterally. Skin: turgor normal.  No new rashes or lesions. Neurologic: Alert and oriented x4, no new focal sensory/motor deficits.      Labs:     Recent Labs     02/17/23  1808   WBC 5.7 HGB 12.8*   HCT 39.5*        Recent Labs     02/17/23  1808      K 4.8      CO2 23   BUN 24*   CREATININE 1.4*   CALCIUM 9.5     Recent Labs     02/17/23  1808   AST 37   ALT 32   BILITOT 2.9*   ALKPHOS 94     No results for input(s): INR in the last 72 hours. Recent Labs     02/17/23  1808   TROPONINI <0.01       Urinalysis:      Lab Results   Component Value Date/Time    NITRU POSITIVE 02/17/2023 06:08 PM    WBCUA  02/17/2023 06:08 PM    BACTERIA 3+ 02/17/2023 06:08 PM    RBCUA 11-20 02/17/2023 06:08 PM    BLOODU LARGE 02/17/2023 06:08 PM    SPECGRAV 1.025 02/17/2023 06:08 PM    GLUCOSEU 500 02/17/2023 06:08 PM       Radiology:     CXR: I have reviewed the CXR with the following interpretation:   No acute process cardiomegaly  EKG:  I have reviewed the EKG with the following interpretation:   Pacemaker  CT ABDOMEN PELVIS W IV CONTRAST Additional Contrast? None   Final Result   1. Trace abdominopelvic ascites and body wall edema. These findings appear   more prominent since prior CT imaging. 2.  Contracted gallbladder. Mild apparent gallbladder wall thickening may be   due to contracted state and accentuated related to ascites. 3.  Additional chronic and benign findings, as above. XR CHEST PORTABLE   Final Result   Stable cardiomegaly without acute abnormality. ASSESSMENT AND PLAN:    Active Hospital Problems    Diagnosis Date Noted    UTI (urinary tract infection) [N39.0] 02/17/2023     Priority: Medium     Acute cystitis,  Ceftriaxone ordered    Acute renal failure  Baseline creatinine 1.1  Currently on performed  IVF pending    GERD: Continue home medication  Chronic Paroxysmal Atrial fibrilliation: unspecified and clinically unable to determine etiology. Controlled on home medication.   currently Anticoagulated and Monitored on tele  Essential Hypertension: Reviewed patient's medications and plan is to continue home medication  BPH - Controlled with active acute obstructive Uropathy,Continued on home medications. Monitor with bladder scan  Type 2 Diabetes: Reviewed patient's medications. Plan is to hold oral medications and continued with reduced home dose of long acting and Insulin sliding scale    Face-to-face discussion with the primary ER physician in regards to symptoms, history, physical exam, diagnosis and treatment, collaborative decision was to admit the patient.     Diet: regular diet    DVT Prophylaxis: eliquis    Dispo:   Expected LOS of two days         Jarrod Rosenbaum DO

## 2023-02-18 NOTE — ED PROVIDER NOTES
[id number] [facility] Emergency Department      Pt Name: Ramesh Huffman  MRN: [id number]  [place] 1960  Date of evaluation: 2/17/2023  Provider: Maya Costa MD  I independently performed a history and physical on Ramesh Huffman. All diagnostic, treatment, and disposition decisions were made by myself in conjunction with the advanced practice provider. HPI: Ramesh Huffman presented with   Chief Complaint   Patient presents with    Abdominal Pain     Pt c/o diffuse abdominal pain for the past few days with low energy. Denies vomiting or diarrhea     Ramesh Huffman has a past medical history of Acute combined systolic and diastolic congestive heart failure ([facility] (8/31/2022), Atrial fibrillation ([facility] (07/25/2019), CHF (congestive heart failure) ([facility] blood clots, and Hypertension. He has a past surgical history that includes Insertable Cardiac Monitor (07/26/2019); Cardioversion (07/26/2019); and Cardiac defibrillator placement (Left). No current facility-administered medications on file prior to encounter.      Current Outpatient Medications on File Prior to Encounter   Medication Sig Dispense Refill    amiodarone (CORDARONE) 200 MG tablet Take 1 tablet by mouth 2 times daily For 2 weeks then decrease to 200mg once a day 45 tablet 0    metoprolol succinate (TOPROL XL) 25 MG extended release tablet Take 1 tablet by mouth in the morning and at bedtime 30 tablet 3    spironolactone (ALDACTONE) 25 MG tablet Take 1 tablet by mouth Daily with lunch 30 tablet 1    pantoprazole (PROTONIX) 40 MG tablet Take 1 tablet by mouth daily 30 tablet 0    finasteride (PROSCAR) 5 MG tablet Take 5 mg by mouth daily      torsemide (DEMADEX) 20 MG tablet Take 1 tablet by mouth daily 30 tablet 3    empagliflozin (JARDIANCE) 10 MG tablet Take 1 tablet by mouth daily 90 tablet 1    lisinopril (PRINIVIL;ZESTRIL) 2.5 MG tablet Take 1 tablet by mouth daily 90 tablet 1    atorvastatin (LIPITOR) 40 MG tablet Take 1 tablet by mouth nightly 30 tablet 2    vitamin D (CHOLECALCIFEROL) 50 MCG (2000 UT) TABS tablet Take 1 tablet by mouth daily 90 tablet 1    [DISCONTINUED] potassium chloride (KLOR-CON M) 20 MEQ extended release tablet Take 1 tablet by mouth in the morning and 1 tablet in the evening. Take with meals. 60 tablet 3    apixaban (ELIQUIS) 5 MG TABS tablet Take 1 tablet by mouth 2 times daily 60 tablet 3    tamsulosin (FLOMAX) 0.4 MG capsule Take 1 capsule by mouth daily 30 capsule 3     PHYSICAL EXAM  Vitals: /80   Pulse 53   Temp 97.5 °F (36.4 °C) (Oral)   Resp 15   Wt 190 lb (86.2 kg)   SpO2 100%   BMI 24.39 kg/m²   Constitutional:  57 y.o. male alert  HENT:  Atraumatic, oral mucosa moist  Neck:  No visible JVD, supple  Chest/Lungs:  Respiratory effort normal, clear, regular  Abdomen:  Non-distended, soft  Back:  No gross deformity  Extremities:  Normal tone and perfusion    Medical Decision Making: Briefly, this is an 57 y.o.male who presented with concern for abdominal pain, generalized fatigue and weakness. For further details of [facility] Emergency Department encounter, please see documentation by advanced practice provider Bertha Hsu CNP.     Labs Reviewed   CBC WITH AUTO DIFFERENTIAL - Abnormal; Notable for the following components:       Result Value    Hemoglobin 12.8 (*)     Hematocrit 39.5 (*)     RDW 16.1 (*)     All other components within normal limits   COMPREHENSIVE METABOLIC PANEL - Abnormal; Notable for the following components:    BUN 24 (*)     Creatinine 1.4 (*)     Est, Glom Filt Rate 56 (*)     Total Bilirubin 2.9 (*)     All other components within normal limits   URINALYSIS WITH REFLEX TO CULTURE - Abnormal; Notable for the following components:    Color, UA DARK YELLOW (*)     Clarity, UA TURBID (*)     Glucose, Ur 500 (*)     Blood, Urine LARGE (*)     Nitrite, Urine POSITIVE (*)     Leukocyte Esterase, Urine MODERATE (*)     All other components within normal limits BRAIN NATRIURETIC PEPTIDE - Abnormal; Notable for the following components:    Pro-BNP 14,327 (*)     All other components within normal limits   MICROSCOPIC URINALYSIS - Abnormal; Notable for the following components:    WBC, UA  (*)     RBC, UA 11-20 (*)     Bacteria, UA 3+ (*)     Yeast, UA Present (*)     All other components within normal limits   CULTURE, URINE   LACTATE, SEPSIS   LIPASE   TROPONIN     Previous BUN/crea:    BUN   Date/Time Value Ref Range Status   02/17/2023 06:08 PM 24 (H) 7 - 20 mg/dL Final   02/03/2023 05:33 AM 15 7 - 20 mg/dL Final   02/02/2023 05:07 AM 15 7 - 20 mg/dL Final     Creatinine   Date/Time Value Ref Range Status   02/17/2023 06:08 PM 1.4 (H) 0.8 - 1.3 mg/dL Final   02/03/2023 05:33 AM 1.1 0.8 - 1.3 mg/dL Final   02/02/2023 05:07 AM 1.4 (H) 0.8 - 1.3 mg/dL Final     RADIOLOGY:   Plain x-rays were viewed by me:   CT ABDOMEN PELVIS W IV CONTRAST Additional Contrast? None   Final Result   1. Trace abdominopelvic ascites and body wall edema. These findings appear   more prominent since prior CT imaging. 2.  Contracted gallbladder. Mild apparent gallbladder wall thickening may be   due to contracted state and accentuated related to ascites. 3.  Additional chronic and benign findings, as above. XR CHEST PORTABLE   Final Result   Stable cardiomegaly without acute abnormality.            EKG:  Read by me in the absence of a cardiologist shows: Underlying atrial fibrillation with demand pacing, nonspecific ST-T wave abnormality and artifact, paced beats with when compared to 31 January 2023    Medications   cefTRIAXone (ROCEPHIN) 1,000 mg in sodium chloride 0.9 % 50 mL IVPB (mini-bag) (0 mg IntraVENous Stopped 2/17/23 2240)   fluconazole (DIFLUCAN) tablet 150 mg (has no administration in time range)   ondansetron (ZOFRAN-ODT) disintegrating tablet 4 mg (4 mg Oral Given 2/17/23 1810)   iopamidol (ISOVUE-370) 76 % injection 75 mL (75 mLs IntraVENous Given 2/17/23 1947)     Vitals:    02/17/23 2204 02/17/23 2215 02/17/23 2230 02/17/23 2245   BP: 104/80 100/81 99/76 100/76   Pulse: 58 57 50 50   Resp: 19 16 15 20   Temp:       TempSrc:       SpO2: 100% 97% 100% 100%   Weight:         History from : Patient  Limitations to history : None  Chronic Conditions:  has a past medical history of Acute combined systolic and diastolic congestive heart failure (HCC), Atrial fibrillation ([address] CHF (congestive heart failure) ([facility] Hx of blood clots, and Hypertension. Records Reviewed : Inpatient Notes    Disposition Considerations (Tests not ordered but considered, Shared Decision Making, Pt Expectation of Test or Tx.):  MRI not deemed clinically necessary in the ED setting  Discussion with Other Profesionals : Admitting Team      FINAL IMPRESSION:    1. Urinary tract infection in male    2.  General weakness       DISPOSITION Decision To Admit 02/17/2023 09:59:15 PM             Rachel Tucker MD  02/17/23 [id number]

## 2023-02-18 NOTE — PROGRESS NOTES
Message sent to Dr. Robinette Claude to make aware that pt. HR ranging from high 40's to high 50's with b/p of 95/73. Pt. Is due amiodarone 200 mg and want to clarify is dose should still be administered. Awaiting response.

## 2023-02-19 LAB
A/G RATIO: 1.3 (ref 1.1–2.2)
ALBUMIN SERPL-MCNC: 3.5 G/DL (ref 3.4–5)
ALP BLD-CCNC: 86 U/L (ref 40–129)
ALT SERPL-CCNC: 27 U/L (ref 10–40)
ANION GAP SERPL CALCULATED.3IONS-SCNC: 7 MMOL/L (ref 3–16)
AST SERPL-CCNC: 30 U/L (ref 15–37)
BASOPHILS ABSOLUTE: 0 K/UL (ref 0–0.2)
BASOPHILS ABSOLUTE: 0.1 K/UL (ref 0–0.2)
BASOPHILS RELATIVE PERCENT: 1.1 %
BASOPHILS RELATIVE PERCENT: 1.2 %
BILIRUB SERPL-MCNC: 1.1 MG/DL (ref 0–1)
BUN BLDV-MCNC: 19 MG/DL (ref 7–20)
CALCIUM SERPL-MCNC: 8.6 MG/DL (ref 8.3–10.6)
CHLORIDE BLD-SCNC: 103 MMOL/L (ref 99–110)
CO2: 24 MMOL/L (ref 21–32)
CREAT SERPL-MCNC: 1.4 MG/DL (ref 0.8–1.3)
EOSINOPHILS ABSOLUTE: 0.1 K/UL (ref 0–0.6)
EOSINOPHILS ABSOLUTE: 0.2 K/UL (ref 0–0.6)
EOSINOPHILS RELATIVE PERCENT: 2.9 %
EOSINOPHILS RELATIVE PERCENT: 3.9 %
GFR SERPL CREATININE-BSD FRML MDRD: 56 ML/MIN/{1.73_M2}
GLUCOSE BLD-MCNC: 113 MG/DL (ref 70–99)
GLUCOSE BLD-MCNC: 73 MG/DL (ref 70–99)
GLUCOSE BLD-MCNC: 84 MG/DL (ref 70–99)
GLUCOSE BLD-MCNC: 86 MG/DL (ref 70–99)
GLUCOSE BLD-MCNC: 89 MG/DL (ref 70–99)
GLUCOSE BLD-MCNC: 95 MG/DL (ref 70–99)
HCT VFR BLD CALC: 36.2 % (ref 40.5–52.5)
HCT VFR BLD CALC: 37.4 % (ref 40.5–52.5)
HEMOGLOBIN: 11.5 G/DL (ref 13.5–17.5)
HEMOGLOBIN: 11.7 G/DL (ref 13.5–17.5)
LYMPHOCYTES ABSOLUTE: 1.1 K/UL (ref 1–5.1)
LYMPHOCYTES ABSOLUTE: 1.3 K/UL (ref 1–5.1)
LYMPHOCYTES RELATIVE PERCENT: 23.4 %
LYMPHOCYTES RELATIVE PERCENT: 30.6 %
MCH RBC QN AUTO: 26.1 PG (ref 26–34)
MCH RBC QN AUTO: 26.9 PG (ref 26–34)
MCHC RBC AUTO-ENTMCNC: 30.8 G/DL (ref 31–36)
MCHC RBC AUTO-ENTMCNC: 32.3 G/DL (ref 31–36)
MCV RBC AUTO: 83.4 FL (ref 80–100)
MCV RBC AUTO: 84.7 FL (ref 80–100)
MONOCYTES ABSOLUTE: 0.3 K/UL (ref 0–1.3)
MONOCYTES ABSOLUTE: 0.4 K/UL (ref 0–1.3)
MONOCYTES RELATIVE PERCENT: 7.1 %
MONOCYTES RELATIVE PERCENT: 8.5 %
NEUTROPHILS ABSOLUTE: 2.4 K/UL (ref 1.7–7.7)
NEUTROPHILS ABSOLUTE: 2.9 K/UL (ref 1.7–7.7)
NEUTROPHILS RELATIVE PERCENT: 56.9 %
NEUTROPHILS RELATIVE PERCENT: 64.4 %
PDW BLD-RTO: 16.4 % (ref 12.4–15.4)
PDW BLD-RTO: 16.5 % (ref 12.4–15.4)
PERFORMED ON: ABNORMAL
PERFORMED ON: NORMAL
PLATELET # BLD: 212 K/UL (ref 135–450)
PLATELET # BLD: 214 K/UL (ref 135–450)
PMV BLD AUTO: 7.7 FL (ref 5–10.5)
PMV BLD AUTO: 9.6 FL (ref 5–10.5)
POTASSIUM REFLEX MAGNESIUM: 4.8 MMOL/L (ref 3.5–5.1)
RBC # BLD: 4.34 M/UL (ref 4.2–5.9)
RBC # BLD: 4.41 M/UL (ref 4.2–5.9)
SODIUM BLD-SCNC: 134 MMOL/L (ref 136–145)
TOTAL PROTEIN: 6.3 G/DL (ref 6.4–8.2)
WBC # BLD: 4.2 K/UL (ref 4–11)
WBC # BLD: 4.6 K/UL (ref 4–11)

## 2023-02-19 PROCEDURE — 6370000000 HC RX 637 (ALT 250 FOR IP): Performed by: NURSE PRACTITIONER

## 2023-02-19 PROCEDURE — 36415 COLL VENOUS BLD VENIPUNCTURE: CPT

## 2023-02-19 PROCEDURE — 85025 COMPLETE CBC W/AUTO DIFF WBC: CPT

## 2023-02-19 PROCEDURE — 6370000000 HC RX 637 (ALT 250 FOR IP): Performed by: INTERNAL MEDICINE

## 2023-02-19 PROCEDURE — 6360000002 HC RX W HCPCS: Performed by: NURSE PRACTITIONER

## 2023-02-19 PROCEDURE — 80053 COMPREHEN METABOLIC PANEL: CPT

## 2023-02-19 PROCEDURE — 2580000003 HC RX 258: Performed by: NURSE PRACTITIONER

## 2023-02-19 PROCEDURE — 1200000000 HC SEMI PRIVATE

## 2023-02-19 PROCEDURE — 2580000003 HC RX 258: Performed by: INTERNAL MEDICINE

## 2023-02-19 RX ORDER — INSULIN LISPRO 100 [IU]/ML
0-4 INJECTION, SOLUTION INTRAVENOUS; SUBCUTANEOUS
Status: DISCONTINUED | OUTPATIENT
Start: 2023-02-19 | End: 2023-02-21 | Stop reason: HOSPADM

## 2023-02-19 RX ORDER — METOPROLOL SUCCINATE 25 MG/1
12.5 TABLET, EXTENDED RELEASE ORAL 2 TIMES DAILY
Status: DISCONTINUED | OUTPATIENT
Start: 2023-02-19 | End: 2023-02-21 | Stop reason: HOSPADM

## 2023-02-19 RX ORDER — FUROSEMIDE 10 MG/ML
40 INJECTION INTRAMUSCULAR; INTRAVENOUS 2 TIMES DAILY
Status: COMPLETED | OUTPATIENT
Start: 2023-02-19 | End: 2023-02-20

## 2023-02-19 RX ADMIN — SPIRONOLACTONE 25 MG: 25 TABLET ORAL at 14:01

## 2023-02-19 RX ADMIN — ATORVASTATIN CALCIUM 40 MG: 40 TABLET, FILM COATED ORAL at 20:52

## 2023-02-19 RX ADMIN — FINASTERIDE 5 MG: 5 TABLET, FILM COATED ORAL at 08:49

## 2023-02-19 RX ADMIN — APIXABAN 5 MG: 5 TABLET, FILM COATED ORAL at 20:52

## 2023-02-19 RX ADMIN — FUROSEMIDE 40 MG: 10 INJECTION, SOLUTION INTRAMUSCULAR; INTRAVENOUS at 08:49

## 2023-02-19 RX ADMIN — TAMSULOSIN HYDROCHLORIDE 0.4 MG: 0.4 CAPSULE ORAL at 08:49

## 2023-02-19 RX ADMIN — PANTOPRAZOLE SODIUM 40 MG: 40 TABLET, DELAYED RELEASE ORAL at 08:49

## 2023-02-19 RX ADMIN — AMIODARONE HYDROCHLORIDE 200 MG: 200 TABLET ORAL at 08:48

## 2023-02-19 RX ADMIN — FUROSEMIDE 40 MG: 10 INJECTION, SOLUTION INTRAMUSCULAR; INTRAVENOUS at 17:09

## 2023-02-19 RX ADMIN — METOPROLOL SUCCINATE 12.5 MG: 25 TABLET, EXTENDED RELEASE ORAL at 08:48

## 2023-02-19 RX ADMIN — CEFTRIAXONE SODIUM 1000 MG: 1 INJECTION, POWDER, FOR SOLUTION INTRAMUSCULAR; INTRAVENOUS at 20:53

## 2023-02-19 RX ADMIN — SODIUM CHLORIDE, PRESERVATIVE FREE 10 ML: 5 INJECTION INTRAVENOUS at 20:57

## 2023-02-19 RX ADMIN — APIXABAN 5 MG: 5 TABLET, FILM COATED ORAL at 08:48

## 2023-02-19 ASSESSMENT — PAIN SCALES - GENERAL
PAINLEVEL_OUTOF10: 0
PAINLEVEL_OUTOF10: 0

## 2023-02-19 NOTE — PROGRESS NOTES
Pt resting at SOB. Tolerating diet well. Denies pain at this time. Independent in room. Denies other needs. Pt is axox4 and able to answer questions and follow commands throughout assessment. Call light in easy reach will continue to monitor. Don remains draining yellow urine.

## 2023-02-19 NOTE — H&P
Hospital Medicine History & Physical       PCP: No primary care provider on file. Date of Admission: 2/17/2023     Date of Service: Pt seen/examined on 2/17/2023     Pt seen/examined face to face on and admitted as inpatient with expected LOS to be two days but can change depending on diagnostic work up and treatment response. Chief Complaint:         Chief Complaint   Patient presents with    Abdominal Pain       Pt c/o diffuse abdominal pain for the past few days with low energy. Denies vomiting or diarrhea         History Of Present Illness:       57 y.o. male who presented to [facility] with reported that has been having abdominal pain started 3 days ago progressively worsening nothing really improving with association of nausea and vomiting reported with clear nonbloody, patient does report having no diarrhea reports that he was in the hospital was having pain with peeing he had a Don in, patient reported that he wanted to keep the Don even though he was told that he could have UTI. Patient reports that he kept the Don for too long. Otherwise patient reports that he tries to eat healthy and avoids any fried food not because of the pain [place] for healthy lifestyle also admits that has been having brown rectal stool nonbloody also does not smoke drink or do drugs. Patient reports he does have family history of mother having enlarged heart patient otherwise reports he been having progressively worsening fatigue.            Past Medical History:       Past Medical History             Diagnosis Date    Acute combined systolic and diastolic congestive heart failure ([address] 8/31/2022    Atrial fibrillation ([address] 07/25/2019    CHF (congestive heart failure) (HCC)      Hx of blood clots      Hypertension              Past Surgical History:       Past Surgical History             Procedure Laterality Date    CARDIAC DEFIBRILLATOR PLACEMENT Left      CARDIOVERSION   07/26/2019     Dr. Maria Isabel Corado INSERTABLE CARDIAC MONITOR   07/26/2019            Medications Prior to Admission:      Home Medications           Prior to Admission medications    Medication Sig Start Date End Date Taking? Authorizing Provider   amiodarone (CORDARONE) 200 MG tablet Take 1 tablet by mouth 2 times daily For 2 weeks then decrease to 200mg once a day 2/3/23     TRENT Corbett CNP   metoprolol succinate (TOPROL XL) 25 MG extended release tablet Take 1 tablet by mouth in the morning and at bedtime 2/3/23     TRENT Corbett CNP   spironolactone (ALDACTONE) 25 MG tablet Take 1 tablet by mouth Daily with lunch 2/4/23     Huan Rodriguez MD   pantoprazole (PROTONIX) 40 MG tablet Take 1 tablet by mouth daily 2/3/23 3/5/23   Huan Rodriguez MD   finasteride (PROSCAR) 5 MG tablet Take 5 mg by mouth daily       Historical Provider, MD   torsemide (DEMADEX) 20 MG tablet Take 1 tablet by mouth daily 10/31/22     Oneil Estrada MD   empagliflozin (JARDIANCE) 10 MG tablet Take 1 tablet by mouth daily 10/26/22     TRENT Gavin   lisinopril (PRINIVIL;ZESTRIL) 2.5 MG tablet Take 1 tablet by mouth daily 10/26/22     TRENT Gavin   atorvastatin (LIPITOR) 40 MG tablet Take 1 tablet by mouth nightly 10/5/22     TRENT Downs   vitamin D (CHOLECALCIFEROL) 50 MCG (2000 UT) TABS tablet Take 1 tablet by mouth daily 9/21/22     TRENT Downs   potassium chloride (KLOR-CON M) 20 MEQ extended release tablet Take 1 tablet by mouth in the morning and 1 tablet in the evening. Take with meals. 8/1/22 9/8/22   Rubén Esteves MD   apixaban (ELIQUIS) 5 MG TABS tablet Take 1 tablet by mouth 2 times daily 6/17/22     TRENT Corbett CNP   tamsulosin [facility]) 0.4 MG capsule Take 1 capsule by mouth daily 6/18/22     TRENT Corbett CNP            Allergies:  Patient has no known allergies. Social History:             TOBACCO:   reports that he has never smoked.  He has never been exposed to tobacco smoke. He has never used smokeless tobacco.  ETOH:   reports no history of alcohol use. E-cigarette/Vaping         Questions Responses     E-cigarette/Vaping Use Never User     Start Date       Passive Exposure       Quit Date       Counseling Given       Comments                     Family History:       Family History reviewed with patient, and does not pertain and non-contributory to the current illness     Family History             Problem Relation Age of Onset    Heart Disease Mother      High Blood Pressure Mother      Heart Attack Mother      Other Father      Stroke Father      High Blood Pressure Father              REVIEW OF SYSTEMS:      Constitutional:  No Fever, No Chills, No Night Sweats  ENT/Mouth:  No Nasal Congestion,  No Hoarseness, No new mouth lesion  Eyes:  No Eye Pain, No Redness, No Discharge  Cardiovascular:  No Chest Pain, No Orthopnea, No Palpitations  Respiratory:  No Cough, No Sputum, No Dyspnea  Gastrointestinal: No Vomiting, No Diarrhea, + abdominal pain  Genitourinary: No Urinary Frequency, No Hematuria, No Urinary pain  Musculoskeletal:  No worsening Arthralgias, No worsening Myalgias  Skin:  No new Skin Lesions, No new skin rash  Neuro:  No new weakness, No new numbness. Psych:  No suicial ideation, No Violence ideation     PHYSICAL EXAM PERFORMED:     /88   Pulse 50   Temp 97.2 °F (36.2 °C) (Oral)   Resp 16   Ht 6' 2\" (1.88 m)   Wt 205 lb 7.5 oz (93.2 kg)   SpO2 99%   BMI 26.38 kg/m²      General appearance:  mild acute distress, appears older than stated age  HEENT:   atraumatic, sclera anicteric, Conjunctivae clear. Neck: Supple,Trachea midline, no goiter  Respiratory:minimal accessory muscle usage, Normal respiratory effort. Clear to auscultation, bilaterally without wheezing  Cardiovascular:  Regular rate and rhythm, capillary refill 2 seconds  Abdomen: Soft, non-tender, non-distended with normal bowel sounds.   Musculoskeletal:  No clubbing, cyanosis. trace edema LE bilaterally. Skin: turgor normal.  No new rashes or lesions. Neurologic: Alert and oriented x4, no new focal sensory/motor deficits. Labs:          Recent Labs     02/17/23  1808   WBC 5.7   HGB 12.8*   HCT 39.5*             Recent Labs     02/17/23  1808      K 4.8      CO2 23   BUN 24*   CREATININE 1.4*   CALCIUM 9.5          Recent Labs     02/17/23  1808   AST 37   ALT 32   BILITOT 2.9*   ALKPHOS 94      No results for input(s): INR in the last 72 hours. Recent Labs     02/17/23  1808   TROPONINI <0.01         Urinalysis:            Lab Results   Component Value Date/Time     NITRU POSITIVE 02/17/2023 06:08 PM     WBCUA  02/17/2023 06:08 PM     BACTERIA 3+ 02/17/2023 06:08 PM     RBCUA 11-20 02/17/2023 06:08 PM     BLOODU LARGE 02/17/2023 06:08 PM     SPECGRAV 1.025 02/17/2023 06:08 PM     GLUCOSEU 500 02/17/2023 06:08 PM         Radiology:      CXR: I have reviewed the CXR with the following interpretation:   No acute process cardiomegaly  EKG:  I have reviewed the EKG with the following interpretation:   Pacemaker  CT ABDOMEN PELVIS W IV CONTRAST Additional Contrast? None   Final Result   1. Trace abdominopelvic ascites and body wall edema. These findings appear   more prominent since prior CT imaging. 2.  Contracted gallbladder. Mild apparent gallbladder wall thickening may be   due to contracted state and accentuated related to ascites. 3.  Additional chronic and benign findings, as above. XR CHEST PORTABLE   Final Result   Stable cardiomegaly without acute abnormality.                  ASSESSMENT AND PLAN:           Active Hospital Problems     Diagnosis Date Noted    UTI (urinary tract infection) [N39.0] 02/17/2023       Priority: Medium      Acute cystitis,  Ceftriaxone ordered    Acute renal failure  Baseline creatinine 1.1  Currently on performed  IVF pending     GERD: Continue home medication  Chronic Paroxysmal Atrial fibrilliation: unspecified and clinically unable to determine etiology. Controlled on home medication. currently Anticoagulated and Monitored on tele  Essential Hypertension: Reviewed patient's medications and plan is to continue home medication  BPH - Controlled with active acute obstructive Uropathy,Continued on home medications. Monitor with bladder scan  Type 2 Diabetes: Reviewed patient's medications. Plan is to hold oral medications and continued with reduced home dose of long acting and Insulin sliding scale    Face-to-face discussion with the primary ER physician in regards to symptoms, history, physical exam, diagnosis and treatment, collaborative decision was to admit the patient.      Diet: regular diet     DVT Prophylaxis: eliquis     Dispo:   Expected LOS of two days            Elieser Ballesteros DO

## 2023-02-19 NOTE — PROGRESS NOTES
[facility] PROGRESS NOTE    2/19/2023 7:30 AM        Name: Shruti Horton . Admitted: 2/17/2023  Primary Care Provider: No primary care provider on file. (Tel: None)      Subjective:  . Admitted with UTI and abdominal pain   Started on antibiotics for UTI and feels better   Seen with RN at bedside  Was up to shower yesterday  Ambulation encouraged     Pt was seen tSaturday by Dr Oscar Teague from urology. Stressed the need for follow up care  ( he is homeless at intervals and does not have a car or cell phone )  currently staying at a friends house.      Appt February 24 with urology     Reviewed interval ancillary notes    Current Medications  insulin lispro (HUMALOG) injection vial 0-4 Units, 4x Daily AC & HS  sodium chloride flush 0.9 % injection 10 mL, 2 times per day  sodium chloride flush 0.9 % injection 10 mL, PRN  0.9 % sodium chloride infusion, PRN  potassium chloride 10 mEq/100 mL IVPB (Peripheral Line), PRN  magnesium sulfate 2000 mg in 50 mL IVPB premix, PRN  promethazine (PHENERGAN) tablet 12.5 mg, Q6H PRN   Or  ondansetron (ZOFRAN) injection 4 mg, Q6H PRN  acetaminophen (TYLENOL) tablet 650 mg, Q6H PRN   Or  acetaminophen (TYLENOL) suppository 650 mg, Q6H PRN  amiodarone (CORDARONE) tablet 200 mg, Daily  apixaban (ELIQUIS) tablet 5 mg, BID  atorvastatin (LIPITOR) tablet 40 mg, Nightly  finasteride (PROSCAR) tablet 5 mg, Daily  metoprolol succinate (TOPROL XL) extended release tablet 25 mg, BID  pantoprazole (PROTONIX) tablet 40 mg, Daily  tamsulosin (FLOMAX) capsule 0.4 mg, Daily  spironolactone (ALDACTONE) tablet 25 mg, Lunch  dextrose bolus 10% 125 mL, PRN   Or  dextrose bolus 10% 250 mL, PRN  glucagon (rDNA) injection 1 mg, PRN  dextrose 10 % infusion, Continuous PRN  cefTRIAXone (ROCEPHIN) 1,000 mg in sodium chloride 0.9 % 50 mL IVPB (mini-bag), Q24H      Objective:  /60   Pulse 52   Temp 97.7 °F (36.5 °C) (Oral)   Resp 17   Ht 6' 2\" (1.88 m)   Wt 207 lb 14.3 oz (94.3 kg)   SpO2 98%   BMI 26.69 kg/m²     Intake/Output Summary (Last 24 hours) at 2/19/2023 0730  Last data filed at 2/19/2023 0526  Gross per 24 hour   Intake 719 ml   Output 900 ml   Net -181 ml        Wt Readings from Last 3 Encounters:   02/19/23 207 lb 14.3 oz (94.3 kg)   02/03/23 182 lb 4.8 oz (82.7 kg)   01/10/23 196 lb (88.9 kg)       General appearance:  Appears comfortable,  alert and pleasant   Eyes: Sclera clear. Pupils equal.  ENT: Moist oral mucosa. Trachea midline, no adenopathy. Cardiovascular: Regular rhythm, normal S1, S2. + murmur. + edema in lower extremities  Respiratory: Not using accessory muscles. Good inspiratory effort. Clear to auscultation bilaterally, no wheeze or crackles. GI: Abdomen soft, no tenderness, not distended, normal bowel sounds, ernst with yellow urine with scant sediment   Musculoskeletal: No cyanosis in digits, neck supple  Neurology: CN 2-12 grossly intact. No speech or motor deficits  Psych: Normal affect. Alert and oriented in time, place and person  Skin: Warm, dry, normal turgor    Labs and Tests:  CBC:   Recent Labs     02/17/23  1808 02/19/23  0622   WBC 5.7 4.6   HGB 12.8* 11.7*    212       BMP:    Recent Labs     02/17/23 1808 02/18/23  0626 02/19/23  0622    134* 134*   K 4.8 4.7 4.8    104 103   CO2 23 21 24   BUN 24* 21* 19   CREATININE 1.4* 1.5* 1.4*   GLUCOSE 77 101* 86       Hepatic:   Recent Labs     02/17/23 1808 02/19/23  0622   AST 37 30   ALT 32 27   BILITOT 2.9* 1.1*   ALKPHOS 94 86        Trace abdominopelvic ascites and body wall edema. These findings appear   more prominent since prior CT imaging. 2.  Contracted gallbladder. Mild apparent gallbladder wall thickening may be   due to contracted state and accentuated related to ascites. 3.  Additional chronic and benign findings, as above.          Chest xray:  Stable cardiomegaly without acute abnormality. Problem List  Principal Problem:    UTI (urinary tract infection)  Resolved Problems:    * No resolved hospital problems. *       Assessment & Plan:   UTI:  on rocephin  and diflucan will follow the cultures. Currently with GNR  Urinary retention:  pt was seen by urology, Dr Kaylin Miller, ernst needs to remain in place. Has appt next week. PSA has been elevated which may represent prostate cancer,  pt again updated ,  he is aware he may need surgical intervention in the future. Chronic systolic HF with EF 90%: follows with cardiology,  has declined life vest. BNP elevated, however BP soft, decreased BB to 12.5 bid and add IV lasix. .. he looks a little fluid overloaded. Continue with aldactone. Will watch his creat closely. PAF with Hx of HENOK thrombus on eliquis and amiodarone   T2DM:  AIC 5.7      Anticipate d/c home in the am after filling his scripts in the outpatient pharmacy. He will need transportation         Diet: ADULT DIET; Regular; 3 carb choices (45 gm/meal); Low Fat/Low Chol/High Fiber/2 gm Na;  Low Sodium (2 gm)  Code:Full Code  DVT PPX      Judge Ruiz, TRENT - CNP   2/19/2023 7:30 AM

## 2023-02-20 LAB
A/G RATIO: 1.3 (ref 1.1–2.2)
ALBUMIN SERPL-MCNC: 3.5 G/DL (ref 3.4–5)
ALP BLD-CCNC: 93 U/L (ref 40–129)
ALT SERPL-CCNC: 25 U/L (ref 10–40)
ANION GAP SERPL CALCULATED.3IONS-SCNC: 10 MMOL/L (ref 3–16)
AST SERPL-CCNC: 25 U/L (ref 15–37)
BASOPHILS ABSOLUTE: 0 K/UL (ref 0–0.2)
BASOPHILS RELATIVE PERCENT: 0.9 %
BILIRUB SERPL-MCNC: 1 MG/DL (ref 0–1)
BUN BLDV-MCNC: 20 MG/DL (ref 7–20)
CALCIUM SERPL-MCNC: 8.2 MG/DL (ref 8.3–10.6)
CHLORIDE BLD-SCNC: 101 MMOL/L (ref 99–110)
CO2: 24 MMOL/L (ref 21–32)
CREAT SERPL-MCNC: 1.3 MG/DL (ref 0.8–1.3)
EOSINOPHILS ABSOLUTE: 0.1 K/UL (ref 0–0.6)
EOSINOPHILS RELATIVE PERCENT: 3.7 %
GFR SERPL CREATININE-BSD FRML MDRD: >60 ML/MIN/{1.73_M2}
GLUCOSE BLD-MCNC: 73 MG/DL (ref 70–99)
GLUCOSE BLD-MCNC: 83 MG/DL (ref 70–99)
GLUCOSE BLD-MCNC: 86 MG/DL (ref 70–99)
GLUCOSE BLD-MCNC: 89 MG/DL (ref 70–99)
GLUCOSE BLD-MCNC: 90 MG/DL (ref 70–99)
GLUCOSE BLD-MCNC: 95 MG/DL (ref 70–99)
HCT VFR BLD CALC: 34.7 % (ref 40.5–52.5)
HEMOGLOBIN: 11.4 G/DL (ref 13.5–17.5)
LYMPHOCYTES ABSOLUTE: 1 K/UL (ref 1–5.1)
LYMPHOCYTES RELATIVE PERCENT: 23.9 %
MCH RBC QN AUTO: 27 PG (ref 26–34)
MCHC RBC AUTO-ENTMCNC: 32.8 G/DL (ref 31–36)
MCV RBC AUTO: 82.4 FL (ref 80–100)
MONOCYTES ABSOLUTE: 0.3 K/UL (ref 0–1.3)
MONOCYTES RELATIVE PERCENT: 7.8 %
NEUTROPHILS ABSOLUTE: 2.6 K/UL (ref 1.7–7.7)
NEUTROPHILS RELATIVE PERCENT: 63.7 %
ORGANISM: ABNORMAL
PDW BLD-RTO: 15.5 % (ref 12.4–15.4)
PERFORMED ON: NORMAL
PLATELET # BLD: 233 K/UL (ref 135–450)
PMV BLD AUTO: 8.2 FL (ref 5–10.5)
POTASSIUM REFLEX MAGNESIUM: 4.3 MMOL/L (ref 3.5–5.1)
PRO-BNP: 4893 PG/ML (ref 0–124)
RBC # BLD: 4.21 M/UL (ref 4.2–5.9)
SODIUM BLD-SCNC: 135 MMOL/L (ref 136–145)
TOTAL PROTEIN: 6.2 G/DL (ref 6.4–8.2)
URINE CULTURE, ROUTINE: ABNORMAL
WBC # BLD: 4 K/UL (ref 4–11)

## 2023-02-20 PROCEDURE — 6360000002 HC RX W HCPCS: Performed by: NURSE PRACTITIONER

## 2023-02-20 PROCEDURE — 6370000000 HC RX 637 (ALT 250 FOR IP): Performed by: NURSE PRACTITIONER

## 2023-02-20 PROCEDURE — 2580000003 HC RX 258: Performed by: INTERNAL MEDICINE

## 2023-02-20 PROCEDURE — 85025 COMPLETE CBC W/AUTO DIFF WBC: CPT

## 2023-02-20 PROCEDURE — 80053 COMPREHEN METABOLIC PANEL: CPT

## 2023-02-20 PROCEDURE — 2580000003 HC RX 258: Performed by: NURSE PRACTITIONER

## 2023-02-20 PROCEDURE — 36415 COLL VENOUS BLD VENIPUNCTURE: CPT

## 2023-02-20 PROCEDURE — 83880 ASSAY OF NATRIURETIC PEPTIDE: CPT

## 2023-02-20 PROCEDURE — 6370000000 HC RX 637 (ALT 250 FOR IP): Performed by: PHYSICIAN ASSISTANT

## 2023-02-20 PROCEDURE — 1200000000 HC SEMI PRIVATE

## 2023-02-20 PROCEDURE — 6370000000 HC RX 637 (ALT 250 FOR IP): Performed by: INTERNAL MEDICINE

## 2023-02-20 RX ORDER — FUROSEMIDE 10 MG/ML
40 INJECTION INTRAMUSCULAR; INTRAVENOUS DAILY
Status: DISCONTINUED | OUTPATIENT
Start: 2023-02-21 | End: 2023-02-21 | Stop reason: HOSPADM

## 2023-02-20 RX ORDER — SENNA AND DOCUSATE SODIUM 50; 8.6 MG/1; MG/1
2 TABLET, FILM COATED ORAL DAILY PRN
Status: DISCONTINUED | OUTPATIENT
Start: 2023-02-20 | End: 2023-02-21 | Stop reason: HOSPADM

## 2023-02-20 RX ADMIN — SPIRONOLACTONE 25 MG: 25 TABLET ORAL at 11:19

## 2023-02-20 RX ADMIN — ATORVASTATIN CALCIUM 40 MG: 40 TABLET, FILM COATED ORAL at 20:18

## 2023-02-20 RX ADMIN — FINASTERIDE 5 MG: 5 TABLET, FILM COATED ORAL at 11:14

## 2023-02-20 RX ADMIN — AMIODARONE HYDROCHLORIDE 200 MG: 200 TABLET ORAL at 11:14

## 2023-02-20 RX ADMIN — PANTOPRAZOLE SODIUM 40 MG: 40 TABLET, DELAYED RELEASE ORAL at 11:14

## 2023-02-20 RX ADMIN — APIXABAN 5 MG: 5 TABLET, FILM COATED ORAL at 11:14

## 2023-02-20 RX ADMIN — FUROSEMIDE 40 MG: 10 INJECTION, SOLUTION INTRAMUSCULAR; INTRAVENOUS at 11:14

## 2023-02-20 RX ADMIN — SODIUM CHLORIDE, PRESERVATIVE FREE 10 ML: 5 INJECTION INTRAVENOUS at 20:19

## 2023-02-20 RX ADMIN — SODIUM CHLORIDE, PRESERVATIVE FREE 10 ML: 5 INJECTION INTRAVENOUS at 11:14

## 2023-02-20 RX ADMIN — METOPROLOL SUCCINATE 12.5 MG: 25 TABLET, EXTENDED RELEASE ORAL at 11:14

## 2023-02-20 RX ADMIN — TAMSULOSIN HYDROCHLORIDE 0.4 MG: 0.4 CAPSULE ORAL at 11:14

## 2023-02-20 RX ADMIN — CEFTRIAXONE SODIUM 1000 MG: 1 INJECTION, POWDER, FOR SOLUTION INTRAMUSCULAR; INTRAVENOUS at 20:24

## 2023-02-20 RX ADMIN — STANDARDIZED SENNA CONCENTRATE AND DOCUSATE SODIUM 2 TABLET: 8.6; 5 TABLET ORAL at 23:26

## 2023-02-20 RX ADMIN — METOPROLOL SUCCINATE 12.5 MG: 25 TABLET, EXTENDED RELEASE ORAL at 20:18

## 2023-02-20 NOTE — PROGRESS NOTES
Consult received    Patient scheduled for office visit 02/24, there is some concern for f/u given social issues. Will work on getting patient scheduled for cysto/trusp/prostate bx as an inpatient. Will update surgery plan and time here when obtained. NPO midnight in case. Will follow.      Hector Hanley CNP  02/20/2023

## 2023-02-20 NOTE — PROGRESS NOTES
Shift assessment complete, VSS, A&Ox4. Morning medications administered per MAR. Patient took a shower this morning. Patient states no further needs at this time. Call light and personal belongings within reach.

## 2023-02-20 NOTE — CARE COORDINATION
02/20/23 1134   Readmission Assessment   Number of Days since last admission? 8-30 days   Previous Disposition Home Alone   Who is being Interviewed Patient   What was the patient's/caregiver's perception as to why they think they needed to return back to the hospital?   (Patient states that he returned to hospital after experiencing abdominal pain)   Did you visit your Primary Care Physician after you left the hospital, before you returned this time? No   Why weren't you able to visit your PCP? Did not have an appointment   Did you see a specialist, such as Cardiac, Pulmonary, Orthopedic Physician, etc. after you left the hospital? No   Who advised the patient to return to the hospital? Self-referral   Does the patient report anything that got in the way of taking their medications? No  (Patient states that he was able to finish his whole course of antibiotics from his prior admission)   In our efforts to provide the best possible care to you and others like you, can you think of anything that we could have done to help you after you left the hospital the first time, so that you might not have needed to return so soon? Teaching during hospitalization regarding your illness; Discharge instructions that are concise, clear, and non contradictory

## 2023-02-20 NOTE — PROGRESS NOTES
[facility] PROGRESS NOTE    2/20/2023 10:11 AM        Name: Vishnu Tony . Admitted: 2/17/2023  Primary Care Provider: No primary care provider on file. (Tel: None)      Subjective:      Seen this am while up walking in the room  He feels well and is in good spirits  Weight down with IV lasix     Admitted with UTI and abdominal pain   Started on antibiotics for UTI and feels better      he is homeless at intervals and does not have a car or cell phone,   currently staying at a friends house.      Appt February 24 with urology     Reviewed interval ancillary notes    Current Medications  insulin lispro (HUMALOG) injection vial 0-4 Units, 4x Daily AC & HS  furosemide (LASIX) injection 40 mg, BID  metoprolol succinate (TOPROL XL) extended release tablet 12.5 mg, BID  sodium chloride flush 0.9 % injection 10 mL, 2 times per day  sodium chloride flush 0.9 % injection 10 mL, PRN  0.9 % sodium chloride infusion, PRN  potassium chloride 10 mEq/100 mL IVPB (Peripheral Line), PRN  magnesium sulfate 2000 mg in 50 mL IVPB premix, PRN  promethazine (PHENERGAN) tablet 12.5 mg, Q6H PRN   Or  ondansetron (ZOFRAN) injection 4 mg, Q6H PRN  acetaminophen (TYLENOL) tablet 650 mg, Q6H PRN   Or  acetaminophen (TYLENOL) suppository 650 mg, Q6H PRN  amiodarone (CORDARONE) tablet 200 mg, Daily  apixaban (ELIQUIS) tablet 5 mg, BID  atorvastatin (LIPITOR) tablet 40 mg, Nightly  finasteride (PROSCAR) tablet 5 mg, Daily  pantoprazole (PROTONIX) tablet 40 mg, Daily  tamsulosin (FLOMAX) capsule 0.4 mg, Daily  spironolactone (ALDACTONE) tablet 25 mg, Lunch  dextrose bolus 10% 125 mL, PRN   Or  dextrose bolus 10% 250 mL, PRN  glucagon (rDNA) injection 1 mg, PRN  dextrose 10 % infusion, Continuous PRN  cefTRIAXone (ROCEPHIN) 1,000 mg in sodium chloride 0.9 % 50 mL IVPB (mini-bag), Q24H      Objective:  BP 98/78   Pulse 57   Temp 97.9 °F (36.6 °C) (Oral)   Resp 18   Ht 6' 2\" (1.88 m)   Wt 201 lb 15.1 oz (91.6 kg)   SpO2 99%   BMI 25.93 kg/m²     Intake/Output Summary (Last 24 hours) at 2/20/2023 1011  Last data filed at 2/20/2023 0521  Gross per 24 hour   Intake 780 ml   Output 2550 ml   Net -1770 ml        Wt Readings from Last 3 Encounters:   02/20/23 201 lb 15.1 oz (91.6 kg)   02/03/23 182 lb 4.8 oz (82.7 kg)   01/10/23 196 lb (88.9 kg)       General appearance:  Appears comfortable,  alert and pleasant   Eyes: Sclera clear. Pupils equal.  ENT: Moist oral mucosa. Trachea midline, no adenopathy. Cardiovascular: Regular rhythm, normal S1, S2. + murmur. + edema in lower extremities  Respiratory: Not using accessory muscles. Good inspiratory effort. Clear to auscultation bilaterally, no wheeze or crackles. GI: Abdomen soft, no tenderness, not distended, normal bowel sounds, ernst with yellow urine with scant sediment   Musculoskeletal: No cyanosis in digits, neck supple  Neurology: CN 2-12 grossly intact. No speech or motor deficits  Psych: Normal affect. Alert and oriented in time, place and person  Skin: Warm, dry, normal turgor    Labs and Tests:  CBC:   Recent Labs     02/18/23  0627 02/19/23  0622 02/20/23  0551   WBC 4.2 4.6 4.0   HGB 11.5* 11.7* 11.4*    212 233       BMP:    Recent Labs     02/18/23  0626 02/19/23  0622 02/20/23  0551   * 134* 135*   K 4.7 4.8 4.3    103 101   CO2 21 24 24   BUN 21* 19 20   CREATININE 1.5* 1.4* 1.3   GLUCOSE 101* 86 90       Hepatic:   Recent Labs     02/17/23  1808 02/19/23  0622 02/20/23  0551   AST 37 30 25   ALT 32 27 25   BILITOT 2.9* 1.1* 1.0   ALKPHOS 94 86 93        Trace abdominopelvic ascites and body wall edema. These findings appear   more prominent since prior CT imaging. 2.  Contracted gallbladder. Mild apparent gallbladder wall thickening may be   due to contracted state and accentuated related to ascites. 3.  Additional chronic and benign findings, as above. Chest xray:  Stable cardiomegaly without acute abnormality. Klebsiella aerogenes (1)    Antibiotic Interpretation Microscan  Method Status    amoxicillin-clavulanate Resistant >16/8 mcg/mL BACTERIAL SUSCEPTIBILITY PANEL BY PAT     ampicillin Resistant >16 mcg/mL BACTERIAL SUSCEPTIBILITY PANEL BY PAT     ampicillin-sulbactam Resistant <=8/4 mcg/mL BACTERIAL SUSCEPTIBILITY PANEL BY PAT     ceFAZolin Resistant >16 mcg/mL BACTERIAL SUSCEPTIBILITY PANEL BY PAT     cefepime Sensitive <=2 mcg/mL BACTERIAL SUSCEPTIBILITY PANEL BY PAT     cefTRIAXone Sensitive <=1 mcg/mL BACTERIAL SUSCEPTIBILITY PANEL BY PAT     cefuroxime Resistant <=4 mcg/mL BACTERIAL SUSCEPTIBILITY PANEL BY PAT     ciprofloxacin Sensitive <=1 mcg/mL BACTERIAL SUSCEPTIBILITY PANEL BY PAT     ertapenem Sensitive <=0.5 mcg/mL BACTERIAL SUSCEPTIBILITY PANEL BY PAT     gentamicin Sensitive <=4 mcg/mL BACTERIAL SUSCEPTIBILITY PANEL BY PAT     meropenem Sensitive <=1 mcg/mL BACTERIAL SUSCEPTIBILITY PANEL BY PAT     nitrofurantoin Sensitive <=32 mcg/mL BACTERIAL SUSCEPTIBILITY PANEL BY PAT     piperacillin-tazobactam Sensitive <=16 mcg/mL BACTERIAL SUSCEPTIBILITY PANEL BY PAT     trimethoprim-sulfamethoxazole Sensitive <=2/38 mcg/mL BACTERIAL SUSCEPTIBILITY PANEL BY PAT           Problem List  Principal Problem:    UTI (urinary tract infection)  Resolved Problems:    * No resolved hospital problems. *       Assessment & Plan:   UTI:  on rocephin and diflucan . Continue with Rocephin   Urinary retention with presumed prostate cancer due to elevated PSA with + family history: I discussed with urology,  will remove ernst for voiding trial.  Pt needs cysto and prostate biopsy possible prostatectomy etc. Unclear if biopsy can be performed    Acute on Chronic systolic HF with EF 13%: follows with cardiology,  has declined life vest. BNP  trending down with IV lasix.   BP soft, decreased BB to 12.5 bid ,  received BID IV lasix,  will decrease to 40 mg IV daily ,   Continue with aldactone. Will watch his creat closely. PAF with Hx of HENOK thrombus on eliquis and amiodarone   T2DM:  AIC 5.7      When patient is d/c will need to fill meds in pharmacy         Diet: ADULT DIET; Regular; 3 carb choices (45 gm/meal); Low Fat/Low Chol/High Fiber/2 gm Na;  Low Sodium (2 gm)  Code:Full Code  DVT PPX      TRENT Seo CNP   2/20/2023 10:11 AM

## 2023-02-20 NOTE — CARE COORDINATION
Discharge Planning Note:    Chart reviewed and it appears that patient has minimal needs for discharge at this time. Discussed with patients nurse and requested that case management be notified if discharge needs are identified.     - Current discharge plan is for the patient to return home. - Needs Meds-to-Beds. - Will needs LYFT home. Case management will continue to follow progress and update discharge plan as needed.       Risk of Readmission Score: 27%    SHAWNEE Covarrubias RN    [facility]  Phone: [phone]

## 2023-02-21 VITALS
TEMPERATURE: 97.8 F | HEART RATE: 57 BPM | RESPIRATION RATE: 18 BRPM | DIASTOLIC BLOOD PRESSURE: 84 MMHG | OXYGEN SATURATION: 100 % | SYSTOLIC BLOOD PRESSURE: 130 MMHG | WEIGHT: 198.41 LBS | BODY MASS INDEX: 25.46 KG/M2 | HEIGHT: 74 IN

## 2023-02-21 LAB
ALBUMIN SERPL-MCNC: 3.5 G/DL (ref 3.4–5)
ANION GAP SERPL CALCULATED.3IONS-SCNC: 8 MMOL/L (ref 3–16)
BUN BLDV-MCNC: 20 MG/DL (ref 7–20)
CALCIUM SERPL-MCNC: 8.5 MG/DL (ref 8.3–10.6)
CHLORIDE BLD-SCNC: 101 MMOL/L (ref 99–110)
CO2: 25 MMOL/L (ref 21–32)
CREAT SERPL-MCNC: 1.2 MG/DL (ref 0.8–1.3)
GFR SERPL CREATININE-BSD FRML MDRD: >60 ML/MIN/{1.73_M2}
GLUCOSE BLD-MCNC: 101 MG/DL (ref 70–99)
GLUCOSE BLD-MCNC: 76 MG/DL (ref 70–99)
GLUCOSE BLD-MCNC: 91 MG/DL (ref 70–99)
PERFORMED ON: ABNORMAL
PERFORMED ON: NORMAL
PHOSPHORUS: 3.4 MG/DL (ref 2.5–4.9)
POTASSIUM SERPL-SCNC: 4.5 MMOL/L (ref 3.5–5.1)
SODIUM BLD-SCNC: 134 MMOL/L (ref 136–145)

## 2023-02-21 PROCEDURE — 51798 US URINE CAPACITY MEASURE: CPT

## 2023-02-21 PROCEDURE — 6370000000 HC RX 637 (ALT 250 FOR IP): Performed by: INTERNAL MEDICINE

## 2023-02-21 PROCEDURE — 36415 COLL VENOUS BLD VENIPUNCTURE: CPT

## 2023-02-21 PROCEDURE — 6360000002 HC RX W HCPCS: Performed by: NURSE PRACTITIONER

## 2023-02-21 PROCEDURE — 80069 RENAL FUNCTION PANEL: CPT

## 2023-02-21 PROCEDURE — 2580000003 HC RX 258: Performed by: INTERNAL MEDICINE

## 2023-02-21 RX ORDER — CIPROFLOXACIN 500 MG/1
500 TABLET, FILM COATED ORAL 2 TIMES DAILY
Qty: 6 TABLET | Refills: 0 | Status: SHIPPED | OUTPATIENT
Start: 2023-02-21 | End: 2023-02-24

## 2023-02-21 RX ORDER — METOPROLOL SUCCINATE 25 MG/1
12.5 TABLET, EXTENDED RELEASE ORAL 2 TIMES DAILY
Qty: 30 TABLET | Refills: 3 | Status: ON HOLD | OUTPATIENT
Start: 2023-02-21 | End: 2023-03-09 | Stop reason: HOSPADM

## 2023-02-21 RX ADMIN — SPIRONOLACTONE 25 MG: 25 TABLET ORAL at 12:24

## 2023-02-21 RX ADMIN — SODIUM CHLORIDE, PRESERVATIVE FREE 10 ML: 5 INJECTION INTRAVENOUS at 08:56

## 2023-02-21 RX ADMIN — FUROSEMIDE 40 MG: 10 INJECTION, SOLUTION INTRAMUSCULAR; INTRAVENOUS at 08:55

## 2023-02-21 NOTE — DISCHARGE SUMMARY
[facility]    Patient Demographics    Patient. Haider Roverto  Date of Birth. 1960  MRN. [id number]     Primary care provider. No primary care provider on file. (Tel: None)    Admit date: 2/17/2023    Discharge date (blank if same as Note Date): Note Date: 2/21/2023     Reason for Hospitalization. Chief Complaint   Patient presents with    Abdominal Pain     Pt c/o diffuse abdominal pain for the past few days with low energy. Denies vomiting or diarrhea         Significant Findings. Principal Problem:    UTI (urinary tract infection)  Resolved Problems:    * No resolved hospital problems. *       Problems and results from this hospitalization that need follow up. Acute on chronic combined CHF  Elevated PSA  Urinary retention  Significant test results and incidental findings. CT ABDOMEN PELVIS W IV CONTRAST Additional Contrast? None   Final Result   1. Trace abdominopelvic ascites and body wall edema. These findings appear   more prominent since prior CT imaging. 2.  Contracted gallbladder. Mild apparent gallbladder wall thickening may be   due to contracted state and accentuated related to ascites. 3.  Additional chronic and benign findings, as above. XR CHEST PORTABLE   Final Result   Stable cardiomegaly without acute abnormality. Invasive procedures and treatments. None     [place] [facility]. Patient is a 77-year-old male who presented with a 3-day history of worsening abdominal pain associate with nausea and vomiting. In the emergency room CT abdomen pelvis revealed trace ascites, mild gallbladder wall thickening due to contracted state. UA was suggestive of UTI. Creatinine was mildly elevated at 1.4. Patient was admitted and started on IV antibiotics. He was seen by urology for urinary retention.  He is known to have a PSA of 20 but has not followed up with urology since 2020. He has an appt this coming Friday and will need an outpatient cysto with prostate biopsy. He states he has a ride for this appt. Don was removed and he is voiding well. Urine cultures were positive for klebsiella. He was discharged home on oral abx in stable condition. Consults. IP CONSULT TO UROLOGY    Physical examination on discharge day. /86   Pulse 58   Temp 97.4 °F (36.3 °C) (Oral)   Resp 16   Ht 6' 2\" (1.88 m)   Wt 201 lb 15.1 oz (91.6 kg)   SpO2 100%   BMI 25.93 kg/m²   General appearance. Alert. Looks comfortable. HEENT. Sclera clear. Moist mucus membranes. Cardiovascular. Regular rate and rhythm, normal S1, S2. No murmur. Respiratory. Not using accessory muscles. Clear to auscultation bilaterally, no wheeze. Gastrointestinal. Abdomen soft, non-tender, not distended, normal bowel sounds  Neurology. Facial symmetry. No speech deficits. Moving all extremities equally. Extremities. No edema in lower extremities. Skin. Warm, dry, normal turgor    Condition at time of discharge stable    Medication instructions provided to patient at discharge.      Medication List        START taking these medications      ciprofloxacin 500 MG tablet  Commonly known as: CIPRO  Take 1 tablet by mouth 2 times daily for 3 days            CHANGE how you take these medications      metoprolol succinate 25 MG extended release tablet  Commonly known as: TOPROL XL  Take 0.5 tablets by mouth in the morning and at bedtime  What changed: how much to take            CONTINUE taking these medications      amiodarone 200 MG tablet  Commonly known as: CORDARONE  Take 1 tablet by mouth 2 times daily For 2 weeks then decrease to 200mg once a day     apixaban 5 MG Tabs tablet  Commonly known as: ELIQUIS  Take 1 tablet by mouth 2 times daily     atorvastatin 40 MG tablet  Commonly known as: LIPITOR  Take 1 tablet by mouth nightly     empagliflozin 10 MG tablet  Commonly known as: Jardiance  Take 1 tablet by mouth daily     finasteride 5 MG tablet  Commonly known as: PROSCAR     lisinopril 2.5 MG tablet  Commonly known as: PRINIVIL;ZESTRIL  Take 1 tablet by mouth daily     pantoprazole 40 MG tablet  Commonly known as: PROTONIX  Take 1 tablet by mouth daily     spironolactone 25 MG tablet  Commonly known as: ALDACTONE  Take 1 tablet by mouth Daily with lunch     tamsulosin 0.4 MG capsule  Commonly known as: FLOMAX  Take 1 capsule by mouth daily     torsemide 20 MG tablet  Commonly known as: DEMADEX  Take 1 tablet by mouth daily     vitamin D 50 MCG (2000 UT) Tabs tablet  Commonly known as: CHOLECALCIFEROL  Take 1 tablet by mouth daily            STOP taking these medications      potassium chloride 20 MEQ extended release tablet  Commonly known as: KLOR-CON M               Where to Get Your Medications        These medications were sent to [address]  [address] [facility], [address]      Phone: [phone]   ciprofloxacin 500 MG tablet  metoprolol succinate 25 MG extended release tablet         Discharge recommendations given to patient. Follow Up. CHF clinic in 1 week, urology this coming Friday   Disposition. home  Activity. activity as tolerated  Diet: ADULT DIET; Regular; Low Sodium (2 gm)      Spent 36 minutes in discharge process. Signed:   Kennedy Leong PA-C     2/21/2023 11:29 AM

## 2023-02-21 NOTE — PROGRESS NOTES
Shift assessment complete, VSS, A&Ox4. Morning medications held due to NP status for possible procedure. Patient given supplies to get cleaned up. Patient states no further needs at this time. Call light and personal belongings within reach. 3:15 PM  Outpatient pharmacy delivered meds to this nurse. Put meds in omnicell until patient discharges.

## 2023-02-21 NOTE — CONSULTS
Urology Consult Note  [facility]     Patient: Angelica Jones MRN: [id number]  Room/Bed: [facility]7410/5547-96   YOB: 1960  Age/Sex: 57 y.o.male  Admission Date: 2/17/2023     Date of Service:  2/21/2023    Consulting Provider: TRENT Ball - CNP  Admitting/Requesting Physician: Hector Hall DO  Primary Care Physician: No primary care provider on file. Reason for Consult: Retention, Elevated PSA    ASSESSMENT/PLAN   Diagnoses:  1. Abdominal pain, right upper quadrant    2. Gallbladder sludge    3. Bilirubinemia       Assessment/Plan:  57 y.o. AA male with hx significant for urinary retention. He was previously seen in 2020 for BPH and elevated psa of 20, and at that time Dr. Devendra Medel  recommended cysto/TRUS bx- numerous calls to patient, non-comply letter sent to his address. Has had + MEET's. Admitted with UTI. Recc  Ernst removed yesterday for VT  Denies luts, Cr is improved, obtain a PVR today  Scheduled for office visit 02/24 at 2pm- patient is aware, he has   transportation, has been living with his friends for some time. Will need outpatient bx and cysto. Continue proscar and flomax outpatient  Can discharge per  if pvr low      All patient questions were answered. He understands the plan as listed above. HISTORY     Chief Complaint:   Chief Complaint   Patient presents with    Abdominal Pain     Pt c/o diffuse abdominal pain for the past few days with low energy. Denies vomiting or diarrhea       History of Present Illness: Angelica Jones is a 57 y.o. male with urinary retention. Onset of symptoms was weeks ago with improving course since that time. Symptoms are aggravated by bph and possible pca. Symptoms improved with ernst and flomax/proscar. Associated symptoms include pelvis distension. Patient also reports no longer haing urinary symptoms with ernst out, voiding with good stream, emptying ok.  He has tried the following treatments: abx, flomax and proscar. Past Medical History:  He has a past medical history of Acute combined systolic and diastolic congestive heart failure ([facility] (8/31/2022), Atrial fibrillation ([facility] (07/25/2019), CHF (congestive heart failure) ([facility] blood clots, and Hypertension. Hospital Problem List:  Principal Problem:    UTI (urinary tract infection)  Resolved Problems:    * No resolved hospital problems. *      Past Surgical History:  He has a past surgical history that includes Insertable Cardiac Monitor (07/26/2019); Cardioversion (07/26/2019); and Cardiac defibrillator placement (Left). Social History:  He reports that he has never smoked. He has never been exposed to tobacco smoke. He has never used smokeless tobacco. He reports that he does not drink alcohol and does not use drugs. Family History:  family history includes Heart Attack in his mother; Heart Disease in his mother; High Blood Pressure in his father and mother; Other in his father; Stroke in his father. Allergies:  No Known Allergies    Medications:  Scheduled Meds:   furosemide  40 mg IntraVENous Daily    insulin lispro  0-4 Units SubCUTAneous 4x Daily AC & HS    metoprolol succinate  12.5 mg Oral BID    sodium chloride flush  10 mL IntraVENous 2 times per day    amiodarone  200 mg Oral Daily    apixaban  5 mg Oral BID    atorvastatin  40 mg Oral Nightly    finasteride  5 mg Oral Daily    pantoprazole  40 mg Oral Daily    tamsulosin  0.4 mg Oral Daily    spironolactone  25 mg Oral Lunch    cefTRIAXone (ROCEPHIN) IV  1,000 mg IntraVENous Q24H     Continuous Infusions:   sodium chloride      dextrose       PRN Meds:sennosides-docusate sodium, sodium chloride flush, sodium chloride, potassium chloride, magnesium sulfate, promethazine **OR** ondansetron, acetaminophen **OR** acetaminophen, dextrose bolus **OR** dextrose bolus, glucagon (rDNA), dextrose    Review of Systems:  Pertinent positives/negatives reviewed in HPI.   All other systems reviewed and negative, unless noted below. Constitutional: Negative  Genitourinary: see HPI  HEENT: Negative   Cardiovascular: Negative   Respiratory: Negative   Gastrointestinal: Negative   Musculoskeletal: Negative   Neurological: Negative   Psychiatric: Negative   Integumentary: Negative     PHYSICAL EXAM     Vitals:    02/21/23 0850   BP: 115/86   Pulse: 58   Resp: 16   Temp: 97.4 °F (36.3 °C)   SpO2: 100%     CONSTITUTIONAL: The patient is well nourished/developed, with no distress noted. NEUROLOGICAL/PSYCHIATRIC: Oriented to place and time, normal affected noted. NECK: The neck is symmetrical and supple, with no masses noted. CARDIOVASCULAR: Regular rate and rhythm, no evidence of swelling noted. RESPIRATORY: Normal respiratory effort with no wheezing noted. ABDOMEN: Abdomen soft, non-tender, non-distended. No enlarged liver or spleen. No hernias noted. Stool occult blood not indicated. SKIN: Skin appears normal.  LYMPHATICS: No adenopathy noted. CVA: No CVA tenderness bilaterally.    GENITOURINARY: Urine CYU in urinal      Ins/Outs:    Intake/Output Summary (Last 24 hours) at 2/21/2023 1056  Last data filed at 2/21/2023 0330  Gross per 24 hour   Intake --   Output 650 ml   Net -650 ml       LABS     CBC   Lab Results   Component Value Date/Time    WBC 4.0 02/20/2023 05:51 AM    RBC 4.21 02/20/2023 05:51 AM    HGB 11.4 02/20/2023 05:51 AM    HCT 34.7 02/20/2023 05:51 AM    MCV 82.4 02/20/2023 05:51 AM    MCH 27.0 02/20/2023 05:51 AM    MCHC 32.8 02/20/2023 05:51 AM    RDW 15.5 02/20/2023 05:51 AM     02/20/2023 05:51 AM    MPV 8.2 02/20/2023 05:51 AM     BMP   Lab Results   Component Value Date/Time     02/21/2023 05:23 AM    K 4.5 02/21/2023 05:23 AM    K 4.3 02/20/2023 05:51 AM     02/21/2023 05:23 AM    CO2 25 02/21/2023 05:23 AM    BUN 20 02/21/2023 05:23 AM    CREATININE 1.2 02/21/2023 05:23 AM    GLUCOSE 91 02/21/2023 05:23 AM    CALCIUM 8.5 02/21/2023 05:23 AM     Urinalysis:   Lab Results   Component Value Date/Time    COLORU DARK YELLOW 02/17/2023 06:08 PM    GLUCOSEU 500 02/17/2023 06:08 PM    BLOODU LARGE 02/17/2023 06:08 PM    NITRU POSITIVE 02/17/2023 06:08 PM    LEUKOCYTESUR MODERATE 02/17/2023 06:08 PM     Urine culture: No results for input(s): LABURIN in the last 72 hours. PSA:   Lab Results   Component Value Date    PSA 15.49 (H) 02/02/2023         IMAGING     CT ABDOMEN PELVIS W IV CONTRAST Additional Contrast? None    Result Date: 2/17/2023  EXAMINATION: CT OF THE ABDOMEN AND PELVIS WITH CONTRAST 2/17/2023 7:33 pm TECHNIQUE: CT of the abdomen and pelvis was performed with the administration of intravenous contrast. Multiplanar reformatted images are provided for review. Automated exposure control, iterative reconstruction, and/or weight based adjustment of the mA/kV was utilized to reduce the radiation dose to as low as reasonably achievable. COMPARISON: CT and ultrasound 01/30/2023 HISTORY: ORDERING SYSTEM PROVIDED HISTORY: abd pain, concern for cholecystitis TECHNOLOGIST PROVIDED HISTORY: Reason for exam:->abd pain, concern for cholecystitis Additional Contrast?->None Decision Support Exception - unselect if not a suspected or confirmed emergency medical condition->Emergency Medical Condition (MA) Reason for Exam: Abdominal Pain (Pt c/o diffuse abdominal pain for the past few days with low energy. Denies vomiting or diarrhea) FINDINGS: Lower Chest: Cardiomegaly with partially visualized AICD. No acute findings identified in the lung bases. Organs: Findings compatible with hepatic steatosis. The gallbladder is relatively contracted with mild apparent wall thickening. No evidence for biliary dilatation. The pancreas, spleen, adrenals and kidneys appear without significant change. The pancreatic duct measures up to 3 mm. Bilateral renal cysts. GI/Bowel: There is no bowel dilatation or wall thickening identified. Diverticulosis. Pelvis: Prostatomegaly.   Don catheter within the bladder, which is relatively decompressed. Mild apparent diffuse wall thickening may be due to underlying hypertrophy. Peritoneum/Retroperitoneum: Trace abdominopelvic ascites. No free air. No enlarged or suspicious-appearing lymph nodes identified. The aorta is normal in caliber. Bones/Soft Tissues: Body wall edema. No acute osseous abnormality identified. Small fat containing umbilical hernia. 1.  Trace abdominopelvic ascites and body wall edema. These findings appear more prominent since prior CT imaging. 2.  Contracted gallbladder. Mild apparent gallbladder wall thickening may be due to contracted state and accentuated related to ascites. 3.  Additional chronic and benign findings, as above. CT ABDOMEN PELVIS W IV CONTRAST Additional Contrast? None    Result Date: 2/1/2023  EXAMINATION: CT OF THE ABDOMEN AND PELVIS WITH CONTRAST 1/30/2023 9:37 am TECHNIQUE: CT of the abdomen and pelvis was performed with the administration of intravenous contrast. Multiplanar reformatted images are provided for review. Automated exposure control, iterative reconstruction, and/or weight based adjustment of the mA/kV was utilized to reduce the radiation dose to as low as reasonably achievable. COMPARISON: 07/24/2019 HISTORY: ORDERING SYSTEM PROVIDED HISTORY: epigastric pain TECHNOLOGIST PROVIDED HISTORY: Reason for exam:->epigastric pain Additional Contrast?->None Decision Support Exception - unselect if not a suspected or confirmed emergency medical condition->Emergency Medical Condition (MA) Reason for Exam: epigastric pain FINDINGS: Lower Chest: The heart size is significantly enlarged with enlargement of all four chambers. Contrast refluxes into the hepatic veins. A pacemaker/defibrillator device is in place. There is no pericardial effusion. An acute process is not appreciated at the lung bases. Organs: A focal hepatic abnormality is not identified.   There is question of mild gallbladder wall thickening. There is subtle increased density within the dependent portion of the gallbladder which may reflect sludge. A focal splenic abnormality is not appreciated. A focal pancreatic abnormality is not identified. A focal adrenal abnormality is not appreciated. Renal cysts are noted. The kidneys are not obstructed. There is limited contrast opacification of the abdominal organs likely related to underlying heart disease. Streak artifact diffusely compromises the quality of the exam. GI/Bowel: The bowel is not obstructed. The appendix is within normal limits. Diverticulosis is present. Pelvis: There is a small amount of free fluid in the pelvis. The prostate gland is enlarged measuring 6.0 cm in transverse dimension. The urinary bladder is unremarkable. Peritoneum/retroperitoneum: The inferior vena cava is enlarged. There is mild tortuosity of the abdominal aorta. There is no free intraperitoneal air. There is a small fat containing periumbilical hernia. Bones/soft tissues: There is mild diffuse subcutaneous edema. Degenerative features are noted at the SI joints and throughout the spine. An acute osseous abnormality is not identified. Question of gallbladder sludge and gallbladder wall edema. Consider right upper quadrant ultrasound for further evaluation. Streak artifact and suboptimal contrast opacification limits the exam. Marked cardiomegaly. Reflux of contrast into enlarged hepatic veins and IVC may reflect right heart failure. The appendix is within normal limits. Diverticulosis. Small amount of free fluid in the pelvis. Prostate gland enlargement. US GALLBLADDER RUQ    Result Date: 1/30/2023  EXAMINATION: RIGHT UPPER QUADRANT ULTRASOUND 1/30/2023 11:23 am COMPARISON: None.  HISTORY: ORDERING SYSTEM PROVIDED HISTORY: RUQ PAIN TECHNOLOGIST PROVIDED HISTORY: Reason for exam:->RUQ PAIN Reason for Exam: fu ct FINDINGS: LIVER:  The liver demonstrates normal echogenicity without evidence of intrahepatic biliary ductal dilatation. BILIARY SYSTEM:  No sonographic evidence of gallstones. There is mild thickening of the gallbladder measuring up to 3 mm. There is a small amount of pericholecystic fluid. Negative sonographic Kebede's sign. Common bile duct is within normal limits measuring 0.5 cm. RIGHT KIDNEY: The right kidney is without evidence of hydronephrosis. There is a simple right renal cyst measuring 3.4 x 3.3 x 2.7 cm. PANCREAS:  There is questionable mild prominence of the pancreatic head. OTHER: No evidence of right upper quadrant ascites. Mild gallbladder wall thickening with a small amount of pericholecystic fluid. No evidence of cholelithiasis. Questionable mild prominence of the pancreatic head. Further evaluation with pancreatic protocol contrast enhanced MRI may be obtained for further evaluation. XR CHEST PORTABLE    Result Date: 2/17/2023  EXAMINATION: ONE XRAY VIEW OF THE CHEST 2/17/2023 5:34 pm COMPARISON: 10/29/2022 HISTORY: Acute shortness of breath. FINDINGS: Patient is mildly rotated. Stable cardiomegaly and left chest ICD. No acute airspace disease. No significant pleural effusion. No pneumothorax. Stable cardiomegaly without acute abnormality.             Electronically signed by: TRENT Thompson CNP  2/21/2023   [facility]  Office Contact: [phone]

## 2023-02-21 NOTE — PROGRESS NOTES
CLINICAL PHARMACY NOTE: MEDS TO BEDS    Total # of Prescriptions Filled: 2   The following medications were delivered to the patient:  Cipro 500 mg  Metoprolol ER 25 mg    Additional Documentation:  Delivered to [facility] RN=Signed  Bonifacio Zamora CPhT

## 2023-02-21 NOTE — PLAN OF CARE
Problem: Discharge Planning  Goal: Discharge to home or other facility with appropriate resources  2/21/2023 0416 by Maria Victoria Melton RN  Outcome: Progressing     Problem: Safety - Adult  Goal: Free from fall injury  2/21/2023 0416 by Maria Victoria Melton RN  Outcome: Progressing     Problem: ABCDS Injury Assessment  Goal: Absence of physical injury  2/21/2023 0416 by Maria Victoria Melton RN  Outcome: Progressing     Problem: Chronic Conditions and Co-morbidities  Goal: Patient's chronic conditions and co-morbidity symptoms are monitored and maintained or improved  2/21/2023 0416 by Maria Victoria Melton RN  Outcome: Progressing

## 2023-02-21 NOTE — PLAN OF CARE
Problem: Discharge Planning  Goal: Discharge to home or other facility with appropriate resources  2/21/2023 1009 by Mann Guardado RN  Outcome: Progressing  Flowsheets (Taken 2/21/2023 9958)  Discharge to home or other facility with appropriate resources: Identify barriers to discharge with patient and caregiver  2/21/2023 0416 by Elizabeth Patel RN  Outcome: Progressing     Problem: Safety - Adult  Goal: Free from fall injury  2/21/2023 0922 by Mann Guardado RN  Outcome: Progressing  2/21/2023 0416 by Elizabeth Patel RN  Outcome: Progressing     Problem: ABCDS Injury Assessment  Goal: Absence of physical injury  2/21/2023 1031 by Mann Guardado RN  Outcome: Progressing  2/21/2023 0416 by Elizabeth Patel RN  Outcome: Progressing     Problem: Chronic Conditions and Co-morbidities  Goal: Patient's chronic conditions and co-morbidity symptoms are monitored and maintained or improved  2/21/2023 0922 by Mann Guardado RN  Outcome: Progressing  Flowsheets (Taken 2/21/2023 0850)  Care Plan - Patient's Chronic Conditions and Co-Morbidity Symptoms are Monitored and Maintained or Improved:   Monitor and assess patient's chronic conditions and comorbid symptoms for stability, deterioration, or improvement   Collaborate with multidisciplinary team to address chronic and comorbid conditions and prevent exacerbation or deterioration   Update acute care plan with appropriate goals if chronic or comorbid symptoms are exacerbated and prevent overall improvement and discharge  2/21/2023 0416 by Elizabeth Patel RN  Outcome: Progressing

## 2023-02-21 NOTE — PROGRESS NOTES
2017: Assessment complete, VSS, BS active, pt denies abd pain, reports small BM today, requesting stool softener, removed ernst cath pt tolerated well, gave urinal, explained voiding trial, pt voiced understanding, message sent to MD r/e holding eliquis for possible biopsy tomorrow, discussed care plan, pt mutually agrees, call light and belongings in reach, will continue monitoring. 2230: pt has been able to void 2x, urine clear yellow, no pain or hesitance with urination, will continue monitoring.   Laisha Simeon RN

## 2023-02-27 NOTE — PROGRESS NOTES
Physician Progress Note      PATIENT:               Sebastian Will  CSN #:                  [id number]  :                       1960  ADMIT DATE:       2023 5:09 PM  [address] [place] DATE:        2023 5:08 PM  RESPONDING  PROVIDER #:        Lorena Briones CNP          QUERY TEXT:    Pt admitted with UTI. Pt noted to have ernst catheter. If possible, please   document in the progress notes and discharge summary if you are evaluating   and/or treating any of the following: The medical record reflects the following:  Risk Factors: 56 yo sometimes homeless, w/Acute cystitis, Acute renal failure,   BPH with active acute obstructive Uropathy. Clinical Indicators: CT abdominal noted as Ernst catheter within the bladder,   which is relatively decompressed. H&P noted as patient reported that he wanted   to keep the Ernst even though he was told that he could have UTI. Patient   reports that he kept the Ernst for too long. Per PN : UTI:  on rocephin    and diflucan will follow the cultures. Currently with GNR. Treatment: IV Rocephin, urine cultures. CT of the ABD. Options provided:  -- UTI due to previous urinary catheter  -- Other - I will add my own diagnosis  -- Disagree - Not applicable / Not valid  -- Disagree - Clinically unable to determine / Unknown  -- Refer to Clinical Documentation Reviewer    PROVIDER RESPONSE TEXT:    UTI is due to the previous indwelling urinary catheter.     Query created by: Jeremias Dumont on 2023 9:11 AM      Electronically signed by:  Donna Briones CNP 2023 4:11   PM

## 2023-03-02 ENCOUNTER — HOSPITAL ENCOUNTER (INPATIENT)
Age: 63
LOS: 8 days | Discharge: HOME | DRG: 194 | End: 2023-03-10
Attending: HOSPITALIST | Admitting: HOSPITALIST
Payer: COMMERCIAL

## 2023-03-02 ENCOUNTER — APPOINTMENT (OUTPATIENT)
Dept: GENERAL RADIOLOGY | Age: 63
DRG: 194 | End: 2023-03-02
Payer: COMMERCIAL

## 2023-03-02 DIAGNOSIS — I50.9 ACUTE ON CHRONIC CONGESTIVE HEART FAILURE, UNSPECIFIED HEART FAILURE TYPE (HCC): Primary | ICD-10-CM

## 2023-03-02 DIAGNOSIS — R97.20 ELEVATED PSA: ICD-10-CM

## 2023-03-02 DIAGNOSIS — R09.02 HYPOXIA: ICD-10-CM

## 2023-03-02 PROBLEM — I50.41 ACUTE COMBINED SYSTOLIC AND DIASTOLIC CHF, NYHA CLASS 1 (HCC): Status: ACTIVE | Noted: 2023-03-02

## 2023-03-02 LAB
A/G RATIO: 1.2 (ref 1.1–2.2)
ALBUMIN SERPL-MCNC: 3.8 G/DL (ref 3.4–5)
ALP BLD-CCNC: 104 U/L (ref 40–129)
ALT SERPL-CCNC: 32 U/L (ref 10–40)
ANION GAP SERPL CALCULATED.3IONS-SCNC: 10 MMOL/L (ref 3–16)
APTT: 27.9 SEC (ref 23–34.3)
AST SERPL-CCNC: 35 U/L (ref 15–37)
BASOPHILS ABSOLUTE: 0 K/UL (ref 0–0.2)
BASOPHILS RELATIVE PERCENT: 1.1 %
BILIRUB SERPL-MCNC: 2.3 MG/DL (ref 0–1)
BUN BLDV-MCNC: 25 MG/DL (ref 7–20)
CALCIUM SERPL-MCNC: 8.8 MG/DL (ref 8.3–10.6)
CHLORIDE BLD-SCNC: 107 MMOL/L (ref 99–110)
CO2: 24 MMOL/L (ref 21–32)
CREAT SERPL-MCNC: 1.3 MG/DL (ref 0.8–1.3)
EKG ATRIAL RATE: 60 BPM
EKG DIAGNOSIS: NORMAL
EKG P-R INTERVAL: 72 MS
EKG Q-T INTERVAL: 442 MS
EKG QRS DURATION: 84 MS
EKG QTC CALCULATION (BAZETT): 442 MS
EKG R AXIS: 6 DEGREES
EKG T AXIS: 26 DEGREES
EKG VENTRICULAR RATE: 60 BPM
EOSINOPHILS ABSOLUTE: 0 K/UL (ref 0–0.6)
EOSINOPHILS RELATIVE PERCENT: 1 %
GFR SERPL CREATININE-BSD FRML MDRD: >60 ML/MIN/{1.73_M2}
GLUCOSE BLD-MCNC: 96 MG/DL (ref 70–99)
HCT VFR BLD CALC: 39.8 % (ref 40.5–52.5)
HEMOGLOBIN: 12.8 G/DL (ref 13.5–17.5)
INR BLD: 1.52 (ref 0.87–1.14)
LYMPHOCYTES ABSOLUTE: 1.1 K/UL (ref 1–5.1)
LYMPHOCYTES RELATIVE PERCENT: 27.3 %
MCH RBC QN AUTO: 26.3 PG (ref 26–34)
MCHC RBC AUTO-ENTMCNC: 32.1 G/DL (ref 31–36)
MCV RBC AUTO: 82 FL (ref 80–100)
MONOCYTES ABSOLUTE: 0.4 K/UL (ref 0–1.3)
MONOCYTES RELATIVE PERCENT: 9.2 %
NEUTROPHILS ABSOLUTE: 2.5 K/UL (ref 1.7–7.7)
NEUTROPHILS RELATIVE PERCENT: 61.4 %
PDW BLD-RTO: 15.7 % (ref 12.4–15.4)
PLATELET # BLD: 216 K/UL (ref 135–450)
PMV BLD AUTO: 7.8 FL (ref 5–10.5)
POTASSIUM SERPL-SCNC: 4.5 MMOL/L (ref 3.5–5.1)
PRO-BNP: ABNORMAL PG/ML (ref 0–124)
PROTHROMBIN TIME: 18.2 SEC (ref 11.7–14.5)
RBC # BLD: 4.85 M/UL (ref 4.2–5.9)
SODIUM BLD-SCNC: 141 MMOL/L (ref 136–145)
TOTAL PROTEIN: 6.9 G/DL (ref 6.4–8.2)
TROPONIN: <0.01 NG/ML
WBC # BLD: 4.1 K/UL (ref 4–11)

## 2023-03-02 PROCEDURE — 71045 X-RAY EXAM CHEST 1 VIEW: CPT

## 2023-03-02 PROCEDURE — 84484 ASSAY OF TROPONIN QUANT: CPT

## 2023-03-02 PROCEDURE — 85610 PROTHROMBIN TIME: CPT

## 2023-03-02 PROCEDURE — 51798 US URINE CAPACITY MEASURE: CPT

## 2023-03-02 PROCEDURE — 2060000000 HC ICU INTERMEDIATE R&B

## 2023-03-02 PROCEDURE — 6370000000 HC RX 637 (ALT 250 FOR IP): Performed by: HOSPITALIST

## 2023-03-02 PROCEDURE — 93005 ELECTROCARDIOGRAM TRACING: CPT | Performed by: HOSPITALIST

## 2023-03-02 PROCEDURE — 85025 COMPLETE CBC W/AUTO DIFF WBC: CPT

## 2023-03-02 PROCEDURE — 6360000002 HC RX W HCPCS: Performed by: HOSPITALIST

## 2023-03-02 PROCEDURE — 6360000002 HC RX W HCPCS: Performed by: PHYSICIAN ASSISTANT

## 2023-03-02 PROCEDURE — 51702 INSERT TEMP BLADDER CATH: CPT

## 2023-03-02 PROCEDURE — 85730 THROMBOPLASTIN TIME PARTIAL: CPT

## 2023-03-02 PROCEDURE — 83880 ASSAY OF NATRIURETIC PEPTIDE: CPT

## 2023-03-02 PROCEDURE — 99285 EMERGENCY DEPT VISIT HI MDM: CPT

## 2023-03-02 PROCEDURE — 2580000003 HC RX 258: Performed by: HOSPITALIST

## 2023-03-02 PROCEDURE — 80053 COMPREHEN METABOLIC PANEL: CPT

## 2023-03-02 PROCEDURE — 93010 ELECTROCARDIOGRAM REPORT: CPT | Performed by: INTERNAL MEDICINE

## 2023-03-02 RX ORDER — AMIODARONE HYDROCHLORIDE 200 MG/1
200 TABLET ORAL 2 TIMES DAILY
Status: DISCONTINUED | OUTPATIENT
Start: 2023-03-02 | End: 2023-03-07

## 2023-03-02 RX ORDER — ONDANSETRON 4 MG/1
4 TABLET, ORALLY DISINTEGRATING ORAL EVERY 8 HOURS PRN
Status: DISCONTINUED | OUTPATIENT
Start: 2023-03-02 | End: 2023-03-10 | Stop reason: HOSPADM

## 2023-03-02 RX ORDER — ATORVASTATIN CALCIUM 40 MG/1
40 TABLET, FILM COATED ORAL NIGHTLY
Status: DISCONTINUED | OUTPATIENT
Start: 2023-03-02 | End: 2023-03-10 | Stop reason: HOSPADM

## 2023-03-02 RX ORDER — PANTOPRAZOLE SODIUM 40 MG/1
40 TABLET, DELAYED RELEASE ORAL DAILY
Status: DISCONTINUED | OUTPATIENT
Start: 2023-03-02 | End: 2023-03-10 | Stop reason: HOSPADM

## 2023-03-02 RX ORDER — METOPROLOL SUCCINATE 25 MG/1
12.5 TABLET, EXTENDED RELEASE ORAL 2 TIMES DAILY
Status: DISCONTINUED | OUTPATIENT
Start: 2023-03-02 | End: 2023-03-10 | Stop reason: HOSPADM

## 2023-03-02 RX ORDER — POLYETHYLENE GLYCOL 3350 17 G/17G
17 POWDER, FOR SOLUTION ORAL DAILY PRN
Status: DISCONTINUED | OUTPATIENT
Start: 2023-03-02 | End: 2023-03-10 | Stop reason: HOSPADM

## 2023-03-02 RX ORDER — SODIUM CHLORIDE 0.9 % (FLUSH) 0.9 %
5-40 SYRINGE (ML) INJECTION PRN
Status: DISCONTINUED | OUTPATIENT
Start: 2023-03-02 | End: 2023-03-10 | Stop reason: HOSPADM

## 2023-03-02 RX ORDER — ONDANSETRON 2 MG/ML
4 INJECTION INTRAMUSCULAR; INTRAVENOUS EVERY 6 HOURS PRN
Status: DISCONTINUED | OUTPATIENT
Start: 2023-03-02 | End: 2023-03-10 | Stop reason: HOSPADM

## 2023-03-02 RX ORDER — SODIUM CHLORIDE 0.9 % (FLUSH) 0.9 %
5-40 SYRINGE (ML) INJECTION EVERY 12 HOURS SCHEDULED
Status: DISCONTINUED | OUTPATIENT
Start: 2023-03-02 | End: 2023-03-10 | Stop reason: HOSPADM

## 2023-03-02 RX ORDER — LISINOPRIL 5 MG/1
2.5 TABLET ORAL DAILY
Status: DISCONTINUED | OUTPATIENT
Start: 2023-03-02 | End: 2023-03-09

## 2023-03-02 RX ORDER — SODIUM CHLORIDE 9 MG/ML
INJECTION, SOLUTION INTRAVENOUS PRN
Status: DISCONTINUED | OUTPATIENT
Start: 2023-03-02 | End: 2023-03-10 | Stop reason: HOSPADM

## 2023-03-02 RX ORDER — SPIRONOLACTONE 25 MG/1
25 TABLET ORAL
Status: DISCONTINUED | OUTPATIENT
Start: 2023-03-02 | End: 2023-03-10 | Stop reason: HOSPADM

## 2023-03-02 RX ORDER — TAMSULOSIN HYDROCHLORIDE 0.4 MG/1
0.4 CAPSULE ORAL DAILY
Status: DISCONTINUED | OUTPATIENT
Start: 2023-03-02 | End: 2023-03-10 | Stop reason: HOSPADM

## 2023-03-02 RX ORDER — FINASTERIDE 5 MG/1
5 TABLET, FILM COATED ORAL DAILY
Status: DISCONTINUED | OUTPATIENT
Start: 2023-03-02 | End: 2023-03-10 | Stop reason: HOSPADM

## 2023-03-02 RX ORDER — FUROSEMIDE 10 MG/ML
40 INJECTION INTRAMUSCULAR; INTRAVENOUS ONCE
Status: COMPLETED | OUTPATIENT
Start: 2023-03-02 | End: 2023-03-02

## 2023-03-02 RX ORDER — ACETAMINOPHEN 325 MG/1
650 TABLET ORAL EVERY 6 HOURS PRN
Status: DISCONTINUED | OUTPATIENT
Start: 2023-03-02 | End: 2023-03-10 | Stop reason: HOSPADM

## 2023-03-02 RX ORDER — ACETAMINOPHEN 650 MG/1
650 SUPPOSITORY RECTAL EVERY 6 HOURS PRN
Status: DISCONTINUED | OUTPATIENT
Start: 2023-03-02 | End: 2023-03-10 | Stop reason: HOSPADM

## 2023-03-02 RX ORDER — FUROSEMIDE 10 MG/ML
40 INJECTION INTRAMUSCULAR; INTRAVENOUS 2 TIMES DAILY
Status: DISCONTINUED | OUTPATIENT
Start: 2023-03-02 | End: 2023-03-05

## 2023-03-02 RX ADMIN — FINASTERIDE 5 MG: 5 TABLET, FILM COATED ORAL at 20:21

## 2023-03-02 RX ADMIN — EMPAGLIFLOZIN 10 MG: 10 TABLET, FILM COATED ORAL at 20:20

## 2023-03-02 RX ADMIN — LISINOPRIL 2.5 MG: 5 TABLET ORAL at 20:21

## 2023-03-02 RX ADMIN — FUROSEMIDE 40 MG: 10 INJECTION, SOLUTION INTRAMUSCULAR; INTRAVENOUS at 20:20

## 2023-03-02 RX ADMIN — Medication 10 ML: at 20:21

## 2023-03-02 RX ADMIN — METOPROLOL SUCCINATE 12.5 MG: 25 TABLET, FILM COATED, EXTENDED RELEASE ORAL at 20:20

## 2023-03-02 RX ADMIN — PANTOPRAZOLE SODIUM 40 MG: 40 TABLET, DELAYED RELEASE ORAL at 20:21

## 2023-03-02 RX ADMIN — SPIRONOLACTONE 25 MG: 25 TABLET ORAL at 20:21

## 2023-03-02 RX ADMIN — APIXABAN 5 MG: 5 TABLET, FILM COATED ORAL at 20:21

## 2023-03-02 RX ADMIN — FUROSEMIDE 40 MG: 10 INJECTION, SOLUTION INTRAMUSCULAR; INTRAVENOUS at 18:43

## 2023-03-02 RX ADMIN — ATORVASTATIN CALCIUM 40 MG: 40 TABLET, FILM COATED ORAL at 20:20

## 2023-03-02 RX ADMIN — AMIODARONE HYDROCHLORIDE 200 MG: 200 TABLET ORAL at 20:20

## 2023-03-02 RX ADMIN — TAMSULOSIN HYDROCHLORIDE 0.4 MG: 0.4 CAPSULE ORAL at 20:20

## 2023-03-02 ASSESSMENT — ENCOUNTER SYMPTOMS
SHORTNESS OF BREATH: 1
DIARRHEA: 0
ABDOMINAL PAIN: 0
WHEEZING: 0
CHEST TIGHTNESS: 0
VOMITING: 0
COUGH: 0
NAUSEA: 0

## 2023-03-02 NOTE — H&P
HOSPITALISTS HISTORY AND PHYSICAL    3/2/2023 5:55 PM    Patient Information:  Sahara Trujillo is a 57 y.o. male [id number]  PCP:  No primary care provider on file. (Tel: None )    Chief complaint:    Chief Complaint   Patient presents with    Shortness of Breath     States SOB upon exertion and also states his legs feel like \"bricks\" when he walks x 3 days         History of Present Illness:  Perez Melissa is a 57 y.o. male who presented with complaints of shortness of breath. And also leg pain. Patient states that worse with exertion. Patient also said legs feels like bricks when he walks patient with history of CHF A-fib hyperlipidemia. Increased lower lower extremity edema with weight gain. Patient said been taking medication as prescribed. Patient denies any new medication no new chest pain. Nothing that makes it better or worse. In ER patient was found to be hypoxic. Oxygen saturation dropping into 84%. REVIEW OF SYSTEMS:   Constitutional: Negative for fever,chills or night sweats  ENT: Negative for rhinorrhea, epistaxis, hoarseness, sore throat. Respiratory: Negative for shortness of breath,wheezing  Cardiovascular: Negative for chest pain, palpitations   Gastrointestinal: Negative for nausea, vomiting, diarrhea  Genitourinary: Negative for polyuria, dysuria   Hematologic/Lymphatic: Negative for bleeding tendency, easy bruising  Musculoskeletal: Negative for myalgias and arthralgias  Neurologic: Negative for confusion,dysarthria. Skin: Negative for itching,rash, good capillary refill. Psychiatric: Negative for depression,anxiety, agitation. Endocrine: Negative for polydipsia,polyuria,heat /cold intolerance. Past Medical History:   has a past medical history of Acute combined systolic and diastolic congestive heart failure ([address] Atrial fibrillation ([address] CHF (congestive heart failure) ([facility] [address] Hx of blood clots, and Hypertension. Past Surgical History:   has a past surgical history that includes Insertable Cardiac Monitor (07/26/2019); Cardioversion (07/26/2019); and Cardiac defibrillator placement (Left). Medications:  No current facility-administered medications on file prior to encounter. Current Outpatient Medications on File Prior to Encounter   Medication Sig Dispense Refill    metoprolol succinate (TOPROL XL) 25 MG extended release tablet Take 0.5 tablets by mouth in the morning and at bedtime 30 tablet 3    amiodarone (CORDARONE) 200 MG tablet Take 1 tablet by mouth 2 times daily For 2 weeks then decrease to 200mg once a day 45 tablet 0    spironolactone (ALDACTONE) 25 MG tablet Take 1 tablet by mouth Daily with lunch 30 tablet 1    pantoprazole (PROTONIX) 40 MG tablet Take 1 tablet by mouth daily 30 tablet 0    finasteride (PROSCAR) 5 MG tablet Take 5 mg by mouth daily      torsemide (DEMADEX) 20 MG tablet Take 1 tablet by mouth daily 30 tablet 3    empagliflozin (JARDIANCE) 10 MG tablet Take 1 tablet by mouth daily 90 tablet 1    lisinopril (PRINIVIL;ZESTRIL) 2.5 MG tablet Take 1 tablet by mouth daily 90 tablet 1    atorvastatin (LIPITOR) 40 MG tablet Take 1 tablet by mouth nightly 30 tablet 2    vitamin D (CHOLECALCIFEROL) 50 MCG (2000 UT) TABS tablet Take 1 tablet by mouth daily 90 tablet 1    [DISCONTINUED] potassium chloride (KLOR-CON M) 20 MEQ extended release tablet Take 1 tablet by mouth in the morning and 1 tablet in the evening. Take with meals. 60 tablet 3    apixaban (ELIQUIS) 5 MG TABS tablet Take 1 tablet by mouth 2 times daily 60 tablet 3    tamsulosin (FLOMAX) 0.4 MG capsule Take 1 capsule by mouth daily 30 capsule 3       Allergies:  No Known Allergies     Social History:   reports that he has never smoked. He has never been exposed to tobacco smoke. He has never used smokeless tobacco. He reports that he does not drink alcohol and does not use drugs.      Family History:  family history includes Heart Attack in his mother; Heart Disease in his mother; High Blood Pressure in his father and mother; Other in his father; Stroke in his father. ,     Physical Exam:  BP (!) 119/94   Pulse 91   Temp 97.7 °F (36.5 °C) (Oral)   Resp 22   Wt 213 lb 8 oz (96.8 kg)   SpO2 (!) 84% Comment: HR 75 o2 start at 92% went down wallking from 32 to z3 back to 32  BMI 27.41 kg/m²     General appearance:  Appears comfortable. Well nourished  Eyes: Sclera clear, pupils equal  ENT: Moist mucus membranes, no thrush. Trachea midline. Cardiovascular: Regular rhythm, normal S1, S2. No murmur, gallop, rub. No edema in lower extremities  Respiratory: Clear to auscultation bilaterally, no wheeze, good inspiratory effort  Gastrointestinal: Abdomen soft, non-tender, not distended, normal bowel sounds  Musculoskeletal: No cyanosis in digits, neck supple  Neurology: Cranial nerves grossly intact. Alert and oriented in time, place and person. No speech or motor deficits  Psychiatry: Appropriate affect.  Not agitated  Skin: Warm, dry, normal turgor, no rash    Labs:  CBC:   Lab Results   Component Value Date/Time    WBC 4.1 03/02/2023 03:21 PM    RBC 4.85 03/02/2023 03:21 PM    HGB 12.8 03/02/2023 03:21 PM    HCT 39.8 03/02/2023 03:21 PM    MCV 82.0 03/02/2023 03:21 PM    MCH 26.3 03/02/2023 03:21 PM    MCHC 32.1 03/02/2023 03:21 PM    RDW 15.7 03/02/2023 03:21 PM     03/02/2023 03:21 PM    MPV 7.8 03/02/2023 03:21 PM     BMP:    Lab Results   Component Value Date/Time     03/02/2023 03:21 PM    K 4.5 03/02/2023 03:21 PM    K 4.3 02/20/2023 05:51 AM     03/02/2023 03:21 PM    CO2 24 03/02/2023 03:21 PM    BUN 25 03/02/2023 03:21 PM    CREATININE 1.3 03/02/2023 03:21 PM    CALCIUM 8.8 03/02/2023 03:21 PM    GFRAA >60 10/05/2022 10:10 AM    LABGLOM >60 03/02/2023 03:21 PM    GLUCOSE 96 03/02/2023 03:21 PM       Chest Xray:   EKG:    I visualized CXR images and EKG strips     Discussed  with     Problem List  Principal Problem: Acute combined systolic and diastolic CHF, NYHA class 1 ([facility])  Resolved Problems:    * No resolved hospital problems.  *        Assessment/Plan:     Acute hypoxic respiratory failure  -Needing oxygen likely probably secondary to CHF exacerbation  -Patient is placed on nasal cannula  -Treat underlying cause    Acute on chronic combined CHF exacerbation  -Treat IV diuresis with Lasix  -Hold oral diuretics  -Continue aspirin statin beta-blocker Jardiance spironolactone and lisinopril  -Monitor renal function closely      Georgie Rea MD    3/2/2023 5:55 PM

## 2023-03-02 NOTE — ED PROVIDER NOTES
[address] [facility]        Pt Name: Yung Myles  MRN: [id number]  [place] 1960  Date of evaluation: 3/2/2023  Provider: Rene Conti PA-C  PCP: No primary care provider on file. Note Started: 3:21 PM EST 3/2/23      SHONA. I have evaluated this patient. My supervising physician was available for consultation. CHIEF COMPLAINT       Chief Complaint   Patient presents with    Shortness of Breath     States SOB upon exertion and also states his legs feel like \"bricks\" when he walks x 3 days        HISTORY OF PRESENT ILLNESS: 1 or more Elements     History from : Patient    Limitations to history : None    Yung Myles is a 57 y.o. male with a history of hypertension, hyperlipidemia, STEMI, atrial fibrillation, ventricular tachycardia and CHF who presents to the emergency department today complaining of worsening shortness of breath for the last 3 days as well as increasing lower extremity edema. He states he has been taking his medications. Patient denies having any chest pain, palpitations, diaphoresis, lightheadedness or dizziness. He denies fevers, chills, coughing or recent illness. Nursing Notes were all reviewed and agreed with or any disagreements were addressed in the HPI. REVIEW OF SYSTEMS :      Review of Systems   Constitutional:  Negative for chills and fever. Respiratory:  Positive for shortness of breath. Negative for cough, chest tightness and wheezing. Cardiovascular:  Positive for leg swelling. Negative for chest pain and palpitations. Gastrointestinal:  Negative for abdominal pain, diarrhea, nausea and vomiting. Genitourinary:  Negative for difficulty urinating, dysuria, flank pain, frequency, hematuria and urgency. Neurological:  Negative for dizziness, weakness, light-headedness, numbness and headaches. All other systems reviewed and are negative.     Positives and Pertinent negatives as per HPI. SURGICAL HISTORY     Past Surgical History:   Procedure Laterality Date    CARDIAC DEFIBRILLATOR PLACEMENT Left     CARDIOVERSION  07/26/2019    Dr. Naresh Ventura  07/26/2019       CURRENTMEDICATIONS       Previous Medications    AMIODARONE (CORDARONE) 200 MG TABLET    Take 1 tablet by mouth 2 times daily For 2 weeks then decrease to 200mg once a day    APIXABAN (ELIQUIS) 5 MG TABS TABLET    Take 1 tablet by mouth 2 times daily    ATORVASTATIN (LIPITOR) 40 MG TABLET    Take 1 tablet by mouth nightly    EMPAGLIFLOZIN (JARDIANCE) 10 MG TABLET    Take 1 tablet by mouth daily    FINASTERIDE (PROSCAR) 5 MG TABLET    Take 5 mg by mouth daily    LISINOPRIL (PRINIVIL;ZESTRIL) 2.5 MG TABLET    Take 1 tablet by mouth daily    METOPROLOL SUCCINATE (TOPROL XL) 25 MG EXTENDED RELEASE TABLET    Take 0.5 tablets by mouth in the morning and at bedtime    PANTOPRAZOLE (PROTONIX) 40 MG TABLET    Take 1 tablet by mouth daily    SPIRONOLACTONE (ALDACTONE) 25 MG TABLET    Take 1 tablet by mouth Daily with lunch    TAMSULOSIN (FLOMAX) 0.4 MG CAPSULE    Take 1 capsule by mouth daily    TORSEMIDE (DEMADEX) 20 MG TABLET    Take 1 tablet by mouth daily    VITAMIN D (CHOLECALCIFEROL) 50 MCG (2000 UT) TABS TABLET    Take 1 tablet by mouth daily       ALLERGIES     Patient has no known allergies.     FAMILYHISTORY       Family History   Problem Relation Age of Onset    Heart Disease Mother     High Blood Pressure Mother     Heart Attack Mother     Other Father     Stroke Father     High Blood Pressure Father         SOCIAL HISTORY       Social History     Tobacco Use    Smoking status: Never     Passive exposure: Never    Smokeless tobacco: Never   Vaping Use    Vaping Use: Never used   Substance Use Topics    Alcohol use: Never    Drug use: Never       SCREENINGS        Deniz Coma Scale  Eye Opening: Spontaneous  Best Verbal Response: Oriented  Best Motor Response: Obeys commands  Deniz Coma Scale Score: 15                CIWA Assessment  BP: (!) 119/94  Heart Rate: 91           PHYSICAL EXAM  1 or more Elements     ED Triage Vitals   BP Temp Temp Source Heart Rate Resp SpO2 Height Weight   23 1420 23 1423 23 1423 23 1420 23 1420 23 1422 -- 23 1420   (!) 119/94 97.7 °F (36.5 °C) Oral 91 22 100 %  213 lb 8 oz (96.8 kg)       Physical Exam  Vitals and nursing note reviewed. Constitutional:       Appearance: He is well-developed. He is not diaphoretic. HENT:      Head: Normocephalic and atraumatic. Mouth/Throat:      Mouth: Mucous membranes are moist.   Eyes:      General:         Right eye: No discharge. Left eye: No discharge. Extraocular Movements: Extraocular movements intact. Conjunctiva/sclera: Conjunctivae normal.      Pupils: Pupils are equal, round, and reactive to light. Cardiovascular:      Rate and Rhythm: Normal rate and regular rhythm. Pulses: Normal pulses. Heart sounds: Normal heart sounds. Pulmonary:      Effort: Pulmonary effort is normal. No respiratory distress. Breath sounds: Normal breath sounds. Abdominal:      General: Abdomen is flat. Bowel sounds are normal.      Palpations: Abdomen is soft. Musculoskeletal:         General: Normal range of motion. Cervical back: Normal range of motion and neck supple. Right lower le+ Edema present. Left lower le+ Edema present. Skin:     General: Skin is warm and dry. Coloration: Skin is not pale. Neurological:      Mental Status: He is alert and oriented to person, place, and time.    Psychiatric:         Behavior: Behavior normal.           DIAGNOSTIC RESULTS   LABS:    Labs Reviewed   CBC WITH AUTO DIFFERENTIAL - Abnormal; Notable for the following components:       Result Value    Hemoglobin 12.8 (*)     Hematocrit 39.8 (*)     RDW 15.7 (*)     All other components within normal limits   COMPREHENSIVE METABOLIC PANEL - Abnormal; Notable for the following components:    BUN 25 (*)     Total Bilirubin 2.3 (*)     All other components within normal limits   PROTIME-INR - Abnormal; Notable for the following components:    Protime 18.2 (*)     INR 1.52 (*)     All other components within normal limits   BRAIN NATRIURETIC PEPTIDE - Abnormal; Notable for the following components:    Pro-BNP 14,354 (*)     All other components within normal limits   TROPONIN   APTT       When ordered only abnormal lab results are displayed. All other labs were within normal range or not returned as of this dictation. EKG: When ordered, EKG's are interpreted by the Emergency Department Physician in the absence of a cardiologist.  Please see their note for interpretation of EKG. RADIOLOGY:   Non-plain film images such as CT, Ultrasound and MRI are read by the radiologist. Plain radiographic images are visualized and preliminarily interpreted by the ED Provider with the below findings:        Interpretation per the Radiologist below, if available at the time of this note:    XR CHEST PORTABLE   Final Result   No radiographic evidence of acute pulmonary disease. Stable cardiomegaly. PROCEDURES   Unless otherwise noted below, none     Procedures    CRITICAL CARE TIME (.cctime)   There was a high probability of life-threatening clinical deterioration in the patient's condition requiring my urgent intervention. I personally saw the patient and independently provided 31 minutes of non-concurrent critical care out of the total shared critical care time provided, excluding separately reportable procedures. PAST MEDICAL HISTORY      has a past medical history of Acute combined systolic and diastolic congestive heart failure ([address] (8/31/2022), Atrial fibrillation ([address] (07/25/2019), CHF (congestive heart failure) ([address] blood clots, and Hypertension.      Chronic Conditions affecting Care: None    EMERGENCY DEPARTMENT COURSE and DIFFERENTIAL DIAGNOSIS/MDM:   Vitals:    Vitals:    03/02/23 1420 03/02/23 1422 03/02/23 1423 03/02/23 1721   BP: (!) 119/94      Pulse: 91      Resp: 22      Temp:   97.7 °F (36.5 °C)    TempSrc:   Oral    SpO2:  100%  (!) 84%   Weight: 213 lb 8 oz (96.8 kg)          Patient was given the following medications:  Medications   furosemide (LASIX) injection 40 mg (has no administration in time range)             Is this patient to be included in the SEP-1 Core Measure due to severe sepsis or septic shock? No   Exclusion criteria - the patient is NOT to be included for SEP-1 Core Measure due to: Infection is not suspected    CONSULTS: (Who and What was discussed)  IP CONSULT TO CARDIOLOGY  Discussion with Other Profesionals : Consultant . Cardiology, Dr. Carlin Kiran    Social Determinants : None    Records Reviewed : None    CC/HPI Summary, DDx, ED Course, and Reassessment: Patient presented to the emergency department today complaining of worsening dyspnea and lower extremity edema over the last 3 days. He denies having any chest pain. He does have bilateral lower extremity edema at the time of my exam.  His lungs are CTA bilaterally. He is oxygenating well at rest however with ambulation his oxygen drops to 84%. His EKG shows no signs of acute ischemia or infarction. Troponin is normal.  proBNP is significantly elevated at >14,000. Chest x-ray shows no acute cardiopulmonary disease. CBC, BMP and LFTs are unremarkable. Patient is given Lasix 40 mg IV. Disposition Considerations (include 1 Tests not done, Shared Decision Making, Pt Expectation of Test or Tx.): I had a lengthy discussion with the patient regarding testing completed in the emergency department today as well as the results. My initial plan was to discharge him with close outpatient follow-up. I did consult cardiology. Patient was ambulated without oxygen and his oxygen dropped to 84%. I feel he will require admission. Patient is agreeable with this. Hospitalist is consulted who is agreeable with admission      Admission      I am the Primary Clinician of Record. FINAL IMPRESSION      1. Acute on chronic congestive heart failure, unspecified heart failure type ([facility] [address]    2. Hypoxia          DISPOSITION/PLAN     DISPOSITION Decision To Admit 03/02/2023 05:34:40 PM      PATIENT REFERRED TO:  No follow-up provider specified.     DISCHARGE MEDICATIONS:  New Prescriptions    No medications on file       DISCONTINUED MEDICATIONS:  Discontinued Medications    No medications on file              (Please note that portions of this note were completed with a voice recognition program.  Efforts were made to edit the dictations but occasionally words are mis-transcribed.)    Lucy Miller PA-C (electronically signed)           Lucy Miller PA-C  03/02/23 [id number]

## 2023-03-03 ENCOUNTER — NURSE ONLY (OUTPATIENT)
Dept: CARDIOLOGY CLINIC | Age: 63
End: 2023-03-03

## 2023-03-03 DIAGNOSIS — I50.22 CHRONIC SYSTOLIC (CONGESTIVE) HEART FAILURE (HCC): ICD-10-CM

## 2023-03-03 DIAGNOSIS — I50.20 HFREF (HEART FAILURE WITH REDUCED EJECTION FRACTION) (HCC): ICD-10-CM

## 2023-03-03 DIAGNOSIS — I47.20 VT (VENTRICULAR TACHYCARDIA): ICD-10-CM

## 2023-03-03 DIAGNOSIS — Z95.810 ICD (IMPLANTABLE CARDIOVERTER-DEFIBRILLATOR), BIVENTRICULAR, IN SITU: Primary | ICD-10-CM

## 2023-03-03 PROBLEM — R00.1 BRADYCARDIA: Status: ACTIVE | Noted: 2023-03-03

## 2023-03-03 PROBLEM — R09.02 HYPOXIA: Status: ACTIVE | Noted: 2023-03-03

## 2023-03-03 LAB
ANION GAP SERPL CALCULATED.3IONS-SCNC: 7 MMOL/L (ref 3–16)
BUN BLDV-MCNC: 23 MG/DL (ref 7–20)
CALCIUM SERPL-MCNC: 8.6 MG/DL (ref 8.3–10.6)
CHLORIDE BLD-SCNC: 106 MMOL/L (ref 99–110)
CO2: 29 MMOL/L (ref 21–32)
CREAT SERPL-MCNC: 1.6 MG/DL (ref 0.8–1.3)
GFR SERPL CREATININE-BSD FRML MDRD: 48 ML/MIN/{1.73_M2}
GLUCOSE BLD-MCNC: 107 MG/DL (ref 70–99)
GLUCOSE BLD-MCNC: 94 MG/DL (ref 70–99)
HCT VFR BLD CALC: 37.9 % (ref 40.5–52.5)
HEMOGLOBIN: 12.1 G/DL (ref 13.5–17.5)
MCH RBC QN AUTO: 26.2 PG (ref 26–34)
MCHC RBC AUTO-ENTMCNC: 31.8 G/DL (ref 31–36)
MCV RBC AUTO: 82.3 FL (ref 80–100)
PDW BLD-RTO: 16.7 % (ref 12.4–15.4)
PERFORMED ON: ABNORMAL
PLATELET # BLD: 199 K/UL (ref 135–450)
PMV BLD AUTO: 8 FL (ref 5–10.5)
POTASSIUM SERPL-SCNC: 4.6 MMOL/L (ref 3.5–5.1)
RBC # BLD: 4.6 M/UL (ref 4.2–5.9)
SODIUM BLD-SCNC: 142 MMOL/L (ref 136–145)
WBC # BLD: 3.6 K/UL (ref 4–11)

## 2023-03-03 PROCEDURE — 51702 INSERT TEMP BLADDER CATH: CPT

## 2023-03-03 PROCEDURE — 85027 COMPLETE CBC AUTOMATED: CPT

## 2023-03-03 PROCEDURE — 99223 1ST HOSP IP/OBS HIGH 75: CPT | Performed by: INTERNAL MEDICINE

## 2023-03-03 PROCEDURE — 94760 N-INVAS EAR/PLS OXIMETRY 1: CPT

## 2023-03-03 PROCEDURE — 2060000000 HC ICU INTERMEDIATE R&B

## 2023-03-03 PROCEDURE — 36415 COLL VENOUS BLD VENIPUNCTURE: CPT

## 2023-03-03 PROCEDURE — 2580000003 HC RX 258: Performed by: HOSPITALIST

## 2023-03-03 PROCEDURE — 80048 BASIC METABOLIC PNL TOTAL CA: CPT

## 2023-03-03 PROCEDURE — 6370000000 HC RX 637 (ALT 250 FOR IP): Performed by: HOSPITALIST

## 2023-03-03 PROCEDURE — 6360000002 HC RX W HCPCS: Performed by: HOSPITALIST

## 2023-03-03 RX ADMIN — EMPAGLIFLOZIN 10 MG: 10 TABLET, FILM COATED ORAL at 09:15

## 2023-03-03 RX ADMIN — AMIODARONE HYDROCHLORIDE 200 MG: 200 TABLET ORAL at 09:00

## 2023-03-03 RX ADMIN — METOPROLOL SUCCINATE 12.5 MG: 25 TABLET, FILM COATED, EXTENDED RELEASE ORAL at 21:14

## 2023-03-03 RX ADMIN — TAMSULOSIN HYDROCHLORIDE 0.4 MG: 0.4 CAPSULE ORAL at 09:00

## 2023-03-03 RX ADMIN — APIXABAN 5 MG: 5 TABLET, FILM COATED ORAL at 09:00

## 2023-03-03 RX ADMIN — PANTOPRAZOLE SODIUM 40 MG: 40 TABLET, DELAYED RELEASE ORAL at 09:01

## 2023-03-03 RX ADMIN — APIXABAN 5 MG: 5 TABLET, FILM COATED ORAL at 21:15

## 2023-03-03 RX ADMIN — METOPROLOL SUCCINATE 12.5 MG: 25 TABLET, FILM COATED, EXTENDED RELEASE ORAL at 09:04

## 2023-03-03 RX ADMIN — Medication 10 ML: at 21:15

## 2023-03-03 RX ADMIN — FINASTERIDE 5 MG: 5 TABLET, FILM COATED ORAL at 08:58

## 2023-03-03 RX ADMIN — Medication 10 ML: at 09:07

## 2023-03-03 RX ADMIN — ATORVASTATIN CALCIUM 40 MG: 40 TABLET, FILM COATED ORAL at 21:15

## 2023-03-03 RX ADMIN — FUROSEMIDE 40 MG: 10 INJECTION, SOLUTION INTRAMUSCULAR; INTRAVENOUS at 17:08

## 2023-03-03 RX ADMIN — FUROSEMIDE 40 MG: 10 INJECTION, SOLUTION INTRAMUSCULAR; INTRAVENOUS at 09:01

## 2023-03-03 RX ADMIN — AMIODARONE HYDROCHLORIDE 200 MG: 200 TABLET ORAL at 21:15

## 2023-03-03 ASSESSMENT — PAIN SCALES - GENERAL
PAINLEVEL_OUTOF10: 0
PAINLEVEL_OUTOF10: 0

## 2023-03-03 NOTE — CONSULTS
[address] [place] [address]   Electrophysiology   Date: 3/3/2023  Reason for Consultation: Bradycardia   Consult Requesting Physician: Dr. Fermin Galvez MD    Chief Complaint   Patient presents with    Shortness of Breath     States SOB upon exertion and also states his legs feel like \"bricks\" when he walks x 3 days        CC: Shortness of breath    HPI: Marylin Steven is a 57 y.o. male with multiple comorbidities including HTN, long persistent atrial fibrillation, HFrEF, s/p BiV ICD, left atrial appendage, challenging social circumstances being homeless who presented to the hospital complaining of shortness of breath with exertion. Patient has been seen by heart failure team and EP has been consulted to assess for bradycardia. Patient has a BiV ICD implanted by Dr. Jasiel Saldaña on 4/25/2022. He has chronic long persistent atrial fibrillation. At the time of implantation the device has been programmed to VVIR with a lower rate of 50s since he was in atrial fibrillation. He has been started on amiodarone planning for CONSUELO and cardioversion. Assessment:   Persistent atrial fibrillation  Bradycardia  HFrEF  HTN  History of left atrial appendage thrombus    Plan:   Patient heart rates 50s. It appears that he is symptomatic with this low heart rate. Device interrogated and programmed and baseline heart rate increased to 70 bpm.  Plan for CONSUELO/cardioversion. This can also be considered as outpatient. High [id number] score, high risk for stroke/thromboembolism. I have discussed potential, and rare side effects of amiodarone including but not limited to lung, liver and thyroid toxicity. Close monitoring for amiodarone toxicity with LFTs, TSH, and CXR. Eliquis 5 bid  Lasix 40 bid  Toprol XL 12.5   Aldactone   Jardiance     Daily weight  Severe exacerbation of HFrEF leading to hospitalization  Patient is high risk of having recurrent atrial arrhythmias because of  multiple comorbidities HFrEF. Patient at higher risk of adverse outcome and higher risk for EP   Discussed with nursing staff. Relevant available labs, and cardiovascular diagnostics, documents reviewed. ECG: Atrial fibrillation, biventricular paced rhythm  Echo: EF 15 to 20%, global hypokinesis, diastolic dysfunction, moderate MR, dilated RV, severe pulmonary hypertension    Troponin: Negative  Hemoglobin 12.1  Potassium 4.6  BUN 23  Creatinine 1.6  Sodium 142      I independently reviewed cardiac tracings / rhythm ECGs strips: noted Biv paced rhythm   Reviewed device interrogation. Consult note reviewed. Complex medical condition with multiple medical problems affecting prognosis and outcome of EP interventions    I independently reviewed relevant and available cardiac diagnostic tests. Scheduled Meds:   amiodarone  200 mg Oral BID    apixaban  5 mg Oral BID    atorvastatin  40 mg Oral Nightly    empagliflozin  10 mg Oral Daily    finasteride  5 mg Oral Daily    [Held by provider] lisinopril  2.5 mg Oral Daily    metoprolol succinate  12.5 mg Oral BID    pantoprazole  40 mg Oral Daily    [Held by provider] spironolactone  25 mg Oral Lunch    tamsulosin  0.4 mg Oral Daily    furosemide  40 mg IntraVENous BID    sodium chloride flush  5-40 mL IntraVENous 2 times per day     Continuous Infusions:   sodium chloride       PRN Meds:.sodium chloride flush, sodium chloride, ondansetron **OR** ondansetron, polyethylene glycol, acetaminophen **OR** acetaminophen     Prior to Admission medications    Medication Sig Start Date End Date Taking?  Authorizing Provider   metoprolol succinate (TOPROL XL) 25 MG extended release tablet Take 0.5 tablets by mouth in the morning and at bedtime 2/21/23   Pravin Quintanilla PA-C   amiodarone (CORDARONE) 200 MG tablet Take 1 tablet by mouth 2 times daily For 2 weeks then decrease to 200mg once a day 2/3/23   Edmar Jefferson APRN - CNP   spironolactone (ALDACTONE) 25 MG tablet Take 1 tablet by mouth Daily with lunch 23   Anant Scott MD   pantoprazole (PROTONIX) 40 MG tablet Take 1 tablet by mouth daily 2/3/23 3/5/23  Anant Scott MD   finasteride (PROSCAR) 5 MG tablet Take 5 mg by mouth daily    Historical Provider, MD   torsemide (DEMADEX) 20 MG tablet Take 1 tablet by mouth daily 10/31/22   Neha Benitez MD   empagliflozin (JARDIANCE) 10 MG tablet Take 1 tablet by mouth daily 10/26/22   TRENT Blue   lisinopril (PRINIVIL;ZESTRIL) 2.5 MG tablet Take 1 tablet by mouth daily 10/26/22   TRENT Blue   atorvastatin (LIPITOR) 40 MG tablet Take 1 tablet by mouth nightly 10/5/22   TRENT Manuel   vitamin D (CHOLECALCIFEROL) 50 MCG (2000 UT) TABS tablet Take 1 tablet by mouth daily  Patient not taking: Reported on 3/2/2023 9/21/22   TRENT Blue   potassium chloride (KLOR-CON M) 20 MEQ extended release tablet Take 1 tablet by mouth in the morning and 1 tablet in the evening. Take with meals. 22  Tsernig Bedoya MD   apixaban (ELIQUIS) 5 MG TABS tablet Take 1 tablet by mouth 2 times daily 22   TRENT Swain CNP   tamsulosin [facility]) 0.4 MG capsule Take 1 capsule by mouth daily 22   TRENT Swain CNP       Physical Examination:  Vitals:    23 1700   BP: 102/72   Pulse: 74   Resp: 18   Temp: 98.4 °F (36.9 °C)   SpO2: 99%      In: -   Out: 3500    Wt Readings from Last 3 Encounters:   23 213 lb (96.6 kg)   23 198 lb 6.6 oz (90 kg)   23 182 lb 4.8 oz (82.7 kg)     Temp  Av.7 °F (36.5 °C)  Min: 97.4 °F (36.3 °C)  Max: 98.4 °F (36.9 °C)  Pulse  Av.7  Min: 49  Max: 74  BP  Min: 92/59  Max: 147/101  SpO2  Av.4 %  Min: 84 %  Max: 99 %    Intake/Output Summary (Last 24 hours) at 3/3/2023 1715  Last data filed at 3/3/2023 1419  Gross per 24 hour   Intake --   Output 3500 ml   Net -3500 ml       Constitutional: Oriented. No distress. Cardiovascular: Normal rate, regular rhythm, S1&S2. Pulmonary/Chest: Bilateral respiratory sounds. No rhonchi. Abdominal: Soft. No tenderness   Musculoskeletal: + edema    Neurological: Alert and oriented. Follows command    Active Hospital Problems    Diagnosis Date Noted    Hypoxia [R09.02] 03/03/2023     Priority: Medium    Bradycardia [R00.1] 03/03/2023     Priority: Medium    Acute combined systolic and diastolic CHF, NYHA class 1 ([address] [I50.41] 03/02/2023     Priority: Medium    Acute renal failure ([address] [N17.9]        Past Medical History:   Diagnosis Date    Acute combined systolic and diastolic congestive heart failure ([address] 8/31/2022    Atrial fibrillation ([address] 07/25/2019    CHF (congestive heart failure) (HCC)     Hx of blood clots     Hypertension         Past Surgical History:   Procedure Laterality Date    CARDIAC DEFIBRILLATOR PLACEMENT Left     CARDIOVERSION  07/26/2019    Dr. Rodger Lou  07/26/2019       No Known Allergies     reports that he has never smoked. He has never been exposed to tobacco smoke. He has never used smokeless tobacco. He reports that he does not drink alcohol and does not use drugs. All questions and concerns were addressed to the patient/family. Alternatives to my treatment were discussed. I have discussed the above stated plan and the patient verbalized understanding and agreed with the plan. NOTE: This report was transcribed using voice recognition software. Every effort was made to ensure accuracy, however, inadvertent computerized transcription errors may be present.      Nano Mcleod MD, MPH  [facility]   Office: [phone]  Fax: [phone]

## 2023-03-03 NOTE — PROGRESS NOTES
Clinical Pharmacy Note    HF Core Measures Assessment    Latest EF: 15-20 %    Entresto/ACE/ARB (EF<40%): lisinopril 2.5 mg  Evidence based BB (bisoprolol, metoprolol XL, carvedilol) (EF<40%): Toprol XL 12.5 mg  Aldosterone Antagonist (EF<35%): spironolactone 25 mg    Heart Failure cores met.     Pancho Flowers, PharmD, [facility]  [id number]

## 2023-03-03 NOTE — CONSULTS
[facility]  Advanced CHF/Pulmonary Hypertension   Cardiac Evaluation      Yung Myles  YOB: 1960    Requesting Physician:  Dr. Thang Parekh      Chief Complaint   Patient presents with    Shortness of Breath     States SOB upon exertion and also states his legs feel like \"bricks\" when he walks x 3 days         History of Present Illness:    Yung Myles is a 58 yo male with a history of PAF, HTN, chronic systolic HF. He is homeless but lives with friends. He was recently admitted for abdominal pain and diagnosed with a UTI. This was treated with Cipro. He came in with increased swelling in his legs. He says that he has not been urinating more than once a day for the past few days. He has had multiple admissions for fluid overload. He states that he has been taking his medications. He has a history of atrial fibrillation. He is on amiodarone bid. He has not followed up with EP or had device check in the last several months. Ekg today suggests that he is in afib with slow ventricular response. Denies chest pain, palpitations, diaphoresis, lightheadedness, or dizziness. No fever, chills, cough, or recent illness. We are consulted for management of his HF. Labs show a rise in creatinine with diuresis. He has known LVEF 15-20%. Labs:  Sodium 142  K 4.6  BUN/Cre 23/1.6  BNP 14K (was 4K on 2/20/23)  Troponin < 0.01  H/H 12.1/37.9    Echo:  1/31/23:      Summary   The left ventricle is severely dilated. Severely reduced systolic function. LVEF 15-20%. Severe global hypokinesis. Grade III diastolic dysfunction. Elevated LVEDP. RV severely dilated with severely depressed function. Moderate eccentric MR. Mild AI. Severe TR with a RVSP   100 mmHg. Severe pulmonary hypertension. IVC dynamics c/w markedly elevated RA pressure (15 mmHg). A bubble study was performed and fails to show evidence of shunting. Incidental Finding: Right kidney cyst noted. Consider additional dedicated   abdominal imaging if clinically indicated.     EKG:  ventricular paced rhythm, atrial fib underlying    Meds prior to admission:  Toprol xl 12.5 bid  Amiodarone 200 bid  Spironolactone 25 qd  Torsemide 20 qd  Jardiance 10 qd  Lisinopril 2.5 qd  Lipitor 40 qd  Eliquis 5 bid      No Known Allergies  Current Facility-Administered Medications   Medication Dose Route Frequency Provider Last Rate Last Admin    amiodarone (CORDARONE) tablet 200 mg  200 mg Oral BID Raysa Nunez MD   200 mg at 03/03/23 0900    apixaban (ELIQUIS) tablet 5 mg  5 mg Oral BID Raysa Nunez MD   5 mg at 03/03/23 0900    atorvastatin (LIPITOR) tablet 40 mg  40 mg Oral Nightly Raysa Nunez MD   40 mg at 03/02/23 2020    empagliflozin (JARDIANCE) tablet 10 mg  10 mg Oral Daily Raysa Nunez MD   10 mg at 03/03/23 0915    finasteride (PROSCAR) tablet 5 mg  5 mg Oral Daily Raysa Nunez MD   5 mg at 03/03/23 0858    [Held by provider] lisinopril (PRINIVIL;ZESTRIL) tablet 2.5 mg  2.5 mg Oral Daily Jose Lugo MD   2.5 mg at 03/02/23 2021    metoprolol succinate (TOPROL XL) extended release tablet 12.5 mg  12.5 mg Oral BID Jose Lugo MD   12.5 mg at 03/03/23 0904    pantoprazole (PROTONIX) tablet 40 mg  40 mg Oral Daily Raysa Nunez MD   40 mg at 03/03/23 0901    [Held by provider] spironolactone (ALDACTONE) tablet 25 mg  25 mg Oral Lunch Jose Lugo MD   25 mg at 03/02/23 2021    tamsulosin (FLOMAX) capsule 0.4 mg  0.4 mg Oral Daily Raysa Nunez MD   0.4 mg at 03/03/23 0900    furosemide (LASIX) injection 40 mg  40 mg IntraVENous BID Raysa Nunez MD   40 mg at 03/03/23 0901    sodium chloride flush 0.9 % injection 5-40 mL  5-40 mL IntraVENous 2 times per day Jose Lugo MD   10 mL at 03/03/23 0907    sodium chloride flush 0.9 % injection 5-40 mL  5-40 mL IntraVENous PRN Raysa Nunez MD        0.9 % sodium chloride infusion   IntraVENous PRN Raysa Nunez MD        ondansetron (ZOFRAN-ODT) disintegrating tablet 4 mg  4 mg Oral Q8H PRN Raysa Nunez MD        Or    ondansetron (ZOFRAN) injection 4 mg  4 mg IntraVENous Q6H PRN Mono Graham MD        polyethylene glycol (GLYCOLAX) packet 17 g  17 g Oral Daily PRN Mono Graham MD        acetaminophen (TYLENOL) tablet 650 mg  650 mg Oral Q6H PRN Mono Graham MD        Or    acetaminophen (TYLENOL) suppository 650 mg  650 mg Rectal Q6H PRN Mono Graham MD           Past Medical History:   Diagnosis Date    Acute combined systolic and diastolic congestive heart failure ([facility] 8/31/2022    Atrial fibrillation ([facility] 07/25/2019    CHF (congestive heart failure) (HCC)     Hx of blood clots     Hypertension      Past Surgical History:   Procedure Laterality Date    CARDIAC DEFIBRILLATOR PLACEMENT Left     CARDIOVERSION  07/26/2019    Dr. Esposito Serum  07/26/2019     Family History   Problem Relation Age of Onset    Heart Disease Mother     High Blood Pressure Mother     Heart Attack Mother     Other Father     Stroke Father     High Blood Pressure Father      Social History     Socioeconomic History    Marital status: Single     Spouse name: Not on file    Number of children: 0    Years of education: 12    Highest education level: Not on file   Occupational History    Not on file   Tobacco Use    Smoking status: Never     Passive exposure: Never    Smokeless tobacco: Never   Vaping Use    Vaping Use: Never used   Substance and Sexual Activity    Alcohol use: Never    Drug use: Never    Sexual activity: Not Currently   Other Topics Concern    Not on file   Social History Narrative    Not on file     Social Determinants of Health     Financial Resource Strain: Not on file   Food Insecurity: Not on file   Transportation Needs: Not on file   Physical Activity: Not on file   Stress: Not on file   Social Connections: Not on file   Intimate Partner Violence: Not on file   Housing Stability: Not on file       Review of Systems:   Constitutional: there has been no unanticipated weight loss. There's been no change in energy level, sleep pattern, or activity level. Eyes: No visual changes or diplopia. No scleral icterus. ENT: No Headaches, hearing loss or vertigo. No mouth sores or sore throat. Cardiovascular: Reviewed in HPI  Respiratory: No cough or wheezing, no sputum production. No hematemesis. Gastrointestinal: No abdominal pain, appetite loss, blood in stools. No change in bowel or bladder habits. Genitourinary: No dysuria, trouble voiding, or hematuria. Musculoskeletal:  No gait disturbance, weakness or joint complaints. Integumentary: No rash or pruritis. Neurological: No headache, diplopia, change in muscle strength, numbness or tingling. No change in gait, balance, coordination, mood, affect, memory, mentation, behavior. Psychiatric: No anxiety, no depression. Endocrine: No malaise, fatigue or temperature intolerance. No excessive thirst, fluid intake, or urination. No tremor. Hematologic/Lymphatic: No abnormal bruising or bleeding, blood clots or swollen lymph nodes. Allergic/Immunologic: No nasal congestion or hives. Physical Examination:    Vitals:    03/03/23 0108 03/03/23 0739 03/03/23 0830 03/03/23 1130   BP: 107/80  111/73 (!) 92/59   Pulse: (!) 49  50 56   Resp: 16  16 16   Temp: 97.6 °F (36.4 °C)  97.6 °F (36.4 °C) 97.5 °F (36.4 °C)   TempSrc: Oral  Oral Oral   SpO2: 99%  99% 95%   Weight: 213 lb (96.6 kg) 213 lb (96.6 kg)     Height:         Body mass index is 27.35 kg/m².      Wt Readings from Last 3 Encounters:   03/03/23 213 lb (96.6 kg)   02/21/23 198 lb 6.6 oz (90 kg)   02/03/23 182 lb 4.8 oz (82.7 kg)     BP Readings from Last 3 Encounters:   03/03/23 (!) 92/59   02/21/23 130/84   02/03/23 123/85     Constitutional and General Appearance:   WD/WN in NAD  HEENT:  NC/AT  NIKKI  No problems with hearing  Skin:  Warm, dry  Respiratory:  Normal excursion and expansion without use of accessory muscles  Resp Auscultation: Normal breath sounds without dullness  Cardiovascular: The apical impulses not displaced  Heart tones are crisp and normal  Cervical veins are not engorged  The carotid upstroke is normal in amplitude and contour without delay or bruit  JVP elevated to angle of jaw  RRR with nl S1 and S2 without m,r,g  Peripheral pulses are symmetrical and full  There is no clubbing, cyanosis of the extremities. 2-3+ bilateral \"woody\" edema up to thighs  Femoral Arteries: 2+ and equal  Pedal Pulses: 2+ and equal   Neck:  No thyromegaly  Abdomen:  No masses or tenderness  Liver/Spleen: No Abnormalities Noted  Neurological/Psychiatric:  Alert and oriented in all spheres  Moves all extremities well  Exhibits normal gait balance and coordination  No abnormalities of mood, affect, memory, mentation, or behavior are noted    Labs were reviewed including labs from other hospital systems through [facility]. Cardiac testing was reviewed including echos, nuclear scans, cardiac catheterization, including from other hospital systems through [facility]. Assessment:    1. Acute on chronic systolic congestive heart failure, unspecified heart failure type ([address]    2. Hypoxia     3.  Relative bradycardia  4. Atrial fibrillation on amiodarone  5. Acute kidney insufficiency, possibly due to cipro      Plan:   He is in afib with slow rate on low dose metoprolol and amiodarone bid. At this point, we might be better off to keep him in afib but at a higher rate and abort amiodarone since it is not keeping him out of afib. Have EP see him about heart rate, afib, and to interrogate his ICD  Continue lasix IV 40 bid, put him on a lasix drip if urine output drops off  Continue jardiance  Continue lipitor  Continue eliquis  Stop cipro, as this might have compromised his renal function with his CHF  Okay to hold lisinopril and spironolactone while diuresing the patient  Get daily bmp to evaluate renal function  CHF education reinforced.   ~salt restriction  ~fluid restriction  ~medication compliance  ~daily weights and notify of any significant weight gain/loss  ~establish with CHF nurse  ~outpatient follow-up with our CHF team    The patient was seen for > 75 minutes. I reviewed interval history, physical exam, review of data including labs, imaging, development and implementation of treatment plan and coordination of complex care. More than 50% of the time was devoted to counseling the patient on their diagnoses/treatments, as well as coordination of care with the other care teams      I appreciate the opportunity of cooperating in the care of this patient.     Dipesh Sauceda M.D., [facility]

## 2023-03-03 NOTE — PLAN OF CARE
Problem: Discharge Planning  Goal: Discharge to home or other facility with appropriate resources  Outcome: Progressing  Flowsheets (Taken 3/2/2023 2252 by Radha Velasquez, RN)  Discharge to home or other facility with appropriate resources: Identify barriers to discharge with patient and caregiver     Problem: Safety - Adult  Goal: Free from fall injury  Outcome: Progressing     Problem: ABCDS Injury Assessment  Goal: Absence of physical injury  Outcome: Progressing  Flowsheets (Taken 3/2/2023 2249 by Radha Velasquez, RN)  Absence of Physical Injury: Implement safety measures based on patient assessment     Problem: Chronic Conditions and Co-morbidities  Goal: Patient's chronic conditions and co-morbidity symptoms are monitored and maintained or improved  Outcome: Progressing  Flowsheets (Taken 3/2/2023 2252 by Radha Velasquez, RN)  Care Plan - Patient's Chronic Conditions and Co-Morbidity Symptoms are Monitored and Maintained or Improved:   Monitor and assess patient's chronic conditions and comorbid symptoms for stability, deterioration, or improvement   Collaborate with multidisciplinary team to address chronic and comorbid conditions and prevent exacerbation or deterioration   Update acute care plan with appropriate goals if chronic or comorbid symptoms are exacerbated and prevent overall improvement and discharge     Problem: Respiratory - Adult  Goal: Achieves optimal ventilation and oxygenation  Outcome: Progressing     Problem: Cardiovascular - Adult  Goal: Maintains optimal cardiac output and hemodynamic stability  Outcome: Progressing     Problem: Skin/Tissue Integrity - Adult  Goal: Skin integrity remains intact  Outcome: Progressing     Problem: Genitourinary - Adult  Goal: Absence of urinary retention  Outcome: Progressing     Problem: Metabolic/Fluid and Electrolytes - Adult  Goal: Electrolytes maintained within normal limits  Outcome: Progressing

## 2023-03-03 NOTE — PROGRESS NOTES
Susanne Maxwell transmission received 03.03.23 @ 3:18pm from [address] [place] [address] floor for patient's CRT-D.     REASON FOR TRANSMISSION  Presence of cardiac device  TECHNICAL FINDINGS  No device or lead performance issue(s) observed  ADDITIONAL NOTES  Patient Name: Abelardo Jay   Reason for Check: Presence of Device   DEVICE ASSESSMENT: Available daily battery/lead measurements within expected range. Lead trends stable. ARRHYTHMIA SUMMARY: No arrhythmias/high rate episodes detected since last interrogation on 1Feb2023. See Counters page for PVC counts. OBSERVATIONS: Device appears to be currently functioning as programmed. Optivol is within normal range, currently elevated up to 40 w correlating TI trend below reference line. TriageHF Heart Failure Risk Status on 03-Mar-2023 is Medium*. NP will review. See interrogation under cardiology tab in the [address] [place] [address] [address] field for more details.

## 2023-03-03 NOTE — CARE COORDINATION
03/03/23 1606   Readmission Assessment   Number of Days since last admission? 8-30 days   Previous Disposition Home with Family   Who is being Interviewed Patient   What was the patient's/caregiver's perception as to why they think they needed to return back to the hospital? Other (Comment)  (Pt was SOB when walking. Felt weak)   Did you visit your Primary Care Physician after you left the hospital, before you returned this time? No   Why weren't you able to visit your PCP? Did not have an appointment  (Provided pt with [phone] number and encouraged to call to set up PCP)   Did you see a specialist, such as Cardiac, Pulmonary, Orthopedic Physician, etc. after you left the hospital? No   Who advised the patient to return to the hospital? Self-referral   Does the patient report anything that got in the way of taking their medications? Yes   What reasons did they give? Did not understand instructions  (Pt reported some confusion about which meds to continue taking. May benefit from RN at home to assist with med mgmt.)   In our efforts to provide the best possible care to you and others like you, can you think of anything that we could have done to help you after you left the hospital the first time, so that you might not have needed to return so soon? Other (Comment)  (Patient would benefit from PCP. Has declined this in the past.  Provided number to call and encouraged to do this.   Pt may benefit from a home RN to assist w/med mgmt.)     Electronically signed by DAVINA Kay, DOMINIQUE on 3/3/2023 at 4:09 PM

## 2023-03-03 NOTE — PROGRESS NOTES
[facility] PROGRESS NOTE    3/3/2023 7:34 AM        Name: Maria Esther Fuentes . Admitted: 3/2/2023  Primary Care Provider: No primary care provider on file. (Tel: None)      Subjective:  . Admitted with increased swelling and shortness of breath   He reports compliance with home meds    Seen this am   Reports breathing is better since IV lasix.       Reviewed interval ancillary notes    Current Medications  amiodarone (CORDARONE) tablet 200 mg, BID  apixaban (ELIQUIS) tablet 5 mg, BID  atorvastatin (LIPITOR) tablet 40 mg, Nightly  empagliflozin (JARDIANCE) tablet 10 mg, Daily  finasteride (PROSCAR) tablet 5 mg, Daily  lisinopril (PRINIVIL;ZESTRIL) tablet 2.5 mg, Daily  metoprolol succinate (TOPROL XL) extended release tablet 12.5 mg, BID  pantoprazole (PROTONIX) tablet 40 mg, Daily  spironolactone (ALDACTONE) tablet 25 mg, Lunch  tamsulosin (FLOMAX) capsule 0.4 mg, Daily  furosemide (LASIX) injection 40 mg, BID  sodium chloride flush 0.9 % injection 5-40 mL, 2 times per day  sodium chloride flush 0.9 % injection 5-40 mL, PRN  0.9 % sodium chloride infusion, PRN  ondansetron (ZOFRAN-ODT) disintegrating tablet 4 mg, Q8H PRN   Or  ondansetron (ZOFRAN) injection 4 mg, Q6H PRN  polyethylene glycol (GLYCOLAX) packet 17 g, Daily PRN  acetaminophen (TYLENOL) tablet 650 mg, Q6H PRN   Or  acetaminophen (TYLENOL) suppository 650 mg, Q6H PRN        Objective:  /80   Pulse (!) 49   Temp 97.6 °F (36.4 °C) (Oral)   Resp 16   Ht 6' 2\" (1.88 m)   Wt 213 lb (96.6 kg)   SpO2 99%   BMI 27.35 kg/m²     Intake/Output Summary (Last 24 hours) at 3/3/2023 0734  Last data filed at 3/3/2023 0114  Gross per 24 hour   Intake --   Output 2100 ml   Net -2100 ml      Wt Readings from Last 3 Encounters:   03/03/23 213 lb (96.6 kg)   02/21/23 198 lb 6.6 oz (90 kg)   02/03/23 182 lb 4.8 oz (82.7 kg)       General appearance:  Appears comfortable, alert and pleasant. Eyes: Sclera clear. Pupils equal.  ENT: Moist oral mucosa. Trachea midline, no adenopathy. Cardiovascular: Regular rhythm, normal S1, S2. Harsh murmur. ++ edema in lower extremities  Respiratory: Not using accessory muscles. Good inspiratory effort. Clear to auscultation bilaterally, no wheeze or crackles. GI: Abdomen soft, no tenderness, not distended, normal bowel sounds  Musculoskeletal: No cyanosis in digits, neck supple  Neurology: CN 2-12 grossly intact. No speech or motor deficits  Psych: Normal affect. Alert and oriented in time, place and person  Skin: Warm, dry, normal turgor    Labs and Tests:  CBC:   Recent Labs     03/02/23  1521 03/03/23  0505   WBC 4.1 3.6*   HGB 12.8* 12.1*    199     BMP:    Recent Labs     03/02/23  1521 03/03/23  0505    142   K 4.5 4.6    106   CO2 24 29   BUN 25* 23*   CREATININE 1.3 1.6*   GLUCOSE 96 94     Hepatic:   Recent Labs     03/02/23  1521   AST 35   ALT 32   BILITOT 2.3*   ALKPHOS 104     Chest xray:    No radiographic evidence of acute pulmonary disease. Stable cardiomegaly       Problem List  Principal Problem:    Acute combined systolic and diastolic CHF, NYHA class 1 (HCC)  Resolved Problems:    * No resolved hospital problems. *       Assessment & Plan:   Acute on chronic systolic and diastolic HF: On BID IV lasix, jardiance and BB therapy. I held his ACE and aldactone this am for elevated creat of 1.6  CKD:  creat up to 1.6:  hold ace/ aldactone today , follow closely with IV diuresis   Elevated PSA with hx of urinary retention/ enlarged prostate. I spoke with urology this am.  They are scheduling him for prostate biopsy at [facility]. They will let me know the date so pt can arrange transportation. Diet: ADULT DIET; Regular;  Low Sodium (2 gm)  Code:Full Code  DVT PPX      Emerald Whittington, APRN - CNP   3/3/2023 7:34 AM

## 2023-03-04 LAB
ANION GAP SERPL CALCULATED.3IONS-SCNC: 6 MMOL/L (ref 3–16)
BUN BLDV-MCNC: 23 MG/DL (ref 7–20)
CALCIUM SERPL-MCNC: 8.2 MG/DL (ref 8.3–10.6)
CHLORIDE BLD-SCNC: 103 MMOL/L (ref 99–110)
CO2: 31 MMOL/L (ref 21–32)
CREAT SERPL-MCNC: 1.5 MG/DL (ref 0.8–1.3)
GFR SERPL CREATININE-BSD FRML MDRD: 52 ML/MIN/{1.73_M2}
GLUCOSE BLD-MCNC: 91 MG/DL (ref 70–99)
HCT VFR BLD CALC: 36 % (ref 40.5–52.5)
HEMOGLOBIN: 12 G/DL (ref 13.5–17.5)
MCH RBC QN AUTO: 27 PG (ref 26–34)
MCHC RBC AUTO-ENTMCNC: 33.3 G/DL (ref 31–36)
MCV RBC AUTO: 81 FL (ref 80–100)
PDW BLD-RTO: 16.2 % (ref 12.4–15.4)
PLATELET # BLD: 218 K/UL (ref 135–450)
PMV BLD AUTO: 7.7 FL (ref 5–10.5)
POTASSIUM SERPL-SCNC: 4.1 MMOL/L (ref 3.5–5.1)
RBC # BLD: 4.44 M/UL (ref 4.2–5.9)
SODIUM BLD-SCNC: 140 MMOL/L (ref 136–145)
WBC # BLD: 4.6 K/UL (ref 4–11)

## 2023-03-04 PROCEDURE — 6370000000 HC RX 637 (ALT 250 FOR IP): Performed by: HOSPITALIST

## 2023-03-04 PROCEDURE — 94760 N-INVAS EAR/PLS OXIMETRY 1: CPT

## 2023-03-04 PROCEDURE — 36415 COLL VENOUS BLD VENIPUNCTURE: CPT

## 2023-03-04 PROCEDURE — 85027 COMPLETE CBC AUTOMATED: CPT

## 2023-03-04 PROCEDURE — 2580000003 HC RX 258: Performed by: HOSPITALIST

## 2023-03-04 PROCEDURE — 6360000002 HC RX W HCPCS: Performed by: HOSPITALIST

## 2023-03-04 PROCEDURE — 99233 SBSQ HOSP IP/OBS HIGH 50: CPT | Performed by: CLINICAL NURSE SPECIALIST

## 2023-03-04 PROCEDURE — 2060000000 HC ICU INTERMEDIATE R&B

## 2023-03-04 PROCEDURE — 80048 BASIC METABOLIC PNL TOTAL CA: CPT

## 2023-03-04 RX ADMIN — Medication 10 ML: at 08:05

## 2023-03-04 RX ADMIN — PANTOPRAZOLE SODIUM 40 MG: 40 TABLET, DELAYED RELEASE ORAL at 08:04

## 2023-03-04 RX ADMIN — FINASTERIDE 5 MG: 5 TABLET, FILM COATED ORAL at 08:03

## 2023-03-04 RX ADMIN — FUROSEMIDE 40 MG: 10 INJECTION, SOLUTION INTRAMUSCULAR; INTRAVENOUS at 08:05

## 2023-03-04 RX ADMIN — TAMSULOSIN HYDROCHLORIDE 0.4 MG: 0.4 CAPSULE ORAL at 08:04

## 2023-03-04 RX ADMIN — METOPROLOL SUCCINATE 12.5 MG: 25 TABLET, FILM COATED, EXTENDED RELEASE ORAL at 08:03

## 2023-03-04 RX ADMIN — EMPAGLIFLOZIN 10 MG: 10 TABLET, FILM COATED ORAL at 08:03

## 2023-03-04 RX ADMIN — APIXABAN 5 MG: 5 TABLET, FILM COATED ORAL at 20:17

## 2023-03-04 RX ADMIN — AMIODARONE HYDROCHLORIDE 200 MG: 200 TABLET ORAL at 08:03

## 2023-03-04 RX ADMIN — FUROSEMIDE 40 MG: 10 INJECTION, SOLUTION INTRAMUSCULAR; INTRAVENOUS at 18:12

## 2023-03-04 RX ADMIN — ATORVASTATIN CALCIUM 40 MG: 40 TABLET, FILM COATED ORAL at 20:17

## 2023-03-04 RX ADMIN — APIXABAN 5 MG: 5 TABLET, FILM COATED ORAL at 08:04

## 2023-03-04 RX ADMIN — AMIODARONE HYDROCHLORIDE 200 MG: 200 TABLET ORAL at 20:16

## 2023-03-04 ASSESSMENT — PAIN SCALES - GENERAL
PAINLEVEL_OUTOF10: 0

## 2023-03-04 NOTE — PLAN OF CARE
Problem: Discharge Planning  Goal: Discharge to home or other facility with appropriate resources  Outcome: Progressing     Problem: Safety - Adult  Goal: Free from fall injury  Outcome: Progressing     Problem: ABCDS Injury Assessment  Goal: Absence of physical injury  Outcome: Progressing     Problem: Chronic Conditions and Co-morbidities  Goal: Patient's chronic conditions and co-morbidity symptoms are monitored and maintained or improved  Outcome: Progressing     Problem: Respiratory - Adult  Goal: Achieves optimal ventilation and oxygenation  Outcome: Progressing     Problem: Cardiovascular - Adult  Goal: Maintains optimal cardiac output and hemodynamic stability  Outcome: Progressing     Problem: Skin/Tissue Integrity - Adult  Goal: Skin integrity remains intact  Outcome: Progressing  Flowsheets (Taken 3/3/2023 2129)  Skin Integrity Remains Intact:   Monitor for areas of redness and/or skin breakdown   Assess vascular access sites hourly   Every 4-6 hours minimum: Change oxygen saturation probe site   Every 4-6 hours: If on nasal continuous positive airway pressure, respiratory therapy assesses nares and determine need for appliance change or resting period     Problem: Genitourinary - Adult  Goal: Absence of urinary retention  Outcome: Progressing     Problem: Metabolic/Fluid and Electrolytes - Adult  Goal: Electrolytes maintained within normal limits  Outcome: Progressing     Problem: Pain  Goal: Verbalizes/displays adequate comfort level or baseline comfort level  Outcome: Progressing

## 2023-03-04 NOTE — PLAN OF CARE
Problem: Discharge Planning  Goal: Discharge to home or other facility with appropriate resources  3/4/2023 1723 by Fernando Tse RN  Outcome: Progressing  3/4/2023 0522 by Juan Graves RN  Outcome: Progressing     Problem: Safety - Adult  Goal: Free from fall injury  3/4/2023 1723 by Fernando Tse RN  Outcome: Progressing  3/4/2023 0522 by Juan Graves RN  Outcome: Progressing     Problem: ABCDS Injury Assessment  Goal: Absence of physical injury  3/4/2023 1723 by Fernando Tse RN  Outcome: Progressing  3/4/2023 0522 by Juan Graves RN  Outcome: Progressing

## 2023-03-04 NOTE — FLOWSHEET NOTE
Pt resting comfortably while eating breakfast. A & O x4. All LDA's remain C/D/I. Denies pain. See below vitals. Bed remains in lowest and locked position with alarm in place. Call light, bedside table, and all personal belongings are within reach. Will continue to closely monitor.         03/04/23 0800   Vital Signs   Temp 97.5 °F (36.4 °C)   Temp Source Oral   Heart Rate 74   Heart Rate Source Monitor   Resp 18   /75   MAP (Calculated) 83   BP Location Right upper arm   BP Method Automatic   Patient Position Semi fowlers   Pain Assessment   Pain Assessment 0-10   Pain Level 0   Oxygen Therapy   SpO2 99 %   Pulse Oximeter Device Mode Intermittent   Pulse Oximeter Device Location Finger   O2 Device None (Room air)

## 2023-03-04 NOTE — PROGRESS NOTES
[address]   Daily Progress Note      Admit Date:  3/2/2023    HPI:    Mr. Julio Aburto is a 57 y.o. male with history of with history of PAF on amiodarone, hypertension. Unvaccinated, , homeless but lives with friends, ICD in place 4/25/2022    He is admitted for abdominal pain and diagnosed with UTI, treated with cipro. He came back to the hospital with swelling in his legs and not urinating much. He has had multiple admissions for fluid overload. He states he is taking his medications. EKG show AF with slow ventricular response. Optival within threshold     Subjective:  Patient is being seen for systolic heart failure. There were no acute overnight cardiac events. Weight 213-195 making great urine -5 L out. He is lying in the bed on room air with a ernst catheter in place. His edema is much improved. His biggest concern is that he will not be able to urinate when the ernst is removed. He did not bring his medications from home      Objective:   /75   Pulse 74   Temp 97.5 °F (36.4 °C) (Oral)   Resp 18   Ht 6' 2\" (1.88 m)   Wt 195 lb 6.4 oz (88.6 kg)   SpO2 99%   BMI 25.09 kg/m²     Intake/Output Summary (Last 24 hours) at 3/4/2023 1005  Last data filed at 3/4/2023 0905  Gross per 24 hour   Intake 1080 ml   Output 2200 ml   Net -1120 ml          Physical Exam:  General:  Awake, alert, oriented in NAD  Skin:  Warm and dry. No unusual bruising or rash  Neck:  Supple. No JVD or carotid bruit appreciated  Chest:  Normal effort.   Clear to auscultation, no wheezes/rhonchi/rales  Cardiovascular:  RRR, S1/S2, no murmur/gallop/rub  Abdomen:  Soft, nontender, +bowel sounds  Extremities:  bilateral lower leg edema  Neurological: No focal deficits  Psychological: Normal mood and affect      Medications:    amiodarone  200 mg Oral BID    apixaban  5 mg Oral BID    atorvastatin  40 mg Oral Nightly    empagliflozin  10 mg Oral Daily    finasteride  5 mg Oral Daily    [Held by provider] lisinopril  2.5 mg Oral Daily    metoprolol succinate  12.5 mg Oral BID    pantoprazole  40 mg Oral Daily    [Held by provider] spironolactone  25 mg Oral Lunch    tamsulosin  0.4 mg Oral Daily    furosemide  40 mg IntraVENous BID    sodium chloride flush  5-40 mL IntraVENous 2 times per day      sodium chloride         Lab Data:  CBC:   Recent Labs     03/02/23  1521 03/03/23  0505 03/04/23  0437   WBC 4.1 3.6* 4.6   HGB 12.8* 12.1* 12.0*    199 218     BMP:    Recent Labs     03/02/23  1521 03/03/23  0505 03/04/23  0437    142 140   K 4.5 4.6 4.1   CO2 24 29 31   BUN 25* 23* 23*   CREATININE 1.3 1.6* 1.5*     INR:    Recent Labs     03/02/23  1521   INR 1.52*     BNP:    Recent Labs     03/02/23  1521   PROBNP 14,354*         Diagnostics:  Echo:  1/31/23:      Summary   The left ventricle is severely dilated. Severely reduced systolic function. LVEF 15-20%. Severe global hypokinesis. Grade III diastolic dysfunction. Elevated LVEDP. RV severely dilated with severely depressed function. Moderate eccentric MR. Mild AI. Severe TR with a RVSP   100 mmHg. Severe pulmonary hypertension. IVC dynamics c/w markedly elevated RA pressure (15 mmHg). A bubble study was performed and fails to show evidence of shunting. Incidental Finding: Right kidney cyst noted. Consider additional dedicated   abdominal imaging if clinically indicated. CONSUELO Dr Dallin Scott 2/10/22  Preliminary CONSUELO results are:      - Severe LV dysfunction,  EF: 20-25%  - LA dilated. There was HENOK thrombus. - Moderate MR     Cardiac Cath PCI: Dr Sancho Tomlin 2/8/2022  Anatomy:   LM-nml   LAD-nml  Cx-nml  OM- nml  RCA-nml  RPDA- nml  LVEF- 10%  LVG- global hypokinesis  LVEDP- 10     Hemodynamics:  RA- mean 3  RV- 37/0  PAWP- 10  PA- 34/12 (20)     C. O.- 5.7 (4.9)  C. I.- 2.6 (2.25)  PA sat 60%  AO sat 91%     Contrast: 70  Flouro Time: 5.3  Access: R radial a, R CFV     Impression  ~Coronary Angiography w/ normal cors  ~LVG with LVEF of 10% and global regional wall motion abnormalities  ~Severe MR  ~Normal CO/CI  ~normal L and R filling pressures     Recommendation  ~Aggressive medical treatment and risk factor modification  ~1. Medications reviewed, no changes at this time. 2. Post cath IVF. Bedrest.  3.Consider CONSUELO for evaluation of MR.   4. Patient has been advised on the importance of regular exercise of at least 20-30 minutes daily alternating with aerobic and isometric activities. 5. Patient counseled about and offered assistance for smoking cessation   6. No indication for cardiac rehab  7. CHF service to evaluate tomorrow. Echo 11/29/2021  Summary   -Covid+   -Severely reduced global systolic function with an ejection fraction   estimated at 20%. -Severe global hypokinesis with regional variations noted. -Right ventricular systolic function appears to be mildly reduced based on   visual inspection.   -Severe biatrial enlargement.   -Thickened mitral valve without evidence of stenosis. There is   mild-to-moderate mitral regurgitation.   -There is mild-to-moderate tricuspid regurgitation with a RVSP estimation of   37 mmHg. -Diastolic dysfunction with elevated LV filling pressures. Assessment:    1. Acute on chronic systolic heart failure, on bb, sglt2  2. Hypoxia  3. Bradycardia  4. SACHIN possible due to cipro  5. ICD in place  6. Chronic atrial fib      Plan:    Continue IV lasix 40 mg twice a day today and change to oral tomorrow  Continue jardiance 10 mg daily  Continue eliquis 5 mg twice a day   Okay to hold lisinopril and aldactone while diuresing  CHF nurse following for diet education, fluid restriction and daily weights  Can plan for CONSUELO/CV on Monday or as an outpatient  Continue toprol 12.5 mg twice a day   Amiodarone per EP;  Seen by EP and threshold increased to 70    Hopefully home tomorrow  Recommend urology see patient as he is concerned that he will not be able to urinate after the ernst is removed. They have seen him during recent admission for elevated PSA    NYHA III    Discussed with patient who is agreeable with plan of care. Thank you for allowing me to participate in the care of your patient.     Elvira Pierre, APRN - CNS, CNS

## 2023-03-04 NOTE — FLOWSHEET NOTE
Pt resting comfortably in bed. Denies pain @ this time. Don remains in place. Pt standby assist and took shower independently after set up. Pills whole. Pt aware of potential d/c home on 3/5/2023 per Cardiology. Asymptomatic with the below vitals. Denies further needs. Call light, bedside table, and all personal belongings are within reach. Will continue to closely monitor.          03/04/23 1350 03/04/23 1353 03/04/23 1639   Vital Signs   Temp  --   --  97.5 °F (36.4 °C)   Temp Source  --   --  Axillary   Heart Rate 73 74 81   Heart Rate Source Monitor Monitor Monitor   Resp 18 18 18   BP 95/66 97/66 107/84   MAP (Calculated) 76 76 92   BP Location Right upper arm Left upper arm Right upper arm   BP Method Automatic Automatic Automatic   Patient Position Sitting Sitting Sitting   Pain Assessment   Pain Assessment None - Denies Pain  --  None - Denies Pain   Pain Level 0  --   --    Oxygen Therapy   SpO2  --   --  98 %   Pulse Oximeter Device Mode Intermittent  --   --    O2 Device  --   --  None (Room air)

## 2023-03-04 NOTE — PROGRESS NOTES
100 [facility] PROGRESS NOTE    3/4/2023 8:17 AM        Name: Jennyfer Cade . Admitted: 3/2/2023  Primary Care Provider: No primary care provider on file. (Tel: None)      Subjective:  . Admitted with increased swelling and shortness of breath   He reports compliance with home meds but does not weight himself at home     Seen by cardiology and EP on Friday,    Pacer rate increased to 70  Anticipate CONSUELO on Monday       Seen this am   Reports breathing is better since IV lasix.   He is diuresing  Weight down to 195       Reviewed interval ancillary notes    Current Medications  amiodarone (CORDARONE) tablet 200 mg, BID  apixaban (ELIQUIS) tablet 5 mg, BID  atorvastatin (LIPITOR) tablet 40 mg, Nightly  empagliflozin (JARDIANCE) tablet 10 mg, Daily  finasteride (PROSCAR) tablet 5 mg, Daily  [Held by provider] lisinopril (PRINIVIL;ZESTRIL) tablet 2.5 mg, Daily  metoprolol succinate (TOPROL XL) extended release tablet 12.5 mg, BID  pantoprazole (PROTONIX) tablet 40 mg, Daily  [Held by provider] spironolactone (ALDACTONE) tablet 25 mg, Lunch  tamsulosin (FLOMAX) capsule 0.4 mg, Daily  furosemide (LASIX) injection 40 mg, BID  sodium chloride flush 0.9 % injection 5-40 mL, 2 times per day  sodium chloride flush 0.9 % injection 5-40 mL, PRN  0.9 % sodium chloride infusion, PRN  ondansetron (ZOFRAN-ODT) disintegrating tablet 4 mg, Q8H PRN   Or  ondansetron (ZOFRAN) injection 4 mg, Q6H PRN  polyethylene glycol (GLYCOLAX) packet 17 g, Daily PRN  acetaminophen (TYLENOL) tablet 650 mg, Q6H PRN   Or  acetaminophen (TYLENOL) suppository 650 mg, Q6H PRN      Objective:  /75   Pulse 74   Temp 97.5 °F (36.4 °C) (Oral)   Resp 18   Ht 6' 2\" (1.88 m)   Wt 195 lb 6.4 oz (88.6 kg)   SpO2 99%   BMI 25.09 kg/m²     Intake/Output Summary (Last 24 hours) at 3/4/2023 0813  Last data filed at 3/4/2023 0415  Gross per 24 hour   Intake 720 ml   Output 2200 ml   Net -1480 ml        Wt Readings from Last 3 Encounters:   03/04/23 195 lb 6.4 oz (88.6 kg)   02/21/23 198 lb 6.6 oz (90 kg)   02/03/23 182 lb 4.8 oz (82.7 kg)       General appearance:  Appears comfortable, alert and pleasant. Eyes: Sclera clear. Pupils equal.  ENT: Moist oral mucosa. Trachea midline, no adenopathy. Cardiovascular: Regular rhythm, normal S1, S2. Harsh murmur. + edema in lower extremities but improving   Respiratory: Not using accessory muscles. Good inspiratory effort. Clear to auscultation bilaterally, no wheeze or crackles. GI: Abdomen soft, no tenderness, not distended, normal bowel sounds  Musculoskeletal: No cyanosis in digits, neck supple  Neurology: CN 2-12 grossly intact. No speech or motor deficits  Psych: Normal affect. Alert and oriented in time, place and person  Skin: Warm, dry, normal turgor    Labs and Tests:  CBC:   Recent Labs     03/02/23  1521 03/03/23  0505 03/04/23  0437   WBC 4.1 3.6* 4.6   HGB 12.8* 12.1* 12.0*    199 218       BMP:    Recent Labs     03/02/23  1521 03/03/23  0505 03/04/23  0437    142 140   K 4.5 4.6 4.1    106 103   CO2 24 29 31   BUN 25* 23* 23*   CREATININE 1.3 1.6* 1.5*   GLUCOSE 96 94 91       Hepatic:   Recent Labs     03/02/23  1521   AST 35   ALT 32   BILITOT 2.3*   ALKPHOS 104     Patient has a BiV ICD implanted by Dr. Janessa Dang on 4/25/2022. He has chronic lung persistent atrial fibrillation. At the time of implantation the device has been programmed to VVIR with a lower rate of 50s since he was in atrial fibrillation. He has been started on amiodarone planning for CONSUELO and cardioversion. Chest xray:    No radiographic evidence of acute pulmonary disease.        Stable cardiomegaly       Problem List  Principal Problem:    Acute combined systolic and diastolic CHF, NYHA class 1 (HCC)  Active Problems:    Hypoxia    Bradycardia    Acute renal failure ([address]  Resolved Problems:    * No resolved hospital problems. *       Assessment & Plan:   Acute on chronic systolic and diastolic HF: On BID IV lasix, jardiance and BB therapy. ACE and aldactone on hold for creat of 1.5  CKD:  creat up to 1.5:  hold ace/ aldactone today , follow closely with IV diuresis   Chronic AF:  AC with eliquis, anticipate CONSUELO/ CV on Monday ,  ICD / pacer in place   Elevated PSA with hx of urinary retention/ enlarged prostate. I spoke with urology this am.  They are scheduling him for prostate biopsy at [facility]. They will let me know the date so pt can arrange transportation. Diet: ADULT DIET; Regular;  Low Sodium (2 gm)  Code:Full Code  DVT PPX      TRENT Segura - CNP   3/4/2023 8:17 AM

## 2023-03-05 LAB
ANION GAP SERPL CALCULATED.3IONS-SCNC: 4 MMOL/L (ref 3–16)
BUN BLDV-MCNC: 23 MG/DL (ref 7–20)
CALCIUM SERPL-MCNC: 8.5 MG/DL (ref 8.3–10.6)
CHLORIDE BLD-SCNC: 100 MMOL/L (ref 99–110)
CO2: 35 MMOL/L (ref 21–32)
CREAT SERPL-MCNC: 1.6 MG/DL (ref 0.8–1.3)
FERRITIN: 73.9 NG/ML (ref 30–400)
GFR SERPL CREATININE-BSD FRML MDRD: 48 ML/MIN/{1.73_M2}
GLUCOSE BLD-MCNC: 82 MG/DL (ref 70–99)
HCT VFR BLD CALC: 36.9 % (ref 40.5–52.5)
HEMOGLOBIN: 12 G/DL (ref 13.5–17.5)
IRON SATURATION: 11 % (ref 20–50)
IRON: 42 UG/DL (ref 59–158)
MCH RBC QN AUTO: 26.4 PG (ref 26–34)
MCHC RBC AUTO-ENTMCNC: 32.5 G/DL (ref 31–36)
MCV RBC AUTO: 81 FL (ref 80–100)
PDW BLD-RTO: 16.3 % (ref 12.4–15.4)
PLATELET # BLD: 202 K/UL (ref 135–450)
PMV BLD AUTO: 8 FL (ref 5–10.5)
POTASSIUM SERPL-SCNC: 4.6 MMOL/L (ref 3.5–5.1)
PRO-BNP: 7694 PG/ML (ref 0–124)
RBC # BLD: 4.55 M/UL (ref 4.2–5.9)
SODIUM BLD-SCNC: 139 MMOL/L (ref 136–145)
TOTAL IRON BINDING CAPACITY: 392 UG/DL (ref 260–445)
WBC # BLD: 3.9 K/UL (ref 4–11)

## 2023-03-05 PROCEDURE — 82728 ASSAY OF FERRITIN: CPT

## 2023-03-05 PROCEDURE — 83550 IRON BINDING TEST: CPT

## 2023-03-05 PROCEDURE — 6370000000 HC RX 637 (ALT 250 FOR IP): Performed by: HOSPITALIST

## 2023-03-05 PROCEDURE — 2060000000 HC ICU INTERMEDIATE R&B

## 2023-03-05 PROCEDURE — 80048 BASIC METABOLIC PNL TOTAL CA: CPT

## 2023-03-05 PROCEDURE — 85027 COMPLETE CBC AUTOMATED: CPT

## 2023-03-05 PROCEDURE — 99232 SBSQ HOSP IP/OBS MODERATE 35: CPT | Performed by: CLINICAL NURSE SPECIALIST

## 2023-03-05 PROCEDURE — 83880 ASSAY OF NATRIURETIC PEPTIDE: CPT

## 2023-03-05 PROCEDURE — 51798 US URINE CAPACITY MEASURE: CPT

## 2023-03-05 PROCEDURE — 36415 COLL VENOUS BLD VENIPUNCTURE: CPT

## 2023-03-05 PROCEDURE — 83540 ASSAY OF IRON: CPT

## 2023-03-05 PROCEDURE — 2580000003 HC RX 258: Performed by: HOSPITALIST

## 2023-03-05 RX ORDER — TORSEMIDE 20 MG/1
20 TABLET ORAL DAILY
Status: DISCONTINUED | OUTPATIENT
Start: 2023-03-06 | End: 2023-03-10 | Stop reason: HOSPADM

## 2023-03-05 RX ADMIN — AMIODARONE HYDROCHLORIDE 200 MG: 200 TABLET ORAL at 09:35

## 2023-03-05 RX ADMIN — ATORVASTATIN CALCIUM 40 MG: 40 TABLET, FILM COATED ORAL at 21:32

## 2023-03-05 RX ADMIN — APIXABAN 5 MG: 5 TABLET, FILM COATED ORAL at 09:35

## 2023-03-05 RX ADMIN — METOPROLOL SUCCINATE 12.5 MG: 25 TABLET, FILM COATED, EXTENDED RELEASE ORAL at 21:32

## 2023-03-05 RX ADMIN — TAMSULOSIN HYDROCHLORIDE 0.4 MG: 0.4 CAPSULE ORAL at 09:35

## 2023-03-05 RX ADMIN — Medication 10 ML: at 09:36

## 2023-03-05 RX ADMIN — Medication 10 ML: at 21:33

## 2023-03-05 RX ADMIN — PANTOPRAZOLE SODIUM 40 MG: 40 TABLET, DELAYED RELEASE ORAL at 09:35

## 2023-03-05 RX ADMIN — AMIODARONE HYDROCHLORIDE 200 MG: 200 TABLET ORAL at 21:32

## 2023-03-05 RX ADMIN — APIXABAN 5 MG: 5 TABLET, FILM COATED ORAL at 21:32

## 2023-03-05 RX ADMIN — FINASTERIDE 5 MG: 5 TABLET, FILM COATED ORAL at 09:35

## 2023-03-05 RX ADMIN — METOPROLOL SUCCINATE 12.5 MG: 25 TABLET, FILM COATED, EXTENDED RELEASE ORAL at 09:35

## 2023-03-05 NOTE — PROGRESS NOTES
[facility] PROGRESS NOTE    3/5/2023 8:05 AM        Name: Cece Grimaldo . Admitted: 3/2/2023  Primary Care Provider: No primary care provider on file. (Tel: None)      Subjective:  . Admitted with increased swelling and shortness of breath   He reports compliance with home meds but does not weight himself at home     Seen by cardiology and EP on Friday,    Pacer rate increased to 70  Anticipate CONSUELO with cardioversion on Monday       Seen this am   Reports breathing is better since IV lasix.   He has diuresed 8 liters   Weight down to 191 today       Reviewed interval ancillary notes    Current Medications  amiodarone (CORDARONE) tablet 200 mg, BID  apixaban (ELIQUIS) tablet 5 mg, BID  atorvastatin (LIPITOR) tablet 40 mg, Nightly  empagliflozin (JARDIANCE) tablet 10 mg, Daily  finasteride (PROSCAR) tablet 5 mg, Daily  [Held by provider] lisinopril (PRINIVIL;ZESTRIL) tablet 2.5 mg, Daily  metoprolol succinate (TOPROL XL) extended release tablet 12.5 mg, BID  pantoprazole (PROTONIX) tablet 40 mg, Daily  [Held by provider] spironolactone (ALDACTONE) tablet 25 mg, Lunch  tamsulosin (FLOMAX) capsule 0.4 mg, Daily  furosemide (LASIX) injection 40 mg, BID  sodium chloride flush 0.9 % injection 5-40 mL, 2 times per day  sodium chloride flush 0.9 % injection 5-40 mL, PRN  0.9 % sodium chloride infusion, PRN  ondansetron (ZOFRAN-ODT) disintegrating tablet 4 mg, Q8H PRN   Or  ondansetron (ZOFRAN) injection 4 mg, Q6H PRN  polyethylene glycol (GLYCOLAX) packet 17 g, Daily PRN  acetaminophen (TYLENOL) tablet 650 mg, Q6H PRN   Or  acetaminophen (TYLENOL) suppository 650 mg, Q6H PRN      Objective:  BP 98/74   Pulse 76   Temp 97.9 °F (36.6 °C) (Oral)   Resp 18   Ht 6' 2\" (1.88 m)   Wt 200 lb (90.7 kg)   SpO2 99%   BMI 25.68 kg/m²     Intake/Output Summary (Last 24 hours) at 3/5/2023 0805  Last data filed at 3/5/2023 8618  Gross per 24 hour   Intake 1080 ml   Output 5050 ml   Net -3970 ml        Wt Readings from Last 3 Encounters:   03/05/23 200 lb (90.7 kg)   02/21/23 198 lb 6.6 oz (90 kg)   02/03/23 182 lb 4.8 oz (82.7 kg)       General appearance:  Appears comfortable, alert and pleasant. Eyes: Sclera clear. Pupils equal.  ENT: Moist oral mucosa. Trachea midline, no adenopathy. Cardiovascular: Regular rhythm, normal S1, S2. Harsh murmur. Trace edema in lower extremities but improving   Respiratory: Not using accessory muscles. Good inspiratory effort. Clear to auscultation bilaterally, no wheeze or crackles. GI: Abdomen soft, no tenderness, not distended, normal bowel sounds  Musculoskeletal: No cyanosis in digits, neck supple  Neurology: CN 2-12 grossly intact. No speech or motor deficits  Psych: Normal affect. Alert and oriented in time, place and person  Skin: Warm, dry, normal turgor    Labs and Tests:  CBC:   Recent Labs     03/03/23  0505 03/04/23  0437 03/05/23  0518   WBC 3.6* 4.6 3.9*   HGB 12.1* 12.0* 12.0*    218 202       BMP:    Recent Labs     03/03/23  0505 03/04/23  0437 03/05/23  0518    140 139   K 4.6 4.1 4.6    103 100   CO2 29 31 35*   BUN 23* 23* 23*   CREATININE 1.6* 1.5* 1.6*   GLUCOSE 94 91 82       Hepatic:   Recent Labs     03/02/23  1521   AST 35   ALT 32   BILITOT 2.3*   ALKPHOS 104     Patient has a BiV ICD implanted by Dr. Tran Manzanares on 4/25/2022. He has chronic lung persistent atrial fibrillation. At the time of implantation the device has been programmed to VVIR with a lower rate of 50s since he was in atrial fibrillation. He has been started on amiodarone planning for CONSUELO and cardioversion. Chest xray:    No radiographic evidence of acute pulmonary disease.        Stable cardiomegaly       Problem List  Principal Problem:    Acute combined systolic and diastolic CHF, NYHA class 1 (HCC)  Active Problems:    Hypoxia    Bradycardia    SACHIN (acute kidney injury) [facility])    ICD (implantable cardioverter-defibrillator) in place  Resolved Problems:    * No resolved hospital problems. *       Assessment & Plan:   Acute on chronic systolic and diastolic HF: On ardiance and BB therapy. IV lasix d/c , will start torsemide in the am . ACE and aldactone on hold for creat of 1.6  CKD:  creat up to 1.6:  hold ace/ aldactone again today  Chronic AF:  AC with eliquis, anticipate CONSUELO/ CV on Monday ,  ICD / pacer in place , cardiology is following  Elevated PSA with hx of urinary retention/ enlarged prostate. I spoke with urology. They are scheduling him for prostate biopsy at [facility] in [facility]. They will let me know the date so pt can arrange transportation. Pt likely stable for d/c home on Monday after cardioversion. Pt needs to know date for prostate biopsy at the urology center PRIOR to his d/c home. He does not have a car or a cell phone. Diet: ADULT DIET; Regular;  Low Sodium (2 gm)  Code:Full Code  DVT PPX      TRENT Lyn - CNP   3/5/2023 8:05 AM

## 2023-03-05 NOTE — PROGRESS NOTES
16 fr. 10ml Don catheter removed. Pt tolerated removal without complications. Manoj instructed to urinate using the urinal. Pt acknowledges this RN's instructions. Will continue to closely monitor.

## 2023-03-05 NOTE — PROGRESS NOTES
Bladder scanned showed 225. APRN, [facility] notified via perfect serve. No new orders obtained. Will continue to closely monitor.

## 2023-03-05 NOTE — PROGRESS NOTES
[address]   Daily Progress Note      Admit Date:  3/2/2023    HPI:    Mr. Corinne Grace is a 57 y.o. male with history of with history of PAF on amiodarone, hypertension. Unvaccinated, , homeless but lives with friends, ICD in place 4/25/2022    He is admitted for abdominal pain and diagnosed with UTI, treated with cipro. He came back to the hospital with swelling in his legs and not urinating much. He has had multiple admissions for fluid overload. He states he is taking his medications. EKG show AF with slow ventricular response. Optival within threshold     Subjective:  Patient is being seen for systolic heart failure. There were no acute overnight cardiac events. He is sitting on the side of the bed. No chest pain, palpitations, lightheadedness or shortness of breath. Leg edema much improved    Weight [phone] and -7.3 L out creat 1.5-1.6      Objective:   /75   Pulse 69   Temp 97.5 °F (36.4 °C) (Oral)   Resp 18   Ht 6' 2\" (1.88 m)   Wt 200 lb (90.7 kg)   SpO2 97%   BMI 25.68 kg/m²     Intake/Output Summary (Last 24 hours) at 3/5/2023 0737  Last data filed at 3/5/2023 0226  Gross per 24 hour   Intake 1080 ml   Output 4450 ml   Net -3370 ml          Physical Exam:  General:  Awake, alert, oriented in NAD  Skin:  Warm and dry. No unusual bruising or rash  Neck:  Supple. No JVD or carotid bruit appreciated  Chest:  Normal effort.   Clear to auscultation, no wheezes/rhonchi/rales  Cardiovascular:  RRR, S1/S2, no murmur/gallop/rub  Abdomen:  Soft, nontender, +bowel sounds  Extremities:  bilateral lower leg edema much improved  Neurological: No focal deficits  Psychological: Normal mood and affect      Medications:    amiodarone  200 mg Oral BID    apixaban  5 mg Oral BID    atorvastatin  40 mg Oral Nightly    empagliflozin  10 mg Oral Daily    finasteride  5 mg Oral Daily    [Held by provider] lisinopril  2.5 mg Oral Daily    metoprolol succinate  12.5 mg Oral BID pantoprazole  40 mg Oral Daily    [Held by provider] spironolactone  25 mg Oral Lunch    tamsulosin  0.4 mg Oral Daily    furosemide  40 mg IntraVENous BID    sodium chloride flush  5-40 mL IntraVENous 2 times per day      sodium chloride         Lab Data:  CBC:   Recent Labs     03/03/23  0505 03/04/23  0437 03/05/23  0518   WBC 3.6* 4.6 3.9*   HGB 12.1* 12.0* 12.0*    218 202     BMP:    Recent Labs     03/03/23  0505 03/04/23  0437 03/05/23  0518    140 139   K 4.6 4.1 4.6   CO2 29 31 35*   BUN 23* 23* 23*   CREATININE 1.6* 1.5* 1.6*     INR:    Recent Labs     03/02/23  1521   INR 1.52*     BNP:    Recent Labs     03/02/23  1521   PROBNP 14,354*         Diagnostics:  Echo:  1/31/23:      Summary   The left ventricle is severely dilated. Severely reduced systolic function. LVEF 15-20%. Severe global hypokinesis. Grade III diastolic dysfunction. Elevated LVEDP. RV severely dilated with severely depressed function. Moderate eccentric MR. Mild AI. Severe TR with a RVSP   100 mmHg. Severe pulmonary hypertension. IVC dynamics c/w markedly elevated RA pressure (15 mmHg). A bubble study was performed and fails to show evidence of shunting. Incidental Finding: Right kidney cyst noted. Consider additional dedicated   abdominal imaging if clinically indicated. CONSUELO Dr Dagmar Cohn 2/10/22  Preliminary CONSUELO results are:      - Severe LV dysfunction,  EF: 20-25%  - LA dilated. There was HENOK thrombus. - Moderate MR     Cardiac Cath PCI: Dr Jonnie Marmolejo 2/8/2022  Anatomy:   LM-nml   LAD-nml  Cx-nml  OM- nml  RCA-nml  RPDA- nml  LVEF- 10%  LVG- global hypokinesis  LVEDP- 10     Hemodynamics:  RA- mean 3  RV- 37/0  PAWP- 10  PA- 34/12 (20)     C. O.- 5.7 (4.9)  C. I.- 2.6 (2.25)  PA sat 60%  AO sat 91%     Contrast: 70  Flouro Time: 5.3  Access: R radial a, R CFV     Impression  ~Coronary Angiography w/ normal cors  ~LVG with LVEF of 10% and global regional wall motion abnormalities  ~Severe MR  ~Normal CO/CI  ~normal L and R filling pressures     Recommendation  ~Aggressive medical treatment and risk factor modification  ~1. Medications reviewed, no changes at this time. 2. Post cath IVF. Bedrest.  3.Consider CONSUELO for evaluation of MR.   4. Patient has been advised on the importance of regular exercise of at least 20-30 minutes daily alternating with aerobic and isometric activities. 5. Patient counseled about and offered assistance for smoking cessation   6. No indication for cardiac rehab  7. CHF service to evaluate tomorrow. Echo 11/29/2021  Summary   -Covid+   -Severely reduced global systolic function with an ejection fraction   estimated at 20%. -Severe global hypokinesis with regional variations noted. -Right ventricular systolic function appears to be mildly reduced based on   visual inspection.   -Severe biatrial enlargement.   -Thickened mitral valve without evidence of stenosis. There is   mild-to-moderate mitral regurgitation.   -There is mild-to-moderate tricuspid regurgitation with a RVSP estimation of   37 mmHg. -Diastolic dysfunction with elevated LV filling pressures. Assessment:    1. Acute on chronic systolic heart failure, on bb, sglt2  2. Hypoxia  3. Bradycardia  4. SACHIN possible due to cipro  5. ICD in place  6. Chronic atrial fib      Plan: Will stop IV lasix and change to torsemide 20 mg daily  Hold jardiance  Continue eliquis 5 mg twice a day   Okay to hold lisinopril and aldactone while diuresing, consider resuming tomorrow  CHF nurse following for diet education, fluid restriction and daily weights  Can plan for CONSUELO/CV on Monday will make npo after MN  Continue toprol 12.5 mg twice a day   Amiodarone per EP; Seen by EP and threshold increased to 70   9. Will check ferritin and iron studies, add bnp    NYHA III    Discussed with patient who is agreeable with plan of care. Thank you for allowing me to participate in the care of your patient.     Mildred Light Debi Leo, APRN - CNS, CNS

## 2023-03-05 NOTE — FLOWSHEET NOTE
Pt resting comfortably in bed. Denies pain @ this time. Pt aware of NPO status @ midnight for planned Cardioversion on 3/6/23, post void bladder scans, strict I/O'S, possible d/c on 3/6 after Cardioversion & the importance of Urology appointment for prostate issues. Juan Pitt is independent to bathroom, uses urinal, and takes his pills whole. Denies further needs. Call light, bedside table, and all personal belongings are within reach. Will continue to closely monitor. 03/05/23 1108 03/05/23 1600   Vital Signs   Temp 98 °F (36.7 °C) 98.1 °F (36.7 °C)   Temp Source Oral Oral   Heart Rate 70 71   Heart Rate Source Monitor Monitor   Resp 18 18   BP 95/73 94/60   MAP (Calculated) 80 71   BP Location Right upper arm Right upper arm   BP Method Automatic Automatic   Patient Position  --  Sitting; Turns self   Level of Consciousness  --  0   MEWS Score  --  2   Pain Assessment   Pain Assessment  --  None - Denies Pain   Oxygen Therapy   SpO2 100 % 100 %   O2 Device None (Room air) None (Room air)

## 2023-03-05 NOTE — FLOWSHEET NOTE
03/04/23 2015   Assessment   Charting Type Shift assessment   Psychosocial   Psychosocial (WDL) WDL   Neurological   Neuro (WDL) WDL   Level of Consciousness 0   Orientation Level Oriented X4   Cognition Appropriate judgement; Appropriate safety awareness; Appropriate attention/concentration; Appropriate for developmental age; Follows commands   Speech Clear   [place] Coma Scale   Eye Opening 4   Best Verbal Response 5   Best Motor Response 6   [place] Coma Scale Score 15   HEENT (Head, Ears, Eyes, Nose, & Throat)   HEENT (WDL) X   Teeth Missing teeth   Respiratory   Respiratory (WDL) WDL   Respiratory Pattern Regular   Respiratory Depth Normal   Respiratory Quality/Effort Unlabored   Chest Assessment Chest expansion symmetrical;Trachea midline   L Breath Sounds Diminished   R Breath Sounds Diminished   Subcutaneous Air/Crepitus None   Cardiac   Cardiac (WDL) X   Cardiac Regularity Regular   Heart Sounds Murmur   Cardiac Rhythm Ventricular paced   Cardiac Monitor   Telemetry Box Number [id number]   Alarm Audible Yes   Telemetry Monitor Alarm Parameters    Pacemaker   Pacemaker Yes   Pacemaker Type Permanent   Gastrointestinal   Abdominal (WDL) WDL   RUQ Bowel Sounds Active   LUQ Bowel Sounds Active   RLQ Bowel Sounds Active   LLQ Bowel Sounds Active   Abdomen Inspection Flat;Soft   GI Symptoms   (pain when eating)   Tenderness Soft;Nontender; No guarding   Passing Flatus Y   Genitourinary   Genitourinary (WDL) X  (Fole)   Suprapubic Tenderness No   Dysuria (Pain/Burning w/Urination) BILLY   Urine Assessment   Urine Color Yellow/straw   Urine Appearance Clear   Urine Odor BILLY   Peripheral Vascular   Peripheral Vascular (WDL) X   Edema Right lower extremity; Left lower extremity   RLE Edema +1;Non-pitting   LLE Edema +1;Non-pitting   RLE Neurovascular Assessment   Capillary Refill Less than/Equal to 3 seconds   Color Appropriate for Ethnicity   Temperature Warm   R Femoral Pulse +2 (Moderate)   R Popliteal Pulse +2   R Post Tibial Pulse +2 (Moderate)   R Pedal Pulse +2   Varicose Veins Present No   R Calf Tenderness  Negative   LLE Neurovascular Assessment   Capillary Refill Less than/Equal to 3 seconds   Color Appropriate for Ethnicity   Temperature Warm   L Femoral Pulse +2 (Moderate)   L Popliteal Pulse +2   L Post Tibial Pulse +2 (Moderate)   L Pedal Pulse +2   Varicose Veins Present No   L Calf Tenderness Negative   Skin Integumentary    Skin Integumentary (WDL) X   Musculoskeletal   Musculoskeletal (WDL) WDL   Urinary Catheter 03/02/23   Placement Date/Time: 03/02/23 2200   Inserted by: Misty Nichols RN  Insertion attempts: 1  Catheter Size: 16 FR  Insertion Procedure Practices: Insertion date placed on ernst drainage bag;Properly inflated balloon;Sterlie gloves;Skin antiseptic (10% povidon. .. Catheter Indications Urinary retention (acute or chronic), continuous bladder irrigation or bladder outlet obstruction   Site Assessment No urethral drainage   Urine Color Yellow   Urine Appearance Clear   Urine Odor   (no odor)   Collection Container Standard   Securement Method Securing device (Describe)   Catheter Best Practices  Drainage tube clipped to bed;Catheter secured to thigh; Tamper seal intact; Bag below bladder;Bag not on floor; Lack of dependent loop in tubing;Drainage bag less than half full   Status Draining;Patent

## 2023-03-05 NOTE — FLOWSHEET NOTE
Pt resting comfortably. A & O x4. All LDA's remain C/D/I. Denies pain. Pt symptomatic with the below vitals. Pt instructed not to get out of bed without staff assistance d/t hypotension. Dr. Arabella Johnson notified via perfect serve. Waiting for response. Jesse Lamar acknowledges this RN's instructions. Bed remains in lowest and locked position with alarm in place. Call light, bedside table, and all personal belongings are within reach. Will continue to closely monitor.         03/05/23 0738   Vital Signs   Temp 97.9 °F (36.6 °C)   Temp Source Oral   Heart Rate 76   Heart Rate Source Monitor   Resp 18   BP 98/74   MAP (Calculated) 82   MAP (mmHg) 82   BP Location Right upper arm   BP Method Automatic   Patient Position Sitting   Level of Consciousness 0   MEWS Score 2   Pain Assessment   Pain Assessment None - Denies Pain   Oxygen Therapy   SpO2 99 %   O2 Device None (Room air)

## 2023-03-05 NOTE — FLOWSHEET NOTE
Pt denies pain, discomfort, or feeling a sense of urgency to urinate.        03/05/23 1604   Output (mL)   Urine 300 mL   Urine Assessment   Bladder Scan Volume (mL) 125 mL   $ Bladder scan $ Yes

## 2023-03-06 ENCOUNTER — TRANSCRIBE ORDERS (OUTPATIENT)
Dept: ADMINISTRATIVE | Age: 63
End: 2023-03-06

## 2023-03-06 DIAGNOSIS — R97.20 ELEVATED PSA: Primary | ICD-10-CM

## 2023-03-06 LAB
ANION GAP SERPL CALCULATED.3IONS-SCNC: 6 MMOL/L (ref 3–16)
APTT: 32.8 SEC (ref 23–34.3)
BUN BLDV-MCNC: 23 MG/DL (ref 7–20)
CALCIUM SERPL-MCNC: 8.6 MG/DL (ref 8.3–10.6)
CHLORIDE BLD-SCNC: 101 MMOL/L (ref 99–110)
CO2: 31 MMOL/L (ref 21–32)
CREAT SERPL-MCNC: 1.4 MG/DL (ref 0.8–1.3)
GFR SERPL CREATININE-BSD FRML MDRD: 56 ML/MIN/{1.73_M2}
GLUCOSE BLD-MCNC: 81 MG/DL (ref 70–99)
HCT VFR BLD CALC: 38.3 % (ref 40.5–52.5)
HEMOGLOBIN: 12.1 G/DL (ref 13.5–17.5)
INR BLD: 1.4 (ref 0.87–1.14)
LV EF: 18 %
LVEF MODALITY: NORMAL
MCH RBC QN AUTO: 26.1 PG (ref 26–34)
MCHC RBC AUTO-ENTMCNC: 31.7 G/DL (ref 31–36)
MCV RBC AUTO: 82.2 FL (ref 80–100)
PDW BLD-RTO: 16.4 % (ref 12.4–15.4)
PLATELET # BLD: 229 K/UL (ref 135–450)
PMV BLD AUTO: 8.7 FL (ref 5–10.5)
POTASSIUM REFLEX MAGNESIUM: 4.3 MMOL/L (ref 3.5–5.1)
PRO-BNP: 7053 PG/ML (ref 0–124)
PROTHROMBIN TIME: 17.1 SEC (ref 11.7–14.5)
RBC # BLD: 4.65 M/UL (ref 4.2–5.9)
SODIUM BLD-SCNC: 138 MMOL/L (ref 136–145)
WBC # BLD: 3.8 K/UL (ref 4–11)

## 2023-03-06 PROCEDURE — 2580000003 HC RX 258: Performed by: CLINICAL NURSE SPECIALIST

## 2023-03-06 PROCEDURE — 92960 CARDIOVERSION ELECTRIC EXT: CPT

## 2023-03-06 PROCEDURE — 80048 BASIC METABOLIC PNL TOTAL CA: CPT

## 2023-03-06 PROCEDURE — 83880 ASSAY OF NATRIURETIC PEPTIDE: CPT

## 2023-03-06 PROCEDURE — 99152 MOD SED SAME PHYS/QHP 5/>YRS: CPT

## 2023-03-06 PROCEDURE — 99233 SBSQ HOSP IP/OBS HIGH 50: CPT | Performed by: CLINICAL NURSE SPECIALIST

## 2023-03-06 PROCEDURE — 85027 COMPLETE CBC AUTOMATED: CPT

## 2023-03-06 PROCEDURE — 51702 INSERT TEMP BLADDER CATH: CPT

## 2023-03-06 PROCEDURE — 93312 ECHO TRANSESOPHAGEAL: CPT

## 2023-03-06 PROCEDURE — 93325 DOPPLER ECHO COLOR FLOW MAPG: CPT

## 2023-03-06 PROCEDURE — 85610 PROTHROMBIN TIME: CPT

## 2023-03-06 PROCEDURE — B24BZZ4 ULTRASONOGRAPHY OF HEART WITH AORTA, TRANSESOPHAGEAL: ICD-10-PCS | Performed by: INTERNAL MEDICINE

## 2023-03-06 PROCEDURE — 6360000002 HC RX W HCPCS: Performed by: PHYSICIAN ASSISTANT

## 2023-03-06 PROCEDURE — 51798 US URINE CAPACITY MEASURE: CPT

## 2023-03-06 PROCEDURE — 6360000002 HC RX W HCPCS: Performed by: CLINICAL NURSE SPECIALIST

## 2023-03-06 PROCEDURE — 6370000000 HC RX 637 (ALT 250 FOR IP): Performed by: CLINICAL NURSE SPECIALIST

## 2023-03-06 PROCEDURE — 2060000000 HC ICU INTERMEDIATE R&B

## 2023-03-06 PROCEDURE — 36415 COLL VENOUS BLD VENIPUNCTURE: CPT

## 2023-03-06 PROCEDURE — 6370000000 HC RX 637 (ALT 250 FOR IP): Performed by: HOSPITALIST

## 2023-03-06 PROCEDURE — 7100000010 HC PHASE II RECOVERY - FIRST 15 MIN

## 2023-03-06 PROCEDURE — 93320 DOPPLER ECHO COMPLETE: CPT

## 2023-03-06 PROCEDURE — 85730 THROMBOPLASTIN TIME PARTIAL: CPT

## 2023-03-06 PROCEDURE — 2580000003 HC RX 258: Performed by: HOSPITALIST

## 2023-03-06 PROCEDURE — 5A2204Z RESTORATION OF CARDIAC RHYTHM, SINGLE: ICD-10-PCS | Performed by: INTERNAL MEDICINE

## 2023-03-06 RX ORDER — LIDOCAINE HYDROCHLORIDE 20 MG/ML
JELLY TOPICAL PRN
Status: DISCONTINUED | OUTPATIENT
Start: 2023-03-06 | End: 2023-03-06 | Stop reason: RX

## 2023-03-06 RX ORDER — LIDOCAINE HYDROCHLORIDE 20 MG/ML
JELLY TOPICAL PRN
Status: DISCONTINUED | OUTPATIENT
Start: 2023-03-06 | End: 2023-03-10 | Stop reason: HOSPADM

## 2023-03-06 RX ORDER — HEPARIN SODIUM 1000 [USP'U]/ML
2000 INJECTION, SOLUTION INTRAVENOUS; SUBCUTANEOUS PRN
Status: DISCONTINUED | OUTPATIENT
Start: 2023-03-06 | End: 2023-03-10 | Stop reason: ALTCHOICE

## 2023-03-06 RX ORDER — HEPARIN SODIUM 10000 [USP'U]/100ML
5-30 INJECTION, SOLUTION INTRAVENOUS CONTINUOUS
Status: DISCONTINUED | OUTPATIENT
Start: 2023-03-06 | End: 2023-03-09

## 2023-03-06 RX ORDER — HEPARIN SODIUM 1000 [USP'U]/ML
4000 INJECTION, SOLUTION INTRAVENOUS; SUBCUTANEOUS PRN
Status: DISCONTINUED | OUTPATIENT
Start: 2023-03-06 | End: 2023-03-10 | Stop reason: ALTCHOICE

## 2023-03-06 RX ADMIN — AMIODARONE HYDROCHLORIDE 200 MG: 200 TABLET ORAL at 09:04

## 2023-03-06 RX ADMIN — IRON SUCROSE 200 MG: 20 INJECTION, SOLUTION INTRAVENOUS at 11:19

## 2023-03-06 RX ADMIN — TAMSULOSIN HYDROCHLORIDE 0.4 MG: 0.4 CAPSULE ORAL at 09:05

## 2023-03-06 RX ADMIN — HEPARIN SODIUM 11 UNITS/KG/HR: 10000 INJECTION, SOLUTION INTRAVENOUS at 21:12

## 2023-03-06 RX ADMIN — APIXABAN 5 MG: 5 TABLET, FILM COATED ORAL at 09:05

## 2023-03-06 RX ADMIN — AMIODARONE HYDROCHLORIDE 200 MG: 200 TABLET ORAL at 20:08

## 2023-03-06 RX ADMIN — METOPROLOL SUCCINATE 12.5 MG: 25 TABLET, FILM COATED, EXTENDED RELEASE ORAL at 23:22

## 2023-03-06 RX ADMIN — METOPROLOL SUCCINATE 12.5 MG: 25 TABLET, FILM COATED, EXTENDED RELEASE ORAL at 09:05

## 2023-03-06 RX ADMIN — ATORVASTATIN CALCIUM 40 MG: 40 TABLET, FILM COATED ORAL at 20:08

## 2023-03-06 RX ADMIN — Medication 10 ML: at 20:09

## 2023-03-06 RX ADMIN — SPIRONOLACTONE 25 MG: 25 TABLET ORAL at 11:44

## 2023-03-06 RX ADMIN — Medication 10 ML: at 09:09

## 2023-03-06 RX ADMIN — FINASTERIDE 5 MG: 5 TABLET, FILM COATED ORAL at 09:05

## 2023-03-06 RX ADMIN — TORSEMIDE 20 MG: 20 TABLET ORAL at 09:04

## 2023-03-06 RX ADMIN — LIDOCAINE HYDROCHLORIDE: 20 JELLY TOPICAL at 13:03

## 2023-03-06 RX ADMIN — PANTOPRAZOLE SODIUM 40 MG: 40 TABLET, DELAYED RELEASE ORAL at 09:05

## 2023-03-06 ASSESSMENT — PAIN SCALES - GENERAL
PAINLEVEL_OUTOF10: 0

## 2023-03-06 NOTE — PROCEDURES
[address]     Electrophysiology Procedure Note       Date of Procedure: 3/6/2023  Patient's Name: Han Porter  YOB: 1960   Medical Record Number: [id number]  Procedure Performed by: Garima Quiles MD    Procedures performed:  External Electrical cardioversion   CONSUELO   IV sedation. Start time: 10:11 AM  Stop time: 10:31 AM     Medications Used: Fentanyl, Versed, Brevital    Fentanyl: 50 Mcg   Versed: 1.5 Mg    Brevital Sodium: 100 Mg    An independent trained nursing staff gave fentanyl and versed for sedation during CONSUELO under my supervision. Brevital was given by physician prior to cardioversion     Indication of the procedure: Persistent atrial fibrillation     Details of procedure: The patient was brought to the cath lab area in a fasting and non-sedated state. The risks, benefits and alternatives of the procedure were discussed with the patient. The patient opted to proceed with the procedure. Written informed consent was signed and placed in the chart. A timeout protocol was completed to identify the patient and the procedure being performed. CONSUELO was performed which did not show any HENOK/LA clot/thrombus. Patient is on chronic anticoagulation therapy. Severe LV dysfunction noted. There was also a small echodensity on the ICD lead, differential diagnosis would be fibrin strands, vegetation, etc. Since he has no fever and WBC is normal, endocarditis is unlikely. Can consider blood culture if he has fever, high WBC, or clinical pictures of endocarditis. Then we used brevital for sedation. 100 mg Brevital was given by me as one dose, and electrical DC cardioversion was perfomred using 200J, synchronized shock. Patient was converted to sinus rhythm. The patient tolerated the procedure well and there were no complications. Conclusion:   Successful external DC cardioversion of atrial fibrillation.

## 2023-03-06 NOTE — DISCHARGE INSTR - DIET
Good nutrition is important when healing from an illness, injury, or surgery. Follow any nutrition recommendations given to you during your hospital stay. If you were given an oral nutrition supplement while in the hospital, continue to take this supplement at home. You can take it with meals, in-between meals, and/or before bedtime. These supplements can be purchased at most local grocery stores, pharmacies, and chain super-stores. If you have any questions about your diet or nutrition, call the hospital and ask for the dietitian. Heart Failure Nutrition Therapy  This diet will help you feel better and support your heart by reducing symptoms of fluid retention, shortness of breath and swelling. You should focus on:  Limiting sodium in your diet by reading labels and limiting foods high in sodium. Limit your daily sodium intake to 3,000 mg per day. Select foods with 140 mg of sodium or less per serving. Foods with more than 300 mg of sodium per serving may not fit into a reduced-sodium meal plan. Check serving sizes. If you eat more than 1 serving, you will get more sodium than the amount listed. Limiting fluid in your diet. Ask your doctor how much fluid you can have per day  Remember foods that are liquid at room temperature such as popsicles, soup, ice cream and Jell-O are fluids. Checking your weight to make sure you're not retaining too much fluid. Weigh yourself every morning. If you gain 3 or more pounds in one day or 5 pounds within 1 week, call your doctor. Foods to choose and avoid:  Avoid processed foods. Eat more fresh foods. Fresh and frozen fruits and vegetables are good choices. Choose fresh meats. Avoid processed meats such as troy, sausage and hot dogs. Do not add salt to your food while cooking or at the table. Try dry or fresh herbs, pepper, lemon juice, or a sodium-free seasoning blend such as Mrs. Dash to add flavor to food. Do not use a salt substitute.   Use caution at [employer] foods are high in sodium. Ask for your food to be cooked without salt and request sauces and dressing to come on the side. 

## 2023-03-06 NOTE — PROGRESS NOTES
note    Patient off the floor today       57 y.o. AA male with h/o CHF, A.fib on chronic anticoagulation. Urology consulted for urinary retention and elevated psa- he has a long standing hx of  chronic ernst. He was previously seen in 2020 for BPH and elevated psa of 20, and at that time Dr. Martha Gould  recommended cysto/TRUS bx- numerous calls to patient, non-comply letter sent to his address. He has had a positive MEET on multiple occasions. Recc  Discussed with EP/Cards. Will be committed to [facility] for some time after CONSUELO/cardioversion. Can be considered outpatient, appreciate recommendations. Cysto/trusp/prostate bx scheduled Thursday with Dr. Radha Johns if ok with multidisciplinary team. Would begin to hold [facility] at this time. Pneumonic compression devices and OOB as tolerated.     Tereza Guerrero CNP  03/06/2023

## 2023-03-06 NOTE — PROGRESS NOTES
S/p CONSUELO and successful cardioversion. Maintaining sinus rhythm would be beneficial for his heart failure. Resume anticoagulation. If patient would like to proceed with procedure this week, start IV heparin and hold it prior to procedure and resume it after, with transition to PO anticoagulation. Otherwise, recommend postponing procedure for next week, hold AC for 48 hours prior to procedure with no bridging and resume it after procedure as soon as possible. There was also a small echodensity on the ICD lead, differential diagnosis would be fibrin strands, vegetation, etc. Since he has no fever and WBC is normal, endocarditis is unlikely. Can consider blood culture if he has fever, high WBC, or clinical pictures of endocarditis. Discussed with primary team.     Can be discharged home from EP standpoint.    Follow up with heart failure team.     Jonathan Stacy MD, MPH  [place] [id number]   Office: [phone]  Fax: [phone]

## 2023-03-06 NOTE — PROGRESS NOTES
[facility] PROGRESS NOTE    3/6/2023 5:47 PM        Name: Rakel Moreau . Admitted: 3/2/2023  Primary Care Provider: No primary care provider on file. (Tel: None)      Subjective:  . Admitted with increased swelling and shortness of breath   He reports compliance with home meds but does not weight himself at home     Seen by cardiology and EP on Friday,    Pacer rate increased to 70. Underwent cardioversion this am.      Seen this am and feeling much better. Reports breathing is better since IV lasix.   He has diuresed almost 10 liters   Weight down to 193 today       Reviewed interval ancillary notes    Current Medications  iron sucrose (VENOFER) 200 mg in sodium chloride 0.9 % 100 mL IVPB, Q24H  lidocaine (XYLOCAINE) 2 % uro-jet, PRN  torsemide (DEMADEX) tablet 20 mg, Daily  amiodarone (CORDARONE) tablet 200 mg, BID  [Held by provider] apixaban (ELIQUIS) tablet 5 mg, BID  atorvastatin (LIPITOR) tablet 40 mg, Nightly  [Held by provider] empagliflozin (JARDIANCE) tablet 10 mg, Daily  finasteride (PROSCAR) tablet 5 mg, Daily  [Held by provider] lisinopril (PRINIVIL;ZESTRIL) tablet 2.5 mg, Daily  metoprolol succinate (TOPROL XL) extended release tablet 12.5 mg, BID  pantoprazole (PROTONIX) tablet 40 mg, Daily  spironolactone (ALDACTONE) tablet 25 mg, Lunch  tamsulosin (FLOMAX) capsule 0.4 mg, Daily  sodium chloride flush 0.9 % injection 5-40 mL, 2 times per day  sodium chloride flush 0.9 % injection 5-40 mL, PRN  0.9 % sodium chloride infusion, PRN  ondansetron (ZOFRAN-ODT) disintegrating tablet 4 mg, Q8H PRN   Or  ondansetron (ZOFRAN) injection 4 mg, Q6H PRN  polyethylene glycol (GLYCOLAX) packet 17 g, Daily PRN  acetaminophen (TYLENOL) tablet 650 mg, Q6H PRN   Or  acetaminophen (TYLENOL) suppository 650 mg, Q6H PRN      Objective:  BP 98/70   Pulse 72   Temp 98.1 °F (36.7 °C) (Oral)   Resp 20   Ht 6' 2\" (1.88 m)   Wt 193 lb 4.8 oz (87.7 kg)   SpO2 95%   BMI 24.82 kg/m²     Intake/Output Summary (Last 24 hours) at 3/6/2023 1747  Last data filed at 3/6/2023 1327  Gross per 24 hour   Intake 480 ml   Output 1700 ml   Net -1220 ml        Wt Readings from Last 3 Encounters:   03/06/23 193 lb 4.8 oz (87.7 kg)   02/21/23 198 lb 6.6 oz (90 kg)   02/03/23 182 lb 4.8 oz (82.7 kg)       General appearance:  Appears comfortable, alert and pleasant.    Eyes: Sclera clear. Pupils equal.  ENT: Moist oral mucosa. Trachea midline, no adenopathy.  Cardiovascular: Regular rhythm, normal S1, S2. Harsh murmur. Trace edema in lower extremities but improving   Respiratory: Not using accessory muscles. Good inspiratory effort. Clear to auscultation bilaterally, no wheeze or crackles.   GI: Abdomen soft, no tenderness, not distended, normal bowel sounds  Musculoskeletal: No cyanosis in digits, neck supple  Neurology: CN 2-12 grossly intact. No speech or motor deficits  Psych: Normal affect. Alert and oriented in time, place and person  Skin: Warm, dry, normal turgor    Labs and Tests:  CBC:   Recent Labs     03/04/23  0437 03/05/23  0518   WBC 4.6 3.9*   HGB 12.0* 12.0*    202       BMP:    Recent Labs     03/04/23  0437 03/05/23  0518 03/06/23  0840    139 138   K 4.1 4.6 4.3    100 101   CO2 31 35* 31   BUN 23* 23* 23*   CREATININE 1.5* 1.6* 1.4*   GLUCOSE 91 82 81       Hepatic:   No results for input(s): AST, ALT, ALB, BILITOT, ALKPHOS in the last 72 hours.  Patient has a BiV ICD implanted by Dr. Soria on 4/25/2022.  He has chronic lung persistent atrial fibrillation.  At the time of implantation the device has been programmed to VVIR with a lower rate of 50s since he was in atrial fibrillation.       He has been started on amiodarone planning for CONSUELO and cardioversion.  Chest xray:    No radiographic evidence of acute pulmonary disease.       Stable cardiomegaly       Problem List  Principal Problem:    Acute  combined systolic and diastolic CHF, NYHA class 1 (HCC)  Active Problems:    Hypoxia    Bradycardia    SACHIN (acute kidney injury) ([facility] [address]    ICD (implantable cardioverter-defibrillator) in place  Resolved Problems:    * No resolved hospital problems. *       Assessment & Plan:   Acute on chronic systolic and diastolic HF: On ardiance and BB therapy. IV lasix stopped Sunday. Demadex started today. ACE on hold. Aldactone resumed  CKD:  creat up to 1.6:  hold ace/ aldactone again today  Chronic AF:  AC with eliquis, s/p cardioversion. Discussed with Dr Sue Li. Ok to proceed with prostate biopsy. Plan to start heparin no bolus and stop morning of surgery. Resume eliquis post procedure when ok with urology-likely Monday. Elevated PSA with hx of urinary retention/ enlarged prostate. I spoke with urology. Going to have prostate biopsy Thursday while in house. Diet: ADULT DIET; Regular;  Low Sodium (2 gm)  Code:Full Code  DVT PPX      Pravin Quintanilla PA-C   3/6/2023 5:47 PM

## 2023-03-06 NOTE — ACP (ADVANCE CARE PLANNING)
Advance Care Planning   Healthcare Decision Maker:      No legal next of kin identified by patient and no HCPOA completed. Consult placed again to Spiritual Care to assist pt in completion of docs. EPILEPSY MONITORING UNIT VIDEO EEG REPORT    Date of service: 7/8/22-7/9/22  EEG Numbers: [facility] -4    Introduction  Electroencephalographic (EEG) is recorded with electrodes placed   according to the International 10-20 placement system.  Thirty Two (32) channels   of digital signal, including T1 and T2 electrodes, are simultaneously recorded   from the scalp and may also include EKG, EMG and/or eye movement monitors.    Recording band pass was 0.1 to 512 Hz.  Digital video recording of the patient   is simultaneously recorded with the EEG.  The patient is instructed to report   clinical symptoms which may occur during the recording session.  EEG and video   recording are stored and archived in digital format.  Activation procedures,   which include photic stimulation, hyperventilation and instructing patients to   perform simple tasks, are done in selected patients.     The EEG is displayed on a monitor screen and can be reformatted into different   montages for evaluation.  The entire recoding is submitted for computer-assisted   analysis to detect spike and electrographic seizure activity.  The entire   recording is visually reviewed, and the times identified by computer analysis as   being spikes or seizures are reviewed again.     Compressed spectral analysis (CSA) is also performed on the activity recorded   from each individual channel.  This is displayed as a power display of   frequencies from 0 to 30 Hz over time.  The CSA analysis is done and displayed   continuously.  This is reviewed for asymmetries in power between homologous   areas of the scalp and for presence of changes in power which can be seen when   seizures occur.  Sections of suspected abnormalities on the CSA are then   compared with the original EEG recording.     SiTune software was also utilized in the review of this study.  This software   suite analyzes the EEG recording in multiple domains.  Coherence and rhythmicity   are  computed to identify EEG sections which may contain organized seizures.    Each channel undergoes analysis to detect presence of spike and sharp waves   which have special and morphological characteristics of epileptic activity.  The   routine EEG recording is converted from special into frequency domain.  This is   then displayed comparing homologous areas to identify areas of significant   asymmetry.  Algorithm to identify non-cortically generated artifact is used to   separate artifact from the EEG.    Recording Times  7/8/22 07:01-7/9/22 07:00  A total of 23 hours, 54 minutes was recorded.    Findings  BACKGROUND: The patient's background consists of a posteriorly dominant  10 Hz alpha rhythm, which is well-formed, well-modulated, and abolishes with eye opening.  There is asymmetry with increased amplitude waveforms over the left temporal region and focal delta to theta continuous slowing over the left temporal region.     During the course of the recording, the patient is noted to be awake, and subsequently becomes drowsy, and falls asleep with normal, symmetric sleep architecture present.     Hyperventilation and photic stimulation were not performed.     INTERICTAL: There are frequent bilateral independent right and left temporal sharp waves during wakefulness and sleep.     ICTAL: There was 1 seizure during this portion of the monitoring. The seizure occurred on 7/8/22 from 14:04-14:05. There is onset of rhythmic 4-5 Hz theta then delta activity over the right temporal region. This then evolved into higher amplitude 3 hz delta and spread over the right hemisphere, midline and left hemisphere as well.  The seizure lasted about 1 minute and there was no obvious clinical correlation during the seizure. About 30 minutes later, the patient pressed the event button and was noted to be confused.     EKG demonstrates regular rhythm.    Interpretation  This is an abnormal EEG due to: 1) right temporal focal onset  seizure with minimal clinical manifestation of just confusion afterwards, 2) bilateral independent epileptiform discharges in the left and right temporal lobes, 3) left temporal breach rhythm with asymmetry and focal slowing, consistent with her prior area of resection.     Please see prior and subsequent EEG reports for details of the other days of EMU stay. In summary, the other EEGs showed similar background findings as above and also captured other right temporal onset focal seizures.

## 2023-03-06 NOTE — PROGRESS NOTES
Nutrition Education      Provided heart failure diet education. Pt has received low Na+ diet education 4x over the past year from inpatient RD. He tries to avoid high salt foods and does not add salt to his meals. Pt understands the recommended fluid restriction (64 oz/day). Pt currently tries to follow a low sodium diet at home and does not add salt to food. Pt states understanding of education. Time spent with patient: 5 minutes.        Domonique Grandchild, RD, LD  Contact Number: [phone]

## 2023-03-06 NOTE — PROGRESS NOTES
[address]   Daily Progress Note      Admit Date:  3/2/2023    HPI:    Mr. Mainor Velazquez is a 57 y.o. male with history of with history of PAF on amiodarone, hypertension. Unvaccinated, , homeless but lives with friends, ICD in place 4/25/2022    He is admitted for abdominal pain and diagnosed with UTI, treated with cipro. He came back to the hospital with swelling in his legs and not urinating much. He has had multiple admissions for fluid overload. He states he is taking his medications. EKG show AF with slow ventricular response. Optival within threshold     Subjective:  Patient is being seen for systolic heart failure. There were no acute overnight cardiac events. Iron deficient. He is sitting up in the bed awaiting CONSUELO/CV today. He denies any chest pain, shortness of breath or edema. We had a discussion about his big problems heart and prostate. He admits he does not always remember what we tell him. Weight [phone] and -8.5 L out creat 1.5-1.6 bnp       Objective:   /77   Pulse 75   Temp 97.5 °F (36.4 °C) (Oral)   Resp 18   Ht 6' 2\" (1.88 m)   Wt 193 lb 4.8 oz (87.7 kg)   SpO2 100%   BMI 24.82 kg/m²     Intake/Output Summary (Last 24 hours) at 3/6/2023 0844  Last data filed at 3/6/2023 0100  Gross per 24 hour   Intake 480 ml   Output 1100 ml   Net -620 ml          Physical Exam:  General:  Awake, alert, oriented in NAD  Skin:  Warm and dry. No unusual bruising or rash  Neck:  Supple. No JVD or carotid bruit appreciated  Chest:  Normal effort.   Clear to auscultation, no wheezes/rhonchi/rales  Cardiovascular:  RRR, S1/S2, no murmur/gallop/rub  Abdomen:  Soft, nontender, +bowel sounds  Extremities:  bilateral lower leg edema much improved  Neurological: No focal deficits  Psychological: Normal mood and affect      Medications:    torsemide  20 mg Oral Daily    amiodarone  200 mg Oral BID    apixaban  5 mg Oral BID    atorvastatin  40 mg Oral Nightly    [Held by provider] empagliflozin  10 mg Oral Daily    finasteride  5 mg Oral Daily    [Held by provider] lisinopril  2.5 mg Oral Daily    metoprolol succinate  12.5 mg Oral BID    pantoprazole  40 mg Oral Daily    [Held by provider] spironolactone  25 mg Oral Lunch    tamsulosin  0.4 mg Oral Daily    sodium chloride flush  5-40 mL IntraVENous 2 times per day      sodium chloride         Lab Data:  CBC:   Recent Labs     03/04/23 0437 03/05/23 0518   WBC 4.6 3.9*   HGB 12.0* 12.0*    202     BMP:    Recent Labs     03/04/23 0437 03/05/23 0518    139   K 4.1 4.6   CO2 31 35*   BUN 23* 23*   CREATININE 1.5* 1.6*     INR:    No results for input(s): INR in the last 72 hours. BNP:    Recent Labs     03/05/23 0518   PROBNP 7,694*         Diagnostics:  Echo:  1/31/23:   Summary   The left ventricle is severely dilated. Severely reduced systolic function. LVEF 15-20%. Severe global hypokinesis. Grade III diastolic dysfunction. Elevated LVEDP. RV severely dilated with severely depressed function. Moderate eccentric MR. Mild AI. Severe TR with a RVSP   100 mmHg. Severe pulmonary hypertension. IVC dynamics c/w markedly elevated RA pressure (15 mmHg). A bubble study was performed and fails to show evidence of shunting. Incidental Finding: Right kidney cyst noted. Consider additional dedicated   abdominal imaging if clinically indicated. CONSUELO Dr Sharron Wall 2/10/22  Preliminary CONSUELO results are:      - Severe LV dysfunction,  EF: 20-25%  - LA dilated. There was HENOK thrombus. - Moderate MR     Cardiac Cath PCI: Dr Robert Boyce 2/8/2022  Anatomy:   LM-nml   LAD-nml  Cx-nml  OM- nml  RCA-nml  RPDA- nml  LVEF- 10%  LVG- global hypokinesis  LVEDP- 10     Hemodynamics:  RA- mean 3  RV- 37/0  PAWP- 10  PA- 34/12 (20)     C. O.- 5.7 (4.9)  C. I.- 2.6 (2.25)  PA sat 60%  AO sat 91%     Contrast: 70  Flouro Time: 5.3  Access: R radial a, R CFV     Impression  ~Coronary Angiography w/ normal cors  ~LVG with LVEF of 10% and global regional wall motion abnormalities  ~Severe MR  ~Normal CO/CI  ~normal L and R filling pressures     Recommendation  ~Aggressive medical treatment and risk factor modification  ~1. Medications reviewed, no changes at this time. 2. Post cath IVF. Bedrest.  3.Consider CONSUELO for evaluation of MR.   4. Patient has been advised on the importance of regular exercise of at least 20-30 minutes daily alternating with aerobic and isometric activities. 5. Patient counseled about and offered assistance for smoking cessation   6. No indication for cardiac rehab  7. CHF service to evaluate tomorrow. Echo 11/29/2021  Summary   -Covid+   -Severely reduced global systolic function with an ejection fraction   estimated at 20%. -Severe global hypokinesis with regional variations noted. -Right ventricular systolic function appears to be mildly reduced based on   visual inspection.   -Severe biatrial enlargement.   -Thickened mitral valve without evidence of stenosis. There is   mild-to-moderate mitral regurgitation.   -There is mild-to-moderate tricuspid regurgitation with a RVSP estimation of   37 mmHg. -Diastolic dysfunction with elevated LV filling pressures. Assessment:    1. Acute on chronic systolic heart failure, on bb, sglt2  2. Hypoxia  3. Bradycardia  4. SACHIN possible due to cipro  5. ICD in place  6. Chronic atrial fib      Plan:    Continue torsemide 20 mg daily  Hold jardiance and lisinopril  Eliquis on hold  Will resume aldactone 25 mg once a day  CHF nurse following for diet education, fluid restriction and daily weights  CONSUELO/CV on today  Continue toprol 12.5 mg twice a day   Amiodarone per EP; Seen by EP and threshold increased to 70   9. Will add iron venofer 200 mg x 5 doses (can complete as an outpatient)  10.   Will consult palliative care for goals, code status as he had severe CM and possible prostate cancer    I had a long discussion with him regarding his condition and that I am concerned that we will not be able to make all of this better. He admits to not always comprehending what we tell him. He seems to understand that he would be a high risk for surgery on his prostate. Discussed with hospitalist    NYHA IV    Discussed with patient who is agreeable with plan of care. Thank you for allowing me to participate in the care of your patient.     Tonio Becker, APRN - CNS, CNS

## 2023-03-06 NOTE — PROGRESS NOTES
[facility]      Lorrie Palacio 1960    History:  Past Medical History:   Diagnosis Date    Acute combined systolic and diastolic congestive heart failure ([facility] [address] 8/31/2022    Atrial fibrillation ([facility] [address] 07/25/2019    CHF (congestive heart failure) (HCC)     Hx of blood clots     Hypertension        ECHO:  1/31/23   Summary   The left ventricle is severely dilated. Severely reduced systolic function. LVEF 15-20%. Severe global hypokinesis. Grade III diastolic dysfunction. Elevated LVEDP. RV severely dilated with severely depressed function. Moderate eccentric MR. Mild AI. Severe TR with a RVSP   100 mmHg. Severe pulmonary hypertension. IVC dynamics c/w markedly elevated RA pressure (15 mmHg). A bubble study was performed and fails to show evidence of shunting. Incidental Finding: Right kidney cyst noted. Consider additional dedicated   abdominal imaging if clinically indicated. ACE/ARB/ARNi: lisinopril 2.5 mg daily  BB: toprol xl 12.5 mg bid  Aldosterone Antagonist: aldactone 25 mg daily  SGLT2: jardiance 10 mg daily    History of sleep apnea: No    [place] Screen ordered: No and screened 2/3- score 5      Last Hospital Admission:  1/31-2/3- with CHF and abd pain  2/17-2/23-with UTI/abd pain    Code Status: full   Discharge plans: lives with friends    Family Present: shahab Palacio was admitted to the hospital with increased shortness of breath. Patient has longstanding HFrEF hx and follows with HF NP as an outpatient. Patient has been seen multiple times for education since diagnosis. Patient states he still has the scale that was provided for him. He does not weigh daily as he should. He is able to speak to when to call with changes in his weight even though he does not weigh or call with symptoms. He is not following his sodium or fluid restriction even though he is able to speak to needing to follow a 64 oz limitation.  Patient does not have a phone, relies on friends phone numbers. It is difficult at times for patient to arrange transportation to appointments. He missed his appointment on 2/9 after initial HF admission at end of January. PATIENT/CAREGIVER TEACHING:    Level of patient/caregiver understanding able to:   [ x] Verbalize understanding [ ] Demonstrate understanding [ ] Teach back   [ ] Needs reinforcement [ ] Other:       Time spent teaching: 15 mins    1. WEIGHT: Admit Weight: 213 lb 8 oz (96.8 kg)      Today  Weight: 193 lb 4.8 oz (87.7 kg)   2. I/O   Intake/Output Summary (Last 24 hours) at 3/6/2023 1153  Last data filed at 3/6/2023 0100  Gross per 24 hour   Intake 480 ml   Output 800 ml   Net -320 ml       Recommendations:   1. We will schedule him back for follow up with HF NP after discharge. 2. Educate further on fluid restriction 48 oz- 64 oz during inpatient stay so he can understand how to measure intake at home. 3. Continue to educate on S/S.   4. Emphasize daily weights, diet, and knowing when and who to call  5. Provided patient with CHF Resource Line for questions and concerns.          Peyton Nguyễn, RN 3/6/2023 11:53 AM

## 2023-03-06 NOTE — PLAN OF CARE
Problem: Safety - Adult  Goal: Free from fall injury  3/6/2023 1425 by Nelda Foss RN  Outcome: Progressing  3/6/2023 0109 by Brandi Sousa RN  Outcome: Progressing     Problem: ABCDS Injury Assessment  Goal: Absence of physical injury  3/6/2023 1425 by Nelda Foss RN  Outcome: Progressing  3/6/2023 0109 by Brandi Sousa RN  Outcome: Progressing     Problem: Chronic Conditions and Co-morbidities  Goal: Patient's chronic conditions and co-morbidity symptoms are monitored and maintained or improved  3/6/2023 1425 by Nelda Foss RN  Outcome: Progressing  Flowsheets (Taken 3/6/2023 1422)  Care Plan - Patient's Chronic Conditions and Co-Morbidity Symptoms are Monitored and Maintained or Improved:   Monitor and assess patient's chronic conditions and comorbid symptoms for stability, deterioration, or improvement   Collaborate with multidisciplinary team to address chronic and comorbid conditions and prevent exacerbation or deterioration   Update acute care plan with appropriate goals if chronic or comorbid symptoms are exacerbated and prevent overall improvement and discharge  3/6/2023 0109 by Brandi Sousa RN  Outcome: Progressing     Problem: Respiratory - Adult  Goal: Achieves optimal ventilation and oxygenation  3/6/2023 1425 by Nelda Foss RN  Outcome: Progressing  Flowsheets (Taken 3/6/2023 1422)  Achieves optimal ventilation and oxygenation:   Assess for changes in respiratory status   Assess for changes in mentation and behavior   Position to facilitate oxygenation and minimize respiratory effort   Oxygen supplementation based on oxygen saturation or arterial blood gases   Encourage broncho-pulmonary hygiene including cough, deep breathe, incentive spirometry  3/6/2023 0109 by Brandi Sousa RN  Outcome: Progressing     Problem: Cardiovascular - Adult  Goal: Maintains optimal cardiac output and hemodynamic stability  3/6/2023 1425 by Nelda Foss RN  Outcome: Progressing  Flowsheets (Taken 3/6/2023 1422)  Maintains optimal cardiac output and hemodynamic stability:   Monitor blood pressure and heart rate   Monitor urine output and notify Licensed Independent Practitioner for values outside of normal range   Assess for signs of decreased cardiac output   Administer fluid and/or volume expanders as ordered  3/6/2023 0109 by Yenifer Victor RN  Outcome: Progressing     Problem: Skin/Tissue Integrity - Adult  Goal: Skin integrity remains intact  3/6/2023 1425 by Clay Cole RN  Outcome: Progressing  Flowsheets (Taken 3/6/2023 1422)  Skin Integrity Remains Intact:   Monitor for areas of redness and/or skin breakdown   Assess vascular access sites hourly   Every 4-6 hours minimum: Change oxygen saturation probe site  3/6/2023 0109 by Yenifer Victor RN  Outcome: Progressing     Problem: Genitourinary - Adult  Goal: Absence of urinary retention  3/6/2023 1425 by Clay Cole RN  Outcome: Progressing  Flowsheets (Taken 3/6/2023 1422)  Absence of urinary retention:   Assess patient’s ability to void and empty bladder   Monitor intake/output and perform bladder scan as needed   Place urinary catheter per Licensed Independent Practitioner order if needed   Discuss with Licensed Independent Practitioner  medications to alleviate retention as needed  3/6/2023 0109 by Yenifer Victor RN  Outcome: Progressing     Problem: Metabolic/Fluid and Electrolytes - Adult  Goal: Electrolytes maintained within normal limits  3/6/2023 1425 by Clay Cole RN  Outcome: Progressing  Flowsheets (Taken 3/6/2023 1422)  Electrolytes maintained within normal limits:   Monitor labs and assess patient for signs and symptoms of electrolyte imbalances   Administer electrolyte replacement as ordered   Monitor response to electrolyte replacements, including repeat lab results as appropriate  3/6/2023 0109 by Yenifer Victor RN  Outcome: Progressing     Problem: Pain  Goal: Verbalizes/displays adequate comfort level or  baseline comfort level  3/6/2023 1425 by Chante Rogers RN  Outcome: Progressing  3/6/2023 0109 by Brittney Obregon RN  Outcome: Progressing

## 2023-03-06 NOTE — PROGRESS NOTES
H&P Update    I have reviewed the history and physical and examined the patient and updated with relevant changes. Consent: I have discussed with the patient and/or the patient representative the indication, alternatives, and the possible risks and/or complications of the planned procedure and the anesthesia methods. The patient and/or patient representative appear to understand and agree to proceed. Vitals:    03/06/23 0904   BP: 106/76   Pulse: 76   Resp:    Temp:    SpO2:      Prior to Admission medications    Medication Sig Start Date End Date Taking? Authorizing Provider   metoprolol succinate (TOPROL XL) 25 MG extended release tablet Take 0.5 tablets by mouth in the morning and at bedtime 2/21/23   Alicia Schilling PA-C   amiodarone (CORDARONE) 200 MG tablet Take 1 tablet by mouth 2 times daily For 2 weeks then decrease to 200mg once a day 2/3/23   TRENT Vinson - CNP   spironolactone (ALDACTONE) 25 MG tablet Take 1 tablet by mouth Daily with lunch 2/4/23   Turner Lopez MD   pantoprazole (PROTONIX) 40 MG tablet Take 1 tablet by mouth daily 2/3/23 3/5/23  Turner Lopez MD   finasteride (PROSCAR) 5 MG tablet Take 5 mg by mouth daily    Historical Provider, MD   torsemide (DEMADEX) 20 MG tablet Take 1 tablet by mouth daily 10/31/22   Sarah Mao MD   empagliflozin (JARDIANCE) 10 MG tablet Take 1 tablet by mouth daily 10/26/22   TRENT Higginbotham   lisinopril (PRINIVIL;ZESTRIL) 2.5 MG tablet Take 1 tablet by mouth daily 10/26/22   TRENT Higginbotham   atorvastatin (LIPITOR) 40 MG tablet Take 1 tablet by mouth nightly 10/5/22   TRENT Cheng   vitamin D (CHOLECALCIFEROL) 50 MCG (2000 UT) TABS tablet Take 1 tablet by mouth daily  Patient not taking: Reported on 3/2/2023 9/21/22   TRENT Higginbotham   potassium chloride (KLOR-CON M) 20 MEQ extended release tablet Take 1 tablet by mouth in the morning and 1 tablet in the evening. Take with meals. 8/1/22 9/8/22  Maribel Mahoney MD   apixaban (ELIQUIS) 5 MG TABS tablet Take 1 tablet by mouth 2 times daily 6/17/22   TRENT Cleveland CNP   tamsulosin [facility]) 0.4 MG capsule Take 1 capsule by mouth daily 6/18/22   TRENT Cleveland CNP     Past Medical History:   Diagnosis Date    Acute combined systolic and diastolic congestive heart failure ([address] 8/31/2022    Atrial fibrillation ([address] 07/25/2019    CHF (congestive heart failure) (HCC)     Hx of blood clots     Hypertension      Past Surgical History:   Procedure Laterality Date    CARDIAC DEFIBRILLATOR PLACEMENT Left     CARDIOVERSION  07/26/2019    Dr. Carmita Hi  07/26/2019     No Known Allergies    Pre-Sedation Documentation and Exam:   I have personally completed a history, physical exam & review of systems for this patient (see notes).     Mallampati Airway Assessment:  Class II     Prior History of Anesthesia Complications:   None    ASA Classification:  Class 3 - A patient with severe systemic disease that limits activity but is not incapacitating    Sedation/ Anesthesia Plan:   Intravenous sedation    Medications Planned:   Midazolam (Versed) and Fentanyl intravenously  Brevital intravenously     Patient is an appropriate candidate for plan of sedation:   Yes    Electronically signed by Rubens Pritchard MD on 3/6/2023 at 9:48 AM

## 2023-03-06 NOTE — PROGRESS NOTES
[address] [place] [address]   Electrophysiology   Date: 3/6/2023  Reason for Consultation: Bradycardia     Chief Complaint   Patient presents with    Shortness of Breath     States SOB upon exertion and also states his legs feel like \"bricks\" when he walks x 3 days        CC: Shortness of breath    HPI: Serina Montero is a 57 y.o. male with multiple comorbidities including HTN, long persistent atrial fibrillation, HFrEF, s/p BiV ICD, left atrial appendage, challenging social circumstances being homeless who presented to the hospital complaining of shortness of breath with exertion. Patient has been seen by heart failure team and EP has been consulted to assess for bradycardia. Patient has a BiV ICD implanted by Dr. Fiona Elizabeth on 4/25/2022. He has chronic long persistent atrial fibrillation. At the time of implantation the device has been programmed to VVIR with a lower rate of 50s since he was in atrial fibrillation. He has been started on amiodarone planning for CONSUELO and cardioversion. His base rate increased to 70s. Assessment:   Persistent atrial fibrillation  Bradycardia  HFrEF  HTN  History of left atrial appendage thrombus    Plan:   Device has been interrogated and programmed and baseline heart rate increased to 70 bpm.    Plan for CONSUELO/cardioversion. This can also be considered as outpatient. High [id number] score, high risk for stroke/thromboembolism. I have discussed potential, and rare side effects of amiodarone including but not limited to lung, liver and thyroid toxicity. Close monitoring for amiodarone toxicity with LFTs, TSH, and CXR. Eliquis 5 bid  Lasix 40 bid  Toprol XL 12.5   Aldactone   Jardiance     Daily weight  Severe exacerbation of HFrEF leading to hospitalization  Patient is high risk of having recurrent atrial arrhythmias because of  multiple comorbidities HFrEF.    Patient at higher risk of adverse outcome and higher risk for EP   Discussed with nursing staff.     Relevant available labs, and cardiovascular diagnostics, documents reviewed. ECG: Atrial fibrillation, biventricular paced rhythm  Echo: EF 15 to 20%, global hypokinesis, diastolic dysfunction, moderate MR, dilated RV, severe pulmonary hypertension    Troponin: Negative  Hemoglobin 12.1  Potassium 4.6  BUN 23  Creatinine 1.6  Sodium 142      I independently reviewed cardiac tracings / rhythm ECGs strips: noted Biv paced rhythm   Reviewed device interrogation. Consult note reviewed. Complex medical condition with multiple medical problems affecting prognosis and outcome of EP interventions    I independently reviewed relevant and available cardiac diagnostic tests. Scheduled Meds:   iron sucrose  200 mg IntraVENous Q24H    torsemide  20 mg Oral Daily    amiodarone  200 mg Oral BID    [Held by provider] apixaban  5 mg Oral BID    atorvastatin  40 mg Oral Nightly    [Held by provider] empagliflozin  10 mg Oral Daily    finasteride  5 mg Oral Daily    [Held by provider] lisinopril  2.5 mg Oral Daily    metoprolol succinate  12.5 mg Oral BID    pantoprazole  40 mg Oral Daily    [Held by provider] spironolactone  25 mg Oral Lunch    tamsulosin  0.4 mg Oral Daily    sodium chloride flush  5-40 mL IntraVENous 2 times per day     Continuous Infusions:   sodium chloride       PRN Meds:.sodium chloride flush, sodium chloride, ondansetron **OR** ondansetron, polyethylene glycol, acetaminophen **OR** acetaminophen     Prior to Admission medications    Medication Sig Start Date End Date Taking?  Authorizing Provider   metoprolol succinate (TOPROL XL) 25 MG extended release tablet Take 0.5 tablets by mouth in the morning and at bedtime 2/21/23   Juana Cho PA-C   amiodarone (CORDARONE) 200 MG tablet Take 1 tablet by mouth 2 times daily For 2 weeks then decrease to 200mg once a day 2/3/23   TRENT Savage - CNP   spironolactone (ALDACTONE) 25 MG tablet Take 1 tablet by mouth Daily with lunch 2/4/23 Adam Lin MD   pantoprazole (PROTONIX) 40 MG tablet Take 1 tablet by mouth daily 2/3/23 3/5/23  Adam Lin MD   finasteride (PROSCAR) 5 MG tablet Take 5 mg by mouth daily    Historical Provider, MD   torsemide (DEMADEX) 20 MG tablet Take 1 tablet by mouth daily 10/31/22   Juan Weaver MD   empagliflozin (JARDIANCE) 10 MG tablet Take 1 tablet by mouth daily 10/26/22   TRENT Jaquez   lisinopril (PRINIVIL;ZESTRIL) 2.5 MG tablet Take 1 tablet by mouth daily 10/26/22   TRENT Jaquez   atorvastatin (LIPITOR) 40 MG tablet Take 1 tablet by mouth nightly 10/5/22   TRENT Guzman   vitamin D (CHOLECALCIFEROL) 50 MCG (2000) TABS tablet Take 1 tablet by mouth daily  Patient not taking: Reported on 3/2/2023 9/21/22   TRENT Jaquez   potassium chloride (KLOR-CON M) 20 MEQ extended release tablet Take 1 tablet by mouth in the morning and 1 tablet in the evening. Take with meals. 22  Reba Liz MD   apixaban (ELIQUIS) 5 MG TABS tablet Take 1 tablet by mouth 2 times daily 22   TRENT Koenig CNP   tamsulosin [facility]) 0.4 MG capsule Take 1 capsule by mouth daily 22   TRENT Koenig CNP       Physical Examination:  Vitals:    23 0904   BP: 106/76   Pulse: 76   Resp:    Temp:    SpO2:       In: 480 [P.O.:480]  Out: 1700    Wt Readings from Last 3 Encounters:   23 193 lb 4.8 oz (87.7 kg)   23 198 lb 6.6 oz (90 kg)   23 182 lb 4.8 oz (82.7 kg)     Temp  Av.9 °F (36.6 °C)  Min: 97.5 °F (36.4 °C)  Max: 98.4 °F (36.9 °C)  Pulse  Av.1  Min: 70  Max: 82  BP  Min: 94/60  Max: 113/75  SpO2  Av.7 %  Min: 97 %  Max: 100 %    Intake/Output Summary (Last 24 hours) at 3/6/2023 0945  Last data filed at 3/6/2023 0100  Gross per 24 hour   Intake 480 ml   Output 1100 ml   Net -620 ml         Constitutional: Oriented. No distress. Cardiovascular: Normal rate, regular rhythm, S1&S2. Pulmonary/Chest: Bilateral respiratory sounds. No rhonchi. Abdominal: Soft. No tenderness   Musculoskeletal: + edema    Neurological: Alert and oriented. Follows command    Active Hospital Problems    Diagnosis Date Noted    Hypoxia [R09.02] 03/03/2023     Priority: Medium    Bradycardia [R00.1] 03/03/2023     Priority: Medium    Acute combined systolic and diastolic CHF, NYHA class 1 ([facility] [I50.41] 03/02/2023     Priority: Medium    ICD (implantable cardioverter-defibrillator) in place [Z95.810] 04/26/2022    SACHIN (acute kidney injury) ([facility] [address] [N17.9]        Past Medical History:   Diagnosis Date    Acute combined systolic and diastolic congestive heart failure ([facility] [address] 8/31/2022    Atrial fibrillation ([facility] [address] 07/25/2019    CHF (congestive heart failure) (HCC)     Hx of blood clots     Hypertension         Past Surgical History:   Procedure Laterality Date    CARDIAC DEFIBRILLATOR PLACEMENT Left     CARDIOVERSION  07/26/2019    Dr. Naresh Ventura  07/26/2019       No Known Allergies     reports that he has never smoked. He has never been exposed to tobacco smoke. He has never used smokeless tobacco. He reports that he does not drink alcohol and does not use drugs. All questions and concerns were addressed to the patient/family. Alternatives to my treatment were discussed. I have discussed the above stated plan and the patient verbalized understanding and agreed with the plan. NOTE: This report was transcribed using voice recognition software. Every effort was made to ensure accuracy, however, inadvertent computerized transcription errors may be present.      Fish Lopez MD, MPH  [facility]   Office: [phone]  Fax: [phone]

## 2023-03-06 NOTE — PROGRESS NOTES
Physician Progress Note      PATIENT:               Marcie Shafer  [facility] #:                  [id number]  :                       1960  ADMIT DATE:       3/2/2023 2:14 PM  DISCH DATE:  RESPONDING  PROVIDER #:        Shad Day PA-C          QUERY TEXT:    Patient admitted with Acute on chronic combined CHF. Noted documentation of   acute respiratory failure in H&P on . In order to support the diagnosis   of acute respiratory failure, please include additional clinical indicators in   your documentation. Or please document if the diagnosis of acute respiratory   failure has been ruled out after further study. The medical record reflects the following:  Risk Factors: Acute on chronic combined CHF,  Clinical Indicators: Acute hypoxic respiratory failure, SpO2 84%, RR 22-16. patient on room air. No respiratory distress. worsening dyspnea,  Treatment: XR chest, patient on room air. Acute Respiratory Failure Clinical Indicators per  MS-DRG Training Guide and   Quick Reference Guide:  pO2 < 60 mmHg or SpO2 (pulse oximetry) < 91% breathing room air  pCO2 > 50 and pH < 7.35  P/F ratio (pO2 / FIO2) < 300  pO2 decrease or pCO2 increase by 10 mmHg from baseline (if known)  Supplemental oxygen of 40% or more  Presence of respiratory distress, tachypnea, dyspnea, shortness of breath,   wheezing  Unable to speak in complete sentences  Use of accessory muscles to breathe  Extreme anxiety and feeling of impending doom  Tripod position  Confusion/altered mental status/obtunded  Options provided:  -- Acute Respiratory Failure as evidenced by, Please document evidence.   -- Acute Respiratory Failure ruled out after study  -- Other - I will add my own diagnosis  -- Disagree - Not applicable / Not valid  -- Disagree - Clinically unable to determine / Unknown  -- Refer to Clinical Documentation Reviewer    PROVIDER RESPONSE TEXT:    This patient is in acute respiratory failure as evidenced by oxygen saturation   of 84%, dypsnea, and tachynea    Query created by: Kassi Varela on 3/6/2023 8:18 AM      Electronically signed by:  Saloni Quezada PA-C 3/6/2023 5:45 PM

## 2023-03-06 NOTE — PLAN OF CARE
Problem: Discharge Planning  Goal: Discharge to home or other facility with appropriate resources  3/6/2023 0109 by Yenifer Victor RN  Outcome: Progressing  3/5/2023 1424 by Eric Baltazar RN  Outcome: Progressing     Problem: Safety - Adult  Goal: Free from fall injury  3/6/2023 0109 by Yenifer Victor RN  Outcome: Progressing  3/5/2023 1424 by Eric Baltazar RN  Outcome: Progressing     Problem: ABCDS Injury Assessment  Goal: Absence of physical injury  3/6/2023 0109 by Yenifer Victor RN  Outcome: Progressing  3/5/2023 1424 by Eric Baltazar RN  Outcome: Progressing     Problem: Chronic Conditions and Co-morbidities  Goal: Patient's chronic conditions and co-morbidity symptoms are monitored and maintained or improved  3/6/2023 0109 by Yenifer Victor RN  Outcome: Progressing  3/5/2023 1424 by Eric Baltazar RN  Outcome: Progressing     Problem: Respiratory - Adult  Goal: Achieves optimal ventilation and oxygenation  3/6/2023 0109 by Yenifer Victor RN  Outcome: Progressing  3/5/2023 1424 by Eric Baltazar RN  Outcome: Progressing     Problem: Cardiovascular - Adult  Goal: Maintains optimal cardiac output and hemodynamic stability  3/6/2023 0109 by Yenifer Victor RN  Outcome: Progressing  3/5/2023 1424 by Eric Baltazar RN  Outcome: Progressing     Problem: Skin/Tissue Integrity - Adult  Goal: Skin integrity remains intact  3/6/2023 0109 by Yenifer Victor RN  Outcome: Progressing  3/5/2023 1424 by Eric Baltazar RN  Outcome: Progressing     Problem: Genitourinary - Adult  Goal: Absence of urinary retention  3/6/2023 0109 by Yenifer Victor RN  Outcome: Progressing  3/5/2023 1424 by Eric Baltazar RN  Outcome: Progressing     Problem: Metabolic/Fluid and Electrolytes - Adult  Goal: Electrolytes maintained within normal limits  3/6/2023 0109 by Yenifer Victor RN  Outcome: Progressing  3/5/2023 1424 by Eric Baltazar RN  Outcome: Progressing     Problem: Pain  Goal:  Verbalizes/displays adequate comfort level or baseline comfort level  3/6/2023 0109 by Perez Sandoval RN  Outcome: Progressing  3/5/2023 1424 by Lakesha Luong, RN  Outcome: Progressing

## 2023-03-06 NOTE — PROGRESS NOTES
patient back from deejay and cardioversion, in NSR, VSS, patient attemtped to urinate and did not get much out. post void bladder scan shows 661.  Urology and hospitalist notified

## 2023-03-07 LAB
ANTI-XA UNFRAC HEPARIN: 0.61 IU/ML (ref 0.3–0.7)
ANTI-XA UNFRAC HEPARIN: 0.98 IU/ML (ref 0.3–0.7)
ANTI-XA UNFRAC HEPARIN: >1.1 IU/ML (ref 0.3–0.7)
HCT VFR BLD CALC: 37 % (ref 40.5–52.5)
HEMOGLOBIN: 11.8 G/DL (ref 13.5–17.5)
MCH RBC QN AUTO: 25.9 PG (ref 26–34)
MCHC RBC AUTO-ENTMCNC: 31.8 G/DL (ref 31–36)
MCV RBC AUTO: 81.5 FL (ref 80–100)
PDW BLD-RTO: 16.3 % (ref 12.4–15.4)
PLATELET # BLD: 193 K/UL (ref 135–450)
PMV BLD AUTO: 8.3 FL (ref 5–10.5)
RBC # BLD: 4.54 M/UL (ref 4.2–5.9)
WBC # BLD: 3.5 K/UL (ref 4–11)

## 2023-03-07 PROCEDURE — 99232 SBSQ HOSP IP/OBS MODERATE 35: CPT | Performed by: NURSE PRACTITIONER

## 2023-03-07 PROCEDURE — 6370000000 HC RX 637 (ALT 250 FOR IP): Performed by: CLINICAL NURSE SPECIALIST

## 2023-03-07 PROCEDURE — 94760 N-INVAS EAR/PLS OXIMETRY 1: CPT

## 2023-03-07 PROCEDURE — 99232 SBSQ HOSP IP/OBS MODERATE 35: CPT | Performed by: CLINICAL NURSE SPECIALIST

## 2023-03-07 PROCEDURE — 36415 COLL VENOUS BLD VENIPUNCTURE: CPT

## 2023-03-07 PROCEDURE — 2580000003 HC RX 258: Performed by: CLINICAL NURSE SPECIALIST

## 2023-03-07 PROCEDURE — 6370000000 HC RX 637 (ALT 250 FOR IP): Performed by: HOSPITALIST

## 2023-03-07 PROCEDURE — 6360000002 HC RX W HCPCS: Performed by: CLINICAL NURSE SPECIALIST

## 2023-03-07 PROCEDURE — 85027 COMPLETE CBC AUTOMATED: CPT

## 2023-03-07 PROCEDURE — 2060000000 HC ICU INTERMEDIATE R&B

## 2023-03-07 PROCEDURE — 85520 HEPARIN ASSAY: CPT

## 2023-03-07 RX ORDER — AMIODARONE HYDROCHLORIDE 200 MG/1
200 TABLET ORAL DAILY
Status: DISCONTINUED | OUTPATIENT
Start: 2023-03-08 | End: 2023-03-10 | Stop reason: HOSPADM

## 2023-03-07 RX ORDER — MIDODRINE HYDROCHLORIDE 5 MG/1
2.5 TABLET ORAL 2 TIMES DAILY WITH MEALS
Status: DISCONTINUED | OUTPATIENT
Start: 2023-03-07 | End: 2023-03-10 | Stop reason: HOSPADM

## 2023-03-07 RX ADMIN — IRON SUCROSE 200 MG: 20 INJECTION, SOLUTION INTRAVENOUS at 14:12

## 2023-03-07 RX ADMIN — TORSEMIDE 20 MG: 20 TABLET ORAL at 08:14

## 2023-03-07 RX ADMIN — METOPROLOL SUCCINATE 12.5 MG: 25 TABLET, FILM COATED, EXTENDED RELEASE ORAL at 20:37

## 2023-03-07 RX ADMIN — MIDODRINE HYDROCHLORIDE 2.5 MG: 5 TABLET ORAL at 12:24

## 2023-03-07 RX ADMIN — MIDODRINE HYDROCHLORIDE 2.5 MG: 5 TABLET ORAL at 17:14

## 2023-03-07 RX ADMIN — SPIRONOLACTONE 25 MG: 25 TABLET ORAL at 12:24

## 2023-03-07 RX ADMIN — FINASTERIDE 5 MG: 5 TABLET, FILM COATED ORAL at 08:14

## 2023-03-07 RX ADMIN — METOPROLOL SUCCINATE 12.5 MG: 25 TABLET, FILM COATED, EXTENDED RELEASE ORAL at 08:14

## 2023-03-07 RX ADMIN — AMIODARONE HYDROCHLORIDE 200 MG: 200 TABLET ORAL at 08:14

## 2023-03-07 RX ADMIN — ATORVASTATIN CALCIUM 40 MG: 40 TABLET, FILM COATED ORAL at 20:37

## 2023-03-07 RX ADMIN — PANTOPRAZOLE SODIUM 40 MG: 40 TABLET, DELAYED RELEASE ORAL at 08:14

## 2023-03-07 RX ADMIN — TAMSULOSIN HYDROCHLORIDE 0.4 MG: 0.4 CAPSULE ORAL at 08:14

## 2023-03-07 ASSESSMENT — PAIN SCALES - GENERAL
PAINLEVEL_OUTOF10: 0
PAINLEVEL_OUTOF10: 0

## 2023-03-07 NOTE — PROGRESS NOTES
Pt addiment about showering, discussed with Raji Said PA and ok to take a quick shower and come off tele/heparin gtt. Current IV in L AC, removed per pt request due to occluding frequently and new one will be placed after shower. Pt instructed to be quick. Scores low fall risk, supplies given to pt and he was instructed to call for any assistance.

## 2023-03-07 NOTE — PROGRESS NOTES
[facility], [place]   Daily Progress Note      Admit Date:  3/2/2023    HPI:    Mr. Jany Mancilla is a 57 y.o. male with history of with history of PAF on amiodarone, hypertension. Unvaccinated, , homeless but lives with friends, ICD in place 4/25/2022    He is admitted for abdominal pain and diagnosed with UTI, treated with cipro. He came back to the hospital with swelling in his legs and not urinating much. He has had multiple admissions for fluid overload. He states he is taking his medications. EKG show AF with slow ventricular response. Optival within threshold     Subjective:  Patient is being seen for systolic heart failure. There were no acute overnight cardiac events. Iron deficient. He is sitting up in the bed, able to answer my questions today about when to weight and when to call me. No shortness of breath, chest pain and he states he feels great. No labs today  Weight [phone] and -8.5 L out creat 1.5-1.6 bnp       Objective:   BP 98/74   Pulse 70   Temp 98 °F (36.7 °C) (Oral)   Resp 18   Ht 6' 2\" (1.88 m)   Wt 193 lb 4.8 oz (87.7 kg)   SpO2 98%   BMI 24.82 kg/m²     Intake/Output Summary (Last 24 hours) at 3/7/2023 1118  Last data filed at 3/7/2023 5997  Gross per 24 hour   Intake 863.65 ml   Output 1900 ml   Net -1036.35 ml          Physical Exam:  General:  Awake, alert, oriented in NAD  Skin:  Warm and dry. No unusual bruising or rash  Neck:  Supple. No JVD or carotid bruit appreciated  Chest:  Normal effort.   Clear to auscultation, no wheezes/rhonchi/rales  Cardiovascular:  RRR, S1/S2, no murmur/gallop/rub  Abdomen:  Soft, nontender, +bowel sounds  Extremities:  bilateral lower leg edema much improved  Neurological: No focal deficits  Psychological: Normal mood and affect      Medications:    [START ON 3/8/2023] amiodarone  200 mg Oral Daily    iron sucrose  200 mg IntraVENous Q24H    torsemide  20 mg Oral Daily    [Held by provider] apixaban  5 mg Oral BID    atorvastatin  40 mg Oral Nightly    [Held by provider] empagliflozin  10 mg Oral Daily    finasteride  5 mg Oral Daily    [Held by provider] lisinopril  2.5 mg Oral Daily    metoprolol succinate  12.5 mg Oral BID    pantoprazole  40 mg Oral Daily    spironolactone  25 mg Oral Lunch    tamsulosin  0.4 mg Oral Daily    sodium chloride flush  5-40 mL IntraVENous 2 times per day      heparin (PORCINE) Infusion 8 Units/kg/hr (03/07/23 0517)    sodium chloride         Lab Data:  CBC:   Recent Labs     03/05/23 0518 03/06/23 2113 03/07/23  0323   WBC 3.9* 3.8* 3.5*   HGB 12.0* 12.1* 11.8*    229 193     BMP:    Recent Labs     03/05/23 0518 03/06/23  0840    138   K 4.6 4.3   CO2 35* 31   BUN 23* 23*   CREATININE 1.6* 1.4*     INR:    Recent Labs     03/06/23 2113   INR 1.40*       BNP:    Recent Labs     03/05/23 0518 03/06/23  0840   PROBNP 7,694* 7,053*         Diagnostics:  Echo:  1/31/23:   Summary   The left ventricle is severely dilated. Severely reduced systolic function. LVEF 15-20%. Severe global hypokinesis. Grade III diastolic dysfunction. Elevated LVEDP. RV severely dilated with severely depressed function. Moderate eccentric MR. Mild AI. Severe TR with a RVSP   100 mmHg. Severe pulmonary hypertension. IVC dynamics c/w markedly elevated RA pressure (15 mmHg). A bubble study was performed and fails to show evidence of shunting. Incidental Finding: Right kidney cyst noted. Consider additional dedicated   abdominal imaging if clinically indicated. CONSUELO Dr Mark Benitez 2/10/22  Preliminary CONSUELO results are:      - Severe LV dysfunction,  EF: 20-25%  - LA dilated. There was HENOK thrombus. - Moderate MR     Cardiac Cath PCI: Dr Cramer May 2/8/2022  Anatomy:   LM-nml   LAD-nml  Cx-nml  OM- nml  RCA-nml  RPDA- nml  LVEF- 10%  LVG- global hypokinesis  LVEDP- 10     Hemodynamics:  RA- mean 3  RV- 37/0  PAWP- 10  PA- 34/12 (20)     C. O.- 5.7 (4.9)  C. I.- 2.6 (2.25)  PA sat 60%  AO sat 91%     Contrast: 70  Flouro Time: 5.3  Access: R radial a, R CFV     Impression  ~Coronary Angiography w/ normal cors  ~LVG with LVEF of 10% and global regional wall motion abnormalities  ~Severe MR  ~Normal CO/CI  ~normal L and R filling pressures     Recommendation  ~Aggressive medical treatment and risk factor modification  ~1. Medications reviewed, no changes at this time. 2. Post cath IVF. Bedrest.  3.Consider CONSUELO for evaluation of MR.   4. Patient has been advised on the importance of regular exercise of at least 20-30 minutes daily alternating with aerobic and isometric activities. 5. Patient counseled about and offered assistance for smoking cessation   6. No indication for cardiac rehab  7. CHF service to evaluate tomorrow. Echo 11/29/2021  Summary   -Covid+   -Severely reduced global systolic function with an ejection fraction   estimated at 20%. -Severe global hypokinesis with regional variations noted. -Right ventricular systolic function appears to be mildly reduced based on   visual inspection.   -Severe biatrial enlargement.   -Thickened mitral valve without evidence of stenosis. There is   mild-to-moderate mitral regurgitation.   -There is mild-to-moderate tricuspid regurgitation with a RVSP estimation of   37 mmHg. -Diastolic dysfunction with elevated LV filling pressures. Assessment:    1. Acute on chronic systolic heart failure, on bb, sglt2  2. Hypoxia  3. Bradycardia  4. SACHIN possible due to cipro  5. ICD in place  6. Chronic atrial fib      Plan:    Continue torsemide 20 mg daily  Hold jardiance and lisinopril  Eliquis on hold, on heparin drip  Continue aldactone 25 mg once a day  CHF nurse following for diet education, fluid restriction and daily weights   Palliative care consult pending for goals, code status as he had severe CM and possible prostate cancer  Continue toprol 12.5 mg twice a day   EP following    9.    Continue iron venofer 200 mg x 5 doses 10.  Will start midodrine 2.5 mg twice a day in order to start some lisinopril tomorrow    He is ready from HF standpoint for discharge but is awaiting prostate biopsy later this week    NYHA IV    Discussed with patient who is agreeable with plan of care. Thank you for allowing me to participate in the care of your patient.     TRENT Millan - CNS, CNS

## 2023-03-07 NOTE — CONSULTS
Urology Consult Note  [facility]     Patient: Priyanka Flowers MRN: [id number]  Room/Bed: [address] [place], [place] [address]   YOB: 1960  Age/Sex: 57 y.o.male  Admission Date: 3/2/2023     Date of Service:  3/7/2023    Consulting Provider: TRENT Anderson - KATLYN  Admitting/Requesting Physician: Chloé Butler MD  Primary Care Physician: No primary care provider on file. Reason for Consult: Elevated PSA    ASSESSMENT/PLAN   57 y.o. AA male with h/o CHF, A.fib on chronic anticoagulation. Urology consulted for urinary retention and elevated psa- he has a long standing hx of  chronic ernst. He was previously seen in 2020 for BPH and elevated psa of 20, and at that time Dr. Cheryle Ebbing  recommended cysto/TRUS bx- numerous calls to patient, non-comply letter sent to his address. He has had a positive DREs on multiple occasions. He has been cardioverted 03/06, now on heparin. [facility]  Cysto/trusp/prostate bx scheduled Thursday with Dr. Karine Burton. Would begin to hold [facility] at this time. Pneumonic compression devices and OOB as tolerated. All patient questions were answered. He understands the plan as listed above. HISTORY     Chief Complaint:   Chief Complaint   Patient presents with    Shortness of Breath     States SOB upon exertion and also states his legs feel like \"bricks\" when he walks x 3 days        History of Present Illness: Priyanka Flowers is a 57 y.o. male with elev psa and AUR. Onset of symptoms was days ago with improving course since that time. Symptoms are aggravated by hx pca. Symptoms improved with ernst. Associated symptoms include pelvic pain. Patient also reports no more pain with ernst in place. He has tried the following treatments: prostate bx tomorrow, flomax, ernst.      Past Medical History:  He has a past medical history of Acute combined systolic and diastolic congestive heart failure ([address] (8/31/2022), Atrial fibrillation ([address] (07/25/2019), CHF (congestive heart failure) ([facility] blood clots, and Hypertension. Hospital Problem List:  Principal Problem:    Acute combined systolic and diastolic CHF, NYHA class 1 ([facility])  Active Problems:    Hypoxia    Bradycardia    SACHIN (acute kidney injury) ([facility]    ICD (implantable cardioverter-defibrillator) in place  Resolved Problems:    * No resolved hospital problems. *      Past Surgical History:  He has a past surgical history that includes Insertable Cardiac Monitor (07/26/2019); Cardioversion (07/26/2019); and Cardiac defibrillator placement (Left). Social History:  He reports that he has never smoked. He has never been exposed to tobacco smoke. He has never used smokeless tobacco. He reports that he does not drink alcohol and does not use drugs. Family History:  family history includes Heart Attack in his mother; Heart Disease in his mother; High Blood Pressure in his father and mother; Other in his father; Stroke in his father. Allergies:  No Known Allergies    Medications:  Scheduled Meds:   [START ON 3/8/2023] amiodarone  200 mg Oral Daily    iron sucrose  200 mg IntraVENous Q24H    torsemide  20 mg Oral Daily    [Held by provider] apixaban  5 mg Oral BID    atorvastatin  40 mg Oral Nightly    [Held by provider] empagliflozin  10 mg Oral Daily    finasteride  5 mg Oral Daily    [Held by provider] lisinopril  2.5 mg Oral Daily    metoprolol succinate  12.5 mg Oral BID    pantoprazole  40 mg Oral Daily    spironolactone  25 mg Oral Lunch    tamsulosin  0.4 mg Oral Daily    sodium chloride flush  5-40 mL IntraVENous 2 times per day     Continuous Infusions:   heparin (PORCINE) Infusion 8 Units/kg/hr (03/07/23 0517)    sodium chloride       PRN Meds:lidocaine, heparin (porcine), heparin (porcine), sodium chloride flush, sodium chloride, ondansetron **OR** ondansetron, polyethylene glycol, acetaminophen **OR** acetaminophen    Review of Systems:  Pertinent positives/negatives reviewed in HPI.   All other systems reviewed and negative, unless noted below. Constitutional: Negative  Genitourinary: see HPI  HEENT: Negative   Cardiovascular: Negative   Respiratory: Negative   Gastrointestinal: Negative   Musculoskeletal: Negative   Neurological: Negative   Psychiatric: Negative   Integumentary: Negative     PHYSICAL EXAM     Vitals:    03/07/23 0914   BP:    Pulse: 74   Resp:    Temp:    SpO2:      CONSTITUTIONAL: The patient is well nourished/developed, with no distress noted. NEUROLOGICAL/PSYCHIATRIC: Oriented to place and time, normal affected noted. NECK: The neck is symmetrical and supple, with no masses noted. CARDIOVASCULAR: Regular rate and rhythm, no evidence of swelling noted. RESPIRATORY: Normal respiratory effort with no wheezing noted. ABDOMEN: Abdomen soft, non-tender, non-distended. No enlarged liver or spleen. No hernias noted. Stool occult blood not indicated. SKIN: Skin appears normal.  LYMPHATICS: No adenopathy noted. CVA: No CVA tenderness bilaterally. GENITOURINARY: The penis is without rash or lesions and meatus with expected size and location. The scrotum appears normal. Bilateral testicles appears to be of normal size and location. No masses or tenderness noted of testicles or epididymis.      Ins/Outs:    Intake/Output Summary (Last 24 hours) at 3/7/2023 1018  Last data filed at 3/7/2023 2894  Gross per 24 hour   Intake 863.65 ml   Output 2150 ml   Net -1286.35 ml       LABS     CBC   Lab Results   Component Value Date/Time    WBC 3.5 03/07/2023 03:23 AM    RBC 4.54 03/07/2023 03:23 AM    HGB 11.8 03/07/2023 03:23 AM    HCT 37.0 03/07/2023 03:23 AM    MCV 81.5 03/07/2023 03:23 AM    MCH 25.9 03/07/2023 03:23 AM    MCHC 31.8 03/07/2023 03:23 AM    RDW 16.3 03/07/2023 03:23 AM     03/07/2023 03:23 AM    MPV 8.3 03/07/2023 03:23 AM     BMP   Lab Results   Component Value Date/Time     03/06/2023 08:40 AM    K 4.3 03/06/2023 08:40 AM     03/06/2023 08:40 AM    CO2 31 03/06/2023 08:40 AM    BUN 23 03/06/2023 08:40 AM    CREATININE 1.4 03/06/2023 08:40 AM    GLUCOSE 81 03/06/2023 08:40 AM    CALCIUM 8.6 03/06/2023 08:40 AM     Urinalysis:   Lab Results   Component Value Date/Time    COLORU DARK YELLOW 02/17/2023 06:08 PM    GLUCOSEU 500 02/17/2023 06:08 PM    BLOODU LARGE 02/17/2023 06:08 PM    NITRU POSITIVE 02/17/2023 06:08 PM    LEUKOCYTESUR MODERATE 02/17/2023 06:08 PM     Urine culture: No results for input(s): LABURIN in the last 72 hours. PSA:   Lab Results   Component Value Date    PSA 15.49 (H) 02/02/2023         IMAGING     CT ABDOMEN PELVIS W IV CONTRAST Additional Contrast? None    Result Date: 2/17/2023  EXAMINATION: CT OF THE ABDOMEN AND PELVIS WITH CONTRAST 2/17/2023 7:33 pm TECHNIQUE: CT of the abdomen and pelvis was performed with the administration of intravenous contrast. Multiplanar reformatted images are provided for review. Automated exposure control, iterative reconstruction, and/or weight based adjustment of the mA/kV was utilized to reduce the radiation dose to as low as reasonably achievable. COMPARISON: CT and ultrasound 01/30/2023 HISTORY: ORDERING SYSTEM PROVIDED HISTORY: abd pain, concern for cholecystitis TECHNOLOGIST PROVIDED HISTORY: Reason for exam:->abd pain, concern for cholecystitis Additional Contrast?->None Decision Support Exception - unselect if not a suspected or confirmed emergency medical condition->Emergency Medical Condition (MA) Reason for Exam: Abdominal Pain (Pt c/o diffuse abdominal pain for the past few days with low energy. Denies vomiting or diarrhea) FINDINGS: Lower Chest: Cardiomegaly with partially visualized AICD. No acute findings identified in the lung bases. Organs: Findings compatible with hepatic steatosis. The gallbladder is relatively contracted with mild apparent wall thickening. No evidence for biliary dilatation. The pancreas, spleen, adrenals and kidneys appear without significant change.   The pancreatic duct measures up to 3 mm. Bilateral renal cysts. GI/Bowel: There is no bowel dilatation or wall thickening identified. Diverticulosis. Pelvis: Prostatomegaly. Don catheter within the bladder, which is relatively decompressed. Mild apparent diffuse wall thickening may be due to underlying hypertrophy. Peritoneum/Retroperitoneum: Trace abdominopelvic ascites. No free air. No enlarged or suspicious-appearing lymph nodes identified. The aorta is normal in caliber. Bones/Soft Tissues: Body wall edema. No acute osseous abnormality identified. Small fat containing umbilical hernia. 1.  Trace abdominopelvic ascites and body wall edema. These findings appear more prominent since prior CT imaging. 2.  Contracted gallbladder. Mild apparent gallbladder wall thickening may be due to contracted state and accentuated related to ascites. 3.  Additional chronic and benign findings, as above. XR CHEST PORTABLE    Result Date: 3/2/2023  EXAMINATION: ONE XRAY VIEW OF THE CHEST 3/2/2023 3:25 pm COMPARISON: Chest x-ray dated 02/17/2023. HISTORY: ORDERING SYSTEM PROVIDED HISTORY: SOB TECHNOLOGIST PROVIDED HISTORY: Reason for exam:->SOB Reason for Exam: SOB FINDINGS: LINES/TUBES/OTHER: Left subclavian AICD is again noted. HEART/MEDIASTINUM: The cardiac silhouette is enlarged, but stable. PLEURA/LUNGS: There are no focal consolidations or pleural effusions. There is no appreciable pneumothorax. BONES/SOFT TISSUE: No acute abnormality. No radiographic evidence of acute pulmonary disease. Stable cardiomegaly. XR CHEST PORTABLE    Result Date: 2/17/2023  EXAMINATION: ONE XRAY VIEW OF THE CHEST 2/17/2023 5:34 pm COMPARISON: 10/29/2022 HISTORY: Acute shortness of breath. FINDINGS: Patient is mildly rotated. Stable cardiomegaly and left chest ICD. No acute airspace disease. No significant pleural effusion. No pneumothorax. Stable cardiomegaly without acute abnormality.             Electronically signed by: TRENT Roper - KATLYN 3/7/2023   [facility]  Office Contact: [phone]

## 2023-03-07 NOTE — PLAN OF CARE
Problem: Discharge Planning  Goal: Discharge to home or other facility with appropriate resources  3/7/2023 0815 by Madyson Araujo RN  Outcome: Progressing     Problem: Safety - Adult  Goal: Free from fall injury  3/7/2023 0815 by Madyson Araujo RN  Outcome: Progressing     Problem: ABCDS Injury Assessment  Goal: Absence of physical injury  3/7/2023 0815 by Madyson Araujo RN  Outcome: Progressing     Problem: Chronic Conditions and Co-morbidities  Goal: Patient's chronic conditions and co-morbidity symptoms are monitored and maintained or improved  3/7/2023 0815 by Madyson Araujo RN  Outcome: Progressing     Problem: Respiratory - Adult  Goal: Achieves optimal ventilation and oxygenation  3/7/2023 0815 by Madyson Araujo RN  Outcome: Progressing     Problem: Cardiovascular - Adult  Goal: Maintains optimal cardiac output and hemodynamic stability  3/7/2023 0815 by Madyson Araujo RN  Outcome: Progressing     Problem: Skin/Tissue Integrity - Adult  Goal: Skin integrity remains intact  3/7/2023 0815 by Madyson Araujo RN  Outcome: Progressing     Problem: Genitourinary - Adult  Goal: Absence of urinary retention  3/7/2023 0815 by Madyson Araujo RN  Outcome: Progressing     Problem: Metabolic/Fluid and Electrolytes - Adult  Goal: Electrolytes maintained within normal limits  3/7/2023 0815 by Madyson Araujo RN  Outcome: Progressing     Problem: Pain  Goal: Verbalizes/displays adequate comfort level or baseline comfort level  3/7/2023 0815 by Madyson Araujo RN  Outcome: Progressing

## 2023-03-07 NOTE — CARE COORDINATION
Discharge Planning:     (MARTHA) rec'd a phone call from Merna Hernandez RN case manager [phone]. She advised she was checking in on the Patient to see how he was doing and if he will have any D/C needs. MARTHA advised it looks like he is going to have a prostate biopsy done on Thursday so he will be here a few more days. Soledad Haq asked when the Patient is ready for D/C that the [address] be faxed to her at [phone]. Soledad Haq advised she had help with various discharge needs such as transport, pharmacy, care, etc.     MARTHA was also asked to confirm Patient's last name with him as [facility] and the Hospital have different last names listed. CM confirmed with the Patient that the hospital has his last name spelled correctly of 'Pat Torito'. CM also asked the Patient about his living situation as she has heard he could possibly be homeless. Patient advised he is currently staying at a friends house and is not homeless. CM team to follow.      Electronically signed by DAVINA Sims on 3/7/2023 at 10:13 AM

## 2023-03-07 NOTE — PROGRESS NOTES
[facility]   Electrophysiology Progress Note     Date: 3/7/2023  Admit Date: 3/2/2023     Reason for consultation: AF, Bradycardia    Chief Complaint:   Chief Complaint   Patient presents with    Shortness of Breath     States SOB upon exertion and also states his legs feel like \"bricks\" when he walks x 3 days        History of Present Illness: History obtained from patient and medical record. Kelin Mckeon is a 57 y.o. male with a past medical history of long persistent atrial fibrillation, HTN, nonischemic cardiomyopathy, HFrEF, left atrial appendage thrombus, challenging social circumstances being homeless, history of noncompliance, s/p BiV ICD on 4/25/2022     Interval Hx: Today, he is being seen for EP follow up. He remains in sinus/AV paced rhythm. His BP remains stable. He is feeling much better. He states he has not been dizzy today. Awaiting urologic procedure. Patient seen and examined. Clinical notes reviewed. Telemetry reviewed. No new complaints today. No major events overnight. Denies having chest pain, palpitations, shortness of breath, orthopnea/PND, cough, or dizziness at the time of this visit. Allergies:  No Known Allergies    Home Meds:  Prior to Visit Medications    Medication Sig Taking?  Authorizing Provider   metoprolol succinate (TOPROL XL) 25 MG extended release tablet Take 0.5 tablets by mouth in the morning and at bedtime  Attila Rocha PA-C   amiodarone (CORDARONE) 200 MG tablet Take 1 tablet by mouth 2 times daily For 2 weeks then decrease to 200mg once a day  Juan Porras APRN - CNP   spironolactone (ALDACTONE) 25 MG tablet Take 1 tablet by mouth Daily with lunch  Benjamin Everett MD   pantoprazole (PROTONIX) 40 MG tablet Take 1 tablet by mouth daily  Benjamin Everett MD   finasteride (PROSCAR) 5 MG tablet Take 5 mg by mouth daily  Historical Provider, MD   torsemide (DEMADEX) 20 MG tablet Take 1 tablet by mouth daily  Mono Graham MD   empagliflozin (JARDIANCE) 10 MG tablet Take 1 tablet by mouth daily  Deirdre Das APRN - CNS   lisinopril (PRINIVIL;ZESTRIL) 2.5 MG tablet Take 1 tablet by mouth daily  Deirdre Das, APRN - CNS   atorvastatin (LIPITOR) 40 MG tablet Take 1 tablet by mouth nightly  Deirdre Das APRN - CNS   vitamin D (CHOLECALCIFEROL) 50 MCG (2000 UT) TABS tablet Take 1 tablet by mouth daily  Patient not taking: Reported on 3/2/2023  TRENT Matthew - CNS   potassium chloride (KLOR-CON M) 20 MEQ extended release tablet Take 1 tablet by mouth in the morning and 1 tablet in the evening. Take with meals.   Luisa Villegas MD   apixaban (ELIQUIS) 5 MG TABS tablet Take 1 tablet by mouth 2 times daily  TRENT Mercado CNP   tamsulosin (FLOMAX) 0.4 MG capsule Take 1 capsule by mouth daily  TRENT Mercado CNP      Scheduled Meds:   iron sucrose  200 mg IntraVENous Q24H    torsemide  20 mg Oral Daily    amiodarone  200 mg Oral BID    [Held by provider] apixaban  5 mg Oral BID    atorvastatin  40 mg Oral Nightly    [Held by provider] empagliflozin  10 mg Oral Daily    finasteride  5 mg Oral Daily    [Held by provider] lisinopril  2.5 mg Oral Daily    metoprolol succinate  12.5 mg Oral BID    pantoprazole  40 mg Oral Daily    spironolactone  25 mg Oral Lunch    tamsulosin  0.4 mg Oral Daily    sodium chloride flush  5-40 mL IntraVENous 2 times per day     Continuous Infusions:   heparin (PORCINE) Infusion 8 Units/kg/hr (03/07/23 0517)    sodium chloride       PRN Meds:lidocaine, heparin (porcine), heparin (porcine), sodium chloride flush, sodium chloride, ondansetron **OR** ondansetron, polyethylene glycol, acetaminophen **OR** acetaminophen     Past Medical History:  Past Medical History:   Diagnosis Date    Acute combined systolic and diastolic congestive heart failure ([facility] 8/31/2022    Atrial fibrillation ([facility] 07/25/2019    CHF (congestive heart failure) (HCC)     Hx of blood clots     Hypertension       Past Surgical History:    has a past surgical history that includes Insertable Cardiac Monitor (07/26/2019); Cardioversion (07/26/2019); and Cardiac defibrillator placement (Left). Social History:  Reviewed. reports that he has never smoked. He has never been exposed to tobacco smoke. He has never used smokeless tobacco. He reports that he does not drink alcohol and does not use drugs. Family History:  Reviewed. family history includes Heart Attack in his mother; Heart Disease in his mother; High Blood Pressure in his father and mother; Other in his father; Stroke in his father. Review of Systems:  Constitutional: Negative for fever, night sweats, chills, weight changes, or weakness  Skin: Negative for rash, dry skin, pruritus, bruising, bleeding, blood clots, or changes in skin pigment  HEENT: Negative for vision changes, ringing in the ears, sore throat, dysphagia, or swollen lymph nodes  Respiratory: Reviewed in HPI  Cardiovascular: Reviewed in HPI  Gastrointestinal: Negative for abdominal pain, N/V/D, constipation, or black/tarry stools  Genito-Urinary: Negative for dysuria, incontinence, urgency, or hematuria  Musculoskeletal: Negative for joint swelling, muscle pain, or injuries  Neurological/Psych: Negative for confusion, seizures, headaches, balance issues or TIA-like symptoms. No anxiety, depression, or insomnia    Physical Examination:  Vitals:    03/07/23 0813   BP: 98/74   Pulse: 72   Resp:    Temp:    SpO2:       In: 863.7 [P.O.:720;  I.V.:36.1]  Out: 2400    Wt Readings from Last 3 Encounters:   03/07/23 193 lb 4.8 oz (87.7 kg)   02/21/23 198 lb 6.6 oz (90 kg)   02/03/23 182 lb 4.8 oz (82.7 kg)       Intake/Output Summary (Last 24 hours) at 3/7/2023 0900  Last data filed at 3/7/2023 4154  Gross per 24 hour   Intake 863.65 ml   Output 2150 ml   Net -1286.35 ml       Telemetry: Personally Reviewed  - Sinus rhythm/AV paced  Constitutional: Cooperative and in no apparent distress, and appears well nourished  Skin: Warm and pink; no pallor, cyanosis, bruising, or clubbing  HEENT: Symmetric and normocephalic. PERRL, EOM intact. Conjunctiva pink with clear sclera. Mucus membranes pink and moist. Teeth intact. Thyroid smooth without nodules or goiter. Cardiovascular: Regular rate and rhythm. S1/S2 present without murmurs, rubs, or gallops. Peripheral pulses 2+, capillary refill < 3 seconds. No elevation of JVP. No peripheral edema  Respiratory: Respirations symmetric and unlabored. Lungs clear to auscultation bilaterally, no wheezing, crackles, or rhonchi  Gastrointestinal: Abdomen soft and round. Bowel sounds normoactive in all quadrants without tenderness or masses. Musculoskeletal: Bilateral upper and lower extremity strength 5/5 with full ROM  Neurologic/Psych: Awake and orientated to person, place and time. Calm affect, appropriate mood    Pertinent labs, diagnostic, device, and imaging results reviewed as a part of this visit    Labs:    BMP:   Recent Labs     03/05/23 0518 03/06/23  0840    138   K 4.6 4.3    101   CO2 35* 31   BUN 23* 23*   CREATININE 1.6* 1.4*     Estimated Creatinine Clearance: 62 mL/min (A) (based on SCr of 1.4 mg/dL (H)).    CBC:   Recent Labs     03/05/23 0518 03/06/23 2113 03/07/23  0323   WBC 3.9* 3.8* 3.5*   HGB 12.0* 12.1* 11.8*   HCT 36.9* 38.3* 37.0*   MCV 81.0 82.2 81.5    229 193     Thyroid:   Lab Results   Component Value Date/Time    TSH 2.66 06/15/2022 10:33 PM     Lipids:   Lab Results   Component Value Date/Time    CHOL 121 10/29/2022 04:11 PM    HDL 69 10/29/2022 04:11 PM    TRIG 75 10/29/2022 04:11 PM     LFTS:   Lab Results   Component Value Date/Time    ALT 32 03/02/2023 03:21 PM    AST 35 03/02/2023 03:21 PM    ALKPHOS 104 03/02/2023 03:21 PM    PROT 6.9 03/02/2023 03:21 PM    AGRATIO 1.2 03/02/2023 03:21 PM    BILITOT 2.3 03/02/2023 03:21 PM     Cardiac Enzymes:   Lab Results   Component Value Date/Time    TROPONINI <0.01 03/02/2023 03:21 PM Tone Stains <0.01 02/17/2023 06:08 PM    TROPONINI <0.01 02/01/2023 03:18 AM     Coags:   Lab Results   Component Value Date/Time    PROTIME 17.1 03/06/2023 09:13 PM    INR 1.40 03/06/2023 09:13 PM       ECG: 3/2/23 (Personally Interpreted)   - Vpaced with underlying AF    ECHO: 1/31/23   The left ventricle is severely dilated. Severely reduced systolic function. LVEF 15-20%. Severe global hypokinesis. Grade III diastolic dysfunction. Elevated LVEDP. RV severely dilated with severely depressed function. Moderate eccentric MR. Mild AI. Severe TR with a RVSP 100 mmHg. Severe pulmonary hypertension. IVC dynamics c/w markedly elevated RA pressure (15 mmHg). A bubble study was performed and fails to show evidence of shunting. Incidental Finding: Right kidney cyst noted. Consider additional dedicated abdominal imaging if clinically indicated. Cath: 2/8/22   LM-nml   LAD-nml  Cx-nml  OM- nml  RCA-nml  RPDA- nml  LVEF- 10%  LVG- global hypokinesis  LVEDP- 10     Hemodynamics:  RA- mean 3  RV- 37/0  PAWP- 10  PA- 34/12 (20)  C. O.- 5.7 (4.9)  C. I.- 2.6 (2.25)  PA sat 60%  AO sat 91%      Impression  ~Coronary Angiography w/ normal cors  ~LVG with LVEF of 10% and global regional wall motion abnormalities  ~Severe MR  ~Normal CO/CI  ~normal L and R filling pressures    Problem List:   Patient Active Problem List    Diagnosis Date Noted    Hypoxia 03/03/2023    Bradycardia 03/03/2023    Acute combined systolic and diastolic CHF, NYHA class 1 ([address] 03/02/2023    UTI (urinary tract infection) 02/17/2023    Persistent atrial fibrillation ([address] 02/01/2023    Epigastric abdominal pain 01/31/2023    Acalculous cholecystitis 01/31/2023    Abdominal pain 01/30/2023    Abdominal pain, right upper quadrant 01/30/2023    HFrEF (heart failure with reduced ejection fraction) ([address] 10/29/2022    Chronic diastolic (congestive) heart failure ([address] 10/29/2022    Dizziness 10/29/2022    Elevated LFTs 09/01/2022    Idiopathic hypotension 09/01/2022    Peripheral edema 09/01/2022    Anemia 09/01/2022    Fluid overload 08/24/2022    Hemoptysis 07/30/2022    PNA (pneumonia) 07/29/2022    Syncope and collapse 04/29/2022    Thrombus of left atrial appendage     VT (ventricular tachycardia)     ICD (implantable cardioverter-defibrillator) in place 04/26/2022    Acute on chronic congestive heart failure (HCC)     Complicated UTI (urinary tract infection)     Chronic atrial fibrillation ([facility])     Moderate mitral regurgitation     Acute combined systolic and diastolic heart failure (HCC) 04/13/2022    NSTEMI (non-ST elevated myocardial infarction) ([facility])     Homeless     COVID     Non-compliance     Chronic systolic (congestive) heart failure ([facility]) 11/28/2021    Acute pulmonary edema ([facility]) 11/27/2021    Encounter for loop recorder check 07/26/2019    Acute urinary retention 07/26/2019    Dilated cardiomyopathy ([facility])     SACHIN (acute kidney injury) ([facility])     Paroxysmal atrial fibrillation ([facility])     Primary hypertension     Acute retention of urine 07/24/2019        Assessment and Plan:     1.Long standing Persistent Atrial Fibrillation  - S/p CONSUELO/DCCV (3/6/23)  - Currently in NSR/AV paced rhythm  - Continue Toprol XL 12.5 mg BID  - I have discussed with patient side effects of amiodarone including but not limited to thyroid disease, discoloration of skin and cornea and pulmonary fibrosis. Patient would like to continue with it.                           ~ Continue oral amiodarone; reduce to 200 mg daily                          ~ TSH 1.24 (10/22). ALT 32, AST 35 (2/23). Monitor for toxicity     - [id number] score:high; [id number] Vasc score and anticoagulation discussed. High risk for stroke and thromboembolism. Anticoagulation is recommended. Risk of bleeding was discussed.  ~ Eliquis on hold for urology procedure. Continue heparin for now and limit time off anti-coagulation given recent cardioversion. Resume AC as soon as safely possible following  procedure     2. Hx of HENOK thrombus              - Noted on CONSUELO (2/22)   - Resolved on CONSUELO     3. Echodensity on ICD Lead   - Noted on CONSUELO yesterday  - Afebrile, no s/s of fever   - No need for blood cultures at this time    4. NICM  - S/p [employer] Biv AICD (4/22)  - I reviewed device interrogation. Device functioning normally.                ~ Device set to VVIR. WIll need to turn on atrial therapies at follow up  - Follow up with device clinic as scheduled     4. Acute on Chronic systolic heart failure (NYHA Class II)  - Appears compensated               ~ EF 15-20% per echo  - Continue with BB, ACE, cathy, torsemide  - Monitor I&O's, Daily weights     5. HTN  - Controlled: Goal <130/80  - Continue current medications     6. At Risk for Sleep Apnea              - Multiple risk factors              - Recommend outpatient sleep study    7. No further EP recommendations. EP will sign off. Please call if there are any questions. Thank you for consult. All pertinent information and plan of care discussed with the EP physician. All questions and concerns were addressed to the patient. Alternatives to my treatment were discussed. I have discussed the above stated plan with patient and the nurse. The patient verbalized understanding and agreed with the plan.     Thank you for allowing to us to participate in the care of TRENT Ramirez-CNP  [address]   Office: [phone]

## 2023-03-07 NOTE — PROGRESS NOTES
[facility] PROGRESS NOTE    3/7/2023 1:42 PM        Name: Spencer Barker . Admitted: 3/2/2023  Primary Care Provider: No primary care provider on file. (Tel: None)      Subjective:  . Admitted with increased swelling and shortness of breath   He reports compliance with home meds but does not weight himself at home     Seen by cardiology and EP on Friday,    Pacer rate increased to 70. Underwent cardioversion Monday morning. Seen this am and feeling much better. Reports breathing is better since IV lasix.   He has diuresed almost 10 liters   Weight stable at 193 today       Reviewed interval ancillary notes    Current Medications  [START ON 3/8/2023] amiodarone (CORDARONE) tablet 200 mg, Daily  midodrine (PROAMATINE) tablet 2.5 mg, BID WC  iron sucrose (VENOFER) 200 mg in sodium chloride 0.9 % 100 mL IVPB, Q24H  lidocaine (XYLOCAINE) 2 % uro-jet, PRN  heparin (porcine) injection 4,000 Units, PRN  heparin (porcine) injection 2,000 Units, PRN  heparin 25,000 units in dextrose 5% 250 mL (premix) infusion, Continuous  torsemide (DEMADEX) tablet 20 mg, Daily  [Held by provider] apixaban (ELIQUIS) tablet 5 mg, BID  atorvastatin (LIPITOR) tablet 40 mg, Nightly  [Held by provider] empagliflozin (JARDIANCE) tablet 10 mg, Daily  finasteride (PROSCAR) tablet 5 mg, Daily  [Held by provider] lisinopril (PRINIVIL;ZESTRIL) tablet 2.5 mg, Daily  metoprolol succinate (TOPROL XL) extended release tablet 12.5 mg, BID  pantoprazole (PROTONIX) tablet 40 mg, Daily  spironolactone (ALDACTONE) tablet 25 mg, Lunch  tamsulosin (FLOMAX) capsule 0.4 mg, Daily  sodium chloride flush 0.9 % injection 5-40 mL, 2 times per day  sodium chloride flush 0.9 % injection 5-40 mL, PRN  0.9 % sodium chloride infusion, PRN  ondansetron (ZOFRAN-ODT) disintegrating tablet 4 mg, Q8H PRN   Or  ondansetron (ZOFRAN) injection 4 mg, Q6H PRN  polyethylene glycol (GLYCOLAX) packet 17 g, Daily PRN  acetaminophen (TYLENOL) tablet 650 mg, Q6H PRN   Or  acetaminophen (TYLENOL) suppository 650 mg, Q6H PRN      Objective:  /79   Pulse 72   Temp 97.6 °F (36.4 °C) (Oral)   Resp 18   Ht 6' 2\" (1.88 m)   Wt 193 lb 4.8 oz (87.7 kg)   SpO2 100%   BMI 24.82 kg/m²     Intake/Output Summary (Last 24 hours) at 3/7/2023 1342  Last data filed at 3/7/2023 1239  Gross per 24 hour   Intake 1103.65 ml   Output 1200 ml   Net -96.35 ml        Wt Readings from Last 3 Encounters:   03/07/23 193 lb 4.8 oz (87.7 kg)   02/21/23 198 lb 6.6 oz (90 kg)   02/03/23 182 lb 4.8 oz (82.7 kg)       General appearance:  Appears comfortable, alert and pleasant. Eyes: Sclera clear. Pupils equal.  ENT: Moist oral mucosa. Trachea midline, no adenopathy. Cardiovascular: Regular rhythm, normal S1, S2. Harsh murmur. Trace edema in lower extremities but improving   Respiratory: Not using accessory muscles. Good inspiratory effort. Clear to auscultation bilaterally, no wheeze or crackles. GI: Abdomen soft, no tenderness, not distended, normal bowel sounds  Musculoskeletal: No cyanosis in digits, neck supple  Neurology: CN 2-12 grossly intact. No speech or motor deficits  Psych: Normal affect. Alert and oriented in time, place and person  Skin: Warm, dry, normal turgor    Labs and Tests:  CBC:   Recent Labs     03/05/23  0518 03/06/23  2113 03/07/23  0323   WBC 3.9* 3.8* 3.5*   HGB 12.0* 12.1* 11.8*    229 193       BMP:    Recent Labs     03/05/23  0518 03/06/23  0840    138   K 4.6 4.3    101   CO2 35* 31   BUN 23* 23*   CREATININE 1.6* 1.4*   GLUCOSE 82 81       Hepatic:   No results for input(s): AST, ALT, ALB, BILITOT, ALKPHOS in the last 72 hours. Patient has a BiV ICD implanted by Dr. Edwina Benjamin on 4/25/2022. He has chronic lung persistent atrial fibrillation.   At the time of implantation the device has been programmed to VVIR with a lower rate of 50s since he was in atrial fibrillation. He has been started on amiodarone planning for CONSUELO and cardioversion. Chest xray:    No radiographic evidence of acute pulmonary disease. Stable cardiomegaly       Problem List  Principal Problem:    Acute combined systolic and diastolic CHF, NYHA class 1 (HCC)  Active Problems:    Hypoxia    Bradycardia    SACHIN (acute kidney injury) ([address]    ICD (implantable cardioverter-defibrillator) in place  Resolved Problems:    * No resolved hospital problems. *       Assessment & Plan:   Acute on chronic systolic and diastolic HF: On ardiance and BB therapy. IV lasix stopped Sunday. Demadex started today. ACE on hold. Aldactone resumed  CKD:  repeat labs in am.   Chronic AF:  AC with eliquis, s/p cardioversion. Discussed with Dr Luis Lilly. Ok to proceed with prostate biopsy. Continue heparin  and stop morning of surgery. Resume eliquis post procedure when ok with urology-likely Monday. Elevated PSA with hx of urinary retention/ enlarged prostate. I spoke with urology. Going to have prostate biopsy Thursday while in house. Diet: ADULT DIET; Regular;  Low Sodium (2 gm)  Code:Full Code  DVT PPX      Gabriella Cifuentes PA-C   3/7/2023 1:42 PM

## 2023-03-07 NOTE — CONSULTS
Palliative Care:        Shortness of Breath (States SOB upon exertion and also states his legs feel like \"bricks\" when he walks x 3 days )    H&P:  57 y.o. male who presented with complaints of shortness of breath. And also leg pain. Patient states that worse with exertion. Patient also said legs feels like bricks when he walks patient with history of CHF A-fib hyperlipidemia. Increased lower lower extremity edema with weight gain. Patient said been taking medication as prescribed. Patient denies any new medication no new chest pain. Nothing that makes it better or worse. In ER patient was found to be hypoxic. Oxygen saturation dropping into 84%. Admit: 3/2/2023  Consult: 3/6/2023    Past Medical History:   has a past medical history of Acute combined systolic and diastolic congestive heart failure ([address] Atrial fibrillation ([address] CHF (congestive heart failure) ([address] Hx of blood clots, and Hypertension. Past Surgical History:   has a past surgical history that includes Insertable Cardiac Monitor (07/26/2019); Cardioversion (07/26/2019); and Cardiac defibrillator placement (Left). Advance Directives:        Advance Care Planning   The patient has the following advanced directives on file:  Advance Directives       Power of [address] Will ACP-Advance Directive ACP-Power of     Not on File Not on File Not on File Not on File     The patient has appointed the following active healthcare agents:  No family contacts - pt is unsure of contact information for any of his three siblings (two brothers, one sister)    The Patient has the following current code status:    Code Status: Full Code    Visit Documentation:  I discussed Advance Care Planning with Cornelio Cueto today which included the importance of making their choices for care and treatment in the case of a health event that adversely affects their decision-making abilities. He has not completed the Advance Care Directives.  He does not have an active health care agent at this time. Nnamdi Prince was encouraged to complete the declaration forms and provide a signed copy of his medical records. I advised patient we would continue this discussion at future visits. Extended Emergency Contact Information  Primary Emergency Contact: Edward [address] Phone: [phone]  Mobile Phone: [phone]  Relation: Other   needed? No  Secondary Emergency Contact: [address] Phone: [phone]  Relation: Other    Problem Severity: Pain/Other Symptoms:        Weight 193#  Body mass index is 24.82 kg/m². Bed Mobility/Toileting/Transfer:        No PT/OT notes this admit - not ordered    Performance Status:        Palliative Performance Scale:  100% []Full Normal activity & work No evidence of disease  90%   [] Full Normal activity & work Some evidence of disease  80%   [x] Full Normal activity with Effort Some evidence of disease  70%   [] Reduced Unable Normal Job/Work Significant disease Full Normal or reduced  60%   [] Ambulation reduced; Significant disease; Can't do hobbies/housework; intake normal or reduced; occasional assist; LOC full/confusion  PPS 80%    Symptom Assessment: Appetite/Nausea/Bowels/Fatigue:        ADULT DIET; Regular; Low Sodium (2 gm)    Intake/Output Summary (Last 24 hours) at 3/7/2023 1504  Last data filed at 3/7/2023 1239  Gross per 24 hour   Intake 1103.65 ml   Output 1200 ml   Net -96.35 ml     Social History:   reports that he has never smoked. He has never been exposed to tobacco smoke. He has never used smokeless tobacco. He reports that he does not drink alcohol and does not use drugs. Family History:  family history includes Heart Attack in his mother; Heart Disease in his mother; High Blood Pressure in his father and mother; Other in his father; Stroke in his father.     Family Discussion:        All MD/RN notes and personal interaction indicate that pt is currently capable of independent decision making. Advance Care Planning  (ACP) Conversation    Date of ACP Conversation: 03/07/23    Conversation Conducted with:   Patient with Decision Making Capacity    *If present, Decision Maker was asked to consider and make decisions based on patient values, known preferences, or best interests. Current Designated Health Care Decision Maker:   No healthcare decision makers have been documented. Click here to complete [address] including selection of the Healthcare Decision Maker Relationship (ie \"Primary\")     If no Decision Maker listed in formal documents, then:  [address] default to (per [place] statute):  Spouse? No  Adult children? No  Living parents? No  Majority of adult siblings? Three but he does not have current contact information for any of them  Any additional family? Nieces, nephews, cousins but does not have any current contact information for any of them, either. For below questions, when conducting conversation with [address], substitute \"he\" and \"his\" for \"you\" and \"your\". Hospitalization: \"If your health were to worsen after you left the hospital and it became clear that your chance of recovery was unlikely, would you want to return to the hospital? Yes, pt would want to return to the hospital.    Ventilation: \"If you were in your present state of health and suddenly became very sick and were unable to breathe on your own, would you want to use a ventilator (breathing machine) if it were available to you? \" Yes, pt would want to be intubated and use a ventilator. \"If your health got worse and it became clear that your chance of recovery was unlikely, would you still want to use a ventilator (breathing machine)? \" No, pt would not want to be connected to a ventilator for multiple weeks/months/permanently - pt does not want trach nor PEG.     Resuscitation:  \"CPR works best to restart the heart when there is a sudden event, like a heart attack, in someone who is otherwise healthy. Unfortunately, CPR does not typically restart the heart for people who have serious health conditions or who are very sick. In the event your heart stopped and you , would you want attempts to restart your heart: chest compressions, shock, ventilator, and medications? Yes, pt would want to receive CPR. \"If your health got worse and it became clear that your chance of recovery was unlikely, would you still want CPR? \" If his medical team believes CPR would be futile, pt does not want to receive CPR. Content Summary:  Spoke with pt at bedside regarding his current code status, options, and implications. Pt requested to be full code but verbalized that he didn't \"want to be a vegetable\". Discussed with pt the importance of either family contact or HCPOA paperwork. Also discussed the availability of Living Will documentation. Provided pt education on CHF dietary restrictions and the importance of daily weights. Pt requested time to think over who he would want to be HCPOA and requested follow up tomorrow (or so). Verbalized willingness to attempt. Action Overview:  Has NO ACP documents/care preferences - information provided, considering goals and options  Reviewed DNR/DNI and patient elects Full Code (Attempt Resuscitation)  Additional topics of conversation included treatment goals, benefit/burden of treatment options, artificial nutrition, ventilation preferences, and end of life care preferences (vegetative state/imminent death)      Will follow up and support patient/family as time allows or circumstances dictate. Please reach out via [id number], FastDue, or email [email or web address] if pt condition changes significantly or if family requests support. Thank you.     Electronically signed by Reza Gilbert RN, BSN on 3/7/2023 at 4:00 PM

## 2023-03-07 NOTE — PLAN OF CARE
Problem: Discharge Planning  Goal: Discharge to home or other facility with appropriate resources  3/7/2023 0158 by Delia Perdomo RN  Outcome: Progressing  3/6/2023 1425 by Charles Nicolas RN  Outcome: Progressing  Flowsheets (Taken 3/6/2023 1422)  Discharge to home or other facility with appropriate resources:   Identify barriers to discharge with patient and caregiver   Arrange for needed discharge resources and transportation as appropriate   Identify discharge learning needs (meds, wound care, etc)   Arrange for interpreters to assist at discharge as needed     Problem: Safety - Adult  Goal: Free from fall injury  3/7/2023 0158 by Delia Perdomo RN  Outcome: Progressing  3/6/2023 1425 by Charles Nicolas RN  Outcome: Progressing     Problem: ABCDS Injury Assessment  Goal: Absence of physical injury  3/7/2023 0158 by Delia Perdomo RN  Outcome: Progressing  3/6/2023 1425 by Charles Nicolas RN  Outcome: Progressing     Problem: Chronic Conditions and Co-morbidities  Goal: Patient's chronic conditions and co-morbidity symptoms are monitored and maintained or improved  3/7/2023 0158 by Delia Perdomo RN  Outcome: Progressing  3/6/2023 1425 by Charles Nicolas RN  Outcome: Progressing  Flowsheets (Taken 3/6/2023 1422)  Care Plan - Patient's Chronic Conditions and Co-Morbidity Symptoms are Monitored and Maintained or Improved:   Monitor and assess patient's chronic conditions and comorbid symptoms for stability, deterioration, or improvement   Collaborate with multidisciplinary team to address chronic and comorbid conditions and prevent exacerbation or deterioration   Update acute care plan with appropriate goals if chronic or comorbid symptoms are exacerbated and prevent overall improvement and discharge     Problem: Respiratory - Adult  Goal: Achieves optimal ventilation and oxygenation  3/7/2023 0158 by Delia Perdomo RN  Outcome: Progressing  3/6/2023 1425 by Charles Nicolas RN  Outcome: Progressing  Flowsheets (Taken 3/6/2023 1422)  Achieves optimal ventilation and oxygenation:   Assess for changes in respiratory status   Assess for changes in mentation and behavior   Position to facilitate oxygenation and minimize respiratory effort   Oxygen supplementation based on oxygen saturation or arterial blood gases   Encourage broncho-pulmonary hygiene including cough, deep breathe, incentive spirometry     Problem: Cardiovascular - Adult  Goal: Maintains optimal cardiac output and hemodynamic stability  3/7/2023 0158 by Júnior Griggs RN  Outcome: Progressing  3/6/2023 1425 by Temitope Wilder RN  Outcome: Progressing  Flowsheets (Taken 3/6/2023 1422)  Maintains optimal cardiac output and hemodynamic stability:   Monitor blood pressure and heart rate   Monitor urine output and notify Licensed Independent Practitioner for values outside of normal range   Assess for signs of decreased cardiac output   Administer fluid and/or volume expanders as ordered     Problem: Skin/Tissue Integrity - Adult  Goal: Skin integrity remains intact  3/7/2023 0158 by Júnior Griggs RN  Outcome: Progressing  3/6/2023 1425 by Temitope Wilder RN  Outcome: Progressing  Flowsheets (Taken 3/6/2023 1422)  Skin Integrity Remains Intact:   Monitor for areas of redness and/or skin breakdown   Assess vascular access sites hourly   Every 4-6 hours minimum: Change oxygen saturation probe site     Problem: Genitourinary - Adult  Goal: Absence of urinary retention  3/7/2023 0158 by Júnior Griggs RN  Outcome: Progressing  3/6/2023 1425 by Temitope Wilder RN  Outcome: Progressing  Flowsheets (Taken 3/6/2023 1422)  Absence of urinary retention:   Assess patients ability to void and empty bladder   Monitor intake/output and perform bladder scan as needed   Place urinary catheter per Licensed Independent Practitioner order if needed   Discuss with Licensed Independent Practitioner  medications to alleviate retention as needed     Problem: Metabolic/Fluid and Electrolytes - Adult  Goal: Electrolytes maintained within normal limits  3/7/2023 0158 by Slim Escalera RN  Outcome: Progressing  3/6/2023 1425 by Gonzalo Bell RN  Outcome: Progressing  Flowsheets (Taken 3/6/2023 1422)  Electrolytes maintained within normal limits:   Monitor labs and assess patient for signs and symptoms of electrolyte imbalances   Administer electrolyte replacement as ordered   Monitor response to electrolyte replacements, including repeat lab results as appropriate     Problem: Pain  Goal: Verbalizes/displays adequate comfort level or baseline comfort level  3/7/2023 0158 by Slim Escalera RN  Outcome: Progressing  3/6/2023 1425 by Gonzalo Bell RN  Outcome: Progressing

## 2023-03-08 ENCOUNTER — ANESTHESIA EVENT (OUTPATIENT)
Dept: OPERATING ROOM | Age: 63
End: 2023-03-08
Payer: COMMERCIAL

## 2023-03-08 LAB
ANION GAP SERPL CALCULATED.3IONS-SCNC: 7 MMOL/L (ref 3–16)
ANTI-XA UNFRAC HEPARIN: 0.33 IU/ML (ref 0.3–0.7)
ANTI-XA UNFRAC HEPARIN: 0.42 IU/ML (ref 0.3–0.7)
BUN BLDV-MCNC: 19 MG/DL (ref 7–20)
CALCIUM SERPL-MCNC: 8.2 MG/DL (ref 8.3–10.6)
CHLORIDE BLD-SCNC: 101 MMOL/L (ref 99–110)
CO2: 30 MMOL/L (ref 21–32)
CREAT SERPL-MCNC: 1.2 MG/DL (ref 0.8–1.3)
GFR SERPL CREATININE-BSD FRML MDRD: >60 ML/MIN/{1.73_M2}
GLUCOSE BLD-MCNC: 98 MG/DL (ref 70–99)
POTASSIUM SERPL-SCNC: 4.3 MMOL/L (ref 3.5–5.1)
SODIUM BLD-SCNC: 138 MMOL/L (ref 136–145)

## 2023-03-08 PROCEDURE — 2060000000 HC ICU INTERMEDIATE R&B

## 2023-03-08 PROCEDURE — 6370000000 HC RX 637 (ALT 250 FOR IP): Performed by: HOSPITALIST

## 2023-03-08 PROCEDURE — 6360000002 HC RX W HCPCS: Performed by: CLINICAL NURSE SPECIALIST

## 2023-03-08 PROCEDURE — 36415 COLL VENOUS BLD VENIPUNCTURE: CPT

## 2023-03-08 PROCEDURE — 2580000003 HC RX 258: Performed by: CLINICAL NURSE SPECIALIST

## 2023-03-08 PROCEDURE — 85520 HEPARIN ASSAY: CPT

## 2023-03-08 PROCEDURE — 6370000000 HC RX 637 (ALT 250 FOR IP): Performed by: NURSE PRACTITIONER

## 2023-03-08 PROCEDURE — 99232 SBSQ HOSP IP/OBS MODERATE 35: CPT | Performed by: CLINICAL NURSE SPECIALIST

## 2023-03-08 PROCEDURE — 6360000002 HC RX W HCPCS: Performed by: PHYSICIAN ASSISTANT

## 2023-03-08 PROCEDURE — 2580000003 HC RX 258: Performed by: HOSPITALIST

## 2023-03-08 PROCEDURE — 6370000000 HC RX 637 (ALT 250 FOR IP): Performed by: CLINICAL NURSE SPECIALIST

## 2023-03-08 PROCEDURE — 80048 BASIC METABOLIC PNL TOTAL CA: CPT

## 2023-03-08 RX ADMIN — HEPARIN SODIUM 8 UNITS/KG/HR: 10000 INJECTION, SOLUTION INTRAVENOUS at 11:48

## 2023-03-08 RX ADMIN — SODIUM CHLORIDE: 9 INJECTION, SOLUTION INTRAVENOUS at 10:34

## 2023-03-08 RX ADMIN — Medication 10 ML: at 10:32

## 2023-03-08 RX ADMIN — AMIODARONE HYDROCHLORIDE 200 MG: 200 TABLET ORAL at 09:20

## 2023-03-08 RX ADMIN — MIDODRINE HYDROCHLORIDE 2.5 MG: 5 TABLET ORAL at 16:51

## 2023-03-08 RX ADMIN — TAMSULOSIN HYDROCHLORIDE 0.4 MG: 0.4 CAPSULE ORAL at 09:19

## 2023-03-08 RX ADMIN — SPIRONOLACTONE 25 MG: 25 TABLET ORAL at 11:51

## 2023-03-08 RX ADMIN — ATORVASTATIN CALCIUM 40 MG: 40 TABLET, FILM COATED ORAL at 20:15

## 2023-03-08 RX ADMIN — Medication 10 ML: at 20:16

## 2023-03-08 RX ADMIN — PANTOPRAZOLE SODIUM 40 MG: 40 TABLET, DELAYED RELEASE ORAL at 09:19

## 2023-03-08 RX ADMIN — MIDODRINE HYDROCHLORIDE 2.5 MG: 5 TABLET ORAL at 09:20

## 2023-03-08 RX ADMIN — TORSEMIDE 20 MG: 20 TABLET ORAL at 09:21

## 2023-03-08 RX ADMIN — FINASTERIDE 5 MG: 5 TABLET, FILM COATED ORAL at 09:18

## 2023-03-08 RX ADMIN — METOPROLOL SUCCINATE 12.5 MG: 25 TABLET, FILM COATED, EXTENDED RELEASE ORAL at 09:20

## 2023-03-08 RX ADMIN — IRON SUCROSE 200 MG: 20 INJECTION, SOLUTION INTRAVENOUS at 10:34

## 2023-03-08 RX ADMIN — METOPROLOL SUCCINATE 12.5 MG: 25 TABLET, FILM COATED, EXTENDED RELEASE ORAL at 20:15

## 2023-03-08 ASSESSMENT — PAIN DESCRIPTION - FREQUENCY
FREQUENCY: CONTINUOUS
FREQUENCY: INTERMITTENT
FREQUENCY: CONTINUOUS
FREQUENCY: CONTINUOUS

## 2023-03-08 ASSESSMENT — PAIN DESCRIPTION - DESCRIPTORS
DESCRIPTORS: SHARP
DESCRIPTORS: SHARP;SHOOTING
DESCRIPTORS: SHARP;ACHING
DESCRIPTORS: SHARP

## 2023-03-08 ASSESSMENT — PAIN DESCRIPTION - ONSET
ONSET: ON-GOING

## 2023-03-08 ASSESSMENT — PAIN DESCRIPTION - LOCATION
LOCATION: ARM

## 2023-03-08 ASSESSMENT — PAIN SCALES - GENERAL
PAINLEVEL_OUTOF10: 0
PAINLEVEL_OUTOF10: 3
PAINLEVEL_OUTOF10: 0
PAINLEVEL_OUTOF10: 0
PAINLEVEL_OUTOF10: 1
PAINLEVEL_OUTOF10: 4
PAINLEVEL_OUTOF10: 7
PAINLEVEL_OUTOF10: 3

## 2023-03-08 ASSESSMENT — PAIN DESCRIPTION - PAIN TYPE
TYPE: ACUTE PAIN

## 2023-03-08 ASSESSMENT — PAIN - FUNCTIONAL ASSESSMENT
PAIN_FUNCTIONAL_ASSESSMENT: ACTIVITIES ARE NOT PREVENTED

## 2023-03-08 ASSESSMENT — PAIN DESCRIPTION - ORIENTATION
ORIENTATION: RIGHT;PROXIMAL
ORIENTATION: RIGHT;PROXIMAL
ORIENTATION: RIGHT
ORIENTATION: RIGHT;PROXIMAL
ORIENTATION: RIGHT;PROXIMAL

## 2023-03-08 ASSESSMENT — PAIN DESCRIPTION - DIRECTION: RADIATING_TOWARDS: RIGHT CHEST

## 2023-03-08 NOTE — ANESTHESIA PRE PROCEDURE
Department of Anesthesiology  Preprocedure Note       Name:  Rashad Melissa   Age:  57 y.o.  :  1960                                          MRN:  [id number]         Date:  3/8/2023      Surgeon: Sharona Baptiste):  Cristi Gutierrez MD    Procedure: Procedure(s):  CYSTOSCOPY, PROSTATE TRANSRECTAL ULTRASOUND BIOPSY    Medications prior to admission:   Prior to Admission medications    Medication Sig Start Date End Date Taking? Authorizing Provider   metoprolol succinate (TOPROL XL) 25 MG extended release tablet Take 0.5 tablets by mouth in the morning and at bedtime 23   George Childers PA-C   amiodarone (CORDARONE) 200 MG tablet Take 1 tablet by mouth 2 times daily For 2 weeks then decrease to 200mg once a day 2/3/23   TRENT Moran - CNP   spironolactone (ALDACTONE) 25 MG tablet Take 1 tablet by mouth Daily with lunch 23   Reva Breaux MD   pantoprazole (PROTONIX) 40 MG tablet Take 1 tablet by mouth daily 2/3/23 3/5/23  Reva Breaux MD   finasteride (PROSCAR) 5 MG tablet Take 5 mg by mouth daily    Historical Provider, MD   torsemide (DEMADEX) 20 MG tablet Take 1 tablet by mouth daily 10/31/22   Theresa Taylor MD   empagliflozin (JARDIANCE) 10 MG tablet Take 1 tablet by mouth daily 10/26/22   TRENT Lundberg   lisinopril (PRINIVIL;ZESTRIL) 2.5 MG tablet Take 1 tablet by mouth daily 10/26/22   TRENT Lundberg   atorvastatin (LIPITOR) 40 MG tablet Take 1 tablet by mouth nightly 10/5/22   TRENT Nieto - CNS   vitamin D (CHOLECALCIFEROL) 50 MCG (2000) TABS tablet Take 1 tablet by mouth daily  Patient not taking: Reported on 3/2/2023 9/21/22   TRENT Lundberg   potassium chloride (KLOR-CON M) 20 MEQ extended release tablet Take 1 tablet by mouth in the morning and 1 tablet in the evening. Take with meals.  22  Bridgette Zapata MD   apixaban (ELIQUIS) 5 MG TABS tablet Take 1 tablet by mouth 2 times daily 22   Joe Allen, APRN - CNP   tamsulosin (FLOMAX) 0.4 MG capsule Take 1 capsule by mouth daily 6/18/22   TRENT Swain - CNP       Current medications:    Current Facility-Administered Medications   Medication Dose Route Frequency Provider Last Rate Last Admin    amiodarone (CORDARONE) tablet 200 mg  200 mg Oral Daily Aliviadanay Alicea, APRN - CNP   200 mg at 03/08/23 0920    midodrine (PROAMATINE) tablet 2.5 mg  2.5 mg Oral BID  TRENT Manuel - CNS   2.5 mg at 03/08/23 0920    iron sucrose (VENOFER) 200 mg in sodium chloride 0.9 % 100 mL IVPB  200 mg IntraVENous Q24H TRENT Alamo - CNS   Stopped at 03/07/23 1427    lidocaine (XYLOCAINE) 2 % uro-jet   Topical PRN Neha Benitez MD   Given at 03/06/23 1303    heparin (porcine) injection 4,000 Units  4,000 Units IntraVENous PRN Lannis Samuel, PA-C        heparin (porcine) injection 2,000 Units  2,000 Units IntraVENous PRN Lannis Samuel, PA-C        heparin 25,000 units in dextrose 5% 250 mL (premix) infusion  5-30 Units/kg/hr IntraVENous Continuous Lannis Samuel, PA-C 7 mL/hr at 03/07/23 1439 8 Units/kg/hr at 03/07/23 1439    torsemide (DEMADEX) tablet 20 mg  20 mg Oral Daily TRENT Manuel - CNS   20 mg at 03/08/23 6779    [Held by provider] apixaban (ELIQUIS) tablet 5 mg  5 mg Oral BID Raysa Nunez MD   5 mg at 03/06/23 0905    atorvastatin (LIPITOR) tablet 40 mg  40 mg Oral Nightly Raysa Nunez MD   40 mg at 03/07/23 2037    [Held by provider] empagliflozin (JARDIANCE) tablet 10 mg  10 mg Oral Daily Raysa Nunez MD   10 mg at 03/04/23 0803    finasteride (PROSCAR) tablet 5 mg  5 mg Oral Daily Raysa Nunez MD   5 mg at 03/08/23 0918    [Held by provider] lisinopril (PRINIVIL;ZESTRIL) tablet 2.5 mg  2.5 mg Oral Daily Raysa Nunez MD   2.5 mg at 03/02/23 2021    metoprolol succinate (TOPROL XL) extended release tablet 12.5 mg  12.5 mg Oral BID Raysa Nunez MD   12.5 mg at 03/08/23 0920    pantoprazole (PROTONIX) tablet 40 mg  40 mg Oral Daily Raysa Nunez MD   40 mg at 03/08/23 0919    spironolactone (ALDACTONE) tablet 25 mg  25 mg Oral Lunch Raysa Nnuez MD   25 mg at 03/07/23 1224    tamsulosin (FLOMAX) capsule 0.4 mg  0.4 mg Oral Daily Raysa Nunez MD   0.4 mg at 03/08/23 0919    sodium chloride flush 0.9 % injection 5-40 mL  5-40 mL IntraVENous 2 times per day Piper Wagner MD   10 mL at 03/08/23 1032    sodium chloride flush 0.9 % injection 5-40 mL  5-40 mL IntraVENous PRN Raysa Nunez MD        0.9 % sodium chloride infusion   IntraVENous PRN Piper Wagner MD        ondansetron (ZOFRAN-ODT) disintegrating tablet 4 mg  4 mg Oral Q8H PRN Piper Wagner MD        Or    ondansetron (ZOFRAN) injection 4 mg  4 mg IntraVENous Q6H PRN Piper Wagner MD        polyethylene glycol (GLYCOLAX) packet 17 g  17 g Oral Daily PRN Piper Wagner MD        acetaminophen (TYLENOL) tablet 650 mg  650 mg Oral Q6H PRN Piper Wagner MD        Or   [facility] acetaminophen (TYLENOL) suppository 650 mg  650 mg Rectal Q6H PRN Piper Wagner MD           Allergies:  No Known Allergies    Problem List:    Patient Active Problem List   Diagnosis Code    Acute retention of urine R33.8    SACHIN (acute kidney injury) ([facility] N17.9    Paroxysmal atrial fibrillation ([facility] I48.0    Primary hypertension I10    Dilated cardiomyopathy ([facility] I42.0    Encounter for loop recorder check Z45.09    Acute urinary retention R33.8    Acute pulmonary edema (HCC) J81.0    Chronic systolic (congestive) heart failure (HCC) I50.22    COVID U07.1    Non-compliance Z91.199    Homeless Z59.00    NSTEMI (non-ST elevated myocardial infarction) ([facility] I21.4    Acute combined systolic and diastolic heart failure (HCC) F84.30    Complicated UTI (urinary tract infection) N39.0    Chronic atrial fibrillation (HCC) I48.20    Moderate mitral regurgitation I34.0    Acute on chronic congestive heart failure (HCC) I50.9    VT (ventricular tachycardia) I47.20    ICD (implantable cardioverter-defibrillator) in place Z95.810    Thrombus of left atrial appendage I51.3    Syncope and collapse R55    PNA (pneumonia) J18.9    Hemoptysis R04.2    Fluid overload E87.70    Elevated LFTs R79.89    Idiopathic hypotension I95.0    Peripheral edema R60.9    Anemia D64.9    HFrEF (heart failure with reduced ejection fraction) ([facility]) I50.20    Chronic diastolic (congestive) heart failure (HCC) I50.32    Dizziness R42    Abdominal pain R10.9    Abdominal pain, right upper quadrant R10.11    Epigastric abdominal pain R10.13    Acalculous cholecystitis K81.9    Persistent atrial fibrillation (HCC) I48.19    UTI (urinary tract infection) N39.0    Acute combined systolic and diastolic CHF, NYHA class 1 ([facility]) I50.41    Hypoxia R09.02    Bradycardia R00.1       Past Medical History:        Diagnosis Date    Acute combined systolic and diastolic congestive heart failure (HCC) 8/31/2022    Atrial fibrillation ([address] 07/25/2019    CHF (congestive heart failure) ([facility])     Hx of blood clots     Hypertension        Past Surgical History:        Procedure Laterality Date    CARDIAC DEFIBRILLATOR PLACEMENT Left     CARDIOVERSION  07/26/2019    Dr. Tommy Almaraz  07/26/2019       Social History:    Social History     Tobacco Use    Smoking status: Never     Passive exposure: Never    Smokeless tobacco: Never   Substance Use Topics    Alcohol use: Never                                Counseling given: Not Answered      Vital Signs (Current):   Vitals:    03/08/23 0332 03/08/23 0709 03/08/23 0819 03/08/23 1010   BP: 106/73 117/81 110/79    Pulse: 75 72 73    Resp: 18 18 16    Temp: 36.8 °C (98.2 °F) 36.7 °C (98.1 °F) 36.9 °C (98.4 °F)    TempSrc: Oral Oral Oral    SpO2: 99% 99%     Weight:    193 lb (87.5 kg)   Height:                                                  BP Readings from Last 3 Encounters:   03/08/23 110/79   02/21/23 130/84   02/03/23 123/85       NPO Status:                                                                                 BMI:   Wt Readings from Last 3 Encounters:   03/08/23 193 lb (87.5 kg)   02/21/23 198 lb 6.6 oz (90 kg)   02/03/23 182 lb 4.8 oz (82.7 kg)     Body mass index is 24.78 kg/m². CBC:   Lab Results   Component Value Date/Time    WBC 3.5 03/07/2023 03:23 AM    RBC 4.54 03/07/2023 03:23 AM    HGB 11.8 03/07/2023 03:23 AM    HCT 37.0 03/07/2023 03:23 AM    MCV 81.5 03/07/2023 03:23 AM    RDW 16.3 03/07/2023 03:23 AM     03/07/2023 03:23 AM       CMP:   Lab Results   Component Value Date/Time     03/06/2023 08:40 AM    K 4.3 03/06/2023 08:40 AM     03/06/2023 08:40 AM    CO2 31 03/06/2023 08:40 AM    BUN 23 03/06/2023 08:40 AM    CREATININE 1.4 03/06/2023 08:40 AM    GFRAA >60 10/05/2022 10:10 AM    AGRATIO 1.2 03/02/2023 03:21 PM    LABGLOM 56 03/06/2023 08:40 AM    GLUCOSE 81 03/06/2023 08:40 AM    PROT 6.9 03/02/2023 03:21 PM    CALCIUM 8.6 03/06/2023 08:40 AM    BILITOT 2.3 03/02/2023 03:21 PM    ALKPHOS 104 03/02/2023 03:21 PM    AST 35 03/02/2023 03:21 PM    ALT 32 03/02/2023 03:21 PM       POC Tests: No results for input(s): POCGLU, POCNA, POCK, POCCL, POCBUN, POCHEMO, POCHCT in the last 72 hours.     Coags:   Lab Results   Component Value Date/Time    PROTIME 17.1 03/06/2023 09:13 PM    INR 1.40 03/06/2023 09:13 PM    APTT 32.8 03/06/2023 09:13 PM       HCG (If Applicable): No results found for: PREGTESTUR, PREGSERUM, HCG, HCGQUANT     ABGs: No results found for: PHART, PO2ART, FUQ9ZVM, YDA8FEO, BEART, G3ECMKGO     Type & Screen (If Applicable):  No results found for: LABABO, LABRH    Drug/Infectious Status (If Applicable):  No results found for: HIV, HEPCAB    COVID-19 Screening (If Applicable):   Lab Results   Component Value Date/Time    COVID19 Not Detected 08/18/2022 11:00 PM    COVID19 NOT DETECTED 06/15/2022 07:55 PM           Anesthesia Evaluation  Patient summary reviewed and Nursing notes reviewed  Airway: Mallampati: II  TM distance: >3 FB   Neck ROM: full  Mouth opening: > = 3 FB   Dental:          Pulmonary:                              Cardiovascular:  Exercise tolerance: good (>4 METS),   (+) hypertension: moderate, pacemaker: AICD, past MI: no interval change, dysrhythmias: atrial fibrillation, CHF:,       ECG reviewed      Echocardiogram reviewed    Cleared by cardiology           ROS comment:     Summary   The left ventricle is dilated. Mild hypertrophy. Severely reduced global   systolic function with an ejection fraction estimated at 15-20%. Severe global hypokinesis noted. Left atrial size appears dilated. Spontaneous echo contrast seen in the left atrium. There is no evidence of   mass or thrombus in the left atrium or appendage. There is mild aortic insufficiency. The right ventricle is dilated and hypokinetic with reduced systolic   function. 3/6/23   Pacer / ICD wire is visualized in the right ventricle. There is small echodensity seen on the ICD lead, differential diagnosis   include fibrin strands or vegetation. Clinical correlation is recommended. There is severe tricuspid regurgitation. Hx of HENOK thrombus              - Noted on CONSUELO (2/22)              - Resolved on CONSUELO  Pt on heparin      Neuro/Psych:               GI/Hepatic/Renal:   (+) renal disease: CRI,          ROS comment: Elevated PSA. Endo/Other:                     Abdominal:             Vascular: Other Findings: Chart review          Anesthesia Plan      MAC and general     ASA 4           MIPS: Prophylactic antiemetics administered. Anesthetic plan and risks discussed with patient. Plan discussed with CRNA.                     TRENT Burgos - CRNA   3/8/2023

## 2023-03-08 NOTE — PROGRESS NOTES
Urology Progress Note  [facility]    Provider: TRENT Bledsoe CNP  Patient ID:  Admission Date: 3/2/2023 Name: Cherie Nice  OR Date: 3/2/2023 MRN: [id number]   Patient Location: [facility]25/[id number] : 1960  Attending: Jose Lugo MD Date of Service: 3/8/2023  PCP: No primary care provider on file. Diagnoses:  1. Acute on chronic congestive heart failure, unspecified heart failure type ([address]    2. Hypoxia         Assessment/Plan:  57 y.o. AA male with h/o CHF, A.fib on chronic anticoagulation. Urology consulted for urinary retention and elevated psa- he has a long standing hx of  chronic ernst. He was previously seen in  for BPH and elevated psa of 20, and at that time Dr. Natalia Angelucci  recommended cysto/TRUS bx- numerous calls to patient, non-comply letter sent to his address. He has had a positive DREs on multiple occasions. He has been cardioverted , now on heparin. [facility]  Cysto/trusp/prostate bx scheduled Thursday with Dr. Lexi Shaw. Would begin to hold [facility] at this time. Pneumonic compression devices and OOB as tolerated. The patient had a chance to ask questions which were answered. he understands the above plan. Subjective:   Cherie Nice is a 57 y.o. male. He was seen and examined this morning. Today we discussed prostate bx     Objective:   Vitals:  Vitals:    23 0819   BP: 110/79   Pulse: 73   Resp: 16   Temp: 98.4 °F (36.9 °C)   SpO2:        Intake/Output Summary (Last 24 hours) at 3/8/2023 0956  Last data filed at 3/8/2023 0710  Gross per 24 hour   Intake 1425.18 ml   Output 2500 ml   Net -1074.82 ml     Physical Exam:  Gen: Alert and oriented x3, no acute distress  CV: Regular rate   Resp: unlabored respirations  Abd: Soft, non-distended, non-tender, no masses  Ext: no peripheral edema noted, moves upper and lower extremities spontaneously  Skin: warmand well perfused, no rashes noted on the face, or arms.      Labs:  Lab Results   Component Value Date    WBC 3.5 (L) 03/07/2023    HGB 11.8 (L) 03/07/2023    HCT 37.0 (L) 03/07/2023    MCV 81.5 03/07/2023     03/07/2023     Lab Results   Component Value Date    CREATININE 1.4 (H) 03/06/2023    BUN 23 (H) 03/06/2023     03/06/2023    K 4.3 03/06/2023     03/06/2023    CO2 31 03/06/2023       Graciela Campbell, TRENT - CNP   3/8/2023

## 2023-03-08 NOTE — PROGRESS NOTES
[facility] PROGRESS NOTE    3/8/2023 2:02 PM        Name: Fam Leal . Admitted: 3/2/2023  Primary Care Provider: No primary care provider on file. (Tel: None)      Subjective:  . Admitted with increased swelling and shortness of breath   He reports compliance with home meds but does not weight himself at home     Seen by cardiology and EP on Friday,    Pacer rate increased to 70. Underwent cardioversion Monday morning. Seen this am and feeling much better. Reports breathing is better since IV lasix.   He has diuresed almost 10 liters   Weight stable at 193 today     Going for prostate biopsy tomorrow      Reviewed interval ancillary notes    Current Medications  amiodarone (CORDARONE) tablet 200 mg, Daily  midodrine (PROAMATINE) tablet 2.5 mg, BID WC  iron sucrose (VENOFER) 200 mg in sodium chloride 0.9 % 100 mL IVPB, Q24H  lidocaine (XYLOCAINE) 2 % uro-jet, PRN  heparin (porcine) injection 4,000 Units, PRN  heparin (porcine) injection 2,000 Units, PRN  heparin 25,000 units in dextrose 5% 250 mL (premix) infusion, Continuous  torsemide (DEMADEX) tablet 20 mg, Daily  [Held by provider] apixaban (ELIQUIS) tablet 5 mg, BID  atorvastatin (LIPITOR) tablet 40 mg, Nightly  [Held by provider] empagliflozin (JARDIANCE) tablet 10 mg, Daily  finasteride (PROSCAR) tablet 5 mg, Daily  [Held by provider] lisinopril (PRINIVIL;ZESTRIL) tablet 2.5 mg, Daily  metoprolol succinate (TOPROL XL) extended release tablet 12.5 mg, BID  pantoprazole (PROTONIX) tablet 40 mg, Daily  spironolactone (ALDACTONE) tablet 25 mg, Lunch  tamsulosin (FLOMAX) capsule 0.4 mg, Daily  sodium chloride flush 0.9 % injection 5-40 mL, 2 times per day  sodium chloride flush 0.9 % injection 5-40 mL, PRN  0.9 % sodium chloride infusion, PRN  ondansetron (ZOFRAN-ODT) disintegrating tablet 4 mg, Q8H PRN   Or  ondansetron (ZOFRAN) injection 4 mg, Q6H PRN  polyethylene glycol (GLYCOLAX) packet 17 g, Daily PRN  acetaminophen (TYLENOL) tablet 650 mg, Q6H PRN   Or  acetaminophen (TYLENOL) suppository 650 mg, Q6H PRN      Objective:  /75   Pulse 71   Temp 98.1 °F (36.7 °C) (Oral)   Resp 14   Ht 6' 2\" (1.88 m)   Wt 193 lb (87.5 kg)   SpO2 99%   BMI 24.78 kg/m²     Intake/Output Summary (Last 24 hours) at 3/8/2023 1402  Last data filed at 3/8/2023 1311  Gross per 24 hour   Intake 2355.18 ml   Output 3000 ml   Net -644.82 ml        Wt Readings from Last 3 Encounters:   03/08/23 193 lb (87.5 kg)   02/21/23 198 lb 6.6 oz (90 kg)   02/03/23 182 lb 4.8 oz (82.7 kg)       General appearance:  Appears comfortable, alert and pleasant. Eyes: Sclera clear. Pupils equal.  ENT: Moist oral mucosa. Trachea midline, no adenopathy. Cardiovascular: Regular rhythm, normal S1, S2. Harsh murmur. Trace edema in lower extremities but improving   Respiratory: Not using accessory muscles. Good inspiratory effort. Clear to auscultation bilaterally, no wheeze or crackles. GI: Abdomen soft, no tenderness, not distended, normal bowel sounds  Musculoskeletal: No cyanosis in digits, neck supple  Neurology: CN 2-12 grossly intact. No speech or motor deficits  Psych: Normal affect. Alert and oriented in time, place and person  Skin: Warm, dry, normal turgor    Labs and Tests:  CBC:   Recent Labs     03/06/23  2113 03/07/23  0323   WBC 3.8* 3.5*   HGB 12.1* 11.8*    193       BMP:    Recent Labs     03/06/23  0840      K 4.3      CO2 31   BUN 23*   CREATININE 1.4*   GLUCOSE 81       Hepatic:   No results for input(s): AST, ALT, ALB, BILITOT, ALKPHOS in the last 72 hours. Patient has a BiV ICD implanted by Dr. Georgina Nielsen on 4/25/2022. He has chronic lung persistent atrial fibrillation. At the time of implantation the device has been programmed to VVIR with a lower rate of 50s since he was in atrial fibrillation.        He has been started on amiodarone planning for CONSUELO and cardioversion. Chest xray:    No radiographic evidence of acute pulmonary disease. Stable cardiomegaly       Problem List  Principal Problem:    Acute combined systolic and diastolic CHF, NYHA class 1 (HCC)  Active Problems:    Hypoxia    Bradycardia    SACHIN (acute kidney injury) ([address]    ICD (implantable cardioverter-defibrillator) in place  Resolved Problems:    * No resolved hospital problems. *       Assessment & Plan:   Acute on chronic systolic and diastolic HF: On ardiance and BB therapy. IV lasix stopped Sunday. Demadex started Tuesday. ACE on hold. Aldactone resumed  CKD:  repeat labs in am.   Chronic AF:  AC with eliquis, s/p cardioversion. Ok to proceed with prostate biopsy. Continue heparin  and stop morning of surgery. Resume eliquis post procedure when ok with urology-likely Monday. Elevated PSA with hx of urinary retention/ enlarged prostate. I spoke with urology. Going to have prostate biopsy Thursday while in house. Diet: ADULT DIET; Regular;  Low Sodium (2 gm)  Code:Full Code  DVT PPX      Cristino Izquierdo PA-C   3/8/2023 2:02 PM

## 2023-03-08 NOTE — PROGRESS NOTES
Pt PIV infiltrated and he has had several attempts with no success same issues yesterday PICC RN called and message left at [phone].

## 2023-03-08 NOTE — PROGRESS NOTES
[facility]   Daily Progress Note      Admit Date:  3/2/2023    HPI:    Mr. Feliz is a 62 y.o. male with history of with history of PAF on amiodarone, hypertension.  Unvaccinated, , homeless but lives with friends, ICD in place 4/25/2022    He is admitted for abdominal pain and diagnosed with UTI, treated with cipro.  He came back to the hospital with swelling in his legs and not urinating much.  He has had multiple admissions for fluid overload.  He states he is taking his medications.  EKG show AF with slow ventricular response.    Optival within threshold     Subjective:  Patient is being seen for systolic heart failure. There were no acute overnight cardiac events.   Iron deficient.  He is sitting up in the bed on room air, awaiting urology procedure tomorrow.  He had a hard time ready and understanding a food label today.  He still knows the daily weight and when to call me  No labs today  Weight [phone] and -10 L out creat 1.5-1.6 bnp       Objective:   /79   Pulse 73   Temp 98.4 °F (36.9 °C) (Oral)   Resp 16   Ht 6' 2\" (1.88 m)   Wt 193 lb (87.5 kg)   SpO2 99%   BMI 24.78 kg/m²     Intake/Output Summary (Last 24 hours) at 3/8/2023 1041  Last data filed at 3/8/2023 0819  Gross per 24 hour   Intake 2325.18 ml   Output 2500 ml   Net -174.82 ml          Physical Exam:  General:  Awake, alert, oriented in NAD  Skin:  Warm and dry.  No unusual bruising or rash  Neck:  Supple.  No JVD or carotid bruit appreciated  Chest:  Normal effort.  Clear to auscultation, no wheezes/rhonchi/rales  Cardiovascular:  RRR, S1/S2, no murmur/gallop/rub  Abdomen:  Soft, nontender, +bowel sounds  Extremities:  bilateral lower leg edema much improved  Neurological: No focal deficits  Psychological: Normal mood and affect      Medications:    amiodarone  200 mg Oral Daily    midodrine  2.5 mg Oral BID WC    iron sucrose  200 mg IntraVENous Q24H    torsemide  20 mg Oral Daily     [Held by provider] apixaban  5 mg Oral BID    atorvastatin  40 mg Oral Nightly    [Held by provider] empagliflozin  10 mg Oral Daily    finasteride  5 mg Oral Daily    [Held by provider] lisinopril  2.5 mg Oral Daily    metoprolol succinate  12.5 mg Oral BID    pantoprazole  40 mg Oral Daily    spironolactone  25 mg Oral Lunch    tamsulosin  0.4 mg Oral Daily    sodium chloride flush  5-40 mL IntraVENous 2 times per day      heparin (PORCINE) Infusion 8 Units/kg/hr (03/07/23 1439)    sodium chloride 25 mL/hr at 03/08/23 1034       Lab Data:  CBC:   Recent Labs     03/06/23 2113 03/07/23  0323   WBC 3.8* 3.5*   HGB 12.1* 11.8*    193     BMP:    Recent Labs     03/06/23  0840      K 4.3   CO2 31   BUN 23*   CREATININE 1.4*     INR:    Recent Labs     03/06/23 2113   INR 1.40*       BNP:    Recent Labs     03/06/23  0840   PROBNP 7,053*         Diagnostics:  Echo:  1/31/23:   Summary   The left ventricle is severely dilated. Severely reduced systolic function. LVEF 15-20%. Severe global hypokinesis. Grade III diastolic dysfunction. Elevated LVEDP. RV severely dilated with severely depressed function. Moderate eccentric MR. Mild AI. Severe TR with a RVSP   100 mmHg. Severe pulmonary hypertension. IVC dynamics c/w markedly elevated RA pressure (15 mmHg). A bubble study was performed and fails to show evidence of shunting. Incidental Finding: Right kidney cyst noted. Consider additional dedicated   abdominal imaging if clinically indicated. CONSUELO Dr Sharron Wall 2/10/22  Preliminary CONSUELO results are:      - Severe LV dysfunction,  EF: 20-25%  - LA dilated. There was HENOK thrombus. - Moderate MR     Cardiac Cath PCI: Dr Robert Boyce 2/8/2022  Anatomy:   LM-nml   LAD-nml  Cx-nml  OM- nml  RCA-nml  RPDA- nml  LVEF- 10%  LVG- global hypokinesis  LVEDP- 10     Hemodynamics:  RA- mean 3  RV- 37/0  PAWP- 10  PA- 34/12 (20)     C. O.- 5.7 (4.9)  C. I.- 2.6 (2.25)  PA sat 60%  AO sat 91%     Contrast: 70  Flouro Time: 5.3  Access: R radial a, R CFV     Impression  ~Coronary Angiography w/ normal cors  ~LVG with LVEF of 10% and global regional wall motion abnormalities  ~Severe MR  ~Normal CO/CI  ~normal L and R filling pressures     Recommendation  ~Aggressive medical treatment and risk factor modification  ~1. Medications reviewed, no changes at this time. 2. Post cath IVF. Bedrest.  3.Consider CONSUELO for evaluation of MR.   4. Patient has been advised on the importance of regular exercise of at least 20-30 minutes daily alternating with aerobic and isometric activities. 5. Patient counseled about and offered assistance for smoking cessation   6. No indication for cardiac rehab  7. CHF service to evaluate tomorrow. Echo 11/29/2021  Summary   -Covid+   -Severely reduced global systolic function with an ejection fraction   estimated at 20%. -Severe global hypokinesis with regional variations noted. -Right ventricular systolic function appears to be mildly reduced based on   visual inspection.   -Severe biatrial enlargement.   -Thickened mitral valve without evidence of stenosis. There is   mild-to-moderate mitral regurgitation.   -There is mild-to-moderate tricuspid regurgitation with a RVSP estimation of   37 mmHg. -Diastolic dysfunction with elevated LV filling pressures. Assessment:    1. Acute on chronic systolic heart failure, on bb, sglt2  2. Hypoxia  3. Bradycardia  4. SACHIN possible due to cipro  5. ICD in place  6. Chronic atrial fib      Plan:    Continue torsemide 20 mg daily  Would like to resume lisinopril and jardiance tomorrow after urology procedure  Eliquis on hold, on heparin drip  Continue aldactone 25 mg once a day  CHF nurse following for diet education, fluid restriction and daily weights  Seen by palliative care  Continue toprol 12.5 mg twice a day   Continue midodrine 2.5 mg twice a day    9.    Continue iron venofer 200 mg x 5 doses       He is ready from HF standpoint for discharge but is awaiting prostate biopsy later this week    NYHA IV    Discussed with patient who is agreeable with plan of care. Thank you for allowing me to participate in the care of your patient.     Jonathan Hernandez, APRN - CNS, CNS

## 2023-03-08 NOTE — PROGRESS NOTES
Pt educated to not by any means empty his ernst bag as we need to keep up with every ounce of fluids that goes into him and comes out. Pt did verbalize understanding but bed alarm engaged as patient has a cognitive delay of some kind.

## 2023-03-08 NOTE — PLAN OF CARE
Problem: Discharge Planning  Goal: Discharge to home or other facility with appropriate resources  3/7/2023 2148 by Brittney Obregon RN  Outcome: Progressing  3/7/2023 0815 by Joe Clement RN  Outcome: Progressing     Problem: Safety - Adult  Goal: Free from fall injury  3/7/2023 2148 by Brittney Obregon RN  Outcome: Progressing  3/7/2023 0815 by Joe Clement RN  Outcome: Progressing     Problem: ABCDS Injury Assessment  Goal: Absence of physical injury  3/7/2023 2148 by Brittney Obregon RN  Outcome: Progressing  3/7/2023 0815 by Joe Clement RN  Outcome: Progressing     Problem: Chronic Conditions and Co-morbidities  Goal: Patient's chronic conditions and co-morbidity symptoms are monitored and maintained or improved  3/7/2023 2148 by Brittney Obregon RN  Outcome: Progressing  3/7/2023 0815 by Joe Clement RN  Outcome: Progressing     Problem: Respiratory - Adult  Goal: Achieves optimal ventilation and oxygenation  3/7/2023 2148 by Brittney Obregon RN  Outcome: Progressing  3/7/2023 0815 by Joe Clement RN  Outcome: Progressing     Problem: Cardiovascular - Adult  Goal: Maintains optimal cardiac output and hemodynamic stability  3/7/2023 2148 by Brittney Obregon RN  Outcome: Progressing  3/7/2023 0815 by Joe Clement RN  Outcome: Progressing     Problem: Skin/Tissue Integrity - Adult  Goal: Skin integrity remains intact  3/7/2023 2148 by Brittney Obregon RN  Outcome: Progressing  3/7/2023 0815 by Joe Clement RN  Outcome: Progressing     Problem: Genitourinary - Adult  Goal: Absence of urinary retention  3/7/2023 2148 by Brittney Obregon RN  Outcome: Progressing  3/7/2023 0815 by Joe Clement RN  Outcome: Progressing     Problem: Metabolic/Fluid and Electrolytes - Adult  Goal: Electrolytes maintained within normal limits  3/7/2023 2148 by Brittney Obregon RN  Outcome: Progressing  3/7/2023 0815 by Joe Clement RN  Outcome: Progressing     Problem: Pain  Goal: Verbalizes/displays adequate comfort level or baseline comfort level  3/7/2023 2148 by Slim Escalera RN  Outcome: Progressing  3/7/2023 0815 by Iker Mares RN  Outcome: Progressing

## 2023-03-09 ENCOUNTER — ANESTHESIA (OUTPATIENT)
Dept: OPERATING ROOM | Age: 63
End: 2023-03-09
Payer: COMMERCIAL

## 2023-03-09 ENCOUNTER — APPOINTMENT (OUTPATIENT)
Dept: ULTRASOUND IMAGING | Age: 63
DRG: 194 | End: 2023-03-09
Payer: COMMERCIAL

## 2023-03-09 PROBLEM — I95.9 HYPOTENSION: Status: ACTIVE | Noted: 2022-09-01

## 2023-03-09 LAB
ANTI-XA UNFRAC HEPARIN: 0.29 IU/ML (ref 0.3–0.7)
ANTI-XA UNFRAC HEPARIN: 0.33 IU/ML (ref 0.3–0.7)
GLUCOSE BLD-MCNC: 66 MG/DL (ref 70–99)
GLUCOSE BLD-MCNC: 88 MG/DL (ref 70–99)
PERFORMED ON: ABNORMAL
PERFORMED ON: NORMAL

## 2023-03-09 PROCEDURE — 2580000003 HC RX 258: Performed by: UROLOGY

## 2023-03-09 PROCEDURE — 2580000003 HC RX 258: Performed by: HOSPITALIST

## 2023-03-09 PROCEDURE — 3600000002 HC SURGERY LEVEL 2 BASE: Performed by: UROLOGY

## 2023-03-09 PROCEDURE — 6370000000 HC RX 637 (ALT 250 FOR IP): Performed by: UROLOGY

## 2023-03-09 PROCEDURE — 0VB08ZX EXCISION OF PROSTATE, VIA NATURAL OR ARTIFICIAL OPENING ENDOSCOPIC, DIAGNOSTIC: ICD-10-PCS | Performed by: UROLOGY

## 2023-03-09 PROCEDURE — 3700000001 HC ADD 15 MINUTES (ANESTHESIA): Performed by: UROLOGY

## 2023-03-09 PROCEDURE — 85520 HEPARIN ASSAY: CPT

## 2023-03-09 PROCEDURE — 2709999900 HC NON-CHARGEABLE SUPPLY: Performed by: UROLOGY

## 2023-03-09 PROCEDURE — 2500000003 HC RX 250 WO HCPCS: Performed by: REGISTERED NURSE

## 2023-03-09 PROCEDURE — C1769 GUIDE WIRE: HCPCS | Performed by: UROLOGY

## 2023-03-09 PROCEDURE — 2500000003 HC RX 250 WO HCPCS

## 2023-03-09 PROCEDURE — 3600000012 HC SURGERY LEVEL 2 ADDTL 15MIN: Performed by: UROLOGY

## 2023-03-09 PROCEDURE — 3600000004 HC SURGERY LEVEL 4 BASE: Performed by: UROLOGY

## 2023-03-09 PROCEDURE — 6360000002 HC RX W HCPCS: Performed by: CLINICAL NURSE SPECIALIST

## 2023-03-09 PROCEDURE — 3700000000 HC ANESTHESIA ATTENDED CARE: Performed by: UROLOGY

## 2023-03-09 PROCEDURE — 2580000003 HC RX 258: Performed by: CLINICAL NURSE SPECIALIST

## 2023-03-09 PROCEDURE — 6360000002 HC RX W HCPCS: Performed by: UROLOGY

## 2023-03-09 PROCEDURE — 2060000000 HC ICU INTERMEDIATE R&B

## 2023-03-09 PROCEDURE — 88305 TISSUE EXAM BY PATHOLOGIST: CPT

## 2023-03-09 PROCEDURE — 6360000002 HC RX W HCPCS: Performed by: REGISTERED NURSE

## 2023-03-09 PROCEDURE — 94760 N-INVAS EAR/PLS OXIMETRY 1: CPT

## 2023-03-09 PROCEDURE — 6370000000 HC RX 637 (ALT 250 FOR IP): Performed by: NURSE PRACTITIONER

## 2023-03-09 PROCEDURE — 3600000014 HC SURGERY LEVEL 4 ADDTL 15MIN: Performed by: UROLOGY

## 2023-03-09 PROCEDURE — 7100000001 HC PACU RECOVERY - ADDTL 15 MIN: Performed by: UROLOGY

## 2023-03-09 PROCEDURE — 6370000000 HC RX 637 (ALT 250 FOR IP): Performed by: HOSPITALIST

## 2023-03-09 PROCEDURE — 36415 COLL VENOUS BLD VENIPUNCTURE: CPT

## 2023-03-09 PROCEDURE — 6370000000 HC RX 637 (ALT 250 FOR IP): Performed by: CLINICAL NURSE SPECIALIST

## 2023-03-09 PROCEDURE — 76942 ECHO GUIDE FOR BIOPSY: CPT

## 2023-03-09 PROCEDURE — 99232 SBSQ HOSP IP/OBS MODERATE 35: CPT | Performed by: CLINICAL NURSE SPECIALIST

## 2023-03-09 PROCEDURE — 7100000000 HC PACU RECOVERY - FIRST 15 MIN: Performed by: UROLOGY

## 2023-03-09 RX ORDER — SPIRONOLACTONE 25 MG/1
25 TABLET ORAL
Qty: 90 TABLET | Refills: 1 | Status: SHIPPED | OUTPATIENT
Start: 2023-03-09

## 2023-03-09 RX ORDER — LISINOPRIL 2.5 MG/1
2.5 TABLET ORAL DAILY
Qty: 90 TABLET | Refills: 0 | Status: SHIPPED | OUTPATIENT
Start: 2023-03-09

## 2023-03-09 RX ORDER — ETOMIDATE 2 MG/ML
INJECTION INTRAVENOUS PRN
Status: DISCONTINUED | OUTPATIENT
Start: 2023-03-09 | End: 2023-03-09 | Stop reason: SDUPTHER

## 2023-03-09 RX ORDER — GENTAMICIN SULFATE 40 MG/ML
INJECTION, SOLUTION INTRAMUSCULAR; INTRAVENOUS PRN
Status: DISCONTINUED | OUTPATIENT
Start: 2023-03-09 | End: 2023-03-09 | Stop reason: SDUPTHER

## 2023-03-09 RX ORDER — LEVOFLOXACIN 5 MG/ML
INJECTION, SOLUTION INTRAVENOUS
Status: DISPENSED
Start: 2023-03-09 | End: 2023-03-10

## 2023-03-09 RX ORDER — DEXTROSE MONOHYDRATE 25 G/50ML
INJECTION, SOLUTION INTRAVENOUS
Status: COMPLETED
Start: 2023-03-09 | End: 2023-03-09

## 2023-03-09 RX ORDER — TORSEMIDE 20 MG/1
20 TABLET ORAL DAILY
Qty: 90 TABLET | Refills: 1 | Status: SHIPPED | OUTPATIENT
Start: 2023-03-09

## 2023-03-09 RX ORDER — LEVOFLOXACIN 5 MG/ML
500 INJECTION, SOLUTION INTRAVENOUS ONCE
Status: COMPLETED | OUTPATIENT
Start: 2023-03-09 | End: 2023-03-09

## 2023-03-09 RX ORDER — METOPROLOL SUCCINATE 25 MG/1
12.5 TABLET, EXTENDED RELEASE ORAL 2 TIMES DAILY
Qty: 45 TABLET | Refills: 1 | Status: SHIPPED | OUTPATIENT
Start: 2023-03-09

## 2023-03-09 RX ORDER — GENTAMICIN SULFATE 80 MG/100ML
80 INJECTION, SOLUTION INTRAVENOUS ONCE
Status: DISCONTINUED | OUTPATIENT
Start: 2023-03-09 | End: 2023-03-09 | Stop reason: CLARIF

## 2023-03-09 RX ORDER — HEPARIN SODIUM 10000 [USP'U]/100ML
5-30 INJECTION, SOLUTION INTRAVENOUS CONTINUOUS
Status: CANCELLED | OUTPATIENT
Start: 2023-03-09

## 2023-03-09 RX ORDER — MAGNESIUM HYDROXIDE 1200 MG/15ML
LIQUID ORAL
Status: COMPLETED | OUTPATIENT
Start: 2023-03-09 | End: 2023-03-09

## 2023-03-09 RX ORDER — MIDODRINE HYDROCHLORIDE 2.5 MG/1
2.5 TABLET ORAL 2 TIMES DAILY WITH MEALS
Qty: 180 TABLET | Refills: 1 | Status: SHIPPED | OUTPATIENT
Start: 2023-03-09 | End: 2023-03-13 | Stop reason: DRUGHIGH

## 2023-03-09 RX ORDER — EPINEPHRINE 1 MG/ML
INJECTION INTRAMUSCULAR; INTRAVENOUS; SUBCUTANEOUS PRN
Status: DISCONTINUED | OUTPATIENT
Start: 2023-03-09 | End: 2023-03-09 | Stop reason: SDUPTHER

## 2023-03-09 RX ORDER — LIDOCAINE HYDROCHLORIDE 20 MG/ML
JELLY TOPICAL
Status: COMPLETED | OUTPATIENT
Start: 2023-03-09 | End: 2023-03-09

## 2023-03-09 RX ORDER — GENTAMICIN SULFATE 80 MG/100ML
INJECTION, SOLUTION INTRAVENOUS
Status: DISPENSED
Start: 2023-03-09 | End: 2023-03-10

## 2023-03-09 RX ORDER — LISINOPRIL 5 MG/1
2.5 TABLET ORAL DAILY
Status: DISCONTINUED | OUTPATIENT
Start: 2023-03-09 | End: 2023-03-10 | Stop reason: HOSPADM

## 2023-03-09 RX ORDER — DEXTROSE MONOHYDRATE 25 G/50ML
12.5 INJECTION, SOLUTION INTRAVENOUS ONCE
Status: COMPLETED | OUTPATIENT
Start: 2023-03-09 | End: 2023-03-09

## 2023-03-09 RX ORDER — PROPOFOL 10 MG/ML
INJECTION, EMULSION INTRAVENOUS PRN
Status: DISCONTINUED | OUTPATIENT
Start: 2023-03-09 | End: 2023-03-09 | Stop reason: SDUPTHER

## 2023-03-09 RX ADMIN — METOPROLOL SUCCINATE 12.5 MG: 25 TABLET, FILM COATED, EXTENDED RELEASE ORAL at 09:10

## 2023-03-09 RX ADMIN — EPINEPHRINE 5 MCG: 1 INJECTION PARENTERAL at 15:50

## 2023-03-09 RX ADMIN — IRON SUCROSE 200 MG: 20 INJECTION, SOLUTION INTRAVENOUS at 11:22

## 2023-03-09 RX ADMIN — PROPOFOL 20 MG: 10 INJECTION, EMULSION INTRAVENOUS at 15:28

## 2023-03-09 RX ADMIN — SODIUM CHLORIDE, PRESERVATIVE FREE 10 ML: 5 INJECTION INTRAVENOUS at 09:11

## 2023-03-09 RX ADMIN — LEVOFLOXACIN 500 MG: 5 INJECTION, SOLUTION INTRAVENOUS at 15:22

## 2023-03-09 RX ADMIN — ETOMIDATE INJECTION 1 MG: 2 SOLUTION INTRAVENOUS at 15:28

## 2023-03-09 RX ADMIN — ATORVASTATIN CALCIUM 40 MG: 40 TABLET, FILM COATED ORAL at 21:34

## 2023-03-09 RX ADMIN — EMPAGLIFLOZIN 10 MG: 10 TABLET, FILM COATED ORAL at 11:20

## 2023-03-09 RX ADMIN — LISINOPRIL 2.5 MG: 5 TABLET ORAL at 12:13

## 2023-03-09 RX ADMIN — FINASTERIDE 5 MG: 5 TABLET, FILM COATED ORAL at 09:11

## 2023-03-09 RX ADMIN — AMIODARONE HYDROCHLORIDE 200 MG: 200 TABLET ORAL at 09:10

## 2023-03-09 RX ADMIN — TAMSULOSIN HYDROCHLORIDE 0.4 MG: 0.4 CAPSULE ORAL at 09:10

## 2023-03-09 RX ADMIN — Medication 10 ML: at 21:34

## 2023-03-09 RX ADMIN — PANTOPRAZOLE SODIUM 40 MG: 40 TABLET, DELAYED RELEASE ORAL at 09:11

## 2023-03-09 RX ADMIN — EPINEPHRINE 10 MCG: 1 INJECTION PARENTERAL at 15:54

## 2023-03-09 RX ADMIN — ETOMIDATE INJECTION 1 MG: 2 SOLUTION INTRAVENOUS at 15:39

## 2023-03-09 RX ADMIN — ETOMIDATE INJECTION 1 MG: 2 SOLUTION INTRAVENOUS at 15:32

## 2023-03-09 RX ADMIN — DEXTROSE MONOHYDRATE 12.5 G: 25 INJECTION, SOLUTION INTRAVENOUS at 15:03

## 2023-03-09 RX ADMIN — METOPROLOL SUCCINATE 12.5 MG: 25 TABLET, FILM COATED, EXTENDED RELEASE ORAL at 21:34

## 2023-03-09 RX ADMIN — PROPOFOL 20 MG: 10 INJECTION, EMULSION INTRAVENOUS at 15:35

## 2023-03-09 RX ADMIN — Medication 10 ML: at 09:15

## 2023-03-09 RX ADMIN — MIDODRINE HYDROCHLORIDE 2.5 MG: 5 TABLET ORAL at 09:10

## 2023-03-09 RX ADMIN — ETOMIDATE INJECTION 1 MG: 2 SOLUTION INTRAVENOUS at 15:35

## 2023-03-09 RX ADMIN — GENTAMICIN SULFATE 80 MG: 40 INJECTION, SOLUTION INTRAMUSCULAR; INTRAVENOUS at 15:22

## 2023-03-09 RX ADMIN — TORSEMIDE 20 MG: 20 TABLET ORAL at 09:09

## 2023-03-09 RX ADMIN — PROPOFOL 20 MG: 10 INJECTION, EMULSION INTRAVENOUS at 15:32

## 2023-03-09 RX ADMIN — SPIRONOLACTONE 25 MG: 25 TABLET ORAL at 12:12

## 2023-03-09 RX ADMIN — MIDODRINE HYDROCHLORIDE 2.5 MG: 5 TABLET ORAL at 17:58

## 2023-03-09 RX ADMIN — PROPOFOL 20 MG: 10 INJECTION, EMULSION INTRAVENOUS at 15:39

## 2023-03-09 ASSESSMENT — PAIN SCALES - GENERAL: PAINLEVEL_OUTOF10: 0

## 2023-03-09 ASSESSMENT — PAIN - FUNCTIONAL ASSESSMENT: PAIN_FUNCTIONAL_ASSESSMENT: NONE - DENIES PAIN

## 2023-03-09 NOTE — PROGRESS NOTES
[facility]   Daily Progress Note      Admit Date:  3/2/2023    HPI:    Mr. Sonia Quezada is a 57 y.o. male with history of with history of PAF on amiodarone, hypertension. Unvaccinated, , homeless but lives with friends, ICD in place 4/25/2022    He is admitted for abdominal pain and diagnosed with UTI, treated with cipro. He came back to the hospital with swelling in his legs and not urinating much. He has had multiple admissions for fluid overload. He states he is taking his medications. EKG show AF with slow ventricular response. Optival within threshold     Subjective:  Patient is being seen for systolic heart failure. There were no acute overnight cardiac events. Iron deficient. He is sitting up in the bed on room air, awaiting urology today. Weight [phone] and -13 L out creat 1.5-1.6-1.2 bnp       Objective:   /74   Pulse 73   Temp 97.6 °F (36.4 °C) (Oral)   Resp 16   Ht 6' 2\" (1.88 m)   Wt 188 lb 6.4 oz (85.5 kg)   SpO2 98%   BMI 24.19 kg/m²     Intake/Output Summary (Last 24 hours) at 3/9/2023 0955  Last data filed at 3/8/2023 2025  Gross per 24 hour   Intake 510 ml   Output 3250 ml   Net -2740 ml          Physical Exam:  General:  Awake, alert, oriented in NAD  Skin:  Warm and dry. No unusual bruising or rash  Neck:  Supple. No JVD or carotid bruit appreciated  Chest:  Normal effort.   Clear to auscultation, no wheezes/rhonchi/rales  Cardiovascular:  RRR, S1/S2, no murmur/gallop/rub  Abdomen:  Soft, nontender, +bowel sounds  Extremities:  bilateral lower leg edema much improved  Neurological: No focal deficits  Psychological: Normal mood and affect      Medications:    amiodarone  200 mg Oral Daily    midodrine  2.5 mg Oral BID WC    iron sucrose  200 mg IntraVENous Q24H    torsemide  20 mg Oral Daily    [Held by provider] apixaban  5 mg Oral BID    atorvastatin  40 mg Oral Nightly    [Held by provider] empagliflozin  10 mg Oral Daily finasteride  5 mg Oral Daily    [Held by provider] lisinopril  2.5 mg Oral Daily    metoprolol succinate  12.5 mg Oral BID    pantoprazole  40 mg Oral Daily    spironolactone  25 mg Oral Lunch    tamsulosin  0.4 mg Oral Daily    sodium chloride flush  5-40 mL IntraVENous 2 times per day      heparin (PORCINE) Infusion 8 Units/kg/hr (03/08/23 1148)    sodium chloride 25 mL/hr at 03/08/23 1034       Lab Data:  CBC:   Recent Labs     03/06/23 2113 03/07/23  0323   WBC 3.8* 3.5*   HGB 12.1* 11.8*    193     BMP:    Recent Labs     03/08/23  1425      K 4.3   CO2 30   BUN 19   CREATININE 1.2     INR:    Recent Labs     03/06/23 2113   INR 1.40*       BNP:    No results for input(s): PROBNP in the last 72 hours. Diagnostics:  Echo:  1/31/23:   Summary   The left ventricle is severely dilated. Severely reduced systolic function. LVEF 15-20%. Severe global hypokinesis. Grade III diastolic dysfunction. Elevated LVEDP. RV severely dilated with severely depressed function. Moderate eccentric MR. Mild AI. Severe TR with a RVSP   100 mmHg. Severe pulmonary hypertension. IVC dynamics c/w markedly elevated RA pressure (15 mmHg). A bubble study was performed and fails to show evidence of shunting. Incidental Finding: Right kidney cyst noted. Consider additional dedicated   abdominal imaging if clinically indicated. CONSUELO Dr Renuka Fortune 2/10/22  Preliminary CONSUELO results are:      - Severe LV dysfunction,  EF: 20-25%  - LA dilated. There was HENOK thrombus. - Moderate MR     Cardiac Cath PCI: Dr Lopez Situ 2/8/2022  Anatomy:   LM-nml   LAD-nml  Cx-nml  OM- nml  RCA-nml  RPDA- nml  LVEF- 10%  LVG- global hypokinesis  LVEDP- 10     Hemodynamics:  RA- mean 3  RV- 37/0  PAWP- 10  PA- 34/12 (20)     C. O.- 5.7 (4.9)  C. I.- 2.6 (2.25)  PA sat 60%  AO sat 91%     Contrast: 70  Flouro Time: 5.3  Access: R radial a, R CFV     Impression  ~Coronary Angiography w/ normal cors  ~LVG with LVEF of 10% and global regional wall motion abnormalities  ~Severe MR  ~Normal CO/CI  ~normal L and R filling pressures     Recommendation  ~Aggressive medical treatment and risk factor modification  ~1. Medications reviewed, no changes at this time. 2. Post cath IVF. Bedrest.  3.Consider CONSUELO for evaluation of MR.   4. Patient has been advised on the importance of regular exercise of at least 20-30 minutes daily alternating with aerobic and isometric activities. 5. Patient counseled about and offered assistance for smoking cessation   6. No indication for cardiac rehab  7. CHF service to evaluate tomorrow. Echo 11/29/2021  Summary   -Covid+   -Severely reduced global systolic function with an ejection fraction   estimated at 20%. -Severe global hypokinesis with regional variations noted. -Right ventricular systolic function appears to be mildly reduced based on   visual inspection.   -Severe biatrial enlargement.   -Thickened mitral valve without evidence of stenosis. There is   mild-to-moderate mitral regurgitation.   -There is mild-to-moderate tricuspid regurgitation with a RVSP estimation of   37 mmHg. -Diastolic dysfunction with elevated LV filling pressures. Assessment:    1. Acute on chronic systolic heart failure, on bb, sglt2, compensated  2. Hypoxia  3. Bradycardia, improved  4. SACHIN possible due to cipro  5. ICD in place  6. Chronic atrial fib  7. Hypotension on midodrine      Plan:    Continue torsemide 20 mg daily  Will resume lisinopril 2.5 and jardiance 10 mg tomorrow am  Eliquis on hold, on heparin drip per hospitalist  Continue aldactone 25 mg once a day  CHF nurse following for diet education, fluid restriction and daily weights  Seen by palliative care  Continue toprol 12.5 mg twice a day   Continue midodrine 2.5 mg twice a day    9.    Continue iron venofer 200 mg x 5 doses completed      He is ready from HF standpoint for discharge but is awaiting prostate biopsy later this week  Will sign off, please call with any questions    I have done his HF meds to bed    NYHA IV    Discussed with patient who is agreeable with plan of care. Thank you for allowing me to participate in the care of your patient.     Jose Meza, TRENT - CNS, CNS

## 2023-03-09 NOTE — PLAN OF CARE
Problem: Discharge Planning  Goal: Discharge to home or other facility with appropriate resources  Outcome: Progressing  Flowsheets (Taken 3/9/2023 0815)  Discharge to home or other facility with appropriate resources:   Identify barriers to discharge with patient and caregiver   Arrange for needed discharge resources and transportation as appropriate   Identify discharge learning needs (meds, wound care, etc)     Problem: Safety - Adult  Goal: Free from fall injury  Outcome: Progressing     Problem: ABCDS Injury Assessment  Goal: Absence of physical injury  Outcome: Progressing     Problem: Chronic Conditions and Co-morbidities  Goal: Patient's chronic conditions and co-morbidity symptoms are monitored and maintained or improved  Outcome: Progressing  Flowsheets (Taken 3/9/2023 0815)  Care Plan - Patient's Chronic Conditions and Co-Morbidity Symptoms are Monitored and Maintained or Improved:   Monitor and assess patient's chronic conditions and comorbid symptoms for stability, deterioration, or improvement   Collaborate with multidisciplinary team to address chronic and comorbid conditions and prevent exacerbation or deterioration   Update acute care plan with appropriate goals if chronic or comorbid symptoms are exacerbated and prevent overall improvement and discharge     Problem: Respiratory - Adult  Goal: Achieves optimal ventilation and oxygenation  Outcome: Progressing  Flowsheets (Taken 3/9/2023 0815)  Achieves optimal ventilation and oxygenation:   Assess for changes in respiratory status   Position to facilitate oxygenation and minimize respiratory effort   Assess for changes in mentation and behavior   Oxygen supplementation based on oxygen saturation or arterial blood gases   Encourage broncho-pulmonary hygiene including cough, deep breathe, incentive spirometry   Assess the need for suctioning and aspirate as needed     Problem: Cardiovascular - Adult  Goal: Maintains optimal cardiac output and hemodynamic stability  Outcome: Progressing  Flowsheets (Taken 3/9/2023 0815)  Maintains optimal cardiac output and hemodynamic stability:   Monitor blood pressure and heart rate   Monitor urine output and notify Licensed Independent Practitioner for values outside of normal range   Assess for signs of decreased cardiac output   Administer fluid and/or volume expanders as ordered     Problem: Skin/Tissue Integrity - Adult  Goal: Skin integrity remains intact  Outcome: Progressing  Flowsheets (Taken 3/9/2023 0815)  Skin Integrity Remains Intact:   Monitor for areas of redness and/or skin breakdown   Assess vascular access sites hourly     Problem: Genitourinary - Adult  Goal: Absence of urinary retention  Outcome: Progressing  Flowsheets (Taken 3/9/2023 0815)  Absence of urinary retention:   Assess patients ability to void and empty bladder   Monitor intake/output and perform bladder scan as needed   Place urinary catheter per Licensed Independent Practitioner order if needed   Discuss with Licensed Independent Practitioner  medications to alleviate retention as needed     Problem: Metabolic/Fluid and Electrolytes - Adult  Goal: Electrolytes maintained within normal limits  Outcome: Progressing  Flowsheets (Taken 3/9/2023 0815)  Electrolytes maintained within normal limits:   Monitor labs and assess patient for signs and symptoms of electrolyte imbalances   Administer electrolyte replacement as ordered   Monitor response to electrolyte replacements, including repeat lab results as appropriate   Fluid restriction as ordered     Problem: Pain  Goal: Verbalizes/displays adequate comfort level or baseline comfort level  Outcome: Progressing     Problem: Musculoskeletal - Adult  Goal: Return mobility to safest level of function  Outcome: Progressing  Flowsheets (Taken 3/9/2023 0815)  Return Mobility to Safest Level of Function:   Assess patient stability and activity tolerance for standing, transferring and ambulating with or without assistive devices   Assist with transfers and ambulation using safe patient handling equipment as needed   Ensure adequate protection for wounds/incisions during mobilization   Obtain physical therapy/occupational therapy consults as needed

## 2023-03-09 NOTE — ANESTHESIA POSTPROCEDURE EVALUATION
Department of Anesthesiology  Postprocedure Note    Patient: Priyanka Flowers  MRN: [id number]  YOB: 1960  Date of evaluation: 3/9/2023      Procedure Summary     Date: 03/09/23 Room / Location: [address]    Anesthesia Start: 4047 Anesthesia Stop: 5219    Procedure: CYSTOSCOPY, PROSTATE TRANSRECTAL ULTRASOUND BIOPSY (Bladder) Diagnosis:       Elevated PSA      (Elevated PSA [R97.20])    Surgeons: Mera Means MD Responsible Provider: Ginger Felty, MD    Anesthesia Type: MAC, general ASA Status: 4          Anesthesia Type: No value filed.     Isrrael Phase I: Isrrael Score: 10    Isrrael Phase II:        Anesthesia Post Evaluation    Patient location during evaluation: PACU  Patient participation: complete - patient participated  Level of consciousness: awake and awake and alert  Pain score: 2  Airway patency: patent  Nausea & Vomiting: no vomiting  Complications: no  Cardiovascular status: blood pressure returned to baseline  Respiratory status: acceptable  Hydration status: euvolemic  Multimodal analgesia pain management approach

## 2023-03-09 NOTE — PROGRESS NOTES
Nutrition Note    RECOMMENDATIONS  PO Diet: Resume diet per MD    NUTRITION ASSESSMENT   Pt triggered for LOS assessment. NPO today for prostate biopsy. Was on 2 gm Na diet with documented intakes of % throughout length of admission. Hx of CHF. If accurate, wt down 25 lb from admission; -13L, was getting IV lasix but now stopped. Wt fluctuations documented in wt hx in EMR, likely r/t fluids from CHF. Pt deemed to be at low nutritional risk at this time. RD will continue to monitor should pt's status change. Nutrition Related Findings: LBM 3/9, BS+. Trace BLE edema. Wounds: None  Nutrition Education:  Education completed ([facility] nutrition education 3/6.)   Nutrition Goals: PO intake 75% or greater, by next RD assessment     MALNUTRITION ASSESSMENT   Malnutrition Status: No malnutrition    NUTRITION DIAGNOSIS   No nutrition diagnosis at this time     CURRENT NUTRITION THERAPIES  Diet NPO     PO Intake: %   PO Supplement Intake:None Ordered    ANTHROPOMETRICS  Current Height: 6' 2\" (188 cm)  Current Weight: 188 lb 6.4 oz (85.5 kg)    Admission weight: 213 lb (96.6 kg) (actual wt)  Ideal Body Weight (IBW): 190 lbs  (86 kg)        BMI: 24.1    The patient will be monitored per nutrition standards of care. Consult dietitian if additional nutrition interventions are needed prior to RD reassessment.      Nayeli Jarrett MS, RD, LD    Contact: [phone]

## 2023-03-09 NOTE — PROGRESS NOTES
Patient admitted to PACU via bed, arouse to stimuli, respirations adeq on RA spo2 99%. Skin warm and dry with good color. Abd soft. Don draining clear yellow urine, denies pain, states having pressure but tolerable, will continue to monitor.

## 2023-03-09 NOTE — PROGRESS NOTES
Urology Progress Note  [facility]    Provider: Merle Allen MD MD Patient ID:  Admission Date: 3/2/2023 Name: Romina Darnell  OR Date: 3/2/2023 MRN: [id number]   Patient Location: [id number] : 1960  Attending: Johnathon Heart MD Date of Service: 3/9/2023  PCP: No primary care provider on file. Diagnoses:  1. Acute on chronic congestive heart failure, unspecified heart failure type ([address]    2. Hypoxia          Assessment/Plan:  57 y.o. AA male with h/o CHF, A.fib on chronic anticoagulation. Urology consulted for urinary retention and elevated psa- he has a long standing hx of  chronic ernst. He was previously seen in  for BPH and elevated psa of 20, and at that time Dr. Khoa Gallegos  recommended cysto/TRUS bx- numerous calls to patient, non-comply letter sent to his address. He has had a positive DREs on multiple occasions. He has been cardioverted , now on heparin. Recc  - hold heparin  - plan on cystoscopy with TRUS bx  - ideally hold AC for ~5 days post op         The patient had a chance to ask questions which were answered. he understands the above plan. Subjective:   Romina Darnell is a 57 y.o. male. He was seen and examined this morning.  Today we discussed prostate bx and cystoscopy    Objective:   Vitals:  Vitals:    23 1046   BP:    Pulse: 72   Resp: 16   Temp:    SpO2: 98%       Intake/Output Summary (Last 24 hours) at 3/9/2023 1106  Last data filed at 3/8/2023 2025  Gross per 24 hour   Intake 510 ml   Output 3250 ml   Net -2740 ml     Physical Exam:  Gen: Alert and oriented x3, no acute distress  Ernst CYU    Labs:  Lab Results   Component Value Date    WBC 3.5 (L) 2023    HGB 11.8 (L) 2023    HCT 37.0 (L) 2023    MCV 81.5 2023     2023     Lab Results   Component Value Date    CREATININE 1.2 2023    BUN 19 2023     2023    K 4.3 2023     2023    CO2 30 2023 Macy Calderon MD MD  3/9/2023

## 2023-03-09 NOTE — PLAN OF CARE
Problem: Discharge Planning  Goal: Discharge to home or other facility with appropriate resources  3/8/2023 2113 by Tricia Abel RN  Outcome: Progressing  3/8/2023 1248 by Tiat Rubio RN  Outcome: Progressing     Problem: Safety - Adult  Goal: Free from fall injury  3/8/2023 2113 by Tricia Abel RN  Outcome: Progressing  3/8/2023 1248 by Tita Rubio RN  Outcome: Progressing     Problem: ABCDS Injury Assessment  Goal: Absence of physical injury  3/8/2023 2113 by Tricia Abel RN  Outcome: Progressing  3/8/2023 1248 by Tita Rubio RN  Outcome: Progressing     Problem: Chronic Conditions and Co-morbidities  Goal: Patient's chronic conditions and co-morbidity symptoms are monitored and maintained or improved  3/8/2023 2113 by Tricia Abel RN  Outcome: Progressing  3/8/2023 1248 by Tita Rubio RN  Outcome: Progressing     Problem: Respiratory - Adult  Goal: Achieves optimal ventilation and oxygenation  3/8/2023 2113 by Tricia Abel RN  Outcome: Progressing  3/8/2023 1248 by Tita Rubio RN  Outcome: Progressing     Problem: Cardiovascular - Adult  Goal: Maintains optimal cardiac output and hemodynamic stability  3/8/2023 2113 by Tricia Abel RN  Outcome: Progressing  3/8/2023 1248 by Tita Rubio RN  Outcome: Progressing     Problem: Skin/Tissue Integrity - Adult  Goal: Skin integrity remains intact  3/8/2023 2113 by Tricia Abel RN  Outcome: Progressing  3/8/2023 1248 by Tita Rubio RN  Outcome: Progressing  Flowsheets (T[facility] 3/8/2023 0820 [facility])  Skin Integrity Remains Intact: Monitor for areas of redness and/or skin breakdown     Problem: Genitourinary - Adult  Goal: Absence of urinary retention  3/8/2023 2113 by Tricia Abel RN  Outcome: Progressing  3/8/2023 1248 by Tita Rubio RN  Outcome: Progressing     Problem: Metabolic/Fluid and Electrolytes - Adult  Goal: Electrolytes maintained within normal limits  3/8/2023 2113 by Charleen Ali, RN  Outcome: Progressing  3/8/2023 1248 by Destiny Rubin RN  Outcome: Progressing     Problem: Pain  Goal: Verbalizes/displays adequate comfort level or baseline comfort level  3/8/2023 2113 by Charleen Andrade RN  Outcome: Progressing  3/8/2023 1248 by Destiny Rubin RN  Outcome: Progressing     Problem: Musculoskeletal - Adult  Goal: Return mobility to safest level of function  3/8/2023 2113 by Charleen Andrade RN  Outcome: Progressing  3/8/2023 1248 by Destiny Rubin RN  Outcome: Progressing

## 2023-03-09 NOTE — OP NOTE
Urology Operative Report  [facility]    Provider: Carloz Rose MD MD Patient ID:  Admission Date: 3/2/2023 Name: Perez Melissa  OR Date: 3/9/2023  MRN: [id number]   Patient Location: OR/NONE : 1960  Attending: Chandra Leigh MD Date of Service: 3/9/2023  PCP: No primary care provider on file. Date of Operation: 3/9/2023    Preoperative Diagnosis:   Urinary retention  difficulty with placing a Ernst catheter  Elevated PSA    Postoperative Diagnosis: same    Procedure:    1. Cystoscopy  2. Difficult Ernst catheter placement  3. Transrectal US guided prostate biopsy    Surgeon:   Carloz Rose MD    Anesthesia: Monitored Local Anesthesia with Sedation    Indications: Perez Melissa is a 57 y.o. male who presents for the above named surgery. Informed consent was obtained and the risks, benefits, and details of the procedure were explained to the patient who elected to proceed. Details of Procedure: The patient was brought to the operating room and placed in the supine position on the operating room table. SCDs were placed on the lower extremities. Following induction of anesthesia the patient was positioned in a supine  position, all pressure points were padded, and the genitals were prepped and draped in the usual sterile fashion. A routine timeout was performed, confirming the patient, procedure, site, risk of fire, patient allergies and confirming that preoperative antibiotics had been administered prior to beginning. A cystoscope was advanced into the urethra. The anterior urethra was normal. Just proximal to the veru, there was a large false passage from prior ernst. The prostate was enlarged, obstructive and with bilobar hypertrophy. The bladder showed no evidence of tumors, stone, or diverticulum. There was mild catheter cystitis at the posterior wall and dome. Retroflexion of the scope showed a hypervascular bladder neck and prostate.  We attempted to place a 16 Fr coude but met resistance at the false passage. The scope was re-inserted. Once in the bladder a sensor wire was placed under vision. The scope was removed. Over the wire a Don catheter was placed into the bladder and the wire was removed. The balloon was filled with fluid and the bladder fluid was monitored for hematuria.     We repositioned in the left lateral decubitus position. A MEET revealed a firm 3+ enlarged gland. An ultrasound probe was inserted and we measures a 137 gram gland. We systematically obtained 12 cores biopsies of the prostate gland without complication. The US probe was removed and procedure concluded     At the end of the procedure all counts were correct. The patient tolerated the procedure well and was transported to the PACU in stable condition.    Findings: large false passage, 137 gram gland with no median lobe    Estimated Blood Loss: minimal                  Drains: 16 Fr counciltip catheter          Specimens: 12 prostate biopsies    Complications: none apparent           Disposition:  PACU - hemodynamically stable.    Plan: Would recommend larger coude catheters always moving forward. Hold AC. Follow up pathology. Would benefit from UDS vs RASP           Tony Dominguez MD  3/9/2023

## 2023-03-09 NOTE — PROGRESS NOTES
[facility] PROGRESS NOTE    3/9/2023 1:32 PM        Name: Jannie Wilson . Admitted: 3/2/2023  Primary Care Provider: No primary care provider on file. (Tel: None)      Subjective:  . Admitted with increased swelling and shortness of breath   He reports compliance with home meds but does not weight himself at home     Seen by cardiology and EP on Friday,    Pacer rate increased to 70. Underwent cardioversion Monday morning. Seen this am and feeling much better. Reports breathing is better since IV lasix.   He has diuresed almost 13 liters   Weight down to 188 today       Reviewed interval ancillary notes    Current Medications  lisinopril (PRINIVIL;ZESTRIL) tablet 2.5 mg, Daily  amiodarone (CORDARONE) tablet 200 mg, Daily  midodrine (PROAMATINE) tablet 2.5 mg, BID WC  iron sucrose (VENOFER) 200 mg in sodium chloride 0.9 % 100 mL IVPB, Q24H  lidocaine (XYLOCAINE) 2 % uro-jet, PRN  heparin (porcine) injection 4,000 Units, PRN  heparin (porcine) injection 2,000 Units, PRN  torsemide (DEMADEX) tablet 20 mg, Daily  [Held by provider] apixaban (ELIQUIS) tablet 5 mg, BID  atorvastatin (LIPITOR) tablet 40 mg, Nightly  empagliflozin (JARDIANCE) tablet 10 mg, Daily  finasteride (PROSCAR) tablet 5 mg, Daily  metoprolol succinate (TOPROL XL) extended release tablet 12.5 mg, BID  pantoprazole (PROTONIX) tablet 40 mg, Daily  spironolactone (ALDACTONE) tablet 25 mg, Lunch  tamsulosin (FLOMAX) capsule 0.4 mg, Daily  sodium chloride flush 0.9 % injection 5-40 mL, 2 times per day  sodium chloride flush 0.9 % injection 5-40 mL, PRN  0.9 % sodium chloride infusion, PRN  ondansetron (ZOFRAN-ODT) disintegrating tablet 4 mg, Q8H PRN   Or  ondansetron (ZOFRAN) injection 4 mg, Q6H PRN  polyethylene glycol (GLYCOLAX) packet 17 g, Daily PRN  acetaminophen (TYLENOL) tablet 650 mg, Q6H PRN   Or  acetaminophen (TYLENOL) suppository 650 mg, Q6H PRN      Objective:  /74   Pulse 75   Temp 98.2 °F (36.8 °C) (Oral)   Resp 18   Ht 6' 2\" (1.88 m)   Wt 188 lb 6.4 oz (85.5 kg)   SpO2 96%   BMI 24.19 kg/m²     Intake/Output Summary (Last 24 hours) at 3/9/2023 1332  Last data filed at 3/8/2023 2025  Gross per 24 hour   Intake 240 ml   Output 2750 ml   Net -2510 ml        Wt Readings from Last 3 Encounters:   03/09/23 188 lb 6.4 oz (85.5 kg)   02/21/23 198 lb 6.6 oz (90 kg)   02/03/23 182 lb 4.8 oz (82.7 kg)       General appearance:  Appears comfortable, alert and pleasant. Eyes: Sclera clear. Pupils equal.  ENT: Moist oral mucosa. Trachea midline, no adenopathy. Cardiovascular: Regular rhythm, normal S1, S2. Harsh murmur. Trace edema in lower extremities but improving   Respiratory: Not using accessory muscles. Good inspiratory effort. Clear to auscultation bilaterally, no wheeze or crackles. GI: Abdomen soft, no tenderness, not distended, normal bowel sounds  Musculoskeletal: No cyanosis in digits, neck supple  Neurology: CN 2-12 grossly intact. No speech or motor deficits  Psych: Normal affect. Alert and oriented in time, place and person  Skin: Warm, dry, normal turgor    Labs and Tests:  CBC:   Recent Labs     03/06/23  2113 03/07/23  0323   WBC 3.8* 3.5*   HGB 12.1* 11.8*    193       BMP:    Recent Labs     03/08/23  1425      K 4.3      CO2 30   BUN 19   CREATININE 1.2   GLUCOSE 98       Hepatic:   No results for input(s): AST, ALT, ALB, BILITOT, ALKPHOS in the last 72 hours. Patient has a BiV ICD implanted by Dr. Mili Ewing on 4/25/2022. He has chronic lung persistent atrial fibrillation. At the time of implantation the device has been programmed to VVIR with a lower rate of 50s since he was in atrial fibrillation. He has been started on amiodarone planning for CONSUELO and cardioversion. Chest xray:    No radiographic evidence of acute pulmonary disease.        Stable cardiomegaly       Problem List  Principal Problem:    Acute combined systolic and diastolic CHF, NYHA class 1 (HCC)  Active Problems:    Hypotension    Hypoxia    Bradycardia    SACHIN (acute kidney injury) ([facility] [address]    ICD (implantable cardioverter-defibrillator) in place  Resolved Problems:    * No resolved hospital problems. *       Assessment & Plan:   Acute on chronic systolic and diastolic HF: On ardiance and BB therapy. IV lasix stopped Sunday. On Demadex. ACE on hold. Aldactone resumed  CKD:  repeat labs in am.   Chronic AF:  AC with eliquis, s/p cardioversion. Discussed with Dr Arti Vizcarra. Ok to proceed with prostate biopsy. Has been on heparin drip. Will dc now in preparation for surgery.        Diet: Diet NPO  Code:Full Code  DVT PPX      Tonio Rojas PA-C   3/9/2023 1:32 PM

## 2023-03-09 NOTE — PROGRESS NOTES
Anti-XA resulted at 0455 at 0.29. Hospitalist notified at 3220. \"antiXA was 0.29 at 0455. The heparin was not adjusted at that time. Do you want me to have it redrawn or adjust now? \"  Hospitalist advised to redraw. AntiXA was redrawn and resulted 0.33. No adjustment needed. Theraputic  level.  Will reorder for [phone] today

## 2023-03-09 NOTE — PROGRESS NOTES
Phase 1 discharge criteria met. VSS, O2 sats 98% on RA. Pt denies pain. Don catheter in place and draining clear yellow urine.   Pt will transfer to  in stable condition

## 2023-03-10 VITALS
BODY MASS INDEX: 23.15 KG/M2 | OXYGEN SATURATION: 96 % | WEIGHT: 180.4 LBS | TEMPERATURE: 98.2 F | HEART RATE: 72 BPM | DIASTOLIC BLOOD PRESSURE: 65 MMHG | HEIGHT: 74 IN | SYSTOLIC BLOOD PRESSURE: 88 MMHG | RESPIRATION RATE: 16 BRPM

## 2023-03-10 PROCEDURE — 2580000003 HC RX 258: Performed by: UROLOGY

## 2023-03-10 PROCEDURE — 6370000000 HC RX 637 (ALT 250 FOR IP): Performed by: UROLOGY

## 2023-03-10 PROCEDURE — 6360000002 HC RX W HCPCS: Performed by: UROLOGY

## 2023-03-10 RX ORDER — AMIODARONE HYDROCHLORIDE 200 MG/1
200 TABLET ORAL DAILY
Qty: 45 TABLET | Refills: 0
Start: 2023-03-10

## 2023-03-10 RX ADMIN — METOPROLOL SUCCINATE 12.5 MG: 25 TABLET, FILM COATED, EXTENDED RELEASE ORAL at 09:51

## 2023-03-10 RX ADMIN — TAMSULOSIN HYDROCHLORIDE 0.4 MG: 0.4 CAPSULE ORAL at 09:50

## 2023-03-10 RX ADMIN — MIDODRINE HYDROCHLORIDE 2.5 MG: 5 TABLET ORAL at 17:12

## 2023-03-10 RX ADMIN — IRON SUCROSE 200 MG: 20 INJECTION, SOLUTION INTRAVENOUS at 10:27

## 2023-03-10 RX ADMIN — TORSEMIDE 20 MG: 20 TABLET ORAL at 09:48

## 2023-03-10 RX ADMIN — AMIODARONE HYDROCHLORIDE 200 MG: 200 TABLET ORAL at 09:49

## 2023-03-10 RX ADMIN — PANTOPRAZOLE SODIUM 40 MG: 40 TABLET, DELAYED RELEASE ORAL at 09:50

## 2023-03-10 RX ADMIN — Medication 10 ML: at 10:21

## 2023-03-10 RX ADMIN — SPIRONOLACTONE 25 MG: 25 TABLET ORAL at 12:15

## 2023-03-10 RX ADMIN — FINASTERIDE 5 MG: 5 TABLET, FILM COATED ORAL at 09:50

## 2023-03-10 RX ADMIN — EMPAGLIFLOZIN 10 MG: 10 TABLET, FILM COATED ORAL at 09:56

## 2023-03-10 RX ADMIN — MIDODRINE HYDROCHLORIDE 2.5 MG: 5 TABLET ORAL at 09:56

## 2023-03-10 RX ADMIN — LISINOPRIL 2.5 MG: 5 TABLET ORAL at 09:52

## 2023-03-10 NOTE — CARE COORDINATION
Discharge note:      MARTHA/GLORIA has been notified of discharge. Patient noted to have the following needs at discharge. MARTHA/GLORIA has coordinated the following services:    MARTHA called Tera Cushing RN case manager [phone] and LVM advising that the Patient will be D/C today. CM advised the patient may need help with transportation in the future to any follow up appointments and medication management. CM left her call back information for any questions. [facility]. Transportation: [facility] or Uber/Lyft. Comment: N/A. All RACHEL needs met, will sign off.     Electronically signed by DAVINA Abrams on 3/10/2023 at 11:40 AM

## 2023-03-10 NOTE — PROGRESS NOTES
Urology Progress Note  [facility]    Provider: TRENT Abraham CNP  Patient ID:  Admission Date: 3/2/2023 Name: Kimberly Yeh  OR Date: 3/2/2023 MRN: [id number]   Patient Location: [id number] : 1960  Attending: Patrice Nagy MD Date of Service: 3/10/2023  PCP: No primary care provider on file. Diagnoses:  1. Acute on chronic congestive heart failure, unspecified heart failure type ([address]    2. Hypoxia    3. Elevated PSA         Assessment/Plan:  57 y.o. AA male with h/o CHF, A.fib on chronic anticoagulation. Urology consulted for urinary retention and elevated psa- he has a long standing hx of  chronic ernst. He was previously seen in  for BPH and elevated psa of 20, and at that time Dr. Josh Wallace  recommended cysto/TRUS bx- numerous calls to patient, non-comply letter sent to his address. He has had a positive DREs on multiple occasions. He has been cardioverted , now on heparin. Recc  S/p Cysto/trusp/prostate bx 03/10- severe hypertrophy, false passage and 137 gram gland  Ideally hold ac 5 days post op, if wishing to begin sooner would need to monitor for hematuria and stop AC if develops GH. Would then need reevaluate in the ED or outpatient. Outpatient discharge with the ernst catheter. He knows this needs exchanged q4 weeks. He may be catheter dependent moving forward. The patient had a chance to ask questions which were answered. he understands the above plan. Subjective:   Kimberly Yeh is a 57 y.o. male. He was seen and examined this morning.  Today we discussed prostate bx     Objective:   Vitals:  Vitals:    03/10/23 0800   BP: 103/67   Pulse: 69   Resp: 16   Temp: 98.5 °F (36.9 °C)   SpO2: 97%       Intake/Output Summary (Last 24 hours) at 3/10/2023 2855  Last data filed at 3/9/2023 2315  Gross per 24 hour   Intake 440 ml   Output 3175 ml   Net -2735 ml       Physical Exam:  Gen: Alert and oriented x3, no acute distress  Skin: warmand well perfused, no rashes noted on the face, or arms.      Labs:  Lab Results   Component Value Date    WBC 3.5 (L) 03/07/2023    HGB 11.8 (L) 03/07/2023    HCT 37.0 (L) 03/07/2023    MCV 81.5 03/07/2023     03/07/2023     Lab Results   Component Value Date    CREATININE 1.2 03/08/2023    BUN 19 03/08/2023     03/08/2023    K 4.3 03/08/2023     03/08/2023    CO2 30 03/08/2023       Mahogany Blackburn, APRN - CNP   3/10/2023

## 2023-03-10 NOTE — PLAN OF CARE
Problem: Discharge Planning  Goal: Discharge to home or other facility with appropriate resources  3/9/2023 2215 by Leticia Pickard RN  Outcome: Progressing  3/9/2023 1610 by Tony Kenyon RN  Outcome: Progressing  Flowsheets (Taken 3/9/2023 0815)  Discharge to home or other facility with appropriate resources:   Identify barriers to discharge with patient and caregiver   Arrange for needed discharge resources and transportation as appropriate   Identify discharge learning needs (meds, wound care, etc)     Problem: Safety - Adult  Goal: Free from fall injury  3/9/2023 2215 by Leticia Pickard RN  Outcome: Progressing  3/9/2023 1610 by Tony Kenyon RN  Outcome: Progressing     Problem: ABCDS Injury Assessment  Goal: Absence of physical injury  3/9/2023 2215 by Leticia Pickard RN  Outcome: Progressing  3/9/2023 1610 by Tony Kenyon RN  Outcome: Progressing     Problem: Chronic Conditions and Co-morbidities  Goal: Patient's chronic conditions and co-morbidity symptoms are monitored and maintained or improved  3/9/2023 2215 by Leticia Pickard RN  Outcome: Progressing  3/9/2023 1610 by Tony Kenyon RN  Outcome: Progressing  Flowsheets (Taken 3/9/2023 0815)  Care Plan - Patient's Chronic Conditions and Co-Morbidity Symptoms are Monitored and Maintained or Improved:   Monitor and assess patient's chronic conditions and comorbid symptoms for stability, deterioration, or improvement   Collaborate with multidisciplinary team to address chronic and comorbid conditions and prevent exacerbation or deterioration   Update acute care plan with appropriate goals if chronic or comorbid symptoms are exacerbated and prevent overall improvement and discharge     Problem: Respiratory - Adult  Goal: Achieves optimal ventilation and oxygenation  3/9/2023 2215 by Leticia Pickard RN  Outcome: Progressing  3/9/2023 1610 by Tony Kenyon RN  Outcome: Progressing  Flowsheets (Taken 3/9/2023 0815)  Achieves optimal ventilation and oxygenation:   Assess for changes in respiratory status   Position to facilitate oxygenation and minimize respiratory effort   Assess for changes in mentation and behavior   Oxygen supplementation based on oxygen saturation or arterial blood gases   Encourage broncho-pulmonary hygiene including cough, deep breathe, incentive spirometry   Assess the need for suctioning and aspirate as needed     Problem: Cardiovascular - Adult  Goal: Maintains optimal cardiac output and hemodynamic stability  3/9/2023 2215 by Silvestre Gan RN  Outcome: Progressing  3/9/2023 1610 by Milka Sher RN  Outcome: Progressing  Flowsheets (Taken 3/9/2023 0815)  Maintains optimal cardiac output and hemodynamic stability:   Monitor blood pressure and heart rate   Monitor urine output and notify Licensed Independent Practitioner for values outside of normal range   Assess for signs of decreased cardiac output   Administer fluid and/or volume expanders as ordered     Problem: Skin/Tissue Integrity - Adult  Goal: Skin integrity remains intact  3/9/2023 2215 by Silvestre Gan RN  Outcome: Progressing  3/9/2023 1610 by Milka Sher RN  Outcome: Progressing  Flowsheets (Taken 3/9/2023 0815)  Skin Integrity Remains Intact:   Monitor for areas of redness and/or skin breakdown   Assess vascular access sites hourly     Problem: Genitourinary - Adult  Goal: Absence of urinary retention  3/9/2023 2215 by Silvestre Gan RN  Outcome: Progressing  3/9/2023 1610 by Milka Sher RN  Outcome: Progressing  Flowsheets (Taken 3/9/2023 0815)  Absence of urinary retention:   Assess patients ability to void and empty bladder   Monitor intake/output and perform bladder scan as needed   Place urinary catheter per Licensed Independent Practitioner order if needed   Discuss with Licensed Independent Practitioner  medications to alleviate retention as needed     Problem: Metabolic/Fluid and Electrolytes - Adult  Goal: Electrolytes maintained within normal limits  3/9/2023 2215 by Rodney Luna RN  Outcome: Progressing  3/9/2023 1610 by Roland Carlisle RN  Outcome: Progressing  Flowsheets (Taken 3/9/2023 0815)  Electrolytes maintained within normal limits:   Monitor labs and assess patient for signs and symptoms of electrolyte imbalances   Administer electrolyte replacement as ordered   Monitor response to electrolyte replacements, including repeat lab results as appropriate   Fluid restriction as ordered     Problem: Pain  Goal: Verbalizes/displays adequate comfort level or baseline comfort level  3/9/2023 2215 by Rodney Luna RN  Outcome: Progressing  3/9/2023 1610 by Roland Carlisle RN  Outcome: Progressing     Problem: Musculoskeletal - Adult  Goal: Return mobility to safest level of function  3/9/2023 2215 by Rodney Luna RN  Outcome: Progressing  3/9/2023 1610 by Roland Carlisle RN  Outcome: Progressing  Flowsheets (Taken 3/9/2023 0815)  Return Mobility to Safest Level of Function:   Assess patient stability and activity tolerance for standing, transferring and ambulating with or without assistive devices   Assist with transfers and ambulation using safe patient handling equipment as needed   Ensure adequate protection for wounds/incisions during mobilization   Obtain physical therapy/occupational therapy consults as needed

## 2023-03-10 NOTE — DISCHARGE SUMMARY
[facility]    Patient Demographics    Vince Mckeon  Date of Birth. 1960  MRN. [id number]     Primary care provider. No primary care provider on file. (Tel: None)    Admit date: 3/2/2023    Discharge date (blank if same as Note Date): Note Date: 3/10/2023     Reason for Hospitalization. Chief Complaint   Patient presents with    Shortness of Breath     States SOB upon exertion and also states his legs feel like \"bricks\" when he walks x 3 days          Significant Findings. Principal Problem:    Acute combined systolic and diastolic CHF, NYHA class 1 ([facility])  Active Problems:    Hypotension    Hypoxia    Bradycardia    SACHIN (acute kidney injury) ([facility] [address]    ICD (implantable cardioverter-defibrillator) in place  Resolved Problems:    * No resolved hospital problems. *       Problems and results from this hospitalization that need follow up. CHF  Prostate biopsy  afib    Significant test results and incidental findings. CXR  No radiographic evidence of acute pulmonary disease. Stable cardiomegaly. C  Invasive procedures and treatments. Cystoscopy with prostate biopsy 3/9  CONSUELO  with [address] [facility]. Patient is a pleasant 15-year-old male with severe congestive heart failure, EF 15%, A-fib, who presented to the hospital with exertional shortness of breath and leg swelling. In the emergency room patient was hypoxic. Patient was admitted with decompensated CHF he was started on IV Lasix. He was seen by electrophysiology for bradycardia. His device was interrogated and programmed, heart rate increased to 70 bpm.  It was decided to perform a CONSUELO with cardioversion as an inpatient as patient has extenuating social circumstances including no car, no phone, no family no family around. This was performed on March 6 by Dr Yuly Miles.  He was started on a heparin drip as he then was scheduled for a cystoscopy due to urinary retention and abnormal PSA. Patient tolerated the procedure well. He will start his elquis on Sunday assuming urine is clear (EP concerned about waiting too long given recent cardioversion). Patient has a follow up with cardiology on Monday. Patients weight at time of dc is 180     Consults. IP CONSULT TO CARDIOLOGY  IP CONSULT TO PALLIATIVE CARE  IP CONSULT TO SPIRITUAL SERVICES    Physical examination on discharge day. /67   Pulse 69   Temp 98.5 °F (36.9 °C) (Oral)   Resp 16   Ht 6' 2\" (1.88 m)   Wt 180 lb 6.4 oz (81.8 kg)   SpO2 97%   BMI 23.16 kg/m²   General appearance. Alert. Looks comfortable. HEENT. Sclera clear. Moist mucus membranes. Cardiovascular. Regular rate and rhythm, normal S1, S2. No murmur. Respiratory. Not using accessory muscles. Clear to auscultation bilaterally, no wheeze. Gastrointestinal. Abdomen soft, non-tender, not distended, normal bowel sounds  Neurology. Facial symmetry. No speech deficits. Moving all extremities equally. Extremities. No edema in lower extremities. Skin. Warm, dry, normal turgor    Condition at time of discharge stable    Medication instructions provided to patient at discharge. Medication List        START taking these medications      midodrine 2.5 MG tablet  Commonly known as: PROAMATINE  Take 1 tablet by mouth 2 times daily (with meals)            CHANGE how you take these medications      amiodarone 200 MG tablet  Commonly known as: CORDARONE  Take 1 tablet by mouth daily For 2 weeks then decrease to 200mg once a day  What changed: when to take this     apixaban 5 MG Tabs tablet  Commonly known as: ELIQUIS  Take 1 tablet by mouth 2 times daily  Start taking on: March 12, 2023  What changed: These instructions start on March 12, 2023. If you are unsure what to do until then, ask your doctor or other care provider.             CONTINUE taking these medications      atorvastatin 40 MG tablet  Commonly known as: LIPITOR  Take 1 tablet by mouth nightly     empagliflozin 10 MG tablet  Commonly known as: Jardiance  Take 1 tablet by mouth daily     finasteride 5 MG tablet  Commonly known as: PROSCAR     lisinopril 2.5 MG tablet  Commonly known as: PRINIVIL;ZESTRIL  Take 1 tablet by mouth daily     metoprolol succinate 25 MG extended release tablet  Commonly known as: TOPROL XL  Take 0.5 tablets by mouth in the morning and at bedtime     pantoprazole 40 MG tablet  Commonly known as: PROTONIX  Take 1 tablet by mouth daily     spironolactone 25 MG tablet  Commonly known as: ALDACTONE  Take 1 tablet by mouth Daily with lunch     tamsulosin 0.4 MG capsule  Commonly known as: FLOMAX  Take 1 capsule by mouth daily     torsemide 20 MG tablet  Commonly known as: DEMADEX  Take 1 tablet by mouth daily            STOP taking these medications      potassium chloride 20 MEQ extended release tablet  Commonly known as: KLOR-CON M     vitamin D 50 MCG (2000 UT) Tabs tablet  Commonly known as: CHOLECALCIFEROL               Where to Get Your Medications        These medications were sent to [facility], [address] [place] [address], [address] [place] [address]      Phone: [phone]   empagliflozin 10 MG tablet  lisinopril 2.5 MG tablet  metoprolol succinate 25 MG extended release tablet  midodrine 2.5 MG tablet  spironolactone 25 MG tablet  torsemide 20 MG tablet       Information about where to get these medications is not yet available    Ask your nurse or doctor about these medications  amiodarone 200 MG tablet  apixaban 5 MG Tabs tablet         Discharge recommendations given to patient. Follow Up. Urology and CHF NP in 1 week   Disposition. home  Activity. activity as tolerated  Diet: ADULT DIET; Regular; Low Sodium (2 gm)      Spent 35 minutes in discharge process. Signed:   Uzma Balbuena PA-C     3/10/2023 11:19 AM

## 2023-03-10 NOTE — DISCHARGE INSTR - COC
Continuity of Care Form    Patient Name: Jannie Wilson   :  1960  MRN:  [id number]    Admit date:  3/2/2023  Discharge date:  ***    Code Status Order: Full Code   Advance Directives:     Admitting Physician:  Liliana East MD  PCP: No primary care provider on file. Discharging Nurse: [facility] Unit/Room#: [id number]/7535-78  Discharging Unit Phone Number: ***    Emergency Contact:   Extended Emergency Contact Information  Primary Emergency Contact: Edward [address] Phone: [phone]  Mobile Phone: [phone]  Relation: Other   needed?  No  Secondary Emergency Contact: [address] Phone: [phone]  Relation: Other    Past Surgical History:  Past Surgical History:   Procedure Laterality Date    CARDIAC DEFIBRILLATOR PLACEMENT Left     CARDIOVERSION  2019    Dr. Huong Jewell  2019    PROSTATE SURGERY N/A 3/9/2023    CYSTOSCOPY, PROSTATE TRANSRECTAL ULTRASOUND BIOPSY performed by Emerald Paula MD at [address] [place] [address]       Immunization History:   Immunization History   Administered Date(s) Administered    Tdap (Boostrix, Adacel) 2022       Active Problems:  Patient Active Problem List   Diagnosis Code    Acute retention of urine R33.8    SACHIN (acute kidney injury) ([facility] [address] N17.9    Paroxysmal atrial fibrillation (HCC) I48.0    Primary hypertension I10    Dilated cardiomyopathy ([facility] [address] I42.0    Encounter for loop recorder check Z45.09    Acute urinary retention R33.8    Acute pulmonary edema (HCC) J81.0    Chronic systolic (congestive) heart failure (HCC) I50.22    COVID U07.1    Non-compliance Z91.199    Homeless Z59.00    NSTEMI (non-ST elevated myocardial infarction) ([facility] [address] I21.4    Acute combined systolic and diastolic heart failure (HCC) M52.12    Complicated UTI (urinary tract infection) N39.0    Chronic atrial fibrillation (HCC) I48.20    Moderate mitral regurgitation I34.0    Acute on chronic congestive heart failure (HCC) I50.9 VT (ventricular tachycardia) I47.20    ICD (implantable cardioverter-defibrillator) in place Z95.810    Thrombus of left atrial appendage I51.3    Syncope and collapse R55    PNA (pneumonia) J18.9    Hemoptysis R04.2    Fluid overload E87.70    Elevated LFTs R79.89    Hypotension I95.9    Peripheral edema R60.9    Anemia D64.9    HFrEF (heart failure with reduced ejection fraction) ([facility]) I50.20    Chronic diastolic (congestive) heart failure ([facility]) I50.32    Dizziness R42    Abdominal pain R10.9    Abdominal pain, right upper quadrant R10.11    Epigastric abdominal pain R10.13    Acalculous cholecystitis K81.9    Persistent atrial fibrillation ([facility]) I48.19    UTI (urinary tract infection) N39.0    Acute combined systolic and diastolic CHF, NYHA class 1 ([facility]) I50.41    Hypoxia R09.02    Bradycardia R00.1       Isolation/Infection:   Isolation            No Isolation          Patient Infection Status       Infection Onset Added Last Indicated Last Indicated By Review Planned Expiration Resolved Resolved By    None active    Resolved    COVID-19 (Rule Out) 22 COVID-19, Rapid (Ordered)   22 Rule-Out Test Resulted    COVID-19 (Rule Out) 22 Respiratory Panel, Molecular, with COVID-19 (Restricted: peds pts or suitable admitted adults) (Ordered)   22 Rule-Out Test Resulted    COVID-19 (Rule Out) 22 COVID-19, Rapid (Ordered)   22 Rule-Out Test Resulted    COVID-19 (Rule Out) 06/15/22 06/15/22 06/15/22 COVID-19 & Influenza Combo (Ordered)   06/15/22 Rule-Out Test Resulted    COVID-19 21 COVID-19   21     COVID-19 (Rule Out) 21 COVID-19 (Ordered)   21 Rule-Out Test Resulted            Nurse Assessment:  Last Vital Signs: BP 96/63   Pulse 69   Temp 97.8 °F (36.6 °C) (Oral)   Resp 16   Ht 6' 2\" (1.88 m)   Wt 180 lb 6.4 oz (81.8 kg)   SpO2 94%   BMI 23.16 kg/m²     Last documented pain score (0-10 scale): Pain Level: 0  Last Weight:   Wt Readings from Last 1 Encounters:   03/10/23 180 lb 6.4 oz (81.8 kg)     Mental Status:  {IP PT MENTAL STATUS:}    IV Access:  [address] IV ACCESS:367809849}    Nursing Mobility/ADLs:  Walking   {CHP DME EEZM:940560964}  Transfer  {CHP DME NXBJ:887996070}  Bathing  {CHP DME ALSB:330938790}  Dressing  {CHP DME PYLK:553636456}  Toileting  {CHP DME QXAV:203807139}  Feeding  {CHP DME HAOV:307030138}  Med Admin  {CHP DME NNGS:201983156}  Med Delivery   { JOSE ROBERTO MED Delivery:237447042}    Wound Care Documentation and Therapy:        Elimination:  Continence: Bowel: {YES / MJ:39429}  Bladder: {YES / QK:99830}  Urinary Catheter: {Urinary Catheter:476607592}   Colostomy/Ileostomy/Ileal Conduit: {YES / HV:45082}       Date of Last BM: ***    Intake/Output Summary (Last 24 hours) at 3/10/2023 1220  Last data filed at 3/10/2023 1030  Gross per 24 hour   Intake 440 ml   Output 3100 ml   Net -2660 ml     I/O last 3 completed shifts:   In: 12 [P.O.:360; I.V.:100; IV Piggyback:100]  Out: 7360 [Urine:4925]    Safety Concerns:     [address] Safety Concerns:491064308}    Impairments/Disabilities:      [address] Impairments/Disabilities:549440571}    Nutrition Therapy:  Current Nutrition Therapy:   [address] Diet List:998706879}    Routes of Feeding: {P DME Other Feedings:019371499}  Liquids: {Slp liquid thickness:98560}  Daily Fluid Restriction: {CHP DME Yes amt example:697536338}  Last Modified Barium Swallow with Video (Video Swallowing Test): {Done Not Done GUX}    Treatments at the Time of Hospital Discharge:   Respiratory Treatments: ***  Oxygen Therapy:  {Therapy; copd oxygen:52936}  Ventilator:    { CC Vent QNRB:787269473}    Rehab Therapies: {THERAPEUTIC INTERVENTION:9413421219}  Weight Bearing Status/Restrictions: { PANCHO Weight Bearin}  Other Medical Equipment (for information only, NOT a DME order):  {EQUIPMENT:049750000}  Other Treatments: ***    Patient's personal belongings (please select all that are sent with patient):  {CHP DME Belongings:858365995}    RN SIGNATURE:  {Esignature:077544345}    CASE MANAGEMENT/SOCIAL WORK SECTION    Inpatient Status Date: ***    Readmission Risk Assessment Score:  Readmission Risk              Risk of Unplanned Readmission:  38           Discharging to Facility/ Agency   Name:   Address:  Phone:  Fax:    Dialysis Facility (if applicable)   Name:  Address:  Dialysis Schedule:  Phone:  Fax:    / signature: {Esignature:895037362}    PHYSICIAN SECTION    Prognosis: Good    Condition at Discharge: Stable    Rehab Potential (if transferring to Rehab): Good    Recommended Labs or Other Treatments After Discharge: Follow up with CHF NP this Monday as scheduled. Follow up with Urology in 1 week    Physician Certification: I certify the above information and transfer of Han Porter  is necessary for the continuing treatment of the diagnosis listed and that he requires Home Care for less 30 days.      Update Admission H&P: No change in H&P    PHYSICIAN SIGNATURE:  Electronically signed by Dread Mujica PA-C on 3/10/23 at 12:21 PM EST

## 2023-03-10 NOTE — PROGRESS NOTES
CLINICAL PHARMACY NOTE: MEDS TO BEDS    Total # of Prescriptions Filled: 4   The following medications were delivered to the patient:  Jardiance 10 mg  Midodrine 2.5 mg  Torsemide 20 mg  Lisinopril 2.5 mg    Additional Documentation:  Delivered to patient=signed  Ok to be delivered per [facility]

## 2023-03-10 NOTE — PROGRESS NOTES
Patient discharged to home. Don catheter left in place per urology orders. IV's removed without complication, dressing applied. Discharge instructions explained and all questions answered. Medications delivered by outpatient pharmacy. Patient will be transported home by his \"friend\". Patient transported to [facility] via wheel chair by PCA

## 2023-03-13 ENCOUNTER — HOSPITAL ENCOUNTER (OUTPATIENT)
Age: 63
Discharge: HOME | End: 2023-03-13
Payer: COMMERCIAL

## 2023-03-13 ENCOUNTER — OFFICE VISIT (OUTPATIENT)
Dept: CARDIOLOGY CLINIC | Age: 63
End: 2023-03-13
Payer: COMMERCIAL

## 2023-03-13 VITALS
OXYGEN SATURATION: 95 % | WEIGHT: 187 LBS | SYSTOLIC BLOOD PRESSURE: 90 MMHG | HEART RATE: 76 BPM | BODY MASS INDEX: 24 KG/M2 | DIASTOLIC BLOOD PRESSURE: 60 MMHG | HEIGHT: 74 IN

## 2023-03-13 DIAGNOSIS — I95.9 HYPOTENSION, UNSPECIFIED HYPOTENSION TYPE: ICD-10-CM

## 2023-03-13 DIAGNOSIS — I42.0 DILATED CARDIOMYOPATHY (HCC): ICD-10-CM

## 2023-03-13 DIAGNOSIS — I50.22 CHRONIC SYSTOLIC (CONGESTIVE) HEART FAILURE (HCC): Primary | ICD-10-CM

## 2023-03-13 DIAGNOSIS — Z91.199 NON-COMPLIANCE: ICD-10-CM

## 2023-03-13 DIAGNOSIS — I50.22 CHRONIC SYSTOLIC (CONGESTIVE) HEART FAILURE (HCC): ICD-10-CM

## 2023-03-13 DIAGNOSIS — I48.0 PAF (PAROXYSMAL ATRIAL FIBRILLATION) (HCC): ICD-10-CM

## 2023-03-13 DIAGNOSIS — Z95.810 ICD (IMPLANTABLE CARDIOVERTER-DEFIBRILLATOR), BIVENTRICULAR, IN SITU: ICD-10-CM

## 2023-03-13 DIAGNOSIS — Z59.00 HOMELESS: ICD-10-CM

## 2023-03-13 PROCEDURE — 80048 BASIC METABOLIC PNL TOTAL CA: CPT

## 2023-03-13 PROCEDURE — 83880 ASSAY OF NATRIURETIC PEPTIDE: CPT

## 2023-03-13 PROCEDURE — 99214 OFFICE O/P EST MOD 30 MIN: CPT | Performed by: CLINICAL NURSE SPECIALIST

## 2023-03-13 PROCEDURE — 36415 COLL VENOUS BLD VENIPUNCTURE: CPT

## 2023-03-13 RX ORDER — MIDODRINE HYDROCHLORIDE 2.5 MG/1
5 TABLET ORAL 2 TIMES DAILY WITH MEALS
Qty: 180 TABLET | Refills: 1 | Status: SHIPPED
Start: 2023-03-13

## 2023-03-13 SDOH — ECONOMIC STABILITY - HOUSING INSECURITY: HOMELESSNESS UNSPECIFIED: Z59.00

## 2023-03-13 NOTE — PROGRESS NOTES
[facility]  Progress Note    Primary Care Doctor:  No primary care provider on file. Chief Complaint   Patient presents with    Congestive Heart Failure    Dizziness            History of Present Illness:  57 y.o. male with history of with history of PAF, hypertension. Unvaccinated, , homeless  hospitalization 2/5-10/22 for weight gain and lower extremity edema. He recently had covid and did not follow up in office. LVEF 20%, C done. Weight 223->193. He was started on HF therapy, declined life vest.  CONSUELO done with HENOK thrombus (on eliquis per EP). Not able to do CV  4/13-26/22 for fluid overload requiring diureses with milrinone and lasix infusion, UTI and VT requiring ICD placement  6/15-20/22 for acute on chronic systolic heart failure. He was over drinking fluids, diuresed with IV lasix and good weight loss of 23 pounds. 7/28-8/1/2022 for shortness of breath, hypoxia, pneumonia, lisinopril and jardiance held due to hypotension and given oral metolazone times 2  8/31-9/8/2022 for acute on chronic systolic heart failure. He received lasix and milrinone infusions plus a dose of metolazone. His weight dropped from 220->189    I had the pleasure of seeing Shruti Horton in follow up for hospitalization 3/2-10/2023 for acute combined s/diastolic heart failure (treated with IV lasix), AF with CV and prostate biopsy with indwelling ernst catheter. He is ambulatory and by his self. He states he had some dizziness yesterday which is better today. He has not resumed the eliquis as he does not have the script and is picking it up at the pharmacy today. His weight at home was 188. He has a list of all his medications with him. He is able to tell me back how much he can drink, sodium and when to call with certain weights. His prostate biopsy was no malignancy. He has an indwelling ernst catheter but has not follow up with urology scheduled.    [place] shows that he is on the drier side and given a glass of water to drink in office      Past Medical History:   has a past medical history of Acute combined systolic and diastolic congestive heart failure ([address] Atrial fibrillation ([address] CHF (congestive heart failure) ([address] Hx of blood clots, and Hypertension. Surgical History:   has a past surgical history that includes Insertable Cardiac Monitor (07/26/2019); Cardioversion (07/26/2019); Cardiac defibrillator placement (Left); and Prostate surgery (N/A, 3/9/2023). Social History:   reports that he has never smoked. He has never been exposed to tobacco smoke. He has never used smokeless tobacco. He reports that he does not drink alcohol and does not use drugs. Family History:   Family History   Problem Relation Age of Onset    Heart Disease Mother     High Blood Pressure Mother     Heart Attack Mother     Other Father     Stroke Father     High Blood Pressure Father        Home Medications:  Prior to Admission medications    Medication Sig Start Date End Date Taking?  Authorizing Provider   midodrine (PROAMATINE) 2.5 MG tablet Take 2 tablets by mouth 2 times daily (with meals) 3/13/23  Yes TRENT Santos   amiodarone (CORDARONE) 200 MG tablet Take 1 tablet by mouth daily For 2 weeks then decrease to 200mg once a day  Patient taking differently: Take 200 mg by mouth daily 3/10/23  Yes Gricelda Coughlin PA-C   empagliflozin (JARDIANCE) 10 MG tablet Take 1 tablet by mouth daily 3/9/23  Yes TRENT Alamo   lisinopril (PRINIVIL;ZESTRIL) 2.5 MG tablet Take 1 tablet by mouth daily 3/9/23  Yes TRENT Alamo   metoprolol succinate (TOPROL XL) 25 MG extended release tablet Take 0.5 tablets by mouth in the morning and at bedtime 3/9/23  Yes TRENT Santos   torsemide (DEMADEX) 20 MG tablet Take 1 tablet by mouth daily 3/9/23  Yes TRENT Santos   pantoprazole (PROTONIX) 40 MG tablet Take 1 tablet by mouth daily 2/3/23 3/13/23 Yes Selina Jean Baptiste MD   finasteride (PROSCAR) 5 MG tablet Take 5 mg by mouth daily   Yes Historical Provider, MD   atorvastatin (LIPITOR) 40 MG tablet Take 1 tablet by mouth nightly 10/5/22  Yes TRENT Alamo   apixaban (ELIQUIS) 5 MG TABS tablet Take 1 tablet by mouth 2 times daily  Patient not taking: Reported on 3/13/2023 3/12/23   Ivana Walters PA-C   spironolactone (ALDACTONE) 25 MG tablet Take 1 tablet by mouth Daily with lunch  Patient not taking: Reported on 3/13/2023 3/9/23   Severa Slider, APRN - CNS   potassium chloride (KLOR-CON M) 20 MEQ extended release tablet Take 1 tablet by mouth in the morning and 1 tablet in the evening. Take with meals. 8/1/22 9/8/22  Heather Webster MD   tamsulosin (FLOMAX) 0.4 MG capsule Take 1 capsule by mouth daily  Patient not taking: Reported on 3/13/2023 6/18/22   TRENT Nelson - CNP        Allergies:  Patient has no known allergies. Review of Systems:   Constitutional: there has been no unanticipated weight loss. There's been no change in energy level, sleep pattern, or activity level. Eyes: No visual changes or diplopia. No scleral icterus. ENT: No Headaches, hearing loss or vertigo. No mouth sores or sore throat. Cardiovascular: Reviewed in HPI  Respiratory: No cough or wheezing, no sputum production. No hematemesis. Gastrointestinal: No abdominal pain, appetite loss, blood in stools. No change in bowel or bladder habits. Genitourinary: No dysuria, trouble voiding, or hematuria. Musculoskeletal:  No gait disturbance, weakness or joint complaints. Integumentary: No rash or pruritis. Neurological: No headache, diplopia, change in muscle strength, numbness or tingling. No change in gait, balance, coordination, mood, affect, memory, mentation, behavior. Psychiatric: No anxiety, no depression. Endocrine: No malaise, fatigue or temperature intolerance. No excessive thirst, fluid intake, or urination.  No tremor. Hematologic/Lymphatic: No abnormal bruising or bleeding, blood clots or swollen lymph nodes. Allergic/Immunologic: No nasal congestion or hives. Physical Examination:    Vitals:    03/13/23 1540 03/13/23 1551   BP: (!) 76/64 90/60   Pulse: 76    SpO2: 95%    Weight: 187 lb (84.8 kg)    Height: 6' 2\" (1.88 m)              Constitutional and General Appearance: Warm and dry, no apparent distress, normal coloration   HEENT:  Normocephalic, atraumatic indwelling ernst catheter  Respiratory:  Normal excursion and expansion without use of accessory muscles  Resp Auscultation: Normal breath sounds without dullness  Cardiovascular: The apical impulses not displaced  Heart tones are crisp and normal  JVP normal cm H2O  irregular rate and rhythm  Peripheral pulses are symmetrical and full  There is no clubbing, cyanosis of the extremities.   Bilateral ankle edema only  Pedal Pulses: 2+ and equal   Abdomen:  No masses or tenderness  Liver/Spleen: No Abnormalities Noted  Neurological/Psychiatric:  Alert and oriented in all spheres  Moves all extremities well  Exhibits normal gait balance and coordination  No abnormalities of mood, affect, memory, mentation, or behavior are noted    Lab Data:    CBC:   Lab Results   Component Value Date/Time    WBC 3.5 03/07/2023 03:23 AM    WBC 3.8 03/06/2023 09:13 PM    WBC 3.9 03/05/2023 05:18 AM    RBC 4.54 03/07/2023 03:23 AM    RBC 4.65 03/06/2023 09:13 PM    RBC 4.55 03/05/2023 05:18 AM    HGB 11.8 03/07/2023 03:23 AM    HGB 12.1 03/06/2023 09:13 PM    HGB 12.0 03/05/2023 05:18 AM    HCT 37.0 03/07/2023 03:23 AM    HCT 38.3 03/06/2023 09:13 PM    HCT 36.9 03/05/2023 05:18 AM    MCV 81.5 03/07/2023 03:23 AM    MCV 82.2 03/06/2023 09:13 PM    MCV 81.0 03/05/2023 05:18 AM    RDW 16.3 03/07/2023 03:23 AM    RDW 16.4 03/06/2023 09:13 PM    RDW 16.3 03/05/2023 05:18 AM     03/07/2023 03:23 AM     03/06/2023 09:13 PM     03/05/2023 05:18 AM     BMP:  Lab Results Component Value Date/Time     03/08/2023 02:25 PM     03/06/2023 08:40 AM     03/05/2023 05:18 AM    K 4.3 03/08/2023 02:25 PM    K 4.3 03/06/2023 08:40 AM    K 4.6 03/05/2023 05:18 AM    K 4.1 03/04/2023 04:37 AM    K 4.3 02/20/2023 05:51 AM    K 4.8 02/19/2023 06:22 AM     03/08/2023 02:25 PM     03/06/2023 08:40 AM     03/05/2023 05:18 AM    CO2 30 03/08/2023 02:25 PM    CO2 31 03/06/2023 08:40 AM    CO2 35 03/05/2023 05:18 AM    PHOS 3.4 02/21/2023 05:23 AM    PHOS 4.3 02/18/2023 06:26 AM    PHOS 3.8 02/01/2023 03:19 AM    BUN 19 03/08/2023 02:25 PM    BUN 23 03/06/2023 08:40 AM    BUN 23 03/05/2023 05:18 AM    CREATININE 1.2 03/08/2023 02:25 PM    CREATININE 1.4 03/06/2023 08:40 AM    CREATININE 1.6 03/05/2023 05:18 AM     BNP:   Lab Results   Component Value Date/Time    PROBNP 7,053 03/06/2023 08:40 AM    PROBNP 7,694 03/05/2023 05:18 AM    PROBNP 14,354 03/02/2023 03:21 PM     Cardiac Imaging:  CONSUELO Dr Darvin Campos 3/6/2023   Summary   The left ventricle is dilated. Mild hypertrophy. Severely reduced global   systolic function with an ejection fraction estimated at 15-20%. Severe global hypokinesis noted. Left atrial size appears dilated. Spontaneous echo contrast seen in the left atrium. There is no evidence of   mass or thrombus in the left atrium or appendage. There is mild aortic insufficiency. The right ventricle is dilated and hypokinetic with reduced systolic   function. Pacer / ICD wire is visualized in the right ventricle. There is small echodensity seen on the ICD lead, differential diagnosis   include fibrin strands or vegetation. Clinical correlation is recommended. There is severe tricuspid regurgitation. CONSUELO Dr Darvin Campos 2/10/22  Preliminary CONSUELO results are:      - Severe LV dysfunction,  EF: 20-25%  - LA dilated. There was HENOK thrombus.    - Moderate MR    Cardiac Cath PCI: Dr Felicitas Diaz 2/8/2022  Anatomy:   LM-nml   LAD-nml  Cx-nml  OM- nml  RCA-nml  RPDA- nml  LVEF- 10%  LVG- global hypokinesis  LVEDP- 10     Hemodynamics:  RA- mean 3  RV- 37/0  PAWP- 10  PA- 34/12 (20)     C. O.- 5.7 (4.9)  C. I.- 2.6 (2.25)  PA sat 60%  AO sat 91%     Contrast: 70  Flouro Time: 5.3  Access: R radial a, R CFV     Impression  ~Coronary Angiography w/ normal cors  ~LVG with LVEF of 10% and global regional wall motion abnormalities  ~Severe MR  ~Normal CO/CI  ~normal L and R filling pressures     Recommendation  ~Aggressive medical treatment and risk factor modification  ~1. Medications reviewed, no changes at this time. 2. Post cath IVF. Bedrest.  3.Consider CONSUELO for evaluation of MR.   4. Patient has been advised on the importance of regular exercise of at least 20-30 minutes daily alternating with aerobic and isometric activities. 5. Patient counseled about and offered assistance for smoking cessation   6. No indication for cardiac rehab  7. CHF service to evaluate tomorrow. Echo 11/29/2021  Summary   -Covid+   -Severely reduced global systolic function with an ejection fraction   estimated at 20%. -Severe global hypokinesis with regional variations noted. -Right ventricular systolic function appears to be mildly reduced based on   visual inspection.   -Severe biatrial enlargement.   -Thickened mitral valve without evidence of stenosis. There is   mild-to-moderate mitral regurgitation.   -There is mild-to-moderate tricuspid regurgitation with a RVSP estimation of   37 mmHg. -Diastolic dysfunction with elevated LV filling pressures. Echo 7/25/2019  Summary   -Overall left ventricular systolic function is moderately depressed .   -Ejection fraction is visually estimated to be 30-35 %.    -Global left ventricular function moderately reduced.   -Indeterminate diastolic function due to atrial fibrillation and moderate to   severe mitral regurgitation.   -Moderate-to-severe mitral regurgitation .   -Dilated left atrium with a volume of 97 ml.   -Left atrial volume is increased probably due to atrial fibrillation .   -There is mild right ventricular hypertrophy.   -The right ventricle is mildly enlarged.   -Right ventricular systolic pressure of 53 mm Hg consistent with pulmonary   hypertension.   -Moderate to severe tricuspid regurgitation. TAPSE = 1.43   -IVC size is dilated (>2.1 cm) but collapses > 50% with respiration   consistent with elevated RA pressure (8 mmHg). Assessment:    1. Chronic systolic heart failure (HCC)  on ace, bb, sglt2 and aldosterone antagonist; on midodrine for hypotension   2. Non-compliance    3. Homeless    4. PAF (paroxysmal atrial fibrillation) ([facility]) Dr Micki rothman   5. Hypotension    6. Dilated cardiomyopathy ([facility]    7.    ICD in place    Plan:   Patient Instructions   Increase midodrine to 2 of the 2.5 mg tablets twice a day  Continue all other medications  Call Jennifer Quezada with Dr Inessa Sierra office about your ernst catheter  Check blood work today  Bring all your pill bottles with you to next appt  RTO in 2 weeks    He needs follow up with Dr Sonia Mckeon and Dr Micki Juan    NYHA 4    I appreciate the opportunity of cooperating in the care of this individual.    TRENT Richardson - CNS, CNS, 3/13/2023, 4:24 PM

## 2023-03-13 NOTE — PATIENT INSTRUCTIONS
Increase midodrine to 2 of the 2.5 mg tablets twice a day  Continue all other medications  Call Jenna Orozco with Dr Sydnie Hernandez office about your ernst catheter  Check blood work today  Bring all your pill bottles with you to next appt  RTO in 2 weeks

## 2023-03-14 ENCOUNTER — TELEPHONE (OUTPATIENT)
Dept: CARDIOLOGY CLINIC | Age: 63
End: 2023-03-14

## 2023-03-14 LAB
ANION GAP SERPL CALCULATED.3IONS-SCNC: 13 MMOL/L (ref 3–16)
BUN BLDV-MCNC: 28 MG/DL (ref 7–20)
CALCIUM SERPL-MCNC: 9 MG/DL (ref 8.3–10.6)
CHLORIDE BLD-SCNC: 99 MMOL/L (ref 99–110)
CO2: 26 MMOL/L (ref 21–32)
CREAT SERPL-MCNC: 1.8 MG/DL (ref 0.8–1.3)
GFR SERPL CREATININE-BSD FRML MDRD: 41 ML/MIN/{1.73_M2}
GLUCOSE BLD-MCNC: 76 MG/DL (ref 70–99)
POTASSIUM SERPL-SCNC: 4.7 MMOL/L (ref 3.5–5.1)
PRO-BNP: ABNORMAL PG/ML (ref 0–124)
SODIUM BLD-SCNC: 138 MMOL/L (ref 136–145)

## 2023-03-14 NOTE — TELEPHONE ENCOUNTER
Left a message with his friend Estrella Laurent to let him know his labs were ok and to get them drawn again in 2 weeks.

## 2023-03-14 NOTE — TELEPHONE ENCOUNTER
----- Message from TRENT Walsh - CNS sent at 3/14/2023 12:54 PM EDT -----  Blood work is stable  His creat is up a little which may be due to current ernst and recent prostate biopsy    Will check blood work again in 2 weeks when he comes to see me  Continue current medications  EKG at next shahriar reason status post CV  thanks

## 2023-03-19 PROBLEM — N39.0 UTI (URINARY TRACT INFECTION): Status: RESOLVED | Noted: 2023-02-17 | Resolved: 2023-03-19

## 2023-03-29 ENCOUNTER — OFFICE VISIT (OUTPATIENT)
Dept: CARDIOLOGY CLINIC | Age: 63
End: 2023-03-29
Payer: COMMERCIAL

## 2023-03-29 ENCOUNTER — HOSPITAL ENCOUNTER (OUTPATIENT)
Age: 63
Discharge: HOME | End: 2023-03-29
Payer: COMMERCIAL

## 2023-03-29 VITALS
HEIGHT: 74 IN | HEART RATE: 71 BPM | OXYGEN SATURATION: 92 % | DIASTOLIC BLOOD PRESSURE: 70 MMHG | WEIGHT: 197 LBS | SYSTOLIC BLOOD PRESSURE: 92 MMHG | BODY MASS INDEX: 25.28 KG/M2

## 2023-03-29 DIAGNOSIS — Z95.810 ICD (IMPLANTABLE CARDIOVERTER-DEFIBRILLATOR), BIVENTRICULAR, IN SITU: ICD-10-CM

## 2023-03-29 DIAGNOSIS — Z59.00 HOMELESS: ICD-10-CM

## 2023-03-29 DIAGNOSIS — I50.22 CHRONIC SYSTOLIC (CONGESTIVE) HEART FAILURE (HCC): Primary | ICD-10-CM

## 2023-03-29 DIAGNOSIS — I50.22 CHRONIC SYSTOLIC (CONGESTIVE) HEART FAILURE (HCC): ICD-10-CM

## 2023-03-29 DIAGNOSIS — I48.0 PAF (PAROXYSMAL ATRIAL FIBRILLATION) (HCC): ICD-10-CM

## 2023-03-29 DIAGNOSIS — I95.9 HYPOTENSION, UNSPECIFIED HYPOTENSION TYPE: ICD-10-CM

## 2023-03-29 DIAGNOSIS — I42.0 DILATED CARDIOMYOPATHY (HCC): ICD-10-CM

## 2023-03-29 LAB
ANION GAP SERPL CALCULATED.3IONS-SCNC: 10 MMOL/L (ref 3–16)
BUN SERPL-MCNC: 26 MG/DL (ref 7–20)
CALCIUM SERPL-MCNC: 9.3 MG/DL (ref 8.3–10.6)
CHLORIDE SERPL-SCNC: 101 MMOL/L (ref 99–110)
CO2 SERPL-SCNC: 30 MMOL/L (ref 21–32)
CREAT SERPL-MCNC: 1.3 MG/DL (ref 0.8–1.3)
GFR SERPLBLD CREATININE-BSD FMLA CKD-EPI: >60 ML/MIN/{1.73_M2}
GLUCOSE SERPL-MCNC: 86 MG/DL (ref 70–99)
NT-PROBNP SERPL-MCNC: ABNORMAL PG/ML (ref 0–124)
POTASSIUM SERPL-SCNC: 4.2 MMOL/L (ref 3.5–5.1)
SODIUM SERPL-SCNC: 141 MMOL/L (ref 136–145)

## 2023-03-29 PROCEDURE — 36415 COLL VENOUS BLD VENIPUNCTURE: CPT

## 2023-03-29 PROCEDURE — 3074F SYST BP LT 130 MM HG: CPT | Performed by: CLINICAL NURSE SPECIALIST

## 2023-03-29 PROCEDURE — 1036F TOBACCO NON-USER: CPT | Performed by: CLINICAL NURSE SPECIALIST

## 2023-03-29 PROCEDURE — 3078F DIAST BP <80 MM HG: CPT | Performed by: CLINICAL NURSE SPECIALIST

## 2023-03-29 PROCEDURE — 3017F COLORECTAL CA SCREEN DOC REV: CPT | Performed by: CLINICAL NURSE SPECIALIST

## 2023-03-29 PROCEDURE — G8484 FLU IMMUNIZE NO ADMIN: HCPCS | Performed by: CLINICAL NURSE SPECIALIST

## 2023-03-29 PROCEDURE — 99214 OFFICE O/P EST MOD 30 MIN: CPT | Performed by: CLINICAL NURSE SPECIALIST

## 2023-03-29 PROCEDURE — 1111F DSCHRG MED/CURRENT MED MERGE: CPT | Performed by: CLINICAL NURSE SPECIALIST

## 2023-03-29 PROCEDURE — 83880 ASSAY OF NATRIURETIC PEPTIDE: CPT

## 2023-03-29 PROCEDURE — G8427 DOCREV CUR MEDS BY ELIG CLIN: HCPCS | Performed by: CLINICAL NURSE SPECIALIST

## 2023-03-29 PROCEDURE — 80048 BASIC METABOLIC PNL TOTAL CA: CPT

## 2023-03-29 PROCEDURE — G8419 CALC BMI OUT NRM PARAM NOF/U: HCPCS | Performed by: CLINICAL NURSE SPECIALIST

## 2023-03-29 RX ORDER — PANTOPRAZOLE SODIUM 40 MG/1
40 TABLET, DELAYED RELEASE ORAL DAILY
Qty: 90 TABLET | Refills: 1 | Status: SHIPPED | OUTPATIENT
Start: 2023-03-29 | End: 2023-04-28

## 2023-03-29 RX ORDER — ATORVASTATIN CALCIUM 40 MG/1
40 TABLET, FILM COATED ORAL NIGHTLY
Qty: 90 TABLET | Refills: 2 | Status: SHIPPED | OUTPATIENT
Start: 2023-03-29

## 2023-03-29 SDOH — ECONOMIC STABILITY - HOUSING INSECURITY: HOMELESSNESS UNSPECIFIED: Z59.00

## 2023-03-29 NOTE — PATIENT INSTRUCTIONS
I refilled the protonix and lipitor.   Dr Dagmar Cohn to refill the amiodarone  Continue all current medications  Needs appt with Shey Zhang (urology)  Blood work today  RTO in 1 month  Needs an appt with Saurabh Sol (Rhythm doc)

## 2023-03-29 NOTE — PROGRESS NOTES
[facility]  Progress Note    Primary Care Doctor:  No primary care provider on file. Chief Complaint   Patient presents with    Follow-up     Pt reports feeling much better since last ov, reports improvement in sob and movement. History of Present Illness:  57 y.o. male with history of with history of PAF, hypertension. Unvaccinated, , homeless  hospitalization 2/5-10/22 for weight gain and lower extremity edema. He recently had covid and did not follow up in office. LVEF 20%, LHC done. Weight 223->193. He was started on HF therapy, declined life vest.  CONSUELO done with HENOK thrombus (on eliquis per EP). Not able to do CV  4/13-26/22 for fluid overload requiring diureses with milrinone and lasix infusion, UTI and VT requiring ICD placement  6/15-20/22 for acute on chronic systolic heart failure. He was over drinking fluids, diuresed with IV lasix and good weight loss of 23 pounds. 7/28-8/1/2022 for shortness of breath, hypoxia, pneumonia, lisinopril and jardiance held due to hypotension and given oral metolazone times 2  8/31-9/8/2022 for acute on chronic systolic heart failure. He received lasix and milrinone infusions plus a dose of metolazone. His weight dropped from 220->189  3/2-10/2023 for acute combined s/diastolic heart failure (treated with IV lasix), AF with CV and prostate biopsy with indwelling ernst catheter. I had the pleasure of seeing Cornelio Cueto in follow up for systolic heart failure. He is ambulatory and by his self. He continues to have his indwelling ernst catheter and no follow up with urology. His weight at home is staying 185. He is taking all his medications and has 3 bottles without any refills, protonix, amiodarone and lipitor. He has all his other medications and is taking them, all with refills. He continues to feel great, no chest pain, palpitations, lightheadedness or increased edema.   Optival is normal and no AF      Past Medical

## 2023-03-30 ENCOUNTER — TELEPHONE (OUTPATIENT)
Dept: CARDIOLOGY CLINIC | Age: 63
End: 2023-03-30

## 2023-03-30 ENCOUNTER — NURSE ONLY (OUTPATIENT)
Dept: CARDIOLOGY CLINIC | Age: 63
End: 2023-03-30

## 2023-03-30 RX ORDER — AMIODARONE HYDROCHLORIDE 200 MG/1
200 TABLET ORAL DAILY
Qty: 90 TABLET | Refills: 1 | Status: SHIPPED | OUTPATIENT
Start: 2023-03-30

## 2023-03-30 NOTE — TELEPHONE ENCOUNTER
Kendy Mcguire, RN   3/30/2023  4:03 PM EDT Back to [facility]      I spoke to Radha Molina at the number listed. He is a friend who assists Cailin Guaman as Cailin Guaman does not have a phone. I went over the med change which he repeated back to me and said he would tell Cailin Guaman this afternoon. I also called [facility] [phone] and asked if they could fax over the latest office note to  fax.      Erik Aguilera, APRN - CNS   3/30/2023 12:52 PM EDT       Call and tell him to take an extra 20 mg of torsemide for the next 3 days  Obtain notes from Dr Randy Boswell office, Prateek Lomeli the NP says he had an appt a couple days ago  thanks

## 2023-03-30 NOTE — TELEPHONE ENCOUNTER
Fred Love from [facility] called to speak with NPRG (thru trying to transfer multiple times we were disconnect - Please extend my apology for the phone dropping).   Please advise

## 2023-03-31 NOTE — TELEPHONE ENCOUNTER
I spoke to Geraldo Cook NP with urology on the phone and in person  He states he will get Mr Nathalie Watson in for a ernst exchange and that he needs a TURP done  I explained to him to call his friend, Lorena Dawn listed as his contact for any instructions as Lorena Dawn communicates with Clarita Fernandez and takes him for his appointments  He has never missed an appt with me

## 2023-05-04 PROBLEM — I50.41 ACUTE COMBINED SYSTOLIC AND DIASTOLIC CHF, NYHA CLASS 1 (HCC): Status: RESOLVED | Noted: 2023-03-02 | Resolved: 2023-05-04

## 2023-05-04 PROBLEM — R10.11 ABDOMINAL PAIN, RIGHT UPPER QUADRANT: Status: RESOLVED | Noted: 2023-01-30 | Resolved: 2023-05-04

## 2023-05-04 PROBLEM — R60.9 PERIPHERAL EDEMA: Status: RESOLVED | Noted: 2022-09-01 | Resolved: 2023-05-04

## 2023-05-04 PROBLEM — R33.8 ACUTE URINARY RETENTION: Status: RESOLVED | Noted: 2019-07-26 | Resolved: 2023-05-04

## 2023-05-04 PROBLEM — R09.02 HYPOXIA: Status: RESOLVED | Noted: 2023-03-03 | Resolved: 2023-05-04

## 2023-05-04 PROBLEM — I50.32 CHRONIC DIASTOLIC (CONGESTIVE) HEART FAILURE (HCC): Status: RESOLVED | Noted: 2022-10-29 | Resolved: 2023-05-04

## 2023-05-04 PROBLEM — R10.9 ABDOMINAL PAIN: Status: RESOLVED | Noted: 2023-01-30 | Resolved: 2023-05-04

## 2023-05-04 PROBLEM — K81.9 ACALCULOUS CHOLECYSTITIS: Status: RESOLVED | Noted: 2023-01-31 | Resolved: 2023-05-04

## 2023-05-04 PROBLEM — J18.9 PNA (PNEUMONIA): Status: RESOLVED | Noted: 2022-07-29 | Resolved: 2023-05-04

## 2023-05-04 PROBLEM — R04.2 HEMOPTYSIS: Status: RESOLVED | Noted: 2022-07-30 | Resolved: 2023-05-04

## 2023-05-04 PROBLEM — R60.0 PERIPHERAL EDEMA: Status: RESOLVED | Noted: 2022-09-01 | Resolved: 2023-05-04

## 2023-05-04 PROBLEM — R33.8 ACUTE RETENTION OF URINE: Status: RESOLVED | Noted: 2019-07-24 | Resolved: 2023-05-04

## 2023-05-04 PROBLEM — J81.0 ACUTE PULMONARY EDEMA (HCC): Status: RESOLVED | Noted: 2021-11-27 | Resolved: 2023-05-04

## 2023-05-12 ENCOUNTER — OFFICE VISIT (OUTPATIENT)
Dept: CARDIOLOGY CLINIC | Age: 63
End: 2023-05-12
Payer: COMMERCIAL

## 2023-05-12 ENCOUNTER — HOSPITAL ENCOUNTER (OUTPATIENT)
Age: 63
Discharge: HOME | End: 2023-05-12
Payer: COMMERCIAL

## 2023-05-12 VITALS
DIASTOLIC BLOOD PRESSURE: 88 MMHG | SYSTOLIC BLOOD PRESSURE: 112 MMHG | BODY MASS INDEX: 25.26 KG/M2 | HEIGHT: 74 IN | OXYGEN SATURATION: 99 % | WEIGHT: 196.8 LBS | HEART RATE: 74 BPM

## 2023-05-12 DIAGNOSIS — Z95.810 ICD (IMPLANTABLE CARDIOVERTER-DEFIBRILLATOR), BIVENTRICULAR, IN SITU: ICD-10-CM

## 2023-05-12 DIAGNOSIS — I50.22 CHRONIC SYSTOLIC (CONGESTIVE) HEART FAILURE (HCC): ICD-10-CM

## 2023-05-12 DIAGNOSIS — I48.0 PAF (PAROXYSMAL ATRIAL FIBRILLATION) (HCC): ICD-10-CM

## 2023-05-12 DIAGNOSIS — I42.0 DILATED CARDIOMYOPATHY (HCC): ICD-10-CM

## 2023-05-12 DIAGNOSIS — I95.9 HYPOTENSION, UNSPECIFIED HYPOTENSION TYPE: ICD-10-CM

## 2023-05-12 DIAGNOSIS — I50.22 CHRONIC SYSTOLIC (CONGESTIVE) HEART FAILURE (HCC): Primary | ICD-10-CM

## 2023-05-12 DIAGNOSIS — Z59.00 HOMELESS: ICD-10-CM

## 2023-05-12 LAB
ANION GAP SERPL CALCULATED.3IONS-SCNC: 12 MMOL/L (ref 3–16)
BUN SERPL-MCNC: 17 MG/DL (ref 7–20)
CALCIUM SERPL-MCNC: 8.8 MG/DL (ref 8.3–10.6)
CHLORIDE SERPL-SCNC: 103 MMOL/L (ref 99–110)
CO2 SERPL-SCNC: 26 MMOL/L (ref 21–32)
CREAT SERPL-MCNC: 1.3 MG/DL (ref 0.8–1.3)
GFR SERPLBLD CREATININE-BSD FMLA CKD-EPI: >60 ML/MIN/{1.73_M2}
GLUCOSE SERPL-MCNC: 83 MG/DL (ref 70–99)
NT-PROBNP SERPL-MCNC: 7922 PG/ML (ref 0–124)
POTASSIUM SERPL-SCNC: 4.1 MMOL/L (ref 3.5–5.1)
SODIUM SERPL-SCNC: 141 MMOL/L (ref 136–145)

## 2023-05-12 PROCEDURE — G8427 DOCREV CUR MEDS BY ELIG CLIN: HCPCS | Performed by: CLINICAL NURSE SPECIALIST

## 2023-05-12 PROCEDURE — 36415 COLL VENOUS BLD VENIPUNCTURE: CPT

## 2023-05-12 PROCEDURE — 3079F DIAST BP 80-89 MM HG: CPT | Performed by: CLINICAL NURSE SPECIALIST

## 2023-05-12 PROCEDURE — 80048 BASIC METABOLIC PNL TOTAL CA: CPT

## 2023-05-12 PROCEDURE — G8419 CALC BMI OUT NRM PARAM NOF/U: HCPCS | Performed by: CLINICAL NURSE SPECIALIST

## 2023-05-12 PROCEDURE — 3017F COLORECTAL CA SCREEN DOC REV: CPT | Performed by: CLINICAL NURSE SPECIALIST

## 2023-05-12 PROCEDURE — 3074F SYST BP LT 130 MM HG: CPT | Performed by: CLINICAL NURSE SPECIALIST

## 2023-05-12 PROCEDURE — 99214 OFFICE O/P EST MOD 30 MIN: CPT | Performed by: CLINICAL NURSE SPECIALIST

## 2023-05-12 PROCEDURE — 83880 ASSAY OF NATRIURETIC PEPTIDE: CPT

## 2023-05-12 PROCEDURE — 1036F TOBACCO NON-USER: CPT | Performed by: CLINICAL NURSE SPECIALIST

## 2023-05-12 RX ORDER — AMIODARONE HYDROCHLORIDE 200 MG/1
200 TABLET ORAL DAILY
Qty: 90 TABLET | Refills: 1 | Status: CANCELLED | OUTPATIENT
Start: 2023-05-12

## 2023-05-12 RX ORDER — FINASTERIDE 5 MG/1
5 TABLET, FILM COATED ORAL DAILY
Qty: 30 TABLET | Refills: 5 | Status: CANCELLED | OUTPATIENT
Start: 2023-05-12

## 2023-05-12 SDOH — ECONOMIC STABILITY - HOUSING INSECURITY: HOMELESSNESS UNSPECIFIED: Z59.00

## 2023-05-12 NOTE — PROGRESS NOTES
aldosterone antagonist; on midodrine for hypotension   2. ICD in place   3. Homeless    4. PAF (paroxysmal atrial fibrillation) ([facility]) Dr Olga rothman   5. Hypotension on midodrine   6. Dilated cardiomyopathy ([address]        Plan:   Patient Instructions   Blood work today  Call Dr Hermann Hensley [phone] about catheter and TURP procedure  Continue all current medications  RTO in 3 months with an echo    He looks the best I have seen him.   He is on good HF drug therapy    NYHA 4    I appreciate the opportunity of cooperating in the care of this individual.    Dilip Mtz, TRENT - CNS, CNS, 5/12/2023, 2:20 PM

## 2023-05-12 NOTE — PATIENT INSTRUCTIONS
Blood work today  Call Dr Ward Brothers [phone] about catheter and TURP procedure  Continue all current medications  RTO in 3 months with an echo

## 2023-05-15 ENCOUNTER — TELEPHONE (OUTPATIENT)
Dept: CARDIOLOGY CLINIC | Age: 63
End: 2023-05-15

## 2023-05-15 NOTE — TELEPHONE ENCOUNTER
----- Message from TRENT Tarango - CNS sent at 5/15/2023  8:50 AM EDT -----  His blood work is fabulous  Keep up the great work  thanks

## 2023-05-30 ENCOUNTER — OFFICE VISIT (OUTPATIENT)
Dept: CARDIOLOGY CLINIC | Age: 63
End: 2023-05-30
Payer: COMMERCIAL

## 2023-05-30 ENCOUNTER — NURSE ONLY (OUTPATIENT)
Dept: CARDIOLOGY CLINIC | Age: 63
End: 2023-05-30
Payer: COMMERCIAL

## 2023-05-30 VITALS
SYSTOLIC BLOOD PRESSURE: 102 MMHG | DIASTOLIC BLOOD PRESSURE: 80 MMHG | HEIGHT: 74 IN | WEIGHT: 195.8 LBS | OXYGEN SATURATION: 97 % | HEART RATE: 75 BPM | BODY MASS INDEX: 25.13 KG/M2

## 2023-05-30 DIAGNOSIS — I50.20 HFREF (HEART FAILURE WITH REDUCED EJECTION FRACTION) (HCC): ICD-10-CM

## 2023-05-30 DIAGNOSIS — Z95.810 ICD (IMPLANTABLE CARDIOVERTER-DEFIBRILLATOR) IN PLACE: Primary | ICD-10-CM

## 2023-05-30 DIAGNOSIS — I48.20 CHRONIC ATRIAL FIBRILLATION (HCC): ICD-10-CM

## 2023-05-30 DIAGNOSIS — I10 PRIMARY HYPERTENSION: ICD-10-CM

## 2023-05-30 DIAGNOSIS — I48.19 PERSISTENT ATRIAL FIBRILLATION (HCC): ICD-10-CM

## 2023-05-30 DIAGNOSIS — Z95.810 ICD (IMPLANTABLE CARDIOVERTER-DEFIBRILLATOR), BIVENTRICULAR, IN SITU: ICD-10-CM

## 2023-05-30 DIAGNOSIS — I50.22 CHRONIC SYSTOLIC (CONGESTIVE) HEART FAILURE (HCC): ICD-10-CM

## 2023-05-30 DIAGNOSIS — I42.0 DILATED CARDIOMYOPATHY (HCC): ICD-10-CM

## 2023-05-30 DIAGNOSIS — I47.20 VT (VENTRICULAR TACHYCARDIA) (HCC): ICD-10-CM

## 2023-05-30 DIAGNOSIS — I48.19 PERSISTENT ATRIAL FIBRILLATION (HCC): Primary | ICD-10-CM

## 2023-05-30 DIAGNOSIS — Z95.810 ICD (IMPLANTABLE CARDIOVERTER-DEFIBRILLATOR) IN PLACE: ICD-10-CM

## 2023-05-30 DIAGNOSIS — I48.0 PAF (PAROXYSMAL ATRIAL FIBRILLATION) (HCC): ICD-10-CM

## 2023-05-30 DIAGNOSIS — I50.22 CHRONIC SYSTOLIC CONGESTIVE HEART FAILURE (HCC): ICD-10-CM

## 2023-05-30 DIAGNOSIS — R00.1 BRADYCARDIA: ICD-10-CM

## 2023-05-30 DIAGNOSIS — I34.0 MODERATE MITRAL REGURGITATION: ICD-10-CM

## 2023-05-30 PROCEDURE — 3074F SYST BP LT 130 MM HG: CPT | Performed by: NURSE PRACTITIONER

## 2023-05-30 PROCEDURE — G8419 CALC BMI OUT NRM PARAM NOF/U: HCPCS | Performed by: NURSE PRACTITIONER

## 2023-05-30 PROCEDURE — 3017F COLORECTAL CA SCREEN DOC REV: CPT | Performed by: NURSE PRACTITIONER

## 2023-05-30 PROCEDURE — G8427 DOCREV CUR MEDS BY ELIG CLIN: HCPCS | Performed by: NURSE PRACTITIONER

## 2023-05-30 PROCEDURE — 93290 INTERROG DEV EVAL ICPMS IP: CPT | Performed by: INTERNAL MEDICINE

## 2023-05-30 PROCEDURE — 1036F TOBACCO NON-USER: CPT | Performed by: NURSE PRACTITIONER

## 2023-05-30 PROCEDURE — 93284 PRGRMG EVAL IMPLANTABLE DFB: CPT | Performed by: INTERNAL MEDICINE

## 2023-05-30 PROCEDURE — 93000 ELECTROCARDIOGRAM COMPLETE: CPT | Performed by: NURSE PRACTITIONER

## 2023-05-30 PROCEDURE — 3079F DIAST BP 80-89 MM HG: CPT | Performed by: NURSE PRACTITIONER

## 2023-05-30 PROCEDURE — 99214 OFFICE O/P EST MOD 30 MIN: CPT | Performed by: NURSE PRACTITIONER

## 2023-05-30 RX ORDER — TORSEMIDE 20 MG/1
20 TABLET ORAL DAILY
Qty: 90 TABLET | Refills: 1 | Status: SHIPPED | OUTPATIENT
Start: 2023-05-30

## 2023-06-02 ENCOUNTER — TELEPHONE (OUTPATIENT)
Dept: CARDIOLOGY CLINIC | Age: 63
End: 2023-06-02

## 2023-06-05 NOTE — TELEPHONE ENCOUNTER
Spoke with Joan Bansal per Elpidio Pérezrí approval on Friday and got him scheduled for procedure. We went over instructions below and he verbalized understanding. Procedure- EP Study/Afib/Afl Abl   Date: 8/31/2023  Arrival time: 9:30 am   Procedure time: 10:30 am       Patient Instructions  Medication Instructions: Hold []Xarelto [x]Eliquis []Coumadin []pradaxa for 1 day  Do not eat or drink after midnight the night prior to procedure [x] Yes [] No      Qgenda updated / Added in Epic / Emailed cath / Betsy Watkins added.

## 2023-06-22 ENCOUNTER — OFFICE VISIT (OUTPATIENT)
Dept: CARDIOLOGY CLINIC | Age: 63
End: 2023-06-22
Payer: COMMERCIAL

## 2023-06-22 ENCOUNTER — NURSE ONLY (OUTPATIENT)
Dept: CARDIOLOGY CLINIC | Age: 63
End: 2023-06-22

## 2023-06-22 ENCOUNTER — HOSPITAL ENCOUNTER (OUTPATIENT)
Age: 63
Discharge: HOME | End: 2023-06-22
Payer: COMMERCIAL

## 2023-06-22 ENCOUNTER — NURSE ONLY (OUTPATIENT)
Dept: CARDIOLOGY CLINIC | Age: 63
End: 2023-06-22
Payer: COMMERCIAL

## 2023-06-22 VITALS
WEIGHT: 198 LBS | OXYGEN SATURATION: 99 % | HEART RATE: 76 BPM | DIASTOLIC BLOOD PRESSURE: 74 MMHG | HEIGHT: 74 IN | BODY MASS INDEX: 25.41 KG/M2 | SYSTOLIC BLOOD PRESSURE: 98 MMHG

## 2023-06-22 DIAGNOSIS — I42.0 DILATED CARDIOMYOPATHY (HCC): ICD-10-CM

## 2023-06-22 DIAGNOSIS — D50.9 IRON DEFICIENCY ANEMIA, UNSPECIFIED IRON DEFICIENCY ANEMIA TYPE: ICD-10-CM

## 2023-06-22 DIAGNOSIS — Z59.00 HOMELESS: ICD-10-CM

## 2023-06-22 DIAGNOSIS — Z95.810 ICD (IMPLANTABLE CARDIOVERTER-DEFIBRILLATOR) IN PLACE: ICD-10-CM

## 2023-06-22 DIAGNOSIS — I47.20 VT (VENTRICULAR TACHYCARDIA) (HCC): ICD-10-CM

## 2023-06-22 DIAGNOSIS — I50.22 CHRONIC SYSTOLIC (CONGESTIVE) HEART FAILURE (HCC): ICD-10-CM

## 2023-06-22 DIAGNOSIS — I48.19 PERSISTENT ATRIAL FIBRILLATION (HCC): ICD-10-CM

## 2023-06-22 DIAGNOSIS — I50.22 CHRONIC SYSTOLIC CONGESTIVE HEART FAILURE (HCC): Primary | ICD-10-CM

## 2023-06-22 DIAGNOSIS — I50.22 CHRONIC SYSTOLIC CONGESTIVE HEART FAILURE (HCC): ICD-10-CM

## 2023-06-22 DIAGNOSIS — I50.22 CHRONIC SYSTOLIC (CONGESTIVE) HEART FAILURE (HCC): Primary | ICD-10-CM

## 2023-06-22 DIAGNOSIS — Z01.818 PREOPERATIVE CLEARANCE: ICD-10-CM

## 2023-06-22 DIAGNOSIS — I50.20 HFREF (HEART FAILURE WITH REDUCED EJECTION FRACTION) (HCC): ICD-10-CM

## 2023-06-22 DIAGNOSIS — R00.1 BRADYCARDIA: ICD-10-CM

## 2023-06-22 LAB
ANION GAP SERPL CALCULATED.3IONS-SCNC: 8 MMOL/L (ref 3–16)
BUN SERPL-MCNC: 19 MG/DL (ref 7–20)
CALCIUM SERPL-MCNC: 9.1 MG/DL (ref 8.3–10.6)
CHLORIDE SERPL-SCNC: 99 MMOL/L (ref 99–110)
CO2 SERPL-SCNC: 31 MMOL/L (ref 21–32)
CREAT SERPL-MCNC: 1.3 MG/DL (ref 0.8–1.3)
DEPRECATED RDW RBC AUTO: 16.1 % (ref 12.4–15.4)
GFR SERPLBLD CREATININE-BSD FMLA CKD-EPI: >60 ML/MIN/{1.73_M2}
GLUCOSE SERPL-MCNC: 76 MG/DL (ref 70–99)
HCT VFR BLD AUTO: 38.6 % (ref 40.5–52.5)
HGB BLD-MCNC: 12.5 G/DL (ref 13.5–17.5)
IRON SATN MFR SERPL: 15 % (ref 20–50)
IRON SERPL-MCNC: 53 UG/DL (ref 59–158)
MCH RBC QN AUTO: 27.8 PG (ref 26–34)
MCHC RBC AUTO-ENTMCNC: 32.3 G/DL (ref 31–36)
MCV RBC AUTO: 86.3 FL (ref 80–100)
NT-PROBNP SERPL-MCNC: ABNORMAL PG/ML (ref 0–124)
PLATELET # BLD AUTO: 203 K/UL (ref 135–450)
PMV BLD AUTO: 10.2 FL (ref 5–10.5)
POTASSIUM SERPL-SCNC: 3.9 MMOL/L (ref 3.5–5.1)
RBC # BLD AUTO: 4.47 M/UL (ref 4.2–5.9)
SODIUM SERPL-SCNC: 138 MMOL/L (ref 136–145)
TIBC SERPL-MCNC: 357 UG/DL (ref 260–445)
WBC # BLD AUTO: 4.5 K/UL (ref 4–11)

## 2023-06-22 PROCEDURE — G8427 DOCREV CUR MEDS BY ELIG CLIN: HCPCS | Performed by: CLINICAL NURSE SPECIALIST

## 2023-06-22 PROCEDURE — 93284 PRGRMG EVAL IMPLANTABLE DFB: CPT | Performed by: INTERNAL MEDICINE

## 2023-06-22 PROCEDURE — 3078F DIAST BP <80 MM HG: CPT | Performed by: CLINICAL NURSE SPECIALIST

## 2023-06-22 PROCEDURE — 83880 ASSAY OF NATRIURETIC PEPTIDE: CPT

## 2023-06-22 PROCEDURE — 3017F COLORECTAL CA SCREEN DOC REV: CPT | Performed by: CLINICAL NURSE SPECIALIST

## 2023-06-22 PROCEDURE — 93290 INTERROG DEV EVAL ICPMS IP: CPT | Performed by: CLINICAL NURSE SPECIALIST

## 2023-06-22 PROCEDURE — 1036F TOBACCO NON-USER: CPT | Performed by: CLINICAL NURSE SPECIALIST

## 2023-06-22 PROCEDURE — 36415 COLL VENOUS BLD VENIPUNCTURE: CPT

## 2023-06-22 PROCEDURE — 83550 IRON BINDING TEST: CPT

## 2023-06-22 PROCEDURE — 93000 ELECTROCARDIOGRAM COMPLETE: CPT | Performed by: CLINICAL NURSE SPECIALIST

## 2023-06-22 PROCEDURE — 82728 ASSAY OF FERRITIN: CPT

## 2023-06-22 PROCEDURE — G8419 CALC BMI OUT NRM PARAM NOF/U: HCPCS | Performed by: CLINICAL NURSE SPECIALIST

## 2023-06-22 PROCEDURE — 3074F SYST BP LT 130 MM HG: CPT | Performed by: CLINICAL NURSE SPECIALIST

## 2023-06-22 PROCEDURE — 83540 ASSAY OF IRON: CPT

## 2023-06-22 PROCEDURE — 99214 OFFICE O/P EST MOD 30 MIN: CPT | Performed by: CLINICAL NURSE SPECIALIST

## 2023-06-22 PROCEDURE — 80048 BASIC METABOLIC PNL TOTAL CA: CPT

## 2023-06-22 PROCEDURE — 85027 COMPLETE CBC AUTOMATED: CPT

## 2023-06-22 SDOH — ECONOMIC STABILITY - HOUSING INSECURITY: HOMELESSNESS UNSPECIFIED: Z59.00

## 2023-06-22 NOTE — PROGRESS NOTES
Addendum  created 10/12/20 1557 by Neptali Wilks MD    Attestation recorded in Intraprocedure, Intraprocedure Attestations filed       [address] transmission received 6/22/23 from [employer]. for patient's CRT-D. Transmission shows normal sensing and pacing function. No new arrhythmias/ events recorded. AP 98.8%, CRT 98.8%, Effective 96.9%, VSRp 0.0%    Optivol is within normal range. TriageHF Heart Failure Risk Status on 22-Jun-2023 is Low     EP/ NP will review. See interrogation under cardiology tab in the [address] [place] [address] [address] field for more details.  Will continue to monitor remotely.    (LIAT)

## 2023-06-22 NOTE — PROGRESS NOTES
[address]  Progress Note    Primary Care Doctor:  No primary care provider on file. Chief Complaint   Patient presents with    Congestive Heart Failure       History of Present Illness:  57 y.o. male with history of with history of PAF, hypertension. Unvaccinated, , homeless  hospitalization 2/5-10/22 for weight gain and lower extremity edema. He recently had covid and did not follow up in office. LVEF 20%, LHC done. Weight 223->193. He was started on HF therapy, declined life vest.  CONSUELO done with HENOK thrombus (on eliquis per EP). Not able to do CV  4/13-26/22 for fluid overload requiring diureses with milrinone and lasix infusion, UTI and VT requiring ICD placement  6/15-20/22 for acute on chronic systolic heart failure. He was over drinking fluids, diuresed with IV lasix and good weight loss of 23 pounds. 7/28-8/1/2022 for shortness of breath, hypoxia, pneumonia, lisinopril and jardiance held due to hypotension and given oral metolazone times 2  8/31-9/8/2022 for acute on chronic systolic heart failure. He received lasix and milrinone infusions plus a dose of metolazone. His weight dropped from 220->189  3/2-10/2023 for acute combined s/diastolic heart failure (treated with IV lasix), AF with CV and prostate biopsy with indwelling ernst catheter. I had the pleasure of seeing Burwell Brennon in follow up for systolic heart failure and discussion regarding robotic simple prostatectomy by Dr Moon Mi in July. He is ambulatory and by his self. He tells me his ernst is out and he is urinating fine with the use of proscar. He does not understand the procedure that urology is wanting to do to him. His optival is normal and EKG is difficulty to assess. He is taking all his medications and weight is stable at 198. His edema in his lower legs is stable. He does have an echo scheduled for August with his return appointment.   No chest pain, palpitations, lightheadedness or shortness

## 2023-06-22 NOTE — PROGRESS NOTES
Patient comes in for programming evaluation for his defibrillator. Patient has Medtronic [id number] Cobalt XT HF Quad CRT-D MRI-4/25/2022  Hx: NICMP, persistent atrial fibrillation, HFrEF, HENOK Thrombus, Moderate to severe MR, severe LV dysfunction    All sensing and pacing parameters are within normal range. Battery life 6.9 years   AP 98.8%  98.8%. Effective 96.9%  No episodes noted. Patient remains on Eliquis, amiodarone and metoprolol. Please see interrogation for more detail. Optivol is WNL. Patient will see Julio Sánchez today and follow up in 3 months in office. Patient prefers to come into the office every 3 months for device checks.

## 2023-06-22 NOTE — PATIENT INSTRUCTIONS
Blood work and EKG today  Speak to Dr Marcie Kelly about the prostatectomy  Continue all current medications  RTO in August 15 th with an echo unless they schedule surgery will do echo earlier

## 2023-06-23 ENCOUNTER — TELEPHONE (OUTPATIENT)
Dept: CARDIOLOGY CLINIC | Age: 63
End: 2023-06-23

## 2023-06-23 LAB — FERRITIN SERPL IA-MCNC: 170.4 NG/ML (ref 30–400)

## 2023-06-27 DIAGNOSIS — D50.9 IRON DEFICIENCY ANEMIA, UNSPECIFIED IRON DEFICIENCY ANEMIA TYPE: ICD-10-CM

## 2023-06-27 RX ORDER — SODIUM CHLORIDE 9 MG/ML
5-250 INJECTION, SOLUTION INTRAVENOUS PRN
Status: CANCELLED | OUTPATIENT
Start: 2023-06-27

## 2023-06-27 RX ORDER — SODIUM CHLORIDE 9 MG/ML
INJECTION, SOLUTION INTRAVENOUS ONCE
Status: CANCELLED
Start: 2023-06-27 | End: 2023-06-27

## 2023-06-27 RX ORDER — SODIUM CHLORIDE 0.9 % (FLUSH) 0.9 %
5-40 SYRINGE (ML) INJECTION ONCE
Status: CANCELLED | OUTPATIENT
Start: 2023-06-27 | End: 2023-06-27

## 2023-06-28 ENCOUNTER — HOSPITAL ENCOUNTER (OUTPATIENT)
Dept: ONCOLOGY | Age: 63
Setting detail: INFUSION SERIES
Discharge: HOME | End: 2023-06-28
Payer: COMMERCIAL

## 2023-06-28 VITALS
HEART RATE: 71 BPM | TEMPERATURE: 97.5 F | DIASTOLIC BLOOD PRESSURE: 77 MMHG | RESPIRATION RATE: 16 BRPM | SYSTOLIC BLOOD PRESSURE: 103 MMHG

## 2023-06-28 DIAGNOSIS — D50.9 IRON DEFICIENCY ANEMIA, UNSPECIFIED IRON DEFICIENCY ANEMIA TYPE: ICD-10-CM

## 2023-06-28 DIAGNOSIS — D64.9 ANEMIA, UNSPECIFIED TYPE: Primary | ICD-10-CM

## 2023-06-28 PROCEDURE — 2580000003 HC RX 258: Performed by: CLINICAL NURSE SPECIALIST

## 2023-06-28 PROCEDURE — 99211 OFF/OP EST MAY X REQ PHY/QHP: CPT

## 2023-06-28 PROCEDURE — 6360000002 HC RX W HCPCS: Performed by: CLINICAL NURSE SPECIALIST

## 2023-06-28 PROCEDURE — 96365 THER/PROPH/DIAG IV INF INIT: CPT

## 2023-06-28 RX ORDER — SODIUM CHLORIDE 9 MG/ML
5-250 INJECTION, SOLUTION INTRAVENOUS PRN
Status: CANCELLED | OUTPATIENT
Start: 2023-07-05

## 2023-06-28 RX ORDER — SODIUM CHLORIDE 9 MG/ML
5-250 INJECTION, SOLUTION INTRAVENOUS PRN
Status: DISCONTINUED | OUTPATIENT
Start: 2023-06-28 | End: 2023-06-29 | Stop reason: HOSPADM

## 2023-06-28 RX ORDER — SODIUM CHLORIDE 0.9 % (FLUSH) 0.9 %
5-40 SYRINGE (ML) INJECTION ONCE
Status: CANCELLED | OUTPATIENT
Start: 2023-07-05 | End: 2023-07-05

## 2023-06-28 RX ORDER — SODIUM CHLORIDE 9 MG/ML
INJECTION, SOLUTION INTRAVENOUS ONCE
Status: COMPLETED | OUTPATIENT
Start: 2023-06-28 | End: 2023-06-28

## 2023-06-28 RX ORDER — SODIUM CHLORIDE 0.9 % (FLUSH) 0.9 %
5-40 SYRINGE (ML) INJECTION ONCE
Status: COMPLETED | OUTPATIENT
Start: 2023-06-28 | End: 2023-06-28

## 2023-06-28 RX ORDER — SODIUM CHLORIDE 9 MG/ML
INJECTION, SOLUTION INTRAVENOUS ONCE
Status: CANCELLED
Start: 2023-07-05 | End: 2023-07-05

## 2023-06-28 RX ADMIN — IRON SUCROSE 200 MG: 20 INJECTION, SOLUTION INTRAVENOUS at 10:55

## 2023-06-28 RX ADMIN — SODIUM CHLORIDE: 9 INJECTION, SOLUTION INTRAVENOUS at 10:54

## 2023-06-28 RX ADMIN — SODIUM CHLORIDE, PRESERVATIVE FREE 10 ML: 5 INJECTION INTRAVENOUS at 10:45

## 2023-07-05 ENCOUNTER — HOSPITAL ENCOUNTER (OUTPATIENT)
Dept: ONCOLOGY | Age: 63
Setting detail: INFUSION SERIES
Discharge: HOME | End: 2023-07-05
Payer: COMMERCIAL

## 2023-07-05 VITALS
TEMPERATURE: 97.2 F | SYSTOLIC BLOOD PRESSURE: 110 MMHG | RESPIRATION RATE: 16 BRPM | HEART RATE: 70 BPM | DIASTOLIC BLOOD PRESSURE: 78 MMHG

## 2023-07-05 DIAGNOSIS — D64.9 ANEMIA, UNSPECIFIED TYPE: Primary | ICD-10-CM

## 2023-07-05 DIAGNOSIS — D50.9 IRON DEFICIENCY ANEMIA, UNSPECIFIED IRON DEFICIENCY ANEMIA TYPE: ICD-10-CM

## 2023-07-05 PROCEDURE — 99211 OFF/OP EST MAY X REQ PHY/QHP: CPT

## 2023-07-05 PROCEDURE — 6360000002 HC RX W HCPCS: Performed by: CLINICAL NURSE SPECIALIST

## 2023-07-05 PROCEDURE — 96365 THER/PROPH/DIAG IV INF INIT: CPT

## 2023-07-05 PROCEDURE — 2580000003 HC RX 258: Performed by: CLINICAL NURSE SPECIALIST

## 2023-07-05 RX ORDER — SODIUM CHLORIDE 0.9 % (FLUSH) 0.9 %
5-40 SYRINGE (ML) INJECTION ONCE
Status: CANCELLED | OUTPATIENT
Start: 2023-07-05 | End: 2023-07-05

## 2023-07-05 RX ORDER — SODIUM CHLORIDE 9 MG/ML
INJECTION, SOLUTION INTRAVENOUS ONCE
Status: DISCONTINUED | OUTPATIENT
Start: 2023-07-05 | End: 2023-07-05

## 2023-07-05 RX ORDER — SODIUM CHLORIDE 9 MG/ML
5-250 INJECTION, SOLUTION INTRAVENOUS PRN
Status: DISCONTINUED | OUTPATIENT
Start: 2023-07-05 | End: 2023-07-05

## 2023-07-05 RX ORDER — SODIUM CHLORIDE 0.9 % (FLUSH) 0.9 %
5-40 SYRINGE (ML) INJECTION ONCE
Status: COMPLETED | OUTPATIENT
Start: 2023-07-05 | End: 2023-07-05

## 2023-07-05 RX ORDER — SODIUM CHLORIDE 9 MG/ML
5-250 INJECTION, SOLUTION INTRAVENOUS PRN
Status: CANCELLED | OUTPATIENT
Start: 2023-07-05

## 2023-07-05 RX ORDER — SODIUM CHLORIDE 9 MG/ML
INJECTION, SOLUTION INTRAVENOUS ONCE
Status: CANCELLED
Start: 2023-07-05 | End: 2023-07-05

## 2023-07-05 RX ADMIN — IRON SUCROSE 200 MG: 20 INJECTION, SOLUTION INTRAVENOUS at 08:40

## 2023-07-05 RX ADMIN — SODIUM CHLORIDE 100 ML/HR: 9 INJECTION, SOLUTION INTRAVENOUS at 08:39

## 2023-07-05 RX ADMIN — SODIUM CHLORIDE, PRESERVATIVE FREE 10 ML: 5 INJECTION INTRAVENOUS at 08:43

## 2023-07-05 NOTE — PROGRESS NOTES
Patient ambulatory to department for two of two doses of venofer. Tolerated venofer infusion well with no adverse rx noted. Given avs with information about venofer. Verbally told about most common possible side effects. Patient discharged home will f/u with provider.

## 2023-07-22 ENCOUNTER — APPOINTMENT (OUTPATIENT)
Dept: GENERAL RADIOLOGY | Age: 63
DRG: 206 | End: 2023-07-22
Payer: COMMERCIAL

## 2023-07-22 ENCOUNTER — HOSPITAL ENCOUNTER (INPATIENT)
Age: 63
LOS: 2 days | Discharge: HOME | DRG: 206 | End: 2023-07-24
Attending: INTERNAL MEDICINE | Admitting: INTERNAL MEDICINE
Payer: COMMERCIAL

## 2023-07-22 DIAGNOSIS — Z45.018 ENCOUNTER FOR CARE OF PACEMAKER: Primary | ICD-10-CM

## 2023-07-22 DIAGNOSIS — Z91.148 NONCOMPLIANCE WITH MEDICATIONS: ICD-10-CM

## 2023-07-22 PROBLEM — I50.42 CHRONIC COMBINED SYSTOLIC AND DIASTOLIC CHF (CONGESTIVE HEART FAILURE) (HCC): Status: ACTIVE | Noted: 2021-11-28

## 2023-07-22 PROBLEM — T82.118S: Status: ACTIVE | Noted: 2023-07-22

## 2023-07-22 LAB
ALBUMIN SERPL-MCNC: 4.2 G/DL (ref 3.4–5)
ALBUMIN/GLOB SERPL: 1.6 {RATIO} (ref 1.1–2.2)
ALP SERPL-CCNC: 105 U/L (ref 40–129)
ALT SERPL-CCNC: 40 U/L (ref 10–40)
ANION GAP SERPL CALCULATED.3IONS-SCNC: 11 MMOL/L (ref 3–16)
AST SERPL-CCNC: 46 U/L (ref 15–37)
BASOPHILS # BLD: 0 K/UL (ref 0–0.2)
BASOPHILS NFR BLD: 0.5 %
BILIRUB SERPL-MCNC: 1.5 MG/DL (ref 0–1)
BUN SERPL-MCNC: 18 MG/DL (ref 7–20)
CALCIUM SERPL-MCNC: 9.1 MG/DL (ref 8.3–10.6)
CHLORIDE SERPL-SCNC: 101 MMOL/L (ref 99–110)
CO2 SERPL-SCNC: 30 MMOL/L (ref 21–32)
CREAT SERPL-MCNC: 1.1 MG/DL (ref 0.8–1.3)
DEPRECATED RDW RBC AUTO: 14.3 % (ref 12.4–15.4)
DIGOXIN SERPL-MCNC: <0.3 NG/ML (ref 0.8–2)
EOSINOPHIL # BLD: 0.1 K/UL (ref 0–0.6)
EOSINOPHIL NFR BLD: 1.8 %
GFR SERPLBLD CREATININE-BSD FMLA CKD-EPI: >60 ML/MIN/{1.73_M2}
GLUCOSE SERPL-MCNC: 78 MG/DL (ref 70–99)
HCT VFR BLD AUTO: 35.8 % (ref 40.5–52.5)
HGB BLD-MCNC: 11.7 G/DL (ref 13.5–17.5)
LYMPHOCYTES # BLD: 0.9 K/UL (ref 1–5.1)
LYMPHOCYTES NFR BLD: 21.5 %
MCH RBC QN AUTO: 27.4 PG (ref 26–34)
MCHC RBC AUTO-ENTMCNC: 32.8 G/DL (ref 31–36)
MCV RBC AUTO: 83.6 FL (ref 80–100)
MONOCYTES # BLD: 0.3 K/UL (ref 0–1.3)
MONOCYTES NFR BLD: 7.2 %
NEUTROPHILS # BLD: 2.8 K/UL (ref 1.7–7.7)
NEUTROPHILS NFR BLD: 69 %
NT-PROBNP SERPL-MCNC: 8545 PG/ML (ref 0–124)
PLATELET # BLD AUTO: 194 K/UL (ref 135–450)
PMV BLD AUTO: 8.1 FL (ref 5–10.5)
POTASSIUM SERPL-SCNC: 3.5 MMOL/L (ref 3.5–5.1)
PROT SERPL-MCNC: 6.9 G/DL (ref 6.4–8.2)
RBC # BLD AUTO: 4.28 M/UL (ref 4.2–5.9)
SODIUM SERPL-SCNC: 142 MMOL/L (ref 136–145)
TROPONIN, HIGH SENSITIVITY: 19 NG/L (ref 0–22)
WBC # BLD AUTO: 4.1 K/UL (ref 4–11)

## 2023-07-22 PROCEDURE — 85025 COMPLETE CBC W/AUTO DIFF WBC: CPT

## 2023-07-22 PROCEDURE — 83880 ASSAY OF NATRIURETIC PEPTIDE: CPT

## 2023-07-22 PROCEDURE — 2580000003 HC RX 258: Performed by: INTERNAL MEDICINE

## 2023-07-22 PROCEDURE — 80151 DRUG ASSAY AMIODARONE: CPT

## 2023-07-22 PROCEDURE — 80053 COMPREHEN METABOLIC PANEL: CPT

## 2023-07-22 PROCEDURE — 2060000000 HC ICU INTERMEDIATE R&B

## 2023-07-22 PROCEDURE — 80162 ASSAY OF DIGOXIN TOTAL: CPT

## 2023-07-22 PROCEDURE — 71045 X-RAY EXAM CHEST 1 VIEW: CPT

## 2023-07-22 PROCEDURE — 99285 EMERGENCY DEPT VISIT HI MDM: CPT

## 2023-07-22 PROCEDURE — 6370000000 HC RX 637 (ALT 250 FOR IP): Performed by: INTERNAL MEDICINE

## 2023-07-22 PROCEDURE — 84484 ASSAY OF TROPONIN QUANT: CPT

## 2023-07-22 PROCEDURE — G0378 HOSPITAL OBSERVATION PER HR: HCPCS

## 2023-07-22 PROCEDURE — 93005 ELECTROCARDIOGRAM TRACING: CPT | Performed by: INTERNAL MEDICINE

## 2023-07-22 PROCEDURE — 1200000000 HC SEMI PRIVATE

## 2023-07-22 RX ORDER — PANTOPRAZOLE SODIUM 40 MG/1
40 TABLET, DELAYED RELEASE ORAL
Status: DISCONTINUED | OUTPATIENT
Start: 2023-07-23 | End: 2023-07-24 | Stop reason: HOSPADM

## 2023-07-22 RX ORDER — SPIRONOLACTONE 25 MG/1
25 TABLET ORAL
Status: DISCONTINUED | OUTPATIENT
Start: 2023-07-23 | End: 2023-07-24 | Stop reason: HOSPADM

## 2023-07-22 RX ORDER — POLYETHYLENE GLYCOL 3350 17 G/17G
17 POWDER, FOR SOLUTION ORAL DAILY PRN
Status: DISCONTINUED | OUTPATIENT
Start: 2023-07-22 | End: 2023-07-24 | Stop reason: HOSPADM

## 2023-07-22 RX ORDER — ONDANSETRON 4 MG/1
4 TABLET, ORALLY DISINTEGRATING ORAL EVERY 8 HOURS PRN
Status: DISCONTINUED | OUTPATIENT
Start: 2023-07-22 | End: 2023-07-24 | Stop reason: HOSPADM

## 2023-07-22 RX ORDER — LISINOPRIL 5 MG/1
2.5 TABLET ORAL DAILY
Status: DISCONTINUED | OUTPATIENT
Start: 2023-07-23 | End: 2023-07-24 | Stop reason: HOSPADM

## 2023-07-22 RX ORDER — ATORVASTATIN CALCIUM 40 MG/1
40 TABLET, FILM COATED ORAL NIGHTLY
Status: DISCONTINUED | OUTPATIENT
Start: 2023-07-22 | End: 2023-07-24 | Stop reason: HOSPADM

## 2023-07-22 RX ORDER — ONDANSETRON 2 MG/ML
4 INJECTION INTRAMUSCULAR; INTRAVENOUS EVERY 6 HOURS PRN
Status: DISCONTINUED | OUTPATIENT
Start: 2023-07-22 | End: 2023-07-24 | Stop reason: HOSPADM

## 2023-07-22 RX ORDER — AMIODARONE HYDROCHLORIDE 200 MG/1
200 TABLET ORAL DAILY
Status: DISCONTINUED | OUTPATIENT
Start: 2023-07-23 | End: 2023-07-24 | Stop reason: HOSPADM

## 2023-07-22 RX ORDER — SODIUM CHLORIDE 0.9 % (FLUSH) 0.9 %
5-40 SYRINGE (ML) INJECTION EVERY 12 HOURS SCHEDULED
Status: DISCONTINUED | OUTPATIENT
Start: 2023-07-22 | End: 2023-07-24 | Stop reason: HOSPADM

## 2023-07-22 RX ORDER — METOPROLOL SUCCINATE 25 MG/1
12.5 TABLET, EXTENDED RELEASE ORAL 2 TIMES DAILY
Status: DISCONTINUED | OUTPATIENT
Start: 2023-07-22 | End: 2023-07-24 | Stop reason: HOSPADM

## 2023-07-22 RX ORDER — SODIUM CHLORIDE 9 MG/ML
INJECTION, SOLUTION INTRAVENOUS PRN
Status: DISCONTINUED | OUTPATIENT
Start: 2023-07-22 | End: 2023-07-24 | Stop reason: HOSPADM

## 2023-07-22 RX ORDER — ACETAMINOPHEN 325 MG/1
650 TABLET ORAL EVERY 6 HOURS PRN
Status: DISCONTINUED | OUTPATIENT
Start: 2023-07-22 | End: 2023-07-24 | Stop reason: HOSPADM

## 2023-07-22 RX ORDER — FINASTERIDE 5 MG/1
5 TABLET, FILM COATED ORAL DAILY
Status: DISCONTINUED | OUTPATIENT
Start: 2023-07-23 | End: 2023-07-24 | Stop reason: HOSPADM

## 2023-07-22 RX ORDER — SODIUM CHLORIDE 0.9 % (FLUSH) 0.9 %
5-40 SYRINGE (ML) INJECTION PRN
Status: DISCONTINUED | OUTPATIENT
Start: 2023-07-22 | End: 2023-07-24 | Stop reason: HOSPADM

## 2023-07-22 RX ORDER — ACETAMINOPHEN 650 MG/1
650 SUPPOSITORY RECTAL EVERY 6 HOURS PRN
Status: DISCONTINUED | OUTPATIENT
Start: 2023-07-22 | End: 2023-07-24 | Stop reason: HOSPADM

## 2023-07-22 RX ORDER — TORSEMIDE 20 MG/1
20 TABLET ORAL DAILY
Status: DISCONTINUED | OUTPATIENT
Start: 2023-07-23 | End: 2023-07-24 | Stop reason: HOSPADM

## 2023-07-22 RX ADMIN — APIXABAN 5 MG: 5 TABLET, FILM COATED ORAL at 21:31

## 2023-07-22 RX ADMIN — SODIUM CHLORIDE, PRESERVATIVE FREE 10 ML: 5 INJECTION INTRAVENOUS at 21:32

## 2023-07-22 RX ADMIN — METOPROLOL SUCCINATE 12.5 MG: 25 TABLET, FILM COATED, EXTENDED RELEASE ORAL at 21:31

## 2023-07-22 RX ADMIN — ATORVASTATIN CALCIUM 40 MG: 40 TABLET, FILM COATED ORAL at 21:32

## 2023-07-22 ASSESSMENT — ENCOUNTER SYMPTOMS
ABDOMINAL PAIN: 0
BACK PAIN: 0
COLOR CHANGE: 0
SHORTNESS OF BREATH: 0

## 2023-07-22 NOTE — ACP (ADVANCE CARE PLANNING)
Advanced Care Planning Note. Purpose of Encounter: Advanced care planning in light of ventricular tachycardia  Parties In Attendance: Patient  Decisional Capacity: Yes  Subjective: Patient says his ICD was ringing  Objective: Cr 1.1  Goals of Care Determination: Patient wants full support (CPR, vent, surgery, HD, trach, PEG; does not want vegetative state)  Plan:  Telemetry. Cardiology consult.    for meds and PCP  Code Status: Full code   Time spent on Advanced care Plannin minutes  Advanced Care Planning Documents: Completed advanced directives on chart, no one is currently the POA (though he trusts his nephew)    Esequiel Luo MD  2023 6:24 PM

## 2023-07-22 NOTE — ED NOTES
Transportation here to take pt to inpatient room at this time with tele monitor.      Lyle Hernandez RN  07/22/23 0613

## 2023-07-22 NOTE — ED PROVIDER NOTES
Emmanuel Betancourt        Pt Name: Narciso Richards  MRN: [id number]  [address] 1960  Date of evaluation: 7/22/2023  Provider: Malinda Bullock PA-C  PCP: No primary care provider on file. Note Started: 1:40 PM EDT 7/22/23      SHONA. I have evaluated this patient. CHIEF COMPLAINT       Chief Complaint   Patient presents with    Other     Patient states he is hearing his pacemaker making an alarm sound. Patient denies cp, sob, and dizziness. Patient states he is also out of 5 medications for about a week but is unsure which ones. HISTORY OF PRESENT ILLNESS: 1 or more Elements     History from : Patient    Limitations to history : None    Narciso Richards is a 57 y.o. male who presents to the emergency department stating that for several days he has had intermittent sounds of an alarm coming from his pacemaker. His most recent episode was this morning. He has not felt any electrical shocks or pain from the site. He has history of atrial fibrillation, DVT and CHF. 5 days ago, he ran out of several of his medications. He is not entirely sure which ones he ran out of. He is supposed to be on Eliquis, digoxin and amiodarone. His pacemaker is [employer]. Nursing Notes were all reviewed and agreed with or any disagreements were addressed in the HPI. REVIEW OF SYSTEMS :      Review of Systems   Constitutional:  Negative for chills and fever. HENT: Negative. Eyes:  Negative for visual disturbance. Respiratory:  Negative for shortness of breath. Cardiovascular:  Negative for chest pain. Gastrointestinal:  Negative for abdominal pain. Genitourinary: Negative. Musculoskeletal:  Negative for back pain and neck pain. Skin:  Negative for color change, pallor, rash and wound. Neurological: Negative. Psychiatric/Behavioral:  Negative for confusion.     All other systems reviewed and are

## 2023-07-22 NOTE — ED NOTES
Writer spoke with Aye L[facility]. Transportation requested at this time.        Raymond Velasco RN  07/22/23 6901

## 2023-07-22 NOTE — ED NOTES
ED TO INPATIENT SBAR HANDOFF    Patient Name: Sebastian Henry   :  1960  57 y.o. MRN:  [id number]  Preferred Name  Berto Huang  ED Room #:  ED-0022/22  Family/Caregiver Present no   Restraints no   Sitter no   Sepsis Risk Score Sepsis Risk Score: 1.66    Situation  Code Status: Full No additional code details. Allergies: Patient has no known allergies. Weight: Patient Vitals for the past 96 hrs (Last 3 readings):   Weight   23 1256 190 lb (86.2 kg)     Arrived from: home  Chief Complaint:   Chief Complaint   Patient presents with    Other     Patient states he is hearing his pacemaker making an alarm sound. Patient denies cp, sob, and dizziness. Patient states he is also out of 5 medications for about a week but is unsure which ones. Hospital Problem/Diagnosis:  Principal Problem:    ICD (implantable cardioverter-defibrillator) malfunction, sequela  Active Problems:    VT (ventricular tachycardia) (HCC)    Thrombus of left atrial appendage    Persistent atrial fibrillation (HCC)    Bradycardia    Primary hypertension    Dilated cardiomyopathy (HCC)    Chronic combined systolic and diastolic CHF (congestive heart failure) (HCC)    Non-compliance    Homeless    ICD (implantable cardioverter-defibrillator) in place    Iron deficiency anemia  Resolved Problems:    * No resolved hospital problems. *    Imaging:   XR CHEST PORTABLE   Final Result   No acute process. Stable cardiomegaly           Abnormal labs:   Abnormal Labs Reviewed   DIGOXIN LEVEL - Abnormal; Notable for the following components:       Result Value    Digoxin Lvl <0.3 (*)     All other components within normal limits   CBC WITH AUTO DIFFERENTIAL - Abnormal; Notable for the following components:    Hemoglobin 11.7 (*)     Hematocrit 35.8 (*)     Lymphocytes Absolute 0.9 (*)     All other components within normal limits   COMPREHENSIVE METABOLIC PANEL - Abnormal; Notable for the following components:     Total Bilirubin 1.5

## 2023-07-22 NOTE — H&P
HOSPITALISTS HISTORY AND PHYSICAL    7/22/2023 6:17 PM    Patient Information:  Lizbet Cruz is a 57 y.o. male [id number]  PCP:  No primary care provider on file. (Tel: None )    Chief complaint:    Chief Complaint   Patient presents with    Other     Patient states he is hearing his pacemaker making an alarm sound. Patient denies cp, sob, and dizziness. Patient states he is also out of 5 medications for about a week but is unsure which ones. History of Present Illness:  Lizbet Cruz is a 57 y.o. male with history of dilated cardiomyopathy with history of VT s/p ICD, chronic combined CHF, Afib and left atrial appendage thrombus on Eliquis, HTN, iron deficiency anemia, h/o homelessness and noncompliance came to ER because his ICD had alarms ringing. Patient this has happened over 3 days but decided it was a problem when he was in the bathroom stall today and noted his ICD was what was ringing. No ICD discharges. No CP, SOB, HA or abdominal pain. No dizziness, LH, palpitations or syncope. No LE edema or orthopnea. Otherwise complete ROS is negative unless listed above. REVIEW OF SYSTEMS:   Pertinent positives as noted in HPI. All other systems were reviewed and are negative. Past Medical History:   has a past medical history of Acute combined systolic and diastolic congestive heart failure ([address]), Atrial fibrillation ([address]), CHF (congestive heart failure) ([address]), Hx of blood clots, and Hypertension. Past Surgical History:   has a past surgical history that includes Insertable Cardiac Monitor (07/26/2019); Cardioversion (07/26/2019); Cardiac defibrillator placement (Left); and Prostate surgery (N/A, 3/9/2023). Medications:  No current facility-administered medications on file prior to encounter.      Current Outpatient Medications on File Prior to Encounter   Medication Sig

## 2023-07-23 LAB
ANION GAP SERPL CALCULATED.3IONS-SCNC: 9 MMOL/L (ref 3–16)
BASOPHILS # BLD: 0 K/UL (ref 0–0.2)
BASOPHILS NFR BLD: 1 %
BUN SERPL-MCNC: 17 MG/DL (ref 7–20)
CALCIUM SERPL-MCNC: 8.5 MG/DL (ref 8.3–10.6)
CHLORIDE SERPL-SCNC: 102 MMOL/L (ref 99–110)
CO2 SERPL-SCNC: 30 MMOL/L (ref 21–32)
CREAT SERPL-MCNC: 1.2 MG/DL (ref 0.8–1.3)
DEPRECATED RDW RBC AUTO: 14.2 % (ref 12.4–15.4)
EKG ATRIAL RATE: 57 BPM
EKG DIAGNOSIS: NORMAL
EKG Q-T INTERVAL: 532 MS
EKG QRS DURATION: 166 MS
EKG QTC CALCULATION (BAZETT): 578 MS
EKG R AXIS: -73 DEGREES
EKG T AXIS: 63 DEGREES
EKG VENTRICULAR RATE: 71 BPM
EOSINOPHIL # BLD: 0.1 K/UL (ref 0–0.6)
EOSINOPHIL NFR BLD: 3.5 %
GFR SERPLBLD CREATININE-BSD FMLA CKD-EPI: >60 ML/MIN/{1.73_M2}
GLUCOSE SERPL-MCNC: 105 MG/DL (ref 70–99)
HCT VFR BLD AUTO: 33.9 % (ref 40.5–52.5)
HGB BLD-MCNC: 10.9 G/DL (ref 13.5–17.5)
LYMPHOCYTES # BLD: 1.1 K/UL (ref 1–5.1)
LYMPHOCYTES NFR BLD: 29.2 %
MAGNESIUM SERPL-MCNC: 2 MG/DL (ref 1.8–2.4)
MCH RBC QN AUTO: 27.1 PG (ref 26–34)
MCHC RBC AUTO-ENTMCNC: 32.2 G/DL (ref 31–36)
MCV RBC AUTO: 84.1 FL (ref 80–100)
MONOCYTES # BLD: 0.3 K/UL (ref 0–1.3)
MONOCYTES NFR BLD: 9 %
NEUTROPHILS # BLD: 2.2 K/UL (ref 1.7–7.7)
NEUTROPHILS NFR BLD: 57.3 %
PLATELET # BLD AUTO: 172 K/UL (ref 135–450)
PMV BLD AUTO: 7.6 FL (ref 5–10.5)
POTASSIUM SERPL-SCNC: 3 MMOL/L (ref 3.5–5.1)
RBC # BLD AUTO: 4.03 M/UL (ref 4.2–5.9)
SODIUM SERPL-SCNC: 141 MMOL/L (ref 136–145)
WBC # BLD AUTO: 3.8 K/UL (ref 4–11)

## 2023-07-23 PROCEDURE — 99222 1ST HOSP IP/OBS MODERATE 55: CPT | Performed by: INTERNAL MEDICINE

## 2023-07-23 PROCEDURE — 93010 ELECTROCARDIOGRAM REPORT: CPT | Performed by: INTERNAL MEDICINE

## 2023-07-23 PROCEDURE — 80048 BASIC METABOLIC PNL TOTAL CA: CPT

## 2023-07-23 PROCEDURE — 6370000000 HC RX 637 (ALT 250 FOR IP): Performed by: INTERNAL MEDICINE

## 2023-07-23 PROCEDURE — 36415 COLL VENOUS BLD VENIPUNCTURE: CPT

## 2023-07-23 PROCEDURE — G0378 HOSPITAL OBSERVATION PER HR: HCPCS

## 2023-07-23 PROCEDURE — 2580000003 HC RX 258: Performed by: INTERNAL MEDICINE

## 2023-07-23 PROCEDURE — 6370000000 HC RX 637 (ALT 250 FOR IP): Performed by: STUDENT IN AN ORGANIZED HEALTH CARE EDUCATION/TRAINING PROGRAM

## 2023-07-23 PROCEDURE — 2060000000 HC ICU INTERMEDIATE R&B

## 2023-07-23 PROCEDURE — 83735 ASSAY OF MAGNESIUM: CPT

## 2023-07-23 PROCEDURE — 1200000000 HC SEMI PRIVATE

## 2023-07-23 PROCEDURE — 85025 COMPLETE CBC W/AUTO DIFF WBC: CPT

## 2023-07-23 RX ADMIN — POTASSIUM BICARBONATE 40 MEQ: 782 TABLET, EFFERVESCENT ORAL at 09:18

## 2023-07-23 RX ADMIN — APIXABAN 5 MG: 5 TABLET, FILM COATED ORAL at 20:54

## 2023-07-23 RX ADMIN — METOPROLOL SUCCINATE 12.5 MG: 25 TABLET, FILM COATED, EXTENDED RELEASE ORAL at 08:37

## 2023-07-23 RX ADMIN — TORSEMIDE 20 MG: 20 TABLET ORAL at 08:37

## 2023-07-23 RX ADMIN — FINASTERIDE 5 MG: 5 TABLET, FILM COATED ORAL at 08:37

## 2023-07-23 RX ADMIN — SODIUM CHLORIDE, PRESERVATIVE FREE 10 ML: 5 INJECTION INTRAVENOUS at 20:54

## 2023-07-23 RX ADMIN — ATORVASTATIN CALCIUM 40 MG: 40 TABLET, FILM COATED ORAL at 20:54

## 2023-07-23 RX ADMIN — POTASSIUM BICARBONATE 40 MEQ: 782 TABLET, EFFERVESCENT ORAL at 06:48

## 2023-07-23 RX ADMIN — METOPROLOL SUCCINATE 12.5 MG: 25 TABLET, FILM COATED, EXTENDED RELEASE ORAL at 20:53

## 2023-07-23 RX ADMIN — PANTOPRAZOLE SODIUM 40 MG: 40 TABLET, DELAYED RELEASE ORAL at 06:48

## 2023-07-23 RX ADMIN — SPIRONOLACTONE 25 MG: 25 TABLET ORAL at 11:53

## 2023-07-23 RX ADMIN — SODIUM CHLORIDE, PRESERVATIVE FREE 10 ML: 5 INJECTION INTRAVENOUS at 08:38

## 2023-07-23 RX ADMIN — EMPAGLIFLOZIN 10 MG: 10 TABLET, FILM COATED ORAL at 08:37

## 2023-07-23 RX ADMIN — APIXABAN 5 MG: 5 TABLET, FILM COATED ORAL at 08:36

## 2023-07-23 RX ADMIN — AMIODARONE HYDROCHLORIDE 200 MG: 200 TABLET ORAL at 08:37

## 2023-07-23 RX ADMIN — LISINOPRIL 2.5 MG: 5 TABLET ORAL at 08:37

## 2023-07-23 ASSESSMENT — PAIN SCALES - GENERAL
PAINLEVEL_OUTOF10: 0

## 2023-07-23 NOTE — PLAN OF CARE
Problem: Safety - Adult  Goal: Free from fall injury  Outcome: Progressing   Pt will remain free from falls. Fall precautions in place and alarm engaged. Bedside table and call light within reach. Pt aware to use call light for assistance ambulating.     Problem: Discharge Planning  Goal: Discharge to home or other facility with appropriate resources  Outcome: Progressing   Pt reviewed d/c goals

## 2023-07-23 NOTE — CARE COORDINATION
Case Management Assessment  Initial Evaluation    Date/Time of Evaluation: 7/23/2023 11:06 AM  Assessment Completed by: Bill Quan RN    If patient is discharged prior to next notation, then this note serves as note for discharge by case management. Patient Name: Jose Izquierdo                   YOB: 1960  Diagnosis: Noncompliance with medications [Z91.148]  ICD (implantable cardioverter-defibrillator) malfunction, sequela [T82.118S]  Encounter for care of pacemaker [Z45.018]                   Date / Time: 7/22/2023 12:47 PM    Patient Admission Status: Inpatient   Readmission Risk (Low < 19, Mod (19-27), High > 27): Readmission Risk Score: 17.5    Current PCP: No primary care provider on file. PCP verified by CM? (P) No    Chart Reviewed: Yes      History Provided by: (P) Patient  Patient Orientation: (P) Alert and Oriented    Patient Cognition: (P) Alert    Hospitalization in the last 30 days (Readmission):  No    If yes, Readmission Assessment in  Navigator will be completed.     Advance Directives:      Code Status: Full Code   Patient's Primary Decision Maker is: (P) Legal Next of Kin      Discharge Planning:    Patient lives with: (P) Alone Type of Home: (P) Other (Comment) (Patient states he stays wiht friends)  Primary Care Giver: (P) Self  Patient Support Systems include: (P) Friends/Neighbors   Current Financial resources: (P) Medicaid  Current community resources: (P) None  Current services prior to admission: (P) None            Current DME:              Type of Home Care services:  (P) None    ADLS  Prior functional level: (P) Independent in ADLs/IADLs  Current functional level: (P) Independent in ADLs/IADLs    PT AM-PAC:   /24  OT AM-PAC:   /24    Family can provide assistance at DC: (P) Yes  Would you like Case Management to discuss the discharge plan with any other family members/significant others, and if so, who? (P) No  Plans to Return to Present Housing: (P)

## 2023-07-24 VITALS
HEART RATE: 72 BPM | SYSTOLIC BLOOD PRESSURE: 119 MMHG | BODY MASS INDEX: 24.64 KG/M2 | WEIGHT: 192 LBS | RESPIRATION RATE: 18 BRPM | OXYGEN SATURATION: 100 % | TEMPERATURE: 98 F | HEIGHT: 74 IN | DIASTOLIC BLOOD PRESSURE: 102 MMHG

## 2023-07-24 PROBLEM — Z45.018 ENCOUNTER FOR CARE OF PACEMAKER: Status: ACTIVE | Noted: 2023-07-24

## 2023-07-24 LAB
ANION GAP SERPL CALCULATED.3IONS-SCNC: 8 MMOL/L (ref 3–16)
BASOPHILS # BLD: 0 K/UL (ref 0–0.2)
BASOPHILS NFR BLD: 0.9 %
BUN SERPL-MCNC: 16 MG/DL (ref 7–20)
CALCIUM SERPL-MCNC: 9.1 MG/DL (ref 8.3–10.6)
CHLORIDE SERPL-SCNC: 100 MMOL/L (ref 99–110)
CO2 SERPL-SCNC: 31 MMOL/L (ref 21–32)
CREAT SERPL-MCNC: 1.1 MG/DL (ref 0.8–1.3)
DEPRECATED RDW RBC AUTO: 14.7 % (ref 12.4–15.4)
EOSINOPHIL # BLD: 0.1 K/UL (ref 0–0.6)
EOSINOPHIL NFR BLD: 2.6 %
GFR SERPLBLD CREATININE-BSD FMLA CKD-EPI: >60 ML/MIN/{1.73_M2}
GLUCOSE SERPL-MCNC: 97 MG/DL (ref 70–99)
HCT VFR BLD AUTO: 38.2 % (ref 40.5–52.5)
HGB BLD-MCNC: 12.2 G/DL (ref 13.5–17.5)
LYMPHOCYTES # BLD: 1.1 K/UL (ref 1–5.1)
LYMPHOCYTES NFR BLD: 26.4 %
MCH RBC QN AUTO: 27 PG (ref 26–34)
MCHC RBC AUTO-ENTMCNC: 31.9 G/DL (ref 31–36)
MCV RBC AUTO: 84.5 FL (ref 80–100)
MONOCYTES # BLD: 0.3 K/UL (ref 0–1.3)
MONOCYTES NFR BLD: 8.2 %
NEUTROPHILS # BLD: 2.6 K/UL (ref 1.7–7.7)
NEUTROPHILS NFR BLD: 61.9 %
PLATELET # BLD AUTO: 205 K/UL (ref 135–450)
PMV BLD AUTO: 8 FL (ref 5–10.5)
POTASSIUM SERPL-SCNC: 4 MMOL/L (ref 3.5–5.1)
RBC # BLD AUTO: 4.52 M/UL (ref 4.2–5.9)
SODIUM SERPL-SCNC: 139 MMOL/L (ref 136–145)
TSH SERPL DL<=0.005 MIU/L-ACNC: 3.88 UIU/ML (ref 0.27–4.2)
WBC # BLD AUTO: 4.1 K/UL (ref 4–11)

## 2023-07-24 PROCEDURE — 6370000000 HC RX 637 (ALT 250 FOR IP): Performed by: INTERNAL MEDICINE

## 2023-07-24 PROCEDURE — 80048 BASIC METABOLIC PNL TOTAL CA: CPT

## 2023-07-24 PROCEDURE — 99233 SBSQ HOSP IP/OBS HIGH 50: CPT | Performed by: CLINICAL NURSE SPECIALIST

## 2023-07-24 PROCEDURE — 84443 ASSAY THYROID STIM HORMONE: CPT

## 2023-07-24 PROCEDURE — 2580000003 HC RX 258: Performed by: INTERNAL MEDICINE

## 2023-07-24 PROCEDURE — 85025 COMPLETE CBC W/AUTO DIFF WBC: CPT

## 2023-07-24 PROCEDURE — 36415 COLL VENOUS BLD VENIPUNCTURE: CPT

## 2023-07-24 PROCEDURE — G0378 HOSPITAL OBSERVATION PER HR: HCPCS

## 2023-07-24 PROCEDURE — 99223 1ST HOSP IP/OBS HIGH 75: CPT | Performed by: INTERNAL MEDICINE

## 2023-07-24 RX ORDER — LISINOPRIL 2.5 MG/1
2.5 TABLET ORAL DAILY
Qty: 90 TABLET | Refills: 1 | Status: SHIPPED | OUTPATIENT
Start: 2023-07-24

## 2023-07-24 RX ORDER — AMIODARONE HYDROCHLORIDE 200 MG/1
200 TABLET ORAL DAILY
Qty: 90 TABLET | Refills: 1 | Status: SHIPPED | OUTPATIENT
Start: 2023-07-24

## 2023-07-24 RX ORDER — TORSEMIDE 20 MG/1
20 TABLET ORAL DAILY
Qty: 90 TABLET | Refills: 1 | Status: SHIPPED | OUTPATIENT
Start: 2023-07-24

## 2023-07-24 RX ORDER — METOPROLOL SUCCINATE 25 MG/1
12.5 TABLET, EXTENDED RELEASE ORAL 2 TIMES DAILY
Qty: 90 TABLET | Refills: 1 | Status: SHIPPED | OUTPATIENT
Start: 2023-07-24

## 2023-07-24 RX ORDER — SPIRONOLACTONE 25 MG/1
25 TABLET ORAL
Qty: 90 TABLET | Refills: 1 | Status: SHIPPED | OUTPATIENT
Start: 2023-07-24

## 2023-07-24 RX ADMIN — METOPROLOL SUCCINATE 12.5 MG: 25 TABLET, FILM COATED, EXTENDED RELEASE ORAL at 08:04

## 2023-07-24 RX ADMIN — FINASTERIDE 5 MG: 5 TABLET, FILM COATED ORAL at 08:04

## 2023-07-24 RX ADMIN — APIXABAN 5 MG: 5 TABLET, FILM COATED ORAL at 08:05

## 2023-07-24 RX ADMIN — LISINOPRIL 2.5 MG: 5 TABLET ORAL at 08:05

## 2023-07-24 RX ADMIN — SODIUM CHLORIDE, PRESERVATIVE FREE 10 ML: 5 INJECTION INTRAVENOUS at 08:05

## 2023-07-24 RX ADMIN — SPIRONOLACTONE 25 MG: 25 TABLET ORAL at 11:39

## 2023-07-24 RX ADMIN — EMPAGLIFLOZIN 10 MG: 10 TABLET, FILM COATED ORAL at 08:05

## 2023-07-24 RX ADMIN — AMIODARONE HYDROCHLORIDE 200 MG: 200 TABLET ORAL at 08:05

## 2023-07-24 RX ADMIN — TORSEMIDE 20 MG: 20 TABLET ORAL at 08:05

## 2023-07-24 RX ADMIN — PANTOPRAZOLE SODIUM 40 MG: 40 TABLET, DELAYED RELEASE ORAL at 08:04

## 2023-07-24 ASSESSMENT — PAIN SCALES - GENERAL
PAINLEVEL_OUTOF10: 0

## 2023-07-24 NOTE — CONSULTS
[address]  [phone]      Reason for consult:  ICD alarm    ASSESSMENT AND PLAN:  Dilated CM with low ejection fraction s/p ICD  ICD alarm reported per patient (on telemetry he is V pacing normally)  Medication noncompliance    ICD isn't currently alarming  Will have EP and CHF see him tomorrow  He appears euvolemic on exam  Continue home medications for now  History of Present Illness:  Juanpablo Elder is a 57 y.o. patient followed in our CHF and EP clinics who has DCM w history of VT now s/p ICD, AF, HENOK clot on Eliquis. Has struggled with his social determinants of health and recently homeless. Came to ER due to hearing an abnormal sound that he thought came from his ICD, which he describes as ringing. Otherwise feels well without chest pain, dizziness, palpitations, or dyspnea. No ICD shock. Past Medical History:   has a past medical history of Acute combined systolic and diastolic congestive heart failure ([address]), Atrial fibrillation ([address]), CHF (congestive heart failure) ([address]), Hx of blood clots, and Hypertension. Surgical History:   has a past surgical history that includes Insertable Cardiac Monitor (07/26/2019); Cardioversion (07/26/2019); Cardiac defibrillator placement (Left); and Prostate surgery (N/A, 3/9/2023). Social History:   reports that he has never smoked. He has never been exposed to tobacco smoke. He has never used smokeless tobacco. He reports that he does not drink alcohol and does not use drugs. Family History   Problem Relation Age of Onset    Heart Disease Mother     High Blood Pressure Mother     Heart Attack Mother     Other Father     Stroke Father     High Blood Pressure Father      Home Medications:  Were reviewed and are listed in nursing record. and/or listed below  Prior to Admission medications    Medication Sig Start Date End Date Taking?  Authorizing Provider   apixaban (ELIQUIS) 5 MG TABS tablet Take 1 tablet by mouth 2 times daily
[address]      Tim Petersen 1960    History:  Past Medical History:   Diagnosis Date    Acute combined systolic and diastolic congestive heart failure ([address]) 8/31/2022    Atrial fibrillation ([address]) 07/25/2019    CHF (congestive heart failure) (HCC)     Hx of blood clots     Hypertension        ECHO:  3/6/23      Summary   The left ventricle is dilated. Mild hypertrophy. Severely reduced global   systolic function with an ejection fraction estimated at 15-20%. Severe global hypokinesis noted. Left atrial size appears dilated. Spontaneous echo contrast seen in the left atrium. There is no evidence of   mass or thrombus in the left atrium or appendage. There is mild aortic insufficiency. The right ventricle is dilated and hypokinetic with reduced systolic   function. Pacer / ICD wire is visualized in the right ventricle. There is small echodensity seen on the ICD lead, differential diagnosis   include fibrin strands or vegetation. Clinical correlation is recommended. There is severe tricuspid regurgitation. ACE/ARB/ARNi: lisinopril 2.5 mg daily   BB: toprol xl 12.5 mg bid  Aldosterone Antagonist: aldactone 25 mg daily  SGLT2: jardiance 10 mg daily      Tim Petersen was admitted to the hospital when he heard his pacemaker making noises. Patient is well known to HF team and follows with Joss Krueger NP as an outpatient. He weighs daily, Baseline is to stay under 199 lb. Patient tries to avoid adding salt and monitors his intake. He has been out of his medications, needs refills. Called and phoned outpatient pharmacy where he gets filled. All meds requiring refill. Relayed to HF  NP. Will get meds to beds at d/c.        1. WEIGHT: Admit Weight - Scale: 190 lb (86.2 kg)      Today  Weight - Scale: 192 lb (87.1 kg)   2.  I/O   Intake/Output Summary (Last 24 hours) at 7/24/2023 1008  Last data filed at 7/23/2023 2053  Gross per 24 hour
Office: [phone]  Fax: [phone]

## 2023-07-24 NOTE — PLAN OF CARE
Problem: Discharge Planning  Goal: Discharge to home or other facility with appropriate resources  7/24/2023 0803 by Suresh Schwartz RN  Outcome: Progressing  Flowsheets (Taken 7/24/2023 0759)  Discharge to home or other facility with appropriate resources:   Identify barriers to discharge with patient and caregiver   Arrange for needed discharge resources and transportation as appropriate   Identify discharge learning needs (meds, wound care, etc)   Refer to discharge planning if patient needs post-hospital services based on physician order or complex needs related to functional status, cognitive ability or social support system     Problem: Safety - Adult  Goal: Free from fall injury  7/24/2023 0803 by Suresh Schwartz RN  Outcome: Progressing     Problem: Pain  Goal: Verbalizes/displays adequate comfort level or baseline comfort level  7/24/2023 0803 by Suresh Schwartz RN  Outcome: Progressing

## 2023-07-24 NOTE — CARE COORDINATION
Discharge Planning:     (CM) reviewed the patient's chart to assess needs. Patient's Readmission Risk Score is 17. Patient's medical insurance is Medicaid. Patient need PCP. Will give a PCP list.     Meds to beds ordered. No needs anticipated, at this time. CM team to follow. Staff to inform CM if additional discharge needs arise.      Electronically signed by Herlinda Flores RN on 7/24/2023 at 3:57 PM

## 2023-07-24 NOTE — DISCHARGE SUMMARY
Hospital Medicine Discharge Summary    Patient: Jamaica Paul     Gender: male  : 1960   Age: 57 y.o. MRN: [id number]    Admitting Physician: Vaughn Guillen MD  Discharge Physician: Vaughn Guillen MD     Code Status: Full Code     Admit Date: 2023   Discharge Date:   23    Disposition:  Home    Discharge Diagnoses: Active Hospital Problems    Diagnosis Date Noted    Bradycardia [R00.1] 2023     Priority: Medium    Persistent atrial fibrillation (HCC) [I48.19] 2023     Priority: Medium    Thrombus of left atrial appendage [I51.3]      Priority: Medium    VT (ventricular tachycardia) (HCC) [I47.20]      Priority: Medium    Encounter for care of pacemaker [Z45.018] 2023    ICD (implantable cardioverter-defibrillator) malfunction, sequela [T82.118S] 2023    Iron deficiency anemia [D50.9] 2023    ICD (implantable cardioverter-defibrillator) in place [Z95.810] 2022    Chronic atrial fibrillation (HCC) [I48.20]     Homeless [Z59.00]     Non-compliance [Z91.199]     Chronic systolic CHF (congestive heart failure), NYHA class 3 ([address]) [I50.22] 2021    Dilated cardiomyopathy ([address]) [I42.0]     Primary hypertension [I10]        Follow-up appointments:  one week    Outpatient to do list: F/U with PCP and Cardio    Condition at Discharge:  Stable    Hospital Course:   57 y.o. male with history of dilated cardiomyopathy with history of VT s/p ICD, chronic combined CHF, Afib and left atrial appendage thrombus on Eliquis, HTN, iron deficiency anemia, h/o homelessness and noncompliance came to ER because his ICD had alarms ringing. Admitted as inpatient for ICD malfunction. Cardiology consulted. Patient resumed on all meds. Seen by EP:  Device interrogated. Patient has been in atrial fibrillation since July 15.      He does not have his remote monitoring device, therefore after he developed atrial fibrillation the AICD was trying to communicate with

## 2023-07-25 ENCOUNTER — TELEPHONE (OUTPATIENT)
Dept: OTHER | Age: 63
End: 2023-07-25

## 2023-07-25 NOTE — TELEPHONE ENCOUNTER
[address]  TELEPHONE ENCOUNTER FORM    Concepción Kim 1960    Attempted to call patient for HF follow-up. No answer at this time.       Next MD/ Clinic appointment: 8/15 with Anthony Beltre RN 7/25/2023 3:48 PM

## 2023-07-28 ENCOUNTER — TELEPHONE (OUTPATIENT)
Dept: CARDIOLOGY CLINIC | Age: 63
End: 2023-07-28

## 2023-07-28 NOTE — TELEPHONE ENCOUNTER
Called Uzma Piña to clarify name and let him know that care source has two different last name of  Jose Eduardo. Please clarify and notify care source .

## 2023-07-28 NOTE — TELEPHONE ENCOUNTER
Authorization for Sergei NGUYEN 1960  - Called [facility] - Annemarie Alfaro is not in their records . They researching whether this is the same person.      Debi Sánchez at [facility] [phone]

## 2023-07-29 LAB
AMIODARONE SERPL-MCNC: <0.3 UG/ML (ref 0.5–2)
DESETHYLAMIODARONE SERPL-MCNC: <0.3 UG/ML

## 2023-08-01 NOTE — PLAN OF CARE
Problem: Discharge Planning  Goal: Discharge to home or other facility with appropriate resources  Outcome: Adequate for Discharge  Flowsheets (Taken 3/10/2023 0800)  Discharge to home or other facility with appropriate resources:   Identify barriers to discharge with patient and caregiver   Arrange for needed discharge resources and transportation as appropriate   Identify discharge learning needs (meds, wound care, etc)   Refer to discharge planning if patient needs post-hospital services based on physician order or complex needs related to functional status, cognitive ability or social support system     Problem: Safety - Adult  Goal: Free from fall injury  Outcome: Adequate for Discharge     Problem: ABCDS Injury Assessment  Goal: Absence of physical injury  Outcome: Adequate for Discharge     Problem: Chronic Conditions and Co-morbidities  Goal: Patient's chronic conditions and co-morbidity symptoms are monitored and maintained or improved  Outcome: Adequate for Discharge  Flowsheets (Taken 3/10/2023 0800)  Care Plan - Patient's Chronic Conditions and Co-Morbidity Symptoms are Monitored and Maintained or Improved:   Monitor and assess patient's chronic conditions and comorbid symptoms for stability, deterioration, or improvement   Collaborate with multidisciplinary team to address chronic and comorbid conditions and prevent exacerbation or deterioration   Update acute care plan with appropriate goals if chronic or comorbid symptoms are exacerbated and prevent overall improvement and discharge     Problem: Respiratory - Adult  Goal: Achieves optimal ventilation and oxygenation  Outcome: Adequate for Discharge  Flowsheets (Taken 3/10/2023 0800)  Achieves optimal ventilation and oxygenation:   Assess for changes in respiratory status   Assess for changes in mentation and behavior   Position to facilitate oxygenation and minimize respiratory effort   Oxygen supplementation based on oxygen saturation or arterial Take Aleve (naproxen) 2 pills twice daily with food for 7-14 days.  Alternatively, you can try ibuprofen 800mg by mouth 3 times per day.      It is very important to make positive changes to your child's weight before more serious health effects occurs.  Several poor health outcomes are related to obesity including high blood pressure, high cholesterol, heart disease, diabetes mellitus, and joint pains.   - Remember, \"you provide, they decide.\"  Your children cannot eat junk foods if you don't buy them.  Allow your child to choose what they want to eat, but you can control the choices (e.g., offer a choice between carrots or celery).   - Children are more likely to stick to positive changes at home when the whole family makes an effort to foster a supportive environment.  Be a role model for your children by choosing healthy foods yourself and exercising regularly.  - Limit juice to no more than 4 ounces per day and stop sugary drinks like sodas and Gatorades.  Drinking one soda per day for a year has enough calories to lead to 17 pounds of weight gain.  Encourage water.  If you do offer juices, try diluting them with water to decrease the sugar content.  - Offer at least 1 vegetable and 1 fruit per meal.  - Use smaller plates and bowls to help control portion size.  When people use larger plates the increased space in between foods encourages them to fill it up with more food.  Consider eating with your non-dominant hand to slow down your consumption.   - Try eating a salad or broth-based soup before your meal as this can decrease your total calorie intake by 20% at that meal.    - Eat foods that are high in fiber.  If you are eating bread or pasta, be sure to choose a whole grain product.    - Enjoy your food!  Be sure to focus on what your 5 senses tell you during a meal and ask your child to describe their food.  Mindful eating allows you to slow down and register a feeling of fullness faster than you otherwise  blood gases   Initiate smoking cessation protocol as indicated   Assess the need for suctioning and aspirate as needed   Encourage broncho-pulmonary hygiene including cough, deep breathe, incentive spirometry   Assess and instruct to report shortness of breath or any respiratory difficulty   Respiratory therapy support as indicated     Problem: Cardiovascular - Adult  Goal: Maintains optimal cardiac output and hemodynamic stability  Outcome: Adequate for Discharge  Flowsheets (Taken 3/10/2023 0800)  Maintains optimal cardiac output and hemodynamic stability:   Monitor blood pressure and heart rate   Monitor urine output and notify Licensed Independent Practitioner for values outside of normal range   Assess for signs of decreased cardiac output   Administer fluid and/or volume expanders as ordered   Administer vasoactive medications as ordered   For PPHN infants, administer sedation as ordered and minimize all controllable stressors.      Problem: Skin/Tissue Integrity - Adult  Goal: Skin integrity remains intact  Outcome: Adequate for Discharge  Flowsheets (Taken 3/10/2023 0800)  Skin Integrity Remains Intact:   Monitor for areas of redness and/or skin breakdown   Assess vascular access sites hourly   Every 4-6 hours minimum: Change oxygen saturation probe site   Every 4-6 hours: If on nasal continuous positive airway pressure, respiratory therapy assesses nares and determine need for appliance change or resting period     Problem: Genitourinary - Adult  Goal: Absence of urinary retention  Outcome: Adequate for Discharge  Flowsheets (Taken 3/10/2023 0800)  Absence of urinary retention:   Assess patients ability to void and empty bladder   Monitor intake/output and perform bladder scan as needed   Place urinary catheter per Licensed Independent Practitioner order if needed   Discuss with Licensed Independent Practitioner  medications to alleviate retention as needed   Discuss catheterization for long term situations would.    - Encourage at least 60 minutes of activity per day.   - Limit computer/TV time to no more than 1 hour a day altogether.  Avoid screens during mealtimes because people who look at screens while eating will consume 20-30% than people who do not.  - Rather than going out to eat on the weekend, try to do a family activity instead such as bowling or walking in the park.  If you do go out to eat, make healthier choices.  For example, rather than choosing a Chinese restaurant or [employer], go to [employer] instead.  When you are at the restaurant rather than choosing unhealthy options, such a french fries, choose healthy alternatives such as apple slices.     Habit Lessons (Taken from Atomic Habits by Chris Sosa)    - Small changes lead to huge outcomes.  Improving yourself in any skill by 1% each day will make you 37 times better after a year.  Unfortunately, the opposite is true for any declines.  If a plane leaves [place] intended for [place] but only shifts the course by 3.5 degrees, it ends up in [facility].   - Seeking your goals requires patience.  If you put an ice cube in a room at 25 degrees and work to increase the room temperature by one degree per week, nothing happens until you get to 32 degrees.  When it melts it seems like it was the final change that made all the difference, but it was the total sum of all the changes that came before that got the end result.  - There are three types of changes, identity changes (who we are), process changes (what we do), and outcome changes (what we achieve.)  We need to shift our focus from outcome changes to identity changes.  For example, move from “I want to lose weight” to “I am a healthy person who makes good decisions for my diet and exercise.”  This sets us up for long-term success rather than temporarily achieving our goal and then slipping back to old patterns.    - Nearly all behavior can be understood by breaking it down into 4 steps which  are Cue, Craving, Response, and Reward.  For example, you see a piece of pizza at [employer], you remember how much you love pizza and feel hungry, you eat the pizza, and you enjoy eating the pizza.  However, you also may gain weight or feel guilty about eating that food.  After enough repetitions, eating pizza becomes associated with going to [employer].  - When we want to start a new habit we need to think about how can we make the cue obvious, how can we make the craving desirable, how can we make the action easier, and how can we make the reward very satisfying.  When we want to break a negative habit it is simply the opposite.  How can we make the cue hard to see, how can we make the craving less desirable, how can we make the action more difficult, and how can we make the reward less satisfying.  - Habit stacking is an effective strategy which adds a new habit with something you do every day to make the cue for a habit more obvious.  For example, I will set my medicine next to my toothbrush so when I brush my teeth in the morning, I remember to take my medicine.  However, we need to make sure that the habit we are stacking on top of occurs at the right frequency, at the right time of day, and has sufficient specificity for us to recognize it.  - When setting a new goal, it is best to be as specific as possible to increase the likelihood that we will follow through.  For example, when I wake up in the morning I will immediately put on my shoes and walk for 30 minutes around my neighborhood.  Additionally, we are more likely to follow through on our goal when we write it down and share it with friends.    - Temptation bundling is a concept that can be used to help make a new habit more satisfying.  For example, you are only allowed to watch your favorite TV show if you are on a treadmill or stationary bike.  Another example would be that you are only allowed to listen to your favorite music play list or an audio book  as appropriate     Problem: Metabolic/Fluid and Electrolytes - Adult  Goal: Electrolytes maintained within normal limits  Outcome: Adequate for Discharge  Flowsheets (Taken 3/10/2023 0800)  Electrolytes maintained within normal limits:   Monitor labs and assess patient for signs and symptoms of electrolyte imbalances   Administer electrolyte replacement as ordered   Monitor response to electrolyte replacements, including repeat lab results as appropriate   Fluid restriction as ordered   Instruct patient on fluid and nutrition restrictions as appropriate     Problem: Pain  Goal: Verbalizes/displays adequate comfort level or baseline comfort level  Outcome: Adequate for Discharge     Problem: Musculoskeletal - Adult  Goal: Return mobility to safest level of function  Outcome: Adequate for Discharge  Flowsheets (Taken 3/10/2023 0800)  Return Mobility to Safest Level of Function:   Assess patient stability and activity tolerance for standing, transferring and ambulating with or without assistive devices   Assist with transfers and ambulation using safe patient handling equipment as needed   Ensure adequate protection for wounds/incisions during mobilization   Obtain physical therapy/occupational therapy consults as needed   Apply continuous passive motion per provider or physical therapy orders to increase flexion toward goal   Instruct patient/family in ordered activity level when you are walking outside.  - Priming your environment to help make your new habit easy to perform is critically important.  Sometimes it only takes a small step to achieve success.  For example setting your running shoes out alongside your bed may lead you to put those shoes on in the morning and commit yourself to working out.  Cutting up fruit on the weekend and placing it in clear containers in an easy to see location in the refrigerator makes it easier to choose healthy snacks.  When you are trying to break a bad habit like excessive screen time, you can increase the difficulty to perform that activity by unplugging the TV every time you are done watching a program.  - In order to create a new habit we need it to be immediately satisfying.  That objective can be difficult because a number of the healthy habits that we try to pursue only give us success if we adhere to them for the long-term.  Some strategies that you can try to use are creating a visual system that helps you to recognize the work that you were putting in each day.  For example, try adding a colored marble to a jar every time you perform your desired habit.  It also helps to be aware of streaks in performing your habit on a daily basis.  People are reluctant to breaking the chain of success and we can use that to our advantage.  When trying to eliminate a bad habit you can add money to an account that is used to help pay for a reward that is in line with your goals.  For example, every time you avoid going out to eat you immediately put the money you would have spent into a savings account which can be used for a vacation.  - Success in creating a new goal is more likely when we are in social groups that are pursuing the same goal.  For example a person's chance of becoming obese increases by 57% if they have a friend who is obese.  However if a partner in a relationship loses weight, the other person in the relationship also loses weight  about 33% of the time.    - Perfection is not required.  Every action you take is a vote for your future self.  You don't need every vote to succeed, you just need to win a majority.  Also, slip ups help you to learn about what things cause you to make a mistake so you can avoid that in the future.  - Don't focus on making changes forever, focus on now.  If your goal for right now is repeatedly attained, forever takes care of itself.

## 2023-08-15 ENCOUNTER — OFFICE VISIT (OUTPATIENT)
Dept: CARDIOLOGY CLINIC | Age: 63
End: 2023-08-15
Payer: COMMERCIAL

## 2023-08-15 ENCOUNTER — HOSPITAL ENCOUNTER (OUTPATIENT)
Dept: NON INVASIVE DIAGNOSTICS | Age: 63
Discharge: HOME | End: 2023-08-15
Payer: COMMERCIAL

## 2023-08-15 VITALS
OXYGEN SATURATION: 98 % | SYSTOLIC BLOOD PRESSURE: 100 MMHG | WEIGHT: 193 LBS | HEIGHT: 74 IN | HEART RATE: 73 BPM | BODY MASS INDEX: 24.77 KG/M2 | DIASTOLIC BLOOD PRESSURE: 80 MMHG

## 2023-08-15 DIAGNOSIS — I42.0 DILATED CARDIOMYOPATHY (HCC): ICD-10-CM

## 2023-08-15 DIAGNOSIS — Z95.810 ICD (IMPLANTABLE CARDIOVERTER-DEFIBRILLATOR) IN PLACE: ICD-10-CM

## 2023-08-15 DIAGNOSIS — Z59.00 HOMELESS: ICD-10-CM

## 2023-08-15 DIAGNOSIS — D50.9 IRON DEFICIENCY ANEMIA, UNSPECIFIED IRON DEFICIENCY ANEMIA TYPE: ICD-10-CM

## 2023-08-15 DIAGNOSIS — I48.0 PAF (PAROXYSMAL ATRIAL FIBRILLATION) (HCC): ICD-10-CM

## 2023-08-15 DIAGNOSIS — I50.22 CHRONIC SYSTOLIC (CONGESTIVE) HEART FAILURE (HCC): Primary | ICD-10-CM

## 2023-08-15 LAB
LV EF: 18 %
LVEF MODALITY: NORMAL

## 2023-08-15 PROCEDURE — 99214 OFFICE O/P EST MOD 30 MIN: CPT | Performed by: CLINICAL NURSE SPECIALIST

## 2023-08-15 PROCEDURE — 93308 TTE F-UP OR LMTD: CPT

## 2023-08-15 PROCEDURE — 93321 DOPPLER ECHO F-UP/LMTD STD: CPT

## 2023-08-15 PROCEDURE — 3017F COLORECTAL CA SCREEN DOC REV: CPT | Performed by: CLINICAL NURSE SPECIALIST

## 2023-08-15 PROCEDURE — 3074F SYST BP LT 130 MM HG: CPT | Performed by: CLINICAL NURSE SPECIALIST

## 2023-08-15 PROCEDURE — 1036F TOBACCO NON-USER: CPT | Performed by: CLINICAL NURSE SPECIALIST

## 2023-08-15 PROCEDURE — 93325 DOPPLER ECHO COLOR FLOW MAPG: CPT

## 2023-08-15 PROCEDURE — G8420 CALC BMI NORM PARAMETERS: HCPCS | Performed by: CLINICAL NURSE SPECIALIST

## 2023-08-15 PROCEDURE — G8427 DOCREV CUR MEDS BY ELIG CLIN: HCPCS | Performed by: CLINICAL NURSE SPECIALIST

## 2023-08-15 PROCEDURE — 93320 DOPPLER ECHO COMPLETE: CPT

## 2023-08-15 PROCEDURE — 3079F DIAST BP 80-89 MM HG: CPT | Performed by: CLINICAL NURSE SPECIALIST

## 2023-08-15 PROCEDURE — 1111F DSCHRG MED/CURRENT MED MERGE: CPT | Performed by: CLINICAL NURSE SPECIALIST

## 2023-08-15 SDOH — ECONOMIC STABILITY - HOUSING INSECURITY: HOMELESSNESS UNSPECIFIED: Z59.00

## 2023-08-15 NOTE — PROGRESS NOTES
Atrial fibrillation ([address]), CHF (congestive heart failure) ([address]), Hx of blood clots, and Hypertension. Surgical History:   has a past surgical history that includes Insertable Cardiac Monitor (07/26/2019); Cardioversion (07/26/2019); Cardiac defibrillator placement (Left); and Prostate surgery (N/A, 3/9/2023). Social History:   reports that he has never smoked. He has never been exposed to tobacco smoke. He has never used smokeless tobacco. He reports that he does not drink alcohol and does not use drugs. Family History:   Family History   Problem Relation Age of Onset    Heart Disease Mother     High Blood Pressure Mother     Heart Attack Mother     Other Father     Stroke Father     High Blood Pressure Father        Home Medications:  Prior to Admission medications    Medication Sig Start Date End Date Taking?  Authorizing Provider   empagliflozin (JARDIANCE) 10 MG tablet Take 1 tablet by mouth daily 7/25/23  Yes TRENT Pham   lisinopril (PRINIVIL;ZESTRIL) 2.5 MG tablet Take 1 tablet by mouth daily 7/24/23  Yes TRENT [place]amo   metoprolol succinate (TOPROL XL) 25 MG extended release tablet Take 0.5 tablets by mouth in the morning and at bedtime 7/24/23  Yes TRENT Pham   spironolactone (ALDACTONE) 25 MG tablet Take 1 tablet by mouth Daily with lunch 7/24/23  Yes TRENT Salinas   torsemide (DEMADEX) 20 MG tablet Take 1 tablet by mouth daily 7/24/23  Yes TRENT Salinas   amiodarone (CORDARONE) 200 MG tablet Take 1 tablet by mouth daily 7/24/23  Yes TRENT aLnd CNP   apixaban (ELIQUIS) 5 MG TABS tablet Take 1 tablet by mouth 2 times daily 7/24/23  Yes TRENT Land CNP   atorvastatin (LIPITOR) 40 MG tablet Take 1 tablet by mouth nightly 3/29/23  Yes TRENT Pham   pantoprazole (PROTONIX) 40 MG tablet Take 1 tablet by mouth daily 3/29/23 8/15/23 Yes TRENT Pham   finasteride

## 2023-08-25 ENCOUNTER — TELEPHONE (OUTPATIENT)
Dept: CARDIOLOGY CLINIC | Age: 63
End: 2023-08-25

## 2023-08-25 NOTE — TELEPHONE ENCOUNTER
Ev Strickland called to see if okay for [facility] do to procedure as this is an [facility] patient and was scheduled under wrong doctor. I reached out to the RN to confirm. Confirmed okay to do with Michael per Micki Dewey as pt has been seen by both doctors. See note below.

## 2023-08-31 ENCOUNTER — TELEPHONE (OUTPATIENT)
Dept: CARDIOLOGY CLINIC | Age: 63
End: 2023-08-31

## 2023-08-31 NOTE — TELEPHONE ENCOUNTER
Spoke with Tatum Serra who will have the patient call me to reschedule procedure. He is aware that I will not be in the office after tomorrow. If I don't reach him before I go I will call him once I return.

## 2023-09-22 ENCOUNTER — TELEPHONE (OUTPATIENT)
Dept: CARDIOLOGY CLINIC | Age: 63
End: 2023-09-22

## 2023-09-22 NOTE — TELEPHONE ENCOUNTER
LVM for [facility] that I have the information to get pt rescheduled. I don't have any dates currently available. I will be in touch as soon as I have something.

## 2023-10-01 PROCEDURE — 93296 REM INTERROG EVL PM/IDS: CPT | Performed by: INTERNAL MEDICINE

## 2023-10-01 PROCEDURE — 93295 DEV INTERROG REMOTE 1/2/MLT: CPT | Performed by: INTERNAL MEDICINE

## 2023-11-12 PROBLEM — E87.70 FLUID OVERLOAD: Status: RESOLVED | Noted: 2022-08-24 | Resolved: 2023-11-12

## 2023-11-12 PROBLEM — I95.9 HYPOTENSION: Status: RESOLVED | Noted: 2022-09-01 | Resolved: 2023-11-12

## 2023-11-12 PROBLEM — Z45.09 ENCOUNTER FOR LOOP RECORDER CHECK: Status: RESOLVED | Noted: 2019-07-26 | Resolved: 2023-11-12

## 2023-11-12 PROBLEM — R55 SYNCOPE AND COLLAPSE: Status: RESOLVED | Noted: 2022-04-29 | Resolved: 2023-11-12

## 2023-11-12 PROBLEM — R79.89 ELEVATED LFTS: Status: RESOLVED | Noted: 2022-09-01 | Resolved: 2023-11-12

## 2023-11-15 ENCOUNTER — TELEPHONE (OUTPATIENT)
Dept: CARDIOLOGY CLINIC | Age: 63
End: 2023-11-15

## 2023-11-15 ENCOUNTER — HOSPITAL ENCOUNTER (OUTPATIENT)
Dept: ONCOLOGY | Age: 63
Setting detail: INFUSION SERIES
Discharge: HOME | End: 2023-11-15
Payer: COMMERCIAL

## 2023-11-15 ENCOUNTER — OFFICE VISIT (OUTPATIENT)
Dept: CARDIOLOGY CLINIC | Age: 63
End: 2023-11-15
Payer: COMMERCIAL

## 2023-11-15 VITALS
DIASTOLIC BLOOD PRESSURE: 102 MMHG | BODY MASS INDEX: 26.83 KG/M2 | SYSTOLIC BLOOD PRESSURE: 124 MMHG | HEART RATE: 86 BPM | WEIGHT: 209 LBS | OXYGEN SATURATION: 100 %

## 2023-11-15 VITALS
SYSTOLIC BLOOD PRESSURE: 121 MMHG | RESPIRATION RATE: 16 BRPM | TEMPERATURE: 97.6 F | HEART RATE: 71 BPM | DIASTOLIC BLOOD PRESSURE: 104 MMHG

## 2023-11-15 DIAGNOSIS — I42.0 DILATED CARDIOMYOPATHY (HCC): ICD-10-CM

## 2023-11-15 DIAGNOSIS — I50.23 ACUTE ON CHRONIC SYSTOLIC HEART FAILURE (HCC): Primary | ICD-10-CM

## 2023-11-15 DIAGNOSIS — I50.41 ACUTE COMBINED SYSTOLIC AND DIASTOLIC HEART FAILURE (HCC): Primary | ICD-10-CM

## 2023-11-15 DIAGNOSIS — Z95.810 ICD (IMPLANTABLE CARDIOVERTER-DEFIBRILLATOR) IN PLACE: ICD-10-CM

## 2023-11-15 DIAGNOSIS — I48.0 PAF (PAROXYSMAL ATRIAL FIBRILLATION) (HCC): ICD-10-CM

## 2023-11-15 DIAGNOSIS — Z59.00 HOMELESS: ICD-10-CM

## 2023-11-15 LAB
ANION GAP SERPL CALCULATED.3IONS-SCNC: 15 MMOL/L (ref 3–16)
BUN SERPL-MCNC: 25 MG/DL (ref 7–20)
CALCIUM SERPL-MCNC: 9 MG/DL (ref 8.3–10.6)
CHLORIDE SERPL-SCNC: 105 MMOL/L (ref 99–110)
CO2 SERPL-SCNC: 23 MMOL/L (ref 21–32)
CREAT SERPL-MCNC: 1 MG/DL (ref 0.8–1.3)
DEPRECATED RDW RBC AUTO: 15.6 % (ref 12.4–15.4)
GFR SERPLBLD CREATININE-BSD FMLA CKD-EPI: >60 ML/MIN/{1.73_M2}
GLUCOSE SERPL-MCNC: 86 MG/DL (ref 70–99)
HCT VFR BLD AUTO: 41.4 % (ref 40.5–52.5)
HGB BLD-MCNC: 13.4 G/DL (ref 13.5–17.5)
MCH RBC QN AUTO: 27.5 PG (ref 26–34)
MCHC RBC AUTO-ENTMCNC: 32.4 G/DL (ref 31–36)
MCV RBC AUTO: 84.9 FL (ref 80–100)
NT-PROBNP SERPL-MCNC: ABNORMAL PG/ML (ref 0–124)
PLATELET # BLD AUTO: 235 K/UL (ref 135–450)
PMV BLD AUTO: 8.2 FL (ref 5–10.5)
POTASSIUM SERPL-SCNC: 4.4 MMOL/L (ref 3.5–5.1)
RBC # BLD AUTO: 4.87 M/UL (ref 4.2–5.9)
SODIUM SERPL-SCNC: 143 MMOL/L (ref 136–145)
WBC # BLD AUTO: 5.4 K/UL (ref 4–11)

## 2023-11-15 PROCEDURE — 6360000002 HC RX W HCPCS: Performed by: CLINICAL NURSE SPECIALIST

## 2023-11-15 PROCEDURE — G8419 CALC BMI OUT NRM PARAM NOF/U: HCPCS | Performed by: CLINICAL NURSE SPECIALIST

## 2023-11-15 PROCEDURE — 3080F DIAST BP >= 90 MM HG: CPT | Performed by: CLINICAL NURSE SPECIALIST

## 2023-11-15 PROCEDURE — 3017F COLORECTAL CA SCREEN DOC REV: CPT | Performed by: CLINICAL NURSE SPECIALIST

## 2023-11-15 PROCEDURE — 99215 OFFICE O/P EST HI 40 MIN: CPT | Performed by: CLINICAL NURSE SPECIALIST

## 2023-11-15 PROCEDURE — 80048 BASIC METABOLIC PNL TOTAL CA: CPT

## 2023-11-15 PROCEDURE — 85027 COMPLETE CBC AUTOMATED: CPT

## 2023-11-15 PROCEDURE — G8484 FLU IMMUNIZE NO ADMIN: HCPCS | Performed by: CLINICAL NURSE SPECIALIST

## 2023-11-15 PROCEDURE — 3074F SYST BP LT 130 MM HG: CPT | Performed by: CLINICAL NURSE SPECIALIST

## 2023-11-15 PROCEDURE — 83880 ASSAY OF NATRIURETIC PEPTIDE: CPT

## 2023-11-15 PROCEDURE — 1036F TOBACCO NON-USER: CPT | Performed by: CLINICAL NURSE SPECIALIST

## 2023-11-15 PROCEDURE — G8427 DOCREV CUR MEDS BY ELIG CLIN: HCPCS | Performed by: CLINICAL NURSE SPECIALIST

## 2023-11-15 PROCEDURE — 99211 OFF/OP EST MAY X REQ PHY/QHP: CPT

## 2023-11-15 PROCEDURE — 96374 THER/PROPH/DIAG INJ IV PUSH: CPT

## 2023-11-15 RX ORDER — FUROSEMIDE 10 MG/ML
120 INJECTION INTRAMUSCULAR; INTRAVENOUS ONCE
Status: COMPLETED | OUTPATIENT
Start: 2023-11-15 | End: 2023-11-15

## 2023-11-15 RX ADMIN — FUROSEMIDE 120 MG: 10 INJECTION, SOLUTION INTRAMUSCULAR; INTRAVENOUS at 08:54

## 2023-11-15 SDOH — ECONOMIC STABILITY - HOUSING INSECURITY: HOMELESSNESS UNSPECIFIED: Z59.00

## 2023-11-15 NOTE — PROGRESS NOTES
Patient to department for outpatient labs and lasix. Chris Huertas from cardiology office here to evaluate. Labs drawn followed by lasix 120mg at 20 mg per minute. Tolerated well. Patient aware that he will have urinary urgency and frequency. Also aware that this tx may lower his b/p and he may feel dizzy upon standing. All questions answered. To follow up with cardiology office.

## 2023-11-15 NOTE — TELEPHONE ENCOUNTER
----- Message from TRENT Loomis - CNS sent at 11/15/2023 11:52 AM EST -----  His fluid level was extremely elevated and given 120 mg IV lasix today in infusion center  Please call and make sure he got his medications out of his friends car    Spoke to patient's friend Dimitri Abrams he will speak with patient and have the patient call our office.

## 2023-11-15 NOTE — PATIENT INSTRUCTIONS
Get all your medications and take them today and always  Cut back on your fluids   IV lasix today in the infusion center  Office visit with me on 11/30/2023 at 230 pm  Call and make an appt with urology

## 2023-11-15 NOTE — PROGRESS NOTES
[facility]  Progress Note    Primary Care Doctor:  No primary care provider on file. Chief Complaint   Patient presents with    Follow-up       History of Present Illness:  57 y.o. male with history of with history of PAF, hypertension. Unvaccinated, , homeless  hospitalization 2/5-10/22 for weight gain and lower extremity edema. He recently had covid and did not follow up in office. LVEF 20%, LHC done. Weight 223->193. He was started on HF therapy, declined life vest.  CONSUELO done with HENOK thrombus (on eliquis per EP). Not able to do CV  4/13-26/22 for fluid overload requiring diureses with milrinone and lasix infusion, UTI and VT requiring ICD placement  6/15-20/22 for acute on chronic systolic heart failure. He was over drinking fluids, diuresed with IV lasix and good weight loss of 23 pounds. 7/28-8/1/2022 for shortness of breath, hypoxia, pneumonia, lisinopril and jardiance held due to hypotension and given oral metolazone times 2  8/31-9/8/2022 for acute on chronic systolic heart failure. He received lasix and milrinone infusions plus a dose of metolazone. His weight dropped from 220->189  3/2-10/2023 for acute combined s/diastolic heart failure (treated with IV lasix), AF with CV and prostate biopsy with indwelling ernst catheter. I had the pleasure of seeing Floridalma Winston in follow up for systolic heart failure. He came in early this morning as he is very shortness of breath. He has not been taking his medications for the past 3 days as his medications are in his friend's car. His weight is up 16 pounds. He has bilateral lower leg edema up to his mid calf. His optival shows increasing thoracic impedence. He denies any chest pain or palpitations but is having shortness of breath and some difficulty with starting and stopping his urine stream.  He has not followed up with urology.       Past Medical History:   has a past medical history of Acute combined systolic and

## 2023-11-25 ENCOUNTER — HOSPITAL ENCOUNTER (INPATIENT)
Age: 63
LOS: 3 days | Discharge: HOME | DRG: 194 | End: 2023-11-28
Attending: INTERNAL MEDICINE | Admitting: INTERNAL MEDICINE
Payer: COMMERCIAL

## 2023-11-25 ENCOUNTER — APPOINTMENT (OUTPATIENT)
Dept: GENERAL RADIOLOGY | Age: 63
DRG: 194 | End: 2023-11-25
Payer: COMMERCIAL

## 2023-11-25 DIAGNOSIS — R93.89 ABNORMAL CHEST X-RAY: ICD-10-CM

## 2023-11-25 DIAGNOSIS — I50.9 ACUTE ON CHRONIC CONGESTIVE HEART FAILURE, UNSPECIFIED HEART FAILURE TYPE (HCC): ICD-10-CM

## 2023-11-25 DIAGNOSIS — I48.91 ATRIAL FIBRILLATION WITH RVR (HCC): Primary | ICD-10-CM

## 2023-11-25 LAB
ALBUMIN SERPL-MCNC: 4.1 G/DL (ref 3.4–5)
ALBUMIN/GLOB SERPL: 1.5 {RATIO} (ref 1.1–2.2)
ALP SERPL-CCNC: 95 U/L (ref 40–129)
ALT SERPL-CCNC: 39 U/L (ref 10–40)
ANION GAP SERPL CALCULATED.3IONS-SCNC: 9 MMOL/L (ref 3–16)
AST SERPL-CCNC: 44 U/L (ref 15–37)
BASOPHILS # BLD: 0.2 K/UL (ref 0–0.2)
BASOPHILS NFR BLD: 4 %
BILIRUB SERPL-MCNC: 1.9 MG/DL (ref 0–1)
BUN SERPL-MCNC: 20 MG/DL (ref 7–20)
CALCIUM SERPL-MCNC: 8.7 MG/DL (ref 8.3–10.6)
CHLORIDE SERPL-SCNC: 102 MMOL/L (ref 99–110)
CO2 SERPL-SCNC: 29 MMOL/L (ref 21–32)
CREAT SERPL-MCNC: 1.2 MG/DL (ref 0.8–1.3)
DEPRECATED RDW RBC AUTO: 16 % (ref 12.4–15.4)
EKG ATRIAL RATE: 288 BPM
EKG DIAGNOSIS: NORMAL
EKG Q-T INTERVAL: 380 MS
EKG QRS DURATION: 96 MS
EKG QTC CALCULATION (BAZETT): 495 MS
EKG R AXIS: 124 DEGREES
EKG T AXIS: -27 DEGREES
EKG VENTRICULAR RATE: 102 BPM
EOSINOPHIL # BLD: 0.1 K/UL (ref 0–0.6)
EOSINOPHIL NFR BLD: 1.5 %
GFR SERPLBLD CREATININE-BSD FMLA CKD-EPI: >60 ML/MIN/{1.73_M2}
GLUCOSE SERPL-MCNC: 96 MG/DL (ref 70–99)
HCT VFR BLD AUTO: 38.5 % (ref 40.5–52.5)
HGB BLD-MCNC: 12.3 G/DL (ref 13.5–17.5)
LACTATE BLDV-SCNC: 1.7 MMOL/L (ref 0.4–1.9)
LIPASE SERPL-CCNC: 18 U/L (ref 13–60)
LYMPHOCYTES # BLD: 0.9 K/UL (ref 1–5.1)
LYMPHOCYTES NFR BLD: 17.2 %
MCH RBC QN AUTO: 27.2 PG (ref 26–34)
MCHC RBC AUTO-ENTMCNC: 31.9 G/DL (ref 31–36)
MCV RBC AUTO: 85.3 FL (ref 80–100)
MONOCYTES # BLD: 0.3 K/UL (ref 0–1.3)
MONOCYTES NFR BLD: 6.1 %
NEUTROPHILS # BLD: 3.7 K/UL (ref 1.7–7.7)
NEUTROPHILS NFR BLD: 71.2 %
NT-PROBNP SERPL-MCNC: ABNORMAL PG/ML (ref 0–124)
PLATELET # BLD AUTO: 235 K/UL (ref 135–450)
PMV BLD AUTO: 8.3 FL (ref 5–10.5)
POTASSIUM SERPL-SCNC: 4.4 MMOL/L (ref 3.5–5.1)
PROT SERPL-MCNC: 6.9 G/DL (ref 6.4–8.2)
RBC # BLD AUTO: 4.51 M/UL (ref 4.2–5.9)
SODIUM SERPL-SCNC: 140 MMOL/L (ref 136–145)
TROPONIN, HIGH SENSITIVITY: 29 NG/L (ref 0–22)
TROPONIN, HIGH SENSITIVITY: 31 NG/L (ref 0–22)
WBC # BLD AUTO: 5.1 K/UL (ref 4–11)

## 2023-11-25 PROCEDURE — 80053 COMPREHEN METABOLIC PANEL: CPT

## 2023-11-25 PROCEDURE — 81003 URINALYSIS AUTO W/O SCOPE: CPT

## 2023-11-25 PROCEDURE — 2580000003 HC RX 258: Performed by: PHYSICIAN ASSISTANT

## 2023-11-25 PROCEDURE — 83690 ASSAY OF LIPASE: CPT

## 2023-11-25 PROCEDURE — 83605 ASSAY OF LACTIC ACID: CPT

## 2023-11-25 PROCEDURE — 99285 EMERGENCY DEPT VISIT HI MDM: CPT

## 2023-11-25 PROCEDURE — 83880 ASSAY OF NATRIURETIC PEPTIDE: CPT

## 2023-11-25 PROCEDURE — 83735 ASSAY OF MAGNESIUM: CPT

## 2023-11-25 PROCEDURE — 93010 ELECTROCARDIOGRAM REPORT: CPT | Performed by: INTERNAL MEDICINE

## 2023-11-25 PROCEDURE — 85025 COMPLETE CBC W/AUTO DIFF WBC: CPT

## 2023-11-25 PROCEDURE — 6360000002 HC RX W HCPCS: Performed by: PHYSICIAN ASSISTANT

## 2023-11-25 PROCEDURE — 96374 THER/PROPH/DIAG INJ IV PUSH: CPT

## 2023-11-25 PROCEDURE — 1200000000 HC SEMI PRIVATE

## 2023-11-25 PROCEDURE — 71045 X-RAY EXAM CHEST 1 VIEW: CPT

## 2023-11-25 PROCEDURE — 93005 ELECTROCARDIOGRAM TRACING: CPT | Performed by: INTERNAL MEDICINE

## 2023-11-25 PROCEDURE — 84484 ASSAY OF TROPONIN QUANT: CPT

## 2023-11-25 PROCEDURE — 2500000003 HC RX 250 WO HCPCS: Performed by: PHYSICIAN ASSISTANT

## 2023-11-25 PROCEDURE — 84145 PROCALCITONIN (PCT): CPT

## 2023-11-25 PROCEDURE — 36415 COLL VENOUS BLD VENIPUNCTURE: CPT

## 2023-11-25 RX ORDER — DILTIAZEM HYDROCHLORIDE 5 MG/ML
10 INJECTION INTRAVENOUS ONCE
Status: COMPLETED | OUTPATIENT
Start: 2023-11-25 | End: 2023-11-25

## 2023-11-25 RX ORDER — FUROSEMIDE 10 MG/ML
20 INJECTION INTRAMUSCULAR; INTRAVENOUS ONCE
Status: COMPLETED | OUTPATIENT
Start: 2023-11-25 | End: 2023-11-25

## 2023-11-25 RX ADMIN — DILTIAZEM HYDROCHLORIDE 10 MG: 5 INJECTION, SOLUTION INTRAVENOUS at 20:06

## 2023-11-25 RX ADMIN — FUROSEMIDE 20 MG: 10 INJECTION, SOLUTION INTRAMUSCULAR; INTRAVENOUS at 23:12

## 2023-11-25 RX ADMIN — PIPERACILLIN AND TAZOBACTAM 3375 MG: 3; .375 INJECTION, POWDER, LYOPHILIZED, FOR SOLUTION INTRAVENOUS at 23:16

## 2023-11-25 ASSESSMENT — ENCOUNTER SYMPTOMS
NAUSEA: 0
CHEST TIGHTNESS: 0
DIARRHEA: 0
SHORTNESS OF BREATH: 1
COUGH: 1
RHINORRHEA: 0
VOMITING: 0
ABDOMINAL PAIN: 0

## 2023-11-25 ASSESSMENT — PAIN - FUNCTIONAL ASSESSMENT: PAIN_FUNCTIONAL_ASSESSMENT: NONE - DENIES PAIN

## 2023-11-26 PROBLEM — R93.89 ABNORMAL CHEST X-RAY: Status: ACTIVE | Noted: 2023-11-26

## 2023-11-26 LAB
ANION GAP SERPL CALCULATED.3IONS-SCNC: 13 MMOL/L (ref 3–16)
BACTERIA URNS QL MICRO: NORMAL /HPF
BILIRUB UR QL STRIP.AUTO: NEGATIVE
BUN SERPL-MCNC: 21 MG/DL (ref 7–20)
CALCIUM SERPL-MCNC: 8.4 MG/DL (ref 8.3–10.6)
CHLORIDE SERPL-SCNC: 104 MMOL/L (ref 99–110)
CLARITY UR: CLEAR
CO2 SERPL-SCNC: 25 MMOL/L (ref 21–32)
COLOR UR: YELLOW
CREAT SERPL-MCNC: 1.2 MG/DL (ref 0.8–1.3)
EPI CELLS #/AREA URNS AUTO: 0 /HPF (ref 0–5)
GFR SERPLBLD CREATININE-BSD FMLA CKD-EPI: >60 ML/MIN/{1.73_M2}
GLUCOSE SERPL-MCNC: 69 MG/DL (ref 70–99)
GLUCOSE UR STRIP.AUTO-MCNC: NEGATIVE MG/DL
HGB UR QL STRIP.AUTO: NEGATIVE
HYALINE CASTS #/AREA URNS AUTO: 0 /LPF (ref 0–8)
KETONES UR STRIP.AUTO-MCNC: NEGATIVE MG/DL
LEUKOCYTE ESTERASE UR QL STRIP.AUTO: NEGATIVE
MAGNESIUM SERPL-MCNC: 2 MG/DL (ref 1.8–2.4)
MAGNESIUM SERPL-MCNC: 2.1 MG/DL (ref 1.8–2.4)
NITRITE UR QL STRIP.AUTO: NEGATIVE
PH UR STRIP.AUTO: 6 [PH] (ref 5–8)
POTASSIUM SERPL-SCNC: 3.7 MMOL/L (ref 3.5–5.1)
PROCALCITONIN SERPL IA-MCNC: 0.09 NG/ML (ref 0–0.15)
PROT UR STRIP.AUTO-MCNC: 30 MG/DL
RBC CLUMPS #/AREA URNS AUTO: 0 /HPF (ref 0–4)
SODIUM SERPL-SCNC: 142 MMOL/L (ref 136–145)
SP GR UR STRIP.AUTO: 1.01 (ref 1–1.03)
UA COMPLETE W REFLEX CULTURE PNL UR: ABNORMAL
UA DIPSTICK W REFLEX MICRO PNL UR: YES
URN SPEC COLLECT METH UR: ABNORMAL
UROBILINOGEN UR STRIP-ACNC: 1 E.U./DL
WBC #/AREA URNS AUTO: 1 /HPF (ref 0–5)

## 2023-11-26 PROCEDURE — 2580000003 HC RX 258: Performed by: PHYSICIAN ASSISTANT

## 2023-11-26 PROCEDURE — 99223 1ST HOSP IP/OBS HIGH 75: CPT | Performed by: INTERNAL MEDICINE

## 2023-11-26 PROCEDURE — 83735 ASSAY OF MAGNESIUM: CPT

## 2023-11-26 PROCEDURE — 1200000000 HC SEMI PRIVATE

## 2023-11-26 PROCEDURE — 6360000002 HC RX W HCPCS: Performed by: PHYSICIAN ASSISTANT

## 2023-11-26 PROCEDURE — 80048 BASIC METABOLIC PNL TOTAL CA: CPT

## 2023-11-26 PROCEDURE — 36415 COLL VENOUS BLD VENIPUNCTURE: CPT

## 2023-11-26 PROCEDURE — 6370000000 HC RX 637 (ALT 250 FOR IP): Performed by: PHYSICIAN ASSISTANT

## 2023-11-26 RX ORDER — ATORVASTATIN CALCIUM 40 MG/1
40 TABLET, FILM COATED ORAL NIGHTLY
Status: DISCONTINUED | OUTPATIENT
Start: 2023-11-26 | End: 2023-11-28 | Stop reason: HOSPADM

## 2023-11-26 RX ORDER — ACETAMINOPHEN 650 MG/1
650 SUPPOSITORY RECTAL EVERY 6 HOURS PRN
Status: DISCONTINUED | OUTPATIENT
Start: 2023-11-26 | End: 2023-11-28 | Stop reason: HOSPADM

## 2023-11-26 RX ORDER — SENNOSIDES A AND B 8.6 MG/1
1 TABLET, FILM COATED ORAL DAILY PRN
Status: DISCONTINUED | OUTPATIENT
Start: 2023-11-26 | End: 2023-11-28 | Stop reason: HOSPADM

## 2023-11-26 RX ORDER — AMIODARONE HYDROCHLORIDE 200 MG/1
200 TABLET ORAL DAILY
Status: DISCONTINUED | OUTPATIENT
Start: 2023-11-26 | End: 2023-11-28 | Stop reason: HOSPADM

## 2023-11-26 RX ORDER — PANTOPRAZOLE SODIUM 40 MG/1
40 TABLET, DELAYED RELEASE ORAL DAILY
Status: DISCONTINUED | OUTPATIENT
Start: 2023-11-26 | End: 2023-11-28 | Stop reason: HOSPADM

## 2023-11-26 RX ORDER — MAGNESIUM SULFATE IN WATER 40 MG/ML
2000 INJECTION, SOLUTION INTRAVENOUS PRN
Status: DISCONTINUED | OUTPATIENT
Start: 2023-11-26 | End: 2023-11-28 | Stop reason: HOSPADM

## 2023-11-26 RX ORDER — SODIUM CHLORIDE 9 MG/ML
INJECTION, SOLUTION INTRAVENOUS PRN
Status: DISCONTINUED | OUTPATIENT
Start: 2023-11-26 | End: 2023-11-28 | Stop reason: HOSPADM

## 2023-11-26 RX ORDER — LISINOPRIL 5 MG/1
2.5 TABLET ORAL DAILY
Status: DISCONTINUED | OUTPATIENT
Start: 2023-11-26 | End: 2023-11-28 | Stop reason: HOSPADM

## 2023-11-26 RX ORDER — SPIRONOLACTONE 25 MG/1
25 TABLET ORAL
Status: DISCONTINUED | OUTPATIENT
Start: 2023-11-26 | End: 2023-11-28 | Stop reason: HOSPADM

## 2023-11-26 RX ORDER — METOPROLOL SUCCINATE 25 MG/1
12.5 TABLET, EXTENDED RELEASE ORAL 2 TIMES DAILY
Status: DISCONTINUED | OUTPATIENT
Start: 2023-11-26 | End: 2023-11-28 | Stop reason: HOSPADM

## 2023-11-26 RX ORDER — FUROSEMIDE 10 MG/ML
40 INJECTION INTRAMUSCULAR; INTRAVENOUS 2 TIMES DAILY
Status: DISCONTINUED | OUTPATIENT
Start: 2023-11-26 | End: 2023-11-27

## 2023-11-26 RX ORDER — POTASSIUM CHLORIDE 7.45 MG/ML
10 INJECTION INTRAVENOUS PRN
Status: DISCONTINUED | OUTPATIENT
Start: 2023-11-26 | End: 2023-11-28 | Stop reason: HOSPADM

## 2023-11-26 RX ORDER — SODIUM CHLORIDE 0.9 % (FLUSH) 0.9 %
5-40 SYRINGE (ML) INJECTION EVERY 12 HOURS SCHEDULED
Status: DISCONTINUED | OUTPATIENT
Start: 2023-11-26 | End: 2023-11-28 | Stop reason: HOSPADM

## 2023-11-26 RX ORDER — SODIUM CHLORIDE 0.9 % (FLUSH) 0.9 %
10 SYRINGE (ML) INJECTION PRN
Status: DISCONTINUED | OUTPATIENT
Start: 2023-11-26 | End: 2023-11-28 | Stop reason: HOSPADM

## 2023-11-26 RX ORDER — ONDANSETRON 2 MG/ML
4 INJECTION INTRAMUSCULAR; INTRAVENOUS EVERY 6 HOURS PRN
Status: DISCONTINUED | OUTPATIENT
Start: 2023-11-26 | End: 2023-11-28 | Stop reason: HOSPADM

## 2023-11-26 RX ORDER — POTASSIUM CHLORIDE 20 MEQ/1
40 TABLET, EXTENDED RELEASE ORAL PRN
Status: DISCONTINUED | OUTPATIENT
Start: 2023-11-26 | End: 2023-11-28 | Stop reason: HOSPADM

## 2023-11-26 RX ORDER — FINASTERIDE 5 MG/1
5 TABLET, FILM COATED ORAL DAILY
Status: DISCONTINUED | OUTPATIENT
Start: 2023-11-26 | End: 2023-11-28 | Stop reason: HOSPADM

## 2023-11-26 RX ORDER — ACETAMINOPHEN 325 MG/1
650 TABLET ORAL EVERY 6 HOURS PRN
Status: DISCONTINUED | OUTPATIENT
Start: 2023-11-26 | End: 2023-11-28 | Stop reason: HOSPADM

## 2023-11-26 RX ADMIN — APIXABAN 5 MG: 5 TABLET, FILM COATED ORAL at 08:05

## 2023-11-26 RX ADMIN — APIXABAN 5 MG: 5 TABLET, FILM COATED ORAL at 21:51

## 2023-11-26 RX ADMIN — ATORVASTATIN CALCIUM 40 MG: 40 TABLET, FILM COATED ORAL at 21:51

## 2023-11-26 RX ADMIN — SPIRONOLACTONE 25 MG: 25 TABLET ORAL at 12:39

## 2023-11-26 RX ADMIN — FUROSEMIDE 40 MG: 10 INJECTION, SOLUTION INTRAMUSCULAR; INTRAVENOUS at 16:17

## 2023-11-26 RX ADMIN — METOPROLOL SUCCINATE 12.5 MG: 25 TABLET, FILM COATED, EXTENDED RELEASE ORAL at 01:05

## 2023-11-26 RX ADMIN — Medication 10 ML: at 21:51

## 2023-11-26 RX ADMIN — SODIUM CHLORIDE, PRESERVATIVE FREE 10 ML: 5 INJECTION INTRAVENOUS at 01:06

## 2023-11-26 RX ADMIN — AMIODARONE HYDROCHLORIDE 200 MG: 200 TABLET ORAL at 08:05

## 2023-11-26 RX ADMIN — METOPROLOL SUCCINATE 12.5 MG: 25 TABLET, FILM COATED, EXTENDED RELEASE ORAL at 08:05

## 2023-11-26 RX ADMIN — METOPROLOL SUCCINATE 12.5 MG: 25 TABLET, FILM COATED, EXTENDED RELEASE ORAL at 21:51

## 2023-11-26 RX ADMIN — APIXABAN 5 MG: 5 TABLET, FILM COATED ORAL at 01:05

## 2023-11-26 RX ADMIN — PANTOPRAZOLE SODIUM 40 MG: 40 TABLET, DELAYED RELEASE ORAL at 08:05

## 2023-11-26 RX ADMIN — ATORVASTATIN CALCIUM 40 MG: 40 TABLET, FILM COATED ORAL at 01:05

## 2023-11-26 RX ADMIN — FUROSEMIDE 40 MG: 10 INJECTION, SOLUTION INTRAMUSCULAR; INTRAVENOUS at 09:58

## 2023-11-26 RX ADMIN — EMPAGLIFLOZIN 10 MG: 10 TABLET, FILM COATED ORAL at 08:05

## 2023-11-26 RX ADMIN — LISINOPRIL 2.5 MG: 5 TABLET ORAL at 08:05

## 2023-11-26 RX ADMIN — Medication 10 ML: at 08:17

## 2023-11-26 RX ADMIN — FINASTERIDE 5 MG: 5 TABLET, FILM COATED ORAL at 08:05

## 2023-11-26 ASSESSMENT — LIFESTYLE VARIABLES
HOW MANY STANDARD DRINKS CONTAINING ALCOHOL DO YOU HAVE ON A TYPICAL DAY: PATIENT DOES NOT DRINK
HOW OFTEN DO YOU HAVE A DRINK CONTAINING ALCOHOL: NEVER

## 2023-11-26 NOTE — ED PROVIDER NOTES
[facility]        Pt Name: Naresh Lagunas  MRN: [id number]  [address] 1960  Date of evaluation: 11/25/2023  Provider: Donna Haro PA-C  PCP: No primary care provider on file. Note Started: 8:04 PM EST 11/25/23      SHONA. I have evaluated this patient. CHIEF COMPLAINT       Chief Complaint   Patient presents with    Shortness of Breath     Pt c/o cough and SOB x 4 days. States his hands and feet are swelling. States he \"heard his defibrillator making noises\". States it did not shock him. C/o chest pressure. HISTORY OF PRESENT ILLNESS: 1 or more Elements     History From: Patient  Limitations to history : None    Naresh Lagunas is a 57 y.o. male who presents to the emergency department today for evaluation for concerns of a cough, as well as shortness of breath. The patient states that he has been experiencing shortness of breath, and lower leg swelling, and states that this is actually been ongoing for the past 4 days. The patient does have a history of atrial fibrillation, he is anticoagulated on Eliquis. The patient does have a history of CHF. Patient states that he is unsure if he could be fluid overloaded but states that he had a 10 pound unintentional weight gain. He states that his cough is dry, there is no sputum production or hemoptysis. He states that today he did notice a discomfort across his chest, and felt more short of breath, which prompted his visit to the ED. He has not had any fevers. He denies any abdominal pain, nausea, vomiting or diarrhea. No urinary symptoms, he does not smoke no other complaint    Nursing Notes were all reviewed and agreed with or any disagreements were addressed in the HPI. REVIEW OF SYSTEMS :      Review of Systems   Constitutional:  Negative for activity change, appetite change, chills and fever. HENT:  Negative for congestion and rhinorrhea.     Respiratory:

## 2023-11-26 NOTE — H&P
Agree with documentation as outlined below. Discussed case with [facility] Medicine History & Physical      Patient Name: Farooq Castillo    : 1960    PCP: No primary care provider on file. Date of Service:  Patient seen and examined on 2023    Chief Complaint: Shortness of breath    History Of Present Illness:    Farooq Castillo is a 57 y.o. male who presents to the ED for evaluation of shortness of breath, nonproductive cough, and lower extremity edema which has been ongoing for the past 4 days. He reports he has also had a 10 pound unintentional weight gain. He reports today he did notice some discomfort across his chest and felt more short of breath which prompted his visit to the ED. He has a history of A-fib and is anticoagulated on Eliquis. He denies fever, dizziness, abdominal pain, nausea, vomiting, diarrhea, urinary symptoms. He denies any other complaints or concerns at this time. Past Medical History:    Patient has a past medical history of Acute combined systolic and diastolic congestive heart failure ([address]), Atrial fibrillation ([address]), CHF (congestive heart failure) ([address]), Hx of blood clots, and Hypertension. Past Surgical History:    Patient has a past surgical history that includes Insertable Cardiac Monitor (2019); Cardioversion (2019); Cardiac defibrillator placement (Left); and Prostate surgery (N/A, 3/9/2023). Medications Prior to Admission:      Prior to Admission medications    Medication Sig Start Date End Date Taking?  Authorizing Provider   empagliflozin (JARDIANCE) 10 MG tablet Take 1 tablet by mouth daily 23   TRENT Beckham   lisinopril (PRINIVIL;ZESTRIL) 2.5 MG tablet Take 1 tablet by mouth daily 23   TRENT Beckham   metoprolol succinate (TOPROL XL) 25 MG extended release tablet Take 0.5 tablets by mouth in the morning and at bedtime 23   TRENT Do

## 2023-11-26 NOTE — ED NOTES
Notable for the following components:    Troponin, High Sensitivity 29 (*)     All other components within normal limits     Critical values:      Abnormal Assessment Findings:     Background  History:   Past Medical History:   Diagnosis Date    Acute combined systolic and diastolic congestive heart failure ([address]) 8/31/2022    Atrial fibrillation ([address]) 07/25/2019    CHF (congestive heart failure) (HCC)     Hx of blood clots     Hypertension        Assessment    Vitals/MEWS: MEWS Score: 2  Level of Consciousness: Alert (0)   Vitals:    11/25/23 2030 11/25/23 2115 11/25/23 2312 11/25/23 2315   BP: 117/89 (!) 111/91 (!) 124/95 108/89   Pulse: 73 73  74   Resp: 19 17  14   Temp:       TempSrc:       SpO2: 98% 95%  99%   Weight:       Height:         FiO2 (%):   O2 Flow Rate: O2 Device: None (Room air)    Cardiac Rhythm:    Pain Assessment:  [] Verbal [] Jessica Grosser Scale  Pain Scale: Pain Assessment  Pain Assessment: None - Denies Pain  Last documented pain score (0-10 scale)    Last documented pain medication administered:   Mental Status: oriented  Orientation Level:    NIH Score:    C-SSRS: Risk of Suicide: No Risk  Bedside swallow:    [place] Coma Scale (GCS): [place] Coma Scale  Eye Opening: Spontaneous  Best Verbal Response: Oriented  Best Motor Response: Obeys commands  Deniz Coma Scale Score: 15  Active LDA's:   Peripheral IV 11/25/23 Left Antecubital (Active)   Site Assessment Clean, dry & intact; Clean;Dry; Intact 11/25/23 2028     PO Status:   Pertinent or High Risk Medications/Drips: no   If Yes, please provide details:   Pending Blood Product Administration: no       You may also review the ED PT Care Timeline found under the Summary Nursing Index tab. Recommendation    Pending orders   Plan for Discharge (if known):    Additional Comments:    If any further questions, please call Sending RN at [phone]    Electronically signed by: Electronically signed by Chloé Lozano RN on 11/25/2023 at 11:53 PM      [address] [facility]

## 2023-11-27 LAB
ANION GAP SERPL CALCULATED.3IONS-SCNC: 11 MMOL/L (ref 3–16)
BUN SERPL-MCNC: 22 MG/DL (ref 7–20)
CALCIUM SERPL-MCNC: 8.1 MG/DL (ref 8.3–10.6)
CHLORIDE SERPL-SCNC: 107 MMOL/L (ref 99–110)
CO2 SERPL-SCNC: 28 MMOL/L (ref 21–32)
CREAT SERPL-MCNC: 1.3 MG/DL (ref 0.8–1.3)
FERRITIN SERPL IA-MCNC: 275.1 NG/ML (ref 30–400)
GFR SERPLBLD CREATININE-BSD FMLA CKD-EPI: >60 ML/MIN/{1.73_M2}
GLUCOSE SERPL-MCNC: 91 MG/DL (ref 70–99)
IRON SATN MFR SERPL: 24 % (ref 20–50)
IRON SERPL-MCNC: 71 UG/DL (ref 59–158)
MAGNESIUM SERPL-MCNC: 2.2 MG/DL (ref 1.8–2.4)
POTASSIUM SERPL-SCNC: 3.6 MMOL/L (ref 3.5–5.1)
SODIUM SERPL-SCNC: 146 MMOL/L (ref 136–145)
TIBC SERPL-MCNC: 299 UG/DL (ref 260–445)

## 2023-11-27 PROCEDURE — 99233 SBSQ HOSP IP/OBS HIGH 50: CPT | Performed by: CLINICAL NURSE SPECIALIST

## 2023-11-27 PROCEDURE — 6360000002 HC RX W HCPCS: Performed by: CLINICAL NURSE SPECIALIST

## 2023-11-27 PROCEDURE — 1200000000 HC SEMI PRIVATE

## 2023-11-27 PROCEDURE — 80048 BASIC METABOLIC PNL TOTAL CA: CPT

## 2023-11-27 PROCEDURE — 6360000002 HC RX W HCPCS: Performed by: PHYSICIAN ASSISTANT

## 2023-11-27 PROCEDURE — 83550 IRON BINDING TEST: CPT

## 2023-11-27 PROCEDURE — 36415 COLL VENOUS BLD VENIPUNCTURE: CPT

## 2023-11-27 PROCEDURE — 99232 SBSQ HOSP IP/OBS MODERATE 35: CPT | Performed by: INTERNAL MEDICINE

## 2023-11-27 PROCEDURE — 2580000003 HC RX 258: Performed by: PHYSICIAN ASSISTANT

## 2023-11-27 PROCEDURE — 6370000000 HC RX 637 (ALT 250 FOR IP): Performed by: PHYSICIAN ASSISTANT

## 2023-11-27 PROCEDURE — 82728 ASSAY OF FERRITIN: CPT

## 2023-11-27 PROCEDURE — 83540 ASSAY OF IRON: CPT

## 2023-11-27 PROCEDURE — 83735 ASSAY OF MAGNESIUM: CPT

## 2023-11-27 RX ORDER — TORSEMIDE 20 MG/1
20 TABLET ORAL DAILY
Status: DISCONTINUED | OUTPATIENT
Start: 2023-11-28 | End: 2023-11-28 | Stop reason: HOSPADM

## 2023-11-27 RX ORDER — FUROSEMIDE 10 MG/ML
40 INJECTION INTRAMUSCULAR; INTRAVENOUS ONCE
Status: COMPLETED | OUTPATIENT
Start: 2023-11-27 | End: 2023-11-27

## 2023-11-27 RX ADMIN — ATORVASTATIN CALCIUM 40 MG: 40 TABLET, FILM COATED ORAL at 20:20

## 2023-11-27 RX ADMIN — APIXABAN 5 MG: 5 TABLET, FILM COATED ORAL at 07:51

## 2023-11-27 RX ADMIN — FUROSEMIDE 40 MG: 10 INJECTION, SOLUTION INTRAMUSCULAR; INTRAVENOUS at 16:03

## 2023-11-27 RX ADMIN — EMPAGLIFLOZIN 10 MG: 10 TABLET, FILM COATED ORAL at 07:51

## 2023-11-27 RX ADMIN — Medication 10 ML: at 20:20

## 2023-11-27 RX ADMIN — PANTOPRAZOLE SODIUM 40 MG: 40 TABLET, DELAYED RELEASE ORAL at 07:51

## 2023-11-27 RX ADMIN — Medication 10 ML: at 07:58

## 2023-11-27 RX ADMIN — APIXABAN 5 MG: 5 TABLET, FILM COATED ORAL at 20:20

## 2023-11-27 RX ADMIN — METOPROLOL SUCCINATE 12.5 MG: 25 TABLET, FILM COATED, EXTENDED RELEASE ORAL at 07:51

## 2023-11-27 RX ADMIN — FINASTERIDE 5 MG: 5 TABLET, FILM COATED ORAL at 07:51

## 2023-11-27 RX ADMIN — AMIODARONE HYDROCHLORIDE 200 MG: 200 TABLET ORAL at 07:51

## 2023-11-27 RX ADMIN — METOPROLOL SUCCINATE 12.5 MG: 25 TABLET, FILM COATED, EXTENDED RELEASE ORAL at 20:20

## 2023-11-27 RX ADMIN — FUROSEMIDE 40 MG: 10 INJECTION, SOLUTION INTRAMUSCULAR; INTRAVENOUS at 07:54

## 2023-11-27 RX ADMIN — SPIRONOLACTONE 25 MG: 25 TABLET ORAL at 11:57

## 2023-11-27 RX ADMIN — LISINOPRIL 2.5 MG: 5 TABLET ORAL at 07:51

## 2023-11-27 NOTE — CONSULTS
Nutrition Education    Provided heart failure diet education. Pt has received low Na+ diet education multiple times over the past year from inpatient RD. Pt reports he does not add salt to his food. Pt understands the recommended fluid restriction (64 oz/day). Pt states understanding of education. Time spent with patient: 5 minutes.       Ollie Brownlee MS, RD, LD  Contact Number: [phone]
[address]      Cornelio Avila 1960    History:  Past Medical History:   Diagnosis Date    Acute combined systolic and diastolic congestive heart failure ([address]) 8/31/2022    Atrial fibrillation ([address]) 07/25/2019    CHF (congestive heart failure) (HCC)     Hx of blood clots     Hypertension        ECHO:  8/15/23   Summary   Left ventricle is severely dilated with normal wall thickness. Overall, left ventricular systolic function is severely depressed. Ejection fraction is estimated to be 15-20%. There is severe global hypokinesis with regional variations. Mild aortic regurgitation. Mild to moderate mitral regurgitation. Mild to moderate tricuspid regurgitation. Biatrial enlargement. Pacer/ICD seen in right ventricle. ACE/ARB/ARNi: lisinopril 2.5 mg daily  BB: toprol xl 12.5 mg bid  Aldosterone Antagonist: aldactone 25 mg daily  SGLT2: jardiance 10 mg daily      DM History: No      Last Hospital Admission:v7/22/23 with chf  Code Status: full   Discharge plans: lives with friends    Family Present: shahab Avila was admitted to the hospital with increased shortness. Patient is well known to HF team and follows as an outpatient. He weight was increasing but did not call. He also left his medications in a friend's car so he was without them for some time. Patient tries to limit sodium. He does not add any. He knows he needs to stay under 64 oz per day. He states he usually can get transportation to appointments. Patient provided with both written and verbal education on CHF signs/ symptoms, causes, discharge medications, daily weights, low sodium diet, activity, and follow-up. Pt to call if gains 3 pounds in one day or 5 pounds in one week. Mutually agreed upon goals were discussed such as calling the MD as soon as they recognize symptoms and weight gain, maintaining proper diet, taking medications as prescribed, joining cardiac rehab when able.
cors  ~LVG with LVEF of 10% and global regional wall motion abnormalities  ~Severe MR  ~Normal CO/CI  ~normal L and R filling pressures    Scheduled Meds:   amiodarone  200 mg Oral Daily    apixaban  5 mg Oral BID    atorvastatin  40 mg Oral Nightly    empagliflozin  10 mg Oral Daily    finasteride  5 mg Oral Daily    lisinopril  2.5 mg Oral Daily    metoprolol succinate  12.5 mg Oral BID    pantoprazole  40 mg Oral Daily    spironolactone  25 mg Oral Lunch    sodium chloride flush  5-40 mL IntraVENous 2 times per day    furosemide  40 mg IntraVENous BID     Continuous Infusions:   sodium chloride       PRN Meds:.sodium chloride flush, sodium chloride, acetaminophen **OR** acetaminophen, magnesium sulfate, potassium chloride **OR** potassium alternative oral replacement **OR** potassium chloride, senna, ondansetron     Patient Active Problem List    Diagnosis Date Noted    Bradycardia 03/03/2023    Persistent atrial fibrillation ([address]) 02/01/2023    Epigastric abdominal pain 01/31/2023    HFrEF (heart failure with reduced ejection fraction) ([address]) 10/29/2022    Dizziness 10/29/2022    Anemia 09/01/2022    Thrombus of left atrial appendage     VT (ventricular tachycardia) ([address])     Atrial fibrillation with RVR ([address]) 11/25/2023    Encounter for care of pacemaker 07/24/2023    ICD (implantable cardioverter-defibrillator) malfunction, sequela 07/22/2023    Iron deficiency anemia 06/27/2023    ICD (implantable cardioverter-defibrillator) in place 04/26/2022    Chronic atrial fibrillation ([address])     Moderate mitral regurgitation     Acute combined systolic and diastolic heart failure ([address]) 04/13/2022    Homeless     Non-compliance     Chronic systolic CHF (congestive heart failure), NYHA class 3 ([address]) 11/28/2021    Dilated cardiomyopathy ([address])     Primary hypertension       Active Hospital Problems    Diagnosis Date Noted    Atrial fibrillation with RVR ([address]) [I48.91] 11/25/2023       Assessment:       Plan:    -Acute on

## 2023-11-27 NOTE — DISCHARGE INSTRUCTIONS
follow-up. Take a copy of this screening test to your primary care physician on your next office visit. Interpretation:      0 -   7: It is unlikely that you are abnormally sleepy. 8 -   9: You have an average amount of daytime sleepiness. 10 - 15: You may be excessively sleepy depending on the situation. You may want to consider seeking medical attention. 16 - 24: You are excessively sleepy and should consider seeking medical attention. Electronically signed by Luis Eduardo Frye RCP on 11/27/2023 at 7:48 PM    Guidelines for Heart Failure home management:    1. Continue to monitor weight first thing each morning. You should weigh yourself after using the bathroom and before you eat breakfast.    2. Report to your doctor any significant weight change. Remember that weight change of 2-3 lbs. in 1 day or 5 lbs in a week is \"significant\" and likely represents changes in \"fluid\" status. If you are experiencing any swelling in your feet, ankles or abdomen, or shortness of breath, call your doctor. 3. You should restrict all sodium intake to 3000 milligrams (3 grams) a day. Depending on your status, you may also be asked to restrict fluid intake to no more that 64 oz/2 Liters a day. If uncertain, ask the nurse or physician. 4. Regular aerobic exercise is encouraged 30 minutes a day (walking, bike, swimming, etc.). For specific exercise recommendations, ask your physician. 5. Report to your doctor any change in symptoms (chest pain, worsening shortness of breath, increased dizziness or passing out, increased palpitations or ICD shock, trouble catching breath while lying down, increased edema or abdominal bloating). Remember that even \"minor\" changes in symptoms may be important. Also report any changes in medications including \"over the counter\" medications.      6. DO NOT take NSAID's for pain (i.e, Advil, Aleve, Motrin, ibuprofen, and many more) since these may cause serious problems in those with

## 2023-11-27 NOTE — CARE COORDINATION
(Rosa-Friend)  Plans to Return to Present Housing: Yes  Other Identified Issues/Barriers to RETURNING to current housing:   Potential Assistance needed at discharge: Other (Comment) (TBD)            Potential DME:    Patient expects to discharge to: Other (comment)  Plan for transportation at discharge: Self (The pt stated he will contact a friend to trasnport the patient home at discharge.)    Financial    Payor: Sadie Oh / Plan: Levell Pauling / Product Type: *No Product type* /     Does insurance require precert for SNF: Yes    Potential assistance Purchasing Medications: No  Meds-to-Beds request:        [address]  [phone] - F [phone] [facility]  Phone: [phone] Fax: [phone]      Notes:    Factors facilitating achievement of predicted outcomes: Family support, Cooperative, and Pleasant    Barriers to discharge: Medical complications    Additional Case Management Notes: The SW spoke with the pt. The pt reports he lives in between friends homes. The patient reports he forgets his medications at home at his friends home which causes him to not take his medications. The Nurse and MD are aware and will provide the patient with a medication set. The SW also provided the patient with a PCP list and Transportation Assistance Card with the [address] [place] [address] Number to contact to schedule transport.       The Plan for Transition of Care is related to the following treatment goals of Abnormal chest x-ray [R93.89]  Atrial fibrillation with RVR (HCC) [I48.91]  Acute on chronic congestive heart failure, unspecified heart failure type ([address]) [I50.9]      DAVINA Elizabeth  Case Management Department

## 2023-11-28 VITALS
SYSTOLIC BLOOD PRESSURE: 120 MMHG | TEMPERATURE: 98.8 F | RESPIRATION RATE: 18 BRPM | OXYGEN SATURATION: 94 % | WEIGHT: 191.6 LBS | BODY MASS INDEX: 24.59 KG/M2 | DIASTOLIC BLOOD PRESSURE: 86 MMHG | HEIGHT: 74 IN | HEART RATE: 92 BPM

## 2023-11-28 LAB
ANION GAP SERPL CALCULATED.3IONS-SCNC: 8 MMOL/L (ref 3–16)
BUN SERPL-MCNC: 20 MG/DL (ref 7–20)
CALCIUM SERPL-MCNC: 8.4 MG/DL (ref 8.3–10.6)
CHLORIDE SERPL-SCNC: 103 MMOL/L (ref 99–110)
CO2 SERPL-SCNC: 32 MMOL/L (ref 21–32)
CREAT SERPL-MCNC: 1.4 MG/DL (ref 0.8–1.3)
GFR SERPLBLD CREATININE-BSD FMLA CKD-EPI: 56 ML/MIN/{1.73_M2}
GLUCOSE SERPL-MCNC: 90 MG/DL (ref 70–99)
MAGNESIUM SERPL-MCNC: 2.3 MG/DL (ref 1.8–2.4)
NT-PROBNP SERPL-MCNC: ABNORMAL PG/ML (ref 0–124)
POTASSIUM SERPL-SCNC: 4 MMOL/L (ref 3.5–5.1)
SODIUM SERPL-SCNC: 143 MMOL/L (ref 136–145)

## 2023-11-28 PROCEDURE — 2580000003 HC RX 258: Performed by: PHYSICIAN ASSISTANT

## 2023-11-28 PROCEDURE — 6370000000 HC RX 637 (ALT 250 FOR IP): Performed by: CLINICAL NURSE SPECIALIST

## 2023-11-28 PROCEDURE — 99232 SBSQ HOSP IP/OBS MODERATE 35: CPT | Performed by: CLINICAL NURSE SPECIALIST

## 2023-11-28 PROCEDURE — 83880 ASSAY OF NATRIURETIC PEPTIDE: CPT

## 2023-11-28 PROCEDURE — 83735 ASSAY OF MAGNESIUM: CPT

## 2023-11-28 PROCEDURE — 80048 BASIC METABOLIC PNL TOTAL CA: CPT

## 2023-11-28 PROCEDURE — 6370000000 HC RX 637 (ALT 250 FOR IP): Performed by: PHYSICIAN ASSISTANT

## 2023-11-28 PROCEDURE — 36415 COLL VENOUS BLD VENIPUNCTURE: CPT

## 2023-11-28 RX ORDER — PANTOPRAZOLE SODIUM 40 MG/1
40 TABLET, DELAYED RELEASE ORAL DAILY
Qty: 30 TABLET | Refills: 3 | Status: SHIPPED | OUTPATIENT
Start: 2023-11-28

## 2023-11-28 RX ORDER — TORSEMIDE 20 MG/1
20 TABLET ORAL DAILY
Qty: 90 TABLET | Refills: 0 | Status: SHIPPED | OUTPATIENT
Start: 2023-11-28

## 2023-11-28 RX ORDER — AMIODARONE HYDROCHLORIDE 200 MG/1
200 TABLET ORAL DAILY
Qty: 90 TABLET | Refills: 0 | Status: SHIPPED | OUTPATIENT
Start: 2023-11-28

## 2023-11-28 RX ORDER — FINASTERIDE 5 MG/1
5 TABLET, FILM COATED ORAL DAILY
Qty: 30 TABLET | Refills: 3 | Status: SHIPPED | OUTPATIENT
Start: 2023-11-28

## 2023-11-28 RX ORDER — SPIRONOLACTONE 25 MG/1
25 TABLET ORAL
Qty: 90 TABLET | Refills: 0 | Status: SHIPPED | OUTPATIENT
Start: 2023-11-28

## 2023-11-28 RX ORDER — ATORVASTATIN CALCIUM 40 MG/1
40 TABLET, FILM COATED ORAL NIGHTLY
Qty: 90 TABLET | Refills: 2 | Status: SHIPPED | OUTPATIENT
Start: 2023-11-28

## 2023-11-28 RX ORDER — METOPROLOL SUCCINATE 25 MG/1
12.5 TABLET, EXTENDED RELEASE ORAL 2 TIMES DAILY
Qty: 30 TABLET | Refills: 3 | Status: SHIPPED | OUTPATIENT
Start: 2023-11-28

## 2023-11-28 RX ORDER — LISINOPRIL 2.5 MG/1
2.5 TABLET ORAL DAILY
Qty: 90 TABLET | Refills: 0 | Status: SHIPPED | OUTPATIENT
Start: 2023-11-28

## 2023-11-28 RX ADMIN — METOPROLOL SUCCINATE 12.5 MG: 25 TABLET, FILM COATED, EXTENDED RELEASE ORAL at 09:02

## 2023-11-28 RX ADMIN — Medication 10 ML: at 08:59

## 2023-11-28 RX ADMIN — APIXABAN 5 MG: 5 TABLET, FILM COATED ORAL at 08:58

## 2023-11-28 RX ADMIN — FINASTERIDE 5 MG: 5 TABLET, FILM COATED ORAL at 08:58

## 2023-11-28 RX ADMIN — LISINOPRIL 2.5 MG: 5 TABLET ORAL at 09:02

## 2023-11-28 RX ADMIN — AMIODARONE HYDROCHLORIDE 200 MG: 200 TABLET ORAL at 09:02

## 2023-11-28 RX ADMIN — EMPAGLIFLOZIN 10 MG: 10 TABLET, FILM COATED ORAL at 08:58

## 2023-11-28 RX ADMIN — TORSEMIDE 20 MG: 20 TABLET ORAL at 09:02

## 2023-11-28 RX ADMIN — PANTOPRAZOLE SODIUM 40 MG: 40 TABLET, DELAYED RELEASE ORAL at 08:58

## 2023-11-28 RX ADMIN — SPIRONOLACTONE 25 MG: 25 TABLET ORAL at 11:37

## 2023-11-28 ASSESSMENT — PAIN SCALES - GENERAL: PAINLEVEL_OUTOF10: 0

## 2023-11-28 NOTE — PLAN OF CARE
Problem: Discharge Planning  Goal: Discharge to home or other facility with appropriate resources  Outcome: Progressing     Problem: Safety - Adult  Goal: Free from fall injury  Outcome: Progressing     Problem: ABCDS Injury Assessment  Goal: Absence of physical injury  Outcome: Progressing     Problem: Pain  Goal: Verbalizes/displays adequate comfort level or baseline comfort level  Outcome: Progressing     Problem: Cardiovascular - Adult  Goal: Maintains optimal cardiac output and hemodynamic stability  Outcome: Progressing     Problem: Skin/Tissue Integrity - Adult  Goal: Skin integrity remains intact  Outcome: Progressing     Problem: Metabolic/Fluid and Electrolytes - Adult  Goal: Electrolytes maintained within normal limits  Outcome: Progressing

## 2023-11-30 ENCOUNTER — TELEPHONE (OUTPATIENT)
Dept: CARDIOLOGY CLINIC | Age: 63
End: 2023-11-30

## 2023-11-30 ENCOUNTER — OFFICE VISIT (OUTPATIENT)
Dept: CARDIOLOGY CLINIC | Age: 63
End: 2023-11-30
Payer: COMMERCIAL

## 2023-11-30 VITALS
HEIGHT: 74 IN | WEIGHT: 201 LBS | HEART RATE: 76 BPM | BODY MASS INDEX: 25.8 KG/M2 | SYSTOLIC BLOOD PRESSURE: 92 MMHG | DIASTOLIC BLOOD PRESSURE: 70 MMHG

## 2023-11-30 DIAGNOSIS — I50.23 ACUTE ON CHRONIC SYSTOLIC HEART FAILURE (HCC): Primary | ICD-10-CM

## 2023-11-30 DIAGNOSIS — Z59.00 HOMELESS: ICD-10-CM

## 2023-11-30 DIAGNOSIS — I42.0 DILATED CARDIOMYOPATHY (HCC): ICD-10-CM

## 2023-11-30 DIAGNOSIS — Z95.810 ICD (IMPLANTABLE CARDIOVERTER-DEFIBRILLATOR) IN PLACE: ICD-10-CM

## 2023-11-30 DIAGNOSIS — I48.0 PAF (PAROXYSMAL ATRIAL FIBRILLATION) (HCC): ICD-10-CM

## 2023-11-30 PROCEDURE — 99215 OFFICE O/P EST HI 40 MIN: CPT | Performed by: CLINICAL NURSE SPECIALIST

## 2023-11-30 RX ORDER — METOLAZONE 5 MG/1
5 TABLET ORAL ONCE
Qty: 1 TABLET | Refills: 0 | Status: SHIPPED | OUTPATIENT
Start: 2023-11-30 | End: 2023-11-30

## 2023-11-30 SDOH — ECONOMIC STABILITY - HOUSING INSECURITY: HOMELESSNESS UNSPECIFIED: Z59.00

## 2023-11-30 NOTE — PROGRESS NOTES
11/26/2023 04:28 AM    PHOS 3.4 02/21/2023 05:23 AM    PHOS 4.3 02/18/2023 06:26 AM    PHOS 3.8 02/01/2023 03:19 AM    BUN 20 11/28/2023 04:42 AM    BUN 22 11/27/2023 05:28 AM    BUN 21 11/26/2023 04:28 AM    CREATININE 1.4 11/28/2023 04:42 AM    CREATININE 1.3 11/27/2023 05:28 AM    CREATININE 1.2 11/26/2023 04:28 AM     BNP:   Lab Results   Component Value Date/Time    PROBNP 17,539 11/28/2023 04:42 AM    PROBNP 23,610 11/25/2023 07:53 PM    PROBNP 29,309 11/15/2023 09:01 AM     Cardiac Imaging:  Echo 8/15/2023   Summary   Left ventricle is severely dilated with normal wall thickness. Overall, left ventricular systolic function is severely depressed. Ejection fraction is estimated to be 15-20%. There is severe global hypokinesis with regional variations. Mild aortic regurgitation. Mild to moderate mitral regurgitation. Mild to moderate tricuspid regurgitation. Biatrial enlargement. Pacer/ICD seen in right ventricle. CONSUELO Dr Naun Arias 3/6/2023   Summary   The left ventricle is dilated. Mild hypertrophy. Severely reduced global   systolic function with an ejection fraction estimated at 15-20%. Severe global hypokinesis noted. Left atrial size appears dilated. Spontaneous echo contrast seen in the left atrium. There is no evidence of   mass or thrombus in the left atrium or appendage. There is mild aortic insufficiency. The right ventricle is dilated and hypokinetic with reduced systolic   function. Pacer / ICD wire is visualized in the right ventricle. There is small echodensity seen on the ICD lead, differential diagnosis   include fibrin strands or vegetation. Clinical correlation is recommended. There is severe tricuspid regurgitation. CONSUELO Dr Naun Arias 2/10/22  Preliminary CONSUELO results are:      - Severe LV dysfunction,  EF: 20-25%  - LA dilated. There was HENOK thrombus.    - Moderate MR    Cardiac Cath PCI: Dr Rosa Locke 2/8/2022  Anatomy:   LM-nml   LAD-nml  Cx-nml  OM-

## 2023-11-30 NOTE — PATIENT INSTRUCTIONS
Take metolazone 5 mg once tomorrow 30 minutes before  you take the torsemide  Continue all other medication  RTO in 3 weeks

## 2023-12-19 NOTE — TELEPHONE ENCOUNTER
LVM for pt that I'm in holding pattern on dates currently. As soon as I know more I'll reach out to get him scheduled.  Most likely going to be Feb but I should know more in the next week or so.

## 2023-12-26 PROBLEM — R79.89 ELEVATED TROPONIN: Status: RESOLVED | Noted: 2022-09-01 | Resolved: 2023-12-26

## 2024-01-01 NOTE — PLAN OF CARE
Problem: Discharge Planning  Goal: Discharge to home or other facility with appropriate resources  Outcome: Progressing  Flowsheets (Taken 9/1/2022 0900)  Discharge to home or other facility with appropriate resources:   Identify barriers to discharge with patient and caregiver   Arrange for needed discharge resources and transportation as appropriate   Identify discharge learning needs (meds, wound care, etc)   Refer to discharge planning if patient needs post-hospital services based on physician order or complex needs related to functional status, cognitive ability or social support system     Problem: Chronic Conditions and Co-morbidities  Goal: Patient's chronic conditions and co-morbidity symptoms are monitored and maintained or improved  Outcome: Progressing  Flowsheets (Taken 9/1/2022 0900)  Care Plan - Patient's Chronic Conditions and Co-Morbidity Symptoms are Monitored and Maintained or Improved:   Monitor and assess patient's chronic conditions and comorbid symptoms for stability, deterioration, or improvement   Collaborate with multidisciplinary team to address chronic and comorbid conditions and prevent exacerbation or deterioration   Update acute care plan with appropriate goals if chronic or comorbid symptoms are exacerbated and prevent overall improvement and discharge Started second trimester

## 2024-01-15 NOTE — TELEPHONE ENCOUNTER
Spoke with Osmany and he is 8000 miles away currently but due back home this week. I will call him later in the week to try and get Manoj scheduled for procedure.

## 2024-02-15 ENCOUNTER — TELEPHONE (OUTPATIENT)
Dept: CARDIOLOGY CLINIC | Age: 64
End: 2024-02-15

## 2024-02-15 NOTE — TELEPHONE ENCOUNTER
Pt was in the pharmacy to  Torsemide.  He stated to the pharmacist Kiko that he didn't realize how important it was.  He has SOB and swelling to the legs. He was also out of breath upon walking into the pharmacy.       He believes his weight is up but he is unsure how much.

## 2024-02-15 NOTE — TELEPHONE ENCOUNTER
Spoke to his friend Osmany he will have the patient call to make a appointment.  Patient is currently admitted

## 2024-02-17 ENCOUNTER — APPOINTMENT (OUTPATIENT)
Dept: GENERAL RADIOLOGY | Age: 64
DRG: 194 | End: 2024-02-17
Payer: MEDICAID

## 2024-02-17 ENCOUNTER — HOSPITAL ENCOUNTER (INPATIENT)
Age: 64
LOS: 2 days | Discharge: HOME | DRG: 194 | End: 2024-02-19
Attending: STUDENT IN AN ORGANIZED HEALTH CARE EDUCATION/TRAINING PROGRAM | Admitting: SURGERY
Payer: MEDICAID

## 2024-02-17 DIAGNOSIS — R07.89 ATYPICAL CHEST PAIN: Primary | ICD-10-CM

## 2024-02-17 PROBLEM — I50.22 SYSTOLIC CHF, CHRONIC (HCC): Status: ACTIVE | Noted: 2024-02-17

## 2024-02-17 PROBLEM — R52 SHOOTING PAIN: Status: ACTIVE | Noted: 2024-02-17

## 2024-02-17 LAB
ALBUMIN SERPL-MCNC: 3.7 G/DL (ref 3.4–5)
ALBUMIN/GLOB SERPL: 1.3 {RATIO} (ref 1.1–2.2)
ALP SERPL-CCNC: 98 U/L (ref 40–129)
ALT SERPL-CCNC: 31 U/L (ref 10–40)
AMPHETAMINES UR QL SCN>1000 NG/ML: NORMAL
ANION GAP SERPL CALCULATED.3IONS-SCNC: 10 MMOL/L (ref 3–16)
AST SERPL-CCNC: 53 U/L (ref 15–37)
BARBITURATES UR QL SCN>200 NG/ML: NORMAL
BASOPHILS # BLD: 0 K/UL (ref 0–0.2)
BASOPHILS NFR BLD: 1.1 %
BENZODIAZ UR QL SCN>200 NG/ML: NORMAL
BILIRUB SERPL-MCNC: 2.2 MG/DL (ref 0–1)
BUN SERPL-MCNC: 25 MG/DL (ref 7–20)
CALCIUM SERPL-MCNC: 8.3 MG/DL (ref 8.3–10.6)
CANNABINOIDS UR QL SCN>50 NG/ML: NORMAL
CHLORIDE SERPL-SCNC: 100 MMOL/L (ref 99–110)
CO2 SERPL-SCNC: 30 MMOL/L (ref 21–32)
COCAINE UR QL SCN: NORMAL
CREAT SERPL-MCNC: 1.1 MG/DL (ref 0.8–1.3)
DEPRECATED RDW RBC AUTO: 15.4 % (ref 12.4–15.4)
DRUG SCREEN COMMENT UR-IMP: NORMAL
EKG ATRIAL RATE: 75 BPM
EKG DIAGNOSIS: NORMAL
EKG P-R INTERVAL: 210 MS
EKG Q-T INTERVAL: 216 MS
EKG QRS DURATION: 94 MS
EKG QTC CALCULATION (BAZETT): 241 MS
EKG R AXIS: -4 DEGREES
EKG T AXIS: -82 DEGREES
EKG VENTRICULAR RATE: 75 BPM
EOSINOPHIL # BLD: 0 K/UL (ref 0–0.6)
EOSINOPHIL NFR BLD: 0.8 %
FENTANYL SCREEN, URINE: NORMAL
GFR SERPLBLD CREATININE-BSD FMLA CKD-EPI: >60 ML/MIN/{1.73_M2}
GLUCOSE SERPL-MCNC: 87 MG/DL (ref 70–99)
HCT VFR BLD AUTO: 36 % (ref 40.5–52.5)
HGB BLD-MCNC: 11.7 G/DL (ref 13.5–17.5)
LIPASE SERPL-CCNC: 41 U/L (ref 13–60)
LYMPHOCYTES # BLD: 0.9 K/UL (ref 1–5.1)
LYMPHOCYTES NFR BLD: 21.5 %
MCH RBC QN AUTO: 27.6 PG (ref 26–34)
MCHC RBC AUTO-ENTMCNC: 32.6 G/DL (ref 31–36)
MCV RBC AUTO: 84.6 FL (ref 80–100)
METHADONE UR QL SCN>300 NG/ML: NORMAL
MONOCYTES # BLD: 0.4 K/UL (ref 0–1.3)
MONOCYTES NFR BLD: 9.8 %
NEUTROPHILS # BLD: 2.7 K/UL (ref 1.7–7.7)
NEUTROPHILS NFR BLD: 66.8 %
NT-PROBNP SERPL-MCNC: ABNORMAL PG/ML (ref 0–124)
OPIATES UR QL SCN>300 NG/ML: NORMAL
OXYCODONE UR QL SCN: NORMAL
PCP UR QL SCN>25 NG/ML: NORMAL
PH UR STRIP: 5 [PH]
PLATELET # BLD AUTO: 232 K/UL (ref 135–450)
PMV BLD AUTO: 8.5 FL (ref 5–10.5)
POTASSIUM SERPL-SCNC: 5 MMOL/L (ref 3.5–5.1)
PROT SERPL-MCNC: 6.5 G/DL (ref 6.4–8.2)
RBC # BLD AUTO: 4.25 M/UL (ref 4.2–5.9)
SODIUM SERPL-SCNC: 140 MMOL/L (ref 136–145)
TROPONIN, HIGH SENSITIVITY: 23 NG/L (ref 0–22)
TROPONIN, HIGH SENSITIVITY: 24 NG/L (ref 0–22)
TROPONIN, HIGH SENSITIVITY: 24 NG/L (ref 0–22)
TROPONIN, HIGH SENSITIVITY: 27 NG/L (ref 0–22)
WBC # BLD AUTO: 4.1 K/UL (ref 4–11)

## 2024-02-17 PROCEDURE — 96361 HYDRATE IV INFUSION ADD-ON: CPT

## 2024-02-17 PROCEDURE — 6370000000 HC RX 637 (ALT 250 FOR IP): Performed by: SURGERY

## 2024-02-17 PROCEDURE — 93010 ELECTROCARDIOGRAM REPORT: CPT | Performed by: INTERNAL MEDICINE

## 2024-02-17 PROCEDURE — 99223 1ST HOSP IP/OBS HIGH 75: CPT | Performed by: INTERNAL MEDICINE

## 2024-02-17 PROCEDURE — 83880 ASSAY OF NATRIURETIC PEPTIDE: CPT

## 2024-02-17 PROCEDURE — 2580000003 HC RX 258: Performed by: SURGERY

## 2024-02-17 PROCEDURE — 80307 DRUG TEST PRSMV CHEM ANLYZR: CPT

## 2024-02-17 PROCEDURE — 2580000003 HC RX 258: Performed by: STUDENT IN AN ORGANIZED HEALTH CARE EDUCATION/TRAINING PROGRAM

## 2024-02-17 PROCEDURE — 84484 ASSAY OF TROPONIN QUANT: CPT

## 2024-02-17 PROCEDURE — 36415 COLL VENOUS BLD VENIPUNCTURE: CPT

## 2024-02-17 PROCEDURE — 99285 EMERGENCY DEPT VISIT HI MDM: CPT

## 2024-02-17 PROCEDURE — 96374 THER/PROPH/DIAG INJ IV PUSH: CPT

## 2024-02-17 PROCEDURE — 71046 X-RAY EXAM CHEST 2 VIEWS: CPT

## 2024-02-17 PROCEDURE — 93005 ELECTROCARDIOGRAM TRACING: CPT | Performed by: STUDENT IN AN ORGANIZED HEALTH CARE EDUCATION/TRAINING PROGRAM

## 2024-02-17 PROCEDURE — 83690 ASSAY OF LIPASE: CPT

## 2024-02-17 PROCEDURE — 80053 COMPREHEN METABOLIC PANEL: CPT

## 2024-02-17 PROCEDURE — 6360000002 HC RX W HCPCS: Performed by: STUDENT IN AN ORGANIZED HEALTH CARE EDUCATION/TRAINING PROGRAM

## 2024-02-17 PROCEDURE — 85025 COMPLETE CBC W/AUTO DIFF WBC: CPT

## 2024-02-17 PROCEDURE — 1200000000 HC SEMI PRIVATE

## 2024-02-17 PROCEDURE — 6360000002 HC RX W HCPCS: Performed by: INTERNAL MEDICINE

## 2024-02-17 RX ORDER — ACETAMINOPHEN 325 MG/1
650 TABLET ORAL EVERY 6 HOURS PRN
Status: DISCONTINUED | OUTPATIENT
Start: 2024-02-17 | End: 2024-02-19 | Stop reason: HOSPADM

## 2024-02-17 RX ORDER — SODIUM CHLORIDE 0.9 % (FLUSH) 0.9 %
5-40 SYRINGE (ML) INJECTION PRN
Status: DISCONTINUED | OUTPATIENT
Start: 2024-02-17 | End: 2024-02-19 | Stop reason: HOSPADM

## 2024-02-17 RX ORDER — POLYETHYLENE GLYCOL 3350 17 G/17G
17 POWDER, FOR SOLUTION ORAL DAILY PRN
Status: DISCONTINUED | OUTPATIENT
Start: 2024-02-17 | End: 2024-02-19 | Stop reason: HOSPADM

## 2024-02-17 RX ORDER — ONDANSETRON 2 MG/ML
4 INJECTION INTRAMUSCULAR; INTRAVENOUS ONCE
Status: COMPLETED | OUTPATIENT
Start: 2024-02-17 | End: 2024-02-17

## 2024-02-17 RX ORDER — FINASTERIDE 5 MG/1
5 TABLET, FILM COATED ORAL DAILY
Status: DISCONTINUED | OUTPATIENT
Start: 2024-02-17 | End: 2024-02-19 | Stop reason: HOSPADM

## 2024-02-17 RX ORDER — SODIUM CHLORIDE 9 MG/ML
INJECTION, SOLUTION INTRAVENOUS PRN
Status: DISCONTINUED | OUTPATIENT
Start: 2024-02-17 | End: 2024-02-19 | Stop reason: HOSPADM

## 2024-02-17 RX ORDER — 0.9 % SODIUM CHLORIDE 0.9 %
1000 INTRAVENOUS SOLUTION INTRAVENOUS ONCE
Status: COMPLETED | OUTPATIENT
Start: 2024-02-17 | End: 2024-02-17

## 2024-02-17 RX ORDER — METOPROLOL SUCCINATE 25 MG/1
12.5 TABLET, EXTENDED RELEASE ORAL 2 TIMES DAILY
Status: DISCONTINUED | OUTPATIENT
Start: 2024-02-17 | End: 2024-02-19 | Stop reason: HOSPADM

## 2024-02-17 RX ORDER — POTASSIUM CHLORIDE 7.45 MG/ML
10 INJECTION INTRAVENOUS PRN
Status: DISCONTINUED | OUTPATIENT
Start: 2024-02-17 | End: 2024-02-19 | Stop reason: HOSPADM

## 2024-02-17 RX ORDER — ACETAMINOPHEN 650 MG/1
650 SUPPOSITORY RECTAL EVERY 6 HOURS PRN
Status: DISCONTINUED | OUTPATIENT
Start: 2024-02-17 | End: 2024-02-19 | Stop reason: HOSPADM

## 2024-02-17 RX ORDER — MAGNESIUM SULFATE IN WATER 40 MG/ML
2000 INJECTION, SOLUTION INTRAVENOUS PRN
Status: DISCONTINUED | OUTPATIENT
Start: 2024-02-17 | End: 2024-02-19 | Stop reason: HOSPADM

## 2024-02-17 RX ORDER — AMIODARONE HYDROCHLORIDE 200 MG/1
200 TABLET ORAL DAILY
Status: DISCONTINUED | OUTPATIENT
Start: 2024-02-17 | End: 2024-02-19 | Stop reason: HOSPADM

## 2024-02-17 RX ORDER — NITROGLYCERIN 0.4 MG/1
0.4 TABLET SUBLINGUAL EVERY 5 MIN PRN
Status: DISCONTINUED | OUTPATIENT
Start: 2024-02-17 | End: 2024-02-19 | Stop reason: HOSPADM

## 2024-02-17 RX ORDER — ONDANSETRON 4 MG/1
4 TABLET, ORALLY DISINTEGRATING ORAL EVERY 8 HOURS PRN
Status: DISCONTINUED | OUTPATIENT
Start: 2024-02-17 | End: 2024-02-19 | Stop reason: HOSPADM

## 2024-02-17 RX ORDER — PANTOPRAZOLE SODIUM 40 MG/1
40 TABLET, DELAYED RELEASE ORAL
Status: DISCONTINUED | OUTPATIENT
Start: 2024-02-17 | End: 2024-02-19 | Stop reason: HOSPADM

## 2024-02-17 RX ORDER — ASPIRIN 81 MG/1
81 TABLET, CHEWABLE ORAL DAILY
Status: DISCONTINUED | OUTPATIENT
Start: 2024-02-18 | End: 2024-02-19 | Stop reason: HOSPADM

## 2024-02-17 RX ORDER — FUROSEMIDE 10 MG/ML
40 INJECTION INTRAMUSCULAR; INTRAVENOUS 2 TIMES DAILY
Status: DISCONTINUED | OUTPATIENT
Start: 2024-02-17 | End: 2024-02-19

## 2024-02-17 RX ORDER — LISINOPRIL 5 MG/1
2.5 TABLET ORAL DAILY
Status: DISCONTINUED | OUTPATIENT
Start: 2024-02-17 | End: 2024-02-19 | Stop reason: HOSPADM

## 2024-02-17 RX ORDER — SPIRONOLACTONE 25 MG/1
25 TABLET ORAL
Status: DISCONTINUED | OUTPATIENT
Start: 2024-02-17 | End: 2024-02-19 | Stop reason: HOSPADM

## 2024-02-17 RX ORDER — POTASSIUM CHLORIDE 20 MEQ/1
40 TABLET, EXTENDED RELEASE ORAL PRN
Status: DISCONTINUED | OUTPATIENT
Start: 2024-02-17 | End: 2024-02-19 | Stop reason: HOSPADM

## 2024-02-17 RX ORDER — ATORVASTATIN CALCIUM 40 MG/1
40 TABLET, FILM COATED ORAL NIGHTLY
Status: DISCONTINUED | OUTPATIENT
Start: 2024-02-17 | End: 2024-02-19 | Stop reason: HOSPADM

## 2024-02-17 RX ORDER — TORSEMIDE 20 MG/1
20 TABLET ORAL DAILY
Status: CANCELLED | OUTPATIENT
Start: 2024-02-17

## 2024-02-17 RX ORDER — SODIUM CHLORIDE 0.9 % (FLUSH) 0.9 %
5-40 SYRINGE (ML) INJECTION EVERY 12 HOURS SCHEDULED
Status: DISCONTINUED | OUTPATIENT
Start: 2024-02-17 | End: 2024-02-19 | Stop reason: HOSPADM

## 2024-02-17 RX ORDER — ONDANSETRON 2 MG/ML
4 INJECTION INTRAMUSCULAR; INTRAVENOUS EVERY 6 HOURS PRN
Status: DISCONTINUED | OUTPATIENT
Start: 2024-02-17 | End: 2024-02-19 | Stop reason: HOSPADM

## 2024-02-17 RX ADMIN — METOPROLOL SUCCINATE 12.5 MG: 25 TABLET, EXTENDED RELEASE ORAL at 19:40

## 2024-02-17 RX ADMIN — APIXABAN 5 MG: 5 TABLET, FILM COATED ORAL at 10:32

## 2024-02-17 RX ADMIN — FUROSEMIDE 40 MG: 10 INJECTION, SOLUTION INTRAMUSCULAR; INTRAVENOUS at 10:33

## 2024-02-17 RX ADMIN — FINASTERIDE 5 MG: 5 TABLET, FILM COATED ORAL at 10:32

## 2024-02-17 RX ADMIN — SPIRONOLACTONE 25 MG: 25 TABLET ORAL at 10:32

## 2024-02-17 RX ADMIN — SODIUM CHLORIDE, PRESERVATIVE FREE 10 ML: 5 INJECTION INTRAVENOUS at 19:40

## 2024-02-17 RX ADMIN — PANTOPRAZOLE SODIUM 40 MG: 40 TABLET, DELAYED RELEASE ORAL at 10:31

## 2024-02-17 RX ADMIN — LISINOPRIL 2.5 MG: 5 TABLET ORAL at 10:31

## 2024-02-17 RX ADMIN — SODIUM CHLORIDE, PRESERVATIVE FREE 10 ML: 5 INJECTION INTRAVENOUS at 10:32

## 2024-02-17 RX ADMIN — APIXABAN 5 MG: 5 TABLET, FILM COATED ORAL at 19:40

## 2024-02-17 RX ADMIN — FUROSEMIDE 40 MG: 10 INJECTION, SOLUTION INTRAMUSCULAR; INTRAVENOUS at 17:54

## 2024-02-17 RX ADMIN — EMPAGLIFLOZIN 10 MG: 10 TABLET, FILM COATED ORAL at 10:32

## 2024-02-17 RX ADMIN — ONDANSETRON 4 MG: 2 INJECTION INTRAMUSCULAR; INTRAVENOUS at 03:57

## 2024-02-17 RX ADMIN — ATORVASTATIN CALCIUM 40 MG: 40 TABLET, FILM COATED ORAL at 19:40

## 2024-02-17 RX ADMIN — SODIUM CHLORIDE 1000 ML: 9 INJECTION, SOLUTION INTRAVENOUS at 03:55

## 2024-02-17 RX ADMIN — AMIODARONE HYDROCHLORIDE 200 MG: 200 TABLET ORAL at 10:32

## 2024-02-17 RX ADMIN — METOPROLOL SUCCINATE 12.5 MG: 25 TABLET, EXTENDED RELEASE ORAL at 10:31

## 2024-02-17 ASSESSMENT — PAIN SCALES - GENERAL: PAINLEVEL_OUTOF10: 3

## 2024-02-17 NOTE — H&P
nerves grossly intact. Alert and oriented in time, place and person. No speech or motor deficits  Psychiatry: Anxious affect. Not agitated  Skin: Warm, dry, normal turgor, no rash  Brisk capillary refill, peripheral pulses palpable     Labs:  CBC:   Lab Results   Component Value Date/Time    WBC 4.1 02/17/2024 03:56 AM    RBC 4.25 02/17/2024 03:56 AM    HGB 11.7 02/17/2024 03:56 AM    HCT 36.0 02/17/2024 03:56 AM    MCV 84.6 02/17/2024 03:56 AM    MCH 27.6 02/17/2024 03:56 AM    MCHC 32.6 02/17/2024 03:56 AM    RDW 15.4 02/17/2024 03:56 AM     02/17/2024 03:56 AM    MPV 8.5 02/17/2024 03:56 AM     BMP:    Lab Results   Component Value Date/Time     02/17/2024 03:56 AM    K 5.0 02/17/2024 03:56 AM     02/17/2024 03:56 AM    CO2 30 02/17/2024 03:56 AM    BUN 25 02/17/2024 03:56 AM    CREATININE 1.1 02/17/2024 03:56 AM    CALCIUM 8.3 02/17/2024 03:56 AM    GFRAA >60 10/05/2022 10:10 AM    LABGLOM >60 02/17/2024 03:56 AM    GLUCOSE 87 02/17/2024 03:56 AM     XR CHEST (2 VW)   Preliminary Result   1. No acute cardiopulmonary disease.   2. Cardiomegaly.             Problem List  Principal Problem:    Shooting pain  Active Problems:    VT (ventricular tachycardia) (HCC)    Thrombus of left atrial appendage    Acute on chronic combined systolic and diastolic heart failure (HCC)    Chronic atrial fibrillation (HCC)    ICD (implantable cardioverter-defibrillator) in place  Resolved Problems:    * No resolved hospital problems. *        Assessment/Plan:   Acute on chronic combined CHF  Admit to inpatient  Telemetry   Start Lasix 40 mg IV bid  Strict I/O  FR 1.5 L when on diet  NPO   Daily weight on standing scale  CHF RN consult  Cardio consult to evaluate  Afib  Continue Amiodarone, Eliquis and Toprol XL  BL arm pain  NPO  Serial troponin  Telemetry   Cardiology consult to evaluate  Continue Eliquis  I do not think ischemic eval is needed, but defer to Cardio (resume diet if no need for cardiac work up per

## 2024-02-17 NOTE — ED PROVIDER NOTES
[facility] EMERGENCY DEPARTMENT  EMERGENCY DEPARTMENT ENCOUNTER      Pt Name: Manoj Feliz  MRN: [id number]  Birthdate 1960  Date of evaluation: 2/17/2024  Provider: Charlie Howell MD    CHIEF COMPLAINT     No chief complaint on file.        HISTORY OF PRESENT ILLNESS   (Location/Symptom, Timing/Onset, Context/Setting, Quality, Duration, Modifying Factors, Severity)  Note limiting factors.   Manoj Feliz is a 63 y.o. male who presents to the emergency department for nausea with vomiting  ongoing since the morning.  While in the waiting room he states that he developed sudden sharp stabbing pain running down both his arms at the same time.  There was no pain in the chest.  He is denying any abdominal pain.  He has ICD in place for history of V. tach.  He is not sure if the ICD fired.  He did not pass out.  Currently his arm pain is completely resolved.  He has no weakness or numbness to the arms.  He denies history of similar pain.      Chart reviewed: Patient was admitted in November for shortness of breath thought to be secondary to acute on chronic CHF exacerbation, treated with IV diuretics.  He has a history of A-fib on Eliquis.  Nursing Notes were reviewed.    REVIEW OF SYSTEMS    (2-9 systems for level 4, 10 or more for level 5)     Review of Systems    Except as noted above the remainder of the review of systems was reviewed and negative.       PAST MEDICAL HISTORY     Past Medical History:   Diagnosis Date    Acute combined systolic and diastolic congestive heart failure (HCC) 8/31/2022    Atrial fibrillation (HCC) 07/25/2019    CHF (congestive heart failure) (HCC)     Hx of blood clots     Hypertension          SURGICAL HISTORY       Past Surgical History:   Procedure Laterality Date    CARDIAC DEFIBRILLATOR PLACEMENT Left     CARDIOVERSION  07/26/2019    Dr. Soria    INSERTABLE CARDIAC MONITOR  07/26/2019    PROSTATE SURGERY N/A 3/9/2023    CYSTOSCOPY, PROSTATE TRANSRECTAL

## 2024-02-17 NOTE — CONSULTS
[facility]  Advanced CHF/Pulmonary Hypertension   Cardiac Evaluation      Manoj Feliz  YOB: 1960    Requesting PHysician:  Dr. ROSA Nunez    Chief complaint:  chest pain    History of Present Illness:  Manoj Feliz is a 64 yo male who presented to the ED with vomiting that had been going on all day.  While he was in the waiting room he had sudden sharp stabbing pain running down both of his arms.  No pain in his chest.  No abdominal pain.  He has ICD in place for history of VT.  While he was in the ED, the arm pain completely resolved.  No weakness or numbness to the arms.      He has known PAF, HTN, chronic systolic HF.  He is homeless but lives with friends.  H.  He has had multiple admissions for fluid overload.  He tells me that he ran out of one of his water pills a week before admission.  He got it filled a couple of days ago, and started taking it then.       His EKG shows atrial fibrillation, paced rhythm.  We are consulted for elevated troponin and BNP level.    Labs:  Sodium 140  K 5.0  BUN/Cre 25/1.1  Bnp 28K (was 20K on 12/21/23)  Troponin 24, 23, 24, 27  Bilirubin 2.2  H/H 11.7/36.0    CXR:  IMPRESSION:  1. No acute cardiopulmonary disease.  2. Cardiomegaly.     EKG:  paced rhythm, atrial fibrillation    Echo:  8/15/23:  Summary   Left ventricle is severely dilated with normal wall thickness.   Overall, left ventricular systolic function is severely depressed.   Ejection fraction is estimated to be 15-20%.   There is severe global hypokinesis with regional variations.   Mild aortic regurgitation.   Mild to moderate mitral regurgitation.   Mild to moderate tricuspid regurgitation.   Biatrial enlargement.   Pacer/ICD seen in right ventricle.    CXR:  IMPRESSION:  1. No acute cardiopulmonary disease.  2. Cardiomegaly.       Meds prior to admission:  Amiodarone 200 qd  Eliquis 5 bid  Lipitor 40 qd  Jardiance 10 qd  Lisinopril 2.5 qd  Toprol xl 12.5 bid  Spironolactone 25

## 2024-02-17 NOTE — PLAN OF CARE
Problem: Discharge Planning  Goal: Discharge to home or other facility with appropriate resources  2/17/2024 1005 by Marylu Chu, RN  Outcome: Progressing  2/17/2024 1005 by Marylu Chu, RN  Outcome: Progressing     Problem: Pain  Goal: Verbalizes/displays adequate comfort level or baseline comfort level  2/17/2024 1005 by Marylu Chu, RN  Outcome: Progressing  2/17/2024 1005 by Marylu Chu, RN  Outcome: Progressing

## 2024-02-17 NOTE — ACP (ADVANCE CARE PLANNING)
Advanced Care Planning Note.    Purpose of Encounter: Advanced care planning in light of acute on chronic combined CHF  Parties In Attendance: Patient  Decisional Capacity: Yes  Subjective: Patient has BL LE edema and orthopnea  Objective: Cr 1.1  Goals of Care Determination: Patient wants full support (CPR, vent, surgery, HD, trach, PEG; does not want vegetative state)  Plan:  IV Lasix.  Cardiology consult.   Code Status: Full code            Time spent on Advanced care Plannin minutes  Advanced Care Planning Documents: Completed advanced directives on chart, brother or nephew could be called the JUSTICE Nunez MD  2024 8:08 AM

## 2024-02-17 NOTE — ED NOTES
ED TO INPATIENT SBAR HANDOFF    Patient Name: Manoj Feliz   :  1960  63 y.o.   MRN:  [id number]  Preferred Name  Manoj  ED Room #:  ED-0020/20  Family/Caregiver Present no   Restraints no   Sitter no   Sepsis Risk Score Sepsis Risk Score: 1.09    Situation  Code Status: Full code No additional code details.    Allergies: Patient has no known allergies.  Weight: No data found.  Arrived from: home  Chief Complaint: No chief complaint on file.    Hospital Problem/Diagnosis:  Principal Problem:    Shooting pain  Resolved Problems:    * No resolved hospital problems. *    Imaging:   XR CHEST (2 VW)   Preliminary Result   1. No acute cardiopulmonary disease.   2. Cardiomegaly.           Abnormal labs:   Abnormal Labs Reviewed   CBC WITH AUTO DIFFERENTIAL - Abnormal; Notable for the following components:       Result Value    Hemoglobin 11.7 (*)     Hematocrit 36.0 (*)     Lymphocytes Absolute 0.9 (*)     All other components within normal limits   COMPREHENSIVE METABOLIC PANEL W/ REFLEX TO MG FOR LOW K - Abnormal; Notable for the following components:    BUN 25 (*)     Total Bilirubin 2.2 (*)     AST 53 (*)     All other components within normal limits   TROPONIN - Abnormal; Notable for the following components:    Troponin, High Sensitivity 24 (*)     All other components within normal limits   TROPONIN - Abnormal; Notable for the following components:    Troponin, High Sensitivity 23 (*)     All other components within normal limits   BRAIN NATRIURETIC PEPTIDE - Abnormal; Notable for the following components:    Pro-BNP 28,859 (*)     All other components within normal limits     Critical values: no     Abnormal Assessment Findings: Atypical chest pain    Background  History:   Past Medical History:   Diagnosis Date    Acute combined systolic and diastolic congestive heart failure (HCC) 2022    Atrial fibrillation (HCC) 2019    CHF (congestive heart failure) (HCC)     Hx of blood clots

## 2024-02-18 PROBLEM — I50.23 ACUTE ON CHRONIC SYSTOLIC HEART FAILURE (HCC): Status: ACTIVE | Noted: 2024-02-17

## 2024-02-18 LAB
ANION GAP SERPL CALCULATED.3IONS-SCNC: 10 MMOL/L (ref 3–16)
BUN SERPL-MCNC: 23 MG/DL (ref 7–20)
CALCIUM SERPL-MCNC: 8.3 MG/DL (ref 8.3–10.6)
CHLORIDE SERPL-SCNC: 101 MMOL/L (ref 99–110)
CO2 SERPL-SCNC: 30 MMOL/L (ref 21–32)
CREAT SERPL-MCNC: 1.5 MG/DL (ref 0.8–1.3)
DEPRECATED RDW RBC AUTO: 16.2 % (ref 12.4–15.4)
FERRITIN SERPL IA-MCNC: 212.2 NG/ML (ref 30–400)
GFR SERPLBLD CREATININE-BSD FMLA CKD-EPI: 51 ML/MIN/{1.73_M2}
GLUCOSE SERPL-MCNC: 87 MG/DL (ref 70–99)
HCT VFR BLD AUTO: 35.3 % (ref 40.5–52.5)
HGB BLD-MCNC: 11.5 G/DL (ref 13.5–17.5)
IRON SATN MFR SERPL: 15 % (ref 20–50)
IRON SERPL-MCNC: 52 UG/DL (ref 59–158)
MCH RBC QN AUTO: 27.8 PG (ref 26–34)
MCHC RBC AUTO-ENTMCNC: 32.7 G/DL (ref 31–36)
MCV RBC AUTO: 85.1 FL (ref 80–100)
PLATELET # BLD AUTO: 224 K/UL (ref 135–450)
PMV BLD AUTO: 7.9 FL (ref 5–10.5)
POTASSIUM SERPL-SCNC: 3.8 MMOL/L (ref 3.5–5.1)
RBC # BLD AUTO: 4.15 M/UL (ref 4.2–5.9)
REPORT: NORMAL
RESP PATH DNA+RNA PNL NPH NAA+NON-PROBE: NORMAL
SODIUM SERPL-SCNC: 141 MMOL/L (ref 136–145)
TIBC SERPL-MCNC: 345 UG/DL (ref 260–445)
TSH SERPL DL<=0.005 MIU/L-ACNC: 1.62 UIU/ML (ref 0.27–4.2)
WBC # BLD AUTO: 4.3 K/UL (ref 4–11)

## 2024-02-18 PROCEDURE — 2580000003 HC RX 258: Performed by: SURGERY

## 2024-02-18 PROCEDURE — 6370000000 HC RX 637 (ALT 250 FOR IP): Performed by: SURGERY

## 2024-02-18 PROCEDURE — 6370000000 HC RX 637 (ALT 250 FOR IP): Performed by: INTERNAL MEDICINE

## 2024-02-18 PROCEDURE — 83550 IRON BINDING TEST: CPT

## 2024-02-18 PROCEDURE — 1200000000 HC SEMI PRIVATE

## 2024-02-18 PROCEDURE — 82728 ASSAY OF FERRITIN: CPT

## 2024-02-18 PROCEDURE — 36415 COLL VENOUS BLD VENIPUNCTURE: CPT

## 2024-02-18 PROCEDURE — 83540 ASSAY OF IRON: CPT

## 2024-02-18 PROCEDURE — 84443 ASSAY THYROID STIM HORMONE: CPT

## 2024-02-18 PROCEDURE — 0202U NFCT DS 22 TRGT SARS-COV-2: CPT

## 2024-02-18 PROCEDURE — 99233 SBSQ HOSP IP/OBS HIGH 50: CPT | Performed by: CLINICAL NURSE SPECIALIST

## 2024-02-18 PROCEDURE — 80048 BASIC METABOLIC PNL TOTAL CA: CPT

## 2024-02-18 PROCEDURE — 6360000002 HC RX W HCPCS: Performed by: INTERNAL MEDICINE

## 2024-02-18 PROCEDURE — 85027 COMPLETE CBC AUTOMATED: CPT

## 2024-02-18 RX ORDER — METOLAZONE 2.5 MG/1
2.5 TABLET ORAL DAILY
Status: DISCONTINUED | OUTPATIENT
Start: 2024-02-18 | End: 2024-02-18

## 2024-02-18 RX ADMIN — SODIUM CHLORIDE, PRESERVATIVE FREE 10 ML: 5 INJECTION INTRAVENOUS at 09:00

## 2024-02-18 RX ADMIN — LISINOPRIL 2.5 MG: 5 TABLET ORAL at 11:29

## 2024-02-18 RX ADMIN — PANTOPRAZOLE SODIUM 40 MG: 40 TABLET, DELAYED RELEASE ORAL at 05:10

## 2024-02-18 RX ADMIN — FINASTERIDE 5 MG: 5 TABLET, FILM COATED ORAL at 08:16

## 2024-02-18 RX ADMIN — METOPROLOL SUCCINATE 12.5 MG: 25 TABLET, EXTENDED RELEASE ORAL at 19:26

## 2024-02-18 RX ADMIN — FUROSEMIDE 40 MG: 10 INJECTION, SOLUTION INTRAMUSCULAR; INTRAVENOUS at 18:10

## 2024-02-18 RX ADMIN — ATORVASTATIN CALCIUM 40 MG: 40 TABLET, FILM COATED ORAL at 19:25

## 2024-02-18 RX ADMIN — EMPAGLIFLOZIN 10 MG: 10 TABLET, FILM COATED ORAL at 08:16

## 2024-02-18 RX ADMIN — SPIRONOLACTONE 25 MG: 25 TABLET ORAL at 13:34

## 2024-02-18 RX ADMIN — APIXABAN 5 MG: 5 TABLET, FILM COATED ORAL at 08:16

## 2024-02-18 RX ADMIN — SODIUM CHLORIDE, PRESERVATIVE FREE 10 ML: 5 INJECTION INTRAVENOUS at 19:25

## 2024-02-18 RX ADMIN — ASPIRIN 81 MG 81 MG: 81 TABLET ORAL at 08:16

## 2024-02-18 RX ADMIN — METOLAZONE 2.5 MG: 2.5 TABLET ORAL at 08:15

## 2024-02-18 RX ADMIN — APIXABAN 5 MG: 5 TABLET, FILM COATED ORAL at 19:25

## 2024-02-18 RX ADMIN — AMIODARONE HYDROCHLORIDE 200 MG: 200 TABLET ORAL at 08:16

## 2024-02-18 RX ADMIN — FUROSEMIDE 40 MG: 10 INJECTION, SOLUTION INTRAMUSCULAR; INTRAVENOUS at 08:15

## 2024-02-18 RX ADMIN — METOPROLOL SUCCINATE 12.5 MG: 25 TABLET, EXTENDED RELEASE ORAL at 11:29

## 2024-02-18 NOTE — PLAN OF CARE
Problem: Discharge Planning  Goal: Discharge to home or other facility with appropriate resources  Outcome: Progressing     Problem: Pain  Goal: Verbalizes/displays adequate comfort level or baseline comfort level  Outcome: Progressing     Problem: Safety - Adult  Goal: Free from fall injury  2/18/2024 0952 by Marylu Chu, RN  Outcome: Progressing  2/17/2024 2115 by Paige Yañez, RN  Outcome: Progressing

## 2024-02-19 VITALS
RESPIRATION RATE: 16 BRPM | BODY MASS INDEX: 25.35 KG/M2 | HEIGHT: 74 IN | TEMPERATURE: 98.6 F | HEART RATE: 70 BPM | DIASTOLIC BLOOD PRESSURE: 79 MMHG | OXYGEN SATURATION: 99 % | WEIGHT: 197.5 LBS | SYSTOLIC BLOOD PRESSURE: 103 MMHG

## 2024-02-19 LAB
ANION GAP SERPL CALCULATED.3IONS-SCNC: 7 MMOL/L (ref 3–16)
BUN SERPL-MCNC: 25 MG/DL (ref 7–20)
CALCIUM SERPL-MCNC: 8.4 MG/DL (ref 8.3–10.6)
CHLORIDE SERPL-SCNC: 99 MMOL/L (ref 99–110)
CO2 SERPL-SCNC: 34 MMOL/L (ref 21–32)
CREAT SERPL-MCNC: 1.3 MG/DL (ref 0.8–1.3)
DEPRECATED RDW RBC AUTO: 16 % (ref 12.4–15.4)
GFR SERPLBLD CREATININE-BSD FMLA CKD-EPI: >60 ML/MIN/{1.73_M2}
GLUCOSE SERPL-MCNC: 86 MG/DL (ref 70–99)
HCT VFR BLD AUTO: 35.7 % (ref 40.5–52.5)
HGB BLD-MCNC: 11.8 G/DL (ref 13.5–17.5)
MCH RBC QN AUTO: 27.9 PG (ref 26–34)
MCHC RBC AUTO-ENTMCNC: 33 G/DL (ref 31–36)
MCV RBC AUTO: 84.6 FL (ref 80–100)
PLATELET # BLD AUTO: 223 K/UL (ref 135–450)
PMV BLD AUTO: 7.7 FL (ref 5–10.5)
POTASSIUM SERPL-SCNC: 3.8 MMOL/L (ref 3.5–5.1)
RBC # BLD AUTO: 4.22 M/UL (ref 4.2–5.9)
SODIUM SERPL-SCNC: 140 MMOL/L (ref 136–145)
WBC # BLD AUTO: 5.3 K/UL (ref 4–11)

## 2024-02-19 PROCEDURE — 36415 COLL VENOUS BLD VENIPUNCTURE: CPT

## 2024-02-19 PROCEDURE — 85027 COMPLETE CBC AUTOMATED: CPT

## 2024-02-19 PROCEDURE — 99232 SBSQ HOSP IP/OBS MODERATE 35: CPT | Performed by: CLINICAL NURSE SPECIALIST

## 2024-02-19 PROCEDURE — 6360000002 HC RX W HCPCS: Performed by: INTERNAL MEDICINE

## 2024-02-19 PROCEDURE — 2580000003 HC RX 258: Performed by: CLINICAL NURSE SPECIALIST

## 2024-02-19 PROCEDURE — 6370000000 HC RX 637 (ALT 250 FOR IP): Performed by: SURGERY

## 2024-02-19 PROCEDURE — 2580000003 HC RX 258: Performed by: SURGERY

## 2024-02-19 PROCEDURE — 80048 BASIC METABOLIC PNL TOTAL CA: CPT

## 2024-02-19 PROCEDURE — 6370000000 HC RX 637 (ALT 250 FOR IP): Performed by: INTERNAL MEDICINE

## 2024-02-19 PROCEDURE — 6360000002 HC RX W HCPCS: Performed by: CLINICAL NURSE SPECIALIST

## 2024-02-19 RX ORDER — GUAIFENESIN 600 MG/1
600 TABLET, EXTENDED RELEASE ORAL 2 TIMES DAILY
Status: DISCONTINUED | OUTPATIENT
Start: 2024-02-19 | End: 2024-02-19 | Stop reason: HOSPADM

## 2024-02-19 RX ORDER — GUAIFENESIN 600 MG/1
600 TABLET, EXTENDED RELEASE ORAL 2 TIMES DAILY
Qty: 20 TABLET | Refills: 0 | Status: SHIPPED | OUTPATIENT
Start: 2024-02-19 | End: 2024-02-29

## 2024-02-19 RX ORDER — TORSEMIDE 20 MG/1
20 TABLET ORAL DAILY
Status: DISCONTINUED | OUTPATIENT
Start: 2024-02-20 | End: 2024-02-19 | Stop reason: HOSPADM

## 2024-02-19 RX ORDER — ASPIRIN 81 MG/1
81 TABLET, CHEWABLE ORAL DAILY
Qty: 90 TABLET | Refills: 2 | Status: SHIPPED | OUTPATIENT
Start: 2024-02-20

## 2024-02-19 RX ADMIN — SODIUM CHLORIDE 200 MG: 9 INJECTION, SOLUTION INTRAVENOUS at 11:11

## 2024-02-19 RX ADMIN — PANTOPRAZOLE SODIUM 40 MG: 40 TABLET, DELAYED RELEASE ORAL at 05:13

## 2024-02-19 RX ADMIN — SODIUM CHLORIDE, PRESERVATIVE FREE 10 ML: 5 INJECTION INTRAVENOUS at 08:09

## 2024-02-19 RX ADMIN — AMIODARONE HYDROCHLORIDE 200 MG: 200 TABLET ORAL at 08:06

## 2024-02-19 RX ADMIN — SPIRONOLACTONE 25 MG: 25 TABLET ORAL at 11:12

## 2024-02-19 RX ADMIN — METOPROLOL SUCCINATE 12.5 MG: 25 TABLET, EXTENDED RELEASE ORAL at 08:06

## 2024-02-19 RX ADMIN — ASPIRIN 81 MG 81 MG: 81 TABLET ORAL at 08:06

## 2024-02-19 RX ADMIN — FUROSEMIDE 40 MG: 10 INJECTION, SOLUTION INTRAMUSCULAR; INTRAVENOUS at 08:06

## 2024-02-19 RX ADMIN — GUAIFENESIN 600 MG: 600 TABLET, EXTENDED RELEASE ORAL at 11:12

## 2024-02-19 RX ADMIN — FINASTERIDE 5 MG: 5 TABLET, FILM COATED ORAL at 08:07

## 2024-02-19 RX ADMIN — APIXABAN 5 MG: 5 TABLET, FILM COATED ORAL at 08:07

## 2024-02-19 RX ADMIN — EMPAGLIFLOZIN 10 MG: 10 TABLET, FILM COATED ORAL at 08:06

## 2024-02-19 NOTE — DISCHARGE SUMMARY
edema in lower extremities persists.  L ICD  Respiratory: No rales.  No wheezing or rhonchi.  Gastrointestinal: Abdomen soft, non-tender, not distended, normal bowel sounds  Musculoskeletal: No cyanosis in digits, neck supple  Neurology: Cranial nerves grossly intact. Alert and oriented in time, place and person. No speech or motor deficits  Psychiatry: Anxious affect. Not agitated  Skin: Warm, dry, normal turgor, no rash  Brisk capillary refill, peripheral pulses palpable     Labs: For convenience and continuity at follow-up the following most recent labs are provided:    Lab Results   Component Value Date/Time    WBC 5.3 02/19/2024 04:28 AM    HGB 11.8 02/19/2024 04:28 AM    HCT 35.7 02/19/2024 04:28 AM    MCV 84.6 02/19/2024 04:28 AM     02/19/2024 04:28 AM     02/19/2024 04:28 AM    K 3.8 02/19/2024 04:28 AM    CL 99 02/19/2024 04:28 AM    CO2 34 02/19/2024 04:28 AM    BUN 25 02/19/2024 04:28 AM    CREATININE 1.3 02/19/2024 04:28 AM    CALCIUM 8.4 02/19/2024 04:28 AM    PHOS 3.4 02/21/2023 05:23 AM    ALKPHOS 98 02/17/2024 03:56 AM    ALT 31 02/17/2024 03:56 AM    AST 53 02/17/2024 03:56 AM    BILITOT 2.2 02/17/2024 03:56 AM    BILIDIR 0.3 02/03/2023 05:33 AM    LABALBU 3.7 02/17/2024 03:56 AM    LDLCALC 37 10/29/2022 04:11 PM    TRIG 75 10/29/2022 04:11 PM     Lab Results   Component Value Date    INR 1.40 (H) 03/06/2023    INR 1.52 (H) 03/02/2023    INR 1.59 (H) 07/28/2022       Radiology:  XR CHEST (2 VW)    Result Date: 2/18/2024  EXAMINATION: TWO XRAY VIEWS OF THE CHEST 2/17/2024 2:31 am COMPARISON: November 25, 2023 HISTORY: ORDERING SYSTEM PROVIDED HISTORY: arm pain b/l TECHNOLOGIST PROVIDED HISTORY: Reason for exam:->arm pain b/l FINDINGS: Left chest wall AICD in place.  Cardiomediastinal silhouette is massively enlarged.  The lungs show no focal consolidation or pleural effusion.  No pulmonary edema.  No pneumothorax. No acute or aggressive osseous lesion.     1. No acute cardiopulmonary

## 2024-02-19 NOTE — PLAN OF CARE
Problem: Safety - Adult  Goal: Free from fall injury  2/18/2024 2103 by Paige Yañez, RN  Outcome: Progressing

## 2024-02-19 NOTE — CARE COORDINATION
Discharge Planning Note:  Patient independent prior to admission. Chart reviewed for discharge needs  and it appears that patient has minimal needs for discharge at this time.  Primary Care Physician is - None on the chart, He was provided with the PCP list  Primary insurance is [place] Medicaid     Please notify case management if any discharge needs are identified.      Case management will continue to follow progress and update discharge plan as needed.

## 2024-02-19 NOTE — TELEPHONE ENCOUNTER
Patient is currently admitted and is out of medication can this refill be sent in today so he will have it when he goes home.

## 2024-02-19 NOTE — PROGRESS NOTES
Hospitalist Progress Note      PCP: No primary care provider on file.    Date of Admission: 2/17/2024    Chief Complaint: BL arm pain    Hospital Course: 63 y.o. male with history of dilated cardiomyopathy with history of VT s/p ICD, chronic combined CHF, Afib and left atrial appendage thrombus on Eliquis, HTN, iron deficiency anemia, h/o homelessness and noncompliance came to ER because he had pain shoot from occiput down both arms.  Patient was concerned he had ICD malfunction.  Lives with friends.  Has BL LE edema and orthopnea.   Admitted as inpatient for acute on chronic combined CHF.  Started on IV Lasix.  Followed by Cardio.    Subjective:  Patient is still swollen.  Less SOB.  No CP, HA or arm pain.         Medications:  Reviewed    Infusion Medications    sodium chloride       Scheduled Medications    amiodarone  200 mg Oral Daily    apixaban  5 mg Oral BID    atorvastatin  40 mg Oral Nightly    empagliflozin  10 mg Oral Daily    finasteride  5 mg Oral Daily    lisinopril  2.5 mg Oral Daily    metoprolol succinate  12.5 mg Oral BID    pantoprazole  40 mg Oral QAM AC    spironolactone  25 mg Oral Lunch    sodium chloride flush  5-40 mL IntraVENous 2 times per day    aspirin  81 mg Oral Daily    furosemide  40 mg IntraVENous BID     PRN Meds: sodium chloride flush, sodium chloride, potassium chloride **OR** potassium alternative oral replacement **OR** potassium chloride, magnesium sulfate, ondansetron **OR** ondansetron, acetaminophen **OR** acetaminophen, polyethylene glycol, nitroGLYCERIN      Intake/Output Summary (Last 24 hours) at 2/18/2024 1436  Last data filed at 2/18/2024 1334  Gross per 24 hour   Intake 1440 ml   Output 5325 ml   Net -3885 ml       Physical Exam Performed:    /88   Pulse 73   Temp 97.6 °F (36.4 °C) (Temporal)   Resp 16   Ht 1.88 m (6' 2\")   Wt 92.4 kg (203 lb 12.8 oz)   SpO2 94%   BMI 26.17 kg/m²     General appearance:  Appears comfortable. Well nourished  Eyes: 
  Physician Progress Note      PATIENT:               DOROTHY ABDULLAHI  CSN #:                  [id number]  :                       1960  ADMIT DATE:       2024 2:07 AM  DISCH DATE:        2024 2:13 PM  RESPONDING  PROVIDER #:        Bill SWENSON MD          QUERY TEXT:    Pt admitted with acute on chronic combined CHF. Pt noted to also have HTN and   Severe MR. If possible, please document in progress note the etiology of CHF,   if able to be determined.    The medical record reflects the following:  Risk Factors: 63 year old male, HTN, noted Severe MR  Clinical Indicators: Echo 8/15/2023: Summary: Left ventricle is severely   dilated with normal wall thickness. ?Overall, left ventricular systolic   function is severely depressed. ?Ejection fraction is estimated to be 15-20%.   ?There is severe global hypokinesis with regional variations. Mild aortic   regurgitation.?Mild to moderate mitral regurgitation.?Mild to moderate   tricuspid regurgitation.?Biatrial enlargement.?Pacer/ICD seen in right   ventricle.  Per IM : \"HTN: Continue Lisinopril, Aldactone, Toprol XL and Amio.\"  Per Cardiology : \"LVG with LVEF of?10%?and global?regional wall motion   abnormalities   Severe MR. Consider CONSUELO for evaluation of MR.\"  Treatment: Scheduled amiodarone, Eliquis, ASA, lipitor, Jardiance, Proscar, IV   Lasix, IV Venofer, lisinopril, toprol, metolazone, aldactone, demadex.   Cardiology consult  Options provided:  -- CHF due to Hypertensive Heart Disease  -- CHF due to Hypertensive Heart Disease and Valvular Heart Disease  -- CHF not due to Hypertension but due to valvular heart disease  -- Other - I will add my own diagnosis  -- Disagree - Not applicable / Not valid  -- Disagree - Clinically unable to determine / Unknown  -- Refer to Clinical Documentation Reviewer    PROVIDER RESPONSE TEXT:    This patient has CHF due to hypertensive heart disease and valvular heart   disease.    Query created by: 
CLINICAL PHARMACY NOTE: MEDS TO BEDS    Total # of Prescriptions Filled: 9   The following medications were delivered to the patient:  ELIQUIS 5MG  ASPIRIN 81MG  SPIRONOLACTONE 25MG  AMIODARONE HCL 200MG  ATORAVASTATIN CALCIUM 40MG  METOPROLOL SUCCINATE ER 25MG  MUCUS RELIEF 600MG  JARDIANCE 10MG  FINASTERIDE 5MG    Additional Documentation:  Delivered to patients room = signed  Ok to be delivered per JACLYN Sewell - Pharmacy Tech.   
Clinical Pharmacy Note    HF Core Measures Assessment    Latest EF: 15-20%    Entresto/ACE/ARB (EF<40%): lisinopril 2.5mg daily  Evidence based BB (bisoprolol, metoprolol XL, carvedilol) (EF<40%): metoprolol XL 12.5mg BID  Aldosterone Antagonist (EF<40%): spironolactone 25mg daily  SGLT2 (HFpEF/HFmrEF/HFrEF): Jardiance 10mg daily    (May consider the use of hydralazine + nitrate in patients intolerant to ACEI/ARB)    Dixie Calle PharmD  PGY-1 Pharmacy Resident  [id number]       
Data- discharge order received, pt verbalized agreement to discharge, disposition to previous residence, no needs for HHC/DME.     Action- discharge instructions prepared and given to pt, pt verbalized understanding. Medication information packet given r/t NEW and/or CHANGED prescriptions emphasizing name/purpose/side effects, pt verbalized understanding. Discharge instruction summary: Diet- low sodium, Activity- up as tolerated, Primary Care Physician as follows: No primary care provider on file. None f/u appointment in one week, immunizations reviewed and updated, prescription medications filled at pharmacy of choice. Inpatient surgical procedure precautions reviewed: na CHF Education reviewed. Pt/ Family has had a total of 60 minutes CHF education this admission encounter.     1. WEIGHT: Admit Weight - Scale: 93.8 kg (206 lb 12.8 oz) (02/17/24 0904)        Today  Weight - Scale: 89.6 kg (197 lb 8 oz) (02/19/24 0422)       2. O2 SAT.: SpO2: 99 % (02/19/24 1155)    Response- Pt belongings gathered, IV removed. Disposition is home (no HHC/DME needs), transported with belongings and discharge instructions, walked to [facility], no complications.    
Nutrition Education  Provided heart failure diet education.  Education included low sodium diet guidelines (3-4 gm Na+/day) and fluid restriction (64 oz/day).  Reviewed foods to choose and foods to avoid, along with label reading and ways to add flavor to food.  Pt currently tries to follow a low sodium diet at home and does not add salt to food.  Pt states understanding of education.  Time spent with patient: 5 minutes.     Educated on CHF diet  Learners: Patient  Readiness: Eager  Method: Explanation  Response: Verbalizes Understanding  Contact name and number provided.    Elba Taylor RD, LD  Contact Number: [phone]    
Post void bladder scan 100cc. Pt stating that he felt relief after voiding. Denies pain.   
Pt admitted to floor from ED. Transferred to bed. Walking independently. Tele monitor put in place. VS stable. Pacer interrogated. Oriented to room, bed controls, and call bell. States that he is feeling much better than when he came in. Lung sounds clear. Denies pain. +1 pitting edema noted to BLE.  
severe tricuspid regurgitation.        CONSUELO Dr Rodriguez 2/10/22  Preliminary CONSUELO results are:      - Severe LV dysfunction,  EF: 20-25%  - LA dilated. There was HENOK thrombus.   - Moderate MR     Cardiac Cath PCI: Dr Johns 2/8/2022  Anatomy:   LM-nml   LAD-nml  Cx-nml  OM- nml  RCA-nml  RPDA- nml  LVEF- 10%  LVG- global hypokinesis  LVEDP- 10     Hemodynamics:  RA- mean 3  RV- 37/0  PAWP- 10  PA- 34/12 (20)     C.O.- 5.7 (4.9)  C.I.- 2.6 (2.25)  PA sat 60%  AO sat 91%     Contrast: 70  Flouro Time: 5.3  Access: R radial a, R CFV     Impression  ~Coronary Angiography w/ normal cors  ~LVG with LVEF of 10% and global regional wall motion abnormalities  ~Severe MR  ~Normal CO/CI  ~normal L and R filling pressures     Recommendation  ~Aggressive medical treatment and risk factor modification  ~1. Medications reviewed, no changes at this time.  2. Post cath IVF. Bedrest.  3.Consider CONSUELO for evaluation of MR.   4. Patient has been advised on the importance of regular exercise of at least 20-30 minutes daily alternating with aerobic and isometric activities.   5. Patient counseled about and offered assistance for smoking cessation   6. No indication for cardiac rehab  7. CHF service to evaluate tomorrow.    Assessment:    1.  Atypical chest pain  2.  Acute on chronic systolic heart failure   3.  Chronic atrial fib on amiodarone and eliquis  4.  ICD in place  5.  Homelessness       Plan:    Continue jardiance 10 mg once a day  Continue lisinopril 2.5 mg daily  Continue toprol 12.5 mg twice a day  Continue aldactone 25 mg daily   Add iron studies, TSH and ferritin to blood in lab  Continue IV 40 mg lasix today and hopefully switch to oral tomorrow  One time dose of metolazone today only  Optival reading pending    Home tomorrow   His biggest issue is urology  Discussed with bedside nurse    NYHA III    Discussed with patient who is agreeable with plan of care.     Thank you for allowing me to participate in the care of your 
eliquis  All other scripts are good  Discussed with bedside nurse    NYHA III    Discussed with patient who is agreeable with plan of care.     Thank you for allowing me to participate in the care of your patient.    DONITA ROJAS, APRN - CNS, CNS

## 2024-02-20 ENCOUNTER — TELEPHONE (OUTPATIENT)
Dept: OTHER | Age: 64
End: 2024-02-20

## 2024-02-20 NOTE — TELEPHONE ENCOUNTER
Called patient's number listed, however it is for his friend Naldo. Patient not with his friend. Unable to speak with him currently. Stated I should try back tomorrow. Patient is scheduled with HF NP tomorrow

## 2024-02-21 ENCOUNTER — TELEPHONE (OUTPATIENT)
Dept: OTHER | Age: 64
End: 2024-02-21

## 2024-02-21 ENCOUNTER — OFFICE VISIT (OUTPATIENT)
Dept: CARDIOLOGY CLINIC | Age: 64
End: 2024-02-21
Payer: MEDICAID

## 2024-02-21 VITALS
WEIGHT: 197 LBS | HEIGHT: 74 IN | OXYGEN SATURATION: 93 % | DIASTOLIC BLOOD PRESSURE: 60 MMHG | BODY MASS INDEX: 25.28 KG/M2 | HEART RATE: 70 BPM | SYSTOLIC BLOOD PRESSURE: 100 MMHG

## 2024-02-21 DIAGNOSIS — Z59.00 HOMELESS: ICD-10-CM

## 2024-02-21 DIAGNOSIS — I50.22 CHRONIC SYSTOLIC (CONGESTIVE) HEART FAILURE (HCC): Primary | ICD-10-CM

## 2024-02-21 DIAGNOSIS — Z95.810 ICD (IMPLANTABLE CARDIOVERTER-DEFIBRILLATOR) IN PLACE: ICD-10-CM

## 2024-02-21 DIAGNOSIS — I48.0 PAF (PAROXYSMAL ATRIAL FIBRILLATION) (HCC): ICD-10-CM

## 2024-02-21 DIAGNOSIS — I42.0 DILATED CARDIOMYOPATHY (HCC): ICD-10-CM

## 2024-02-21 PROCEDURE — 99215 OFFICE O/P EST HI 40 MIN: CPT | Performed by: CLINICAL NURSE SPECIALIST

## 2024-02-21 RX ORDER — AZITHROMYCIN 1 G/1
1 POWDER, FOR SUSPENSION ORAL ONCE
Qty: 1 EACH | Refills: 0 | Status: SHIPPED | OUTPATIENT
Start: 2024-02-21 | End: 2024-02-21

## 2024-02-21 RX ORDER — AZITHROMYCIN 250 MG/1
TABLET, FILM COATED ORAL
Qty: 6 TABLET | Refills: 0 | Status: SHIPPED | OUTPATIENT
Start: 2024-02-21 | End: 2024-03-02

## 2024-02-21 SDOH — ECONOMIC STABILITY - HOUSING INSECURITY: HOMELESSNESS UNSPECIFIED: Z59.00

## 2024-02-21 NOTE — PROGRESS NOTES
normal wall thickness.   Overall, left ventricular systolic function is severely depressed.   Ejection fraction is estimated to be 15-20%.   There is severe global hypokinesis with regional variations.   Mild aortic regurgitation.   Mild to moderate mitral regurgitation.   Mild to moderate tricuspid regurgitation.   Biatrial enlargement.   Pacer/ICD seen in right ventricle.    CONSUELO Dr Rodriguez 3/6/2023   Summary   The left ventricle is dilated. Mild hypertrophy. Severely reduced global   systolic function with an ejection fraction estimated at 15-20%.   Severe global hypokinesis noted.      Left atrial size appears dilated.   Spontaneous echo contrast seen in the left atrium. There is no evidence of   mass or thrombus in the left atrium or appendage.      There is mild aortic insufficiency.   The right ventricle is dilated and hypokinetic with reduced systolic   function.      Pacer / ICD wire is visualized in the right ventricle.      There is small echodensity seen on the ICD lead, differential diagnosis   include fibrin strands or vegetation. Clinical correlation is recommended.      There is severe tricuspid regurgitation.       CONSUELO Dr Rodriguez 2/10/22  Preliminary CONSUELO results are:      - Severe LV dysfunction,  EF: 20-25%  - LA dilated. There was HENOK thrombus.   - Moderate MR    Cardiac Cath PCI: Dr Johns 2/8/2022  Anatomy:   LM-nml   LAD-nml  Cx-nml  OM- nml  RCA-nml  RPDA- nml  LVEF- 10%  LVG- global hypokinesis  LVEDP- 10     Hemodynamics:  RA- mean 3  RV- 37/0  PAWP- 10  PA- 34/12 (20)     C.O.- 5.7 (4.9)  C.I.- 2.6 (2.25)  PA sat 60%  AO sat 91%     Contrast: 70  Flouro Time: 5.3  Access: R radial a, R CFV     Impression  ~Coronary Angiography w/ normal cors  ~LVG with LVEF of 10% and global regional wall motion abnormalities  ~Severe MR  ~Normal CO/CI  ~normal L and R filling pressures     Recommendation  ~Aggressive medical treatment and risk factor modification  ~1. Medications reviewed, no changes at this

## 2024-02-22 ENCOUNTER — CLINICAL DOCUMENTATION (OUTPATIENT)
Dept: ONCOLOGY | Age: 64
End: 2024-02-22

## 2024-02-22 RX ORDER — SODIUM CHLORIDE 0.9 % (FLUSH) 0.9 %
5-40 SYRINGE (ML) INJECTION PRN
OUTPATIENT
Start: 2024-02-22

## 2024-02-22 RX ORDER — SODIUM CHLORIDE 9 MG/ML
5-250 INJECTION, SOLUTION INTRAVENOUS PRN
OUTPATIENT
Start: 2024-02-22

## 2024-02-23 ENCOUNTER — CLINICAL DOCUMENTATION (OUTPATIENT)
Dept: ONCOLOGY | Age: 64
End: 2024-02-23

## 2024-02-28 ENCOUNTER — HOSPITAL ENCOUNTER (OUTPATIENT)
Dept: ONCOLOGY | Age: 64
Setting detail: INFUSION SERIES
Discharge: HOME | End: 2024-02-28
Payer: MEDICAID

## 2024-02-28 VITALS
SYSTOLIC BLOOD PRESSURE: 104 MMHG | TEMPERATURE: 97 F | HEART RATE: 71 BPM | RESPIRATION RATE: 18 BRPM | DIASTOLIC BLOOD PRESSURE: 82 MMHG

## 2024-02-28 DIAGNOSIS — I50.23 ACUTE ON CHRONIC SYSTOLIC HEART FAILURE (HCC): ICD-10-CM

## 2024-02-28 DIAGNOSIS — I50.41 ACUTE COMBINED SYSTOLIC AND DIASTOLIC HEART FAILURE (HCC): Primary | ICD-10-CM

## 2024-02-28 DIAGNOSIS — D50.9 IRON DEFICIENCY ANEMIA, UNSPECIFIED IRON DEFICIENCY ANEMIA TYPE: ICD-10-CM

## 2024-02-28 PROCEDURE — 2580000003 HC RX 258: Performed by: CLINICAL NURSE SPECIALIST

## 2024-02-28 PROCEDURE — 6360000002 HC RX W HCPCS: Performed by: CLINICAL NURSE SPECIALIST

## 2024-02-28 PROCEDURE — 96365 THER/PROPH/DIAG IV INF INIT: CPT

## 2024-02-28 PROCEDURE — 99211 OFF/OP EST MAY X REQ PHY/QHP: CPT

## 2024-02-28 RX ORDER — SODIUM CHLORIDE 0.9 % (FLUSH) 0.9 %
5-40 SYRINGE (ML) INJECTION PRN
Status: CANCELLED | OUTPATIENT
Start: 2024-03-06

## 2024-02-28 RX ORDER — SODIUM CHLORIDE 9 MG/ML
5-250 INJECTION, SOLUTION INTRAVENOUS PRN
Status: CANCELLED | OUTPATIENT
Start: 2024-03-06

## 2024-02-28 RX ORDER — SODIUM CHLORIDE 9 MG/ML
5-250 INJECTION, SOLUTION INTRAVENOUS PRN
Status: DISCONTINUED | OUTPATIENT
Start: 2024-02-28 | End: 2024-02-29 | Stop reason: HOSPADM

## 2024-02-28 RX ORDER — SODIUM CHLORIDE 0.9 % (FLUSH) 0.9 %
5-40 SYRINGE (ML) INJECTION PRN
Status: DISCONTINUED | OUTPATIENT
Start: 2024-02-28 | End: 2024-02-29 | Stop reason: HOSPADM

## 2024-02-28 RX ADMIN — Medication 200 MG: at 11:35

## 2024-02-28 RX ADMIN — SODIUM CHLORIDE 220 ML/HR: 9 INJECTION, SOLUTION INTRAVENOUS at 11:34

## 2024-02-28 RX ADMIN — Medication 10 ML: at 11:33

## 2024-02-28 ASSESSMENT — PAIN SCALES - GENERAL: PAINLEVEL_OUTOF10: 0

## 2024-02-28 NOTE — PROGRESS NOTES
Patient ambulatory to department for first of three doses of venofer. Tolerated venofer infusion well with no adverse rx noted.Given avs with information about venofer. Verbally told about most common possible side effects. To return next week for next infusion.

## 2024-03-06 ENCOUNTER — HOSPITAL ENCOUNTER (OUTPATIENT)
Dept: ONCOLOGY | Age: 64
Setting detail: INFUSION SERIES
Discharge: HOME | End: 2024-03-06
Payer: MEDICAID

## 2024-03-06 VITALS
SYSTOLIC BLOOD PRESSURE: 95 MMHG | DIASTOLIC BLOOD PRESSURE: 70 MMHG | RESPIRATION RATE: 16 BRPM | TEMPERATURE: 96.9 F | HEART RATE: 72 BPM

## 2024-03-06 DIAGNOSIS — I50.41 ACUTE COMBINED SYSTOLIC AND DIASTOLIC HEART FAILURE (HCC): Primary | ICD-10-CM

## 2024-03-06 DIAGNOSIS — D50.9 IRON DEFICIENCY ANEMIA, UNSPECIFIED IRON DEFICIENCY ANEMIA TYPE: ICD-10-CM

## 2024-03-06 DIAGNOSIS — I50.23 ACUTE ON CHRONIC SYSTOLIC HEART FAILURE (HCC): ICD-10-CM

## 2024-03-06 PROCEDURE — 6360000002 HC RX W HCPCS: Performed by: CLINICAL NURSE SPECIALIST

## 2024-03-06 PROCEDURE — 2580000003 HC RX 258: Performed by: CLINICAL NURSE SPECIALIST

## 2024-03-06 PROCEDURE — 96365 THER/PROPH/DIAG IV INF INIT: CPT

## 2024-03-06 PROCEDURE — 99211 OFF/OP EST MAY X REQ PHY/QHP: CPT

## 2024-03-06 RX ORDER — SODIUM CHLORIDE 0.9 % (FLUSH) 0.9 %
5-40 SYRINGE (ML) INJECTION PRN
Status: DISCONTINUED | OUTPATIENT
Start: 2024-03-06 | End: 2024-03-07 | Stop reason: HOSPADM

## 2024-03-06 RX ORDER — SODIUM CHLORIDE 9 MG/ML
5-250 INJECTION, SOLUTION INTRAVENOUS PRN
Status: DISCONTINUED | OUTPATIENT
Start: 2024-03-06 | End: 2024-03-07 | Stop reason: HOSPADM

## 2024-03-06 RX ORDER — SODIUM CHLORIDE 0.9 % (FLUSH) 0.9 %
5-40 SYRINGE (ML) INJECTION PRN
OUTPATIENT
Start: 2024-03-13

## 2024-03-06 RX ORDER — SODIUM CHLORIDE 9 MG/ML
5-250 INJECTION, SOLUTION INTRAVENOUS PRN
OUTPATIENT
Start: 2024-03-13

## 2024-03-06 RX ADMIN — SODIUM CHLORIDE 220 ML/HR: 9 INJECTION, SOLUTION INTRAVENOUS at 09:22

## 2024-03-06 RX ADMIN — Medication 200 MG: at 09:23

## 2024-03-06 RX ADMIN — Medication 10 ML: at 09:21

## 2024-03-06 NOTE — PROGRESS NOTES
Patient ambulatory to department for second of three doses of venofer. Tolerated venofer infusion well with no adverse rx noted.Denied need for printed AVS. To return next week for next infusion.

## 2024-03-13 ENCOUNTER — HOSPITAL ENCOUNTER (OUTPATIENT)
Dept: ONCOLOGY | Age: 64
Setting detail: INFUSION SERIES
Discharge: HOME | End: 2024-03-13
Payer: MEDICAID

## 2024-03-13 ENCOUNTER — OFFICE VISIT (OUTPATIENT)
Dept: CARDIOLOGY CLINIC | Age: 64
End: 2024-03-13
Payer: MEDICAID

## 2024-03-13 ENCOUNTER — TELEPHONE (OUTPATIENT)
Dept: CARDIOLOGY CLINIC | Age: 64
End: 2024-03-13

## 2024-03-13 VITALS
HEART RATE: 70 BPM | HEIGHT: 74 IN | SYSTOLIC BLOOD PRESSURE: 130 MMHG | WEIGHT: 197 LBS | DIASTOLIC BLOOD PRESSURE: 88 MMHG | BODY MASS INDEX: 25.28 KG/M2

## 2024-03-13 VITALS
SYSTOLIC BLOOD PRESSURE: 97 MMHG | RESPIRATION RATE: 16 BRPM | TEMPERATURE: 96.9 F | DIASTOLIC BLOOD PRESSURE: 83 MMHG | HEART RATE: 69 BPM

## 2024-03-13 DIAGNOSIS — Z95.810 ICD (IMPLANTABLE CARDIOVERTER-DEFIBRILLATOR) IN PLACE: ICD-10-CM

## 2024-03-13 DIAGNOSIS — I50.22 CHRONIC SYSTOLIC (CONGESTIVE) HEART FAILURE (HCC): ICD-10-CM

## 2024-03-13 DIAGNOSIS — I50.23 ACUTE ON CHRONIC SYSTOLIC HEART FAILURE (HCC): ICD-10-CM

## 2024-03-13 DIAGNOSIS — I42.0 DILATED CARDIOMYOPATHY (HCC): ICD-10-CM

## 2024-03-13 DIAGNOSIS — I48.0 PAF (PAROXYSMAL ATRIAL FIBRILLATION) (HCC): ICD-10-CM

## 2024-03-13 DIAGNOSIS — I50.22 CHRONIC SYSTOLIC (CONGESTIVE) HEART FAILURE (HCC): Primary | ICD-10-CM

## 2024-03-13 DIAGNOSIS — I50.41 ACUTE COMBINED SYSTOLIC AND DIASTOLIC HEART FAILURE (HCC): Primary | ICD-10-CM

## 2024-03-13 DIAGNOSIS — Z59.00 HOMELESS: ICD-10-CM

## 2024-03-13 DIAGNOSIS — D50.9 IRON DEFICIENCY ANEMIA, UNSPECIFIED IRON DEFICIENCY ANEMIA TYPE: ICD-10-CM

## 2024-03-13 LAB
ANION GAP SERPL CALCULATED.3IONS-SCNC: 11 MMOL/L (ref 3–16)
BUN SERPL-MCNC: 21 MG/DL (ref 7–20)
CALCIUM SERPL-MCNC: 8.8 MG/DL (ref 8.3–10.6)
CHLORIDE SERPL-SCNC: 102 MMOL/L (ref 99–110)
CO2 SERPL-SCNC: 30 MMOL/L (ref 21–32)
CREAT SERPL-MCNC: 1 MG/DL (ref 0.8–1.3)
DEPRECATED RDW RBC AUTO: 16.8 % (ref 12.4–15.4)
GFR SERPLBLD CREATININE-BSD FMLA CKD-EPI: >60 ML/MIN/{1.73_M2}
GLUCOSE SERPL-MCNC: 72 MG/DL (ref 70–99)
HCT VFR BLD AUTO: 41.1 % (ref 40.5–52.5)
HGB BLD-MCNC: 13.2 G/DL (ref 13.5–17.5)
MCH RBC QN AUTO: 27.4 PG (ref 26–34)
MCHC RBC AUTO-ENTMCNC: 32.2 G/DL (ref 31–36)
MCV RBC AUTO: 85 FL (ref 80–100)
NT-PROBNP SERPL-MCNC: ABNORMAL PG/ML (ref 0–124)
PLATELET # BLD AUTO: 234 K/UL (ref 135–450)
PMV BLD AUTO: 8.3 FL (ref 5–10.5)
POTASSIUM SERPL-SCNC: 3.3 MMOL/L (ref 3.5–5.1)
RBC # BLD AUTO: 4.83 M/UL (ref 4.2–5.9)
SODIUM SERPL-SCNC: 143 MMOL/L (ref 136–145)
WBC # BLD AUTO: 3.7 K/UL (ref 4–11)

## 2024-03-13 PROCEDURE — 83880 ASSAY OF NATRIURETIC PEPTIDE: CPT

## 2024-03-13 PROCEDURE — 6360000002 HC RX W HCPCS: Performed by: CLINICAL NURSE SPECIALIST

## 2024-03-13 PROCEDURE — 80048 BASIC METABOLIC PNL TOTAL CA: CPT

## 2024-03-13 PROCEDURE — 3075F SYST BP GE 130 - 139MM HG: CPT | Performed by: CLINICAL NURSE SPECIALIST

## 2024-03-13 PROCEDURE — 99211 OFF/OP EST MAY X REQ PHY/QHP: CPT

## 2024-03-13 PROCEDURE — 1123F ACP DISCUSS/DSCN MKR DOCD: CPT | Performed by: CLINICAL NURSE SPECIALIST

## 2024-03-13 PROCEDURE — 99214 OFFICE O/P EST MOD 30 MIN: CPT | Performed by: CLINICAL NURSE SPECIALIST

## 2024-03-13 PROCEDURE — 85027 COMPLETE CBC AUTOMATED: CPT

## 2024-03-13 PROCEDURE — 3079F DIAST BP 80-89 MM HG: CPT | Performed by: CLINICAL NURSE SPECIALIST

## 2024-03-13 PROCEDURE — 2580000003 HC RX 258: Performed by: CLINICAL NURSE SPECIALIST

## 2024-03-13 PROCEDURE — 96365 THER/PROPH/DIAG IV INF INIT: CPT

## 2024-03-13 RX ORDER — SODIUM CHLORIDE 0.9 % (FLUSH) 0.9 %
5-40 SYRINGE (ML) INJECTION PRN
Status: DISCONTINUED | OUTPATIENT
Start: 2024-03-13 | End: 2024-03-13

## 2024-03-13 RX ORDER — SODIUM CHLORIDE 9 MG/ML
5-250 INJECTION, SOLUTION INTRAVENOUS PRN
Status: CANCELLED | OUTPATIENT
Start: 2024-03-13

## 2024-03-13 RX ORDER — SODIUM CHLORIDE 9 MG/ML
5-250 INJECTION, SOLUTION INTRAVENOUS PRN
Status: DISCONTINUED | OUTPATIENT
Start: 2024-03-13 | End: 2024-03-13

## 2024-03-13 RX ORDER — SODIUM CHLORIDE 0.9 % (FLUSH) 0.9 %
5-40 SYRINGE (ML) INJECTION PRN
Status: CANCELLED | OUTPATIENT
Start: 2024-03-13

## 2024-03-13 RX ADMIN — Medication 200 MG: at 10:37

## 2024-03-13 RX ADMIN — SODIUM CHLORIDE 220 ML/HR: 9 INJECTION, SOLUTION INTRAVENOUS at 10:36

## 2024-03-13 RX ADMIN — Medication 10 ML: at 10:36

## 2024-03-13 SDOH — ECONOMIC STABILITY - HOUSING INSECURITY: HOMELESSNESS UNSPECIFIED: Z59.00

## 2024-03-13 NOTE — PROGRESS NOTES
[facility]  Progress Note    Primary Care Doctor:  No primary care provider on file.    Chief Complaint   Patient presents with    Follow-up    Congestive Heart Failure       History of Present Illness:  63 y.o. male with history of with history of PAF, hypertension.  Unvaccinated, , homeless  hospitalization 2/5-10/22 for weight gain and lower extremity edema.  He recently had covid and did not follow up in office.  LVEF 20%, LHC done.  Weight 223->193.  He was started on HF therapy, declined life vest.  CONSUELO done with HENOK thrombus (on eliquis per EP). Not able to do CV      I had the pleasure of seeing Manoj Feliz in follow up for chronic systolic heart failure.  He is ambulatory and by his self.  His optival shows normal thoracic impedence and continual AF.  Today will be his 4th dose of IV venofer.  He feels stronger, no shortness of breath, chest pain, palpitations, lightheadedness.  He has not made a PCP appointment and has never had a colonoscopy.  This is the second time him receiving IV venofer.  He hs his medications in his pocket as instructed.  He recovered from his URI with the oral antibiotic after last visit.    Past Medical History:   has a past medical history of Acute combined systolic and diastolic congestive heart failure (HCC), Atrial fibrillation (HCC), CHF (congestive heart failure) (HCC), Hx of blood clots, and Hypertension.  Surgical History:   has a past surgical history that includes Insertable Cardiac Monitor (07/26/2019); Cardioversion (07/26/2019); Cardiac defibrillator placement (Left); and Prostate surgery (N/A, 3/9/2023).   Social History:   reports that he has never smoked. He has never been exposed to tobacco smoke. He has never used smokeless tobacco. He reports that he does not drink alcohol and does not use drugs.   Family History:   Family History   Problem Relation Age of Onset    Heart Disease Mother     High Blood Pressure Mother     Heart Attack

## 2024-03-13 NOTE — TELEPHONE ENCOUNTER
----- Message from Deirdre Das, TRENT - CNS sent at 3/13/2024  1:00 PM EDT -----  Blood work is good just his potassium was down  Ask him to eat a couple bananas for 2 days  thanks    Spoke to his friend teresa he will let him know

## 2024-03-13 NOTE — PATIENT INSTRUCTIONS
Make an appt with Dr Soria  Continue all medications  Blood work today while getting iron infusion  Make sure you always keep medications on you  Get a Primary care list given   RTO in 2 months

## 2024-03-13 NOTE — PROGRESS NOTES
Patient ambulatory to department for last dose of venofer. Tolerated venofer infusion well with no adverse rx noted.Denied need for printed AVS. Pt to follow up with Cardiology in 2 months

## 2024-03-14 ENCOUNTER — TELEPHONE (OUTPATIENT)
Dept: FAMILY MEDICINE CLINIC | Age: 64
End: 2024-03-14

## 2024-04-03 NOTE — TELEPHONE ENCOUNTER
Pt states he received message regarding potassium.     Pt states he is feeling low in energy and asking if there is any foods he can eat to help with that. Please call pt at [phone] to advise.

## 2024-04-03 NOTE — TELEPHONE ENCOUNTER
What is his weight and any leg swelling?  He needs to see EP to talk about the AF ablation that would help him

## 2024-04-03 NOTE — TELEPHONE ENCOUNTER
197 current weight, eating bananas, no swelling, should he double up on the water pill, he has a appointment with EP 05/22/2024, is there any foods he can eat to help with his energy level. He is feeling good he stated he is borderline where he was before, can he have iron infusion again?

## 2024-04-04 ENCOUNTER — OFFICE VISIT (OUTPATIENT)
Dept: CARDIOLOGY CLINIC | Age: 64
End: 2024-04-04
Payer: MEDICAID

## 2024-04-04 ENCOUNTER — HOSPITAL ENCOUNTER (OUTPATIENT)
Age: 64
Discharge: HOME | End: 2024-04-04
Payer: MEDICAID

## 2024-04-04 VITALS
SYSTOLIC BLOOD PRESSURE: 110 MMHG | WEIGHT: 198 LBS | HEART RATE: 74 BPM | HEIGHT: 74 IN | DIASTOLIC BLOOD PRESSURE: 80 MMHG | OXYGEN SATURATION: 93 % | BODY MASS INDEX: 25.41 KG/M2

## 2024-04-04 DIAGNOSIS — I48.0 PAF (PAROXYSMAL ATRIAL FIBRILLATION) (HCC): ICD-10-CM

## 2024-04-04 DIAGNOSIS — I42.0 DILATED CARDIOMYOPATHY (HCC): ICD-10-CM

## 2024-04-04 DIAGNOSIS — I50.22 CHRONIC SYSTOLIC (CONGESTIVE) HEART FAILURE (HCC): Primary | ICD-10-CM

## 2024-04-04 DIAGNOSIS — Z95.810 ICD (IMPLANTABLE CARDIOVERTER-DEFIBRILLATOR) IN PLACE: ICD-10-CM

## 2024-04-04 DIAGNOSIS — Z59.00 HOMELESS: ICD-10-CM

## 2024-04-04 DIAGNOSIS — I50.22 CHRONIC SYSTOLIC (CONGESTIVE) HEART FAILURE (HCC): ICD-10-CM

## 2024-04-04 DIAGNOSIS — D50.9 IRON DEFICIENCY ANEMIA, UNSPECIFIED IRON DEFICIENCY ANEMIA TYPE: ICD-10-CM

## 2024-04-04 LAB
ANION GAP SERPL CALCULATED.3IONS-SCNC: 17 MMOL/L (ref 3–16)
BUN SERPL-MCNC: 20 MG/DL (ref 7–20)
CALCIUM SERPL-MCNC: 9.2 MG/DL (ref 8.3–10.6)
CHLORIDE SERPL-SCNC: 100 MMOL/L (ref 99–110)
CO2 SERPL-SCNC: 26 MMOL/L (ref 21–32)
CREAT SERPL-MCNC: 1.4 MG/DL (ref 0.8–1.3)
DEPRECATED RDW RBC AUTO: 17.3 % (ref 12.4–15.4)
FERRITIN SERPL IA-MCNC: 509.8 NG/ML (ref 30–400)
GFR SERPLBLD CREATININE-BSD FMLA CKD-EPI: 56 ML/MIN/{1.73_M2}
GLUCOSE SERPL-MCNC: 105 MG/DL (ref 70–99)
HCT VFR BLD AUTO: 42.5 % (ref 40.5–52.5)
HGB BLD-MCNC: 14.1 G/DL (ref 13.5–17.5)
IRON SATN MFR SERPL: 38 % (ref 20–50)
IRON SERPL-MCNC: 139 UG/DL (ref 59–158)
MCH RBC QN AUTO: 28.1 PG (ref 26–34)
MCHC RBC AUTO-ENTMCNC: 33.1 G/DL (ref 31–36)
MCV RBC AUTO: 84.8 FL (ref 80–100)
NT-PROBNP SERPL-MCNC: ABNORMAL PG/ML (ref 0–124)
PLATELET # BLD AUTO: 204 K/UL (ref 135–450)
PMV BLD AUTO: 9.4 FL (ref 5–10.5)
POTASSIUM SERPL-SCNC: 4.1 MMOL/L (ref 3.5–5.1)
RBC # BLD AUTO: 5.01 M/UL (ref 4.2–5.9)
SODIUM SERPL-SCNC: 143 MMOL/L (ref 136–145)
TIBC SERPL-MCNC: 363 UG/DL (ref 260–445)
WBC # BLD AUTO: 4.8 K/UL (ref 4–11)

## 2024-04-04 PROCEDURE — 83880 ASSAY OF NATRIURETIC PEPTIDE: CPT

## 2024-04-04 PROCEDURE — 83550 IRON BINDING TEST: CPT

## 2024-04-04 PROCEDURE — 80048 BASIC METABOLIC PNL TOTAL CA: CPT

## 2024-04-04 PROCEDURE — 3074F SYST BP LT 130 MM HG: CPT | Performed by: CLINICAL NURSE SPECIALIST

## 2024-04-04 PROCEDURE — 85027 COMPLETE CBC AUTOMATED: CPT

## 2024-04-04 PROCEDURE — 82728 ASSAY OF FERRITIN: CPT

## 2024-04-04 PROCEDURE — 3079F DIAST BP 80-89 MM HG: CPT | Performed by: CLINICAL NURSE SPECIALIST

## 2024-04-04 PROCEDURE — 99214 OFFICE O/P EST MOD 30 MIN: CPT | Performed by: CLINICAL NURSE SPECIALIST

## 2024-04-04 PROCEDURE — 1123F ACP DISCUSS/DSCN MKR DOCD: CPT | Performed by: CLINICAL NURSE SPECIALIST

## 2024-04-04 PROCEDURE — 83540 ASSAY OF IRON: CPT

## 2024-04-04 PROCEDURE — 36415 COLL VENOUS BLD VENIPUNCTURE: CPT

## 2024-04-04 RX ORDER — LISINOPRIL 2.5 MG/1
2.5 TABLET ORAL 2 TIMES DAILY
Qty: 180 TABLET | Refills: 0 | Status: SHIPPED | OUTPATIENT
Start: 2024-04-04

## 2024-04-04 SDOH — ECONOMIC STABILITY - HOUSING INSECURITY: HOMELESSNESS UNSPECIFIED: Z59.00

## 2024-04-04 NOTE — PROGRESS NOTES
appreciate the opportunity of cooperating in the care of this individual.    DONITA ROJAS, APRN - CNS, CNS, 4/4/2024, 2:25 PM

## 2024-04-04 NOTE — PATIENT INSTRUCTIONS
Increase lisinopril 2.5 mg twice a day  Continue all other medications  Blood work today  Schedule an echo

## 2024-04-05 ENCOUNTER — TELEPHONE (OUTPATIENT)
Dept: CARDIOLOGY CLINIC | Age: 64
End: 2024-04-05

## 2024-04-05 ENCOUNTER — HOSPITAL ENCOUNTER (OUTPATIENT)
Dept: ONCOLOGY | Age: 64
Setting detail: INFUSION SERIES
Discharge: HOME | End: 2024-04-05
Payer: MEDICAID

## 2024-04-05 VITALS
DIASTOLIC BLOOD PRESSURE: 90 MMHG | RESPIRATION RATE: 16 BRPM | SYSTOLIC BLOOD PRESSURE: 108 MMHG | TEMPERATURE: 97.3 F | HEART RATE: 93 BPM | OXYGEN SATURATION: 100 %

## 2024-04-05 PROCEDURE — 99211 OFF/OP EST MAY X REQ PHY/QHP: CPT

## 2024-04-05 PROCEDURE — 96374 THER/PROPH/DIAG INJ IV PUSH: CPT

## 2024-04-05 PROCEDURE — 6360000002 HC RX W HCPCS: Performed by: CLINICAL NURSE SPECIALIST

## 2024-04-05 PROCEDURE — 2580000003 HC RX 258: Performed by: CLINICAL NURSE SPECIALIST

## 2024-04-05 RX ORDER — FUROSEMIDE 10 MG/ML
120 INJECTION INTRAMUSCULAR; INTRAVENOUS ONCE
Status: COMPLETED | OUTPATIENT
Start: 2024-04-05 | End: 2024-04-05

## 2024-04-05 RX ORDER — SODIUM CHLORIDE 0.9 % (FLUSH) 0.9 %
5-40 SYRINGE (ML) INJECTION ONCE
Status: COMPLETED | OUTPATIENT
Start: 2024-04-05 | End: 2024-04-05

## 2024-04-05 RX ADMIN — FUROSEMIDE 120 MG: 10 INJECTION, SOLUTION INTRAMUSCULAR; INTRAVENOUS at 11:01

## 2024-04-05 RX ADMIN — Medication 10 ML: at 11:01

## 2024-04-05 NOTE — TELEPHONE ENCOUNTER
----- Message from TRENT [place] - CNS sent at 4/5/2024  8:36 AM EDT -----  Please call and ask if he can come to the infusion center for IV lasix at 1030 or 11 am today    Spoke to Rickey he will locate patient and bring him in. He will call if he has any issues.    Patient had IV Lasix last week per NPRG call to see how patient is doing. Tried to reach patient's friend Rickey COATES for him to return our call.    Spoke to Rickey patient's friend he stated the patient is doing much better he seen him Saturday and the coughing was better and patient was in good spirits. He will have patient call us tomorrow.

## 2024-04-05 NOTE — PROGRESS NOTES
Pt to Dept for lasix IVP. Pt was seen by Deirdre Das NP on 4/4/24. Lasix 120mg IVP given over 6 minutes. Pt tavares with no adverse reaction noted. AVS printed and reviewed. Pt to follow up with the [facility]

## 2024-04-10 NOTE — TELEPHONE ENCOUNTER
Spoke to patient he stated he is doing well and feeling better. He will be in for his next appointment. He is having no issues.

## 2024-05-22 ENCOUNTER — HOSPITAL ENCOUNTER (OUTPATIENT)
Age: 64
Discharge: HOME | End: 2024-05-22
Payer: MEDICAID

## 2024-05-22 ENCOUNTER — OFFICE VISIT (OUTPATIENT)
Dept: CARDIOLOGY CLINIC | Age: 64
End: 2024-05-22
Payer: MEDICAID

## 2024-05-22 ENCOUNTER — HOSPITAL ENCOUNTER (OUTPATIENT)
Age: 64
Discharge: HOME | End: 2024-05-24
Payer: MEDICAID

## 2024-05-22 ENCOUNTER — NURSE ONLY (OUTPATIENT)
Dept: CARDIOLOGY CLINIC | Age: 64
End: 2024-05-22
Payer: MEDICAID

## 2024-05-22 ENCOUNTER — HOSPITAL ENCOUNTER (OUTPATIENT)
Dept: GENERAL RADIOLOGY | Age: 64
Discharge: HOME | End: 2024-05-22
Payer: MEDICAID

## 2024-05-22 VITALS
BODY MASS INDEX: 25.28 KG/M2 | HEART RATE: 48 BPM | SYSTOLIC BLOOD PRESSURE: 100 MMHG | OXYGEN SATURATION: 97 % | DIASTOLIC BLOOD PRESSURE: 58 MMHG | HEIGHT: 74 IN | WEIGHT: 197 LBS

## 2024-05-22 VITALS
HEIGHT: 74 IN | DIASTOLIC BLOOD PRESSURE: 90 MMHG | BODY MASS INDEX: 25.41 KG/M2 | WEIGHT: 198 LBS | SYSTOLIC BLOOD PRESSURE: 108 MMHG

## 2024-05-22 VITALS
DIASTOLIC BLOOD PRESSURE: 80 MMHG | OXYGEN SATURATION: 97 % | HEIGHT: 74 IN | HEART RATE: 96 BPM | BODY MASS INDEX: 25.28 KG/M2 | SYSTOLIC BLOOD PRESSURE: 120 MMHG | WEIGHT: 197 LBS

## 2024-05-22 DIAGNOSIS — E78.5 HYPERLIPIDEMIA, UNSPECIFIED HYPERLIPIDEMIA TYPE: ICD-10-CM

## 2024-05-22 DIAGNOSIS — R00.1 BRADYCARDIA: ICD-10-CM

## 2024-05-22 DIAGNOSIS — I50.22 CHRONIC SYSTOLIC (CONGESTIVE) HEART FAILURE (HCC): ICD-10-CM

## 2024-05-22 DIAGNOSIS — I48.0 PAF (PAROXYSMAL ATRIAL FIBRILLATION) (HCC): ICD-10-CM

## 2024-05-22 DIAGNOSIS — I47.20 VT (VENTRICULAR TACHYCARDIA) (HCC): ICD-10-CM

## 2024-05-22 DIAGNOSIS — I48.19 PERSISTENT ATRIAL FIBRILLATION (HCC): Primary | ICD-10-CM

## 2024-05-22 DIAGNOSIS — I42.0 DILATED CARDIOMYOPATHY (HCC): ICD-10-CM

## 2024-05-22 DIAGNOSIS — Z95.810 ICD (IMPLANTABLE CARDIOVERTER-DEFIBRILLATOR) IN PLACE: Primary | ICD-10-CM

## 2024-05-22 DIAGNOSIS — I48.20 CHRONIC ATRIAL FIBRILLATION (HCC): ICD-10-CM

## 2024-05-22 DIAGNOSIS — I50.22 CHRONIC SYSTOLIC (CONGESTIVE) HEART FAILURE (HCC): Primary | ICD-10-CM

## 2024-05-22 DIAGNOSIS — I50.20 HFREF (HEART FAILURE WITH REDUCED EJECTION FRACTION) (HCC): ICD-10-CM

## 2024-05-22 DIAGNOSIS — I51.3 THROMBUS OF LEFT ATRIAL APPENDAGE: ICD-10-CM

## 2024-05-22 DIAGNOSIS — Z95.810 ICD (IMPLANTABLE CARDIOVERTER-DEFIBRILLATOR) IN PLACE: ICD-10-CM

## 2024-05-22 DIAGNOSIS — I10 PRIMARY HYPERTENSION: ICD-10-CM

## 2024-05-22 DIAGNOSIS — Z59.00 HOMELESS: ICD-10-CM

## 2024-05-22 LAB
ALBUMIN SERPL-MCNC: 3.9 G/DL (ref 3.4–5)
ALBUMIN/GLOB SERPL: 1.4 {RATIO} (ref 1.1–2.2)
ALP SERPL-CCNC: 115 U/L (ref 40–129)
ALT SERPL-CCNC: 36 U/L (ref 10–40)
ANION GAP SERPL CALCULATED.3IONS-SCNC: 13 MMOL/L (ref 3–16)
AST SERPL-CCNC: 40 U/L (ref 15–37)
BILIRUB SERPL-MCNC: 3.4 MG/DL (ref 0–1)
BUN SERPL-MCNC: 23 MG/DL (ref 7–20)
CALCIUM SERPL-MCNC: 9.3 MG/DL (ref 8.3–10.6)
CHLORIDE SERPL-SCNC: 102 MMOL/L (ref 99–110)
CHOLEST SERPL-MCNC: 103 MG/DL (ref 0–199)
CO2 SERPL-SCNC: 28 MMOL/L (ref 21–32)
CREAT SERPL-MCNC: 1.2 MG/DL (ref 0.8–1.3)
ECHO AO ASC DIAM: 4.1 CM
ECHO AO ASCENDING AORTA INDEX: 1.9 CM/M2
ECHO AO ROOT DIAM: 3.4 CM
ECHO AO ROOT INDEX: 1.57 CM/M2
ECHO AR MAX VEL PISA: 2.9 M/S
ECHO AV AREA PEAK VELOCITY: 2.6 CM2
ECHO AV AREA VTI: 2.2 CM2
ECHO AV AREA/BSA PEAK VELOCITY: 1.2 CM2/M2
ECHO AV AREA/BSA VTI: 1 CM2/M2
ECHO AV MEAN GRADIENT: 3 MMHG
ECHO AV MEAN VELOCITY: 0.8 M/S
ECHO AV PEAK GRADIENT: 6 MMHG
ECHO AV PEAK VELOCITY: 1.2 M/S
ECHO AV REGURGITANT PHT: 2103 MS
ECHO AV VELOCITY RATIO: 0.75
ECHO AV VTI: 20 CM
ECHO BSA: 2.16 M2
ECHO LA AREA 2C: 55 CM2
ECHO LA AREA 4C: 53.9 CM2
ECHO LA DIAMETER INDEX: 2.82 CM/M2
ECHO LA DIAMETER: 6.1 CM
ECHO LA MAJOR AXIS: 9.6 CM
ECHO LA MINOR AXIS: 9 CM
ECHO LA TO AORTIC ROOT RATIO: 1.79
ECHO LA VOL BP: 263 ML (ref 18–58)
ECHO LA VOL MOD A2C: 272 ML (ref 18–58)
ECHO LA VOL MOD A4C: 240 ML (ref 18–58)
ECHO LA VOL/BSA BIPLANE: 122 ML/M2 (ref 16–34)
ECHO LA VOLUME INDEX MOD A2C: 126 ML/M2 (ref 16–34)
ECHO LA VOLUME INDEX MOD A4C: 111 ML/M2 (ref 16–34)
ECHO LV E' LATERAL VELOCITY: 7 CM/S
ECHO LV E' SEPTAL VELOCITY: 7 CM/S
ECHO LV EDV A2C: 245 ML
ECHO LV EDV A4C: 249 ML
ECHO LV EDV INDEX A4C: 115 ML/M2
ECHO LV EDV NDEX A2C: 113 ML/M2
ECHO LV EJECTION FRACTION A2C: 11 %
ECHO LV EJECTION FRACTION A4C: 15 %
ECHO LV EJECTION FRACTION BIPLANE: 10 % (ref 55–100)
ECHO LV ESV A2C: 218 ML
ECHO LV ESV A4C: 213 ML
ECHO LV ESV INDEX A2C: 101 ML/M2
ECHO LV ESV INDEX A4C: 99 ML/M2
ECHO LV FRACTIONAL SHORTENING: 10 % (ref 28–44)
ECHO LV INTERNAL DIMENSION DIASTOLE INDEX: 3.24 CM/M2
ECHO LV INTERNAL DIMENSION DIASTOLIC: 7 CM (ref 4.2–5.9)
ECHO LV INTERNAL DIMENSION SYSTOLIC INDEX: 2.92 CM/M2
ECHO LV INTERNAL DIMENSION SYSTOLIC: 6.3 CM
ECHO LV IVSD: 1.3 CM (ref 0.6–1)
ECHO LV MASS 2D: 451.3 G (ref 88–224)
ECHO LV MASS INDEX 2D: 208.9 G/M2 (ref 49–115)
ECHO LV POSTERIOR WALL DIASTOLIC: 1.3 CM (ref 0.6–1)
ECHO LV RELATIVE WALL THICKNESS RATIO: 0.37
ECHO LVOT AREA: 3.5 CM2
ECHO LVOT AV VTI INDEX: 0.64
ECHO LVOT DIAM: 2.1 CM
ECHO LVOT MEAN GRADIENT: 2 MMHG
ECHO LVOT PEAK GRADIENT: 3 MMHG
ECHO LVOT PEAK VELOCITY: 0.9 M/S
ECHO LVOT STROKE VOLUME INDEX: 20.5 ML/M2
ECHO LVOT SV: 44.3 ML
ECHO LVOT VTI: 12.8 CM
ECHO MV A VELOCITY: 0.25 M/S
ECHO MV E DECELERATION TIME (DT): 264 MS
ECHO MV E VELOCITY: 0.96 M/S
ECHO MV E/A RATIO: 3.84
ECHO MV E/E' LATERAL: 13.71
ECHO MV E/E' RATIO (AVERAGED): 13.71
ECHO MV E/E' SEPTAL: 13.71
ECHO MV REGURGITANT ALIASING (NYQUIST) VELOCITY: 31 CM/S
ECHO MV REGURGITANT PEAK GRADIENT: 85 MMHG
ECHO MV REGURGITANT PEAK VELOCITY: 4.6 M/S
ECHO MV REGURGITANT RADIUS PISA: 0.8 CM
ECHO RA AREA 4C: 38.1 CM2
ECHO RA END SYSTOLIC VOLUME APICAL 4 CHAMBER INDEX BSA: 76 ML/M2
ECHO RA VOLUME: 164 ML
ECHO RV BASAL DIMENSION: 5.8 CM
ECHO RV FREE WALL PEAK S': 7 CM/S
ECHO RV LONGITUDINAL DIMENSION: 10.8 CM
ECHO RV MID DIMENSION: 3.7 CM
ECHO RV TAPSE: 1.3 CM (ref 1.7–?)
ECHO TV REGURGITANT MAX VELOCITY: 4.65 M/S
ECHO TV REGURGITANT PEAK GRADIENT: 86 MMHG
GFR SERPLBLD CREATININE-BSD FMLA CKD-EPI: 67 ML/MIN/{1.73_M2}
GLUCOSE SERPL-MCNC: 73 MG/DL (ref 70–99)
HDLC SERPL-MCNC: 58 MG/DL (ref 40–60)
LDLC SERPL CALC-MCNC: 32 MG/DL
NT-PROBNP SERPL-MCNC: ABNORMAL PG/ML (ref 0–124)
POTASSIUM SERPL-SCNC: 3.8 MMOL/L (ref 3.5–5.1)
PROT SERPL-MCNC: 6.7 G/DL (ref 6.4–8.2)
SODIUM SERPL-SCNC: 143 MMOL/L (ref 136–145)
TRIGL SERPL-MCNC: 66 MG/DL (ref 0–150)
VLDLC SERPL CALC-MCNC: 13 MG/DL

## 2024-05-22 PROCEDURE — 36415 COLL VENOUS BLD VENIPUNCTURE: CPT

## 2024-05-22 PROCEDURE — 99214 OFFICE O/P EST MOD 30 MIN: CPT | Performed by: CLINICAL NURSE SPECIALIST

## 2024-05-22 PROCEDURE — 1123F ACP DISCUSS/DSCN MKR DOCD: CPT | Performed by: CLINICAL NURSE SPECIALIST

## 2024-05-22 PROCEDURE — 71046 X-RAY EXAM CHEST 2 VIEWS: CPT

## 2024-05-22 PROCEDURE — 3079F DIAST BP 80-89 MM HG: CPT | Performed by: CLINICAL NURSE SPECIALIST

## 2024-05-22 PROCEDURE — 3074F SYST BP LT 130 MM HG: CPT | Performed by: CLINICAL NURSE SPECIALIST

## 2024-05-22 PROCEDURE — C8924 2D TTE W OR W/O FOL W/CON,FU: HCPCS

## 2024-05-22 PROCEDURE — 1123F ACP DISCUSS/DSCN MKR DOCD: CPT | Performed by: INTERNAL MEDICINE

## 2024-05-22 PROCEDURE — 6360000004 HC RX CONTRAST MEDICATION: Performed by: CLINICAL NURSE SPECIALIST

## 2024-05-22 PROCEDURE — 3078F DIAST BP <80 MM HG: CPT | Performed by: INTERNAL MEDICINE

## 2024-05-22 PROCEDURE — 80053 COMPREHEN METABOLIC PANEL: CPT

## 2024-05-22 PROCEDURE — 3074F SYST BP LT 130 MM HG: CPT | Performed by: INTERNAL MEDICINE

## 2024-05-22 PROCEDURE — 83880 ASSAY OF NATRIURETIC PEPTIDE: CPT

## 2024-05-22 PROCEDURE — 99214 OFFICE O/P EST MOD 30 MIN: CPT | Performed by: INTERNAL MEDICINE

## 2024-05-22 PROCEDURE — 80061 LIPID PANEL: CPT

## 2024-05-22 RX ORDER — SPIRONOLACTONE 25 MG/1
25 TABLET ORAL
Qty: 90 TABLET | Refills: 0 | Status: SHIPPED | OUTPATIENT
Start: 2024-05-22

## 2024-05-22 RX ORDER — METOPROLOL SUCCINATE 25 MG/1
12.5 TABLET, EXTENDED RELEASE ORAL 2 TIMES DAILY
Qty: 45 TABLET | Refills: 3 | Status: SHIPPED | OUTPATIENT
Start: 2024-05-22

## 2024-05-22 RX ORDER — ATORVASTATIN CALCIUM 40 MG/1
40 TABLET, FILM COATED ORAL NIGHTLY
Qty: 90 TABLET | Refills: 2 | Status: SHIPPED | OUTPATIENT
Start: 2024-05-22

## 2024-05-22 RX ORDER — AMIODARONE HYDROCHLORIDE 200 MG/1
200 TABLET ORAL DAILY
Qty: 90 TABLET | Refills: 0 | Status: SHIPPED | OUTPATIENT
Start: 2024-05-22

## 2024-05-22 RX ORDER — FINASTERIDE 5 MG/1
5 TABLET, FILM COATED ORAL DAILY
Qty: 30 TABLET | Refills: 3 | Status: CANCELLED | OUTPATIENT
Start: 2024-05-22

## 2024-05-22 RX ORDER — TORSEMIDE 20 MG/1
20 TABLET ORAL DAILY
Qty: 90 TABLET | Refills: 0 | Status: SHIPPED | OUTPATIENT
Start: 2024-05-22

## 2024-05-22 RX ADMIN — PERFLUTREN 1.5 ML: 6.52 INJECTION, SUSPENSION INTRAVENOUS at 14:13

## 2024-05-22 SDOH — ECONOMIC STABILITY - HOUSING INSECURITY: HOMELESSNESS UNSPECIFIED: Z59.00

## 2024-05-22 NOTE — PROGRESS NOTES
Social History:   reports that he has never smoked. He has never been exposed to tobacco smoke. He has never used smokeless tobacco. He reports that he does not drink alcohol and does not use drugs.   Family History:   Family History   Problem Relation Age of Onset    Heart Disease Mother     High Blood Pressure Mother     Heart Attack Mother     Other Father     Stroke Father     High Blood Pressure Father        Home Medications:  Prior to Admission medications    Medication Sig Start Date End Date Taking? Authorizing Provider   empagliflozin (JARDIANCE) 10 MG tablet Take 1 tablet by mouth daily 5/22/24  Yes Deirdre Das APRN - CNS   metoprolol succinate (TOPROL XL) 25 MG extended release tablet Take 0.5 tablets by mouth in the morning and at bedtime 5/22/24  Yes Deirdre Das APRN - CNS   spironolactone (ALDACTONE) 25 MG tablet Take 1 tablet by mouth Daily with lunch 5/22/24  Yes Deirdre Das APRN - CNS   torsemide (DEMADEX) 20 MG tablet Take 1 tablet by mouth daily 5/22/24  Yes Deirdre Das APRN - CNS   atorvastatin (LIPITOR) 40 MG tablet Take 1 tablet by mouth nightly 5/22/24  Yes Deirdre Dsa APRN - CNS   lisinopril (PRINIVIL;ZESTRIL) 2.5 MG tablet Take 1 tablet by mouth in the morning and at bedtime 4/4/24  Yes Deirdre Das APRN - CNS   aspirin 81 MG chewable tablet Take 1 tablet by mouth daily 2/20/24  Yes Deirdre Das APRN - CNS   apixaban (ELIQUIS) 5 MG TABS tablet Take 1 tablet by mouth 2 times daily 2/19/24  Yes Dago Preston APRN - CNP   finasteride (PROSCAR) 5 MG tablet Take 1 tablet by mouth daily 11/28/23  Yes Raysa Nunez MD   pantoprazole (PROTONIX) 40 MG tablet Take 1 tablet by mouth daily 11/28/23  Yes Raysa Nunez MD   amiodarone (CORDARONE) 200 MG tablet Take 1 tablet by mouth daily 5/22/24   Dago Preston APRN - CNP   potassium chloride (KLOR-CON M) 20 MEQ extended release tablet Take 1 tablet by mouth in the morning and 1 tablet in

## 2024-05-22 NOTE — PATIENT INSTRUCTIONS
You are scheduled for a cardioversion.    Our  will call you to discuss a date for your procedure.    The day of your procedure you will need to check in at the Registration Desk in the Main Lobby.    PRE-PROCEDURE INSTRUCTIONS   Do not eat or drink anything after midnight the night before your procedure.   Take all your medications with a sip of water in the morning.   Do not hold your ELIQUIS   Please have a responsible adult to drive you home after your procedure.    If you have any questions regarding your procedure itself or your medications, please call [phone] and ask to speak to an EP nurse.  _______________________________________________    ATRIAL FIBRILLATION ABLATION INFORMATION    Our  will be contacting you with a date and time for your procedure    The morning of your ablation you will need to check in at the registration desk in the main lobby.     PRE-PROCEDURE INSTRUCTIONS -   -Do not eat or drink anything after midnight the night before your ablation.  -You will need to have a responsible adult to drive you home.  -Your nurse will provide you with discharge instructions.  -You will need to hold your Eliquis for the night before and morning of ablation   -If you are taking a diuretic (water pill) please hold the morning of the procedure  -If you take long acting insulin, take 1/2 the dose the evening before or morning of depending on when you take your dosage.  -You may take all of your other medications with a sip of water.    You will be scheduled for a 6-8 week follow up with cardiology.  This will be done prior to your discharge instructions.    If you have any questions regarding the procedure itself or medications, please call [phone] and ask to speak to an EP nurse.

## 2024-05-22 NOTE — PROGRESS NOTES
Lymphadenopathy: Has no cervical adenopathy.   Neurological: Alert and oriented. Cranial nerve appears intact, No Gross deficit   Skin: Skin is warm and dry. No rash noted.   Psychiatric: Has a normal behavior       Review of System:  [x] Full ROS obtained and negative except as mentioned in HPI    Prior to Admission medications    Medication Sig Start Date End Date Taking? Authorizing Provider   empagliflozin (JARDIANCE) 10 MG tablet Take 1 tablet by mouth daily 5/22/24  Yes Deirdre Das APRN - CNS   metoprolol succinate (TOPROL XL) 25 MG extended release tablet Take 0.5 tablets by mouth in the morning and at bedtime 5/22/24  Yes Deirdre Das APRN - CNS   spironolactone (ALDACTONE) 25 MG tablet Take 1 tablet by mouth Daily with lunch 5/22/24  Yes Deirdre Das APRN - CNS   torsemide (DEMADEX) 20 MG tablet Take 1 tablet by mouth daily 5/22/24  Yes Deirdre Das APRN - CNS   atorvastatin (LIPITOR) 40 MG tablet Take 1 tablet by mouth nightly 5/22/24  Yes Deirdre Das APRN - CNS   lisinopril (PRINIVIL;ZESTRIL) 2.5 MG tablet Take 1 tablet by mouth in the morning and at bedtime 4/4/24  Yes Deirdre Das APRN - CNS   aspirin 81 MG chewable tablet Take 1 tablet by mouth daily 2/20/24  Yes Deirdre Das APRN - CNS   apixaban (ELIQUIS) 5 MG TABS tablet Take 1 tablet by mouth 2 times daily 2/19/24  Yes Dago Preston APRN - CNP   finasteride (PROSCAR) 5 MG tablet Take 1 tablet by mouth daily 11/28/23  Yes Raysa Nunez MD   pantoprazole (PROTONIX) 40 MG tablet Take 1 tablet by mouth daily 11/28/23  Yes Raysa Nunez MD   amiodarone (CORDARONE) 200 MG tablet Take 1 tablet by mouth daily 5/22/24   Dago Preston APRN - CNP   potassium chloride (KLOR-CON M) 20 MEQ extended release tablet Take 1 tablet by mouth in the morning and 1 tablet in the evening. Take with meals. 8/1/22 9/8/22  Nadeem Marks MD       No Known Allergies    Social History:  Reviewed.  reports that he

## 2024-05-22 NOTE — PATIENT INSTRUCTIONS
Refills done  Blood work and cxr today  Call and make primary care appointment  RTO in June 3rd at 915  Let your friend, Manoj help you with things

## 2024-05-23 ENCOUNTER — TELEPHONE (OUTPATIENT)
Dept: CARDIOLOGY CLINIC | Age: 64
End: 2024-05-23

## 2024-05-23 DIAGNOSIS — I48.0 PAF (PAROXYSMAL ATRIAL FIBRILLATION) (HCC): Primary | ICD-10-CM

## 2024-05-23 PROCEDURE — 93284 PRGRMG EVAL IMPLANTABLE DFB: CPT | Performed by: INTERNAL MEDICINE

## 2024-05-23 NOTE — TELEPHONE ENCOUNTER
----- Message from Earnestine Geller RN sent at 5/23/2024  8:09 AM EDT -----  Please schedule DCCV with BOO. Please. He is an RMM pt

## 2024-05-23 NOTE — TELEPHONE ENCOUNTER
Spoke with pt, scheduled CV with RMM (per instructions) on 5-24-24 at 7:30 am in [facility].  Reviewed all preps with full understanding.

## 2024-05-23 NOTE — PROGRESS NOTES
ATRIAL FIBRILLATION ABLATION INFORMATION    Our  will be contacting you with a date and time for your procedure    The morning of your ablation you will need to check in at the registration desk in the main lobby.     PRE-PROCEDURE INSTRUCTIONS -   -Do not eat or drink anything after midnight the night before your ablation.  -You will need to have a responsible adult to drive you home.  -Your nurse will provide you with discharge instructions.  -You will need to hold your ELIQUIS the night before and morning of   -You will need to hold your Lisinopril the day off  -If you are taking a diuretic (water pill) please hold the morning of the procedure  -If you take long acting insulin, take 1/2 the dose the evening before or morning of depending on when you take your dosage.  -You may take all of your other medications with a sip of water.    You will be scheduled for a 6-8 week follow up with cardiology.  This will be done prior to your discharge instructions.    If you have any questions regarding the procedure itself or medications, please call [phone] and ask to speak to an EP nurse.

## 2024-05-24 ENCOUNTER — HOSPITAL ENCOUNTER (OUTPATIENT)
Age: 64
End: 2024-05-24
Attending: INTERNAL MEDICINE
Payer: MEDICAID

## 2024-05-24 ENCOUNTER — NURSE ONLY (OUTPATIENT)
Dept: CARDIOLOGY CLINIC | Age: 64
End: 2024-05-24

## 2024-05-24 ENCOUNTER — OFFICE VISIT (OUTPATIENT)
Dept: CARDIOLOGY CLINIC | Age: 64
End: 2024-05-24
Payer: MEDICAID

## 2024-05-24 VITALS
SYSTOLIC BLOOD PRESSURE: 103 MMHG | BODY MASS INDEX: 25.28 KG/M2 | WEIGHT: 197 LBS | OXYGEN SATURATION: 100 % | DIASTOLIC BLOOD PRESSURE: 81 MMHG | HEART RATE: 85 BPM | HEIGHT: 74 IN | RESPIRATION RATE: 16 BRPM

## 2024-05-24 VITALS
RESPIRATION RATE: 16 BRPM | BODY MASS INDEX: 25.28 KG/M2 | HEIGHT: 74 IN | WEIGHT: 197 LBS | OXYGEN SATURATION: 99 % | HEART RATE: 98 BPM | SYSTOLIC BLOOD PRESSURE: 123 MMHG | DIASTOLIC BLOOD PRESSURE: 94 MMHG

## 2024-05-24 DIAGNOSIS — I47.20 VT (VENTRICULAR TACHYCARDIA) (HCC): ICD-10-CM

## 2024-05-24 DIAGNOSIS — I48.19 PERSISTENT ATRIAL FIBRILLATION (HCC): ICD-10-CM

## 2024-05-24 DIAGNOSIS — Z59.00 HOMELESS: ICD-10-CM

## 2024-05-24 DIAGNOSIS — I50.22 CHRONIC SYSTOLIC (CONGESTIVE) HEART FAILURE (HCC): Primary | ICD-10-CM

## 2024-05-24 DIAGNOSIS — I42.0 DILATED CARDIOMYOPATHY (HCC): ICD-10-CM

## 2024-05-24 DIAGNOSIS — Z95.810 ICD (IMPLANTABLE CARDIOVERTER-DEFIBRILLATOR) IN PLACE: ICD-10-CM

## 2024-05-24 DIAGNOSIS — E78.5 HYPERLIPIDEMIA, UNSPECIFIED HYPERLIPIDEMIA TYPE: ICD-10-CM

## 2024-05-24 DIAGNOSIS — R00.1 BRADYCARDIA: ICD-10-CM

## 2024-05-24 DIAGNOSIS — Z95.810 ICD (IMPLANTABLE CARDIOVERTER-DEFIBRILLATOR) IN PLACE: Primary | ICD-10-CM

## 2024-05-24 DIAGNOSIS — I48.0 PAF (PAROXYSMAL ATRIAL FIBRILLATION) (HCC): ICD-10-CM

## 2024-05-24 LAB
ECHO BSA: 2.16 M2
EKG ATRIAL RATE: 79 BPM
EKG DIAGNOSIS: NORMAL
EKG Q-T INTERVAL: 438 MS
EKG QRS DURATION: 118 MS
EKG QTC CALCULATION (BAZETT): 521 MS
EKG R AXIS: 10 DEGREES
EKG T AXIS: -69 DEGREES
EKG VENTRICULAR RATE: 85 BPM

## 2024-05-24 PROCEDURE — 2500000003 HC RX 250 WO HCPCS: Performed by: INTERNAL MEDICINE

## 2024-05-24 PROCEDURE — 99152 MOD SED SAME PHYS/QHP 5/>YRS: CPT | Performed by: INTERNAL MEDICINE

## 2024-05-24 PROCEDURE — 92960 CARDIOVERSION ELECTRIC EXT: CPT

## 2024-05-24 PROCEDURE — 1123F ACP DISCUSS/DSCN MKR DOCD: CPT | Performed by: CLINICAL NURSE SPECIALIST

## 2024-05-24 PROCEDURE — 3079F DIAST BP 80-89 MM HG: CPT | Performed by: CLINICAL NURSE SPECIALIST

## 2024-05-24 PROCEDURE — 3074F SYST BP LT 130 MM HG: CPT | Performed by: CLINICAL NURSE SPECIALIST

## 2024-05-24 PROCEDURE — 2500000003 HC RX 250 WO HCPCS

## 2024-05-24 PROCEDURE — 7100000010 HC PHASE II RECOVERY - FIRST 15 MIN: Performed by: INTERNAL MEDICINE

## 2024-05-24 PROCEDURE — 92960 CARDIOVERSION ELECTRIC EXT: CPT | Performed by: INTERNAL MEDICINE

## 2024-05-24 PROCEDURE — 99215 OFFICE O/P EST HI 40 MIN: CPT | Performed by: CLINICAL NURSE SPECIALIST

## 2024-05-24 PROCEDURE — 7100000011 HC PHASE II RECOVERY - ADDTL 15 MIN: Performed by: INTERNAL MEDICINE

## 2024-05-24 PROCEDURE — 93005 ELECTROCARDIOGRAM TRACING: CPT

## 2024-05-24 RX ORDER — TAMSULOSIN HYDROCHLORIDE 0.4 MG/1
0.4 CAPSULE ORAL DAILY
COMMUNITY

## 2024-05-24 RX ORDER — SODIUM CHLORIDE 0.9 % (FLUSH) 0.9 %
5-40 SYRINGE (ML) INJECTION PRN
Status: DISCONTINUED | OUTPATIENT
Start: 2024-05-24 | End: 2024-05-27 | Stop reason: HOSPADM

## 2024-05-24 RX ORDER — LISINOPRIL 2.5 MG/1
2.5 TABLET ORAL 2 TIMES DAILY
Qty: 180 TABLET | Refills: 0 | Status: CANCELLED | OUTPATIENT
Start: 2024-05-24

## 2024-05-24 RX ORDER — ASPIRIN 81 MG/1
81 TABLET, CHEWABLE ORAL DAILY
Qty: 90 TABLET | Refills: 2 | Status: SHIPPED | OUTPATIENT
Start: 2024-05-24

## 2024-05-24 RX ADMIN — METHOHEXITAL SODIUM 60 MG: 500 INJECTION, POWDER, LYOPHILIZED, FOR SOLUTION INTRAMUSCULAR; INTRAVENOUS; RECTAL at 07:57

## 2024-05-24 RX ADMIN — METHOHEXITAL SODIUM 30 MG: 500 INJECTION, POWDER, LYOPHILIZED, FOR SOLUTION INTRAMUSCULAR; INTRAVENOUS; RECTAL at 08:00

## 2024-05-24 SDOH — ECONOMIC STABILITY - HOUSING INSECURITY: HOMELESSNESS UNSPECIFIED: Z59.00

## 2024-05-24 NOTE — PATIENT INSTRUCTIONS
We went through all his medications in his pill box and what he has with him.  Refills done  We discussed in length the results of his echo which is very poor with LVEF of 10-15%, severe mitral valve and tricuspid valve regurg. He is not a candidate per Dr Rodriguez for an AF ablation.  I discussed echo with Dr Davis  Patient has issues with no family support and has been off and on non compliant with his medications  He is able to repeat back to me all of the above in front of his friend Manoj (he lives and helps manoj in his buildings)  We discussed that he is not a transplant candidate due to the above and he is not interested in pursuing this  I have recommended him to do a POA and living will  Will see back in 2-3 months

## 2024-05-24 NOTE — PROGRESS NOTES
2/10/22  Preliminary CONSUELO results are:      - Severe LV dysfunction,  EF: 20-25%  - LA dilated. There was HENOK thrombus.   - Moderate MR    Cardiac Cath PCI: Dr Johns 2/8/2022  Anatomy:   LM-nml   LAD-nml  Cx-nml  OM- nml  RCA-nml  RPDA- nml  LVEF- 10%  LVG- global hypokinesis  LVEDP- 10     Hemodynamics:  RA- mean 3  RV- 37/0  PAWP- 10  PA- 34/12 (20)     C.O.- 5.7 (4.9)  C.I.- 2.6 (2.25)  PA sat 60%  AO sat 91%     Contrast: 70  Flouro Time: 5.3  Access: R radial a, R CFV     Impression  ~Coronary Angiography w/ normal cors  ~LVG with LVEF of 10% and global regional wall motion abnormalities  ~Severe MR  ~Normal CO/CI  ~normal L and R filling pressures     Recommendation  ~Aggressive medical treatment and risk factor modification  ~1. Medications reviewed, no changes at this time.  2. Post cath IVF. Bedrest.  3.Consider CONSUELO for evaluation of MR.   4. Patient has been advised on the importance of regular exercise of at least 20-30 minutes daily alternating with aerobic and isometric activities.   5. Patient counseled about and offered assistance for smoking cessation   6. No indication for cardiac rehab  7. CHF service to evaluate tomorrow.     Echo 11/29/2021  Summary   -Covid+   -Severely reduced global systolic function with an ejection fraction   estimated at 20%.   -Severe global hypokinesis with regional variations noted.   -Right ventricular systolic function appears to be mildly reduced based on   visual inspection.   -Severe biatrial enlargement.   -Thickened mitral valve without evidence of stenosis. There is   mild-to-moderate mitral regurgitation.   -There is mild-to-moderate tricuspid regurgitation with a RVSP estimation of   37 mmHg.   -Diastolic dysfunction with elevated LV filling pressures.     Echo 7/25/2019  Summary   -Overall left ventricular systolic function is moderately depressed .   -Ejection fraction is visually estimated to be 30-35 %.   -Global left ventricular function moderately

## 2024-05-24 NOTE — DISCHARGE INSTRUCTIONS
CARDIOVERSION DISCHARGE INSTRUCTIONS    No driving for 24 hours. We strongly recommend that a responsible adult stay with you for the next 24 hours.    Continue Eliquis.    Hydrocortisone 1% cream to reddened areas as needed.

## 2024-05-24 NOTE — H&P
Dilated cardiomyopathy (HCC)     Primary hypertension      Diagnostic studies:   Echo 5/22/2024  EF 10% or less    Echo: 8/15/2023   Left ventricle is severely dilated with normal wall thickness.   Overall, left ventricular systolic function is severely depressed.   Ejection fraction is estimated to be 15-20%.   There is severe global hypokinesis with regional variations.   Mild aortic regurgitation.   Mild to moderate mitral regurgitation.   Mild to moderate tricuspid regurgitation.   Biatrial enlargement.   Pacer/ICD seen in right ventricle.     Cath:  2/8/2022   LM-nml   LAD-nml  Cx-nml  OM- nml  RCA-nml  RPDA- nml  LVEF- 10%  LVG- global hypokinesis  LVEDP- 10     Hemodynamics:  RA- mean 3  RV- 37/0  PAWP- 10  PA- 34/12 (20)  C.O.- 5.7 (4.9)  C.I.- 2.6 (2.25)  PA sat 60%  AO sat 91%      Impression  ~Coronary Angiography w/ normal cors  ~LVG with LVEF of 10% and global regional wall motion abnormalities  ~Severe MR  ~Normal CO/CI  ~normal L and R filling pressures  I independently reviewed the cardiac diagnostic studies, ECG and relevant imaging studies.     Lab Results   Component Value Date    LVEF 18 08/15/2023    LVEFMODE Cardiac Cath 02/08/2022     Lab Results   Component Value Date    TSH 1.62 02/18/2024         Physical Examination:  Vitals:    05/24/24 0718   BP: 103/81   Pulse: 85   Resp: 16   SpO2: 100%      Wt Readings from Last 3 Encounters:   05/24/24 89.4 kg (197 lb)   05/22/24 89.4 kg (197 lb)   05/22/24 89.4 kg (197 lb)       Constitutional: Oriented. No distress.   Head: Normocephalic and atraumatic.   Mouth/Throat: Oropharynx is clear and moist.   Eyes: Conjunctivae normal. EOM are normal.   Neck: Neck supple. No rigidity. No JVD present.    Cardiovascular: Normal rate, irregular rhythm, S1&S2.    Pulmonary/Chest: Bilateral respiratory sounds. No wheezes, No rhonchi.    Abdominal: Soft. Bowel sounds present. No distension, No tenderness.   Musculoskeletal: No tenderness. No edema   DEFIBRILLATOR PLACEMENT Left     CARDIOVERSION  07/26/2019    Dr. Soria    INSERTABLE CARDIAC MONITOR  07/26/2019    PROSTATE SURGERY N/A 3/9/2023    CYSTOSCOPY, PROSTATE TRANSRECTAL ULTRASOUND BIOPSY performed by Tony Dominguez MD at [facility] OR     No Known Allergies    Pre-Sedation Documentation and Exam:   I have personally completed a history, physical exam & review of systems for this patient (see notes).    Mallampati Airway Assessment:  Class II     Prior History of Anesthesia Complications:   None    ASA Classification:  Class 3 - A patient with severe systemic disease that limits activity but is not incapacitating    Sedation/ Anesthesia Plan:   Intravenous sedation    Medications Planned:   Brevital intravenously     Patient is an appropriate candidate for plan of sedation:   Yes    Electronically signed by Michael Aranda MD on 5/24/2024 at 7:43 AM

## 2024-05-24 NOTE — PROGRESS NOTES
Patient failed cardioversion.     Sedation was challenging and difficult to check the pulse oximeter for O2 saturation.   Patient has severe LV dysfunction.   He will be high risk for general anesthesia due to severe LV dysfunction.   Do not recommend ablation.     Continue with anticoagulation and GDMT.     Michael Aranda MD, MPH  [facility]   Office: [phone]  Fax: [phone]

## 2024-05-30 ENCOUNTER — TELEPHONE (OUTPATIENT)
Dept: CARDIOLOGY CLINIC | Age: 64
End: 2024-05-30

## 2024-05-30 NOTE — TELEPHONE ENCOUNTER
----- Message from TRENT Alamo - CNS sent at 5/24/2024  3:39 PM EDT -----  He will need an appt with me in 2-3 months

## 2024-06-07 ENCOUNTER — TELEPHONE (OUTPATIENT)
Dept: CARDIOLOGY CLINIC | Age: 64
End: 2024-06-07

## 2024-06-07 NOTE — TELEPHONE ENCOUNTER
SHRUTHIM for pt to return call to schedule procedure. Only a few spots for July and then I'm into Aug.    Procedure - ATRIAL FIBRILLATION ABLATION INFORMATION  Date:  Arrival time:  Procedure time:      Our  will be contacting you with a date and time for your procedure     The morning of your ablation you will need to check in at the registration desk in the main lobby.                 PRE-PROCEDURE INSTRUCTIONS -   -Do not eat or drink anything after midnight the night before your ablation.  -You will need to have a responsible adult to drive you home.  -Your nurse will provide you with discharge instructions.  -You will need to hold your ELIQUIS the night before and morning of   -You will need to hold your Lisinopril the day off  -If you are taking a diuretic (water pill) please hold the morning of the procedure  -If you take long acting insulin, take 1/2 the dose the evening before or morning of depending on when you take your dosage.  -You may take all of your other medications with a sip of water.     You will be scheduled for a 6-8 week follow up with cardiology.  This will be done prior to your discharge instructions.     If you have any questions regarding the procedure itself or medications, please call [phone] and ask to speak to an EP nurse.

## 2024-06-11 NOTE — TELEPHONE ENCOUNTER
LVM for pt and friend again to call to schedule procedure. Spoke to Osmany his friend and he is going to try and get a hold of him and have him call in to schedule.

## 2024-06-12 NOTE — PROGRESS NOTES
[facility] PROGRESS NOTE    4/22/2022 12:13 PM        Name: Chris Barksdale . Admitted: 4/13/2022  Primary Care Provider: No primary care provider on file. (Tel: None)        Subjective:    Continues to diurese no chest pain shortness of breath fevers or chills  Reviewed interval ancillary notes    Current Medications  milrinone (PRIMACOR) 20 mg in dextrose 5 % 100 mL infusion, Continuous  sodium chloride flush 0.9 % injection 5-40 mL, 2 times per day  sodium chloride flush 0.9 % injection 5-40 mL, PRN  0.9 % sodium chloride infusion, PRN  apixaban (ELIQUIS) tablet 5 mg, BID  metoprolol succinate (TOPROL XL) extended release tablet 25 mg, Daily  furosemide (LASIX) 100 mg in dextrose 5 % 100 mL infusion, Continuous  atorvastatin (LIPITOR) tablet 40 mg, Nightly  finasteride (PROSCAR) tablet 5 mg, Daily  tamsulosin (FLOMAX) capsule 0.4 mg, Daily  sodium chloride flush 0.9 % injection 5-40 mL, 2 times per day  sodium chloride flush 0.9 % injection 5-40 mL, PRN  0.9 % sodium chloride infusion, PRN  polyethylene glycol (GLYCOLAX) packet 17 g, Daily PRN  acetaminophen (TYLENOL) tablet 650 mg, Q6H PRN   Or  acetaminophen (TYLENOL) suppository 650 mg, Q6H PRN  prochlorperazine (COMPAZINE) tablet 10 mg, Q6H PRN   Or  prochlorperazine (COMPAZINE) 10 mg in sodium chloride (PF) 10 mL injection, Q6H PRN        Objective:  /72   Pulse 78   Temp 97.5 °F (36.4 °C) (Oral)   Resp 18   Ht 6' 2\" (1.88 m)   Wt 204 lb 8 oz (92.8 kg)   SpO2 95%   BMI 26.26 kg/m²     Intake/Output Summary (Last 24 hours) at 4/22/2022 1213  Last data filed at 4/22/2022 1106  Gross per 24 hour   Intake 2410.6 ml   Output 3525 ml   Net -1114.4 ml      Wt Readings from Last 3 Encounters:   04/22/22 204 lb 8 oz (92.8 kg)   04/12/22 227 lb 9.6 oz (103.2 kg)   03/22/22 220 lb 1.6 oz (99.8 kg)       General appearance:  Appears comfortable. above  improved     PAF with left atrial appendage clost 2/2022  - eliquis  - bblocker     Bph: flomax     UTI: s/p atbx  DVT prophylaxis eliquis  Code status full code       Tonio Pennington MD   4/22/2022 12:13 PM [facility]

## 2024-06-14 ENCOUNTER — APPOINTMENT (OUTPATIENT)
Dept: GENERAL RADIOLOGY | Age: 64
End: 2024-06-14
Payer: MEDICAID

## 2024-06-14 ENCOUNTER — HOSPITAL ENCOUNTER (INPATIENT)
Age: 64
LOS: 6 days | Discharge: HOME | End: 2024-06-21
Attending: STUDENT IN AN ORGANIZED HEALTH CARE EDUCATION/TRAINING PROGRAM | Admitting: STUDENT IN AN ORGANIZED HEALTH CARE EDUCATION/TRAINING PROGRAM
Payer: MEDICAID

## 2024-06-14 DIAGNOSIS — I50.9 ACUTE ON CHRONIC CONGESTIVE HEART FAILURE, UNSPECIFIED HEART FAILURE TYPE (HCC): Primary | ICD-10-CM

## 2024-06-14 PROCEDURE — 93005 ELECTROCARDIOGRAM TRACING: CPT | Performed by: STUDENT IN AN ORGANIZED HEALTH CARE EDUCATION/TRAINING PROGRAM

## 2024-06-14 PROCEDURE — 94760 N-INVAS EAR/PLS OXIMETRY 1: CPT

## 2024-06-14 PROCEDURE — 99285 EMERGENCY DEPT VISIT HI MDM: CPT

## 2024-06-14 PROCEDURE — 71046 X-RAY EXAM CHEST 2 VIEWS: CPT

## 2024-06-14 RX ORDER — IPRATROPIUM BROMIDE AND ALBUTEROL SULFATE 2.5; .5 MG/3ML; MG/3ML
1 SOLUTION RESPIRATORY (INHALATION) ONCE
Status: COMPLETED | OUTPATIENT
Start: 2024-06-14 | End: 2024-06-15

## 2024-06-15 PROBLEM — I50.23 ACUTE ON CHRONIC SYSTOLIC CHF (CONGESTIVE HEART FAILURE) (HCC): Status: ACTIVE | Noted: 2024-06-15

## 2024-06-15 PROBLEM — I50.23 ACUTE ON CHRONIC SYSTOLIC (CONGESTIVE) HEART FAILURE (HCC): Status: ACTIVE | Noted: 2024-06-15

## 2024-06-15 LAB
ALBUMIN SERPL-MCNC: 3.8 G/DL (ref 3.4–5)
ALBUMIN SERPL-MCNC: 3.9 G/DL (ref 3.4–5)
ALBUMIN/GLOB SERPL: 1.3 {RATIO} (ref 1.1–2.2)
ALP SERPL-CCNC: 149 U/L (ref 40–129)
ALT SERPL-CCNC: 38 U/L (ref 10–40)
ANION GAP SERPL CALCULATED.3IONS-SCNC: 11 MMOL/L (ref 3–16)
ANION GAP SERPL CALCULATED.3IONS-SCNC: 12 MMOL/L (ref 3–16)
AST SERPL-CCNC: 42 U/L (ref 15–37)
BACTERIA URNS QL MICRO: ABNORMAL /HPF
BASOPHILS # BLD: 0 K/UL (ref 0–0.2)
BASOPHILS # BLD: 0 K/UL (ref 0–0.2)
BASOPHILS NFR BLD: 0.5 %
BASOPHILS NFR BLD: 0.9 %
BILIRUB SERPL-MCNC: 2.3 MG/DL (ref 0–1)
BILIRUB UR QL STRIP.AUTO: NEGATIVE
BUN SERPL-MCNC: 20 MG/DL (ref 7–20)
BUN SERPL-MCNC: 21 MG/DL (ref 7–20)
CALCIUM SERPL-MCNC: 8.6 MG/DL (ref 8.3–10.6)
CALCIUM SERPL-MCNC: 8.7 MG/DL (ref 8.3–10.6)
CHLORIDE SERPL-SCNC: 101 MMOL/L (ref 99–110)
CHLORIDE SERPL-SCNC: 102 MMOL/L (ref 99–110)
CLARITY UR: CLEAR
CO2 SERPL-SCNC: 27 MMOL/L (ref 21–32)
CO2 SERPL-SCNC: 28 MMOL/L (ref 21–32)
COLOR UR: ABNORMAL
CREAT SERPL-MCNC: 1.3 MG/DL (ref 0.8–1.3)
CREAT SERPL-MCNC: 1.4 MG/DL (ref 0.8–1.3)
DEPRECATED RDW RBC AUTO: 16.5 % (ref 12.4–15.4)
DEPRECATED RDW RBC AUTO: 16.5 % (ref 12.4–15.4)
EKG ATRIAL RATE: 49 BPM
EKG DIAGNOSIS: NORMAL
EKG Q-T INTERVAL: 504 MS
EKG QRS DURATION: 170 MS
EKG QTC CALCULATION (BAZETT): 570 MS
EKG R AXIS: 56 DEGREES
EKG T AXIS: 85 DEGREES
EKG VENTRICULAR RATE: 77 BPM
EOSINOPHIL # BLD: 0 K/UL (ref 0–0.6)
EOSINOPHIL # BLD: 0.1 K/UL (ref 0–0.6)
EOSINOPHIL NFR BLD: 1.2 %
EOSINOPHIL NFR BLD: 2.2 %
EPI CELLS #/AREA URNS AUTO: 2 /HPF (ref 0–5)
FLUAV RNA RESP QL NAA+PROBE: NOT DETECTED
FLUBV RNA RESP QL NAA+PROBE: NOT DETECTED
GFR SERPLBLD CREATININE-BSD FMLA CKD-EPI: 56 ML/MIN/{1.73_M2}
GFR SERPLBLD CREATININE-BSD FMLA CKD-EPI: 61 ML/MIN/{1.73_M2}
GLUCOSE SERPL-MCNC: 92 MG/DL (ref 70–99)
GLUCOSE SERPL-MCNC: 93 MG/DL (ref 70–99)
GLUCOSE UR STRIP.AUTO-MCNC: 250 MG/DL
HCT VFR BLD AUTO: 38.8 % (ref 40.5–52.5)
HCT VFR BLD AUTO: 39.3 % (ref 40.5–52.5)
HGB BLD-MCNC: 12.9 G/DL (ref 13.5–17.5)
HGB BLD-MCNC: 12.9 G/DL (ref 13.5–17.5)
HGB UR QL STRIP.AUTO: NEGATIVE
HYALINE CASTS #/AREA URNS AUTO: 19 /LPF (ref 0–8)
HYALINE CASTS: PRESENT
KETONES UR STRIP.AUTO-MCNC: ABNORMAL MG/DL
LEUKOCYTE ESTERASE UR QL STRIP.AUTO: NEGATIVE
LYMPHOCYTES # BLD: 0.8 K/UL (ref 1–5.1)
LYMPHOCYTES # BLD: 0.9 K/UL (ref 1–5.1)
LYMPHOCYTES NFR BLD: 21.6 %
LYMPHOCYTES NFR BLD: 27.2 %
MAGNESIUM SERPL-MCNC: 2.5 MG/DL (ref 1.8–2.4)
MCH RBC QN AUTO: 28.8 PG (ref 26–34)
MCH RBC QN AUTO: 28.9 PG (ref 26–34)
MCHC RBC AUTO-ENTMCNC: 32.9 G/DL (ref 31–36)
MCHC RBC AUTO-ENTMCNC: 33.1 G/DL (ref 31–36)
MCV RBC AUTO: 86.9 FL (ref 80–100)
MCV RBC AUTO: 87.8 FL (ref 80–100)
MONOCYTES # BLD: 0.3 K/UL (ref 0–1.3)
MONOCYTES # BLD: 0.3 K/UL (ref 0–1.3)
MONOCYTES NFR BLD: 7.9 %
MONOCYTES NFR BLD: 9.7 %
MUCUS: PRESENT
NEUTROPHILS # BLD: 2 K/UL (ref 1.7–7.7)
NEUTROPHILS # BLD: 2.7 K/UL (ref 1.7–7.7)
NEUTROPHILS NFR BLD: 60.4 %
NEUTROPHILS NFR BLD: 68.4 %
NITRITE UR QL STRIP.AUTO: NEGATIVE
NT-PROBNP SERPL-MCNC: ABNORMAL PG/ML (ref 0–124)
PH UR STRIP.AUTO: 5 [PH] (ref 5–8)
PHOSPHATE SERPL-MCNC: 4.2 MG/DL (ref 2.5–4.9)
PLATELET # BLD AUTO: 160 K/UL (ref 135–450)
PLATELET # BLD AUTO: 170 K/UL (ref 135–450)
PMV BLD AUTO: 7.6 FL (ref 5–10.5)
PMV BLD AUTO: 8 FL (ref 5–10.5)
POTASSIUM SERPL-SCNC: 3.4 MMOL/L (ref 3.5–5.1)
POTASSIUM SERPL-SCNC: 3.7 MMOL/L (ref 3.5–5.1)
PROT SERPL-MCNC: 6.8 G/DL (ref 6.4–8.2)
PROT UR STRIP.AUTO-MCNC: 100 MG/DL
RBC # BLD AUTO: 4.46 M/UL (ref 4.2–5.9)
RBC # BLD AUTO: 4.48 M/UL (ref 4.2–5.9)
RBC CLUMPS #/AREA URNS AUTO: 0 /HPF (ref 0–4)
SARS-COV-2 RNA RESP QL NAA+PROBE: NOT DETECTED
SODIUM SERPL-SCNC: 140 MMOL/L (ref 136–145)
SODIUM SERPL-SCNC: 141 MMOL/L (ref 136–145)
SP GR UR STRIP.AUTO: 1.02 (ref 1–1.03)
T4 FREE SERPL-MCNC: 2.1 NG/DL (ref 0.9–1.8)
TROPONIN, HIGH SENSITIVITY: 27 NG/L (ref 0–22)
TROPONIN, HIGH SENSITIVITY: 27 NG/L (ref 0–22)
TSH SERPL DL<=0.005 MIU/L-ACNC: 4.33 UIU/ML (ref 0.27–4.2)
UA COMPLETE W REFLEX CULTURE PNL UR: ABNORMAL
UA DIPSTICK W REFLEX MICRO PNL UR: YES
URN SPEC COLLECT METH UR: ABNORMAL
UROBILINOGEN UR STRIP-ACNC: 1 E.U./DL
WBC # BLD AUTO: 3.4 K/UL (ref 4–11)
WBC # BLD AUTO: 3.9 K/UL (ref 4–11)
WBC #/AREA URNS AUTO: 2 /HPF (ref 0–5)

## 2024-06-15 PROCEDURE — 80053 COMPREHEN METABOLIC PANEL: CPT

## 2024-06-15 PROCEDURE — 97161 PT EVAL LOW COMPLEX 20 MIN: CPT

## 2024-06-15 PROCEDURE — 93010 ELECTROCARDIOGRAM REPORT: CPT | Performed by: INTERNAL MEDICINE

## 2024-06-15 PROCEDURE — 84484 ASSAY OF TROPONIN QUANT: CPT

## 2024-06-15 PROCEDURE — 87636 SARSCOV2 & INF A&B AMP PRB: CPT

## 2024-06-15 PROCEDURE — 6370000000 HC RX 637 (ALT 250 FOR IP): Performed by: PHYSICIAN ASSISTANT

## 2024-06-15 PROCEDURE — 94640 AIRWAY INHALATION TREATMENT: CPT

## 2024-06-15 PROCEDURE — 97165 OT EVAL LOW COMPLEX 30 MIN: CPT

## 2024-06-15 PROCEDURE — 81001 URINALYSIS AUTO W/SCOPE: CPT

## 2024-06-15 PROCEDURE — 83735 ASSAY OF MAGNESIUM: CPT

## 2024-06-15 PROCEDURE — 84439 ASSAY OF FREE THYROXINE: CPT

## 2024-06-15 PROCEDURE — 2580000003 HC RX 258: Performed by: STUDENT IN AN ORGANIZED HEALTH CARE EDUCATION/TRAINING PROGRAM

## 2024-06-15 PROCEDURE — 85025 COMPLETE CBC W/AUTO DIFF WBC: CPT

## 2024-06-15 PROCEDURE — 1200000000 HC SEMI PRIVATE

## 2024-06-15 PROCEDURE — 97116 GAIT TRAINING THERAPY: CPT

## 2024-06-15 PROCEDURE — 6360000002 HC RX W HCPCS: Performed by: STUDENT IN AN ORGANIZED HEALTH CARE EDUCATION/TRAINING PROGRAM

## 2024-06-15 PROCEDURE — 83880 ASSAY OF NATRIURETIC PEPTIDE: CPT

## 2024-06-15 PROCEDURE — 6370000000 HC RX 637 (ALT 250 FOR IP): Performed by: STUDENT IN AN ORGANIZED HEALTH CARE EDUCATION/TRAINING PROGRAM

## 2024-06-15 PROCEDURE — 97530 THERAPEUTIC ACTIVITIES: CPT

## 2024-06-15 PROCEDURE — 84443 ASSAY THYROID STIM HORMONE: CPT

## 2024-06-15 PROCEDURE — 6360000002 HC RX W HCPCS: Performed by: PHYSICIAN ASSISTANT

## 2024-06-15 PROCEDURE — 99255 IP/OBS CONSLTJ NEW/EST HI 80: CPT | Performed by: INTERNAL MEDICINE

## 2024-06-15 PROCEDURE — 36415 COLL VENOUS BLD VENIPUNCTURE: CPT

## 2024-06-15 RX ORDER — ONDANSETRON 4 MG/1
4 TABLET, ORALLY DISINTEGRATING ORAL EVERY 8 HOURS PRN
Status: DISCONTINUED | OUTPATIENT
Start: 2024-06-15 | End: 2024-06-21 | Stop reason: HOSPADM

## 2024-06-15 RX ORDER — BUMETANIDE 0.25 MG/ML
1 INJECTION INTRAMUSCULAR; INTRAVENOUS 2 TIMES DAILY
Status: DISCONTINUED | OUTPATIENT
Start: 2024-06-15 | End: 2024-06-16

## 2024-06-15 RX ORDER — MAGNESIUM SULFATE IN WATER 40 MG/ML
2000 INJECTION, SOLUTION INTRAVENOUS PRN
Status: DISCONTINUED | OUTPATIENT
Start: 2024-06-15 | End: 2024-06-21 | Stop reason: HOSPADM

## 2024-06-15 RX ORDER — POTASSIUM CHLORIDE 7.45 MG/ML
10 INJECTION INTRAVENOUS PRN
Status: DISCONTINUED | OUTPATIENT
Start: 2024-06-15 | End: 2024-06-16

## 2024-06-15 RX ORDER — ACETAMINOPHEN 325 MG/1
650 TABLET ORAL EVERY 6 HOURS PRN
Status: DISCONTINUED | OUTPATIENT
Start: 2024-06-15 | End: 2024-06-21 | Stop reason: HOSPADM

## 2024-06-15 RX ORDER — AMIODARONE HYDROCHLORIDE 200 MG/1
200 TABLET ORAL DAILY
Status: DISCONTINUED | OUTPATIENT
Start: 2024-06-15 | End: 2024-06-21 | Stop reason: HOSPADM

## 2024-06-15 RX ORDER — POLYETHYLENE GLYCOL 3350 17 G/17G
17 POWDER, FOR SOLUTION ORAL DAILY PRN
Status: DISCONTINUED | OUTPATIENT
Start: 2024-06-15 | End: 2024-06-21 | Stop reason: HOSPADM

## 2024-06-15 RX ORDER — POTASSIUM CHLORIDE 20 MEQ/1
40 TABLET, EXTENDED RELEASE ORAL PRN
Status: DISCONTINUED | OUTPATIENT
Start: 2024-06-15 | End: 2024-06-16

## 2024-06-15 RX ORDER — METOPROLOL SUCCINATE 25 MG/1
12.5 TABLET, EXTENDED RELEASE ORAL 2 TIMES DAILY
Status: DISCONTINUED | OUTPATIENT
Start: 2024-06-15 | End: 2024-06-21 | Stop reason: HOSPADM

## 2024-06-15 RX ORDER — SODIUM CHLORIDE 9 MG/ML
INJECTION, SOLUTION INTRAVENOUS PRN
Status: DISCONTINUED | OUTPATIENT
Start: 2024-06-15 | End: 2024-06-21 | Stop reason: HOSPADM

## 2024-06-15 RX ORDER — SPIRONOLACTONE 25 MG/1
25 TABLET ORAL
Status: DISCONTINUED | OUTPATIENT
Start: 2024-06-15 | End: 2024-06-21 | Stop reason: HOSPADM

## 2024-06-15 RX ORDER — TAMSULOSIN HYDROCHLORIDE 0.4 MG/1
0.4 CAPSULE ORAL DAILY
Status: DISCONTINUED | OUTPATIENT
Start: 2024-06-15 | End: 2024-06-21 | Stop reason: HOSPADM

## 2024-06-15 RX ORDER — ATORVASTATIN CALCIUM 80 MG/1
40 TABLET, FILM COATED ORAL NIGHTLY
Status: DISCONTINUED | OUTPATIENT
Start: 2024-06-15 | End: 2024-06-21 | Stop reason: HOSPADM

## 2024-06-15 RX ORDER — ONDANSETRON 2 MG/ML
4 INJECTION INTRAMUSCULAR; INTRAVENOUS EVERY 6 HOURS PRN
Status: DISCONTINUED | OUTPATIENT
Start: 2024-06-15 | End: 2024-06-21 | Stop reason: HOSPADM

## 2024-06-15 RX ORDER — ASPIRIN 81 MG/1
81 TABLET, CHEWABLE ORAL DAILY
Status: DISCONTINUED | OUTPATIENT
Start: 2024-06-15 | End: 2024-06-21 | Stop reason: HOSPADM

## 2024-06-15 RX ORDER — IPRATROPIUM BROMIDE AND ALBUTEROL SULFATE 2.5; .5 MG/3ML; MG/3ML
1 SOLUTION RESPIRATORY (INHALATION) EVERY 4 HOURS PRN
Status: DISCONTINUED | OUTPATIENT
Start: 2024-06-15 | End: 2024-06-21 | Stop reason: HOSPADM

## 2024-06-15 RX ORDER — FUROSEMIDE 10 MG/ML
40 INJECTION INTRAMUSCULAR; INTRAVENOUS ONCE
Status: COMPLETED | OUTPATIENT
Start: 2024-06-15 | End: 2024-06-15

## 2024-06-15 RX ORDER — ACETAMINOPHEN 650 MG/1
650 SUPPOSITORY RECTAL EVERY 6 HOURS PRN
Status: DISCONTINUED | OUTPATIENT
Start: 2024-06-15 | End: 2024-06-21 | Stop reason: HOSPADM

## 2024-06-15 RX ORDER — SODIUM CHLORIDE 0.9 % (FLUSH) 0.9 %
5-40 SYRINGE (ML) INJECTION PRN
Status: DISCONTINUED | OUTPATIENT
Start: 2024-06-15 | End: 2024-06-21 | Stop reason: HOSPADM

## 2024-06-15 RX ORDER — SODIUM CHLORIDE 0.9 % (FLUSH) 0.9 %
5-40 SYRINGE (ML) INJECTION EVERY 12 HOURS SCHEDULED
Status: DISCONTINUED | OUTPATIENT
Start: 2024-06-15 | End: 2024-06-21 | Stop reason: HOSPADM

## 2024-06-15 RX ADMIN — APIXABAN 5 MG: 5 TABLET, FILM COATED ORAL at 19:43

## 2024-06-15 RX ADMIN — APIXABAN 5 MG: 5 TABLET, FILM COATED ORAL at 10:29

## 2024-06-15 RX ADMIN — METOPROLOL SUCCINATE 12.5 MG: 25 TABLET, EXTENDED RELEASE ORAL at 10:29

## 2024-06-15 RX ADMIN — Medication 10 ML: at 10:31

## 2024-06-15 RX ADMIN — SPIRONOLACTONE 25 MG: 25 TABLET ORAL at 13:51

## 2024-06-15 RX ADMIN — AMIODARONE HYDROCHLORIDE 200 MG: 200 TABLET ORAL at 10:29

## 2024-06-15 RX ADMIN — ASPIRIN 81 MG 81 MG: 81 TABLET ORAL at 10:29

## 2024-06-15 RX ADMIN — IPRATROPIUM BROMIDE AND ALBUTEROL SULFATE 1 DOSE: .5; 3 SOLUTION RESPIRATORY (INHALATION) at 00:15

## 2024-06-15 RX ADMIN — TAMSULOSIN HYDROCHLORIDE 0.4 MG: 0.4 CAPSULE ORAL at 10:29

## 2024-06-15 RX ADMIN — FUROSEMIDE 40 MG: 10 INJECTION, SOLUTION INTRAMUSCULAR; INTRAVENOUS at 03:41

## 2024-06-15 RX ADMIN — EMPAGLIFLOZIN 10 MG: 10 TABLET, FILM COATED ORAL at 10:29

## 2024-06-15 RX ADMIN — ATORVASTATIN CALCIUM 40 MG: 80 TABLET, FILM COATED ORAL at 19:42

## 2024-06-15 RX ADMIN — METOPROLOL SUCCINATE 12.5 MG: 25 TABLET, EXTENDED RELEASE ORAL at 19:43

## 2024-06-15 RX ADMIN — BUMETANIDE 1 MG: 0.25 INJECTION INTRAMUSCULAR; INTRAVENOUS at 10:29

## 2024-06-15 RX ADMIN — Medication 10 ML: at 19:43

## 2024-06-15 RX ADMIN — BUMETANIDE 1 MG: 0.25 INJECTION INTRAMUSCULAR; INTRAVENOUS at 19:42

## 2024-06-15 NOTE — ED NOTES
ED TO INPATIENT SBAR HANDOFF    Patient Name: Manoj Feliz   :  1960  63 y.o.   MRN:  [id number]  Preferred Name  Carloz  ED Room #:  ED-0009/09  Family/Caregiver Present no   Restraints no   Sitter no   Sepsis Risk Score Sepsis Risk Score: 1.96    Situation  Code Status: Full Code No additional code details.    Allergies: Patient has no known allergies.  Weight: No data found.  Arrived from: home  Chief Complaint:   Chief Complaint   Patient presents with    Shortness of Breath     Pt came in from home, pt reports shortness of breath with productive cough for 4 days, pt also reports lower extremity swelling, pt also reports left elbow swelling and pain pt denies injury.     Hospital Problem/Diagnosis:  Principal Problem:    Acute on chronic congestive heart failure (HCC)  Active Problems:    HFrEF (heart failure with reduced ejection fraction) (HCC)    Primary hypertension    Dilated cardiomyopathy (HCC)    Chronic atrial fibrillation (HCC)    ICD (implantable cardioverter-defibrillator) in place    Acute on chronic systolic (congestive) heart failure (HCC)  Resolved Problems:    * No resolved hospital problems. *    Imaging:   XR CHEST (2 VW)   Final Result   Stable mild moderate cardiomegaly with mild central pulmonary congestion or   pulmonary artery hypertension which is less prominent.      No acute infiltrate or effusion is seen.           Abnormal labs:   Abnormal Labs Reviewed   CBC WITH AUTO DIFFERENTIAL - Abnormal; Notable for the following components:       Result Value    WBC 3.4 (*)     Hemoglobin 12.9 (*)     Hematocrit 38.8 (*)     RDW 16.5 (*)     Lymphocytes Absolute 0.9 (*)     All other components within normal limits   COMPREHENSIVE METABOLIC PANEL - Abnormal; Notable for the following components:    Potassium 3.4 (*)     BUN 21 (*)     Creatinine 1.4 (*)     Est, Glom Filt Rate 56 (*)     Total Bilirubin 2.3 (*)     Alkaline Phosphatase 149 (*)     AST 42 (*)     All other  Product Administration: no       You may also review the ED PT Care Timeline found under the Summary Nursing Index tab.    Recommendation    Pending orders ed orders completed  Plan for Discharge (if known):   Additional Comments: n/a   If any further questions, please call Sending RN at ED    Electronically signed by: Electronically signed by Xochilt Lemus RN on 6/15/2024 at 2:03 AM

## 2024-06-15 NOTE — PROGRESS NOTES
4 Eyes Skin Assessment     NAME:  Manoj Feliz  YOB: 1960  MEDICAL RECORD NUMBER:  [id number]    The patient is being assessed for  Admission    I agree that at least one RN has performed a thorough Head to Toe Skin Assessment on the patient. ALL assessment sites listed below have been assessed.      Areas assessed by both nurses:    Head, Face, Ears, Shoulders, Back, Chest, Arms, Elbows, Hands, Sacrum. Buttock, Coccyx, Ischium, and Legs. Feet and Heels        Does the Patient have a Wound? No noted wound(s)       J Carlos Prevention initiated by RN: No  Wound Care Orders initiated by RN: No    Pressure Injury (Stage 3,4, Unstageable, DTI, NWPT, and Complex wounds) if present, place Wound referral order by RN under : No    New Ostomies, if present place, Ostomy referral order under : No     Nurse 1 eSignature: Electronically signed by Yaron Shah RN on 6/15/24 at 2:51 AM EDT    **SHARE this note so that the co-signing nurse can place an eSignature**    Nurse 2 eSignature: Electronically signed by Kim Lopez RN on 6/15/24 at 3:14 AM EDT

## 2024-06-15 NOTE — PROGRESS NOTES
assistance required to generate solutions, assistance required to implement solutions  Insights: decreased awareness of deficits  Orientation:    alert and oriented x 4  Command Following:   WFL     Education  Barriers To Learning: cognition  Patient Education: patient educated on goals, OT role and benefits, plan of care, ADL adaptive strategies, disease specific education  Learning Assessment:  patient verbalizes understanding, would benefit from continued reinforcement    Assessment  Activity Tolerance: BP 96/69 in sitting, 109/82 in standing.   Impairments Requiring Therapeutic Intervention: decreased functional mobility, decreased ADL status, decreased safety awareness, decreased endurance, decreased balance  Prognosis: good  Clinical Assessment: Patient presents to hospital with CHF exacerbation - typically  mod I with ADLS and IADLs. Currently SBA/supervision. Appears to have limited insight into management of his CHF. Patient presents with the above deficits and would benefit from continued skilled OT to address return to PLOF.    Would benefit from supervision of aid while showering at hospital due to low BP and limited safety awareness.      Safety Interventions: patient left in chair, call light within reach, nurse notified, and patient up ad erendira     Plan  Frequency: 3-5 x/per week  Current Treatment Recommendations: strengthening, balance training, functional mobility training, transfer training, patient/caregiver education, ADL/self-care training, and IADL training    Goals  Patient Goals: to return home     Short Term Goals:  Time Frame: discharge   Patient will complete upper body ADL at Independent   Patient will complete lower body ADL at modified independent   Patient will complete toileting at Independent   Patient will complete grooming at Independent   Patient will complete functional transfers at Independent   Patient will complete functional mobility at Independent     Above goals reviewed on  6/15/2024.  All goals are ongoing at this time unless indicated above.       Therapy Session Time     Individual Group Co-treatment   Time In 1038      Time Out 1103      Minutes 25           Timed Code Treatment Minutes:   10  Total Treatment Minutes:  25       Electronically Signed By: Victor Manuel Fine OT

## 2024-06-15 NOTE — PROGRESS NOTES
[facility]    6/15/2024 11:46 AM        Name: Manoj Feliz .              Admitted: 6/14/2024  Primary Care Provider: No primary care provider on file. (Tel: None)      Chief complaint: 62 yo male with hx HFrEF (EF 10%) presented with worsening edema and shortness of breath. Admitted with CHF exacerbation.     Subjective:  Sitting on side of bed, states doing better. Denies chest pain, palpitations, lightheadedness, abdominal pain, nausea. Ambulated length of hallway, reports breathing much improved.     Reviewed interval ancillary notes    Current Medications  amiodarone (CORDARONE) tablet 200 mg, Daily  apixaban (ELIQUIS) tablet 5 mg, BID  aspirin chewable tablet 81 mg, Daily  atorvastatin (LIPITOR) tablet 40 mg, Nightly  empagliflozin (JARDIANCE) tablet 10 mg, Daily  metoprolol succinate (TOPROL XL) extended release tablet 12.5 mg, BID  spironolactone (ALDACTONE) tablet 25 mg, Lunch  tamsulosin (FLOMAX) capsule 0.4 mg, Daily  bumetanide (BUMEX) injection 1 mg, BID  sodium chloride flush 0.9 % injection 5-40 mL, 2 times per day  sodium chloride flush 0.9 % injection 5-40 mL, PRN  0.9 % sodium chloride infusion, PRN  potassium chloride (KLOR-CON M) extended release tablet 40 mEq, PRN   Or  potassium bicarb-citric acid (EFFER-K) effervescent tablet 40 mEq, PRN   Or  potassium chloride 10 mEq/100 mL IVPB (Peripheral Line), PRN  magnesium sulfate 2000 mg in 50 mL IVPB premix, PRN  ondansetron (ZOFRAN-ODT) disintegrating tablet 4 mg, Q8H PRN   Or  ondansetron (ZOFRAN) injection 4 mg, Q6H PRN  polyethylene glycol (GLYCOLAX) packet 17 g, Daily PRN  acetaminophen (TYLENOL) tablet 650 mg, Q6H PRN   Or  acetaminophen (TYLENOL) suppository 650 mg, Q6H PRN        Objective:  /73   Pulse 76   Temp 97.4 °F (36.3 °C) (Oral)   Resp 20   Ht 1.88 m (6' 2\")   Wt 94.3 kg (208 lb)   SpO2 94%   BMI 26.71 kg/m²

## 2024-06-15 NOTE — H&P
Hospital Medicine History & Physical        Name:  Manoj Feliz  /Age/Sex: 1960  (63 y.o. male)  MRN & CSN:  [id number] & [id number]     PCP: No primary care provider on file.    Date of Admission: 2024    Date of Service: Patient seen/examined on 6/15/2024    Patient Status:  Inpatient - Patient will most likely require more than 48 hours of treatment and requires intensive medical treatment/monitoring     Chief Complaint:    Chief Complaint   Patient presents with    Shortness of Breath     Pt came in from home, pt reports shortness of breath with productive cough for 4 days, pt also reports lower extremity swelling, pt also reports left elbow swelling and pain pt denies injury.         History Of Present Illness:     63 y.o. male with PMHx of CHF, atrial fibrillation, hypertension who presented to [facility] with complaints of shortness of breath.    States that shortness of breath has been going on for the last 3-4 days. He also endorses a cough and has been coughing up a lot of sputum.  No blood in the sputum.  He states his legs have been swelling for the last 3-4 days and he feels his swelling is more than his usual.  He admits to taking all of his medications as prescribed.    Denies chest pain, nausea, vomiting, GI/ symptoms, edema, fever, or chills.    Denies tobacco, alcohol, or illicit drug use.    Past Medical History:          Diagnosis Date    Acute combined systolic and diastolic congestive heart failure (HCC) 2022    Atrial fibrillation (HCC) 2019    CHF (congestive heart failure) (HCC)     Hx of blood clots     Hypertension        Past Surgical History:          Procedure Laterality Date    CARDIAC DEFIBRILLATOR PLACEMENT Left     CARDIOVERSION  2019    Dr. Soria    INSERTABLE CARDIAC MONITOR  2019    PROSTATE SURGERY N/A 3/9/2023    CYSTOSCOPY, PROSTATE TRANSRECTAL ULTRASOUND BIOPSY performed by Tony Dominguez MD at [facility] OR       Medications

## 2024-06-15 NOTE — ACP (ADVANCE CARE PLANNING)
Advanced Care Planning Note.    Purpose of Encounter: Advanced care planning in light of CHF exacerbation  Parties In Attendance: Patient,   Decisional Capacity: Yes  Subjective: Shortness of breath  Objective: BNP 18,609.  Creatinine 1.4.  Goals of Care Determination: Patient/POA wishes to seek full treatment  Plan: Cardiology consult.  Bumex.  Labs.  Imaging.  Code Status: Full code   Time spent on Advanced care Plannin minutes  Advanced Care Planning Documents: Completed advanced directives on chart, Naldo, friend is the POA.    Allan Pritchett DO  6/15/2024 1:00 AM

## 2024-06-15 NOTE — ED PROVIDER NOTES
The Ekg interpreted by me in the absence of a cardiologist shows.  Ventricular paced rhythm, rate of 77.  Axis normal.  No specific ST or T wave abnormality.  Right bundle branch block new.  Ventricular paced rhythm with right bundle branch block has replaced narrow complex atrial fibrillation compared to prior 5-.  Lateral T wave inversions are not noted today.      I only performed EKG interpretation and was not otherwise involved in the care of this patient.       Charlie Howell MD  06/14/24 [id number]

## 2024-06-15 NOTE — ED PROVIDER NOTES
[facility] emergency  EMERGENCY DEPARTMENT ENCOUNTER        Pt Name: Manoj Feliz  MRN: [id number]  Birthdate 1960  Date of evaluation: 6/14/2024  Provider: Nayely Ramirez PA-C  PCP: No primary care provider on file.  Note Started: 2:39 AM EDT 6/15/24      SHONA. I have evaluated this patient.        CHIEF COMPLAINT       Chief Complaint   Patient presents with    Shortness of Breath     Pt came in from home, pt reports shortness of breath with productive cough for 4 days, pt also reports lower extremity swelling, pt also reports left elbow swelling and pain pt denies injury.       HISTORY OF PRESENT ILLNESS: 1 or more Elements     History From: Patient  Limitations to history : None    Manoj Feliz is a 63 y.o. male who presents to the emergency department today for evaluation for concerns of shortness of breath.  The patient states that for the past 4 days, he states he has noticed increasing shortness of breath.  He reports that the shortness of breath is worse with exertion, as well as lying flat.  Patient states that he has a history of CHF, and he states he does have swelling to his legs.  He is unsure of any weight gain.  The patient has no chest pain or chest tightness.  He states that he is having a cough which is productive of clear sputum.  He denies any hemoptysis.  He denies any abdominal pain, nausea, vomiting or diarrhea.  No urinary symptoms, patient otherwise has no other complaint    Nursing Notes were all reviewed and agreed with or any disagreements were addressed in the HPI.    REVIEW OF SYSTEMS :      Review of Systems   Constitutional:  Negative for activity change, appetite change, chills and fever.   HENT:  Negative for congestion and rhinorrhea.    Respiratory:  Positive for cough and shortness of breath.    Cardiovascular:  Negative for chest pain.   Gastrointestinal:  Negative for abdominal pain, diarrhea, nausea and vomiting.   Genitourinary:  Negative for  negative    Patient be given Lasix in the ED, he is 90% on room air, and I do feel he would benefit from admission and CHF exacerbation.  Hospitalist has been paged for admission, the patient is updated, agreeable with plan, stable for admission    I am the Primary Clinician of Record.  FINAL IMPRESSION      1. Acute on chronic congestive heart failure, unspecified heart failure type (HCC)          DISPOSITION/PLAN     DISPOSITION Admitted 06/15/2024 12:59:42 AM      PATIENT REFERRED TO:  No follow-up provider specified.    DISCHARGE MEDICATIONS:  Current Discharge Medication List          DISCONTINUED MEDICATIONS:  Current Discharge Medication List        STOP taking these medications       potassium chloride (KLOR-CON M) 20 MEQ extended release tablet Comments:   Reason for Stopping:                      (Please note that portions of this note were completed with a voice recognition program.  Efforts were made to edit the dictations but occasionally words are mis-transcribed.)    Nayely Ramirez PA-C (electronically signed)       Nayely Ramirez PA-C  06/15/24 [id number]

## 2024-06-15 NOTE — CONSULTS
x3  ENT:   normal ocular range of motion  LUNGS:  clear bilaterally  CV:   Regular rate and rhythm  Normal S1/S2  No rubs, murmurs, gallops  1+ edema to mid shin bilaterally  JVD  Peripheral pulses 2+  ABD:   Non-tender, non-distended  EXTRM:  atraumatic  SKIN:   normal color, turgur  PSYCH:  normal mood and affect  NEURO:  Normal gross motor function and sensation     Labs  CBC:   Lab Results   Component Value Date/Time    WBC 3.9 06/15/2024 05:16 AM    RBC 4.48 06/15/2024 05:16 AM    HGB 12.9 06/15/2024 05:16 AM    HCT 39.3 06/15/2024 05:16 AM    MCV 87.8 06/15/2024 05:16 AM    RDW 16.5 06/15/2024 05:16 AM     06/15/2024 05:16 AM     CMP:    Lab Results   Component Value Date/Time     06/15/2024 05:16 AM    K 3.7 06/15/2024 05:16 AM    K 3.8 02/19/2024 04:28 AM     06/15/2024 05:16 AM    CO2 28 06/15/2024 05:16 AM    BUN 20 06/15/2024 05:16 AM    CREATININE 1.3 06/15/2024 05:16 AM    GFRAA >60 10/05/2022 10:10 AM    AGRATIO 1.3 06/15/2024 12:01 AM    LABGLOM 61 06/15/2024 05:16 AM    LABGLOM 56 04/04/2024 02:24 PM    GLUCOSE 92 06/15/2024 05:16 AM    CALCIUM 8.6 06/15/2024 05:16 AM    BILITOT 2.3 06/15/2024 12:01 AM    ALKPHOS 149 06/15/2024 12:01 AM    AST 42 06/15/2024 12:01 AM    ALT 38 06/15/2024 12:01 AM     PT/INR:  No results found for: \"PTINR\"  Lab Results   Component Value Date    TROPONINI <0.01 03/02/2023       EKG:  I have reviewed EKG with the following interpretation:  Impression:    AV paced    Echo:   5/2024  Severe global and regional LV systolic dysfunction.  Estimated EF 10-15%.  Severely enlarged LV chamber size with eccentric remodeling.    Increased LV apical trabeculation.  No evidence for LV thrombus with Definity.  Spontaneous echo-contrast observed.  Indeterminate LV diastolic function.    Severe bi-atrial enlargement.  Moderate-severely reduced RV systolic function.  RV size is enlarged.  Device wire is present.  Mild to moderate aortic valve regurgitation.  Severe  mitral valve regurgitation.  Severe tricuspid valve regurgitation.  At least moderate pulmonary hypertension based on TR signal obtained.  IVC not visualized.    Mildly dilated proximal ascending thoracic aorta, 4.1 cm.    Stress:   None since [facility]    Cath:   Cardiac Cath: Dr Johns 2/8/2022  Anatomy:   LM-nml   LAD-nml  Cx-nml  OM- nml  RCA-nml  RPDA- nml  LVEF- 10%  LVG- global hypokinesis  LVEDP- 10     Hemodynamics:  RA- mean 3  RV- 37/0  PAWP- 10  PA- 34/12 (20)     C.O.- 5.7 (4.9)  C.I.- 2.6 (2.25)  PA sat 60%  AO sat 91%    MRI/EP/Other:   none    Old notes reviewed  Telemetry reviewd  EKG personally reviewed  CXR personally reviewed  Echo, cath, and medications and labs reviewed  High complexity/medical decision making due to extensive data review, extensive history review, independent review of data  High risk due to acute illness, evaluation of drug-drug interactions, medication management and diagnostic interventions    Assessment  Patient Active Problem List   Diagnosis    Primary hypertension    Dilated cardiomyopathy (HCC)    Acute on chronic combined systolic and diastolic heart failure ([facility])    Non-compliance    Homelessness    Acute combined systolic and diastolic heart failure (HCC)    Chronic atrial fibrillation ([facility])    Moderate mitral regurgitation    Acute on chronic congestive heart failure ([facility])    VT (ventricular tachycardia) ([facility])    ICD (implantable cardioverter-defibrillator) in place    Thrombus of left atrial appendage    Anemia    HFrEF (heart failure with reduced ejection fraction) ([facility])    Dizziness    Epigastric abdominal pain    Persistent atrial fibrillation ([facility])    Bradycardia    Iron deficiency anemia    ICD (implantable cardioverter-defibrillator) malfunction, sequela    Encounter for care of pacemaker    Atrial fibrillation with RVR ([facility])    Abnormal chest x-ray    Shooting pain    Atypical chest pain    Acute on chronic systolic heart failure (HCC)    Acute on chronic

## 2024-06-15 NOTE — PROGRESS NOTES
[facility] - Inpatient Rehabilitation Department   Phone: [phone]    Physical Therapy    [x] Initial Evaluation            [] Daily Treatment Note         [] Discharge Summary      Patient: Manoj Feliz   : 1960   MRN: [id number]   Date of Service:  6/15/2024  Admitting Diagnosis: Acute on chronic congestive heart failure (HCC)  Current Admission Summary: Manoj Feliz is a 63 y.o. male who presents to the emergency department today for evaluation for concerns of shortness of breath. The patient states that for the past 4 days, he states he has noticed increasing shortness of breath. He reports that the shortness of breath is worse with exertion, as well as lying flat. Patient states that he has a history of CHF, and he states he does have swelling to his legs. He is unsure of any weight gain. The patient has no chest pain or chest tightness.  He states that he is having a cough which is productive of clear sputum. He denies any hemoptysis. He denies any abdominal pain, nausea, vomiting or diarrhea. No urinary symptoms, patient otherwise has no other complaint.   Past Medical History:  has a past medical history of Acute combined systolic and diastolic congestive heart failure (HCC), Atrial fibrillation (HCC), CHF (congestive heart failure) (HCC), Hx of blood clots, and Hypertension.  Past Surgical History:  has a past surgical history that includes Insertable Cardiac Monitor (2019); Cardioversion (2019); Cardiac defibrillator placement (Left); and Prostate surgery (N/A, 3/9/2023).  Discharge Recommendations: Manoj Feliz scored a 22/24 on the AM-PAC short mobility form.  At this time, no further PT is recommended upon discharge as pt is presenting near his baseline level of function.  Recommend patient returns to prior setting with prior services.   DME Required For Discharge: no DME required at discharge  Precautions/Restrictions: medium fall risk  Weight Bearing  Restrictions: no restrictions     Required Braces/Orthotics: no braces required  Positional Restrictions:no positional restrictions    Pre-Admission Information   Lives With: alone    Type of Home: house  Home Layout: one level  Home Access: level entry  Bathroom Layout: walk in shower  Bathroom Equipment: built in shower seat  Toilet Height: elevated height  Home Equipment: no prior equipment  Transfer Assistance: Independent without use of device  Ambulation Assistance:Independent without use of device  ADL Assistance: independent with all ADL's  IADL Assistance: independent with homemaking tasks  Active :        [] Yes  [x] No- friends assist with transportation  Hand Dominance: [] Left  [x] Right  Current Employment: part time employment.  Occupation: Car repairs  Hobbies: Fishing, bowling  Recent Falls: Pt denies falls in the past 6 months    Examination   Vision:   Vision Gross Assessment: Impaired and Vision Corrective Device: wears glasses for reading  Hearing:   WFL  Observation:   General Observation:  Pt on RA on arrival.  Posture:   Mild forward head, rounded shoulders, and increased thoracic kyphosis in sitting and standing.  Sensation:   reports numbness and tingling in (B) LE- (B) feet, pt reports this started ~ 3 months ago  ROM:   (B) LE AROM WFL  Strength:   (B) LE strength grossly WFL  Therapist Clinical Decision Making (Complexity): low complexity  Clinical Presentation: stable      Subjective  General: Pt semi-fowlers in bed on arrival, pleasant and agreeable to participate in PT initial evaluation.  Pain: 0/10  Pain Interventions: not applicable     Functional Mobility  Bed Mobility:  Supine to Sit: modified independent  Scooting: Independent- toward EOB  Comments: Supine to sit: HOB elevated  Transfers:  Sit to stand transfer: supervision  Stand to sit transfer: supervision  Comments:  Ambulation:  Surface:level surface  Assistive Device: no device  Assistance: supervision  Distance:

## 2024-06-16 PROBLEM — I48.21 PERMANENT ATRIAL FIBRILLATION (HCC): Status: ACTIVE | Noted: 2024-06-16

## 2024-06-16 LAB
ALBUMIN SERPL-MCNC: 3.8 G/DL (ref 3.4–5)
ANION GAP SERPL CALCULATED.3IONS-SCNC: 10 MMOL/L (ref 3–16)
BASOPHILS # BLD: 0 K/UL (ref 0–0.2)
BASOPHILS NFR BLD: 1.1 %
BUN SERPL-MCNC: 22 MG/DL (ref 7–20)
CALCIUM SERPL-MCNC: 8.4 MG/DL (ref 8.3–10.6)
CHLORIDE SERPL-SCNC: 101 MMOL/L (ref 99–110)
CO2 SERPL-SCNC: 27 MMOL/L (ref 21–32)
CREAT SERPL-MCNC: 1.3 MG/DL (ref 0.8–1.3)
DEPRECATED RDW RBC AUTO: 16.1 % (ref 12.4–15.4)
EOSINOPHIL # BLD: 0.1 K/UL (ref 0–0.6)
EOSINOPHIL NFR BLD: 2.7 %
GFR SERPLBLD CREATININE-BSD FMLA CKD-EPI: 61 ML/MIN/{1.73_M2}
GLUCOSE SERPL-MCNC: 116 MG/DL (ref 70–99)
HCT VFR BLD AUTO: 37.7 % (ref 40.5–52.5)
HGB BLD-MCNC: 12.5 G/DL (ref 13.5–17.5)
LYMPHOCYTES # BLD: 0.9 K/UL (ref 1–5.1)
LYMPHOCYTES NFR BLD: 24.5 %
MAGNESIUM SERPL-MCNC: 2.4 MG/DL (ref 1.8–2.4)
MCH RBC QN AUTO: 29.1 PG (ref 26–34)
MCHC RBC AUTO-ENTMCNC: 33.2 G/DL (ref 31–36)
MCV RBC AUTO: 87.4 FL (ref 80–100)
MONOCYTES # BLD: 0.3 K/UL (ref 0–1.3)
MONOCYTES NFR BLD: 9.1 %
NEUTROPHILS # BLD: 2.3 K/UL (ref 1.7–7.7)
NEUTROPHILS NFR BLD: 62.6 %
PHOSPHATE SERPL-MCNC: 4 MG/DL (ref 2.5–4.9)
PLATELET # BLD AUTO: 159 K/UL (ref 135–450)
PMV BLD AUTO: 7.7 FL (ref 5–10.5)
POTASSIUM SERPL-SCNC: 3.3 MMOL/L (ref 3.5–5.1)
RBC # BLD AUTO: 4.31 M/UL (ref 4.2–5.9)
SODIUM SERPL-SCNC: 138 MMOL/L (ref 136–145)
WBC # BLD AUTO: 3.7 K/UL (ref 4–11)

## 2024-06-16 PROCEDURE — 6360000002 HC RX W HCPCS: Performed by: STUDENT IN AN ORGANIZED HEALTH CARE EDUCATION/TRAINING PROGRAM

## 2024-06-16 PROCEDURE — 94640 AIRWAY INHALATION TREATMENT: CPT

## 2024-06-16 PROCEDURE — 85025 COMPLETE CBC W/AUTO DIFF WBC: CPT

## 2024-06-16 PROCEDURE — 6370000000 HC RX 637 (ALT 250 FOR IP): Performed by: NURSE PRACTITIONER

## 2024-06-16 PROCEDURE — 99233 SBSQ HOSP IP/OBS HIGH 50: CPT | Performed by: INTERNAL MEDICINE

## 2024-06-16 PROCEDURE — 6370000000 HC RX 637 (ALT 250 FOR IP): Performed by: STUDENT IN AN ORGANIZED HEALTH CARE EDUCATION/TRAINING PROGRAM

## 2024-06-16 PROCEDURE — 2580000003 HC RX 258: Performed by: STUDENT IN AN ORGANIZED HEALTH CARE EDUCATION/TRAINING PROGRAM

## 2024-06-16 PROCEDURE — 80069 RENAL FUNCTION PANEL: CPT

## 2024-06-16 PROCEDURE — 6360000002 HC RX W HCPCS: Performed by: INTERNAL MEDICINE

## 2024-06-16 PROCEDURE — 6360000002 HC RX W HCPCS: Performed by: NURSE PRACTITIONER

## 2024-06-16 PROCEDURE — 1200000000 HC SEMI PRIVATE

## 2024-06-16 PROCEDURE — 36415 COLL VENOUS BLD VENIPUNCTURE: CPT

## 2024-06-16 PROCEDURE — 83735 ASSAY OF MAGNESIUM: CPT

## 2024-06-16 RX ORDER — POTASSIUM CHLORIDE 20 MEQ/1
40 TABLET, EXTENDED RELEASE ORAL ONCE
Status: COMPLETED | OUTPATIENT
Start: 2024-06-16 | End: 2024-06-16

## 2024-06-16 RX ORDER — FUROSEMIDE 10 MG/ML
40 INJECTION INTRAMUSCULAR; INTRAVENOUS 2 TIMES DAILY
Status: DISCONTINUED | OUTPATIENT
Start: 2024-06-16 | End: 2024-06-21

## 2024-06-16 RX ORDER — FUROSEMIDE 10 MG/ML
80 INJECTION INTRAMUSCULAR; INTRAVENOUS ONCE
Status: COMPLETED | OUTPATIENT
Start: 2024-06-16 | End: 2024-06-16

## 2024-06-16 RX ADMIN — APIXABAN 5 MG: 5 TABLET, FILM COATED ORAL at 08:46

## 2024-06-16 RX ADMIN — IPRATROPIUM BROMIDE AND ALBUTEROL SULFATE 1 DOSE: .5; 3 SOLUTION RESPIRATORY (INHALATION) at 21:07

## 2024-06-16 RX ADMIN — ATORVASTATIN CALCIUM 40 MG: 80 TABLET, FILM COATED ORAL at 19:55

## 2024-06-16 RX ADMIN — EMPAGLIFLOZIN 10 MG: 10 TABLET, FILM COATED ORAL at 08:46

## 2024-06-16 RX ADMIN — IPRATROPIUM BROMIDE AND ALBUTEROL SULFATE 1 DOSE: .5; 3 SOLUTION RESPIRATORY (INHALATION) at 00:05

## 2024-06-16 RX ADMIN — TAMSULOSIN HYDROCHLORIDE 0.4 MG: 0.4 CAPSULE ORAL at 08:46

## 2024-06-16 RX ADMIN — METOPROLOL SUCCINATE 12.5 MG: 25 TABLET, EXTENDED RELEASE ORAL at 08:45

## 2024-06-16 RX ADMIN — BUMETANIDE 1 MG: 0.25 INJECTION INTRAMUSCULAR; INTRAVENOUS at 08:45

## 2024-06-16 RX ADMIN — APIXABAN 5 MG: 5 TABLET, FILM COATED ORAL at 19:55

## 2024-06-16 RX ADMIN — Medication 10 ML: at 08:50

## 2024-06-16 RX ADMIN — Medication 10 ML: at 19:55

## 2024-06-16 RX ADMIN — FUROSEMIDE 80 MG: 10 INJECTION, SOLUTION INTRAMUSCULAR; INTRAVENOUS at 10:59

## 2024-06-16 RX ADMIN — ASPIRIN 81 MG 81 MG: 81 TABLET ORAL at 08:46

## 2024-06-16 RX ADMIN — SPIRONOLACTONE 25 MG: 25 TABLET ORAL at 11:53

## 2024-06-16 RX ADMIN — FUROSEMIDE 40 MG: 10 INJECTION, SOLUTION INTRAMUSCULAR; INTRAVENOUS at 17:31

## 2024-06-16 RX ADMIN — POTASSIUM CHLORIDE 40 MEQ: 1500 TABLET, EXTENDED RELEASE ORAL at 11:53

## 2024-06-16 RX ADMIN — AMIODARONE HYDROCHLORIDE 200 MG: 200 TABLET ORAL at 08:45

## 2024-06-16 RX ADMIN — METOPROLOL SUCCINATE 12.5 MG: 25 TABLET, EXTENDED RELEASE ORAL at 21:38

## 2024-06-16 ASSESSMENT — PAIN SCALES - GENERAL: PAINLEVEL_OUTOF10: 0

## 2024-06-16 NOTE — PROGRESS NOTES
Physician Progress Note      PATIENT:               DOROTHY ABDULLAHI  CSN #:                  [id number]  :                       1960  ADMIT DATE:       2024 10:23 PM  DISCH DATE:  RESPONDING  PROVIDER #:        Perry Luevano MD        QUERY TEXT:    Stage of Chronic Kidney Disease: Please provide further specificity, if known.    Clinical indicators include: ckd, bun, creatinine  Options provided:  -- Chronic kidney disease stage 1  -- Chronic kidney disease stage 2  -- Chronic kidney disease stage 3  -- Chronic kidney disease stage 3a  -- Chronic kidney disease stage 3b  -- Chronic kidney disease stage 4  -- Chronic kidney disease stage 5  -- Chronic kidney disease stage 5, requiring dialysis  -- End stage renal disease  -- Other - I will add my own diagnosis  -- Disagree - Not applicable / Not valid  -- Disagree - Clinically Unable to determine / Unknown        PROVIDER RESPONSE TEXT:    The patient has chronic kidney disease stage 3.      Electronically signed by:  Perry Luevano MD 2024 7:33 AM

## 2024-06-16 NOTE — PROGRESS NOTES
[facility]    6/16/2024 11:33 AM        Name: Manoj Feliz .              Admitted: 6/14/2024  Primary Care Provider: No primary care provider on file. (Tel: None)      Chief complaint: 64 yo male with hx HFrEF (EF 10%) presented with worsening edema and shortness of breath. Admitted with CHF exacerbation.     Subjective:  Up in bedside chair. States he feels better than when he came in but does not feel as if he is putting out much urine. Weight unchanged. Denies chest pain, palpitations, abdominal pain, nausea. Breathing comfortable at rest but easily winded with ambulation.     Reviewed interval ancillary notes    Current Medications  amiodarone (CORDARONE) tablet 200 mg, Daily  apixaban (ELIQUIS) tablet 5 mg, BID  aspirin chewable tablet 81 mg, Daily  atorvastatin (LIPITOR) tablet 40 mg, Nightly  empagliflozin (JARDIANCE) tablet 10 mg, Daily  metoprolol succinate (TOPROL XL) extended release tablet 12.5 mg, BID  spironolactone (ALDACTONE) tablet 25 mg, Lunch  tamsulosin (FLOMAX) capsule 0.4 mg, Daily  bumetanide (BUMEX) injection 1 mg, BID  sodium chloride flush 0.9 % injection 5-40 mL, 2 times per day  sodium chloride flush 0.9 % injection 5-40 mL, PRN  0.9 % sodium chloride infusion, PRN  potassium chloride (KLOR-CON M) extended release tablet 40 mEq, PRN   Or  potassium bicarb-citric acid (EFFER-K) effervescent tablet 40 mEq, PRN   Or  potassium chloride 10 mEq/100 mL IVPB (Peripheral Line), PRN  magnesium sulfate 2000 mg in 50 mL IVPB premix, PRN  ondansetron (ZOFRAN-ODT) disintegrating tablet 4 mg, Q8H PRN   Or  ondansetron (ZOFRAN) injection 4 mg, Q6H PRN  polyethylene glycol (GLYCOLAX) packet 17 g, Daily PRN  acetaminophen (TYLENOL) tablet 650 mg, Q6H PRN   Or  acetaminophen (TYLENOL) suppository 650 mg, Q6H PRN  ipratropium 0.5 mg-albuterol 2.5 mg (DUONEB) nebulizer solution 1 Dose, Q4H  Permanent atrial fibrillation (HCC)  Resolved Problems:    * No resolved hospital problems. *       Assessment & Plan:     Acute on chronic sCHF  - Presented worsening shortness of breath and edema  - CXR with central pulmonary congestion, proBNP [id number]  - CONSUELO (3/2023) with LVEF 15-20%, dilated LV with reduced systolic function,severe TR  - Received IV Lasix in ER, IV Bumex BID ordered on admission  - Not diuresing much, will switch Bumex to IV Lasix which worked well for him on previous admissions  - Continue metoprolol 12.5 mg, Jardiance 10 mg, spironolactone 25 mg (potassium 3.3 today)  - Monitor I&O, daily weights  - Not considered candidate for advanced therapies due to compliance and lack of social support  - Palliative care consult pending    Persistent atrial fib  - Recent device interrogation (5/24/24) showed 100% AT/AF burden  - Failed cardioversion 5/24  - Continue amiodarone 200 mg, metoprolol 12.5 mg  - Continue apixaban 5 mg    Elevated troponin  - Troponin 27->27, chronically elevated, consistent with prior values  - Paced rhythm on EKG  - [facility] 2/2022 with normal cors  - Cardiology evaluated, troponin elevation due to CHF  - No further testing planned    Elevated total bilirubin  - Total bilirubin 2.3, chronically elevated, stable  - No abdominal pain or nausea  - Most likely secondary passive liver congestion with CHF    Hypokalemia  - Potassium 3.3 today, magnesium 2.40  - Likely secondary to diuresis      Diet: ADULT DIET; Regular; 4 carb choices (60 gm/meal); Low Sodium (2 gm); 1800 ml  Code:Full Code  DVT PPX: apixaban       TRENT Hayes - CNP   6/16/2024 11:33 AM

## 2024-06-16 NOTE — PROGRESS NOTES
[facility]   Electrophysiology Follow up   Date: 6/16/2024    CC: HFrEF   HPI: Manoj Feliz is a 63 y.o. male history of persistent atrial fibrillation, nonischemic cardiomyopathy, EF of 10 to 15%, status post BiV ICD in 2022, hypertension, HFrEF, left atrial appendage thrombus, CONSUELO/DCCV 3/6/2023  An attempt at cardioversion last month was not successful.      He was admitted due to worsening shortness of breath without obvious triggers.       HPI and Interval History:   Patient seen and examined. Clinical notes reviewed.   Telemetry reviewed. No new complaint today.   No major events overnight.   Denies having chest pain, shortness of breath, dyspnea on exertion, Orthopnea, PND at the time of this visit.  Feels better.  Shortness of breath is resolved.  He still has leg edema and complains that he is not urinating much.    Assessment and plan:   Permanent atrial fibrillation   -100% AT/AF burden per device   Patient is not a candidate for any invasive EP procedures.  No further attempt at maintaining sinus rhythm will be made in the future.    Acute on chronic HFrEF/Non Ischemic Cardiomyopathy   He is not wheezing anymore.  However, he does not think that he is urinating as much as he used to.  I gave him an extra dose of Lasix 80 mg by IV.  May need to put him on IV Lasix/IV drip.  Heart failure team to follow-up tomorrow.             Hx of HENOK thrombus              - Noted on CONSUELO (2/22)              - Resolved on CONSUELO    HTN  BP is well controlled. Continue current meds.       Patient Active Problem List    Diagnosis Date Noted    Bradycardia 03/03/2023    Persistent atrial fibrillation (HCC) 02/01/2023    Epigastric abdominal pain 01/31/2023    HFrEF (heart failure with reduced ejection fraction) ([facility]) 10/29/2022    Dizziness 10/29/2022    Anemia 09/01/2022    Thrombus of left atrial appendage     VT (ventricular tachycardia) ([facility])     Acute on chronic systolic (congestive) heart failure ([facility])  5/22/24   Deirdre Das APRN - CNS   torsemide (DEMADEX) 20 MG tablet Take 1 tablet by mouth daily 5/22/24   Deirdre Das APRN - CNS   atorvastatin (LIPITOR) 40 MG tablet Take 1 tablet by mouth nightly 5/22/24   Deirdre Das APRN - CNS   amiodarone (CORDARONE) 200 MG tablet Take 1 tablet by mouth daily 5/22/24   Dago Preston, TRENT - CNP   lisinopril (PRINIVIL;ZESTRIL) 2.5 MG tablet Take 1 tablet by mouth in the morning and at bedtime  Patient not taking: Reported on 5/24/2024 4/4/24   Deirdre Das APRN - MARILEE   finasteride (PROSCAR) 5 MG tablet Take 1 tablet by mouth daily  Patient not taking: Reported on 5/24/2024 11/28/23   Raysa Nunez MD   pantoprazole (PROTONIX) 40 MG tablet Take 1 tablet by mouth daily  Patient not taking: Reported on 5/24/2024 11/28/23   Raysa Nunez MD   potassium chloride (KLOR-CON M) 20 MEQ extended release tablet Take 1 tablet by mouth in the morning and 1 tablet in the evening. Take with meals. 8/1/22 9/8/22  Nadeem Marks MD       No Known Allergies    Social History:  Reviewed.  reports that he has never smoked. He has never been exposed to tobacco smoke. He has never used smokeless tobacco. He reports that he does not drink alcohol and does not use drugs.     Family History:  Reviewed. Reviewed. No family history of SCD.      Relevant and available labs, and cardiovascular diagnostics reviewed.   Reviewed.     I independently reviewed relevant and available cardiac diagnostic tests ECG, CXR, Echo, Stress test, Device interrogation, Holter, CT scan.    Outside medical records via Care everywhere reviewed and summarized in H&P above.    Complex medical condition with multiple medical problems affecting prognosis and outcome of EP interventions.  Very high risk of adverse outcome due to severe cardiomyopathy and multiple admissions for heart failure and increased morbidity and mortality risk       - The patient is counseled to follow a low salt diet

## 2024-06-16 NOTE — PROGRESS NOTES
Shift assessment. VSS. Patient awake sitting in chair. Respiration even and unlabored on room air. He states that he feels better than before. Lungs clear upon auscultation. No shortness of breath or wheezing. ACE wrap on both lower extremities below knee level. Will continue to monitor.

## 2024-06-16 NOTE — PROGRESS NOTES
Shift assessment completed. Routine vitals stable. Scheduled medications given. Patient is awake, alert and oriented. Patient concerned diuresis is not working. Weight 205lbs on admission, up to a steady 208lbs now, also low output over night, Md notified of concerns. Call light and bed side table within reach. No further needs at this time,

## 2024-06-17 LAB
ALBUMIN SERPL-MCNC: 4.1 G/DL (ref 3.4–5)
ANION GAP SERPL CALCULATED.3IONS-SCNC: 10 MMOL/L (ref 3–16)
BASOPHILS # BLD: 0 K/UL (ref 0–0.2)
BASOPHILS NFR BLD: 1.1 %
BUN SERPL-MCNC: 26 MG/DL (ref 7–20)
CALCIUM SERPL-MCNC: 8.8 MG/DL (ref 8.3–10.6)
CHLORIDE SERPL-SCNC: 98 MMOL/L (ref 99–110)
CO2 SERPL-SCNC: 29 MMOL/L (ref 21–32)
CREAT SERPL-MCNC: 1.5 MG/DL (ref 0.8–1.3)
DEPRECATED RDW RBC AUTO: 16.6 % (ref 12.4–15.4)
EOSINOPHIL # BLD: 0 K/UL (ref 0–0.6)
EOSINOPHIL NFR BLD: 1.2 %
GFR SERPLBLD CREATININE-BSD FMLA CKD-EPI: 51 ML/MIN/{1.73_M2}
GLUCOSE SERPL-MCNC: 95 MG/DL (ref 70–99)
HCT VFR BLD AUTO: 41 % (ref 40.5–52.5)
HGB BLD-MCNC: 13.2 G/DL (ref 13.5–17.5)
LYMPHOCYTES # BLD: 0.9 K/UL (ref 1–5.1)
LYMPHOCYTES NFR BLD: 21.7 %
MAGNESIUM SERPL-MCNC: 2.5 MG/DL (ref 1.8–2.4)
MCH RBC QN AUTO: 28.2 PG (ref 26–34)
MCHC RBC AUTO-ENTMCNC: 32.1 G/DL (ref 31–36)
MCV RBC AUTO: 87.8 FL (ref 80–100)
MONOCYTES # BLD: 0.4 K/UL (ref 0–1.3)
MONOCYTES NFR BLD: 9 %
NEUTROPHILS # BLD: 2.7 K/UL (ref 1.7–7.7)
NEUTROPHILS NFR BLD: 67 %
PHOSPHATE SERPL-MCNC: 4.3 MG/DL (ref 2.5–4.9)
PLATELET # BLD AUTO: 178 K/UL (ref 135–450)
PMV BLD AUTO: 8.9 FL (ref 5–10.5)
POTASSIUM SERPL-SCNC: 4.1 MMOL/L (ref 3.5–5.1)
RBC # BLD AUTO: 4.67 M/UL (ref 4.2–5.9)
SODIUM SERPL-SCNC: 137 MMOL/L (ref 136–145)
WBC # BLD AUTO: 4 K/UL (ref 4–11)

## 2024-06-17 PROCEDURE — 99232 SBSQ HOSP IP/OBS MODERATE 35: CPT | Performed by: NURSE PRACTITIONER

## 2024-06-17 PROCEDURE — 6360000002 HC RX W HCPCS: Performed by: INTERNAL MEDICINE

## 2024-06-17 PROCEDURE — 2580000003 HC RX 258: Performed by: STUDENT IN AN ORGANIZED HEALTH CARE EDUCATION/TRAINING PROGRAM

## 2024-06-17 PROCEDURE — 6360000002 HC RX W HCPCS: Performed by: NURSE PRACTITIONER

## 2024-06-17 PROCEDURE — 97530 THERAPEUTIC ACTIVITIES: CPT

## 2024-06-17 PROCEDURE — 80069 RENAL FUNCTION PANEL: CPT

## 2024-06-17 PROCEDURE — 94761 N-INVAS EAR/PLS OXIMETRY MLT: CPT

## 2024-06-17 PROCEDURE — 94640 AIRWAY INHALATION TREATMENT: CPT

## 2024-06-17 PROCEDURE — 36415 COLL VENOUS BLD VENIPUNCTURE: CPT

## 2024-06-17 PROCEDURE — 85025 COMPLETE CBC W/AUTO DIFF WBC: CPT

## 2024-06-17 PROCEDURE — 83735 ASSAY OF MAGNESIUM: CPT

## 2024-06-17 PROCEDURE — 6370000000 HC RX 637 (ALT 250 FOR IP): Performed by: STUDENT IN AN ORGANIZED HEALTH CARE EDUCATION/TRAINING PROGRAM

## 2024-06-17 PROCEDURE — 1200000000 HC SEMI PRIVATE

## 2024-06-17 RX ORDER — ALBUTEROL SULFATE 2.5 MG/3ML
2.5 SOLUTION RESPIRATORY (INHALATION) 3 TIMES DAILY
Status: DISCONTINUED | OUTPATIENT
Start: 2024-06-17 | End: 2024-06-21 | Stop reason: HOSPADM

## 2024-06-17 RX ADMIN — APIXABAN 5 MG: 5 TABLET, FILM COATED ORAL at 09:28

## 2024-06-17 RX ADMIN — ALBUTEROL SULFATE 2.5 MG: 2.5 SOLUTION RESPIRATORY (INHALATION) at 09:06

## 2024-06-17 RX ADMIN — ATORVASTATIN CALCIUM 40 MG: 80 TABLET, FILM COATED ORAL at 20:34

## 2024-06-17 RX ADMIN — FUROSEMIDE 40 MG: 10 INJECTION, SOLUTION INTRAMUSCULAR; INTRAVENOUS at 18:12

## 2024-06-17 RX ADMIN — Medication 10 ML: at 09:31

## 2024-06-17 RX ADMIN — METOPROLOL SUCCINATE 12.5 MG: 25 TABLET, EXTENDED RELEASE ORAL at 09:28

## 2024-06-17 RX ADMIN — APIXABAN 5 MG: 5 TABLET, FILM COATED ORAL at 20:34

## 2024-06-17 RX ADMIN — AMIODARONE HYDROCHLORIDE 200 MG: 200 TABLET ORAL at 09:28

## 2024-06-17 RX ADMIN — Medication 10 ML: at 21:46

## 2024-06-17 RX ADMIN — ALBUTEROL SULFATE 2.5 MG: 2.5 SOLUTION RESPIRATORY (INHALATION) at 22:07

## 2024-06-17 RX ADMIN — FUROSEMIDE 40 MG: 10 INJECTION, SOLUTION INTRAMUSCULAR; INTRAVENOUS at 09:29

## 2024-06-17 RX ADMIN — ASPIRIN 81 MG 81 MG: 81 TABLET ORAL at 09:28

## 2024-06-17 RX ADMIN — SPIRONOLACTONE 25 MG: 25 TABLET ORAL at 12:25

## 2024-06-17 RX ADMIN — EMPAGLIFLOZIN 10 MG: 10 TABLET, FILM COATED ORAL at 09:28

## 2024-06-17 RX ADMIN — TAMSULOSIN HYDROCHLORIDE 0.4 MG: 0.4 CAPSULE ORAL at 09:29

## 2024-06-17 ASSESSMENT — ENCOUNTER SYMPTOMS
ABDOMINAL DISTENTION: 1
EYES NEGATIVE: 1
SHORTNESS OF BREATH: 1
ALLERGIC/IMMUNOLOGIC NEGATIVE: 1

## 2024-06-17 ASSESSMENT — PAIN SCALES - GENERAL
PAINLEVEL_OUTOF10: 0

## 2024-06-17 NOTE — CONSULTS
with PMH homelessness, NICM, HFrEF, AF, BiV ICD, CKD urinary retention admitted with SOB.     Palliative Medicine SymptomScreening/ROS:    Review of Systems   Constitutional:  Positive for fatigue and unexpected weight change.   Eyes: Negative.    Respiratory:  Positive for shortness of breath.    Cardiovascular:  Positive for leg swelling.   Gastrointestinal:  Positive for abdominal distention.   Endocrine: Negative.    Genitourinary: Negative.    Musculoskeletal:  Positive for arthralgias.   Skin:  Positive for pallor.   Allergic/Immunologic: Negative.                Palliative Performance Scale:     [x] 60%  Amb reduced; Sig dz. Can't do hobbies/housework; Intake normal or reduced, Occasional assist; LOC full/confusion   [] 50%  Mainly sit/lie; Extensive disease. Mainly assist, Intake normal or reduced; Occasional assist; LOC full/confusion   [] 40%  Mainly in bed; Extensive disease; Mainly assist; Intake normal or reduced; Occasional assist; LOC full/confusion   [] 30%  Bed bound, Extensive disease; Total care; Intake reduced; LOC full/confusion   [] 20%  Bed bound; Extensive disease; Total care; Intake minimal; Drowsy/coma   [] 10%  Bed bound; Extensive disease; Total care; Mouth care only; Drowsy/coma   []  0%   Death       Home med list and hospital medications reviewed in chart as of 6/17/2024     EXAM     Vitals:    06/17/24 1142   BP: 107/76   Pulse: 74   Resp: 18   Temp: 97.2 °F (36.2 °C)   SpO2: 97%       Physical Exam  Constitutional:       Appearance: He is ill-appearing.   HENT:      Head: Normocephalic and atraumatic.      Nose: Nose normal.      Mouth/Throat:      Mouth: Mucous membranes are dry.   Eyes:      Pupils: Pupils are equal, round, and reactive to light.   Cardiovascular:      Rate and Rhythm: Normal rate.      Pulses: Normal pulses.      Heart sounds: No murmur heard.     No gallop.   Pulmonary:      Effort: Pulmonary effort is normal.   Chest:      Chest wall: Tenderness present.    Abdominal:      General: There is distension.   Musculoskeletal:      Cervical back: Neck supple.      Right lower leg: Edema present.      Left lower leg: Edema present.   Skin:     General: Skin is dry.      Coloration: Skin is pale.   Neurological:      Mental Status: He is alert and oriented to person, place, and time. Mental status is at baseline.   Psychiatric:         Mood and Affect: Mood normal.         Behavior: Behavior normal.         Thought Content: Thought content normal.         Judgment: Judgment normal.                OBJECTIVE   /76   Pulse 74   Temp 97.2 °F (36.2 °C) (Oral)   Resp 18   Ht 1.88 m (6' 2\")   Wt 94.9 kg (209 lb 3.2 oz)   SpO2 97%   BMI 26.86 kg/m²   I/O last 3 completed shifts:  In: 320 [P.O.:320]  Out: 2205 [Urine:2205]  I/O this shift:  In: 10 [I.V.:10]  Out: -       Palliative Medicine Interventions:    patient/family support  Goals of Care discussions with patient/surrogate  Spiritual Interventions: none         DATA:  Current labs in the epic chart reviewed as of 6/17/2024   Review of previous notes, admits, labs, radiology and testing relevant to this consult done in this chart today 6/17/2024    Medical Decision Making:  The following items were considered in medical decision making:  Review of prior external note(s) from each unique source relevant to today's visit: Hospitalist, Case management  Discussion of management or test with external physician/qualified health care professional: Hospitalist, Case management  Unique test results reviewed: CBC and BMP       Risk of Complications/Morbidity: High   Illness(es)/ Infection present that pose threat to bodily function.   There is potential for severe exacerbation of condition/side effects of treatment.  Therapy requires intensive monitoring for toxicity        The patient and/or authorized decision maker consented to a voluntary Advance Care Planning conversation.   Decisional Capacity: Full  Individuals

## 2024-06-17 NOTE — ACP (ADVANCE CARE PLANNING)
Advance Care Planning     Advance Care Planning Inpatient Note  Spiritual Care Department    Today's Date: 6/17/2024  Unit: [facility] 5 TWR ONCOLOGY    Received request from IDT Member, Gabrielle Valverde APRN - CNP.  Upon review of chart and communication with care team, patient's decision making abilities are not in question.. Patient was/were present in the room during visit.    Goals of ACP Conversation:  Discuss advance care planning documents  Facilitate a discussion related to patient's goals of care as they align with the patient's values and beliefs.    Health Care Decision Makers:     No healthcare decision makers have been documented.  Click here to complete HealthCare Decision Makers including selection of the Healthcare Decision Maker Relationship (ie \"Primary\")  Summary:  No Decision Maker named by patient at this time    Advance Care Planning Documents (Patient Wishes):  None     Assessment:  Spiritual Care consult to facilitate completion of Health Care Power of  and Living Will. Pt was awake and alert. Pt was given blank copies to review/complete with family when appropriate. Pt will notify the nurse to contact Spiritual Care Services to witness pt sign completed POA/LW documents.  Provided support through active listening, affirmed feeing, thoughts, concerns, conversation, emotional support, encouragement , and prayer. Pt expressed gratitude.    Interventions:  Provided education on documents for clarity and greater understanding  Discussed and provided education on state decision maker hierarchy  Encouraged ongoing ACP conversation with future decision makers and loved ones  Reviewed but did not complete ACP document    Care Preferences Communicated:   No    Outcomes/Plan:  ACP Discussion: Postponed. Pt will notify the nurse when appropriate.    Electronically signed by Chaplain Radha on 6/17/2024 at 12:45 PM

## 2024-06-17 NOTE — PROGRESS NOTES
[facility]    6/17/2024 11:44 AM        Name: Manoj Feliz .              Admitted: 6/14/2024  Primary Care Provider: No primary care provider on file. (Tel: None)      Chief complaint: 62 yo male with hx HFrEF (EF 10%) presented with worsening edema and shortness of breath. Admitted with CHF exacerbation.     Subjective:  Sitting on side of bed, seen ambulating in halls earlier today. Reports improvement in breathing, still with edema in BLE. Reports improved output on Lasix. Denies chest pain, lightheadedness, palpitations.     Reviewed interval ancillary notes    Current Medications  albuterol (PROVENTIL) (2.5 MG/3ML) 0.083% nebulizer solution 2.5 mg, TID  furosemide (LASIX) injection 40 mg, BID  amiodarone (CORDARONE) tablet 200 mg, Daily  apixaban (ELIQUIS) tablet 5 mg, BID  aspirin chewable tablet 81 mg, Daily  atorvastatin (LIPITOR) tablet 40 mg, Nightly  empagliflozin (JARDIANCE) tablet 10 mg, Daily  metoprolol succinate (TOPROL XL) extended release tablet 12.5 mg, BID  spironolactone (ALDACTONE) tablet 25 mg, Lunch  tamsulosin (FLOMAX) capsule 0.4 mg, Daily  sodium chloride flush 0.9 % injection 5-40 mL, 2 times per day  sodium chloride flush 0.9 % injection 5-40 mL, PRN  0.9 % sodium chloride infusion, PRN  magnesium sulfate 2000 mg in 50 mL IVPB premix, PRN  ondansetron (ZOFRAN-ODT) disintegrating tablet 4 mg, Q8H PRN   Or  ondansetron (ZOFRAN) injection 4 mg, Q6H PRN  polyethylene glycol (GLYCOLAX) packet 17 g, Daily PRN  acetaminophen (TYLENOL) tablet 650 mg, Q6H PRN   Or  acetaminophen (TYLENOL) suppository 650 mg, Q6H PRN  ipratropium 0.5 mg-albuterol 2.5 mg (DUONEB) nebulizer solution 1 Dose, Q4H PRN        Objective:  /86   Pulse 78   Temp 98.5 °F (36.9 °C) (Oral)   Resp 18   Ht 1.88 m (6' 2\")   Wt 94.9 kg (209 lb 3.2 oz)   SpO2 98%   BMI 26.86 kg/m²     Intake/Output Summary (Last

## 2024-06-17 NOTE — PROGRESS NOTES
Shift assessment completed. Routine vitals stable. Scheduled medications given. Patient is awake, alert and oriented. Respirations are easy and unlabored. Patient does not appear to be in distress, resting comfortably at this time. Call light within reach. No further needs expressed.

## 2024-06-17 NOTE — PROGRESS NOTES
[facility]  HEART FAILURE  Progress Note      Admit Date 6/14/2024     Reason for Consult:      Reason for Consultation/Chief Complaint: SOB    HPI:    Manoj Feliz is a 63 y.o. male with PMH homelessness, NICM, HFrEF, AF, BiV ICD, CKD and urinary retention admitted with SOB.       Subjective:  Patient is being seen for  CHF. There were no acute overnight cardiac events.   Today Mr. Feliz c/o continues SO, orthopnea, AD and edema but has a little improvement. He denies chest pain, palpitations, or dizziness. He was taking ACE prior to admit  I spent 10 minutes educating the patient on heart failure, medications, lifestyle modifications and dietary guidelines.       Baseline Weight: 190   Wt Readings from Last 3 Encounters:   06/17/24 94.9 kg (209 lb 3.2 oz)   05/24/24 89.4 kg (197 lb)   05/22/24 89.8 kg (198 lb)         Cardiac Testing:   ECHO 5/2024  Severe global and regional LV systolic dysfunction.  Estimated EF 10-15%.  Severely enlarged LV chamber size with eccentric remodeling.    Increased LV apical trabeculation.  No evidence for LV thrombus with Definity.  Spontaneous echo-contrast observed.  Indeterminate LV diastolic function.    Severe bi-atrial enlargement.  Moderate-severely reduced RV systolic function.  RV size is enlarged.  Device wire is present.  Mild to moderate aortic valve regurgitation.  Severe mitral valve regurgitation.  Severe tricuspid valve regurgitation.  At least moderate pulmonary hypertension based on TR signal obtained.  IVC not visualized.    Mildly dilated proximal ascending thoracic aorta, 4.1 cm.   Cardiac Cath: Dr Johns 2/8/2022  Anatomy:   LM-nml   LAD-nml  Cx-nml  OM- nml  RCA-nml  RPDA- nml  LVEF- 10%  LVG- global hypokinesis  LVEDP- 10     Hemodynamics:  RA- mean 3  RV- 37/0  PAWP- 10  PA- 34/12 (20)     C.O.- 5.7 (4.9)  C.I.- 2.6 (2.25)  PA sat 60%  AO sat 91%    Device: Medtronic BiV ICD with Optivol     Optivol at Bedside:     NYHA Class

## 2024-06-17 NOTE — PROGRESS NOTES
Occupational Therapy  Manoj Feliz      1020: Attempted to see pt for OT tx session, pt requesting full shower later this date if RN approves, requesting to rest at this time. Pt reports having just completed breathing treatment for SOB and desires to rest for LUE and LLE edema. Agreeable to f/u later this date, pt left with all needs met.     Thanks,  Freya Rodriguez, [id number]

## 2024-06-17 NOTE — CARE COORDINATION
Case Management Assessment  Initial Evaluation    Date/Time of Evaluation: 6/17/2024 10:39 AM  Assessment Completed by: Celia Figueroa RN    If patient is discharged prior to next notation, then this note serves as note for discharge by case management.    Patient Name: Manoj Feliz                   YOB: 1960  Diagnosis: Acute on chronic systolic (congestive) heart failure (HCC) [I50.23]  Acute on chronic congestive heart failure, unspecified heart failure type (HCC) [I50.9]  Acute on chronic systolic CHF (congestive heart failure) (HCC) [I50.23]                   Date / Time: 6/14/2024 10:23 PM    Patient Admission Status: Inpatient   Readmission Risk (Low < 19, Mod (19-27), High > 27): Readmission Risk Score: 16.1    Current PCP: No primary care provider on file.  PCP verified by CM? (P) Yes (new patient apt set for 7/1/24 w/Georgina Escalera CNP)    Chart Reviewed: Yes      History Provided by: (P) Patient, Medical Record  Patient Orientation: (P) Alert and Oriented, Person, Place, Situation    Patient Cognition: (P) Alert    Hospitalization in the last 30 days (Readmission):  No    If yes, Readmission Assessment in CM Navigator will be completed.    Advance Directives:      Code Status: Full Code   Patient's Primary Decision Maker is: (P) Legal Next of Kin      Discharge Planning:    Patient lives with: (P) Alone Type of Home: (P) House (1 level house, level entry)  Primary Care Giver: (P) Self  Patient Support Systems include: (P) Family Members   Current Financial resources: (P) Medicaid  Current community resources: (P) None  Current services prior to admission: (P) None            Current DME:              Type of Home Care services:  (P) None    ADLS  Prior functional level: (P) Independent in ADLs/IADLs  Current functional level: (P) Independent in ADLs/IADLs    PT AM-PAC: 22 /24  OT AM-PAC: 21 /24    Family can provide assistance at DC: (P) Other (comment) (n/a)  Would you like Case

## 2024-06-17 NOTE — RT PROTOCOL NOTE
RT Inhaler-Nebulizer Bronchodilator Protocol Note    There is a bronchodilator order in the chart from a provider indicating to follow the RT Bronchodilator Protocol and there is an “Initiate RT Inhaler-Nebulizer Bronchodilator Protocol” order as well (see protocol at bottom of note).    CXR Findings:  No results found.    The findings from the last RT Protocol Assessment were as follows:   History Pulmonary Disease: None or smoker <15 pack years  Respiratory Pattern: Dyspnea on exertion or RR 21-25 bpm  Breath Sounds: Intermittent or unilateral wheezes  Cough: Strong, productive  Indication for Bronchodilator Therapy:    Bronchodilator Assessment Score: 7    Aerosolized bronchodilator medication orders have been revised according to the RT Inhaler-Nebulizer Bronchodilator Protocol below.    Respiratory Therapist to perform RT Therapy Protocol Assessment initially then follow the protocol.  Repeat RT Therapy Protocol Assessment PRN for score 0-3 or on second treatment, BID, and PRN for scores above 3.    No Indications - adjust the frequency to every 6 hours PRN wheezing or bronchospasm, if no treatments needed after 48 hours then discontinue using Per Protocol order mode.     If indication present, adjust the RT bronchodilator orders based on the Bronchodilator Assessment Score as indicated below.  Use Inhaler orders unless patient has one or more of the following: on home nebulizer, not able to hold breath for 10 seconds, is not alert and oriented, cannot activate and use MDI correctly, or respiratory rate 25 breaths per minute or more, then use the equivalent nebulizer order(s) with same Frequency and PRN reasons based on the score.  If a patient is on this medication at home then do not decrease Frequency below that used at home.    0-3 - enter or revise RT bronchodilator order(s) to equivalent RT Bronchodilator order with Frequency of every 4 hours PRN for wheezing or increased work of breathing using Per

## 2024-06-17 NOTE — PROGRESS NOTES
[facility] - Inpatient Rehabilitation Department   Phone: [phone]    Physical Therapy    [] Initial Evaluation            [x] Daily Treatment Note         [] Discharge Summary      Patient: Manoj Feliz   : 1960   MRN: [id number]   Date of Service:  2024  Admitting Diagnosis: Acute on chronic congestive heart failure (HCC)  Current Admission Summary: Manoj Feliz is a 63 y.o. male who presents to the emergency department today for evaluation for concerns of shortness of breath. The patient states that for the past 4 days, he states he has noticed increasing shortness of breath. He reports that the shortness of breath is worse with exertion, as well as lying flat. Patient states that he has a history of CHF, and he states he does have swelling to his legs. He is unsure of any weight gain. The patient has no chest pain or chest tightness.  He states that he is having a cough which is productive of clear sputum. He denies any hemoptysis. He denies any abdominal pain, nausea, vomiting or diarrhea. No urinary symptoms, patient otherwise has no other complaint.   Past Medical History:  has a past medical history of Acute combined systolic and diastolic congestive heart failure (HCC), Atrial fibrillation (HCC), CHF (congestive heart failure) (HCC), Hx of blood clots, and Hypertension.  Past Surgical History:  has a past surgical history that includes Insertable Cardiac Monitor (2019); Cardioversion (2019); Cardiac defibrillator placement (Left); and Prostate surgery (N/A, 3/9/2023).  Discharge Recommendations: Manoj Feliz scored a 22/24 on the AM-PAC short mobility form.  At this time, no further PT is recommended upon discharge as pt is presenting near his baseline level of function.  Recommend patient returns to prior setting with prior services.   DME Required For Discharge: no DME required at discharge  Precautions/Restrictions: medium fall risk  Weight Bearing

## 2024-06-17 NOTE — CARE COORDINATION
Discharge Planning Note:    Chart reviewed and it appears that patient has minimal needs for discharge at this time. Risk Score 16 %     Primary Care Physician is Pt has a new patient apt scheduled for 7/1/24 with Georgina Escalera CNP at [facility]    Primary insurance is [place] Medicaid    Please notify case management if any discharge needs are identified.      Case management will continue to follow progress and update discharge plan as needed.

## 2024-06-18 LAB
ANION GAP SERPL CALCULATED.3IONS-SCNC: 11 MMOL/L (ref 3–16)
BUN SERPL-MCNC: 27 MG/DL (ref 7–20)
CALCIUM SERPL-MCNC: 8.5 MG/DL (ref 8.3–10.6)
CHLORIDE SERPL-SCNC: 101 MMOL/L (ref 99–110)
CO2 SERPL-SCNC: 27 MMOL/L (ref 21–32)
CREAT SERPL-MCNC: 1.4 MG/DL (ref 0.8–1.3)
GFR SERPLBLD CREATININE-BSD FMLA CKD-EPI: 56 ML/MIN/{1.73_M2}
GLUCOSE SERPL-MCNC: 89 MG/DL (ref 70–99)
NT-PROBNP SERPL-MCNC: ABNORMAL PG/ML (ref 0–124)
POTASSIUM SERPL-SCNC: 3.6 MMOL/L (ref 3.5–5.1)
SODIUM SERPL-SCNC: 139 MMOL/L (ref 136–145)

## 2024-06-18 PROCEDURE — 6360000002 HC RX W HCPCS: Performed by: NURSE PRACTITIONER

## 2024-06-18 PROCEDURE — 83880 ASSAY OF NATRIURETIC PEPTIDE: CPT

## 2024-06-18 PROCEDURE — 97530 THERAPEUTIC ACTIVITIES: CPT

## 2024-06-18 PROCEDURE — 80048 BASIC METABOLIC PNL TOTAL CA: CPT

## 2024-06-18 PROCEDURE — 6360000002 HC RX W HCPCS: Performed by: INTERNAL MEDICINE

## 2024-06-18 PROCEDURE — 6370000000 HC RX 637 (ALT 250 FOR IP): Performed by: STUDENT IN AN ORGANIZED HEALTH CARE EDUCATION/TRAINING PROGRAM

## 2024-06-18 PROCEDURE — 2580000003 HC RX 258: Performed by: STUDENT IN AN ORGANIZED HEALTH CARE EDUCATION/TRAINING PROGRAM

## 2024-06-18 PROCEDURE — 94640 AIRWAY INHALATION TREATMENT: CPT

## 2024-06-18 PROCEDURE — 6370000000 HC RX 637 (ALT 250 FOR IP): Performed by: NURSE PRACTITIONER

## 2024-06-18 PROCEDURE — 1200000000 HC SEMI PRIVATE

## 2024-06-18 PROCEDURE — 99232 SBSQ HOSP IP/OBS MODERATE 35: CPT | Performed by: NURSE PRACTITIONER

## 2024-06-18 PROCEDURE — 36415 COLL VENOUS BLD VENIPUNCTURE: CPT

## 2024-06-18 RX ORDER — METOLAZONE 2.5 MG/1
2.5 TABLET ORAL DAILY
Status: DISCONTINUED | OUTPATIENT
Start: 2024-06-18 | End: 2024-06-19

## 2024-06-18 RX ADMIN — ATORVASTATIN CALCIUM 40 MG: 80 TABLET, FILM COATED ORAL at 20:47

## 2024-06-18 RX ADMIN — APIXABAN 5 MG: 5 TABLET, FILM COATED ORAL at 09:16

## 2024-06-18 RX ADMIN — TAMSULOSIN HYDROCHLORIDE 0.4 MG: 0.4 CAPSULE ORAL at 09:16

## 2024-06-18 RX ADMIN — METOLAZONE 2.5 MG: 2.5 TABLET ORAL at 12:43

## 2024-06-18 RX ADMIN — METOPROLOL SUCCINATE 12.5 MG: 25 TABLET, EXTENDED RELEASE ORAL at 09:16

## 2024-06-18 RX ADMIN — Medication 10 ML: at 20:49

## 2024-06-18 RX ADMIN — APIXABAN 5 MG: 5 TABLET, FILM COATED ORAL at 20:48

## 2024-06-18 RX ADMIN — AMIODARONE HYDROCHLORIDE 200 MG: 200 TABLET ORAL at 09:16

## 2024-06-18 RX ADMIN — SPIRONOLACTONE 25 MG: 25 TABLET ORAL at 12:43

## 2024-06-18 RX ADMIN — ALBUTEROL SULFATE 2.5 MG: 2.5 SOLUTION RESPIRATORY (INHALATION) at 20:57

## 2024-06-18 RX ADMIN — EMPAGLIFLOZIN 10 MG: 10 TABLET, FILM COATED ORAL at 09:16

## 2024-06-18 RX ADMIN — ALBUTEROL SULFATE 2.5 MG: 2.5 SOLUTION RESPIRATORY (INHALATION) at 09:03

## 2024-06-18 RX ADMIN — FUROSEMIDE 40 MG: 10 INJECTION, SOLUTION INTRAMUSCULAR; INTRAVENOUS at 17:58

## 2024-06-18 RX ADMIN — Medication 10 ML: at 09:17

## 2024-06-18 RX ADMIN — FUROSEMIDE 40 MG: 10 INJECTION, SOLUTION INTRAMUSCULAR; INTRAVENOUS at 09:17

## 2024-06-18 RX ADMIN — ASPIRIN 81 MG 81 MG: 81 TABLET ORAL at 09:16

## 2024-06-18 RX ADMIN — METOPROLOL SUCCINATE 12.5 MG: 25 TABLET, EXTENDED RELEASE ORAL at 20:47

## 2024-06-18 RX ADMIN — ALBUTEROL SULFATE 2.5 MG: 2.5 SOLUTION RESPIRATORY (INHALATION) at 15:21

## 2024-06-18 ASSESSMENT — ENCOUNTER SYMPTOMS
EYES NEGATIVE: 1
SHORTNESS OF BREATH: 1
ALLERGIC/IMMUNOLOGIC NEGATIVE: 1
ABDOMINAL DISTENTION: 1

## 2024-06-18 ASSESSMENT — PAIN SCALES - GENERAL
PAINLEVEL_OUTOF10: 0
PAINLEVEL_OUTOF10: 0

## 2024-06-18 NOTE — NURSE NAVIGATOR
[facility]  HEART FAILURE PROGRAM      Manoj Feliz 1960    History:  Past Medical History:   Diagnosis Date    Acute combined systolic and diastolic congestive heart failure (HCC) 8/31/2022    Atrial fibrillation (HCC) 07/25/2019    CHF (congestive heart failure) (HCC)     Hx of blood clots     Hypertension        ECHO:  5/22/24  Limited ECHO with 2D imaging, Color-Flow and Spectral Doppler:     Severe global and regional LV systolic dysfunction.  Estimated EF 10-15%.  Severely enlarged LV chamber size with eccentric remodeling.    Increased LV apical trabeculation.  No evidence for LV thrombus with Definity.  Spontaneous echo-contrast observed.  Indeterminate LV diastolic function.    Severe bi-atrial enlargement.  Moderate-severely reduced RV systolic function.  RV size is enlarged.  Device wire is present.  Mild to moderate aortic valve regurgitation.  Severe mitral valve regurgitation.  Severe tricuspid valve regurgitation.  At least moderate pulmonary hypertension based on TR signal obtained.  IVC not visualized.    Mildly dilated proximal ascending thoracic aorta, 4.1 cm.    ACE/ARB/ARNi: on lisinopril 2.5 mg daily  BB: toprol xl 12.5 mg bid  Aldosterone Antagonist: aldactone 25 mg daily  SGLT2: jardiance 10 mg daily    History of sleep apnea: No      [place] Screen ordered: Yes    DM History: No        Last Hospital Admission: 2/17/24 with CHF  Code Status: full   Discharge plans:homeless    Family Present: no    Manoj Feliz was admitted to the hospital with increased shortness of breath. Patient is well known to HF team and follows as an outpatient. He states his baseline weight is usually around 200 lb. Patient states he did notice weight gain, edema and dyspnea. He did not call HF team because it was a weekend. He came to the ED. Patient knows to limit sodium but this is challenging with his living situation. He knows to limit his fluids to under 64 oz. He has been off and  on with medication compliance. He states he was taking his medications after he saw HF NP in clinic last month. Patient relies on friends to transport him to visits.     Patient provided with both written and verbal education on CHF signs/ symptoms, causes, discharge medications, daily weights, low sodium diet, activity, and follow-up.  Pt to call if gains 3 pounds in one day or 5 pounds in one week. Mutually agreed upon goals were discussed such as calling the MD as soon as they recognize symptoms and weight gain, maintaining proper diet, taking medications as prescribed, joining cardiac rehab when able. Also reviewed importance of risk factor reduction. Patient provided with CHF Zone Management tool and CHF symptoms magnet.    Discussed importance of lifestyle changes: stressed calling soon with symptoms    PATIENT/CAREGIVER TEACHING:    Level of patient/caregiver understanding able to:   [x ] Verbalize understanding [ ] Demonstrate understanding [ ] Teach back   [ ] Needs reinforcement [ ] Other:       Time spent teachin mins    1. WEIGHT: Admit Weight - Scale: 93 kg (205 lb 0.4 oz)      Today  Weight - Scale: 93.7 kg (206 lb 9.6 oz)   2. I/O   Intake/Output Summary (Last 24 hours) at 2024 1249  Last data filed at 2024 1141  Gross per 24 hour   Intake 480 ml   Output 2375 ml   Net -1895 ml       Recommendations:   1. Patient will need a one week or less hospital follow up scheduled before discharge.   2. Educate further on fluid restriction 48 oz- 64 oz during inpatient stay so they can understand how to measure intake at home.   3. Continue to educate on S/S.   4. Emphasize daily weights, diet, and knowing when and who to call  5. Provided patient with CHF Resource Line for questions and concerns.         SHANTHI CALL RN 2024 12:49 PM

## 2024-06-18 NOTE — PROGRESS NOTES
Hospitalist Progress Note      Name:  Manoj Feliz /Age/Sex: 1960  (63 y.o. male)   MRN & CSN:  [id number] & [id number] Encounter Date/Time: 2024 1:19 PM EDT   Location:  [id number] PCP: No primary care provider on file.     Attending:Cristian Lira MD       Hospital Day: 5    Subjective:   Chief Complaint:   Chief Complaint   Patient presents with    Shortness of Breath     Pt came in from home, pt reports shortness of breath with productive cough for 4 days, pt also reports lower extremity swelling, pt also reports left elbow swelling and pain pt denies injury.     Manoj Feliz is a 63 y.o. male with a past medical history of hypertension, sCHF, EF 10%, Biv-AICD, CKD, atrial fibrillation, homelessness who presented with SOB.  Admitted for acute CHF.      Interval History:  Today, he is sitting up in the chair.  Still feels SOB< however it is improving gradually.  He is worried about his BP, has been lower than usual.  He denies CP.  Continue with LE swelling.      Independently reviewed interval ancillary notes from cardiology.     Assessment and Recommendations   Problem List  Principal Problem:    Acute on chronic congestive heart failure (HCC)  Active Problems:    HFrEF (heart failure with reduced ejection fraction) (HCC)    Benign essential HTN    NICM (nonischemic cardiomyopathy) (HCC)    Chronic atrial fibrillation (HCC)    ICD (implantable cardioverter-defibrillator) in place    Acute on chronic systolic (congestive) heart failure (HCC)    Acute on chronic systolic CHF (congestive heart failure) (HCC)    Permanent atrial fibrillation (HCC)  Resolved Problems:    * No resolved hospital problems. *     Assessment and Plan:    Acute on chronic sCHF  - Presented with worsening shortness of breath and edema  - CXR with central pulmonary congestion, proBNP   - CONSUELO (3/2023) with LVEF 15-20%, dilated LV with reduced systolic function,severe TR  - Received IV Lasix in ER, IV

## 2024-06-18 NOTE — PROGRESS NOTES
Physical Therapy  Pt kindly refused therapy session at this time.  Will follow-up as schedule permits.   Thanks, Kimberly Sin PT, DPT [id number]    2nd attempt pt was agreeable but BP at rest 92/62.  Declined mobility after knowing BP was still running low.  Pt states he may try to walk with nsg later today if BP improves.     Thanks, Kimberly Sin PT, DPT [id number]

## 2024-06-18 NOTE — PROGRESS NOTES
[facility]  HEART FAILURE  Progress Note      Admit Date 6/14/2024     Reason for Consult:      Reason for Consultation/Chief Complaint: SOB    HPI:    Manoj Feliz is a 63 y.o. male with PMH homelessness, NICM, HFrEF, AF, BiV ICD, CKD and urinary retention admitted with SOB.       Subjective:  Patient is being seen for  CHF. There were no acute overnight cardiac events.   Today Mr. Feliz is feeling better today, less SOB and edema.  He denies chest pain, palpitations, or dizziness. He was taking ACE prior to admit        Baseline Weight: 190   Wt Readings from Last 3 Encounters:   06/18/24 93.7 kg (206 lb 9.6 oz)   05/24/24 89.4 kg (197 lb)   05/22/24 89.8 kg (198 lb)         Cardiac Testing:   ECHO 5/2024  Severe global and regional LV systolic dysfunction.  Estimated EF 10-15%.  Severely enlarged LV chamber size with eccentric remodeling.    Increased LV apical trabeculation.  No evidence for LV thrombus with Definity.  Spontaneous echo-contrast observed.  Indeterminate LV diastolic function.    Severe bi-atrial enlargement.  Moderate-severely reduced RV systolic function.  RV size is enlarged.  Device wire is present.  Mild to moderate aortic valve regurgitation.  Severe mitral valve regurgitation.  Severe tricuspid valve regurgitation.  At least moderate pulmonary hypertension based on TR signal obtained.  IVC not visualized.    Mildly dilated proximal ascending thoracic aorta, 4.1 cm.   Cardiac Cath: Dr Johns 2/8/2022  Anatomy:   LM-nml   LAD-nml  Cx-nml  OM- nml  RCA-nml  RPDA- nml  LVEF- 10%  LVG- global hypokinesis  LVEDP- 10     Hemodynamics:  RA- mean 3  RV- 37/0  PAWP- 10  PA- 34/12 (20)     C.O.- 5.7 (4.9)  C.I.- 2.6 (2.25)  PA sat 60%  AO sat 91%    Device: Medtronic BiV ICD with Optivol     Optivol at Bedside:     NYHA Class III    Objective:   /76   Pulse 76   Temp 97.3 °F (36.3 °C)   Resp 16   Ht 1.88 m (6' 2\")   Wt 93.7 kg (206 lb 9.6 oz)   SpO2 96%   BMI 26.53

## 2024-06-18 NOTE — DISCHARGE INSTRUCTIONS
Guidelines for Heart Failure home management:    1. Continue to monitor weight first thing each morning. You should weigh yourself after using the bathroom and before you eat breakfast.    2. Report to your doctor any significant weight change. Remember that weight change of 2-3 lbs. in 1 day or 5 lbs in a week is \"significant\" and likely represents changes in \"fluid\" status.  If you are experiencing any swelling in your feet, ankles or abdomen, or shortness of breath, call your doctor.     3. You should restrict all sodium intake to 3000 milligrams (3 grams) a day. Depending on your status, you may also be asked to restrict fluid intake to no more that 64 oz/2 Liters a day. If uncertain, ask the nurse or physician.     4. Regular aerobic exercise is encouraged 30 minutes a day (walking, bike, swimming, etc.). For specific exercise recommendations, ask your physician.     5. Report to your doctor any change in symptoms (chest pain, worsening shortness of breath, increased dizziness or passing out, increased palpitations or ICD shock, trouble catching breath while lying down, increased edema or abdominal bloating). Remember that even \"minor\" changes in symptoms may be important. Also report any changes in medications including \"over the counter\" medications.     6. DO NOT take NSAID's for pain (i.e, Advil, Aleve, Motrin, ibuprofen, and many more) since these may cause serious problems in those with a history of CHF. If uncertain about the medication, call your doctor.    7. If you have new significant or ongoing diarrhea or vomiting, please call your doctor for further instructions. Taking a diuretic (water pill) with these symptoms can worsen dehydration.     8. If you have any questions or concerns you can always call the CHF  Resource Line( [phone].  It is available Monday thru Friday 8 am- 5 pm. Please leave a message and your call will be returned shortly.     Extra Heart Failure  sites:    https://[employer].VirtualU/publication/?n=874299  --- this is American Heart Association interactive Healthier Living with Heart Failure guidebook. Please click hyperlink or copy / paste link into search bar. Use your mouse to scroll through the pages. Lots of information about weight monitoring, diet tips, activity, meds, etc    HF [place] tony -- this is a free smart phone tony available for iPhone and Android download. Use your phone to track sodium / fluid intake, zone tool symptom tracking, weights, medications, etc. Click on this hyperlink HF [place] Tony for QR code for easy download.    DASH (Dietary Approach to Stop Hypertension) diet -- https://www.nhlbi.nih.gov/education/dash-eating-plan -- this diet is a flexible eating plan that promotes heart healthy eating style. Click on hyperlink or copy / paste link into search bar. Lots of low sodium recipes and tips.    https://www.[employer].VirtualU/recipes -- more free recipes

## 2024-06-18 NOTE — PROGRESS NOTES
CHF Nutrition Education    Consult received for heart failure diet education. Nutrition therapy included low sodium diet guidelines, fluid restriction, foods to choose and foods to avoid, label reading, and ways to add flavor to food. Patient does not add salt to food. All questions answered and patient voiced understanding. Time spent with patient: 15 minutes      Electronically signed by Mayra Hicks MS, RD, LD on 6/18/2024 at 9:27 AM

## 2024-06-18 NOTE — PLAN OF CARE
Problem: Safety - Adult  Goal: Free from fall injury  Outcome: Progressing     Problem: ABCDS Injury Assessment  Goal: Absence of physical injury  Outcome: Progressing     Problem: Chronic Conditions and Co-morbidities  Goal: Patient's chronic conditions and co-morbidity symptoms are monitored and maintained or improved  Outcome: Progressing     Problem: Pain  Goal: Verbalizes/displays adequate comfort level or baseline comfort level  Outcome: Progressing

## 2024-06-18 NOTE — PROGRESS NOTES
Shift assessment completed, see flowsheets.  Medications administered, see MAR. Vital signs stable.  Plan of care discussed with patient. Safety precautions in place. Call light and bedside table within reach.  Pt denies further needs at this time.  Will continue to monitor.  Katie Marlow RN

## 2024-06-18 NOTE — PALLIATIVE CARE DISCHARGE
Please fax face sheet with DC address once known and SS#  as a referral to [facility] Palliative care [phone] to follow pt after DC

## 2024-06-18 NOTE — PROGRESS NOTES
d/c to increase the patient's independence.  At this time, this patient demonstrates the endurance and safety to discharge home with HHOT (home vs OP services) and a follow up treatment frequency of 2-3x/wk.   Please see assessment section for further patient specific details.    If patient discharges prior to next session this note will serve as a discharge summary.  Please see below for the latest assessment towards goals.      DME Required For Discharge: grab bars in shower/by toilet     Precautions/Restrictions: medium fall risk  Weight Bearing Restrictions: no restrictions  [] Right Upper Extremity  [] Left Upper Extremity [] Right Lower Extremity  [] Left Lower Extremity     Required Braces/Orthotics: no braces required   [] Right  [] Left  Positional Restrictions:no positional restrictions    Pre-Admission Information   Lives With: alone                     Type of Home: house  Home Layout: one level  Home Access: level entry  Bathroom Layout: walk in shower  Bathroom Equipment: built in shower seat  Toilet Height: elevated height  Home Equipment: no prior equipment  Transfer Assistance: Independent without use of device  Ambulation Assistance:Independent without use of device  ADL Assistance: independent with all ADL's  IADL Assistance: independent with homemaking tasks  Active :        [] Yes                 [x] No- friends assist with transportation  Hand Dominance: [] Left                 [x] Right  Current Employment: part time employment.  Occupation: Car repairs  Hobbies: Fishing, bowling  Recent Falls: Pt denies falls in the past 6 months    Examination   Vision:   Vision Corrective Device: wears glasses for reading  Hearing:   WFL  Perception:   WFL  Observation:   General Observation:  on room air - telemetry in place   Posture:   Fair  Sensation:   reports numbness and tingling in (B) LE - B feeet for the past 3 months   Proprioception:    WFL  Tone:   Normotonic  Coordination Testing:   WFL     ROM:   (B) UE AROM WFL  Strength:   (B) UE strength grossly WFL    Therapist Clinical Decision Making (Complexity): low complexity  Clinical Presentation: stable      Subjective  General: Patient in chair on arrival - agreeable to evaluation - c/o intermittent SOB  Pain: 0/10  Pain Interventions: not applicable  Vitals  Pulse: 66  BP: (!) 93/59  MAP (Calculated): 70     Activities of Daily Living  Basic Activities of Daily Living  General Comments: Patient asking to shower - reports he showered independently last night with shower chair - Pt educated on ADL safety with low BP   Instrumental Activities of Daily Living  No IADL completed on this date.    Functional Mobility  Bed Mobility:  Bed mobility not completed on this date.  Comments:  Transfers:  Stand to sit transfer: stand by assistance  Comments:   Functional Mobility  Functional Mobility Activity: Up in room upon entry  Comment: Educated on importance of having someone with him with Low BP  Balance:  Static Sitting Balance: good: independent with functional balance in unsupported position  Dynamic Sitting Balance: good: independent with functional balance in unsupported position  Static Standing Balance: fair (+): maintains balance at SBA/supervision without use of UE support  Dynamic Standing Balance: fair (+): maintains balance at SBA/supervision without use of UE support  Comments:    Other Therapeutic Interventions    Functional Outcomes  AM-PAC Inpatient Daily Activity Raw Score: 21                                    Cognition  Safety Judgement: decreased awareness of need for assistance, decreased awareness of need for safety  Problem Solving: assistance required to generate solutions, assistance required to implement solutions  Insights: decreased awareness of deficits  Orientation:    alert and oriented x 4  Command Following:   WFL     Education  Barriers To Learning: cognition  Patient Education: patient educated on goals, OT role and benefits,

## 2024-06-18 NOTE — PROGRESS NOTES
The [place] Sleepiness Scale       The [place] Sleepiness Scale is widely used in the field of sleep medicine as a subjective measure of a patient's sleepiness. The test is a list of eight situations in which you rate your tendency to become sleepy on a scale of 0, no chance to 3, high chance of dozing. Your score is based on a scale of 0 to 24. The scale estimates whether you are experiencing excessive sleepiness that possibly requires medical attention.     How Sleepy Are You?  How sleepy are you to doze off or fall asleep in the following situations? You should rate your chances of dozing off, not just feeling tired. Even if you have not done some of these things recently try to determine how they would have affected you. For each situation, decide whether or not you would have:     0 = No chance of dozing 1 = Slight chance of dozing   2 = Moderate chance of  dozing 3 = High change of dozing       Situation                                                                                     Chance of Dozing    Sitting and reading  0 =  []  1 =    [x] 2 =    [] 3 =    []    Watching TV  0 =  []  1 =    [x] 2 =    [] 3 =    []      Sitting inactive in public place (e.g., a theater or a meeting)  0 =  []  1 =    [x] 2 =    [] 3 =    []    As a passenger in a car for an hour without a break          0  =  []  1 =    [] 2 =    [] 3 =    [x]    Lying down to rest in the afternoon when circumstances permit    0 =  []  1 =    [] 2 =    [] 3 =    [x]    Sitting and talking to someone  0 =  [x]  1 =    [] 2 =    [] 3 =    []      Sitting quietly after a lunch without alcohol  0 =  [x]  1 =    [] 2 =    [] 3 =    []    In a car, while stopped for a few minutes in traffic                                                                      0 =  [x]  1 =    [] 2 =    [] 3 =    []    Total Score = 9    If your total score is 10 or greater, you are experiencing excessive sleepiness and should consider seeking a medical  follow-up. Take a copy of this screening test to your primary care physician on your next office visit.      Interpretation:      0 -   7: It is unlikely that you are abnormally sleepy.    8 -   9: You have an average amount of daytime sleepiness.  10 - 15: You may be excessively sleepy depending on the situation. You may want to consider seeking medical attention.   16 - 24: You are excessively sleepy and should consider seeking medical attention.      Electronically signed by Mayda Michael RCP on 6/18/2024 at 3:25 PM

## 2024-06-18 NOTE — PROGRESS NOTES
PALLIATIVE MEDICINE PROGRESS NOTE     Patient name:Manoj Feliz    MRN:[id number] :1960  Room/Bed:[facility]-5585/5585-01    LOS: 3 days        ASSESSMENT/RECOMMENDATIONS   63 y.o. male with CHF and edema         Symptom Management:  CHF- EF 10% Multi-valve disease with multiple admissions for fluid overload   Edema- 2+ generalized edema worse in abdomen and lower legs    Goals of Care- Pt is working on HCPOA and living will paperwork he has named his friend Naldo Bauman [phone] his HCPOA. Pt is ok with palliative care to follow the issue is he is not sure where he is going to stay after DC once we have address can send palliative care referral      Patient/Family Goals of Care :      talked to pt at length regarding CHF and nature of heart failure. He is estranged from his family he is homeless staying a different [facility] houses. We discussed code status briefly and need for Advance directives at length given that he is estranged from his family. Asked chaplains to help with AD paperwork. Pt states that his friend Deon would be the person he would likely name. Will follow-up tomorrow       Pt is working on HCPOA and living will paperwork he has named his friend Naldo Bauman [phone] his HCPOA. Pt is ok with palliative care to follow the issue is he is not sure where he is going to stay after DC once we have address can send palliative care referral      Disposition/Discharge Plan:   Pending     Advance Directives:     The patient has appointed the following active healthcare agents:  Unknown      The Patient has the following current code status:    Code Status: Full Code        Interactive exchange regarding medications,tests and procedures with: patient, floor RN, Gin NEWMAN  Thank you for allowing us to participate in the care of this patient     SUBJECTIVE     Chief Complaint: edema     Last 24 hours:   Pt reports edema is improved today     ROS:    Review of Systems  visit: Hospitalist, Case management,cardiology   Discussion of management or test with external physician/qualified health care professional: Hospitalist, Case management  Unique test results reviewed: CBC and BMP    Risk of Complications/Morbidity: High   Illness(es)/ Infection present that pose threat to bodily function.   There is potential for severe exacerbation of condition/side effects of treatment.  Therapy requires intensive monitoring for toxicity    Time spent on Advanced care [place] minutes    Counseling and educating the patient/family/caregiver  Preparing to see the patient (e.g., review of tests)  Referring / communicating with other healthcare professionals including care coordination (not separately reported): Hospitalist, Case management  Documenting clinical information in the electronic health record     Signed By: Electronically signed by TRENT Ayon CNP on 2024 at 9:30 AM   Palliative Medicine     2024

## 2024-06-19 ENCOUNTER — APPOINTMENT (OUTPATIENT)
Dept: GENERAL RADIOLOGY | Age: 64
End: 2024-06-19
Payer: MEDICAID

## 2024-06-19 LAB
ANION GAP SERPL CALCULATED.3IONS-SCNC: 14 MMOL/L (ref 3–16)
BUN SERPL-MCNC: 25 MG/DL (ref 7–20)
CALCIUM SERPL-MCNC: 9.3 MG/DL (ref 8.3–10.6)
CHLORIDE SERPL-SCNC: 96 MMOL/L (ref 99–110)
CO2 SERPL-SCNC: 27 MMOL/L (ref 21–32)
CREAT SERPL-MCNC: 1.3 MG/DL (ref 0.8–1.3)
DEPRECATED RDW RBC AUTO: 16.2 % (ref 12.4–15.4)
GFR SERPLBLD CREATININE-BSD FMLA CKD-EPI: 61 ML/MIN/{1.73_M2}
GLUCOSE SERPL-MCNC: 68 MG/DL (ref 70–99)
HCT VFR BLD AUTO: 40.9 % (ref 40.5–52.5)
HGB BLD-MCNC: 13.6 G/DL (ref 13.5–17.5)
MAGNESIUM SERPL-MCNC: 2.6 MG/DL (ref 1.8–2.4)
MCH RBC QN AUTO: 29.3 PG (ref 26–34)
MCHC RBC AUTO-ENTMCNC: 33.3 G/DL (ref 31–36)
MCV RBC AUTO: 87.7 FL (ref 80–100)
NT-PROBNP SERPL-MCNC: ABNORMAL PG/ML (ref 0–124)
PLATELET # BLD AUTO: 157 K/UL (ref 135–450)
PMV BLD AUTO: 8.7 FL (ref 5–10.5)
POTASSIUM SERPL-SCNC: 4.6 MMOL/L (ref 3.5–5.1)
PROCALCITONIN SERPL IA-MCNC: 0.11 NG/ML (ref 0–0.15)
RBC # BLD AUTO: 4.66 M/UL (ref 4.2–5.9)
SODIUM SERPL-SCNC: 137 MMOL/L (ref 136–145)
WBC # BLD AUTO: 4.9 K/UL (ref 4–11)

## 2024-06-19 PROCEDURE — 1200000000 HC SEMI PRIVATE

## 2024-06-19 PROCEDURE — 97116 GAIT TRAINING THERAPY: CPT

## 2024-06-19 PROCEDURE — 6370000000 HC RX 637 (ALT 250 FOR IP): Performed by: NURSE PRACTITIONER

## 2024-06-19 PROCEDURE — 80048 BASIC METABOLIC PNL TOTAL CA: CPT

## 2024-06-19 PROCEDURE — 94640 AIRWAY INHALATION TREATMENT: CPT

## 2024-06-19 PROCEDURE — 36415 COLL VENOUS BLD VENIPUNCTURE: CPT

## 2024-06-19 PROCEDURE — 84145 PROCALCITONIN (PCT): CPT

## 2024-06-19 PROCEDURE — 83735 ASSAY OF MAGNESIUM: CPT

## 2024-06-19 PROCEDURE — 71045 X-RAY EXAM CHEST 1 VIEW: CPT

## 2024-06-19 PROCEDURE — 97530 THERAPEUTIC ACTIVITIES: CPT

## 2024-06-19 PROCEDURE — 6360000002 HC RX W HCPCS: Performed by: NURSE PRACTITIONER

## 2024-06-19 PROCEDURE — 85027 COMPLETE CBC AUTOMATED: CPT

## 2024-06-19 PROCEDURE — 2580000003 HC RX 258: Performed by: STUDENT IN AN ORGANIZED HEALTH CARE EDUCATION/TRAINING PROGRAM

## 2024-06-19 PROCEDURE — 99232 SBSQ HOSP IP/OBS MODERATE 35: CPT | Performed by: NURSE PRACTITIONER

## 2024-06-19 PROCEDURE — 83880 ASSAY OF NATRIURETIC PEPTIDE: CPT

## 2024-06-19 PROCEDURE — 6360000002 HC RX W HCPCS: Performed by: INTERNAL MEDICINE

## 2024-06-19 PROCEDURE — 6370000000 HC RX 637 (ALT 250 FOR IP): Performed by: STUDENT IN AN ORGANIZED HEALTH CARE EDUCATION/TRAINING PROGRAM

## 2024-06-19 PROCEDURE — 94761 N-INVAS EAR/PLS OXIMETRY MLT: CPT

## 2024-06-19 RX ORDER — METOLAZONE 2.5 MG/1
2.5 TABLET ORAL DAILY
Status: DISCONTINUED | OUTPATIENT
Start: 2024-06-19 | End: 2024-06-21

## 2024-06-19 RX ADMIN — ALBUTEROL SULFATE 2.5 MG: 2.5 SOLUTION RESPIRATORY (INHALATION) at 13:28

## 2024-06-19 RX ADMIN — EMPAGLIFLOZIN 10 MG: 10 TABLET, FILM COATED ORAL at 10:11

## 2024-06-19 RX ADMIN — FUROSEMIDE 40 MG: 10 INJECTION, SOLUTION INTRAMUSCULAR; INTRAVENOUS at 10:12

## 2024-06-19 RX ADMIN — AMIODARONE HYDROCHLORIDE 200 MG: 200 TABLET ORAL at 10:11

## 2024-06-19 RX ADMIN — ALBUTEROL SULFATE 2.5 MG: 2.5 SOLUTION RESPIRATORY (INHALATION) at 08:10

## 2024-06-19 RX ADMIN — ALBUTEROL SULFATE 2.5 MG: 2.5 SOLUTION RESPIRATORY (INHALATION) at 21:22

## 2024-06-19 RX ADMIN — SPIRONOLACTONE 25 MG: 25 TABLET ORAL at 12:07

## 2024-06-19 RX ADMIN — FUROSEMIDE 40 MG: 10 INJECTION, SOLUTION INTRAMUSCULAR; INTRAVENOUS at 17:55

## 2024-06-19 RX ADMIN — METOLAZONE 2.5 MG: 2.5 TABLET ORAL at 14:35

## 2024-06-19 RX ADMIN — Medication 10 ML: at 10:12

## 2024-06-19 RX ADMIN — ASPIRIN 81 MG 81 MG: 81 TABLET ORAL at 10:11

## 2024-06-19 RX ADMIN — METOPROLOL SUCCINATE 12.5 MG: 25 TABLET, EXTENDED RELEASE ORAL at 21:13

## 2024-06-19 RX ADMIN — APIXABAN 5 MG: 5 TABLET, FILM COATED ORAL at 10:11

## 2024-06-19 RX ADMIN — APIXABAN 5 MG: 5 TABLET, FILM COATED ORAL at 21:13

## 2024-06-19 RX ADMIN — TAMSULOSIN HYDROCHLORIDE 0.4 MG: 0.4 CAPSULE ORAL at 10:11

## 2024-06-19 RX ADMIN — ATORVASTATIN CALCIUM 40 MG: 80 TABLET, FILM COATED ORAL at 21:13

## 2024-06-19 RX ADMIN — Medication 10 ML: at 21:14

## 2024-06-19 ASSESSMENT — PAIN SCALES - GENERAL
PAINLEVEL_OUTOF10: 0
PAINLEVEL_OUTOF10: 0

## 2024-06-19 NOTE — PROGRESS NOTES
Shift assessment completed. Routine vitals obtained. Pt Bp soft. Provider notified. Metoprolol held. Scheduled medications given. Patient is awake, alert and oriented. Respirations are easy and unlabored. Patient does not appear to be in distress, resting comfortably at this time. Call light within reach. No further needs expressed.

## 2024-06-19 NOTE — PROGRESS NOTES
[facility] - Inpatient Rehabilitation Department   Phone: [phone]    Physical Therapy    [] Initial Evaluation            [x] Daily Treatment Note         [] Discharge Summary      Patient: Manoj Feliz   : 1960   MRN: [id number]   Date of Service:  2024  Admitting Diagnosis: Acute on chronic congestive heart failure (HCC)  Current Admission Summary: Manoj Feliz is a 63 y.o. male who presents to the emergency department today for evaluation for concerns of shortness of breath. The patient states that for the past 4 days, he states he has noticed increasing shortness of breath. He reports that the shortness of breath is worse with exertion, as well as lying flat. Patient states that he has a history of CHF, and he states he does have swelling to his legs. He is unsure of any weight gain. The patient has no chest pain or chest tightness.  He states that he is having a cough which is productive of clear sputum. He denies any hemoptysis. He denies any abdominal pain, nausea, vomiting or diarrhea. No urinary symptoms, patient otherwise has no other complaint.   Past Medical History:  has a past medical history of Acute combined systolic and diastolic congestive heart failure (HCC), Atrial fibrillation (HCC), CHF (congestive heart failure) (HCC), Hx of blood clots, and Hypertension.  Past Surgical History:  has a past surgical history that includes Insertable Cardiac Monitor (2019); Cardioversion (2019); Cardiac defibrillator placement (Left); and Prostate surgery (N/A, 3/9/2023).  Discharge Recommendations: Manoj Feliz scored a 22/24 on the AM-PAC short mobility form.  At this time, no further PT is recommended upon discharge as pt is presenting near his baseline level of function.  Recommend patient returns to prior setting with prior services.   DME Required For Discharge: no DME required at discharge  Precautions/Restrictions: medium fall risk  Weight Bearing

## 2024-06-19 NOTE — PLAN OF CARE
Problem: Safety - Adult  Goal: Free from fall injury  6/19/2024 1256 by Juli Ozuna RN  Outcome: Progressing  6/19/2024 0630 by Hari Beltran RN  Outcome: Adequate for Discharge  6/19/2024 0236 by Hari Beltran RN  Outcome: Adequate for Discharge     Problem: ABCDS Injury Assessment  Goal: Absence of physical injury  6/19/2024 1256 by Juli Ozuna RN  Outcome: Progressing  6/19/2024 0236 by Hari Beltran RN  Outcome: Adequate for Discharge     Problem: Chronic Conditions and Co-morbidities  Goal: Patient's chronic conditions and co-morbidity symptoms are monitored and maintained or improved  6/19/2024 1256 by Juli Ozuna RN  Outcome: Progressing  6/19/2024 0236 by Hari Beltran RN  Outcome: Progressing     Problem: Pain  Goal: Verbalizes/displays adequate comfort level or baseline comfort level  6/19/2024 1256 by Juli Ozuna RN  Outcome: Progressing  6/19/2024 0236 by Hrai Beltran RN  Outcome: Adequate for Discharge

## 2024-06-19 NOTE — PROGRESS NOTES
[facility]  HEART FAILURE  Progress Note      Admit Date 6/14/2024     Reason for Consult:      Reason for Consultation/Chief Complaint: SOB    HPI:    Manoj Feliz is a 63 y.o. male with PMH homelessness, NICM, HFrEF, AF, BiV ICD, CKD and urinary retention admitted with SOB.       Subjective:  Patient is being seen for  CHF. There were no acute overnight cardiac events.   Today Mr. Feliz is continues to feel better, less SOB and edema.  He denies chest pain, palpitations, or dizziness. He was taking ACE prior to admit        Baseline Weight: 190   Wt Readings from Last 3 Encounters:   06/19/24 92.6 kg (204 lb 3.2 oz)   05/24/24 89.4 kg (197 lb)   05/22/24 89.8 kg (198 lb)         Cardiac Testing:   ECHO 5/2024  Severe global and regional LV systolic dysfunction.  Estimated EF 10-15%.  Severely enlarged LV chamber size with eccentric remodeling.    Increased LV apical trabeculation.  No evidence for LV thrombus with Definity.  Spontaneous echo-contrast observed.  Indeterminate LV diastolic function.    Severe bi-atrial enlargement.  Moderate-severely reduced RV systolic function.  RV size is enlarged.  Device wire is present.  Mild to moderate aortic valve regurgitation.  Severe mitral valve regurgitation.  Severe tricuspid valve regurgitation.  At least moderate pulmonary hypertension based on TR signal obtained.  IVC not visualized.    Mildly dilated proximal ascending thoracic aorta, 4.1 cm.   Cardiac Cath: Dr Johns 2/8/2022  Anatomy:   LM-nml   LAD-nml  Cx-nml  OM- nml  RCA-nml  RPDA- nml  LVEF- 10%  LVG- global hypokinesis  LVEDP- 10     Hemodynamics:  RA- mean 3  RV- 37/0  PAWP- 10  PA- 34/12 (20)     C.O.- 5.7 (4.9)  C.I.- 2.6 (2.25)  PA sat 60%  AO sat 91%    Device: Medtronic BiV ICD with Optivol     Optivol at Bedside done today: leveling off    NYHA Class III    Objective:   BP 96/63   Pulse 73   Temp 97.8 °F (36.6 °C) (Oral)   Resp 16   Ht 1.88 m (6' 2\")   Wt 92.6 kg (204 lb 3.2

## 2024-06-19 NOTE — PROGRESS NOTES
Hospitalist Progress Note      Name:  Manoj Feliz /Age/Sex: 1960  (63 y.o. male)   MRN & CSN:  [id number] & [id number] Encounter Date/Time: 2024 1:19 PM EDT   Location:  [id number]/5585-01 PCP: No primary care provider on file.     Attending:Cristian Lira MD       Hospital Day: 6    Subjective:   Chief Complaint:   Chief Complaint   Patient presents with    Shortness of Breath     Pt came in from home, pt reports shortness of breath with productive cough for 4 days, pt also reports lower extremity swelling, pt also reports left elbow swelling and pain pt denies injury.     Manoj Feliz is a 63 y.o. male with a past medical history of hypertension, sCHF, EF 10%, Biv-AICD, CKD, atrial fibrillation, homelessness who presented with SOB.  Admitted for acute CHF.      Interval History:  Today, he is up in the chair.  He reports that his SOB is improving.  He does have a productive cough which seems slightly worse that usual.  Swelling is improving in legs gradually.       Independently reviewed interval ancillary notes from cardiology.     Assessment and Recommendations   Problem List  Principal Problem:    Acute on chronic congestive heart failure (HCC)  Active Problems:    HFrEF (heart failure with reduced ejection fraction) (HCC)    Benign essential HTN    NICM (nonischemic cardiomyopathy) (HCC)    Chronic atrial fibrillation (HCC)    ICD (implantable cardioverter-defibrillator) in place    Acute on chronic systolic (congestive) heart failure (HCC)    Acute on chronic systolic CHF (congestive heart failure) (HCC)    Permanent atrial fibrillation (HCC)  Resolved Problems:    * No resolved hospital problems. *     Assessment and Plan:    Acute on chronic sCHF  - Presented with worsening shortness of breath and edema  - CXR with central pulmonary congestion, proBNP   - CONSUELO (3/2023) with LVEF 15-20%, dilated LV with reduced systolic function,severe TR  - Received IV Lasix in ER, IV Bumex  \"BILITOT\", \"ALKPHOS\" in the last 72 hours.    Invalid input(s): \"ALB\"  Lipids:   Lab Results   Component Value Date/Time    CHOL 103 05/22/2024 04:24 PM    HDL 58 05/22/2024 04:24 PM    TRIG 66 05/22/2024 04:24 PM     Hemoglobin A1C:   Lab Results   Component Value Date/Time    LABA1C 5.7 02/18/2023 06:27 AM     TSH:   Lab Results   Component Value Date/Time    TSH 4.33 06/15/2024 05:16 AM     Troponin: No results found for: \"TROPONINT\"  Lactic Acid: No results for input(s): \"LACTA\" in the last 72 hours.  BNP:   Recent Labs     06/18/24  0525   PROBNP 15,796*       UA:  Lab Results   Component Value Date/Time    NITRU Negative 06/15/2024 12:01 AM    COLORU DARK YELLOW 06/15/2024 12:01 AM    PHUR 5.0 06/15/2024 12:01 AM    PHUR 5.0 02/17/2024 05:52 AM    WBCUA 2 06/15/2024 12:01 AM    RBCUA 0 06/15/2024 12:01 AM    MUCUS Present 06/15/2024 12:01 AM    YEAST Present 02/17/2023 06:08 PM    BACTERIA None Seen 06/15/2024 12:01 AM    CLARITYU Clear 06/15/2024 12:01 AM    LEUKOCYTESUR Negative 06/15/2024 12:01 AM    UROBILINOGEN 1.0 06/15/2024 12:01 AM    BILIRUBINUR Negative 06/15/2024 12:01 AM    BLOODU Negative 06/15/2024 12:01 AM    GLUCOSEU 250 06/15/2024 12:01 AM    KETUA TRACE 06/15/2024 12:01 AM     Urine Cultures:   Lab Results   Component Value Date/Time    LABURIN >100,000 CFU/ml 02/17/2023 06:08 PM     Blood Cultures:   Lab Results   Component Value Date/Time    BC No Growth after 4 days of incubation. 04/13/2022 03:18 PM     Lab Results   Component Value Date/Time    BLOODCULT2 No Growth after 4 days of incubation. 04/13/2022 04:26 PM     Organism:   Lab Results   Component Value Date/Time    ORG Klebsiella aerogenes 02/17/2023 06:08 PM       Personally reviewed labs, diagnostic, device, and imaging results reviewed as a part of this visit    Electronically signed by TRENT Aviles CNP on 6/19/2024 at 8:38 AM

## 2024-06-19 NOTE — PLAN OF CARE
Problem: Chronic Conditions and Co-morbidities  Goal: Patient's chronic conditions and co-morbidity symptoms are monitored and maintained or improved  6/19/2024 0236 by Hari Beltran RN  Outcome: Progressing  6/18/2024 1414 by Katie Marlow RN  Outcome: Progressing     Problem: Safety - Adult  Goal: Free from fall injury  6/19/2024 0236 by Hari Beltran RN  Outcome: Adequate for Discharge  6/18/2024 1414 by Katie Marlow RN  Outcome: Progressing     Problem: ABCDS Injury Assessment  Goal: Absence of physical injury  6/19/2024 0236 by Hari Beltran RN  Outcome: Adequate for Discharge  6/18/2024 1414 by Katie Marlow RN  Outcome: Progressing     Problem: Pain  Goal: Verbalizes/displays adequate comfort level or baseline comfort level  6/19/2024 0236 by Hari Beltran RN  Outcome: Adequate for Discharge  6/18/2024 1414 by Katie aMrlow RN  Outcome: Progressing

## 2024-06-20 LAB
ANION GAP SERPL CALCULATED.3IONS-SCNC: 10 MMOL/L (ref 3–16)
BUN SERPL-MCNC: 23 MG/DL (ref 7–20)
CALCIUM SERPL-MCNC: 9 MG/DL (ref 8.3–10.6)
CHLORIDE SERPL-SCNC: 95 MMOL/L (ref 99–110)
CO2 SERPL-SCNC: 33 MMOL/L (ref 21–32)
CREAT SERPL-MCNC: 1.3 MG/DL (ref 0.8–1.3)
GFR SERPLBLD CREATININE-BSD FMLA CKD-EPI: 61 ML/MIN/{1.73_M2}
GLUCOSE SERPL-MCNC: 100 MG/DL (ref 70–99)
POTASSIUM SERPL-SCNC: 4.3 MMOL/L (ref 3.5–5.1)
SODIUM SERPL-SCNC: 138 MMOL/L (ref 136–145)

## 2024-06-20 PROCEDURE — 36415 COLL VENOUS BLD VENIPUNCTURE: CPT

## 2024-06-20 PROCEDURE — 80048 BASIC METABOLIC PNL TOTAL CA: CPT

## 2024-06-20 PROCEDURE — 2580000003 HC RX 258: Performed by: STUDENT IN AN ORGANIZED HEALTH CARE EDUCATION/TRAINING PROGRAM

## 2024-06-20 PROCEDURE — 99232 SBSQ HOSP IP/OBS MODERATE 35: CPT | Performed by: NURSE PRACTITIONER

## 2024-06-20 PROCEDURE — 97110 THERAPEUTIC EXERCISES: CPT

## 2024-06-20 PROCEDURE — 6370000000 HC RX 637 (ALT 250 FOR IP): Performed by: NURSE PRACTITIONER

## 2024-06-20 PROCEDURE — 94640 AIRWAY INHALATION TREATMENT: CPT

## 2024-06-20 PROCEDURE — 97535 SELF CARE MNGMENT TRAINING: CPT

## 2024-06-20 PROCEDURE — 6370000000 HC RX 637 (ALT 250 FOR IP): Performed by: STUDENT IN AN ORGANIZED HEALTH CARE EDUCATION/TRAINING PROGRAM

## 2024-06-20 PROCEDURE — 1200000000 HC SEMI PRIVATE

## 2024-06-20 PROCEDURE — 6360000002 HC RX W HCPCS: Performed by: INTERNAL MEDICINE

## 2024-06-20 PROCEDURE — 94761 N-INVAS EAR/PLS OXIMETRY MLT: CPT

## 2024-06-20 PROCEDURE — 6360000002 HC RX W HCPCS: Performed by: NURSE PRACTITIONER

## 2024-06-20 RX ADMIN — Medication 10 ML: at 19:51

## 2024-06-20 RX ADMIN — AMIODARONE HYDROCHLORIDE 200 MG: 200 TABLET ORAL at 08:22

## 2024-06-20 RX ADMIN — SACUBITRIL AND VALSARTAN 0.5 TABLET: 24; 26 TABLET, FILM COATED ORAL at 17:39

## 2024-06-20 RX ADMIN — TAMSULOSIN HYDROCHLORIDE 0.4 MG: 0.4 CAPSULE ORAL at 08:22

## 2024-06-20 RX ADMIN — ATORVASTATIN CALCIUM 40 MG: 80 TABLET, FILM COATED ORAL at 19:51

## 2024-06-20 RX ADMIN — ALBUTEROL SULFATE 2.5 MG: 2.5 SOLUTION RESPIRATORY (INHALATION) at 21:11

## 2024-06-20 RX ADMIN — FUROSEMIDE 40 MG: 10 INJECTION, SOLUTION INTRAMUSCULAR; INTRAVENOUS at 17:39

## 2024-06-20 RX ADMIN — FUROSEMIDE 40 MG: 10 INJECTION, SOLUTION INTRAMUSCULAR; INTRAVENOUS at 08:24

## 2024-06-20 RX ADMIN — Medication 10 ML: at 08:24

## 2024-06-20 RX ADMIN — METOLAZONE 2.5 MG: 2.5 TABLET ORAL at 08:21

## 2024-06-20 RX ADMIN — ALBUTEROL SULFATE 2.5 MG: 2.5 SOLUTION RESPIRATORY (INHALATION) at 09:11

## 2024-06-20 RX ADMIN — APIXABAN 5 MG: 5 TABLET, FILM COATED ORAL at 08:21

## 2024-06-20 RX ADMIN — ASPIRIN 81 MG 81 MG: 81 TABLET ORAL at 08:21

## 2024-06-20 RX ADMIN — APIXABAN 5 MG: 5 TABLET, FILM COATED ORAL at 19:51

## 2024-06-20 RX ADMIN — METOPROLOL SUCCINATE 12.5 MG: 25 TABLET, EXTENDED RELEASE ORAL at 08:22

## 2024-06-20 RX ADMIN — EMPAGLIFLOZIN 10 MG: 10 TABLET, FILM COATED ORAL at 08:21

## 2024-06-20 NOTE — PROGRESS NOTES
Morning assessment and medications complete, pt cooperated and tolerated well. Medications given per MAR. VS stable. A&O X4. Pt denies pain at this time. Patient clean and dry, ambulates independently in room. Tele  monitor on. Breakfast tray set up. Bed side table, call light and belongings within reach. Chair locked. All questions and concerns addressed, no further needs at this time.

## 2024-06-20 NOTE — PLAN OF CARE
Problem: Chronic Conditions and Co-morbidities  Goal: Patient's chronic conditions and co-morbidity symptoms are monitored and maintained or improved  Outcome: Progressing     Problem: Safety - Adult  Goal: Free from fall injury  Outcome: Adequate for Discharge     Problem: ABCDS Injury Assessment  Goal: Absence of physical injury  Outcome: Adequate for Discharge     Problem: Pain  Goal: Verbalizes/displays adequate comfort level or baseline comfort level  Outcome: Adequate for Discharge

## 2024-06-20 NOTE — FLOWSHEET NOTE
06/20/24 1230   Vital Signs   Temp 97.9 °F (36.6 °C)   Temp Source Oral   Pulse 79   Heart Rate Source Monitor   Respirations 18   BP (!) 77/45   MAP (Calculated) 56   BP Location Left upper arm   BP Method Automatic   Patient Position Semi fowlers   Pain Assessment   Pain Assessment None - Denies Pain     NP aware and at bedside. 12:00pm Spironolactone not given.

## 2024-06-20 NOTE — FLOWSHEET NOTE
06/20/24 1730   Vital Signs   Temp 98 °F (36.7 °C)   Temp Source Oral   Pulse 78   Heart Rate Source Monitor   Respirations 16   BP (!) 91/46   MAP (Calculated) 61   BP Location Left upper arm   BP Method Automatic   Patient Position Up in chair   Pain Assessment   Pain Assessment None - Denies Pain   Care Plan - Pain Goals   Verbalizes/displays adequate comfort level or baseline comfort level Encourage patient to monitor pain and request assistance   Opioid-Induced Sedation   POSS Score 1   Oxygen Therapy   SpO2 97 %   Pulse Oximeter Device Mode Intermittent   Pulse Oximeter Device Location Finger   O2 Device None (Room air)     Scheduled Lasix given per NP.

## 2024-06-20 NOTE — PLAN OF CARE
Problem: Safety - Adult  Goal: Free from fall injury  6/20/2024 0806 by Gricel Marrufo RN  Outcome: Progressing  6/20/2024 0455 by Hari Beltran RN  Outcome: Adequate for Discharge     Problem: ABCDS Injury Assessment  Goal: Absence of physical injury  6/20/2024 0806 by Gricel Marrufo RN  Outcome: Progressing  Flowsheets (Taken 6/20/2024 0804)  Absence of Physical Injury: Implement safety measures based on patient assessment  6/20/2024 0455 by Hari Beltran RN  Outcome: Adequate for Discharge     Problem: Chronic Conditions and Co-morbidities  Goal: Patient's chronic conditions and co-morbidity symptoms are monitored and maintained or improved  6/20/2024 0806 by Gricel Marrufo RN  Outcome: Progressing  6/20/2024 0455 by Hari Beltran RN  Outcome: Progressing

## 2024-06-20 NOTE — PROGRESS NOTES
[facility] - Inpatient Rehabilitation Department   Phone: [phone]    Occupational Therapy    [] Initial Evaluation            [x] Daily Treatment Note         [] Discharge Summary      Patient: Manoj Feliz   : 1960   MRN: [id number]   Date of Service:  2024    Admitting Diagnosis:  Acute on chronic congestive heart failure (HCC)  Current Admission Summary:  Manoj Feliz is a 63 y.o. male who presents to the emergency department today for evaluation for concerns of shortness of breath. The patient states that for the past 4 days, he states he has noticed increasing shortness of breath. He reports that the shortness of breath is worse with exertion, as well as lying flat. Patient states that he has a history of CHF, and he states he does have swelling to his legs. He is unsure of any weight gain. The patient has no chest pain or chest tightness.  He states that he is having a cough which is productive of clear sputum. He denies any hemoptysis. He denies any abdominal pain, nausea, vomiting or diarrhea. No urinary symptoms, patient otherwise has no other complaint.   Past Medical History:  has a past medical history of Acute combined systolic and diastolic congestive heart failure (HCC), Atrial fibrillation (HCC), CHF (congestive heart failure) (HCC), Hx of blood clots, and Hypertension.  Past Surgical History:  has a past surgical history that includes Insertable Cardiac Monitor (2019); Cardioversion (2019); Cardiac defibrillator placement (Left); and Prostate surgery (N/A, 3/9/2023).    Discharge Recommendations: Manoj Feliz scored a 24/24 on the AM-PAC ADL Inpatient form.  At this time, no further OT is recommended upon discharge due to baseline.  Recommend patient returns to prior setting with prior services.     DME Required For Discharge: grab bars in shower/by toilet     Precautions/Restrictions: medium fall risk  Weight Bearing Restrictions: no  good  Clinical Assessment: Pt is at baseline, completing toileting/shower level ADL/dressing/grooming all at Independent. D/C from POC.    Safety Interventions: patient left in chair, call light within reach, nurse notified, and patient up ad erendira     Plan  Frequency: 3-5 x/per week  Current Treatment Recommendations: strengthening, balance training, functional mobility training, transfer training, patient/caregiver education, ADL/self-care training, and IADL training    Goals  Patient Goals: to return home     Short Term Goals:  Time Frame: discharge   Patient will complete upper body ADL at Independent   Patient will complete lower body ADL at modified independent   Patient will complete toileting at Independent   Patient will complete grooming at Independent   Patient will complete functional transfers at Independent   Patient will complete functional mobility at Independent     All Goals Met 6/20       Therapy Session Time     Individual Group Co-treatment   Time In 1034      Time Out 1142      Minutes 68         Timed Code Treatment Minutes: 68 Minutes  Total Treatment Minutes:  68 Minutes       Electronically Signed By: MARIA Olguin/LINDA [id number]

## 2024-06-20 NOTE — PROGRESS NOTES
Physician Progress Note      PATIENT:               DOROTHY ABDULLAHI  CSN #:                  [id number]  :                       1960  ADMIT DATE:       2024 10:23 PM  DISCH DATE:  RESPONDING  PROVIDER #:        TAMI Briones CNP          QUERY TEXT:    Patient admitted with CHF, noted to have Atrial fibrillation and is maintained   on apixaban. If possible, please document in progress notes and discharge   summary if you are evaluating and/or treating any of the following:?  ?  The medical record reflects the following:  Risk Factors: Age, Hx- CHF, AF, HTN, NICM s/p BiV ICD, CKD  Clinical Indicators: Per H&P on 6/15/24- Atrial fibrillation  Treatment: Continue home amiodarone and Eliquis    Thank You,  Gena Ruiz RN BSN CDS CRCR  [email or web address]  Options provided:  -- Secondary hypercoagulable state in a patient with atrial fibrillation  -- Other - I will add my own diagnosis  -- Disagree - Not applicable / Not valid  -- Disagree - Clinically unable to determine / Unknown  -- Refer to Clinical Documentation Reviewer    PROVIDER RESPONSE TEXT:    This patient has secondary hypercoagulable state in a patient with atrial   fibrillation.    Query created by: Gena Ruiz on 2024 1:26 PM      Electronically signed by:  TAMI Briones CNP 2024 1:32 PM

## 2024-06-20 NOTE — PROGRESS NOTES
[facility]  HEART FAILURE  Progress Note      Admit Date 6/14/2024     Reason for Consult:      Reason for Consultation/Chief Complaint: SOB    HPI:    Manoj Feliz is a 63 y.o. male with PMH homelessness, NICM, HFrEF, AF, BiV ICD, CKD and urinary retention admitted with SOB.       Subjective:  Patient is being seen for  CHF. There were no acute overnight cardiac events.   Today Mr. Feliz is getting a resp treatment and continues to feel better. He is less SOB and edema is improving. Standing wt today is 199.  He denies chest pain, palpitations, or dizziness.        Baseline Weight: 190   Wt Readings from Last 3 Encounters:   06/20/24 92.5 kg (204 lb)   05/24/24 89.4 kg (197 lb)   05/22/24 89.8 kg (198 lb)         Cardiac Testing:   ECHO 5/2024  Severe global and regional LV systolic dysfunction.  Estimated EF 10-15%.  Severely enlarged LV chamber size with eccentric remodeling.    Increased LV apical trabeculation.  No evidence for LV thrombus with Definity.  Spontaneous echo-contrast observed.  Indeterminate LV diastolic function.    Severe bi-atrial enlargement.  Moderate-severely reduced RV systolic function.  RV size is enlarged.  Device wire is present.  Mild to moderate aortic valve regurgitation.  Severe mitral valve regurgitation.  Severe tricuspid valve regurgitation.  At least moderate pulmonary hypertension based on TR signal obtained.  IVC not visualized.    Mildly dilated proximal ascending thoracic aorta, 4.1 cm.   Cardiac Cath: Dr Johns 2/8/2022  Anatomy:   LM-nml   LAD-nml  Cx-nml  OM- nml  RCA-nml  RPDA- nml  LVEF- 10%  LVG- global hypokinesis  LVEDP- 10     Hemodynamics:  RA- mean 3  RV- 37/0  PAWP- 10  PA- 34/12 (20)     C.O.- 5.7 (4.9)  C.I.- 2.6 (2.25)  PA sat 60%  AO sat 91%    Device: Medtronic BiV ICD with Optivol     Optivol at Bedside done 6/19/24: leveling off    NYHA Class III    Objective:   /76   Pulse 78   Temp 97.9 °F (36.6 °C) (Oral)   Resp 18   Ht

## 2024-06-20 NOTE — PROGRESS NOTES
Hospitalist Progress Note      Name:  Manoj Feliz /Age/Sex: 1960  (63 y.o. male)   MRN & CSN:  [id number] & [id number] Encounter Date/Time: 2024 1:19 PM EDT   Location:  [id number]/5585-01 PCP: No primary care provider on file.     Attending:Cristian Lira MD       Hospital Day: 7    Subjective:   Chief Complaint:   Chief Complaint   Patient presents with    Shortness of Breath     Pt came in from home, pt reports shortness of breath with productive cough for 4 days, pt also reports lower extremity swelling, pt also reports left elbow swelling and pain pt denies injury.     Manoj Feliz is a 63 y.o. male with a past medical history of hypertension, sCHF, EF 10%, Biv-AICD, CKD, atrial fibrillation, homelessness who presented with SOB.  Admitted for acute CHF.      Interval History:  Today, he is up in the chair.  He is feeling better today. Cough is improved.  Swelling gradually improved.  He slept well last night, breathing seems near his baseline.      Independently reviewed interval ancillary notes from cardiology.     Assessment and Recommendations   Problem List  Principal Problem:    Acute on chronic congestive heart failure (HCC)  Active Problems:    HFrEF (heart failure with reduced ejection fraction) (HCC)    Benign essential HTN    NICM (nonischemic cardiomyopathy) (HCC)    Chronic atrial fibrillation (HCC)    ICD (implantable cardioverter-defibrillator) in place    Acute on chronic systolic (congestive) heart failure (HCC)    Acute on chronic systolic CHF (congestive heart failure) (HCC)    Permanent atrial fibrillation (HCC)  Resolved Problems:    * No resolved hospital problems. *     Assessment and Plan:    Acute on chronic sCHF  - Presented with worsening shortness of breath and edema  - CXR with central pulmonary congestion, proBNP   - CONSUELO (3/2023) with LVEF 15-20%, dilated LV with reduced systolic function,severe TR  - Received IV Lasix in ER, IV Bumex BID ordered

## 2024-06-21 VITALS
TEMPERATURE: 97.6 F | HEART RATE: 86 BPM | OXYGEN SATURATION: 96 % | BODY MASS INDEX: 24.56 KG/M2 | HEIGHT: 74 IN | RESPIRATION RATE: 18 BRPM | WEIGHT: 191.4 LBS | DIASTOLIC BLOOD PRESSURE: 81 MMHG | SYSTOLIC BLOOD PRESSURE: 107 MMHG

## 2024-06-21 LAB
ANION GAP SERPL CALCULATED.3IONS-SCNC: 12 MMOL/L (ref 3–16)
BUN SERPL-MCNC: 28 MG/DL (ref 7–20)
CALCIUM SERPL-MCNC: 8.9 MG/DL (ref 8.3–10.6)
CHLORIDE SERPL-SCNC: 96 MMOL/L (ref 99–110)
CO2 SERPL-SCNC: 30 MMOL/L (ref 21–32)
CREAT SERPL-MCNC: 1.4 MG/DL (ref 0.8–1.3)
GFR SERPLBLD CREATININE-BSD FMLA CKD-EPI: 56 ML/MIN/{1.73_M2}
GLUCOSE SERPL-MCNC: 93 MG/DL (ref 70–99)
MAGNESIUM SERPL-MCNC: 2.4 MG/DL (ref 1.8–2.4)
NT-PROBNP SERPL-MCNC: ABNORMAL PG/ML (ref 0–124)
POTASSIUM SERPL-SCNC: 4.5 MMOL/L (ref 3.5–5.1)
SODIUM SERPL-SCNC: 138 MMOL/L (ref 136–145)

## 2024-06-21 PROCEDURE — 97530 THERAPEUTIC ACTIVITIES: CPT

## 2024-06-21 PROCEDURE — 94640 AIRWAY INHALATION TREATMENT: CPT

## 2024-06-21 PROCEDURE — 36415 COLL VENOUS BLD VENIPUNCTURE: CPT

## 2024-06-21 PROCEDURE — 6370000000 HC RX 637 (ALT 250 FOR IP): Performed by: STUDENT IN AN ORGANIZED HEALTH CARE EDUCATION/TRAINING PROGRAM

## 2024-06-21 PROCEDURE — 83880 ASSAY OF NATRIURETIC PEPTIDE: CPT

## 2024-06-21 PROCEDURE — 94761 N-INVAS EAR/PLS OXIMETRY MLT: CPT

## 2024-06-21 PROCEDURE — 2580000003 HC RX 258: Performed by: STUDENT IN AN ORGANIZED HEALTH CARE EDUCATION/TRAINING PROGRAM

## 2024-06-21 PROCEDURE — 99232 SBSQ HOSP IP/OBS MODERATE 35: CPT | Performed by: NURSE PRACTITIONER

## 2024-06-21 PROCEDURE — 83735 ASSAY OF MAGNESIUM: CPT

## 2024-06-21 PROCEDURE — 6360000002 HC RX W HCPCS: Performed by: INTERNAL MEDICINE

## 2024-06-21 PROCEDURE — 80048 BASIC METABOLIC PNL TOTAL CA: CPT

## 2024-06-21 RX ORDER — FINASTERIDE 5 MG/1
5 TABLET, FILM COATED ORAL DAILY
Qty: 30 TABLET | Refills: 0 | Status: SHIPPED | OUTPATIENT
Start: 2024-06-21

## 2024-06-21 RX ORDER — ALBUTEROL SULFATE 90 UG/1
2 AEROSOL, METERED RESPIRATORY (INHALATION) EVERY 6 HOURS PRN
Qty: 54 G | Refills: 1 | Status: SHIPPED | OUTPATIENT
Start: 2024-06-21

## 2024-06-21 RX ORDER — TORSEMIDE 20 MG/1
20 TABLET ORAL 2 TIMES DAILY
Qty: 60 TABLET | Refills: 0 | Status: SHIPPED | OUTPATIENT
Start: 2024-06-21

## 2024-06-21 RX ORDER — PANTOPRAZOLE SODIUM 40 MG/1
40 TABLET, DELAYED RELEASE ORAL DAILY
Qty: 30 TABLET | Refills: 0 | Status: SHIPPED | OUTPATIENT
Start: 2024-06-21

## 2024-06-21 RX ADMIN — AMIODARONE HYDROCHLORIDE 200 MG: 200 TABLET ORAL at 11:20

## 2024-06-21 RX ADMIN — ASPIRIN 81 MG 81 MG: 81 TABLET ORAL at 11:20

## 2024-06-21 RX ADMIN — Medication 10 ML: at 11:22

## 2024-06-21 RX ADMIN — APIXABAN 5 MG: 5 TABLET, FILM COATED ORAL at 11:20

## 2024-06-21 RX ADMIN — SPIRONOLACTONE 25 MG: 25 TABLET ORAL at 11:20

## 2024-06-21 RX ADMIN — ALBUTEROL SULFATE 2.5 MG: 2.5 SOLUTION RESPIRATORY (INHALATION) at 12:57

## 2024-06-21 RX ADMIN — TAMSULOSIN HYDROCHLORIDE 0.4 MG: 0.4 CAPSULE ORAL at 11:20

## 2024-06-21 RX ADMIN — EMPAGLIFLOZIN 10 MG: 10 TABLET, FILM COATED ORAL at 11:20

## 2024-06-21 NOTE — PROGRESS NOTES
Nutrition Note    RECOMMENDATIONS  PO Diet: Continue current diet    NUTRITION ASSESSMENT   Pt triggered for LOS assessment. On 2 gm Na+, 1800 mL fluid restricted diet with documented meal intakes of % throughout admission. Upon visiting, reported appetite and po intake has been good. No wt loss prior to current admission per wt hx in EMR but down 14 lb from admission; -10.1L per I/Os and receiving diuretics. Deemed to be at low nutritional risk at this time. RD will continue to monitor should pt's status change.     Nutrition Related Findings: LBM 6/17. Edema: non-pitting LUE, +1 pitting BLE.  Wounds: None  Nutrition Education:  Education completed (CHF diet education 6/18)   Nutrition Goals: PO intake 75% or greater, prior to discharge     MALNUTRITION ASSESSMENT   Malnutrition Status: No malnutrition    NUTRITION DIAGNOSIS   No nutrition diagnosis at this time     CURRENT NUTRITION THERAPIES  ADULT DIET; Regular; Low Sodium (2 gm); 1800 ml     PO Intake: %   PO Supplement Intake:None Ordered    ANTHROPOMETRICS  Current Height: 188 cm (6' 2\")  Current Weight - Scale: 86.8 kg (191 lb 6.4 oz)    Admission weight: 93 kg (205 lb 0.4 oz)  Ideal Body Weight (IBW): 190 lbs  (86 kg)        BMI: 24.6    The patient will be monitored per nutrition standards of care. Consult dietitian if additional nutrition interventions are needed prior to RD reassessment.     Mayra Hicks MS, RD, LD    Contact: [phone]

## 2024-06-21 NOTE — PROGRESS NOTES
Physician Progress Note      PATIENT:               DOROTHY ABDULLAHI  CSN #:                  [id number]  :                       1960  ADMIT DATE:       2024 10:23 PM  DISCH DATE:  RESPONDING  PROVIDER #:        TAMI Briones CNP          QUERY TEXT:    Patient admitted with CHF.   Noted documentation of Chronic atrial   fibrillation in Cardiology consult on 6/15/24 and Permanent atrial   fibrillation in Cardiology pn on 24 and Persistent atrial fibrillation in   attending pn on 24. If possible, please document in progress notes and   discharge summary if you are evaluating and /or treating any of the following:      The medical record reflects the following:  Risk Factors: Age, Hx- CHF, AF, HTN, NICM s/p BiV ICD, CKD  Clinical Indicators: Chronic atrial fibrillation in Cardiology consult on   6/15/24 and Permanent atrial fibrillation in Cardiology pn on 24 and   Persistent atrial fibrillation in attending pn on 24.  Treatment: Telemetry, Continue home Amiodarone, Metoprolol and Eliquis      Thank You,  Gena Ruiz RN BSN CDS CRCR  [email or web address]  Options provided:  -- Persistent Atrial Fibrillation confirmed and Chronic and Permanent atrial   fibrillation ruled out  -- Chronic Atrial Fibrillation confirmed and Permanent and Persistent atrial   fibrillation ruled out.  -- Permanent Atrial Fibrillation confirmed and Chronic and Persistent atrial   fibrillation ruled out.  -- Other - I will add my own diagnosis  -- Disagree - Not applicable / Not valid  -- Disagree - Clinically unable to determine / Unknown  -- Refer to Clinical Documentation Reviewer    PROVIDER RESPONSE TEXT:    Permanent Atrial Fibrillation confirmed and Chronic and Persistent atrial   fibrillation ruled out.    Query created by: Gena Ruiz on 2024 1:46 PM      QUERY TEXT:    Pt admitted with CHF. Pt noted to have elevated troponin. If possible, please   document in the

## 2024-06-21 NOTE — PROGRESS NOTES
CLINICAL PHARMACY NOTE: MEDS TO BEDS    Total # of Prescriptions Filled: 3   The following medications were delivered to the patient:  Pantoprazole 40mg  Finasteride 5mg  Albuterol inhaler    Additional Documentation:    JACLYN Monsalve approved medication delivery    Medications were delivered to patient's room and patient signed for    Ana Dalton CPhT

## 2024-06-21 NOTE — PLAN OF CARE
Problem: Safety - Adult  Goal: Free from fall injury  6/21/2024 1514 by Gricel Marrufo RN  Outcome: Adequate for Discharge  6/21/2024 0819 by Gricel Marrufo RN  Outcome: Progressing     Problem: ABCDS Injury Assessment  Goal: Absence of physical injury  6/21/2024 1514 by Gricel Marrufo RN  Outcome: Adequate for Discharge  6/21/2024 0819 by Gricel Marrufo RN  Outcome: Progressing  Flowsheets (Taken 6/21/2024 0819)  Absence of Physical Injury: Implement safety measures based on patient assessment     Problem: Chronic Conditions and Co-morbidities  Goal: Patient's chronic conditions and co-morbidity symptoms are monitored and maintained or improved  6/21/2024 1514 by Gricel Marrufo RN  Outcome: Adequate for Discharge  Flowsheets (Taken 6/21/2024 1000)  Care Plan - Patient's Chronic Conditions and Co-Morbidity Symptoms are Monitored and Maintained or Improved: Monitor and assess patient's chronic conditions and comorbid symptoms for stability, deterioration, or improvement  6/21/2024 0819 by Gricel Marrufo RN  Outcome: Progressing     Problem: Pain  Goal: Verbalizes/displays adequate comfort level or baseline comfort level  6/21/2024 1514 by Gricel Marrufo RN  Outcome: Adequate for Discharge  Flowsheets  Taken 6/21/2024 1400  Verbalizes/displays adequate comfort level or baseline comfort level: Encourage patient to monitor pain and request assistance  Taken 6/21/2024 1000  Verbalizes/displays adequate comfort level or baseline comfort level: Encourage patient to monitor pain and request assistance  6/21/2024 0819 by Gricel Marrufo RN  Outcome: Progressing

## 2024-06-21 NOTE — DISCHARGE SUMMARY
[facility]    [facility] Demographics    Patient. Manoj Feliz  Date of Birth. 1960  MRN. [id number]     Primary care provider. No primary care provider on file.  (Tel: None)    Admit date: 6/14/2024    Discharge date (blank if same as Note Date):   Note Date: 6/21/2024     Reason for Hospitalization.   Chief Complaint   Patient presents with    Shortness of Breath     Pt came in from home, pt reports shortness of breath with productive cough for 4 days, pt also reports lower extremity swelling, pt also reports left elbow swelling and pain pt denies injury.       Significant Findings.   Principal Problem:    Acute on chronic congestive heart failure (HCC)  Active Problems:    HFrEF (heart failure with reduced ejection fraction) (HCC)    Benign essential HTN    NICM (nonischemic cardiomyopathy) (HCC)    Chronic atrial fibrillation (HCC)    ICD (implantable cardioverter-defibrillator) in place    Acute on chronic systolic (congestive) heart failure (HCC)    Acute on chronic systolic CHF (congestive heart failure) (HCC)    Permanent atrial fibrillation (HCC)  Resolved Problems:    * No resolved hospital problems. *     Problem-based Hospital Course.  Manoj Feliz is a 63 y.o. male with a past medical history of hypertension, sCHF, EF 10%, Biv-AICD, CKD, atrial fibrillation, homelessness who presented with SOB.  Admitted for acute CHF. Diuresed with IV lasix and metolazone.  Palliative care evaluated and offered palliative care.  At discharge he declined both palliative care and [facility]. He did have wheezes that resolved with diuresis.  Procal was normal and vascular congestion was improved on repeat CXR. He was ambulating in room feeling at baseline.  Declined need for other medication refills at discharge.  Medications were sent to OP pharmacy for delivery to room.       He is living with friends right now, but does not stay at one residence.  He was offered

## 2024-06-21 NOTE — PROGRESS NOTES
[facility]  HEART FAILURE  Progress Note      Admit Date 6/14/2024     Reason for Consult:      Reason for Consultation/Chief Complaint: SOB    HPI:    Manoj Feliz is a 63 y.o. male with PMH homelessness, NICM, HFrEF, AF, BiV ICD, CKD and urinary retention admitted with SOB.       Subjective:  Patient is being seen for  CHF. Pt was hypotensive last night, improved today  Today Mr. Feliz is sitting in the chair and feels great!.  He denies chest pain, SOB, palpitations, or dizziness.        Baseline Weight: 190   Wt Readings from Last 3 Encounters:   06/21/24 86.8 kg (191 lb 6.4 oz)   05/24/24 89.4 kg (197 lb)   05/22/24 89.8 kg (198 lb)         Cardiac Testing:   ECHO 5/2024  Severe global and regional LV systolic dysfunction.  Estimated EF 10-15%.  Severely enlarged LV chamber size with eccentric remodeling.    Increased LV apical trabeculation.  No evidence for LV thrombus with Definity.  Spontaneous echo-contrast observed.  Indeterminate LV diastolic function.    Severe bi-atrial enlargement.  Moderate-severely reduced RV systolic function.  RV size is enlarged.  Device wire is present.  Mild to moderate aortic valve regurgitation.  Severe mitral valve regurgitation.  Severe tricuspid valve regurgitation.  At least moderate pulmonary hypertension based on TR signal obtained.  IVC not visualized.    Mildly dilated proximal ascending thoracic aorta, 4.1 cm.   Cardiac Cath: Dr Johns 2/8/2022  Anatomy:   LM-nml   LAD-nml  Cx-nml  OM- nml  RCA-nml  RPDA- nml  LVEF- 10%  LVG- global hypokinesis  LVEDP- 10     Hemodynamics:  RA- mean 3  RV- 37/0  PAWP- 10  PA- 34/12 (20)     C.O.- 5.7 (4.9)  C.I.- 2.6 (2.25)  PA sat 60%  AO sat 91%    Device: Medtronic BiV ICD with Optivol     Optivol at Bedside done 6/19/24: leveling off    NYHA Class III    Objective:   BP 91/75   Pulse 75   Temp 97.7 °F (36.5 °C) (Oral)   Resp 18   Ht 1.88 m (6' 2\")   Wt 86.8 kg (191 lb 6.4 oz)   SpO2 94%   BMI 24.57  >15k>11k, 10L out  ~Plan:stop IV lasix, resume po torsemide, continue cathy, and jardiance, pt ok for discharge, meds sent to pharmacy for meds to beds. Pt has HF f/u scheduled  Cardiomyopathy: NICM  ~Echo:  EF: 10-15%   Core measures for HF:  ACEi/ARB/ARNI: lisinopril 2.5mg/day prior to admit, pt got one dose of entresto and was hypotensive, will d/c and resume ACE since he did not tolerate entresto  BB: Metoprolol succinate   Damian: Spironolactone      SGLT2i: Jardiance     ~Device: Medtronic BiV ICD with optivol  ~Plan: last OV 5/24 discussed that he is not a transplant candidate and he is not interested in pursuing this  HTN:   ~BP soft with entresto, resume ACE  Atrial Fib   ~status-100% AT/AF burden per device   Patient is not a candidate for any invasive EP procedures.  No further attempt at maintaining sinus rhythm will be made in the future.   ~Plan: continue rate control with BB and AC                              All questions and concerns were addressed to the patient. Alternatives to my treatment were discussed. I have discussed the above stated plan with patient and the nurse. The patient verbalized understanding and agreed with the plan.    I appreciate the opportunity of cooperating in the care of this individual.    TRENT MCKINNEY - CNP, ACNP, AGPCNP 6/21/2024, 8:42 AM  Heart Failure  [facility] [place] [address]  Ph: [phone]

## 2024-06-21 NOTE — RT PROTOCOL NOTE
RT Inhaler-Nebulizer Bronchodilator Protocol Note    There is a bronchodilator order in the chart from a provider indicating to follow the RT Bronchodilator Protocol and there is an “Initiate RT Inhaler-Nebulizer Bronchodilator Protocol” order as well (see protocol at bottom of note).    CXR Findings:  XR CHEST PORTABLE    Result Date: 6/19/2024  No radiographic evidence of acute pulmonary disease. Prominent cardiomegaly; cannot exclude underlying component pericardial effusion.       The findings from the last RT Protocol Assessment were as follows:   History Pulmonary Disease: None or smoker <15 pack years  Respiratory Pattern: Dyspnea on exertion or RR 21-25 bpm  Breath Sounds: Slightly diminished and/or crackles  Cough: Strong, productive  Indication for Bronchodilator Therapy:    Bronchodilator Assessment Score: 5    Aerosolized bronchodilator medication orders have been revised according to the RT Inhaler-Nebulizer Bronchodilator Protocol below.    Respiratory Therapist to perform RT Therapy Protocol Assessment initially then follow the protocol.  Repeat RT Therapy Protocol Assessment PRN for score 0-3 or on second treatment, BID, and PRN for scores above 3.    No Indications - adjust the frequency to every 6 hours PRN wheezing or bronchospasm, if no treatments needed after 48 hours then discontinue using Per Protocol order mode.     If indication present, adjust the RT bronchodilator orders based on the Bronchodilator Assessment Score as indicated below.  Use Inhaler orders unless patient has one or more of the following: on home nebulizer, not able to hold breath for 10 seconds, is not alert and oriented, cannot activate and use MDI correctly, or respiratory rate 25 breaths per minute or more, then use the equivalent nebulizer order(s) with same Frequency and PRN reasons based on the score.  If a patient is on this medication at home then do not decrease Frequency below that used at home.    0-3 - enter or  revise RT bronchodilator order(s) to equivalent RT Bronchodilator order with Frequency of every 4 hours PRN for wheezing or increased work of breathing using Per Protocol order mode.        4-6 - enter or revise RT Bronchodilator order(s) to two equivalent RT bronchodilator orders with one order with BID Frequency and one order with Frequency of every 4 hours PRN wheezing or increased work of breathing using Per Protocol order mode.        7-10 - enter or revise RT Bronchodilator order(s) to two equivalent RT bronchodilator orders with one order with TID Frequency and one order with Frequency of every 4 hours PRN wheezing or increased work of breathing using Per Protocol order mode.       11-13 - enter or revise RT Bronchodilator order(s) to one equivalent RT bronchodilator order with QID Frequency and an Albuterol order with Frequency of every 4 hours PRN wheezing or increased work of breathing using Per Protocol order mode.      Greater than 13 - enter or revise RT Bronchodilator order(s) to one equivalent RT bronchodilator order with every 4 hours Frequency and an Albuterol order with Frequency of every 2 hours PRN wheezing or increased work of breathing using Per Protocol order mode.     RT to enter RT Home Evaluation for COPD & MDI Assessment order using Per Protocol order mode.    Electronically signed by Felicita Nichols RCP on 6/21/2024 at 5:05 AM

## 2024-06-21 NOTE — PROGRESS NOTES
CLINICAL PHARMACY NOTE: MEDS TO BEDS    Total # of Prescriptions Filled: 1   The following medications were delivered to the patient:  TORSEMIDE 20MG TABS    Additional Documentation: Gricel ANAYA approved to deliver medications to patient room=signed  [facility] Tech

## 2024-06-21 NOTE — PROGRESS NOTES
[facility] - Inpatient Rehabilitation Department   Phone: [phone]    Physical Therapy    [] Initial Evaluation            [x] Daily Treatment Note         [x] Discharge Summary      Patient: Manoj Feliz   : 1960   MRN: [id number]   Date of Service:  2024  Admitting Diagnosis: Acute on chronic congestive heart failure (HCC)  Current Admission Summary: Manoj Feliz is a 63 y.o. male who presents to the emergency department today for evaluation for concerns of shortness of breath. The patient states that for the past 4 days, he states he has noticed increasing shortness of breath. He reports that the shortness of breath is worse with exertion, as well as lying flat. Patient states that he has a history of CHF, and he states he does have swelling to his legs. He is unsure of any weight gain. The patient has no chest pain or chest tightness.  He states that he is having a cough which is productive of clear sputum. He denies any hemoptysis. He denies any abdominal pain, nausea, vomiting or diarrhea. No urinary symptoms, patient otherwise has no other complaint.   Past Medical History:  has a past medical history of Acute combined systolic and diastolic congestive heart failure (HCC), Atrial fibrillation (HCC), CHF (congestive heart failure) (HCC), Hx of blood clots, and Hypertension.  Past Surgical History:  has a past surgical history that includes Insertable Cardiac Monitor (2019); Cardioversion (2019); Cardiac defibrillator placement (Left); and Prostate surgery (N/A, 3/9/2023).  Discharge Recommendations: Manoj Feliz scored a 22/24 on the AM-PAC short mobility form.  At this time, no further PT is recommended upon discharge as pt is presenting near his baseline level of function.  Recommend patient returns to prior setting with prior services.   DME Required For Discharge: no DME required at discharge  Precautions/Restrictions: medium fall risk  Weight  unsupported position  Dynamic Sitting Balance: good: independent with functional balance in unsupported position  Static Standing Balance: good: independent with functional balance in unsupported position  Dynamic Standing Balance: fair (+): maintains balance at SBA/supervision without use of UE support  Comments:     Other Therapeutic Interventions  Pt reporting concerns about L knee not bending at much or easily as his R knee. Increased education explaining joint mechanics and effects of swelling, possible arthritis, and other factors that may be contributing to decreased ROM. Educated on ways to stretch his knee to try to improve his ROM.  Pt also concerned about the slight external rotation of his RLE when ambulation. Again explaining reasons for possible change in LE positioning due to changes in his joints/swelling, etc.      Functional Outcomes  AM-PAC Inpatient Mobility Raw Score : 22              Cognition  WFL  Orientation:    alert and oriented x 4  Command Following:   WFL    Education  Barriers To Learning: none  Patient Education: patient educated on goals, PT role and benefits, plan of care, general safety, functional mobility training, energy conservation, disease specific education, discharge recommendations, PLB  Learning Assessment: patient verbalizes and demonstrates understanding    Assessment  Activity Tolerance: LANDEROS with mobility, decreased balance due to LE swelling   Impairments Requiring Therapeutic Intervention: decreased functional mobility, decreased endurance  Prognosis: good  Clinical Assessment: Anticipate no needs at discharge once swelling continues to improve.  Pt without any SOB during long distances of ambulation and improved balance/stability due to reduced swelling.  Functional goals met. No further PT needs at this time.   Safety Interventions: patient left in chair, call light within reach, nurse notified, and patient up ad erendira     Plan  Frequency: 3-5 x/per week  Current

## 2024-06-21 NOTE — RT PROTOCOL NOTE
RT Inhaler-Nebulizer Bronchodilator Protocol Note    There is a bronchodilator order in the chart from a provider indicating to follow the RT Bronchodilator Protocol and there is an “Initiate RT Inhaler-Nebulizer Bronchodilator Protocol” order as well (see protocol at bottom of note).    CXR Findings:  XR CHEST PORTABLE    Result Date: 6/19/2024  No radiographic evidence of acute pulmonary disease. Prominent cardiomegaly; cannot exclude underlying component pericardial effusion.       The findings from the last RT Protocol Assessment were as follows:   History Pulmonary Disease: Chronic pulmonary disease  Respiratory Pattern: Dyspnea on exertion or RR 21-25 bpm  Breath Sounds: Slightly diminished and/or crackles  Cough: Strong, productive  Indication for Bronchodilator Therapy:    Bronchodilator Assessment Score: 7    Aerosolized bronchodilator medication orders have been revised according to the RT Inhaler-Nebulizer Bronchodilator Protocol below.    Respiratory Therapist to perform RT Therapy Protocol Assessment initially then follow the protocol.  Repeat RT Therapy Protocol Assessment PRN for score 0-3 or on second treatment, BID, and PRN for scores above 3.    No Indications - adjust the frequency to every 6 hours PRN wheezing or bronchospasm, if no treatments needed after 48 hours then discontinue using Per Protocol order mode.     If indication present, adjust the RT bronchodilator orders based on the Bronchodilator Assessment Score as indicated below.  Use Inhaler orders unless patient has one or more of the following: on home nebulizer, not able to hold breath for 10 seconds, is not alert and oriented, cannot activate and use MDI correctly, or respiratory rate 25 breaths per minute or more, then use the equivalent nebulizer order(s) with same Frequency and PRN reasons based on the score.  If a patient is on this medication at home then do not decrease Frequency below that used at home.    0-3 - enter or  revise RT bronchodilator order(s) to equivalent RT Bronchodilator order with Frequency of every 4 hours PRN for wheezing or increased work of breathing using Per Protocol order mode.        4-6 - enter or revise RT Bronchodilator order(s) to two equivalent RT bronchodilator orders with one order with BID Frequency and one order with Frequency of every 4 hours PRN wheezing or increased work of breathing using Per Protocol order mode.        7-10 - enter or revise RT Bronchodilator order(s) to two equivalent RT bronchodilator orders with one order with TID Frequency and one order with Frequency of every 4 hours PRN wheezing or increased work of breathing using Per Protocol order mode.       11-13 - enter or revise RT Bronchodilator order(s) to one equivalent RT bronchodilator order with QID Frequency and an Albuterol order with Frequency of every 4 hours PRN wheezing or increased work of breathing using Per Protocol order mode.      Greater than 13 - enter or revise RT Bronchodilator order(s) to one equivalent RT bronchodilator order with every 4 hours Frequency and an Albuterol order with Frequency of every 2 hours PRN wheezing or increased work of breathing using Per Protocol order mode.     RT to enter RT Home Evaluation for COPD & MDI Assessment order using Per Protocol order mode.    Electronically signed by Felicita Nichols RCP on 6/21/2024 at 5:05 AM

## 2024-06-21 NOTE — ACP (ADVANCE CARE PLANNING)
Advance Care Planning     Advance Care Planning Inpatient Note  [facility]    Today's Date: 6/21/2024  Unit: [facility] 5 TWR ONCOLOGY    Received request from HealthCare Provider.  Upon review of chart and communication with care team, patient's decision making abilities are not in question.. Patient was present in the room during visit.     Goals of ACP Conversation:  Discuss advance care planning documents  Facilitate a discussion related to patient's goals of care as they align with the patient's values and beliefs.    Health Care Decision Makers:       Primary Decision Maker: Naldo Bauman - Healthcare Decision Maker - [phone]    Summary:  Completed New Documents  - ACP/Healthcare POA. Pt. Declined Living Will.  Nurse came in to sign Healthcare POA as a second witness following my signature as primary witness.     Advance Care Planning Documents (Patient Wishes):  Healthcare Power of /Advance Directive Appointment of Health Care Agent     Assessment:  Patient was clear in expressing his wishes regarding ACP documents. He wanted the POA, but he did not want the Living Will after I explained both documents and Hierarchy in the State [facility]. No medical, psychological, psychosocial, family systems, ethical, cultural, societal and spiritual issues were observed.    Interventions:  Provided education on documents for clarity and greater understanding  Discussed and provided education on state decision maker hierarchy  Assisted in the completion of documents according to patient's wishes at this time  Provided patient with original document plus two copies. Retained one copy to be placed in EPIC for electronic medical record purposes.  Patient endorsed being Hoahaoism. Offered prayer of blessing over him with his consent.    Care Preferences Communicated:   No    Outcomes/Plan:  New advance directive completed. Healthcare POA only.    Electronically signed by Chaplain REYNALDO on 6/21/2024 at

## 2024-06-21 NOTE — PROGRESS NOTES
Morning assessment and medications complete, pt cooperated and tolerated well. Medications given per MAR. VS stable. A&O X4. Pt denies pain at this time. Patient clean and dry. Ambulates independently in room, uses urinal- emptied- 1500ml. Tele monitor on. Breakfast tray set up. Bed side table, call light and belongings within reach. Chair locked. All questions and concerns addressed, no further needs at this time.

## 2024-06-21 NOTE — PROGRESS NOTES
06/21/24 1821   Encounter Summary   Encounter Overview/Reason Advance Care Planning;Follow-up;Spiritual/Emotional Needs   Service Provided For Patient   Referral/Consult From Nurse   Support System Friends/neighbors   Last Encounter  06/21/24  (F/U encounter to complete ACP documents previously given to the patient by  KE. ~ )   Complexity of Encounter Low   Begin Time 1800   End Time  1825   Total Time Calculated 25 min   Spiritual/Emotional needs   Type Spiritual Support   Rituals, Rites and Sacraments   Type Blessings  (Offered prayer of blessing over the patient with his consent.)   Advance Care Planning   Type Completed AD/ACP document(s)  (Patient completed Healthcare POA. He declined Living Will. Explained [place] heiarchy of care and he verbally confirmed his understanding of the law.)   Assessment/Intervention/Outcome   Assessment Calm;Coping;Hopeful;Peaceful   Intervention Active listening;Discussed belief system/Episcopalian practices/amy;Prayer (assurance of)/[place];Sustaining Presence/Ministry of presence   Outcome Comfort;Concerns relieved;Encouraged;Engaged in conversation;Optimistic;Peace;Receptive   Plan and Referrals   Plan/Referrals Referred to Outpatient ;No future visits requested;Provided reading/devotional materials  (Patient is being discharged. He declined offer for outpatient  support.)   Does the patient have a [facility] PCP? Yes    to follow up after discharge? No        06/21/24 1821   Encounter Summary   Encounter Overview/Reason Advance Care Planning;Follow-up;Spiritual/Emotional Needs   Service Provided For Patient   Referral/Consult From Nurse   Support System Friends/neighbors   Last Encounter  06/21/24  (F/U encounter to complete ACP documents previously given to the patient by Chaplain CARVAJAL. ~ )   Complexity of Encounter Low   Begin Time 1800   End Time  1825   Total Time Calculated 25 min   Spiritual/Emotional needs   Type Spiritual Support   Rituals,

## 2024-06-21 NOTE — PLAN OF CARE
Problem: Safety - Adult  Goal: Free from fall injury  Outcome: Progressing     Problem: ABCDS Injury Assessment  Goal: Absence of physical injury  Outcome: Progressing  Flowsheets (Taken 6/21/2024 5601)  Absence of Physical Injury: Implement safety measures based on patient assessment     Problem: Chronic Conditions and Co-morbidities  Goal: Patient's chronic conditions and co-morbidity symptoms are monitored and maintained or improved  Outcome: Progressing

## 2024-06-24 ENCOUNTER — TELEPHONE (OUTPATIENT)
Dept: OTHER | Age: 64
End: 2024-06-24

## 2024-06-24 NOTE — TELEPHONE ENCOUNTER
Two more unsuccessful attempts to reach patient. He is scheduled for follow up this week with HF NP

## 2024-06-24 NOTE — TELEPHONE ENCOUNTER
Attempted to reach patient. Phone number is for a friend. Message left to request call back. Patient has follow up on 6/26

## 2024-06-26 ENCOUNTER — OFFICE VISIT (OUTPATIENT)
Dept: CARDIOLOGY CLINIC | Age: 64
End: 2024-06-26
Payer: MEDICAID

## 2024-06-26 VITALS
TEMPERATURE: 76 F | BODY MASS INDEX: 25.15 KG/M2 | DIASTOLIC BLOOD PRESSURE: 60 MMHG | OXYGEN SATURATION: 96 % | HEIGHT: 74 IN | WEIGHT: 196 LBS | SYSTOLIC BLOOD PRESSURE: 100 MMHG

## 2024-06-26 DIAGNOSIS — Z59.00 HOMELESS: ICD-10-CM

## 2024-06-26 DIAGNOSIS — Z95.810 ICD (IMPLANTABLE CARDIOVERTER-DEFIBRILLATOR) IN PLACE: ICD-10-CM

## 2024-06-26 DIAGNOSIS — I50.22 CHRONIC SYSTOLIC (CONGESTIVE) HEART FAILURE (HCC): Primary | ICD-10-CM

## 2024-06-26 DIAGNOSIS — I48.0 PAF (PAROXYSMAL ATRIAL FIBRILLATION) (HCC): ICD-10-CM

## 2024-06-26 DIAGNOSIS — E78.5 HYPERLIPIDEMIA, UNSPECIFIED HYPERLIPIDEMIA TYPE: ICD-10-CM

## 2024-06-26 DIAGNOSIS — I42.0 DILATED CARDIOMYOPATHY (HCC): ICD-10-CM

## 2024-06-26 PROCEDURE — 99215 OFFICE O/P EST HI 40 MIN: CPT | Performed by: CLINICAL NURSE SPECIALIST

## 2024-06-26 PROCEDURE — 1123F ACP DISCUSS/DSCN MKR DOCD: CPT | Performed by: CLINICAL NURSE SPECIALIST

## 2024-06-26 PROCEDURE — 3074F SYST BP LT 130 MM HG: CPT | Performed by: CLINICAL NURSE SPECIALIST

## 2024-06-26 PROCEDURE — 3078F DIAST BP <80 MM HG: CPT | Performed by: CLINICAL NURSE SPECIALIST

## 2024-06-26 SDOH — ECONOMIC STABILITY - HOUSING INSECURITY: HOMELESSNESS UNSPECIFIED: Z59.00

## 2024-06-26 NOTE — PROGRESS NOTES
[facility]  Progress Note    Primary Care Doctor:  No primary care provider on file.    Chief Complaint   Patient presents with    Congestive Heart Failure    Cough    Shortness of Breath       History of Present Illness:  63 y.o. male with history of with history of PAF, hypertension.  Unvaccinated, , homeless  hospitalization 2/5-10/22 for weight gain and lower extremity edema.  He recently had covid and did not follow up in office.  LVEF 20%, LHC done.  Weight 223->193.  He was started on HF therapy, declined life vest.  CONSUELO done with HENOK thrombus (on eliquis per EP). Not able to do CV      I had the pleasure of seeing Manoj Feliz in follow up for systolic heart failure and hospitalization 6/14-21/24 for fluid overload, treated with IV lasix and metolazone.  He was tried on entresto but did not tolerate it, BP dropped into the 70s.  He was seen by palliative while in the hospital.  He is ambulatory and with his friend, Manoj.  His weight is 196 today which is his baseline.  His optival continues to show normal thoracic impedence.  He denies any shortness of breath, palpitations (still in AF), lightheadedness or chest pain.  He continues with bilateral ankle to mid calf edema.  He is taking all his medications.  He seems to understand how ill he is and wants information to read about LVAD along with heart transplant.  He understands family support is needed to receive either LVAD or transplant.      Phone call 6/24/24    Past Medical History:   has a past medical history of Acute combined systolic and diastolic congestive heart failure (HCC), Atrial fibrillation (HCC), CHF (congestive heart failure) (HCC), Hx of blood clots, and Hypertension.  Surgical History:   has a past surgical history that includes Insertable Cardiac Monitor (07/26/2019); Cardioversion (07/26/2019); Cardiac defibrillator placement (Left); and Prostate surgery (N/A, 3/9/2023).   Social History:   reports that he has

## 2024-06-26 NOTE — PATIENT INSTRUCTIONS
Information for heart transplant and assist device  Continue all current medications  Blood work when you see your Primary care  Let me know if you need refills  RTO in 1 month

## 2024-07-01 ENCOUNTER — OFFICE VISIT (OUTPATIENT)
Dept: PRIMARY CARE CLINIC | Age: 64
End: 2024-07-01
Payer: MEDICAID

## 2024-07-01 VITALS
HEIGHT: 74 IN | BODY MASS INDEX: 25 KG/M2 | HEART RATE: 79 BPM | WEIGHT: 194.8 LBS | SYSTOLIC BLOOD PRESSURE: 100 MMHG | DIASTOLIC BLOOD PRESSURE: 70 MMHG | OXYGEN SATURATION: 99 %

## 2024-07-01 DIAGNOSIS — I50.22 CHRONIC SYSTOLIC (CONGESTIVE) HEART FAILURE (HCC): ICD-10-CM

## 2024-07-01 DIAGNOSIS — R53.83 FATIGUE, UNSPECIFIED TYPE: Primary | ICD-10-CM

## 2024-07-01 DIAGNOSIS — Z12.11 ENCOUNTER FOR COLORECTAL CANCER SCREENING: ICD-10-CM

## 2024-07-01 DIAGNOSIS — I10 BENIGN ESSENTIAL HTN: ICD-10-CM

## 2024-07-01 DIAGNOSIS — Z13.220 LIPID SCREENING: ICD-10-CM

## 2024-07-01 DIAGNOSIS — I51.3 THROMBUS OF LEFT ATRIAL APPENDAGE: ICD-10-CM

## 2024-07-01 DIAGNOSIS — R53.83 FATIGUE, UNSPECIFIED TYPE: ICD-10-CM

## 2024-07-01 DIAGNOSIS — Z23 NEED FOR PNEUMOCOCCAL VACCINATION: ICD-10-CM

## 2024-07-01 DIAGNOSIS — Z12.12 ENCOUNTER FOR COLORECTAL CANCER SCREENING: ICD-10-CM

## 2024-07-01 DIAGNOSIS — I34.0 MODERATE MITRAL REGURGITATION: ICD-10-CM

## 2024-07-01 DIAGNOSIS — I47.20 VT (VENTRICULAR TACHYCARDIA) (HCC): ICD-10-CM

## 2024-07-01 DIAGNOSIS — I48.20 CHRONIC ATRIAL FIBRILLATION (HCC): ICD-10-CM

## 2024-07-01 DIAGNOSIS — Z12.5 PROSTATE CANCER SCREENING: ICD-10-CM

## 2024-07-01 LAB — 25(OH)D3 SERPL-MCNC: 14.3 NG/ML

## 2024-07-01 PROCEDURE — 90471 IMMUNIZATION ADMIN: CPT | Performed by: NURSE PRACTITIONER

## 2024-07-01 PROCEDURE — 3074F SYST BP LT 130 MM HG: CPT | Performed by: NURSE PRACTITIONER

## 2024-07-01 PROCEDURE — 3078F DIAST BP <80 MM HG: CPT | Performed by: NURSE PRACTITIONER

## 2024-07-01 PROCEDURE — 90677 PCV20 VACCINE IM: CPT | Performed by: NURSE PRACTITIONER

## 2024-07-01 PROCEDURE — 1123F ACP DISCUSS/DSCN MKR DOCD: CPT | Performed by: NURSE PRACTITIONER

## 2024-07-01 PROCEDURE — 99204 OFFICE O/P NEW MOD 45 MIN: CPT | Performed by: NURSE PRACTITIONER

## 2024-07-01 SDOH — ECONOMIC STABILITY: FOOD INSECURITY: WITHIN THE PAST 12 MONTHS, YOU WORRIED THAT YOUR FOOD WOULD RUN OUT BEFORE YOU GOT MONEY TO BUY MORE.: OFTEN TRUE

## 2024-07-01 SDOH — ECONOMIC STABILITY: FOOD INSECURITY: WITHIN THE PAST 12 MONTHS, THE FOOD YOU BOUGHT JUST DIDN'T LAST AND YOU DIDN'T HAVE MONEY TO GET MORE.: OFTEN TRUE

## 2024-07-01 SDOH — ECONOMIC STABILITY: HOUSING INSECURITY
IN THE LAST 12 MONTHS, WAS THERE A TIME WHEN YOU DID NOT HAVE A STEADY PLACE TO SLEEP OR SLEPT IN A SHELTER (INCLUDING NOW)?: YES

## 2024-07-01 SDOH — ECONOMIC STABILITY: INCOME INSECURITY: HOW HARD IS IT FOR YOU TO PAY FOR THE VERY BASICS LIKE FOOD, HOUSING, MEDICAL CARE, AND HEATING?: SOMEWHAT HARD

## 2024-07-01 ASSESSMENT — COLUMBIA-SUICIDE SEVERITY RATING SCALE - C-SSRS
2. HAVE YOU ACTUALLY HAD ANY THOUGHTS OF KILLING YOURSELF?: NO
6. HAVE YOU EVER DONE ANYTHING, STARTED TO DO ANYTHING, OR PREPARED TO DO ANYTHING TO END YOUR LIFE?: NO
1. WITHIN THE PAST MONTH, HAVE YOU WISHED YOU WERE DEAD OR WISHED YOU COULD GO TO SLEEP AND NOT WAKE UP?: NO

## 2024-07-01 ASSESSMENT — PATIENT HEALTH QUESTIONNAIRE - PHQ9
6. FEELING BAD ABOUT YOURSELF - OR THAT YOU ARE A FAILURE OR HAVE LET YOURSELF OR YOUR FAMILY DOWN: NOT AT ALL
4. FEELING TIRED OR HAVING LITTLE ENERGY: MORE THAN HALF THE DAYS
SUM OF ALL RESPONSES TO PHQ QUESTIONS 1-9: 6
SUM OF ALL RESPONSES TO PHQ QUESTIONS 1-9: 6
8. MOVING OR SPEAKING SO SLOWLY THAT OTHER PEOPLE COULD HAVE NOTICED. OR THE OPPOSITE, BEING SO FIGETY OR RESTLESS THAT YOU HAVE BEEN MOVING AROUND A LOT MORE THAN USUAL: NOT AT ALL
9. THOUGHTS THAT YOU WOULD BE BETTER OFF DEAD, OR OF HURTING YOURSELF: NOT AT ALL
SUM OF ALL RESPONSES TO PHQ9 QUESTIONS 1 & 2: 2
5. POOR APPETITE OR OVEREATING: SEVERAL DAYS
2. FEELING DOWN, DEPRESSED OR HOPELESS: SEVERAL DAYS
1. LITTLE INTEREST OR PLEASURE IN DOING THINGS: SEVERAL DAYS
10. IF YOU CHECKED OFF ANY PROBLEMS, HOW DIFFICULT HAVE THESE PROBLEMS MADE IT FOR YOU TO DO YOUR WORK, TAKE CARE OF THINGS AT HOME, OR GET ALONG WITH OTHER PEOPLE: NOT DIFFICULT AT ALL
SUM OF ALL RESPONSES TO PHQ QUESTIONS 1-9: 6
3. TROUBLE FALLING OR STAYING ASLEEP: SEVERAL DAYS
SUM OF ALL RESPONSES TO PHQ QUESTIONS 1-9: 6
7. TROUBLE CONCENTRATING ON THINGS, SUCH AS READING THE NEWSPAPER OR WATCHING TELEVISION: NOT AT ALL

## 2024-07-01 ASSESSMENT — ENCOUNTER SYMPTOMS
WHEEZING: 0
GASTROINTESTINAL NEGATIVE: 1
CHEST TIGHTNESS: 0
SHORTNESS OF BREATH: 0
COUGH: 0

## 2024-07-01 NOTE — PATIENT INSTRUCTIONS
[phone]    [facility]   What they offer: Transportation Services also offers convenient group shopping trips. Seniors can socialize with their friends as they shop and complete other errands.  Website:  [email or web address]   Phone Number: [phone]   Eligibility Requirements: Older adults (60+)  Areas Covered: Areas we currently serve include [facility], [place], [place], [place], [place], [facility], [place], [place], [place], [place], [place], [facility], [place], and [place]. Don’t see your area on our list? Contact us to see if transportation can be arranged.    Senior Transportation Programs  What they offer: Your Area Agency on Aging or local senior center may be able to help eligible older adults make essential trips, such as for medical appointments, errands, shopping, and more.   Areas on Aging  [place] on Aging (COA)- [phone]: [place], [place], [place] and [facility] Area on Aging ([facility])- [phone]: [place], [place], [place], [place], [place], [place]    [place] [facility]   What they offer: [facility] Services provides transportation for [facility] seniors who need assistance for medical and other appointments.  Website:  [email or web address]   Phone Number: 972.601.5396  Areas Covered:   [facility]- Limited to types of services listed  Out of County- Medical Appointments Only    [place] Department of Transportation (ODOT)   What they offer: Search a listing of ODOT facilities in each county. Use the topic category to filter counties by [employer]OT district jurisdiction or use the alphabetical buttons to filter by county name.  Website:  https://www.transportation.[place].gov/about-us/locations/#page=1   This is a web-based search only      Medicaid Plans   Each health plan has options for transportation

## 2024-07-01 NOTE — PROGRESS NOTES
General: Normal range of motion.      Cervical back: Normal range of motion and neck supple.      Right lower leg: No edema.      Left lower leg: No edema.   Lymphadenopathy:      Cervical: No cervical adenopathy.   Skin:     General: Skin is warm.   Neurological:      General: No focal deficit present.      Mental Status: He is alert and oriented to person, place, and time.   Psychiatric:         Mood and Affect: Mood normal.         Behavior: Behavior normal.         Thought Content: Thought content normal.       Immunizations Administered       Name Date Dose Route    Pneumococcal, PCV20, PREVNAR 20, (age 6w+), IM, 0.5mL 7/1/2024 0.5 mL Intramuscular    Site: Deltoid- Left    Lot: [id number]    NDC: 0005-2000-10             ASSESSMENT/PLAN:  1. Fatigue, unspecified type  -Active  - Vitamin D 25 Hydroxy; Future  - T4; Future  - TSH; Future    2. Benign essential HTN  -Controlled  -Follows with cardiology  -/70 in office today  -Continue current medications  - Comprehensive Metabolic Panel, Fasting; Future    3. Chronic atrial fibrillation (HCC)  -Stable  -Follows with cardiologist,   -Continue current medications    4. Moderate mitral regurgitation  -Stable  -Follows with cardiology    5. VT (ventricular tachycardia) (HCC)  -Stable  -Follows with cardiology  -Continue current medications    6. Thrombus of left atrial appendage  -Follows with cardiology  -Continue current medications    7. Prostate cancer screening  - PSA, TOTAL AND FREE; Future    8. Lipid screening  - Lipid, Fasting; Future    9. Encounter for colorectal cancer screening  - POCT Fecal Immunochemical Test (FIT); Future    10. Need for pneumococcal vaccination  - Pneumococcal, PCV20, PREVNAR 20, (age 6w+), IM, PF      Return in about 3 months (around 10/4/2024) for 3 month follow up to review lab results-- ALWAYS 40 MINS.     Discussed use, benefit, and side effects of prescribed medications.     Patient's questions answered and

## 2024-07-02 ENCOUNTER — TELEPHONE (OUTPATIENT)
Dept: CARDIOLOGY CLINIC | Age: 64
End: 2024-07-02

## 2024-07-02 DIAGNOSIS — I50.22 CHRONIC SYSTOLIC (CONGESTIVE) HEART FAILURE (HCC): Primary | ICD-10-CM

## 2024-07-02 LAB
ANION GAP SERPL CALCULATED.3IONS-SCNC: 9 MMOL/L (ref 3–16)
BUN SERPL-MCNC: 29 MG/DL (ref 7–20)
CALCIUM SERPL-MCNC: 8.8 MG/DL (ref 8.3–10.6)
CHLORIDE SERPL-SCNC: 100 MMOL/L (ref 99–110)
CO2 SERPL-SCNC: 31 MMOL/L (ref 21–32)
CREAT SERPL-MCNC: 1.8 MG/DL (ref 0.8–1.3)
GFR SERPLBLD CREATININE-BSD FMLA CKD-EPI: 41 ML/MIN/{1.73_M2}
GLUCOSE SERPL-MCNC: 80 MG/DL (ref 70–99)
NT-PROBNP SERPL-MCNC: ABNORMAL PG/ML (ref 0–124)
POTASSIUM SERPL-SCNC: 4.1 MMOL/L (ref 3.5–5.1)
SODIUM SERPL-SCNC: 140 MMOL/L (ref 136–145)
T4 SERPL-MCNC: 10.2 UG/DL (ref 4.5–10.9)
TSH SERPL DL<=0.005 MIU/L-ACNC: 3.97 UIU/ML (ref 0.27–4.2)

## 2024-07-02 RX ORDER — TORSEMIDE 20 MG/1
TABLET ORAL
Qty: 90 TABLET | Refills: 1 | Status: SHIPPED | OUTPATIENT
Start: 2024-07-02 | End: 2024-07-03

## 2024-07-02 NOTE — TELEPHONE ENCOUNTER
----- Message from TRENT Alamo - CNS sent at 7/2/2024  8:41 AM EDT -----  His fluid level is very elevated again  Please call and ask him to increase his torsemide to 40 mg in the morning and 20 mg in the evening  Also ask him to get blood work done next Tuesday  Thanks    Tried to reach patient's friend JAXON Lang for him to return our call for the medication change and new lab orders.

## 2024-07-03 RX ORDER — TORSEMIDE 20 MG/1
TABLET ORAL
Qty: 90 TABLET | Refills: 1 | Status: CANCELLED | OUTPATIENT
Start: 2024-07-03

## 2024-07-03 RX ORDER — TORSEMIDE 20 MG/1
TABLET ORAL
Qty: 90 TABLET | Refills: 1 | Status: SHIPPED | OUTPATIENT
Start: 2024-07-03

## 2024-07-03 NOTE — TELEPHONE ENCOUNTER
Spoke with patient's friend Rickeyfarrah Rick and relayed information from [facility].  Verbalized understanding.  They are asking to have the RX for Torsemide sent to [facility] on [address] instead please.

## 2024-07-08 DIAGNOSIS — I50.22 CHRONIC SYSTOLIC (CONGESTIVE) HEART FAILURE (HCC): ICD-10-CM

## 2024-07-08 LAB
ANION GAP SERPL CALCULATED.3IONS-SCNC: 11 MMOL/L (ref 3–16)
BUN SERPL-MCNC: 22 MG/DL (ref 7–20)
CALCIUM SERPL-MCNC: 8.8 MG/DL (ref 8.3–10.6)
CHLORIDE SERPL-SCNC: 97 MMOL/L (ref 99–110)
CO2 SERPL-SCNC: 31 MMOL/L (ref 21–32)
CREAT SERPL-MCNC: 1.5 MG/DL (ref 0.8–1.3)
GFR SERPLBLD CREATININE-BSD FMLA CKD-EPI: 51 ML/MIN/{1.73_M2}
GLUCOSE SERPL-MCNC: 80 MG/DL (ref 70–99)
NT-PROBNP SERPL-MCNC: ABNORMAL PG/ML (ref 0–124)
POTASSIUM SERPL-SCNC: 4.1 MMOL/L (ref 3.5–5.1)
SODIUM SERPL-SCNC: 139 MMOL/L (ref 136–145)

## 2024-07-09 ENCOUNTER — TELEPHONE (OUTPATIENT)
Dept: CARDIOLOGY CLINIC | Age: 64
End: 2024-07-09

## 2024-07-09 NOTE — TELEPHONE ENCOUNTER
----- Message from TRENT Hicks CNP sent at 7/9/2024  8:45 AM EDT -----  Labs look a little better, continue higher dose of torsemide. KJV   Pt states she will be getting period the middle of next week and would like to schedule her nexplanon insertion.    Lola, you only have GYN openings at 9:00 and 10:00 on 4.14.22.  Do you want to use one of these appts or should we offer her an appointment with another provider?    Routed to JEVON Rivas -- Please advise.

## 2024-07-11 ENCOUNTER — TELEPHONE (OUTPATIENT)
Dept: PRIMARY CARE CLINIC | Age: 64
End: 2024-07-11

## 2024-07-11 DIAGNOSIS — E55.9 VITAMIN D DEFICIENCY: ICD-10-CM

## 2024-07-11 RX ORDER — ERGOCALCIFEROL 1.25 MG/1
50000 CAPSULE ORAL WEEKLY
Qty: 12 CAPSULE | Refills: 3 | Status: CANCELLED | OUTPATIENT
Start: 2024-07-11 | End: 2025-07-11

## 2024-07-11 NOTE — TELEPHONE ENCOUNTER
Called and went over lab results, pt verbalized understanding and would like [facility]s sent to [facility]

## 2024-07-11 NOTE — RESULT ENCOUNTER NOTE
Please notify patient of lab results.    1.  Vitamin D is extremely low, 14.3 (normal range is above 30).  -New prescription for high-dose vitamin D sent to pharmacy.  Take 1 capsule every 7 days.  Prescription written for 1 year.  When down to 1 capsule please contact pharmacy for refill.    *Low vitamin D can cause fatigue.    2. Thyroid labs are stable.    Thank you.

## 2024-07-11 NOTE — TELEPHONE ENCOUNTER
Called to go over message below, no answer    98 97 - 99 -   Weight 186 lb 184 lb 184 lb 183 lb 186 lb 190 lb   Height 5' 2\" 5' 2\" 5' 2\" 5' 2\" 5' 2\" 5' 2\"   BMI (wt*703/ht~2) 34.02 kg/m2 33.65 kg/m2 33.65 kg/m2 33.47 kg/m2 34.02 kg/m2 34.75 kg/m2   Some recent data might be hidden       Family History   Problem Relation Age of Onset    Diabetes Mother     Other Father         thickness in blood    Heart Disease Father     Other Sister         thickness in blood    Diabetes Sister     Other Brother         thickness in blood    Other Paternal Uncle         thickness in blood    Other Paternal Grandfather         thickness in blood    Heart Disease Paternal Grandfather     Other Brother         thickness in blood    Diabetes Brother     Heart Disease Brother     Other Brother         thickness in blood    Other Brother         thickness in blood    Colon Cancer Neg Hx     Colon Polyps Neg Hx        Social History     Tobacco Use    Smoking status: Never Smoker    Smokeless tobacco: Never Used   Substance Use Topics    Alcohol use: No      Current Outpatient Medications   Medication Sig Dispense Refill    metFORMIN (GLUCOPHAGE-XR) 500 MG extended release tablet Take 1 tablet by mouth 2 times daily 180 tablet 0    lisinopril (PRINIVIL;ZESTRIL) 20 MG tablet Take 1 tablet by mouth daily 90 tablet 3    atorvastatin (LIPITOR) 40 MG tablet Take 1 tablet by mouth daily 30 tablet 11    Lancets MISC 1 each by Does not apply route daily Dx:e11.9 100 each 5    blood glucose test strips (ASCENSIA AUTODISC VI;ONE TOUCH ULTRA TEST VI) strip 1 each by In Vitro route daily As needed. Dx.e11.9 100 each 5    Multiple Vitamins-Minerals (THERAPEUTIC MULTIVITAMIN-MINERALS) tablet Take 1 tablet by mouth daily      aspirin 81 MG tablet Take 81 mg by mouth nightly        No current facility-administered medications for this visit.       Allergies   Allergen Reactions    Corticosteroids Palpitations and Other (See Comments)     Causes elevated blood pressure    Niacin And Related Palpitations and Other (See Comments)     Causes elevated blood pressure       Health Maintenance   Topic Date Due    Diabetic retinal exam  04/07/1959    Shingles Vaccine (2 of 3) 12/29/2014    Breast cancer screen  05/31/2019    Annual Wellness Visit (AWV)  06/13/2019    Pneumococcal 65+ years Vaccine (2 of 2 - PPSV23) 11/26/2019    Colon cancer screen colonoscopy  03/11/2020    Diabetic microalbuminuria test  05/08/2020    Diabetic foot exam  06/13/2020    A1C test (Diabetic or Prediabetic)  12/20/2020    Lipid screen  12/20/2020    Potassium monitoring  12/20/2020    Creatinine monitoring  12/20/2020    DTaP/Tdap/Td vaccine (2 - Td) 01/24/2028    DEXA (modify frequency per FRAX score)  Completed    Flu vaccine  Completed    Hepatitis C screen  Completed       Subjective:      Review of Systems    Objective:     Physical Exam  Constitutional:       Appearance: She is well-developed. HENT:      Head: Atraumatic. Right Ear: External ear normal.      Left Ear: External ear normal.      Nose: Nose normal.   Eyes:      Conjunctiva/sclera: Conjunctivae normal.      Pupils: Pupils are equal, round, and reactive to light. Neck:      Musculoskeletal: Normal range of motion and neck supple. Cardiovascular:      Rate and Rhythm: Normal rate and regular rhythm. Heart sounds: S1 normal and S2 normal. Murmur present. Systolic murmur present with a grade of 2/6. Pulmonary:      Effort: Pulmonary effort is normal.      Breath sounds: Normal breath sounds. Abdominal:      General: Bowel sounds are normal.      Palpations: Abdomen is soft. Musculoskeletal: Normal range of motion. Skin:     General: Skin is warm and dry. Neurological:      Mental Status: She is alert and oriented to person, place, and time.    Psychiatric:         Behavior: Behavior normal.       /70 (Site: Left Upper Arm, Position: Sitting)   Pulse 74   Temp 97.7 °F (36.5 °C)   Ht 5' 2\" (1.575 m) Wt 186 lb (84.4 kg)   SpO2 97%   BMI 34.02 kg/m²     Assessment:       Diagnosis Orders   1. Routine general medical examination at a health care facility     2. Encounter for screening mammogram for breast cancer  [facility] Digital Screen Bilateral [[id number]]   3. Need for prophylactic vaccination against Streptococcus pneumoniae (pneumococcus)  Pneumococcal polysaccharide vaccine 23-valent PPSV23   4. H/O aortic valve replacement  Hilary Stevens APRN, Cardiology, [place]    CBC Auto Differential    Comprehensive Metabolic Panel    Hemoglobin A1C    Lipid Panel    TSH without Reflex       Plan:     Referral placed for cardiology. Patient will follow-up in 6 months with labs prior to visit. Patient given educational materials -see patient instructions. Discussed use, benefit, and side effects of prescribed medications. All patient questions answered. Pt voiced understanding. Reviewed health maintenance. Instructed to continue currentmedications, diet and exercise. Patient agreed with treatment plan. Follow up as directed. MEDICATIONS:  No orders of the defined types were placed in this encounter. ORDERS:  Orders Placed This Encounter   Procedures    [facility] Digital Screen Bilateral [[id number]]    Pneumococcal polysaccharide vaccine 23-valent PPSV23    CBC Auto Differential    Comprehensive Metabolic Panel    Hemoglobin A1C    Lipid Panel    TSH without Reflex    LULA Lopez, Cardiology, [place]       Follow-up:  Return in about 6 months (around 7/6/2020) for Medicare Annual Wellness Visit in 1 year, follow-up with labs. PATIENT INSTRUCTIONS:  Patient Instructions     Personalized Preventive Plan for [place] - 1/6/2020  Medicare offers a range of preventive health benefits. Some of the tests and screenings are paid in full while other may be subject to a deductible, co-insurance, and/or copay.     Some of these benefits include a comprehensive review of your exist.    Medicare Annual Wellness Visit  Name: Destinee Hannon Date: 2020   MRN: [id number] Sex: Female   Age: 77 y.o. Ethnicity: Non-/Non    : 1951 Race: [place] [address] is here for Medicare AWV    Screenings for behavioral, psychosocial and functional/safety risks, and cognitive dysfunction are all negative except as indicated below. These results, as well as other patient data from the [address] form, are documented in Flowsheets linked to this Encounter. Allergies   Allergen Reactions    Corticosteroids Palpitations and Other (See Comments)     Causes elevated blood pressure    Niacin And Related Palpitations and Other (See Comments)     Causes elevated blood pressure         Prior to Visit Medications    Medication Sig Taking? Authorizing Provider   metFORMIN (GLUCOPHAGE-XR) 500 MG extended release tablet Take 1 tablet by mouth 2 times daily  LULA Madrigal   lisinopril (PRINIVIL;ZESTRIL) 20 MG tablet Take 1 tablet by mouth daily  LULA Womack   atorvastatin (LIPITOR) 40 MG tablet Take 1 tablet by mouth daily  LULA Murillo   Lancets MISC 1 each by Does not apply route daily Dx:e11.9  LULA Murillo   blood glucose test strips (ASCENSIA AUTODISC VI;ONE TOUCH ULTRA TEST VI) strip 1 each by In Vitro route daily As needed.  Dx.e11.9  LULA Murillo   Multiple Vitamins-Minerals (THERAPEUTIC MULTIVITAMIN-MINERALS) tablet Take 1 tablet by mouth daily  Historical Provider, MD   aspirin 81 MG tablet Take 81 mg by mouth nightly   Historical Provider, MD         Past Medical History:   Diagnosis Date    Arteriosclerotic heart disease (ASHD)     Coronary artery disease involving native coronary artery of native heart without angina pectoris     Family history of heart disease     father  37 MI, brother 46  MI, brother 61  MI, sister 58  MI    Hyperlipidemia     Hypertension     Mild mitral and well- nourished, in no acute distress  Skin: warm and dry, no rash or erythema  Head: normocephalic and atraumatic  Eyes: pupils equal, round, and reactive to light, extraocular eye movements intact, conjunctivae normal  ENT: tympanic membrane, external ear and ear canal normal bilaterally, nose without deformity, nasal mucosa and turbinates normal without polyps  Neck: supple and non-tender without mass, no thyromegaly or thyroid nodules, no cervical lymphadenopathy  Pulmonary/Chest: clear to auscultation bilaterally- no wheezes, rales or rhonchi, normal air movement, no respiratory distress  Cardiovascular: normal rate, regular rhythm, normal S1 and S2, no murmurs, rubs, clicks, or gallops, distal pulses intact, no carotid bruits  Abdomen: soft, non-tender, non-distended, normal bowel sounds, no masses or organomegaly  Extremities: no cyanosis, clubbing or edema  Musculoskeletal: normal range of motion, no joint swelling, deformity or tenderness  Neurologic: reflexes normal and symmetric, no cranial nerve deficit, gait, coordination and speech normal    Patient's complete Health Risk Assessment and screening values have been reviewed and are found in Flowsheets. The following problems were reviewed today and where indicated follow up appointments were made and/or referrals ordered. Positive Risk Factor Screenings with Interventions:     Health Habits/Nutrition:  Health Habits/Nutrition  Do you exercise for at least 20 minutes 2-3 times per week?: Yes  Have you lost any weight without trying in the past 3 months?: No  Do you eat fewer than 2 meals per day?: No  Have you seen a dentist within the past year?: (!) No(Dentures)  Body mass index is 34.02 kg/m².   Health Habits/Nutrition Interventions:  · Dental exam overdue:  patient encouraged to make appointment with his/her dentist    Personalized Preventive Plan   Current Health Maintenance Status  Immunization History   Administered Date(s) Administered    Influenza Virus Vaccine 11/03/2014    Influenza, High Dose (Fluzone 65 yrs and older) 01/24/2018, 11/26/2018    Influenza, Quadv, IM, PF (6 mo and older Fluzone, Flulaval, Fluarix, and 3 yrs and older Afluria) 12/13/2016    Influenza, Triv, inactivated, subunit, adjuvanted, IM (Fluad 65 yrs and older) 12/13/2019    Pneumococcal Conjugate 13-valent ([id number]) 01/05/2015, 11/26/2018    Pneumococcal Conjugate 7-valent (Prevnar7) 11/03/2014    Pneumococcal Conjugate Vaccine 12/01/2017    Tdap (Boostrix, Adacel) 01/24/2018    Zoster Live (Zostavax) 11/03/2014        Health Maintenance   Topic Date Due    Diabetic retinal exam  04/07/1959    Shingles Vaccine (2 of 3) 12/29/2014    Breast cancer screen  05/31/2019    Annual Wellness Visit (AWV)  06/13/2019    Pneumococcal 65+ years Vaccine (2 of 2 - PPSV23) 11/26/2019    Colon cancer screen colonoscopy  03/11/2020    Diabetic microalbuminuria test  05/08/2020    Diabetic foot exam  06/13/2020    A1C test (Diabetic or Prediabetic)  12/20/2020    Lipid screen  12/20/2020    Potassium monitoring  12/20/2020    Creatinine monitoring  12/20/2020    DTaP/Tdap/Td vaccine (2 - Td) 01/24/2028    DEXA (modify frequency per FRAX score)  Completed    Flu vaccine  Completed    Hepatitis C screen  Completed     Recommendations for Preventive Services Due: see orders and patient instructions/AVS.  . Recommended screening schedule for the next 5-10 years is provided to the patient in written form: see Patient Elsy Cross was seen today for medicare awv. Diagnoses and all orders for this visit:    Routine general medical examination at a health care facility    Encounter for screening mammogram for breast cancer  -     [facility] Digital Screen Bilateral [[id number]];  Future    Need for prophylactic vaccination against Streptococcus pneumoniae (pneumococcus)  -     Pneumococcal polysaccharide vaccine 23-valent PPSV23    H/O aortic valve replacement  -

## 2024-07-11 NOTE — TELEPHONE ENCOUNTER
----- Message from TRENT Pelayo - CNP sent at 7/10/2024  8:56 PM EDT -----  Please notify patient of lab results.    1.  Vitamin D is extremely low, 14.3 (normal range is above 30).  -New prescription for high-dose vitamin D sent to pharmacy.  Take 1 capsule every 7 days.  Prescription written for 1 year.  When down to 1 capsule please contact pharmacy for refill.    #Low vitamin D can cause fatigue.    2. Thyroid labs are stable.    Thank you.

## 2024-07-12 DIAGNOSIS — E55.9 VITAMIN D DEFICIENCY: ICD-10-CM

## 2024-07-12 RX ORDER — ERGOCALCIFEROL 1.25 MG/1
50000 CAPSULE ORAL WEEKLY
Qty: 12 CAPSULE | Refills: 3 | Status: SHIPPED | OUTPATIENT
Start: 2024-07-12 | End: 2025-07-12

## 2024-07-12 NOTE — TELEPHONE ENCOUNTER
vitamin D (ERGOCALCIFEROL) 1.25 MG (81484 UT) CAPS capsule      Patient states this was supposed to be sent to [address]     Please call to advise.

## 2024-07-17 ENCOUNTER — HOSPITAL ENCOUNTER (INPATIENT)
Age: 64
LOS: 3 days | Discharge: HOME | DRG: 194 | End: 2024-07-20
Attending: STUDENT IN AN ORGANIZED HEALTH CARE EDUCATION/TRAINING PROGRAM | Admitting: INTERNAL MEDICINE
Payer: MEDICAID

## 2024-07-17 ENCOUNTER — APPOINTMENT (OUTPATIENT)
Dept: CT IMAGING | Age: 64
DRG: 194 | End: 2024-07-17
Payer: MEDICAID

## 2024-07-17 ENCOUNTER — APPOINTMENT (OUTPATIENT)
Dept: GENERAL RADIOLOGY | Age: 64
DRG: 194 | End: 2024-07-17
Payer: MEDICAID

## 2024-07-17 DIAGNOSIS — I50.9 ACUTE ON CHRONIC CONGESTIVE HEART FAILURE, UNSPECIFIED HEART FAILURE TYPE (HCC): Primary | ICD-10-CM

## 2024-07-17 DIAGNOSIS — R55 NEAR SYNCOPE: ICD-10-CM

## 2024-07-17 LAB
ALBUMIN SERPL-MCNC: 4.1 G/DL (ref 3.4–5)
ALBUMIN/GLOB SERPL: 1.3 {RATIO} (ref 1.1–2.2)
ALP SERPL-CCNC: 141 U/L (ref 40–129)
ALT SERPL-CCNC: 35 U/L (ref 10–40)
ANION GAP SERPL CALCULATED.3IONS-SCNC: 11 MMOL/L (ref 3–16)
AST SERPL-CCNC: 35 U/L (ref 15–37)
BACTERIA URNS QL MICRO: NORMAL /HPF
BASOPHILS # BLD: 0 K/UL (ref 0–0.2)
BASOPHILS NFR BLD: 1.3 %
BILIRUB SERPL-MCNC: 2.3 MG/DL (ref 0–1)
BILIRUB UR QL STRIP.AUTO: NEGATIVE
BUN SERPL-MCNC: 20 MG/DL (ref 7–20)
CALCIUM SERPL-MCNC: 8.8 MG/DL (ref 8.3–10.6)
CHLORIDE SERPL-SCNC: 100 MMOL/L (ref 99–110)
CLARITY UR: CLEAR
CO2 SERPL-SCNC: 27 MMOL/L (ref 21–32)
COLOR UR: YELLOW
CREAT SERPL-MCNC: 1.3 MG/DL (ref 0.8–1.3)
DEPRECATED RDW RBC AUTO: 16 % (ref 12.4–15.4)
EOSINOPHIL # BLD: 0.1 K/UL (ref 0–0.6)
EOSINOPHIL NFR BLD: 2.2 %
EPI CELLS #/AREA URNS AUTO: 1 /HPF (ref 0–5)
GFR SERPLBLD CREATININE-BSD FMLA CKD-EPI: 61 ML/MIN/{1.73_M2}
GLUCOSE SERPL-MCNC: 101 MG/DL (ref 70–99)
GLUCOSE UR STRIP.AUTO-MCNC: >=1000 MG/DL
HCT VFR BLD AUTO: 42 % (ref 40.5–52.5)
HGB BLD-MCNC: 13.9 G/DL (ref 13.5–17.5)
HGB UR QL STRIP.AUTO: NEGATIVE
HYALINE CASTS #/AREA URNS AUTO: 3 /LPF (ref 0–8)
KETONES UR STRIP.AUTO-MCNC: NEGATIVE MG/DL
LEUKOCYTE ESTERASE UR QL STRIP.AUTO: NEGATIVE
LYMPHOCYTES # BLD: 0.8 K/UL (ref 1–5.1)
LYMPHOCYTES NFR BLD: 21.6 %
MCH RBC QN AUTO: 28.8 PG (ref 26–34)
MCHC RBC AUTO-ENTMCNC: 33 G/DL (ref 31–36)
MCV RBC AUTO: 87.3 FL (ref 80–100)
MONOCYTES # BLD: 0.3 K/UL (ref 0–1.3)
MONOCYTES NFR BLD: 6.8 %
NEUTROPHILS # BLD: 2.5 K/UL (ref 1.7–7.7)
NEUTROPHILS NFR BLD: 68.1 %
NITRITE UR QL STRIP.AUTO: NEGATIVE
NT-PROBNP SERPL-MCNC: ABNORMAL PG/ML (ref 0–124)
PH UR STRIP.AUTO: 5.5 [PH] (ref 5–8)
PLATELET # BLD AUTO: 178 K/UL (ref 135–450)
PMV BLD AUTO: 7.5 FL (ref 5–10.5)
POTASSIUM SERPL-SCNC: 3.7 MMOL/L (ref 3.5–5.1)
PROT SERPL-MCNC: 7.3 G/DL (ref 6.4–8.2)
PROT UR STRIP.AUTO-MCNC: 30 MG/DL
RBC # BLD AUTO: 4.81 M/UL (ref 4.2–5.9)
RBC CLUMPS #/AREA URNS AUTO: 1 /HPF (ref 0–4)
SODIUM SERPL-SCNC: 138 MMOL/L (ref 136–145)
SP GR UR STRIP.AUTO: 1.02 (ref 1–1.03)
TROPONIN, HIGH SENSITIVITY: 24 NG/L (ref 0–22)
UA COMPLETE W REFLEX CULTURE PNL UR: ABNORMAL
UA DIPSTICK W REFLEX MICRO PNL UR: YES
URN SPEC COLLECT METH UR: ABNORMAL
UROBILINOGEN UR STRIP-ACNC: 1 E.U./DL
WBC # BLD AUTO: 3.7 K/UL (ref 4–11)
WBC #/AREA URNS AUTO: 2 /HPF (ref 0–5)

## 2024-07-17 PROCEDURE — 93005 ELECTROCARDIOGRAM TRACING: CPT | Performed by: STUDENT IN AN ORGANIZED HEALTH CARE EDUCATION/TRAINING PROGRAM

## 2024-07-17 PROCEDURE — 99285 EMERGENCY DEPT VISIT HI MDM: CPT

## 2024-07-17 PROCEDURE — 80053 COMPREHEN METABOLIC PANEL: CPT

## 2024-07-17 PROCEDURE — 71260 CT THORAX DX C+: CPT

## 2024-07-17 PROCEDURE — 71045 X-RAY EXAM CHEST 1 VIEW: CPT

## 2024-07-17 PROCEDURE — 85025 COMPLETE CBC W/AUTO DIFF WBC: CPT

## 2024-07-17 PROCEDURE — 6360000004 HC RX CONTRAST MEDICATION: Performed by: PHYSICIAN ASSISTANT

## 2024-07-17 PROCEDURE — 1200000000 HC SEMI PRIVATE

## 2024-07-17 PROCEDURE — 83880 ASSAY OF NATRIURETIC PEPTIDE: CPT

## 2024-07-17 PROCEDURE — 81001 URINALYSIS AUTO W/SCOPE: CPT

## 2024-07-17 PROCEDURE — 84484 ASSAY OF TROPONIN QUANT: CPT

## 2024-07-17 RX ORDER — METOPROLOL SUCCINATE 25 MG/1
25 TABLET, EXTENDED RELEASE ORAL DAILY
Status: DISCONTINUED | OUTPATIENT
Start: 2024-07-18 | End: 2024-07-20 | Stop reason: HOSPADM

## 2024-07-17 RX ORDER — POTASSIUM CHLORIDE 20 MEQ/1
40 TABLET, EXTENDED RELEASE ORAL PRN
Status: DISCONTINUED | OUTPATIENT
Start: 2024-07-17 | End: 2024-07-20 | Stop reason: HOSPADM

## 2024-07-17 RX ORDER — SODIUM CHLORIDE 0.9 % (FLUSH) 0.9 %
5-40 SYRINGE (ML) INJECTION PRN
Status: DISCONTINUED | OUTPATIENT
Start: 2024-07-17 | End: 2024-07-20 | Stop reason: HOSPADM

## 2024-07-17 RX ORDER — FINASTERIDE 5 MG/1
5 TABLET, FILM COATED ORAL DAILY
Status: DISCONTINUED | OUTPATIENT
Start: 2024-07-18 | End: 2024-07-20 | Stop reason: HOSPADM

## 2024-07-17 RX ORDER — SPIRONOLACTONE 25 MG/1
25 TABLET ORAL DAILY
Status: DISCONTINUED | OUTPATIENT
Start: 2024-07-18 | End: 2024-07-20 | Stop reason: HOSPADM

## 2024-07-17 RX ORDER — TAMSULOSIN HYDROCHLORIDE 0.4 MG/1
0.4 CAPSULE ORAL DAILY
Status: DISCONTINUED | OUTPATIENT
Start: 2024-07-18 | End: 2024-07-20 | Stop reason: HOSPADM

## 2024-07-17 RX ORDER — MAGNESIUM SULFATE IN WATER 40 MG/ML
2000 INJECTION, SOLUTION INTRAVENOUS PRN
Status: DISCONTINUED | OUTPATIENT
Start: 2024-07-17 | End: 2024-07-20 | Stop reason: HOSPADM

## 2024-07-17 RX ORDER — ACETAMINOPHEN 325 MG/1
650 TABLET ORAL EVERY 6 HOURS PRN
Status: DISCONTINUED | OUTPATIENT
Start: 2024-07-17 | End: 2024-07-20 | Stop reason: HOSPADM

## 2024-07-17 RX ORDER — ONDANSETRON 4 MG/1
4 TABLET, ORALLY DISINTEGRATING ORAL EVERY 8 HOURS PRN
Status: DISCONTINUED | OUTPATIENT
Start: 2024-07-17 | End: 2024-07-20 | Stop reason: HOSPADM

## 2024-07-17 RX ORDER — POLYETHYLENE GLYCOL 3350 17 G/17G
17 POWDER, FOR SOLUTION ORAL DAILY PRN
Status: DISCONTINUED | OUTPATIENT
Start: 2024-07-17 | End: 2024-07-18

## 2024-07-17 RX ORDER — POTASSIUM CHLORIDE 20 MEQ/1
40 TABLET, EXTENDED RELEASE ORAL ONCE
Status: COMPLETED | OUTPATIENT
Start: 2024-07-17 | End: 2024-07-18

## 2024-07-17 RX ORDER — SODIUM CHLORIDE 9 MG/ML
INJECTION, SOLUTION INTRAVENOUS PRN
Status: DISCONTINUED | OUTPATIENT
Start: 2024-07-17 | End: 2024-07-20 | Stop reason: HOSPADM

## 2024-07-17 RX ORDER — LISINOPRIL 5 MG/1
2.5 TABLET ORAL DAILY
Status: DISCONTINUED | OUTPATIENT
Start: 2024-07-18 | End: 2024-07-20 | Stop reason: HOSPADM

## 2024-07-17 RX ORDER — ASPIRIN 81 MG/1
81 TABLET, CHEWABLE ORAL DAILY
Status: DISCONTINUED | OUTPATIENT
Start: 2024-07-18 | End: 2024-07-20 | Stop reason: HOSPADM

## 2024-07-17 RX ORDER — FUROSEMIDE 10 MG/ML
40 INJECTION INTRAMUSCULAR; INTRAVENOUS 2 TIMES DAILY
Status: COMPLETED | OUTPATIENT
Start: 2024-07-18 | End: 2024-07-19

## 2024-07-17 RX ORDER — POTASSIUM CHLORIDE 7.45 MG/ML
10 INJECTION INTRAVENOUS PRN
Status: DISCONTINUED | OUTPATIENT
Start: 2024-07-17 | End: 2024-07-20 | Stop reason: HOSPADM

## 2024-07-17 RX ORDER — SODIUM CHLORIDE 0.9 % (FLUSH) 0.9 %
5-40 SYRINGE (ML) INJECTION EVERY 12 HOURS SCHEDULED
Status: DISCONTINUED | OUTPATIENT
Start: 2024-07-17 | End: 2024-07-20 | Stop reason: HOSPADM

## 2024-07-17 RX ORDER — ONDANSETRON 2 MG/ML
4 INJECTION INTRAMUSCULAR; INTRAVENOUS EVERY 6 HOURS PRN
Status: DISCONTINUED | OUTPATIENT
Start: 2024-07-17 | End: 2024-07-20 | Stop reason: HOSPADM

## 2024-07-17 RX ORDER — ACETAMINOPHEN 650 MG/1
650 SUPPOSITORY RECTAL EVERY 6 HOURS PRN
Status: DISCONTINUED | OUTPATIENT
Start: 2024-07-17 | End: 2024-07-20 | Stop reason: HOSPADM

## 2024-07-17 RX ORDER — ALBUTEROL SULFATE 2.5 MG/3ML
2.5 SOLUTION RESPIRATORY (INHALATION) EVERY 6 HOURS PRN
Status: DISCONTINUED | OUTPATIENT
Start: 2024-07-17 | End: 2024-07-20 | Stop reason: HOSPADM

## 2024-07-17 RX ORDER — AMIODARONE HYDROCHLORIDE 200 MG/1
200 TABLET ORAL DAILY
Status: DISCONTINUED | OUTPATIENT
Start: 2024-07-18 | End: 2024-07-20 | Stop reason: HOSPADM

## 2024-07-17 RX ADMIN — IOPAMIDOL 75 ML: 755 INJECTION, SOLUTION INTRAVENOUS at 20:01

## 2024-07-17 RX ADMIN — IOPAMIDOL 75 ML: 755 INJECTION, SOLUTION INTRAVENOUS at 19:53

## 2024-07-17 ASSESSMENT — ENCOUNTER SYMPTOMS
VOMITING: 0
COUGH: 0
NAUSEA: 0
DIARRHEA: 0
RHINORRHEA: 0
SHORTNESS OF BREATH: 1
ABDOMINAL PAIN: 0

## 2024-07-17 ASSESSMENT — PAIN - FUNCTIONAL ASSESSMENT: PAIN_FUNCTIONAL_ASSESSMENT: NONE - DENIES PAIN

## 2024-07-17 NOTE — ED PROVIDER NOTES
[facility] EMERGENCY DEPARTMENT  EMERGENCY DEPARTMENT ENCOUNTER        Pt Name: Manoj Feliz  MRN: [id number]  Birthdate 1960  Date of evaluation: 7/17/2024  Provider: Nayely Ramirez PA-C  PCP: No primary care provider on file.  Note Started: 6:38 PM EDT 7/17/24       I have seen and evaluated this patient with my supervising physician Charlie Howell MD.      CHIEF COMPLAINT       Chief Complaint   Patient presents with    Dizziness     States dizziness, \"blinking out\" states he was coughing up sputum and was on the verge of \"passing out\"        HISTORY OF PRESENT ILLNESS: 1 or more Elements     History From: Patient  Limitations to history : None    Manoj Feliz is a 63 y.o. male who presents to the emergency department today for evaluation for concerns of a near syncopal episode.  The patient has a defibrillator/pacemaker in place.  He has a history of CHF.  History of A-fib, is anticoagulated on Eliquis.  Patient reports that he was recently admitted for CHF, andStates that since being home he has been doing well up until the last 3 to 4 days.  The patient reports that he feels that there is \"fluid on my lungs\".  The patient reports that he has been coughing because of this fluid, and states that after coughing today he states that he felt very lightheaded and felt that he was going to pass out.  He did not syncopize fully.  Patient does report shortness of breath, but denies any chest pain or chest tightness.  He does report some lower leg edema.  He is unsure about his weight gain.  He denies any fevers.  No vomiting or diarrhea.  No chest pain, no other complaints.    Nursing Notes were all reviewed and agreed with or any disagreements were addressed in the HPI.    REVIEW OF SYSTEMS :      Review of Systems   Constitutional:  Negative for activity change, appetite change, chills and fever.   HENT:  Negative for congestion and rhinorrhea.    Respiratory:  Positive for  gentle diuresis.  His last echocardiogram EF was 10%.  Hospitalist has been paged for admission, patient is updated agreeable plan, stable for admission    I am the Primary Clinician of Record.  FINAL IMPRESSION      1. Acute on chronic congestive heart failure, unspecified heart failure type (HCC)    2. Near syncope          DISPOSITION/PLAN     DISPOSITION Decision To Admit 07/17/2024 09:41:27 PM      PATIENT REFERRED TO:  No follow-up provider specified.    DISCHARGE MEDICATIONS:  New Prescriptions    No medications on file       DISCONTINUED MEDICATIONS:  Discontinued Medications    No medications on file              (Please note that portions of this note were completed with a voice recognition program.  Efforts were made to edit the dictations but occasionally words are mis-transcribed.)    Nayely Ramirez PA-C (electronically signed)        Nayely Ramirez PA-C  07/17/24 [id number]

## 2024-07-18 PROBLEM — I95.89 CHRONIC HYPOTENSION: Status: ACTIVE | Noted: 2022-09-01

## 2024-07-18 PROBLEM — I50.22 SYSTOLIC CHF, CHRONIC (HCC): Status: ACTIVE | Noted: 2024-06-15

## 2024-07-18 LAB
ANION GAP SERPL CALCULATED.3IONS-SCNC: 10 MMOL/L (ref 3–16)
BUN SERPL-MCNC: 21 MG/DL (ref 7–20)
CALCIUM SERPL-MCNC: 9.4 MG/DL (ref 8.3–10.6)
CHLORIDE SERPL-SCNC: 102 MMOL/L (ref 99–110)
CHOLEST SERPL-MCNC: 96 MG/DL (ref 0–199)
CO2 SERPL-SCNC: 29 MMOL/L (ref 21–32)
CREAT SERPL-MCNC: 1.6 MG/DL (ref 0.8–1.3)
DEPRECATED RDW RBC AUTO: 16.2 % (ref 12.4–15.4)
EKG ATRIAL RATE: 74 BPM
EKG DIAGNOSIS: NORMAL
EKG Q-T INTERVAL: 508 MS
EKG QRS DURATION: 182 MS
EKG QTC CALCULATION (BAZETT): 596 MS
EKG R AXIS: -81 DEGREES
EKG T AXIS: 85 DEGREES
EKG VENTRICULAR RATE: 83 BPM
GFR SERPLBLD CREATININE-BSD FMLA CKD-EPI: 47 ML/MIN/{1.73_M2}
GLUCOSE SERPL-MCNC: 106 MG/DL (ref 70–99)
HCT VFR BLD AUTO: 40.9 % (ref 40.5–52.5)
HDLC SERPL-MCNC: 61 MG/DL (ref 40–60)
HGB BLD-MCNC: 13.2 G/DL (ref 13.5–17.5)
LDLC SERPL CALC-MCNC: 21 MG/DL
MAGNESIUM SERPL-MCNC: 2.5 MG/DL (ref 1.8–2.4)
MCH RBC QN AUTO: 28.6 PG (ref 26–34)
MCHC RBC AUTO-ENTMCNC: 32.4 G/DL (ref 31–36)
MCV RBC AUTO: 88.2 FL (ref 80–100)
PLATELET # BLD AUTO: 190 K/UL (ref 135–450)
PMV BLD AUTO: 9.3 FL (ref 5–10.5)
POTASSIUM SERPL-SCNC: 4.5 MMOL/L (ref 3.5–5.1)
PROCALCITONIN SERPL IA-MCNC: 0.11 NG/ML (ref 0–0.15)
RBC # BLD AUTO: 4.64 M/UL (ref 4.2–5.9)
SODIUM SERPL-SCNC: 141 MMOL/L (ref 136–145)
TRIGL SERPL-MCNC: 68 MG/DL (ref 0–150)
TROPONIN, HIGH SENSITIVITY: 22 NG/L (ref 0–22)
VLDLC SERPL CALC-MCNC: 14 MG/DL
WBC # BLD AUTO: 4.3 K/UL (ref 4–11)

## 2024-07-18 PROCEDURE — 80061 LIPID PANEL: CPT

## 2024-07-18 PROCEDURE — 2580000003 HC RX 258: Performed by: INTERNAL MEDICINE

## 2024-07-18 PROCEDURE — 99223 1ST HOSP IP/OBS HIGH 75: CPT | Performed by: INTERNAL MEDICINE

## 2024-07-18 PROCEDURE — 36415 COLL VENOUS BLD VENIPUNCTURE: CPT

## 2024-07-18 PROCEDURE — 84145 PROCALCITONIN (PCT): CPT

## 2024-07-18 PROCEDURE — 6370000000 HC RX 637 (ALT 250 FOR IP): Performed by: INTERNAL MEDICINE

## 2024-07-18 PROCEDURE — 85027 COMPLETE CBC AUTOMATED: CPT

## 2024-07-18 PROCEDURE — 80048 BASIC METABOLIC PNL TOTAL CA: CPT

## 2024-07-18 PROCEDURE — 83735 ASSAY OF MAGNESIUM: CPT

## 2024-07-18 PROCEDURE — 1200000000 HC SEMI PRIVATE

## 2024-07-18 PROCEDURE — 93010 ELECTROCARDIOGRAM REPORT: CPT | Performed by: INTERNAL MEDICINE

## 2024-07-18 PROCEDURE — 84484 ASSAY OF TROPONIN QUANT: CPT

## 2024-07-18 PROCEDURE — 6360000002 HC RX W HCPCS: Performed by: INTERNAL MEDICINE

## 2024-07-18 RX ORDER — POLYETHYLENE GLYCOL 3350 17 G/17G
17 POWDER, FOR SOLUTION ORAL DAILY
Status: DISCONTINUED | OUTPATIENT
Start: 2024-07-19 | End: 2024-07-20 | Stop reason: HOSPADM

## 2024-07-18 RX ADMIN — APIXABAN 5 MG: 5 TABLET, FILM COATED ORAL at 00:16

## 2024-07-18 RX ADMIN — POLYETHYLENE GLYCOL 3350 17 G: 17 POWDER, FOR SOLUTION ORAL at 06:52

## 2024-07-18 RX ADMIN — APIXABAN 5 MG: 5 TABLET, FILM COATED ORAL at 09:42

## 2024-07-18 RX ADMIN — ONDANSETRON 4 MG: 2 INJECTION INTRAMUSCULAR; INTRAVENOUS at 06:52

## 2024-07-18 RX ADMIN — AMIODARONE HYDROCHLORIDE 200 MG: 200 TABLET ORAL at 09:42

## 2024-07-18 RX ADMIN — SODIUM CHLORIDE, PRESERVATIVE FREE 10 ML: 5 INJECTION INTRAVENOUS at 09:45

## 2024-07-18 RX ADMIN — SPIRONOLACTONE 25 MG: 25 TABLET ORAL at 09:43

## 2024-07-18 RX ADMIN — TAMSULOSIN HYDROCHLORIDE 0.4 MG: 0.4 CAPSULE ORAL at 09:43

## 2024-07-18 RX ADMIN — FINASTERIDE 5 MG: 5 TABLET, FILM COATED ORAL at 09:43

## 2024-07-18 RX ADMIN — WATER 1000 MG: 1 INJECTION INTRAMUSCULAR; INTRAVENOUS; SUBCUTANEOUS at 00:16

## 2024-07-18 RX ADMIN — ASPIRIN 81 MG 81 MG: 81 TABLET ORAL at 09:43

## 2024-07-18 RX ADMIN — FUROSEMIDE 40 MG: 10 INJECTION, SOLUTION INTRAMUSCULAR; INTRAVENOUS at 09:37

## 2024-07-18 RX ADMIN — EMPAGLIFLOZIN 10 MG: 10 TABLET, FILM COATED ORAL at 09:43

## 2024-07-18 RX ADMIN — FUROSEMIDE 40 MG: 10 INJECTION, SOLUTION INTRAMUSCULAR; INTRAVENOUS at 17:29

## 2024-07-18 RX ADMIN — POTASSIUM CHLORIDE 40 MEQ: 1500 TABLET, EXTENDED RELEASE ORAL at 00:16

## 2024-07-18 RX ADMIN — METOPROLOL SUCCINATE 25 MG: 25 TABLET, EXTENDED RELEASE ORAL at 09:43

## 2024-07-18 RX ADMIN — SODIUM CHLORIDE, PRESERVATIVE FREE 10 ML: 5 INJECTION INTRAVENOUS at 00:16

## 2024-07-18 RX ADMIN — APIXABAN 5 MG: 5 TABLET, FILM COATED ORAL at 22:57

## 2024-07-18 RX ADMIN — LISINOPRIL 2.5 MG: 5 TABLET ORAL at 09:37

## 2024-07-18 RX ADMIN — SODIUM CHLORIDE, PRESERVATIVE FREE 10 ML: 5 INJECTION INTRAVENOUS at 22:57

## 2024-07-18 RX ADMIN — WATER 1000 MG: 1 INJECTION INTRAMUSCULAR; INTRAVENOUS; SUBCUTANEOUS at 22:57

## 2024-07-18 NOTE — CONSULTS
[facility], [facility] [place]   Electrophysiology   Date: 7/18/2024  Reason for Consultation: pre syncope   Consult Requesting Physician: Cristian Lira MD     Chief Complaint   Patient presents with    Dizziness     States dizziness, \"blinking out\" states he was coughing up sputum and was on the verge of \"passing out\"        CC: lightheadedness   HPI: Manoj Feliz is a 63 y.o. male with past medical history of hypertension, HFrEF, CKD, atrial fibrillation.    Patient present to hospital with complaints of lightheadedness after cough spell. States he felt like he need to cough up phlegm, and after coughing spell, he became lightheaded and felt like he might fall. He did not fall, no LOC. States he leaned onto the wall and that kept him from falling down. He endorses increase in lower leg edema. He was told by his primary doctor to increase torsemide from 1 to 2 tablets as needed for increase swelling. He did so but noticed he was going to run out of medication before his refill so he went back to taking 1 tablet.     Patient was admitted for CHF exacerbation .       EP was consulted for syncope.     Assessment:   Pre-syncope   Persistent atrial fibrillation   HFrEF    S/p AICD   HTN    Plan:   Presyncope after intense coughing spell in the setting of severe LV dysfunction.   Device interrogated.   No ventricular arrhythmia noted.     Patient has had persistent atrial fibrillation with controlled ventricular response.   High risk [id number]-CDLb score. Anticoagulation is recommended.  On Eliquis.   Continue with Amiodarone and Toprol XL.   Cardioversion failed and mild sedation was very challenging with hypoxemia due to severe LV dysfunction. He is high risk for general anesthesia and any EP intervention, e.g. ablation.   Rate control and anticoagulation is main therapy.     On Antibiotic therapy for bronchitis.     No further EP recommendations.     Relevant available labs, and cardiovascular diagnostics, 
[facility]  HEART FAILURE PROGRAM      Manoj Feliz 1960    History:  Past Medical History:   Diagnosis Date    Acute combined systolic and diastolic congestive heart failure (HCC) 8/31/2022    Atrial fibrillation (HCC) 07/25/2019    CHF (congestive heart failure) (HCC)     Hx of blood clots     Hypertension            ECHO:  5/22/24  Limited ECHO with 2D imaging, Color-Flow and Spectral Doppler:     Severe global and regional LV systolic dysfunction.  Estimated EF 10-15%.  Severely enlarged LV chamber size with eccentric remodeling.    Increased LV apical trabeculation.  No evidence for LV thrombus with Definity.  Spontaneous echo-contrast observed.  Indeterminate LV diastolic function.    Severe bi-atrial enlargement.  Moderate-severely reduced RV systolic function.  RV size is enlarged.  Device wire is present.  Mild to moderate aortic valve regurgitation.  Severe mitral valve regurgitation.  Severe tricuspid valve regurgitation.  At least moderate pulmonary hypertension based on TR signal obtained.  IVC not visualized.    Mildly dilated proximal ascending thoracic aorta, 4.1 cm.     ACE/ARB/ARNi: on lisinopril 2.5 mg daily--- has been too hypotensive in the past for ARNi  BB: toprol xl 25 mg daily  Aldosterone Antagonist: aldactone 25 mg daily  SGLT2: jardiance 10 mg daily     History of sleep apnea: No     [place] Screen ordered: No; screened on last admission-score 9     DM History: No           Last Hospital Admission:  6/14-6/21 with CHF  2/17/24 with CHF  Code Status: full   Discharge plans:homeless     Family Present: no     Manoj Feliz was admitted to the hospital with increased shortness of breath. Patient is well known to HF team and follows as an outpatient. He states his weight has been around 194-196 lb as an outpatient. He was admitted due to dizziness. He didn't notice any edema or dyspnea.. Patient did not call HF clinic with symptoms. He stopped at the PCP clinic 
See previous consult note  
  Neck:  No thyromegaly  Abdomen:  No masses or tenderness  Liver/Spleen: No Abnormalities Noted  Neurological/Psychiatric:  Alert and oriented in all spheres  Moves all extremities well  Exhibits normal gait balance and coordination  No abnormalities of mood, affect, memory, mentation, or behavior are noted    Labs were reviewed including labs from other hospital systems through Care Everywhere.  Cardiac testing was reviewed including echos, nuclear scans, cardiac catheterization, including from other hospital systems through Care Everywhere.    Assessment:    1. Chronic severe systolic congestive heart failure, unspecified heart failure type (HCC)    2. Near syncope most likely due to cough    3.  Chronic hypotension due to severe systolic HF  4.  ICD  5.  homelessness    Plan:   His CHF is not decompensated.  This is actually his baseline.  Okay to continue to give him IV lasix tonight and tomorrow morning, then switch back to torsemide.  Suggest increasing torsemide dose on discharge  Continue spironolactone, Jardiance, toprol xl, lisinopril.  EP will interrogate his device to make sure there are no arrhythmias causing his near syncope.  His presyncope most likely due to cough episode.  Palliative care/hospice care would be indicated for him  He can be discharged tomorrow to follow up in our office.  CHF education reinforced.  ~salt restriction  ~fluid restriction  ~medication compliance  ~daily weights and notify of any significant weight gain/loss  ~establish with CHF nurse  ~outpatient follow-up with our CHF team    The patient was seen for > 75 minutes. I reviewed interval history, physical exam, review of data including labs, imaging, development and implementation of treatment plan and coordination of complex care. More than 50% of the time was devoted to counseling the patient on their diagnoses/treatments, as well as coordination of care with the other care teams      I appreciate the opportunity of

## 2024-07-18 NOTE — DISCHARGE INSTRUCTIONS
Guidelines for Heart Failure home management:    1. Continue to monitor weight first thing each morning. You should weigh yourself after using the bathroom and before you eat breakfast.    2. Report to your doctor any significant weight change. Remember that weight change of 2-3 lbs. in 1 day or 5 lbs in a week is \"significant\" and likely represents changes in \"fluid\" status.  If you are experiencing any swelling in your feet, ankles or abdomen, or shortness of breath, call your doctor.     3. You should restrict all sodium intake to 3000 milligrams (3 grams) a day. Depending on your status, you may also be asked to restrict fluid intake to no more that 64 oz/2 Liters a day. If uncertain, ask the nurse or physician.     4. Regular aerobic exercise is encouraged 30 minutes a day (walking, bike, swimming, etc.). For specific exercise recommendations, ask your physician.     5. Report to your doctor any change in symptoms (chest pain, worsening shortness of breath, increased dizziness or passing out, increased palpitations or ICD shock, trouble catching breath while lying down, increased edema or abdominal bloating). Remember that even \"minor\" changes in symptoms may be important. Also report any changes in medications including \"over the counter\" medications.     6. DO NOT take NSAID's for pain (i.e, Advil, Aleve, Motrin, ibuprofen, and many more) since these may cause serious problems in those with a history of CHF. If uncertain about the medication, call your doctor.    7. If you have new significant or ongoing diarrhea or vomiting, please call your doctor for further instructions. Taking a diuretic (water pill) with these symptoms can worsen dehydration.     8. If you have any questions or concerns you can always call the CHF  Resource Line( [phone].  It is available Monday thru Friday 8 am- 5 pm. Please leave a message and your call will be returned shortly.     Extra Heart Failure

## 2024-07-18 NOTE — RT PROTOCOL NOTE
RT Inhaler-Nebulizer Bronchodilator Protocol Note    There is a bronchodilator order in the chart from a provider indicating to follow the RT Bronchodilator Protocol and there is an “Initiate RT Inhaler-Nebulizer Bronchodilator Protocol” order as well (see protocol at bottom of note).    CXR Findings:  XR CHEST PORTABLE    Result Date: 7/17/2024  1. No acute cardiopulmonary process. 2. Cardiomegaly. 3. Prominence of the right interlobar artery, suggesting pulmonary arterial hypertension.       The findings from the last RT Protocol Assessment were as follows:   History Pulmonary Disease: None or smoker <15 pack years  Respiratory Pattern: Dyspnea on exertion or RR 21-25 bpm  Breath Sounds: Slightly diminished and/or crackles  Cough: Strong, spontaneous, non-productive  Indication for Bronchodilator Therapy:    Bronchodilator Assessment Score: 4    Aerosolized bronchodilator medication orders have been revised according to the RT Inhaler-Nebulizer Bronchodilator Protocol below.    Respiratory Therapist to perform RT Therapy Protocol Assessment initially then follow the protocol.  Repeat RT Therapy Protocol Assessment PRN for score 0-3 or on second treatment, BID, and PRN for scores above 3.    No Indications - adjust the frequency to every 6 hours PRN wheezing or bronchospasm, if no treatments needed after 48 hours then discontinue using Per Protocol order mode.     If indication present, adjust the RT bronchodilator orders based on the Bronchodilator Assessment Score as indicated below.  Use Inhaler orders unless patient has one or more of the following: on home nebulizer, not able to hold breath for 10 seconds, is not alert and oriented, cannot activate and use MDI correctly, or respiratory rate 25 breaths per minute or more, then use the equivalent nebulizer order(s) with same Frequency and PRN reasons based on the score.  If a patient is on this medication at home then do not decrease Frequency below that used at  home.    0-3 - enter or revise RT bronchodilator order(s) to equivalent RT Bronchodilator order with Frequency of every 4 hours PRN for wheezing or increased work of breathing using Per Protocol order mode.        4-6 - enter or revise RT Bronchodilator order(s) to two equivalent RT bronchodilator orders with one order with BID Frequency and one order with Frequency of every 4 hours PRN wheezing or increased work of breathing using Per Protocol order mode.        7-10 - enter or revise RT Bronchodilator order(s) to two equivalent RT bronchodilator orders with one order with TID Frequency and one order with Frequency of every 4 hours PRN wheezing or increased work of breathing using Per Protocol order mode.       11-13 - enter or revise RT Bronchodilator order(s) to one equivalent RT bronchodilator order with QID Frequency and an Albuterol order with Frequency of every 4 hours PRN wheezing or increased work of breathing using Per Protocol order mode.      Greater than 13 - enter or revise RT Bronchodilator order(s) to one equivalent RT bronchodilator order with every 4 hours Frequency and an Albuterol order with Frequency of every 2 hours PRN wheezing or increased work of breathing using Per Protocol order mode.     RT to enter RT Home Evaluation for COPD & MDI Assessment order using Per Protocol order mode.    Electronically signed by Feliicta Nichols RCP on 7/18/2024 at 4:18 AM

## 2024-07-18 NOTE — CARE COORDINATION
Case Management Assessment  Initial Evaluation    Date/Time of Evaluation: 7/18/2024 4:51 PM  Assessment Completed by: Kiki Engel RN    If patient is discharged prior to next notation, then this note serves as note for discharge by case management.    Patient Name: Manoj Feliz                   YOB: 1960  Diagnosis: Near syncope [R55]  Acute on chronic congestive heart failure, unspecified heart failure type (HCC) [I50.9]  Decompensated heart failure (HCC) [I50.9]                   Date / Time: 7/17/2024  6:12 PM    Patient Admission Status: Inpatient   Readmission Risk (Low < 19, Mod (19-27), High > 27): Readmission Risk Score: 21.4    Current PCP: No primary care provider on file.  PCP verified by CM? Yes    Chart Reviewed: Yes      History Provided by: Patient  Patient Orientation: Alert and Oriented, Person, Place, Situation    Patient Cognition: Alert    Hospitalization in the last 30 days (Readmission):  Yes    If yes, Readmission Assessment in CM Navigator will be completed.    Advance Directives:      Code Status: Full Code   Patient's Primary Decision Maker is: Named in Scanned ACP Document    Primary Decision Maker: Naldo Bauman - Healthcare Decision Maker - [phone]    Discharge Planning:    Patient lives with: Alone Type of Home: House  Primary Care Giver: Self  Patient Support Systems include: Friends/Neighbors   Current Financial resources: Medicaid  Current community resources: None  Current services prior to admission: None            Current DME:              Type of Home Care services:  None    ADLS  Prior functional level: Independent in ADLs/IADLs  Current functional level: Independent in ADLs/IADLs    PT AM-PAC:   /24  OT AM-PAC:   /24    Family can provide assistance at DC: No (no family, just friends)  Would you like Case Management to discuss the discharge plan with any other family members/significant others, and if so, who?    Plans to Return to Present  data associated with the providers was provided to:     Patient Representative Name:       The Patient and/or Patient Representative Agree with the Discharge Plan?      Electronically signed by ANGEL Benoit on 7/18/2024 at 4:54 PM    Case Management Department  Ph: [phone]

## 2024-07-18 NOTE — PROGRESS NOTES
Nutrition Education    Provided heart failure diet education.  Education included low sodium diet guidelines (3-4 gm Na+/day) and fluid restriction (64 oz/day).  Reviewed foods to choose and foods to avoid, along with label reading and ways to add flavor to food.  Pt currently tries to follow a low sodium diet at home and does not add salt to food.  Pt states understanding of education.  Time spent with patient: 10 minutes.     Learners: Patient  Readiness: Acceptance  Method: Explanation and Handout  Response: Verbalizes Understanding  Contact name and number provided.    Elba Taylor RD, LD  Contact Number: [phone]

## 2024-07-18 NOTE — ED NOTES
ED TO INPATIENT SBAR HANDOFF    Patient Name: Manoj Feliz   :  1960  63 y.o.   MRN:  [id number]  Preferred Name  Manoj  ED Room #:  ED-0005/05  Family/Caregiver Present no   Restraints no   Sitter no   Sepsis Risk Score      Situation  Code Status: Full Code No additional code details.    Allergies: Patient has no known allergies.  Weight: Patient Vitals for the past 96 hrs (Last 3 readings):   Weight   24 1818 88 kg (194 lb)     Arrived from: home  Chief Complaint:   Chief Complaint   Patient presents with    Dizziness     States dizziness, \"blinking out\" states he was coughing up sputum and was on the verge of \"passing out\"      Hospital Problem/Diagnosis:  Principal Problem:    Decompensated heart failure (HCC)  Resolved Problems:    * No resolved hospital problems. *    Imaging:   CT CHEST PULMONARY EMBOLISM W CONTRAST   Final Result   1. Mixing artifact within the pulmonary arteries partially limits evaluation   and is more prominent compared to the prior study with a degree of webbing   not excluded.  However, no obvious acute pulmonary emboli identified.   2. Cardiovascular findings suggestive of pulmonary hypertension and right   heart dysfunction.   3. Mild bronchial wall thickening potentially due pulmonary vascular   congestion, reactive airways disease, or bronchitis.   4. A few additional incidental findings as above for which no dedicated   follow-up is recommended.      RECOMMENDATIONS:   Right Bosniak I benign renal cyst measuring 5.2 cm. No follow-up imaging is   recommended.      JACR 2018 Feb; [id number], Management of the Incidental Renal Mass on CT,   [employer] ; [id number], Bosniak Classification of Cystic Renal Masses,   Version 2019.            XR CHEST PORTABLE   Final Result   1. No acute cardiopulmonary process.   2. Cardiomegaly.   3. Prominence of the right interlobar artery, suggesting pulmonary arterial   hypertension.           Abnormal labs:   Abnormal Labs 
How does patient ambulate?   [x]Low Fall Risk (ambulates by themselves without support)  []Stand by assist   []Contact Guard   []Front wheel walker  []Wheelchair   []Steady  []Bed bound  []History of Lower Extremity Amputation  []Unknown, did not assess in the emergency department   How does patient take pills?  [x]Whole with Water  []Crushed in applesauce  []Crushed in pudding  []Other  []Unknown no oral medications were given in the ED  Is patient alert?   [x]Alert  []Drowsy but responds to voice  []Doesn't respond to voice but responds to painful stimuli  []Unresponsive  Is patient oriented?   [x]To person  [x]To place  [x]To time  [x]To situation  []Confused  []Agitated  []Follows commands  If patient is disoriented or from a Skill Nursing Facility has family been notified of admission?   []Yes   [x]No  Patient belongings?   [x]Cell phone  [x]Wallet   []Dentures  [x]Clothing  Any specific patient or family belongings/needs/dynamics?   N/A  Miscellaneous comments/pending orders?  N/A     If there are any additional questions please reach out to the Emergency Department.         
Orthos:  Lyin/83  Pulse 70    Sittin/84  Pulse 78    Standin/85  Pulse 78.    Pt denies dizziness during orthostatics. To CT at this time via wheelchair.   
Patient oriented to room and ED throughput process.  Safety measures with ED bed locked in lowest position and call light in reach.  Patient educated on all orders, including any medications.  Patient educated on chief complaint/symptoms. Patient encouraged to ask questions regarding care, medications or treatment plan.  Patient aware of how to reach staff with questions/concerns.    
No

## 2024-07-18 NOTE — H&P
LUNGS/PLEURA:  Partial collapse of the central airways on some images likely due to expiration during image acquisition.  Mild bronchial wall thickening. Unchanged patchy linear opacities in the mid to basilar lungs.  Minimal gravity dependent atelectasis.  No pleural effusions nor pneumothoraces. UPPER ABDOMEN:  5.2 cm simple cyst arising from the upper pole of the right kidney (Bosniak category 1).  Prominent reflux of the contrast bolus into the inferior vena cava and hepatic veins. SOFT TISSUES/BONES:   Reflux of the contrast bolus into venous collaterals about the right shoulder, most notably into the right internal jugular vein. At least minimal subcutaneous edema.  No supraclavicular nor axillary lymphadenopathy.  Minimal bilateral gynecomastia.  No acute fractures nor suspicious bony lesions.     1. Mixing artifact within the pulmonary arteries partially limits evaluation and is more prominent compared to the prior study with a degree of webbing not excluded.  However, no obvious acute pulmonary emboli identified. 2. Cardiovascular findings suggestive of pulmonary hypertension and right heart dysfunction. 3. Mild bronchial wall thickening potentially due pulmonary vascular congestion, reactive airways disease, or bronchitis. 4. A few additional incidental findings as above for which no dedicated follow-up is recommended. RECOMMENDATIONS: Right Bosniak I benign renal cyst measuring 5.2 cm. No follow-up imaging is recommended. JACR 2018 Feb; 264-273, Management of the Incidental Renal Mass on CT, [employer] 2021; [id number], Bosniak Classification of Cystic Renal Masses, Version 2019.     XR CHEST PORTABLE    Result Date: 7/17/2024  EXAMINATION: ONE XRAY VIEW OF THE CHEST 7/17/2024 5:41 pm COMPARISON: 06/19/2024 HISTORY: ORDERING SYSTEM PROVIDED HISTORY: sob TECHNOLOGIST PROVIDED HISTORY: Reason for exam:->sob Reason for Exam: sob FINDINGS: The lungs appear clear.  There is prominence of the right interlobar  artery, suggesting pulmonary arterial hypertension.  There is prominent cardiomegaly. AICD pacer leads are in good position.  Bony structures unremarkable.  There is no change from prior examination.     1. No acute cardiopulmonary process. 2. Cardiomegaly. 3. Prominence of the right interlobar artery, suggesting pulmonary arterial hypertension.         Electronically signed by Quyen Lovell MD on 7/17/2024 at 10:39 PM

## 2024-07-18 NOTE — PROGRESS NOTES
Hospitalist Progress Note      Name:  Manoj Feliz /Age/Sex: 1960  (63 y.o. male)   MRN & CSN:  [id number] & [id number] Encounter Date/Time: 2024 8:02 AM EDT   Location:  [id number] PCP: No primary care provider on file.     Attending:Cristian Lira MD       Hospital Day: 2    Subjective:   Chief Complaint:   Chief Complaint   Patient presents with    Dizziness     States dizziness, \"blinking out\" states he was coughing up sputum and was on the verge of \"passing out\"      Manoj Feliz is a 63 y.o. male with a past medical history of hypertension, sCHF, EF 10-15%, Biv-AICD, CKD, atrial fibrillation, homelessness who presented with dizziness.   Admitted for decompensated CHF.  He was recently admitted and diuresed, discharged to \"home\" a friends cough with palliative care referral.      Interval History:  Today, he is sitting up in the chair.  He denies CP or SOB. No further dizziness or presyncope.  Reports that he was sitting in his chair when he developed a coughing spell.  He was able to cough up white frothy sputum and then he got up uickly to spit it out in the bathroom and he became lightheaded and thought he might pass out, however he did not have LOC.  Reports that he was instructed to double up his torsemide, he was doing this, however he realized he was going to run out, so for the last week he has only been taking one a day instead of 2.  He is wondering if this is the issues.  He has more LE swelling, however he states his goal weight is <200 lbs and he has jacques below that.      Independently reviewed interval ancillary notes from cardiology.     Assessment and Recommendations   Problem List  Principal Problem:    Decompensated heart failure (HCC)  Resolved Problems:    * No resolved hospital problems. *     Assessment and Plan:    Acute on Chronic Systolic CHF   - Increase LE swelling, elevated BNP, chest imaging stable, productive cough with frothy sputum   - Continue  Ear drainage and pain.    IV lasix 40 mg bid    - Strict I&O, daily weights, sodium and fluid restriction    - Cardiology consultation pending  Presyncope   - Likely vasovagal with coughing spell and running to the bathroom   - No LOC   - Orthostatic negative, no recurrent episodes  Bronchitis   - Started on ceftriaxone    - No wheezes on exam   - More likely due to CHF, procal    Hypertension   - Controlled.  Continue current medications  BPH   - finasteride and Flomax   - Possible contributor to dizziness, presyncope  Persistent Atrial Fibrillation   - Paced on telemetry   - Continue amiodarone 200 mg daily and Toprol 25 mg   daily    - Continue Eliquis 5 mg bid  Plan:   Continue IV lasix 40 mg bid  BMP, mag, CBC in am   Consult cardiology     Drugs that require monitoring for toxicity include: IV lasix and the method of monitoring was/is daily BMP and mag .    Discussed care with patient and nursing.   All pertinent information and plan of care discussed with the attending physician.     Diet: ADULT DIET; Regular; No Added Salt (3-4 gm)  Code Status: Full Code  DVT Prophylaxis: Eliquis  Disposition PTA: Home /Homeless  Discharge Disposition:  Pending  Surrogate Decision Maker/ POA: Naldo Bauman    Review of Systems:      Pertinent positives and negatives discussed in HPI    Objective:   No intake or output data in the 24 hours ending 07/18/24 0802   Vitals:   Vitals:    07/17/24 2302 07/18/24 0000 07/18/24 0005 07/18/24 0600   BP: 109/87 108/78  102/66   Pulse: 74 87  81   Resp: 10 12  16   Temp:  97.5 °F (36.4 °C)  97.4 °F (36.3 °C)   TempSrc:  Oral  Oral   SpO2: 96% 97%  97%   Weight:   89.2 kg (196 lb 9.6 oz) 89.2 kg (196 lb 9.6 oz)   Height:   1.88 m (6' 2.02\")      Physical Exam:    Physical Examination:  Vitals:    07/18/24 0600   BP: 102/66   Pulse: 81   Resp: 16   Temp: 97.4 °F (36.3 °C)   SpO2: 97%      No intake/output data recorded.   Wt Readings from Last 3 Encounters:   07/18/24 89.2 kg (196 lb 9.6 oz)   07/01/24 88.4 kg

## 2024-07-18 NOTE — CARE COORDINATION
07/18/24 1646   Readmission Assessment   Number of Days since last admission? 8-30 days   Previous Disposition Other (comment)  (is homeless, went home with a friend in [place], [place])   Who is being Interviewed Patient   What was the patient's/caregiver's perception as to why they think they needed to return back to the hospital? Other (Comment)  (started feeling dizzy and felt like he had fluid in his chest- coughing, dizzy, room spinning)   Did you visit your Primary Care Physician after you left the hospital, before you returned this time? Yes   Why weren't you able to visit your PCP? Other (Comment)  (he saw his PCP)   Did you see a specialist, such as Cardiac, Pulmonary, Orthopedic Physician, etc. after you left the hospital? Yes   Who advised the patient to return to the hospital? Self-referral   Does the patient report anything that got in the way of taking their medications? No   In our efforts to provide the best possible care to you and others like you, can you think of anything that we could have done to help you after you left the hospital the first time, so that you might not have needed to return so soon? Other (Comment)  (nothing we did - just coughing, dizzy, room spinning)

## 2024-07-18 NOTE — ED PROVIDER NOTES
In addition to the advanced practice provider, I personally saw Manoj Feliz and performed a substantive portion of the visit including all aspects of the medical decision making.    Medical Decision Making    Pt with dizziness and feeling near syncopal after a coughing spell.   He endorses cough productive of a lot of sputum over the last several days.  He is on eliquis.  Denies missing any of this.  No fevers or exposure to sick contacts.  He does endorse peripheral edema above baseline.  Per cardiology visit in 2023 patient has a history of nonischemic cardiomyopathy, systolic heart failure with last EF 10 to 15% status post AICD in April 2022.  He also has a history of left atrial appendage thrombus and moderate to severe pulmonary hypertension      On exam he has significant JVD and pitting edema to bilateral lower extremities.  He is in no respiratory distress.  No hypoxia on room air.  No tachypnea.  Cardiac regular rate and rhythm.  Answers all questions appropriately and moves all 4 extremities to command.      Patient appears clinically overloaded on arrival with peripheral edema and JVD, known history of systolic heart failure.  Currently not in any respiratory distress.  Blood pressure is borderline so with stable respiratory status we are avoiding diuretics in the emergency room.  CT PE study done showing widening in the pulmonary arteries, no obvious PE.  There are findings of pulmonary vascular congestion.  There is prominence of right interlobar pulmonary artery likely pulmonary hypertension.  It does not appear that he is on treatment for this.  He denies chest pain.  Initial high-sensitivity troponin is 24 which is at baseline for him.  Delta troponin pending.  I would admit for treatment of fluid overload and cardiology consultation regarding pulmonary hypertension as well as ACS rule out.  He is agreeable to plan.    EKG  The Ekg interpreted by me in the absence of a cardiologist

## 2024-07-19 LAB
ANION GAP SERPL CALCULATED.3IONS-SCNC: 8 MMOL/L (ref 3–16)
BUN SERPL-MCNC: 22 MG/DL (ref 7–20)
CALCIUM SERPL-MCNC: 9 MG/DL (ref 8.3–10.6)
CHLORIDE SERPL-SCNC: 101 MMOL/L (ref 99–110)
CO2 SERPL-SCNC: 29 MMOL/L (ref 21–32)
CREAT SERPL-MCNC: 1.7 MG/DL (ref 0.8–1.3)
DEPRECATED RDW RBC AUTO: 16.5 % (ref 12.4–15.4)
GFR SERPLBLD CREATININE-BSD FMLA CKD-EPI: 44 ML/MIN/{1.73_M2}
GLUCOSE SERPL-MCNC: 112 MG/DL (ref 70–99)
HCT VFR BLD AUTO: 38.9 % (ref 40.5–52.5)
HGB BLD-MCNC: 12.8 G/DL (ref 13.5–17.5)
MAGNESIUM SERPL-MCNC: 2.4 MG/DL (ref 1.8–2.4)
MCH RBC QN AUTO: 29.2 PG (ref 26–34)
MCHC RBC AUTO-ENTMCNC: 32.9 G/DL (ref 31–36)
MCV RBC AUTO: 88.8 FL (ref 80–100)
PLATELET # BLD AUTO: 166 K/UL (ref 135–450)
PMV BLD AUTO: 8.8 FL (ref 5–10.5)
POTASSIUM SERPL-SCNC: 4 MMOL/L (ref 3.5–5.1)
RBC # BLD AUTO: 4.38 M/UL (ref 4.2–5.9)
SODIUM SERPL-SCNC: 138 MMOL/L (ref 136–145)
WBC # BLD AUTO: 3.7 K/UL (ref 4–11)

## 2024-07-19 PROCEDURE — 83735 ASSAY OF MAGNESIUM: CPT

## 2024-07-19 PROCEDURE — 2580000003 HC RX 258: Performed by: INTERNAL MEDICINE

## 2024-07-19 PROCEDURE — 80048 BASIC METABOLIC PNL TOTAL CA: CPT

## 2024-07-19 PROCEDURE — 6370000000 HC RX 637 (ALT 250 FOR IP): Performed by: NURSE PRACTITIONER

## 2024-07-19 PROCEDURE — 6360000002 HC RX W HCPCS: Performed by: INTERNAL MEDICINE

## 2024-07-19 PROCEDURE — 85027 COMPLETE CBC AUTOMATED: CPT

## 2024-07-19 PROCEDURE — 36415 COLL VENOUS BLD VENIPUNCTURE: CPT

## 2024-07-19 PROCEDURE — 1200000000 HC SEMI PRIVATE

## 2024-07-19 PROCEDURE — 99232 SBSQ HOSP IP/OBS MODERATE 35: CPT | Performed by: NURSE PRACTITIONER

## 2024-07-19 PROCEDURE — 6370000000 HC RX 637 (ALT 250 FOR IP): Performed by: INTERNAL MEDICINE

## 2024-07-19 RX ORDER — TORSEMIDE 20 MG/1
TABLET ORAL
Qty: 90 TABLET | Refills: 1 | Status: SHIPPED | OUTPATIENT
Start: 2024-07-19 | End: 2024-07-22 | Stop reason: SDUPTHER

## 2024-07-19 RX ORDER — TORSEMIDE 20 MG/1
40 TABLET ORAL DAILY
Status: DISCONTINUED | OUTPATIENT
Start: 2024-07-20 | End: 2024-07-20 | Stop reason: HOSPADM

## 2024-07-19 RX ORDER — FINASTERIDE 5 MG/1
5 TABLET, FILM COATED ORAL DAILY
Qty: 30 TABLET | Refills: 0 | Status: SHIPPED | OUTPATIENT
Start: 2024-07-19 | End: 2024-07-22 | Stop reason: SDUPTHER

## 2024-07-19 RX ORDER — LISINOPRIL 2.5 MG/1
2.5 TABLET ORAL DAILY
Qty: 30 TABLET | Refills: 3 | Status: SHIPPED | OUTPATIENT
Start: 2024-07-20

## 2024-07-19 RX ORDER — TORSEMIDE 20 MG/1
20 TABLET ORAL
Status: DISCONTINUED | OUTPATIENT
Start: 2024-07-19 | End: 2024-07-20 | Stop reason: HOSPADM

## 2024-07-19 RX ADMIN — FUROSEMIDE 40 MG: 10 INJECTION, SOLUTION INTRAMUSCULAR; INTRAVENOUS at 09:58

## 2024-07-19 RX ADMIN — APIXABAN 5 MG: 5 TABLET, FILM COATED ORAL at 09:57

## 2024-07-19 RX ADMIN — TORSEMIDE 20 MG: 20 TABLET ORAL at 21:18

## 2024-07-19 RX ADMIN — APIXABAN 5 MG: 5 TABLET, FILM COATED ORAL at 21:18

## 2024-07-19 RX ADMIN — SODIUM CHLORIDE, PRESERVATIVE FREE 10 ML: 5 INJECTION INTRAVENOUS at 09:00

## 2024-07-19 RX ADMIN — SODIUM CHLORIDE, PRESERVATIVE FREE 10 ML: 5 INJECTION INTRAVENOUS at 21:18

## 2024-07-19 RX ADMIN — FINASTERIDE 5 MG: 5 TABLET, FILM COATED ORAL at 09:56

## 2024-07-19 RX ADMIN — LISINOPRIL 2.5 MG: 5 TABLET ORAL at 09:57

## 2024-07-19 RX ADMIN — SPIRONOLACTONE 25 MG: 25 TABLET ORAL at 09:57

## 2024-07-19 RX ADMIN — ASPIRIN 81 MG 81 MG: 81 TABLET ORAL at 09:57

## 2024-07-19 RX ADMIN — METOPROLOL SUCCINATE 25 MG: 25 TABLET, EXTENDED RELEASE ORAL at 09:56

## 2024-07-19 RX ADMIN — EMPAGLIFLOZIN 10 MG: 10 TABLET, FILM COATED ORAL at 10:03

## 2024-07-19 RX ADMIN — TAMSULOSIN HYDROCHLORIDE 0.4 MG: 0.4 CAPSULE ORAL at 09:56

## 2024-07-19 RX ADMIN — AMIODARONE HYDROCHLORIDE 200 MG: 200 TABLET ORAL at 09:55

## 2024-07-19 NOTE — PROGRESS NOTES
Device interrogation received and reviewed:     Device is functioning normally  ~ A- Paced 16.9%  [unfilled]-Paced 96.7%  ~Time in AT/AF: 98.2%  ~Episodes of NSVT or VT: 0  ~  5.5 years left on battery life expectancy  ~ Discussed with patient   ~Follow-up with device clinic as scheduled       No further recommendations from EP, will sign off

## 2024-07-19 NOTE — DISCHARGE SUMMARY
[facility]    Patient Demographics    Patient. Manoj Feliz  Date of Birth. 1960  MRN. [id number]     Primary care provider. No primary care provider on file.  (Tel: None)    Admit date: 7/17/2024    Discharge date (blank if same as Note Date):   Note Date: 7/19/2024     Reason for Hospitalization.   Chief Complaint   Patient presents with    Dizziness     States dizziness, \"blinking out\" states he was coughing up sputum and was on the verge of \"passing out\"        Significant Findings.   Principal Problem:    Decompensated heart failure (HCC)  Active Problems:    Near syncope    Chronic hypotension    Dilated cardiomyopathy (HCC)    Systolic CHF, chronic (HCC)  Resolved Problems:    * No resolved hospital problems. *     Problem-based Hospital Course.  Manoj Feliz is a 63 y.o. male with a past medical history of hypertension, sCHF, EF 10-15%, Biv-AICD, CKD, atrial fibrillation, homelessness who presented with dizziness.   Admitted for decompensated CHF.  He was recently admitted and diuresed, discharged to \"home\" a friends cough with palliative care referral.  Reports that he was sitting in his chair when he developed a coughing spell. He was able to cough up white frothy sputum and then he got up uickly to spit it out in the bathroom and he became lightheaded and thought he might pass out, however he did not have LOC. Reports that he was instructed to double up his torsemide, he was doing this, however he realized he was going to run out, so for the last week he has only been taking one a day instead of 2. Had increased BLE swelling.  He has had no recurrent dizziness.  Diuresed with IV lasix and his cough resolved. He was treated with IV ceftriaxone, however this was stopped with normal procal, no wheezes, no leukocytosis and no fever.  Sputum is frothy consistent with pulmonary edema and he has resolved completed with diuresis. He is ambulating

## 2024-07-19 NOTE — PROGRESS NOTES
Date/Time    INR 1.40 03/06/2023 09:13 PM    INR 1.52 03/02/2023 03:21 PM    INR 1.59 07/28/2022 10:50 PM        CARDIAC LABS  ENZYMES:No results for input(s): \"CKMB\", \"CKMBINDEX\", \"TROPONINI\" in the last 72 hours.    Invalid input(s): \"CKTOTAL;3\"  FASTING LIPID PANEL:  Lab Results   Component Value Date/Time    HDL 61 07/18/2024 05:59 AM    TRIG 68 07/18/2024 05:59 AM    TSH 3.97 07/01/2024 12:32 PM    TSH 4.33 06/15/2024 05:16 AM     LIVER PROFILE:  Lab Results   Component Value Date/Time    AST 35 07/17/2024 06:52 PM    AST 42 06/15/2024 12:01 AM    ALT 35 07/17/2024 06:52 PM    ALT 38 06/15/2024 12:01 AM     BNP:   Lab Results   Component Value Date/Time    PROBNP 16,073 07/17/2024 06:52 PM    PROBNP 17,904 07/08/2024 01:06 PM    PROBNP 20,433 07/01/2024 12:32 PM     Iron Studies:    Lab Results   Component Value Date/Time    TIBC 363 04/04/2024 02:24 PM    TIBC 345 02/18/2024 05:08 AM    TIBC 299 11/27/2023 05:28 AM    FERRITIN 509.8 04/04/2024 02:24 PM    FERRITIN 212.2 02/18/2024 05:08 AM    FERRITIN 275.1 11/27/2023 05:28 AM     Lab Results   Component Value Date    IRON 139 04/04/2024    TIBC 363 04/04/2024    FERRITIN 509.8 (H) 04/04/2024      Iron Deficiency Anemia:  No   IV Iron Therapy:  No  2017 ACC/AHA HF Guidelines:   intravenous iron replacement in patients with New York Heart Association (NYHA) class II and III HF and iron deficiency(ferritin <100 ng/ml or 100-300 ng/ml if transferrin saturation <20%), to improve functional status and QoL.      1. WEIGHT: Admit Weight - Scale: 88 kg (194 lb)      Today  Weight - Scale: 89.4 kg (197 lb)   2. I/O No intake or output data in the 24 hours ending 07/19/24 0908        Assessment/Plan:     Congestive Heart Failure:  ~compensated   ~ BNP 16k on admit  ~Plan:resume home meds, torsemide refill sent to pharm for meds to beds, ok for d/c today, has f/u in CHF clinic  Cardiomyopathy: NICM  ~Echo:  EF: 10-15%   Core measures for HF:  ACEi/ARB/ARNI: lisinopril  2.5mg/day  BB: Metoprolol succinate   Damian: Spironolactone      SGLT2i: Jardiance     ~Device: Medtronic BiV ICD with optivol  HTN:   ~controlled  Atrial Fib   ~status-100% AT/AF burden per device   Patient is not a candidate for any invasive EP procedures.  No further attempt at maintaining sinus rhythm will be made in the future.   ~Plan: continue rate control with BB and AC                              All questions and concerns were addressed to the patient. Alternatives to my treatment were discussed. I have discussed the above stated plan with patient and the nurse. The patient verbalized understanding and agreed with the plan.    I appreciate the opportunity of cooperating in the care of this individual.    KRISTIE COYLE, APRN - CNP, ACNP, AGPCNP 7/19/2024, 9:08 AM  Heart Failure  [place] [address]  Ph: [phone]

## 2024-07-20 VITALS
WEIGHT: 198.8 LBS | SYSTOLIC BLOOD PRESSURE: 92 MMHG | OXYGEN SATURATION: 96 % | RESPIRATION RATE: 18 BRPM | BODY MASS INDEX: 25.51 KG/M2 | DIASTOLIC BLOOD PRESSURE: 77 MMHG | HEIGHT: 74 IN | HEART RATE: 75 BPM | TEMPERATURE: 97.2 F

## 2024-07-20 LAB
ANION GAP SERPL CALCULATED.3IONS-SCNC: 9 MMOL/L (ref 3–16)
BUN SERPL-MCNC: 26 MG/DL (ref 7–20)
CALCIUM SERPL-MCNC: 8.7 MG/DL (ref 8.3–10.6)
CHLORIDE SERPL-SCNC: 99 MMOL/L (ref 99–110)
CO2 SERPL-SCNC: 30 MMOL/L (ref 21–32)
CREAT SERPL-MCNC: 1.5 MG/DL (ref 0.8–1.3)
GFR SERPLBLD CREATININE-BSD FMLA CKD-EPI: 51 ML/MIN/{1.73_M2}
GLUCOSE SERPL-MCNC: 85 MG/DL (ref 70–99)
MAGNESIUM SERPL-MCNC: 2.4 MG/DL (ref 1.8–2.4)
NT-PROBNP SERPL-MCNC: ABNORMAL PG/ML (ref 0–124)
POTASSIUM SERPL-SCNC: 4.8 MMOL/L (ref 3.5–5.1)
SODIUM SERPL-SCNC: 138 MMOL/L (ref 136–145)

## 2024-07-20 PROCEDURE — 6370000000 HC RX 637 (ALT 250 FOR IP): Performed by: NURSE PRACTITIONER

## 2024-07-20 PROCEDURE — 2580000003 HC RX 258: Performed by: INTERNAL MEDICINE

## 2024-07-20 PROCEDURE — 83735 ASSAY OF MAGNESIUM: CPT

## 2024-07-20 PROCEDURE — 36415 COLL VENOUS BLD VENIPUNCTURE: CPT

## 2024-07-20 PROCEDURE — 6370000000 HC RX 637 (ALT 250 FOR IP): Performed by: INTERNAL MEDICINE

## 2024-07-20 PROCEDURE — 80048 BASIC METABOLIC PNL TOTAL CA: CPT

## 2024-07-20 PROCEDURE — 83880 ASSAY OF NATRIURETIC PEPTIDE: CPT

## 2024-07-20 RX ORDER — TORSEMIDE 20 MG/1
TABLET ORAL
Qty: 7 TABLET | Refills: 0 | Status: SHIPPED | OUTPATIENT
Start: 2024-07-20 | End: 2024-07-20 | Stop reason: HOSPADM

## 2024-07-20 RX ORDER — FINASTERIDE 5 MG/1
5 TABLET, FILM COATED ORAL DAILY
Qty: 2 TABLET | Refills: 0 | Status: SHIPPED | OUTPATIENT
Start: 2024-07-20 | End: 2024-07-20 | Stop reason: HOSPADM

## 2024-07-20 RX ADMIN — TORSEMIDE 40 MG: 20 TABLET ORAL at 09:51

## 2024-07-20 RX ADMIN — LISINOPRIL 2.5 MG: 5 TABLET ORAL at 09:51

## 2024-07-20 RX ADMIN — POLYETHYLENE GLYCOL 3350 17 G: 17 POWDER, FOR SOLUTION ORAL at 09:57

## 2024-07-20 RX ADMIN — METOPROLOL SUCCINATE 25 MG: 25 TABLET, EXTENDED RELEASE ORAL at 09:56

## 2024-07-20 RX ADMIN — TAMSULOSIN HYDROCHLORIDE 0.4 MG: 0.4 CAPSULE ORAL at 09:51

## 2024-07-20 RX ADMIN — AMIODARONE HYDROCHLORIDE 200 MG: 200 TABLET ORAL at 09:56

## 2024-07-20 RX ADMIN — SODIUM CHLORIDE, PRESERVATIVE FREE 10 ML: 5 INJECTION INTRAVENOUS at 09:57

## 2024-07-20 RX ADMIN — EMPAGLIFLOZIN 10 MG: 10 TABLET, FILM COATED ORAL at 09:56

## 2024-07-20 RX ADMIN — ASPIRIN 81 MG 81 MG: 81 TABLET ORAL at 09:56

## 2024-07-20 RX ADMIN — FINASTERIDE 5 MG: 5 TABLET, FILM COATED ORAL at 09:57

## 2024-07-20 RX ADMIN — SPIRONOLACTONE 25 MG: 25 TABLET ORAL at 09:57

## 2024-07-20 RX ADMIN — APIXABAN 5 MG: 5 TABLET, FILM COATED ORAL at 09:56

## 2024-07-20 NOTE — PROGRESS NOTES
Pt has been discharged at this time.  Medication education and appointment education has been completed.  Pt verbalized that he understands and is ready to go.   severe

## 2024-07-20 NOTE — DISCHARGE SUMMARY
[facility]    Patient Demographics    Patient. Manoj Feliz  Date of Birth. 1960  MRN. [id number]     Primary care provider. No primary care provider on file.  (Tel: None)    Admit date: 7/17/2024    Discharge date (blank if same as Note Date):   Note Date: 7/20/2024     Reason for Hospitalization.   Chief Complaint   Patient presents with    Dizziness     States dizziness, \"blinking out\" states he was coughing up sputum and was on the verge of \"passing out\"        Significant Findings.   Principal Problem:    Decompensated heart failure (HCC)  Active Problems:    Near syncope    Chronic hypotension    Dilated cardiomyopathy (HCC)    Systolic CHF, chronic (HCC)  Resolved Problems:    * No resolved hospital problems. *     Problem-based Hospital Course.  Manoj Feliz is a 63 y.o. male with a past medical history of hypertension, sCHF, EF 10-15%, Biv-AICD, CKD, atrial fibrillation, homelessness who presented with dizziness.   Admitted for decompensated CHF.  He was recently admitted and diuresed, discharged to \"home\" a friends cough with palliative care referral.  Reports that he was sitting in his chair when he developed a coughing spell. He was able to cough up white frothy sputum and then he got up uickly to spit it out in the bathroom and he became lightheaded and thought he might pass out, however he did not have LOC. Reports that he was instructed to double up his torsemide, he was doing this, however he realized he was going to run out, so for the last week he has only been taking one a day instead of 2. Had increased BLE swelling.  He has had no recurrent dizziness.  Diuresed with IV lasix and his cough resolved. He was treated with IV ceftriaxone, however this was stopped with normal procal, no wheezes, no leukocytosis and no fever.  Sputum is frothy consistent with pulmonary edema and he has resolved completed with diuresis. He is ambulating  FAILURE NURSE/COORDINATOR  IP CONSULT TO DIETITIAN  IP CONSULT TO CARDIOLOGY    Physical examination on discharge day.   BP 92/77   Pulse 75   Temp 97.2 °F (36.2 °C) (Oral)   Resp 18   Ht 1.88 m (6' 2.02\")   Wt 90.2 kg (198 lb 12.8 oz)   SpO2 96%   BMI 25.51 kg/m²   General appearance.  Alert. Looks comfortable. Well nourished.   HEENT. Sclera clear. Moist mucus membranes.   Cardiovascular. Regular rate and rhythm, normal S1, S2. No murmur. No significant peripheral edema  Respiratory. Breathing unlabored, not using accessory muscles. Clear to auscultation bilaterally, no wheeze, crackles or rhonchi.  Gastrointestinal. Abdomen soft, non-tender, not distended, normal bowel sounds.  Neurology. Facial symmetry. No speech deficits. Moving all extremities equally.  Extremities. No focal weakness. Pulses palpable.   Skin. Warm, dry, normal turgor    Condition at time of discharge: Fair    Medication instructions provided to patient at discharge.     Medication List        CHANGE how you take these medications      lisinopril 2.5 MG tablet  Commonly known as: PRINIVIL;ZESTRIL  Take 1 tablet by mouth daily  What changed: when to take this            CONTINUE taking these medications      albuterol sulfate  (90 Base) MCG/ACT inhaler  Commonly known as: Ventolin HFA  Inhale 2 puffs into the lungs every 6 hours as needed for Wheezing     amiodarone 200 MG tablet  Commonly known as: CORDARONE  Take 1 tablet by mouth daily     apixaban 5 MG Tabs tablet  Commonly known as: ELIQUIS  Take 1 tablet by mouth 2 times daily     aspirin 81 MG chewable tablet  Take 1 tablet by mouth daily     atorvastatin 40 MG tablet  Commonly known as: LIPITOR  Take 1 tablet by mouth nightly     empagliflozin 10 MG tablet  Commonly known as: Jardiance  Take 1 tablet by mouth daily     finasteride 5 MG tablet  Commonly known as: PROSCAR  Take 1 tablet by mouth daily     metoprolol succinate 25 MG extended release tablet  Commonly known as:

## 2024-07-22 ENCOUNTER — OFFICE VISIT (OUTPATIENT)
Dept: CARDIOLOGY CLINIC | Age: 64
End: 2024-07-22
Payer: MEDICAID

## 2024-07-22 ENCOUNTER — TELEPHONE (OUTPATIENT)
Dept: OTHER | Age: 64
End: 2024-07-22

## 2024-07-22 VITALS
BODY MASS INDEX: 25.28 KG/M2 | HEIGHT: 74 IN | DIASTOLIC BLOOD PRESSURE: 80 MMHG | WEIGHT: 197 LBS | OXYGEN SATURATION: 99 % | HEART RATE: 63 BPM | SYSTOLIC BLOOD PRESSURE: 100 MMHG

## 2024-07-22 DIAGNOSIS — I42.0 DILATED CARDIOMYOPATHY (HCC): ICD-10-CM

## 2024-07-22 DIAGNOSIS — I48.0 PAF (PAROXYSMAL ATRIAL FIBRILLATION) (HCC): ICD-10-CM

## 2024-07-22 DIAGNOSIS — Z95.810 ICD (IMPLANTABLE CARDIOVERTER-DEFIBRILLATOR) IN PLACE: ICD-10-CM

## 2024-07-22 DIAGNOSIS — E78.5 HYPERLIPIDEMIA, UNSPECIFIED HYPERLIPIDEMIA TYPE: ICD-10-CM

## 2024-07-22 DIAGNOSIS — I50.22 CHRONIC SYSTOLIC (CONGESTIVE) HEART FAILURE (HCC): Primary | ICD-10-CM

## 2024-07-22 DIAGNOSIS — Z59.00 HOMELESS: ICD-10-CM

## 2024-07-22 PROCEDURE — 3074F SYST BP LT 130 MM HG: CPT | Performed by: CLINICAL NURSE SPECIALIST

## 2024-07-22 PROCEDURE — 3079F DIAST BP 80-89 MM HG: CPT | Performed by: CLINICAL NURSE SPECIALIST

## 2024-07-22 PROCEDURE — 1123F ACP DISCUSS/DSCN MKR DOCD: CPT | Performed by: CLINICAL NURSE SPECIALIST

## 2024-07-22 PROCEDURE — 99215 OFFICE O/P EST HI 40 MIN: CPT | Performed by: CLINICAL NURSE SPECIALIST

## 2024-07-22 RX ORDER — TORSEMIDE 20 MG/1
TABLET ORAL
Qty: 270 TABLET | Refills: 1 | Status: SHIPPED | OUTPATIENT
Start: 2024-07-22

## 2024-07-22 RX ORDER — FINASTERIDE 5 MG/1
5 TABLET, FILM COATED ORAL DAILY
Qty: 90 TABLET | Refills: 2 | Status: SHIPPED | OUTPATIENT
Start: 2024-07-22

## 2024-07-22 SDOH — ECONOMIC STABILITY - HOUSING INSECURITY: HOMELESSNESS UNSPECIFIED: Z59.00

## 2024-07-22 NOTE — PATIENT INSTRUCTIONS
Refilled torsemide and proscar  Blood work next week  RTO in 3 weeks  Continue all current medications

## 2024-07-22 NOTE — TELEPHONE ENCOUNTER
[facility]  HEART FAILURE PROGRAM  TELEPHONE ENCOUNTER FORM    Manoj Patelsaji 1960    Attempted to call patient for HF follow-up. No answer at this time.      Next MD/ Clinic appointment: today with Deirdre CALL RN 7/22/2024 10:50 AM

## 2024-07-22 NOTE — PROGRESS NOTES
Mother     High Blood Pressure Mother     Heart Attack Mother     Other Father     Stroke Father     High Blood Pressure Father        Home Medications:  Prior to Admission medications    Medication Sig Start Date End Date Taking? Authorizing Provider   torsemide (DEMADEX) 20 MG tablet 40 mg in am and 20 mg in pm 7/22/24  Yes Deirdre Das APRN - CNS   finasteride (PROSCAR) 5 MG tablet Take 1 tablet by mouth daily 7/22/24  Yes Deirdre Das APRN - CNS   lisinopril (PRINIVIL;ZESTRIL) 2.5 MG tablet Take 1 tablet by mouth daily 7/20/24  Yes Selene Gimenez APRN - CNP   vitamin D (ERGOCALCIFEROL) 1.25 MG (96914 UT) CAPS capsule Take 1 capsule by mouth once a week 7/12/24 7/12/25 Yes Georgina Escalera APRN - CNP   pantoprazole (PROTONIX) 40 MG tablet Take 1 tablet by mouth daily 6/21/24  Yes Marylu Herron APRN - CNP   albuterol sulfate HFA (VENTOLIN HFA) 108 (90 Base) MCG/ACT inhaler Inhale 2 puffs into the lungs every 6 hours as needed for Wheezing 6/21/24  Yes Marylu Herron APRN - CNP   apixaban (ELIQUIS) 5 MG TABS tablet Take 1 tablet by mouth 2 times daily 5/24/24  Yes Dago Preston APRN - CNP   aspirin 81 MG chewable tablet Take 1 tablet by mouth daily 5/24/24  Yes Deirdre Das APRN - CNS   tamsulosin (FLOMAX) 0.4 MG capsule Take 1 capsule by mouth daily   Yes Provider, MD Faye   empagliflozin (JARDIANCE) 10 MG tablet Take 1 tablet by mouth daily 5/22/24  Yes Deirdre Das APRN - CNS   metoprolol succinate (TOPROL XL) 25 MG extended release tablet Take 0.5 tablets by mouth in the morning and at bedtime 5/22/24  Yes Deirdre Das APRN - CNS   spironolactone (ALDACTONE) 25 MG tablet Take 1 tablet by mouth Daily with lunch 5/22/24  Yes Deirdre Das APRN - CNS   atorvastatin (LIPITOR) 40 MG tablet Take 1 tablet by mouth nightly 5/22/24  Yes Deirdre Das APRN - CNS   amiodarone (CORDARONE) 200 MG tablet Take 1 tablet by mouth daily 5/22/24  Yes Dago Preston APRN -

## 2024-07-26 NOTE — PROGRESS NOTES
Physician Progress Note      PATIENT:               DOORTHY ABDULLAHI  CSN #:                  [id number]  :                       1960  ADMIT DATE:       2024 6:12 PM  DISCH DATE:        2024 12:30 PM  RESPONDING  PROVIDER #:        TAMI Briones CNP          QUERY TEXT:    Patient admitted with HHD, noted to have atrial fibrillation and is maintained   on Eliquis. If possible, please document in progress notes and discharge   summary if you are evaluating and/or treating any of the following:?  ?  The medical record reflects the following:  Risk Factors: Persistent Atrial Fibrillation, HHD  Clinical Indicators: Echo:  EF: 10-15%  Treatment: Eliquis 5 mg bid    Thank You Carmel Nicholas RN, CDS [email or web address]  Options provided:  -- Secondary hypercoagulable state in a patient with atrial fibrillation  -- Other - I will add my own diagnosis  -- Disagree - Not applicable / Not valid  -- Disagree - Clinically unable to determine / Unknown  -- Refer to Clinical Documentation Reviewer    PROVIDER RESPONSE TEXT:    This patient has secondary hypercoagulable state in a patient with atrial   fibrillation.    Query created by: Carmel Nicholas on 2024 2:13 PM      Electronically signed by:  TAMI Briones CNP 2024 7:32 AM

## 2024-08-14 ENCOUNTER — OFFICE VISIT (OUTPATIENT)
Dept: CARDIOLOGY CLINIC | Age: 64
End: 2024-08-14
Payer: MEDICAID

## 2024-08-14 VITALS
HEART RATE: 71 BPM | OXYGEN SATURATION: 99 % | BODY MASS INDEX: 24.51 KG/M2 | HEIGHT: 74 IN | DIASTOLIC BLOOD PRESSURE: 60 MMHG | SYSTOLIC BLOOD PRESSURE: 90 MMHG | WEIGHT: 191 LBS

## 2024-08-14 DIAGNOSIS — I42.0 DILATED CARDIOMYOPATHY (HCC): ICD-10-CM

## 2024-08-14 DIAGNOSIS — Z95.810 ICD (IMPLANTABLE CARDIOVERTER-DEFIBRILLATOR) IN PLACE: ICD-10-CM

## 2024-08-14 DIAGNOSIS — E78.5 HYPERLIPIDEMIA, UNSPECIFIED HYPERLIPIDEMIA TYPE: ICD-10-CM

## 2024-08-14 DIAGNOSIS — I50.22 CHRONIC SYSTOLIC (CONGESTIVE) HEART FAILURE (HCC): Primary | ICD-10-CM

## 2024-08-14 DIAGNOSIS — I48.0 PAF (PAROXYSMAL ATRIAL FIBRILLATION) (HCC): ICD-10-CM

## 2024-08-14 DIAGNOSIS — Z59.00 HOMELESS: ICD-10-CM

## 2024-08-14 PROCEDURE — 99214 OFFICE O/P EST MOD 30 MIN: CPT | Performed by: CLINICAL NURSE SPECIALIST

## 2024-08-14 PROCEDURE — 3078F DIAST BP <80 MM HG: CPT | Performed by: CLINICAL NURSE SPECIALIST

## 2024-08-14 PROCEDURE — 3074F SYST BP LT 130 MM HG: CPT | Performed by: CLINICAL NURSE SPECIALIST

## 2024-08-14 PROCEDURE — 1123F ACP DISCUSS/DSCN MKR DOCD: CPT | Performed by: CLINICAL NURSE SPECIALIST

## 2024-08-14 SDOH — ECONOMIC STABILITY - HOUSING INSECURITY: HOMELESSNESS UNSPECIFIED: Z59.00

## 2024-08-14 NOTE — PROGRESS NOTES
coordination  No abnormalities of mood, affect, memory, mentation, or behavior are noted    Lab Data:    CBC:   Lab Results   Component Value Date/Time    WBC 3.7 07/19/2024 05:51 AM    WBC 4.3 07/18/2024 05:58 AM    WBC 3.7 07/17/2024 06:52 PM    RBC 4.38 07/19/2024 05:51 AM    RBC 4.64 07/18/2024 05:58 AM    RBC 4.81 07/17/2024 06:52 PM    HGB 12.8 07/19/2024 05:51 AM    HGB 13.2 07/18/2024 05:58 AM    HGB 13.9 07/17/2024 06:52 PM    HCT 38.9 07/19/2024 05:51 AM    HCT 40.9 07/18/2024 05:58 AM    HCT 42.0 07/17/2024 06:52 PM    MCV 88.8 07/19/2024 05:51 AM    MCV 88.2 07/18/2024 05:58 AM    MCV 87.3 07/17/2024 06:52 PM    RDW 16.5 07/19/2024 05:51 AM    RDW 16.2 07/18/2024 05:58 AM    RDW 16.0 07/17/2024 06:52 PM     07/19/2024 05:51 AM     07/18/2024 05:58 AM     07/17/2024 06:52 PM     BMP:  Lab Results   Component Value Date/Time     07/20/2024 06:17 AM     07/19/2024 05:52 AM     07/18/2024 05:59 AM    K 4.8 07/20/2024 06:17 AM    K 4.0 07/19/2024 05:52 AM    K 4.5 07/18/2024 05:59 AM    K 3.8 02/19/2024 04:28 AM    K 3.8 02/18/2024 05:09 AM    K 5.0 02/17/2024 03:56 AM    CL 99 07/20/2024 06:17 AM     07/19/2024 05:52 AM     07/18/2024 05:59 AM    CO2 30 07/20/2024 06:17 AM    CO2 29 07/19/2024 05:52 AM    CO2 29 07/18/2024 05:59 AM    PHOS 4.3 06/17/2024 06:18 AM    PHOS 4.0 06/16/2024 05:35 AM    PHOS 4.2 06/15/2024 05:16 AM    BUN 26 07/20/2024 06:17 AM    BUN 22 07/19/2024 05:52 AM    BUN 21 07/18/2024 05:59 AM    CREATININE 1.5 07/20/2024 06:17 AM    CREATININE 1.7 07/19/2024 05:52 AM    CREATININE 1.6 07/18/2024 05:59 AM     BNP:   Lab Results   Component Value Date/Time    PROBNP 14,267 07/20/2024 06:17 AM    PROBNP 16,073 07/17/2024 06:52 PM    PROBNP 17,904 07/08/2024 01:06 PM     Cardiac Imaging:  Echo 5/22/24   Limited ECHO with 2D imaging, Color-Flow and Spectral Doppler:     Severe global and regional LV systolic dysfunction.  Estimated EF

## 2024-08-14 NOTE — PATIENT INSTRUCTIONS
Continue all current medications  You look wonderful!!  Keep taking all your medications  Call me [phone]  RTO october

## 2024-09-23 ENCOUNTER — APPOINTMENT (OUTPATIENT)
Dept: CT IMAGING | Age: 64
End: 2024-09-23
Payer: MEDICAID

## 2024-09-23 ENCOUNTER — HOSPITAL ENCOUNTER (INPATIENT)
Age: 64
LOS: 2 days | Discharge: HOME | End: 2024-09-26
Attending: STUDENT IN AN ORGANIZED HEALTH CARE EDUCATION/TRAINING PROGRAM | Admitting: STUDENT IN AN ORGANIZED HEALTH CARE EDUCATION/TRAINING PROGRAM
Payer: MEDICAID

## 2024-09-23 DIAGNOSIS — K82.8 THICKENING OF WALL OF GALLBLADDER: ICD-10-CM

## 2024-09-23 DIAGNOSIS — R17 ELEVATED BILIRUBIN: Primary | ICD-10-CM

## 2024-09-23 DIAGNOSIS — R53.1 GENERAL WEAKNESS: ICD-10-CM

## 2024-09-23 LAB
ALBUMIN SERPL-MCNC: 3.9 G/DL (ref 3.4–5)
ALBUMIN/GLOB SERPL: 1.3 {RATIO} (ref 1.1–2.2)
ALP SERPL-CCNC: 134 U/L (ref 40–129)
ALT SERPL-CCNC: 28 U/L (ref 10–40)
ANION GAP SERPL CALCULATED.3IONS-SCNC: 13 MMOL/L (ref 3–16)
AST SERPL-CCNC: 40 U/L (ref 15–37)
BACTERIA URNS QL MICRO: ABNORMAL /HPF
BILIRUB SERPL-MCNC: 3.6 MG/DL (ref 0–1)
BILIRUB UR QL STRIP.AUTO: NEGATIVE
BUN SERPL-MCNC: 36 MG/DL (ref 7–20)
CALCIUM SERPL-MCNC: 8.6 MG/DL (ref 8.3–10.6)
CHLORIDE SERPL-SCNC: 99 MMOL/L (ref 99–110)
CLARITY UR: CLEAR
CO2 SERPL-SCNC: 27 MMOL/L (ref 21–32)
COLOR UR: ABNORMAL
CREAT SERPL-MCNC: 1.3 MG/DL (ref 0.8–1.3)
EPI CELLS #/AREA URNS AUTO: 1 /HPF (ref 0–5)
ETHANOLAMINE SERPL-MCNC: NORMAL MG/DL (ref 0–0.08)
GFR SERPLBLD CREATININE-BSD FMLA CKD-EPI: 61 ML/MIN/{1.73_M2}
GLUCOSE SERPL-MCNC: 91 MG/DL (ref 70–99)
GLUCOSE UR STRIP.AUTO-MCNC: NEGATIVE MG/DL
HGB UR QL STRIP.AUTO: NEGATIVE
HYALINE CASTS #/AREA URNS AUTO: 10 /LPF (ref 0–8)
KETONES UR STRIP.AUTO-MCNC: NEGATIVE MG/DL
LEUKOCYTE ESTERASE UR QL STRIP.AUTO: NEGATIVE
LIPASE SERPL-CCNC: 54 U/L (ref 13–60)
NITRITE UR QL STRIP.AUTO: NEGATIVE
PH UR STRIP.AUTO: 5 [PH] (ref 5–8)
POTASSIUM SERPL-SCNC: 3.7 MMOL/L (ref 3.5–5.1)
PROT SERPL-MCNC: 6.8 G/DL (ref 6.4–8.2)
PROT UR STRIP.AUTO-MCNC: 100 MG/DL
RBC CLUMPS #/AREA URNS AUTO: 0 /HPF (ref 0–4)
SODIUM SERPL-SCNC: 139 MMOL/L (ref 136–145)
SP GR UR STRIP.AUTO: 1.01 (ref 1–1.03)
TROPONIN, HIGH SENSITIVITY: 29 NG/L (ref 0–22)
UA COMPLETE W REFLEX CULTURE PNL UR: ABNORMAL
UA DIPSTICK W REFLEX MICRO PNL UR: YES
URN SPEC COLLECT METH UR: ABNORMAL
UROBILINOGEN UR STRIP-ACNC: 1 E.U./DL
WBC #/AREA URNS AUTO: 1 /HPF (ref 0–5)

## 2024-09-23 PROCEDURE — 82077 ASSAY SPEC XCP UR&BREATH IA: CPT

## 2024-09-23 PROCEDURE — 80053 COMPREHEN METABOLIC PANEL: CPT

## 2024-09-23 PROCEDURE — 6370000000 HC RX 637 (ALT 250 FOR IP): Performed by: PHYSICIAN ASSISTANT

## 2024-09-23 PROCEDURE — 74177 CT ABD & PELVIS W/CONTRAST: CPT

## 2024-09-23 PROCEDURE — 87636 SARSCOV2 & INF A&B AMP PRB: CPT

## 2024-09-23 PROCEDURE — 99285 EMERGENCY DEPT VISIT HI MDM: CPT

## 2024-09-23 PROCEDURE — 84484 ASSAY OF TROPONIN QUANT: CPT

## 2024-09-23 PROCEDURE — 83690 ASSAY OF LIPASE: CPT

## 2024-09-23 PROCEDURE — 93005 ELECTROCARDIOGRAM TRACING: CPT | Performed by: PHYSICIAN ASSISTANT

## 2024-09-23 PROCEDURE — 85025 COMPLETE CBC W/AUTO DIFF WBC: CPT

## 2024-09-23 PROCEDURE — 70450 CT HEAD/BRAIN W/O DYE: CPT

## 2024-09-23 PROCEDURE — 81001 URINALYSIS AUTO W/SCOPE: CPT

## 2024-09-23 PROCEDURE — 70498 CT ANGIOGRAPHY NECK: CPT

## 2024-09-23 PROCEDURE — 6360000004 HC RX CONTRAST MEDICATION: Performed by: PHYSICIAN ASSISTANT

## 2024-09-23 RX ORDER — PANTOPRAZOLE SODIUM 40 MG/10ML
40 INJECTION, POWDER, LYOPHILIZED, FOR SOLUTION INTRAVENOUS ONCE
Status: DISCONTINUED | OUTPATIENT
Start: 2024-09-23 | End: 2024-09-24 | Stop reason: HOSPADM

## 2024-09-23 RX ORDER — IOPAMIDOL 755 MG/ML
75 INJECTION, SOLUTION INTRAVASCULAR
Status: COMPLETED | OUTPATIENT
Start: 2024-09-23 | End: 2024-09-23

## 2024-09-23 RX ORDER — ONDANSETRON 2 MG/ML
4 INJECTION INTRAMUSCULAR; INTRAVENOUS ONCE
Status: DISCONTINUED | OUTPATIENT
Start: 2024-09-23 | End: 2024-09-24 | Stop reason: HOSPADM

## 2024-09-23 RX ADMIN — ALUMINUM HYDROXIDE, MAGNESIUM HYDROXIDE, AND SIMETHICONE: 1200; 120; 1200 SUSPENSION ORAL at 22:58

## 2024-09-23 RX ADMIN — IOPAMIDOL 75 ML: 755 INJECTION, SOLUTION INTRAVENOUS at 23:30

## 2024-09-23 ASSESSMENT — ENCOUNTER SYMPTOMS
RHINORRHEA: 0
EYE REDNESS: 0
DIARRHEA: 0
NAUSEA: 1
COUGH: 1
SHORTNESS OF BREATH: 0
COLOR CHANGE: 0
SORE THROAT: 0
VOMITING: 1
ABDOMINAL PAIN: 1

## 2024-09-23 ASSESSMENT — PAIN DESCRIPTION - DESCRIPTORS: DESCRIPTORS: SHARP

## 2024-09-23 ASSESSMENT — PAIN DESCRIPTION - LOCATION: LOCATION: ABDOMEN

## 2024-09-23 ASSESSMENT — PAIN - FUNCTIONAL ASSESSMENT: PAIN_FUNCTIONAL_ASSESSMENT: 0-10

## 2024-09-23 ASSESSMENT — PAIN SCALES - GENERAL: PAINLEVEL_OUTOF10: 7

## 2024-09-24 ENCOUNTER — APPOINTMENT (OUTPATIENT)
Dept: GENERAL RADIOLOGY | Age: 64
End: 2024-09-24
Payer: MEDICAID

## 2024-09-24 LAB
ALBUMIN SERPL-MCNC: 4.2 G/DL (ref 3.4–5)
ALP SERPL-CCNC: 134 U/L (ref 40–129)
ALT SERPL-CCNC: 30 U/L (ref 10–40)
ANION GAP SERPL CALCULATED.3IONS-SCNC: 20 MMOL/L (ref 3–16)
ANISOCYTOSIS BLD QL SMEAR: ABNORMAL
AST SERPL-CCNC: 47 U/L (ref 15–37)
BASOPHILS # BLD: 0 K/UL (ref 0–0.2)
BASOPHILS NFR BLD: 0 %
BILIRUB DIRECT SERPL-MCNC: 1.4 MG/DL (ref 0–0.3)
BILIRUB INDIRECT SERPL-MCNC: 2.8 MG/DL (ref 0–1)
BILIRUB SERPL-MCNC: 4.2 MG/DL (ref 0–1)
BUN SERPL-MCNC: 37 MG/DL (ref 7–20)
CALCIUM SERPL-MCNC: 8.9 MG/DL (ref 8.3–10.6)
CHLORIDE SERPL-SCNC: 97 MMOL/L (ref 99–110)
CO2 SERPL-SCNC: 22 MMOL/L (ref 21–32)
CREAT SERPL-MCNC: 1.4 MG/DL (ref 0.8–1.3)
DEPRECATED RDW RBC AUTO: 17 % (ref 12.4–15.4)
EKG ATRIAL RATE: 83 BPM
EKG DIAGNOSIS: NORMAL
EKG Q-T INTERVAL: 416 MS
EKG QRS DURATION: 112 MS
EKG QTC CALCULATION (BAZETT): 506 MS
EKG R AXIS: 0 DEGREES
EKG T AXIS: -43 DEGREES
EKG VENTRICULAR RATE: 89 BPM
EOSINOPHIL # BLD: 0 K/UL (ref 0–0.6)
EOSINOPHIL NFR BLD: 0 %
FLUAV RNA RESP QL NAA+PROBE: NOT DETECTED
FLUBV RNA RESP QL NAA+PROBE: NOT DETECTED
GFR SERPLBLD CREATININE-BSD FMLA CKD-EPI: 56 ML/MIN/{1.73_M2}
GLUCOSE SERPL-MCNC: 77 MG/DL (ref 70–99)
HCT VFR BLD AUTO: 39.3 % (ref 40.5–52.5)
HGB BLD-MCNC: 13.2 G/DL (ref 13.5–17.5)
LYMPHOCYTES # BLD: 0.9 K/UL (ref 1–5.1)
LYMPHOCYTES NFR BLD: 19 %
MACROCYTES BLD QL SMEAR: ABNORMAL
MAGNESIUM SERPL-MCNC: 2.3 MG/DL (ref 1.8–2.4)
MCH RBC QN AUTO: 29.3 PG (ref 26–34)
MCHC RBC AUTO-ENTMCNC: 33.6 G/DL (ref 31–36)
MCV RBC AUTO: 87.3 FL (ref 80–100)
MONOCYTES # BLD: 0.3 K/UL (ref 0–1.3)
MONOCYTES NFR BLD: 6 %
NEUTROPHILS # BLD: 3.5 K/UL (ref 1.7–7.7)
NEUTROPHILS NFR BLD: 75 %
NT-PROBNP SERPL-MCNC: ABNORMAL PG/ML (ref 0–124)
OVALOCYTES BLD QL SMEAR: ABNORMAL
PLATELET # BLD AUTO: 189 K/UL (ref 135–450)
PLATELET BLD QL SMEAR: ADEQUATE
PMV BLD AUTO: 7.7 FL (ref 5–10.5)
POIKILOCYTOSIS BLD QL SMEAR: ABNORMAL
POLYCHROMASIA BLD QL SMEAR: ABNORMAL
POTASSIUM SERPL-SCNC: 4 MMOL/L (ref 3.5–5.1)
PROT SERPL-MCNC: 7.3 G/DL (ref 6.4–8.2)
RBC # BLD AUTO: 4.5 M/UL (ref 4.2–5.9)
SARS-COV-2 RNA RESP QL NAA+PROBE: NOT DETECTED
SLIDE REVIEW: ABNORMAL
SODIUM SERPL-SCNC: 139 MMOL/L (ref 136–145)
TROPONIN, HIGH SENSITIVITY: 29 NG/L (ref 0–22)
WBC # BLD AUTO: 4.7 K/UL (ref 4–11)

## 2024-09-24 PROCEDURE — 97165 OT EVAL LOW COMPLEX 30 MIN: CPT

## 2024-09-24 PROCEDURE — 83735 ASSAY OF MAGNESIUM: CPT

## 2024-09-24 PROCEDURE — 2060000000 HC ICU INTERMEDIATE R&B

## 2024-09-24 PROCEDURE — 97530 THERAPEUTIC ACTIVITIES: CPT

## 2024-09-24 PROCEDURE — 83880 ASSAY OF NATRIURETIC PEPTIDE: CPT

## 2024-09-24 PROCEDURE — 6360000002 HC RX W HCPCS: Performed by: NURSE PRACTITIONER

## 2024-09-24 PROCEDURE — 97535 SELF CARE MNGMENT TRAINING: CPT

## 2024-09-24 PROCEDURE — 6370000000 HC RX 637 (ALT 250 FOR IP): Performed by: NURSE PRACTITIONER

## 2024-09-24 PROCEDURE — 80076 HEPATIC FUNCTION PANEL: CPT

## 2024-09-24 PROCEDURE — 71045 X-RAY EXAM CHEST 1 VIEW: CPT

## 2024-09-24 PROCEDURE — 99223 1ST HOSP IP/OBS HIGH 75: CPT | Performed by: INTERNAL MEDICINE

## 2024-09-24 PROCEDURE — 2580000003 HC RX 258: Performed by: NURSE PRACTITIONER

## 2024-09-24 PROCEDURE — 84484 ASSAY OF TROPONIN QUANT: CPT

## 2024-09-24 PROCEDURE — 93010 ELECTROCARDIOGRAM REPORT: CPT | Performed by: INTERNAL MEDICINE

## 2024-09-24 PROCEDURE — 1200000000 HC SEMI PRIVATE

## 2024-09-24 PROCEDURE — 80048 BASIC METABOLIC PNL TOTAL CA: CPT

## 2024-09-24 RX ORDER — FUROSEMIDE 10 MG/ML
40 INJECTION INTRAMUSCULAR; INTRAVENOUS ONCE
Status: COMPLETED | OUTPATIENT
Start: 2024-09-24 | End: 2024-09-24

## 2024-09-24 RX ORDER — ACETAMINOPHEN 325 MG/1
650 TABLET ORAL EVERY 6 HOURS PRN
Status: DISCONTINUED | OUTPATIENT
Start: 2024-09-24 | End: 2024-09-26 | Stop reason: HOSPADM

## 2024-09-24 RX ORDER — ALBUTEROL SULFATE 90 UG/1
2 INHALANT RESPIRATORY (INHALATION) EVERY 6 HOURS PRN
Status: DISCONTINUED | OUTPATIENT
Start: 2024-09-24 | End: 2024-09-26 | Stop reason: HOSPADM

## 2024-09-24 RX ORDER — MAGNESIUM SULFATE IN WATER 40 MG/ML
2000 INJECTION, SOLUTION INTRAVENOUS PRN
Status: DISCONTINUED | OUTPATIENT
Start: 2024-09-24 | End: 2024-09-26 | Stop reason: HOSPADM

## 2024-09-24 RX ORDER — LISINOPRIL 5 MG/1
2.5 TABLET ORAL 2 TIMES DAILY
Status: DISCONTINUED | OUTPATIENT
Start: 2024-09-24 | End: 2024-09-25

## 2024-09-24 RX ORDER — SODIUM CHLORIDE 0.9 % (FLUSH) 0.9 %
5-40 SYRINGE (ML) INJECTION PRN
Status: DISCONTINUED | OUTPATIENT
Start: 2024-09-24 | End: 2024-09-26 | Stop reason: HOSPADM

## 2024-09-24 RX ORDER — ASPIRIN 81 MG/1
81 TABLET, CHEWABLE ORAL DAILY
Status: DISCONTINUED | OUTPATIENT
Start: 2024-09-24 | End: 2024-09-26 | Stop reason: HOSPADM

## 2024-09-24 RX ORDER — SODIUM CHLORIDE 0.9 % (FLUSH) 0.9 %
5-40 SYRINGE (ML) INJECTION EVERY 12 HOURS SCHEDULED
Status: DISCONTINUED | OUTPATIENT
Start: 2024-09-24 | End: 2024-09-26 | Stop reason: HOSPADM

## 2024-09-24 RX ORDER — POTASSIUM CHLORIDE 1500 MG/1
40 TABLET, EXTENDED RELEASE ORAL PRN
Status: DISCONTINUED | OUTPATIENT
Start: 2024-09-24 | End: 2024-09-26 | Stop reason: HOSPADM

## 2024-09-24 RX ORDER — AMIODARONE HYDROCHLORIDE 200 MG/1
200 TABLET ORAL DAILY
Status: DISCONTINUED | OUTPATIENT
Start: 2024-09-24 | End: 2024-09-26 | Stop reason: HOSPADM

## 2024-09-24 RX ORDER — METOPROLOL SUCCINATE 25 MG/1
12.5 TABLET, EXTENDED RELEASE ORAL 2 TIMES DAILY
Status: DISCONTINUED | OUTPATIENT
Start: 2024-09-24 | End: 2024-09-26 | Stop reason: HOSPADM

## 2024-09-24 RX ORDER — POLYETHYLENE GLYCOL 3350 17 G/17G
17 POWDER, FOR SOLUTION ORAL DAILY PRN
Status: DISCONTINUED | OUTPATIENT
Start: 2024-09-24 | End: 2024-09-26 | Stop reason: HOSPADM

## 2024-09-24 RX ORDER — ATORVASTATIN CALCIUM 40 MG/1
40 TABLET, FILM COATED ORAL NIGHTLY
Status: DISCONTINUED | OUTPATIENT
Start: 2024-09-24 | End: 2024-09-26 | Stop reason: HOSPADM

## 2024-09-24 RX ORDER — POTASSIUM CHLORIDE 7.45 MG/ML
10 INJECTION INTRAVENOUS PRN
Status: DISCONTINUED | OUTPATIENT
Start: 2024-09-24 | End: 2024-09-26 | Stop reason: HOSPADM

## 2024-09-24 RX ORDER — SPIRONOLACTONE 25 MG/1
25 TABLET ORAL
Status: DISCONTINUED | OUTPATIENT
Start: 2024-09-24 | End: 2024-09-26 | Stop reason: HOSPADM

## 2024-09-24 RX ORDER — ONDANSETRON 2 MG/ML
4 INJECTION INTRAMUSCULAR; INTRAVENOUS EVERY 6 HOURS PRN
Status: DISCONTINUED | OUTPATIENT
Start: 2024-09-24 | End: 2024-09-26 | Stop reason: HOSPADM

## 2024-09-24 RX ORDER — TAMSULOSIN HYDROCHLORIDE 0.4 MG/1
0.4 CAPSULE ORAL DAILY
Status: DISCONTINUED | OUTPATIENT
Start: 2024-09-24 | End: 2024-09-26 | Stop reason: HOSPADM

## 2024-09-24 RX ORDER — FUROSEMIDE 10 MG/ML
40 INJECTION INTRAMUSCULAR; INTRAVENOUS 2 TIMES DAILY
Status: DISCONTINUED | OUTPATIENT
Start: 2024-09-24 | End: 2024-09-26

## 2024-09-24 RX ORDER — ACETAMINOPHEN 650 MG/1
650 SUPPOSITORY RECTAL EVERY 6 HOURS PRN
Status: DISCONTINUED | OUTPATIENT
Start: 2024-09-24 | End: 2024-09-26 | Stop reason: HOSPADM

## 2024-09-24 RX ORDER — SODIUM CHLORIDE 9 MG/ML
INJECTION, SOLUTION INTRAVENOUS PRN
Status: DISCONTINUED | OUTPATIENT
Start: 2024-09-24 | End: 2024-09-26 | Stop reason: HOSPADM

## 2024-09-24 RX ORDER — FINASTERIDE 5 MG/1
5 TABLET, FILM COATED ORAL DAILY
Status: DISCONTINUED | OUTPATIENT
Start: 2024-09-24 | End: 2024-09-26 | Stop reason: HOSPADM

## 2024-09-24 RX ORDER — PANTOPRAZOLE SODIUM 40 MG/1
40 TABLET, DELAYED RELEASE ORAL
Status: DISCONTINUED | OUTPATIENT
Start: 2024-09-24 | End: 2024-09-26 | Stop reason: HOSPADM

## 2024-09-24 RX ORDER — ONDANSETRON 4 MG/1
4 TABLET, ORALLY DISINTEGRATING ORAL EVERY 8 HOURS PRN
Status: DISCONTINUED | OUTPATIENT
Start: 2024-09-24 | End: 2024-09-26 | Stop reason: HOSPADM

## 2024-09-24 RX ADMIN — ATORVASTATIN CALCIUM 40 MG: 40 TABLET, FILM COATED ORAL at 21:45

## 2024-09-24 RX ADMIN — LISINOPRIL 2.5 MG: 5 TABLET ORAL at 21:45

## 2024-09-24 RX ADMIN — ASPIRIN 81 MG: 81 TABLET, CHEWABLE ORAL at 09:44

## 2024-09-24 RX ADMIN — METOPROLOL SUCCINATE 12.5 MG: 25 TABLET, FILM COATED, EXTENDED RELEASE ORAL at 21:44

## 2024-09-24 RX ADMIN — APIXABAN 5 MG: 5 TABLET, FILM COATED ORAL at 21:45

## 2024-09-24 RX ADMIN — SODIUM CHLORIDE, PRESERVATIVE FREE 10 ML: 5 INJECTION INTRAVENOUS at 21:45

## 2024-09-24 RX ADMIN — APIXABAN 5 MG: 5 TABLET, FILM COATED ORAL at 09:44

## 2024-09-24 RX ADMIN — FINASTERIDE 5 MG: 5 TABLET, FILM COATED ORAL at 09:43

## 2024-09-24 RX ADMIN — SODIUM CHLORIDE, PRESERVATIVE FREE 10 ML: 5 INJECTION INTRAVENOUS at 09:43

## 2024-09-24 RX ADMIN — EMPAGLIFLOZIN 10 MG: 10 TABLET, FILM COATED ORAL at 09:44

## 2024-09-24 RX ADMIN — AMIODARONE HYDROCHLORIDE 200 MG: 200 TABLET ORAL at 09:44

## 2024-09-24 RX ADMIN — FUROSEMIDE 40 MG: 10 INJECTION, SOLUTION INTRAMUSCULAR; INTRAVENOUS at 18:28

## 2024-09-24 RX ADMIN — TAMSULOSIN HYDROCHLORIDE 0.4 MG: 0.4 CAPSULE ORAL at 09:44

## 2024-09-24 RX ADMIN — FUROSEMIDE 40 MG: 10 INJECTION, SOLUTION INTRAMUSCULAR; INTRAVENOUS at 03:55

## 2024-09-24 RX ADMIN — LISINOPRIL 2.5 MG: 5 TABLET ORAL at 09:44

## 2024-09-24 RX ADMIN — PANTOPRAZOLE SODIUM 40 MG: 40 TABLET, DELAYED RELEASE ORAL at 09:43

## 2024-09-24 RX ADMIN — METOPROLOL SUCCINATE 12.5 MG: 25 TABLET, FILM COATED, EXTENDED RELEASE ORAL at 09:43

## 2024-09-24 RX ADMIN — FUROSEMIDE 40 MG: 10 INJECTION, SOLUTION INTRAMUSCULAR; INTRAVENOUS at 09:42

## 2024-09-24 RX ADMIN — SPIRONOLACTONE 25 MG: 25 TABLET ORAL at 14:10

## 2024-09-24 NOTE — ED PROVIDER NOTES
EMERGENCY DEPARTMENT PROVIDER ATTESTATION NOTE      PATIENT IDENTIFICATION  Name:   Manoj Feliz  MRN:   [id number]  YOB: 1960  Date of Evaluation:   2024  Provider:   BISI George; Fermin Torrez DO  PCP:   No primary care provider on file.        CHIEF COMPLAINT:   Fatigue (Patient arrives through triage with complaints of abdominal pain and weakness. Patient reports not  feeling well for three days. Reports having some nausea and vomiting. Patient reports \"it feels like my blood pressure has shot straight up. I wanted to make sure I wasn't having a stroke\". Patient denies headache. Reports feeling lightheadedness. )      HPI  I independently interviewed patient and/or caretaker(s).  See Advanced Practice Provider ([facility]ONA) note for full HPI.  In summary, Manoj Feliz  is a(n) 63 y.o. male who presents with about 3 days of intermittent dizziness described as a spinning sensation.  No evidence of a trigger.  Also complaining of some periumbilical abdominal pain.  No other symptoms.        PHYSICAL EXAM  I performed an independent physical exam and agree with findings in [facility] note.  Overall well-appearing gentleman with no obvious focal abdominal tenderness palpation.  Normal neurologic examination.  NIH 0.  States symptoms have actually resolved since he arrived.  Bilateral symmetric lower extremity edema that patient states is baseline.         EKG  TIME:   2150  RATE:   89 bpm  MI INTERVAL:   * ms  QRS DURATION:   112 ms  QT/QTc:   416/506 ms  RHYTHM:    Paced rhythm with occasional PVC  AXIS:   Regular  ABNORMALITIES  No STEMI  No STEMI per Sgarbossa criteria    PRIOR EK/17/24- current EKG without significant changes when compared to prior    INTERPRETATION:  Nonspecific    REVIEWED BY:   Fermin Torrez Jr., DO    EKG was independently reviewed by emergency department physician in absence of cardiologist.        LABS  Results for orders placed or performed during the  Language (9): No aphasia  Dysarthria (10): Normal  Extinction and Inattention (11): No abnormality  Total: 0          MEDICAL DECISION-MAKING  I, Dr. Torrez, participated in patient's care at the request of BISI George.  Patient's history, physical exam, plan of care, and results were discussed.    Patient presented with Fatigue (Patient arrives through triage with complaints of abdominal pain and weakness. Patient reports not  feeling well for three days. Reports having some nausea and vomiting. Patient reports \"it feels like my blood pressure has shot straight up. I wanted to make sure I wasn't having a stroke\". Patient denies headache. Reports feeling lightheadedness. ) as described above.  History obtained from patient and SHONA.  Prior medical history reviewed.  Initial vital signs reviewed and within normal limits.  Patient nontoxic appearing and stable.  My physical exam matches as documented in [facility]ONA note.    History and physical exam consistent with differential including, but not limited to:   Anxiety, stroke, intra-abdominal infection, GERD, and peripheral vertigo.  I reviewed work up with SHONA and agree with plan.  Patient is having a stroke scale 0 and last known normal was 3 days ago.  Does have low clinical suspicion for acute stroke.  Patient to be evaluated with blood work and CT imaging.  Symptomatic management will be attempted with Zofran, Protonix, and GI cocktail.    The following risk stratification rule(s) were utilized in medical decision making:  -   NIH stroke scale: 0  Please see detailed scoring above.    I independently interpreted EKG (see full interpretation above), lab work (per ED course), and imaging (per ED course).    ED Course as of 09/24/24 0403   Mon Sep 23, 2024   2220 EKG 12 Lead  Paced rhythm, no STEMI per Sgarbossa criteria. [PB]   2221 BP: 120/86 [PB]   2318 Troponin, High Sensitivity(!): 29  Appears baseline.  Ordered repeat. [PB]   2321 Potassium: 3.7 [PB]   2321

## 2024-09-24 NOTE — PROGRESS NOTES
How does patient ambulate?   [x]Low Fall Risk (ambulates by themselves without support)  []Stand by assist   []Contact Guard   []Front wheel walker  []Wheelchair   []Steady  []Bed bound  []History of Lower Extremity Amputation  []Unknown, did not assess in the emergency department   How does patient take pills?  [x]Whole with Water  []Crushed in applesauce  []Crushed in pudding  []Other  []Unknown no oral medications were given in the ED  Is patient alert?   [x]Alert  []Drowsy but responds to voice  []Doesn't respond to voice but responds to painful stimuli  []Unresponsive  Is patient oriented?   [x]To person  [x]To place  [x]To time  [x]To situation  []Confused  []Agitated  []Follows commands  If patient is disoriented or from a Skill Nursing Facility has family been notified of admission?   []Yes   [x]No  Patient belongings?   [x]Cell phone  []Wallet   []Dentures  [x]Clothing  Any specific patient or family belongings/needs/dynamics?     Miscellaneous comments/pending orders?       If there are any additional questions please reach out to the Emergency Department.

## 2024-09-24 NOTE — PROGRESS NOTES
[facility] - Inpatient Rehabilitation Department   Phone: [phone]    Occupational Therapy    [x] Initial Evaluation            [] Daily Treatment Note         [x] Discharge Summary      Patient: Manoj Feliz   : 1960   MRN: [id number]   Date of Service:  2024    Admitting Diagnosis:  Acute on chronic systolic heart failure (HCC)  Current Admission Summary: 63-year-old male with PMHx of HFrEF s/p AICD, chronic A-fib hypertension and hyperlipidemia who presented with fatigue, SOB and.  Edema admitted for acute on chronic systolic heart failure.   Past Medical History:  has a past medical history of Acute combined systolic and diastolic congestive heart failure (HCC), Atrial fibrillation (HCC), CHF (congestive heart failure) (HCC), Hx of blood clots, and Hypertension.  Past Surgical History:  has a past surgical history that includes Insertable Cardiac Monitor (2019); Cardioversion (2019); Cardiac defibrillator placement (Left); and Prostate surgery (N/A, 3/9/2023).    Discharge Recommendations: Manoj Feliz scored a  on the -[facility] ADL Inpatient form.  At this time, no further OT is recommended upon discharge due to patient at independent level.  Recommend patient returns to prior setting with prior services.      DME Required For Discharge: No DME required    Precautions/Restrictions: medium fall risk  Positional Restrictions:no positional restrictions    Pre-Admission Information   Lives With: friends --staying [place] a friend's [place].    Type of Home: house  Home Layout: one level  Home Access: level entry  Bathroom Layout: walk in shower  Toilet Height: standard height  Bathroom Equipment:  No prior equipment.   Home Equipment: no prior equipment  Transfer Assistance: Independent without use of device  Ambulation Assistance:Independent without use of device  ADL Assistance: independent with all ADL's  IADL Assistance: requires assistance with

## 2024-09-24 NOTE — CONSULTS
[facility]  HEART FAILURE PROGRAM      Manoj Feliz 1960    History:  Past Medical History:   Diagnosis Date    Acute combined systolic and diastolic congestive heart failure (HCC) 8/31/2022    Atrial fibrillation (HCC) 07/25/2019    CHF (congestive heart failure) (HCC)     Hx of blood clots     Hypertension        ECHO:  5/22/24  Limited ECHO with 2D imaging, Color-Flow and Spectral Doppler:     Severe global and regional LV systolic dysfunction.  Estimated EF 10-15%.  Severely enlarged LV chamber size with eccentric remodeling.    Increased LV apical trabeculation.  No evidence for LV thrombus with Definity.  Spontaneous echo-contrast observed.  Indeterminate LV diastolic function.    Severe bi-atrial enlargement.  Moderate-severely reduced RV systolic function.  RV size is enlarged.  Device wire is present.  Mild to moderate aortic valve regurgitation.  Severe mitral valve regurgitation.  Severe tricuspid valve regurgitation.  At least moderate pulmonary hypertension based on TR signal obtained.  IVC not visualized.    Mildly dilated proximal ascending thoracic aorta, 4.1 cm.       ACE/ARB/ARNi: lisinopril 2.5 mg daily- has been too hypotensive in the past for ARNi  BB: toprol xl 12.5 mg daily  Aldosterone Antagonist: aldactone 25 mg daily  SGLT2: jardiance 10 mg    History of sleep apnea: No    Warren Screen ordered: No; previously screened    DM History: No      Last Hospital Admission:  7/17-7/20 with CHF  6/14-6/21 with CHF    Code Status: full   Discharge plans: patient is homeless    Family Present: NO    Manoj Feliz was admitted to the hospital with abdominal pain and weakness. Patient found to have some fluid overload. Patient is well known and follows with HF NP as an outpatient. He does weigh himself at home. Baseline is usually 190 lb. His weight increased to 198 lb. He did not call with weight gain or symptoms. He states he has been compliant with his medications. He  does not add salt. He admits with the heat he has been drinking more water and knows he has drank over 64 oz limit. He is compliant with his appointments.        Discussed importance of lifestyle changes: he needs to call with symptoms and weight gain-- this happened prior to last admission as well. Stressed he needs to limit his fluids    PATIENT/CAREGIVER TEACHING:    Level of patient/caregiver understanding able to:   [x ] Verbalize understanding [ ] Demonstrate understanding [ ] Teach back   [ ] Needs reinforcement [ ] Other:       Time spent teachin mins    1. WEIGHT: Admit Weight - Scale: 89.5 kg (197 lb 5 oz)      Today  Weight - Scale: 89.5 kg (197 lb 5 oz)   2. I/O   Intake/Output Summary (Last 24 hours) at 2024 1101  Last data filed at 2024 0942  Gross per 24 hour   Intake 260 ml   Output 450 ml   Net -190 ml       Recommendations:   1. Patient will need a one week or less hospital follow up scheduled before discharge. -- he is high risk for readmissions.   2. Seen by palliative care in , however has now had 2 more admissions for HF since then--could benefit from repeat visit  3. Need to reinforce when he needs to call with weight gain or symptoms-- he struggles knowing when to call.   4. Discussed ways to help with dry mouth.        SHANTHI CALL RN 2024 11:01 AM

## 2024-09-24 NOTE — PROGRESS NOTES
Pt disconnected from ed monitoring system and transported to . Report given to florian. All questions answered.

## 2024-09-24 NOTE — DISCHARGE INSTR - COC
Continuity of Care Form    Patient Name: Manoj Feliz   :  1960  MRN:  [id number]    Admit date:  2024  Discharge date:  ***    Code Status Order: Full Code   Advance Directives:   Advance Care Flowsheet Documentation             Admitting Physician:  Shelia Fritz MD  PCP: No primary care provider on file.    Discharging Nurse: ***  Discharging Hospital Unit/Room#: 3TN-3357/3357-02  Discharging Unit Phone Number: ***    Emergency Contact:   Extended Emergency Contact Information  Primary Emergency Contact: Naldo Bauman  Home Phone: [phone]  Relation: Healthcare Decision Maker  Secondary Emergency Contact: mireya Rick  Home Phone: [phone]  Relation: Friend    Past Surgical History:  Past Surgical History:   Procedure Laterality Date    CARDIAC DEFIBRILLATOR PLACEMENT Left     CARDIOVERSION  2019    Dr. Soria    INSERTABLE CARDIAC MONITOR  2019    PROSTATE SURGERY N/A 3/9/2023    CYSTOSCOPY, PROSTATE TRANSRECTAL ULTRASOUND BIOPSY performed by Tony Dominguez MD at [facility] OR       Immunization History:   Immunization History   Administered Date(s) Administered    Pneumococcal, PCV20, PREVNAR 20, (age 6w+), IM, 0.5mL 2024    TDaP, ADACEL (age 10y-64y), BOOSTRIX (age 10y+), IM, 0.5mL 2022       Active Problems:  Patient Active Problem List   Diagnosis Code    Benign essential HTN I10    Dilated cardiomyopathy (HCC) I42.0    Acute on chronic combined systolic and diastolic heart failure (HCC) I50.43    Non-compliance Z91.199    Homelessness Z59.00    Acute combined systolic and diastolic heart failure (HCC) I50.41    Chronic atrial fibrillation (HCC) I48.20    Moderate mitral regurgitation I34.0    Acute on chronic congestive heart failure (HCC) I50.9    VT (ventricular tachycardia) (HCC) I47.20    ICD (implantable cardioverter-defibrillator) in place Z95.810    Thrombus of left atrial appendage I51.3    Near syncope R55    Chronic hypotension I95.89    Anemia  {Urinary Catheter:418359127}   Colostomy/Ileostomy/Ileal Conduit: {YES / NO:}       Date of Last BM: ***    Intake/Output Summary (Last 24 hours) at 2024 1313  Last data filed at 2024 0942  Gross per 24 hour   Intake 260 ml   Output 450 ml   Net -190 ml     I/O last 3 completed shifts:  In: 240 [P.O.:240]  Out: 200 [Urine:200]    Safety Concerns:     { JOSE ROBERTO Safety Concerns:187940150}    Impairments/Disabilities:      { JOSE ROBERTO Impairments/Disabilities:272856749}    Nutrition Therapy:  Current Nutrition Therapy:   { JOSE ROBERTO Diet List:888136742}    Routes of Feeding: {[facility] Other Feedings:949332641}  Liquids: {Slp liquid thickness:73982}  Daily Fluid Restriction: {[facility] DME Yes amt example:866173681}  Last Modified Barium Swallow with Video (Video Swallowing Test): {Done Not Done Date:}    Treatments at the Time of Hospital Discharge:   Respiratory Treatments: ***  Oxygen Therapy:  {Therapy; copd oxygen:45335}  Ventilator:    {[facility] Vent List:201641094}    Rehab Therapies: {THERAPEUTIC INTERVENTION:6132272055}  Weight Bearing Status/Restrictions: {[facility] Weight Bearin}  Other Medical Equipment (for information only, NOT a DME order):  {EQUIPMENT:489349586}  Other Treatments: ***    Patient's personal belongings (please select all that are sent with patient):  {[facility] Belongings:472594018}    RN SIGNATURE:  {Esignature:594809880}    CASE MANAGEMENT/SOCIAL WORK SECTION    Inpatient Status Date: ***    Readmission Risk Assessment Score:  Readmission Risk              Risk of Unplanned Readmission:  24           Discharging to Facility/ Agency   Name:   Address:  Phone:  Fax:    Dialysis Facility (if applicable)   Name:  Address:  Dialysis Schedule:  Phone:  Fax:    / signature: {Esignature:602991219}    PHYSICIAN SECTION    Prognosis: {Prognosis:4759982396}    Condition at Discharge: { Patient Condition:473083647}    Rehab Potential (if transferring to Rehab):  {Prognosis:9006716731}    Recommended Labs or Other Treatments After Discharge: ***    Physician Certification: I certify the above information and transfer of Manoj Feliz  is necessary for the continuing treatment of the diagnosis listed and that he requires {Admit to Appropriate Level of Care:60642} for {GREATER/LESS:255639795} 30 days.     Update Admission H&P: {[facility]P DME Changes in HandP:190251411}    PHYSICIAN SIGNATURE:  {Esignature:422226784}

## 2024-09-24 NOTE — CONSULTS
CHF Nutrition Education    Consult received for heart failure diet education. Pt unavailable at time of RD visit. Pt has been educated on the heart failure diet 7 times during previous admissions over the past 1.5 years. Handout left at bedside. Remain available to discuss with pt and answer questions as needed prior to discharge.     Electronically signed by Nohemi Aranda MS, RD, LD on 9/24/2024 at 11:47 AM   Contact: [phone]

## 2024-09-24 NOTE — RT PROTOCOL NOTE
RT Inhaler-Nebulizer Bronchodilator Protocol Note    There is a bronchodilator order in the chart from a provider indicating to follow the RT Bronchodilator Protocol and there is an “Initiate RT Inhaler-Nebulizer Bronchodilator Protocol” order as well (see protocol at bottom of note).    CXR Findings:  XR CHEST PORTABLE    Result Date: 9/24/2024  Cardiomegaly. No acute airspace disease identified.       The findings from the last RT Protocol Assessment were as follows:   History Pulmonary Disease: None or smoker <15 pack years  Respiratory Pattern: Dyspnea on exertion or RR 21-25 bpm  Breath Sounds: Slightly diminished and/or crackles  Cough: Strong, spontaneous, non-productive  Indication for Bronchodilator Therapy:    Bronchodilator Assessment Score: 4    Aerosolized bronchodilator medication orders have been revised according to the RT Inhaler-Nebulizer Bronchodilator Protocol below.    Respiratory Therapist to perform RT Therapy Protocol Assessment initially then follow the protocol.  Repeat RT Therapy Protocol Assessment PRN for score 0-3 or on second treatment, BID, and PRN for scores above 3.    No Indications - adjust the frequency to every 6 hours PRN wheezing or bronchospasm, if no treatments needed after 48 hours then discontinue using Per Protocol order mode.     If indication present, adjust the RT bronchodilator orders based on the Bronchodilator Assessment Score as indicated below.  Use Inhaler orders unless patient has one or more of the following: on home nebulizer, not able to hold breath for 10 seconds, is not alert and oriented, cannot activate and use MDI correctly, or respiratory rate 25 breaths per minute or more, then use the equivalent nebulizer order(s) with same Frequency and PRN reasons based on the score.  If a patient is on this medication at home then do not decrease Frequency below that used at home.    0-3 - enter or revise RT bronchodilator order(s) to equivalent RT Bronchodilator  order with Frequency of every 4 hours PRN for wheezing or increased work of breathing using Per Protocol order mode.        4-6 - enter or revise RT Bronchodilator order(s) to two equivalent RT bronchodilator orders with one order with BID Frequency and one order with Frequency of every 4 hours PRN wheezing or increased work of breathing using Per Protocol order mode.        7-10 - enter or revise RT Bronchodilator order(s) to two equivalent RT bronchodilator orders with one order with TID Frequency and one order with Frequency of every 4 hours PRN wheezing or increased work of breathing using Per Protocol order mode.       11-13 - enter or revise RT Bronchodilator order(s) to one equivalent RT bronchodilator order with QID Frequency and an Albuterol order with Frequency of every 4 hours PRN wheezing or increased work of breathing using Per Protocol order mode.      Greater than 13 - enter or revise RT Bronchodilator order(s) to one equivalent RT bronchodilator order with every 4 hours Frequency and an Albuterol order with Frequency of every 2 hours PRN wheezing or increased work of breathing using Per Protocol order mode.     RT to enter RT Home Evaluation for COPD & MDI Assessment order using Per Protocol order mode.    Electronically signed by Janice Oates RCP on 9/24/2024 at 1:06 PM

## 2024-09-24 NOTE — CONSULTS
[facility]  Cardiac Consult     Referring Provider:  Evette        History of Present Illness:     63-year-old male with severe dilated cardiomyopathy with ejection fraction of 10 to 15% with chronic systolic heart failure and chronic atrial fibrillation admitted with worsening shortness of breath edema and abdominal pain.  He has frequent admissions with congestive heart failure.  He is homeless which makes treatment more challenging.  However he says he has been taking all of his home medications.    For the past few days he has had epigastric discomfort.  Increasing shortness of breath with exertion and worsening edema.  He does feel lightheaded at times as well.  Due to these complaints he came to the emergency room.  He says he was given a GI cocktail and his abdominal pain resolved.  CT scan of the abdomen was obtained that showed nonspecific findings but no acute problem because abdominal pain that I can tell.  CT of the head and neck as well showed no clear acute diagnosis.  Blood pressure has not been low.    He was given IV Lasix and GI cocktail as below.  He feels somewhat better today but remains somewhat dyspneic.  His abdominal pain has resolved and he is eating breakfast.  He says that his breathing is worse than usual and his edema is somewhat worse than usual.      Past Medical History:   has a past medical history of Acute combined systolic and diastolic congestive heart failure (HCC), Atrial fibrillation (HCC), CHF (congestive heart failure) (HCC), Hx of blood clots, and Hypertension.    Surgical History:   has a past surgical history that includes Insertable Cardiac Monitor (07/26/2019); Cardioversion (07/26/2019); Cardiac defibrillator placement (Left); and Prostate surgery (N/A, 3/9/2023).     Social History:   reports that he has never smoked. He has never been exposed to tobacco smoke. He has never used smokeless tobacco. He reports that he does not drink alcohol and does not use  identified.      CT ABD  Impression:  1. Partially distended gallbladder with nonspecific apparent gallbladder wall   thickening. No stones identified. If there is clinical concern for acute   cholecystitis, ultrasound could be performed.   2. Lobulated liver contour for which chronic liver disease may be present.   3. Trace abdominopelvic ascites.   4. Diverticulosis.   5. Prostatomegaly.      LABS  WBC4.7K/uL RBC4.50M/uL Ckmasgwmhl21.2 Low g/dL Gykivoaxyb96.3 Low % MCV87.3fL MCH29.3pg MCHC33.6g/dL RDW17.0 High % Ouxvwcfwz289E/uL    Total Protein7.3g/dL Albumin4.2g/dL Alkaline Osxwvtcfhqw958 High U/L ALT30U/L AST47 High U/L    NT Pro-BNP22,113 High pg/mL    Pfurtf781brwl/L Potassium4.0mmol/L Ydoegyjz37 Low mmol/L TR191rrom/L Anion Gap20 High  Uvhhymp49lh/dL BUN37 High mg/dL Creatinine1.4 High mg/dL   Latest Reference Range & Units 09/23/24 22:46 09/24/24 00:03   Troponin, High Sensitivity 0 - 22 ng/L 29 (H) 29 (H)       CT AND CTA HEAD AND NECK  Impression:  1. No evidence of hemodynamically significant carotid artery stenosis by   NASCET criteria.  The left vertebral artery is of very small caliber but   patent.   2. No evidence of arterial occlusion or significant stenosis in the head or   neck.   3. 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   4. No evidence of acute intracranial abnormality on the pre contrast CT scan   of head.   5. Moderate to severe multilevel degenerative disc disease of the cervical   spine.   6. If there is clinical concern for acute CVA, MRI brain is recommended for   further evaluation.      ECHO 5/2024     Severe global and regional LV systolic dysfunction.  Estimated EF 10-15%.  Severely enlarged LV chamber size with eccentric remodeling.    Increased LV apical trabeculation.  No evidence for LV thrombus

## 2024-09-24 NOTE — PROGRESS NOTES
HOSPITALISTS PROGRESS NOTE    9/24/2024 9:39 AM        Name: Manoj Feliz .              Admitted: 9/23/2024  Primary Care Provider: No primary care provider on file. (Tel: None)      Brief Course: This 63-year-old male with PMHx of HFrEF s/p AICD, chronic A-fib hypertension and hyperlipidemia who presented with fatigue, SOB and.  Edema admitted for acute on chronic systolic heart failure.        Interval history:   Pt seen and examined today   Overnight events noted and interval ancillary notes reviewed.  On RA satting well.  Afebrile overnight, WBCs WNL  Diuresing IV Lasix;creatinine of 1.4  Pt up in chair; reported he is feeling better today.  His lightheadedness and abdominal pain resolved.    Endorsed BLE swelling, SOB on exertion but denied any fever, chills, chest pain, palpitations, nausea or vomiting        Assessment & Plan:     Acute on chronic systolic Heat Failure (EF 10 to 15%); s/p AICD  BNP 22, 113 on admission CXR with cardiomegaly.  Cardiology consulted; continue IV Lasix.  Monitor electrolytes closely while on IV Lasix and replace as needed  Continue ACE, BB, sglt2, and spironolactone.  Strict intake output  Strict intake and output, daily weights monitor fluid and salt restriction    Hx Atrial A-fib; AV paced.  Continue Eliquis and amiodarone.  Monitor on telemetry    Lightheadedness;CT negative for acute pathology.  CTA head and neck negative for LVO or significant stenosis  Check orthostatic vitals.  Maintain fall precautions.  PT/OT consulted    Hyperbilirubinemia; total bili 3.6 on admission; trended up to 4.2  AST 40, ALT 28,  Alk phos 134.   CT abdomen pelvis showed partially distended gallbladder without cholelithiasis   GI consulted; RUQ US ordered.  Monitor LFTs closely    Hyperlipidemia screening continue statins    CKD stage IIIa; creatinine baseline(1.3-1.5).  Avoid nephrotoxin, strict intake output.  Monitor

## 2024-09-24 NOTE — ED PROVIDER NOTES
fever.   HENT:  Negative for congestion, rhinorrhea and sore throat.    Eyes:  Negative for redness.   Respiratory:  Positive for cough. Negative for shortness of breath.    Cardiovascular:  Positive for leg swelling (At baseline per patient). Negative for chest pain and palpitations.   Gastrointestinal:  Positive for abdominal pain, nausea and vomiting. Negative for diarrhea.   Genitourinary:  Negative for dysuria.   Musculoskeletal:  Negative for arthralgias and joint swelling.   Skin:  Negative for color change.   Neurological:  Positive for dizziness. Negative for seizures, syncope, numbness and headaches.       Positives and Pertinent negatives as per HPI.     SURGICAL HISTORY     Past Surgical History:   Procedure Laterality Date    CARDIAC DEFIBRILLATOR PLACEMENT Left     CARDIOVERSION  07/26/2019    Dr. Soria    INSERTABLE CARDIAC MONITOR  07/26/2019    PROSTATE SURGERY N/A 3/9/2023    CYSTOSCOPY, PROSTATE TRANSRECTAL ULTRASOUND BIOPSY performed by Tony Dominguez MD at [facility]       [facility]       Discharge Medication List as of 9/26/2024  2:13 PM        CONTINUE these medications which have NOT CHANGED    Details   spironolactone (ALDACTONE) 25 MG tablet TAKE ONE TABLET BY MOUTH ONCE A DAY WITH LUNCH, Disp-90 tablet, R-0Normal      amiodarone (CORDARONE) 200 MG tablet TAKE ONE TABLET BY MOUTH ONCE A DAY, Disp-90 tablet, R-0Normal      torsemide (DEMADEX) 20 MG tablet 40 mg in am and 20 mg in pm, Disp-270 tablet, R-1Normal      finasteride (PROSCAR) 5 MG tablet Take 1 tablet by mouth daily, Disp-90 tablet, R-2Normal      lisinopril (PRINIVIL;ZESTRIL) 2.5 MG tablet Take 1 tablet by mouth daily, Disp-30 tablet, R-3Normal      vitamin D (ERGOCALCIFEROL) 1.25 MG (66739 UT) CAPS capsule Take 1 capsule by mouth once a week, Disp-12 capsule, R-3Normal      pantoprazole (PROTONIX) 40 MG tablet Take 1 tablet by mouth daily, Disp-30 tablet, R-0Normal      albuterol sulfate HFA (VENTOLIN HFA) 108 (90 Base)  SEP-1 Core Measure due to:  Infection is not suspected    Chronic Conditions affecting care:    has a past medical history of Acute combined systolic and diastolic congestive heart failure (HCC) (8/31/2022), Atrial fibrillation (HCC) (07/25/2019), CHF (congestive heart failure) (HCC), blood clots, and Hypertension.    CONSULTS: (Who and What was discussed)  IP CONSULT TO HOSPITALIST  IP CONSULT TO HEART FAILURE NURSE/COORDINATOR  IP CONSULT TO DIETITIAN  IP CONSULT TO CARDIOLOGY      Social Determinants Significantly Affecting Health : Patient with complex socioeconomic background, does not have PCP, poor historian    Records Reviewed (External and Source) reviewed previous records including cardiology visits and multiple admissions for acute on chronic CHF    CC/HPI Summary, DDx, ED Course, and Reassessment: Patient presents with multiple complaints.  He is reporting over the last 3 days having nausea, vomiting, generalized abdominal pain (greatest in the epigastrium) occasional cough, fatigue and general malaise.  He is concerned about his blood pressure, but does not have a way to check it at home.  On arrival blood pressure is normal at 120/86, and other vitals are stable.  Patient is overall well-appearing and nontoxic.  Abdomen is soft with generalized tenderness greatest in the epigastrium but no rigidity or guarding.  Lung sounds clear to auscultation bilaterally.  2+ pitting edema bilateral lower extremities, at baseline per patient.  He has a history of CHF.  NIHSS of 0 with no focal neural logic deficits on exam.  Low suspicion for acute CVA, but given his history of dizziness this morning, did obtain CT imaging of the head and CTA of the head/neck.  These are pending.  CBC and CMP unremarkable.  Urinalysis not indicative of infection.  Lipase and ethanol levels are not elevated.  Influenza and COVID-negative.  Troponin slightly elevated at 29 with a delta of 29.  EKG negative for evidence of acute  ischemia.      CTA of the head/neck and CT of the abdomen/pelvis are pending.  This has exceeded the length of my shift, and patient care is transferred to attending, Dr. Torrez.  Please see his note for final management and disposition.    Disposition Considerations (tests considered but not done, Admit vs D/C, Shared Decision Making, Pt Expectation of Test or Tx.):        I am the Primary Clinician of Record.  FINAL IMPRESSION      1. Elevated bilirubin    2. General weakness    3. Thickening of wall of gallbladder          DISPOSITION/PLAN     DISPOSITION Admitted 09/24/2024 03:33:29 AM  Condition at Disposition: Data Unavailable      PATIENT REFERRED TO:  No follow-up provider specified.    DISCHARGE MEDICATIONS:  Discharge Medication List as of 9/26/2024  2:13 PM          DISCONTINUED MEDICATIONS:  Discharge Medication List as of 9/26/2024  2:13 PM        STOP taking these medications       potassium chloride (KLOR-CON M) 20 MEQ extended release tablet Comments:   Reason for Stopping:                      (Please note that portions of this note were completed with a voice recognition program.  Efforts were made to edit the dictations but occasionally words are mis-transcribed.)    BISI George (electronically signed)            Noemi Hernandez PA  09/28/24 [id number]

## 2024-09-24 NOTE — PROGRESS NOTES
ED TO INPATIENT SBAR HANDOFF    Patient Name: Manoj Feliz   :  1960  63 y.o.   MRN:  [id number]  Preferred Name    ED Room #:  ED-0011/11  Family/Caregiver Present no   Restraints no   Sitter no   Sepsis Risk Score      Situation  Code Status: Prior No additional code details.    Allergies: Patient has no known allergies.  Weight: No data found.  Arrived from: home  Chief Complaint:   Chief Complaint   Patient presents with    Fatigue     Patient arrives through triage with complaints of abdominal pain and weakness. Patient reports not  feeling well for three days. Reports having some nausea and vomiting. Patient reports \"it feels like my blood pressure has shot straight up. I wanted to make sure I wasn't having a stroke\". Patient denies headache. Reports feeling lightheadedness.      Hospital Problem/Diagnosis:  Principal Problem:    Acute on chronic systolic heart failure (HCC)  Resolved Problems:    * No resolved hospital problems. *    Imaging:   CT ABDOMEN PELVIS W IV CONTRAST Additional Contrast? None   Final Result   1. Partially distended gallbladder with nonspecific apparent gallbladder wall   thickening. No stones identified. If there is clinical concern for acute   cholecystitis, ultrasound could be performed.   2. Lobulated liver contour for which chronic liver disease may be present.   3. Trace abdominopelvic ascites.   4. Diverticulosis.   5. Prostatomegaly.         CTA Head Neck W/Contrast   Preliminary Result   1. No evidence of hemodynamically significant carotid artery stenosis by   NASCET criteria.  The left vertebral artery is of very small caliber but   patent.   2. No evidence of arterial occlusion or significant stenosis in the head or   neck.   3. 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   4. No  evidence of acute intracranial abnormality on the pre contrast CT scan   of head.   5. Moderate to severe multilevel degenerative disc disease of the cervical   spine.   6. If there is clinical concern for acute CVA, MRI brain is recommended for   further evaluation.         CT Head W/O Contrast   Final Result   No acute intracranial abnormality.      No change from prior exam.         XR CHEST PORTABLE    (Results Pending)     Abnormal labs:   Abnormal Labs Reviewed   CBC WITH AUTO DIFFERENTIAL - Abnormal; Notable for the following components:       Result Value    Hemoglobin 13.2 (*)     Hematocrit 39.3 (*)     RDW 17.0 (*)     Lymphocytes Absolute 0.9 (*)     Anisocytosis Occasional (*)     Macrocytes Occasional (*)     Polychromasia Occasional (*)     Poikilocytes Occasional (*)     Ovalocytes Occasional (*)     All other components within normal limits   COMPREHENSIVE METABOLIC PANEL W/ REFLEX TO MG FOR LOW K - Abnormal; Notable for the following components:    BUN 36 (*)     Total Bilirubin 3.6 (*)     Alkaline Phosphatase 134 (*)     AST 40 (*)     All other components within normal limits   TROPONIN - Abnormal; Notable for the following components:    Troponin, High Sensitivity 29 (*)     All other components within normal limits   TROPONIN - Abnormal; Notable for the following components:    Troponin, High Sensitivity 29 (*)     All other components within normal limits   URINALYSIS WITH REFLEX TO CULTURE - Abnormal; Notable for the following components:    Color, UA DARK YELLOW (*)     Protein,  (*)     All other components within normal limits   MICROSCOPIC URINALYSIS - Abnormal; Notable for the following components:    Hyaline Casts, UA 10 (*)     All other components within normal limits   HEPATIC FUNCTION PANEL - Abnormal; Notable for the following components:    Alkaline Phosphatase 134 (*)     AST 47 (*)     Total Bilirubin 4.2 (*)     Bilirubin, Direct 1.4 (*)     Bilirubin, Indirect 2.8 (*)

## 2024-09-24 NOTE — H&P
V2.0  History and Physical      Name:  Manoj Feliz /Age/Sex: 1960  (63 y.o. male)   MRN & CSN:  [id number] & [id number] Encounter Date/Time: 2024 3:28 AM EDT   Location:  -001 PCP: No primary care provider on file.       Hospital Day: 2    Assessment and Plan:   Manoj Feliz is a 63 y.o. male  who presents with fatigue      Plan:  Acute on Chronic Systolic Heat Failure EF 10 to 15%  Admit inpatient with telemetry  IV lasix  Continue ACE, BB, sglt2, and aldosterone antagonist  Strict I and O, low Na  Previously seen by palliative care but patient declined  He has multiple concerns but on exam he has significant LE edema and bilateral JVD, his breathing seems labored although he denies shortness of breath.  Suspect he is feeling poor secondary to CHF exacerbation  Cardiology consult     2. Hyperbilirubinemia  Bilirubin 3.6,  check direct/indirect  AST 40, ALT 28,  Alk phos 134  Bilirubin previously elevated  Partially distended gallbladder without cholelithiasis     3. Hx Atrial Fibrillation  Rhythm on telemetry paced at times  Continue eliquis    4. Hyperlipidemia  Continue statin     5. AICD     Disposition:   Current Living situation: homeless  Expected Disposition: homeless  Estimated D/C: 3    Diet No diet orders on file   DVT Prophylaxis [] Lovenox, []  Heparin, [] SCDs, [] Ambulation,  [x] Eliquis, [] Xarelto, [] Coumadin   Code Status Prior   Surrogate Decision Maker/ POA      Personally reviewed Lab Studies and Imaging     Discussed management of the case with BISI George in ER who recommended admission         History from:     patient    History of Present Illness:     Chief Complaint: fatigue x 3 days  Manoj Feliz is a 63 y.o. male with pmh of systolic heart failure, HTN, HLD,  who presents with fatigue.  Patient reports not feeling well for 3 days.  He has been very tired.  He reports shortness of breath with exertion so has been limiting activity. He reports  not a suspected or confirmed emergency medical condition->Emergency Medical Condition (MA) Reason for Exam: dizziness this morning Relevant Medical/Surgical History: Fatigue (Patient arrives through triage with complaints of abdominal pain and weakness. Patient reports not  feeling well for three days. Reports having some nausea and vomiting. Patient reports \"it feels like my blood pressure has shot straight up. I wanted to make sure I wasn't having a stroke\". Patient denies headache. Reports feeling lightheadedness. ) FINDINGS: BRAIN/VENTRICLES: There is no acute intracranial hemorrhage, mass effect or midline shift.  No abnormal extra-axial fluid collection.  The gray-white differentiation is maintained without evidence of an acute infarct.  There is no evidence of hydrocephalus. ORBITS: The visualized portion of the orbits demonstrate no acute abnormality. SINUSES: The visualized paranasal sinuses and mastoid air cells demonstrate no acute abnormality. SOFT TISSUES/SKULL:  No acute abnormality of the visualized skull or soft tissues.     No acute intracranial abnormality. No change from prior exam.         Electronically signed by TRENT Muro CNP on 9/24/2024 at 3:28 AM

## 2024-09-24 NOTE — DISCHARGE INSTRUCTIONS
Follow up with your PCP within 3-4 days of discharge.  Have PCP obtain liver function test and arrange outpatient right upper quadrant ultrasound  Follow up with cardiology as instructed   Take all your medications as prescribed.      Guidelines for Heart Failure home management:    1. Continue to monitor weight first thing each morning. You should weigh yourself after using the bathroom and before you eat breakfast.    2. Report to your doctor any significant weight change. Remember that weight change of 2-3 lbs. in 1 day or 5 lbs in a week is \"significant\" and likely represents changes in \"fluid\" status.  If you are experiencing any swelling in your feet, ankles or abdomen, or shortness of breath, call your doctor.     3. You should restrict all sodium intake to 3000 milligrams (3 grams) a day. Depending on your status, you may also be asked to restrict fluid intake to no more that 64 oz/2 Liters a day. If uncertain, ask the nurse or physician.     4. Regular aerobic exercise is encouraged 30 minutes a day (walking, bike, swimming, etc.). For specific exercise recommendations, ask your physician.     5. Report to your doctor any change in symptoms (chest pain, worsening shortness of breath, increased dizziness or passing out, increased palpitations or ICD shock, trouble catching breath while lying down, increased edema or abdominal bloating). Remember that even \"minor\" changes in symptoms may be important. Also report any changes in medications including \"over the counter\" medications.     6. DO NOT take NSAID's for pain (i.e, Advil, Aleve, Motrin, ibuprofen, and many more) since these may cause serious problems in those with a history of CHF. If uncertain about the medication, call your doctor.    7. If you have new significant or ongoing diarrhea or vomiting, please call your doctor for further instructions. Taking a diuretic (water pill) with these symptoms can worsen dehydration.     8. If you have any

## 2024-09-25 PROBLEM — N28.9 RENAL INSUFFICIENCY: Status: ACTIVE | Noted: 2024-09-25

## 2024-09-25 LAB
ANION GAP SERPL CALCULATED.3IONS-SCNC: 13 MMOL/L (ref 3–16)
BUN SERPL-MCNC: 33 MG/DL (ref 7–20)
CALCIUM SERPL-MCNC: 8.4 MG/DL (ref 8.3–10.6)
CHLORIDE SERPL-SCNC: 101 MMOL/L (ref 99–110)
CHOLEST SERPL-MCNC: 83 MG/DL (ref 0–199)
CO2 SERPL-SCNC: 23 MMOL/L (ref 21–32)
CREAT SERPL-MCNC: 1.2 MG/DL (ref 0.8–1.3)
FERRITIN SERPL IA-MCNC: 224 NG/ML (ref 30–400)
GFR SERPLBLD CREATININE-BSD FMLA CKD-EPI: 67 ML/MIN/{1.73_M2}
GLUCOSE SERPL-MCNC: 93 MG/DL (ref 70–99)
HDLC SERPL-MCNC: 36 MG/DL (ref 40–60)
IRON SATN MFR SERPL: 22 % (ref 20–50)
IRON SERPL-MCNC: 83 UG/DL (ref 59–158)
LDLC SERPL CALC-MCNC: 35 MG/DL
MAGNESIUM SERPL-MCNC: 2.5 MG/DL (ref 1.8–2.4)
POTASSIUM SERPL-SCNC: 4 MMOL/L (ref 3.5–5.1)
SODIUM SERPL-SCNC: 137 MMOL/L (ref 136–145)
TIBC SERPL-MCNC: 376 UG/DL (ref 260–445)
TRIGL SERPL-MCNC: 61 MG/DL (ref 0–150)
VLDLC SERPL CALC-MCNC: 12 MG/DL

## 2024-09-25 PROCEDURE — 6360000002 HC RX W HCPCS: Performed by: NURSE PRACTITIONER

## 2024-09-25 PROCEDURE — 83735 ASSAY OF MAGNESIUM: CPT

## 2024-09-25 PROCEDURE — 97161 PT EVAL LOW COMPLEX 20 MIN: CPT

## 2024-09-25 PROCEDURE — 80061 LIPID PANEL: CPT

## 2024-09-25 PROCEDURE — 99232 SBSQ HOSP IP/OBS MODERATE 35: CPT | Performed by: CLINICAL NURSE SPECIALIST

## 2024-09-25 PROCEDURE — 83540 ASSAY OF IRON: CPT

## 2024-09-25 PROCEDURE — 2580000003 HC RX 258: Performed by: NURSE PRACTITIONER

## 2024-09-25 PROCEDURE — 97530 THERAPEUTIC ACTIVITIES: CPT

## 2024-09-25 PROCEDURE — 97116 GAIT TRAINING THERAPY: CPT

## 2024-09-25 PROCEDURE — 82728 ASSAY OF FERRITIN: CPT

## 2024-09-25 PROCEDURE — 1200000000 HC SEMI PRIVATE

## 2024-09-25 PROCEDURE — 83550 IRON BINDING TEST: CPT

## 2024-09-25 PROCEDURE — 6370000000 HC RX 637 (ALT 250 FOR IP): Performed by: NURSE PRACTITIONER

## 2024-09-25 PROCEDURE — 80048 BASIC METABOLIC PNL TOTAL CA: CPT

## 2024-09-25 PROCEDURE — 36415 COLL VENOUS BLD VENIPUNCTURE: CPT

## 2024-09-25 RX ORDER — AMIODARONE HYDROCHLORIDE 200 MG/1
200 TABLET ORAL DAILY
Qty: 90 TABLET | Refills: 0 | Status: SHIPPED | OUTPATIENT
Start: 2024-09-25

## 2024-09-25 RX ORDER — SPIRONOLACTONE 25 MG/1
TABLET ORAL
Qty: 90 TABLET | Refills: 0 | Status: SHIPPED | OUTPATIENT
Start: 2024-09-25

## 2024-09-25 RX ORDER — LISINOPRIL 5 MG/1
2.5 TABLET ORAL DAILY
Status: DISCONTINUED | OUTPATIENT
Start: 2024-09-26 | End: 2024-09-26 | Stop reason: HOSPADM

## 2024-09-25 RX ADMIN — APIXABAN 5 MG: 5 TABLET, FILM COATED ORAL at 20:35

## 2024-09-25 RX ADMIN — PANTOPRAZOLE SODIUM 40 MG: 40 TABLET, DELAYED RELEASE ORAL at 06:11

## 2024-09-25 RX ADMIN — ASPIRIN 81 MG: 81 TABLET, CHEWABLE ORAL at 08:40

## 2024-09-25 RX ADMIN — ATORVASTATIN CALCIUM 40 MG: 40 TABLET, FILM COATED ORAL at 20:35

## 2024-09-25 RX ADMIN — TAMSULOSIN HYDROCHLORIDE 0.4 MG: 0.4 CAPSULE ORAL at 08:40

## 2024-09-25 RX ADMIN — SPIRONOLACTONE 25 MG: 25 TABLET ORAL at 12:17

## 2024-09-25 RX ADMIN — SODIUM CHLORIDE, PRESERVATIVE FREE 10 ML: 5 INJECTION INTRAVENOUS at 08:41

## 2024-09-25 RX ADMIN — METOPROLOL SUCCINATE 12.5 MG: 25 TABLET, FILM COATED, EXTENDED RELEASE ORAL at 08:40

## 2024-09-25 RX ADMIN — SODIUM CHLORIDE, PRESERVATIVE FREE 10 ML: 5 INJECTION INTRAVENOUS at 20:36

## 2024-09-25 RX ADMIN — FUROSEMIDE 40 MG: 10 INJECTION, SOLUTION INTRAMUSCULAR; INTRAVENOUS at 17:54

## 2024-09-25 RX ADMIN — FINASTERIDE 5 MG: 5 TABLET, FILM COATED ORAL at 08:40

## 2024-09-25 RX ADMIN — AMIODARONE HYDROCHLORIDE 200 MG: 200 TABLET ORAL at 08:40

## 2024-09-25 RX ADMIN — LISINOPRIL 2.5 MG: 5 TABLET ORAL at 08:40

## 2024-09-25 RX ADMIN — APIXABAN 5 MG: 5 TABLET, FILM COATED ORAL at 08:40

## 2024-09-25 RX ADMIN — EMPAGLIFLOZIN 10 MG: 10 TABLET, FILM COATED ORAL at 08:40

## 2024-09-25 RX ADMIN — FUROSEMIDE 40 MG: 10 INJECTION, SOLUTION INTRAMUSCULAR; INTRAVENOUS at 08:39

## 2024-09-25 NOTE — PROGRESS NOTES
[facility] - Inpatient Rehabilitation Department   Phone: [phone]    Physical Therapy    [x] Initial Evaluation            [] Daily Treatment Note         [x] Discharge Summary      Patient: Manoj Feliz   : 1960   MRN: [id number]   Date of Service:  2024  Admitting Diagnosis: Acute on chronic systolic heart failure (HCC)  Current Admission Summary: Per H&P on  \"63 y.o. male with pmh of systolic heart failure, HTN, HLD,  who presents with fatigue.  Patient reports not feeling well for 3 days.  He has been very tired.  He reports shortness of breath with exertion so has been limiting activity. He reports increased LE edema.  He denies any chest pain.  He does reports feeling like he is congested in his chest with productive cough, white sputum.  He denies any fevers.   He has multiple concerns.  His abdominal pain has improved in ER, he is requesting to eat.    His dizziness has improved as well.\"  Past Medical History:  has a past medical history of Acute combined systolic and diastolic congestive heart failure (HCC), Atrial fibrillation (HCC), CHF (congestive heart failure) (HCC), Hx of blood clots, and Hypertension.  Past Surgical History:  has a past surgical history that includes Insertable Cardiac Monitor (2019); Cardioversion (2019); Cardiac defibrillator placement (Left); and Prostate surgery (N/A, 3/9/2023).  Discharge Recommendations: Manoj Feliz scored a 24/24 on the AM-PAC short mobility form.  At this time, no further PT is recommended upon discharge due to patient at independent level.  Recommend patient returns to prior setting with prior services.    DME Required For Discharge: No new DME required  Precautions/Restrictions: low fall risk  Positional Restrictions:no positional restrictions    Pre-Admission Information   Lives With: friends -- staying [place] friend[place].        Type of Home: house  Home Layout: one level  Home Access: level  entry  Bathroom Layout: walk in shower  Toilet Height: standard height  Bathroom Equipment:  No prior equipment.   Home Equipment: no prior equipment  Transfer Assistance: Independent without use of device  Ambulation Assistance:Independent without use of device  ADL Assistance: independent with all ADL's  IADL Assistance: requires assistance with cleaning  Active :        [] Yes                 [x] No--friends drive   Hand Dominance: [] Left                 [x] Right  Current Employment: retired.  Occupation: --works on a seldom basis to earn a little extra money.   Hobbies:   Recent Falls: Denies recent falls.    Examination   Vision:   Vision Gross Assessment: Impaired and Vision Corrective Device: wears glasses for reading  Hearing:   WFL  Observation:   General Observation:  Pt on RA, telemetry  Posture:   Good  Sensation:   WFL  ROM:   (B) LE ROM WFL  Strength:   (B) LE gross strength WFL  Therapist Clinical Decision Making (Complexity): low complexity  Clinical Presentation: stable      Subjective  General: Patient semi-reclined in bed upon therapist arrival. Pt agreeable to PT eval. Pt very talkative today. Reports feeling much better than when he came in.   Pain: 0/10  Pain Interventions: not applicable       Functional Mobility  Bed Mobility  Supine to Sit: modified independent  Sit to Supine: modified independent  Scooting: Independent  Comments: HOB slightly elevated  Transfers  Sit to stand transfer: Independent  Stand to sit transfer: Independent  Comments:  Ambulation  Surface:level surface  Assistive Device: no device  Assistance: Independent  Distance: 400 ft  Gait Mechanics: Decreased (L) stance time, mild increased (L) lateral lean  Comments:  Pt reports mild joint pain in (B) knees with (L) hurting worse than (L). Reports this is intermittent at baseline and worse when his legs are swollen such as they are now.   Pt with mild progressing to moderate LANDEROS towards end of ambulation but

## 2024-09-25 NOTE — PROGRESS NOTES
HOSPITALISTS PROGRESS NOTE    9/25/2024 9:01 AM        Name: Manoj Feliz .              Admitted: 9/23/2024  Primary Care Provider: No primary care provider on file. (Tel: None)      Brief Course: This 63-year-old male with PMHx of HFrEF s/p AICD, chronic A-fib hypertension and hyperlipidemia who presented with fatigue, SOB and.  Edema admitted for acute on chronic systolic heart failure.        Interval history:   Pt seen and examined today.  Overnight events noted, interval ancillary notes and labs reviewed.   On RA satting well.  Afebrile overnight, WBCs pending.  Diuresing with IV Lasix; -1.6 L since admission.  Creatinine down to 1.2  Up in bed; reported he is feeling better; no further lightheadedness.  BLE edema improving.  Denied any fever, chills, chest pain, nausea, vomiting or abdominal pain          Assessment & Plan:     Acute on chronic systolic Heat Failure (EF 10 to 15%); s/p AICD  BNP 22, 113 on admission CXR with cardiomegaly.  Cardiology consulted; continue IV Lasix.  Monitor electrolytes closely while on IV Lasix and replace as needed  Continue ACE, BB, sglt2, and spironolactone.  Strict intake output  Strict intake and output, daily weights monitor fluid and salt restriction    Hx Atrial A-fib; AV paced.  Continue Eliquis and amiodarone.  Monitor on telemetry    Lightheadedness;CT negative for acute pathology.  CTA head and neck negative for LVO or significant stenosis  Check orthostatic vitals.  Maintain fall precautions.  PT/OT consulted    Hyperbilirubinemia; total bili 3.6 on admission; trended up to 4.2  AST 40, ALT 28,  Alk phos 134.   CT abdomen pelvis showed partially distended gallbladder without cholelithiasis   GI consulted; RUQ US ordered.  Monitor LFTs closely    Hyperlipidemia screening continue statins    CKD stage IIIa; creatinine baseline(1.3-1.5).  Avoid nephrotoxin, strict intake output.  Monitor renal

## 2024-09-25 NOTE — PLAN OF CARE
Problem: Discharge Planning  Goal: Discharge to home or other facility with appropriate resources  Outcome: Progressing     Problem: Respiratory - Adult  Goal: Achieves optimal ventilation and oxygenation  Outcome: Progressing     Problem: Cardiovascular - Adult  Goal: Maintains optimal cardiac output and hemodynamic stability  Outcome: Progressing

## 2024-09-25 NOTE — PROGRESS NOTES
[facility]   Daily Progress Note      Admit Date:  9/23/2024    HPI:    Mr. Feliz is a 63 year old chronic AF on anticoag and unable to CV, severe systolic heart failure 10-15%, hypertension, , homeless     He was admitted with worsening shortness of breath and abdominal pain.  He has had frequent admission with heart failure.  He states he has been taking all his medications but did not call with weight gain.  He had worsening lower leg edema.  CT abdomen showed non specific findings.  He was started on IV lasix.  Optival shows continual AF and no elevation in thoracic impedence LFTs elevated     Subjective:  Patient is being seen for acute on chronic systolic heart failure. There were no acute overnight cardiac events.  He is sitting on the side of the bed and states that his lower leg edema is improving and his abdomen is not as distended.  He admits to drinking more fluid over the past couple weeks when it was hot outside.  He continues to not call me when he has issues rather then just going to the ER.  He did not miss any of his medications  Creat 1.4-1.2 weight is 197 (best is 190)    Objective:   /86   Pulse 78   Temp 97.5 °F (36.4 °C) (Oral)   Resp 18   Ht 1.88 m (6' 2\")   Wt 89.5 kg (197 lb 6.4 oz)   SpO2 98%   BMI 25.34 kg/m²     Intake/Output Summary (Last 24 hours) at 9/25/2024 0833  Last data filed at 9/25/2024 0615  Gross per 24 hour   Intake 860 ml   Output 2300 ml   Net -1440 ml          Physical Exam:  General:  Awake, alert, oriented in NAD  Skin:  Warm and dry.  No unusual bruising or rash  Neck:  Supple.  No JVD or carotid bruit appreciated  Chest:  Normal effort.  Clear to auscultation, no wheezes/rhonchi/rales  Cardiovascular:  RRR, S1/S2, no murmur/gallop/rub  Abdomen:  Soft, nontender, +bowel sounds  Extremities:  bilateral ankle to mid calf  edema  Neurological: No focal deficits  Psychological: Normal mood and affect      Medications:    amiodarone  day  Continue lisinopril 2.5 mg daily (intolerant to entresto)  Continue toprol 12.5 mg twice a day  Continue aldactone 25 mg daily     Hopefully home tomorrow    NYHA IV    Discussed with patient who is agreeable with plan of care.     Thank you for allowing me to participate in the care of your patient.    DONITA ROJAS, APRN - CNS, CNS

## 2024-09-26 ENCOUNTER — TELEPHONE (OUTPATIENT)
Dept: PRIMARY CARE CLINIC | Age: 64
End: 2024-09-26

## 2024-09-26 VITALS
BODY MASS INDEX: 25.08 KG/M2 | RESPIRATION RATE: 18 BRPM | TEMPERATURE: 97.2 F | SYSTOLIC BLOOD PRESSURE: 98 MMHG | OXYGEN SATURATION: 98 % | HEART RATE: 75 BPM | DIASTOLIC BLOOD PRESSURE: 75 MMHG | HEIGHT: 74 IN | WEIGHT: 195.4 LBS

## 2024-09-26 LAB
ALBUMIN SERPL-MCNC: 3.8 G/DL (ref 3.4–5)
ALP SERPL-CCNC: 151 U/L (ref 40–129)
ALT SERPL-CCNC: 29 U/L (ref 10–40)
ANION GAP SERPL CALCULATED.3IONS-SCNC: 13 MMOL/L (ref 3–16)
AST SERPL-CCNC: 46 U/L (ref 15–37)
BASOPHILS # BLD: 0 K/UL (ref 0–0.2)
BASOPHILS NFR BLD: 0.8 %
BILIRUB DIRECT SERPL-MCNC: 1.1 MG/DL (ref 0–0.3)
BILIRUB INDIRECT SERPL-MCNC: 1.4 MG/DL (ref 0–1)
BILIRUB SERPL-MCNC: 2.5 MG/DL (ref 0–1)
BUN SERPL-MCNC: 25 MG/DL (ref 7–20)
CALCIUM SERPL-MCNC: 8.5 MG/DL (ref 8.3–10.6)
CHLORIDE SERPL-SCNC: 100 MMOL/L (ref 99–110)
CO2 SERPL-SCNC: 26 MMOL/L (ref 21–32)
CREAT SERPL-MCNC: 1.2 MG/DL (ref 0.8–1.3)
DEPRECATED RDW RBC AUTO: 17 % (ref 12.4–15.4)
EOSINOPHIL # BLD: 0.1 K/UL (ref 0–0.6)
EOSINOPHIL NFR BLD: 2.1 %
GFR SERPLBLD CREATININE-BSD FMLA CKD-EPI: 67 ML/MIN/{1.73_M2}
GLUCOSE SERPL-MCNC: 82 MG/DL (ref 70–99)
HCT VFR BLD AUTO: 42.6 % (ref 40.5–52.5)
HGB BLD-MCNC: 13.7 G/DL (ref 13.5–17.5)
LYMPHOCYTES # BLD: 1 K/UL (ref 1–5.1)
LYMPHOCYTES NFR BLD: 20.8 %
MAGNESIUM SERPL-MCNC: 2.5 MG/DL (ref 1.8–2.4)
MCH RBC QN AUTO: 28.6 PG (ref 26–34)
MCHC RBC AUTO-ENTMCNC: 32.3 G/DL (ref 31–36)
MCV RBC AUTO: 88.7 FL (ref 80–100)
MONOCYTES # BLD: 0.4 K/UL (ref 0–1.3)
MONOCYTES NFR BLD: 7.6 %
NEUTROPHILS # BLD: 3.3 K/UL (ref 1.7–7.7)
NEUTROPHILS NFR BLD: 68.7 %
NT-PROBNP SERPL-MCNC: 8504 PG/ML (ref 0–124)
PLATELET # BLD AUTO: 198 K/UL (ref 135–450)
PMV BLD AUTO: 8.2 FL (ref 5–10.5)
POTASSIUM SERPL-SCNC: 3.6 MMOL/L (ref 3.5–5.1)
PROT SERPL-MCNC: 7.1 G/DL (ref 6.4–8.2)
RBC # BLD AUTO: 4.8 M/UL (ref 4.2–5.9)
SODIUM SERPL-SCNC: 139 MMOL/L (ref 136–145)
WBC # BLD AUTO: 4.7 K/UL (ref 4–11)

## 2024-09-26 PROCEDURE — 36415 COLL VENOUS BLD VENIPUNCTURE: CPT

## 2024-09-26 PROCEDURE — 80048 BASIC METABOLIC PNL TOTAL CA: CPT

## 2024-09-26 PROCEDURE — 2580000003 HC RX 258: Performed by: NURSE PRACTITIONER

## 2024-09-26 PROCEDURE — 6370000000 HC RX 637 (ALT 250 FOR IP): Performed by: NURSE PRACTITIONER

## 2024-09-26 PROCEDURE — 83735 ASSAY OF MAGNESIUM: CPT

## 2024-09-26 PROCEDURE — 80076 HEPATIC FUNCTION PANEL: CPT

## 2024-09-26 PROCEDURE — 6370000000 HC RX 637 (ALT 250 FOR IP): Performed by: CLINICAL NURSE SPECIALIST

## 2024-09-26 PROCEDURE — 99232 SBSQ HOSP IP/OBS MODERATE 35: CPT | Performed by: CLINICAL NURSE SPECIALIST

## 2024-09-26 PROCEDURE — 85025 COMPLETE CBC W/AUTO DIFF WBC: CPT

## 2024-09-26 PROCEDURE — 83880 ASSAY OF NATRIURETIC PEPTIDE: CPT

## 2024-09-26 RX ORDER — TORSEMIDE 20 MG/1
20 TABLET ORAL EVERY EVENING
Status: DISCONTINUED | OUTPATIENT
Start: 2024-09-26 | End: 2024-09-26 | Stop reason: HOSPADM

## 2024-09-26 RX ORDER — TORSEMIDE 20 MG/1
40 TABLET ORAL DAILY
Status: DISCONTINUED | OUTPATIENT
Start: 2024-09-26 | End: 2024-09-26 | Stop reason: HOSPADM

## 2024-09-26 RX ORDER — ASPIRIN 81 MG/1
81 TABLET, CHEWABLE ORAL DAILY
Qty: 90 TABLET | Refills: 2 | Status: SHIPPED | OUTPATIENT
Start: 2024-09-26

## 2024-09-26 RX ADMIN — APIXABAN 5 MG: 5 TABLET, FILM COATED ORAL at 09:39

## 2024-09-26 RX ADMIN — FINASTERIDE 5 MG: 5 TABLET, FILM COATED ORAL at 09:39

## 2024-09-26 RX ADMIN — SPIRONOLACTONE 25 MG: 25 TABLET ORAL at 13:09

## 2024-09-26 RX ADMIN — TORSEMIDE 40 MG: 20 TABLET ORAL at 13:09

## 2024-09-26 RX ADMIN — ASPIRIN 81 MG: 81 TABLET, CHEWABLE ORAL at 09:39

## 2024-09-26 RX ADMIN — SODIUM CHLORIDE, PRESERVATIVE FREE 10 ML: 5 INJECTION INTRAVENOUS at 09:40

## 2024-09-26 RX ADMIN — METOPROLOL SUCCINATE 12.5 MG: 25 TABLET, FILM COATED, EXTENDED RELEASE ORAL at 09:54

## 2024-09-26 RX ADMIN — PANTOPRAZOLE SODIUM 40 MG: 40 TABLET, DELAYED RELEASE ORAL at 06:08

## 2024-09-26 RX ADMIN — TAMSULOSIN HYDROCHLORIDE 0.4 MG: 0.4 CAPSULE ORAL at 09:39

## 2024-09-26 RX ADMIN — AMIODARONE HYDROCHLORIDE 200 MG: 200 TABLET ORAL at 09:39

## 2024-09-26 RX ADMIN — EMPAGLIFLOZIN 10 MG: 10 TABLET, FILM COATED ORAL at 09:39

## 2024-09-26 NOTE — DISCHARGE SUMMARY
Hospital Medicine Discharge Summary    Patient ID: Manoj Feliz      Patient's PCP: Georgina Escalera, APRN - CNP    Admit Date: 9/23/2024     Discharge Date: 9/26/2024     Admitting Physician: Shelia Fritz MD     Discharge Physician: DIAMANTE NASCIMENTO MD      Hospital Course: This 63-year-old male with PMHx of HFrEF s/p AICD, chronic A-fib hypertension and hyperlipidemia who presented with fatigue, SOB and. Edema admitted for acute on chronic systolic heart failure.       Acute on chronic systolic Heat Failure (EF 10 to 15%); s/p AICD.  Appears compensated  Elevated proBNP on admission.  Trending down with IV diuresis  Cardiology input appreciated.  Patient was diuresed with IV Lasix during hospital stay and discharged home on p.o. torsemide.  Instructed to continue Jardiance, lisinopril, Toprol and Aldactone and follow-up with cardiology    Hx Atrial A-fib; AV paced.  Continue Eliquis and amiodarone.      Lightheadedness; resolved  CT negative for acute pathology.  CTA head and neck negative for LVO or significant stenosis  Check orthostatic vitals.  Maintain fall precautions.  PT/OT consulted     Hyperbilirubinemia; total bili 3.6 on admission; trended down  CT abdomen pelvis showed partially distended gallbladder without cholelithiasis.  Patient instructed to follow-up PCP for repeat LFTs and RUQ US outpatient     Hyperlipidemia  continue statins     CKD stage IIIa; creatinine baseline(1.3-1.5).    Physical Exam Performed:     BP 98/75   Pulse 75   Temp 97.2 °F (36.2 °C) (Oral)   Resp 18   Ht 1.88 m (6' 2\")   Wt 88.6 kg (195 lb 6.4 oz)   SpO2 98%   BMI 25.09 kg/m²     General appearance: No apparent distress, appears stated age and cooperative.  Cardiovascular: Regular rhythm, normal S1, S2. No murmur.   Respiratory: Clear to auscultation bilaterally, no wheeze or crackles.   GI: Abdomen soft, no tenderness, not distended, normal bowel sounds  Musculoskeletal:  No cyanosis in digits.  No BLE edema

## 2024-09-26 NOTE — PROGRESS NOTES
CLINICAL PHARMACY NOTE: MEDS TO BEDS    Total # of Prescriptions Filled: 9   The following medications were delivered to the patient:  AMIODARONE HCL 200MG TABS  SPIRONOLACTONE 25MG TABS  ASPIRIN LOW DOSE 81MG CHEW  ALBUTEROL SULFATE  AERS  PANTOPRAZOLE SODIUM 40MG TBEC  METOPROLOL SUCCINATE ER 25MG TB24  JARDIANCE 10MG TABS  ATORVASTATIN CALCIUM 40MG TABS  LISINOPRIL 25MG TABLET    Additional Documentation: Nilda ANAYA approved to deliver medications to patient room=signed  [facility]

## 2024-09-26 NOTE — CARE COORDINATION
Case Management -  Discharge Note      Patient Name: Manoj Feliz                   YOB: 1960            Readmission Risk (Low < 19, Mod (19-27), High > 27): Readmission Risk Score: 20.1    Current PCP: Georgina Escalera APRN - CNP      (IMM) Important Message from Medicare:    Has pt received appropriate IMM before discharge if required: N/A  Date: n/a pt has medicaid     PT AM-PAC: 24 /24  OT AM-PAC: 24 /24      Financial    Payor: [place]ICA[place] / Plan: [place] DEPT OF JOB / Product Type: *No Product type* /     Pharmacy:  Potential assistance Purchasing Medications: No  Meds-to-Beds request: Yes      [place], [place] - [address] [facility] [address] - P [phone] - F [phone]  [address] [facility] [address]  Phone: [phone] Fax: [phone]    [facility] [id number] - [place], [place] - [address] -  [phone] - F [phone]  [address] [address]  Phone: [phone] Fax: [phone]      Notes:    Additional Case Management Notes: pt will get Meds to Bed. No other discharge needs.    Patient discharged 9/26/2024 to home.  All discharge needs met per case management.    Stacie Ramsey RN, BSN  [phone]

## 2024-09-26 NOTE — PLAN OF CARE
Problem: Metabolic/Fluid and Electrolytes - Adult  Goal: Electrolytes maintained within normal limits  Outcome: Progressing  Flowsheets (Taken 9/25/2024 2206)  Electrolytes maintained within normal limits:   Monitor labs and assess patient for signs and symptoms of electrolyte imbalances   Instruct patient on fluid and nutrition restrictions as appropriate     Problem: Metabolic/Fluid and Electrolytes - Adult  Goal: Hemodynamic stability and optimal renal function maintained  Outcome: Progressing  Flowsheets (Taken 9/25/2024 2206)  Hemodynamic stability and optimal renal function maintained: Monitor intake, output and patient weight

## 2024-09-26 NOTE — TELEPHONE ENCOUNTER
LVM for patient asking him to call our office. We have some questions for him and we can schedule his hospital follow up.

## 2024-09-26 NOTE — PLAN OF CARE
Problem: Discharge Planning  Goal: Discharge to home or other facility with appropriate resources  Outcome: Progressing  Flowsheets (Taken 9/25/2024 2204)  Discharge to home or other facility with appropriate resources: Identify discharge learning needs (meds, wound care, etc)     Problem: Respiratory - Adult  Goal: Achieves optimal ventilation and oxygenation  Outcome: Progressing  Flowsheets (Taken 9/25/2024 2204)  Achieves optimal ventilation and oxygenation: Assess for changes in respiratory status     Problem: Cardiovascular - Adult  Goal: Maintains optimal cardiac output and hemodynamic stability  Outcome: Progressing  Flowsheets (Taken 9/25/2024 2204)  Maintains optimal cardiac output and hemodynamic stability: Monitor blood pressure and heart rate

## 2024-09-26 NOTE — CARE COORDINATION
Case Management Assessment  Initial Evaluation    Date/Time of Evaluation: 9/26/2024 09:30 AM   Assessment Completed by: TUAN BRYAN RN    If patient is discharged prior to next notation, then this note serves as note for discharge by case management.    Patient Name: Manoj Feliz                   YOB: 1960  Diagnosis: Acute on chronic systolic heart failure (HCC) [I50.23]  General weakness [R53.1]  Elevated bilirubin [R17]  Thickening of wall of gallbladder [K82.8]                   Date / Time: 9/23/2024  9:34 PM    Patient Admission Status: Inpatient   Readmission Risk (Low < 19, Mod (19-27), High > 27): Readmission Risk Score: 20.1    Current PCP: Georgina Escalera APRN - CNP  PCP verified by CM? Yes    Chart Reviewed: Yes      History Provided by: Patient  Patient Orientation: Alert and Oriented    Patient Cognition: Alert    Hospitalization in the last 30 days (Readmission):  No    If yes, Readmission Assessment in CM Navigator will be completed.    Advance Directives:      Code Status: Full Code   Patient's Primary Decision Maker is: Legal Next of Kin    Primary Decision Maker: Naldo Bauman - Healthcare Decision Maker - [phone]    Discharge Planning:    Patient lives with: Alone Type of Home: House, Other (Comment) (on friends property)  Primary Care Giver: Self  Patient Support Systems include: Friends/Neighbors   Current Financial resources: Medicaid  Current community resources: None  Current services prior to admission: None            Current DME:              Type of Home Care services:  None    ADLS  Prior functional level: Independent in ADLs/IADLs  Current functional level: Independent in ADLs/IADLs    PT AM-PAC: 24 /24  OT AM-PAC: 24 /24    Family can provide assistance at DC: No  Would you like Case Management to discuss the discharge plan with any other family members/significant others, and if so, who? No  Plans to Return to Present Housing: Yes  Other Identified  Issues/Barriers to RETURNING to current housing: none   Potential Assistance needed at discharge: N/A            Potential DME:    Patient expects to discharge to: House  Plan for transportation at discharge: Family    Financial    Payor: [place]ICA[place] / Plan: MEDICAID [facility] DEPT OF JOB / Product Type: *No Product type* /     Does insurance require precert for SNF: Yes    Potential assistance Purchasing Medications: No  Meds-to-Beds request: Yes      [facility] - [place], [place] - [address] - P [phone] - F [phone]  [address] [facility] [address]  Phone: [phone] Fax: [phone]    [facility] [id number] - [place], [place] - [address] -  [phone] - F [phone]  [address] [facility] [address]  Phone: [phone] Fax: [phone]      Notes:    Factors facilitating achievement of predicted outcomes: Friend support, Cooperative, and Pleasant    Barriers to discharge: none identified     Additional Case Management Notes: pt from home. Independent. State will have his friend pick him up.    Pt will be getting meds to bed. No other needs     The Plan for Transition of Care is related to the following treatment goals of Acute on chronic systolic heart failure (HCC) [I50.23]  General weakness [R53.1]  Elevated bilirubin [R17]  Thickening of wall of gallbladder [K82.8]    IF APPLICABLE: The Patient and/or patient representative Manoj and his family were provided with a choice of provider and agrees with the discharge plan. Freedom of choice list with basic dialogue that supports the patient's individualized plan of care/goals and shares the quality data associated with the providers was provided to: Patient (pt is independent and no therapy needs per therapy notes)   Patient Representative Name:       The Patient and/or Patient Representative Agree with the Discharge Plan? Yes    Stacie Ramsey RN, BSN  [phone]

## 2024-09-26 NOTE — PLAN OF CARE
Problem: Discharge Planning  Goal: Discharge to home or other facility with appropriate resources  9/26/2024 0253 by Adalid Wheatley RN  Outcome: Progressing  9/25/2024 2204 by Kiko Rehman RN  Outcome: Progressing  Flowsheets (Taken 9/25/2024 2204)  Discharge to home or other facility with appropriate resources: Identify discharge learning needs (meds, wound care, etc)     Problem: Respiratory - Adult  Goal: Achieves optimal ventilation and oxygenation  9/26/2024 0253 by Adalid Wheatley RN  Outcome: Progressing  9/25/2024 2204 by Kiko Rehman RN  Outcome: Progressing  Flowsheets (Taken 9/25/2024 2204)  Achieves optimal ventilation and oxygenation: Assess for changes in respiratory status     Problem: Cardiovascular - Adult  Goal: Maintains optimal cardiac output and hemodynamic stability  9/26/2024 0253 by Adalid Wheatley RN  Outcome: Progressing  9/25/2024 2204 by Kiko Rehman RN  Outcome: Progressing  Flowsheets (Taken 9/25/2024 2204)  Maintains optimal cardiac output and hemodynamic stability: Monitor blood pressure and heart rate     Problem: Safety - Adult  Goal: Free from fall injury  Outcome: Progressing     Problem: Metabolic/Fluid and Electrolytes - Adult  Goal: Electrolytes maintained within normal limits  9/26/2024 0253 by dAalid Wheatley RN  Outcome: Progressing  Flowsheets (Taken 9/25/2024 2206 by Kiko Rehman RN)  Electrolytes maintained within normal limits:   Monitor labs and assess patient for signs and symptoms of electrolyte imbalances   Instruct patient on fluid and nutrition restrictions as appropriate  9/25/2024 2206 by Kiko Rehman RN  Outcome: Progressing  Flowsheets (Taken 9/25/2024 2206)  Electrolytes maintained within normal limits:   Monitor labs and assess patient for signs and symptoms of electrolyte imbalances   Instruct patient on fluid and nutrition restrictions as appropriate  Goal: Hemodynamic stability and optimal renal function maintained  9/26/2024 0253 by

## 2024-09-26 NOTE — PROGRESS NOTES
[facility]   Daily Progress Note      Admit Date:  9/23/2024    HPI:    Mr. Feliz is a 63 year old chronic AF on anticoag and unable to CV, severe systolic heart failure 10-15%, hypertension, , homeless     He was admitted with worsening shortness of breath and abdominal pain.  He has had frequent admission with heart failure.  He states he has been taking all his medications but did not call with weight gain.  He had worsening lower leg edema.  CT abdomen showed non specific findings.  He was started on IV lasix.  Optival shows continual AF and no elevation in thoracic impedence LFTs elevated     Subjective:  Patient is being seen for acute on chronic systolic heart failure. There were no acute overnight cardiac events.  He is sitting up in the chair and feels so much better.  No chest pain, shortness of breath, palpitations and edema improved  He only needs aldactone and aspirin refilled    Creat 1.4-1.2 weight is 197-195 (best is 190)    Objective:   BP (!) 89/70   Pulse 72   Temp 97.5 °F (36.4 °C) (Oral)   Resp 18   Ht 1.88 m (6' 2\")   Wt 88.6 kg (195 lb 6.4 oz)   SpO2 96%   BMI 25.09 kg/m²     Intake/Output Summary (Last 24 hours) at 9/26/2024 0913  Last data filed at 9/26/2024 0607  Gross per 24 hour   Intake 840 ml   Output 2875 ml   Net -2035 ml          Physical Exam:  General:  Awake, alert, oriented in NAD  Skin:  Warm and dry.  No unusual bruising or rash  Neck:  Supple.  No JVD or carotid bruit appreciated  Chest:  Normal effort.  Clear to auscultation, no wheezes/rhonchi/rales  Cardiovascular:  RRR, S1/S2, no murmur/gallop/rub  Abdomen:  Soft, nontender, +bowel sounds  Extremities:  bilateral ankle to mid calf  edema improved  Neurological: No focal deficits  Psychological: Normal mood and affect      Medications:    lisinopril  2.5 mg Oral Daily    amiodarone  200 mg Oral Daily    apixaban  5 mg Oral BID    aspirin  81 mg Oral Daily    atorvastatin  40 mg Oral  Nightly    empagliflozin  10 mg Oral Daily    finasteride  5 mg Oral Daily    metoprolol succinate  12.5 mg Oral BID    pantoprazole  40 mg Oral QAM AC    spironolactone  25 mg Oral Lunch    tamsulosin  0.4 mg Oral Daily    sodium chloride flush  5-40 mL IntraVENous 2 times per day    furosemide  40 mg IntraVENous BID      sodium chloride         Lab Data:  CBC:   Recent Labs     09/23/24  2246 09/26/24  0600   WBC 4.7 4.7   HGB 13.2* 13.7    198     BMP:    Recent Labs     09/24/24  0301 09/25/24  0419 09/26/24  0600    137 139   K 4.0 4.0 3.6   CO2 22 23 26   BUN 37* 33* 25*   CREATININE 1.4* 1.2 1.2     INR:  No results for input(s): \"INR\" in the last 72 hours.  BNP:    Recent Labs     09/24/24  0301   PROBNP 22,113*         Diagnostics:  Echo 5/22/24   Limited ECHO with 2D imaging, Color-Flow and Spectral Doppler:     Severe global and regional LV systolic dysfunction.  Estimated EF 10-15%.  Severely enlarged LV chamber size with eccentric remodeling.    Increased LV apical trabeculation.  No evidence for LV thrombus with Definity.  Spontaneous echo-contrast observed.  Indeterminate LV diastolic function.    Severe bi-atrial enlargement.  Moderate-severely reduced RV systolic function.  RV size is enlarged.  Device wire is present.  Mild to moderate aortic valve regurgitation.  Severe mitral valve regurgitation.  Severe tricuspid valve regurgitation.  At least moderate pulmonary hypertension based on TR signal obtained.  IVC not visualized.    Mildly dilated proximal ascending thoracic aorta, 4.1 cm.    Assessment:    1.  Acute on chronic systolic heart failure  2.  Chronic atrial fib on amiodarone and eliquis  3.  ICD in place  4.  Renal insufficiency  5.  Homeless   6.  Anemia not iron deficient    Plan:    Continue jardiance 10 mg once a day  Stop IV lasix and change to torsemide 40 mg am and 20 mg pm  Continue lisinopril 2.5 mg daily (intolerant to entresto)  Continue toprol 12.5 mg twice a

## 2024-09-26 NOTE — PROGRESS NOTES
Patient is alert and oriented. He asked to take a shower this morning and he knows he is going home today.  He has a friend that is going to pick him up at 3:00 pm. He will be here for lunch. Patient is very sweet but struggles with keeping directions. He is confused at times what constitutes counting his liquids.

## 2024-09-27 ENCOUNTER — TELEPHONE (OUTPATIENT)
Dept: OTHER | Age: 64
End: 2024-09-27

## 2024-10-02 ENCOUNTER — OFFICE VISIT (OUTPATIENT)
Dept: PRIMARY CARE CLINIC | Age: 64
End: 2024-10-02
Payer: MEDICAID

## 2024-10-02 VITALS
WEIGHT: 202.8 LBS | DIASTOLIC BLOOD PRESSURE: 70 MMHG | SYSTOLIC BLOOD PRESSURE: 120 MMHG | OXYGEN SATURATION: 92 % | HEART RATE: 52 BPM | TEMPERATURE: 98.2 F | BODY MASS INDEX: 26.04 KG/M2

## 2024-10-02 DIAGNOSIS — E80.6 HYPERBILIRUBINEMIA: ICD-10-CM

## 2024-10-02 DIAGNOSIS — R60.0 EDEMA OF LEFT LOWER LEG: ICD-10-CM

## 2024-10-02 DIAGNOSIS — L03.116 CELLULITIS OF LEFT LOWER EXTREMITY: ICD-10-CM

## 2024-10-02 DIAGNOSIS — Z12.5 SCREENING FOR PROSTATE CANCER: ICD-10-CM

## 2024-10-02 DIAGNOSIS — E80.6 HYPERBILIRUBINEMIA: Primary | ICD-10-CM

## 2024-10-02 DIAGNOSIS — Z09 HOSPITAL DISCHARGE FOLLOW-UP: ICD-10-CM

## 2024-10-02 LAB
ALBUMIN SERPL-MCNC: 3.7 G/DL (ref 3.4–5)
ALP SERPL-CCNC: 138 U/L (ref 40–129)
ALT SERPL-CCNC: 36 U/L (ref 10–40)
AST SERPL-CCNC: 48 U/L (ref 15–37)
BILIRUB DIRECT SERPL-MCNC: 1.8 MG/DL (ref 0–0.3)
BILIRUB INDIRECT SERPL-MCNC: 2.8 MG/DL (ref 0–1)
BILIRUB SERPL-MCNC: 4.6 MG/DL (ref 0–1)
D-DIMER QUANTITATIVE: 1.08 UG/ML FEU (ref 0–0.6)
PROT SERPL-MCNC: 6.1 G/DL (ref 6.4–8.2)
PSA SERPL DL<=0.01 NG/ML-MCNC: 6.6 NG/ML (ref 0–4)

## 2024-10-02 PROCEDURE — 3074F SYST BP LT 130 MM HG: CPT | Performed by: NURSE PRACTITIONER

## 2024-10-02 PROCEDURE — 1123F ACP DISCUSS/DSCN MKR DOCD: CPT | Performed by: NURSE PRACTITIONER

## 2024-10-02 PROCEDURE — 3078F DIAST BP <80 MM HG: CPT | Performed by: NURSE PRACTITIONER

## 2024-10-02 PROCEDURE — 99214 OFFICE O/P EST MOD 30 MIN: CPT | Performed by: NURSE PRACTITIONER

## 2024-10-02 PROCEDURE — 1111F DSCHRG MED/CURRENT MED MERGE: CPT | Performed by: NURSE PRACTITIONER

## 2024-10-02 RX ORDER — SULFAMETHOXAZOLE AND TRIMETHOPRIM 400; 80 MG/1; MG/1
1 TABLET ORAL 2 TIMES DAILY
Qty: 20 TABLET | Refills: 0 | Status: ON HOLD | OUTPATIENT
Start: 2024-10-02 | End: 2024-10-11 | Stop reason: HOSPADM

## 2024-10-02 NOTE — PROGRESS NOTES
with the patient discussing the diagnosis and importance of compliance with the treatment plan as well as documenting on the day of the visit.       Subjective:   HPI:    Follow up of Hospital problems/diagnosis(es):   Acute on chronic systolic Heat Failure   Hx Atrial A-fib; AV paced   Lightheadedness; resolved   Hyperbilirubinemia; total bili 3.6 on admission; trended down. CT abdomen pelvis showed partially distended gallbladder without cholelithiasis.  Patient instructed to follow-up PCP for repeat LFTs and RUQ US outpatient    Hyperlipidemia   CKD stage IIIa; creatinine baseline(1.3-1.5).     Inpatient course:   Discharge summary reviewed- see chart.      Interval history/Current status:   Edema to left leg and foot: started 2 days ago. Reports pain has worsened since onset. Difficulty walking due to pain. Describes pain as \"little needles up and down left lower hussain.  Has an appointment with cardiology for CHF follow up on 10/3/24 with Deirdre Giesting   APRN - CNS   Hyperbilirubinemia  (trended down). Repeat LFTs and RUQ US ordered today for partially distended gallbladder without cholelithiasis noted on CT abdomen pelvis   Lightheadedness resolved.   Today reports coughing a lot and vomiting. Friend reports increased coughing with CHF. He has vomited 3 times today with hard, forceful cough.  Continues to have weakness.  Continues to have shortness of breath with lying flat.     Patient Active Problem List   Diagnosis    Benign essential HTN    Dilated cardiomyopathy (HCC)    Acute on chronic combined systolic and diastolic heart failure (HCC)    Non-compliance    Homeless    Acute combined systolic and diastolic heart failure (HCC)    Chronic atrial fibrillation (HCC)    Moderate mitral regurgitation    Acute on chronic congestive heart failure (HCC)    VT (ventricular tachycardia) (HCC)    ICD (implantable cardioverter-defibrillator) in place    Thrombus of left atrial appendage    Near syncope    Chronic

## 2024-10-03 ENCOUNTER — OFFICE VISIT (OUTPATIENT)
Dept: CARDIOLOGY CLINIC | Age: 64
End: 2024-10-03
Payer: MEDICAID

## 2024-10-03 ENCOUNTER — HOSPITAL ENCOUNTER (OUTPATIENT)
Dept: ONCOLOGY | Age: 64
Setting detail: INFUSION SERIES
Discharge: HOME | End: 2024-10-03
Payer: MEDICAID

## 2024-10-03 VITALS
OXYGEN SATURATION: 98 % | WEIGHT: 200 LBS | BODY MASS INDEX: 25.67 KG/M2 | HEIGHT: 74 IN | DIASTOLIC BLOOD PRESSURE: 70 MMHG | SYSTOLIC BLOOD PRESSURE: 106 MMHG | HEART RATE: 84 BPM

## 2024-10-03 VITALS
TEMPERATURE: 98.3 F | HEART RATE: 72 BPM | RESPIRATION RATE: 16 BRPM | DIASTOLIC BLOOD PRESSURE: 77 MMHG | SYSTOLIC BLOOD PRESSURE: 100 MMHG

## 2024-10-03 DIAGNOSIS — I50.82 BIVENTRICULAR HEART FAILURE (HCC): ICD-10-CM

## 2024-10-03 DIAGNOSIS — Z59.00 HOMELESS: ICD-10-CM

## 2024-10-03 DIAGNOSIS — E78.5 HYPERLIPIDEMIA, UNSPECIFIED HYPERLIPIDEMIA TYPE: ICD-10-CM

## 2024-10-03 DIAGNOSIS — I50.23 ACUTE ON CHRONIC SYSTOLIC HEART FAILURE (HCC): Primary | ICD-10-CM

## 2024-10-03 DIAGNOSIS — I48.0 PAF (PAROXYSMAL ATRIAL FIBRILLATION) (HCC): ICD-10-CM

## 2024-10-03 DIAGNOSIS — I42.0 DILATED CARDIOMYOPATHY (HCC): ICD-10-CM

## 2024-10-03 DIAGNOSIS — Z95.810 ICD (IMPLANTABLE CARDIOVERTER-DEFIBRILLATOR) IN PLACE: ICD-10-CM

## 2024-10-03 LAB
ANION GAP SERPL CALCULATED.3IONS-SCNC: 12 MMOL/L (ref 3–16)
BASOPHILS # BLD: 0 K/UL (ref 0–0.2)
BASOPHILS NFR BLD: 0 %
BUN SERPL-MCNC: 23 MG/DL (ref 7–20)
CALCIUM SERPL-MCNC: 8.7 MG/DL (ref 8.3–10.6)
CHLORIDE SERPL-SCNC: 99 MMOL/L (ref 99–110)
CO2 SERPL-SCNC: 30 MMOL/L (ref 21–32)
CREAT SERPL-MCNC: 1.2 MG/DL (ref 0.8–1.3)
DEPRECATED RDW RBC AUTO: 17.1 % (ref 12.4–15.4)
DEPRECATED RDW RBC AUTO: 18 % (ref 12.4–15.4)
EOSINOPHIL # BLD: 0 K/UL (ref 0–0.6)
EOSINOPHIL NFR BLD: 0 %
GFR SERPLBLD CREATININE-BSD FMLA CKD-EPI: 67 ML/MIN/{1.73_M2}
GLUCOSE SERPL-MCNC: 95 MG/DL (ref 70–99)
HCT VFR BLD AUTO: 40.8 % (ref 40.5–52.5)
HCT VFR BLD AUTO: 42.2 % (ref 40.5–52.5)
HGB BLD-MCNC: 13.7 G/DL (ref 13.5–17.5)
HGB BLD-MCNC: 14.3 G/DL (ref 13.5–17.5)
LYMPHOCYTES # BLD: 0 K/UL (ref 1–5.1)
LYMPHOCYTES NFR BLD: 0 %
MCH RBC QN AUTO: 29.6 PG (ref 26–34)
MCH RBC QN AUTO: 29.8 PG (ref 26–34)
MCHC RBC AUTO-ENTMCNC: 33.4 G/DL (ref 31–36)
MCHC RBC AUTO-ENTMCNC: 33.8 G/DL (ref 31–36)
MCV RBC AUTO: 88.1 FL (ref 80–100)
MCV RBC AUTO: 88.7 FL (ref 80–100)
MONOCYTES # BLD: 0 K/UL (ref 0–1.3)
MONOCYTES NFR BLD: 0 %
NEUTROPHILS # BLD: 10.9 K/UL (ref 1.7–7.7)
NEUTROPHILS NFR BLD: 98 %
NEUTS BAND NFR BLD MANUAL: 2 % (ref 0–7)
NT-PROBNP SERPL-MCNC: ABNORMAL PG/ML (ref 0–124)
PLATELET # BLD AUTO: 139 K/UL (ref 135–450)
PLATELET # BLD AUTO: 167 K/UL (ref 135–450)
PLATELET BLD QL SMEAR: ADEQUATE
PMV BLD AUTO: 7.9 FL (ref 5–10.5)
PMV BLD AUTO: 9.5 FL (ref 5–10.5)
POTASSIUM SERPL-SCNC: 3.8 MMOL/L (ref 3.5–5.1)
RBC # BLD AUTO: 4.61 M/UL (ref 4.2–5.9)
RBC # BLD AUTO: 4.79 M/UL (ref 4.2–5.9)
SLIDE REVIEW: ABNORMAL
SODIUM SERPL-SCNC: 141 MMOL/L (ref 136–145)
WBC # BLD AUTO: 10.9 K/UL (ref 4–11)
WBC # BLD AUTO: 12.8 K/UL (ref 4–11)

## 2024-10-03 PROCEDURE — 3074F SYST BP LT 130 MM HG: CPT | Performed by: CLINICAL NURSE SPECIALIST

## 2024-10-03 PROCEDURE — 85027 COMPLETE CBC AUTOMATED: CPT

## 2024-10-03 PROCEDURE — 99211 OFF/OP EST MAY X REQ PHY/QHP: CPT

## 2024-10-03 PROCEDURE — 83880 ASSAY OF NATRIURETIC PEPTIDE: CPT

## 2024-10-03 PROCEDURE — 80048 BASIC METABOLIC PNL TOTAL CA: CPT

## 2024-10-03 PROCEDURE — 1123F ACP DISCUSS/DSCN MKR DOCD: CPT | Performed by: CLINICAL NURSE SPECIALIST

## 2024-10-03 PROCEDURE — 96374 THER/PROPH/DIAG INJ IV PUSH: CPT

## 2024-10-03 PROCEDURE — 3078F DIAST BP <80 MM HG: CPT | Performed by: CLINICAL NURSE SPECIALIST

## 2024-10-03 PROCEDURE — 2580000003 HC RX 258: Performed by: CLINICAL NURSE SPECIALIST

## 2024-10-03 PROCEDURE — 99215 OFFICE O/P EST HI 40 MIN: CPT | Performed by: CLINICAL NURSE SPECIALIST

## 2024-10-03 PROCEDURE — 6360000002 HC RX W HCPCS: Performed by: CLINICAL NURSE SPECIALIST

## 2024-10-03 RX ORDER — SODIUM CHLORIDE 0.9 % (FLUSH) 0.9 %
5-40 SYRINGE (ML) INJECTION ONCE
Status: COMPLETED | OUTPATIENT
Start: 2024-10-03 | End: 2024-10-03

## 2024-10-03 RX ORDER — TORSEMIDE 20 MG/1
TABLET ORAL
Qty: 360 TABLET | Refills: 1 | Status: SHIPPED | OUTPATIENT
Start: 2024-10-03

## 2024-10-03 RX ORDER — FUROSEMIDE 10 MG/ML
120 INJECTION INTRAMUSCULAR; INTRAVENOUS ONCE
Status: COMPLETED | OUTPATIENT
Start: 2024-10-03 | End: 2024-10-03

## 2024-10-03 RX ADMIN — FUROSEMIDE 120 MG: 10 INJECTION, SOLUTION INTRAMUSCULAR; INTRAVENOUS at 14:35

## 2024-10-03 RX ADMIN — SODIUM CHLORIDE, PRESERVATIVE FREE 10 ML: 5 INJECTION INTRAVENOUS at 14:36

## 2024-10-03 SDOH — ECONOMIC STABILITY - HOUSING INSECURITY: HOMELESSNESS UNSPECIFIED: Z59.00

## 2024-10-03 NOTE — PROGRESS NOTES
Pt to Dept for Lasix IVP. Pt was seen in [facility]  by Deirdre Das NP. Labs drawn followed by Lasix 120mg IVP given over 6 minutes. Pt tavares with no adverse reaction noted. Denied need for printed AVS. Pt to follow up with the [facility]

## 2024-10-03 NOTE — PATIENT INSTRUCTIONS
IV lasix today with blood work  Increase torsemide to 40 mg twice a day  Continue all other medications  RTO in October 16 at 330

## 2024-10-03 NOTE — PROGRESS NOTES
[facility]  Progress Note    Primary Care Doctor:  Georgina Escalera, TRENT - KATLYN    Chief Complaint   Patient presents with    Follow-Up from Hospital    Congestive Heart Failure       History of Present Illness:  63 y.o. male with history of with history of chronic AF on eliquis, hypertension, systolic heart failure, BPH, , homeless      I had the pleasure of seeing Manoj Feliz in follow up for systolic heart failure and hospitalization 9/23-26/2024 for acute on chronic systolic heart failure, given IV lasix, his LFTs were elevated due to increased fluid.  He is ambulatory today with a mask on as he has a cough, increased weight to 200 (190 is his best) and left lower leg with cellulitis.  He saw his PCP yesterday who gave him scripts for bactrim and augmentin which he has not started.  He states that his left leg continues to hurt but not as painful as yesterday.  He is taking all his medications.      Past Medical History:   has a past medical history of Acute combined systolic and diastolic congestive heart failure (HCC), Atrial fibrillation (HCC), CHF (congestive heart failure) (HCC), Hx of blood clots, and Hypertension.  Surgical History:   has a past surgical history that includes Insertable Cardiac Monitor (07/26/2019); Cardioversion (07/26/2019); Cardiac defibrillator placement (Left); and Prostate surgery (N/A, 3/9/2023).   Social History:   reports that he has never smoked. He has never been exposed to tobacco smoke. He has never used smokeless tobacco. He reports that he does not drink alcohol and does not use drugs.   Family History:   Family History   Problem Relation Age of Onset    Heart Disease Mother     High Blood Pressure Mother     Heart Attack Mother     Other Father     Stroke Father     High Blood Pressure Father        Home Medications:  Prior to Admission medications    Medication Sig Start Date End Date Taking? Authorizing Provider   torsemide (DEMADEX) 20 MG tablet 40

## 2024-10-04 ENCOUNTER — TELEPHONE (OUTPATIENT)
Dept: CARDIOLOGY CLINIC | Age: 64
End: 2024-10-04

## 2024-10-04 NOTE — RESULT ENCOUNTER NOTE
Please notify patient of lab results.    1. No anemia    2. Prostate cancer screening number elevated, 6.60 (down from 15.49 a year ago)  -Patient to continue follow-ups with urologist.    3. D-dimer slightly elevated, not statistically significant.  -I do not have concern for a blood clot as he is taking Eliquis 5 mg twice daily.    4.  Bilirubin is still elevated, we will continue to monitor.    5.  Needs to schedule gallbladder ultrasound  [phone]    Thank you.

## 2024-10-04 NOTE — TELEPHONE ENCOUNTER
----- Message from TERNT Luong - CNS sent at 10/4/2024  8:40 AM EDT -----  Fluid level was very elevated   Call and see how he is feeling today and make sure he increased his torsemide to 40 mg twice a day    Angela  His wbc is elevated and he picked up his antibiotics yesterday   That left leg looks awful  He is very end stage heart failure  FYI no one would do any surgery on him, Dr Rodriguez will not even try another CV  Thanks  Deirdre      Spoke to patient's friend Rickey he will check on the patient and call us back.    Spoke to patient he has not been coughing as much he can walk a bit better but still has swelling, he is feeling better he is less sob, he did take his torsemide as instructed, he started the antibiotics today he did take them with food and he drank an ensure also

## 2024-10-07 ENCOUNTER — TELEPHONE (OUTPATIENT)
Dept: PRIMARY CARE CLINIC | Age: 64
End: 2024-10-07

## 2024-10-07 ENCOUNTER — TELEPHONE (OUTPATIENT)
Dept: CARDIOLOGY CLINIC | Age: 64
End: 2024-10-07

## 2024-10-07 ENCOUNTER — APPOINTMENT (OUTPATIENT)
Dept: GENERAL RADIOLOGY | Age: 64
End: 2024-10-07
Payer: MEDICAID

## 2024-10-07 ENCOUNTER — HOSPITAL ENCOUNTER (INPATIENT)
Age: 64
LOS: 4 days | Discharge: HOME | End: 2024-10-11
Attending: INTERNAL MEDICINE | Admitting: INTERNAL MEDICINE
Payer: MEDICAID

## 2024-10-07 ENCOUNTER — APPOINTMENT (OUTPATIENT)
Dept: VASCULAR LAB | Age: 64
End: 2024-10-07
Payer: MEDICAID

## 2024-10-07 DIAGNOSIS — Z78.9 FAILURE OF OUTPATIENT TREATMENT: ICD-10-CM

## 2024-10-07 DIAGNOSIS — R60.0 PEDAL EDEMA: Primary | ICD-10-CM

## 2024-10-07 DIAGNOSIS — L03.116 CELLULITIS OF LEFT LOWER EXTREMITY: ICD-10-CM

## 2024-10-07 PROBLEM — N18.30 CKD (CHRONIC KIDNEY DISEASE) STAGE 3, GFR 30-59 ML/MIN (HCC): Status: ACTIVE | Noted: 2024-10-07

## 2024-10-07 PROBLEM — I50.23 ACUTE ON CHRONIC SYSTOLIC CHF (CONGESTIVE HEART FAILURE) (HCC): Status: ACTIVE | Noted: 2024-10-07

## 2024-10-07 LAB
ALBUMIN SERPL-MCNC: 3.8 G/DL (ref 3.4–5)
ALBUMIN/GLOB SERPL: 1.2 {RATIO} (ref 1.1–2.2)
ALP SERPL-CCNC: 141 U/L (ref 40–129)
ALT SERPL-CCNC: 42 U/L (ref 10–40)
ANION GAP SERPL CALCULATED.3IONS-SCNC: 9 MMOL/L (ref 3–16)
AST SERPL-CCNC: 49 U/L (ref 15–37)
BACTERIA URNS QL MICRO: NORMAL /HPF
BASOPHILS # BLD: 0 K/UL (ref 0–0.2)
BASOPHILS NFR BLD: 0.7 %
BILIRUB SERPL-MCNC: 2.5 MG/DL (ref 0–1)
BILIRUB UR QL STRIP.AUTO: NEGATIVE
BUN SERPL-MCNC: 16 MG/DL (ref 7–20)
CALCIUM SERPL-MCNC: 8.4 MG/DL (ref 8.3–10.6)
CHLORIDE SERPL-SCNC: 98 MMOL/L (ref 99–110)
CLARITY UR: CLEAR
CO2 SERPL-SCNC: 30 MMOL/L (ref 21–32)
COLOR UR: YELLOW
CREAT SERPL-MCNC: 1 MG/DL (ref 0.8–1.3)
DEPRECATED RDW RBC AUTO: 17.2 % (ref 12.4–15.4)
ECHO BSA: 2.17 M2
EKG ATRIAL RATE: 81 BPM
EKG DIAGNOSIS: NORMAL
EKG Q-T INTERVAL: 520 MS
EKG QRS DURATION: 172 MS
EKG QTC CALCULATION (BAZETT): 577 MS
EKG R AXIS: -32 DEGREES
EKG T AXIS: 71 DEGREES
EKG VENTRICULAR RATE: 74 BPM
EOSINOPHIL # BLD: 0 K/UL (ref 0–0.6)
EOSINOPHIL NFR BLD: 0.5 %
EPI CELLS #/AREA URNS AUTO: 0 /HPF (ref 0–5)
GFR SERPLBLD CREATININE-BSD FMLA CKD-EPI: 83 ML/MIN/{1.73_M2}
GLUCOSE SERPL-MCNC: 87 MG/DL (ref 70–99)
GLUCOSE UR STRIP.AUTO-MCNC: >=1000 MG/DL
HCT VFR BLD AUTO: 38.7 % (ref 40.5–52.5)
HGB BLD-MCNC: 13 G/DL (ref 13.5–17.5)
HGB UR QL STRIP.AUTO: NEGATIVE
HYALINE CASTS #/AREA URNS AUTO: 0 /LPF (ref 0–8)
KETONES UR STRIP.AUTO-MCNC: NEGATIVE MG/DL
LEUKOCYTE ESTERASE UR QL STRIP.AUTO: NEGATIVE
LYMPHOCYTES # BLD: 0.5 K/UL (ref 1–5.1)
LYMPHOCYTES NFR BLD: 10 %
MCH RBC QN AUTO: 29.7 PG (ref 26–34)
MCHC RBC AUTO-ENTMCNC: 33.6 G/DL (ref 31–36)
MCV RBC AUTO: 88.2 FL (ref 80–100)
MONOCYTES # BLD: 0.4 K/UL (ref 0–1.3)
MONOCYTES NFR BLD: 7.8 %
NEUTROPHILS # BLD: 3.9 K/UL (ref 1.7–7.7)
NEUTROPHILS NFR BLD: 81 %
NITRITE UR QL STRIP.AUTO: NEGATIVE
NT-PROBNP SERPL-MCNC: ABNORMAL PG/ML (ref 0–124)
PH UR STRIP.AUTO: 7 [PH] (ref 5–8)
PLATELET # BLD AUTO: 138 K/UL (ref 135–450)
PMV BLD AUTO: 8.2 FL (ref 5–10.5)
POTASSIUM SERPL-SCNC: 3.7 MMOL/L (ref 3.5–5.1)
PROT SERPL-MCNC: 7.1 G/DL (ref 6.4–8.2)
PROT UR STRIP.AUTO-MCNC: ABNORMAL MG/DL
RBC # BLD AUTO: 4.39 M/UL (ref 4.2–5.9)
RBC CLUMPS #/AREA URNS AUTO: 2 /HPF (ref 0–4)
SODIUM SERPL-SCNC: 137 MMOL/L (ref 136–145)
SP GR UR STRIP.AUTO: 1.01 (ref 1–1.03)
TROPONIN, HIGH SENSITIVITY: 23 NG/L (ref 0–22)
TROPONIN, HIGH SENSITIVITY: 24 NG/L (ref 0–22)
UA COMPLETE W REFLEX CULTURE PNL UR: ABNORMAL
UA DIPSTICK W REFLEX MICRO PNL UR: YES
URN SPEC COLLECT METH UR: ABNORMAL
UROBILINOGEN UR STRIP-ACNC: 0.2 E.U./DL
WBC # BLD AUTO: 4.9 K/UL (ref 4–11)
WBC #/AREA URNS AUTO: 0 /HPF (ref 0–5)

## 2024-10-07 PROCEDURE — 6360000002 HC RX W HCPCS: Performed by: PHYSICIAN ASSISTANT

## 2024-10-07 PROCEDURE — 94760 N-INVAS EAR/PLS OXIMETRY 1: CPT

## 2024-10-07 PROCEDURE — 83880 ASSAY OF NATRIURETIC PEPTIDE: CPT

## 2024-10-07 PROCEDURE — 93010 ELECTROCARDIOGRAM REPORT: CPT | Performed by: INTERNAL MEDICINE

## 2024-10-07 PROCEDURE — 99285 EMERGENCY DEPT VISIT HI MDM: CPT

## 2024-10-07 PROCEDURE — 6360000002 HC RX W HCPCS: Performed by: INTERNAL MEDICINE

## 2024-10-07 PROCEDURE — 81001 URINALYSIS AUTO W/SCOPE: CPT

## 2024-10-07 PROCEDURE — 71045 X-RAY EXAM CHEST 1 VIEW: CPT

## 2024-10-07 PROCEDURE — 96374 THER/PROPH/DIAG INJ IV PUSH: CPT

## 2024-10-07 PROCEDURE — 80053 COMPREHEN METABOLIC PANEL: CPT

## 2024-10-07 PROCEDURE — 6370000000 HC RX 637 (ALT 250 FOR IP): Performed by: PHYSICIAN ASSISTANT

## 2024-10-07 PROCEDURE — 36415 COLL VENOUS BLD VENIPUNCTURE: CPT

## 2024-10-07 PROCEDURE — 1200000000 HC SEMI PRIVATE

## 2024-10-07 PROCEDURE — 93005 ELECTROCARDIOGRAM TRACING: CPT | Performed by: PHYSICIAN ASSISTANT

## 2024-10-07 PROCEDURE — 84484 ASSAY OF TROPONIN QUANT: CPT

## 2024-10-07 PROCEDURE — 93970 EXTREMITY STUDY: CPT

## 2024-10-07 PROCEDURE — 6370000000 HC RX 637 (ALT 250 FOR IP): Performed by: INTERNAL MEDICINE

## 2024-10-07 PROCEDURE — 93970 EXTREMITY STUDY: CPT | Performed by: INTERNAL MEDICINE

## 2024-10-07 PROCEDURE — 85025 COMPLETE CBC W/AUTO DIFF WBC: CPT

## 2024-10-07 PROCEDURE — 2580000003 HC RX 258: Performed by: INTERNAL MEDICINE

## 2024-10-07 RX ORDER — ERGOCALCIFEROL 1.25 MG/1
50000 CAPSULE, LIQUID FILLED ORAL WEEKLY
Status: DISCONTINUED | OUTPATIENT
Start: 2024-10-07 | End: 2024-10-07

## 2024-10-07 RX ORDER — MAGNESIUM SULFATE IN WATER 40 MG/ML
2000 INJECTION, SOLUTION INTRAVENOUS PRN
Status: DISCONTINUED | OUTPATIENT
Start: 2024-10-07 | End: 2024-10-11 | Stop reason: HOSPADM

## 2024-10-07 RX ORDER — PANTOPRAZOLE SODIUM 40 MG/1
40 TABLET, DELAYED RELEASE ORAL DAILY
Status: DISCONTINUED | OUTPATIENT
Start: 2024-10-08 | End: 2024-10-11 | Stop reason: HOSPADM

## 2024-10-07 RX ORDER — LISINOPRIL 5 MG/1
2.5 TABLET ORAL DAILY
Status: DISCONTINUED | OUTPATIENT
Start: 2024-10-08 | End: 2024-10-11 | Stop reason: HOSPADM

## 2024-10-07 RX ORDER — SODIUM CHLORIDE 9 MG/ML
INJECTION, SOLUTION INTRAVENOUS PRN
Status: DISCONTINUED | OUTPATIENT
Start: 2024-10-07 | End: 2024-10-11 | Stop reason: HOSPADM

## 2024-10-07 RX ORDER — METOPROLOL SUCCINATE 25 MG/1
12.5 TABLET, EXTENDED RELEASE ORAL 2 TIMES DAILY
Status: DISCONTINUED | OUTPATIENT
Start: 2024-10-07 | End: 2024-10-11 | Stop reason: HOSPADM

## 2024-10-07 RX ORDER — POTASSIUM CHLORIDE 1500 MG/1
40 TABLET, EXTENDED RELEASE ORAL PRN
Status: DISCONTINUED | OUTPATIENT
Start: 2024-10-07 | End: 2024-10-11 | Stop reason: HOSPADM

## 2024-10-07 RX ORDER — SODIUM CHLORIDE 0.9 % (FLUSH) 0.9 %
5-40 SYRINGE (ML) INJECTION EVERY 12 HOURS SCHEDULED
Status: DISCONTINUED | OUTPATIENT
Start: 2024-10-07 | End: 2024-10-11 | Stop reason: HOSPADM

## 2024-10-07 RX ORDER — FUROSEMIDE 10 MG/ML
40 INJECTION INTRAMUSCULAR; INTRAVENOUS ONCE
Status: COMPLETED | OUTPATIENT
Start: 2024-10-07 | End: 2024-10-07

## 2024-10-07 RX ORDER — HYDROCODONE BITARTRATE AND ACETAMINOPHEN 5; 325 MG/1; MG/1
1 TABLET ORAL ONCE
Status: COMPLETED | OUTPATIENT
Start: 2024-10-07 | End: 2024-10-07

## 2024-10-07 RX ORDER — SODIUM CHLORIDE 0.9 % (FLUSH) 0.9 %
5-40 SYRINGE (ML) INJECTION PRN
Status: DISCONTINUED | OUTPATIENT
Start: 2024-10-07 | End: 2024-10-11 | Stop reason: HOSPADM

## 2024-10-07 RX ORDER — ACETAMINOPHEN 650 MG/1
650 SUPPOSITORY RECTAL EVERY 6 HOURS PRN
Status: DISCONTINUED | OUTPATIENT
Start: 2024-10-07 | End: 2024-10-11 | Stop reason: HOSPADM

## 2024-10-07 RX ORDER — FINASTERIDE 5 MG/1
5 TABLET, FILM COATED ORAL DAILY
Status: DISCONTINUED | OUTPATIENT
Start: 2024-10-08 | End: 2024-10-11 | Stop reason: HOSPADM

## 2024-10-07 RX ORDER — ASPIRIN 81 MG/1
81 TABLET, CHEWABLE ORAL DAILY
Status: DISCONTINUED | OUTPATIENT
Start: 2024-10-08 | End: 2024-10-11 | Stop reason: HOSPADM

## 2024-10-07 RX ORDER — ATORVASTATIN CALCIUM 40 MG/1
40 TABLET, FILM COATED ORAL NIGHTLY
Status: DISCONTINUED | OUTPATIENT
Start: 2024-10-07 | End: 2024-10-08

## 2024-10-07 RX ORDER — ONDANSETRON 4 MG/1
4 TABLET, ORALLY DISINTEGRATING ORAL ONCE
Status: COMPLETED | OUTPATIENT
Start: 2024-10-07 | End: 2024-10-07

## 2024-10-07 RX ORDER — ONDANSETRON 4 MG/1
4 TABLET, ORALLY DISINTEGRATING ORAL EVERY 8 HOURS PRN
Status: DISCONTINUED | OUTPATIENT
Start: 2024-10-07 | End: 2024-10-11 | Stop reason: HOSPADM

## 2024-10-07 RX ORDER — POLYETHYLENE GLYCOL 3350 17 G/17G
17 POWDER, FOR SOLUTION ORAL DAILY PRN
Status: DISCONTINUED | OUTPATIENT
Start: 2024-10-07 | End: 2024-10-11 | Stop reason: HOSPADM

## 2024-10-07 RX ORDER — POTASSIUM CHLORIDE 7.45 MG/ML
10 INJECTION INTRAVENOUS PRN
Status: DISCONTINUED | OUTPATIENT
Start: 2024-10-07 | End: 2024-10-11 | Stop reason: HOSPADM

## 2024-10-07 RX ORDER — ACETAMINOPHEN 325 MG/1
650 TABLET ORAL EVERY 6 HOURS PRN
Status: DISCONTINUED | OUTPATIENT
Start: 2024-10-07 | End: 2024-10-11 | Stop reason: HOSPADM

## 2024-10-07 RX ORDER — TAMSULOSIN HYDROCHLORIDE 0.4 MG/1
0.4 CAPSULE ORAL DAILY
Status: DISCONTINUED | OUTPATIENT
Start: 2024-10-07 | End: 2024-10-07

## 2024-10-07 RX ORDER — AMIODARONE HYDROCHLORIDE 200 MG/1
200 TABLET ORAL DAILY
Status: DISCONTINUED | OUTPATIENT
Start: 2024-10-08 | End: 2024-10-11 | Stop reason: HOSPADM

## 2024-10-07 RX ORDER — ALBUTEROL SULFATE 90 UG/1
2 INHALANT RESPIRATORY (INHALATION) EVERY 6 HOURS PRN
Status: DISCONTINUED | OUTPATIENT
Start: 2024-10-07 | End: 2024-10-11 | Stop reason: HOSPADM

## 2024-10-07 RX ORDER — ONDANSETRON 2 MG/ML
4 INJECTION INTRAMUSCULAR; INTRAVENOUS EVERY 6 HOURS PRN
Status: DISCONTINUED | OUTPATIENT
Start: 2024-10-07 | End: 2024-10-11 | Stop reason: HOSPADM

## 2024-10-07 RX ADMIN — FUROSEMIDE 10 MG/HR: 10 INJECTION, SOLUTION INTRAMUSCULAR; INTRAVENOUS at 20:36

## 2024-10-07 RX ADMIN — HYDROCODONE BITARTRATE AND ACETAMINOPHEN 1 TABLET: 5; 325 TABLET ORAL at 16:13

## 2024-10-07 RX ADMIN — FUROSEMIDE 40 MG: 10 INJECTION, SOLUTION INTRAMUSCULAR; INTRAVENOUS at 16:14

## 2024-10-07 RX ADMIN — APIXABAN 5 MG: 5 TABLET, FILM COATED ORAL at 20:39

## 2024-10-07 RX ADMIN — ONDANSETRON 4 MG: 4 TABLET, ORALLY DISINTEGRATING ORAL at 16:13

## 2024-10-07 RX ADMIN — METOPROLOL SUCCINATE 12.5 MG: 25 TABLET, FILM COATED, EXTENDED RELEASE ORAL at 20:39

## 2024-10-07 RX ADMIN — ATORVASTATIN CALCIUM 40 MG: 40 TABLET, FILM COATED ORAL at 20:39

## 2024-10-07 RX ADMIN — Medication 10 ML: at 20:40

## 2024-10-07 ASSESSMENT — PAIN DESCRIPTION - LOCATION: LOCATION: LEG

## 2024-10-07 ASSESSMENT — ENCOUNTER SYMPTOMS
ABDOMINAL PAIN: 0
COLOR CHANGE: 0
CHEST TIGHTNESS: 0
NAUSEA: 0
COUGH: 0
CONSTIPATION: 0
RESPIRATORY NEGATIVE: 1
VOMITING: 0
BACK PAIN: 0
SHORTNESS OF BREATH: 0
DIARRHEA: 0

## 2024-10-07 ASSESSMENT — PAIN DESCRIPTION - PAIN TYPE: TYPE: ACUTE PAIN

## 2024-10-07 ASSESSMENT — PAIN - FUNCTIONAL ASSESSMENT: PAIN_FUNCTIONAL_ASSESSMENT: 0-10

## 2024-10-07 ASSESSMENT — PAIN SCALES - GENERAL: PAINLEVEL_OUTOF10: 7

## 2024-10-07 ASSESSMENT — PAIN DESCRIPTION - ORIENTATION: ORIENTATION: LEFT

## 2024-10-07 ASSESSMENT — PAIN DESCRIPTION - DESCRIPTORS: DESCRIPTORS: BURNING

## 2024-10-07 NOTE — H&P
HOSPITALISTS HISTORY AND PHYSICAL    10/7/2024 6:27 PM    Patient Information:  DOROTHY ABDULLAHI is a 63 y.o. male [id number]  PCP:  Georgina Escalera APRN - CNP (Tel: [phone] )    Chief complaint:    Chief Complaint   Patient presents with    Leg Pain     Left leg pain and swelling for a week that has gotten worse.         History of Present Illness:  Dorothy Abdullahi is a 63 y.o. 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  Patient with progressing LE edema despite increase in diuretics.   No CP, SOB, HA or abdominal pain.  Otherwise complete ROS is negative unless listed above.      REVIEW OF SYSTEMS:   Pertinent positives as noted in HPI.  All other systems were reviewed and are negative.      Past Medical History:   has a past medical history of Acute combined systolic and diastolic congestive heart failure (HCC), Atrial fibrillation (HCC), CHF (congestive heart failure) (HCC), Hx of blood clots, and Hypertension.     Past Surgical History:   has a past surgical history that includes Insertable Cardiac Monitor (07/26/2019); Cardioversion (07/26/2019); Cardiac defibrillator placement (Left); and Prostate surgery (N/A, 3/9/2023).     Medications:  No current facility-administered medications on file prior to encounter.     Current Outpatient Medications on File Prior to Encounter   Medication Sig Dispense Refill    torsemide (DEMADEX) 20 MG tablet 40 mg twice a day 360 tablet 1    sulfamethoxazole-trimethoprim (BACTRIM) 400-80 MG per tablet Take 1 tablet by mouth 2 times daily for 10 days 20 tablet 0    amoxicillin-clavulanate (AUGMENTIN) 875-125 MG per tablet Take 1 tablet by mouth 2 times daily for 10 days 20 tablet 0    aspirin 81 MG chewable tablet  lb)   SpO2 92%   BMI 25.42 kg/m²   General appearance:  Appears comfortable. Well nourished  Eyes: Sclera clear, pupils equal  ENT: Moist mucus membranes, no thrush. Trachea midline.  Cardiovascular: Regular rhythm, normal S1, S2. No murmur, gallop, rub. 3+ BL LE  edema.  L ICD  Respiratory: No rales.  No wheezing or rhonchi.  Gastrointestinal: Abdomen soft, non-tender, not distended, normal bowel sounds  Musculoskeletal: No cyanosis in digits, neck supple  Neurology: Cranial nerves grossly intact. Alert and oriented in time, place and person. No speech or motor deficits  Psychiatry: Anxious affect. Not agitated  Skin: Warm, dry, normal turgor, no rash  Brisk capillary refill, peripheral pulses palpable    Labs:  CBC:   Lab Results   Component Value Date/Time    WBC 4.9 10/07/2024 11:07 AM    RBC 4.39 10/07/2024 11:07 AM    HGB 13.0 10/07/2024 11:07 AM    HCT 38.7 10/07/2024 11:07 AM    MCV 88.2 10/07/2024 11:07 AM    MCH 29.7 10/07/2024 11:07 AM    MCHC 33.6 10/07/2024 11:07 AM    RDW 17.2 10/07/2024 11:07 AM     10/07/2024 11:07 AM    MPV 8.2 10/07/2024 11:07 AM     BMP:    Lab Results   Component Value Date/Time     10/07/2024 11:07 AM    K 3.7 10/07/2024 11:07 AM    CL 98 10/07/2024 11:07 AM    CO2 30 10/07/2024 11:07 AM    BUN 16 10/07/2024 11:07 AM    CREATININE 1.0 10/07/2024 11:07 AM    CALCIUM 8.4 10/07/2024 11:07 AM    GFRAA >60 10/05/2022 10:10 AM    LABGLOM 83 10/07/2024 11:07 AM    LABGLOM 56 04/04/2024 02:24 PM    GLUCOSE 87 10/07/2024 11:07 AM     Vascular duplex lower extremity venous bilateral   Final Result      XR CHEST PORTABLE   Final Result   Cardiomegaly.      No acute findings             Problem List  Principal Problem:    Acute on chronic systolic CHF (congestive heart failure) (HCC)  Active Problems:    Dilated cardiomyopathy (HCC)    Chronic atrial fibrillation (HCC)    ICD (implantable cardioverter-defibrillator) in place    Acute on chronic systolic (congestive) heart

## 2024-10-07 NOTE — TELEPHONE ENCOUNTER
Called and informed pt of message below, pt verbalized understanding and stated that he is currently on his way to the hospital, he has a painful, burning sensation in his left leg.

## 2024-10-07 NOTE — TELEPHONE ENCOUNTER
----- Message from TRENT Pelayo - CNP sent at 10/4/2024  5:10 PM EDT -----  Please notify patient of lab results.    1. No anemia    2. Prostate cancer screening number elevated, 6.60 (down from 15.49 a year ago)  -Patient to continue follow-ups with urologist.    3. D-dimer slightly elevated, not statistically significant.  -I do not have concern for a blood clot as he is taking Eliquis 5 mg twice daily.    4.  Bilirubin is still elevated, we will continue to monitor.    5.  Needs to schedule gallbladder ultrasound  [phone]    Thank you.

## 2024-10-07 NOTE — ED PROVIDER NOTES
EKG interpretation by me in absence of a face-to-face evaluation by me as follows:    The 12 lead EKG was interpreted by me independent of a cardiologist as follows:  Rate: normal with a rate of 74  Rhythm: normal ventricular pacemaker  Intervals: left bundle branch block which limits further interpretation of EKG changes  Prior EKG comparison: EKG dated 9/23/24 is not significantly different        Julio Rodriguez MD  10/07/24 1130

## 2024-10-07 NOTE — ACP (ADVANCE CARE PLANNING)
Advanced Care Planning Note.    Purpose of Encounter: Advanced care planning in light of acute on chronic systolic CHF  Parties In Attendance: Patient  Decisional Capacity: Yes  Subjective: Patient with  LE edema  Objective: Cr 1.0  Goals of Care Determination: Patient wants full support (CPR, vent, surgery, HD, trach, PEG)  Plan:  Lasix gtt, Cardio consult, CHF consult  Code Status: Full code   Time spent on Advanced care Plannin minutes  Advanced Care Planning Documents: Completed advanced directives on chart, brother or nephew could be the POA.    Bill Nunez MD  10/7/2024 6:27 PM

## 2024-10-07 NOTE — ED PROVIDER NOTES
[facility] EMERGENCY DEPARTMENT  EMERGENCY DEPARTMENT ENCOUNTER        Pt Name: Manoj Feliz  MRN: [id number]  Birthdate 1960  Date of evaluation: 10/7/2024  Provider: BISI Lozano  PCP: Georgina Escalera APRN - CNP  Note Started: 11:46 AM EDT 10/7/24      SHONA. I have evaluated this patient.        CHIEF COMPLAINT       Chief Complaint   Patient presents with    Leg Pain     Left leg pain and swelling for a week that has gotten worse.        HISTORY OF PRESENT ILLNESS: 1 or more Elements     History From: Patient  Limitations to history : None    Manoj Feliz is a 63 y.o. male with past medical history of CHF, hypertension, atrial fibrillation anticoagulated on Eliquis who presents ED with complaint of leg swelling.  He is complaint of bilateral leg swelling left worse than right.  Has been ongoing for the past week.  He states left leg is tight and painful.  He reports it is slightly darkened discoloration.  He denies any warmth.  Denies any fever or chills.  Denies numbness or tingling.  Reports he saw general practitioner earlier this week and they put him on antibiotics for presumed cellulitis.  He reports continued swelling.  Rates his discomfort as a 7/10.  Became concerned and came to the ED for further evaluation and treatment.  He is anticoagulant on Eliquis.  Denies history of DVT or PE.  He reports he is not taking his anticoagulation as prescribed.  Denies any missed or skipped dosages.  Denies chest pain or shortness of breath.  Denies cough or hemoptysis.  Denies pleuritic pain.  Denies abdominal pain, nausea/vomiting, changes in urine output or changes in bowel movements.  Denies any fever chills.  Denies injury or trauma.  Denies numbness or tingling.  Denies decreased range of motion or strength.    Nursing Notes were all reviewed and agreed with or any disagreements were addressed in the HPI.    REVIEW OF SYSTEMS :      Review of Systems   Constitutional:

## 2024-10-07 NOTE — ED NOTES
ED TO INPATIENT SBAR HANDOFF    Patient Name: Manoj Feliz   :  1960  63 y.o.   MRN:  [id number]  Preferred Name  manoj   ED Room #:  ED-0010/10  Family/Caregiver Present no   Restraints no   Sitter no   Sepsis Risk Score      Situation  Code Status: Prior No additional code details.    Allergies: Patient has no known allergies.  Weight: Patient Vitals for the past 96 hrs (Last 3 readings):   Weight   10/07/24 1011 89.8 kg (198 lb)     Arrived from: home  Chief Complaint:   Chief Complaint   Patient presents with    Leg Pain     Left leg pain and swelling for a week that has gotten worse.      Hospital Problem/Diagnosis:  Principal Problem:    Acute on chronic systolic CHF (congestive heart failure) (HCC)  Active Problems:    Dilated cardiomyopathy (HCC)    Chronic atrial fibrillation (HCC)    ICD (implantable cardioverter-defibrillator) in place    Acute on chronic systolic (congestive) heart failure (HCC)    CKD (chronic kidney disease) stage 3, GFR 30-59 ml/min (HCC)  Resolved Problems:    * No resolved hospital problems. *    Imaging:   Vascular duplex lower extremity venous bilateral   Final Result      XR CHEST PORTABLE   Final Result   Cardiomegaly.      No acute findings           Abnormal labs:   Abnormal Labs Reviewed   CBC WITH AUTO DIFFERENTIAL - Abnormal; Notable for the following components:       Result Value    Hemoglobin 13.0 (*)     Hematocrit 38.7 (*)     RDW 17.2 (*)     Lymphocytes Absolute 0.5 (*)     All other components within normal limits   COMPREHENSIVE METABOLIC PANEL W/ REFLEX TO MG FOR LOW K - Abnormal; Notable for the following components:    Chloride 98 (*)     Total Bilirubin 2.5 (*)     Alkaline Phosphatase 141 (*)     ALT 42 (*)     AST 49 (*)     All other components within normal limits   TROPONIN - Abnormal; Notable for the following components:    Troponin, High Sensitivity 23 (*)     All other components within normal limits   BRAIN NATRIURETIC PEPTIDE -  Abnormal; Notable for the following components:    NT Pro-BNP 25,709 (*)     All other components within normal limits   URINALYSIS WITH REFLEX TO CULTURE - Abnormal; Notable for the following components:    Glucose, Ur >=1000 (*)     Protein, UA TRACE (*)     All other components within normal limits   TROPONIN - Abnormal; Notable for the following components:    Troponin, High Sensitivity 24 (*)     All other components within normal limits     Critical values: no     Abnormal Assessment Findings:     Background  History:   Past Medical History:   Diagnosis Date    Acute combined systolic and diastolic congestive heart failure (HCC) 8/31/2022    Atrial fibrillation (HCC) 07/25/2019    CHF (congestive heart failure) ([facility])     Hx of blood clots     Hypertension        Assessment    Vitals/MEWS: MEWS Score: 1  Level of Consciousness: Alert (0)   Vitals:    10/07/24 1613 10/07/24 1700 10/07/24 1715 10/07/24 1731   BP: 111/89 (!) 103/92 (!) 106/93 106/83   Pulse:  86 69 72   Resp:  21 10 (!) 0   Temp:       TempSrc:       SpO2:    92%   Weight:       Height:         FiO2 (%):   O2 Flow Rate: O2 Device: None (Room air)    Cardiac Rhythm:    Pain Assessment:  [] Verbal [] Saravia Baker Scale  Pain Scale: Pain Assessment  Pain Assessment: 0-10  Pain Level: 7  Pain Location: Leg  Pain Orientation: Left  Pain Descriptors: Burning  Pain Type: Acute pain  Last documented pain score (0-10 scale) Pain Level: 7  Last documented pain medication administered:   Mental Status: oriented, alert, coherent, logical, thought processes intact, and able to concentrate and follow conversation  Orientation Level:    NIH Score:    C-SSRS: Risk of Suicide: No Risk  Bedside swallow:    [place] Coma Scale (GCS): Deniz Coma Scale  Eye Opening: Spontaneous  Best Verbal Response: Oriented  Best Motor Response: Obeys commands  [place] Coma Scale Score: 15  Active LDA's:   Peripheral IV 10/07/24 Right Hand (Active)                 PO Status:

## 2024-10-08 LAB
ANION GAP SERPL CALCULATED.3IONS-SCNC: 11 MMOL/L (ref 3–16)
BASOPHILS # BLD: 0 K/UL (ref 0–0.2)
BASOPHILS NFR BLD: 0.7 %
BUN SERPL-MCNC: 18 MG/DL (ref 7–20)
C DIFF TOX A+B STL QL IA: NORMAL
CALCIUM SERPL-MCNC: 8.5 MG/DL (ref 8.3–10.6)
CHLORIDE SERPL-SCNC: 99 MMOL/L (ref 99–110)
CO2 SERPL-SCNC: 28 MMOL/L (ref 21–32)
CREAT SERPL-MCNC: 1.2 MG/DL (ref 0.8–1.3)
DEPRECATED RDW RBC AUTO: 17.5 % (ref 12.4–15.4)
EOSINOPHIL # BLD: 0.1 K/UL (ref 0–0.6)
EOSINOPHIL NFR BLD: 1.5 %
GFR SERPLBLD CREATININE-BSD FMLA CKD-EPI: 67 ML/MIN/{1.73_M2}
GLUCOSE SERPL-MCNC: 99 MG/DL (ref 70–99)
HCT VFR BLD AUTO: 41.5 % (ref 40.5–52.5)
HGB BLD-MCNC: 13.5 G/DL (ref 13.5–17.5)
LYMPHOCYTES # BLD: 0.8 K/UL (ref 1–5.1)
LYMPHOCYTES NFR BLD: 18.7 %
MAGNESIUM SERPL-MCNC: 2.5 MG/DL (ref 1.8–2.4)
MCH RBC QN AUTO: 28.9 PG (ref 26–34)
MCHC RBC AUTO-ENTMCNC: 32.4 G/DL (ref 31–36)
MCV RBC AUTO: 89.1 FL (ref 80–100)
MONOCYTES # BLD: 0.5 K/UL (ref 0–1.3)
MONOCYTES NFR BLD: 10.2 %
NEUTROPHILS # BLD: 3.1 K/UL (ref 1.7–7.7)
NEUTROPHILS NFR BLD: 68.9 %
PLATELET # BLD AUTO: 156 K/UL (ref 135–450)
PMV BLD AUTO: 8.2 FL (ref 5–10.5)
POTASSIUM SERPL-SCNC: 4 MMOL/L (ref 3.5–5.1)
RBC # BLD AUTO: 4.66 M/UL (ref 4.2–5.9)
SODIUM SERPL-SCNC: 138 MMOL/L (ref 136–145)
WBC # BLD AUTO: 4.5 K/UL (ref 4–11)

## 2024-10-08 PROCEDURE — 2580000003 HC RX 258: Performed by: INTERNAL MEDICINE

## 2024-10-08 PROCEDURE — 6370000000 HC RX 637 (ALT 250 FOR IP): Performed by: INTERNAL MEDICINE

## 2024-10-08 PROCEDURE — 87324 CLOSTRIDIUM AG IA: CPT

## 2024-10-08 PROCEDURE — 83735 ASSAY OF MAGNESIUM: CPT

## 2024-10-08 PROCEDURE — 99222 1ST HOSP IP/OBS MODERATE 55: CPT | Performed by: INTERNAL MEDICINE

## 2024-10-08 PROCEDURE — 85025 COMPLETE CBC W/AUTO DIFF WBC: CPT

## 2024-10-08 PROCEDURE — 80048 BASIC METABOLIC PNL TOTAL CA: CPT

## 2024-10-08 PROCEDURE — 1200000000 HC SEMI PRIVATE

## 2024-10-08 PROCEDURE — 6360000002 HC RX W HCPCS: Performed by: INTERNAL MEDICINE

## 2024-10-08 PROCEDURE — 87506 IADNA-DNA/RNA PROBE TQ 6-11: CPT

## 2024-10-08 PROCEDURE — 36415 COLL VENOUS BLD VENIPUNCTURE: CPT

## 2024-10-08 PROCEDURE — 87449 NOS EACH ORGANISM AG IA: CPT

## 2024-10-08 RX ORDER — DOBUTAMINE HYDROCHLORIDE 200 MG/100ML
2.5 INJECTION INTRAVENOUS CONTINUOUS
Status: DISCONTINUED | OUTPATIENT
Start: 2024-10-08 | End: 2024-10-08

## 2024-10-08 RX ADMIN — LISINOPRIL 2.5 MG: 5 TABLET ORAL at 09:20

## 2024-10-08 RX ADMIN — METOPROLOL SUCCINATE 12.5 MG: 25 TABLET, FILM COATED, EXTENDED RELEASE ORAL at 09:19

## 2024-10-08 RX ADMIN — APIXABAN 5 MG: 5 TABLET, FILM COATED ORAL at 09:20

## 2024-10-08 RX ADMIN — METOPROLOL SUCCINATE 12.5 MG: 25 TABLET, FILM COATED, EXTENDED RELEASE ORAL at 20:36

## 2024-10-08 RX ADMIN — APIXABAN 5 MG: 5 TABLET, FILM COATED ORAL at 20:36

## 2024-10-08 RX ADMIN — FINASTERIDE 5 MG: 5 TABLET, FILM COATED ORAL at 09:19

## 2024-10-08 RX ADMIN — Medication 10 ML: at 09:21

## 2024-10-08 RX ADMIN — Medication 10 ML: at 20:37

## 2024-10-08 RX ADMIN — AMIODARONE HYDROCHLORIDE 200 MG: 200 TABLET ORAL at 09:19

## 2024-10-08 RX ADMIN — FUROSEMIDE 10 MG/HR: 10 INJECTION, SOLUTION INTRAMUSCULAR; INTRAVENOUS at 06:19

## 2024-10-08 RX ADMIN — FUROSEMIDE 10 MG/HR: 10 INJECTION, SOLUTION INTRAMUSCULAR; INTRAVENOUS at 22:43

## 2024-10-08 RX ADMIN — PANTOPRAZOLE SODIUM 40 MG: 40 TABLET, DELAYED RELEASE ORAL at 09:19

## 2024-10-08 RX ADMIN — ASPIRIN 81 MG: 81 TABLET, CHEWABLE ORAL at 09:19

## 2024-10-08 ASSESSMENT — PAIN SCALES - GENERAL: PAINLEVEL_OUTOF10: 0

## 2024-10-08 NOTE — PROGRESS NOTES
CHF Nutrition Education    Consult received for heart failure diet education. Patient has been educated numerous times over the past year. Nutrition therapy included low sodium diet guidelines, fluid restriction, foods to choose and foods to avoid, label reading, and ways to add flavor to food.  Patient does not follow a low sodium diet. Patient frequently consumes high sodium foods such as tomato soup and grilled cheese with American cheese. Patient states that he or a friend does the grocery shopping. Patient consumes fast food, but chooses lower sodium options such as plain rice. All questions answered and patient voiced understanding.  Time spent with patient: 15 minutes      Electronically signed by Alva Grover on 10/8/2024 at 2:25 PM

## 2024-10-08 NOTE — FLOWSHEET NOTE
10/07/24 2045   Assessment   Charting Type Admission   Psychosocial   Psychosocial (WDL) WD   Neurological   Neuro (WDL) [facility]   Level of Consciousness 0   Orientation Level Oriented to person;Oriented to place;Oriented to time;Oriented to situation;Oriented X4   Cognition Appropriate judgement;Appropriate safety awareness;Appropriate attention/concentration;Appropriate for developmental age;Follows commands   Speech Clear;Appropriate for developmental age   Deniz Coma Scale   Eye Opening 4   Best Verbal Response 5   Best Motor Response 6   [place] Coma Scale Score 15   HEENT (Head, Ears, Eyes, Nose, & Throat)   HEENT (WDL) WD   Respiratory   Respiratory (WDL) [facility]   Respiratory Interventions H.O.B. elevated   Respiratory Pattern Regular   Respiratory Depth Normal   Respiratory Quality/Effort Unlabored   Chest Assessment Chest expansion symmetrical   L Breath Sounds Diminished   R Breath Sounds Diminished   Breath Sounds   Right Upper Lobe Diminished   Right Middle Lobe Diminished   Right Lower Lobe Diminished   Left Upper Lobe Diminished   Left Lower Lobe Diminished   Cardiac   Cardiac (WDL) [facility]   Cardiac Regularity Regularly Irregular   Heart Sounds S1, S2   Cardiac Rhythm Ventricular paced   Cardiac Monitor   Cardiac/Telemetry Monitor On Portable telemetry pack applied   Gastrointestinal   Abdominal (WDL) WDL   Genitourinary   Genitourinary (WDL) [facility]   Urine Assessment   Urinary Status Voiding   Peripheral Vascular   Peripheral Vascular (WDL) X   Edema Left lower extremity;Right lower extremity   RLE Edema +1;Pitting   LLE Edema +3;Pitting   RLE Neurovascular Assessment   Capillary Refill Less than/Equal to 3 seconds   Color Appropriate for Ethnicity   Temperature Warm   R Pedal Pulse +1   LLE Neurovascular Assessment   Capillary Refill Less than/Equal to 3 seconds   Color Appropriate for Ethnicity   Temperature Warm   L Pedal Pulse +1   Skin Integumentary    Skin Integumentary (WDL) WDL

## 2024-10-08 NOTE — CONSULTS
[facility]  HEART FAILURE PROGRAM      Manoj Feliz 1960    History:  Past Medical History:   Diagnosis Date    Acute combined systolic and diastolic congestive heart failure (HCC) 8/31/2022    Atrial fibrillation (HCC) 07/25/2019    CHF (congestive heart failure) (HCC)     Hx of blood clots     Hypertension      5/22/24  Limited ECHO with 2D imaging, Color-Flow and Spectral Doppler:     Severe global and regional LV systolic dysfunction.  Estimated EF 10-15%.  Severely enlarged LV chamber size with eccentric remodeling.    Increased LV apical trabeculation.  No evidence for LV thrombus with Definity.  Spontaneous echo-contrast observed.  Indeterminate LV diastolic function.    Severe bi-atrial enlargement.  Moderate-severely reduced RV systolic function.  RV size is enlarged.  Device wire is present.  Mild to moderate aortic valve regurgitation.  Severe mitral valve regurgitation.  Severe tricuspid valve regurgitation.  At least moderate pulmonary hypertension based on TR signal obtained.  IVC not visualized.    Mildly dilated proximal ascending thoracic aorta, 4.1 cm.        ACE/ARB/ARNi: lisinopril 2.5 mg daily- has been too hypotensive in the past for ARNi  BB: toprol xl 12.5 mg daily  Aldosterone Antagonist: aldactone 25 mg daily--- is home medication-- not on here  SGLT2: jardiance 10 mg----is home med--not on here    History of sleep apnea:no-- previously screened    DM History: No    Last Hospital Admission:9/23-9/26 with CHF  Code Status: full code  Discharge plans: is homeless- stays with friends    Family Present: no      Manoj Feliz was admitted to the hospital with cellulitis in lower extremity. Patient is well known and follows closely with HF NP. He was just seen last week for hospital follow up with HF NP and was sent to infusion center for IV lasix for signs of overload and weight gain. Patient was seen by palliative care in June but has had 3 more admissions since

## 2024-10-08 NOTE — RT PROTOCOL NOTE
RT Inhaler-Nebulizer Bronchodilator Protocol Note    There is a bronchodilator order in the chart from a provider indicating to follow the RT Bronchodilator Protocol and there is an “Initiate RT Inhaler-Nebulizer Bronchodilator Protocol” order as well (see protocol at bottom of note).    CXR Findings:  XR CHEST PORTABLE    Result Date: 10/7/2024  Cardiomegaly. No acute findings       The findings from the last RT Protocol Assessment were as follows:   History Pulmonary Disease: None or smoker <15 pack years  Respiratory Pattern: Regular pattern and RR 12-20 bpm  Breath Sounds: Slightly diminished and/or crackles  Cough: Strong, spontaneous, non-productive  Indication for Bronchodilator Therapy: Decreased or absent breath sounds, On home bronchodilators  Bronchodilator Assessment Score: 2    Aerosolized bronchodilator medication orders have been revised according to the RT Inhaler-Nebulizer Bronchodilator Protocol below.    Respiratory Therapist to perform RT Therapy Protocol Assessment initially then follow the protocol.  Repeat RT Therapy Protocol Assessment PRN for score 0-3 or on second treatment, BID, and PRN for scores above 3.    No Indications - adjust the frequency to every 6 hours PRN wheezing or bronchospasm, if no treatments needed after 48 hours then discontinue using Per Protocol order mode.     If indication present, adjust the RT bronchodilator orders based on the Bronchodilator Assessment Score as indicated below.  Use Inhaler orders unless patient has one or more of the following: on home nebulizer, not able to hold breath for 10 seconds, is not alert and oriented, cannot activate and use MDI correctly, or respiratory rate 25 breaths per minute or more, then use the equivalent nebulizer order(s) with same Frequency and PRN reasons based on the score.  If a patient is on this medication at home then do not decrease Frequency below that used at home.    0-3 - enter or revise RT bronchodilator order(s)  to equivalent RT Bronchodilator order with Frequency of every 4 hours PRN for wheezing or increased work of breathing using Per Protocol order mode.        4-6 - enter or revise RT Bronchodilator order(s) to two equivalent RT bronchodilator orders with one order with BID Frequency and one order with Frequency of every 4 hours PRN wheezing or increased work of breathing using Per Protocol order mode.        7-10 - enter or revise RT Bronchodilator order(s) to two equivalent RT bronchodilator orders with one order with TID Frequency and one order with Frequency of every 4 hours PRN wheezing or increased work of breathing using Per Protocol order mode.       11-13 - enter or revise RT Bronchodilator order(s) to one equivalent RT bronchodilator order with QID Frequency and an Albuterol order with Frequency of every 4 hours PRN wheezing or increased work of breathing using Per Protocol order mode.      Greater than 13 - enter or revise RT Bronchodilator order(s) to one equivalent RT bronchodilator order with every 4 hours Frequency and an Albuterol order with Frequency of every 2 hours PRN wheezing or increased work of breathing using Per Protocol order mode.     RT to enter RT Home Evaluation for COPD & MDI Assessment order using Per Protocol order mode.    Electronically signed by PAULY QUIROS RCP on 10/7/2024 at 11:55 PM

## 2024-10-08 NOTE — PROGRESS NOTES
10/07/24 7244   RT Protocol   History Pulmonary Disease 0   Respiratory pattern 0   Breath sounds 2   Cough 0   Indications for Bronchodilator Therapy Decreased or absent breath sounds;On home bronchodilators   Bronchodilator Assessment Score 2

## 2024-10-08 NOTE — PROGRESS NOTES

## 2024-10-08 NOTE — CONSULTS
[facility]      GENERAL CARDIOLOGY CONSULTATION  [phone]        Inpatient consult to Cardiology  Consult performed by: Jada Espinosa DO  Consult ordered by: Bill Nunez MD  Reason for consult: ADHF          Chief Complaint   Patient presents with    Leg Pain     Left leg pain and swelling for a week that has gotten worse.         ASSESSMENT/PLAN:  ADHF  Leg cellulitis  Troponin elevation  Abnormal LFT  Chronic AFIB  CKD    Acute on chronic decompensated LV and RV systolic CHF.  Continue IV diuresis, strict I/Os, daily weights, monitor hemodynamics.  Did not tolerate Entresto as outpatient.  MRA, SGLT2I held on admission.  Continue GDMT with ACE, BB, optimize as clinical status allows.  ICD in place, recent interrogation during hospitalization 7/2024.      Troponin elevation is borderline elevated, adynamic, not indicative of ACS.  ECG is V-paced.  No anginal type symptoms reported. Maintained on aspirin as outpatient.      Abnormal LFTs may be secondary to congestive hepatopathy.  Recommend stopping statin.  Will need routine outpatient Amiodarone monitoring.      Left lower extremity, likely cellulitis.  Vascular duplex negative for DVT/SVT.  Management per primary team.      Chronic AFIB on BB, Amiodarone, OAC.  Continue medical management.      Per outpatient Cardiology note (10/3/2024), has declined Palliative evaluation.      Further recommendations tomorrow upon re-evaluation.      History of Present Illness:  Manoj Feliz is a 63 y.o. male patient presenting with left leg pain and swelling.  States that he has chronic bilateral leg edema, L > R but it has worsened over the pas week and feels \"hot\".  Denies fevers, chills or infectious type symptoms.  He denies significant sob, chest pain, palpitations.  No specific cardiac complaint.      Medical history reviewed, significant for dilated cardiomyopathy, severe LV systolic dysfunction, RV systolic dysfunction, s/p ICD, chronic AFIB  estimated to be 15-20%.   There is severe global hypokinesis with regional variations.   Mild aortic regurgitation.   Mild to moderate mitral regurgitation.   Mild to moderate tricuspid regurgitation.   Biatrial enlargement.   Pacer/ICD seen in right ventricle.     CONSUELO Dr Rodriguez 3/6/2023   Summary   The left ventricle is dilated. Mild hypertrophy. Severely reduced global   systolic function with an ejection fraction estimated at 15-20%.   Severe global hypokinesis noted.      Left atrial size appears dilated.   Spontaneous echo contrast seen in the left atrium. There is no evidence of   mass or thrombus in the left atrium or appendage.      There is mild aortic insufficiency.   The right ventricle is dilated and hypokinetic with reduced systolic   function.      Pacer / ICD wire is visualized in the right ventricle.      There is small echodensity seen on the ICD lead, differential diagnosis   include fibrin strands or vegetation. Clinical correlation is recommended.      There is severe tricuspid regurgitation.        CONSUELO Dr Rodriguez 2/10/22  Preliminary CONSUELO results are:      - Severe LV dysfunction,  EF: 20-25%  - LA dilated. There was HENOK thrombus.   - Moderate MR     Cardiac Cath PCI: Dr Johns 2/8/2022  Anatomy:   LM-nml   LAD-nml  Cx-nml  OM- nml  RCA-nml  RPDA- nml  LVEF- 10%  LVG- global hypokinesis  LVEDP- 10     Hemodynamics:  RA- mean 3  RV- 37/0  PAWP- 10  PA- 34/12 (20)     C.O.- 5.7 (4.9)  C.I.- 2.6 (2.25)  PA sat 60%  AO sat 91%     Contrast: 70  Flouro Time: 5.3  Access: R radial a, R CFV     Impression  ~Coronary Angiography w/ normal cors  ~LVG with LVEF of 10% and global regional wall motion abnormalities  ~Severe MR  ~Normal CO/CI  ~normal L and R filling pressures     Recommendation  ~Aggressive medical treatment and risk factor modification  ~1. Medications reviewed, no changes at this time.  2. Post cath IVF. Bedrest.  3.Consider CONSUELO for evaluation of MR.   4. Patient has been advised on the

## 2024-10-08 NOTE — DISCHARGE INSTRUCTIONS
Guidelines for Heart Failure home management:    1. Continue to monitor weight first thing each morning. You should weigh yourself after using the bathroom and before you eat breakfast.    2. Report to your doctor any significant weight change. Remember that weight change of 2-3 lbs. in 1 day or 5 lbs in a week is \"significant\" and likely represents changes in \"fluid\" status.  If you are experiencing any swelling in your feet, ankles or abdomen, or shortness of breath, call your doctor.     3. You should restrict all sodium intake to 3000 milligrams (3 grams) a day. Depending on your status, you may also be asked to restrict fluid intake to no more that 64 oz/2 Liters a day. If uncertain, ask the nurse or physician.     4. Regular aerobic exercise is encouraged 30 minutes a day (walking, bike, swimming, etc.). For specific exercise recommendations, ask your physician.     5. Report to your doctor any change in symptoms (chest pain, worsening shortness of breath, increased dizziness or passing out, increased palpitations or ICD shock, trouble catching breath while lying down, increased edema or abdominal bloating). Remember that even \"minor\" changes in symptoms may be important. Also report any changes in medications including \"over the counter\" medications.     6. DO NOT take NSAID's for pain (i.e, Advil, Aleve, Motrin, ibuprofen, and many more) since these may cause serious problems in those with a history of CHF. If uncertain about the medication, call your doctor.    7. If you have new significant or ongoing diarrhea or vomiting, please call your doctor for further instructions. Taking a diuretic (water pill) with these symptoms can worsen dehydration.     8. If you have any questions or concerns you can always call the CHF  Resource Line( [phone].  It is available Monday thru Friday 8 am- 5 pm. Please leave a message and your call will be returned shortly.     Extra Heart Failure

## 2024-10-08 NOTE — PROGRESS NOTES
Pharmacy Home Medication Reconciliation Note    A medication reconciliation has been completed for Manoj Feliz 1960    Pharmacy: [facility] outpatient pharmacy [address]   Information provided by: patient     The patient's home medication list is as follows:  No current facility-administered medications on file prior to encounter.     Current Outpatient Medications on File Prior to Encounter   Medication Sig Dispense Refill    torsemide (DEMADEX) 20 MG tablet 40 mg twice a day (Patient taking differently: Take 2 tablets by mouth 2 times daily) 360 tablet 1    sulfamethoxazole-trimethoprim (BACTRIM) 400-80 MG per tablet Take 1 tablet by mouth 2 times daily for 10 days (Patient taking differently: Take 1 tablet by mouth 2 times daily 10/3/24-10/13/24) 20 tablet 0    amoxicillin-clavulanate (AUGMENTIN) 875-125 MG per tablet Take 1 tablet by mouth 2 times daily for 10 days (Patient taking differently: Take 1 tablet by mouth 2 times daily 10/3/24-10/13/24) 20 tablet 0    aspirin 81 MG chewable tablet Take 1 tablet by mouth daily 90 tablet 2    spironolactone (ALDACTONE) 25 MG tablet TAKE ONE TABLET BY MOUTH ONCE A DAY WITH LUNCH (Patient taking differently: Take 1 tablet by mouth Daily with lunch) 90 tablet 0    amiodarone (CORDARONE) 200 MG tablet TAKE ONE TABLET BY MOUTH ONCE A DAY 90 tablet 0    finasteride (PROSCAR) 5 MG tablet Take 1 tablet by mouth daily 90 tablet 2    lisinopril (PRINIVIL;ZESTRIL) 2.5 MG tablet Take 1 tablet by mouth daily 30 tablet 3    pantoprazole (PROTONIX) 40 MG tablet Take 1 tablet by mouth daily 30 tablet 0    albuterol sulfate HFA (VENTOLIN HFA) 108 (90 Base) MCG/ACT inhaler Inhale 2 puffs into the lungs every 6 hours as needed for Wheezing 54 g 1    apixaban (ELIQUIS) 5 MG TABS tablet Take 1 tablet by mouth 2 times daily 60 tablet 5    empagliflozin (JARDIANCE) 10 MG tablet Take 1 tablet by mouth daily 90 tablet 1    metoprolol succinate (TOPROL XL) 25 MG extended  release tablet Take 0.5 tablets by mouth in the morning and at bedtime 45 tablet 3    atorvastatin (LIPITOR) 40 MG tablet Take 1 tablet by mouth nightly 90 tablet 2    vitamin D (ERGOCALCIFEROL) 1.25 MG (72206 UT) CAPS capsule Take 1 capsule by mouth once a week (Patient not taking: Reported on 10/7/2024) 12 capsule 3    tamsulosin (FLOMAX) 0.4 MG capsule Take 1 capsule by mouth daily (Patient not taking: Reported on 10/7/2024)      [DISCONTINUED] potassium chloride (KLOR-CON M) 20 MEQ extended release tablet Take 1 tablet by mouth in the morning and 1 tablet in the evening. Take with meals. 60 tablet 3       Patient is no longer taking potassium , flomax , and vitamin d     Of note, patient took morning medications ; patient takes eliquis 5mg bid .    Of note , patient is currently on augmentin 875-125mg bid bactrim ds bid both for 10 days . Patient started on 10/3/24 . Patient took both this morning .     Timing of last doses updated.    Thank you,  Mihaela Quesada hT

## 2024-10-08 NOTE — PROGRESS NOTES
Hospitalist Progress Note      PCP: Georgina Escalera, APRN - CNP    Date of Admission: 10/7/2024    Chief Complaint: Leg pain    Hospital Course:  63 y.o. 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  Patient with progressing LE edema despite increase in diuretics. Admitted as inpatient for acute on chronic systolic CHF.  Started on Lasix gtt.  Cardiology consulted.     Subjective:  Patient with improving LE edema.  He is not collecting urine output because he has loose stool.  No CP, SOB, HA or abdominal pain.       Medications:  Reviewed    Infusion Medications    sodium chloride      furosemide 10 mg/hr (10/08/24 0619)     Scheduled Medications    amiodarone  200 mg Oral Daily    apixaban  5 mg Oral BID    aspirin  81 mg Oral Daily    atorvastatin  40 mg Oral Nightly    finasteride  5 mg Oral Daily    metoprolol succinate  12.5 mg Oral BID    lisinopril  2.5 mg Oral Daily    pantoprazole  40 mg Oral Daily    sodium chloride flush  5-40 mL IntraVENous 2 times per day     PRN Meds: albuterol sulfate HFA, sodium chloride flush, sodium chloride, potassium chloride **OR** potassium alternative oral replacement **OR** potassium chloride, magnesium sulfate, [Held by provider] ondansetron **OR** [Held by provider] ondansetron, polyethylene glycol, acetaminophen **OR** acetaminophen      Intake/Output Summary (Last 24 hours) at 10/8/2024 1011  Last data filed at 10/8/2024 0921  Gross per 24 hour   Intake 610 ml   Output 700 ml   Net -90 ml       Physical Exam Performed:    /72   Pulse 72   Temp 98.2 °F (36.8 °C) (Oral)   Resp 16   Ht 1.88 m (6' 2\")   Wt 87.3 kg (192 lb 7.4 oz)   SpO2 97%   BMI 24.71 kg/m²     General appearance:  Appears comfortable. Well nourished  Eyes: Sclera clear, pupils equal  ENT: Moist mucus membranes, no thrush. Trachea

## 2024-10-09 LAB
ANION GAP SERPL CALCULATED.3IONS-SCNC: 10 MMOL/L (ref 3–16)
BASOPHILS # BLD: 0.1 K/UL (ref 0–0.2)
BASOPHILS NFR BLD: 1 %
BUN SERPL-MCNC: 21 MG/DL (ref 7–20)
CALCIUM SERPL-MCNC: 8.5 MG/DL (ref 8.3–10.6)
CHLORIDE SERPL-SCNC: 99 MMOL/L (ref 99–110)
CO2 SERPL-SCNC: 29 MMOL/L (ref 21–32)
CREAT SERPL-MCNC: 1.3 MG/DL (ref 0.8–1.3)
DEPRECATED RDW RBC AUTO: 17.4 % (ref 12.4–15.4)
EOSINOPHIL # BLD: 0.1 K/UL (ref 0–0.6)
EOSINOPHIL NFR BLD: 1.2 %
GFR SERPLBLD CREATININE-BSD FMLA CKD-EPI: 61 ML/MIN/{1.73_M2}
GI PATHOGENS PNL STL NAA+PROBE: NORMAL
GLUCOSE SERPL-MCNC: 86 MG/DL (ref 70–99)
HCT VFR BLD AUTO: 40.2 % (ref 40.5–52.5)
HGB BLD-MCNC: 13 G/DL (ref 13.5–17.5)
LYMPHOCYTES # BLD: 0.8 K/UL (ref 1–5.1)
LYMPHOCYTES NFR BLD: 14 %
MAGNESIUM SERPL-MCNC: 2.63 MG/DL (ref 1.8–2.4)
MCH RBC QN AUTO: 28.7 PG (ref 26–34)
MCHC RBC AUTO-ENTMCNC: 32.3 G/DL (ref 31–36)
MCV RBC AUTO: 88.9 FL (ref 80–100)
MONOCYTES # BLD: 0.5 K/UL (ref 0–1.3)
MONOCYTES NFR BLD: 8.8 %
NEUTROPHILS # BLD: 4.5 K/UL (ref 1.7–7.7)
NEUTROPHILS NFR BLD: 75 %
PLATELET # BLD AUTO: 176 K/UL (ref 135–450)
PMV BLD AUTO: 8.4 FL (ref 5–10.5)
POTASSIUM SERPL-SCNC: 4.3 MMOL/L (ref 3.5–5.1)
RBC # BLD AUTO: 4.52 M/UL (ref 4.2–5.9)
SODIUM SERPL-SCNC: 138 MMOL/L (ref 136–145)
WBC # BLD AUTO: 6 K/UL (ref 4–11)

## 2024-10-09 PROCEDURE — 6360000002 HC RX W HCPCS: Performed by: INTERNAL MEDICINE

## 2024-10-09 PROCEDURE — 1200000000 HC SEMI PRIVATE

## 2024-10-09 PROCEDURE — 36415 COLL VENOUS BLD VENIPUNCTURE: CPT

## 2024-10-09 PROCEDURE — 6370000000 HC RX 637 (ALT 250 FOR IP): Performed by: INTERNAL MEDICINE

## 2024-10-09 PROCEDURE — 83735 ASSAY OF MAGNESIUM: CPT

## 2024-10-09 PROCEDURE — 2580000003 HC RX 258: Performed by: INTERNAL MEDICINE

## 2024-10-09 PROCEDURE — 99232 SBSQ HOSP IP/OBS MODERATE 35: CPT | Performed by: NURSE PRACTITIONER

## 2024-10-09 PROCEDURE — 85025 COMPLETE CBC W/AUTO DIFF WBC: CPT

## 2024-10-09 PROCEDURE — 80048 BASIC METABOLIC PNL TOTAL CA: CPT

## 2024-10-09 RX ADMIN — FUROSEMIDE 10 MG/HR: 10 INJECTION, SOLUTION INTRAMUSCULAR; INTRAVENOUS at 11:42

## 2024-10-09 RX ADMIN — ACETAMINOPHEN 650 MG: 325 TABLET ORAL at 20:17

## 2024-10-09 RX ADMIN — METOPROLOL SUCCINATE 12.5 MG: 25 TABLET, FILM COATED, EXTENDED RELEASE ORAL at 20:17

## 2024-10-09 RX ADMIN — ACETAMINOPHEN 650 MG: 325 TABLET ORAL at 09:05

## 2024-10-09 RX ADMIN — FINASTERIDE 5 MG: 5 TABLET, FILM COATED ORAL at 08:57

## 2024-10-09 RX ADMIN — APIXABAN 5 MG: 5 TABLET, FILM COATED ORAL at 20:17

## 2024-10-09 RX ADMIN — ASPIRIN 81 MG: 81 TABLET, CHEWABLE ORAL at 08:57

## 2024-10-09 RX ADMIN — LISINOPRIL 2.5 MG: 5 TABLET ORAL at 10:38

## 2024-10-09 RX ADMIN — AMIODARONE HYDROCHLORIDE 200 MG: 200 TABLET ORAL at 08:57

## 2024-10-09 RX ADMIN — METOPROLOL SUCCINATE 12.5 MG: 25 TABLET, FILM COATED, EXTENDED RELEASE ORAL at 10:38

## 2024-10-09 RX ADMIN — PANTOPRAZOLE SODIUM 40 MG: 40 TABLET, DELAYED RELEASE ORAL at 08:58

## 2024-10-09 RX ADMIN — Medication 10 ML: at 08:58

## 2024-10-09 RX ADMIN — Medication 10 ML: at 20:18

## 2024-10-09 RX ADMIN — APIXABAN 5 MG: 5 TABLET, FILM COATED ORAL at 08:57

## 2024-10-09 ASSESSMENT — PAIN DESCRIPTION - LOCATION
LOCATION: LEG
LOCATION: LEG

## 2024-10-09 ASSESSMENT — PAIN DESCRIPTION - ORIENTATION
ORIENTATION: LEFT
ORIENTATION: LEFT

## 2024-10-09 ASSESSMENT — PAIN DESCRIPTION - DESCRIPTORS
DESCRIPTORS: ACHING
DESCRIPTORS: DISCOMFORT;ACHING;TIGHTNESS

## 2024-10-09 ASSESSMENT — PAIN SCALES - GENERAL
PAINLEVEL_OUTOF10: 8
PAINLEVEL_OUTOF10: 7
PAINLEVEL_OUTOF10: 4

## 2024-10-09 ASSESSMENT — PAIN SCALES - WONG BAKER: WONGBAKER_NUMERICALRESPONSE: HURTS A LITTLE BIT

## 2024-10-09 ASSESSMENT — PAIN DESCRIPTION - PAIN TYPE: TYPE: ACUTE PAIN

## 2024-10-09 NOTE — PROGRESS NOTES
Hospitalist Progress Note      PCP: Georgina Escalera, APRN - CNP    Date of Admission: 10/7/2024    Chief Complaint: Leg pain    Hospital Course:  63 y.o. 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  Patient with progressing LE edema despite increase in diuretics. Admitted as inpatient for acute on chronic systolic CHF.  Started on Lasix gtt.  Cardiology consulted.     Subjective:  Patient still not collecting urine.  Says his L leg feels tight.  No CP, SOB, HA or abdominal pain.       Medications:  Reviewed    Infusion Medications    sodium chloride      furosemide 10 mg/hr (10/09/24 1142)     Scheduled Medications    amiodarone  200 mg Oral Daily    apixaban  5 mg Oral BID    aspirin  81 mg Oral Daily    finasteride  5 mg Oral Daily    metoprolol succinate  12.5 mg Oral BID    lisinopril  2.5 mg Oral Daily    pantoprazole  40 mg Oral Daily    sodium chloride flush  5-40 mL IntraVENous 2 times per day     PRN Meds: albuterol sulfate HFA, sodium chloride flush, sodium chloride, potassium chloride **OR** potassium alternative oral replacement **OR** potassium chloride, magnesium sulfate, [Held by provider] ondansetron **OR** [Held by provider] ondansetron, polyethylene glycol, acetaminophen **OR** acetaminophen    No intake or output data in the 24 hours ending 10/09/24 1210      Physical Exam Performed:    BP 98/73   Pulse 80   Temp 97.8 °F (36.6 °C) (Oral)   Resp 16   Ht 1.88 m (6' 2\")   Wt 87.3 kg (192 lb 7.4 oz)   SpO2 97%   BMI 24.71 kg/m²     General appearance:  Appears comfortable. Well nourished  Eyes: Sclera clear, pupils equal  ENT: Moist mucus membranes, no thrush. Trachea midline.  Cardiovascular: Regular rhythm, normal S1, S2. No murmur, gallop, rub. 1-2+ BL LE  edema.  L ICD  Respiratory: No rales.  No wheezing or  rhonchi.  Gastrointestinal: Abdomen soft, non-tender, not distended, normal bowel sounds  Musculoskeletal: No cyanosis in digits, neck supple  Neurology: Cranial nerves grossly intact. Alert and oriented in time, place and person. No speech or motor deficits  Psychiatry: Anxious affect. Not agitated  Skin: Warm, dry, normal turgor, no rash  Brisk capillary refill, peripheral pulses palpable      Labs:   Recent Labs     10/07/24  1107 10/08/24  0430 10/09/24  0553   WBC 4.9 4.5 6.0   HGB 13.0* 13.5 13.0*   HCT 38.7* 41.5 40.2*    156 176     Recent Labs     10/07/24  1107 10/08/24  0430 10/09/24  0553    138 138   K 3.7 4.0 4.3   CL 98* 99 99   CO2 30 28 29   BUN 16 18 21*   CREATININE 1.0 1.2 1.3   CALCIUM 8.4 8.5 8.5     Recent Labs     10/07/24  1107   AST 49*   ALT 42*   BILITOT 2.5*   ALKPHOS 141*     No results for input(s): \"INR\" in the last 72 hours.  Recent Labs     10/07/24  1107 10/07/24  1225   TROPHS 23* 24*       Urinalysis:      Lab Results   Component Value Date/Time    NITRU Negative 10/07/2024 11:07 AM    WBCUA 0 10/07/2024 11:07 AM    BACTERIA None Seen 10/07/2024 11:07 AM    RBCUA 2 10/07/2024 11:07 AM    BLOODU Negative 10/07/2024 11:07 AM    GLUCOSEU >=1000 10/07/2024 11:07 AM       Radiology:  Vascular duplex lower extremity venous bilateral   Final Result      XR CHEST PORTABLE   Final Result   Cardiomegaly.      No acute findings             IP CONSULT TO HEART FAILURE NURSE/COORDINATOR  IP CONSULT TO DIETITIAN  IP CONSULT TO CARDIOLOGY    Assessment/Plan:    Active Hospital Problems    Diagnosis     CKD (chronic kidney disease) stage 3, GFR 30-59 ml/min ([facility]) [N18.30]     Acute on chronic systolic CHF (congestive heart failure) ([facility]) [I50.23]     Acute on chronic systolic (congestive) heart failure ([facility]) [I50.23]     ICD (implantable cardioverter-defibrillator) in place [Z95.810]     Chronic atrial fibrillation ([facility]) [I48.20]     Dilated cardiomyopathy (HCC) [I42.0]      Acute

## 2024-10-09 NOTE — PLAN OF CARE
Problem: Chronic Conditions and Co-morbidities  Goal: Patient's chronic conditions and co-morbidity symptoms are monitored and maintained or improved  10/9/2024 1730 by Katie Coto RN  Outcome: Progressing  10/9/2024 0445 by Hari Beltran RN  Outcome: Progressing     Problem: Discharge Planning  Goal: Discharge to home or other facility with appropriate resources  10/9/2024 1730 by Katie Coto RN  Outcome: Progressing  10/9/2024 0445 by Hari Beltran RN  Outcome: Progressing     Problem: Pain  Goal: Verbalizes/displays adequate comfort level or baseline comfort level  10/9/2024 0445 by Hari Beltran RN  Outcome: Progressing

## 2024-10-09 NOTE — CARE COORDINATION
10/08/24 0948   Readmission Assessment   Number of Days since last admission? 8-30 days   Previous Disposition Home Alone   Who is being Interviewed Patient   What was the patient's/caregiver's perception as to why they think they needed to return back to the hospital? Other (Comment)  (I was having pain in my left foot)   Did you visit your Primary Care Physician after you left the hospital, before you returned this time? Yes   Who advised the patient to return to the hospital? Self-referral   In our efforts to provide the best possible care to you and others like you, can you think of anything that we could have done to help you after you left the hospital the first time, so that you might not have needed to return so soon? Other (Comment)

## 2024-10-09 NOTE — CARE COORDINATION
Case Management Assessment  Initial Evaluation    Date/Time of Evaluation: 10/9/2024 2:24 PM  Assessment Completed by: Diogenes Barajas    If patient is discharged prior to next notation, then this note serves as note for discharge by case management.    Patient Name: Manoj Feliz                   YOB: 1960  Diagnosis: Pedal edema [R60.0]  Cellulitis of left lower extremity [L03.116]  Acute on chronic systolic CHF (congestive heart failure) (HCC) [I50.23]  Failure of outpatient treatment [Z78.9]                   Date / Time: 10/7/2024 10:03 AM    Patient Admission Status: Inpatient   Readmission Risk (Low < 19, Mod (19-27), High > 27): Readmission Risk Score: 22.5    Current PCP: Georgina Escalera APRN - CNP  PCP verified by CM? Yes    Chart Reviewed: Yes      History Provided by: Patient  Patient Orientation: Alert and Oriented    Patient Cognition: Alert    Hospitalization in the last 30 days (Readmission):  Yes    If yes, Readmission Assessment in CM Navigator will be completed.    Advance Directives:      Code Status: Full Code   Patient's Primary Decision Maker is: Legal Next of Kin    Primary Decision Maker: Naldo Bauman - Healthcare Decision Maker - [phone]    Discharge Planning:    Patient lives with: Alone Type of Home: House  Primary Care Giver: Self  Patient Support Systems include: Family Members, Friends/Neighbors   Current Financial resources: Medicaid  Current community resources: None  Current services prior to admission: None            Current DME:              Type of Home Care services:  None    ADLS  Prior functional level: Independent in ADLs/IADLs  Current functional level: Assistance with the following:, Bathing, Dressing, Toileting, Mobility    PT AM-PAC:   /24  OT AM-PAC:   /24    Family can provide assistance at DC: No  Would you like Case Management to discuss the discharge plan with any other family members/significant others, and if so, who? No  Plans to  and/or Patient Representative Agree with the Discharge Plan? Yes    Diogenes Barajas  Case Management Department  Ph: [phone]

## 2024-10-09 NOTE — PLAN OF CARE
Problem: Chronic Conditions and Co-morbidities  Goal: Patient's chronic conditions and co-morbidity symptoms are monitored and maintained or improved  10/9/2024 0445 by Hari Beltran RN  Outcome: Progressing  10/8/2024 1730 by Dixie Gonzalez RN  Outcome: Progressing     Problem: Discharge Planning  Goal: Discharge to home or other facility with appropriate resources  Outcome: Progressing     Problem: Pain  Goal: Verbalizes/displays adequate comfort level or baseline comfort level  10/9/2024 0445 by Hari Beltran RN  Outcome: Progressing  10/8/2024 1730 by Dixie Gonzalez RN  Outcome: Progressing

## 2024-10-09 NOTE — PROGRESS NOTES
[facility]  HEART FAILURE  Progress Note      Admit Date 10/7/2024     Reason for Consult:      Reason for Consultation/Chief Complaint: edema    HPI:    Manoj Feliz is a 63 y.o. male with PMH PMH homelessness, NICM, HFrEF, AF, BiV ICD, CKD and urinary retention admitted with leg pain/cellulitis      Subjective:  Patient is being seen for CHF. There were no acute overnight cardiac events.   Today Mr. Feliz is feeling better, leg pain and edema are improving and he denies chest pain, shortness of breath, palpitations, or dizziness  I spent 10 minutes educating the patient on heart failure, medications, lifestyle modifications and dietary guidelines.       Baseline Weight: 190   Wt Readings from Last 3 Encounters:   10/09/24 87.3 kg (192 lb 7.4 oz)   10/03/24 90.7 kg (200 lb)   10/02/24 92 kg (202 lb 12.8 oz)         Cardiac Testing:   ECHO 5/22/2024  Severe global and regional LV systolic dysfunction.  Estimated EF 10-15%.  Severely enlarged LV chamber size with eccentric remodeling.    Increased LV apical trabeculation.  No evidence for LV thrombus with Definity.  Spontaneous echo-contrast observed.  Indeterminate LV diastolic function.    Severe bi-atrial enlargement.  Moderate-severely reduced RV systolic function.  RV size is enlarged.  Device wire is present.  Mild to moderate aortic valve regurgitation.  Severe mitral valve regurgitation.  Severe tricuspid valve regurgitation.  At least moderate pulmonary hypertension based on TR signal obtained.  IVC not visualized.    Mildly dilated proximal ascending thoracic aorta, 4.1 cm.   Cardiac Cath: Dr Johns 2/8/2022  Anatomy:   LM-nml   LAD-nml  Cx-nml  OM- nml  RCA-nml  RPDA- nml  LVEF- 10%  LVG- global hypokinesis  LVEDP- 10     Hemodynamics:  RA- mean 3  RV- 37/0  PAWP- 10  PA- 34/12 (20)     C.O.- 5.7 (4.9)  C.I.- 2.6 (2.25)  PA sat 60%  AO sat 91%     Device: Medtronic BiV ICD with Optivol   Optivol at Bedside: under baseline    NYHA Class  Spironolactone                                         SGLT2i: Jardiance                                ~Device: Medtronic BiV ICD with optivol  3. Hypotension:   ~stable  4. Atrial Fib   ~status: rate controlled   ~Plan: continue Amio, AC, BB    All questions and concerns were addressed to the patient. Alternatives to my treatment were discussed. I have discussed the above stated plan with patient and the nurse. The patient verbalized understanding and agreed with the plan.    I appreciate the opportunity of cooperating in the care of this individual.    KRISTIE COYLE, APRN - CNP, ACNP, AGPCNP 10/9/2024, 10:42 AM  Heart Failure  [facility] [place] [address]  Ph: [phone]

## 2024-10-10 LAB
ANION GAP SERPL CALCULATED.3IONS-SCNC: 8 MMOL/L (ref 3–16)
BASOPHILS # BLD: 0 K/UL (ref 0–0.2)
BASOPHILS NFR BLD: 1 %
BUN SERPL-MCNC: 20 MG/DL (ref 7–20)
CALCIUM SERPL-MCNC: 8.2 MG/DL (ref 8.3–10.6)
CHLORIDE SERPL-SCNC: 100 MMOL/L (ref 99–110)
CO2 SERPL-SCNC: 31 MMOL/L (ref 21–32)
CREAT SERPL-MCNC: 1.3 MG/DL (ref 0.8–1.3)
DEPRECATED RDW RBC AUTO: 17.3 % (ref 12.4–15.4)
EOSINOPHIL # BLD: 0.1 K/UL (ref 0–0.6)
EOSINOPHIL NFR BLD: 1.2 %
GFR SERPLBLD CREATININE-BSD FMLA CKD-EPI: 61 ML/MIN/{1.73_M2}
GLUCOSE SERPL-MCNC: 83 MG/DL (ref 70–99)
HCT VFR BLD AUTO: 38.5 % (ref 40.5–52.5)
HGB BLD-MCNC: 12.3 G/DL (ref 13.5–17.5)
LYMPHOCYTES # BLD: 0.7 K/UL (ref 1–5.1)
LYMPHOCYTES NFR BLD: 16.4 %
MAGNESIUM SERPL-MCNC: 2.42 MG/DL (ref 1.8–2.4)
MCH RBC QN AUTO: 28.4 PG (ref 26–34)
MCHC RBC AUTO-ENTMCNC: 32 G/DL (ref 31–36)
MCV RBC AUTO: 88.6 FL (ref 80–100)
MONOCYTES # BLD: 0.5 K/UL (ref 0–1.3)
MONOCYTES NFR BLD: 10.3 %
NEUTROPHILS # BLD: 3.1 K/UL (ref 1.7–7.7)
NEUTROPHILS NFR BLD: 71.1 %
NT-PROBNP SERPL-MCNC: ABNORMAL PG/ML (ref 0–124)
PLATELET # BLD AUTO: 206 K/UL (ref 135–450)
PMV BLD AUTO: 7.5 FL (ref 5–10.5)
POTASSIUM SERPL-SCNC: 3.7 MMOL/L (ref 3.5–5.1)
RBC # BLD AUTO: 4.35 M/UL (ref 4.2–5.9)
SODIUM SERPL-SCNC: 139 MMOL/L (ref 136–145)
WBC # BLD AUTO: 4.4 K/UL (ref 4–11)

## 2024-10-10 PROCEDURE — 99232 SBSQ HOSP IP/OBS MODERATE 35: CPT | Performed by: NURSE PRACTITIONER

## 2024-10-10 PROCEDURE — 6360000002 HC RX W HCPCS: Performed by: INTERNAL MEDICINE

## 2024-10-10 PROCEDURE — 94760 N-INVAS EAR/PLS OXIMETRY 1: CPT

## 2024-10-10 PROCEDURE — 83735 ASSAY OF MAGNESIUM: CPT

## 2024-10-10 PROCEDURE — 6370000000 HC RX 637 (ALT 250 FOR IP): Performed by: INTERNAL MEDICINE

## 2024-10-10 PROCEDURE — 83880 ASSAY OF NATRIURETIC PEPTIDE: CPT

## 2024-10-10 PROCEDURE — 36415 COLL VENOUS BLD VENIPUNCTURE: CPT

## 2024-10-10 PROCEDURE — 2580000003 HC RX 258: Performed by: INTERNAL MEDICINE

## 2024-10-10 PROCEDURE — 80048 BASIC METABOLIC PNL TOTAL CA: CPT

## 2024-10-10 PROCEDURE — 1200000000 HC SEMI PRIVATE

## 2024-10-10 PROCEDURE — 85025 COMPLETE CBC W/AUTO DIFF WBC: CPT

## 2024-10-10 RX ADMIN — Medication 10 ML: at 21:00

## 2024-10-10 RX ADMIN — APIXABAN 5 MG: 5 TABLET, FILM COATED ORAL at 08:41

## 2024-10-10 RX ADMIN — LISINOPRIL 2.5 MG: 5 TABLET ORAL at 08:40

## 2024-10-10 RX ADMIN — Medication 10 ML: at 08:41

## 2024-10-10 RX ADMIN — ASPIRIN 81 MG: 81 TABLET, CHEWABLE ORAL at 08:41

## 2024-10-10 RX ADMIN — PANTOPRAZOLE SODIUM 40 MG: 40 TABLET, DELAYED RELEASE ORAL at 08:41

## 2024-10-10 RX ADMIN — FINASTERIDE 5 MG: 5 TABLET, FILM COATED ORAL at 08:41

## 2024-10-10 RX ADMIN — METOPROLOL SUCCINATE 12.5 MG: 25 TABLET, FILM COATED, EXTENDED RELEASE ORAL at 08:41

## 2024-10-10 RX ADMIN — FUROSEMIDE 10 MG/HR: 10 INJECTION, SOLUTION INTRAMUSCULAR; INTRAVENOUS at 10:06

## 2024-10-10 RX ADMIN — AMIODARONE HYDROCHLORIDE 200 MG: 200 TABLET ORAL at 08:40

## 2024-10-10 RX ADMIN — ACETAMINOPHEN 650 MG: 325 TABLET ORAL at 21:17

## 2024-10-10 RX ADMIN — APIXABAN 5 MG: 5 TABLET, FILM COATED ORAL at 21:17

## 2024-10-10 RX ADMIN — METOPROLOL SUCCINATE 12.5 MG: 25 TABLET, FILM COATED, EXTENDED RELEASE ORAL at 21:17

## 2024-10-10 ASSESSMENT — PAIN DESCRIPTION - DESCRIPTORS
DESCRIPTORS: ACHING
DESCRIPTORS: ACHING

## 2024-10-10 ASSESSMENT — PAIN SCALES - WONG BAKER
WONGBAKER_NUMERICALRESPONSE: NO HURT

## 2024-10-10 ASSESSMENT — PAIN DESCRIPTION - ORIENTATION
ORIENTATION: LEFT
ORIENTATION: LEFT

## 2024-10-10 ASSESSMENT — PAIN SCALES - GENERAL
PAINLEVEL_OUTOF10: 4
PAINLEVEL_OUTOF10: 8

## 2024-10-10 ASSESSMENT — PAIN DESCRIPTION - LOCATION
LOCATION: LEG
LOCATION: LEG

## 2024-10-10 NOTE — PROGRESS NOTES
Date/Time     10/10/2024 05:51 AM    K 3.7 10/10/2024 05:51 AM    K 3.7 10/07/2024 11:07 AM     10/10/2024 05:51 AM    CO2 31 10/10/2024 05:51 AM    BUN 20 10/10/2024 05:51 AM    CREATININE 1.3 10/10/2024 05:51 AM    GLUCOSE 83 10/10/2024 05:51 AM     INR:   Lab Results   Component Value Date/Time    INR 1.40 03/06/2023 09:13 PM    INR 1.52 03/02/2023 03:21 PM    INR 1.59 07/28/2022 10:50 PM        CARDIAC LABS  ENZYMES:No results for input(s): \"CKMB\", \"CKMBINDEX\", \"TROPONINI\" in the last 72 hours.    Invalid input(s): \"CKTOTAL;3\"  FASTING LIPID PANEL:  Lab Results   Component Value Date/Time    HDL 36 09/25/2024 04:19 AM    TRIG 61 09/25/2024 04:19 AM    TSH 3.97 07/01/2024 12:32 PM    TSH 4.33 06/15/2024 05:16 AM     LIVER PROFILE:  Lab Results   Component Value Date/Time    AST 49 10/07/2024 11:07 AM    AST 48 10/02/2024 02:07 PM    ALT 42 10/07/2024 11:07 AM    ALT 36 10/02/2024 02:07 PM     BNP:   Lab Results   Component Value Date/Time    PROBNP 15,467 10/10/2024 05:51 AM    PROBNP 25,709 10/07/2024 11:07 AM    PROBNP 16,873 10/03/2024 02:32 PM     Iron Studies:    Lab Results   Component Value Date/Time    TIBC 376 09/25/2024 04:19 AM    TIBC 363 04/04/2024 02:24 PM    TIBC 345 02/18/2024 05:08 AM    FERRITIN 224.0 09/25/2024 04:19 AM    FERRITIN 509.8 04/04/2024 02:24 PM    FERRITIN 212.2 02/18/2024 05:08 AM     Lab Results   Component Value Date    IRON 83 09/25/2024    TIBC 376 09/25/2024    FERRITIN 224.0 09/25/2024          1. WEIGHT: Admit Weight - Scale: 89.8 kg (198 lb)      Today  Weight - Scale: 88 kg (194 lb)   2. I/O   Intake/Output Summary (Last 24 hours) at 10/10/2024 0920  Last data filed at 10/10/2024 0840  Gross per 24 hour   Intake 10 ml   Output --   Net 10 ml         Assessment/Plan:     Acute Heart Failure:  ~volume improving  ~ BNP 25k >15k, pt cannot collect urine for I/O  ~Plan:continue lasix gtt today, may be ready for po tomorrow, cathy on hold, Monitor I&O's, Daily  weights, Pt education  Cardiomyopathy: NICM  ~Echo:  EF: 10-15%   Core measures for HF:  ACEi/ARB/ARNI: lisinopril 2.5mg/day  BB: Metoprolol succinate          Damian: Spironolactone                                         SGLT2i: Jardiance                                ~Device: Medtronic BiV ICD with optivol  3. Hypotension:   ~stable  4. Atrial Fib   ~status: rate controlled   ~Plan: continue Amio, AC, BB    All questions and concerns were addressed to the patient. Alternatives to my treatment were discussed. I have discussed the above stated plan with patient and the nurse. The patient verbalized understanding and agreed with the plan.    I appreciate the opportunity of cooperating in the care of this individual.    KRISTIE COYLE, APRN - CNP, ACNP, AGPCNP 10/10/2024, 9:20 AM  Heart Failure  [facility] [place] [address]  Ph: [phone]

## 2024-10-10 NOTE — PLAN OF CARE
Problem: Pain  Goal: Verbalizes/displays adequate comfort level or baseline comfort level  Outcome: Not Progressing     Problem: Chronic Conditions and Co-morbidities  Goal: Patient's chronic conditions and co-morbidity symptoms are monitored and maintained or improved  10/10/2024 0350 by Hari Beltran RN  Outcome: Progressing  10/9/2024 1730 by Katie Coto RN  Outcome: Progressing     Problem: Discharge Planning  Goal: Discharge to home or other facility with appropriate resources  10/10/2024 0350 by Hari Beltran RN  Outcome: Progressing  10/9/2024 1730 by Katie Coto RN  Outcome: Progressing     Problem: Safety - Adult  Goal: Free from fall injury  Outcome: Progressing     Problem: Pain  Goal: Verbalizes/displays adequate comfort level or baseline comfort level  Outcome: Not Progressing

## 2024-10-10 NOTE — PROGRESS NOTES
ASSESSMENT: Completed, VSS! Patient tolerated morning medication. some complaints of pain in LLE. Patient stated pain is mild 2/10, don't need anything for pain, no signs of distress. All questions answered, all needs met. Will continue to monitor care.

## 2024-10-10 NOTE — PLAN OF CARE
Problem: Chronic Conditions and Co-morbidities  Goal: Patient's chronic conditions and co-morbidity symptoms are monitored and maintained or improved  10/10/2024 1506 by Alexandra Berkowitz RN  Outcome: Progressing  10/10/2024 0350 by Hari Beltran RN  Outcome: Progressing     Problem: Discharge Planning  Goal: Discharge to home or other facility with appropriate resources  10/10/2024 1506 by Alexandra Berkowitz RN  Outcome: Progressing  10/10/2024 0350 by Hari Beltran RN  Outcome: Progressing     Problem: Pain  Goal: Verbalizes/displays adequate comfort level or baseline comfort level  10/10/2024 1506 by Alexandra Berkowitz RN  Outcome: Progressing  10/10/2024 0350 by Hari Beltran RN  Outcome: Not Progressing     Problem: Safety - Adult  Goal: Free from fall injury  10/10/2024 1506 by Alexandra Berkowitz RN  Outcome: Progressing  10/10/2024 0350 by Hari Beltran RN  Outcome: Progressing     Problem: Pain  Goal: Verbalizes/displays adequate comfort level or baseline comfort level  10/10/2024 1506 by Alexandra Berkowitz RN  Outcome: Progressing  10/10/2024 0350 by Hari Beltran RN  Outcome: Not Progressing

## 2024-10-11 VITALS
SYSTOLIC BLOOD PRESSURE: 112 MMHG | OXYGEN SATURATION: 99 % | TEMPERATURE: 97.3 F | BODY MASS INDEX: 24.79 KG/M2 | RESPIRATION RATE: 18 BRPM | HEART RATE: 79 BPM | WEIGHT: 193.2 LBS | HEIGHT: 74 IN | DIASTOLIC BLOOD PRESSURE: 82 MMHG

## 2024-10-11 LAB
ANION GAP SERPL CALCULATED.3IONS-SCNC: 9 MMOL/L (ref 3–16)
BASOPHILS # BLD: 0 K/UL (ref 0–0.2)
BASOPHILS NFR BLD: 1 %
BUN SERPL-MCNC: 20 MG/DL (ref 7–20)
CALCIUM SERPL-MCNC: 8.4 MG/DL (ref 8.3–10.6)
CHLORIDE SERPL-SCNC: 101 MMOL/L (ref 99–110)
CO2 SERPL-SCNC: 32 MMOL/L (ref 21–32)
CREAT SERPL-MCNC: 1.4 MG/DL (ref 0.8–1.3)
DEPRECATED RDW RBC AUTO: 17.4 % (ref 12.4–15.4)
EOSINOPHIL # BLD: 0 K/UL (ref 0–0.6)
EOSINOPHIL NFR BLD: 0.9 %
GFR SERPLBLD CREATININE-BSD FMLA CKD-EPI: 55 ML/MIN/{1.73_M2}
GLUCOSE SERPL-MCNC: 88 MG/DL (ref 70–99)
HCT VFR BLD AUTO: 37.3 % (ref 40.5–52.5)
HGB BLD-MCNC: 12.5 G/DL (ref 13.5–17.5)
LYMPHOCYTES # BLD: 0.7 K/UL (ref 1–5.1)
LYMPHOCYTES NFR BLD: 15.2 %
MAGNESIUM SERPL-MCNC: 2.45 MG/DL (ref 1.8–2.4)
MCH RBC QN AUTO: 29.3 PG (ref 26–34)
MCHC RBC AUTO-ENTMCNC: 33.5 G/DL (ref 31–36)
MCV RBC AUTO: 87.4 FL (ref 80–100)
MONOCYTES # BLD: 0.4 K/UL (ref 0–1.3)
MONOCYTES NFR BLD: 9 %
NEUTROPHILS # BLD: 3.3 K/UL (ref 1.7–7.7)
NEUTROPHILS NFR BLD: 73.9 %
PLATELET # BLD AUTO: 235 K/UL (ref 135–450)
PMV BLD AUTO: 7.5 FL (ref 5–10.5)
POTASSIUM SERPL-SCNC: 3.9 MMOL/L (ref 3.5–5.1)
RBC # BLD AUTO: 4.26 M/UL (ref 4.2–5.9)
SODIUM SERPL-SCNC: 142 MMOL/L (ref 136–145)
WBC # BLD AUTO: 4.4 K/UL (ref 4–11)

## 2024-10-11 PROCEDURE — 2580000003 HC RX 258: Performed by: INTERNAL MEDICINE

## 2024-10-11 PROCEDURE — 80048 BASIC METABOLIC PNL TOTAL CA: CPT

## 2024-10-11 PROCEDURE — 36415 COLL VENOUS BLD VENIPUNCTURE: CPT

## 2024-10-11 PROCEDURE — 6370000000 HC RX 637 (ALT 250 FOR IP): Performed by: NURSE PRACTITIONER

## 2024-10-11 PROCEDURE — 6370000000 HC RX 637 (ALT 250 FOR IP): Performed by: INTERNAL MEDICINE

## 2024-10-11 PROCEDURE — 83735 ASSAY OF MAGNESIUM: CPT

## 2024-10-11 PROCEDURE — 85025 COMPLETE CBC W/AUTO DIFF WBC: CPT

## 2024-10-11 PROCEDURE — 99232 SBSQ HOSP IP/OBS MODERATE 35: CPT | Performed by: NURSE PRACTITIONER

## 2024-10-11 RX ORDER — TORSEMIDE 20 MG/1
40 TABLET ORAL 2 TIMES DAILY
Status: DISCONTINUED | OUTPATIENT
Start: 2024-10-11 | End: 2024-10-11 | Stop reason: HOSPADM

## 2024-10-11 RX ORDER — SPIRONOLACTONE 25 MG/1
25 TABLET ORAL DAILY
Status: DISCONTINUED | OUTPATIENT
Start: 2024-10-12 | End: 2024-10-11 | Stop reason: HOSPADM

## 2024-10-11 RX ADMIN — TORSEMIDE 40 MG: 20 TABLET ORAL at 16:42

## 2024-10-11 RX ADMIN — ASPIRIN 81 MG: 81 TABLET, CHEWABLE ORAL at 09:43

## 2024-10-11 RX ADMIN — APIXABAN 5 MG: 5 TABLET, FILM COATED ORAL at 09:43

## 2024-10-11 RX ADMIN — AMIODARONE HYDROCHLORIDE 200 MG: 200 TABLET ORAL at 09:44

## 2024-10-11 RX ADMIN — PANTOPRAZOLE SODIUM 40 MG: 40 TABLET, DELAYED RELEASE ORAL at 09:43

## 2024-10-11 RX ADMIN — METOPROLOL SUCCINATE 12.5 MG: 25 TABLET, FILM COATED, EXTENDED RELEASE ORAL at 09:43

## 2024-10-11 RX ADMIN — FINASTERIDE 5 MG: 5 TABLET, FILM COATED ORAL at 09:43

## 2024-10-11 RX ADMIN — LISINOPRIL 2.5 MG: 5 TABLET ORAL at 09:44

## 2024-10-11 RX ADMIN — Medication 10 ML: at 09:44

## 2024-10-11 ASSESSMENT — PAIN SCALES - WONG BAKER
WONGBAKER_NUMERICALRESPONSE: NO HURT

## 2024-10-11 NOTE — DISCHARGE SUMMARY
Hospital Medicine Discharge Summary    Patient: Manoj Feliz     Gender: male  : 1960   Age: 63 y.o.  MRN: [id number]    Admitting Physician: Bill Nunez MD  Discharge Physician: Bill Nunez MD     Code Status: Full Code     Admit Date: 10/7/2024   Discharge Date:   10/11/24    Disposition:  Home    Discharge Diagnoses:    Active Hospital Problems    Diagnosis Date Noted    Pedal edema [R60.0] 2022     Priority: Medium    CKD (chronic kidney disease) stage 3, GFR 30-59 ml/min ([facility]) [N18.30] 10/07/2024    Acute on chronic systolic CHF (congestive heart failure) ([facility]) [I50.23] 10/07/2024    Acute on chronic systolic (congestive) heart failure (HCC) [I50.23] 06/15/2024    ICD (implantable cardioverter-defibrillator) in place [Z95.810] 2022    Chronic atrial fibrillation ([facility]) [I48.20]     Dilated cardiomyopathy ([facility]) [I42.0]        Follow-up appointments:  one week    Outpatient to do list: F/U with PCP and Cardio    Condition at Discharge:  Stable    Hospital Course:   63 y.o. 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 Patient with progressing LE edema despite increase in diuretics. Admitted as inpatient for acute on chronic systolic CHF. Started on Lasix gtt. Cardiology consulted.  He never collected urine output.  Had significant improvement in LE edema.  Will resume home Torsemide and f/u with Cardio in office.     Discharge Medications:   Current Discharge Medication List        Current Discharge Medication List        Current Discharge Medication List        CONTINUE these medications which have NOT CHANGED    Details   torsemide (DEMADEX) 20 MG tablet 40 mg twice a day  Qty: 360 tablet, Refills: 1      aspirin 81 MG chewable tablet Take 1 tablet by mouth daily  Qty: 90 tablet, Refills: 2      spironolactone (ALDACTONE)  SINUSES: The visualized paranasal sinuses and mastoid air cells demonstrate no acute abnormality. SOFT TISSUES/SKULL:  No acute abnormality of the visualized skull or soft tissues.     No acute intracranial abnormality. No change from prior exam.       The patient was seen and examined on day of discharge and this discharge summary is in conjunction with any daily progress note from day of discharge.Time Spent on discharge is 35 minutes in the examination, evaluation, counseling and review of medications and discharge plan.      Note that more than 30 minutes was spent in preparing discharge papers, discussing discharge with patient, medication review, etc.       Signed:    Bill Nunez MD   10/11/2024      Thank you Georgina Escalera APRN - CNP for the opportunity to be involved in this patient's care. If you have any questions or concerns please feel free to contact me at [facility], [facility]

## 2024-10-11 NOTE — PROGRESS NOTES
Hospitalist Progress Note      PCP: Georgina Escalera, APRN - CNP    Date of Admission: 10/7/2024    Chief Complaint: Leg pain    Hospital Course:  63 y.o. 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  Patient with progressing LE edema despite increase in diuretics. Admitted as inpatient for acute on chronic systolic CHF.  Started on Lasix gtt.  Cardiology consulted.     Subjective:  Patient with improving LLE edema.  No CP, SOB, HA or abdominal pain.       Medications:  Reviewed    Infusion Medications    sodium chloride      furosemide 10 mg/hr (10/10/24 1006)     Scheduled Medications    amiodarone  200 mg Oral Daily    apixaban  5 mg Oral BID    aspirin  81 mg Oral Daily    finasteride  5 mg Oral Daily    metoprolol succinate  12.5 mg Oral BID    lisinopril  2.5 mg Oral Daily    pantoprazole  40 mg Oral Daily    sodium chloride flush  5-40 mL IntraVENous 2 times per day     PRN Meds: albuterol sulfate HFA, sodium chloride flush, sodium chloride, potassium chloride **OR** potassium alternative oral replacement **OR** potassium chloride, magnesium sulfate, [Held by provider] ondansetron **OR** [Held by provider] ondansetron, polyethylene glycol, acetaminophen **OR** acetaminophen      Intake/Output Summary (Last 24 hours) at 10/10/2024 7715  Last data filed at 10/10/2024 1223  Gross per 24 hour   Intake 370 ml   Output --   Net 370 ml         Physical Exam Performed:    /76   Pulse 76   Temp 97.8 °F (36.6 °C) (Oral)   Resp 16   Ht 1.88 m (6' 2\")   Wt 88 kg (194 lb)   SpO2 95%   BMI 24.91 kg/m²     General appearance:  Appears comfortable. Well nourished  Eyes: Sclera clear, pupils equal  ENT: Moist mucus membranes, no thrush. Trachea midline.  Cardiovascular: Regular rhythm, normal S1, S2. No murmur, gallop, rub. 1+ BL LE  edema.  L

## 2024-10-11 NOTE — PROGRESS NOTES
[facility]  HEART FAILURE  Progress Note      Admit Date 10/7/2024     Reason for Consult:      Reason for Consultation/Chief Complaint: edema    HPI:    Manoj Feliz is a 63 y.o. male with PMH PMH homelessness, NICM, HFrEF, AF, BiV ICD, CKD and urinary retention admitted with leg pain/cellulitis      Subjective:  Patient is being seen for CHF. There were no acute overnight cardiac events.   Today Mr. Feliz is feeling better, leg pain and edema are improving and he denies chest pain, shortness of breath, palpitations, or dizziness        Baseline Weight: 190   Wt Readings from Last 3 Encounters:   10/11/24 87.6 kg (193 lb 3.2 oz)   10/03/24 90.7 kg (200 lb)   10/02/24 92 kg (202 lb 12.8 oz)         Cardiac Testing:   ECHO 5/22/2024  Severe global and regional LV systolic dysfunction.  Estimated EF 10-15%.  Severely enlarged LV chamber size with eccentric remodeling.    Increased LV apical trabeculation.  No evidence for LV thrombus with Definity.  Spontaneous echo-contrast observed.  Indeterminate LV diastolic function.    Severe bi-atrial enlargement.  Moderate-severely reduced RV systolic function.  RV size is enlarged.  Device wire is present.  Mild to moderate aortic valve regurgitation.  Severe mitral valve regurgitation.  Severe tricuspid valve regurgitation.  At least moderate pulmonary hypertension based on TR signal obtained.  IVC not visualized.    Mildly dilated proximal ascending thoracic aorta, 4.1 cm.   Cardiac Cath: Dr Johns 2/8/2022  Anatomy:   LM-nml   LAD-nml  Cx-nml  OM- nml  RCA-nml  RPDA- nml  LVEF- 10%  LVG- global hypokinesis  LVEDP- 10     Hemodynamics:  RA- mean 3  RV- 37/0  PAWP- 10  PA- 34/12 (20)     C.O.- 5.7 (4.9)  C.I.- 2.6 (2.25)  PA sat 60%  AO sat 91%     Device: Medtronic BiV ICD with Optivol   Optivol at Bedside: under baseline    NYHA Class III    Objective:   /83   Pulse 84   Temp 97.6 °F (36.4 °C) (Oral)   Resp 16   Ht 1.88 m (6' 2\")   Wt 87.6 kg  (193 lb 3.2 oz)   SpO2 99%   BMI 24.81 kg/m²     Intake/Output Summary (Last 24 hours) at 10/11/2024 0921  Last data filed at 10/10/2024 1223  Gross per 24 hour   Intake 360 ml   Output --   Net 360 ml      In: 370 [P.O.:360; I.V.:10]  Out: -       Physical Exam:  General Appearance:  Well Nourished  Respiratory:  Resp Auscultation: Normal breath sounds without dullness  Cardiovascular:  Auscultation: Regular rate and rhythm, normal S1S2, no m/g/r/c  Palpation: Normal    Pedal Pulses: 2+ and equal   Abdomen:  Soft, NT, ND, + bs  Extremities:  No Cyanosis or Clubbing  Extremities: 1+ edema left, erythema, trace right  Neurological/Psychiatric:  Oriented to time, place, and person  Non-anxious    Pertinent labs, diagnostic, device, and imaging results reviewed as a part of this visit    MEDICATIONS:   Scheduled Meds:   Scheduled Meds:   torsemide  40 mg Oral BID    amiodarone  200 mg Oral Daily    apixaban  5 mg Oral BID    aspirin  81 mg Oral Daily    finasteride  5 mg Oral Daily    metoprolol succinate  12.5 mg Oral BID    lisinopril  2.5 mg Oral Daily    pantoprazole  40 mg Oral Daily    sodium chloride flush  5-40 mL IntraVENous 2 times per day     Continuous Infusions:   sodium chloride       PRN Meds:.albuterol sulfate HFA, sodium chloride flush, sodium chloride, potassium chloride **OR** potassium alternative oral replacement **OR** potassium chloride, magnesium sulfate, [Held by provider] ondansetron **OR** [Held by provider] ondansetron, polyethylene glycol, acetaminophen **OR** acetaminophen  Continuous Infusions:   sodium chloride         Intake/Output Summary (Last 24 hours) at 10/11/2024 0921  Last data filed at 10/10/2024 1223  Gross per 24 hour   Intake 360 ml   Output --   Net 360 ml       Lab Data:  CBC:   Lab Results   Component Value Date/Time    WBC 4.4 10/11/2024 06:27 AM    HGB 12.5 10/11/2024 06:27 AM     10/11/2024 06:27 AM     BMP:  Lab Results   Component Value Date/Time

## 2024-10-11 NOTE — CARE COORDINATION
Case Management -  Discharge Note      Patient Name: Manoj Feliz                   YOB: 1960            Readmission Risk (Low < 19, Mod (19-27), High > 27): Readmission Risk Score: 22.3    Current PCP: Georgina Escalera APRN - CNP      (IMM) Important Message from Medicare:    Has pt received appropriate IMM before discharge if required: yes  Date: 10/11/2024    PT AM-PAC:   /24  OT AM-PAC:   /24        Financial    Pay[place] / Plan: [place] / Product Type: *No Product type* /     Pharmacy:  Potential assistance Purchasing Medications: No  Meds-to-Beds request: Yes      [facility] - [place], [place] - [address] - P [phone] - F [phone]  [address] [facility] [address]  Phone: [phone] Fax: [phone]    [facility] [id number] - [facility] [address] [facility] -  [phone] - F [phone]  [address] [place] [address]  Phone: [phone] Fax: [phone]      Notes:    Additional Case Management Notes: Patient discharged 10/11/2024 to home.  All discharge needs met per case management       Stacie Ramsey RN, BSN  [phone]

## 2024-10-11 NOTE — PLAN OF CARE
Problem: Chronic Conditions and Co-morbidities  Goal: Patient's chronic conditions and co-morbidity symptoms are monitored and maintained or improved  10/11/2024 0358 by Hari Beltran RN  Outcome: Progressing  10/10/2024 1506 by Alexandra Berkowitz RN  Outcome: Progressing     Problem: Discharge Planning  Goal: Discharge to home or other facility with appropriate resources  10/11/2024 0358 by Hari Beltran RN  Outcome: Progressing  10/10/2024 1506 by Alexandra Berkowitz RN  Outcome: Progressing     Problem: Pain  Goal: Verbalizes/displays adequate comfort level or baseline comfort level  10/11/2024 0358 by Hari Beltran RN  Outcome: Progressing  10/10/2024 1506 by Alexandra Berkowitz RN  Outcome: Progressing     Problem: Safety - Adult  Goal: Free from fall injury  10/11/2024 0358 by Hari Beltran RN  Outcome: Progressing  10/10/2024 1506 by Alexandra Berkowitz RN  Outcome: Progressing

## 2024-10-11 NOTE — CARE COORDINATION
10/11/24 5840   IMM Letter   IMM Letter given to Patient/Family/Significant other/Guardian/POA/by: Stacie Ramsey RN CM   IMM Letter date given: 10/11/24   IMM Letter time given: 9015  (copy made for hard chart)

## 2024-10-11 NOTE — PROGRESS NOTES
Pt has been discharged at this time.  Medication eduation has been given.  Pt verbalizes that he understands,  pt denies any complaints or discomfort at this time.

## 2024-10-14 ENCOUNTER — TELEPHONE (OUTPATIENT)
Dept: PRIMARY CARE CLINIC | Age: 64
End: 2024-10-14

## 2024-10-14 NOTE — TELEPHONE ENCOUNTER
Care Transitions Initial Follow Up Call    Outreach made within 2 business days of discharge: Yes    Patient: Manoj Feliz Patient : 1960   MRN: [id number]  Reason for Admission:   Discharge Date: 10/11/24       Spoke with: Pt    Discharge department/facility: [facility]    TCM Interactive Patient Contact:  Was patient able to fill all prescriptions: Yes  Was patient instructed to bring all medications to the follow-up visit: Yes  Is patient taking all medications as directed in the discharge summary? Yes  Does patient understand their discharge instructions: Yes  Does patient have questions or concerns that need addressed prior to 7-14 day follow up office visit: no      Additional needs identified to be addressed with provider           Scheduled appointment with PCP within 7-14 days    Follow Up  Future Appointments   Date Time Provider Department Center   10/16/2024  3:30 PM Deirdre Das APRN - CNS FF Cardio [facility]   10/24/2024  1:00 PM Georgina Escalera APRN - CNP [place] [facility]       Fanny Sinha MA

## 2024-10-14 NOTE — TELEPHONE ENCOUNTER
Patient was instructed by Dr. Nunez to quit taking the amoxicillin-clavulanate and the sulfamethoxazole-trimethoprim.

## 2024-10-14 NOTE — TELEPHONE ENCOUNTER
Care Transitions Initial Follow Up Call    Outreach made within 2 business days of discharge: Yes    Patient: Manoj Feliz Patient : 1960   MRN: [id number]  Reason for Admission: Edema of lower limb  Discharge Date: 10/11/24       Spoke with: Patient    Discharge department/facility: [facility] Interactive Patient Contact:  Was patient able to fill all prescriptions: Yes  Was patient instructed to bring all medications to the follow-up visit: Yes  Is patient taking all medications as directed in the discharge summary? Yes  Does patient understand their discharge instructions: Yes  Does patient have questions or concerns that need addressed prior to 7-14 day follow up office visit: no    Additional needs identified to be addressed with provider  Has a cough where he coughs up some fluid, was told it may be the fluid coming out, was given an inhaler which has helped a little; no chest pains, no dizziness             Scheduled appointment with PCP within 7-14 days    Follow Up  Future Appointments   Date Time Provider Department Center   10/16/2024  3:30 PM Deirdre Das, APRN - CNS FF Cardio [facility]       Rossy Livingston MA

## 2024-10-16 ENCOUNTER — HOSPITAL ENCOUNTER (OUTPATIENT)
Age: 64
Discharge: HOME | End: 2024-10-16
Payer: MEDICAID

## 2024-10-16 ENCOUNTER — OFFICE VISIT (OUTPATIENT)
Dept: CARDIOLOGY CLINIC | Age: 64
End: 2024-10-16
Payer: MEDICAID

## 2024-10-16 VITALS
HEART RATE: 55 BPM | OXYGEN SATURATION: 98 % | SYSTOLIC BLOOD PRESSURE: 116 MMHG | BODY MASS INDEX: 25.15 KG/M2 | HEIGHT: 74 IN | DIASTOLIC BLOOD PRESSURE: 82 MMHG | WEIGHT: 196 LBS

## 2024-10-16 DIAGNOSIS — I50.22 CHRONIC SYSTOLIC (CONGESTIVE) HEART FAILURE (HCC): ICD-10-CM

## 2024-10-16 DIAGNOSIS — E78.5 HYPERLIPIDEMIA, UNSPECIFIED HYPERLIPIDEMIA TYPE: ICD-10-CM

## 2024-10-16 DIAGNOSIS — I48.0 PAF (PAROXYSMAL ATRIAL FIBRILLATION) (HCC): ICD-10-CM

## 2024-10-16 DIAGNOSIS — I42.0 DILATED CARDIOMYOPATHY (HCC): ICD-10-CM

## 2024-10-16 DIAGNOSIS — Z59.00 HOMELESS: ICD-10-CM

## 2024-10-16 DIAGNOSIS — Z95.810 ICD (IMPLANTABLE CARDIOVERTER-DEFIBRILLATOR) IN PLACE: ICD-10-CM

## 2024-10-16 DIAGNOSIS — I50.22 CHRONIC SYSTOLIC (CONGESTIVE) HEART FAILURE (HCC): Primary | ICD-10-CM

## 2024-10-16 DIAGNOSIS — I50.82 BIVENTRICULAR HEART FAILURE (HCC): ICD-10-CM

## 2024-10-16 LAB
ANION GAP SERPL CALCULATED.3IONS-SCNC: 13 MMOL/L (ref 3–16)
BUN SERPL-MCNC: 27 MG/DL (ref 7–20)
CALCIUM SERPL-MCNC: 9.1 MG/DL (ref 8.3–10.6)
CHLORIDE SERPL-SCNC: 97 MMOL/L (ref 99–110)
CO2 SERPL-SCNC: 28 MMOL/L (ref 21–32)
CREAT SERPL-MCNC: 1.6 MG/DL (ref 0.8–1.3)
GFR SERPLBLD CREATININE-BSD FMLA CKD-EPI: 47 ML/MIN/{1.73_M2}
GLUCOSE SERPL-MCNC: 73 MG/DL (ref 70–99)
NT-PROBNP SERPL-MCNC: ABNORMAL PG/ML (ref 0–124)
POTASSIUM SERPL-SCNC: 4.4 MMOL/L (ref 3.5–5.1)
SODIUM SERPL-SCNC: 138 MMOL/L (ref 136–145)

## 2024-10-16 PROCEDURE — 3074F SYST BP LT 130 MM HG: CPT | Performed by: CLINICAL NURSE SPECIALIST

## 2024-10-16 PROCEDURE — 83880 ASSAY OF NATRIURETIC PEPTIDE: CPT

## 2024-10-16 PROCEDURE — 36415 COLL VENOUS BLD VENIPUNCTURE: CPT

## 2024-10-16 PROCEDURE — 3079F DIAST BP 80-89 MM HG: CPT | Performed by: CLINICAL NURSE SPECIALIST

## 2024-10-16 PROCEDURE — 99215 OFFICE O/P EST HI 40 MIN: CPT | Performed by: CLINICAL NURSE SPECIALIST

## 2024-10-16 PROCEDURE — 1123F ACP DISCUSS/DSCN MKR DOCD: CPT | Performed by: CLINICAL NURSE SPECIALIST

## 2024-10-16 PROCEDURE — 80048 BASIC METABOLIC PNL TOTAL CA: CPT

## 2024-10-16 RX ORDER — SPIRONOLACTONE 25 MG/1
25 TABLET ORAL DAILY
Qty: 90 TABLET | Refills: 0
Start: 2024-10-16

## 2024-10-16 SDOH — ECONOMIC STABILITY - HOUSING INSECURITY: HOMELESSNESS UNSPECIFIED: Z59.00

## 2024-10-16 NOTE — PROGRESS NOTES
Home Medications:  Prior to Admission medications    Medication Sig Start Date End Date Taking? Authorizing Provider   spironolactone (ALDACTONE) 25 MG tablet Take 1 tablet by mouth daily 10/16/24  Yes Deirdre Das APRN - CNS   torsemide (DEMADEX) 20 MG tablet 40 mg twice a day  Patient taking differently: Take 2 tablets by mouth 2 times daily 10/3/24  Yes Deirdre Das APRN - CNS   aspirin 81 MG chewable tablet Take 1 tablet by mouth daily 9/26/24  Yes Deirdre Das APRN - CNS   amiodarone (CORDARONE) 200 MG tablet TAKE ONE TABLET BY MOUTH ONCE A DAY 9/25/24  Yes Dago Preston APRN - CNP   finasteride (PROSCAR) 5 MG tablet Take 1 tablet by mouth daily 7/22/24  Yes Deirdre Das APRN - CNS   lisinopril (PRINIVIL;ZESTRIL) 2.5 MG tablet Take 1 tablet by mouth daily 7/20/24  Yes Selene Gimenez APRN - CNP   pantoprazole (PROTONIX) 40 MG tablet Take 1 tablet by mouth daily 6/21/24  Yes Marylu Herron APRN - CNP   albuterol sulfate HFA (VENTOLIN HFA) 108 (90 Base) MCG/ACT inhaler Inhale 2 puffs into the lungs every 6 hours as needed for Wheezing 6/21/24  Yes Marylu Herron APRN - CNP   apixaban (ELIQUIS) 5 MG TABS tablet Take 1 tablet by mouth 2 times daily 5/24/24  Yes Daog Preston APRN - CNP   empagliflozin (JARDIANCE) 10 MG tablet Take 1 tablet by mouth daily 5/22/24  Yes Deirdre Das APRN - CNS   metoprolol succinate (TOPROL XL) 25 MG extended release tablet Take 0.5 tablets by mouth in the morning and at bedtime 5/22/24  Yes Deirdre Das APRN - CNS   atorvastatin (LIPITOR) 40 MG tablet Take 1 tablet by mouth nightly 5/22/24  Yes Deirdre Das APRN - CNS   potassium chloride (KLOR-CON M) 20 MEQ extended release tablet Take 1 tablet by mouth in the morning and 1 tablet in the evening. Take with meals. 8/1/22 9/8/22  Nadeem Marks MD        Allergies:  Patient has no known allergies.     Review of Systems:   Constitutional: there has been no unanticipated

## 2024-10-16 NOTE — PATIENT INSTRUCTIONS
Take all your medications including the spironolactone  Blood work today  Continue all current medications  10/25 at 930 am  If not better, may consider dose of metolazone

## 2024-10-17 ENCOUNTER — TELEPHONE (OUTPATIENT)
Dept: CARDIOLOGY CLINIC | Age: 64
End: 2024-10-17

## 2024-10-17 RX ORDER — METOLAZONE 5 MG/1
5 TABLET ORAL ONCE
Qty: 1 TABLET | Refills: 0 | Status: SHIPPED | OUTPATIENT
Start: 2024-10-17 | End: 2024-10-17

## 2024-10-17 NOTE — TELEPHONE ENCOUNTER
----- Message from TRENT Luong - CNS sent at 10/17/2024  8:33 AM EDT -----  His fluid level is very elevated   I want him to take a 5 mg metolazone 30 minutes prior to his torsemide on Friday, just once  I sent it to the [facility] as we talked yesterday  He is suppose to weigh himself every day  Thanks      Spoke to patient's friend Rickey he verbalized understanding.

## 2024-10-21 ENCOUNTER — TELEPHONE (OUTPATIENT)
Dept: CARDIOLOGY CLINIC | Age: 64
End: 2024-10-21

## 2024-10-21 DIAGNOSIS — I50.22 CHRONIC SYSTOLIC (CONGESTIVE) HEART FAILURE (HCC): Primary | ICD-10-CM

## 2024-10-21 NOTE — TELEPHONE ENCOUNTER
Tried to reach patient's friend Rickey JAXON about new labs and no changes in medication. Asked him to call with any questions or concerns.

## 2024-10-21 NOTE — TELEPHONE ENCOUNTER
Tried to reach Rickey JAXON to return our call. 180 is his current weight, will he keep losing weight? He is very concerned about his weight loss, He is doing everything he was instructed to do.

## 2024-10-23 DIAGNOSIS — I50.22 CHRONIC SYSTOLIC (CONGESTIVE) HEART FAILURE (HCC): ICD-10-CM

## 2024-10-24 ENCOUNTER — OFFICE VISIT (OUTPATIENT)
Dept: PRIMARY CARE CLINIC | Age: 64
End: 2024-10-24

## 2024-10-24 VITALS
SYSTOLIC BLOOD PRESSURE: 102 MMHG | HEIGHT: 74 IN | HEART RATE: 77 BPM | BODY MASS INDEX: 23.2 KG/M2 | WEIGHT: 180.8 LBS | OXYGEN SATURATION: 99 % | DIASTOLIC BLOOD PRESSURE: 62 MMHG | TEMPERATURE: 97.9 F

## 2024-10-24 DIAGNOSIS — I42.0 DILATED CARDIOMYOPATHY (HCC): ICD-10-CM

## 2024-10-24 DIAGNOSIS — R73.03 PREDIABETES: ICD-10-CM

## 2024-10-24 DIAGNOSIS — Z09 HOSPITAL DISCHARGE FOLLOW-UP: Primary | ICD-10-CM

## 2024-10-24 DIAGNOSIS — I50.20 HFREF (HEART FAILURE WITH REDUCED EJECTION FRACTION) (HCC): ICD-10-CM

## 2024-10-24 DIAGNOSIS — I10 BENIGN ESSENTIAL HTN: ICD-10-CM

## 2024-10-24 DIAGNOSIS — E55.9 VITAMIN D DEFICIENCY: ICD-10-CM

## 2024-10-24 LAB
ALBUMIN SERPL-MCNC: 4.2 G/DL (ref 3.4–5)
ALBUMIN/GLOB SERPL: 1.3 {RATIO} (ref 1.1–2.2)
ALP SERPL-CCNC: 172 U/L (ref 40–129)
ALT SERPL-CCNC: 35 U/L (ref 10–40)
ANION GAP SERPL CALCULATED.3IONS-SCNC: 11 MMOL/L (ref 3–16)
AST SERPL-CCNC: 40 U/L (ref 15–37)
BILIRUB SERPL-MCNC: 2 MG/DL (ref 0–1)
BUN SERPL-MCNC: 25 MG/DL (ref 7–20)
CALCIUM SERPL-MCNC: 9.2 MG/DL (ref 8.3–10.6)
CHLORIDE SERPL-SCNC: 90 MMOL/L (ref 99–110)
CO2 SERPL-SCNC: 34 MMOL/L (ref 21–32)
CREAT SERPL-MCNC: 1.3 MG/DL (ref 0.8–1.3)
GFR SERPLBLD CREATININE-BSD FMLA CKD-EPI: 61 ML/MIN/{1.73_M2}
GLUCOSE SERPL-MCNC: 81 MG/DL (ref 70–99)
NT-PROBNP SERPL-MCNC: 8975 PG/ML (ref 0–124)
POTASSIUM SERPL-SCNC: 3.4 MMOL/L (ref 3.5–5.1)
PROT SERPL-MCNC: 7.5 G/DL (ref 6.4–8.2)
SODIUM SERPL-SCNC: 135 MMOL/L (ref 136–145)

## 2024-10-24 RX ORDER — ERGOCALCIFEROL 1.25 MG/1
50000 CAPSULE, LIQUID FILLED ORAL WEEKLY
Qty: 12 CAPSULE | Refills: 3 | Status: SHIPPED | OUTPATIENT
Start: 2024-10-24 | End: 2025-10-24

## 2024-10-24 ASSESSMENT — PATIENT HEALTH QUESTIONNAIRE - PHQ9
3. TROUBLE FALLING OR STAYING ASLEEP: NOT AT ALL
9. THOUGHTS THAT YOU WOULD BE BETTER OFF DEAD, OR OF HURTING YOURSELF: NOT AT ALL
SUM OF ALL RESPONSES TO PHQ QUESTIONS 1-9: 5
2. FEELING DOWN, DEPRESSED OR HOPELESS: SEVERAL DAYS
1. LITTLE INTEREST OR PLEASURE IN DOING THINGS: SEVERAL DAYS
SUM OF ALL RESPONSES TO PHQ9 QUESTIONS 1 & 2: 2
7. TROUBLE CONCENTRATING ON THINGS, SUCH AS READING THE NEWSPAPER OR WATCHING TELEVISION: NOT AT ALL
SUM OF ALL RESPONSES TO PHQ QUESTIONS 1-9: 5
8. MOVING OR SPEAKING SO SLOWLY THAT OTHER PEOPLE COULD HAVE NOTICED. OR THE OPPOSITE, BEING SO FIGETY OR RESTLESS THAT YOU HAVE BEEN MOVING AROUND A LOT MORE THAN USUAL: NOT AT ALL
10. IF YOU CHECKED OFF ANY PROBLEMS, HOW DIFFICULT HAVE THESE PROBLEMS MADE IT FOR YOU TO DO YOUR WORK, TAKE CARE OF THINGS AT HOME, OR GET ALONG WITH OTHER PEOPLE: NOT DIFFICULT AT ALL
4. FEELING TIRED OR HAVING LITTLE ENERGY: NEARLY EVERY DAY
6. FEELING BAD ABOUT YOURSELF - OR THAT YOU ARE A FAILURE OR HAVE LET YOURSELF OR YOUR FAMILY DOWN: NOT AT ALL
5. POOR APPETITE OR OVEREATING: NOT AT ALL
SUM OF ALL RESPONSES TO PHQ QUESTIONS 1-9: 5
SUM OF ALL RESPONSES TO PHQ QUESTIONS 1-9: 5

## 2024-10-24 ASSESSMENT — ANXIETY QUESTIONNAIRES
1. FEELING NERVOUS, ANXIOUS, OR ON EDGE: SEVERAL DAYS
4. TROUBLE RELAXING: SEVERAL DAYS
6. BECOMING EASILY ANNOYED OR IRRITABLE: NOT AT ALL
IF YOU CHECKED OFF ANY PROBLEMS ON THIS QUESTIONNAIRE, HOW DIFFICULT HAVE THESE PROBLEMS MADE IT FOR YOU TO DO YOUR WORK, TAKE CARE OF THINGS AT HOME, OR GET ALONG WITH OTHER PEOPLE: SOMEWHAT DIFFICULT
7. FEELING AFRAID AS IF SOMETHING AWFUL MIGHT HAPPEN: SEVERAL DAYS
GAD7 TOTAL SCORE: 4
5. BEING SO RESTLESS THAT IT IS HARD TO SIT STILL: NOT AT ALL
2. NOT BEING ABLE TO STOP OR CONTROL WORRYING: SEVERAL DAYS
3. WORRYING TOO MUCH ABOUT DIFFERENT THINGS: NOT AT ALL

## 2024-10-24 ASSESSMENT — ENCOUNTER SYMPTOMS
APNEA: 0
GASTROINTESTINAL NEGATIVE: 1
COUGH: 0
CHOKING: 0
CHEST TIGHTNESS: 0
STRIDOR: 0
SHORTNESS OF BREATH: 0
WHEEZING: 0

## 2024-10-24 NOTE — PROGRESS NOTES
tablet  Take 1 tablet by mouth daily     atorvastatin 40 MG tablet  Commonly known as: LIPITOR  Take 1 tablet by mouth nightly     empagliflozin 10 MG tablet  Commonly known as: Jardiance  Take 1 tablet by mouth daily     finasteride 5 MG tablet  Commonly known as: PROSCAR  Take 1 tablet by mouth daily     lisinopril 2.5 MG tablet  Commonly known as: PRINIVIL;ZESTRIL  Take 1 tablet by mouth daily     metOLazone 5 MG tablet  Commonly known as: ZAROXOLYN  Take 1 tablet by mouth once for 1 dose     metoprolol succinate 25 MG extended release tablet  Commonly known as: TOPROL XL  Take 0.5 tablets by mouth in the morning and at bedtime     pantoprazole 40 MG tablet  Commonly known as: PROTONIX  Take 1 tablet by mouth daily     spironolactone 25 MG tablet  Commonly known as: ALDACTONE  Take 1 tablet by mouth daily               Where to Get Your Medications        These medications were sent to [facility] [id number] [facility] [address] [facility] [phone] -  [phone]  [address] [place] [id number]      Phone: [phone]   vitamin D 1.25 MG (02550 UT) Caps capsule           Medications marked \"taking\" at this time  Outpatient Medications Marked as Taking for the 10/24/24 encounter (Office Visit) with Georgina Escalera APRN - CNP   Medication Sig Dispense Refill    spironolactone (ALDACTONE) 25 MG tablet Take 1 tablet by mouth daily 90 tablet 0    torsemide (DEMADEX) 20 MG tablet 40 mg twice a day (Patient taking differently: Take 2 tablets by mouth 2 times daily) 360 tablet 1    aspirin 81 MG chewable tablet Take 1 tablet by mouth daily 90 tablet 2    amiodarone (CORDARONE) 200 MG tablet TAKE ONE TABLET BY MOUTH ONCE A DAY 90 tablet 0    finasteride (PROSCAR) 5 MG tablet Take 1 tablet by mouth daily 90 tablet 2    lisinopril (PRINIVIL;ZESTRIL) 2.5 MG tablet Take 1 tablet by mouth daily 30 tablet 3    pantoprazole (PROTONIX) 40 MG tablet Take 1 tablet by mouth daily 30 tablet 0

## 2024-10-25 ENCOUNTER — OFFICE VISIT (OUTPATIENT)
Dept: CARDIOLOGY CLINIC | Age: 64
End: 2024-10-25
Payer: MEDICAID

## 2024-10-25 VITALS
SYSTOLIC BLOOD PRESSURE: 90 MMHG | WEIGHT: 182 LBS | HEIGHT: 74 IN | HEART RATE: 68 BPM | DIASTOLIC BLOOD PRESSURE: 60 MMHG | BODY MASS INDEX: 23.36 KG/M2 | OXYGEN SATURATION: 99 %

## 2024-10-25 DIAGNOSIS — I48.0 PAF (PAROXYSMAL ATRIAL FIBRILLATION) (HCC): ICD-10-CM

## 2024-10-25 DIAGNOSIS — Z95.810 ICD (IMPLANTABLE CARDIOVERTER-DEFIBRILLATOR) IN PLACE: ICD-10-CM

## 2024-10-25 DIAGNOSIS — I50.22 CHRONIC SYSTOLIC (CONGESTIVE) HEART FAILURE (HCC): Primary | ICD-10-CM

## 2024-10-25 DIAGNOSIS — Z59.00 HOMELESS: ICD-10-CM

## 2024-10-25 DIAGNOSIS — E78.5 HYPERLIPIDEMIA, UNSPECIFIED HYPERLIPIDEMIA TYPE: ICD-10-CM

## 2024-10-25 DIAGNOSIS — I42.0 DILATED CARDIOMYOPATHY (HCC): ICD-10-CM

## 2024-10-25 DIAGNOSIS — I50.82 BIVENTRICULAR HEART FAILURE (HCC): ICD-10-CM

## 2024-10-25 LAB
EST. AVERAGE GLUCOSE BLD GHB EST-MCNC: 128.4 MG/DL
HBA1C MFR BLD: 6.1 %

## 2024-10-25 PROCEDURE — 3078F DIAST BP <80 MM HG: CPT | Performed by: CLINICAL NURSE SPECIALIST

## 2024-10-25 PROCEDURE — 99214 OFFICE O/P EST MOD 30 MIN: CPT | Performed by: CLINICAL NURSE SPECIALIST

## 2024-10-25 PROCEDURE — 1123F ACP DISCUSS/DSCN MKR DOCD: CPT | Performed by: CLINICAL NURSE SPECIALIST

## 2024-10-25 PROCEDURE — 3074F SYST BP LT 130 MM HG: CPT | Performed by: CLINICAL NURSE SPECIALIST

## 2024-10-25 RX ORDER — TAMSULOSIN HYDROCHLORIDE 0.4 MG/1
0.4 CAPSULE ORAL DAILY
COMMUNITY

## 2024-10-25 SDOH — ECONOMIC STABILITY - HOUSING INSECURITY: HOMELESSNESS UNSPECIFIED: Z59.00

## 2024-10-25 NOTE — PROGRESS NOTES
BNP:   Lab Results   Component Value Date/Time    PROBNP 8,975 10/23/2024 09:25 AM    PROBNP 25,610 10/16/2024 04:21 PM    PROBNP 15,467 10/10/2024 05:51 AM     Cardiac Imaging:  Echo 5/22/24   Limited ECHO with 2D imaging, Color-Flow and Spectral Doppler:     Severe global and regional LV systolic dysfunction.  Estimated EF 10-15%.  Severely enlarged LV chamber size with eccentric remodeling.    Increased LV apical trabeculation.  No evidence for LV thrombus with Definity.  Spontaneous echo-contrast observed.  Indeterminate LV diastolic function.    Severe bi-atrial enlargement.  Moderate-severely reduced RV systolic function.  RV size is enlarged.  Device wire is present.  Mild to moderate aortic valve regurgitation.  Severe mitral valve regurgitation.  Severe tricuspid valve regurgitation.  At least moderate pulmonary hypertension based on TR signal obtained.  IVC not visualized.    Mildly dilated proximal ascending thoracic aorta, 4.1 cm.    Echo 8/15/2023   Summary   Left ventricle is severely dilated with normal wall thickness.   Overall, left ventricular systolic function is severely depressed.   Ejection fraction is estimated to be 15-20%.   There is severe global hypokinesis with regional variations.   Mild aortic regurgitation.   Mild to moderate mitral regurgitation.   Mild to moderate tricuspid regurgitation.   Biatrial enlargement.   Pacer/ICD seen in right ventricle.    CONSUELO Dr Rodriguez 3/6/2023   Summary   The left ventricle is dilated. Mild hypertrophy. Severely reduced global   systolic function with an ejection fraction estimated at 15-20%.   Severe global hypokinesis noted.      Left atrial size appears dilated.   Spontaneous echo contrast seen in the left atrium. There is no evidence of   mass or thrombus in the left atrium or appendage.      There is mild aortic insufficiency.   The right ventricle is dilated and hypokinetic with reduced systolic   function.      Pacer / ICD wire is visualized in

## 2024-10-25 NOTE — RESULT ENCOUNTER NOTE
Prediabetes, hemoglobin 6.1% (up from 5.7% in February 2023)    Please notify patient he is in the prediabetes range, which increases his risk for developing diabetes.    Patient to limit bread, rice, pasta, potatoes and concentrated sweets to decrease hemoglobin A1c and decrease risk for developing diabetes.    Thank you.

## 2024-10-25 NOTE — PATIENT INSTRUCTIONS
Continue all current medications  Keep smiling  RTO in 1 month  Call me if weight every goes past 195 or if weight down 175 or dizziness

## 2024-10-28 ENCOUNTER — TELEPHONE (OUTPATIENT)
Dept: PRIMARY CARE CLINIC | Age: 64
End: 2024-10-28

## 2024-10-28 NOTE — TELEPHONE ENCOUNTER
----- Message from TRENT Pelayo CNP sent at 10/25/2024  9:44 AM EDT -----  Prediabetes, hemoglobin 6.1% (up from 5.7% in February 2023)    Please notify patient he is in the prediabetes range, which increases his risk for developing diabetes.    Patient to limit bread, rice, pasta, potatoes and concentrated sweets to decrease hemoglobin A1c and decrease risk for developing diabetes.    Thank you.

## 2024-11-19 ENCOUNTER — TELEPHONE (OUTPATIENT)
Dept: CARDIOLOGY CLINIC | Age: 64
End: 2024-11-19

## 2024-11-19 RX ORDER — METOLAZONE 5 MG/1
5 TABLET ORAL ONCE
Qty: 1 TABLET | Refills: 0 | Status: SHIPPED | OUTPATIENT
Start: 2024-11-19 | End: 2024-11-19

## 2024-11-19 NOTE — TELEPHONE ENCOUNTER
I sent a script for metolazone to [facility] and he can take it tomorrow 30 minutes prior to his torsemide dose  If not better by Thursday then an appt

## 2024-11-19 NOTE — TELEPHONE ENCOUNTER
Pt called stating they were to call the office if their wt is over 190 lbs, pt current  wt is 200 pt is taking torsemide , and spironolactone  as directed.    Pt is a lil SOB, and has increase of  coughing, both feet are little swollen, pt bump is shin and fluid is seeping out.    Pls advise pt [phone]

## 2024-11-19 NOTE — TELEPHONE ENCOUNTER
Spoke with pt's friend, Rickey  Relayed Results and instructions as directed    Emphasized need to be seen this week if metolazone does not help.

## 2024-11-20 NOTE — TELEPHONE ENCOUNTER
Spoke to patient's friend Rickey patient is struggling with his energy level today. He will call tomorrow and update our office on how the patient's swelling and weight gain and sob are. Patient has taken the metolazone today as instructed.

## 2024-11-21 NOTE — TELEPHONE ENCOUNTER
Pt called to say he is feeling much better, medication worked good, has more energy and wanted to say thank you to vance and Ronna for their help. Can call pt if there is any questions.

## 2024-12-05 ENCOUNTER — HOSPITAL ENCOUNTER (OUTPATIENT)
Age: 64
Discharge: HOME | End: 2024-12-05
Payer: MEDICAID

## 2024-12-05 ENCOUNTER — OFFICE VISIT (OUTPATIENT)
Dept: CARDIOLOGY CLINIC | Age: 64
End: 2024-12-05
Payer: MEDICAID

## 2024-12-05 VITALS
HEIGHT: 74 IN | OXYGEN SATURATION: 99 % | HEART RATE: 83 BPM | DIASTOLIC BLOOD PRESSURE: 56 MMHG | WEIGHT: 188 LBS | SYSTOLIC BLOOD PRESSURE: 108 MMHG | BODY MASS INDEX: 24.13 KG/M2

## 2024-12-05 DIAGNOSIS — I42.0 DILATED CARDIOMYOPATHY (HCC): ICD-10-CM

## 2024-12-05 DIAGNOSIS — I50.22 CHRONIC SYSTOLIC (CONGESTIVE) HEART FAILURE (HCC): ICD-10-CM

## 2024-12-05 DIAGNOSIS — I50.82 BIVENTRICULAR HEART FAILURE (HCC): ICD-10-CM

## 2024-12-05 DIAGNOSIS — E78.5 HYPERLIPIDEMIA, UNSPECIFIED HYPERLIPIDEMIA TYPE: ICD-10-CM

## 2024-12-05 DIAGNOSIS — Z95.810 ICD (IMPLANTABLE CARDIOVERTER-DEFIBRILLATOR) IN PLACE: ICD-10-CM

## 2024-12-05 DIAGNOSIS — I48.0 PAF (PAROXYSMAL ATRIAL FIBRILLATION) (HCC): ICD-10-CM

## 2024-12-05 DIAGNOSIS — I50.22 CHRONIC SYSTOLIC (CONGESTIVE) HEART FAILURE (HCC): Primary | ICD-10-CM

## 2024-12-05 DIAGNOSIS — Z59.00 HOMELESS: ICD-10-CM

## 2024-12-05 LAB
ANION GAP SERPL CALCULATED.3IONS-SCNC: 11 MMOL/L (ref 3–16)
BUN SERPL-MCNC: 27 MG/DL (ref 7–20)
CALCIUM SERPL-MCNC: 9.2 MG/DL (ref 8.3–10.6)
CHLORIDE SERPL-SCNC: 99 MMOL/L (ref 99–110)
CO2 SERPL-SCNC: 30 MMOL/L (ref 21–32)
CREAT SERPL-MCNC: 1.7 MG/DL (ref 0.8–1.3)
GFR SERPLBLD CREATININE-BSD FMLA CKD-EPI: 44 ML/MIN/{1.73_M2}
GLUCOSE SERPL-MCNC: 80 MG/DL (ref 70–99)
NT-PROBNP SERPL-MCNC: ABNORMAL PG/ML (ref 0–124)
POTASSIUM SERPL-SCNC: 4 MMOL/L (ref 3.5–5.1)
SODIUM SERPL-SCNC: 140 MMOL/L (ref 136–145)

## 2024-12-05 PROCEDURE — 36415 COLL VENOUS BLD VENIPUNCTURE: CPT

## 2024-12-05 PROCEDURE — 83880 ASSAY OF NATRIURETIC PEPTIDE: CPT

## 2024-12-05 PROCEDURE — 3074F SYST BP LT 130 MM HG: CPT | Performed by: CLINICAL NURSE SPECIALIST

## 2024-12-05 PROCEDURE — 3078F DIAST BP <80 MM HG: CPT | Performed by: CLINICAL NURSE SPECIALIST

## 2024-12-05 PROCEDURE — 1123F ACP DISCUSS/DSCN MKR DOCD: CPT | Performed by: CLINICAL NURSE SPECIALIST

## 2024-12-05 PROCEDURE — 99214 OFFICE O/P EST MOD 30 MIN: CPT | Performed by: CLINICAL NURSE SPECIALIST

## 2024-12-05 PROCEDURE — 80048 BASIC METABOLIC PNL TOTAL CA: CPT

## 2024-12-05 RX ORDER — LISINOPRIL 2.5 MG/1
2.5 TABLET ORAL DAILY
Qty: 90 TABLET | Refills: 1 | Status: SHIPPED | OUTPATIENT
Start: 2024-12-05

## 2024-12-05 RX ORDER — METOPROLOL SUCCINATE 25 MG/1
12.5 TABLET, EXTENDED RELEASE ORAL 2 TIMES DAILY
Qty: 45 TABLET | Refills: 3 | Status: SHIPPED | OUTPATIENT
Start: 2024-12-05

## 2024-12-05 RX ORDER — SPIRONOLACTONE 25 MG/1
25 TABLET ORAL DAILY
Qty: 90 TABLET | Refills: 1 | Status: SHIPPED | OUTPATIENT
Start: 2024-12-05

## 2024-12-05 SDOH — ECONOMIC STABILITY - HOUSING INSECURITY: HOMELESSNESS UNSPECIFIED: Z59.00

## 2024-12-05 NOTE — PATIENT INSTRUCTIONS
Blood work today  Refills sent to [facility]  Continue all current medications  RTO in 3 months  Call me for anything

## 2024-12-05 NOTE — PROGRESS NOTES
- Severe LV dysfunction,  EF: 20-25%  - LA dilated. There was HENOK thrombus.   - Moderate MR    Cardiac Cath PCI: Dr Johns 2/8/2022  Anatomy:   LM-nml   LAD-nml  Cx-nml  OM- nml  RCA-nml  RPDA- nml  LVEF- 10%  LVG- global hypokinesis  LVEDP- 10     Hemodynamics:  RA- mean 3  RV- 37/0  PAWP- 10  PA- 34/12 (20)     C.O.- 5.7 (4.9)  C.I.- 2.6 (2.25)  PA sat 60%  AO sat 91%     Contrast: 70  Flouro Time: 5.3  Access: R radial a, R CFV     Impression  ~Coronary Angiography w/ normal cors  ~LVG with LVEF of 10% and global regional wall motion abnormalities  ~Severe MR  ~Normal CO/CI  ~normal L and R filling pressures     Recommendation  ~Aggressive medical treatment and risk factor modification  ~1. Medications reviewed, no changes at this time.  2. Post cath IVF. Bedrest.  3.Consider CONSUELO for evaluation of MR.   4. Patient has been advised on the importance of regular exercise of at least 20-30 minutes daily alternating with aerobic and isometric activities.   5. Patient counseled about and offered assistance for smoking cessation   6. No indication for cardiac rehab  7. CHF service to evaluate tomorrow.     Echo 11/29/2021  Summary   -Covid+   -Severely reduced global systolic function with an ejection fraction   estimated at 20%.   -Severe global hypokinesis with regional variations noted.   -Right ventricular systolic function appears to be mildly reduced based on   visual inspection.   -Severe biatrial enlargement.   -Thickened mitral valve without evidence of stenosis. There is   mild-to-moderate mitral regurgitation.   -There is mild-to-moderate tricuspid regurgitation with a RVSP estimation of   37 mmHg.   -Diastolic dysfunction with elevated LV filling pressures.       Assessment:    1. Chronic systolic heart failure (HCC)  on ace, bb, sglt2 and aldosterone antagonist, did not tolerate entresto   2. ICD in place   3. Homeless    4. PAF (paroxysmal atrial fibrillation) (HCC) eliquis, Dr attari   5. Dilated

## 2024-12-06 ENCOUNTER — TELEPHONE (OUTPATIENT)
Dept: CARDIOLOGY CLINIC | Age: 64
End: 2024-12-06

## 2025-01-03 ENCOUNTER — TELEPHONE (OUTPATIENT)
Dept: CARDIOLOGY CLINIC | Age: 65
End: 2025-01-03

## 2025-01-03 RX ORDER — METOLAZONE 5 MG/1
5 TABLET ORAL ONCE
Qty: 1 TABLET | Refills: 0 | Status: SHIPPED | OUTPATIENT
Start: 2025-01-03 | End: 2025-01-03

## 2025-01-03 NOTE — TELEPHONE ENCOUNTER
He should be taking all his medications  I want him to take a metolazone 5 mg tomorrow 30 minutes prior to his torsemide  Script sent to [facility]  Please make she he is taking all his medication

## 2025-01-03 NOTE — TELEPHONE ENCOUNTER
Rickey Felixtiamalcolm, patient's friend, is calling to report he has had an increase of weight and is now at 200 lbs, and states NPRG wants him to stay below 190lbs.     Please call to advise [phone]    If NPGR wants him to restart his water pill, please send to:    [facility] [id number] [facility] [address] [place] [address] - P [phone] - F [phone]

## 2025-01-03 NOTE — TELEPHONE ENCOUNTER
Spoke with pt  He is taking Torsemide 40mg BID and has been taking all of his meds as prescribed.    He is agreeable to [facility] recommendation for metolazone and will pickup from pharmacy today so that he can take tomorrow morning.

## 2025-01-21 ENCOUNTER — APPOINTMENT (OUTPATIENT)
Dept: GENERAL RADIOLOGY | Age: 65
End: 2025-01-21
Payer: COMMERCIAL

## 2025-01-21 ENCOUNTER — HOSPITAL ENCOUNTER (EMERGENCY)
Age: 65
Discharge: HOME | End: 2025-01-21
Attending: EMERGENCY MEDICINE
Payer: COMMERCIAL

## 2025-01-21 VITALS
HEIGHT: 74 IN | RESPIRATION RATE: 18 BRPM | BODY MASS INDEX: 24.38 KG/M2 | TEMPERATURE: 99 F | OXYGEN SATURATION: 96 % | HEART RATE: 73 BPM | WEIGHT: 190 LBS | DIASTOLIC BLOOD PRESSURE: 74 MMHG | SYSTOLIC BLOOD PRESSURE: 108 MMHG

## 2025-01-21 DIAGNOSIS — J20.9 ACUTE BRONCHITIS, UNSPECIFIED ORGANISM: Primary | ICD-10-CM

## 2025-01-21 LAB
ALBUMIN SERPL-MCNC: 3.8 G/DL (ref 3.4–5)
ALBUMIN/GLOB SERPL: 1.2 {RATIO} (ref 1.1–2.2)
ALP SERPL-CCNC: 102 U/L (ref 40–129)
ALT SERPL-CCNC: 22 U/L (ref 10–40)
ANION GAP SERPL CALCULATED.3IONS-SCNC: 11 MMOL/L (ref 3–16)
APTT BLD: 27.8 SEC (ref 22.1–36.4)
AST SERPL-CCNC: 35 U/L (ref 15–37)
BASOPHILS # BLD: 0 K/UL (ref 0–0.2)
BASOPHILS NFR BLD: 0.6 %
BILIRUB SERPL-MCNC: 3.2 MG/DL (ref 0–1)
BUN SERPL-MCNC: 24 MG/DL (ref 7–20)
CALCIUM SERPL-MCNC: 8.9 MG/DL (ref 8.3–10.6)
CHLORIDE SERPL-SCNC: 99 MMOL/L (ref 99–110)
CO2 SERPL-SCNC: 28 MMOL/L (ref 21–32)
CREAT SERPL-MCNC: 1.3 MG/DL (ref 0.8–1.3)
DEPRECATED RDW RBC AUTO: 16.6 % (ref 12.4–15.4)
EOSINOPHIL # BLD: 0 K/UL (ref 0–0.6)
EOSINOPHIL NFR BLD: 0.4 %
FLUAV RNA RESP QL NAA+PROBE: NOT DETECTED
FLUBV RNA RESP QL NAA+PROBE: NOT DETECTED
GFR SERPLBLD CREATININE-BSD FMLA CKD-EPI: 61 ML/MIN/{1.73_M2}
GLUCOSE SERPL-MCNC: 99 MG/DL (ref 70–99)
HCT VFR BLD AUTO: 45.4 % (ref 40.5–52.5)
HGB BLD-MCNC: 15.2 G/DL (ref 13.5–17.5)
INR PPP: 1.18 (ref 0.85–1.15)
LYMPHOCYTES # BLD: 0.6 K/UL (ref 1–5.1)
LYMPHOCYTES NFR BLD: 7.9 %
MCH RBC QN AUTO: 29.4 PG (ref 26–34)
MCHC RBC AUTO-ENTMCNC: 33.4 G/DL (ref 31–36)
MCV RBC AUTO: 88.1 FL (ref 80–100)
MONOCYTES # BLD: 0.7 K/UL (ref 0–1.3)
MONOCYTES NFR BLD: 8.5 %
NEUTROPHILS # BLD: 6.5 K/UL (ref 1.7–7.7)
NEUTROPHILS NFR BLD: 82.6 %
NT-PROBNP SERPL-MCNC: ABNORMAL PG/ML (ref 0–124)
PLATELET # BLD AUTO: 221 K/UL (ref 135–450)
PMV BLD AUTO: 8.6 FL (ref 5–10.5)
POTASSIUM SERPL-SCNC: 3.9 MMOL/L (ref 3.5–5.1)
PROT SERPL-MCNC: 7.1 G/DL (ref 6.4–8.2)
PROTHROMBIN TIME: 15.2 SEC (ref 11.9–14.9)
RBC # BLD AUTO: 5.15 M/UL (ref 4.2–5.9)
REASON FOR REJECTION: NORMAL
REASON FOR REJECTION: NORMAL
REJECTED TEST: NORMAL
REJECTED TEST: NORMAL
SARS-COV-2 RNA RESP QL NAA+PROBE: NOT DETECTED
SODIUM SERPL-SCNC: 138 MMOL/L (ref 136–145)
TROPONIN, HIGH SENSITIVITY: 21 NG/L (ref 0–22)
WBC # BLD AUTO: 7.9 K/UL (ref 4–11)

## 2025-01-21 PROCEDURE — 80053 COMPREHEN METABOLIC PANEL: CPT

## 2025-01-21 PROCEDURE — 71045 X-RAY EXAM CHEST 1 VIEW: CPT

## 2025-01-21 PROCEDURE — 93005 ELECTROCARDIOGRAM TRACING: CPT | Performed by: EMERGENCY MEDICINE

## 2025-01-21 PROCEDURE — 94640 AIRWAY INHALATION TREATMENT: CPT

## 2025-01-21 PROCEDURE — 85025 COMPLETE CBC W/AUTO DIFF WBC: CPT

## 2025-01-21 PROCEDURE — 99285 EMERGENCY DEPT VISIT HI MDM: CPT

## 2025-01-21 PROCEDURE — 84484 ASSAY OF TROPONIN QUANT: CPT

## 2025-01-21 PROCEDURE — 94760 N-INVAS EAR/PLS OXIMETRY 1: CPT

## 2025-01-21 PROCEDURE — 83880 ASSAY OF NATRIURETIC PEPTIDE: CPT

## 2025-01-21 PROCEDURE — 87636 SARSCOV2 & INF A&B AMP PRB: CPT

## 2025-01-21 PROCEDURE — 85610 PROTHROMBIN TIME: CPT

## 2025-01-21 PROCEDURE — 6370000000 HC RX 637 (ALT 250 FOR IP): Performed by: PHYSICIAN ASSISTANT

## 2025-01-21 PROCEDURE — 85730 THROMBOPLASTIN TIME PARTIAL: CPT

## 2025-01-21 RX ORDER — BENZONATATE 100 MG/1
100 CAPSULE ORAL 3 TIMES DAILY PRN
Qty: 30 CAPSULE | Refills: 0 | Status: SHIPPED | OUTPATIENT
Start: 2025-01-21 | End: 2025-01-28

## 2025-01-21 RX ORDER — PREDNISONE 10 MG/1
60 TABLET ORAL DAILY
Qty: 30 TABLET | Refills: 0 | Status: SHIPPED | OUTPATIENT
Start: 2025-01-21 | End: 2025-01-26

## 2025-01-21 RX ORDER — IPRATROPIUM BROMIDE AND ALBUTEROL SULFATE 2.5; .5 MG/3ML; MG/3ML
1 SOLUTION RESPIRATORY (INHALATION) ONCE
Status: COMPLETED | OUTPATIENT
Start: 2025-01-21 | End: 2025-01-21

## 2025-01-21 RX ORDER — ALBUTEROL SULFATE 90 UG/1
2 INHALANT RESPIRATORY (INHALATION) 4 TIMES DAILY PRN
Qty: 54 G | Refills: 1 | Status: SHIPPED | OUTPATIENT
Start: 2025-01-21

## 2025-01-21 RX ADMIN — IPRATROPIUM BROMIDE AND ALBUTEROL SULFATE 1 DOSE: .5; 3 SOLUTION RESPIRATORY (INHALATION) at 16:45

## 2025-01-21 ASSESSMENT — PAIN - FUNCTIONAL ASSESSMENT: PAIN_FUNCTIONAL_ASSESSMENT: 0-10

## 2025-01-21 ASSESSMENT — ENCOUNTER SYMPTOMS
ABDOMINAL PAIN: 0
CONSTIPATION: 0
BACK PAIN: 0
TROUBLE SWALLOWING: 0
VOMITING: 0
VOICE CHANGE: 0
COLOR CHANGE: 0
CHEST TIGHTNESS: 1
STRIDOR: 0
NAUSEA: 0
WHEEZING: 0
SORE THROAT: 0
DIARRHEA: 0
COUGH: 1
SHORTNESS OF BREATH: 1

## 2025-01-21 ASSESSMENT — PAIN SCALES - GENERAL: PAINLEVEL_OUTOF10: 7

## 2025-01-21 NOTE — ED PROVIDER NOTES
In addition to the advanced practice provider, I personally saw Manoj Feliz and performed a substantive portion of the visit including all aspects of the medical decision making.    Medical Decision Making  Patient seen and evaluated at bedside.  Briefly, he is presenting with chest pain and cough.  He was given DuoNebs for symptomatic relief.  Lab workup negative for anemia, leukocytosis or any clinically significant Angela abnormalities.  BNP is highly elevated, however this is patient's chronic baseline and there is no clinical evidence of fluid overload.  Troponin was within normal levels.  Chest x-ray showed stable cardiomegaly without acute process.  Patient is feeling much better after treatment and on reexamination.  He remained hemodynamically stable in the ED.  Given clinical context, physical exam and lab workup, suspect that his symptoms are secondary to bronchitis.  He was advised to continue supportive care at home and to follow-up with primary care physician.  He is provided with prescriptions for albuterol, Tessalon Perles and prednisone.  He was also given ED return precautions.  Patient is stable for discharge at this time.    EKG  Ventricular paced rhythm.    SEP-1  Is this patient to be included in the SEP-1 Core Measure due to severe sepsis or septic shock?   No     Exclusion criteria - the patient is NOT to be included for SEP-1 Core Measure due to:  2+ SIRS criteria are not met    Screenings     [place] Coma Scale  Eye Opening: Spontaneous  Best Verbal Response: Oriented  Best Motor Response: Obeys commands  Deniz Coma Scale Score: 15             Patient Referrals:  Georgina Escalera, TRENT - CNP  [address] [facility] [address]  [phone]    In 3 days      [facility] Emergency Department  [address]  [facility] [id number] [phone]    If symptoms worsen      Discharge Medications:  Discharge Medication List as of 1/21/2025  6:38 PM        START taking these

## 2025-01-21 NOTE — ED PROVIDER NOTES
[facility] EMERGENCY DEPARTMENT  EMERGENCY DEPARTMENT ENCOUNTER        Pt Name: Manoj Feliz  MRN: [id number]  Birthdate 1960  Date of evaluation: 1/21/2025  Provider: Jameson Crabtree PA-C  PCP: Georgina Escalera APRN - CNP  Note Started: 3:48 PM EST 1/21/25       I have seen and evaluated this patient with my supervising physician Dr Owen       CHIEF COMPLAINT       Chief Complaint   Patient presents with    Chest Pain     Pt states that he started having CP started two days ago, achy feeling while active/inactive. Pt states some blood coming up while coughing. Pt states has a defibrillator.        HISTORY OF PRESENT ILLNESS: 1 or more Elements     History from : Patient    Limitations to history : None    Manoj Feliz is a 64 y.o. male who presents to the emergency department complaining of 1 week of cough with congestion.  2 days ago, he started getting constant anterior achy chest tightness.  He denies pleuritic or sharp pain.  He denies radiation to his back.  He also reports rhinorrhea with occasional bloody mucus which he believes is dripping down the back of his throat and causing him to cough up blood.  He is anticoagulated on Eliquis.    Nursing Notes were all reviewed and agreed with or any disagreements were addressed in the HPI.    REVIEW OF SYSTEMS :      Review of Systems   Constitutional:  Negative for chills and fever.   HENT:  Positive for congestion and nosebleeds. Negative for dental problem, drooling, ear discharge, ear pain, sore throat, tinnitus, trouble swallowing and voice change.    Eyes:  Negative for visual disturbance.   Respiratory:  Positive for cough, chest tightness and shortness of breath. Negative for wheezing and stridor.    Cardiovascular:  Positive for chest pain. Negative for palpitations and leg swelling.   Gastrointestinal:  Negative for abdominal pain, constipation, diarrhea, nausea and vomiting.   Endocrine: Negative.    Genitourinary:

## 2025-01-21 NOTE — ED NOTES
Patient provided with discharge instructions, discussed in detail, new medications reviewed including use and side effects.  Patient verbalized understanding.  Prescriptions to be filled per outpatient pharmacy.   All questions answered, family at the bedside to transport home.

## 2025-01-22 LAB
EKG ATRIAL RATE: 78 BPM
EKG DIAGNOSIS: NORMAL
EKG Q-T INTERVAL: 480 MS
EKG QRS DURATION: 172 MS
EKG QTC CALCULATION (BAZETT): 553 MS
EKG R AXIS: -3 DEGREES
EKG T AXIS: 86 DEGREES
EKG VENTRICULAR RATE: 80 BPM

## 2025-01-22 PROCEDURE — 93010 ELECTROCARDIOGRAM REPORT: CPT | Performed by: INTERNAL MEDICINE

## 2025-03-06 ENCOUNTER — OFFICE VISIT (OUTPATIENT)
Dept: CARDIOLOGY CLINIC | Age: 65
End: 2025-03-06
Payer: COMMERCIAL

## 2025-03-06 ENCOUNTER — TELEPHONE (OUTPATIENT)
Dept: CARDIOLOGY CLINIC | Age: 65
End: 2025-03-06

## 2025-03-06 VITALS
BODY MASS INDEX: 24.26 KG/M2 | WEIGHT: 189 LBS | DIASTOLIC BLOOD PRESSURE: 72 MMHG | HEIGHT: 74 IN | HEART RATE: 66 BPM | SYSTOLIC BLOOD PRESSURE: 92 MMHG | OXYGEN SATURATION: 90 %

## 2025-03-06 DIAGNOSIS — I50.82 BIVENTRICULAR HEART FAILURE (HCC): ICD-10-CM

## 2025-03-06 DIAGNOSIS — Z95.810 ICD (IMPLANTABLE CARDIOVERTER-DEFIBRILLATOR) IN PLACE: ICD-10-CM

## 2025-03-06 DIAGNOSIS — E78.5 HYPERLIPIDEMIA, UNSPECIFIED HYPERLIPIDEMIA TYPE: ICD-10-CM

## 2025-03-06 DIAGNOSIS — I48.0 PAF (PAROXYSMAL ATRIAL FIBRILLATION) (HCC): ICD-10-CM

## 2025-03-06 DIAGNOSIS — I42.0 DILATED CARDIOMYOPATHY (HCC): ICD-10-CM

## 2025-03-06 DIAGNOSIS — Z59.00 HOMELESS: ICD-10-CM

## 2025-03-06 DIAGNOSIS — I50.22 CHRONIC SYSTOLIC (CONGESTIVE) HEART FAILURE (HCC): Primary | ICD-10-CM

## 2025-03-06 PROCEDURE — 3078F DIAST BP <80 MM HG: CPT | Performed by: CLINICAL NURSE SPECIALIST

## 2025-03-06 PROCEDURE — 3074F SYST BP LT 130 MM HG: CPT | Performed by: CLINICAL NURSE SPECIALIST

## 2025-03-06 PROCEDURE — 99214 OFFICE O/P EST MOD 30 MIN: CPT | Performed by: CLINICAL NURSE SPECIALIST

## 2025-03-06 PROCEDURE — 1123F ACP DISCUSS/DSCN MKR DOCD: CPT | Performed by: CLINICAL NURSE SPECIALIST

## 2025-03-06 RX ORDER — ERGOCALCIFEROL 1.25 MG/1
50000 CAPSULE, LIQUID FILLED ORAL WEEKLY
Qty: 12 CAPSULE | Refills: 3 | Status: SHIPPED | OUTPATIENT
Start: 2025-03-06 | End: 2026-03-06

## 2025-03-06 RX ORDER — AMIODARONE HYDROCHLORIDE 200 MG/1
200 TABLET ORAL DAILY
Qty: 90 TABLET | Refills: 0 | Status: SHIPPED | OUTPATIENT
Start: 2025-03-06

## 2025-03-06 RX ORDER — AMIODARONE HYDROCHLORIDE 200 MG/1
200 TABLET ORAL DAILY
Qty: 90 TABLET | Refills: 0 | Status: SHIPPED | OUTPATIENT
Start: 2025-03-06 | End: 2025-03-06

## 2025-03-06 RX ORDER — APIXABAN 5 MG/1
5 TABLET, FILM COATED ORAL 2 TIMES DAILY
Qty: 60 TABLET | Refills: 5 | OUTPATIENT
Start: 2025-03-06

## 2025-03-06 RX ORDER — AMIODARONE HYDROCHLORIDE 200 MG/1
200 TABLET ORAL DAILY
Qty: 90 TABLET | Refills: 0 | OUTPATIENT
Start: 2025-03-06

## 2025-03-06 SDOH — ECONOMIC STABILITY - HOUSING INSECURITY: HOMELESSNESS UNSPECIFIED: Z59.00

## 2025-03-06 NOTE — PROGRESS NOTES
entresto   2. ICD in place   3. Homeless    4. PAF (paroxysmal atrial fibrillation) (HCC) eliquis, Dr attari   5. Dilated cardiomyopathy   6.   Hyperlipidemia   7.   Biventricular heart failure    Plan:   Patient Instructions   Continue all current medications  Refills x 5 today  RTO in 3 months  Always call the phone number on your bottle for refills      His prognosis is poor, declines palliative   Metolazone works great as a diuretic for fluid overload    Goal weight is 190    NYHA IV    I appreciate the opportunity of cooperating in the care of this individual.    DONITA ROJAS, TRENT - CNS, CNS, 3/6/2025, 3:20 PM

## 2025-03-06 NOTE — PATIENT INSTRUCTIONS
Continue all current medications  Refills x 5 today  RTO in 3 months  Always call the phone number on your bottle for refills    
Alert-The patient is alert, awake and responds to voice. The patient is oriented to time, place, and person. The triage nurse is able to obtain subjective information.

## 2025-03-06 NOTE — TELEPHONE ENCOUNTER
Please look for prescription request for Eliquis and amiodarone for pt.    He is here and would like the pharmacy to fill it today.    Pt has been out of Eliquis and Amiodarone for some time!    All help appreciated

## 2025-04-08 ENCOUNTER — CLINICAL SUPPORT (OUTPATIENT)
Dept: CARDIOLOGY CLINIC | Age: 65
End: 2025-04-08

## 2025-04-08 VITALS — DIASTOLIC BLOOD PRESSURE: 76 MMHG | SYSTOLIC BLOOD PRESSURE: 104 MMHG

## 2025-04-08 DIAGNOSIS — I48.21 PERMANENT ATRIAL FIBRILLATION (HCC): ICD-10-CM

## 2025-04-08 DIAGNOSIS — I48.91 ATRIAL FIBRILLATION WITH RVR (HCC): ICD-10-CM

## 2025-04-08 DIAGNOSIS — Z95.810 ICD (IMPLANTABLE CARDIOVERTER-DEFIBRILLATOR) IN PLACE: Primary | ICD-10-CM

## 2025-04-08 DIAGNOSIS — I47.20 VT (VENTRICULAR TACHYCARDIA) (HCC): ICD-10-CM

## 2025-04-08 DIAGNOSIS — R00.1 BRADYCARDIA: ICD-10-CM

## 2025-04-08 DIAGNOSIS — T82.118S: ICD-10-CM

## 2025-04-08 DIAGNOSIS — I48.19 PERSISTENT ATRIAL FIBRILLATION (HCC): ICD-10-CM

## 2025-04-16 ENCOUNTER — HOSPITAL ENCOUNTER (INPATIENT)
Age: 65
LOS: 1 days | Discharge: HOME | End: 2025-04-18
Attending: STUDENT IN AN ORGANIZED HEALTH CARE EDUCATION/TRAINING PROGRAM | Admitting: STUDENT IN AN ORGANIZED HEALTH CARE EDUCATION/TRAINING PROGRAM
Payer: COMMERCIAL

## 2025-04-16 ENCOUNTER — APPOINTMENT (OUTPATIENT)
Dept: CT IMAGING | Age: 65
End: 2025-04-16
Payer: COMMERCIAL

## 2025-04-16 ENCOUNTER — APPOINTMENT (OUTPATIENT)
Dept: GENERAL RADIOLOGY | Age: 65
End: 2025-04-16
Payer: COMMERCIAL

## 2025-04-16 DIAGNOSIS — I50.9 ACUTE ON CHRONIC CONGESTIVE HEART FAILURE, UNSPECIFIED HEART FAILURE TYPE (HCC): ICD-10-CM

## 2025-04-16 DIAGNOSIS — I50.43 ACUTE ON CHRONIC COMBINED SYSTOLIC AND DIASTOLIC CONGESTIVE HEART FAILURE (HCC): ICD-10-CM

## 2025-04-16 DIAGNOSIS — R55 NEAR SYNCOPE: Primary | ICD-10-CM

## 2025-04-16 DIAGNOSIS — I51.7 CARDIOMEGALY: ICD-10-CM

## 2025-04-16 LAB
ALBUMIN SERPL-MCNC: 3.9 G/DL (ref 3.4–5)
ALBUMIN/GLOB SERPL: 1.3 {RATIO} (ref 1.1–2.2)
ALP SERPL-CCNC: 111 U/L (ref 40–129)
ALT SERPL-CCNC: 21 U/L (ref 10–40)
ANION GAP SERPL CALCULATED.3IONS-SCNC: 10 MMOL/L (ref 3–16)
AST SERPL-CCNC: 41 U/L (ref 15–37)
BASOPHILS # BLD: 0.1 K/UL (ref 0–0.2)
BASOPHILS NFR BLD: 0.9 %
BILIRUB SERPL-MCNC: 1.7 MG/DL (ref 0–1)
BUN SERPL-MCNC: 12 MG/DL (ref 7–20)
CALCIUM SERPL-MCNC: 8.7 MG/DL (ref 8.3–10.6)
CHLORIDE SERPL-SCNC: 103 MMOL/L (ref 99–110)
CO2 SERPL-SCNC: 28 MMOL/L (ref 21–32)
CREAT SERPL-MCNC: 1.3 MG/DL (ref 0.8–1.3)
DEPRECATED RDW RBC AUTO: 15.6 % (ref 12.4–15.4)
EOSINOPHIL # BLD: 0.3 K/UL (ref 0–0.6)
EOSINOPHIL NFR BLD: 4.6 %
GFR SERPLBLD CREATININE-BSD FMLA CKD-EPI: 60 ML/MIN/{1.73_M2}
GLUCOSE SERPL-MCNC: 91 MG/DL (ref 70–99)
HCT VFR BLD AUTO: 40.5 % (ref 40.5–52.5)
HGB BLD-MCNC: 13 G/DL (ref 13.5–17.5)
LYMPHOCYTES # BLD: 0.7 K/UL (ref 1–5.1)
LYMPHOCYTES NFR BLD: 10.5 %
MCH RBC QN AUTO: 27.7 PG (ref 26–34)
MCHC RBC AUTO-ENTMCNC: 32 G/DL (ref 31–36)
MCV RBC AUTO: 86.7 FL (ref 80–100)
MONOCYTES # BLD: 0.6 K/UL (ref 0–1.3)
MONOCYTES NFR BLD: 8.9 %
NEUTROPHILS # BLD: 5 K/UL (ref 1.7–7.7)
NEUTROPHILS NFR BLD: 75.1 %
NT-PROBNP SERPL-MCNC: ABNORMAL PG/ML (ref 0–124)
PLATELET # BLD AUTO: 230 K/UL (ref 135–450)
PMV BLD AUTO: 7.7 FL (ref 5–10.5)
POTASSIUM SERPL-SCNC: 4 MMOL/L (ref 3.5–5.1)
PROT SERPL-MCNC: 7 G/DL (ref 6.4–8.2)
RBC # BLD AUTO: 4.67 M/UL (ref 4.2–5.9)
SODIUM SERPL-SCNC: 141 MMOL/L (ref 136–145)
TROPONIN, HIGH SENSITIVITY: 31 NG/L (ref 0–22)
WBC # BLD AUTO: 6.6 K/UL (ref 4–11)

## 2025-04-16 PROCEDURE — 70450 CT HEAD/BRAIN W/O DYE: CPT

## 2025-04-16 PROCEDURE — 80053 COMPREHEN METABOLIC PANEL: CPT

## 2025-04-16 PROCEDURE — 84484 ASSAY OF TROPONIN QUANT: CPT

## 2025-04-16 PROCEDURE — 93005 ELECTROCARDIOGRAM TRACING: CPT | Performed by: STUDENT IN AN ORGANIZED HEALTH CARE EDUCATION/TRAINING PROGRAM

## 2025-04-16 PROCEDURE — 85025 COMPLETE CBC W/AUTO DIFF WBC: CPT

## 2025-04-16 PROCEDURE — 71045 X-RAY EXAM CHEST 1 VIEW: CPT

## 2025-04-16 PROCEDURE — 83880 ASSAY OF NATRIURETIC PEPTIDE: CPT

## 2025-04-16 PROCEDURE — 6370000000 HC RX 637 (ALT 250 FOR IP): Performed by: PHYSICIAN ASSISTANT

## 2025-04-16 PROCEDURE — 99285 EMERGENCY DEPT VISIT HI MDM: CPT

## 2025-04-16 RX ORDER — ACETAMINOPHEN 500 MG
1000 TABLET ORAL ONCE
Status: COMPLETED | OUTPATIENT
Start: 2025-04-16 | End: 2025-04-16

## 2025-04-16 RX ORDER — MECLIZINE HCL 12.5 MG 12.5 MG/1
25 TABLET ORAL ONCE
Status: COMPLETED | OUTPATIENT
Start: 2025-04-16 | End: 2025-04-16

## 2025-04-16 RX ORDER — FUROSEMIDE 10 MG/ML
20 INJECTION INTRAMUSCULAR; INTRAVENOUS ONCE
Status: COMPLETED | OUTPATIENT
Start: 2025-04-16 | End: 2025-04-17

## 2025-04-16 RX ADMIN — ACETAMINOPHEN 1000 MG: 500 TABLET ORAL at 22:09

## 2025-04-16 RX ADMIN — MECLIZINE 25 MG: 12.5 TABLET ORAL at 22:09

## 2025-04-16 ASSESSMENT — PAIN SCALES - GENERAL: PAINLEVEL_OUTOF10: 6

## 2025-04-16 ASSESSMENT — PAIN - FUNCTIONAL ASSESSMENT: PAIN_FUNCTIONAL_ASSESSMENT: 0-10

## 2025-04-16 ASSESSMENT — PAIN DESCRIPTION - LOCATION: LOCATION: HEAD

## 2025-04-17 ENCOUNTER — APPOINTMENT (OUTPATIENT)
Age: 65
End: 2025-04-17
Attending: STUDENT IN AN ORGANIZED HEALTH CARE EDUCATION/TRAINING PROGRAM
Payer: COMMERCIAL

## 2025-04-17 PROBLEM — I50.21 ACUTE HFREF (HEART FAILURE WITH REDUCED EJECTION FRACTION) (HCC): Status: ACTIVE | Noted: 2025-04-17

## 2025-04-17 PROBLEM — I51.7 CARDIOMEGALY: Status: ACTIVE | Noted: 2025-04-17

## 2025-04-17 LAB
ANION GAP SERPL CALCULATED.3IONS-SCNC: 9 MMOL/L (ref 3–16)
BUN SERPL-MCNC: 12 MG/DL (ref 7–20)
CALCIUM SERPL-MCNC: 8.1 MG/DL (ref 8.3–10.6)
CHLORIDE SERPL-SCNC: 105 MMOL/L (ref 99–110)
CO2 SERPL-SCNC: 27 MMOL/L (ref 21–32)
CREAT SERPL-MCNC: 1.1 MG/DL (ref 0.8–1.3)
ECHO AO ASC DIAM: 4.4 CM
ECHO AO ASCENDING AORTA INDEX: 2.08 CM/M2
ECHO AO ROOT DIAM: 3.8 CM
ECHO AO ROOT INDEX: 1.79 CM/M2
ECHO AR MAX VEL PISA: 1.4 M/S
ECHO AV AREA PEAK VELOCITY: 2.8 CM2
ECHO AV AREA VTI: 2.1 CM2
ECHO AV AREA/BSA PEAK VELOCITY: 1.3 CM2/M2
ECHO AV AREA/BSA VTI: 1 CM2/M2
ECHO AV CUSP MM: 2.2 CM
ECHO AV MEAN GRADIENT: 5 MMHG
ECHO AV MEAN VELOCITY: 1.1 M/S
ECHO AV PEAK GRADIENT: 7 MMHG
ECHO AV PEAK VELOCITY: 1.3 M/S
ECHO AV REGURGITANT PHT: 678 MS
ECHO AV VELOCITY RATIO: 0.54
ECHO AV VTI: 28.5 CM
ECHO BSA: 2.12 M2
ECHO IVC INSP: 3.1 CM
ECHO IVC PROX: 3.4 CM
ECHO LA AREA 2C: 61.2 CM2
ECHO LA AREA 4C: 51 CM2
ECHO LA DIAMETER INDEX: 3.11 CM/M2
ECHO LA DIAMETER: 6.6 CM
ECHO LA MAJOR AXIS: 9.9 CM
ECHO LA MINOR AXIS: 10.2 CM
ECHO LA TO AORTIC ROOT RATIO: 1.74
ECHO LA VOL BP: 256 ML (ref 18–58)
ECHO LA VOL MOD A2C: 302 ML (ref 18–58)
ECHO LA VOL MOD A4C: 211 ML (ref 18–58)
ECHO LA VOL/BSA BIPLANE: 121 ML/M2 (ref 16–34)
ECHO LA VOLUME INDEX MOD A2C: 142 ML/M2 (ref 16–34)
ECHO LA VOLUME INDEX MOD A4C: 100 ML/M2 (ref 16–34)
ECHO LV E' LATERAL VELOCITY: 13.3 CM/S
ECHO LV E' SEPTAL VELOCITY: 5.44 CM/S
ECHO LV EDV A2C: 217 ML
ECHO LV EDV A4C: 227 ML
ECHO LV EDV INDEX A4C: 107 ML/M2
ECHO LV EDV NDEX A2C: 102 ML/M2
ECHO LV EF PHYSICIAN: 10 %
ECHO LV EJECTION FRACTION A2C: 18 %
ECHO LV EJECTION FRACTION A4C: 19 %
ECHO LV EJECTION FRACTION BIPLANE: 19 % (ref 55–100)
ECHO LV ESV A2C: 179 ML
ECHO LV ESV A4C: 184 ML
ECHO LV ESV INDEX A2C: 84 ML/M2
ECHO LV ESV INDEX A4C: 87 ML/M2
ECHO LV FRACTIONAL SHORTENING: 9 % (ref 28–44)
ECHO LV INTERNAL DIMENSION DIASTOLE INDEX: 3.87 CM/M2
ECHO LV INTERNAL DIMENSION DIASTOLIC: 8.2 CM (ref 4.2–5.9)
ECHO LV INTERNAL DIMENSION SYSTOLIC INDEX: 3.54 CM/M2
ECHO LV INTERNAL DIMENSION SYSTOLIC: 7.5 CM
ECHO LV IVSD: 1.2 CM (ref 0.6–1)
ECHO LV MASS 2D: 477.7 G (ref 88–224)
ECHO LV MASS INDEX 2D: 225.4 G/M2 (ref 49–115)
ECHO LV POSTERIOR WALL DIASTOLIC: 1 CM (ref 0.6–1)
ECHO LV RELATIVE WALL THICKNESS RATIO: 0.24
ECHO LVOT AREA: 5.3 CM2
ECHO LVOT AV VTI INDEX: 0.4
ECHO LVOT DIAM: 2.6 CM
ECHO LVOT MEAN GRADIENT: 1 MMHG
ECHO LVOT PEAK GRADIENT: 2 MMHG
ECHO LVOT PEAK VELOCITY: 0.7 M/S
ECHO LVOT STROKE VOLUME INDEX: 28.3 ML/M2
ECHO LVOT SV: 60 ML
ECHO LVOT VTI: 11.3 CM
ECHO MV A VELOCITY: 0.38 M/S
ECHO MV AREA VTI: 1.8 CM2
ECHO MV E DECELERATION TIME (DT): 100 MS
ECHO MV E VELOCITY: 1.53 M/S
ECHO MV E/A RATIO: 4.03
ECHO MV E/E' LATERAL: 11.5
ECHO MV E/E' RATIO (AVERAGED): 19.81
ECHO MV E/E' SEPTAL: 28.13
ECHO MV LVOT VTI INDEX: 2.92
ECHO MV MAX VELOCITY: 1.7 M/S
ECHO MV MEAN GRADIENT: 3 MMHG
ECHO MV MEAN VELOCITY: 0.7 M/S
ECHO MV PEAK GRADIENT: 12 MMHG
ECHO MV REGURGITANT PEAK GRADIENT: 74 MMHG
ECHO MV REGURGITANT PEAK VELOCITY: 4.3 M/S
ECHO MV REGURGITANT VTIA: 150 CM
ECHO MV VTI: 33 CM
ECHO PULMONARY ARTERY END DIASTOLIC PRESSURE: 3 MMHG
ECHO PV ACCELERATION TIME (AT): 84 MS
ECHO PV MAX VELOCITY: 0.6 M/S
ECHO PV MEAN GRADIENT: 1 MMHG
ECHO PV MEAN VELOCITY: 0.5 M/S
ECHO PV PEAK GRADIENT: 2 MMHG
ECHO PV REGURGITANT MAX VELOCITY: 0.9 M/S
ECHO PV VTI: 10.8 CM
ECHO RA AREA 4C: 44.5 CM2
ECHO RA END SYSTOLIC VOLUME APICAL 4 CHAMBER INDEX BSA: 92 ML/M2
ECHO RA VOLUME: 194 ML
ECHO RV BASAL DIMENSION: 6.2 CM
ECHO RV FREE WALL PEAK S': 9 CM/S
ECHO RV LONGITUDINAL DIMENSION: 8.6 CM
ECHO RV MID DIMENSION: 3.6 CM
ECHO RV TAPSE: 1.3 CM (ref 1.7–?)
ECHO TV REGURGITANT MAX VELOCITY: 3.44 M/S
ECHO TV REGURGITANT PEAK GRADIENT: 47 MMHG
ECHO TV REGURGITANT VTI: 110 CM
EKG ATRIAL RATE: 58 BPM
EKG DIAGNOSIS: NORMAL
EKG Q-T INTERVAL: 522 MS
EKG QRS DURATION: 174 MS
EKG QTC CALCULATION (BAZETT): 587 MS
EKG R AXIS: -89 DEGREES
EKG T AXIS: 88 DEGREES
EKG VENTRICULAR RATE: 76 BPM
GFR SERPLBLD CREATININE-BSD FMLA CKD-EPI: 74 ML/MIN/{1.73_M2}
GLUCOSE SERPL-MCNC: 107 MG/DL (ref 70–99)
MAGNESIUM SERPL-MCNC: 2.04 MG/DL (ref 1.8–2.4)
POTASSIUM SERPL-SCNC: 3.2 MMOL/L (ref 3.5–5.1)
SODIUM SERPL-SCNC: 141 MMOL/L (ref 136–145)
TROPONIN, HIGH SENSITIVITY: 22 NG/L (ref 0–22)

## 2025-04-17 PROCEDURE — 99223 1ST HOSP IP/OBS HIGH 75: CPT | Performed by: INTERNAL MEDICINE

## 2025-04-17 PROCEDURE — 93010 ELECTROCARDIOGRAM REPORT: CPT | Performed by: INTERNAL MEDICINE

## 2025-04-17 PROCEDURE — 93306 TTE W/DOPPLER COMPLETE: CPT | Performed by: INTERNAL MEDICINE

## 2025-04-17 PROCEDURE — 83735 ASSAY OF MAGNESIUM: CPT

## 2025-04-17 PROCEDURE — 6360000002 HC RX W HCPCS: Performed by: STUDENT IN AN ORGANIZED HEALTH CARE EDUCATION/TRAINING PROGRAM

## 2025-04-17 PROCEDURE — 80048 BASIC METABOLIC PNL TOTAL CA: CPT

## 2025-04-17 PROCEDURE — 6360000002 HC RX W HCPCS: Performed by: PHYSICIAN ASSISTANT

## 2025-04-17 PROCEDURE — 2500000003 HC RX 250 WO HCPCS: Performed by: STUDENT IN AN ORGANIZED HEALTH CARE EDUCATION/TRAINING PROGRAM

## 2025-04-17 PROCEDURE — 2140000000 HC CCU INTERMEDIATE R&B

## 2025-04-17 PROCEDURE — 99222 1ST HOSP IP/OBS MODERATE 55: CPT | Performed by: INTERNAL MEDICINE

## 2025-04-17 PROCEDURE — 6370000000 HC RX 637 (ALT 250 FOR IP): Performed by: STUDENT IN AN ORGANIZED HEALTH CARE EDUCATION/TRAINING PROGRAM

## 2025-04-17 PROCEDURE — 93306 TTE W/DOPPLER COMPLETE: CPT

## 2025-04-17 RX ORDER — POLYETHYLENE GLYCOL 3350 17 G/17G
17 POWDER, FOR SOLUTION ORAL DAILY PRN
Status: DISCONTINUED | OUTPATIENT
Start: 2025-04-17 | End: 2025-04-18 | Stop reason: HOSPADM

## 2025-04-17 RX ORDER — TORSEMIDE 20 MG/1
40 TABLET ORAL 2 TIMES DAILY
Status: DISCONTINUED | OUTPATIENT
Start: 2025-04-18 | End: 2025-04-18 | Stop reason: HOSPADM

## 2025-04-17 RX ORDER — SODIUM CHLORIDE 9 MG/ML
INJECTION, SOLUTION INTRAVENOUS PRN
Status: DISCONTINUED | OUTPATIENT
Start: 2025-04-17 | End: 2025-04-18 | Stop reason: HOSPADM

## 2025-04-17 RX ORDER — ACETAMINOPHEN 650 MG/1
650 SUPPOSITORY RECTAL EVERY 6 HOURS PRN
Status: DISCONTINUED | OUTPATIENT
Start: 2025-04-17 | End: 2025-04-18 | Stop reason: HOSPADM

## 2025-04-17 RX ORDER — MAGNESIUM SULFATE IN WATER 40 MG/ML
2000 INJECTION, SOLUTION INTRAVENOUS PRN
Status: DISCONTINUED | OUTPATIENT
Start: 2025-04-17 | End: 2025-04-18 | Stop reason: HOSPADM

## 2025-04-17 RX ORDER — POTASSIUM CHLORIDE 7.45 MG/ML
10 INJECTION INTRAVENOUS PRN
Status: DISCONTINUED | OUTPATIENT
Start: 2025-04-17 | End: 2025-04-18 | Stop reason: HOSPADM

## 2025-04-17 RX ORDER — LISINOPRIL 5 MG/1
2.5 TABLET ORAL DAILY
Status: DISCONTINUED | OUTPATIENT
Start: 2025-04-17 | End: 2025-04-18 | Stop reason: HOSPADM

## 2025-04-17 RX ORDER — ENOXAPARIN SODIUM 100 MG/ML
40 INJECTION SUBCUTANEOUS DAILY
Status: CANCELLED | OUTPATIENT
Start: 2025-04-17

## 2025-04-17 RX ORDER — ONDANSETRON 2 MG/ML
4 INJECTION INTRAMUSCULAR; INTRAVENOUS EVERY 6 HOURS PRN
Status: DISCONTINUED | OUTPATIENT
Start: 2025-04-17 | End: 2025-04-18 | Stop reason: HOSPADM

## 2025-04-17 RX ORDER — ONDANSETRON 4 MG/1
4 TABLET, ORALLY DISINTEGRATING ORAL EVERY 8 HOURS PRN
Status: DISCONTINUED | OUTPATIENT
Start: 2025-04-17 | End: 2025-04-18 | Stop reason: HOSPADM

## 2025-04-17 RX ORDER — ACETAMINOPHEN 325 MG/1
650 TABLET ORAL EVERY 6 HOURS PRN
Status: DISCONTINUED | OUTPATIENT
Start: 2025-04-17 | End: 2025-04-18 | Stop reason: HOSPADM

## 2025-04-17 RX ORDER — POTASSIUM CHLORIDE 1500 MG/1
40 TABLET, EXTENDED RELEASE ORAL PRN
Status: DISCONTINUED | OUTPATIENT
Start: 2025-04-17 | End: 2025-04-18 | Stop reason: HOSPADM

## 2025-04-17 RX ORDER — METOPROLOL SUCCINATE 25 MG/1
12.5 TABLET, EXTENDED RELEASE ORAL 2 TIMES DAILY
Status: DISCONTINUED | OUTPATIENT
Start: 2025-04-17 | End: 2025-04-18 | Stop reason: HOSPADM

## 2025-04-17 RX ORDER — FUROSEMIDE 10 MG/ML
40 INJECTION INTRAMUSCULAR; INTRAVENOUS 2 TIMES DAILY
Status: DISCONTINUED | OUTPATIENT
Start: 2025-04-17 | End: 2025-04-17

## 2025-04-17 RX ORDER — SPIRONOLACTONE 25 MG/1
25 TABLET ORAL DAILY
Status: DISCONTINUED | OUTPATIENT
Start: 2025-04-17 | End: 2025-04-18 | Stop reason: HOSPADM

## 2025-04-17 RX ORDER — AMIODARONE HYDROCHLORIDE 200 MG/1
200 TABLET ORAL DAILY
Status: DISCONTINUED | OUTPATIENT
Start: 2025-04-17 | End: 2025-04-18 | Stop reason: HOSPADM

## 2025-04-17 RX ORDER — SODIUM CHLORIDE 0.9 % (FLUSH) 0.9 %
5-40 SYRINGE (ML) INJECTION EVERY 12 HOURS SCHEDULED
Status: DISCONTINUED | OUTPATIENT
Start: 2025-04-17 | End: 2025-04-18 | Stop reason: HOSPADM

## 2025-04-17 RX ORDER — ATORVASTATIN CALCIUM 40 MG/1
40 TABLET, FILM COATED ORAL NIGHTLY
Status: DISCONTINUED | OUTPATIENT
Start: 2025-04-17 | End: 2025-04-18 | Stop reason: HOSPADM

## 2025-04-17 RX ORDER — SODIUM CHLORIDE 0.9 % (FLUSH) 0.9 %
5-40 SYRINGE (ML) INJECTION PRN
Status: DISCONTINUED | OUTPATIENT
Start: 2025-04-17 | End: 2025-04-18 | Stop reason: HOSPADM

## 2025-04-17 RX ADMIN — EMPAGLIFLOZIN 10 MG: 10 TABLET, FILM COATED ORAL at 08:39

## 2025-04-17 RX ADMIN — FUROSEMIDE 40 MG: 10 INJECTION, SOLUTION INTRAMUSCULAR; INTRAVENOUS at 08:39

## 2025-04-17 RX ADMIN — SODIUM CHLORIDE, PRESERVATIVE FREE 10 ML: 5 INJECTION INTRAVENOUS at 20:18

## 2025-04-17 RX ADMIN — APIXABAN 5 MG: 5 TABLET, FILM COATED ORAL at 20:18

## 2025-04-17 RX ADMIN — SODIUM CHLORIDE, PRESERVATIVE FREE 10 ML: 5 INJECTION INTRAVENOUS at 11:30

## 2025-04-17 RX ADMIN — LISINOPRIL 2.5 MG: 5 TABLET ORAL at 08:38

## 2025-04-17 RX ADMIN — METOPROLOL SUCCINATE 12.5 MG: 25 TABLET, EXTENDED RELEASE ORAL at 08:46

## 2025-04-17 RX ADMIN — ATORVASTATIN CALCIUM 40 MG: 40 TABLET, FILM COATED ORAL at 20:18

## 2025-04-17 RX ADMIN — POLYETHYLENE GLYCOL 3350 17 G: 17 POWDER, FOR SOLUTION ORAL at 11:34

## 2025-04-17 RX ADMIN — AMIODARONE HYDROCHLORIDE 200 MG: 200 TABLET ORAL at 08:39

## 2025-04-17 RX ADMIN — SPIRONOLACTONE 25 MG: 25 TABLET ORAL at 08:39

## 2025-04-17 RX ADMIN — APIXABAN 5 MG: 5 TABLET, FILM COATED ORAL at 08:38

## 2025-04-17 RX ADMIN — FUROSEMIDE 20 MG: 10 INJECTION, SOLUTION INTRAMUSCULAR; INTRAVENOUS at 00:42

## 2025-04-17 RX ADMIN — POTASSIUM BICARBONATE 40 MEQ: 782 TABLET, EFFERVESCENT ORAL at 07:25

## 2025-04-17 ASSESSMENT — ENCOUNTER SYMPTOMS
COUGH: 0
NAUSEA: 0
VOMITING: 0
DIARRHEA: 0
RHINORRHEA: 0
SHORTNESS OF BREATH: 0
ABDOMINAL PAIN: 0

## 2025-04-17 NOTE — CONSULTS
[facility] [place] [place]   Electrophysiology   Date: 4/17/2025  Reason for Consultation: Pre-Syncope    Consult Requesting Physician: Delmy Marshall MD     Chief Complaint   Patient presents with    Dizziness     Pt presents with intermittent dizziness, Pt states it is worse when he gets up. Pt states he has a hx of dizziness        CC: Dizziness    HPI: Manoj Feliz is a 64 y.o. male  with PMH of long persistent atrial fibrillation, HTN, nonischemic cardiomyopathy, HFrEF, left atrial appendage thrombus, challenging social circumstances being homeless, history of noncompliance, s/p BiV ICD (4/25/2022). S/p CONSUELO/DCCV (3/6/23). Patient presented to hospital with complaints of dizziness. He has admitted for heart failure exacerbation.   He also felt his heart beating fast which concerned him.  He had headache with the dizziness.  EP has been consulted for Pre-syncope       Assessment and Plan:     Pre-syncope/ dizziness   - He reports dizziness when standing up from seated position   - When he sits back down, dizziness resolves. Has not fallen or passed out   Episode seems to be orthostatic and related to low blood pressure.  Preventive measures such as avoiding standing up quickly or standing or sitting for long periods of time were discussed.  He is on very low-dose cardiac/heart failure medications.  Permanent AF  - 100% % burden on today's interrogation   - He has been deemed a poor candidate for any invasive EP procedures. Rate control strategy only   -Patient has a [id number] Score of at least 4(age, HTN, HF, HENOK thrombus) Continue Eliquis 5 mg BID -CONSUELO/DCCV 3/6/2023  -Continue toprol xl 12.5 mg twice daily   -Continue amiodarone 200 mg daily. Monitor for toxicity ALT 21, AST 41   Interrogation of his device does not show any ventricular arrhythmias.  He has 100% A-fib with relatively controlled rate.    HFrEF/ NICM  -status post BiV ICD in 2022,  -EF 10-15%  per most recent echo   -Does

## 2025-04-17 NOTE — H&P
Hospital Medicine History & Physical      Date of Admission: 4/16/2025    Date of Service:  Pt seen/examined on 4/17/2025    [x]Admitted to Inpatient with expected LOS greater than two midnights due to medical therapy.  []Placed in Observation status.    Chief Admission Complaint: Presyncope    Presenting Admission History:      64 y.o. male who presented to [facility] with symptoms of presyncope.  PMHx significant for persistent A-fib on Eliquis, dilated cardiomyopathy, HFrEF, BPH, hypertension, CKD 3, iron deficiency, history of left atrial appendage thrombus on Eliquis, history of VT status post ICD.    Patient presents to the hospital with intermittent lightheadedness that is worse when he stands up.  He has had some issues with this in the past but acutely worsened in the last 24 hours.  Says that it is difficult to tie his shoe because when he bends over to tie his shoe and stands back up, he will have significant lightheadedness.  He has had a syncopal episode before in the past.  He has significant cardiac history.  He noted that he has been having some palpitations in his chest today which is new.  Says it felt like his chest was beating faster than usual.  Denied any fevers or chills.  Denied any syncope but felt like he was going to pass out.  No other acute complaints.    Of note, he said his dry weight is about 180 pounds.  Was noted to be 194 pounds today on arrival to the ED.  He says he is unsure if he did not more dyspneic on exertion as he has not exerted himself much.  Denied any orthopnea or worsening swelling in his lower extremities.  Did have elevated JVP to above the angle of the mandible with bed at 30 degrees.    In the ED, vital signs stable.  On room air.  BMP largely unremarkable.  proBNP elevated at 16,000 but does not appear too much increased from baseline.  CBC unremarkable.      Assessment/Plan:      Current Principal Problem:  Acute HFrEF (heart failure with reduced  elevated past angle of mandible with bed at 30 degrees  Cardiovascular:      Rate and Rhythm: Normal rate and regular rhythm.      Pulses: Normal pulses.      Heart sounds: Normal heart sounds.   Pulmonary:      Effort: Pulmonary effort is normal.      Breath sounds: Normal breath sounds.   Abdominal:      General: Abdomen is flat.      Palpations: Abdomen is soft.      Tenderness: There is no abdominal tenderness. There is no guarding or rebound.   Musculoskeletal:         General: No swelling, deformity or signs of injury.      Cervical back: Normal range of motion and neck supple.      Right lower leg: No edema.      Left lower leg: No edema.      Comments: Does not have signs of significant peripheral edema   Skin:     General: Skin is warm and dry.   Neurological:      General: No focal deficit present.      Mental Status: He is alert and oriented to person, place, and time.         Diet: [x]Adult/General  []Cardiac  []Diabetic  []Low Fat  []NPO  []NPO after Midnight  []Other   DVT Prophylaxis: []PPx LMWH  []SQ Heparin  []IPC/SCDs  [x]Eliquis  []Xarelto  []Coumadin     Code status: [x]Full  []DNR/CCA  []Limited (DNR/CCA with Do Not Intubate)  []DNRCC  Surrogate Decision Maker: Friend-Naldo  PT/OT Eval Status:   [x]NOT yet ordered  []Ordered and Pending   []Seen with Recommendations for:  []Home independently  []Home w/ assist  []HHC  []SNF  []Acute Rehab    Anticipated Discharge Day/Date: 4/20/2025    Anticipated Discharge Location: [x]Home  []HHC  []SNF  []Acute Rehab  []ECF  []LTAC  []Hospice    Consults:      IP CONSULT TO HEART FAILURE NURSE/COORDINATOR  IP CONSULT TO CARDIOLOGY    I personally have obtained, updated and/or reviewed the patient’s medication list on 4/17/2025   --------------------------------------------------------------------------------------------------------------------------------------------------------------------    Imaging:     XR CHEST PORTABLE  Result Date:

## 2025-04-17 NOTE — PROGRESS NOTES
Patient admitted to CVU 2908 from ED and attached to monitors. VSS. Focused assessments in flowsheets. Pt oriented to room and standard safety precautions in place. Pt denies any further needs at this time. Will continue to monitor.    Electronically signed by Shirley Carvajal RN on 4/17/2025 at 6:47 AM

## 2025-04-17 NOTE — ED PROVIDER NOTES
no vertical test of skew.     Right Ear: External ear normal.      Left Ear: External ear normal.      Nose: Nose normal.      Mouth/Throat:      Mouth: Mucous membranes are moist.      Pharynx: Oropharynx is clear. No posterior oropharyngeal erythema.   Eyes:      General:         Right eye: No discharge.         Left eye: No discharge.      Extraocular Movements: Extraocular movements intact.      Conjunctiva/sclera: Conjunctivae normal.      Pupils: Pupils are equal, round, and reactive to light.   Neck:      Trachea: No tracheal deviation.   Cardiovascular:      Rate and Rhythm: Normal rate and regular rhythm.      Comments: To plus edema to bilateral lower leg  Pulmonary:      Effort: Pulmonary effort is normal. No respiratory distress.      Breath sounds: Rales present. No wheezing.      Comments: Faint rales to bilateral bases  Abdominal:      General: Bowel sounds are normal. There is no distension.      Palpations: Abdomen is soft.      Tenderness: There is no abdominal tenderness. There is no guarding.   Musculoskeletal:         General: Normal range of motion.      Cervical back: Normal range of motion and neck supple.   Skin:     General: Skin is warm and dry.   Neurological:      General: No focal deficit present.      Mental Status: He is alert and oriented to person, place, and time.      GCS: GCS eye subscore is 4. GCS verbal subscore is 5. GCS motor subscore is 6.      Cranial Nerves: Cranial nerves 2-12 are intact.      Sensory: Sensation is intact.      Motor: Motor function is intact.      Coordination: Coordination is intact.      Gait: Gait is intact.   Psychiatric:         Behavior: Behavior normal.             DIAGNOSTIC RESULTS   LABS:    Labs Reviewed   CBC WITH AUTO DIFFERENTIAL - Abnormal; Notable for the following components:       Result Value    Hemoglobin 13.0 (*)     RDW 15.6 (*)     Lymphocytes Absolute 0.7 (*)     All other components within normal limits   COMPREHENSIVE METABOLIC  in time range)   spironolactone (ALDACTONE) tablet 25 mg (has no administration in time range)   meclizine (ANTIVERT) tablet 25 mg (25 mg Oral Given 4/16/25 2209)   acetaminophen (TYLENOL) tablet 1,000 mg (1,000 mg Oral Given 4/16/25 2209)   furosemide (LASIX) injection 20 mg (20 mg IntraVENous Given 4/17/25 0042)       ED Course as of 04/17/25 0112 Wed Apr 16, 2025 2324 WBC: 6.6 [PB]   2324 Hemoglobin Quant(!): 13.0 [PB]   2325 Potassium: 4.0 [PB]   2325 Creatinine: 1.3 [PB]   2325 Total Bilirubin(!): 1.7 [PB]   2325 Alkaline Phosphatase: 111 [PB]   2325 ALT: 21 [PB]   2325 AST(!): 41 [PB]   2325 Troponin, High Sensitivity(!): 31  Will obtain repeat [PB]   2325 NT Pro-BNP(!): 16,041  Consistent with fluid overload [PB]   2325 EKG 12 Lead  Ventricularly paced rhythm, no STEMI per Sgarbossa criteria [PB]   2326 XR CHEST PORTABLE  Severe cardiomegaly.  Worsening since January [PB]   2326 CT HEAD WO CONTRAST  Negative for acute findings. [PB]   2337 You bedside ultrasound performed per procedure note.  Exceedingly poor EF.  No pericardial effusion.  B-lines present.  No pleural effusion.  Discussed results and plan for admission to the hospital.  Patient verbalizes understanding and agreement with plan. [PB]   2340 SHONA to page for admission [PB]      ED Course User Index  [PB] Fermin Torrez,         Chronic Conditions affecting care:  has a past medical history of Acute combined systolic and diastolic congestive heart failure (HCC) (8/31/2022), Atrial fibrillation (HCC) (07/25/2019), CHF (congestive heart failure) (HCC), blood clots, and Hypertension.    CONSULTS: (Who and What was discussed)  IP CONSULT TO PHARMACY  IP CONSULT TO HEART FAILURE NURSE/COORDINATOR  IP CONSULT TO CARDIOLOGY      Social Determinants Significantly Affecting Health : None    Records Reviewed (External, Source and Summary) previous cardiology notes    CC/HPI Summary, DDx, ED Course, and Reassessment: Briefly this is a 64-year-old

## 2025-04-17 NOTE — ED PROVIDER NOTES
EMERGENCY DEPARTMENT PROVIDER NOTE         PATIENT IDENTIFICATION     Name:   Manoj Feliz  MRN:   [id number]  YOB: 1960  Date of Evaluation:   2025  Provider:   BISI Sherwood; Fermin Torrez DO  PCP:   Georgina Escalera APRN - CNP        CHIEF COMPLAINT       Dizziness (Pt presents with intermittent dizziness, Pt states it is worse when he gets up. Pt states he has a hx of dizziness )        HISTORY OF PRESENT ILLNESS     I independently interviewed patient and/or caretaker(s).  See Advanced Practice Provider (SHONA) note for full HPI.  In summary, Manoj Feliz  is a(n) 64 y.o. male who presents with dizziness and lightheadedness.  Does have a history of CHF with most recent EF of 10-15%.  ICD is in place.  Also history of paroxysmal atrial fibrillation on Eliquis.        PHYSICAL EXAM     I reviewed physical exam performed and documented by SHONA.  I performed an independent physical examination with findings as follows:  Overall well-appearing nontoxic gentleman with no respiratory distress on room air.  Does have rales in bilateral lung fields.        EKG INTERPRETATION     TIME:     RATE:   76 bpm  GA INTERVAL:   * ms  QRS DURATION:   174 ms  QT/QTc:   522/587 ms  RHYTHM:    Ventricularly paced rhythm  AXIS:   Regular  ABNORMALITIES  No STEMI per Sgarbossa criteria    PRIOR EK/21/25- current EKG without significant changes when compared to prior    INTERPRETATION:  Nonspecific, unchanged from prior    REVIEWED BY:   Fermin Torrez Jr., DO    EKG was independently reviewed by emergency department physician in absence of cardiologist.        LAB RESULTS     Results for orders placed or performed during the hospital encounter of 25   CBC with Auto Differential   Result Value Ref Range    WBC 6.6 4.0 - 11.0 K/uL    RBC 4.67 4.20 - 5.90 M/uL    Hemoglobin 13.0 (L) 13.5 - 17.5 g/dL    Hematocrit 40.5 40.5 - 52.5 %    MCV 86.7 80.0 - 100.0 fL    MCH 27.7 26.0 - 34.0  were reviewed independently by me in addition to the radiologist.  I reviewed all radiology images and reports as well from this evaluation.        MEDICAL DECISION MAKING     In addition to the advanced practice provider, I personally saw Manoj Feliz and performed a substantive portion of the visit including all aspects of the medical decision making. I made/approved the management plan and take responsibility for the patient management    Patient presented with Dizziness (Pt presents with intermittent dizziness, Pt states it is worse when he gets up. Pt states he has a hx of dizziness ) as described above.  History obtained from patient and SHONA.  Prior medical history reviewed.  Initial vital signs reviewed and within normal limits.  Patient nontoxic appearing and stable.  Physical exam as above.    Patient has been thoroughly evaluated for dizziness by SHONA.  I reviewed workup performed by SHONA per ED course.  Workup consistent with fluid overload.  With patient's history of CHF with an EF of 10%, likely etiology.  Will diurese and plan for admission.    I independently interpreted EKG (see full interpretation above), lab work (per ED course), and imaging (per ED course).     Procedure(s) performed per \"Procedures\" section below.    ED Course as of 04/17/25 0126   Wed Apr 16, 2025 2324 WBC: 6.6 [PB]   2324 Hemoglobin Quant(!): 13.0 [PB]   2325 Potassium: 4.0 [PB]   2325 Creatinine: 1.3 [PB]   2325 Total Bilirubin(!): 1.7 [PB]   2325 Alkaline Phosphatase: 111 [PB]   2325 ALT: 21 [PB]   2325 AST(!): 41 [PB]   2325 Troponin, High Sensitivity(!): 31  Will obtain repeat [PB]   2325 NT Pro-BNP(!): 16,041  Consistent with fluid overload [PB]   2325 EKG 12 Lead  Ventricularly paced rhythm, no STEMI per Sgarbossa criteria [PB]   2326 XR CHEST PORTABLE  Severe cardiomegaly.  Worsening since January [PB]   2326 CT HEAD WO CONTRAST  Negative for acute findings. [PB]   2337 You bedside ultrasound performed per procedure

## 2025-04-17 NOTE — PROGRESS NOTES
[facility]    4/17/2025 1:04 PM        Name: Manoj Feliz .              Admitted: 4/16/2025  Primary Care Provider: Georgina Escalera APRN - CNP (Tel: [phone])      Chief complaint: 65 yo male presented to ER with c/o feeling faint and palpitations. Admitted with presyncope.      Subjective:  Resting in bed. Reports he feels much better this afternoon. Denies chest pain, shortness of breath, lightheadedness, abdominal pain, nausea, edema.     Reviewed interval ancillary notes    Current Medications  sodium chloride flush 0.9 % injection 5-40 mL, 2 times per day  sodium chloride flush 0.9 % injection 5-40 mL, PRN  0.9 % sodium chloride infusion, PRN  ondansetron (ZOFRAN-ODT) disintegrating tablet 4 mg, Q8H PRN   Or  ondansetron (ZOFRAN) injection 4 mg, Q6H PRN  polyethylene glycol (GLYCOLAX) packet 17 g, Daily PRN  acetaminophen (TYLENOL) tablet 650 mg, Q6H PRN   Or  acetaminophen (TYLENOL) suppository 650 mg, Q6H PRN  potassium chloride (KLOR-CON M) extended release tablet 40 mEq, PRN   Or  potassium bicarb-citric acid (EFFER-K) effervescent tablet 40 mEq, PRN   Or  potassium chloride 10 mEq/100 mL IVPB (Peripheral Line), PRN  magnesium sulfate 2000 mg in 50 mL IVPB premix, PRN  furosemide (LASIX) injection 40 mg, BID  apixaban (ELIQUIS) tablet 5 mg, BID  atorvastatin (LIPITOR) tablet 40 mg, Nightly  metoprolol succinate (TOPROL XL) extended release tablet 12.5 mg, BID  amiodarone (CORDARONE) tablet 200 mg, Daily  empagliflozin (JARDIANCE) tablet 10 mg, Daily  lisinopril (PRINIVIL;ZESTRIL) tablet 2.5 mg, Daily  spironolactone (ALDACTONE) tablet 25 mg, Daily        Objective:  /83   Pulse 72   Temp 98.2 °F (36.8 °C) (Temporal)   Resp 17   Ht 1.88 m (6' 2\")   Wt 85.7 kg (189 lb)   SpO2 98%   BMI 24.27 kg/m²     Intake/Output Summary (Last 24 hours) at 4/17/2025 1308  Last data filed at 4/17/2025

## 2025-04-17 NOTE — CONSULTS
[facility]  HEART FAILURE PROGRAM      Manoj Feliz 1960    History:  Past Medical History:   Diagnosis Date    Acute combined systolic and diastolic congestive heart failure (HCC) 8/31/2022    Atrial fibrillation (HCC) 07/25/2019    CHF (congestive heart failure) (HCC)     Hx of blood clots     Hypertension        ECHO:  4/17/25      Left Ventricle: Severely reduced left ventricular systolic function with a visually estimated EF of 10 -15%. EF by 2D Simpsons Biplane is 19%. Left ventricle is severely dilated. Mild septal thickening. Severe global hypokinesis present. Grade III diastolic dysfunction with increased LAP.    Right Ventricle: Right ventricle is moderately dilated. Wire seen in right heart. Reduced systolic function.    Mitral Valve: Moderately thickened leaflets. Severe regurgitation. No stenosis noted.    Tricuspid Valve: Valve structure is normal. Severe regurgitation. No stenosis noted.    Left Atrium: Left atrial volume index is severely increased (>48 mL/m2) mL/m2. Volume index measuring 121 mL/m2.    Right Atrium: Right atrium is severely dilated.    Aorta: Mildly dilated aortic root. Ao root diameter is 3.8 cm. Moderately dilated ascending aorta. Ao ascending diameter is 4.4 cm.    IVC/SVC: IVC diameter is greater than 21 mm and decreases less than 50% during inspiration; therefore the estimated right atrial pressure is elevated (~15 mmHg).    Image quality is good.    ACE/ARB/ARNi:  lisinopril 2.5 mg daily-- could not tolerate ARNi  BB: toprol xl 12.5 mg bid  Aldosterone Antagonist: aldactone 25 mg daily  SGLT2: jardiance 10 mg daily    History of sleep apnea: No    [place] Screen ordered: No    DM History: No    Last Hospital Admission: 10/7/24 with CHF  Code Status: full   Discharge plans: lives with friends    Family Present: no     Manoj Feliz was admitted to the hospital with dizziness, headache, and lightheadedness.  Patient is well known to HF team and

## 2025-04-17 NOTE — CONSULTS
[facility]  Advanced CHF/Pulmonary Hypertension   Cardiac Evaluation      Manoj Feliz  YOB: 1960    Requesting PHysician:  Dr. Robertson      Chief Complaint   Patient presents with    Dizziness     Pt presents with intermittent dizziness, Pt states it is worse when he gets up. Pt states he has a hx of dizziness         History of Present Illness:  Manoj Feliz is a 65 yo male who presented to [facility] with symptoms of headache and dizziness.  He has known persistent atrial fibrillation on Eliquis, dilated end stage chronic systolic HF, BPH, hypertension, CKD 3, iron deficiency, ICD.  He is followed closely in the CHF clinic.  His dizziness was worse when he stands up.  When he bends over to tie his shoes, he gets dizzy.  Denies fever, chills.  No history of syncope.  No orthopnea, PND.  He has been compliant with his medications.    e has known severe CHF with LVEF 10-15% and frequent admissions for CHF.  He has an ICD and has chronic afib.  He is homeless.  He was recently admitted for CHF.  He has chronic edema in his feet. He has had presyncopal episodes in the past with cough.    In the ED, his vital signs are stable.  Review of his optivol shows that his volume status is stable.  His labs are unremarkable.  BNP 16K not significantly different than 13K on last measure.  CBC unremarkable.  We are asked to see him for CHF management.        Labs:  Sodium 141  K 4.0  BUN/Cre 12/1.3  Troponin 31, 22  BNP 16K (last 13K on 1/21/25)  Bilirubin 1.7 (3.2 in January)  H/H 13.0/40.5    CXR:    IMPRESSION:  Severe cardiomegaly with moderate pulmonary vascular congestion.    Echo:  4/17/25:    Left Ventricle: Severely reduced left ventricular systolic function with a visually estimated EF of 10 -15%. EF by 2D Simpsons Biplane is 19%. Left ventricle is severely dilated. Mild septal thickening. Severe global hypokinesis present. Grade III diastolic dysfunction with increased LAP.    Right

## 2025-04-17 NOTE — ED NOTES
Patient Name: Manoj Feliz  : 1960 64 y.o.  MRN: [id number]  ED Room #: ED-0014/14     Chief complaint:   Chief Complaint   Patient presents with    Dizziness     Pt presents with intermittent dizziness, Pt states it is worse when he gets up. Pt states he has a hx of dizziness      Hospital Problem/Diagnosis:   Hospital Problems           Last Modified POA    * (Principal) Acute HFrEF (heart failure with reduced ejection fraction) ([facility]ly [facility]) 2025 Yes         O2 Flow Rate:O2 Device: None (Room air)   (if applicable)  Cardiac Rhythm:   (if applicable)  Active LDA's:   Peripheral IV 25 Left;Proximal;Dorsal Forearm (Active)            How does patient ambulate? Stand by assist    2. How does patient take pills? Whole with Water    3. Is patient alert? Alert    4. Is patient oriented? To Person, To Place, To Time, To Situation, and Follows Commands    5.   Patient arrived from:  home  Facility Name: ___________________________________________    6. If patient is disoriented or from a Skill Nursing Facility has family been notified of admission? No    7. Patient belongings? Belongings: Cell Phone, Wallet, and Clothing    Disposition of belongings? Kept with Patient     8. Any specific patient or family belongings/needs/dynamics?   a.     9. Miscellaneous comments/pending orders?  a.      If there are any additional questions please reach out to the Emergency Department.

## 2025-04-17 NOTE — DISCHARGE INSTRUCTIONS
Guidelines for Heart Failure home management:    1. Continue to monitor weight first thing each morning. You should weigh yourself after using the bathroom and before you eat breakfast.    2. Report to your doctor any significant weight change. Remember that weight change of 2-3 lbs. in 1 day or 5 lbs in a week is \"significant\" and likely represents changes in \"fluid\" status.  If you are experiencing any swelling in your feet, ankles or abdomen, or shortness of breath, call your doctor.     3. You should restrict all sodium intake to 3000 milligrams (3 grams) a day. Depending on your status, you may also be asked to restrict fluid intake to no more that 64 oz/2 Liters a day. If uncertain, ask the nurse or physician.     4. Regular aerobic exercise is encouraged 30 minutes a day (walking, bike, swimming, etc.). For specific exercise recommendations, ask your physician.     5. Report to your doctor any change in symptoms (chest pain, worsening shortness of breath, increased dizziness or passing out, increased palpitations or ICD shock, trouble catching breath while lying down, increased edema or abdominal bloating). Remember that even \"minor\" changes in symptoms may be important. Also report any changes in medications including \"over the counter\" medications.     6. DO NOT take NSAID's for pain (i.e, Advil, Aleve, Motrin, ibuprofen, and many more) since these may cause serious problems in those with a history of CHF. If uncertain about the medication, call your doctor.    7. If you have new significant or ongoing diarrhea or vomiting, please call your doctor for further instructions. Taking a diuretic (water pill) with these symptoms can worsen dehydration.     8. If you have any questions or concerns you can always call the CHF  Resource Line( [phone].  It is available Monday thru Friday 8 am- 5 pm. Please leave a message and your call will be returned shortly.     Extra Heart Failure

## 2025-04-18 VITALS
OXYGEN SATURATION: 99 % | SYSTOLIC BLOOD PRESSURE: 103 MMHG | TEMPERATURE: 96.9 F | DIASTOLIC BLOOD PRESSURE: 83 MMHG | HEART RATE: 75 BPM | BODY MASS INDEX: 23.85 KG/M2 | RESPIRATION RATE: 18 BRPM | HEIGHT: 74 IN | WEIGHT: 185.85 LBS

## 2025-04-18 PROBLEM — R51.9 SEVERE HEADACHE: Status: ACTIVE | Noted: 2025-04-18

## 2025-04-18 LAB
ALBUMIN SERPL-MCNC: 3.5 G/DL (ref 3.4–5)
ALP SERPL-CCNC: 109 U/L (ref 40–129)
ALT SERPL-CCNC: 18 U/L (ref 10–40)
ANION GAP SERPL CALCULATED.3IONS-SCNC: 8 MMOL/L (ref 3–16)
AST SERPL-CCNC: 29 U/L (ref 15–37)
BILIRUB DIRECT SERPL-MCNC: 0.7 MG/DL (ref 0–0.3)
BILIRUB INDIRECT SERPL-MCNC: 0.7 MG/DL (ref 0–1)
BILIRUB SERPL-MCNC: 1.4 MG/DL (ref 0–1)
BUN SERPL-MCNC: 16 MG/DL (ref 7–20)
CALCIUM SERPL-MCNC: 8.5 MG/DL (ref 8.3–10.6)
CHLORIDE SERPL-SCNC: 102 MMOL/L (ref 99–110)
CHOLEST SERPL-MCNC: 104 MG/DL (ref 0–199)
CO2 SERPL-SCNC: 27 MMOL/L (ref 21–32)
CREAT SERPL-MCNC: 1.1 MG/DL (ref 0.8–1.3)
GFR SERPLBLD CREATININE-BSD FMLA CKD-EPI: 74 ML/MIN/{1.73_M2}
GLUCOSE SERPL-MCNC: 85 MG/DL (ref 70–99)
HDLC SERPL-MCNC: 56 MG/DL (ref 40–60)
LDLC SERPL CALC-MCNC: 38 MG/DL
MAGNESIUM SERPL-MCNC: 2.34 MG/DL (ref 1.8–2.4)
POTASSIUM SERPL-SCNC: 3.7 MMOL/L (ref 3.5–5.1)
PROT SERPL-MCNC: 6.4 G/DL (ref 6.4–8.2)
SODIUM SERPL-SCNC: 137 MMOL/L (ref 136–145)
TRIGL SERPL-MCNC: 50 MG/DL (ref 0–150)
VLDLC SERPL CALC-MCNC: 10 MG/DL

## 2025-04-18 PROCEDURE — 6370000000 HC RX 637 (ALT 250 FOR IP): Performed by: STUDENT IN AN ORGANIZED HEALTH CARE EDUCATION/TRAINING PROGRAM

## 2025-04-18 PROCEDURE — 83735 ASSAY OF MAGNESIUM: CPT

## 2025-04-18 PROCEDURE — 6370000000 HC RX 637 (ALT 250 FOR IP): Performed by: NURSE PRACTITIONER

## 2025-04-18 PROCEDURE — 99232 SBSQ HOSP IP/OBS MODERATE 35: CPT | Performed by: CLINICAL NURSE SPECIALIST

## 2025-04-18 PROCEDURE — 80076 HEPATIC FUNCTION PANEL: CPT

## 2025-04-18 PROCEDURE — 80061 LIPID PANEL: CPT

## 2025-04-18 PROCEDURE — 6370000000 HC RX 637 (ALT 250 FOR IP): Performed by: INTERNAL MEDICINE

## 2025-04-18 PROCEDURE — 2500000003 HC RX 250 WO HCPCS: Performed by: STUDENT IN AN ORGANIZED HEALTH CARE EDUCATION/TRAINING PROGRAM

## 2025-04-18 PROCEDURE — 80048 BASIC METABOLIC PNL TOTAL CA: CPT

## 2025-04-18 RX ORDER — SPIRONOLACTONE 25 MG/1
25 TABLET ORAL DAILY
Qty: 90 TABLET | Refills: 1 | Status: SHIPPED | OUTPATIENT
Start: 2025-04-18

## 2025-04-18 RX ORDER — METOPROLOL SUCCINATE 25 MG/1
12.5 TABLET, EXTENDED RELEASE ORAL 2 TIMES DAILY
Qty: 45 TABLET | Refills: 3 | Status: SHIPPED | OUTPATIENT
Start: 2025-04-18

## 2025-04-18 RX ORDER — TORSEMIDE 20 MG/1
TABLET ORAL
Qty: 360 TABLET | Refills: 1 | Status: SHIPPED | OUTPATIENT
Start: 2025-04-18

## 2025-04-18 RX ORDER — POTASSIUM CHLORIDE 1500 MG/1
40 TABLET, EXTENDED RELEASE ORAL ONCE
Status: COMPLETED | OUTPATIENT
Start: 2025-04-18 | End: 2025-04-18

## 2025-04-18 RX ADMIN — SPIRONOLACTONE 25 MG: 25 TABLET ORAL at 08:46

## 2025-04-18 RX ADMIN — SODIUM CHLORIDE, PRESERVATIVE FREE 10 ML: 5 INJECTION INTRAVENOUS at 08:48

## 2025-04-18 RX ADMIN — EMPAGLIFLOZIN 10 MG: 10 TABLET, FILM COATED ORAL at 08:46

## 2025-04-18 RX ADMIN — LISINOPRIL 2.5 MG: 5 TABLET ORAL at 08:47

## 2025-04-18 RX ADMIN — TORSEMIDE 40 MG: 20 TABLET ORAL at 08:46

## 2025-04-18 RX ADMIN — APIXABAN 5 MG: 5 TABLET, FILM COATED ORAL at 08:46

## 2025-04-18 RX ADMIN — METOPROLOL SUCCINATE 12.5 MG: 25 TABLET, EXTENDED RELEASE ORAL at 08:46

## 2025-04-18 RX ADMIN — POTASSIUM CHLORIDE 40 MEQ: 1500 TABLET, EXTENDED RELEASE ORAL at 08:46

## 2025-04-18 RX ADMIN — AMIODARONE HYDROCHLORIDE 200 MG: 200 TABLET ORAL at 08:47

## 2025-04-18 NOTE — DISCHARGE SUMMARY
measuring 121 mL/m2.    Right Atrium: Right atrium is severely dilated.    Aorta: Mildly dilated aortic root. Ao root diameter is 3.8 cm. Moderately dilated ascending aorta. Ao ascending diameter is 4.4 cm.    IVC/SVC: IVC diameter is greater than 21 mm and decreases less than 50% during inspiration; therefore the estimated right atrial pressure is elevated (~15 mmHg).    Image quality is good.       Invasive procedures and treatments.   None     Problem-based Hospital Course.  63 yo male presented to ER with c/o feeling faint and palpitations. Admitted with presyncope.     Presyncope   - Presented with lightheadedness, feeling faint  - CT head with no acute abnormality  - No significant orthostatic hypotension, supine /92, HR 71: standing /98, HR 71  - EKG and telemetry monitoring with frequent PVCs which possibly contributing to symptoms  - Device interrogation showed 100% atrial fib burden  - Cardiology evaluated, suspect symptoms most likely related to hypotension    Chronic HFrEF  - Hx NICM  - Echo with EF 10-15%, grade III diastolic dysfunction  - S/p BiV ICD (4/2022)  - CXR with moderate pulmonary vascular congestion,  proBNP 16K, which appears slightly up from baseline (13K)  - Was given IV Lasix 40 mg in the ED, continued on admission, over concern for CHF exacerbation  - Cardiology evaluated, volume status is stable, he does not have decompensated CHF  - Continued lisinopril 2.5 mg, spironolactone 25 mg, metoprolol 12.5 mg, Jardiance 10 mg     Permanent atrial fib,  Frequent PVCs,  NSVT  - Presents in  rhythm  - Rate controlled  - EP evaluated, continued amiodarone 200 mg, Eliquis 5 mg, metoprolol 12.5 mg     CKD stage II  - Creatinine 1.3 on presentation which appears baseline      Up in bedside chair. Reports he feels good, eager for DC. Denies chest pain, shortness of breath, palpitations. Notes mild lightheadedness when he first stands up, improved from admission. Reviewed DC plans,  follow up and medications. Expresses understanding. Questions answered.      Consults.  IP CONSULT TO HEART FAILURE NURSE/COORDINATOR  IP CONSULT TO CARDIOLOGY    Physical examination on discharge day.   /83   Pulse 75   Temp 96.9 °F (36.1 °C) (Temporal)   Resp 18   Ht 1.88 m (6' 2\")   Wt 84.3 kg (185 lb 13.6 oz)   SpO2 99%   BMI 23.86 kg/m²   General appearance.  Alert. Looks comfortable.  HEENT. Sclera clear. Moist mucus membranes.  Cardiovascular. Irregular rate and rhythm, normal S1, S2. No murmur.   Respiratory. Not using accessory muscles.Clear to auscultation bilaterally, no wheeze.  Gastrointestinal. Abdomen soft, non-tender, not distended, normal bowel sounds  Neurology. Facial symmetry. No speech deficits. Moving all extremities equally.  Extremities. No edema in lower extremities.  Skin. Warm, dry, normal turgor    Condition at time of discharge stable    Medication instructions provided to patient at discharge.     Medication List        CHANGE how you take these medications      albuterol sulfate  (90 Base) MCG/ACT inhaler  Commonly known as: Ventolin HFA  Inhale 2 puffs into the lungs every 6 hours as needed for Wheezing  What changed: Another medication with the same name was removed. Continue taking this medication, and follow the directions you see here.     torsemide 20 MG tablet  Commonly known as: DEMADEX  40 mg twice a day  What changed:   how much to take  how to take this  when to take this  additional instructions            CONTINUE taking these medications      amiodarone 200 MG tablet  Commonly known as: CORDARONE  Take 1 tablet by mouth daily     apixaban 5 MG Tabs tablet  Commonly known as: ELIQUIS  Take 1 tablet by mouth 2 times daily     atorvastatin 40 MG tablet  Commonly known as: LIPITOR  Take 1 tablet by mouth nightly     empagliflozin 10 MG tablet  Commonly known as: Jardiance  Take 1 tablet by mouth daily     lisinopril 2.5 MG tablet  Commonly known as:

## 2025-04-18 NOTE — PROGRESS NOTES
Nutrition Education    Provided heart failure diet education. Education included low sodium diet guidelines (3 gm Na+/day) and fluid restriction (64 oz/day). Reviewed label reading. Pt currently tries to follow a low sodium diet at home and does not add salt to food. Has been educated on HF diet guidelines multiple times in the past. Pt states understanding of education. Time spent with patient: 10 minutes.      Educated on 4/18  Learners: Patient  Readiness: Acceptance  Method: Explanation and Handout  Response: Verbalizes Understanding  Contact name and number provided.    Mayra Hicks MS, RD, LD  Contact Number: [phone]

## 2025-04-18 NOTE — PROGRESS NOTES
1945 Pt had 17 beats of VTach. Pt has pacer.     2020 Pt night meds passed. Metoprolol held due to pt BP being 105/85 and pt here for dizziness.    0600 Pt stood and showered this morning without dizziness. States that he feels much better this morning than he has lately    0730 Shift change, bedside report given to Monica ANAYA. Pt exhibits no s/s of distress. Call light in reach. Care has been transferred at this time.

## 2025-04-18 NOTE — PROGRESS NOTES
CLINICAL PHARMACY NOTE: MEDS TO BEDS    Total # of Prescriptions Filled: 3   The following medications were delivered to the patient:  Spironolactone 25mg tabs  Metoprolol succinate er 25mg tb24  Torsemide 20mg tabs    Additional Documentation: Ely ANAYA approved to deliver medications to patient room=signed  [facility] tech

## 2025-04-21 ENCOUNTER — TELEPHONE (OUTPATIENT)
Dept: OTHER | Age: 65
End: 2025-04-21

## 2025-04-21 ENCOUNTER — TELEPHONE (OUTPATIENT)
Dept: PRIMARY CARE CLINIC | Age: 65
End: 2025-04-21

## 2025-04-21 NOTE — TELEPHONE ENCOUNTER
[facility]  HEART FAILURE PROGRAM  TELEPHONE ENCOUNTER FORM    Manoj Patelsaji 1960    Attempted to call patient for HF follow-up x 3. No answer at this time.      Next MD/ Clinic appointment: 4/25 with Deirdre CALL, RN 4/21/2025 1:04 PM

## 2025-04-21 NOTE — TELEPHONE ENCOUNTER
Called patient with the phone number on file and his friend Rickey answered, it's his ph #[phone] that we have on file. He said the patient doesn't live with him anymore but he could get a message to him. Rickey said the patient doesn't have a phone. Rickey is on the patient's HIPAA form. I asked if he could ask the patient to give our office a call. He said he would try to get the message to the patient. Patient does have an office visit with Georgina on 25. I will make it a hospital follow up.    Care Transitions Initial Follow Up Call    Outreach made within 2 business days of discharge: Yes    Patient: Manoj Feliz Patient : 1960   MRN: [id number]  Reason for Admission: Acute Heart Failure  Discharge Date: 25       Spoke with: Rickey Rick, on HIPAA    Discharge department/facility: [facility]    Scheduled appointment with PCP within 7-14 days    Follow Up  Future Appointments   Date Time Provider Department Center   2025 12:20 PM Georgina Escalera APRN - CNP [facility] ECC DEP   2025  1:30 PM Deirdre Das APRN - CNS [facility]   2025  2:00 PM DANDY [place] DEVICE CHECK [facility]   2025  2:00 PM Earnestine Chavarria APRN - CNP [facility]   2025  2:00 PM Deirdre Das APRN - CNS [facility]       Rossy Livingston MA

## 2025-06-06 ENCOUNTER — OFFICE VISIT (OUTPATIENT)
Dept: CARDIOLOGY CLINIC | Age: 65
End: 2025-06-06
Payer: COMMERCIAL

## 2025-06-06 ENCOUNTER — HOSPITAL ENCOUNTER (OUTPATIENT)
Age: 65
Discharge: HOME | End: 2025-06-06
Payer: COMMERCIAL

## 2025-06-06 VITALS
DIASTOLIC BLOOD PRESSURE: 60 MMHG | WEIGHT: 192 LBS | OXYGEN SATURATION: 98 % | HEART RATE: 80 BPM | SYSTOLIC BLOOD PRESSURE: 100 MMHG | HEIGHT: 74 IN | BODY MASS INDEX: 24.64 KG/M2

## 2025-06-06 DIAGNOSIS — E55.9 VITAMIN D DEFICIENCY: ICD-10-CM

## 2025-06-06 DIAGNOSIS — I50.22 CHRONIC SYSTOLIC (CONGESTIVE) HEART FAILURE (HCC): ICD-10-CM

## 2025-06-06 DIAGNOSIS — Z59.00 HOMELESS: ICD-10-CM

## 2025-06-06 DIAGNOSIS — T82.118S: ICD-10-CM

## 2025-06-06 DIAGNOSIS — I50.82 BIVENTRICULAR HEART FAILURE (HCC): ICD-10-CM

## 2025-06-06 DIAGNOSIS — I48.20 CHRONIC ATRIAL FIBRILLATION (HCC): Primary | ICD-10-CM

## 2025-06-06 DIAGNOSIS — I42.0 DILATED CARDIOMYOPATHY (HCC): ICD-10-CM

## 2025-06-06 DIAGNOSIS — E78.5 HYPERLIPIDEMIA, UNSPECIFIED HYPERLIPIDEMIA TYPE: ICD-10-CM

## 2025-06-06 DIAGNOSIS — I50.22 CHRONIC SYSTOLIC (CONGESTIVE) HEART FAILURE (HCC): Primary | ICD-10-CM

## 2025-06-06 LAB
25(OH)D3 SERPL-MCNC: 33.3 NG/ML
ANION GAP SERPL CALCULATED.3IONS-SCNC: 13 MMOL/L (ref 3–16)
BUN SERPL-MCNC: 25 MG/DL (ref 7–20)
CALCIUM SERPL-MCNC: 9.2 MG/DL (ref 8.3–10.6)
CHLORIDE SERPL-SCNC: 103 MMOL/L (ref 99–110)
CO2 SERPL-SCNC: 27 MMOL/L (ref 21–32)
CREAT SERPL-MCNC: 1.6 MG/DL (ref 0.8–1.3)
DEPRECATED RDW RBC AUTO: 15.6 % (ref 12.4–15.4)
FERRITIN SERPL IA-MCNC: 302 NG/ML (ref 30–400)
GFR SERPLBLD CREATININE-BSD FMLA CKD-EPI: 47 ML/MIN/{1.73_M2}
GLUCOSE SERPL-MCNC: 84 MG/DL (ref 70–99)
HCT VFR BLD AUTO: 38.5 % (ref 40.5–52.5)
HGB BLD-MCNC: 12.8 G/DL (ref 13.5–17.5)
IRON SATN MFR SERPL: 26 % (ref 20–50)
IRON SERPL-MCNC: 94 UG/DL (ref 59–158)
MCH RBC QN AUTO: 28.2 PG (ref 26–34)
MCHC RBC AUTO-ENTMCNC: 33.3 G/DL (ref 31–36)
MCV RBC AUTO: 84.7 FL (ref 80–100)
NT-PROBNP SERPL-MCNC: ABNORMAL PG/ML (ref 0–124)
PLATELET # BLD AUTO: 169 K/UL (ref 135–450)
PMV BLD AUTO: 9 FL (ref 5–10.5)
POTASSIUM SERPL-SCNC: 3.9 MMOL/L (ref 3.5–5.1)
RBC # BLD AUTO: 4.55 M/UL (ref 4.2–5.9)
SODIUM SERPL-SCNC: 143 MMOL/L (ref 136–145)
TIBC SERPL-MCNC: 368 UG/DL (ref 260–445)
TSH SERPL DL<=0.005 MIU/L-ACNC: 2.34 UIU/ML (ref 0.27–4.2)
WBC # BLD AUTO: 3.5 K/UL (ref 4–11)

## 2025-06-06 PROCEDURE — 36415 COLL VENOUS BLD VENIPUNCTURE: CPT

## 2025-06-06 PROCEDURE — 82306 VITAMIN D 25 HYDROXY: CPT

## 2025-06-06 PROCEDURE — 82728 ASSAY OF FERRITIN: CPT

## 2025-06-06 PROCEDURE — 99214 OFFICE O/P EST MOD 30 MIN: CPT | Performed by: CLINICAL NURSE SPECIALIST

## 2025-06-06 PROCEDURE — 1123F ACP DISCUSS/DSCN MKR DOCD: CPT | Performed by: CLINICAL NURSE SPECIALIST

## 2025-06-06 PROCEDURE — 83540 ASSAY OF IRON: CPT

## 2025-06-06 PROCEDURE — 80048 BASIC METABOLIC PNL TOTAL CA: CPT

## 2025-06-06 PROCEDURE — 83880 ASSAY OF NATRIURETIC PEPTIDE: CPT

## 2025-06-06 PROCEDURE — 3074F SYST BP LT 130 MM HG: CPT | Performed by: CLINICAL NURSE SPECIALIST

## 2025-06-06 PROCEDURE — 83550 IRON BINDING TEST: CPT

## 2025-06-06 PROCEDURE — 84443 ASSAY THYROID STIM HORMONE: CPT

## 2025-06-06 PROCEDURE — 3078F DIAST BP <80 MM HG: CPT | Performed by: CLINICAL NURSE SPECIALIST

## 2025-06-06 PROCEDURE — 85027 COMPLETE CBC AUTOMATED: CPT

## 2025-06-06 RX ORDER — AMIODARONE HYDROCHLORIDE 200 MG/1
200 TABLET ORAL DAILY
Qty: 90 TABLET | Refills: 0 | Status: SHIPPED | OUTPATIENT
Start: 2025-06-06

## 2025-06-06 RX ORDER — METOLAZONE 5 MG/1
5 TABLET ORAL ONCE
Qty: 1 TABLET | Refills: 0 | Status: SHIPPED | OUTPATIENT
Start: 2025-06-06 | End: 2025-06-06

## 2025-06-06 SDOH — ECONOMIC STABILITY - HOUSING INSECURITY: HOMELESSNESS UNSPECIFIED: Z59.00

## 2025-06-06 NOTE — PROGRESS NOTES
[facility]  Progress Note    Primary Care Doctor:  Georgina Escalera, APRN - CNP    Chief Complaint   Patient presents with    3 Month Follow-Up    Congestive Heart Failure       History of Present Illness:  64 y.o. male with history of with history of chronic AF on eliquis, hypertension, systolic heart failure, BPH, , homeless      I had the pleasure of seeing Manoj Feliz in follow up for systolic heart failure.  He is ambulatory and by his self.  History of Present Illness  The patient presents for evaluation of fluid retention.    He reports a daily alarm from his device at 10:00 AM, which has been ongoing for several weeks. This has raised concerns about the potential depletion of the device's battery, particularly in light of his cardiac condition. He recalls a previous hospital visit due to the device's alarm, but it was determined not to be a cause for concern at that time. He has been compliant with his medication regimen and does not report any adverse symptoms. However, he has observed an increase in his weight, although it remains below 200 pounds. He also notes slight swelling in his ankles.  His device has never showed increased fluid (since Mid April) most likely the cause of the alarm as battery life is 3.5 years.    MEDICATIONS  Current: torsemide      Past Medical History:   has a past medical history of Acute combined systolic and diastolic congestive heart failure (HCC), Atrial fibrillation (HCC), CHF (congestive heart failure) (HCC), Hx of blood clots, and Hypertension.  Surgical History:   has a past surgical history that includes Insertable Cardiac Monitor (07/26/2019); Cardioversion (07/26/2019); Cardiac defibrillator placement (Left); and Prostate surgery (N/A, 3/9/2023).   Social History:   reports that he has never smoked. He has never been exposed to tobacco smoke. He has never used smokeless tobacco. He reports that he does not drink alcohol and does not use drugs.

## 2025-06-06 NOTE — PATIENT INSTRUCTIONS
Take the metolazone 5 mg once 30 minutes prior to next medications  Continue all other medications  Need to make an appt with device office for Monday  Blood work today  RTO in 2 months

## 2025-06-09 ENCOUNTER — RESULTS FOLLOW-UP (OUTPATIENT)
Dept: CARDIOLOGY CLINIC | Age: 65
End: 2025-06-09

## 2025-06-09 NOTE — TELEPHONE ENCOUNTER
----- Message from TRENT Luong - CNS sent at 6/9/2025  8:41 AM EDT -----  His fluid level was very elevated and I ordered metolazone which he was suppose to take  Call and see if he feels better   He should be seeing device clinic today  Thanks    Tried his friend Mireya COATES asking pt to call the office.  06/10/2025 Tried to reach mireya COATES for him to call if he has gotten I touch with the patient    Tried to reach Naldo he has no idea where patient is living now has not seen patient in a very long time.

## 2025-06-17 NOTE — TELEPHONE ENCOUNTER
LMOM for pt to call office back. Will mail letter today as we have tried reaching the pt unsuccessfully three times now.

## 2025-07-12 ENCOUNTER — HOSPITAL ENCOUNTER (OUTPATIENT)
Age: 65
Setting detail: OBSERVATION
Discharge: HOME | End: 2025-07-14
Attending: STUDENT IN AN ORGANIZED HEALTH CARE EDUCATION/TRAINING PROGRAM | Admitting: INTERNAL MEDICINE
Payer: COMMERCIAL

## 2025-07-12 ENCOUNTER — APPOINTMENT (OUTPATIENT)
Dept: CT IMAGING | Age: 65
End: 2025-07-12
Payer: COMMERCIAL

## 2025-07-12 ENCOUNTER — APPOINTMENT (OUTPATIENT)
Dept: GENERAL RADIOLOGY | Age: 65
End: 2025-07-12
Payer: COMMERCIAL

## 2025-07-12 DIAGNOSIS — N17.9 ACUTE KIDNEY INJURY SUPERIMPOSED ON CKD: ICD-10-CM

## 2025-07-12 DIAGNOSIS — R42 EPISODIC LIGHTHEADEDNESS: ICD-10-CM

## 2025-07-12 DIAGNOSIS — R79.89 ELEVATED BRAIN NATRIURETIC PEPTIDE (BNP) LEVEL: ICD-10-CM

## 2025-07-12 DIAGNOSIS — R07.9 CHEST PAIN, UNSPECIFIED TYPE: Primary | ICD-10-CM

## 2025-07-12 DIAGNOSIS — R79.89 ELEVATED TROPONIN: ICD-10-CM

## 2025-07-12 DIAGNOSIS — R06.02 SOB (SHORTNESS OF BREATH) ON EXERTION: ICD-10-CM

## 2025-07-12 DIAGNOSIS — N18.9 ACUTE KIDNEY INJURY SUPERIMPOSED ON CKD: ICD-10-CM

## 2025-07-12 DIAGNOSIS — I50.1 PULMONARY EDEMA CARDIAC CAUSE (HCC): ICD-10-CM

## 2025-07-12 DIAGNOSIS — R21 RASH AND OTHER NONSPECIFIC SKIN ERUPTION: ICD-10-CM

## 2025-07-12 DIAGNOSIS — J90 PLEURAL EFFUSION: ICD-10-CM

## 2025-07-12 DIAGNOSIS — R74.8 ELEVATED LIVER ENZYMES: ICD-10-CM

## 2025-07-12 DIAGNOSIS — K82.8 THICKENING OF WALL OF GALLBLADDER: ICD-10-CM

## 2025-07-12 LAB
ALBUMIN SERPL-MCNC: 4.1 G/DL (ref 3.4–5)
ALP SERPL-CCNC: 94 U/L (ref 40–129)
ALT SERPL-CCNC: 28 U/L (ref 10–40)
ANION GAP SERPL CALCULATED.3IONS-SCNC: 13 MMOL/L (ref 3–16)
AST SERPL-CCNC: 45 U/L (ref 15–37)
BASOPHILS # BLD: 0 K/UL (ref 0–0.2)
BASOPHILS NFR BLD: 0.9 %
BILIRUB DIRECT SERPL-MCNC: 1 MG/DL (ref 0–0.3)
BILIRUB INDIRECT SERPL-MCNC: 1.7 MG/DL (ref 0–1)
BILIRUB SERPL-MCNC: 2.7 MG/DL (ref 0–1)
BUN SERPL-MCNC: 28 MG/DL (ref 7–20)
CALCIUM SERPL-MCNC: 9.2 MG/DL (ref 8.3–10.6)
CHLORIDE SERPL-SCNC: 102 MMOL/L (ref 99–110)
CO2 SERPL-SCNC: 26 MMOL/L (ref 21–32)
CREAT SERPL-MCNC: 1.5 MG/DL (ref 0.8–1.3)
DEPRECATED RDW RBC AUTO: 15.5 % (ref 12.4–15.4)
EOSINOPHIL # BLD: 0.1 K/UL (ref 0–0.6)
EOSINOPHIL NFR BLD: 2.5 %
GFR SERPLBLD CREATININE-BSD FMLA CKD-EPI: 51 ML/MIN/{1.73_M2}
GLUCOSE SERPL-MCNC: 89 MG/DL (ref 70–99)
HCT VFR BLD AUTO: 41.2 % (ref 40.5–52.5)
HGB BLD-MCNC: 13.9 G/DL (ref 13.5–17.5)
LIPASE SERPL-CCNC: 39 U/L (ref 13–60)
LYMPHOCYTES # BLD: 0.9 K/UL (ref 1–5.1)
LYMPHOCYTES NFR BLD: 22.5 %
MAGNESIUM SERPL-MCNC: 2.25 MG/DL (ref 1.8–2.4)
MCH RBC QN AUTO: 28.4 PG (ref 26–34)
MCHC RBC AUTO-ENTMCNC: 33.7 G/DL (ref 31–36)
MCV RBC AUTO: 84.3 FL (ref 80–100)
MONOCYTES # BLD: 0.4 K/UL (ref 0–1.3)
MONOCYTES NFR BLD: 9.9 %
NEUTROPHILS # BLD: 2.7 K/UL (ref 1.7–7.7)
NEUTROPHILS NFR BLD: 64.2 %
NT-PROBNP SERPL-MCNC: ABNORMAL PG/ML (ref 0–124)
PLATELET # BLD AUTO: 198 K/UL (ref 135–450)
PMV BLD AUTO: 7.7 FL (ref 5–10.5)
POTASSIUM SERPL-SCNC: 3.5 MMOL/L (ref 3.5–5.1)
PROT SERPL-MCNC: 6.8 G/DL (ref 6.4–8.2)
RBC # BLD AUTO: 4.89 M/UL (ref 4.2–5.9)
SODIUM SERPL-SCNC: 141 MMOL/L (ref 136–145)
TROPONIN, HIGH SENSITIVITY: 29 NG/L (ref 0–22)
TROPONIN, HIGH SENSITIVITY: 30 NG/L (ref 0–22)
WBC # BLD AUTO: 4.2 K/UL (ref 4–11)

## 2025-07-12 PROCEDURE — 80048 BASIC METABOLIC PNL TOTAL CA: CPT

## 2025-07-12 PROCEDURE — 6360000002 HC RX W HCPCS: Performed by: PHYSICIAN ASSISTANT

## 2025-07-12 PROCEDURE — 85025 COMPLETE CBC W/AUTO DIFF WBC: CPT

## 2025-07-12 PROCEDURE — 6360000004 HC RX CONTRAST MEDICATION: Performed by: PHYSICIAN ASSISTANT

## 2025-07-12 PROCEDURE — 71045 X-RAY EXAM CHEST 1 VIEW: CPT

## 2025-07-12 PROCEDURE — 99285 EMERGENCY DEPT VISIT HI MDM: CPT

## 2025-07-12 PROCEDURE — 83880 ASSAY OF NATRIURETIC PEPTIDE: CPT

## 2025-07-12 PROCEDURE — 36415 COLL VENOUS BLD VENIPUNCTURE: CPT

## 2025-07-12 PROCEDURE — 6370000000 HC RX 637 (ALT 250 FOR IP): Performed by: PHYSICIAN ASSISTANT

## 2025-07-12 PROCEDURE — 96374 THER/PROPH/DIAG INJ IV PUSH: CPT

## 2025-07-12 PROCEDURE — 93005 ELECTROCARDIOGRAM TRACING: CPT | Performed by: PHYSICIAN ASSISTANT

## 2025-07-12 PROCEDURE — 83735 ASSAY OF MAGNESIUM: CPT

## 2025-07-12 PROCEDURE — 80076 HEPATIC FUNCTION PANEL: CPT

## 2025-07-12 PROCEDURE — 74177 CT ABD & PELVIS W/CONTRAST: CPT

## 2025-07-12 PROCEDURE — 83690 ASSAY OF LIPASE: CPT

## 2025-07-12 PROCEDURE — 84484 ASSAY OF TROPONIN QUANT: CPT

## 2025-07-12 PROCEDURE — 96375 TX/PRO/DX INJ NEW DRUG ADDON: CPT

## 2025-07-12 RX ORDER — ONDANSETRON 2 MG/ML
4 INJECTION INTRAMUSCULAR; INTRAVENOUS ONCE
Status: COMPLETED | OUTPATIENT
Start: 2025-07-12 | End: 2025-07-12

## 2025-07-12 RX ORDER — ASPIRIN 81 MG/1
324 TABLET, CHEWABLE ORAL ONCE
Status: COMPLETED | OUTPATIENT
Start: 2025-07-13 | End: 2025-07-12

## 2025-07-12 RX ORDER — IOPAMIDOL 755 MG/ML
75 INJECTION, SOLUTION INTRAVASCULAR
Status: COMPLETED | OUTPATIENT
Start: 2025-07-12 | End: 2025-07-12

## 2025-07-12 RX ORDER — FENTANYL CITRATE 50 UG/ML
50 INJECTION, SOLUTION INTRAMUSCULAR; INTRAVENOUS ONCE
Refills: 0 | Status: COMPLETED | OUTPATIENT
Start: 2025-07-12 | End: 2025-07-12

## 2025-07-12 RX ADMIN — IOPAMIDOL 75 ML: 755 INJECTION, SOLUTION INTRAVENOUS at 23:06

## 2025-07-12 RX ADMIN — ONDANSETRON 4 MG: 2 INJECTION, SOLUTION INTRAMUSCULAR; INTRAVENOUS at 22:19

## 2025-07-12 RX ADMIN — ASPIRIN 324 MG: 81 TABLET, CHEWABLE ORAL at 23:53

## 2025-07-12 RX ADMIN — FENTANYL CITRATE 50 MCG: 50 INJECTION, SOLUTION INTRAMUSCULAR; INTRAVENOUS at 22:19

## 2025-07-12 ASSESSMENT — PAIN SCALES - GENERAL
PAINLEVEL_OUTOF10: 0
PAINLEVEL_OUTOF10: 7
PAINLEVEL_OUTOF10: 6

## 2025-07-12 ASSESSMENT — PAIN DESCRIPTION - LOCATION
LOCATION: ABDOMEN
LOCATION: ABDOMEN

## 2025-07-12 ASSESSMENT — PAIN - FUNCTIONAL ASSESSMENT: PAIN_FUNCTIONAL_ASSESSMENT: 0-10

## 2025-07-12 ASSESSMENT — HEART SCORE: ECG: NON-SPECIFC REPOLARIZATION DISTURBANCE/LBTB/PM

## 2025-07-12 ASSESSMENT — PAIN DESCRIPTION - DESCRIPTORS: DESCRIPTORS: ACHING

## 2025-07-13 PROBLEM — R07.9 CHEST PAIN: Status: ACTIVE | Noted: 2025-07-13

## 2025-07-13 LAB
ANION GAP SERPL CALCULATED.3IONS-SCNC: 12 MMOL/L (ref 3–16)
BACTERIA URNS QL MICRO: NORMAL /HPF
BILIRUB UR QL STRIP.AUTO: NEGATIVE
BUN SERPL-MCNC: 27 MG/DL (ref 7–20)
CALCIUM SERPL-MCNC: 8.9 MG/DL (ref 8.3–10.6)
CHLORIDE SERPL-SCNC: 102 MMOL/L (ref 99–110)
CHOLEST SERPL-MCNC: 104 MG/DL (ref 0–199)
CLARITY UR: CLEAR
CO2 SERPL-SCNC: 27 MMOL/L (ref 21–32)
COLOR UR: ABNORMAL
CREAT SERPL-MCNC: 1.4 MG/DL (ref 0.8–1.3)
DEPRECATED RDW RBC AUTO: 15.2 % (ref 12.4–15.4)
EKG ATRIAL RATE: 85 BPM
EKG DIAGNOSIS: NORMAL
EKG Q-T INTERVAL: 526 MS
EKG QRS DURATION: 180 MS
EKG QTC CALCULATION (BAZETT): 583 MS
EKG R AXIS: -60 DEGREES
EKG T AXIS: 89 DEGREES
EKG VENTRICULAR RATE: 74 BPM
EPI CELLS #/AREA URNS AUTO: 0 /HPF (ref 0–5)
GFR SERPLBLD CREATININE-BSD FMLA CKD-EPI: 55 ML/MIN/{1.73_M2}
GLUCOSE SERPL-MCNC: 91 MG/DL (ref 70–99)
GLUCOSE UR STRIP.AUTO-MCNC: NEGATIVE MG/DL
HCT VFR BLD AUTO: 39 % (ref 40.5–52.5)
HDLC SERPL-MCNC: 59 MG/DL (ref 40–60)
HGB BLD-MCNC: 13 G/DL (ref 13.5–17.5)
HGB UR QL STRIP.AUTO: NEGATIVE
HYALINE CASTS #/AREA URNS AUTO: 3 /LPF (ref 0–8)
KETONES UR STRIP.AUTO-MCNC: NEGATIVE MG/DL
LDLC SERPL CALC-MCNC: 34 MG/DL
LEUKOCYTE ESTERASE UR QL STRIP.AUTO: NEGATIVE
MAGNESIUM SERPL-MCNC: 2.7 MG/DL (ref 1.8–2.4)
MCH RBC QN AUTO: 28.2 PG (ref 26–34)
MCHC RBC AUTO-ENTMCNC: 33.2 G/DL (ref 31–36)
MCV RBC AUTO: 84.8 FL (ref 80–100)
NITRITE UR QL STRIP.AUTO: NEGATIVE
PH UR STRIP.AUTO: 5 [PH] (ref 5–8)
PLATELET # BLD AUTO: 181 K/UL (ref 135–450)
PMV BLD AUTO: 7.9 FL (ref 5–10.5)
POTASSIUM SERPL-SCNC: 3.5 MMOL/L (ref 3.5–5.1)
PROT UR STRIP.AUTO-MCNC: 30 MG/DL
RBC # BLD AUTO: 4.6 M/UL (ref 4.2–5.9)
RBC CLUMPS #/AREA URNS AUTO: 0 /HPF (ref 0–4)
SODIUM SERPL-SCNC: 141 MMOL/L (ref 136–145)
SP GR UR STRIP.AUTO: >=1.03 (ref 1–1.03)
TRIGL SERPL-MCNC: 54 MG/DL (ref 0–150)
UA COMPLETE W REFLEX CULTURE PNL UR: ABNORMAL
UA DIPSTICK W REFLEX MICRO PNL UR: YES
URN SPEC COLLECT METH UR: ABNORMAL
UROBILINOGEN UR STRIP-ACNC: 1 E.U./DL
VLDLC SERPL CALC-MCNC: 11 MG/DL
WBC # BLD AUTO: 4 K/UL (ref 4–11)
WBC #/AREA URNS AUTO: 1 /HPF (ref 0–5)

## 2025-07-13 PROCEDURE — G0378 HOSPITAL OBSERVATION PER HR: HCPCS

## 2025-07-13 PROCEDURE — 6360000002 HC RX W HCPCS: Performed by: STUDENT IN AN ORGANIZED HEALTH CARE EDUCATION/TRAINING PROGRAM

## 2025-07-13 PROCEDURE — 99222 1ST HOSP IP/OBS MODERATE 55: CPT | Performed by: STUDENT IN AN ORGANIZED HEALTH CARE EDUCATION/TRAINING PROGRAM

## 2025-07-13 PROCEDURE — 93010 ELECTROCARDIOGRAM REPORT: CPT | Performed by: INTERNAL MEDICINE

## 2025-07-13 PROCEDURE — 83735 ASSAY OF MAGNESIUM: CPT

## 2025-07-13 PROCEDURE — 80048 BASIC METABOLIC PNL TOTAL CA: CPT

## 2025-07-13 PROCEDURE — 6370000000 HC RX 637 (ALT 250 FOR IP): Performed by: PHYSICIAN ASSISTANT

## 2025-07-13 PROCEDURE — 96375 TX/PRO/DX INJ NEW DRUG ADDON: CPT

## 2025-07-13 PROCEDURE — 85027 COMPLETE CBC AUTOMATED: CPT

## 2025-07-13 PROCEDURE — 81001 URINALYSIS AUTO W/SCOPE: CPT

## 2025-07-13 PROCEDURE — 36415 COLL VENOUS BLD VENIPUNCTURE: CPT

## 2025-07-13 PROCEDURE — 80061 LIPID PANEL: CPT

## 2025-07-13 PROCEDURE — 2500000003 HC RX 250 WO HCPCS: Performed by: PHYSICIAN ASSISTANT

## 2025-07-13 RX ORDER — LISINOPRIL 5 MG/1
2.5 TABLET ORAL DAILY
Status: DISCONTINUED | OUTPATIENT
Start: 2025-07-13 | End: 2025-07-14 | Stop reason: HOSPADM

## 2025-07-13 RX ORDER — FUROSEMIDE 10 MG/ML
40 INJECTION INTRAMUSCULAR; INTRAVENOUS DAILY
Status: COMPLETED | OUTPATIENT
Start: 2025-07-13 | End: 2025-07-13

## 2025-07-13 RX ORDER — POTASSIUM CHLORIDE 1500 MG/1
40 TABLET, EXTENDED RELEASE ORAL PRN
Status: DISCONTINUED | OUTPATIENT
Start: 2025-07-13 | End: 2025-07-14 | Stop reason: HOSPADM

## 2025-07-13 RX ORDER — FUROSEMIDE 10 MG/ML
40 INJECTION INTRAMUSCULAR; INTRAVENOUS DAILY
Status: DISCONTINUED | OUTPATIENT
Start: 2025-07-13 | End: 2025-07-13

## 2025-07-13 RX ORDER — POTASSIUM CHLORIDE 7.45 MG/ML
10 INJECTION INTRAVENOUS PRN
Status: DISCONTINUED | OUTPATIENT
Start: 2025-07-13 | End: 2025-07-14 | Stop reason: HOSPADM

## 2025-07-13 RX ORDER — SENNOSIDES 8.6 MG/1
1 TABLET ORAL DAILY PRN
Status: DISCONTINUED | OUTPATIENT
Start: 2025-07-13 | End: 2025-07-14 | Stop reason: HOSPADM

## 2025-07-13 RX ORDER — ACETAMINOPHEN 325 MG/1
650 TABLET ORAL EVERY 6 HOURS PRN
Status: DISCONTINUED | OUTPATIENT
Start: 2025-07-13 | End: 2025-07-14 | Stop reason: HOSPADM

## 2025-07-13 RX ORDER — TORSEMIDE 20 MG/1
40 TABLET ORAL 2 TIMES DAILY
Status: DISCONTINUED | OUTPATIENT
Start: 2025-07-13 | End: 2025-07-13

## 2025-07-13 RX ORDER — ACETAMINOPHEN 650 MG/1
650 SUPPOSITORY RECTAL EVERY 6 HOURS PRN
Status: DISCONTINUED | OUTPATIENT
Start: 2025-07-13 | End: 2025-07-14 | Stop reason: HOSPADM

## 2025-07-13 RX ORDER — FUROSEMIDE 10 MG/ML
80 INJECTION INTRAMUSCULAR; INTRAVENOUS DAILY
Status: DISCONTINUED | OUTPATIENT
Start: 2025-07-14 | End: 2025-07-14

## 2025-07-13 RX ORDER — SODIUM CHLORIDE 0.9 % (FLUSH) 0.9 %
10 SYRINGE (ML) INJECTION EVERY 12 HOURS SCHEDULED
Status: DISCONTINUED | OUTPATIENT
Start: 2025-07-13 | End: 2025-07-14 | Stop reason: HOSPADM

## 2025-07-13 RX ORDER — SPIRONOLACTONE 25 MG/1
25 TABLET ORAL DAILY
Status: DISCONTINUED | OUTPATIENT
Start: 2025-07-13 | End: 2025-07-14 | Stop reason: HOSPADM

## 2025-07-13 RX ORDER — METOLAZONE 2.5 MG/1
2.5 TABLET ORAL DAILY
Status: DISCONTINUED | OUTPATIENT
Start: 2025-07-13 | End: 2025-07-14 | Stop reason: HOSPADM

## 2025-07-13 RX ORDER — METOPROLOL SUCCINATE 25 MG/1
12.5 TABLET, EXTENDED RELEASE ORAL 2 TIMES DAILY
Status: DISCONTINUED | OUTPATIENT
Start: 2025-07-13 | End: 2025-07-14 | Stop reason: HOSPADM

## 2025-07-13 RX ORDER — ATORVASTATIN CALCIUM 40 MG/1
40 TABLET, FILM COATED ORAL NIGHTLY
Status: DISCONTINUED | OUTPATIENT
Start: 2025-07-13 | End: 2025-07-14 | Stop reason: HOSPADM

## 2025-07-13 RX ORDER — SODIUM CHLORIDE 0.9 % (FLUSH) 0.9 %
10 SYRINGE (ML) INJECTION PRN
Status: DISCONTINUED | OUTPATIENT
Start: 2025-07-13 | End: 2025-07-14 | Stop reason: HOSPADM

## 2025-07-13 RX ORDER — AMIODARONE HYDROCHLORIDE 200 MG/1
200 TABLET ORAL DAILY
Status: DISCONTINUED | OUTPATIENT
Start: 2025-07-13 | End: 2025-07-14 | Stop reason: HOSPADM

## 2025-07-13 RX ORDER — ALBUTEROL SULFATE 90 UG/1
2 INHALANT RESPIRATORY (INHALATION) EVERY 6 HOURS PRN
Status: DISCONTINUED | OUTPATIENT
Start: 2025-07-13 | End: 2025-07-14 | Stop reason: HOSPADM

## 2025-07-13 RX ORDER — GUAIFENESIN 200 MG/10ML
LIQUID ORAL 2 TIMES DAILY
Status: DISCONTINUED | OUTPATIENT
Start: 2025-07-13 | End: 2025-07-13

## 2025-07-13 RX ORDER — PANTOPRAZOLE SODIUM 40 MG/1
40 TABLET, DELAYED RELEASE ORAL
Status: DISCONTINUED | OUTPATIENT
Start: 2025-07-13 | End: 2025-07-14 | Stop reason: HOSPADM

## 2025-07-13 RX ORDER — MAGNESIUM SULFATE IN WATER 40 MG/ML
2000 INJECTION, SOLUTION INTRAVENOUS PRN
Status: DISCONTINUED | OUTPATIENT
Start: 2025-07-13 | End: 2025-07-14 | Stop reason: HOSPADM

## 2025-07-13 RX ORDER — SODIUM CHLORIDE 9 MG/ML
INJECTION, SOLUTION INTRAVENOUS PRN
Status: DISCONTINUED | OUTPATIENT
Start: 2025-07-13 | End: 2025-07-14 | Stop reason: HOSPADM

## 2025-07-13 RX ORDER — ONDANSETRON 2 MG/ML
4 INJECTION INTRAMUSCULAR; INTRAVENOUS EVERY 6 HOURS PRN
Status: DISCONTINUED | OUTPATIENT
Start: 2025-07-13 | End: 2025-07-14 | Stop reason: HOSPADM

## 2025-07-13 RX ORDER — DIPHENHYDRAMINE HCL 25 MG
25 TABLET ORAL EVERY 6 HOURS PRN
Status: DISCONTINUED | OUTPATIENT
Start: 2025-07-13 | End: 2025-07-14 | Stop reason: HOSPADM

## 2025-07-13 RX ORDER — NITROGLYCERIN 0.4 MG/1
0.4 TABLET SUBLINGUAL EVERY 5 MIN PRN
Status: DISCONTINUED | OUTPATIENT
Start: 2025-07-13 | End: 2025-07-14 | Stop reason: HOSPADM

## 2025-07-13 RX ORDER — MIDODRINE HYDROCHLORIDE 5 MG/1
5 TABLET ORAL
Status: CANCELLED | OUTPATIENT
Start: 2025-07-13

## 2025-07-13 RX ADMIN — PANTOPRAZOLE SODIUM 40 MG: 40 TABLET, DELAYED RELEASE ORAL at 16:53

## 2025-07-13 RX ADMIN — AMIODARONE HYDROCHLORIDE 200 MG: 200 TABLET ORAL at 09:45

## 2025-07-13 RX ADMIN — SODIUM CHLORIDE, PRESERVATIVE FREE 10 ML: 5 INJECTION INTRAVENOUS at 20:30

## 2025-07-13 RX ADMIN — TORSEMIDE 40 MG: 20 TABLET ORAL at 09:45

## 2025-07-13 RX ADMIN — ATORVASTATIN CALCIUM 40 MG: 40 TABLET, FILM COATED ORAL at 20:31

## 2025-07-13 RX ADMIN — PANTOPRAZOLE SODIUM 40 MG: 40 TABLET, DELAYED RELEASE ORAL at 06:02

## 2025-07-13 RX ADMIN — APIXABAN 5 MG: 5 TABLET, FILM COATED ORAL at 09:45

## 2025-07-13 RX ADMIN — METOPROLOL SUCCINATE 12.5 MG: 25 TABLET, EXTENDED RELEASE ORAL at 20:30

## 2025-07-13 RX ADMIN — FUROSEMIDE 40 MG: 10 INJECTION, SOLUTION INTRAMUSCULAR; INTRAVENOUS at 11:39

## 2025-07-13 RX ADMIN — DIPHENHYDRAMINE HYDROCHLORIDE 25 MG: 25 TABLET ORAL at 06:02

## 2025-07-13 RX ADMIN — APIXABAN 5 MG: 5 TABLET, FILM COATED ORAL at 20:31

## 2025-07-13 RX ADMIN — EMPAGLIFLOZIN 10 MG: 10 TABLET, FILM COATED ORAL at 09:45

## 2025-07-13 ASSESSMENT — PAIN SCALES - GENERAL
PAINLEVEL_OUTOF10: 0

## 2025-07-13 NOTE — PROGRESS NOTES
4 Eyes Skin Assessment     NAME:  Manoj Feliz  YOB: 1960  MEDICAL RECORD NUMBER:  [id number]    The patient is being assessed for  Admission    I agree that at least one RN has performed a thorough Head to Toe Skin Assessment on the patient. ALL assessment sites listed below have been assessed.      Areas assessed by both nurses:            Does the Patient have a Wound? No noted wound(s)       J Carlos Prevention initiated by RN: No  Wound Care Orders initiated by RN: No    Pressure Injury (Stage 1,2,3,4, Unstageable, DTI, NWPT, and Complex wounds) if present, place Wound referral order by RN under : No    New Ostomies, if present place, Ostomy referral order under : No     Nurse 1 eSignature: Electronically signed by Paris Nova RN on 7/13/25 at 5:52 AM EDT    **SHARE this note so that the co-signing nurse can place an eSignature**    Nurse 2 eSignature: Electronically signed by Johanna Santos RN on 7/13/25 at 6:49 AM EDT    Pt had elevated oral temp 4/22 of 99.2 at 1853. Oral temp was rechecked at 2110, oral temp was 101. Wife asked to check rectal temp. Rectal temp was 102.7 at 2116.

## 2025-07-13 NOTE — PROGRESS NOTES
Shift assessment completed. Routine vitals stable, BP soft MD notified. Scheduled medications given, BP meds held d/t low BP. Patient is awake, alert and oriented x4. Respirations are easy and unlabored, pt expresses mild orthopnea when lying down, cardiology notified. Patient does not appear to be in distress, resting comfortably at this time. Call light within reach. Denies needs at this time. Pain 0/10 at this time per patient . POC discussed, pt agreeable. Rash noted to BUE and face, pt states he was outside and may have touched something that caused it. Mild peripheral edema noted on assessment, cardiology notified.    Satya Gordon, BSN RN

## 2025-07-13 NOTE — PLAN OF CARE
Problem: Chronic Conditions and Co-morbidities  Goal: Patient's chronic conditions and co-morbidity symptoms are monitored and maintained or improved  Outcome: Progressing     Problem: Discharge Planning  Goal: Discharge to home or other facility with appropriate resources  Outcome: Progressing     Problem: Respiratory - Adult  Goal: Achieves optimal ventilation and oxygenation  Outcome: Progressing     Problem: Cardiovascular - Adult  Goal: Maintains optimal cardiac output and hemodynamic stability  Outcome: Progressing  Goal: Absence of cardiac dysrhythmias or at baseline  Outcome: Progressing     Problem: Skin/Tissue Integrity - Adult  Goal: Skin integrity remains intact  Outcome: Progressing  Goal: Incisions, wounds, or drain sites healing without S/S of infection  Outcome: Progressing  Goal: Oral mucous membranes remain intact  Outcome: Progressing     Problem: Metabolic/Fluid and Electrolytes - Adult  Goal: Electrolytes maintained within normal limits  Outcome: Progressing  Goal: Hemodynamic stability and optimal renal function maintained  Outcome: Progressing

## 2025-07-13 NOTE — H&P
[facility] Medicine History & Physical        Name:  Manoj Feliz /Age/Sex: 1960  (64 y.o. male)   MRN & CSN:  [id number] & [id number] Encounter Date/Time: 2025 12:02 AM EDT   Location:  ED- PCP: Georgina Escalera APRN - CNP         CHIEF COMPLAINT:   Chief Complaint   Patient presents with    Illness     Pt reports to the ED from home and states he is having abd pain and has a rash on bilateral arms. States he's having nausea too.          HISTORY OF PRESENT ILLNESS:      History from: patient  Limitations to history : None     Manoj Feliz is a 64 y.o. male who presented to ED for evaluation with multiple complaints.  Patient reports he has had past 2-3 days he has had epigastric abdominal pain, nausea, vomiting, chest pressure, and shortness of breath with exertion.  He repots has has felt generally weak and occasionally feels lightheaded if he stands too quickly.  Patient reports he also has an itchy rash to his arms that has been present for the last week.  He reports that he has been outside and is concerned for poison ivy.  He reports rash has been slowly improving and itchiness has significantly lessened since arrival to ED.  He denies fever, cough, extremity edema, diarrhea, constipation, urinary symptoms.  He denies any recent fall or head trauma, neck pain or stiffness, changes in vision, difficulty swallowing, numbness/tingling extremities, focal weakness.  He denies any other complaints or concerns at this time.    Workup initiated in ED.  Labs notable for troponin 30 ? 29, NT proBNP 19,147, Total bilirubin 2.7 (direct 1.0, indirect 1.7), creatinine 1.5.  These labs are stable compared to previous and ~ baseline for patient.  CT chest/abdomen/pelvis negative for acute abnormality; notable for nonspecific gallbladder wall thickening possibly related to chronic gallbladder disease or fluid overload state.      REVIEW OF SYSTEMS:   Pertinent positives as noted in the

## 2025-07-13 NOTE — ED PROVIDER NOTES
[facility] EMERGENCY DEPARTMENT  EMERGENCY DEPARTMENT ENCOUNTER        Pt Name: Manoj Feliz  MRN: [id number]  Birthdate 1960  Date of evaluation: 7/12/2025  Provider: Patricio Lopez PA-C  PCP: Georgina Escalera APRN - CNP  Note Started: 9:47 PM EDT 7/12/25       I have seen and evaluated this patient with my supervising physician Fermin Torrez DO.      CHIEF COMPLAINT       Chief Complaint   Patient presents with    Illness     Pt reports to the ED from home and states he is having abd pain and has a rash on bilateral arms. States he's having nausea too.        HISTORY OF PRESENT ILLNESS: 1 or more Elements     History From: Patient    Limitations to history : None    Social Determinants Significantly Affecting Health : None    Chief Complaint: Rash, abdominal pain, nausea and vomiting, chest pressure, shortness of breath, generalized weakness and lightheadedness    Manoj Feliz is a 64 y.o. male with a history of hypertension, hyperlipidemia, CHF, atrial fibrillation, chronic anticoagulation with Eliquis, ventricular tachycardia, ICD, and CKD who presents to the emergency department today complaining of an itchy rash on his arms only for the last week.  Patient states he has been outside and is concerned this could be poison ivy.  Patient states for the last 2 to 3 days he has been having epigastric abdominal pain, nausea, vomiting, chest pressure, shortness of breath with exertion, generalized weakness and even lightheadedness when he stands up too quickly.  Patient denies headache, dizziness, vision changes, numbness, tingling or unilateral weakness in his extremities.  He denies fevers, chills, coughing or recent illness.  He denies diarrhea or constipation.  He has no urinary complaints.        Nursing Notes were all reviewed and agreed with or any disagreements were addressed in the HPI.    REVIEW OF SYSTEMS :      Review of Systems   Constitutional:  Negative for chills and

## 2025-07-13 NOTE — PROGRESS NOTES
07/13/25 0151   RT Protocol   History Pulmonary Disease 0   Respiratory pattern 2   Breath sounds 0   Cough 0   Indications for Bronchodilator Therapy On home bronchodilators   Bronchodilator Assessment Score 2

## 2025-07-13 NOTE — CONSULTS
[facility]    2025    Manoj Feliz (:  1960) is a 64 y.o. male    Referring Provider: Georgina Escalera APRN - CNP    HISTORY:  64M hx a fib on Eliquis, HTN, end stage NICM LVEF 10-15%, Severe MR admitted with multiple complaints including skin rash, dyspnea, CP. It was actually more of the rash than anything that brought him in as his CP and dyspnea is chronic. It worsens when laying flat. He does not have profound edema, but feels his legs are a bit worse than normal. He also has weakness/lightheadedness, nausea, vomiting as well. His abdomen was giving him a lot of pain, and then the rash showed up so he felt like he should come in. He also states the weather has been so hot that he has been drinking more fluid than normal. He says his housing situation is okay at this point, but he does not have A/C.     REVIEW OF SYSTEMS:  A complete review of systems was reviewed and is negative except as noted in the history of present illness.    Prior to Visit Medications    Medication Sig Taking? Authorizing Provider   amiodarone (CORDARONE) 200 MG tablet TAKE ONE TABLET BY MOUTH ONCE A DAY Yes Dago Preston APRN - CNP   metoprolol succinate (TOPROL XL) 25 MG extended release tablet Take 0.5 tablets by mouth in the morning and at bedtime Yes Deirdre Das APRN - CNS   spironolactone (ALDACTONE) 25 MG tablet Take 1 tablet by mouth daily Yes Deirdre Das APRN - CNS   torsemide (DEMADEX) 20 MG tablet 40 mg twice a day Yes Deirdre Das APRN - CNS   empagliflozin (JARDIANCE) 10 MG tablet Take 1 tablet by mouth daily Yes Deirdre Das APRN - CNS   vitamin D (ERGOCALCIFEROL) 1.25 MG (25978 UT) CAPS capsule Take 1 capsule by mouth once a week Yes Deirdre Das APRN - CNS   apixaban (ELIQUIS) 5 MG TABS tablet Take 1 tablet by mouth 2 times daily Yes Dago Preston APRN - CNP   lisinopril (PRINIVIL;ZESTRIL) 2.5 MG tablet Take 1 tablet by mouth daily Yes Pippa

## 2025-07-13 NOTE — RT PROTOCOL NOTE
7-10 - enter or revise RT Bronchodilator order(s) to two equivalent RT bronchodilator orders with one order with TID Frequency and one order with Frequency of every 4 hours PRN wheezing or increased work of breathing using Per Protocol order mode.       11-13 - enter or revise RT Bronchodilator order(s) to one equivalent RT bronchodilator order with QID Frequency and an Albuterol order with Frequency of every 4 hours PRN wheezing or increased work of breathing using Per Protocol order mode.      Greater than 13 - enter or revise RT Bronchodilator order(s) to one equivalent RT bronchodilator order with every 4 hours Frequency and an Albuterol order with Frequency of every 2 hours PRN wheezing or increased work of breathing using Per Protocol order mode.     RT to enter RT Home Evaluation for COPD & MDI Assessment order using Per Protocol order mode.    Electronically signed by Rita Hernandez RCP on 7/13/2025 at 1:53 AM

## 2025-07-13 NOTE — ED PROVIDER NOTES
EMERGENCY DEPARTMENT PROVIDER NOTE         PATIENT IDENTIFICATION     Name:   Manoj Feliz  MRN:   [id number]  YOB: 1960  Date of Evaluation:   2025  Provider:   BISI Alicea; Fermin Torrez DO  PCP:   Georgina Escalera APRN - CNP        CHIEF COMPLAINT     Illness (Pt reports to the ED from home and states he is having abd pain and has a rash on bilateral arms. States he's having nausea too. )        HISTORY OF PRESENT ILLNESS     I independently interviewed patient and/or caretaker(s).  See Advanced Practice Provider (SHONA) note for full HPI.  In summary, Manoj Feliz  is a(n) 64 y.o. male who presents with numerous complaints.  States he has had chest pain and dyspnea on exertion.  Long history of cardiac symptoms including CHF and CKD.        PHYSICAL EXAM     I reviewed physical exam performed and documented by SHONA.  I performed an independent physical examination with findings as follows:  Overall well-appearing male with no respiratory distress on room air.        EKG INTERPRETATION     TIME:     RATE:   74 bpm  HI INTERVAL:   * ms  QRS DURATION:   180 ms  QT/QTc:   526/583 ms  RHYTHM:   Paced rhythm  AXIS:   Regular  ABNORMALITIES  No STEMI per Sgarbossa criteria    PRIOR EK25- current EKG without significant changes when compared to prior    INTERPRETATION:  Nonspecific, unchanged from prior    REVIEWED BY:   Fermin Torrez Jr., DO    EKG was independently reviewed by emergency department physician in absence of cardiologist.        LAB RESULTS     Results for orders placed or performed during the hospital encounter of 25   CBC with Auto Differential   Result Value Ref Range    WBC 4.2 4.0 - 11.0 K/uL    RBC 4.89 4.20 - 5.90 M/uL    Hemoglobin 13.9 13.5 - 17.5 g/dL    Hematocrit 41.2 40.5 - 52.5 %    MCV 84.3 80.0 - 100.0 fL    MCH 28.4 26.0 - 34.0 pg    MCHC 33.7 31.0 - 36.0 g/dL    RDW 15.5 (H) 12.4 - 15.4 %    Platelets 198 135 - 450 K/uL    MPV

## 2025-07-13 NOTE — ED NOTES
Patient Name: Manoj Feliz  : 1960 64 y.o.  MRN: [id number]  ED Room #: ED-0007/     Chief complaint:   Chief Complaint   Patient presents with    Illness     Pt reports to the ED from home and states he is having abd pain and has a rash on bilateral arms. States he's having nausea too.      Hospital Problem/Diagnosis:   Hospital Problems           Last Modified POA    * (Principal) Chest pain 2025 Yes         O2 Flow Rate:O2 Device: None (Room air)   (if applicable)  Cardiac Rhythm:   (if applicable)  Active LDA's:   Peripheral IV 25 Distal;Right Cephalic (Active)            How does patient ambulate? Stand by assist    2. How does patient take pills? Whole with Water    3. Is patient alert? Alert    4. Is patient oriented? To Person, To Place, To Time, To Situation, and Follows Commands    5.   Patient arrived from:  home  Facility Name: ___________________________________________    6. If patient is disoriented or from a Skill Nursing Facility has family been notified of admission? No    7. Patient belongings? Belongings: Clothing    Disposition of belongings? Kept with Patient     8. Any specific patient or family belongings/needs/dynamics?   a. N/a    9. Miscellaneous comments/pending orders?  a. Urine sample       If there are any additional questions please reach out to the Emergency Department.

## 2025-07-14 VITALS
BODY MASS INDEX: 25.18 KG/M2 | OXYGEN SATURATION: 94 % | SYSTOLIC BLOOD PRESSURE: 116 MMHG | WEIGHT: 196.2 LBS | TEMPERATURE: 97.2 F | HEIGHT: 74 IN | RESPIRATION RATE: 17 BRPM | HEART RATE: 84 BPM | DIASTOLIC BLOOD PRESSURE: 82 MMHG

## 2025-07-14 PROBLEM — R06.02 SOB (SHORTNESS OF BREATH) ON EXERTION: Status: ACTIVE | Noted: 2025-07-14

## 2025-07-14 LAB
ANION GAP SERPL CALCULATED.3IONS-SCNC: 10 MMOL/L (ref 3–16)
BUN SERPL-MCNC: 26 MG/DL (ref 7–20)
CALCIUM SERPL-MCNC: 8.7 MG/DL (ref 8.3–10.6)
CHLORIDE SERPL-SCNC: 102 MMOL/L (ref 99–110)
CO2 SERPL-SCNC: 27 MMOL/L (ref 21–32)
CREAT SERPL-MCNC: 1.5 MG/DL (ref 0.8–1.3)
GFR SERPLBLD CREATININE-BSD FMLA CKD-EPI: 51 ML/MIN/{1.73_M2}
GLUCOSE SERPL-MCNC: 101 MG/DL (ref 70–99)
POTASSIUM SERPL-SCNC: 3.7 MMOL/L (ref 3.5–5.1)
SODIUM SERPL-SCNC: 139 MMOL/L (ref 136–145)

## 2025-07-14 PROCEDURE — 6370000000 HC RX 637 (ALT 250 FOR IP): Performed by: PHYSICIAN ASSISTANT

## 2025-07-14 PROCEDURE — 80048 BASIC METABOLIC PNL TOTAL CA: CPT

## 2025-07-14 PROCEDURE — 96376 TX/PRO/DX INJ SAME DRUG ADON: CPT

## 2025-07-14 PROCEDURE — 36415 COLL VENOUS BLD VENIPUNCTURE: CPT

## 2025-07-14 PROCEDURE — 99232 SBSQ HOSP IP/OBS MODERATE 35: CPT | Performed by: NURSE PRACTITIONER

## 2025-07-14 PROCEDURE — 2500000003 HC RX 250 WO HCPCS: Performed by: PHYSICIAN ASSISTANT

## 2025-07-14 PROCEDURE — G0378 HOSPITAL OBSERVATION PER HR: HCPCS

## 2025-07-14 PROCEDURE — 6360000002 HC RX W HCPCS: Performed by: STUDENT IN AN ORGANIZED HEALTH CARE EDUCATION/TRAINING PROGRAM

## 2025-07-14 RX ORDER — TRIAMCINOLONE ACETONIDE 0.25 MG/G
OINTMENT TOPICAL
Qty: 80 G | Refills: 0 | Status: SHIPPED | OUTPATIENT
Start: 2025-07-14 | End: 2025-07-21

## 2025-07-14 RX ORDER — PANTOPRAZOLE SODIUM 20 MG/1
20 TABLET, DELAYED RELEASE ORAL
Qty: 90 TABLET | Refills: 1 | Status: SHIPPED | OUTPATIENT
Start: 2025-07-14

## 2025-07-14 RX ORDER — DIPHENHYDRAMINE HCL 25 MG
25 TABLET ORAL EVERY 8 HOURS PRN
Qty: 15 TABLET | Refills: 0 | Status: SHIPPED | OUTPATIENT
Start: 2025-07-14

## 2025-07-14 RX ADMIN — METOPROLOL SUCCINATE 12.5 MG: 25 TABLET, EXTENDED RELEASE ORAL at 08:31

## 2025-07-14 RX ADMIN — FUROSEMIDE 80 MG: 10 INJECTION, SOLUTION INTRAMUSCULAR; INTRAVENOUS at 08:32

## 2025-07-14 RX ADMIN — LISINOPRIL 2.5 MG: 5 TABLET ORAL at 08:32

## 2025-07-14 RX ADMIN — SPIRONOLACTONE 25 MG: 25 TABLET ORAL at 08:32

## 2025-07-14 RX ADMIN — SODIUM CHLORIDE, PRESERVATIVE FREE 10 ML: 5 INJECTION INTRAVENOUS at 08:32

## 2025-07-14 RX ADMIN — AMIODARONE HYDROCHLORIDE 200 MG: 200 TABLET ORAL at 08:32

## 2025-07-14 RX ADMIN — METOLAZONE 2.5 MG: 2.5 TABLET ORAL at 09:20

## 2025-07-14 RX ADMIN — PANTOPRAZOLE SODIUM 40 MG: 40 TABLET, DELAYED RELEASE ORAL at 07:16

## 2025-07-14 RX ADMIN — EMPAGLIFLOZIN 10 MG: 10 TABLET, FILM COATED ORAL at 08:31

## 2025-07-14 RX ADMIN — APIXABAN 5 MG: 5 TABLET, FILM COATED ORAL at 08:32

## 2025-07-14 NOTE — PLAN OF CARE
Problem: Chronic Conditions and Co-morbidities  Goal: Patient's chronic conditions and co-morbidity symptoms are monitored and maintained or improved  7/13/2025 2306 by Jazzmine Arenas RN  Outcome: Progressing  7/13/2025 1052 by Satya Gordon RN  Outcome: Progressing     Problem: Discharge Planning  Goal: Discharge to home or other facility with appropriate resources  7/13/2025 2306 by Jazzmine Arenas RN  Outcome: Progressing  7/13/2025 1052 by Satya Gordon RN  Outcome: Progressing     Problem: Respiratory - Adult  Goal: Achieves optimal ventilation and oxygenation  7/13/2025 2306 by Jazzmine Arenas RN  Outcome: Progressing  7/13/2025 1052 by Satya Gordon RN  Outcome: Progressing     Problem: Cardiovascular - Adult  Goal: Maintains optimal cardiac output and hemodynamic stability  7/13/2025 2306 by Jazzmine Arenas RN  Outcome: Progressing  7/13/2025 1052 by Satya Gordon RN  Outcome: Progressing  Goal: Absence of cardiac dysrhythmias or at baseline  7/13/2025 2306 by Jazzmine Arenas RN  Outcome: Progressing  7/13/2025 1052 by Satya Gordon RN  Outcome: Progressing     Problem: Skin/Tissue Integrity - Adult  Goal: Skin integrity remains intact  7/13/2025 2306 by Jazzmine Arenas RN  Outcome: Progressing  7/13/2025 1052 by Satya Gordon RN  Outcome: Progressing  Goal: Incisions, wounds, or drain sites healing without S/S of infection  7/13/2025 2306 by Jazzmine Arenas RN  Outcome: Progressing  7/13/2025 1052 by Satya Gordon RN  Outcome: Progressing  Goal: Oral mucous membranes remain intact  7/13/2025 2306 by Jazzmine Arenas RN  Outcome: Progressing  7/13/2025 1052 by Satya Gordon RN  Outcome: Progressing     Problem: Metabolic/Fluid and Electrolytes - Adult  Goal: Electrolytes maintained within normal limits  7/13/2025 2306 by Jazzmine Arenas RN  Outcome: Progressing  7/13/2025 1052 by Satya Gordon RN  Outcome: Progressing  Goal: Hemodynamic stability and

## 2025-07-14 NOTE — PROGRESS NOTES
Shift assessment completed. Routine vitals stable. Scheduled medications given. Patient is awake, alert and oriented. Respirations are easy and unlabored however patient still c/o orthopnea, wearing 2L NC PRN for comfort.  Patient does not appear to be in distress, resting comfortably at this time. Expresses no needs. POC discussed, patient agreeable. Call light within reach.

## 2025-07-14 NOTE — PROGRESS NOTES
CLINICAL PHARMACY NOTE: MEDS TO BEDS    Total # of Prescriptions Filled: 2   The following medications were delivered to the patient:  Banophen 25 mg   Pantoprazole sodium 40 mg     Additional Documentation: Pt picked up in outpatient pharmacy.  Angela Butler CPhT

## 2025-07-14 NOTE — PROGRESS NOTES
This patient has had a discharge order placed. They are returning home and being picked up in the lobby by patient's friend. Discharge paperwork has been printed, highlighted, and gone over with the patient by this RN. Patient understands teaching and has no further questions at this time. IV has been removed with no complications. Telemetry has been removed. Pt has all belongings present including outpatient pharmacy scripts.  No further needs expressed.

## 2025-07-14 NOTE — PROGRESS NOTES
[facility]  HEART FAILURE  Progress Note      Admit Date 7/12/2025     Reason for Consult:      Reason for Consultation/Chief Complaint: rash, dyspnea, CP    HPI:    Manoj Feliz is a 64 y.o. male with PMH NICM, HFrEF, severe MR, AF, Hypotension admitted with abd pain and rash.       Subjective:  Patient is being seen for CHF. There were no acute overnight cardiac events.   Today Mr. Feliz feels great today and denies chest pain, shortness of breath, palpitations, or dizziness. His rash is less itchy        Baseline Weight: 185   Wt Readings from Last 3 Encounters:   07/14/25 89 kg (196 lb 3.2 oz)   06/06/25 87.1 kg (192 lb)   04/18/25 84.3 kg (185 lb 13.6 oz)         Cardiac Testing:   ECHO  04/17/2025    Interpretation Summary    Left Ventricle: Severely reduced left ventricular systolic function with a visually estimated EF of 10 -15%. EF by 2D Simpsons Biplane is 19%. Left ventricle is severely dilated. Mild septal thickening. Severe global hypokinesis present. Grade III diastolic dysfunction with increased LAP.    Right Ventricle: Right ventricle is moderately dilated. Wire seen in right heart. Reduced systolic function.    Mitral Valve: Moderately thickened leaflets. Severe regurgitation. No stenosis noted.    Tricuspid Valve: Valve structure is normal. Severe regurgitation. No stenosis noted.    Left Atrium: Left atrial volume index is severely increased (>48 mL/m2) mL/m2. Volume index measuring 121 mL/m2.    Right Atrium: Right atrium is severely dilated.    Aorta: Mildly dilated aortic root. Ao root diameter is 3.8 cm. Moderately dilated ascending aorta. Ao ascending diameter is 4.4 cm.    IVC/SVC: IVC diameter is greater than 21 mm and decreases less than 50% during inspiration; therefore the estimated right atrial pressure is elevated (~15 mmHg).    Image quality is good.        Cardiac Cath : Dr Johns 2/8/2022  Anatomy:   LM-nml   LAD-nml  Cx-nml  OM- nml  RCA-nml  RPDA- nml  LVEF-

## 2025-07-14 NOTE — CARE COORDINATION
Case Management Assessment  Initial Evaluation    Date/Time of Evaluation: 7/14/2025 9:08 AM  Assessment Completed by: Tiffanie TRACY RN    If patient is discharged prior to next notation, then this note serves as note for discharge by case management.    Patient Name: Manoj Feliz                   YOB: 1960  Diagnosis: Rash and other nonspecific skin eruption [R21]  Chest pain [R07.9]  Pleural effusion [J90]  Pulmonary edema cardiac cause (HCC) [I50.1]  Elevated troponin [R79.89]  SOB (shortness of breath) on exertion [R06.02]  Elevated brain natriuretic peptide (BNP) level [R79.89]  Episodic lightheadedness [R42]  Elevated liver enzymes [R74.8]  Thickening of wall of gallbladder [K82.8]  Acute kidney injury superimposed on CKD [N17.9, N18.9]  Chest pain, unspecified type [R07.9]                   Date / Time: 7/12/2025  9:09 PM    Patient Admission Status: Observation   Readmission Risk (Low < 19, Mod (19-27), High > 27): Readmission Risk Score: 18.3    Current PCP: Georgina Escalera APRN - CNP  PCP verified by CM?      Chart Reviewed: Yes      History Provided by: Patient  Patient Orientation: Alert and Oriented, Person, Place, Situation, Self    Patient Cognition: Alert    Hospitalization in the last 30 days (Readmission):  No    If yes, Readmission Assessment in CM Navigator will be completed.    Advance Directives:      Code Status: Full Code   Patient's Primary Decision Maker is: Legal Next of Kin (friend Naldo)    Primary Decision Maker: Naldo Bauman - Healthcare Decision Maker - [phone]    Discharge Planning:    Patient lives with: Friends Type of Home: House  Primary Care Giver: Self  Patient Support Systems include: Friends/Neighbors   Current Financial resources:    Current community resources: None  Current services prior to admission: None            Current DME:              Type of Home Care services:  None    ADLS  Prior functional level: Independent in ADLs/IADLs, Bathing,

## 2025-07-14 NOTE — FLOWSHEET NOTE
07/13/25 2015   Vital Signs   Temp 97.8 °F (36.6 °C)   Temp Source Oral   Pulse 63   Heart Rate Source Monitor   Respirations 18   /72   MAP (Calculated) 82   BP Location Left upper arm   BP Method Automatic   Patient Position Semi fowlers   Pain Assessment   Pain Assessment None - Denies Pain   Pain Level 0   Opioid-Induced Sedation   POSS Score 1   RASS   Mike Agitation Sedation Scale (RASS) 0   Oxygen Therapy   SpO2 98 %   Pulse Oximeter Device Location Left;Finger   O2 Device None (Room air)

## 2025-07-15 ENCOUNTER — TELEPHONE (OUTPATIENT)
Dept: OTHER | Age: 65
End: 2025-07-15

## 2025-07-15 ENCOUNTER — CARE COORDINATION (OUTPATIENT)
Dept: CASE MANAGEMENT | Age: 65
End: 2025-07-15

## 2025-07-15 NOTE — TELEPHONE ENCOUNTER
[facility]  HEART FAILURE PROGRAM  TELEPHONE ENCOUNTER FORM    Manoj Yeungambrocio 1960    Attempted to call patient for HF follow-up. No answer at this time.      Next MD/ Clinic appointment: 7/18 with Deirdre CALL, JACLYN 7/15/2025 2:56 PM

## 2025-07-15 NOTE — CARE COORDINATION
Care Transitions Note    Initial Call - Call within 2 business days of discharge: Yes    Attempted to reach patient for transitions of care follow up. Unable to reach patient.    Outreach Attempts:   Left message with friend    Patient: Manoj Feliz    Patient : 1960   MRN: [id number]    Reason for Admission:   Discharge Date: 25  RURS: Readmission Risk Score: 18.3    Last Discharge Facility       Date Complaint Diagnosis Description Type Department Provider    25 Illness Chest pain, unspecified type ... ED to Hosp-Admission (Discharged) (ADMITTED) [facility] Abram Diaz MD; Piotr Torrez...            Was this an external facility discharge? No    Follow Up Appointment:   Patient has hospital follow up appointment scheduled within 7 days of discharge.    Future Appointments         Provider Specialty Dept Phone    2025 1:30 PM Deirdre Das, [facility] - [facility] Cardiology [phone]    2025 2:30 PM Deirdre Das, APRN - CNS Cardiology [phone]            Plan for follow-up on next business day.      Atul Trevino RN

## 2025-07-16 ENCOUNTER — TELEPHONE (OUTPATIENT)
Dept: OTHER | Age: 65
End: 2025-07-16

## 2025-07-16 ENCOUNTER — TELEPHONE (OUTPATIENT)
Dept: PRIMARY CARE CLINIC | Age: 65
End: 2025-07-16

## 2025-07-16 ENCOUNTER — CARE COORDINATION (OUTPATIENT)
Dept: CASE MANAGEMENT | Age: 65
End: 2025-07-16

## 2025-07-16 NOTE — TELEPHONE ENCOUNTER
[facility]  HEART FAILURE PROGRAM  TELEPHONE ENCOUNTER FORM    Manoj Feliz 1960    Attempted to call patient for HF follow-up. No answer at this time.      Next MD/ Clinic appointment: 7/18 with KATLYN Luong RN 7/16/2025 2:04 PM

## 2025-07-16 NOTE — CARE COORDINATION
Care Transitions Note    Initial Call - Call within 2 business days of discharge: Yes    Attempted to reach patient for transitions of care follow up. Unable to reach patient.Will send message to PCP office to schedule a hospital f/u visit.      Outreach Attempts:   Left message with friend    Patient: Manoj Feliz    Patient : 1960   MRN: [id number]    Reason for Admission:   Discharge Date: 25  RURS: Readmission Risk Score: 18.3    Last Discharge Facility       Date Complaint Diagnosis Description Type Department Provider    25 Illness Chest pain, unspecified type ... ED to Hosp-Admission (Discharged) (ADMITTED) [facility] Abram Diaz MD; Piotr Torrez...            Was this an external facility discharge? No    Follow Up Appointment:   Patient has hospital follow up appointment scheduled within 7 days of discharge.    Future Appointments         Provider Specialty Dept Phone    2025 1:30 PM Deirdre Das, [facility] - CNS Cardiology [phone]    2025 2:30 PM Deirdre Das, APRN - CNS Cardiology [phone]            No further follow-up call indicated     Atul Trevino RN

## 2025-07-16 NOTE — DISCHARGE SUMMARY
07/12/2025 198 135 - 450 K/uL Final   06/06/2025 169 135 - 450 K/uL Final     Sodium   Date Value Ref Range Status   07/14/2025 139 136 - 145 mmol/L Final   07/13/2025 141 136 - 145 mmol/L Final   07/12/2025 141 136 - 145 mmol/L Final     Potassium   Date Value Ref Range Status   07/14/2025 3.7 3.5 - 5.1 mmol/L Final     Comment:     Specimen hemolysis has exceeded the interference as defined by Roche.  Value may be falsely increased. Suggest recollection if clinically  indicated.     07/12/2025 3.5 3.5 - 5.1 mmol/L Final   06/06/2025 3.9 3.5 - 5.1 mmol/L Final     Potassium reflex Magnesium   Date Value Ref Range Status   07/13/2025 3.5 3.5 - 5.1 mmol/L Final     Comment:     Specimen hemolysis has exceeded the interference as defined by Roche.  Value may be falsely increased. Suggest recollection if clinically  indicated.     04/16/2025 4.0 3.5 - 5.1 mmol/L Final     Comment:     Specimen hemolysis has exceeded the interference as defined by Roche.  Value may be falsely increased. Suggest recollection if clinically  indicated.     10/07/2024 3.7 3.5 - 5.1 mmol/L Final     Chloride   Date Value Ref Range Status   07/14/2025 102 99 - 110 mmol/L Final   07/13/2025 102 99 - 110 mmol/L Final   07/12/2025 102 99 - 110 mmol/L Final     CO2   Date Value Ref Range Status   07/14/2025 27 21 - 32 mmol/L Final   07/13/2025 27 21 - 32 mmol/L Final   07/12/2025 26 21 - 32 mmol/L Final     BUN   Date Value Ref Range Status   07/14/2025 26 (H) 7 - 20 mg/dL Final   07/13/2025 27 (H) 7 - 20 mg/dL Final   07/12/2025 28 (H) 7 - 20 mg/dL Final     Creatinine   Date Value Ref Range Status   07/14/2025 1.5 (H) 0.8 - 1.3 mg/dL Final   07/13/2025 1.4 (H) 0.8 - 1.3 mg/dL Final   07/12/2025 1.5 (H) 0.8 - 1.3 mg/dL Final     Calcium   Date Value Ref Range Status   07/14/2025 8.7 8.3 - 10.6 mg/dL Final   07/13/2025 8.9 8.3 - 10.6 mg/dL Final   07/12/2025 9.2 8.3 - 10.6 mg/dL Final     Phosphorus   Date Value Ref Range Status   06/17/2024

## 2025-07-16 NOTE — TELEPHONE ENCOUNTER
KAYLEEN with his friend Rickey to have Manoj give the office a call to schedule a hospital f\u  
[FreeTextEntry1] : Rory Valadez MD  [address], [address] ([facility]  [place] [address]  [phone]    Dear Dr. Valadez,    Reason for visit: Bladder lesions s/p TURBT. Small right AML.    This is a 65 year old Mandarin-speaking woman with small right AML and bladder lesions status post TURBT. Her cystoscopy in February demonstrated indeterminate bladder lesions. The patient underwent uneventful TURBT on March 9. 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 She developed urinary retention after surgery which required catherization.  She previously obtained a negative bladder biopsy in March 2022. She passed her previous voiding trial. Her cystoscopy in July 2022 was negative. Her ultrasound in January 2023 demonstrated a 1.1 cm right AML. The patient returns today for follow up. Since she was last seen, the patient reports no changes in health. She denies any retention. The patient denies any fevers or chills. She denies any pain. She is overall well. The patient denies any changes in health. The past medical history and family history and social history are unchanged. All other review of systems are negative. The patient denies any changes in medications. Medication list was reconciled.    On examination, the patient is a healthy-appearing woman in no acute distress. She is alert and oriented follows commands. She has normal mood and affect. She is normocephalic. Neck is supple. Respirations are unlabored. Abdomen is soft and nontender. Liver is not palpable. Bladder is nonpalpable. No CVA tenderness. Neurologically she is grossly intact. No peripheral edema. Skin without gross abnormality.  In January 2023, her urinalysis was unremarkable. Her urine cytology was negative for high grade urothelial carcinoma.  Assessment: Bladder lesions s/p TURBT. Hematuria. 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 Small right AML.    I counseled the patient. She is doing well. In terms of her previous hematuria and bladder lesions, she will repeat urinalysis, urine culture, and urine cytology to look for infections and high grade urothelial carcinoma. In terms of her small right AML, I recommended she repeat renal ultrasound for further evaluation. I counseled the patient regarding the procedure. The patient will take the necessary preparations for the procedure. Patient understands that if she develops gross hematuria or any urinary discomfort, she will contact me for further evaluation. Risks and alternatives were discussed. I answered the patient questions. The patient will follow-up as directed and will contact me with any questions or concerns. Thank you for the opportunity to participate in the care of Ms. VELA. I will keep you updated on her progress.    Plan: Renal ultrasound. Urinalysis. Urine cytology. Urine culture. Follow up in 6 months.     I personally reviewed ultrasound images with the patient today and images demonstrated again, a 1.2 cm hyperechoic focus noted in right kidney upper pole, previous 1.1 cm. Both kidneys appear normal in size and echogenicity, no stones or hydronephrosis visualized.    I spent 30-minutes time today on all issues related to this patient on today date of service including non face to face time.  
[FreeTextEntry1] : Rory Valadez MD  [address], [address] ([facility]  [place] [address]  [phone]    Dear Dr. Valadez,    Reason for visit: Bladder lesions s/p TURBT. Small right AML.    This is a 65 year old Mandarin-speaking woman with small right AML and bladder lesions status post TURBT. Her cystoscopy in February demonstrated indeterminate bladder lesions. The patient underwent uneventful TURBT on March 9. 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 She developed urinary retention after surgery which required catherization.  She previously obtained a negative bladder biopsy in March 2022. She passed her previous voiding trial. Her cystoscopy in July 2022 was negative. Her ultrasound in January 2023 demonstrated a 1.1 cm right AML. The patient returns today for follow up. Since she was last seen, the patient reports no changes in health. She denies any retention. The patient denies any fevers or chills. She denies any pain. She is overall well. The patient denies any changes in health. The past medical history and family history and social history are unchanged. All other review of systems are negative. The patient denies any changes in medications. Medication list was reconciled.    On examination, the patient is a healthy-appearing woman in no acute distress. She is alert and oriented follows commands. She has normal mood and affect. She is normocephalic. Neck is supple. Respirations are unlabored. Abdomen is soft and nontender. Liver is not palpable. Bladder is nonpalpable. No CVA tenderness. Neurologically she is grossly intact. No peripheral edema. Skin without gross abnormality.  In January 2023, her urinalysis was unremarkable. Her urine cytology was negative for high grade urothelial carcinoma.  Assessment: Bladder lesions s/p TURBT. Hematuria. 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 Small right AML.    I counseled the patient. She is doing well. In terms of her previous hematuria and bladder lesions, she will repeat urinalysis, urine culture, and urine cytology to look for infections and high grade urothelial carcinoma. In terms of her small right AML, I recommended she repeat renal ultrasound for further evaluation. I counseled the patient regarding the procedure. The patient will take the necessary preparations for the procedure. Patient understands that if she develops gross hematuria or any urinary discomfort, she will contact me for further evaluation. Risks and alternatives were discussed. I answered the patient questions. The patient will follow-up as directed and will contact me with any questions or concerns. Thank you for the opportunity to participate in the care of Ms. VELA. I will keep you updated on her progress.    Plan: Renal ultrasound. Urinalysis. Urine cytology. Urine culture. Follow up in 6 months.     I personally reviewed ultrasound images with the patient today and images demonstrated again, a 1.2 cm hyperechoic focus noted in right kidney upper pole, previous 1.1 cm. Both kidneys appear normal in size and echogenicity, no stones or hydronephrosis visualized.    I spent 30-minutes time today on all issues related to this patient on today date of service including non face to face time.

## 2025-07-17 ENCOUNTER — TELEPHONE (OUTPATIENT)
Dept: OTHER | Age: 65
End: 2025-07-17

## 2025-07-18 ENCOUNTER — OFFICE VISIT (OUTPATIENT)
Dept: CARDIOLOGY CLINIC | Age: 65
End: 2025-07-18

## 2025-07-18 VITALS
SYSTOLIC BLOOD PRESSURE: 100 MMHG | OXYGEN SATURATION: 98 % | HEART RATE: 92 BPM | DIASTOLIC BLOOD PRESSURE: 60 MMHG | HEIGHT: 74 IN | BODY MASS INDEX: 23.87 KG/M2 | WEIGHT: 186 LBS

## 2025-07-18 DIAGNOSIS — E78.5 HYPERLIPIDEMIA, UNSPECIFIED HYPERLIPIDEMIA TYPE: ICD-10-CM

## 2025-07-18 DIAGNOSIS — E55.9 VITAMIN D DEFICIENCY: ICD-10-CM

## 2025-07-18 DIAGNOSIS — I48.0 PAF (PAROXYSMAL ATRIAL FIBRILLATION) (HCC): ICD-10-CM

## 2025-07-18 DIAGNOSIS — I50.82 BIVENTRICULAR HEART FAILURE (HCC): ICD-10-CM

## 2025-07-18 DIAGNOSIS — T82.118S: ICD-10-CM

## 2025-07-18 DIAGNOSIS — I42.0 DILATED CARDIOMYOPATHY (HCC): ICD-10-CM

## 2025-07-18 DIAGNOSIS — Z59.00 HOMELESS: ICD-10-CM

## 2025-07-18 DIAGNOSIS — I50.22 CHRONIC SYSTOLIC (CONGESTIVE) HEART FAILURE (HCC): Primary | ICD-10-CM

## 2025-07-18 RX ORDER — LISINOPRIL 2.5 MG/1
2.5 TABLET ORAL DAILY
Qty: 90 TABLET | Refills: 1 | Status: SHIPPED | OUTPATIENT
Start: 2025-07-18

## 2025-07-18 RX ORDER — MULTIVIT-MIN/IRON/FOLIC ACID/K 18-600-40
2000 CAPSULE ORAL DAILY
Qty: 90 CAPSULE | Refills: 2 | Status: SHIPPED | OUTPATIENT
Start: 2025-07-18

## 2025-07-18 SDOH — ECONOMIC STABILITY - HOUSING INSECURITY: HOMELESSNESS UNSPECIFIED: Z59.00

## 2025-07-18 NOTE — PROGRESS NOTES
[facility]  Progress Note    Primary Care Doctor:  Georgina Escalera, [facility] - KATLYN    Chief Complaint   Patient presents with    Follow-Up from Hospital    Congestive Heart Failure       History of Present Illness:  64 y.o. male with history of with history of chronic AF on eliquis, hypertension, systolic heart failure, BPH, , homeless      I had the pleasure of seeing Manoj Feliz in follow up for systolic heart failure and hospitalization 7/12-14/2025 for abdominal discomfort (given protonix) and rash on his arms.  He is ambulatory and by his self.  History of Present Illness  .  He was recently hospitalized due to severe abdominal pain and a rash that appeared on his arms, chest, and back. The rash has since improved and is drying up. He was prescribed medication for the rash and plans to obtain a cream after today's visit. He reports no issues with his legs. His weight is 186 which is great for him.  He has been adhering to his medication regimen, although some changes were made during his hospital stay (cream and protonix). He has not experienced any issues with his device as the optival shows normal thoracic impedence. He believes he has been taking lisinopril. He also mentions that he is nearing the end of his vitamin D supply. He has been spending time outdoors in the sun but has not been in contact with any contagious substances. He has been maintaining good hygiene by washing regularly and has not been using lotion on his skin.  No chest pain, palpitations or increased shortness of breath         Past Medical History:   has a past medical history of Acute combined systolic and diastolic congestive heart failure (HCC), Atrial fibrillation (HCC), CHF (congestive heart failure) (HCC), Hx of blood clots, and Hypertension.  Surgical History:   has a past surgical history that includes Insertable Cardiac Monitor (07/26/2019); Cardioversion (07/26/2019); Cardiac defibrillator placement (Left);

## 2025-07-18 NOTE — PATIENT INSTRUCTIONS
Refilled lisinopril and vitamin d   Take vitamin daily 2000   Continue all other medications  Move August appt to October

## 2025-08-07 ENCOUNTER — TELEPHONE (OUTPATIENT)
Dept: CARDIOLOGY CLINIC | Age: 65
End: 2025-08-07

## 2025-08-11 ENCOUNTER — HOSPITAL ENCOUNTER (INPATIENT)
Age: 65
LOS: 2 days | Discharge: HOME | End: 2025-08-14
Attending: EMERGENCY MEDICINE | Admitting: HOSPITALIST
Payer: COMMERCIAL

## 2025-08-11 ENCOUNTER — APPOINTMENT (OUTPATIENT)
Dept: GENERAL RADIOLOGY | Age: 65
End: 2025-08-11
Payer: COMMERCIAL

## 2025-08-11 DIAGNOSIS — I50.9 ACUTE ON CHRONIC CONGESTIVE HEART FAILURE, UNSPECIFIED HEART FAILURE TYPE (HCC): Primary | ICD-10-CM

## 2025-08-11 DIAGNOSIS — N17.9 ACUTE RENAL FAILURE SUPERIMPOSED ON STAGE 3 CHRONIC KIDNEY DISEASE, UNSPECIFIED ACUTE RENAL FAILURE TYPE, UNSPECIFIED WHETHER STAGE 3A OR 3B CKD (HCC): ICD-10-CM

## 2025-08-11 DIAGNOSIS — N18.30 ACUTE RENAL FAILURE SUPERIMPOSED ON STAGE 3 CHRONIC KIDNEY DISEASE, UNSPECIFIED ACUTE RENAL FAILURE TYPE, UNSPECIFIED WHETHER STAGE 3A OR 3B CKD (HCC): ICD-10-CM

## 2025-08-11 LAB
ALBUMIN SERPL-MCNC: 3.9 G/DL (ref 3.4–5)
ALBUMIN/GLOB SERPL: 1.5 {RATIO} (ref 1.1–2.2)
ALP SERPL-CCNC: 77 U/L (ref 40–129)
ALT SERPL-CCNC: ABNORMAL U/L (ref 10–40)
ANION GAP SERPL CALCULATED.3IONS-SCNC: 15 MMOL/L (ref 3–16)
AST SERPL-CCNC: 84 U/L (ref 15–37)
BASOPHILS # BLD: 0 K/UL (ref 0–0.2)
BASOPHILS NFR BLD: 1.5 %
BILIRUB SERPL-MCNC: 4.9 MG/DL (ref 0–1)
BUN SERPL-MCNC: 30 MG/DL (ref 7–20)
CALCIUM SERPL-MCNC: 8.9 MG/DL (ref 8.3–10.6)
CHLORIDE SERPL-SCNC: 97 MMOL/L (ref 99–110)
CO2 SERPL-SCNC: 26 MMOL/L (ref 21–32)
CREAT SERPL-MCNC: 2 MG/DL (ref 0.8–1.3)
DEPRECATED RDW RBC AUTO: 15.7 % (ref 12.4–15.4)
EOSINOPHIL # BLD: 0 K/UL (ref 0–0.6)
EOSINOPHIL NFR BLD: 1.6 %
GFR SERPLBLD CREATININE-BSD FMLA CKD-EPI: 36 ML/MIN/{1.73_M2}
GLUCOSE SERPL-MCNC: 92 MG/DL (ref 70–99)
HCT VFR BLD AUTO: 39.3 % (ref 40.5–52.5)
HGB BLD-MCNC: 13 G/DL (ref 13.5–17.5)
LYMPHOCYTES # BLD: 0.8 K/UL (ref 1–5.1)
LYMPHOCYTES NFR BLD: 25.3 %
MCH RBC QN AUTO: 28 PG (ref 26–34)
MCHC RBC AUTO-ENTMCNC: 33.1 G/DL (ref 31–36)
MCV RBC AUTO: 84.6 FL (ref 80–100)
MONOCYTES # BLD: 0.3 K/UL (ref 0–1.3)
MONOCYTES NFR BLD: 11.6 %
NEUTROPHILS # BLD: 1.8 K/UL (ref 1.7–7.7)
NEUTROPHILS NFR BLD: 60 %
NT-PROBNP SERPL-MCNC: ABNORMAL PG/ML (ref 0–124)
PLATELET # BLD AUTO: 187 K/UL (ref 135–450)
PMV BLD AUTO: 7.8 FL (ref 5–10.5)
POTASSIUM SERPL-SCNC: ABNORMAL MMOL/L (ref 3.5–5.1)
PROT SERPL-MCNC: 6.5 G/DL (ref 6.4–8.2)
RBC # BLD AUTO: 4.65 M/UL (ref 4.2–5.9)
REASON FOR REJECTION: NORMAL
REJECTED TEST: NORMAL
SODIUM SERPL-SCNC: 138 MMOL/L (ref 136–145)
TROPONIN, HIGH SENSITIVITY: 37 NG/L (ref 0–22)
WBC # BLD AUTO: 3 K/UL (ref 4–11)

## 2025-08-11 PROCEDURE — 71045 X-RAY EXAM CHEST 1 VIEW: CPT

## 2025-08-11 PROCEDURE — 80053 COMPREHEN METABOLIC PANEL: CPT

## 2025-08-11 PROCEDURE — 93005 ELECTROCARDIOGRAM TRACING: CPT | Performed by: PHYSICIAN ASSISTANT

## 2025-08-11 PROCEDURE — 83880 ASSAY OF NATRIURETIC PEPTIDE: CPT

## 2025-08-11 PROCEDURE — 84484 ASSAY OF TROPONIN QUANT: CPT

## 2025-08-11 PROCEDURE — 85025 COMPLETE CBC W/AUTO DIFF WBC: CPT

## 2025-08-11 PROCEDURE — 99285 EMERGENCY DEPT VISIT HI MDM: CPT

## 2025-08-11 RX ORDER — FUROSEMIDE 10 MG/ML
20 INJECTION INTRAMUSCULAR; INTRAVENOUS ONCE
Status: COMPLETED | OUTPATIENT
Start: 2025-08-11 | End: 2025-08-12

## 2025-08-11 ASSESSMENT — ENCOUNTER SYMPTOMS
VOMITING: 0
COUGH: 0
RHINORRHEA: 0
SHORTNESS OF BREATH: 1
DIARRHEA: 0
ABDOMINAL PAIN: 0
NAUSEA: 0

## 2025-08-11 ASSESSMENT — PAIN - FUNCTIONAL ASSESSMENT: PAIN_FUNCTIONAL_ASSESSMENT: 0-10

## 2025-08-11 ASSESSMENT — PAIN SCALES - GENERAL: PAINLEVEL_OUTOF10: 5

## 2025-08-12 PROBLEM — I13.10 CARDIORENAL SYNDROME: Status: ACTIVE | Noted: 2024-09-25

## 2025-08-12 PROBLEM — I50.9 ACUTE DECOMPENSATED HEART FAILURE (HCC): Status: ACTIVE | Noted: 2025-08-12

## 2025-08-12 LAB
ALT SERPL-CCNC: 34 U/L (ref 10–40)
ANION GAP SERPL CALCULATED.3IONS-SCNC: 14 MMOL/L (ref 3–16)
ANION GAP SERPL CALCULATED.3IONS-SCNC: 16 MMOL/L (ref 3–16)
BUN SERPL-MCNC: 30 MG/DL (ref 7–20)
BUN SERPL-MCNC: 32 MG/DL (ref 7–20)
CALCIUM SERPL-MCNC: 8.7 MG/DL (ref 8.3–10.6)
CALCIUM SERPL-MCNC: 9.4 MG/DL (ref 8.3–10.6)
CHLORIDE SERPL-SCNC: 97 MMOL/L (ref 99–110)
CHLORIDE SERPL-SCNC: 98 MMOL/L (ref 99–110)
CO2 SERPL-SCNC: 24 MMOL/L (ref 21–32)
CO2 SERPL-SCNC: 26 MMOL/L (ref 21–32)
CREAT SERPL-MCNC: 1.9 MG/DL (ref 0.8–1.3)
CREAT SERPL-MCNC: 2 MG/DL (ref 0.8–1.3)
EKG ATRIAL RATE: 77 BPM
EKG DIAGNOSIS: NORMAL
EKG P-R INTERVAL: 164 MS
EKG Q-T INTERVAL: 566 MS
EKG QRS DURATION: 186 MS
EKG QTC CALCULATION (BAZETT): 636 MS
EKG R AXIS: -18 DEGREES
EKG T AXIS: 89 DEGREES
EKG VENTRICULAR RATE: 76 BPM
FERRITIN SERPL IA-MCNC: 196 NG/ML (ref 30–400)
GFR SERPLBLD CREATININE-BSD FMLA CKD-EPI: 36 ML/MIN/{1.73_M2}
GFR SERPLBLD CREATININE-BSD FMLA CKD-EPI: 38 ML/MIN/{1.73_M2}
GLUCOSE SERPL-MCNC: 95 MG/DL (ref 70–99)
GLUCOSE SERPL-MCNC: 97 MG/DL (ref 70–99)
IRON SATN MFR SERPL: 43 % (ref 20–50)
IRON SERPL-MCNC: 166 UG/DL (ref 59–158)
MAGNESIUM SERPL-MCNC: 2.26 MG/DL (ref 1.8–2.4)
POTASSIUM SERPL-SCNC: 3.2 MMOL/L (ref 3.5–5.1)
POTASSIUM SERPL-SCNC: 3.2 MMOL/L (ref 3.5–5.1)
POTASSIUM SERPL-SCNC: 3.5 MMOL/L (ref 3.5–5.1)
POTASSIUM SERPL-SCNC: 4 MMOL/L (ref 3.5–5.1)
POTASSIUM SERPL-SCNC: NORMAL MMOL/L (ref 3.5–5.1)
REASON FOR REJECTION: NORMAL
REJECTED TEST: NORMAL
SODIUM SERPL-SCNC: 137 MMOL/L (ref 136–145)
SODIUM SERPL-SCNC: 138 MMOL/L (ref 136–145)
TIBC SERPL-MCNC: 382 UG/DL (ref 260–445)
TROPONIN, HIGH SENSITIVITY: 33 NG/L (ref 0–22)
TSH SERPL DL<=0.005 MIU/L-ACNC: 2.62 UIU/ML (ref 0.27–4.2)

## 2025-08-12 PROCEDURE — 83735 ASSAY OF MAGNESIUM: CPT

## 2025-08-12 PROCEDURE — 6360000002 HC RX W HCPCS: Performed by: PHYSICIAN ASSISTANT

## 2025-08-12 PROCEDURE — 6370000000 HC RX 637 (ALT 250 FOR IP): Performed by: INTERNAL MEDICINE

## 2025-08-12 PROCEDURE — 96374 THER/PROPH/DIAG INJ IV PUSH: CPT

## 2025-08-12 PROCEDURE — 2060000000 HC ICU INTERMEDIATE R&B

## 2025-08-12 PROCEDURE — 99223 1ST HOSP IP/OBS HIGH 75: CPT | Performed by: INTERNAL MEDICINE

## 2025-08-12 PROCEDURE — 6360000002 HC RX W HCPCS: Performed by: INTERNAL MEDICINE

## 2025-08-12 PROCEDURE — 82728 ASSAY OF FERRITIN: CPT

## 2025-08-12 PROCEDURE — 36415 COLL VENOUS BLD VENIPUNCTURE: CPT

## 2025-08-12 PROCEDURE — 51798 US URINE CAPACITY MEASURE: CPT

## 2025-08-12 PROCEDURE — 6370000000 HC RX 637 (ALT 250 FOR IP): Performed by: HOSPITALIST

## 2025-08-12 PROCEDURE — 84443 ASSAY THYROID STIM HORMONE: CPT

## 2025-08-12 PROCEDURE — 80048 BASIC METABOLIC PNL TOTAL CA: CPT

## 2025-08-12 PROCEDURE — 2580000003 HC RX 258: Performed by: INTERNAL MEDICINE

## 2025-08-12 PROCEDURE — 84484 ASSAY OF TROPONIN QUANT: CPT

## 2025-08-12 PROCEDURE — 2500000003 HC RX 250 WO HCPCS: Performed by: HOSPITALIST

## 2025-08-12 PROCEDURE — 6360000002 HC RX W HCPCS: Performed by: HOSPITALIST

## 2025-08-12 PROCEDURE — 83540 ASSAY OF IRON: CPT

## 2025-08-12 PROCEDURE — 83550 IRON BINDING TEST: CPT

## 2025-08-12 PROCEDURE — 93010 ELECTROCARDIOGRAM REPORT: CPT | Performed by: INTERNAL MEDICINE

## 2025-08-12 PROCEDURE — 84132 ASSAY OF SERUM POTASSIUM: CPT

## 2025-08-12 RX ORDER — VITAMIN B COMPLEX
2000 TABLET ORAL DAILY
Status: DISCONTINUED | OUTPATIENT
Start: 2025-08-12 | End: 2025-08-14 | Stop reason: HOSPADM

## 2025-08-12 RX ORDER — AMIODARONE HYDROCHLORIDE 200 MG/1
200 TABLET ORAL DAILY
Status: DISCONTINUED | OUTPATIENT
Start: 2025-08-12 | End: 2025-08-12

## 2025-08-12 RX ORDER — PANTOPRAZOLE SODIUM 40 MG/1
40 TABLET, DELAYED RELEASE ORAL
Status: DISCONTINUED | OUTPATIENT
Start: 2025-08-12 | End: 2025-08-14 | Stop reason: HOSPADM

## 2025-08-12 RX ORDER — SODIUM CHLORIDE 9 MG/ML
INJECTION, SOLUTION INTRAVENOUS PRN
Status: DISCONTINUED | OUTPATIENT
Start: 2025-08-12 | End: 2025-08-14 | Stop reason: HOSPADM

## 2025-08-12 RX ORDER — POLYETHYLENE GLYCOL 3350 17 G/17G
17 POWDER, FOR SOLUTION ORAL DAILY PRN
Status: DISCONTINUED | OUTPATIENT
Start: 2025-08-12 | End: 2025-08-14 | Stop reason: HOSPADM

## 2025-08-12 RX ORDER — MAGNESIUM SULFATE IN WATER 40 MG/ML
2000 INJECTION, SOLUTION INTRAVENOUS PRN
Status: DISCONTINUED | OUTPATIENT
Start: 2025-08-12 | End: 2025-08-14 | Stop reason: HOSPADM

## 2025-08-12 RX ORDER — ALBUTEROL SULFATE 90 UG/1
2 INHALANT RESPIRATORY (INHALATION) EVERY 6 HOURS PRN
Status: DISCONTINUED | OUTPATIENT
Start: 2025-08-12 | End: 2025-08-14 | Stop reason: HOSPADM

## 2025-08-12 RX ORDER — SODIUM CHLORIDE 0.9 % (FLUSH) 0.9 %
5-40 SYRINGE (ML) INJECTION PRN
Status: DISCONTINUED | OUTPATIENT
Start: 2025-08-12 | End: 2025-08-14 | Stop reason: HOSPADM

## 2025-08-12 RX ORDER — METOPROLOL SUCCINATE 25 MG/1
12.5 TABLET, EXTENDED RELEASE ORAL 2 TIMES DAILY
Status: DISCONTINUED | OUTPATIENT
Start: 2025-08-12 | End: 2025-08-14 | Stop reason: HOSPADM

## 2025-08-12 RX ORDER — ONDANSETRON 4 MG/1
4 TABLET, ORALLY DISINTEGRATING ORAL EVERY 8 HOURS PRN
Status: DISCONTINUED | OUTPATIENT
Start: 2025-08-12 | End: 2025-08-14 | Stop reason: HOSPADM

## 2025-08-12 RX ORDER — ATORVASTATIN CALCIUM 40 MG/1
40 TABLET, FILM COATED ORAL NIGHTLY
Status: DISCONTINUED | OUTPATIENT
Start: 2025-08-12 | End: 2025-08-14 | Stop reason: HOSPADM

## 2025-08-12 RX ORDER — ENOXAPARIN SODIUM 100 MG/ML
40 INJECTION SUBCUTANEOUS DAILY
Status: DISCONTINUED | OUTPATIENT
Start: 2025-08-12 | End: 2025-08-12

## 2025-08-12 RX ORDER — LISINOPRIL 5 MG/1
2.5 TABLET ORAL DAILY
Status: DISCONTINUED | OUTPATIENT
Start: 2025-08-12 | End: 2025-08-14 | Stop reason: HOSPADM

## 2025-08-12 RX ORDER — FUROSEMIDE 10 MG/ML
40 INJECTION INTRAMUSCULAR; INTRAVENOUS 2 TIMES DAILY
Status: DISCONTINUED | OUTPATIENT
Start: 2025-08-12 | End: 2025-08-12

## 2025-08-12 RX ORDER — METOLAZONE 2.5 MG/1
5 TABLET ORAL ONCE
Status: COMPLETED | OUTPATIENT
Start: 2025-08-12 | End: 2025-08-12

## 2025-08-12 RX ORDER — SPIRONOLACTONE 25 MG/1
25 TABLET ORAL DAILY
Status: DISCONTINUED | OUTPATIENT
Start: 2025-08-12 | End: 2025-08-14 | Stop reason: HOSPADM

## 2025-08-12 RX ORDER — ACETAMINOPHEN 325 MG/1
650 TABLET ORAL EVERY 6 HOURS PRN
Status: DISCONTINUED | OUTPATIENT
Start: 2025-08-12 | End: 2025-08-14 | Stop reason: HOSPADM

## 2025-08-12 RX ORDER — ACETAMINOPHEN 650 MG/1
650 SUPPOSITORY RECTAL EVERY 6 HOURS PRN
Status: DISCONTINUED | OUTPATIENT
Start: 2025-08-12 | End: 2025-08-14 | Stop reason: HOSPADM

## 2025-08-12 RX ORDER — ONDANSETRON 2 MG/ML
4 INJECTION INTRAMUSCULAR; INTRAVENOUS EVERY 6 HOURS PRN
Status: DISCONTINUED | OUTPATIENT
Start: 2025-08-12 | End: 2025-08-14 | Stop reason: HOSPADM

## 2025-08-12 RX ORDER — SODIUM CHLORIDE 0.9 % (FLUSH) 0.9 %
5-40 SYRINGE (ML) INJECTION EVERY 12 HOURS SCHEDULED
Status: DISCONTINUED | OUTPATIENT
Start: 2025-08-12 | End: 2025-08-14 | Stop reason: HOSPADM

## 2025-08-12 RX ORDER — POTASSIUM CHLORIDE 1500 MG/1
40 TABLET, EXTENDED RELEASE ORAL PRN
Status: DISCONTINUED | OUTPATIENT
Start: 2025-08-12 | End: 2025-08-14 | Stop reason: HOSPADM

## 2025-08-12 RX ORDER — POTASSIUM CHLORIDE 7.45 MG/ML
10 INJECTION INTRAVENOUS PRN
Status: DISCONTINUED | OUTPATIENT
Start: 2025-08-12 | End: 2025-08-14 | Stop reason: HOSPADM

## 2025-08-12 RX ADMIN — LISINOPRIL 2.5 MG: 5 TABLET ORAL at 07:51

## 2025-08-12 RX ADMIN — SODIUM CHLORIDE, PRESERVATIVE FREE 10 ML: 5 INJECTION INTRAVENOUS at 08:47

## 2025-08-12 RX ADMIN — SODIUM CHLORIDE, PRESERVATIVE FREE 10 ML: 5 INJECTION INTRAVENOUS at 07:50

## 2025-08-12 RX ADMIN — Medication 2000 UNITS: at 07:50

## 2025-08-12 RX ADMIN — PANTOPRAZOLE SODIUM 40 MG: 40 TABLET, DELAYED RELEASE ORAL at 07:55

## 2025-08-12 RX ADMIN — ONDANSETRON 4 MG: 2 INJECTION, SOLUTION INTRAMUSCULAR; INTRAVENOUS at 11:46

## 2025-08-12 RX ADMIN — SODIUM CHLORIDE, PRESERVATIVE FREE 10 ML: 5 INJECTION INTRAVENOUS at 11:46

## 2025-08-12 RX ADMIN — METOPROLOL SUCCINATE 12.5 MG: 25 TABLET, EXTENDED RELEASE ORAL at 08:47

## 2025-08-12 RX ADMIN — SODIUM CHLORIDE, PRESERVATIVE FREE 10 ML: 5 INJECTION INTRAVENOUS at 20:59

## 2025-08-12 RX ADMIN — METOPROLOL SUCCINATE 12.5 MG: 25 TABLET, EXTENDED RELEASE ORAL at 20:59

## 2025-08-12 RX ADMIN — POTASSIUM CHLORIDE 40 MEQ: 1500 TABLET, EXTENDED RELEASE ORAL at 03:50

## 2025-08-12 RX ADMIN — APIXABAN 5 MG: 5 TABLET, FILM COATED ORAL at 07:50

## 2025-08-12 RX ADMIN — APIXABAN 5 MG: 5 TABLET, FILM COATED ORAL at 20:59

## 2025-08-12 RX ADMIN — AMIODARONE HYDROCHLORIDE 200 MG: 200 TABLET ORAL at 07:51

## 2025-08-12 RX ADMIN — FUROSEMIDE 5 MG/HR: 10 INJECTION, SOLUTION INTRAMUSCULAR; INTRAVENOUS at 08:49

## 2025-08-12 RX ADMIN — ATORVASTATIN CALCIUM 40 MG: 40 TABLET, FILM COATED ORAL at 20:59

## 2025-08-12 RX ADMIN — FUROSEMIDE 20 MG: 10 INJECTION, SOLUTION INTRAMUSCULAR; INTRAVENOUS at 00:07

## 2025-08-12 RX ADMIN — METOLAZONE 5 MG: 2.5 TABLET ORAL at 07:50

## 2025-08-12 RX ADMIN — SPIRONOLACTONE 25 MG: 25 TABLET ORAL at 07:50

## 2025-08-12 RX ADMIN — EMPAGLIFLOZIN 10 MG: 10 TABLET, FILM COATED ORAL at 07:50

## 2025-08-12 ASSESSMENT — PAIN SCALES - GENERAL
PAINLEVEL_OUTOF10: 4
PAINLEVEL_OUTOF10: 0

## 2025-08-12 ASSESSMENT — PAIN DESCRIPTION - ORIENTATION: ORIENTATION: MID;UPPER;INNER

## 2025-08-12 ASSESSMENT — PAIN DESCRIPTION - DESCRIPTORS: DESCRIPTORS: DISCOMFORT

## 2025-08-13 PROBLEM — I50.84 END STAGE HEART FAILURE (HCC): Status: ACTIVE | Noted: 2024-07-17

## 2025-08-13 LAB
ALBUMIN SERPL-MCNC: 3.7 G/DL (ref 3.4–5)
ALP SERPL-CCNC: 79 U/L (ref 40–129)
ALT SERPL-CCNC: 31 U/L (ref 10–40)
ANION GAP SERPL CALCULATED.3IONS-SCNC: 13 MMOL/L (ref 3–16)
AST SERPL-CCNC: 47 U/L (ref 15–37)
BILIRUB DIRECT SERPL-MCNC: 1.4 MG/DL (ref 0–0.3)
BILIRUB INDIRECT SERPL-MCNC: 2.6 MG/DL (ref 0–1)
BILIRUB SERPL-MCNC: 4 MG/DL (ref 0–1)
BUN SERPL-MCNC: 33 MG/DL (ref 7–20)
CALCIUM SERPL-MCNC: 9 MG/DL (ref 8.3–10.6)
CHLORIDE SERPL-SCNC: 95 MMOL/L (ref 99–110)
CO2 SERPL-SCNC: 28 MMOL/L (ref 21–32)
CREAT SERPL-MCNC: 2.1 MG/DL (ref 0.8–1.3)
GFR SERPLBLD CREATININE-BSD FMLA CKD-EPI: 34 ML/MIN/{1.73_M2}
GLUCOSE SERPL-MCNC: 107 MG/DL (ref 70–99)
MAGNESIUM SERPL-MCNC: 2.31 MG/DL (ref 1.8–2.4)
POTASSIUM SERPL-SCNC: 3.6 MMOL/L (ref 3.5–5.1)
PROT SERPL-MCNC: 6.1 G/DL (ref 6.4–8.2)
SODIUM SERPL-SCNC: 136 MMOL/L (ref 136–145)

## 2025-08-13 PROCEDURE — 80048 BASIC METABOLIC PNL TOTAL CA: CPT

## 2025-08-13 PROCEDURE — 6370000000 HC RX 637 (ALT 250 FOR IP): Performed by: HOSPITALIST

## 2025-08-13 PROCEDURE — 2500000003 HC RX 250 WO HCPCS: Performed by: HOSPITALIST

## 2025-08-13 PROCEDURE — 94760 N-INVAS EAR/PLS OXIMETRY 1: CPT

## 2025-08-13 PROCEDURE — 99232 SBSQ HOSP IP/OBS MODERATE 35: CPT | Performed by: CLINICAL NURSE SPECIALIST

## 2025-08-13 PROCEDURE — 6370000000 HC RX 637 (ALT 250 FOR IP): Performed by: INTERNAL MEDICINE

## 2025-08-13 PROCEDURE — 6360000002 HC RX W HCPCS: Performed by: INTERNAL MEDICINE

## 2025-08-13 PROCEDURE — 80076 HEPATIC FUNCTION PANEL: CPT

## 2025-08-13 PROCEDURE — 2580000003 HC RX 258: Performed by: INTERNAL MEDICINE

## 2025-08-13 PROCEDURE — 2060000000 HC ICU INTERMEDIATE R&B

## 2025-08-13 PROCEDURE — 83735 ASSAY OF MAGNESIUM: CPT

## 2025-08-13 RX ORDER — POTASSIUM CHLORIDE 1500 MG/1
40 TABLET, EXTENDED RELEASE ORAL ONCE
Status: COMPLETED | OUTPATIENT
Start: 2025-08-13 | End: 2025-08-13

## 2025-08-13 RX ADMIN — FUROSEMIDE 5 MG/HR: 10 INJECTION, SOLUTION INTRAMUSCULAR; INTRAVENOUS at 20:25

## 2025-08-13 RX ADMIN — SPIRONOLACTONE 25 MG: 25 TABLET ORAL at 08:37

## 2025-08-13 RX ADMIN — SODIUM CHLORIDE, PRESERVATIVE FREE 10 ML: 5 INJECTION INTRAVENOUS at 20:28

## 2025-08-13 RX ADMIN — PANTOPRAZOLE SODIUM 40 MG: 40 TABLET, DELAYED RELEASE ORAL at 06:04

## 2025-08-13 RX ADMIN — METOPROLOL SUCCINATE 12.5 MG: 25 TABLET, EXTENDED RELEASE ORAL at 20:28

## 2025-08-13 RX ADMIN — APIXABAN 5 MG: 5 TABLET, FILM COATED ORAL at 08:37

## 2025-08-13 RX ADMIN — EMPAGLIFLOZIN 10 MG: 10 TABLET, FILM COATED ORAL at 08:37

## 2025-08-13 RX ADMIN — LISINOPRIL 2.5 MG: 5 TABLET ORAL at 08:38

## 2025-08-13 RX ADMIN — ACETAMINOPHEN 650 MG: 325 TABLET ORAL at 00:53

## 2025-08-13 RX ADMIN — Medication 2000 UNITS: at 08:38

## 2025-08-13 RX ADMIN — FUROSEMIDE 5 MG/HR: 10 INJECTION, SOLUTION INTRAMUSCULAR; INTRAVENOUS at 00:13

## 2025-08-13 RX ADMIN — SODIUM CHLORIDE, PRESERVATIVE FREE 10 ML: 5 INJECTION INTRAVENOUS at 08:42

## 2025-08-13 RX ADMIN — ATORVASTATIN CALCIUM 40 MG: 40 TABLET, FILM COATED ORAL at 20:28

## 2025-08-13 RX ADMIN — METOPROLOL SUCCINATE 12.5 MG: 25 TABLET, EXTENDED RELEASE ORAL at 08:37

## 2025-08-13 RX ADMIN — APIXABAN 5 MG: 5 TABLET, FILM COATED ORAL at 20:28

## 2025-08-13 RX ADMIN — POTASSIUM CHLORIDE 40 MEQ: 1500 TABLET, EXTENDED RELEASE ORAL at 08:38

## 2025-08-13 ASSESSMENT — PAIN SCALES - GENERAL
PAINLEVEL_OUTOF10: 0
PAINLEVEL_OUTOF10: 3
PAINLEVEL_OUTOF10: 0

## 2025-08-13 ASSESSMENT — PAIN - FUNCTIONAL ASSESSMENT
PAIN_FUNCTIONAL_ASSESSMENT: 0-10
PAIN_FUNCTIONAL_ASSESSMENT: 0-10

## 2025-08-13 ASSESSMENT — PAIN DESCRIPTION - LOCATION: LOCATION: LEG

## 2025-08-14 VITALS
OXYGEN SATURATION: 94 % | SYSTOLIC BLOOD PRESSURE: 94 MMHG | TEMPERATURE: 97.5 F | DIASTOLIC BLOOD PRESSURE: 62 MMHG | HEIGHT: 74 IN | WEIGHT: 184.7 LBS | BODY MASS INDEX: 23.7 KG/M2 | HEART RATE: 90 BPM | RESPIRATION RATE: 16 BRPM

## 2025-08-14 PROBLEM — I95.0 IDIOPATHIC HYPOTENSION: Status: ACTIVE | Noted: 2025-08-14

## 2025-08-14 LAB
ANION GAP SERPL CALCULATED.3IONS-SCNC: 13 MMOL/L (ref 3–16)
BUN SERPL-MCNC: 37 MG/DL (ref 7–20)
CALCIUM SERPL-MCNC: 8.9 MG/DL (ref 8.3–10.6)
CHLORIDE SERPL-SCNC: 94 MMOL/L (ref 99–110)
CO2 SERPL-SCNC: 31 MMOL/L (ref 21–32)
CREAT SERPL-MCNC: 2 MG/DL (ref 0.8–1.3)
GFR SERPLBLD CREATININE-BSD FMLA CKD-EPI: 36 ML/MIN/{1.73_M2}
GLUCOSE SERPL-MCNC: 98 MG/DL (ref 70–99)
MAGNESIUM SERPL-MCNC: 2.09 MG/DL (ref 1.8–2.4)
NT-PROBNP SERPL-MCNC: ABNORMAL PG/ML (ref 0–124)
POTASSIUM SERPL-SCNC: 3.6 MMOL/L (ref 3.5–5.1)
SODIUM SERPL-SCNC: 138 MMOL/L (ref 136–145)

## 2025-08-14 PROCEDURE — 83880 ASSAY OF NATRIURETIC PEPTIDE: CPT

## 2025-08-14 PROCEDURE — 94760 N-INVAS EAR/PLS OXIMETRY 1: CPT

## 2025-08-14 PROCEDURE — 83735 ASSAY OF MAGNESIUM: CPT

## 2025-08-14 PROCEDURE — 6370000000 HC RX 637 (ALT 250 FOR IP): Performed by: INTERNAL MEDICINE

## 2025-08-14 PROCEDURE — 36415 COLL VENOUS BLD VENIPUNCTURE: CPT

## 2025-08-14 PROCEDURE — 99232 SBSQ HOSP IP/OBS MODERATE 35: CPT | Performed by: NURSE PRACTITIONER

## 2025-08-14 PROCEDURE — 80048 BASIC METABOLIC PNL TOTAL CA: CPT

## 2025-08-14 PROCEDURE — 2500000003 HC RX 250 WO HCPCS: Performed by: HOSPITALIST

## 2025-08-14 RX ORDER — TORSEMIDE 20 MG/1
40 TABLET ORAL 2 TIMES DAILY
Status: DISCONTINUED | OUTPATIENT
Start: 2025-08-15 | End: 2025-08-14 | Stop reason: HOSPADM

## 2025-08-14 RX ADMIN — EMPAGLIFLOZIN 10 MG: 10 TABLET, FILM COATED ORAL at 08:40

## 2025-08-14 RX ADMIN — SODIUM CHLORIDE, PRESERVATIVE FREE 10 ML: 5 INJECTION INTRAVENOUS at 08:42

## 2025-08-14 RX ADMIN — APIXABAN 5 MG: 5 TABLET, FILM COATED ORAL at 08:40

## 2025-08-14 RX ADMIN — Medication 2000 UNITS: at 08:40

## 2025-08-14 RX ADMIN — METOPROLOL SUCCINATE 12.5 MG: 25 TABLET, EXTENDED RELEASE ORAL at 08:40

## 2025-08-14 RX ADMIN — LISINOPRIL 2.5 MG: 5 TABLET ORAL at 08:40

## 2025-08-14 RX ADMIN — SPIRONOLACTONE 25 MG: 25 TABLET ORAL at 08:41

## 2025-08-14 RX ADMIN — PANTOPRAZOLE SODIUM 40 MG: 40 TABLET, DELAYED RELEASE ORAL at 06:18

## 2025-08-14 ASSESSMENT — PAIN SCALES - GENERAL
PAINLEVEL_OUTOF10: 0

## 2025-08-15 ENCOUNTER — TELEPHONE (OUTPATIENT)
Dept: OTHER | Age: 65
End: 2025-08-15

## 2025-08-15 ENCOUNTER — CARE COORDINATION (OUTPATIENT)
Dept: CASE MANAGEMENT | Age: 65
End: 2025-08-15

## 2025-08-18 ENCOUNTER — TELEPHONE (OUTPATIENT)
Dept: PRIMARY CARE CLINIC | Age: 65
End: 2025-08-18

## 2025-08-18 ENCOUNTER — CARE COORDINATION (OUTPATIENT)
Dept: CASE MANAGEMENT | Age: 65
End: 2025-08-18

## 2025-08-20 ENCOUNTER — HOSPITAL ENCOUNTER (OUTPATIENT)
Age: 65
Discharge: HOME | End: 2025-08-20
Payer: COMMERCIAL

## 2025-08-20 ENCOUNTER — OFFICE VISIT (OUTPATIENT)
Dept: CARDIOLOGY CLINIC | Age: 65
End: 2025-08-20

## 2025-08-20 VITALS
WEIGHT: 183 LBS | DIASTOLIC BLOOD PRESSURE: 62 MMHG | BODY MASS INDEX: 23.49 KG/M2 | SYSTOLIC BLOOD PRESSURE: 88 MMHG | HEART RATE: 65 BPM | OXYGEN SATURATION: 99 % | HEIGHT: 74 IN

## 2025-08-20 DIAGNOSIS — Z59.00 HOMELESS: ICD-10-CM

## 2025-08-20 DIAGNOSIS — T82.118S: ICD-10-CM

## 2025-08-20 DIAGNOSIS — I42.0 DILATED CARDIOMYOPATHY (HCC): ICD-10-CM

## 2025-08-20 DIAGNOSIS — I50.22 CHRONIC SYSTOLIC (CONGESTIVE) HEART FAILURE (HCC): ICD-10-CM

## 2025-08-20 DIAGNOSIS — I48.0 PAF (PAROXYSMAL ATRIAL FIBRILLATION) (HCC): ICD-10-CM

## 2025-08-20 DIAGNOSIS — E55.9 VITAMIN D DEFICIENCY: ICD-10-CM

## 2025-08-20 DIAGNOSIS — I50.22 CHRONIC SYSTOLIC (CONGESTIVE) HEART FAILURE (HCC): Primary | ICD-10-CM

## 2025-08-20 DIAGNOSIS — I50.82 BIVENTRICULAR HEART FAILURE (HCC): ICD-10-CM

## 2025-08-20 LAB
ANION GAP SERPL CALCULATED.3IONS-SCNC: 10 MMOL/L (ref 3–16)
BUN SERPL-MCNC: 35 MG/DL (ref 7–20)
CALCIUM SERPL-MCNC: 8.8 MG/DL (ref 8.3–10.6)
CHLORIDE SERPL-SCNC: 95 MMOL/L (ref 99–110)
CO2 SERPL-SCNC: 34 MMOL/L (ref 21–32)
CREAT SERPL-MCNC: 1.8 MG/DL (ref 0.8–1.3)
GFR SERPLBLD CREATININE-BSD FMLA CKD-EPI: 41 ML/MIN/{1.73_M2}
GLUCOSE SERPL-MCNC: 82 MG/DL (ref 70–99)
NT-PROBNP SERPL-MCNC: ABNORMAL PG/ML (ref 0–124)
POTASSIUM SERPL-SCNC: 3.4 MMOL/L (ref 3.5–5.1)
SODIUM SERPL-SCNC: 139 MMOL/L (ref 136–145)

## 2025-08-20 PROCEDURE — 83880 ASSAY OF NATRIURETIC PEPTIDE: CPT

## 2025-08-20 PROCEDURE — 80048 BASIC METABOLIC PNL TOTAL CA: CPT

## 2025-08-20 PROCEDURE — 36415 COLL VENOUS BLD VENIPUNCTURE: CPT

## 2025-08-20 RX ORDER — AZITHROMYCIN 1 G/1
1 POWDER, FOR SUSPENSION ORAL ONCE
Qty: 1 EACH | Refills: 0 | Status: SHIPPED | OUTPATIENT
Start: 2025-08-20 | End: 2025-08-20

## 2025-08-20 SDOH — ECONOMIC STABILITY - HOUSING INSECURITY: HOMELESSNESS UNSPECIFIED: Z59.00

## 2025-08-21 ENCOUNTER — RESULTS FOLLOW-UP (OUTPATIENT)
Dept: CARDIOLOGY CLINIC | Age: 65
End: 2025-08-21

## 2025-08-25 ENCOUNTER — APPOINTMENT (OUTPATIENT)
Dept: GENERAL RADIOLOGY | Age: 65
DRG: 194 | End: 2025-08-25
Payer: COMMERCIAL

## 2025-08-25 ENCOUNTER — APPOINTMENT (OUTPATIENT)
Dept: CT IMAGING | Age: 65
DRG: 194 | End: 2025-08-25
Payer: COMMERCIAL

## 2025-08-25 ENCOUNTER — HOSPITAL ENCOUNTER (INPATIENT)
Age: 65
LOS: 8 days | Discharge: NURSING, REHAB OR LONG-TERM CARE | DRG: 194 | End: 2025-09-02
Attending: EMERGENCY MEDICINE | Admitting: INTERNAL MEDICINE
Payer: COMMERCIAL

## 2025-08-25 ENCOUNTER — APPOINTMENT (OUTPATIENT)
Dept: ULTRASOUND IMAGING | Age: 65
DRG: 194 | End: 2025-08-25
Payer: COMMERCIAL

## 2025-08-25 DIAGNOSIS — I50.84 END STAGE CONGESTIVE HEART FAILURE (HCC): ICD-10-CM

## 2025-08-25 DIAGNOSIS — N18.9 CHRONIC KIDNEY DISEASE, UNSPECIFIED CKD STAGE: ICD-10-CM

## 2025-08-25 DIAGNOSIS — Z51.5 HOSPICE CARE: ICD-10-CM

## 2025-08-25 DIAGNOSIS — I50.9 CONGESTIVE HEART FAILURE, UNSPECIFIED HF CHRONICITY, UNSPECIFIED HEART FAILURE TYPE (HCC): ICD-10-CM

## 2025-08-25 DIAGNOSIS — K76.89 LIVER DYSFUNCTION: ICD-10-CM

## 2025-08-25 DIAGNOSIS — R10.13 ABDOMINAL PAIN, EPIGASTRIC: Primary | ICD-10-CM

## 2025-08-25 DIAGNOSIS — E87.20 LACTIC ACIDOSIS: ICD-10-CM

## 2025-08-25 DIAGNOSIS — E80.6 HYPERBILIRUBINEMIA: ICD-10-CM

## 2025-08-25 DIAGNOSIS — E87.6 HYPOKALEMIA: ICD-10-CM

## 2025-08-25 DIAGNOSIS — R79.89 ELEVATED TROPONIN: ICD-10-CM

## 2025-08-25 LAB
ALBUMIN SERPL-MCNC: 4.5 G/DL (ref 3.4–5)
ALBUMIN/GLOB SERPL: 1.5 {RATIO} (ref 1.1–2.2)
ALP SERPL-CCNC: 104 U/L (ref 40–129)
ALT SERPL-CCNC: 40 U/L (ref 10–40)
ANION GAP SERPL CALCULATED.3IONS-SCNC: 15 MMOL/L (ref 3–16)
AST SERPL-CCNC: 55 U/L (ref 15–37)
BASOPHILS # BLD: 0 K/UL (ref 0–0.2)
BASOPHILS NFR BLD: 1.2 %
BILIRUB DIRECT SERPL-MCNC: 1.4 MG/DL (ref 0–0.3)
BILIRUB INDIRECT SERPL-MCNC: 2.8 MG/DL (ref 0–1)
BILIRUB SERPL-MCNC: 4.2 MG/DL (ref 0–1)
BILIRUB SERPL-MCNC: 4.5 MG/DL (ref 0–1)
BUN SERPL-MCNC: 38 MG/DL (ref 7–20)
CALCIUM SERPL-MCNC: 9.3 MG/DL (ref 8.3–10.6)
CHLORIDE SERPL-SCNC: 93 MMOL/L (ref 99–110)
CO2 SERPL-SCNC: 28 MMOL/L (ref 21–32)
CREAT SERPL-MCNC: 2 MG/DL (ref 0.8–1.3)
DEPRECATED RDW RBC AUTO: 15.8 % (ref 12.4–15.4)
EKG ATRIAL RATE: 93 BPM
EKG DIAGNOSIS: NORMAL
EKG Q-T INTERVAL: 470 MS
EKG QRS DURATION: 180 MS
EKG QTC CALCULATION (BAZETT): 600 MS
EKG R AXIS: -67 DEGREES
EKG T AXIS: 86 DEGREES
EKG VENTRICULAR RATE: 98 BPM
EOSINOPHIL # BLD: 0 K/UL (ref 0–0.6)
EOSINOPHIL NFR BLD: 1.2 %
ETHANOLAMINE SERPL-MCNC: NORMAL MG/DL (ref 0–0.08)
GFR SERPLBLD CREATININE-BSD FMLA CKD-EPI: 36 ML/MIN/{1.73_M2}
GLUCOSE SERPL-MCNC: 114 MG/DL (ref 70–99)
HAV IGM SERPL QL IA: NORMAL
HBV CORE IGM SERPL QL IA: NORMAL
HBV SURFACE AG SERPL QL IA: NORMAL
HCT VFR BLD AUTO: 44.5 % (ref 40.5–52.5)
HCV AB SERPL QL IA: NORMAL
HGB BLD-MCNC: 14.8 G/DL (ref 13.5–17.5)
INR PPP: 1.63 (ref 0.86–1.14)
LACTATE BLDV-SCNC: 2.4 MMOL/L (ref 0.4–2)
LACTATE BLDV-SCNC: 2.9 MMOL/L (ref 0.4–2)
LIPASE SERPL-CCNC: 36 U/L (ref 13–60)
LYMPHOCYTES # BLD: 1 K/UL (ref 1–5.1)
LYMPHOCYTES NFR BLD: 25.2 %
MAGNESIUM SERPL-MCNC: 2.44 MG/DL (ref 1.8–2.4)
MAGNESIUM SERPL-MCNC: 2.47 MG/DL (ref 1.8–2.4)
MCH RBC QN AUTO: 28.1 PG (ref 26–34)
MCHC RBC AUTO-ENTMCNC: 33.2 G/DL (ref 31–36)
MCV RBC AUTO: 84.4 FL (ref 80–100)
MONOCYTES # BLD: 0.4 K/UL (ref 0–1.3)
MONOCYTES NFR BLD: 9.3 %
NEUTROPHILS # BLD: 2.6 K/UL (ref 1.7–7.7)
NEUTROPHILS NFR BLD: 63.1 %
NT-PROBNP SERPL-MCNC: ABNORMAL PG/ML (ref 0–124)
PLATELET # BLD AUTO: 176 K/UL (ref 135–450)
PMV BLD AUTO: 8.1 FL (ref 5–10.5)
POTASSIUM SERPL-SCNC: 3.3 MMOL/L (ref 3.5–5.1)
POTASSIUM SERPL-SCNC: 3.9 MMOL/L (ref 3.5–5.1)
POTASSIUM SERPL-SCNC: 4.3 MMOL/L (ref 3.5–5.1)
PROCALCITONIN SERPL IA-MCNC: 0.19 NG/ML (ref 0–0.15)
PROT SERPL-MCNC: 7.5 G/DL (ref 6.4–8.2)
PROTHROMBIN TIME: 19.4 SEC (ref 12.1–14.9)
RBC # BLD AUTO: 5.26 M/UL (ref 4.2–5.9)
SODIUM SERPL-SCNC: 136 MMOL/L (ref 136–145)
TROPONIN, HIGH SENSITIVITY: 37 NG/L (ref 0–22)
TROPONIN, HIGH SENSITIVITY: 45 NG/L (ref 0–22)
WBC # BLD AUTO: 4.1 K/UL (ref 4–11)

## 2025-08-25 PROCEDURE — 76705 ECHO EXAM OF ABDOMEN: CPT

## 2025-08-25 PROCEDURE — 82248 BILIRUBIN DIRECT: CPT

## 2025-08-25 PROCEDURE — 80074 ACUTE HEPATITIS PANEL: CPT

## 2025-08-25 PROCEDURE — 94640 AIRWAY INHALATION TREATMENT: CPT

## 2025-08-25 PROCEDURE — 87040 BLOOD CULTURE FOR BACTERIA: CPT

## 2025-08-25 PROCEDURE — 83880 ASSAY OF NATRIURETIC PEPTIDE: CPT

## 2025-08-25 PROCEDURE — 83690 ASSAY OF LIPASE: CPT

## 2025-08-25 PROCEDURE — 2500000003 HC RX 250 WO HCPCS: Performed by: INTERNAL MEDICINE

## 2025-08-25 PROCEDURE — 6360000002 HC RX W HCPCS: Performed by: EMERGENCY MEDICINE

## 2025-08-25 PROCEDURE — 83605 ASSAY OF LACTIC ACID: CPT

## 2025-08-25 PROCEDURE — 84484 ASSAY OF TROPONIN QUANT: CPT

## 2025-08-25 PROCEDURE — 84132 ASSAY OF SERUM POTASSIUM: CPT

## 2025-08-25 PROCEDURE — 1200000000 HC SEMI PRIVATE

## 2025-08-25 PROCEDURE — 36415 COLL VENOUS BLD VENIPUNCTURE: CPT

## 2025-08-25 PROCEDURE — 6370000000 HC RX 637 (ALT 250 FOR IP): Performed by: INTERNAL MEDICINE

## 2025-08-25 PROCEDURE — 2580000003 HC RX 258: Performed by: EMERGENCY MEDICINE

## 2025-08-25 PROCEDURE — 99285 EMERGENCY DEPT VISIT HI MDM: CPT

## 2025-08-25 PROCEDURE — 74176 CT ABD & PELVIS W/O CONTRAST: CPT

## 2025-08-25 PROCEDURE — 93005 ELECTROCARDIOGRAM TRACING: CPT | Performed by: EMERGENCY MEDICINE

## 2025-08-25 PROCEDURE — 93010 ELECTROCARDIOGRAM REPORT: CPT | Performed by: INTERNAL MEDICINE

## 2025-08-25 PROCEDURE — 71045 X-RAY EXAM CHEST 1 VIEW: CPT

## 2025-08-25 PROCEDURE — 85610 PROTHROMBIN TIME: CPT

## 2025-08-25 PROCEDURE — 85025 COMPLETE CBC W/AUTO DIFF WBC: CPT

## 2025-08-25 PROCEDURE — 82077 ASSAY SPEC XCP UR&BREATH IA: CPT

## 2025-08-25 PROCEDURE — 84145 PROCALCITONIN (PCT): CPT

## 2025-08-25 PROCEDURE — 96374 THER/PROPH/DIAG INJ IV PUSH: CPT

## 2025-08-25 PROCEDURE — 83735 ASSAY OF MAGNESIUM: CPT

## 2025-08-25 PROCEDURE — 96375 TX/PRO/DX INJ NEW DRUG ADDON: CPT

## 2025-08-25 PROCEDURE — 80053 COMPREHEN METABOLIC PANEL: CPT

## 2025-08-25 RX ORDER — ONDANSETRON 2 MG/ML
4 INJECTION INTRAMUSCULAR; INTRAVENOUS
Status: DISCONTINUED | OUTPATIENT
Start: 2025-08-25 | End: 2025-08-25

## 2025-08-25 RX ORDER — TORSEMIDE 20 MG/1
40 TABLET ORAL 2 TIMES DAILY
Status: DISCONTINUED | OUTPATIENT
Start: 2025-08-25 | End: 2025-08-27

## 2025-08-25 RX ORDER — SODIUM CHLORIDE 9 MG/ML
INJECTION, SOLUTION INTRAVENOUS CONTINUOUS
Status: DISCONTINUED | OUTPATIENT
Start: 2025-08-25 | End: 2025-08-25

## 2025-08-25 RX ORDER — ACETAMINOPHEN 325 MG/1
650 TABLET ORAL EVERY 6 HOURS PRN
Status: DISCONTINUED | OUTPATIENT
Start: 2025-08-25 | End: 2025-09-02 | Stop reason: HOSPADM

## 2025-08-25 RX ORDER — POLYETHYLENE GLYCOL 3350 17 G/17G
17 POWDER, FOR SOLUTION ORAL DAILY PRN
Status: DISCONTINUED | OUTPATIENT
Start: 2025-08-25 | End: 2025-09-02 | Stop reason: HOSPADM

## 2025-08-25 RX ORDER — ONDANSETRON 4 MG/1
4 TABLET, ORALLY DISINTEGRATING ORAL EVERY 8 HOURS PRN
Status: DISCONTINUED | OUTPATIENT
Start: 2025-08-25 | End: 2025-08-26

## 2025-08-25 RX ORDER — ALBUTEROL SULFATE 0.83 MG/ML
2.5 SOLUTION RESPIRATORY (INHALATION) ONCE
Status: COMPLETED | OUTPATIENT
Start: 2025-08-25 | End: 2025-08-25

## 2025-08-25 RX ORDER — POTASSIUM CHLORIDE 1500 MG/1
40 TABLET, EXTENDED RELEASE ORAL PRN
Status: DISCONTINUED | OUTPATIENT
Start: 2025-08-25 | End: 2025-09-02 | Stop reason: HOSPADM

## 2025-08-25 RX ORDER — ALBUTEROL SULFATE 0.83 MG/ML
2.5 SOLUTION RESPIRATORY (INHALATION) EVERY 4 HOURS PRN
Status: DISCONTINUED | OUTPATIENT
Start: 2025-08-25 | End: 2025-09-02 | Stop reason: HOSPADM

## 2025-08-25 RX ORDER — AMIODARONE HYDROCHLORIDE 200 MG/1
200 TABLET ORAL DAILY
Status: DISCONTINUED | OUTPATIENT
Start: 2025-08-25 | End: 2025-09-02 | Stop reason: HOSPADM

## 2025-08-25 RX ORDER — ATORVASTATIN CALCIUM 40 MG/1
40 TABLET, FILM COATED ORAL NIGHTLY
Status: DISCONTINUED | OUTPATIENT
Start: 2025-08-25 | End: 2025-09-02 | Stop reason: HOSPADM

## 2025-08-25 RX ORDER — SODIUM CHLORIDE 9 MG/ML
INJECTION, SOLUTION INTRAVENOUS PRN
Status: DISCONTINUED | OUTPATIENT
Start: 2025-08-25 | End: 2025-09-02 | Stop reason: HOSPADM

## 2025-08-25 RX ORDER — LISINOPRIL 5 MG/1
2.5 TABLET ORAL DAILY
Status: DISCONTINUED | OUTPATIENT
Start: 2025-08-25 | End: 2025-08-27

## 2025-08-25 RX ORDER — ENOXAPARIN SODIUM 100 MG/ML
40 INJECTION SUBCUTANEOUS DAILY
Status: DISCONTINUED | OUTPATIENT
Start: 2025-08-25 | End: 2025-08-25

## 2025-08-25 RX ORDER — ONDANSETRON 2 MG/ML
4 INJECTION INTRAMUSCULAR; INTRAVENOUS EVERY 6 HOURS PRN
Status: DISCONTINUED | OUTPATIENT
Start: 2025-08-25 | End: 2025-08-26

## 2025-08-25 RX ORDER — METOPROLOL SUCCINATE 25 MG/1
12.5 TABLET, EXTENDED RELEASE ORAL 2 TIMES DAILY
Status: DISCONTINUED | OUTPATIENT
Start: 2025-08-25 | End: 2025-08-27

## 2025-08-25 RX ORDER — POTASSIUM CHLORIDE 1500 MG/1
40 TABLET, EXTENDED RELEASE ORAL
Status: DISCONTINUED | OUTPATIENT
Start: 2025-08-26 | End: 2025-08-27

## 2025-08-25 RX ORDER — POTASSIUM CHLORIDE 7.45 MG/ML
10 INJECTION INTRAVENOUS PRN
Status: DISCONTINUED | OUTPATIENT
Start: 2025-08-25 | End: 2025-08-25

## 2025-08-25 RX ORDER — ACETAMINOPHEN 650 MG/1
650 SUPPOSITORY RECTAL EVERY 6 HOURS PRN
Status: DISCONTINUED | OUTPATIENT
Start: 2025-08-25 | End: 2025-09-02 | Stop reason: HOSPADM

## 2025-08-25 RX ORDER — SODIUM CHLORIDE 0.9 % (FLUSH) 0.9 %
5-40 SYRINGE (ML) INJECTION PRN
Status: DISCONTINUED | OUTPATIENT
Start: 2025-08-25 | End: 2025-09-02 | Stop reason: HOSPADM

## 2025-08-25 RX ORDER — PANTOPRAZOLE SODIUM 40 MG/1
40 TABLET, DELAYED RELEASE ORAL
Status: DISCONTINUED | OUTPATIENT
Start: 2025-08-25 | End: 2025-09-02 | Stop reason: HOSPADM

## 2025-08-25 RX ORDER — SPIRONOLACTONE 25 MG/1
25 TABLET ORAL DAILY
Status: DISCONTINUED | OUTPATIENT
Start: 2025-08-25 | End: 2025-08-27

## 2025-08-25 RX ORDER — SODIUM CHLORIDE 0.9 % (FLUSH) 0.9 %
5-40 SYRINGE (ML) INJECTION EVERY 12 HOURS SCHEDULED
Status: DISCONTINUED | OUTPATIENT
Start: 2025-08-25 | End: 2025-09-02 | Stop reason: HOSPADM

## 2025-08-25 RX ORDER — 0.9 % SODIUM CHLORIDE 0.9 %
1000 INTRAVENOUS SOLUTION INTRAVENOUS ONCE
Status: COMPLETED | OUTPATIENT
Start: 2025-08-25 | End: 2025-08-25

## 2025-08-25 RX ORDER — MAGNESIUM SULFATE IN WATER 40 MG/ML
2000 INJECTION, SOLUTION INTRAVENOUS PRN
Status: DISCONTINUED | OUTPATIENT
Start: 2025-08-25 | End: 2025-09-02 | Stop reason: HOSPADM

## 2025-08-25 RX ORDER — MORPHINE SULFATE 4 MG/ML
4 INJECTION, SOLUTION INTRAMUSCULAR; INTRAVENOUS
Refills: 0 | Status: DISCONTINUED | OUTPATIENT
Start: 2025-08-25 | End: 2025-09-02 | Stop reason: HOSPADM

## 2025-08-25 RX ORDER — ALBUTEROL SULFATE 90 UG/1
2 INHALANT RESPIRATORY (INHALATION) EVERY 4 HOURS PRN
Status: DISCONTINUED | OUTPATIENT
Start: 2025-08-25 | End: 2025-09-02 | Stop reason: HOSPADM

## 2025-08-25 RX ORDER — ALBUTEROL SULFATE 90 UG/1
2 INHALANT RESPIRATORY (INHALATION) EVERY 6 HOURS PRN
Status: DISCONTINUED | OUTPATIENT
Start: 2025-08-25 | End: 2025-08-25

## 2025-08-25 RX ORDER — POTASSIUM CHLORIDE 7.45 MG/ML
10 INJECTION INTRAVENOUS
Status: DISCONTINUED | OUTPATIENT
Start: 2025-08-25 | End: 2025-08-25

## 2025-08-25 RX ADMIN — EMPAGLIFLOZIN 10 MG: 10 TABLET, FILM COATED ORAL at 11:44

## 2025-08-25 RX ADMIN — METOPROLOL SUCCINATE 12.5 MG: 25 TABLET, EXTENDED RELEASE ORAL at 19:52

## 2025-08-25 RX ADMIN — SODIUM CHLORIDE 1000 ML: 0.9 INJECTION, SOLUTION INTRAVENOUS at 06:34

## 2025-08-25 RX ADMIN — PIPERACILLIN AND TAZOBACTAM 3375 MG: 3; .375 INJECTION, POWDER, FOR SOLUTION INTRAVENOUS at 06:37

## 2025-08-25 RX ADMIN — SODIUM CHLORIDE 1000 ML: 0.9 INJECTION, SOLUTION INTRAVENOUS at 03:39

## 2025-08-25 RX ADMIN — APIXABAN 5 MG: 5 TABLET, FILM COATED ORAL at 20:30

## 2025-08-25 RX ADMIN — POTASSIUM CHLORIDE 10 MEQ: 7.46 INJECTION, SOLUTION INTRAVENOUS at 06:36

## 2025-08-25 RX ADMIN — MORPHINE SULFATE 4 MG: 4 INJECTION, SOLUTION INTRAMUSCULAR; INTRAVENOUS at 02:55

## 2025-08-25 RX ADMIN — ONDANSETRON 4 MG: 2 INJECTION, SOLUTION INTRAMUSCULAR; INTRAVENOUS at 02:55

## 2025-08-25 RX ADMIN — METOPROLOL SUCCINATE 12.5 MG: 25 TABLET, EXTENDED RELEASE ORAL at 11:44

## 2025-08-25 RX ADMIN — APIXABAN 5 MG: 5 TABLET, FILM COATED ORAL at 11:44

## 2025-08-25 RX ADMIN — TORSEMIDE 40 MG: 20 TABLET ORAL at 19:51

## 2025-08-25 RX ADMIN — ALBUTEROL SULFATE 2.5 MG: 2.5 SOLUTION RESPIRATORY (INHALATION) at 02:40

## 2025-08-25 RX ADMIN — PANTOPRAZOLE SODIUM 40 MG: 40 TABLET, DELAYED RELEASE ORAL at 11:44

## 2025-08-25 RX ADMIN — Medication 10 ML: at 19:52

## 2025-08-25 RX ADMIN — TORSEMIDE 40 MG: 20 TABLET ORAL at 11:44

## 2025-08-25 RX ADMIN — AMIODARONE HYDROCHLORIDE 200 MG: 200 TABLET ORAL at 11:44

## 2025-08-25 RX ADMIN — ATORVASTATIN CALCIUM 40 MG: 40 TABLET, FILM COATED ORAL at 19:52

## 2025-08-25 RX ADMIN — SPIRONOLACTONE 25 MG: 25 TABLET ORAL at 11:44

## 2025-08-25 ASSESSMENT — PAIN SCALES - GENERAL
PAINLEVEL_OUTOF10: 0
PAINLEVEL_OUTOF10: 2
PAINLEVEL_OUTOF10: 0
PAINLEVEL_OUTOF10: 0
PAINLEVEL_OUTOF10: 6
PAINLEVEL_OUTOF10: 5

## 2025-08-25 ASSESSMENT — PAIN DESCRIPTION - ORIENTATION: ORIENTATION: MID;UPPER

## 2025-08-25 ASSESSMENT — PAIN - FUNCTIONAL ASSESSMENT
PAIN_FUNCTIONAL_ASSESSMENT: 0-10
PAIN_FUNCTIONAL_ASSESSMENT: 0-10

## 2025-08-25 ASSESSMENT — PAIN DESCRIPTION - PAIN TYPE: TYPE: ACUTE PAIN

## 2025-08-25 ASSESSMENT — PAIN DESCRIPTION - LOCATION: LOCATION: ABDOMEN

## 2025-08-26 PROBLEM — I49.01 VF (VENTRICULAR FIBRILLATION) (HCC): Status: ACTIVE | Noted: 2025-08-26

## 2025-08-26 LAB
ALBUMIN SERPL-MCNC: 3.9 G/DL (ref 3.4–5)
ALP SERPL-CCNC: 88 U/L (ref 40–129)
ALT SERPL-CCNC: 32 U/L (ref 10–40)
ANION GAP SERPL CALCULATED.3IONS-SCNC: 13 MMOL/L (ref 3–16)
AST SERPL-CCNC: 42 U/L (ref 15–37)
BASOPHILS # BLD: 0 K/UL (ref 0–0.2)
BASOPHILS NFR BLD: 1.1 %
BILIRUB DIRECT SERPL-MCNC: 1.5 MG/DL (ref 0–0.3)
BILIRUB INDIRECT SERPL-MCNC: 2.7 MG/DL (ref 0–1)
BILIRUB SERPL-MCNC: 4.2 MG/DL (ref 0–1)
BUN SERPL-MCNC: 42 MG/DL (ref 7–20)
CALCIUM SERPL-MCNC: 9.1 MG/DL (ref 8.3–10.6)
CHLORIDE SERPL-SCNC: 95 MMOL/L (ref 99–110)
CO2 SERPL-SCNC: 28 MMOL/L (ref 21–32)
CREAT SERPL-MCNC: 2 MG/DL (ref 0.8–1.3)
DEPRECATED RDW RBC AUTO: 15.4 % (ref 12.4–15.4)
EOSINOPHIL # BLD: 0.1 K/UL (ref 0–0.6)
EOSINOPHIL NFR BLD: 2.1 %
GFR SERPLBLD CREATININE-BSD FMLA CKD-EPI: 36 ML/MIN/{1.73_M2}
GLUCOSE SERPL-MCNC: 95 MG/DL (ref 70–99)
HCT VFR BLD AUTO: 41.4 % (ref 40.5–52.5)
HGB BLD-MCNC: 13.7 G/DL (ref 13.5–17.5)
LACTATE BLDV-SCNC: 2.6 MMOL/L (ref 0.4–2)
LYMPHOCYTES # BLD: 0.9 K/UL (ref 1–5.1)
LYMPHOCYTES NFR BLD: 24.7 %
MAGNESIUM SERPL-MCNC: 2.53 MG/DL (ref 1.8–2.4)
MCH RBC QN AUTO: 28 PG (ref 26–34)
MCHC RBC AUTO-ENTMCNC: 33.1 G/DL (ref 31–36)
MCV RBC AUTO: 84.8 FL (ref 80–100)
MONOCYTES # BLD: 0.4 K/UL (ref 0–1.3)
MONOCYTES NFR BLD: 12.4 %
NEUTROPHILS # BLD: 2.1 K/UL (ref 1.7–7.7)
NEUTROPHILS NFR BLD: 59.7 %
NT-PROBNP SERPL-MCNC: ABNORMAL PG/ML (ref 0–124)
PHOSPHATE SERPL-MCNC: 5.2 MG/DL (ref 2.5–4.9)
PLATELET # BLD AUTO: 168 K/UL (ref 135–450)
PMV BLD AUTO: 7.7 FL (ref 5–10.5)
POTASSIUM SERPL-SCNC: 3.8 MMOL/L (ref 3.5–5.1)
PROT SERPL-MCNC: 6.6 G/DL (ref 6.4–8.2)
RBC # BLD AUTO: 4.88 M/UL (ref 4.2–5.9)
SODIUM SERPL-SCNC: 136 MMOL/L (ref 136–145)
WBC # BLD AUTO: 3.6 K/UL (ref 4–11)

## 2025-08-26 PROCEDURE — 6370000000 HC RX 637 (ALT 250 FOR IP): Performed by: INTERNAL MEDICINE

## 2025-08-26 PROCEDURE — 83880 ASSAY OF NATRIURETIC PEPTIDE: CPT

## 2025-08-26 PROCEDURE — 80048 BASIC METABOLIC PNL TOTAL CA: CPT

## 2025-08-26 PROCEDURE — 2500000003 HC RX 250 WO HCPCS: Performed by: INTERNAL MEDICINE

## 2025-08-26 PROCEDURE — 36415 COLL VENOUS BLD VENIPUNCTURE: CPT

## 2025-08-26 PROCEDURE — 80076 HEPATIC FUNCTION PANEL: CPT

## 2025-08-26 PROCEDURE — 84100 ASSAY OF PHOSPHORUS: CPT

## 2025-08-26 PROCEDURE — 83605 ASSAY OF LACTIC ACID: CPT

## 2025-08-26 PROCEDURE — 94760 N-INVAS EAR/PLS OXIMETRY 1: CPT

## 2025-08-26 PROCEDURE — 85025 COMPLETE CBC W/AUTO DIFF WBC: CPT

## 2025-08-26 PROCEDURE — 1200000000 HC SEMI PRIVATE

## 2025-08-26 PROCEDURE — 99223 1ST HOSP IP/OBS HIGH 75: CPT | Performed by: INTERNAL MEDICINE

## 2025-08-26 PROCEDURE — 83735 ASSAY OF MAGNESIUM: CPT

## 2025-08-26 RX ADMIN — METOPROLOL SUCCINATE 12.5 MG: 25 TABLET, EXTENDED RELEASE ORAL at 11:14

## 2025-08-26 RX ADMIN — SPIRONOLACTONE 25 MG: 25 TABLET ORAL at 15:50

## 2025-08-26 RX ADMIN — AMIODARONE HYDROCHLORIDE 200 MG: 200 TABLET ORAL at 11:14

## 2025-08-26 RX ADMIN — POTASSIUM CHLORIDE 40 MEQ: 1500 TABLET, EXTENDED RELEASE ORAL at 15:51

## 2025-08-26 RX ADMIN — ATORVASTATIN CALCIUM 40 MG: 40 TABLET, FILM COATED ORAL at 22:19

## 2025-08-26 RX ADMIN — TORSEMIDE 40 MG: 20 TABLET ORAL at 15:51

## 2025-08-26 RX ADMIN — Medication 10 ML: at 22:19

## 2025-08-26 RX ADMIN — Medication 10 ML: at 11:16

## 2025-08-26 RX ADMIN — APIXABAN 5 MG: 5 TABLET, FILM COATED ORAL at 22:19

## 2025-08-26 ASSESSMENT — PAIN SCALES - GENERAL
PAINLEVEL_OUTOF10: 0
PAINLEVEL_OUTOF10: 0

## 2025-08-27 PROBLEM — E87.6 HYPOKALEMIA: Status: ACTIVE | Noted: 2025-08-27

## 2025-08-27 PROBLEM — I95.9 ARTERIAL HYPOTENSION: Status: ACTIVE | Noted: 2025-08-14

## 2025-08-27 PROBLEM — N18.9 CHRONIC KIDNEY DISEASE: Status: ACTIVE | Noted: 2024-10-07

## 2025-08-27 PROBLEM — K76.1 HEPATIC CONGESTION: Status: ACTIVE | Noted: 2025-08-27

## 2025-08-27 LAB
ALBUMIN SERPL-MCNC: 4.1 G/DL (ref 3.4–5)
ALP SERPL-CCNC: 115 U/L (ref 40–129)
ALT SERPL-CCNC: 34 U/L (ref 10–40)
ANION GAP SERPL CALCULATED.3IONS-SCNC: 16 MMOL/L (ref 3–16)
AST SERPL-CCNC: 46 U/L (ref 15–37)
BACTERIA URNS QL MICRO: ABNORMAL /HPF
BASOPHILS # BLD: 0 K/UL (ref 0–0.2)
BASOPHILS NFR BLD: 1.1 %
BILIRUB DIRECT SERPL-MCNC: 1.6 MG/DL (ref 0–0.3)
BILIRUB INDIRECT SERPL-MCNC: 2.3 MG/DL (ref 0–1)
BILIRUB SERPL-MCNC: 3.9 MG/DL (ref 0–1)
BILIRUB UR QL STRIP.AUTO: NEGATIVE
BUN SERPL-MCNC: 47 MG/DL (ref 7–20)
CALCIUM OXALATE CRYSTALS: PRESENT
CALCIUM SERPL-MCNC: 9 MG/DL (ref 8.3–10.6)
CHLORIDE SERPL-SCNC: 95 MMOL/L (ref 99–110)
CLARITY UR: CLEAR
CO2 SERPL-SCNC: 26 MMOL/L (ref 21–32)
COLOR UR: ABNORMAL
CREAT SERPL-MCNC: 2.2 MG/DL (ref 0.8–1.3)
DEPRECATED RDW RBC AUTO: 15.9 % (ref 12.4–15.4)
EOSINOPHIL # BLD: 0.1 K/UL (ref 0–0.6)
EOSINOPHIL NFR BLD: 1.7 %
EPI CELLS #/AREA URNS AUTO: 1 /HPF (ref 0–5)
GFR SERPLBLD CREATININE-BSD FMLA CKD-EPI: 32 ML/MIN/{1.73_M2}
GLUCOSE SERPL-MCNC: 90 MG/DL (ref 70–99)
GLUCOSE UR STRIP.AUTO-MCNC: 250 MG/DL
HCT VFR BLD AUTO: 42 % (ref 40.5–52.5)
HGB BLD-MCNC: 13.9 G/DL (ref 13.5–17.5)
HGB UR QL STRIP.AUTO: NEGATIVE
HYALINE CASTS #/AREA URNS AUTO: 40 /LPF (ref 0–8)
KETONES UR STRIP.AUTO-MCNC: NEGATIVE MG/DL
LEUKOCYTE ESTERASE UR QL STRIP.AUTO: NEGATIVE
LYMPHOCYTES # BLD: 0.9 K/UL (ref 1–5.1)
LYMPHOCYTES NFR BLD: 20.9 %
MCH RBC QN AUTO: 28.2 PG (ref 26–34)
MCHC RBC AUTO-ENTMCNC: 33.1 G/DL (ref 31–36)
MCV RBC AUTO: 85.4 FL (ref 80–100)
MONOCYTES # BLD: 0.5 K/UL (ref 0–1.3)
MONOCYTES NFR BLD: 11.5 %
NEUTROPHILS # BLD: 2.6 K/UL (ref 1.7–7.7)
NEUTROPHILS NFR BLD: 64.8 %
NITRITE UR QL STRIP.AUTO: NEGATIVE
PH UR STRIP.AUTO: 5 [PH] (ref 5–8)
PLATELET # BLD AUTO: 170 K/UL (ref 135–450)
PMV BLD AUTO: 8.1 FL (ref 5–10.5)
POTASSIUM SERPL-SCNC: 4.2 MMOL/L (ref 3.5–5.1)
PROT SERPL-MCNC: 6.7 G/DL (ref 6.4–8.2)
PROT UR STRIP.AUTO-MCNC: ABNORMAL MG/DL
RBC # BLD AUTO: 4.92 M/UL (ref 4.2–5.9)
RBC CLUMPS #/AREA URNS AUTO: 1 /HPF (ref 0–4)
SODIUM SERPL-SCNC: 137 MMOL/L (ref 136–145)
SP GR UR STRIP.AUTO: 1.01 (ref 1–1.03)
UA DIPSTICK W REFLEX MICRO PNL UR: YES
URN SPEC COLLECT METH UR: ABNORMAL
UROBILINOGEN UR STRIP-ACNC: 1 E.U./DL
WBC # BLD AUTO: 4.1 K/UL (ref 4–11)
WBC #/AREA URNS AUTO: 1 /HPF (ref 0–5)

## 2025-08-27 PROCEDURE — 6370000000 HC RX 637 (ALT 250 FOR IP): Performed by: INTERNAL MEDICINE

## 2025-08-27 PROCEDURE — 80076 HEPATIC FUNCTION PANEL: CPT

## 2025-08-27 PROCEDURE — 1200000000 HC SEMI PRIVATE

## 2025-08-27 PROCEDURE — 81001 URINALYSIS AUTO W/SCOPE: CPT

## 2025-08-27 PROCEDURE — 84156 ASSAY OF PROTEIN URINE: CPT

## 2025-08-27 PROCEDURE — 2500000003 HC RX 250 WO HCPCS: Performed by: INTERNAL MEDICINE

## 2025-08-27 PROCEDURE — 82570 ASSAY OF URINE CREATININE: CPT

## 2025-08-27 PROCEDURE — 85025 COMPLETE CBC W/AUTO DIFF WBC: CPT

## 2025-08-27 PROCEDURE — 82043 UR ALBUMIN QUANTITATIVE: CPT

## 2025-08-27 PROCEDURE — 99223 1ST HOSP IP/OBS HIGH 75: CPT | Performed by: INTERNAL MEDICINE

## 2025-08-27 PROCEDURE — 36415 COLL VENOUS BLD VENIPUNCTURE: CPT

## 2025-08-27 PROCEDURE — 80048 BASIC METABOLIC PNL TOTAL CA: CPT

## 2025-08-27 PROCEDURE — 51798 US URINE CAPACITY MEASURE: CPT

## 2025-08-27 RX ORDER — FUROSEMIDE 10 MG/ML
60 INJECTION INTRAMUSCULAR; INTRAVENOUS 2 TIMES DAILY
Status: DISCONTINUED | OUTPATIENT
Start: 2025-08-27 | End: 2025-08-27

## 2025-08-27 RX ORDER — SPIRONOLACTONE 25 MG/1
12.5 TABLET ORAL DAILY
Status: DISCONTINUED | OUTPATIENT
Start: 2025-08-27 | End: 2025-08-28

## 2025-08-27 RX ORDER — MIDODRINE HYDROCHLORIDE 5 MG/1
5 TABLET ORAL
Status: DISCONTINUED | OUTPATIENT
Start: 2025-08-27 | End: 2025-08-29

## 2025-08-27 RX ORDER — TORSEMIDE 20 MG/1
40 TABLET ORAL DAILY
Status: DISCONTINUED | OUTPATIENT
Start: 2025-08-28 | End: 2025-08-29

## 2025-08-27 RX ORDER — METOPROLOL SUCCINATE 25 MG/1
12.5 TABLET, EXTENDED RELEASE ORAL 2 TIMES DAILY
Status: DISCONTINUED | OUTPATIENT
Start: 2025-08-27 | End: 2025-09-02 | Stop reason: HOSPADM

## 2025-08-27 RX ADMIN — Medication 10 ML: at 09:56

## 2025-08-27 RX ADMIN — APIXABAN 5 MG: 5 TABLET, FILM COATED ORAL at 21:39

## 2025-08-27 RX ADMIN — MIDODRINE HYDROCHLORIDE 5 MG: 5 TABLET ORAL at 17:23

## 2025-08-27 RX ADMIN — Medication 10 ML: at 21:44

## 2025-08-27 RX ADMIN — MIDODRINE HYDROCHLORIDE 5 MG: 5 TABLET ORAL at 09:59

## 2025-08-27 RX ADMIN — AMIODARONE HYDROCHLORIDE 200 MG: 200 TABLET ORAL at 09:57

## 2025-08-27 RX ADMIN — SPIRONOLACTONE 12.5 MG: 25 TABLET ORAL at 17:23

## 2025-08-27 RX ADMIN — APIXABAN 5 MG: 5 TABLET, FILM COATED ORAL at 09:56

## 2025-08-27 RX ADMIN — MIDODRINE HYDROCHLORIDE 5 MG: 5 TABLET ORAL at 12:19

## 2025-08-27 RX ADMIN — METOPROLOL SUCCINATE 12.5 MG: 25 TABLET, EXTENDED RELEASE ORAL at 21:39

## 2025-08-27 RX ADMIN — ATORVASTATIN CALCIUM 40 MG: 40 TABLET, FILM COATED ORAL at 21:39

## 2025-08-27 RX ADMIN — POTASSIUM CHLORIDE 40 MEQ: 1500 TABLET, EXTENDED RELEASE ORAL at 09:56

## 2025-08-27 ASSESSMENT — PAIN SCALES - GENERAL: PAINLEVEL_OUTOF10: 0

## 2025-08-28 LAB
ALBUMIN SERPL-MCNC: 3.6 G/DL (ref 3.4–5)
ALBUMIN SERPL-MCNC: 3.9 G/DL (ref 3.4–5)
ALP SERPL-CCNC: 97 U/L (ref 40–129)
ALT SERPL-CCNC: 38 U/L (ref 10–40)
ANION GAP SERPL CALCULATED.3IONS-SCNC: 14 MMOL/L (ref 3–16)
ANION GAP SERPL CALCULATED.3IONS-SCNC: 17 MMOL/L (ref 3–16)
AST SERPL-CCNC: 63 U/L (ref 15–37)
BASOPHILS # BLD: 0 K/UL (ref 0–0.2)
BASOPHILS NFR BLD: 0.7 %
BILIRUB DIRECT SERPL-MCNC: 1.3 MG/DL (ref 0–0.3)
BILIRUB INDIRECT SERPL-MCNC: 2.9 MG/DL (ref 0–1)
BILIRUB SERPL-MCNC: 4.2 MG/DL (ref 0–1)
BUN SERPL-MCNC: 50 MG/DL (ref 7–20)
BUN SERPL-MCNC: 51 MG/DL (ref 7–20)
CALCIUM SERPL-MCNC: 8.8 MG/DL (ref 8.3–10.6)
CALCIUM SERPL-MCNC: 8.9 MG/DL (ref 8.3–10.6)
CHLORIDE SERPL-SCNC: 95 MMOL/L (ref 99–110)
CHLORIDE SERPL-SCNC: 95 MMOL/L (ref 99–110)
CO2 SERPL-SCNC: 19 MMOL/L (ref 21–32)
CO2 SERPL-SCNC: 20 MMOL/L (ref 21–32)
CREAT SERPL-MCNC: 2.3 MG/DL (ref 0.8–1.3)
CREAT SERPL-MCNC: 2.4 MG/DL (ref 0.8–1.3)
CREAT UR-MCNC: 135 MG/DL (ref 39–259)
CREAT UR-MCNC: 136 MG/DL (ref 39–259)
DEPRECATED RDW RBC AUTO: 15.9 % (ref 12.4–15.4)
EOSINOPHIL # BLD: 0 K/UL (ref 0–0.6)
EOSINOPHIL NFR BLD: 0.2 %
GFR SERPLBLD CREATININE-BSD FMLA CKD-EPI: 29 ML/MIN/{1.73_M2}
GFR SERPLBLD CREATININE-BSD FMLA CKD-EPI: 30 ML/MIN/{1.73_M2}
GLUCOSE SERPL-MCNC: 121 MG/DL (ref 70–99)
GLUCOSE SERPL-MCNC: 73 MG/DL (ref 70–99)
HCT VFR BLD AUTO: 43.2 % (ref 40.5–52.5)
HGB BLD-MCNC: 14.1 G/DL (ref 13.5–17.5)
LYMPHOCYTES # BLD: 0.8 K/UL (ref 1–5.1)
LYMPHOCYTES NFR BLD: 18.4 %
MAGNESIUM SERPL-MCNC: 2.55 MG/DL (ref 1.8–2.4)
MCH RBC QN AUTO: 28 PG (ref 26–34)
MCHC RBC AUTO-ENTMCNC: 32.7 G/DL (ref 31–36)
MCV RBC AUTO: 85.7 FL (ref 80–100)
MICROALBUMIN UR DL<=1MG/L-MCNC: 9.63 MG/DL
MICROALBUMIN/CREAT UR: 71.3 MG/G (ref 0–30)
MONOCYTES # BLD: 0.5 K/UL (ref 0–1.3)
MONOCYTES NFR BLD: 12 %
NEUTROPHILS # BLD: 3.1 K/UL (ref 1.7–7.7)
NEUTROPHILS NFR BLD: 68.7 %
PHOSPHATE SERPL-MCNC: 4.4 MG/DL (ref 2.5–4.9)
PHOSPHATE SERPL-MCNC: 4.6 MG/DL (ref 2.5–4.9)
PLATELET # BLD AUTO: 173 K/UL (ref 135–450)
PMV BLD AUTO: 8.5 FL (ref 5–10.5)
POTASSIUM SERPL-SCNC: 4.8 MMOL/L (ref 3.5–5.1)
POTASSIUM SERPL-SCNC: 5.3 MMOL/L (ref 3.5–5.1)
POTASSIUM SERPL-SCNC: ABNORMAL MMOL/L (ref 3.5–5.1)
PROT SERPL-MCNC: 6.7 G/DL (ref 6.4–8.2)
PROT UR-MCNC: 28.7 MG/DL
PROT/CREAT UR-RTO: 0.2 MG/DL
RBC # BLD AUTO: 5.05 M/UL (ref 4.2–5.9)
REASON FOR REJECTION: NORMAL
REJECTED TEST: NORMAL
SODIUM SERPL-SCNC: 128 MMOL/L (ref 136–145)
SODIUM SERPL-SCNC: 132 MMOL/L (ref 136–145)
WBC # BLD AUTO: 4.6 K/UL (ref 4–11)

## 2025-08-28 PROCEDURE — 85025 COMPLETE CBC W/AUTO DIFF WBC: CPT

## 2025-08-28 PROCEDURE — 84132 ASSAY OF SERUM POTASSIUM: CPT

## 2025-08-28 PROCEDURE — 80069 RENAL FUNCTION PANEL: CPT

## 2025-08-28 PROCEDURE — 2500000003 HC RX 250 WO HCPCS: Performed by: INTERNAL MEDICINE

## 2025-08-28 PROCEDURE — 83735 ASSAY OF MAGNESIUM: CPT

## 2025-08-28 PROCEDURE — 51798 US URINE CAPACITY MEASURE: CPT

## 2025-08-28 PROCEDURE — 36415 COLL VENOUS BLD VENIPUNCTURE: CPT

## 2025-08-28 PROCEDURE — 6370000000 HC RX 637 (ALT 250 FOR IP): Performed by: INTERNAL MEDICINE

## 2025-08-28 PROCEDURE — 80076 HEPATIC FUNCTION PANEL: CPT

## 2025-08-28 PROCEDURE — 6370000000 HC RX 637 (ALT 250 FOR IP): Performed by: NURSE PRACTITIONER

## 2025-08-28 PROCEDURE — 99232 SBSQ HOSP IP/OBS MODERATE 35: CPT | Performed by: NURSE PRACTITIONER

## 2025-08-28 PROCEDURE — 94760 N-INVAS EAR/PLS OXIMETRY 1: CPT

## 2025-08-28 PROCEDURE — 1200000000 HC SEMI PRIVATE

## 2025-08-28 RX ADMIN — Medication 10 ML: at 22:54

## 2025-08-28 RX ADMIN — MIDODRINE HYDROCHLORIDE 5 MG: 5 TABLET ORAL at 17:10

## 2025-08-28 RX ADMIN — PANTOPRAZOLE SODIUM 40 MG: 40 TABLET, DELAYED RELEASE ORAL at 06:14

## 2025-08-28 RX ADMIN — METOPROLOL SUCCINATE 12.5 MG: 25 TABLET, EXTENDED RELEASE ORAL at 22:51

## 2025-08-28 RX ADMIN — AMIODARONE HYDROCHLORIDE 200 MG: 200 TABLET ORAL at 09:29

## 2025-08-28 RX ADMIN — TORSEMIDE 40 MG: 20 TABLET ORAL at 09:29

## 2025-08-28 RX ADMIN — MIDODRINE HYDROCHLORIDE 5 MG: 5 TABLET ORAL at 09:29

## 2025-08-28 RX ADMIN — APIXABAN 5 MG: 5 TABLET, FILM COATED ORAL at 09:29

## 2025-08-28 RX ADMIN — SODIUM ZIRCONIUM CYCLOSILICATE 5 G: 5 POWDER, FOR SUSPENSION ORAL at 17:10

## 2025-08-28 RX ADMIN — ATORVASTATIN CALCIUM 40 MG: 40 TABLET, FILM COATED ORAL at 22:54

## 2025-08-28 RX ADMIN — METOPROLOL SUCCINATE 12.5 MG: 25 TABLET, EXTENDED RELEASE ORAL at 09:29

## 2025-08-28 RX ADMIN — APIXABAN 5 MG: 5 TABLET, FILM COATED ORAL at 22:50

## 2025-08-28 RX ADMIN — Medication 10 ML: at 09:29

## 2025-08-28 ASSESSMENT — PAIN SCALES - GENERAL
PAINLEVEL_OUTOF10: 0
PAINLEVEL_OUTOF10: 0

## 2025-08-29 LAB
ALBUMIN SERPL-MCNC: 3.9 G/DL (ref 3.4–5)
ANION GAP SERPL CALCULATED.3IONS-SCNC: 15 MMOL/L (ref 3–16)
BACTERIA BLD CULT ORG #2: NORMAL
BACTERIA BLD CULT: NORMAL
BUN SERPL-MCNC: 57 MG/DL (ref 7–20)
CALCIUM SERPL-MCNC: 8.8 MG/DL (ref 8.3–10.6)
CHLORIDE SERPL-SCNC: 93 MMOL/L (ref 99–110)
CO2 SERPL-SCNC: 22 MMOL/L (ref 21–32)
CREAT SERPL-MCNC: 2.6 MG/DL (ref 0.8–1.3)
DEPRECATED RDW RBC AUTO: 16 % (ref 12.4–15.4)
GFR SERPLBLD CREATININE-BSD FMLA CKD-EPI: 26 ML/MIN/{1.73_M2}
GLUCOSE SERPL-MCNC: 108 MG/DL (ref 70–99)
HCT VFR BLD AUTO: 43.3 % (ref 40.5–52.5)
HGB BLD-MCNC: 14 G/DL (ref 13.5–17.5)
MAGNESIUM SERPL-MCNC: 2.63 MG/DL (ref 1.8–2.4)
MCH RBC QN AUTO: 27.6 PG (ref 26–34)
MCHC RBC AUTO-ENTMCNC: 32.3 G/DL (ref 31–36)
MCV RBC AUTO: 85.5 FL (ref 80–100)
PHOSPHATE SERPL-MCNC: 4.5 MG/DL (ref 2.5–4.9)
PLATELET # BLD AUTO: 168 K/UL (ref 135–450)
PMV BLD AUTO: 8.6 FL (ref 5–10.5)
POTASSIUM SERPL-SCNC: 5.3 MMOL/L (ref 3.5–5.1)
RBC # BLD AUTO: 5.06 M/UL (ref 4.2–5.9)
SODIUM SERPL-SCNC: 130 MMOL/L (ref 136–145)
WBC # BLD AUTO: 4.2 K/UL (ref 4–11)

## 2025-08-29 PROCEDURE — 80069 RENAL FUNCTION PANEL: CPT

## 2025-08-29 PROCEDURE — 2500000003 HC RX 250 WO HCPCS: Performed by: INTERNAL MEDICINE

## 2025-08-29 PROCEDURE — 6370000000 HC RX 637 (ALT 250 FOR IP): Performed by: INTERNAL MEDICINE

## 2025-08-29 PROCEDURE — 85027 COMPLETE CBC AUTOMATED: CPT

## 2025-08-29 PROCEDURE — 97161 PT EVAL LOW COMPLEX 20 MIN: CPT

## 2025-08-29 PROCEDURE — 6370000000 HC RX 637 (ALT 250 FOR IP): Performed by: NURSE PRACTITIONER

## 2025-08-29 PROCEDURE — 97535 SELF CARE MNGMENT TRAINING: CPT

## 2025-08-29 PROCEDURE — 99232 SBSQ HOSP IP/OBS MODERATE 35: CPT | Performed by: NURSE PRACTITIONER

## 2025-08-29 PROCEDURE — 83735 ASSAY OF MAGNESIUM: CPT

## 2025-08-29 PROCEDURE — 97165 OT EVAL LOW COMPLEX 30 MIN: CPT

## 2025-08-29 PROCEDURE — 97530 THERAPEUTIC ACTIVITIES: CPT

## 2025-08-29 PROCEDURE — 36415 COLL VENOUS BLD VENIPUNCTURE: CPT

## 2025-08-29 PROCEDURE — 6360000002 HC RX W HCPCS: Performed by: INTERNAL MEDICINE

## 2025-08-29 PROCEDURE — 1200000000 HC SEMI PRIVATE

## 2025-08-29 PROCEDURE — 97116 GAIT TRAINING THERAPY: CPT

## 2025-08-29 RX ORDER — FUROSEMIDE 10 MG/ML
40 INJECTION INTRAMUSCULAR; INTRAVENOUS 2 TIMES DAILY
Status: DISCONTINUED | OUTPATIENT
Start: 2025-08-29 | End: 2025-08-30

## 2025-08-29 RX ORDER — MIDODRINE HYDROCHLORIDE 5 MG/1
10 TABLET ORAL
Status: DISCONTINUED | OUTPATIENT
Start: 2025-08-29 | End: 2025-09-02 | Stop reason: HOSPADM

## 2025-08-29 RX ADMIN — Medication 10 ML: at 08:57

## 2025-08-29 RX ADMIN — MIDODRINE HYDROCHLORIDE 10 MG: 5 TABLET ORAL at 18:24

## 2025-08-29 RX ADMIN — FUROSEMIDE 40 MG: 10 INJECTION, SOLUTION INTRAMUSCULAR; INTRAVENOUS at 11:48

## 2025-08-29 RX ADMIN — TORSEMIDE 40 MG: 20 TABLET ORAL at 08:53

## 2025-08-29 RX ADMIN — MIDODRINE HYDROCHLORIDE 10 MG: 5 TABLET ORAL at 11:48

## 2025-08-29 RX ADMIN — AMIODARONE HYDROCHLORIDE 200 MG: 200 TABLET ORAL at 08:55

## 2025-08-29 RX ADMIN — APIXABAN 5 MG: 5 TABLET, FILM COATED ORAL at 21:05

## 2025-08-29 RX ADMIN — ATORVASTATIN CALCIUM 40 MG: 40 TABLET, FILM COATED ORAL at 21:05

## 2025-08-29 RX ADMIN — APIXABAN 5 MG: 5 TABLET, FILM COATED ORAL at 08:54

## 2025-08-29 RX ADMIN — Medication 10 ML: at 21:05

## 2025-08-29 RX ADMIN — MIDODRINE HYDROCHLORIDE 5 MG: 5 TABLET ORAL at 08:54

## 2025-08-29 RX ADMIN — METOPROLOL SUCCINATE 12.5 MG: 25 TABLET, EXTENDED RELEASE ORAL at 08:54

## 2025-08-29 RX ADMIN — PANTOPRAZOLE SODIUM 40 MG: 40 TABLET, DELAYED RELEASE ORAL at 08:54

## 2025-08-29 RX ADMIN — FUROSEMIDE 40 MG: 10 INJECTION, SOLUTION INTRAMUSCULAR; INTRAVENOUS at 18:24

## 2025-08-29 RX ADMIN — METOPROLOL SUCCINATE 12.5 MG: 25 TABLET, EXTENDED RELEASE ORAL at 21:05

## 2025-08-29 ASSESSMENT — PAIN SCALES - GENERAL
PAINLEVEL_OUTOF10: 0

## 2025-08-30 LAB
ALBUMIN SERPL-MCNC: 3.8 G/DL (ref 3.4–5)
ANION GAP SERPL CALCULATED.3IONS-SCNC: 15 MMOL/L (ref 3–16)
BUN SERPL-MCNC: 64 MG/DL (ref 7–20)
CALCIUM SERPL-MCNC: 9.3 MG/DL (ref 8.3–10.6)
CHLORIDE SERPL-SCNC: 93 MMOL/L (ref 99–110)
CO2 SERPL-SCNC: 24 MMOL/L (ref 21–32)
CREAT SERPL-MCNC: 2.8 MG/DL (ref 0.8–1.3)
CREAT UR-MCNC: 106 MG/DL (ref 39–259)
GFR SERPLBLD CREATININE-BSD FMLA CKD-EPI: 24 ML/MIN/{1.73_M2}
GLUCOSE SERPL-MCNC: 99 MG/DL (ref 70–99)
MAGNESIUM SERPL-MCNC: 2.59 MG/DL (ref 1.8–2.4)
PHOSPHATE SERPL-MCNC: 4.9 MG/DL (ref 2.5–4.9)
POTASSIUM SERPL-SCNC: 4.9 MMOL/L (ref 3.5–5.1)
SODIUM SERPL-SCNC: 132 MMOL/L (ref 136–145)
SODIUM UR-SCNC: <20 MMOL/L

## 2025-08-30 PROCEDURE — 6370000000 HC RX 637 (ALT 250 FOR IP): Performed by: INTERNAL MEDICINE

## 2025-08-30 PROCEDURE — 1200000000 HC SEMI PRIVATE

## 2025-08-30 PROCEDURE — 84300 ASSAY OF URINE SODIUM: CPT

## 2025-08-30 PROCEDURE — 99232 SBSQ HOSP IP/OBS MODERATE 35: CPT | Performed by: CLINICAL NURSE SPECIALIST

## 2025-08-30 PROCEDURE — 36415 COLL VENOUS BLD VENIPUNCTURE: CPT

## 2025-08-30 PROCEDURE — 6360000002 HC RX W HCPCS: Performed by: INTERNAL MEDICINE

## 2025-08-30 PROCEDURE — 2580000003 HC RX 258: Performed by: HOSPITALIST

## 2025-08-30 PROCEDURE — 6370000000 HC RX 637 (ALT 250 FOR IP): Performed by: NURSE PRACTITIONER

## 2025-08-30 PROCEDURE — P9047 ALBUMIN (HUMAN), 25%, 50ML: HCPCS | Performed by: INTERNAL MEDICINE

## 2025-08-30 PROCEDURE — 2500000003 HC RX 250 WO HCPCS: Performed by: INTERNAL MEDICINE

## 2025-08-30 PROCEDURE — 80069 RENAL FUNCTION PANEL: CPT

## 2025-08-30 PROCEDURE — 83735 ASSAY OF MAGNESIUM: CPT

## 2025-08-30 PROCEDURE — 82570 ASSAY OF URINE CREATININE: CPT

## 2025-08-30 PROCEDURE — 84540 ASSAY OF URINE/UREA-N: CPT

## 2025-08-30 RX ORDER — 0.9 % SODIUM CHLORIDE 0.9 %
250 INTRAVENOUS SOLUTION INTRAVENOUS ONCE
Status: COMPLETED | OUTPATIENT
Start: 2025-08-30 | End: 2025-08-30

## 2025-08-30 RX ORDER — ALBUMIN (HUMAN) 12.5 G/50ML
12.5 SOLUTION INTRAVENOUS EVERY 8 HOURS
Status: DISPENSED | OUTPATIENT
Start: 2025-08-30 | End: 2025-08-31

## 2025-08-30 RX ADMIN — ATORVASTATIN CALCIUM 40 MG: 40 TABLET, FILM COATED ORAL at 23:10

## 2025-08-30 RX ADMIN — FUROSEMIDE 40 MG: 10 INJECTION, SOLUTION INTRAMUSCULAR; INTRAVENOUS at 08:20

## 2025-08-30 RX ADMIN — PANTOPRAZOLE SODIUM 40 MG: 40 TABLET, DELAYED RELEASE ORAL at 08:20

## 2025-08-30 RX ADMIN — MIDODRINE HYDROCHLORIDE 10 MG: 5 TABLET ORAL at 08:19

## 2025-08-30 RX ADMIN — ALBUMIN (HUMAN) 12.5 G: 0.25 INJECTION, SOLUTION INTRAVENOUS at 13:05

## 2025-08-30 RX ADMIN — APIXABAN 5 MG: 5 TABLET, FILM COATED ORAL at 23:10

## 2025-08-30 RX ADMIN — SODIUM CHLORIDE 250 ML: 0.9 INJECTION, SOLUTION INTRAVENOUS at 18:34

## 2025-08-30 RX ADMIN — Medication 10 ML: at 08:20

## 2025-08-30 RX ADMIN — AMIODARONE HYDROCHLORIDE 200 MG: 200 TABLET ORAL at 08:19

## 2025-08-30 RX ADMIN — ALBUMIN (HUMAN) 12.5 G: 0.25 INJECTION, SOLUTION INTRAVENOUS at 23:09

## 2025-08-30 RX ADMIN — APIXABAN 5 MG: 5 TABLET, FILM COATED ORAL at 08:19

## 2025-08-30 RX ADMIN — MIDODRINE HYDROCHLORIDE 10 MG: 5 TABLET ORAL at 17:22

## 2025-08-30 RX ADMIN — Medication 10 ML: at 23:11

## 2025-08-30 RX ADMIN — MIDODRINE HYDROCHLORIDE 10 MG: 5 TABLET ORAL at 12:11

## 2025-08-30 RX ADMIN — METOPROLOL SUCCINATE 12.5 MG: 25 TABLET, EXTENDED RELEASE ORAL at 08:19

## 2025-08-30 ASSESSMENT — PAIN SCALES - GENERAL
PAINLEVEL_OUTOF10: 0

## 2025-08-31 LAB
ALBUMIN SERPL-MCNC: 4.1 G/DL (ref 3.4–5)
ALP SERPL-CCNC: 128 U/L (ref 40–129)
ALT SERPL-CCNC: 69 U/L (ref 10–40)
ANION GAP SERPL CALCULATED.3IONS-SCNC: 14 MMOL/L (ref 3–16)
AST SERPL-CCNC: 103 U/L (ref 15–37)
BILIRUB DIRECT SERPL-MCNC: 2.1 MG/DL (ref 0–0.3)
BILIRUB INDIRECT SERPL-MCNC: 2.9 MG/DL (ref 0–1)
BILIRUB SERPL-MCNC: 5 MG/DL (ref 0–1)
BUN SERPL-MCNC: 63 MG/DL (ref 7–20)
CALCIUM SERPL-MCNC: 9.4 MG/DL (ref 8.3–10.6)
CHLORIDE SERPL-SCNC: 95 MMOL/L (ref 99–110)
CO2 SERPL-SCNC: 25 MMOL/L (ref 21–32)
CREAT SERPL-MCNC: 2.6 MG/DL (ref 0.8–1.3)
GFR SERPLBLD CREATININE-BSD FMLA CKD-EPI: 26 ML/MIN/{1.73_M2}
GLUCOSE SERPL-MCNC: 85 MG/DL (ref 70–99)
MAGNESIUM SERPL-MCNC: 2.62 MG/DL (ref 1.8–2.4)
PHOSPHATE SERPL-MCNC: 4.1 MG/DL (ref 2.5–4.9)
POTASSIUM SERPL-SCNC: 4.7 MMOL/L (ref 3.5–5.1)
PROT SERPL-MCNC: 6.8 G/DL (ref 6.4–8.2)
SODIUM SERPL-SCNC: 134 MMOL/L (ref 136–145)
UUN UR-MCNC: 601 MG/DL (ref 800–1666)

## 2025-08-31 PROCEDURE — 94760 N-INVAS EAR/PLS OXIMETRY 1: CPT

## 2025-08-31 PROCEDURE — 83735 ASSAY OF MAGNESIUM: CPT

## 2025-08-31 PROCEDURE — 6370000000 HC RX 637 (ALT 250 FOR IP): Performed by: INTERNAL MEDICINE

## 2025-08-31 PROCEDURE — 6360000002 HC RX W HCPCS: Performed by: INTERNAL MEDICINE

## 2025-08-31 PROCEDURE — P9047 ALBUMIN (HUMAN), 25%, 50ML: HCPCS | Performed by: INTERNAL MEDICINE

## 2025-08-31 PROCEDURE — 80069 RENAL FUNCTION PANEL: CPT

## 2025-08-31 PROCEDURE — 36415 COLL VENOUS BLD VENIPUNCTURE: CPT

## 2025-08-31 PROCEDURE — 2500000003 HC RX 250 WO HCPCS: Performed by: INTERNAL MEDICINE

## 2025-08-31 PROCEDURE — 1200000000 HC SEMI PRIVATE

## 2025-08-31 PROCEDURE — 99233 SBSQ HOSP IP/OBS HIGH 50: CPT | Performed by: CLINICAL NURSE SPECIALIST

## 2025-08-31 PROCEDURE — 80076 HEPATIC FUNCTION PANEL: CPT

## 2025-08-31 RX ORDER — ALBUMIN (HUMAN) 12.5 G/50ML
12.5 SOLUTION INTRAVENOUS EVERY 8 HOURS
Status: COMPLETED | OUTPATIENT
Start: 2025-08-31 | End: 2025-09-01

## 2025-08-31 RX ADMIN — MIDODRINE HYDROCHLORIDE 10 MG: 5 TABLET ORAL at 07:43

## 2025-08-31 RX ADMIN — APIXABAN 5 MG: 5 TABLET, FILM COATED ORAL at 07:43

## 2025-08-31 RX ADMIN — APIXABAN 5 MG: 5 TABLET, FILM COATED ORAL at 21:48

## 2025-08-31 RX ADMIN — PANTOPRAZOLE SODIUM 40 MG: 40 TABLET, DELAYED RELEASE ORAL at 07:43

## 2025-08-31 RX ADMIN — Medication 10 ML: at 22:50

## 2025-08-31 RX ADMIN — ALBUMIN (HUMAN) 12.5 G: 0.25 INJECTION, SOLUTION INTRAVENOUS at 14:00

## 2025-08-31 RX ADMIN — MIDODRINE HYDROCHLORIDE 10 MG: 5 TABLET ORAL at 11:33

## 2025-08-31 RX ADMIN — Medication 10 ML: at 07:43

## 2025-08-31 RX ADMIN — ALBUMIN (HUMAN) 12.5 G: 0.25 INJECTION, SOLUTION INTRAVENOUS at 21:50

## 2025-08-31 RX ADMIN — ATORVASTATIN CALCIUM 40 MG: 40 TABLET, FILM COATED ORAL at 21:48

## 2025-08-31 RX ADMIN — AMIODARONE HYDROCHLORIDE 200 MG: 200 TABLET ORAL at 07:43

## 2025-08-31 RX ADMIN — MIDODRINE HYDROCHLORIDE 10 MG: 5 TABLET ORAL at 16:26

## 2025-08-31 ASSESSMENT — PAIN SCALES - GENERAL
PAINLEVEL_OUTOF10: 0
PAINLEVEL_OUTOF10: 0

## 2025-09-01 LAB
ALBUMIN SERPL-MCNC: 4 G/DL (ref 3.4–5)
ANION GAP SERPL CALCULATED.3IONS-SCNC: 13 MMOL/L (ref 3–16)
BUN SERPL-MCNC: 59 MG/DL (ref 7–20)
CALCIUM SERPL-MCNC: 8.6 MG/DL (ref 8.3–10.6)
CHLORIDE SERPL-SCNC: 96 MMOL/L (ref 99–110)
CO2 SERPL-SCNC: 24 MMOL/L (ref 21–32)
CREAT SERPL-MCNC: 2.2 MG/DL (ref 0.8–1.3)
GFR SERPLBLD CREATININE-BSD FMLA CKD-EPI: 32 ML/MIN/{1.73_M2}
GLUCOSE SERPL-MCNC: 83 MG/DL (ref 70–99)
MAGNESIUM SERPL-MCNC: 2.68 MG/DL (ref 1.8–2.4)
NT-PROBNP SERPL-MCNC: ABNORMAL PG/ML (ref 0–124)
PHOSPHATE SERPL-MCNC: 3.5 MG/DL (ref 2.5–4.9)
POTASSIUM SERPL-SCNC: 3.9 MMOL/L (ref 3.5–5.1)
SODIUM SERPL-SCNC: 133 MMOL/L (ref 136–145)

## 2025-09-01 PROCEDURE — 80069 RENAL FUNCTION PANEL: CPT

## 2025-09-01 PROCEDURE — 94760 N-INVAS EAR/PLS OXIMETRY 1: CPT

## 2025-09-01 PROCEDURE — 6360000002 HC RX W HCPCS: Performed by: INTERNAL MEDICINE

## 2025-09-01 PROCEDURE — P9047 ALBUMIN (HUMAN), 25%, 50ML: HCPCS | Performed by: INTERNAL MEDICINE

## 2025-09-01 PROCEDURE — 83735 ASSAY OF MAGNESIUM: CPT

## 2025-09-01 PROCEDURE — 83880 ASSAY OF NATRIURETIC PEPTIDE: CPT

## 2025-09-01 PROCEDURE — 1200000000 HC SEMI PRIVATE

## 2025-09-01 PROCEDURE — 94640 AIRWAY INHALATION TREATMENT: CPT

## 2025-09-01 PROCEDURE — 36415 COLL VENOUS BLD VENIPUNCTURE: CPT

## 2025-09-01 PROCEDURE — 6370000000 HC RX 637 (ALT 250 FOR IP): Performed by: INTERNAL MEDICINE

## 2025-09-01 PROCEDURE — 2500000003 HC RX 250 WO HCPCS: Performed by: INTERNAL MEDICINE

## 2025-09-01 RX ADMIN — MIDODRINE HYDROCHLORIDE 10 MG: 5 TABLET ORAL at 17:04

## 2025-09-01 RX ADMIN — MIDODRINE HYDROCHLORIDE 10 MG: 5 TABLET ORAL at 08:49

## 2025-09-01 RX ADMIN — APIXABAN 5 MG: 5 TABLET, FILM COATED ORAL at 19:44

## 2025-09-01 RX ADMIN — Medication 10 ML: at 19:45

## 2025-09-01 RX ADMIN — Medication 10 ML: at 08:50

## 2025-09-01 RX ADMIN — MIDODRINE HYDROCHLORIDE 10 MG: 5 TABLET ORAL at 12:19

## 2025-09-01 RX ADMIN — AMIODARONE HYDROCHLORIDE 200 MG: 200 TABLET ORAL at 08:50

## 2025-09-01 RX ADMIN — POLYETHYLENE GLYCOL 3350 17 G: 17 POWDER, FOR SOLUTION ORAL at 19:44

## 2025-09-01 RX ADMIN — ALBUMIN (HUMAN) 12.5 G: 0.25 INJECTION, SOLUTION INTRAVENOUS at 05:50

## 2025-09-01 RX ADMIN — APIXABAN 5 MG: 5 TABLET, FILM COATED ORAL at 08:49

## 2025-09-01 RX ADMIN — ALBUTEROL SULFATE 2.5 MG: 2.5 SOLUTION RESPIRATORY (INHALATION) at 22:54

## 2025-09-01 RX ADMIN — PANTOPRAZOLE SODIUM 40 MG: 40 TABLET, DELAYED RELEASE ORAL at 05:49

## 2025-09-01 RX ADMIN — ATORVASTATIN CALCIUM 40 MG: 40 TABLET, FILM COATED ORAL at 19:44

## 2025-09-01 ASSESSMENT — PAIN SCALES - GENERAL
PAINLEVEL_OUTOF10: 0

## 2025-09-02 VITALS
HEIGHT: 74 IN | SYSTOLIC BLOOD PRESSURE: 99 MMHG | BODY MASS INDEX: 24.67 KG/M2 | DIASTOLIC BLOOD PRESSURE: 66 MMHG | TEMPERATURE: 97.3 F | WEIGHT: 192.2 LBS | HEART RATE: 81 BPM | OXYGEN SATURATION: 100 % | RESPIRATION RATE: 20 BRPM

## 2025-09-02 LAB
ALBUMIN SERPL-MCNC: 4 G/DL (ref 3.4–5)
ANION GAP SERPL CALCULATED.3IONS-SCNC: 12 MMOL/L (ref 3–16)
BUN SERPL-MCNC: 57 MG/DL (ref 7–20)
CALCIUM SERPL-MCNC: 9 MG/DL (ref 8.3–10.6)
CHLORIDE SERPL-SCNC: 97 MMOL/L (ref 99–110)
CO2 SERPL-SCNC: 23 MMOL/L (ref 21–32)
CREAT SERPL-MCNC: 2.4 MG/DL (ref 0.8–1.3)
GFR SERPLBLD CREATININE-BSD FMLA CKD-EPI: 29 ML/MIN/{1.73_M2}
GLUCOSE SERPL-MCNC: 89 MG/DL (ref 70–99)
MAGNESIUM SERPL-MCNC: 2.82 MG/DL (ref 1.8–2.4)
PHOSPHATE SERPL-MCNC: 3.7 MG/DL (ref 2.5–4.9)
POTASSIUM SERPL-SCNC: 4.7 MMOL/L (ref 3.5–5.1)
SODIUM SERPL-SCNC: 132 MMOL/L (ref 136–145)

## 2025-09-02 PROCEDURE — 36415 COLL VENOUS BLD VENIPUNCTURE: CPT

## 2025-09-02 PROCEDURE — 80069 RENAL FUNCTION PANEL: CPT

## 2025-09-02 PROCEDURE — 83735 ASSAY OF MAGNESIUM: CPT

## 2025-09-02 PROCEDURE — 6370000000 HC RX 637 (ALT 250 FOR IP): Performed by: INTERNAL MEDICINE

## 2025-09-02 PROCEDURE — 99232 SBSQ HOSP IP/OBS MODERATE 35: CPT | Performed by: CLINICAL NURSE SPECIALIST

## 2025-09-02 PROCEDURE — 2500000003 HC RX 250 WO HCPCS: Performed by: INTERNAL MEDICINE

## 2025-09-02 RX ORDER — PANTOPRAZOLE SODIUM 40 MG/1
40 TABLET, DELAYED RELEASE ORAL
Qty: 30 TABLET | Refills: 3
Start: 2025-09-03

## 2025-09-02 RX ORDER — MORPHINE SULFATE 15 MG/1
15 TABLET ORAL EVERY 6 HOURS PRN
Refills: 0 | Status: DISCONTINUED | OUTPATIENT
Start: 2025-09-02 | End: 2025-09-02 | Stop reason: HOSPADM

## 2025-09-02 RX ORDER — MIDODRINE HYDROCHLORIDE 10 MG/1
10 TABLET ORAL
Qty: 90 TABLET | Refills: 3
Start: 2025-09-02

## 2025-09-02 RX ORDER — LORAZEPAM 0.5 MG/1
0.5 TABLET ORAL EVERY 4 HOURS PRN
Status: DISCONTINUED | OUTPATIENT
Start: 2025-09-02 | End: 2025-09-02 | Stop reason: HOSPADM

## 2025-09-02 RX ORDER — LORAZEPAM 0.5 MG/1
0.5 TABLET ORAL EVERY 4 HOURS PRN
Qty: 10 TABLET | Refills: 0 | Status: SHIPPED | OUTPATIENT
Start: 2025-09-02 | End: 2025-09-05

## 2025-09-02 RX ORDER — MORPHINE SULFATE 15 MG/1
15 TABLET ORAL EVERY 6 HOURS PRN
Qty: 10 TABLET | Refills: 0 | Status: SHIPPED | OUTPATIENT
Start: 2025-09-02 | End: 2025-09-05

## 2025-09-02 RX ADMIN — PANTOPRAZOLE SODIUM 40 MG: 40 TABLET, DELAYED RELEASE ORAL at 04:12

## 2025-09-02 RX ADMIN — Medication 10 ML: at 08:29

## 2025-09-02 RX ADMIN — APIXABAN 5 MG: 5 TABLET, FILM COATED ORAL at 08:23

## 2025-09-02 RX ADMIN — MIDODRINE HYDROCHLORIDE 10 MG: 5 TABLET ORAL at 08:23

## 2025-09-02 RX ADMIN — AMIODARONE HYDROCHLORIDE 200 MG: 200 TABLET ORAL at 08:23

## 2025-09-02 RX ADMIN — MIDODRINE HYDROCHLORIDE 10 MG: 5 TABLET ORAL at 12:04

## 2025-09-02 ASSESSMENT — ENCOUNTER SYMPTOMS
ALLERGIC/IMMUNOLOGIC NEGATIVE: 1
EYES NEGATIVE: 1
ABDOMINAL PAIN: 1
SHORTNESS OF BREATH: 1

## 2025-09-02 ASSESSMENT — PAIN SCALES - GENERAL: PAINLEVEL_OUTOF10: 0

## 2025-09-03 ENCOUNTER — TELEPHONE (OUTPATIENT)
Dept: PRIMARY CARE CLINIC | Age: 65
End: 2025-09-03

## (undated) DEVICE — CATHETER URETH 16FR BLLN 5CC 2 W F SPEC M OLV COUDE TIP

## (undated) DEVICE — GUIDEWIRE ENDOSCP L150CM DIA0.035IN TIP 3CM PTFE NIT

## (undated) DEVICE — CATHETER URETH 16FR BLLN 5CC STD LTX 2 W F TWO OPP DRNGE

## (undated) DEVICE — CATHETER URETH 18FR BLLN 5CC SIL HYDRGEL 2 W F SPEC CARS M